# Patient Record
Sex: MALE | Race: WHITE | NOT HISPANIC OR LATINO | Employment: FULL TIME | URBAN - METROPOLITAN AREA
[De-identification: names, ages, dates, MRNs, and addresses within clinical notes are randomized per-mention and may not be internally consistent; named-entity substitution may affect disease eponyms.]

---

## 2017-01-04 ENCOUNTER — HOSPITAL ENCOUNTER (OUTPATIENT)
Facility: MEDICAL CENTER | Age: 52
End: 2017-01-05
Attending: EMERGENCY MEDICINE | Admitting: HOSPITALIST
Payer: COMMERCIAL

## 2017-01-04 ENCOUNTER — RESOLUTE PROFESSIONAL BILLING HOSPITAL PROF FEE (OUTPATIENT)
Dept: HOSPITALIST | Facility: MEDICAL CENTER | Age: 52
End: 2017-01-04
Payer: COMMERCIAL

## 2017-01-04 DIAGNOSIS — M54.50 ACUTE MIDLINE LOW BACK PAIN WITHOUT SCIATICA: ICD-10-CM

## 2017-01-04 DIAGNOSIS — R73.9 HYPERGLYCEMIA: ICD-10-CM

## 2017-01-04 DIAGNOSIS — E10.10 DIABETIC KETOACIDOSIS WITHOUT COMA ASSOCIATED WITH TYPE 1 DIABETES MELLITUS (HCC): ICD-10-CM

## 2017-01-04 PROBLEM — E11.10 DKA (DIABETIC KETOACIDOSES): Status: ACTIVE | Noted: 2017-01-04

## 2017-01-04 LAB
ALBUMIN SERPL BCP-MCNC: 4.5 G/DL (ref 3.2–4.9)
ALBUMIN SERPL BCP-MCNC: 4.7 G/DL (ref 3.2–4.9)
ALBUMIN/GLOB SERPL: 1.6 G/DL
ALBUMIN/GLOB SERPL: 1.6 G/DL
ALP SERPL-CCNC: 68 U/L (ref 30–99)
ALP SERPL-CCNC: 84 U/L (ref 30–99)
ALT SERPL-CCNC: 23 U/L (ref 2–50)
ALT SERPL-CCNC: 25 U/L (ref 2–50)
ANION GAP SERPL CALC-SCNC: 11 MMOL/L (ref 0–11.9)
ANION GAP SERPL CALC-SCNC: 15 MMOL/L (ref 0–11.9)
AST SERPL-CCNC: 10 U/L (ref 12–45)
AST SERPL-CCNC: 12 U/L (ref 12–45)
BASOPHILS # BLD AUTO: 0.1 % (ref 0–1.8)
BASOPHILS # BLD: 0.01 K/UL (ref 0–0.12)
BILIRUB SERPL-MCNC: 0.3 MG/DL (ref 0.1–1.5)
BILIRUB SERPL-MCNC: 0.5 MG/DL (ref 0.1–1.5)
BLOOD CULTURE HOLD CXBCH: NORMAL
BUN SERPL-MCNC: 23 MG/DL (ref 8–22)
BUN SERPL-MCNC: 28 MG/DL (ref 8–22)
CALCIUM SERPL-MCNC: 10.4 MG/DL (ref 8.5–10.5)
CALCIUM SERPL-MCNC: 9.7 MG/DL (ref 8.5–10.5)
CHLORIDE SERPL-SCNC: 105 MMOL/L (ref 96–112)
CHLORIDE SERPL-SCNC: 96 MMOL/L (ref 96–112)
CO2 SERPL-SCNC: 18 MMOL/L (ref 20–33)
CO2 SERPL-SCNC: 20 MMOL/L (ref 20–33)
CREAT SERPL-MCNC: 0.83 MG/DL (ref 0.5–1.4)
CREAT SERPL-MCNC: 1.04 MG/DL (ref 0.5–1.4)
EOSINOPHIL # BLD AUTO: 0 K/UL (ref 0–0.51)
EOSINOPHIL NFR BLD: 0 % (ref 0–6.9)
ERYTHROCYTE [DISTWIDTH] IN BLOOD BY AUTOMATED COUNT: 41.3 FL (ref 35.9–50)
EST. AVERAGE GLUCOSE BLD GHB EST-MCNC: 269 MG/DL
GFR SERPL CREATININE-BSD FRML MDRD: >60 ML/MIN/1.73 M 2
GFR SERPL CREATININE-BSD FRML MDRD: >60 ML/MIN/1.73 M 2
GLOBULIN SER CALC-MCNC: 2.8 G/DL (ref 1.9–3.5)
GLOBULIN SER CALC-MCNC: 2.9 G/DL (ref 1.9–3.5)
GLUCOSE BLD-MCNC: 298 MG/DL (ref 65–99)
GLUCOSE BLD-MCNC: 344 MG/DL (ref 65–99)
GLUCOSE BLD-MCNC: 384 MG/DL (ref 65–99)
GLUCOSE BLD-MCNC: 532 MG/DL (ref 65–99)
GLUCOSE SERPL-MCNC: 417 MG/DL (ref 65–99)
GLUCOSE SERPL-MCNC: 728 MG/DL (ref 65–99)
HBA1C MFR BLD: 11 % (ref 0–5.6)
HCT VFR BLD AUTO: 39.2 % (ref 42–52)
HGB BLD-MCNC: 14 G/DL (ref 14–18)
IMM GRANULOCYTES # BLD AUTO: 0.06 K/UL (ref 0–0.11)
IMM GRANULOCYTES NFR BLD AUTO: 0.6 % (ref 0–0.9)
LACTATE BLD-SCNC: 1.6 MMOL/L (ref 0.5–2)
LACTATE BLD-SCNC: 3.4 MMOL/L (ref 0.5–2)
LIPASE SERPL-CCNC: 20 U/L (ref 11–82)
LYMPHOCYTES # BLD AUTO: 0.9 K/UL (ref 1–4.8)
LYMPHOCYTES NFR BLD: 9.5 % (ref 22–41)
MCH RBC QN AUTO: 31.1 PG (ref 27–33)
MCHC RBC AUTO-ENTMCNC: 35.7 G/DL (ref 33.7–35.3)
MCV RBC AUTO: 87.1 FL (ref 81.4–97.8)
MONOCYTES # BLD AUTO: 0.33 K/UL (ref 0–0.85)
MONOCYTES NFR BLD AUTO: 3.5 % (ref 0–13.4)
NEUTROPHILS # BLD AUTO: 8.21 K/UL (ref 1.82–7.42)
NEUTROPHILS NFR BLD: 86.3 % (ref 44–72)
NRBC # BLD AUTO: 0 K/UL
NRBC BLD AUTO-RTO: 0 /100 WBC
PLATELET # BLD AUTO: 316 K/UL (ref 164–446)
PMV BLD AUTO: 9.5 FL (ref 9–12.9)
POTASSIUM SERPL-SCNC: 3.7 MMOL/L (ref 3.6–5.5)
POTASSIUM SERPL-SCNC: 4.4 MMOL/L (ref 3.6–5.5)
PROT SERPL-MCNC: 7.3 G/DL (ref 6–8.2)
PROT SERPL-MCNC: 7.6 G/DL (ref 6–8.2)
RBC # BLD AUTO: 4.5 M/UL (ref 4.7–6.1)
SODIUM SERPL-SCNC: 129 MMOL/L (ref 135–145)
SODIUM SERPL-SCNC: 136 MMOL/L (ref 135–145)
WBC # BLD AUTO: 9.5 K/UL (ref 4.8–10.8)

## 2017-01-04 PROCEDURE — 700102 HCHG RX REV CODE 250 W/ 637 OVERRIDE(OP): Performed by: HOSPITALIST

## 2017-01-04 PROCEDURE — 83036 HEMOGLOBIN GLYCOSYLATED A1C: CPT

## 2017-01-04 PROCEDURE — 700111 HCHG RX REV CODE 636 W/ 250 OVERRIDE (IP): Performed by: EMERGENCY MEDICINE

## 2017-01-04 PROCEDURE — 96372 THER/PROPH/DIAG INJ SC/IM: CPT

## 2017-01-04 PROCEDURE — 83605 ASSAY OF LACTIC ACID: CPT

## 2017-01-04 PROCEDURE — 99285 EMERGENCY DEPT VISIT HI MDM: CPT

## 2017-01-04 PROCEDURE — 700105 HCHG RX REV CODE 258: Performed by: EMERGENCY MEDICINE

## 2017-01-04 PROCEDURE — 96376 TX/PRO/DX INJ SAME DRUG ADON: CPT

## 2017-01-04 PROCEDURE — G0378 HOSPITAL OBSERVATION PER HR: HCPCS

## 2017-01-04 PROCEDURE — 302128 INFUSION PUMP W/POLE: Performed by: EMERGENCY MEDICINE

## 2017-01-04 PROCEDURE — 83690 ASSAY OF LIPASE: CPT

## 2017-01-04 PROCEDURE — 700102 HCHG RX REV CODE 250 W/ 637 OVERRIDE(OP): Performed by: EMERGENCY MEDICINE

## 2017-01-04 PROCEDURE — 36415 COLL VENOUS BLD VENIPUNCTURE: CPT

## 2017-01-04 PROCEDURE — 700105 HCHG RX REV CODE 258: Performed by: HOSPITALIST

## 2017-01-04 PROCEDURE — 85025 COMPLETE CBC W/AUTO DIFF WBC: CPT

## 2017-01-04 PROCEDURE — 96365 THER/PROPH/DIAG IV INF INIT: CPT

## 2017-01-04 PROCEDURE — 96361 HYDRATE IV INFUSION ADD-ON: CPT

## 2017-01-04 PROCEDURE — 80053 COMPREHEN METABOLIC PANEL: CPT

## 2017-01-04 PROCEDURE — A9270 NON-COVERED ITEM OR SERVICE: HCPCS | Performed by: HOSPITALIST

## 2017-01-04 PROCEDURE — 99220 PR INITIAL OBSERVATION CARE,LEVL III: CPT | Performed by: HOSPITALIST

## 2017-01-04 PROCEDURE — 96375 TX/PRO/DX INJ NEW DRUG ADDON: CPT

## 2017-01-04 PROCEDURE — 700111 HCHG RX REV CODE 636 W/ 250 OVERRIDE (IP): Performed by: HOSPITALIST

## 2017-01-04 PROCEDURE — 82962 GLUCOSE BLOOD TEST: CPT

## 2017-01-04 RX ORDER — MORPHINE SULFATE 4 MG/ML
4 INJECTION, SOLUTION INTRAMUSCULAR; INTRAVENOUS ONCE
Status: COMPLETED | OUTPATIENT
Start: 2017-01-04 | End: 2017-01-04

## 2017-01-04 RX ORDER — RIFAMPIN 300 MG/1
300 CAPSULE ORAL 2 TIMES DAILY
Status: ON HOLD | COMMUNITY
End: 2017-01-05

## 2017-01-04 RX ORDER — MORPHINE SULFATE 4 MG/ML
2 INJECTION, SOLUTION INTRAMUSCULAR; INTRAVENOUS ONCE
Status: COMPLETED | OUTPATIENT
Start: 2017-01-04 | End: 2017-01-04

## 2017-01-04 RX ORDER — BUPROPION HYDROCHLORIDE 300 MG/1
300 TABLET ORAL EVERY MORNING
Status: DISCONTINUED | OUTPATIENT
Start: 2017-01-04 | End: 2017-01-04

## 2017-01-04 RX ORDER — HYDROMORPHONE HYDROCHLORIDE 2 MG/1
2 TABLET ORAL
Status: DISCONTINUED | OUTPATIENT
Start: 2017-01-04 | End: 2017-01-05 | Stop reason: HOSPADM

## 2017-01-04 RX ORDER — ATORVASTATIN CALCIUM 20 MG/1
20 TABLET, FILM COATED ORAL EVERY EVENING
Status: DISCONTINUED | OUTPATIENT
Start: 2017-01-04 | End: 2017-01-05 | Stop reason: HOSPADM

## 2017-01-04 RX ORDER — SODIUM CHLORIDE 9 MG/ML
INJECTION, SOLUTION INTRAVENOUS CONTINUOUS
Status: DISCONTINUED | OUTPATIENT
Start: 2017-01-04 | End: 2017-01-05 | Stop reason: HOSPADM

## 2017-01-04 RX ORDER — SODIUM CHLORIDE 9 MG/ML
INJECTION, SOLUTION INTRAVENOUS CONTINUOUS
Status: DISCONTINUED | OUTPATIENT
Start: 2017-01-04 | End: 2017-01-04

## 2017-01-04 RX ORDER — SODIUM CHLORIDE 9 MG/ML
1000 INJECTION, SOLUTION INTRAVENOUS CONTINUOUS
Status: ACTIVE | OUTPATIENT
Start: 2017-01-04 | End: 2017-01-04

## 2017-01-04 RX ORDER — ONDANSETRON 4 MG/1
4 TABLET, ORALLY DISINTEGRATING ORAL EVERY 4 HOURS PRN
Status: DISCONTINUED | OUTPATIENT
Start: 2017-01-04 | End: 2017-01-05 | Stop reason: HOSPADM

## 2017-01-04 RX ORDER — PROMETHAZINE HYDROCHLORIDE 25 MG/1
12.5-25 TABLET ORAL EVERY 4 HOURS PRN
Status: DISCONTINUED | OUTPATIENT
Start: 2017-01-04 | End: 2017-01-05 | Stop reason: HOSPADM

## 2017-01-04 RX ORDER — AMOXICILLIN AND CLAVULANATE POTASSIUM 875; 125 MG/1; MG/1
1 TABLET, FILM COATED ORAL 2 TIMES DAILY
Status: ON HOLD | COMMUNITY
End: 2017-01-05

## 2017-01-04 RX ORDER — ONDANSETRON 2 MG/ML
4 INJECTION INTRAMUSCULAR; INTRAVENOUS EVERY 4 HOURS PRN
Status: DISCONTINUED | OUTPATIENT
Start: 2017-01-04 | End: 2017-01-05 | Stop reason: HOSPADM

## 2017-01-04 RX ORDER — ONDANSETRON 2 MG/ML
4 INJECTION INTRAMUSCULAR; INTRAVENOUS ONCE
Status: COMPLETED | OUTPATIENT
Start: 2017-01-04 | End: 2017-01-04

## 2017-01-04 RX ORDER — LACTOBACILLUS RHAMNOSUS GG 10B CELL
1 CAPSULE ORAL DAILY
COMMUNITY
End: 2017-01-16

## 2017-01-04 RX ORDER — THYROID 30 MG/1
75 TABLET ORAL DAILY
Status: DISCONTINUED | OUTPATIENT
Start: 2017-01-05 | End: 2017-01-05 | Stop reason: HOSPADM

## 2017-01-04 RX ORDER — ACETAMINOPHEN 325 MG/1
650 TABLET ORAL EVERY 6 HOURS PRN
Status: DISCONTINUED | OUTPATIENT
Start: 2017-01-04 | End: 2017-01-05 | Stop reason: HOSPADM

## 2017-01-04 RX ORDER — PROMETHAZINE HYDROCHLORIDE 25 MG/1
12.5-25 SUPPOSITORY RECTAL EVERY 4 HOURS PRN
Status: DISCONTINUED | OUTPATIENT
Start: 2017-01-04 | End: 2017-01-05 | Stop reason: HOSPADM

## 2017-01-04 RX ORDER — INSULIN GLARGINE 100 [IU]/ML
22 INJECTION, SOLUTION SUBCUTANEOUS EVERY EVENING
Status: DISCONTINUED | OUTPATIENT
Start: 2017-01-04 | End: 2017-01-05 | Stop reason: HOSPADM

## 2017-01-04 RX ORDER — SODIUM CHLORIDE 9 MG/ML
1000 INJECTION, SOLUTION INTRAVENOUS CONTINUOUS
Status: DISCONTINUED | OUTPATIENT
Start: 2017-01-04 | End: 2017-01-04

## 2017-01-04 RX ORDER — BUPROPION HYDROCHLORIDE 150 MG/1
150 TABLET, EXTENDED RELEASE ORAL 2 TIMES DAILY
Status: DISCONTINUED | OUTPATIENT
Start: 2017-01-04 | End: 2017-01-05 | Stop reason: HOSPADM

## 2017-01-04 RX ORDER — LACTOBACILLUS RHAMNOSUS GG 10B CELL
1 CAPSULE ORAL DAILY
Status: DISCONTINUED | OUTPATIENT
Start: 2017-01-04 | End: 2017-01-04

## 2017-01-04 RX ORDER — MESALAMINE 1.2 G/1
2.4 TABLET, DELAYED RELEASE ORAL 2 TIMES DAILY
Status: DISCONTINUED | OUTPATIENT
Start: 2017-01-04 | End: 2017-01-05 | Stop reason: HOSPADM

## 2017-01-04 RX ORDER — DEXTROSE MONOHYDRATE 25 G/50ML
25 INJECTION, SOLUTION INTRAVENOUS
Status: DISCONTINUED | OUTPATIENT
Start: 2017-01-04 | End: 2017-01-05 | Stop reason: HOSPADM

## 2017-01-04 RX ADMIN — HYDROMORPHONE HYDROCHLORIDE 1 MG: 1 INJECTION, SOLUTION INTRAMUSCULAR; INTRAVENOUS; SUBCUTANEOUS at 12:24

## 2017-01-04 RX ADMIN — HYDROMORPHONE HYDROCHLORIDE 2 MG: 2 TABLET ORAL at 21:30

## 2017-01-04 RX ADMIN — INSULIN HUMAN 10 UNITS: 100 INJECTION, SOLUTION PARENTERAL at 13:29

## 2017-01-04 RX ADMIN — INSULIN GLARGINE 22 UNITS: 100 INJECTION, SOLUTION SUBCUTANEOUS at 21:31

## 2017-01-04 RX ADMIN — SODIUM CHLORIDE 1000 ML: 9 INJECTION, SOLUTION INTRAVENOUS at 16:12

## 2017-01-04 RX ADMIN — INSULIN LISPRO 10 UNITS: 100 INJECTION, SOLUTION INTRAVENOUS; SUBCUTANEOUS at 17:51

## 2017-01-04 RX ADMIN — SODIUM CHLORIDE: 9 INJECTION, SOLUTION INTRAVENOUS at 21:35

## 2017-01-04 RX ADMIN — BUPROPION HYDROCHLORIDE 150 MG: 150 TABLET, FILM COATED, EXTENDED RELEASE ORAL at 21:30

## 2017-01-04 RX ADMIN — ENOXAPARIN SODIUM 40 MG: 100 INJECTION SUBCUTANEOUS at 16:12

## 2017-01-04 RX ADMIN — SODIUM CHLORIDE: 9 INJECTION, SOLUTION INTRAVENOUS at 10:56

## 2017-01-04 RX ADMIN — ONDANSETRON 4 MG: 2 INJECTION, SOLUTION INTRAMUSCULAR; INTRAVENOUS at 10:56

## 2017-01-04 RX ADMIN — MORPHINE SULFATE 4 MG: 4 INJECTION INTRAVENOUS at 10:56

## 2017-01-04 RX ADMIN — ATORVASTATIN CALCIUM 20 MG: 20 TABLET, FILM COATED ORAL at 21:30

## 2017-01-04 RX ADMIN — SODIUM CHLORIDE: 9 INJECTION, SOLUTION INTRAVENOUS at 13:45

## 2017-01-04 RX ADMIN — SODIUM CHLORIDE 10 UNITS/HR: 9 INJECTION, SOLUTION INTRAVENOUS at 13:50

## 2017-01-04 RX ADMIN — INSULIN LISPRO 7 UNITS: 100 INJECTION, SOLUTION INTRAVENOUS; SUBCUTANEOUS at 21:32

## 2017-01-04 RX ADMIN — MORPHINE SULFATE 2 MG: 4 INJECTION INTRAVENOUS at 13:45

## 2017-01-04 ASSESSMENT — PAIN SCALES - GENERAL
PAINLEVEL_OUTOF10: 3
PAINLEVEL_OUTOF10: 5
PAINLEVEL_OUTOF10: ASSUMED PAIN PRESENT
PAINLEVEL_OUTOF10: 5

## 2017-01-04 ASSESSMENT — LIFESTYLE VARIABLES
ALCOHOL_USE: NO
EVER_SMOKED: NEVER

## 2017-01-04 NOTE — ED PROVIDER NOTES
ED Provider Note    CHIEF COMPLAINT  No chief complaint on file.      HPI  Mahesh Bradshaw is a 51 y.o. male who presents with history of hyperglycemia. Evidently he checked his glucose this morning, 6:30 AM, her glucose was 391. He checked his glucose again at 9:30 AM this morning and it read,high. History of insulin-dependent diabetes, last insulin 6:30 AM this morning. He did have an injection, epidural, 60 mg of Kenalog, epidural injection 6:30 PM last night. No fever no chills nausea and vomiting, 9 AM last vomiting here no diarrhea no chest pain no abdominal pain.      REVIEW OF SYSTEMS  See HPI for further details. History of chronic back pain, recent epidural injection, 40 mg Kenalog, 6:30 PM last night. History of asthma high-grade headaches depression diabetes kidney stones doubt sepsis. Recent umbilical hernia surgery. Was followed by a seroma, infection, his last Augmentin was last night.     All other systems are negative.     PAST MEDICAL HISTORY  Past Medical History   Diagnosis Date   • ASTHMA    • Migraine    • Hypothyroid    • Depression    • Depression 11/26/2014   • Kidney stones    • Gout    • Sepsis ([facility]) 1/21/2016   • Essential hypertension 1/22/2016   • Diabetes ([facility])    • ADRIANE (acute kidney injury) ([facility]) 2/24/2016       FAMILY HISTORY  No family history on file.    SOCIAL HISTORY  Social History     Social History   • Marital Status:      Spouse Name: N/A   • Number of Children: N/A   • Years of Education: N/A     Social History Main Topics   • Smoking status: Never Smoker    • Smokeless tobacco: Not on file   • Alcohol Use: Yes      Comment: occ   • Drug Use: No   • Sexual Activity: Not on file     Other Topics Concern   • Not on file     Social History Narrative    ** Merged History Encounter **         ** Merged History Encounter **            SURGICAL HISTORY  Past Surgical History   Procedure Laterality Date   • Other orthopedic surgery       Left Wrist ORIF   • Carmenza by  laparoscopy     • Colonoscopy with biopsy  11/26/2014     Performed by Solo Higginbotham M.D. at ENDOSCOPY [facility]   • Umbilical hernia repair N/A 11/6/2016     Procedure: UMBILICAL HERNIA REPAIR;  Surgeon: Esau Dooley M.D.;  Location: SURGERY [facility];  Service:        CURRENT MEDICATIONS  Home Medications     **Home medications have not yet been reviewed for this encounter**          ALLERGIES  Allergies   Allergen Reactions   • Peanut-Derived Anaphylaxis     Peanuts    • Hydrocodone-Acetaminophen Hives     Has tolerated morphine and oxycodone previously        PHYSICAL EXAM  VITAL SIGNS: There were no vitals taken for this visit.  Constitutional: Well developed, Well nourished, No acute distress, Non-toxic appearance.   HENT: Normocephalic, Atraumatic, Bilateral external ears normal, Oropharynx moist, No oral exudates, Nose normal.   Eyes: PERRL, EOMI, Conjunctiva normal, No discharge.   Neck: Normal range of motion, No tenderness, Supple, No stridor.   Lymphatic: No lymphadenopathy noted.   Cardiovascular: Normal heart rate, Normal rhythm, No murmurs, No rubs, No gallops.   Thorax & Lungs: Normal breath sounds, No respiratory distress, No wheezing, No chest tenderness.   Abdomen: Slightly tender umbilicus where he has had recent surgery, no guarding no rigidity and the abdomen is soft.  No masses, No pulsatile masses.  Skin: Warm, Dry, No erythema, No rash.   Back: Examination of the back reveals tender lumbar. Straight leg raise is positive bilaterally. Deep tendon reflexes equal bilaterally. Toe and heel flexion and extension are normal. Motor sensory exam of the lower legs is normal  Extremities: Intact distal pulses, No edema, No tenderness, No cyanosis, No clubbing.   Musculoskeletal: Good range of motion in all major joints. No tenderness to palpation or major deformities noted.   Neurologic: Alert & oriented x 3, Normal motor function, Normal sensory function, No focal deficits  noted.   Psychiatric: Affect normal, Judgment normal, Mood normal.       RADIOLOGY/PROCEDURES      COURSE & MEDICAL DECISION MAKING  Pertinent Labs & Imaging studies reviewed. (See chart for details) electrolytes, sodium low 129 bicarb of 18, glucose 728.  He is given insulin, regular insulin, 10 unit bolus, then a 0.1/kg per hour drip of regular insulin, IV normal saline, thousand cc an hour.. I will talk with the hospitalist about admission    FINAL IMPRESSION  1.   1. Hyperglycemia        2. Diabetic ketoacidosis     3. Low back pain     Disposition  I have talked with the hospitalist about admission. He had epidural injection yesterday, still having back pain, given narcotic analgesics for his back pain.  Electronically signed by: Brady Posadas, 1/4/2017 10:43 AM

## 2017-01-04 NOTE — ED NOTES
Chief Complaint   Patient presents with   • High Blood Sugar   • Abdominal Pain     1030-Patient states he had a steroid shot yesterday for chronic back pain, FSBS at home will not give reading just shows high. Normal insulin regimen not controlling BS. Patient c/o N/V. Also has abdominal pain, but states recent history of hernia surgery.

## 2017-01-04 NOTE — DISCHARGE PLANNING
Care Transition Team Assessment    Information Source  Orientation : Oriented x 4  Who is responsible for making decisions for patient? : Patient  Source of Information: Patient  Primary Caregiver for Others?: Minor child  Why do you believe you were admitted?: high blood sugar    Readmission Evaluation  Is this a readmission?: No    Elopement Risk  Legal Hold: No  Ambulatory or Self Mobile in Wheelchair: No-Not an Elopement Risk    Interdisciplinary Discharge Planning  Does Admitting Nurse Feel This Could be a Complex Discharge?: No  Primary Care Physician: Rodolfo Stein  Lives with - Patient's Self Care Capacity: Significant Other, Child Less than 18 Years of Age  Support Systems: Spouse / Significant Other, Friends / Neighbors  Housing / Facility: [address] [place] [address]  Do You Take your Prescribed Medications Regularly: Yes  Able to Return to Previous ADL's: Yes  Mobility Issues: No  Prior Services: None  Durable Medical Equipment: Not Applicable    Discharge Preparedness  [facility] resources needed?: None  Do you have concerns about your hospital stay or care after discharge?: No  Difficulity with ADLs: None  Difficulity with IADLs: None  Pharmacy: [facility] in [facility]  Prescription Coverage: Yes    Functional Assesment  Prior Functional Level: Independent with Activities of Daily Living    Finances  Source of Income: Employed  Medical Insurance Coverage: Private insurance              Advance Directive  Advance Directive?: Living Will  Document Location: Not available (place on chart w/in 36 hrs)              Discharge Risks or Barriers  Discharge risks or barriers?: No  Patient risk factors:  (multiple admissions 2016)    Anticipated Discharge Information  Anticipated discharge disposition: Home  Discharge Address: [address] [place]  Discharge Contact Phone Number: [phone]

## 2017-01-04 NOTE — ED NOTES
Med rec complete per pt  Allergies reviewed   Pt normally takes Arimidex and Depo-Testosterone on Tuesdays, but Pt was on vacation.  Allergies reviewed  Pt [facility]

## 2017-01-04 NOTE — IP AVS SNAPSHOT
" After Visit Summary                                                                                                                  Name:Mahesh Bradshaw  Medical Record Number:[id number]  CSN: [id number]    YOB: 1965   Age: 51 y.o.  Sex: male  HT:1.727 m (5' 8\") WT: 102.967 kg (227 lb)          Admit Date: 1/4/2017     Discharge Date:   Today's Date: 1/5/2017  Attending Doctor:  Carlos Kapoor M.D.                  Allergies:  Peanut-derived; Tradjenta; and Hydrocodone-acetaminophen            Discharge Instructions       Discharge Instructions    Discharged to home by car with relative. Discharged via wheelchair, hospital escort: Yes.  Special equipment needed: Not Applicable    Be sure to schedule a follow-up appointment with your primary care doctor or any specialists as instructed.     Discharge Plan:   Diet Plan: Discussed  Activity Level: Discussed  Confirmed Follow up Appointment: Patient to Call and Schedule Appointment  Confirmed Symptoms Management: Discussed  Medication Reconciliation Updated: Yes  Influenza Vaccine Indication: Not indicated: Previously immunized this influenza season and > 8 years of age    I understand that a diet low in cholesterol, fat, and sodium is recommended for good health. Unless I have been given specific instructions below for another diet, I accept this instruction as my diet prescription.   Other diet: DIABETIC    Special Instructions: None    · Is patient discharged on Warfarin / Coumadin?   No     · Is patient Post Blood Transfusion?  No    Depression / Suicide Risk    As you are discharged from this [facility], it is important to learn how to keep safe from harming yourself.    Recognize the warning signs:  · Abrupt changes in personality, positive or negative- including increase in energy   · Giving away possessions  · Change in eating patterns- significant weight changes-  positive or negative  · Change in sleeping patterns- unable to sleep " or sleeping all the time   · Unwillingness or inability to communicate  · Depression  · Unusual sadness, discouragement and loneliness  · Talk of wanting to die  · Neglect of personal appearance   · Rebelliousness- reckless behavior  · Withdrawal from people/activities they love  · Confusion- inability to concentrate     If you or a loved one observes any of these behaviors or has concerns about self-harm, here's what you can do:  · Talk about it- your feelings and reasons for harming yourself  · Remove any means that you might use to hurt yourself (examples: pills, rope, extension cords, firearm)  · Get professional help from the community (Mental Health, Substance Abuse, psychological counseling)  · Do not be alone:Call your Safe Contact- someone whom you trust who will be there for you.  · Call your local CRISIS HOTLINE 998-5025 or 741-488-3346  · Call your local Children's Mobile Crisis Response Team [place] (285) 795-9758 or www.[employer]  · Call the toll free National Suicide Prevention Hotlines   · National Suicide Prevention Lifeline 059-240-KWQE (9140)  · [employer] Hope Line Network 800-SUICIDE (868-6468)        Follow-up Information     1. Follow up with Rodolfo Stein M.D.. Schedule an appointment as soon as possible for a visit in 1 week.    Specialty:  Internal Medicine    Contact information    [address]  [address]  [facility] [address]  [phone]           Discharge Medication Instructions:    Below are the medications your physician expects you to take upon discharge:    Review all your home medications and newly ordered medications with your doctor and/or pharmacist. Follow medication instructions as directed by your doctor and/or pharmacist.    Please keep your medication list with you and share with your physician.               Medication List      START taking these medications        Instructions    Insulin Glargine 300 UNIT/ML Sopn   Commonly known as:  TOUJEO SOLOSTAR     Inject 26 Units as instructed every day. Usually takes between 1900 and 2000   Dose:  26 Units       insulin lispro (Human) 100 UNIT/ML Sopn injection   Last time this was given:  10 Units on 1/5/2017 12:11 PM   Commonly known as:  HUMALOG    Inject 2-12 Units as instructed 4 Times a Day,Before Meals and at Bedtime. 70- 150 =0 units 151 - 200=2 units 201 - 299=4 units 301 - 350=6 units  351 - 400=8 units 400- 500= 10 units   Dose:  2-12 Units         CONTINUE taking these medications        Instructions    anastrozole 1 MG Tabs   Commonly known as:  ARIMIDEX    Take 1 mg by mouth 2X A WEEK. Tues and Fri   Dose:  1 mg       ARMOUR THYROID PO   Last time this was given:  75 mg on 1/5/2017  7:49 AM    Take 75 mg by mouth every day.   Dose:  75 mg       aspirin 81 MG tablet    Take 81 mg by mouth every day.   Dose:  81 mg       atorvastatin 20 MG Tabs   Last time this was given:  20 mg on 1/4/2017  9:30 PM   Commonly known as:  LIPITOR    Take 20 mg by mouth every day.   Dose:  20 mg       buPROPion 300 MG XL tablet   Commonly known as:  WELLBUTRIN XL    Take 300 mg by mouth every morning.   Dose:  300 mg       CULTURELLE DIGESTIVE HEALTH Caps    Take 1 Cap by mouth every day.   Dose:  1 Cap       [facility] VIT D 5000 HIGH-POTENCY PO    Take 1 Tab by mouth every day.   Dose:  1 Tab       HYDROmorphone 2 MG Tabs   Last time this was given:  2 mg on 1/4/2017  9:30 PM   Commonly known as:  DILAUDID    Take 2 mg by mouth 1 time daily as needed for Severe Pain (back pain).   Dose:  2 mg       LIALDA 1.2 GM Tbec   Generic drug:  mesalamine    Take 2.4 g by mouth 2 Times a Day.   Dose:  2.4 g       ondansetron 4 MG Tbdp   Commonly known as:  ZOFRAN ODT    Take 1 Tab by mouth every four hours as needed.   Dose:  4 mg       testosterone cypionate 200 MG/ML Soln injection   Commonly known as:  DEPO-TESTOSTERONE    200 mg by Intramuscular route every Tuesday.   Dose:  200 mg         STOP taking these medications      amoxicillin-clavulanate 875-125 MG Tabs   Commonly known as:  AUGMENTIN       rifampin 300 MG Caps   Commonly known as:  RIFADINE               Instructions           Diet / Nutrition:    Follow any diet instructions given to you by your doctor or the dietician, including how much salt (sodium) you are allowed each day.    If you are overweight, talk to your doctor about a weight reduction plan.    Activity:    Remain physically active following your doctor's instructions about exercise and activity.    Rest often.     Any time you become even a little tired or short of breath, SIT DOWN and rest.    Worsening Symptoms:    Report any of the following signs and symptoms to the doctor's office immediately:    *Pain of jaw, arm, or neck  *Chest pain not relieved by medication                               *Dizziness or loss of consciousness  *Difficulty breathing even when at rest   *More tired than usual                                       *Bleeding drainage or swelling of surgical site  *Swelling of feet, ankles, legs or stomach                 *Fever (>100ºF)  *Pink or blood tinged sputum  *Weight gain (3lbs/day or 5lbs /week)           *Shock from internal defibrillator (if applicable)  *Palpitations or irregular heartbeats                *Cool and/or numb extremities    Stroke Awareness    Common Risk Factors for Stroke include:    Age  Atrial Fibrillation  Carotid Artery Stenosis  Diabetes Mellitus  Excessive alcohol consumption  High blood pressure  Overweight   Physical inactivity  Smoking    Warning signs and symptoms of a stroke include:    *Sudden numbness or weakness of the face, arm or leg (especially on one side of the body).  *Sudden confusion, trouble speaking or understanding.  *Sudden trouble seeing in one or both eyes.  *Sudden trouble walking, dizziness, loss of balance or coordination.Sudden severe headache with no known cause.    It is very important to get treatment quickly when a stroke occurs.  If you experience any of the above warning signs, call 911 immediately.                   Disclaimer         Quit Smoking / Tobacco Use:    I understand the use of any tobacco products increases my chance of suffering from future heart disease or stroke and could cause other illnesses which may shorten my life. Quitting the use of tobacco products is the single most important thing I can do to improve my health. For further information on smoking / tobacco cessation call a Toll Free Quit Line at 1-894.315.9704 (*National Cancer [place]) or 1-495.678.5742 (American Lung Association) or you can access the web based program at www.lungusa.org.    [place] Tobacco Users Help Line:  (587) 786-9249       Toll Free: 1-453.956.1337  Quit Tobacco Program [facility] (013)528-9320    Crisis Hotline:    [place] Crisis Hotline:  8-441-PCEGQGX or 1-983.300.8289    [place] Crisis Hotline:    1-515.941.3020 or 958-178-7018    Discharge Survey:   Thank you for choosing [facility]. We hope we did everything we could to make your hospital stay a pleasant one. You may be receiving a phone survey and we would appreciate your time and participation in answering the questions. Your input is very valuable to us in our efforts to improve our service to our patients and their families.        My signature on this form indicates that:    1. I have reviewed and understand the above information.  2. My questions regarding this information have been answered to my satisfaction.  3. I have formulated a plan with my discharge nurse to obtain my prescribed medications for home.                  Disclaimer         __________________________________                     __________       ________                       Patient Signature                                                 Date                    Time

## 2017-01-05 VITALS
BODY MASS INDEX: 34.4 KG/M2 | HEART RATE: 65 BPM | SYSTOLIC BLOOD PRESSURE: 104 MMHG | TEMPERATURE: 98.1 F | HEIGHT: 68 IN | WEIGHT: 227 LBS | DIASTOLIC BLOOD PRESSURE: 58 MMHG | RESPIRATION RATE: 16 BRPM | OXYGEN SATURATION: 95 %

## 2017-01-05 PROBLEM — E87.20 LACTIC ACIDOSIS: Status: RESOLVED | Noted: 2017-01-05 | Resolved: 2017-01-05

## 2017-01-05 PROBLEM — E87.1 HYPONATREMIA: Status: ACTIVE | Noted: 2017-01-05

## 2017-01-05 PROBLEM — E87.20 LACTIC ACIDOSIS: Status: ACTIVE | Noted: 2017-01-05

## 2017-01-05 PROBLEM — E87.1 HYPONATREMIA: Status: RESOLVED | Noted: 2017-01-05 | Resolved: 2017-01-05

## 2017-01-05 PROBLEM — E11.10 DKA (DIABETIC KETOACIDOSES): Status: RESOLVED | Noted: 2017-01-04 | Resolved: 2017-01-05

## 2017-01-05 LAB
ANION GAP SERPL CALC-SCNC: 12 MMOL/L (ref 0–11.9)
BUN SERPL-MCNC: 22 MG/DL (ref 8–22)
CALCIUM SERPL-MCNC: 9 MG/DL (ref 8.5–10.5)
CHLORIDE SERPL-SCNC: 104 MMOL/L (ref 96–112)
CO2 SERPL-SCNC: 20 MMOL/L (ref 20–33)
CREAT SERPL-MCNC: 0.66 MG/DL (ref 0.5–1.4)
GFR SERPL CREATININE-BSD FRML MDRD: >60 ML/MIN/1.73 M 2
GLUCOSE BLD-MCNC: 201 MG/DL (ref 65–99)
GLUCOSE BLD-MCNC: 249 MG/DL (ref 65–99)
GLUCOSE BLD-MCNC: 303 MG/DL (ref 65–99)
GLUCOSE BLD-MCNC: 338 MG/DL (ref 65–99)
GLUCOSE SERPL-MCNC: 251 MG/DL (ref 65–99)
LACTATE BLD-SCNC: 1.5 MMOL/L (ref 0.5–2)
LACTATE BLD-SCNC: 1.9 MMOL/L (ref 0.5–2)
POTASSIUM SERPL-SCNC: 4.7 MMOL/L (ref 3.6–5.5)
SODIUM SERPL-SCNC: 136 MMOL/L (ref 135–145)

## 2017-01-05 PROCEDURE — 700105 HCHG RX REV CODE 258: Performed by: HOSPITALIST

## 2017-01-05 PROCEDURE — G0378 HOSPITAL OBSERVATION PER HR: HCPCS

## 2017-01-05 PROCEDURE — 80048 BASIC METABOLIC PNL TOTAL CA: CPT

## 2017-01-05 PROCEDURE — 700102 HCHG RX REV CODE 250 W/ 637 OVERRIDE(OP): Performed by: NURSE PRACTITIONER

## 2017-01-05 PROCEDURE — 700102 HCHG RX REV CODE 250 W/ 637 OVERRIDE(OP): Performed by: HOSPITALIST

## 2017-01-05 PROCEDURE — 82962 GLUCOSE BLOOD TEST: CPT | Mod: 91

## 2017-01-05 PROCEDURE — 99217 PR OBSERVATION CARE DISCHARGE: CPT | Performed by: HOSPITALIST

## 2017-01-05 PROCEDURE — 36415 COLL VENOUS BLD VENIPUNCTURE: CPT

## 2017-01-05 PROCEDURE — 700111 HCHG RX REV CODE 636 W/ 250 OVERRIDE (IP): Performed by: HOSPITALIST

## 2017-01-05 PROCEDURE — 96361 HYDRATE IV INFUSION ADD-ON: CPT

## 2017-01-05 PROCEDURE — A9270 NON-COVERED ITEM OR SERVICE: HCPCS | Performed by: HOSPITALIST

## 2017-01-05 PROCEDURE — 96372 THER/PROPH/DIAG INJ SC/IM: CPT

## 2017-01-05 PROCEDURE — 83605 ASSAY OF LACTIC ACID: CPT

## 2017-01-05 RX ORDER — INSULIN GLARGINE 100 [IU]/ML
8 INJECTION, SOLUTION SUBCUTANEOUS ONCE
Status: COMPLETED | OUTPATIENT
Start: 2017-01-05 | End: 2017-01-05

## 2017-01-05 RX ADMIN — SODIUM CHLORIDE: 9 INJECTION, SOLUTION INTRAVENOUS at 05:41

## 2017-01-05 RX ADMIN — INSULIN LISPRO 4 UNITS: 100 INJECTION, SOLUTION INTRAVENOUS; SUBCUTANEOUS at 06:38

## 2017-01-05 RX ADMIN — ENOXAPARIN SODIUM 40 MG: 100 INJECTION SUBCUTANEOUS at 07:44

## 2017-01-05 RX ADMIN — ACETAMINOPHEN 650 MG: 325 TABLET, FILM COATED ORAL at 10:39

## 2017-01-05 RX ADMIN — INSULIN LISPRO 10 UNITS: 100 INJECTION, SOLUTION INTRAVENOUS; SUBCUTANEOUS at 12:11

## 2017-01-05 RX ADMIN — INSULIN GLARGINE 8 UNITS: 100 INJECTION, SOLUTION SUBCUTANEOUS at 10:44

## 2017-01-05 RX ADMIN — THYROID, PORCINE 75 MG: 30 TABLET ORAL at 07:49

## 2017-01-05 RX ADMIN — BUPROPION HYDROCHLORIDE 150 MG: 150 TABLET, FILM COATED, EXTENDED RELEASE ORAL at 07:48

## 2017-01-05 ASSESSMENT — PAIN SCALES - GENERAL
PAINLEVEL_OUTOF10: 5
PAINLEVEL_OUTOF10: 3

## 2017-01-05 NOTE — ED NOTES
Report from NOC shift.  Scheduled AM medications given per MAR.  Denies N/V.  3/10 chronic back pain reported, declines pain medication, warm blankets provided.  Call light within reach.  Maintenance IVF infusing.

## 2017-01-05 NOTE — DISCHARGE INSTRUCTIONS
Discharge Instructions    Discharged to home by car with relative. Discharged via wheelchair, hospital escort: Yes.  Special equipment needed: Not Applicable    Be sure to schedule a follow-up appointment with your primary care doctor or any specialists as instructed.     Discharge Plan:   Diet Plan: Discussed  Activity Level: Discussed  Confirmed Follow up Appointment: Patient to Call and Schedule Appointment  Confirmed Symptoms Management: Discussed  Medication Reconciliation Updated: Yes  Influenza Vaccine Indication: Not indicated: Previously immunized this influenza season and > 8 years of age    I understand that a diet low in cholesterol, fat, and sodium is recommended for good health. Unless I have been given specific instructions below for another diet, I accept this instruction as my diet prescription.   Other diet: DIABETIC    Special Instructions: None    · Is patient discharged on Warfarin / Coumadin?   No     · Is patient Post Blood Transfusion?  No    Depression / Suicide Risk    As you are discharged from this [facility] facility, it is important to learn how to keep safe from harming yourself.    Recognize the warning signs:  · Abrupt changes in personality, positive or negative- including increase in energy   · Giving away possessions  · Change in eating patterns- significant weight changes-  positive or negative  · Change in sleeping patterns- unable to sleep or sleeping all the time   · Unwillingness or inability to communicate  · Depression  · Unusual sadness, discouragement and loneliness  · Talk of wanting to die  · Neglect of personal appearance   · Rebelliousness- reckless behavior  · Withdrawal from people/activities they love  · Confusion- inability to concentrate     If you or a loved one observes any of these behaviors or has concerns about self-harm, here's what you can do:  · Talk about it- your feelings and reasons for harming yourself  · Remove any means that you might use to hurt  yourself (examples: pills, rope, extension cords, firearm)  · Get professional help from the community (Mental Health, Substance Abuse, psychological counseling)  · Do not be alone:Call your Safe Contact- someone whom you trust who will be there for you.  · Call your local CRISIS HOTLINE 539-5288 or 510-840-7257  · Call your local Children's Mobile Crisis Response Team [place] (274) 085-1784 or www.[employer]  · Call the toll free National Suicide Prevention Hotlines   · National Suicide Prevention Lifeline 959-672-EVUQ (0031)  · National Hope Line Network 800-SUICIDE (684-0325)

## 2017-01-05 NOTE — PROGRESS NOTES
Discuss to patient about Plan of care. Continue with IV fluids and monitor FSBS. Alert and oriented, pleasant. Chronic back pain not asking for pain medication.

## 2017-01-05 NOTE — PROGRESS NOTES
IV AND TELE DCED INSTRUCTIONS GIVEN WITH SCRIPTS ANSWERED ALL QUESTIONS  TO LOBBY VIA W/C CONDITION STABLE

## 2017-01-05 NOTE — PROGRESS NOTES
Report received.  Assumed Care.  Pt in bed. AOx4, responds appropriately.  Denies pain, SOB.  Plan of care discussed, pt verbalizes understanding.  Call light and belongings within reach, treaded slipper socks on,bed in lowest position.will monitor.

## 2017-01-05 NOTE — RESPIRATORY CARE
COPD EDUCATION by COPD CLINICAL EDUCATOR  1/5/2017 at 7:52 AM by Carleen Ohara     Patient reviewed by COPD education team. Patient does not qualify for COPD program.

## 2017-01-05 NOTE — H&P
PRIMARY CARE PHYSICIAN:  Rodolfo Stein MD.    CHIEF COMPLAINT:  Uncontrolled blood sugars with nausea, vomiting, and   abdominal cramps.    HISTORY OF PRESENT ILLNESS:  Patient is a 51-year-old male with history of   insulin-dependent diabetes, ulcerative colitis, hypothyroidism, chronic low   back pain, and post-lumbar epidural steroid injection yesterday, evaluated   here at [facility] with complaints of uncontrolled blood sugars following with   nausea, vomiting, abdominal cramps, and mainly fatigue.  The patient reports   that his normal blood sugars run in 160-180s range.  Following steroid   injection yesterday blood sugars noted to be in the 400s.  He routinely takes   long acting insulin 18-22 units with a sliding scale as needed, although   recently has not required sliding scale.  He has had nausea and vomiting   throughout the morning and while at work in his office with abdominal cramps   associated with his nausea and vomiting.  Patient had recent complication with   infected abdominal hernia site with seroma, completed Robaxin and Augmentin   yesterday with decrease in drainage, now minimal with no significant redness.    Had the umbilical wound area packed.  He has had increased urinary frequency,   of late.  No dysuria.  Weight stable of late.  Patient without chest pain or   shortness of breath.  No fevers, chills, or cough.  No melena or hematochezia.    REVIEW OF SYSTEMS:  As above, otherwise negative according to AMA and CMS   criteria.    PAST MEDICAL HISTORY:  Includes:  1.  Insulin-dependent diabetes, diagnosed March 2016.  2.  Chronic low back pain.  3.  Hypothyroidism.  4.  History of anxiety.  5.  Ulcerative colitis.  6.  Recent umbilical hernia repair 11/06/2016.  7.  Complications of infection with seroma having recently completed   antibiotics as described above.  8.  Acute respiratory failure with ventilator dependence, extubated   03/05/2016, with MRSA aspiration pneumonia.  9.  Acute  renal failure.  10.  Encephalopathy due to hypoglycemia.  11.  History of asthma and gout.  12.  Low testosterone.    PAST SURGICAL HISTORY:  1.  Incarcerated umbilical hernia repair with mesh, 11/06/2016.  2.  Cholecystectomy.  3.  Left wrist surgery.    MEDICATIONS:  Currently, on Arimidex 1 mg two times a week, [place] Thyroid 75   mg daily, aspirin 81 daily, Lipitor 20 daily, Wellbutrin- mg every   morning, vitamin D 5000 units daily, Dilaudid 2 mg once daily, as needed for   back pain, has not been on couple of weeks, insulin glargine 18-24 units daily   depending on blood sugar, probiotic daily, Mesalamine 2.4 g twice a day,   Zofran 4 mg q. 4 hours p.r.n., and testosterone 200 mg IM every Tuesday.    Augmentin and rifampin, recently completed a 10-day course.    ALLERGIES:  PEANUT DERIVATIVES, ALSO HYDROCODONE.    SOCIAL HISTORY:  No tobacco.  Occasional alcohol.  He is an RN at [employer],   recently engaged.    FAMILY HISTORY:  Grandfather had diabetes, mother had non-Hodgkin's lymphoma.    PHYSICAL EXAMINATION:  CURRENT VITAL SIGNS:  Revealed a temperature of 36.1, pulse 80s, respirations   16, blood pressure 119/ systolic, 91% on room air, and 102 kilograms.  GENERAL:  Patient was alert, appropriate, and oriented.  No apparent distress,   was mildly obese.  HEENT:  Anicteric.  Extraocular movements are intact.  Mucous membranes were   dry.  NECK:  No cervical or supraclavicular adenopathy.  Trachea midline.  CARDIOVASCULAR:  Regular rate and rhythm.  No murmurs.  LUNGS:  Clear to auscultation bilaterally.  Normal chest wall excursion effort   without chest wall tenderness.  ABDOMEN:  Mildly obese, soft, nontender, and nondistended.  His umbilicus with   overlying dressing and packed with gauze.  There is no surrounding oozing or   drainage.  No warmth.  There is very minimum erythema  BACK:  No CVA or paraspinal tenderness.  EXTREMITIES:  No lower extremity edema, negative Homans sign, symmetric,    generalized 5/5 strength.  NEUROLOGICAL EXAMINATION:  Cranial nerves grossly intact.  No focal extremity   weakness.  SKIN:  Warm and dry without pallor.  PSYCHIATRIC:  Calm and cooperative without depressed affect.    LABORATORY DATA:  Patient's labs revealed a white count 9.5, hemoglobin 14,   and platelet count 316,000.  A BUN and creatinine 23/0.8, blood sugar of 417.    Earlier at 10:45 labs revealed a sodium of 129, bicarbonate 18, anion gap 15,   blood sugar 728, BUN and creatinine 20/1.04, and his lactic acid is 3.4.    IMPRESSION AND PLAN:  1.  Post diabetic ketoacidosis.  The patient will be continued on intravenous   fluid resuscitation, transition off insulin drip, which was started in   emergency room.  He will need increased insulin coverage due to hyperglycemic   effect from recent steroid injection.  We will increase his long-acting   insulin glargine with close monitoring of blood sugar with serial Accu-Cheks,   provide insulin sliding scale coverage as needed.  Start ADA diet.  Continue   with intravenous fluids.  Follow repeat BMP in 12 hours to ensure it does not   revert back to diabetic ketoacidosis.  2.  Hyponatremia, hyperosmolar/pseudo hyponatremia, resolved.  We will   continue monitoring with intravenous normal saline and glycemic control.  3.  History of ulcerative colitis, stable symptoms.  Resume mesalamine.  4.  History of hypothyroidism, continued on [place] Thyroid.  5.  History of anxiety.  Resume outpatient Wellbutrin.  6.  Dyslipidemia, to be continued on home statin, Lipitor.  7.  Chronic low back pain, post-lumbar epidural steroid injection to follow up   with [facility].  8.  Lactic acidosis.  The patient is afebrile without acute symptoms of   infection.  Blood pressure is stable.  Provide fluids to facilitate tissue   perfusion.  Follow up lactic acid level.  9.  Recent umbilical hernia repair, complications of seroma, infection. Completed   antibiotics, no signs of active  infection to be continued with wound care.        ____________________________________     MD TUNDE ANTHONY / STORM    DD:  01/04/2017 18:07:00  DT:  01/04/2017 23:26:34    D#:  [id number]  Job#:  [id number]

## 2017-01-05 NOTE — DISCHARGE SUMMARY
Hospital Medicine Discharge Note     Patient ID:  Mahesh Bradshaw  [id number]  51 y.o.male  1965    Admit Date:  1/4/2017       Discharge Date:   1/5/2017    Primary Care Provider: Rodolfo Stein M.D.    Admitting Physician: Andrea Gomez M.D.  Discharging Physician: Carlos Kapoor MD    Chief Complaint: nausea, vomiting, abdominal pain    Discharge Diagnoses:     DKA (diabetic ketoacidoses) - resolved    Hyponatremia- pseudohyponatremia, resolved    Lactic acidosis- resolved    Acute dehydration    Chronic Medical Problems:  Past Medical History   Diagnosis Date   • ASTHMA    • Migraine    • Hypothyroid    • Depression    • Depression 11/26/2014   • Kidney stones    • Gout    • Sepsis ([facility]) 1/21/2016   • Essential hypertension 1/22/2016   • Diabetes ([facility])    • ADRIANE (acute kidney injury) ([facility]) 2/24/2016       Code Status: Full Code    Hospital Summary:       Please refer to H&P by Dr Gomez on 1/4/2017 for full details.      In brief, Mahesh Bradshaw is a 51 y.o. male who was admitted 1/4/2017 for uncontrolled blood sugars with nausea, vomiting, and abdominal cramping. He recently had a steroid injection for his back pain, and then developed blood sugars in the 400s, with N/V. He was found to be in DKA and placed under observation status.      The patient was placed on an insulin drip, given IV hydration, and monitored. His DKA improved and his gap has closed. His blood sugars have improved, and he has tolerated a diabetic diet without distress. He is feeling improved and ready for discharge home. His electrolytes were corrected. He has no further abdominal pain, and his abdominal examination is benign. It is likely that the steroid injection that he had for his back pain caused his blood sugars to become out of control.     Today, the patient is feeling improved. He feels ready for discharge home. He has a sick day plan established by his PCP, and does have regular insulin to use for sliding scale  coverage to improve his diabetic control. His long acting insulin has been increased. He has been instructed to follow up with his PCP.     Therefore, he is discharged in good and stable condition with close outpatient follow-up.    Consultants:      None    Studies:    Laboratory:   Recent Labs      01/04/17   1045   WBC  9.5   RBC  4.50*   HEMOGLOBIN  14.0   HEMATOCRIT  39.2*   MCV  87.1   MCH  31.1   MCHC  35.7*   RDW  41.3   PLATELETCT  316   MPV  9.5       Recent Labs      01/04/17   1045  01/04/17   1410  01/05/17   0041   SODIUM  129*  136  136   POTASSIUM  4.4  3.7  4.7   CHLORIDE  96  105  104   CO2  18*  20  20   GLUCOSE  728*  417*  251*   BUN  28*  23*  22   CREATININE  1.04  0.83  0.66   CALCIUM  10.4  9.7  9.0       Recent Labs      01/04/17   1045  01/04/17   1410  01/05/17   0041   ALTSGPT  25  23   --    ASTSGOT  12  10*   --    ALKPHOSPHAT  84  68   --    TBILIRUBIN  0.5  0.3   --    LIPASE  20   --    --    GLUCOSE  728*  417*  251*      Results     Procedure Component Value Units Date/Time    BLOOD CULTURE,HOLD [[id number]] Collected:  01/04/17 1045    Order Status:  Completed Updated:  01/04/17 1137     Blood Culture Hold Collected         Procedures/Surgeries:        None    Disposition:  Discharge home    Condition:  Stable    Instructions:   Activity: As tolerated.  Diet: consistent carbohydrate  Followup:   -PCP 1 week  Instructions:  -Take FBSB ACHS and record, use SS insulin for coverage  -Increase Glargine by 2 units daily until FSBS is less than 200  -Use sick day management strategy for diabetic management  -Given instructions to return to the ER if any new or worsening symptoms, worsening condition, or failure to improve  -Call PCP for followup  -No smoking, no alcohol, no caffeine  -Encourage risk factor reduction with tobacco and alcohol abstinence, diet changes, weight loss, and exercise.     Follow-Up  Rodolfo Stein M.D.  [address]  [address]  [facility]  [address]  [phone]    Schedule an appointment as soon as possible for a visit in 1 week      Discharge Medications:           Medication List      START taking these medications       Instructions    Insulin Glargine 300 UNIT/ML Sopn   Commonly known as:  TOUJEO SOLOSTAR    Inject 26 Units as instructed every day. Usually takes between 1900 and 2000   Dose:  26 Units       insulin lispro (Human) 100 UNIT/ML Sopn injection   Last time this was given:  10 Units on 1/5/2017 12:11 PM   Commonly known as:  HUMALOG    Inject 2-12 Units as instructed 4 Times a Day,Before Meals and at Bedtime. 70- 150 =0 units 151 - 200=2 units 201 - 299=4 units 301 - 350=6 units  351 - 400=8 units 400- 500= 10 units   Dose:  2-12 Units         CONTINUE taking these medications       Instructions    anastrozole 1 MG Tabs   Commonly known as:  ARIMIDEX    Take 1 mg by mouth 2X A WEEK. Tues and Fri   Dose:  1 mg       ARMOUR THYROID PO   Last time this was given:  75 mg on 1/5/2017  7:49 AM    Take 75 mg by mouth every day.   Dose:  75 mg       aspirin 81 MG tablet    Take 81 mg by mouth every day.   Dose:  81 mg       atorvastatin 20 MG Tabs   Last time this was given:  20 mg on 1/4/2017  9:30 PM   Commonly known as:  LIPITOR    Take 20 mg by mouth every day.   Dose:  20 mg       buPROPion 300 MG XL tablet   Commonly known as:  WELLBUTRIN XL    Take 300 mg by mouth every morning.   Dose:  300 mg       CULTURELLE DIGESTIVE HEALTH Caps    Take 1 Cap by mouth every day.   Dose:  1 Cap       [employer] VIT D 5000 HIGH-POTENCY PO    Take 1 Tab by mouth every day.   Dose:  1 Tab       HYDROmorphone 2 MG Tabs   Last time this was given:  2 mg on 1/4/2017  9:30 PM   Commonly known as:  DILAUDID    Take 2 mg by mouth 1 time daily as needed for Severe Pain (back pain).   Dose:  2 mg       LIALDA 1.2 GM Tbec   Generic drug:  mesalamine    Take 2.4 g by mouth 2 Times a Day.   Dose:  2.4 g       ondansetron 4 MG Tbdp   Commonly known as:  ZOFRAN ODT    Take 1  Tab by mouth every four hours as needed.   Dose:  4 mg       testosterone cypionate 200 MG/ML Soln injection   Commonly known as:  DEPO-TESTOSTERONE    200 mg by Intramuscular route every Tuesday.   Dose:  200 mg         STOP taking these medications          amoxicillin-clavulanate 875-125 MG Tabs   Commonly known as:  AUGMENTIN       rifampin 300 MG Caps   Commonly known as:  RIFADINE           Please CC the above physicians    JAS HayPMynorRMynorN.  1/5/2017  12:25 PM

## 2017-01-13 ENCOUNTER — HOSPITAL ENCOUNTER (OUTPATIENT)
Dept: LAB | Facility: MEDICAL CENTER | Age: 52
End: 2017-01-13
Attending: NURSE PRACTITIONER
Payer: COMMERCIAL

## 2017-01-13 LAB
ALBUMIN SERPL BCP-MCNC: 4.5 G/DL (ref 3.2–4.9)
ALBUMIN/GLOB SERPL: 1.5 G/DL
ALP SERPL-CCNC: 83 U/L (ref 30–99)
ALT SERPL-CCNC: 68 U/L (ref 2–50)
ANION GAP SERPL CALC-SCNC: 13 MMOL/L (ref 0–11.9)
APPEARANCE UR: CLEAR
AST SERPL-CCNC: 37 U/L (ref 12–45)
BASOPHILS # BLD AUTO: 0.3 % (ref 0–1.8)
BASOPHILS # BLD: 0.02 K/UL (ref 0–0.12)
BILIRUB SERPL-MCNC: 0.5 MG/DL (ref 0.1–1.5)
BILIRUB UR QL STRIP.AUTO: NEGATIVE
BUN SERPL-MCNC: 28 MG/DL (ref 8–22)
CALCIUM SERPL-MCNC: 9.6 MG/DL (ref 8.4–10.2)
CHLORIDE SERPL-SCNC: 93 MMOL/L (ref 96–112)
CO2 SERPL-SCNC: 23 MMOL/L (ref 20–33)
COLOR UR: ABNORMAL
CREAT SERPL-MCNC: 1.05 MG/DL (ref 0.5–1.4)
CULTURE IF INDICATED INDCX: NO UA CULTURE
EOSINOPHIL # BLD AUTO: 0.06 K/UL (ref 0–0.51)
EOSINOPHIL NFR BLD: 0.8 % (ref 0–6.9)
ERYTHROCYTE [DISTWIDTH] IN BLOOD BY AUTOMATED COUNT: 43.8 FL (ref 35.9–50)
GFR SERPL CREATININE-BSD FRML MDRD: >60 ML/MIN/1.73 M 2
GLOBULIN SER CALC-MCNC: 3 G/DL (ref 1.9–3.5)
GLUCOSE SERPL-MCNC: 628 MG/DL (ref 65–99)
GLUCOSE UR STRIP.AUTO-MCNC: >=1000 MG/DL
HCT VFR BLD AUTO: 40.2 % (ref 42–52)
HGB BLD-MCNC: 14.6 G/DL (ref 14–18)
IMM GRANULOCYTES # BLD AUTO: 0.05 K/UL (ref 0–0.11)
IMM GRANULOCYTES NFR BLD AUTO: 0.7 % (ref 0–0.9)
KETONES UR STRIP.AUTO-MCNC: ABNORMAL MG/DL
LEUKOCYTE ESTERASE UR QL STRIP.AUTO: NEGATIVE
LYMPHOCYTES # BLD AUTO: 1.1 K/UL (ref 1–4.8)
LYMPHOCYTES NFR BLD: 14.6 % (ref 22–41)
MCH RBC QN AUTO: 31.4 PG (ref 27–33)
MCHC RBC AUTO-ENTMCNC: 36.3 G/DL (ref 33.7–35.3)
MCV RBC AUTO: 86.5 FL (ref 81.4–97.8)
MICRO URNS: ABNORMAL
MONOCYTES # BLD AUTO: 0.42 K/UL (ref 0–0.85)
MONOCYTES NFR BLD AUTO: 5.6 % (ref 0–13.4)
NEUTROPHILS # BLD AUTO: 5.88 K/UL (ref 1.82–7.42)
NEUTROPHILS NFR BLD: 78 % (ref 44–72)
NITRITE UR QL STRIP.AUTO: NEGATIVE
NRBC # BLD AUTO: 0 K/UL
NRBC BLD AUTO-RTO: 0 /100 WBC
PH UR STRIP.AUTO: 5 [PH]
PLATELET # BLD AUTO: 227 K/UL (ref 164–446)
PMV BLD AUTO: 9.9 FL (ref 9–12.9)
POTASSIUM SERPL-SCNC: 4.3 MMOL/L (ref 3.6–5.5)
PROT SERPL-MCNC: 7.5 G/DL (ref 6–8.2)
PROT UR QL STRIP: NEGATIVE MG/DL
RBC # BLD AUTO: 4.65 M/UL (ref 4.7–6.1)
RBC UR QL AUTO: NEGATIVE
SODIUM SERPL-SCNC: 129 MMOL/L (ref 135–145)
SP GR UR STRIP.AUTO: 1.01
TSH SERPL DL<=0.005 MIU/L-ACNC: 3.32 UIU/ML (ref 0.35–5.5)
WBC # BLD AUTO: 7.5 K/UL (ref 4.8–10.8)

## 2017-01-13 PROCEDURE — 80053 COMPREHEN METABOLIC PANEL: CPT

## 2017-01-13 PROCEDURE — 85025 COMPLETE CBC W/AUTO DIFF WBC: CPT

## 2017-01-13 PROCEDURE — 84443 ASSAY THYROID STIM HORMONE: CPT

## 2017-01-13 PROCEDURE — 81003 URINALYSIS AUTO W/O SCOPE: CPT

## 2017-01-13 PROCEDURE — 36415 COLL VENOUS BLD VENIPUNCTURE: CPT

## 2017-01-16 ENCOUNTER — HOSPITAL ENCOUNTER (INPATIENT)
Facility: MEDICAL CENTER | Age: 52
LOS: 2 days | DRG: 638 | End: 2017-01-18
Attending: EMERGENCY MEDICINE | Admitting: INTERNAL MEDICINE
Payer: COMMERCIAL

## 2017-01-16 ENCOUNTER — RESOLUTE PROFESSIONAL BILLING HOSPITAL PROF FEE (OUTPATIENT)
Dept: HOSPITALIST | Facility: MEDICAL CENTER | Age: 52
End: 2017-01-16
Payer: COMMERCIAL

## 2017-01-16 DIAGNOSIS — R73.9 HYPERGLYCEMIA: ICD-10-CM

## 2017-01-16 DIAGNOSIS — E86.0 DEHYDRATION: ICD-10-CM

## 2017-01-16 PROBLEM — E11.65 HYPERGLYCEMIA DUE TO TYPE 2 DIABETES MELLITUS (HCC): Status: ACTIVE | Noted: 2017-01-16

## 2017-01-16 PROBLEM — M54.9 BACK PAIN: Status: ACTIVE | Noted: 2017-01-16

## 2017-01-16 LAB
ACETONE UR QL: ABNORMAL
ALBUMIN SERPL BCP-MCNC: 4.2 G/DL (ref 3.2–4.9)
ALBUMIN/GLOB SERPL: 1.4 G/DL
ALP SERPL-CCNC: 98 U/L (ref 30–99)
ALT SERPL-CCNC: 117 U/L (ref 2–50)
ANION GAP SERPL CALC-SCNC: 13 MMOL/L (ref 0–11.9)
APPEARANCE UR: CLEAR
AST SERPL-CCNC: 17 U/L (ref 12–45)
B-OH-BUTYR SERPL-MCNC: 0.36 MMOL/L (ref 0.02–0.27)
BASOPHILS # BLD AUTO: 0.1 % (ref 0–1.8)
BASOPHILS # BLD: 0.01 K/UL (ref 0–0.12)
BILIRUB SERPL-MCNC: 0.3 MG/DL (ref 0.1–1.5)
BILIRUB UR QL STRIP.AUTO: NEGATIVE
BUN SERPL-MCNC: 29 MG/DL (ref 8–22)
CALCIUM SERPL-MCNC: 9.7 MG/DL (ref 8.5–10.5)
CHLORIDE SERPL-SCNC: 96 MMOL/L (ref 96–112)
CO2 SERPL-SCNC: 20 MMOL/L (ref 20–33)
COLOR UR: COLORLESS
CREAT SERPL-MCNC: 0.89 MG/DL (ref 0.5–1.4)
CULTURE IF INDICATED INDCX: NO UA CULTURE
EOSINOPHIL # BLD AUTO: 0.03 K/UL (ref 0–0.51)
EOSINOPHIL NFR BLD: 0.4 % (ref 0–6.9)
ERYTHROCYTE [DISTWIDTH] IN BLOOD BY AUTOMATED COUNT: 44.5 FL (ref 35.9–50)
EST. AVERAGE GLUCOSE BLD GHB EST-MCNC: 301 MG/DL
GFR SERPL CREATININE-BSD FRML MDRD: >60 ML/MIN/1.73 M 2
GLOBULIN SER CALC-MCNC: 3 G/DL (ref 1.9–3.5)
GLUCOSE BLD-MCNC: 220 MG/DL (ref 65–99)
GLUCOSE BLD-MCNC: 279 MG/DL (ref 65–99)
GLUCOSE BLD-MCNC: 392 MG/DL (ref 65–99)
GLUCOSE SERPL-MCNC: 589 MG/DL (ref 65–99)
GLUCOSE UR STRIP.AUTO-MCNC: >1000 MG/DL
HBA1C MFR BLD: 12.1 % (ref 0–5.6)
HCT VFR BLD AUTO: 41.9 % (ref 42–52)
HGB BLD-MCNC: 14.8 G/DL (ref 14–18)
IMM GRANULOCYTES # BLD AUTO: 0.03 K/UL (ref 0–0.11)
IMM GRANULOCYTES NFR BLD AUTO: 0.4 % (ref 0–0.9)
KETONES UR STRIP.AUTO-MCNC: NEGATIVE MG/DL
LEUKOCYTE ESTERASE UR QL STRIP.AUTO: NEGATIVE
LIPASE SERPL-CCNC: 14 U/L (ref 11–82)
LYMPHOCYTES # BLD AUTO: 1.64 K/UL (ref 1–4.8)
LYMPHOCYTES NFR BLD: 23.8 % (ref 22–41)
MCH RBC QN AUTO: 31.5 PG (ref 27–33)
MCHC RBC AUTO-ENTMCNC: 35.3 G/DL (ref 33.7–35.3)
MCV RBC AUTO: 89.1 FL (ref 81.4–97.8)
MICRO URNS: ABNORMAL
MONOCYTES # BLD AUTO: 0.37 K/UL (ref 0–0.85)
MONOCYTES NFR BLD AUTO: 5.4 % (ref 0–13.4)
NEUTROPHILS # BLD AUTO: 4.81 K/UL (ref 1.82–7.42)
NEUTROPHILS NFR BLD: 69.9 % (ref 44–72)
NITRITE UR QL STRIP.AUTO: NEGATIVE
NRBC # BLD AUTO: 0 K/UL
NRBC BLD AUTO-RTO: 0 /100 WBC
PH UR STRIP.AUTO: 5 [PH]
PLATELET # BLD AUTO: 204 K/UL (ref 164–446)
PMV BLD AUTO: 9.8 FL (ref 9–12.9)
POTASSIUM SERPL-SCNC: 4.5 MMOL/L (ref 3.6–5.5)
PROT SERPL-MCNC: 7.2 G/DL (ref 6–8.2)
PROT UR QL STRIP: NEGATIVE MG/DL
RBC # BLD AUTO: 4.7 M/UL (ref 4.7–6.1)
RBC UR QL AUTO: NEGATIVE
SODIUM SERPL-SCNC: 129 MMOL/L (ref 135–145)
SP GR UR STRIP.AUTO: 1.02
WBC # BLD AUTO: 6.9 K/UL (ref 4.8–10.8)

## 2017-01-16 PROCEDURE — 770006 HCHG ROOM/CARE - MED/SURG/GYN SEMI*

## 2017-01-16 PROCEDURE — 83036 HEMOGLOBIN GLYCOSYLATED A1C: CPT

## 2017-01-16 PROCEDURE — 700102 HCHG RX REV CODE 250 W/ 637 OVERRIDE(OP): Performed by: INTERNAL MEDICINE

## 2017-01-16 PROCEDURE — 96361 HYDRATE IV INFUSION ADD-ON: CPT

## 2017-01-16 PROCEDURE — 700105 HCHG RX REV CODE 258: Performed by: EMERGENCY MEDICINE

## 2017-01-16 PROCEDURE — 96376 TX/PRO/DX INJ SAME DRUG ADON: CPT

## 2017-01-16 PROCEDURE — 82010 KETONE BODYS QUAN: CPT

## 2017-01-16 PROCEDURE — 36415 COLL VENOUS BLD VENIPUNCTURE: CPT

## 2017-01-16 PROCEDURE — 96372 THER/PROPH/DIAG INJ SC/IM: CPT

## 2017-01-16 PROCEDURE — 85025 COMPLETE CBC W/AUTO DIFF WBC: CPT

## 2017-01-16 PROCEDURE — A9270 NON-COVERED ITEM OR SERVICE: HCPCS | Performed by: INTERNAL MEDICINE

## 2017-01-16 PROCEDURE — 700105 HCHG RX REV CODE 258: Performed by: INTERNAL MEDICINE

## 2017-01-16 PROCEDURE — 83690 ASSAY OF LIPASE: CPT

## 2017-01-16 PROCEDURE — 96374 THER/PROPH/DIAG INJ IV PUSH: CPT

## 2017-01-16 PROCEDURE — 96375 TX/PRO/DX INJ NEW DRUG ADDON: CPT

## 2017-01-16 PROCEDURE — 99285 EMERGENCY DEPT VISIT HI MDM: CPT

## 2017-01-16 PROCEDURE — 82962 GLUCOSE BLOOD TEST: CPT | Mod: 91

## 2017-01-16 PROCEDURE — 700111 HCHG RX REV CODE 636 W/ 250 OVERRIDE (IP): Performed by: EMERGENCY MEDICINE

## 2017-01-16 PROCEDURE — 80053 COMPREHEN METABOLIC PANEL: CPT

## 2017-01-16 PROCEDURE — 81002 URINALYSIS NONAUTO W/O SCOPE: CPT

## 2017-01-16 PROCEDURE — 99223 1ST HOSP IP/OBS HIGH 75: CPT | Performed by: INTERNAL MEDICINE

## 2017-01-16 PROCEDURE — 81003 URINALYSIS AUTO W/O SCOPE: CPT

## 2017-01-16 PROCEDURE — 700111 HCHG RX REV CODE 636 W/ 250 OVERRIDE (IP): Performed by: INTERNAL MEDICINE

## 2017-01-16 RX ORDER — SODIUM CHLORIDE 9 MG/ML
1000 INJECTION, SOLUTION INTRAVENOUS ONCE
Status: COMPLETED | OUTPATIENT
Start: 2017-01-16 | End: 2017-01-16

## 2017-01-16 RX ORDER — THYROID 30 MG/1
75 TABLET ORAL EVERY EVENING
Status: DISCONTINUED | OUTPATIENT
Start: 2017-01-16 | End: 2017-01-18 | Stop reason: HOSPADM

## 2017-01-16 RX ORDER — DOCUSATE SODIUM 100 MG/1
100 CAPSULE, LIQUID FILLED ORAL EVERY MORNING
Status: DISCONTINUED | OUTPATIENT
Start: 2017-01-16 | End: 2017-01-18 | Stop reason: HOSPADM

## 2017-01-16 RX ORDER — MESALAMINE 1.2 G/1
2.4 TABLET, DELAYED RELEASE ORAL 2 TIMES DAILY
Status: DISCONTINUED | OUTPATIENT
Start: 2017-01-16 | End: 2017-01-18 | Stop reason: HOSPADM

## 2017-01-16 RX ORDER — ATORVASTATIN CALCIUM 20 MG/1
20 TABLET, FILM COATED ORAL
Status: DISCONTINUED | OUTPATIENT
Start: 2017-01-16 | End: 2017-01-17

## 2017-01-16 RX ORDER — BUPROPION HYDROCHLORIDE 300 MG/1
300 TABLET ORAL EVERY MORNING
Status: DISCONTINUED | OUTPATIENT
Start: 2017-01-16 | End: 2017-01-16

## 2017-01-16 RX ORDER — ONDANSETRON 4 MG/1
4 TABLET, ORALLY DISINTEGRATING ORAL EVERY 4 HOURS PRN
Status: DISCONTINUED | OUTPATIENT
Start: 2017-01-16 | End: 2017-01-18 | Stop reason: HOSPADM

## 2017-01-16 RX ORDER — LACTULOSE 20 G/30ML
30 SOLUTION ORAL
Status: DISCONTINUED | OUTPATIENT
Start: 2017-01-16 | End: 2017-01-18 | Stop reason: HOSPADM

## 2017-01-16 RX ORDER — PROMETHAZINE HYDROCHLORIDE 25 MG/1
12.5-25 TABLET ORAL EVERY 4 HOURS PRN
Status: DISCONTINUED | OUTPATIENT
Start: 2017-01-16 | End: 2017-01-18 | Stop reason: HOSPADM

## 2017-01-16 RX ORDER — PROMETHAZINE HYDROCHLORIDE 25 MG/1
12.5-25 SUPPOSITORY RECTAL EVERY 4 HOURS PRN
Status: DISCONTINUED | OUTPATIENT
Start: 2017-01-16 | End: 2017-01-18 | Stop reason: HOSPADM

## 2017-01-16 RX ORDER — ACETAMINOPHEN 325 MG/1
650 TABLET ORAL EVERY 4 HOURS PRN
Status: DISCONTINUED | OUTPATIENT
Start: 2017-01-16 | End: 2017-01-18 | Stop reason: HOSPADM

## 2017-01-16 RX ORDER — AMOXICILLIN 250 MG
1 CAPSULE ORAL NIGHTLY
Status: DISCONTINUED | OUTPATIENT
Start: 2017-01-16 | End: 2017-01-18 | Stop reason: HOSPADM

## 2017-01-16 RX ORDER — DIAZEPAM 2 MG/1
2 TABLET ORAL
Status: COMPLETED | OUTPATIENT
Start: 2017-01-16 | End: 2017-01-17

## 2017-01-16 RX ORDER — INSULIN GLARGINE 100 [IU]/ML
26 INJECTION, SOLUTION SUBCUTANEOUS EVERY EVENING
Status: DISCONTINUED | OUTPATIENT
Start: 2017-01-16 | End: 2017-01-16

## 2017-01-16 RX ORDER — BISACODYL 10 MG
10 SUPPOSITORY, RECTAL RECTAL
Status: DISCONTINUED | OUTPATIENT
Start: 2017-01-16 | End: 2017-01-18 | Stop reason: HOSPADM

## 2017-01-16 RX ORDER — HYDROMORPHONE HYDROCHLORIDE 2 MG/1
2 TABLET ORAL
Status: DISCONTINUED | OUTPATIENT
Start: 2017-01-16 | End: 2017-01-18 | Stop reason: HOSPADM

## 2017-01-16 RX ORDER — ONDANSETRON 2 MG/ML
4 INJECTION INTRAMUSCULAR; INTRAVENOUS ONCE
Status: COMPLETED | OUTPATIENT
Start: 2017-01-16 | End: 2017-01-16

## 2017-01-16 RX ORDER — ENEMA 19; 7 G/133ML; G/133ML
1 ENEMA RECTAL
Status: DISCONTINUED | OUTPATIENT
Start: 2017-01-16 | End: 2017-01-18 | Stop reason: HOSPADM

## 2017-01-16 RX ORDER — ONDANSETRON 4 MG/1
4 TABLET, ORALLY DISINTEGRATING ORAL EVERY 4 HOURS PRN
Status: DISCONTINUED | OUTPATIENT
Start: 2017-01-16 | End: 2017-01-16

## 2017-01-16 RX ORDER — ANASTROZOLE 1 MG/1
1 TABLET ORAL
Status: DISCONTINUED | OUTPATIENT
Start: 2017-01-16 | End: 2017-01-16

## 2017-01-16 RX ORDER — AMOXICILLIN AND CLAVULANATE POTASSIUM 875; 125 MG/1; MG/1
1 TABLET, FILM COATED ORAL 2 TIMES DAILY
Status: ON HOLD | COMMUNITY
Start: 2016-12-21 | End: 2017-01-18

## 2017-01-16 RX ORDER — THYROID 60 MG/1
60 TABLET ORAL DAILY
COMMUNITY
End: 2018-07-16

## 2017-01-16 RX ORDER — INSULIN GLARGINE 100 [IU]/ML
40 INJECTION, SOLUTION SUBCUTANEOUS EVERY EVENING
Status: DISCONTINUED | OUTPATIENT
Start: 2017-01-16 | End: 2017-01-18 | Stop reason: HOSPADM

## 2017-01-16 RX ORDER — AMOXICILLIN 250 MG
1 CAPSULE ORAL
Status: DISCONTINUED | OUTPATIENT
Start: 2017-01-16 | End: 2017-01-18 | Stop reason: HOSPADM

## 2017-01-16 RX ORDER — SODIUM CHLORIDE 9 MG/ML
INJECTION, SOLUTION INTRAVENOUS CONTINUOUS
Status: ACTIVE | OUTPATIENT
Start: 2017-01-16 | End: 2017-01-17

## 2017-01-16 RX ORDER — TESTOSTERONE CYPIONATE 200 MG/ML
200 INJECTION, SOLUTION INTRAMUSCULAR
Status: DISCONTINUED | OUTPATIENT
Start: 2017-01-17 | End: 2017-01-18 | Stop reason: HOSPADM

## 2017-01-16 RX ORDER — INSULIN GLARGINE 100 [IU]/ML
40 INJECTION, SOLUTION SUBCUTANEOUS
Status: DISCONTINUED | OUTPATIENT
Start: 2017-01-17 | End: 2017-01-18 | Stop reason: HOSPADM

## 2017-01-16 RX ORDER — ANASTROZOLE 1 MG/1
1 TABLET ORAL
Status: DISCONTINUED | OUTPATIENT
Start: 2017-01-20 | End: 2017-01-17

## 2017-01-16 RX ORDER — ASPIRIN 81 MG/1
81 TABLET, CHEWABLE ORAL DAILY
Status: DISCONTINUED | OUTPATIENT
Start: 2017-01-16 | End: 2017-01-18 | Stop reason: HOSPADM

## 2017-01-16 RX ORDER — POTASSIUM CHLORIDE 1.5 G/1.58G
20 POWDER, FOR SOLUTION ORAL
Status: ON HOLD | COMMUNITY
End: 2017-01-18

## 2017-01-16 RX ORDER — BUPROPION HYDROCHLORIDE 150 MG/1
150 TABLET, EXTENDED RELEASE ORAL 2 TIMES DAILY
Status: DISCONTINUED | OUTPATIENT
Start: 2017-01-16 | End: 2017-01-18 | Stop reason: HOSPADM

## 2017-01-16 RX ORDER — ANASTROZOLE 1 MG/1
1 TABLET ORAL
Status: DISCONTINUED | OUTPATIENT
Start: 2017-01-17 | End: 2017-01-17

## 2017-01-16 RX ORDER — ONDANSETRON 2 MG/ML
4 INJECTION INTRAMUSCULAR; INTRAVENOUS EVERY 4 HOURS PRN
Status: DISCONTINUED | OUTPATIENT
Start: 2017-01-16 | End: 2017-01-18 | Stop reason: HOSPADM

## 2017-01-16 RX ORDER — DEXTROSE MONOHYDRATE 25 G/50ML
25 INJECTION, SOLUTION INTRAVENOUS
Status: DISCONTINUED | OUTPATIENT
Start: 2017-01-16 | End: 2017-01-18 | Stop reason: HOSPADM

## 2017-01-16 RX ORDER — RIFAMPIN 300 MG/1
600 CAPSULE ORAL DAILY
Status: ON HOLD | COMMUNITY
Start: 2016-12-21 | End: 2017-01-18

## 2017-01-16 RX ADMIN — HYDROMORPHONE HYDROCHLORIDE 1 MG: 1 INJECTION, SOLUTION INTRAMUSCULAR; INTRAVENOUS; SUBCUTANEOUS at 11:17

## 2017-01-16 RX ADMIN — INSULIN GLARGINE 40 UNITS: 100 INJECTION, SOLUTION SUBCUTANEOUS at 21:29

## 2017-01-16 RX ADMIN — THYROID, PORCINE 75 MG: 30 TABLET ORAL at 21:36

## 2017-01-16 RX ADMIN — INSULIN HUMAN 15 UNITS: 100 INJECTION, SOLUTION PARENTERAL at 11:39

## 2017-01-16 RX ADMIN — ONDANSETRON 4 MG: 4 TABLET, ORALLY DISINTEGRATING ORAL at 15:46

## 2017-01-16 RX ADMIN — HYDROMORPHONE HYDROCHLORIDE 1 MG: 1 INJECTION, SOLUTION INTRAMUSCULAR; INTRAVENOUS; SUBCUTANEOUS at 09:28

## 2017-01-16 RX ADMIN — ATORVASTATIN CALCIUM 20 MG: 20 TABLET, FILM COATED ORAL at 21:37

## 2017-01-16 RX ADMIN — ONDANSETRON 4 MG: 2 INJECTION, SOLUTION INTRAMUSCULAR; INTRAVENOUS at 09:27

## 2017-01-16 RX ADMIN — SODIUM CHLORIDE 1000 ML: 9 INJECTION, SOLUTION INTRAVENOUS at 09:27

## 2017-01-16 RX ADMIN — ACETAMINOPHEN 650 MG: 325 TABLET, FILM COATED ORAL at 15:46

## 2017-01-16 RX ADMIN — BUPROPION HYDROCHLORIDE 150 MG: 150 TABLET, FILM COATED, EXTENDED RELEASE ORAL at 14:52

## 2017-01-16 RX ADMIN — SODIUM CHLORIDE: 9 INJECTION, SOLUTION INTRAVENOUS at 20:50

## 2017-01-16 RX ADMIN — BUPROPION HYDROCHLORIDE 150 MG: 150 TABLET, FILM COATED, EXTENDED RELEASE ORAL at 21:36

## 2017-01-16 RX ADMIN — INSULIN LISPRO 2 UNITS: 100 INJECTION, SOLUTION INTRAVENOUS; SUBCUTANEOUS at 21:31

## 2017-01-16 RX ADMIN — INSULIN LISPRO 3 UNITS: 100 INJECTION, SOLUTION INTRAVENOUS; SUBCUTANEOUS at 17:11

## 2017-01-16 RX ADMIN — ONDANSETRON 4 MG: 2 INJECTION, SOLUTION INTRAMUSCULAR; INTRAVENOUS at 11:17

## 2017-01-16 RX ADMIN — ASPIRIN 81 MG: 81 TABLET, CHEWABLE ORAL at 12:58

## 2017-01-16 RX ADMIN — SODIUM CHLORIDE: 9 INJECTION, SOLUTION INTRAVENOUS at 12:58

## 2017-01-16 RX ADMIN — SODIUM CHLORIDE 1000 ML: 9 INJECTION, SOLUTION INTRAVENOUS at 11:00

## 2017-01-16 RX ADMIN — HYDROMORPHONE HYDROCHLORIDE 2 MG: 2 TABLET ORAL at 21:39

## 2017-01-16 ASSESSMENT — LIFESTYLE VARIABLES
DO YOU DRINK ALCOHOL: NO
ALCOHOL_USE: NO
EVER_SMOKED: NEVER

## 2017-01-16 ASSESSMENT — PAIN SCALES - GENERAL
PAINLEVEL_OUTOF10: 0
PAINLEVEL_OUTOF10: 6
PAINLEVEL_OUTOF10: 5

## 2017-01-16 NOTE — PROGRESS NOTES
Diabetes education: Met with Mahesh regarding diabetes management. Please see consult note.  Plan: Pt has no diabetes education needs at this time. CDE will continue to follow. Please call [phone] if needs change.

## 2017-01-16 NOTE — ED NOTES
The Medication Reconciliation has been completed per patient  Allergies have been reviewed  Antibiotic use in 30 days - Yes - Augmentin and Rifampin    Pharmacy:  [facility] [place]

## 2017-01-16 NOTE — ED PROVIDER NOTES
"ED Provider Note    Scribed for Geronimo Bell M.D. by Wyatt Tomas. 1/16/2017  8:16 AM    Primary Care Provider: Rodolfo Stein M.D.  Means of arrival: Walk-in  History limited by: None    CHIEF COMPLAINT  Chief Complaint   Patient presents with   • Tired     Pt BIB self to [facility], pt reports unable to manage glucose, pt reports abnormal labs (Na). states feeling tired.     HPI  Mahesh Bradshaw is a 51 y.o. male with a history of diabetes who presents to the ED complaining of fatigue and difficulty managing blood sugars. The patient reports diet control until November 2016 when he began taking Insulin after having hernia surgery. He states he has been managing his sugars at around 190, but his sugars elevated to the 700 after receiving a steroid epidural by Dr. Cash at [facility] 2 weeks ago. He has been hospitalized previously and seen by his PCP for symptoms as well since receiving the injection. The patient reports of having labs performed on Friday 3 days ago and was advised to come to the ED this morning for abnormal sodium values of 117. He also notes of using 120 units of long acting insulin and 40 units of short acting insulin yesterday and his sugars afterwards were recorded to be 597. The patient has associated upper abdominal cramping, which he states he normally gets when his \"sugars are out of control\", fatigue, nausea, and headache and has been medicating with Zofran at home. He also notes of weight loss of 6 lbs in the last week. He denies any vomiting, acute blurry vision, sore throat, chest pain, shortness of breath, diarrhea, skin rashes, focal numbness or weakness, or depression.     REVIEW OF SYSTEMS    CONSTITUTIONAL: Fatigue, denies weakness.   EYES:  Denies acute blurry vision  ENT:  Denies sore throat  CARDIOVASCULAR:  Denies chest pain  RESPIRATORY:  Denies shortness of breath, difficulty breathing.  GI: Upper abdominal pain, nausea, Denies vomiting, or " diarrhea.  MUSCULOSKELETAL: Denies weakness  SKIN:  No rash.  NEUROLOGIC: Headache, denies focal numbness or weakness.   PSYCHIATRIC:  Denies depression.    PAST MEDICAL HISTORY  Past Medical History   Diagnosis Date   • ASTHMA    • Migraine    • Hypothyroid    • Depression    • Depression 11/26/2014   • Kidney stones    • Gout    • Sepsis (CMS-HCC) 1/21/2016   • Essential hypertension 1/22/2016   • Diabetes (CMS-HCC)    • ADRIANE (acute kidney injury) (CMS-HCC) 2/24/2016     FAMILY HISTORY  No family history noted    SOCIAL HISTORY   reports that he has never smoked. He reports that he drinks alcohol. He reports that he does not use illicit drugs.    SURGICAL HISTORY  Past Surgical History   Procedure Laterality Date   • Other orthopedic surgery       Left Wrist ORIF   • Carmenza by laparoscopy     • Colonoscopy with biopsy  11/26/2014     Performed by Solo Higginbotham M.D. at ENDOSCOPY [facility]   • Umbilical hernia repair N/A 11/6/2016     Procedure: UMBILICAL HERNIA REPAIR;  Surgeon: Esau Dooley M.D.;  Location: SURGERY [facility];  Service:        CURRENT MEDICATIONS  No current facility-administered medications for this encounter.    Current outpatient prescriptions:   •  insulin lispro, Human, (HUMALOG) 100 UNIT/ML Solution Pen-injector injection, Inject 2-12 Units as instructed 4 Times a Day,Before Meals and at Bedtime. 70- 150 =0 units 151 - 200=2 units 201 - 299=4 units 301 - 350=6 units  351 - 400=8 units 400- 500= 10 units, Disp: 1 PEN, Rfl: 6  •  Insulin Glargine (TOUJEO SOLOSTAR) 300 UNIT/ML Solution Pen-injector, Inject 26 Units as instructed every day. Usually takes between 1900 and 2000, Disp: 1 PEN, Rfl: 0  •  Lactobacillus-Inulin ([facility] DIG[facility]) Cap, Take 1 Cap by mouth every day., Disp: , Rfl:   •  ARMOUR THYROID PO, Take 75 mg by mouth every day., Disp: , Rfl:   •  anastrozole (ARIMIDEX) 1 MG Tab, Take 1 mg by mouth 2X A WEEK. Tues and Fri, Disp: , Rfl:   •  aspirin  81 MG tablet, Take 81 mg by mouth every day., Disp: , Rfl:   •  atorvastatin (LIPITOR) 20 MG Tab, Take 20 mg by mouth every day., Disp: , Rfl:   •  HYDROmorphone (DILAUDID) 2 MG Tab, Take 2 mg by mouth 1 time daily as needed for Severe Pain (back pain)., Disp: , Rfl:   •  Cholecalciferol ([employer] VIT D 5000 HIGH-POTENCY PO), Take 1 Tab by mouth every day., Disp: , Rfl:   •  ondansetron (ZOFRAN ODT) 4 MG TABLET DISPERSIBLE, Take 1 Tab by mouth every four hours as needed., Disp: 20 Tab, Rfl: 0  •  buPROPion (WELLBUTRIN XL) 300 MG XL tablet, Take 300 mg by mouth every morning., Disp: , Rfl:   •  mesalamine (LIALDA) 1.2 GM TBEC, Take 2.4 g by mouth 2 Times a Day., Disp: , Rfl:   •  testosterone cypionate (DEPO-TESTOSTERONE) 200 MG/ML SOLN injection, 200 mg by Intramuscular route every Tuesday., Disp: , Rfl:     ALLERGIES  Allergies   Allergen Reactions   • Peanut-Derived Anaphylaxis     Peanuts   RXN=age 36   • Tradjenta [Linagliptin] Unspecified     Pt developed pancreatitis   • Hydrocodone-Acetaminophen Hives     Has tolerated morphine and oxycodone previously   RXN=age 29       PHYSICAL EXAM  VITAL SIGNS: Temp(Src) 36.7 °C (98.1 °F)  Wt 102.967 kg (227 lb)     Constitutional: Patient is awake and alert. No acute respiratory distress. Well developed, Well nourished, Non-toxic appearance.  HENT: Normocephalic, Atraumatic, Bilateral external ears normal, Oropharynx pink dry with no exudates, Nose patent.  Eyes: PERRLA, EOMI, Sclera and conjunctiva clear, No discharge.   Neck:  Supple no nuchal rigidity, no thyromegaly or mass. Non-tender  Lymphatic: No supraclavicular lymph nodes.   Cardiovascular: Heart is regular rate and rhythm no murmur, rub or thrill.   Thorax & Lungs: Chest is symmetrical, with good breath sounds. No wheezing or crackles. No respiratory distress, No chest tenderness.   Abdomen: Soft, tender epigastric area, no hepatosplenomegaly there is no guarding or rebound, No masses, No pulsatile masses.  Skin:  Warm, Dry, no petechia, purpura, or rash.   Back: Non tender with palpation, No CVA tenderness.   Extremities: No edema. Non tender.   Musculoskeletal: Good range of motion to wrists, elbows, shoulders, hips, knees, and ankles. Pulses 2+ radially and femorally. No gross deformities noted.   Neurologic: Alert & oriented to person, time, and place. Normal gait. Strength is 5 over 5 and symmetric in bilateral upper and lower extremities.  Sensory is intact to light touch to face, arms, and legs.  DTRs are symmetrical in biceps brachioradialis, patella and Achilles.   Psychiatric: Normal affect    LABS  Results for orders placed or performed during the hospital encounter of 01/16/17   CBC WITH DIFFERENTIAL   Result Value Ref Range    WBC 6.9 4.8 - 10.8 K/uL    RBC 4.70 4.70 - 6.10 M/uL    Hemoglobin 14.8 14.0 - 18.0 g/dL    Hematocrit 41.9 (L) 42.0 - 52.0 %    MCV 89.1 81.4 - 97.8 fL    MCH 31.5 27.0 - 33.0 pg    MCHC 35.3 33.7 - 35.3 g/dL    RDW 44.5 35.9 - 50.0 fL    Platelet Count 204 164 - 446 K/uL    MPV 9.8 9.0 - 12.9 fL    Neutrophils-Polys 69.90 44.00 - 72.00 %    Lymphocytes 23.80 22.00 - 41.00 %    Monocytes 5.40 0.00 - 13.40 %    Eosinophils 0.40 0.00 - 6.90 %    Basophils 0.10 0.00 - 1.80 %    Immature Granulocytes 0.40 0.00 - 0.90 %    Nucleated RBC 0.00 /100 WBC    Neutrophils (Absolute) 4.81 1.82 - 7.42 K/uL    Lymphs (Absolute) 1.64 1.00 - 4.80 K/uL    Monos (Absolute) 0.37 0.00 - 0.85 K/uL    Eos (Absolute) 0.03 0.00 - 0.51 K/uL    Baso (Absolute) 0.01 0.00 - 0.12 K/uL    Immature Granulocytes (abs) 0.03 0.00 - 0.11 K/uL    NRBC (Absolute) 0.00 K/uL   COMP METABOLIC PANEL   Result Value Ref Range    Sodium 129 (L) 135 - 145 mmol/L    Potassium 4.5 3.6 - 5.5 mmol/L    Chloride 96 96 - 112 mmol/L    Co2 20 20 - 33 mmol/L    Anion Gap 13.0 (H) 0.0 - 11.9    Glucose 589 (HH) 65 - 99 mg/dL    Bun 29 (H) 8 - 22 mg/dL    Creatinine 0.89 0.50 - 1.40 mg/dL    Calcium 9.7 8.5 - 10.5 mg/dL    AST(SGOT) 17 12 - 45  U/L    ALT(SGPT) 117 (H) 2 - 50 U/L    Alkaline Phosphatase 98 30 - 99 U/L    Total Bilirubin 0.3 0.1 - 1.5 mg/dL    Albumin 4.2 3.2 - 4.9 g/dL    Total Protein 7.2 6.0 - 8.2 g/dL    Globulin 3.0 1.9 - 3.5 g/dL    A-G Ratio 1.4 g/dL   URINALYSIS,CULTURE IF INDICATED   Result Value Ref Range    Color Colorless     Character Clear     Specific Gravity 1.025 <1.035    Ph 5.0 5.0-8.0    Glucose >1000 (A) Negative mg/dL    Ketones Negative Negative mg/dL    Protein Negative Negative mg/dL    Bilirubin Negative Negative    Nitrite Negative Negative    Leukocyte Esterase Negative Negative    Occult Blood Negative Negative    Micro Urine Req see below     Culture Indicated No UA Culture   BETA-HYDROXYBUTYRIC ACID   Result Value Ref Range    beta-Hydroxybutyric Acid 0.36 (H) 0.02 - 0.27 mmol/L   KETONES-URINE QUAL(ACETONE URINE QUAL)   Result Value Ref Range    Ketones Small (A) Negative   LIPASE   Result Value Ref Range    Lipase 14 11 - 82 U/L   ESTIMATED GFR   Result Value Ref Range    GFR If African American >60 >60 mL/min/1.73 m 2    GFR If Non African American >60 >60 mL/min/1.73 m 2   All labs reviewed by me.    COURSE & MEDICAL DECISION MAKING  Pertinent Labs & Imaging studies reviewed. (See chart for details)    Differential diagnoses include but are not limited to pancreatitis, diabetes out-of-control secondary steroid injection, infection.     8:29 AM - Patient seen and examined at bedside. Ordered estimated GFR, CBC, CMP, urinalysis culture, Beta-hydroxybutyric acid, Ketones-urine qual, hemoglobin A1C, and lipase.  Patient will be medicated with normal saline 1L infusion for his symptoms.     9:26 AM The patient is complaining of pain and will be medicated with Dilaudid 1g IV and Zofran 4mg IV.     10:21 AM Reviewed the patient's lab results, which are noted above. I discussed the patient's case and the above findings with Dr. Olson (Hospitalist) who happened to be in the area, who agreed to consult on and  admit the patient. He requests to consult on the patient first prior to administering Insulin.     10:34 AM Patient was reevaluated at bedside. The patient is resting comfortably in bed, but continues to report of abdominal pain. Discussed lab results with the patient and informed them of the results which are noted above. Also discussed admission to the Hospitalist for further medical treatment and care, which he understands and agrees with. The patient will be given another bolus of fluid and treated with Dilaudid 1g IV and Zofran 4mg IV.     Decision Making  Patient is had uncontrolled hyperglycemia for 2 weeks. He had a epidural steroid injection and since then he's had uncontrolled diabetes. His sugars right after the injection was in the 700s. That's been hospitalized for that. Subsequently sugars are better controlled now become out of control again the last several days. His sugar this morning was over 500. He feels weak with epigastric abdominal pain nausea and just generalized weakness last 2 days again. He is quite hyperglycemic at this time  With a glucose of almost 590. He also has a BUN of 29 which is elevated and fictitious hyponatremia 129. Beta hydroxybutyric acid 0.36 which is elevated and ketones. Probably has some component of DKA. Although the CO2 was still 20. Been taking large doses of insulin at home included 120 units of long-acting insulin and and 4040 units of regular yesterday but continues to have marked hyperglycemia today. This time I feel that he needs admission to the hospital for further care and evaluation. Urine showed no signs of infection. I do not believe he has an infection in his back this area looks good. He will need hospitalization. We did give him IV fluids here and Dr. Woods will start his insulin regime to bring his sugars back down.      DISPOSITION:  Patient will be admitted to Dr. Olson in guarded condition.    FINAL IMPRESSION  1. Hyperglycemia    2.  Dehydration     3. Early DKA     PLAN  1. Admission [facility]  2. Continue management of his hyperglycemia      IWyatt (Gabrielleiblorenzo), am scribing for, and in the presence of, Geronimo Bell M.D..    Electronically signed by: Wyatt Tomas (Gareth), 1/16/2017    Geronimo EPSTEIN M.D. personally performed the services described in this documentation, as scribed by Wyatt Tomas in my presence, and it is both accurate and complete.    The note accurately reflects work and decisions made by me.  Geronimo Bell  1/16/2017  1:03 PM

## 2017-01-16 NOTE — IP AVS SNAPSHOT
After Visit Summary                                                                                                                  Name:Mahesh Bradshaw  Medical Record Number:[id number]  CSN: [id number]    YOB: 1965   Age: 51 y.o.  Sex: male  HT:  WT: 102.967 kg (227 lb)          Admit Date: 1/16/2017     Discharge Date:   Today's Date: 1/18/2017  Attending Doctor:  Morena Machado M.D.                  Allergies:  Peanut-derived; Tradjenta; and Hydrocodone            Discharge Instructions       Discharge Instructions    Discharged to home by car with relative. Discharged via wheelchair, hospital escort: Yes.  Special equipment needed: Not Applicable    Be sure to schedule a follow-up appointment with your primary care doctor or any specialists as instructed.     Discharge Plan:   Diet Plan: Discussed  Activity Level: Discussed  Confirmed Follow up Appointment: Appointment Scheduled  Confirmed Symptoms Management: Discussed  Medication Reconciliation Updated: Yes  Influenza Vaccine Indication: Not indicated: Previously immunized this influenza season and > 8 years of age    I understand that a diet low in cholesterol, fat, and sodium is recommended for good health. Unless I have been given specific instructions below for another diet, I accept this instruction as my diet prescription.   Other diet: diabetic diet      Special Instructions:    · Is patient discharged on Warfarin / Coumadin?   No     · Is patient Post Blood Transfusion?  No    Depression / Suicide Risk    As you are discharged from this [facility] facility, it is important to learn how to keep safe from harming yourself.    Recognize the warning signs:  · Abrupt changes in personality, positive or negative- including increase in energy   · Giving away possessions  · Change in eating patterns- significant weight changes-  positive or negative  · Change in sleeping patterns- unable to sleep or sleeping all the time   · Unwillingness or  inability to communicate  · Depression  · Unusual sadness, discouragement and loneliness  · Talk of wanting to die  · Neglect of personal appearance   · Rebelliousness- reckless behavior  · Withdrawal from people/activities they love  · Confusion- inability to concentrate     If you or a loved one observes any of these behaviors or has concerns about self-harm, here's what you can do:  · Talk about it- your feelings and reasons for harming yourself  · Remove any means that you might use to hurt yourself (examples: pills, rope, extension cords, firearm)  · Get professional help from the community (Mental Health, Substance Abuse, psychological counseling)  · Do not be alone:Call your Safe Contact- someone whom you trust who will be there for you.  · Call your local CRISIS HOTLINE 579-2757 or 721-667-0441  · Call your local Children's Mobile Crisis Response Team [place] (400) 263-7948 or www.[employer]  · Call the toll free National Suicide Prevention Hotlines   · National Suicide Prevention Lifeline 691-718-BVYB (2818)  · [employer] 800-SUICIDE (020-3586)        Follow-up Information     1. Follow up with Rodolfo Stein M.D.. Schedule an appointment as soon as possible for a visit in 2 weeks.    Specialty:  Internal Medicine    Why:  Hospital follow-up appointment with PCP    Contact information    [address]  [address]  [place] [address] [phone]          2. Follow up with Solo Higginbotham M.D. In 2 weeks.    Specialty:  Gastroenterology    Contact information    [address]  [place] [address] [phone]           Discharge Medication Instructions:    Below are the medications your physician expects you to take upon discharge:    Review all your home medications and newly ordered medications with your doctor and/or pharmacist. Follow medication instructions as directed by your doctor and/or pharmacist.    Please keep your medication list with you and share with  your physician.               Medication List      START taking these medications        Instructions    aspirin 81 MG Chew chewable tablet   Last time this was given:  81 mg on 1/18/2017  7:51 AM   Commonly known as:  ASA   Replaces:  aspirin 81 MG tablet    Take 1 Tab by mouth every day.   Dose:  81 mg         CHANGE how you take these medications        Instructions    Insulin Glargine 300 UNIT/ML Sopn   What changed:  how much to take   Commonly known as:  TOUJEO SOLOSTAR    Inject 40 Units as instructed 2 Times a Day. 62 units QAM 52 units QPM   Dose:  40 Units       insulin lispro 100 UNIT/ML Soln   What changed:  Another medication with the same name was removed. Continue taking this medication, and follow the directions you see here.   Last time this was given:  2 Units on 1/17/2017  8:41 PM   Commonly known as:  HUMALOG    Inject 2-20 Units as instructed 3 times a day before meals. Sliding Scale - 2 units every 50   Dose:  2-20 Units         CONTINUE taking these medications        Instructions    ARMOUR THYROID 60 MG Tabs   Last time this was given:  75 mg on 1/17/2017  8:34 PM   Generic drug:  thyroid    Take 75 mg by mouth every evening. Pt uses a 60 mg and 1/2 of a 30 mg   Dose:  75 mg       buPROPion 300 MG XL tablet   Commonly known as:  WELLBUTRIN XL    Take 300 mg by mouth every evening.   Dose:  300 mg       HYDROmorphone 2 MG Tabs   Last time this was given:  2 mg on 1/18/2017  2:55 AM   Commonly known as:  DILAUDID    Take 2 mg by mouth 1 time daily as needed for Severe Pain (back pain).   Dose:  2 mg       LIALDA 1.2 GM Tbec   Generic drug:  mesalamine    Take 2.4 g by mouth 2 Times a Day.   Dose:  2.4 g       ondansetron 4 MG Tbdp   Last time this was given:  4 mg on 1/16/2017  3:46 PM   Commonly known as:  ZOFRAN ODT    Take 1 Tab by mouth every four hours as needed.   Dose:  4 mg       testosterone cypionate 200 MG/ML Soln injection   Commonly known as:  DEPO-TESTOSTERONE    200 mg by  Intramuscular route every Tuesday.   Dose:  200 mg       Vitamin D3 5000 UNITS Tabs    Take 5,000 Units by mouth every evening.   Dose:  5000 Units         STOP taking these medications     amoxicillin-clavulanate 875-125 MG Tabs   Commonly known as:  AUGMENTIN       anastrozole 1 MG Tabs   Commonly known as:  ARIMIDEX       aspirin 81 MG tablet   Replaced by:  aspirin 81 MG Chew chewable tablet       atorvastatin 20 MG Tabs   Commonly known as:  LIPITOR       potassium chloride 20 MEQ Pack   Commonly known as:  KLOR-CON       rifampin 300 MG Caps   Commonly known as:  RIFADINE               Instructions           Diet / Nutrition:    Follow any diet instructions given to you by your doctor or the dietician, including how much salt (sodium) you are allowed each day.    If you are overweight, talk to your doctor about a weight reduction plan.    Activity:    Remain physically active following your doctor's instructions about exercise and activity.    Rest often.     Any time you become even a little tired or short of breath, SIT DOWN and rest.    Worsening Symptoms:    Report any of the following signs and symptoms to the doctor's office immediately:    *Pain of jaw, arm, or neck  *Chest pain not relieved by medication                               *Dizziness or loss of consciousness  *Difficulty breathing even when at rest   *More tired than usual                                       *Bleeding drainage or swelling of surgical site  *Swelling of feet, ankles, legs or stomach                 *Fever (>100ºF)  *Pink or blood tinged sputum  *Weight gain (3lbs/day or 5lbs /week)           *Shock from internal defibrillator (if applicable)  *Palpitations or irregular heartbeats                *Cool and/or numb extremities    Stroke Awareness    Common Risk Factors for Stroke include:    Age  Atrial Fibrillation  Carotid Artery Stenosis  Diabetes Mellitus  Excessive alcohol consumption  High blood pressure  Overweight      Physical inactivity  Smoking    Warning signs and symptoms of a stroke include:    *Sudden numbness or weakness of the face, arm or leg (especially on one side of the body).  *Sudden confusion, trouble speaking or understanding.  *Sudden trouble seeing in one or both eyes.  *Sudden trouble walking, dizziness, loss of balance or coordination.Sudden severe headache with no known cause.    It is very important to get treatment quickly when a stroke occurs. If you experience any of the above warning signs, call 911 immediately.                   Disclaimer         Quit Smoking / Tobacco Use:    I understand the use of any tobacco products increases my chance of suffering from future heart disease or stroke and could cause other illnesses which may shorten my life. Quitting the use of tobacco products is the single most important thing I can do to improve my health. For further information on smoking / tobacco cessation call a Toll Free Quit Line at 1-512.185.9966 (*National Cancer [place]) or 1-241.143.2008 (American Lung Association) or you can access the web based program at www.[employer].org.    [place] Tobacco Users Help Line:  (907) 806-1138       Toll Free: 1-708.311.7216  Quit Tobacco Program [facility] (559)769-3093    Crisis Hotline:    [place] Crisis Hotline:  3-670-MDAWQXK or 1-109.552.3626    [place] Crisis Hotline:    1-291.960.4467 or 503-320-2284    Discharge Survey:   Thank you for choosing [facility]. We hope we did everything we could to make your hospital stay a pleasant one. You may be receiving a phone survey and we would appreciate your time and participation in answering the questions. Your input is very valuable to us in our efforts to improve our service to our patients and their families.        My signature on this form indicates that:    1. I have reviewed and understand the above information.  2. My questions regarding this information have been answered to my  satisfaction.  3. I have formulated a plan with my discharge nurse to obtain my prescribed medications for home.                  Disclaimer         __________________________________                     __________       ________                       Patient Signature                                                 Date                    Time

## 2017-01-16 NOTE — CONSULTS
Diabetes education: Met with Mahesh who has an almost 1 year hx of diabetes. Pt was originally on Trajenta, but was allergic, was on insulin for short time, did well went off insulin. Most recently had hernia repair and then steroid injection to his back. He was placed on Tresiba, which worked well for the short term, but after steroid injection was admitted and discharged on Toujeo. He could not get blood sugars down with large doses of Toujeo (BID though Toujeo is once a day) and sliding scale ac and hs. Pt was admitted 1/4/17 (blood sugar 728), discharged 1/5/17 and readmitted today with blood sugar of 589 ( was 628 on 1/13/17).  Reviewed Toujeo and handout given.  Pt is currently on Lantus 40 units bid and Humalog sliding scale ac and hs. No current blood sugars available. Hg A1c was 11.0%( was 8.8% on 9/19/16).   Plan: Pt has no diabetes education needs at this time. CDE will continue to follow. Please call [phone] if needs change.

## 2017-01-16 NOTE — IP AVS SNAPSHOT
" <p align=\"LEFT\"><IMG SRC=\"//[email or web address]$/[email or web address]\" alt=\"Image\" WIDTH=\"50%\" HEIGHT=\"200\" BORDER=\"\"></p>                   Name:Mahesh Bradshaw  Medical Record Number:[id number]  CSN: [id number]    YOB: 1965   Age: 51 y.o.  Sex: male  HT:  WT: 102.967 kg (227 lb)          Admit Date: 1/16/2017     Discharge Date:   Today's Date: 1/18/2017  Attending Doctor:  Morena Machado M.D.                  Allergies:  Peanut-derived; Tradjenta; and Hydrocodone          Follow-up Information     1. Follow up with Rodolfo Stein M.D.. Schedule an appointment as soon as possible for a visit in 2 weeks.    Specialty:  Internal Medicine    Why:  Hospital follow-up appointment with PCP    Contact information    [address]  [address]  [place] [id number] [phone]          2. Follow up with Solo Higginbotham M.D. In 2 weeks.    Specialty:  Gastroenterology    Contact information    [id number]  [place] [id number] [phone]           Medication List      Take these Medications        Instructions    ARMOUR THYROID 60 MG Tabs   Generic drug:  thyroid    Take 75 mg by mouth every evening. Pt uses a 60 mg and 1/2 of a 30 mg   Dose:  75 mg       aspirin 81 MG Chew chewable tablet   Commonly known as:  ASA    Take 1 Tab by mouth every day.   Dose:  81 mg       buPROPion 300 MG XL tablet   Commonly known as:  WELLBUTRIN XL    Take 300 mg by mouth every evening.   Dose:  300 mg       HYDROmorphone 2 MG Tabs   Commonly known as:  DILAUDID    Take 2 mg by mouth 1 time daily as needed for Severe Pain (back pain).   Dose:  2 mg       Insulin Glargine 300 UNIT/ML Sopn   What changed:  how much to take   Commonly known as:  TOUJEO SOLOSTAR    Inject 40 Units as instructed 2 Times a Day. 62 units QAM 52 units QPM   Dose:  40 Units       insulin lispro 100 UNIT/ML Soln   What changed:  Another medication with the same name was removed. Continue taking this medication, and follow the directions you see here.  "   Commonly known as:  HUMALOG    Inject 2-20 Units as instructed 3 times a day before meals. Sliding Scale - 2 units every 50   Dose:  2-20 Units       LIALDA 1.2 GM Tbec   Generic drug:  mesalamine    Take 2.4 g by mouth 2 Times a Day.   Dose:  2.4 g       ondansetron 4 MG Tbdp   Commonly known as:  ZOFRAN ODT    Take 1 Tab by mouth every four hours as needed.   Dose:  4 mg       testosterone cypionate 200 MG/ML Soln injection   Commonly known as:  DEPO-TESTOSTERONE    200 mg by Intramuscular route every Tuesday.   Dose:  200 mg       Vitamin D3 5000 UNITS Tabs    Take 5,000 Units by mouth every evening.   Dose:  5000 Units

## 2017-01-16 NOTE — IP AVS SNAPSHOT
Equals6 Access Code: [email or web address]  Expires: 1/30/2017  8:19 AM    Equals6  A secure, online tool to manage your health information     [facility]’s Equals6® is a secure, online tool that connects you to your personalized health information from the privacy of your home -- day or night - making it very easy for you to manage your healthcare. Once the activation process is completed, you can even access your medical information using the Equals6 princess, which is available for free in the Apple Princess store or Google Play store.     Equals6 provides the following levels of access (as shown below):   My Chart Features   [facility] Primary Care Doctor [facility]  Specialists [facility]  Urgent  Care Non-[facility]  Primary Care  Doctor   Email your healthcare team securely and privately 24/7 X X X X   Manage appointments: schedule your next appointment; view details of past/upcoming appointments X      Request prescription refills. X      View recent personal medical records, including lab and immunizations X X X X   View health record, including health history, allergies, medications X X X X   Read reports about your outpatient visits, procedures, consult and ER notes X X X X   See your discharge summary, which is a recap of your hospital and/or ER visit that includes your diagnosis, lab results, and care plan. X X       How to register for Equals6:  1. Go to  [email or web address].  2. Click on the Sign Up Now box, which takes you to the New Member Sign Up page. You will need to provide the following information:  a. Enter your Equals6 Access Code exactly as it appears at the top of this page. (You will not need to use this code after you’ve completed the sign-up process. If you do not sign up before the expiration date, you must request a new code.)   b. Enter your date of birth.   c. Enter your home email address.   d. Click Submit, and follow the next screen’s instructions.  3. Create a Equals6 ID. This will be your Equals6  login ID and cannot be changed, so think of one that is secure and easy to remember.  4. Create a [facility] password. You can change your password at any time.  5. Enter your Password Reset Question and Answer. This can be used at a later time if you forget your password.   6. Enter your e-mail address. This allows you to receive e-mail notifications when new information is available in [facility].  7. Click Sign Up. You can now view your health information.    For assistance activating your [facility] account, call [phone]

## 2017-01-16 NOTE — ED NOTES
Chief Complaint   Patient presents with   • Tired     Pt BIB self to [facility], pt reports unable to manage glucose, pt reports abnormal labs (Na). states feeling tired.

## 2017-01-16 NOTE — H&P
CHIEF COMPLAINT:  Elevated blood sugars and back pain.    HISTORY OF PRESENT ILLNESS:  The patient is a 51-year-old male with history of   type 2 diabetes mellitus, recently on insulin as well as ulcerative colitis   and epidural steroid injection.  Patient underwent an uncomplicated lumbar   epidural steroid injection for chronic lower back pain on 01/04/2017 after   that he developed elevated blood sugars and mild DKA.  He was admitted for   this on 01/04/2017 through 01/05/2017.  His blood sugar is normalized and then   he went home, his notes over the last week, his blood sugars continue remain   elevated and he has had mild increase in back pain last week, primarily around   where they did the epidural steroid injection.  He denies any fecal or   urinary incontinence.  He does state he has been urinating more, which he   attributes to when his blood sugars get high.  He has had some mild abdominal   pain that is crampy in nature, primarily around the epigastric region when he   said it is exacerbated when his blood sugars go up.  Denies any diarrhea.    Denies any numbness or weakness in upper extremities or lower extremities.    Denies any skin rashes.  No obvious fevers or chills.  However, the last week,   he has had much decreased energy and overall just feels quite unwell.  He   says he denies any new medications.  He started over-the-counter or by mouth.    He says he has been compliant with his diet and has been working on his blood   sugars as best as he can.    REVIEW OF SYSTEMS:  All other systems are reviewed and are negative.    In the ED, he was given 2 liters of NS bolus and 15 units of Humulin R.    PAST MEDICAL HISTORY:  1.  Insulin-dependent diabetes diagnosed in March 2006  2.  Chronic low back pain status post recent lumbar epidural steroid injection   in 01/4/2017.  3.  Hypothyroidism.  4.  Anxiety.  5.  Ulcerative colitis.  6.  Recent umbilical hernia repair 11/06/2016.  7.  History of  asthma.  8.  History of gout.  9.  Low testosterone.    PAST SURGICAL HISTORY:  1.  Incarcerated umbilical hernia repair with mesh 11/06/2006.  2.  Cholecystectomy.  3.  Left wrist surgery.  4.  Lumbar epidural steroid injection 01/04/2017.    ALLERGIES:  1.  PEANUT DERIVATIVES.  2.  HYDROCODONE    SOCIAL HISTORY:  No alcohol, no tobacco, no drugs.  He is an RN at [employer].    FAMILY HISTORY:  Father with diabetes, mother with non-Hodgkin's Lymphoma.    MEDICATIONS PRIOR TO ADMISSION:  1.  Insulin Glargine 26 units everyday.  2.  Insulin sliding scale 2-12 units instructed 4 times a day before meals and   at bedtime.  3.  Anastrozole 1 mg tablet Tuesday and Friday.  4.  Beulah Thyroid 75 mg daily.  5.  Aspirin 81 mg daily.  6.  Atorvastatin 20 mg tablet daily.  7.  Bupropion  mg tablet daily.  8.  Culturelle Digestive capsule once daily.  9.  [facility] Vitamin D 5000 units daily.  10.  Hydromorphone 2 mg tablets everyday as needed for severe back pain.  11.  Lialda 1.2 g per ER 2.4 g b.i.d.  12.  Zofran 4 mg tablets every 4 hours as needed for nausea and vomiting.  13.  Testosterone 200 mg injection every Tuesday.    PHYSICAL EXAMINATION:  VITAL SIGNS:  Temperature is 36.7, heart rate 77, respiratory rate 16, oxygen   saturation 100% on room air, blood pressure 139/83.  GENERAL:  No acute distress, speaking in full sentences, A and O x4, pleasant   and cooperative.  HEENT:  Normocephalic and atraumatic.  Pupils are equal, round, and reactive   to light.  Extraocular muscles are intact.   NECK:  Flat JVD.  CARDIOVASCULAR:  Regular rate and rhythm.  No murmurs, rubs, or gallops.    Nondisplaced PMI.  LUNGS:  Clear to auscultation bilaterally.  No use of accessory muscles.  ABDOMEN:  Soft, nontender, and nondistended.  Normoactive bowel sounds.  No   hepatosplenomegaly.  Can appreciate a mesh in the umbilical region with 3-5 cm   horizontal abdominal incision just distal to the umbilicus that appears   clean, dry, and  intact well healed.  BACK:  He has moderate tenderness to palpation along the vertebral column in   L2-L4 region.  There is no fluctuance and no overlying erythema.  There is no   sacral edema.  Otherwise, the rest of the paravertebral and vertebral column   is nontender.  There is no step-offs appreciated.  EXTREMITIES:  No clubbing, cyanosis, or edema.  Warm peripherally, 2+ radial   pulses equal and symmetric.  NEUROLOGICAL:  Intact sensation to soft touch throughout and nonfocal.  MUSCULOSKELETAL:  Full range of motion in all extremities, 5/5 muscle strength   throughout.  SKIN:  No rashes, lesions, or excoriations.  PSYCHOLOGICAL:  Appropriate affect, A and O x4.    LABORATORY DATA:  CBC remarkable for white blood cell count 6.9, H and H 14.4   and 41.9, platelet count is 204.  CMP:  Sodium 129, potassium 4.5, chloride   96, bicarbonate 20, BUN and creatinine is 20 and 0.89, glucose is 589.  AST   and ALT 17 and 117.  Lipase 14.  Beta hydroxybutyrate 0.36.    UA, no evidence of infection, massive glucosuria.    IMAGING:  None.    ASSESSMENT:  1.  Hyperglycemia secondary type 2 diabetes mellitus and possibly infectious   etiology.  2.  Lower back pain, concerning for possible epidural infection.  3.  Isolated elevated ALT, unknown etiology.  4.  Type 2 diabetes mellitus, poorly controlled.  5.  History of ulcerative colitis.  6.  History of anxiety.  7.  History of gout.  8.  Pseudohyponatremia.    PLAN:  1.  Patient will be admitted to the neurological unit.  At this time, I do not   have a clear indication or answer to the patient's persistent hyperglycemia.    Epidural steroid injections with corticosteroids are known to cause   hyperglycemia of underline diabetes, but effect should be transient to wear   off within 2-5 days of injection.  He is 2 weeks out now, therefore I am   concerned he might have an underlying infection given his point tenderness,   overall lethargy and persistent hyperglycemia, I am  going to do a MRI of the   lumbar spine with and without contrast to rule out any epidural or vertebral   column infection.  Given his vital signs are stable and all other labs are   within normal limits, I am not going to initiate antibiotics at this time.  I   am going to give Humulin R 15 units x1 now and then I will continue his   outpatient Toujeo, doing insulin sliding scale, diabetic diet as well as   diabetes education as I am concerned that maybe the patient is not been   totally compliant with his diet either.  He does have an elevated ALT, which   is more specific for liver than AST is.  I am going to get a GGT.  I am also   going to do an abdominal ultrasound.  It could be fatty liver, though I do not   know at this time.    CODE STATUS:  Full code.    DIET:  Diabetic.    Deep venous thrombosis prophylaxis, none.  We will do bilateral SCDs in place.       ____________________________________     MD NELSON JACOBO / STORM    DD:  01/16/2017 11:44:28  DT:  01/16/2017 15:44:43    D#:  [id number]  Job#:  [id number]

## 2017-01-16 NOTE — IP AVS SNAPSHOT
1/18/2017          Mahesh Bradshaw  [address]  [place] [address]    Dear Mahesh:    [facility] wants to ensure your discharge home is safe and you or your loved ones have had all your questions answered regarding your care after you leave the hospital.    You may receive a telephone call within two days of your discharge.  This call is to make certain you understand your discharge instructions as well as ensure we provided you with the best care possible during your stay with us.     The call will only last approximately 3-5 minutes and will be done by a nurse.    Once again, we want to ensure your discharge home is safe and that you have a clear understanding of any next steps in your care.  If you have any questions or concerns, please do not hesitate to contact us, we are here for you.  Thank you for choosing [facility] for your healthcare needs.    Sincerely,    Kuldip Dias    [facility]

## 2017-01-17 ENCOUNTER — APPOINTMENT (OUTPATIENT)
Dept: RADIOLOGY | Facility: MEDICAL CENTER | Age: 52
DRG: 638 | End: 2017-01-17
Attending: INTERNAL MEDICINE
Payer: COMMERCIAL

## 2017-01-17 LAB
ALBUMIN SERPL BCP-MCNC: 3.5 G/DL (ref 3.2–4.9)
ALP SERPL-CCNC: 80 U/L (ref 30–99)
ALT SERPL-CCNC: 152 U/L (ref 2–50)
ANION GAP SERPL CALC-SCNC: 7 MMOL/L (ref 0–11.9)
AST SERPL-CCNC: 49 U/L (ref 12–45)
BILIRUB CONJ SERPL-MCNC: <0.1 MG/DL (ref 0.1–0.5)
BILIRUB INDIRECT SERPL-MCNC: ABNORMAL MG/DL (ref 0–1)
BILIRUB SERPL-MCNC: 0.3 MG/DL (ref 0.1–1.5)
BUN SERPL-MCNC: 22 MG/DL (ref 8–22)
CALCIUM SERPL-MCNC: 8.3 MG/DL (ref 8.5–10.5)
CHLORIDE SERPL-SCNC: 106 MMOL/L (ref 96–112)
CO2 SERPL-SCNC: 22 MMOL/L (ref 20–33)
CREAT SERPL-MCNC: 0.74 MG/DL (ref 0.5–1.4)
ERYTHROCYTE [DISTWIDTH] IN BLOOD BY AUTOMATED COUNT: 45.9 FL (ref 35.9–50)
GFR SERPL CREATININE-BSD FRML MDRD: >60 ML/MIN/1.73 M 2
GLUCOSE BLD-MCNC: 194 MG/DL (ref 65–99)
GLUCOSE BLD-MCNC: 204 MG/DL (ref 65–99)
GLUCOSE BLD-MCNC: 234 MG/DL (ref 65–99)
GLUCOSE BLD-MCNC: 260 MG/DL (ref 65–99)
GLUCOSE SERPL-MCNC: 240 MG/DL (ref 65–99)
HCT VFR BLD AUTO: 38.3 % (ref 42–52)
HGB BLD-MCNC: 12.9 G/DL (ref 14–18)
MAGNESIUM SERPL-MCNC: 1.9 MG/DL (ref 1.5–2.5)
MCH RBC QN AUTO: 30.5 PG (ref 27–33)
MCHC RBC AUTO-ENTMCNC: 33.7 G/DL (ref 33.7–35.3)
MCV RBC AUTO: 90.5 FL (ref 81.4–97.8)
PLATELET # BLD AUTO: 176 K/UL (ref 164–446)
PMV BLD AUTO: 9.6 FL (ref 9–12.9)
POTASSIUM SERPL-SCNC: 3.8 MMOL/L (ref 3.6–5.5)
PROT SERPL-MCNC: 6.2 G/DL (ref 6–8.2)
RBC # BLD AUTO: 4.23 M/UL (ref 4.7–6.1)
SODIUM SERPL-SCNC: 135 MMOL/L (ref 135–145)
WBC # BLD AUTO: 6.3 K/UL (ref 4.8–10.8)

## 2017-01-17 PROCEDURE — 97161 PT EVAL LOW COMPLEX 20 MIN: CPT

## 2017-01-17 PROCEDURE — G8978 MOBILITY CURRENT STATUS: HCPCS | Mod: CI

## 2017-01-17 PROCEDURE — 700102 HCHG RX REV CODE 250 W/ 637 OVERRIDE(OP): Performed by: INTERNAL MEDICINE

## 2017-01-17 PROCEDURE — 83735 ASSAY OF MAGNESIUM: CPT

## 2017-01-17 PROCEDURE — 99233 SBSQ HOSP IP/OBS HIGH 50: CPT | Performed by: INTERNAL MEDICINE

## 2017-01-17 PROCEDURE — 80076 HEPATIC FUNCTION PANEL: CPT

## 2017-01-17 PROCEDURE — G8979 MOBILITY GOAL STATUS: HCPCS | Mod: CI

## 2017-01-17 PROCEDURE — A9270 NON-COVERED ITEM OR SERVICE: HCPCS | Performed by: NURSE PRACTITIONER

## 2017-01-17 PROCEDURE — A9579 GAD-BASE MR CONTRAST NOS,1ML: HCPCS | Performed by: INTERNAL MEDICINE

## 2017-01-17 PROCEDURE — G8980 MOBILITY D/C STATUS: HCPCS | Mod: CI

## 2017-01-17 PROCEDURE — 82962 GLUCOSE BLOOD TEST: CPT

## 2017-01-17 PROCEDURE — A9270 NON-COVERED ITEM OR SERVICE: HCPCS | Performed by: INTERNAL MEDICINE

## 2017-01-17 PROCEDURE — 700102 HCHG RX REV CODE 250 W/ 637 OVERRIDE(OP): Performed by: NURSE PRACTITIONER

## 2017-01-17 PROCEDURE — 36415 COLL VENOUS BLD VENIPUNCTURE: CPT

## 2017-01-17 PROCEDURE — 770006 HCHG ROOM/CARE - MED/SURG/GYN SEMI*

## 2017-01-17 PROCEDURE — 700105 HCHG RX REV CODE 258: Performed by: INTERNAL MEDICINE

## 2017-01-17 PROCEDURE — 72158 MRI LUMBAR SPINE W/O & W/DYE: CPT

## 2017-01-17 PROCEDURE — 700117 HCHG RX CONTRAST REV CODE 255: Performed by: INTERNAL MEDICINE

## 2017-01-17 PROCEDURE — 80048 BASIC METABOLIC PNL TOTAL CA: CPT

## 2017-01-17 PROCEDURE — 85027 COMPLETE CBC AUTOMATED: CPT

## 2017-01-17 RX ADMIN — ACETAMINOPHEN, ASPIRIN AND CAFFEINE 2 TABLET: 250; 250; 65 TABLET, FILM COATED ORAL at 06:04

## 2017-01-17 RX ADMIN — GADODIAMIDE 20 ML: 287 INJECTION INTRAVENOUS at 11:03

## 2017-01-17 RX ADMIN — BUPROPION HYDROCHLORIDE 150 MG: 150 TABLET, FILM COATED, EXTENDED RELEASE ORAL at 20:35

## 2017-01-17 RX ADMIN — SODIUM CHLORIDE: 9 INJECTION, SOLUTION INTRAVENOUS at 05:05

## 2017-01-17 RX ADMIN — HYDROMORPHONE HYDROCHLORIDE 2 MG: 2 TABLET ORAL at 11:28

## 2017-01-17 RX ADMIN — INSULIN LISPRO 3 UNITS: 100 INJECTION, SOLUTION INTRAVENOUS; SUBCUTANEOUS at 17:32

## 2017-01-17 RX ADMIN — INSULIN LISPRO 1 UNITS: 100 INJECTION, SOLUTION INTRAVENOUS; SUBCUTANEOUS at 11:29

## 2017-01-17 RX ADMIN — INSULIN GLARGINE 40 UNITS: 100 INJECTION, SOLUTION SUBCUTANEOUS at 20:40

## 2017-01-17 RX ADMIN — DIAZEPAM 2 MG: 2 TABLET ORAL at 10:42

## 2017-01-17 RX ADMIN — INSULIN LISPRO 2 UNITS: 100 INJECTION, SOLUTION INTRAVENOUS; SUBCUTANEOUS at 06:06

## 2017-01-17 RX ADMIN — INSULIN GLARGINE 40 UNITS: 100 INJECTION, SOLUTION SUBCUTANEOUS at 06:05

## 2017-01-17 RX ADMIN — ANASTROZOLE 1 MG: 1 TABLET ORAL at 08:39

## 2017-01-17 RX ADMIN — BUPROPION HYDROCHLORIDE 150 MG: 150 TABLET, FILM COATED, EXTENDED RELEASE ORAL at 08:39

## 2017-01-17 RX ADMIN — INSULIN LISPRO 2 UNITS: 100 INJECTION, SOLUTION INTRAVENOUS; SUBCUTANEOUS at 20:41

## 2017-01-17 RX ADMIN — THYROID, PORCINE 75 MG: 30 TABLET ORAL at 20:34

## 2017-01-17 RX ADMIN — ACETAMINOPHEN 650 MG: 325 TABLET, FILM COATED ORAL at 04:34

## 2017-01-17 RX ADMIN — ASPIRIN 81 MG: 81 TABLET, CHEWABLE ORAL at 08:39

## 2017-01-17 ASSESSMENT — GAIT ASSESSMENTS
DISTANCE (FEET): 400
GAIT LEVEL OF ASSIST: SUPERVISED

## 2017-01-17 ASSESSMENT — ENCOUNTER SYMPTOMS
WEAKNESS: 0
COUGH: 0
MYALGIAS: 0
NAUSEA: 0
HEADACHES: 0
VOMITING: 0
FEVER: 0
CHILLS: 0
PALPITATIONS: 0
CONSTIPATION: 0

## 2017-01-17 ASSESSMENT — PAIN SCALES - GENERAL
PAINLEVEL_OUTOF10: 4
PAINLEVEL_OUTOF10: 5
PAINLEVEL_OUTOF10: 2
PAINLEVEL_OUTOF10: 5
PAINLEVEL_OUTOF10: 3

## 2017-01-17 NOTE — PROGRESS NOTES
Pt refuses bed alarm; pt independent and ambulatory, steady on feet. Instructed to call for any assistance.

## 2017-01-17 NOTE — PROGRESS NOTES
Patient received, report taken at bedside, alert and oriented x4 and able to make needs known, denies discomfort at this time, no s/s of distress, respirations even and unlabored, call light within reach

## 2017-01-17 NOTE — RESPIRATORY CARE
COPD EDUCATION by COPD CLINICAL EDUCATOR  1/17/2017 at 8:07 AM by Jacey Blackwell     Patient reviewed by COPD education team. Patient does not qualify for COPD program.

## 2017-01-17 NOTE — PROGRESS NOTES
Received bedside report from day-shift RN and assumed care of patient. Labs and orders noted. Assessment performed. Safety precautions in place including patient call light within reach, personal possessions nearby, bed in low position and locked, patient rounding in practice, and non-skid socks in place.

## 2017-01-17 NOTE — PROGRESS NOTES
Nursing staff indicates patient has not been NPO, will keep NPO after midnight for RUQ USD in a.m.

## 2017-01-17 NOTE — PROGRESS NOTES
Assumed care of pt. Received report from ER RN. KEN/BERT4. Ambulatory, steady on feet. PIV in left AC 20g, with NS at 125cc/hr. Diabetic diet. Accu checks AC and HS. PT ordered. APAP and zofran given per pt request. MRI pending, 1x dose of valium prior to. Call light in reach, bed in low position, will continue hourly rounding.

## 2017-01-17 NOTE — THERAPY
"Physical Therapy Evaluation completed.   Bed Mobility:  Supine to Sit: Supervised  Transfers: Sit to Stand: Supervised  Gait: Level Of Assist: Supervised with No Equipment Needed       Plan of Care: Patient with no further skilled PT needs in the acute care setting at this time  Discharge Recommendations: Equipment: No Equipment Needed. Post-acute therapy recommended after discharged home.    See \"Rehab Therapy-Acute\" Patient Summary Report for complete documentation.     "

## 2017-01-18 ENCOUNTER — APPOINTMENT (OUTPATIENT)
Dept: RADIOLOGY | Facility: MEDICAL CENTER | Age: 52
DRG: 638 | End: 2017-01-18
Attending: INTERNAL MEDICINE
Payer: COMMERCIAL

## 2017-01-18 VITALS
BODY MASS INDEX: 34.52 KG/M2 | WEIGHT: 227 LBS | RESPIRATION RATE: 18 BRPM | OXYGEN SATURATION: 96 % | HEART RATE: 74 BPM | TEMPERATURE: 97.4 F | SYSTOLIC BLOOD PRESSURE: 122 MMHG | DIASTOLIC BLOOD PRESSURE: 81 MMHG

## 2017-01-18 LAB
ALBUMIN SERPL BCP-MCNC: 3.8 G/DL (ref 3.2–4.9)
ALBUMIN/GLOB SERPL: 1.3 G/DL
ALP SERPL-CCNC: 80 U/L (ref 30–99)
ALT SERPL-CCNC: 113 U/L (ref 2–50)
ANION GAP SERPL CALC-SCNC: 9 MMOL/L (ref 0–11.9)
APAP SERPL-MCNC: <10 UG/ML (ref 10–30)
AST SERPL-CCNC: 24 U/L (ref 12–45)
BILIRUB SERPL-MCNC: 0.4 MG/DL (ref 0.1–1.5)
BUN SERPL-MCNC: 23 MG/DL (ref 8–22)
CALCIUM SERPL-MCNC: 9.1 MG/DL (ref 8.5–10.5)
CHLORIDE SERPL-SCNC: 106 MMOL/L (ref 96–112)
CO2 SERPL-SCNC: 21 MMOL/L (ref 20–33)
CREAT SERPL-MCNC: 0.75 MG/DL (ref 0.5–1.4)
GFR SERPL CREATININE-BSD FRML MDRD: >60 ML/MIN/1.73 M 2
GGT SERPL-CCNC: 361 U/L (ref 7–51)
GLOBULIN SER CALC-MCNC: 3 G/DL (ref 1.9–3.5)
GLUCOSE BLD-MCNC: 123 MG/DL (ref 65–99)
GLUCOSE BLD-MCNC: 137 MG/DL (ref 65–99)
GLUCOSE SERPL-MCNC: 154 MG/DL (ref 65–99)
POTASSIUM SERPL-SCNC: 3.8 MMOL/L (ref 3.6–5.5)
PROT SERPL-MCNC: 6.8 G/DL (ref 6–8.2)
SODIUM SERPL-SCNC: 136 MMOL/L (ref 135–145)

## 2017-01-18 PROCEDURE — 82977 ASSAY OF GGT: CPT

## 2017-01-18 PROCEDURE — A9270 NON-COVERED ITEM OR SERVICE: HCPCS | Performed by: INTERNAL MEDICINE

## 2017-01-18 PROCEDURE — 80307 DRUG TEST PRSMV CHEM ANLYZR: CPT

## 2017-01-18 PROCEDURE — 700102 HCHG RX REV CODE 250 W/ 637 OVERRIDE(OP): Performed by: INTERNAL MEDICINE

## 2017-01-18 PROCEDURE — 99239 HOSP IP/OBS DSCHRG MGMT >30: CPT | Performed by: INTERNAL MEDICINE

## 2017-01-18 PROCEDURE — 36415 COLL VENOUS BLD VENIPUNCTURE: CPT

## 2017-01-18 PROCEDURE — 82962 GLUCOSE BLOOD TEST: CPT

## 2017-01-18 PROCEDURE — 80053 COMPREHEN METABOLIC PANEL: CPT

## 2017-01-18 PROCEDURE — 76705 ECHO EXAM OF ABDOMEN: CPT

## 2017-01-18 RX ORDER — ASPIRIN 81 MG/1
81 TABLET, CHEWABLE ORAL DAILY
Qty: 100 TAB | Status: SHIPPED | DISCHARGE
Start: 2017-01-18 | End: 2017-04-06

## 2017-01-18 RX ADMIN — ASPIRIN 81 MG: 81 TABLET, CHEWABLE ORAL at 07:51

## 2017-01-18 RX ADMIN — BUPROPION HYDROCHLORIDE 150 MG: 150 TABLET, FILM COATED, EXTENDED RELEASE ORAL at 07:51

## 2017-01-18 RX ADMIN — HYDROMORPHONE HYDROCHLORIDE 2 MG: 2 TABLET ORAL at 02:55

## 2017-01-18 RX ADMIN — INSULIN GLARGINE 40 UNITS: 100 INJECTION, SOLUTION SUBCUTANEOUS at 06:08

## 2017-01-18 ASSESSMENT — PAIN SCALES - GENERAL
PAINLEVEL_OUTOF10: 2
PAINLEVEL_OUTOF10: 6
PAINLEVEL_OUTOF10: 2

## 2017-01-18 NOTE — PROGRESS NOTES
Patient discharged, all personal belongings sent with patient, up and ambulatory and refused escort downstairs

## 2017-01-18 NOTE — DISCHARGE INSTRUCTIONS
Discharge Instructions    Discharged to home by car with relative. Discharged via wheelchair, hospital escort: Yes.  Special equipment needed: Not Applicable    Be sure to schedule a follow-up appointment with your primary care doctor or any specialists as instructed.     Discharge Plan:   Diet Plan: Discussed  Activity Level: Discussed  Confirmed Follow up Appointment: Appointment Scheduled  Confirmed Symptoms Management: Discussed  Medication Reconciliation Updated: Yes  Influenza Vaccine Indication: Not indicated: Previously immunized this influenza season and > 8 years of age    I understand that a diet low in cholesterol, fat, and sodium is recommended for good health. Unless I have been given specific instructions below for another diet, I accept this instruction as my diet prescription.   Other diet: diabetic diet      Special Instructions:    · Is patient discharged on Warfarin / Coumadin?   No     · Is patient Post Blood Transfusion?  No    Depression / Suicide Risk    As you are discharged from this [facility] facility, it is important to learn how to keep safe from harming yourself.    Recognize the warning signs:  · Abrupt changes in personality, positive or negative- including increase in energy   · Giving away possessions  · Change in eating patterns- significant weight changes-  positive or negative  · Change in sleeping patterns- unable to sleep or sleeping all the time   · Unwillingness or inability to communicate  · Depression  · Unusual sadness, discouragement and loneliness  · Talk of wanting to die  · Neglect of personal appearance   · Rebelliousness- reckless behavior  · Withdrawal from people/activities they love  · Confusion- inability to concentrate     If you or a loved one observes any of these behaviors or has concerns about self-harm, here's what you can do:  · Talk about it- your feelings and reasons for harming yourself  · Remove any means that you might use to hurt yourself  (examples: pills, rope, extension cords, firearm)  · Get professional help from the community (Mental Health, Substance Abuse, psychological counseling)  · Do not be alone:Call your Safe Contact- someone whom you trust who will be there for you.  · Call your local CRISIS HOTLINE 765-0311 or 622-752-5918  · Call your local Children's Mobile Crisis Response Team [place] (542) 887-8153 or www.[employer]  · Call the toll free National Suicide Prevention Hotlines   · National Suicide Prevention Lifeline 996-300-VBDF (7523)  · National Hope Line Network 800-SUICIDE (325-3549)

## 2017-01-18 NOTE — CARE PLAN
Problem: Nutritional:  Goal: Patient to verbalize or demonstrate understanding of diet  Outcome: MET Date Met:  01/17/17

## 2017-01-18 NOTE — PROGRESS NOTES
Received report from day shift RN. Assumed care of patient. Pt shows no s/sx of distress. Discussed plan of care for day with patient and received verbal understanding. Call light within reach, bed in low position.

## 2017-01-18 NOTE — DIETARY
Nutrition Services: Consult received for Diabetic Diet education. FSBS: [phone] (1/16-1/17), on Lantus and SSI. HbA1c= 12.1 on 1/16. Diet= Diabetic. Pt with known Type II DM and reports he tries to follow diabetic diet at home. Pt states he was doing well until he had medical issues starting in November and receptive to receive education. Provided written and verbal information on DM diet with resources for follow up. Expect good compliance. RD available PRN.

## 2017-01-18 NOTE — PROGRESS NOTES
Hospital Medicine Progress Note, Adult, Complex               Author: Morena Machado Date & Time created: 1/17/2017  4:41 PM     Interval History:  The patient was admitted with back pain and elevated blood sugars     Today blood sugar is better on insulin  His back pain is at its baseline, he denies focal weakness or numbness  He has been taking tylenol regularly at home for pain and is on arimidex for low testosterone treatment which he has been on for at least 5 years  MRI of the spine shows no acute pathology    Review of Systems:  Review of Systems   Constitutional: Negative for fever and chills.   Respiratory: Negative for cough.    Cardiovascular: Negative for chest pain, palpitations and leg swelling.   Gastrointestinal: Negative for nausea, vomiting and constipation.        Epigastric discomfort when blood sugar is elevated   Genitourinary: Negative for dysuria.   Musculoskeletal: Negative for myalgias.   Skin: Negative for itching and rash.   Neurological: Negative for weakness and headaches.       Physical Exam:  Physical Exam   Constitutional: No distress.   Eyes: Conjunctivae are normal.   Neck: Neck supple.   Cardiovascular: Normal rate.    Pulmonary/Chest: No respiratory distress. He has no wheezes. He has no rales.   Abdominal: Soft. Bowel sounds are normal. He exhibits no distension. There is no tenderness.   Musculoskeletal: He exhibits no edema.   Skin: Skin is warm and dry. He is not diaphoretic.   Vitals reviewed.      Labs:        Invalid input(s): [id number]      Recent Labs      01/16/17   0920  01/17/17   0338   SODIUM  129*  135   POTASSIUM  4.5  3.8   CHLORIDE  96  106   CO2  20  22   BUN  29*  22   CREATININE  0.89  0.74   MAGNESIUM   --   1.9   CALCIUM  9.7  8.3*     Recent Labs      01/16/17   0920  01/17/17   0338   ALTSGPT  117*  152*   ASTSGOT  17  49*   ALKPHOSPHAT  98  80   TBILIRUBIN  0.3  0.3   DBILIRUBIN   --   <0.1   LIPASE  14   --    GLUCOSE  589*  240*     Recent Labs       17   0920  17   0338   RBC  4.70  4.23*   HEMOGLOBIN  14.8  12.9*   HEMATOCRIT  41.9*  38.3*   PLATELETCT  204  176     Recent Labs      17   0920  17   0338   WBC  6.9  6.3   NEUTSPOLYS  69.90   --    LYMPHOCYTES  23.80   --    MONOCYTES  5.40   --    EOSINOPHILS  0.40   --    BASOPHILS  0.10   --    ASTSGOT  17  49*   ALTSGPT  117*  152*   ALKPHOSPHAT  98  80   TBILIRUBIN  0.3  0.3           Hemodynamics:  Temp (24hrs), Av.3 °C (97.3 °F), Min:36.1 °C (96.9 °F), Max:36.6 °C (97.8 °F)  Temperature: 36.6 °C (97.8 °F)  Pulse  Av.3  Min: 60  Max: 80   Blood Pressure: 125/80 mmHg     Respiratory:    Respiration: 17, Pulse Oximetry: 94 %           Fluids:    Intake/Output Summary (Last 24 hours) at 17 1641  Last data filed at 17 0800   Gross per 24 hour   Intake    360 ml   Output      0 ml   Net    360 ml        GI/Nutrition:  Orders Placed This Encounter   Procedures   • Diet Order     Standing Status: Standing      Number of Occurrences: 1      Standing Expiration Date:      Order Specific Question:  Diet:     Answer:  Diabetic [3]     Medical Decision Making, by Problem:  Active Hospital Problems    Diagnosis   • Back pain [M54.9] at his baseline,  Dilaudid as needed  Elevated ALT continuing to rise  I called Dr. Lopez who will see the patient, he sees Dr. Higginbotham outpatient  Stop statin, stop arimidex and check tylenol level   • Hyperglycemia due to type 2 diabetes mellitus (CMS-HCC) [E11.65] continue insulin       Labs reviewed, Radiology images reviewed and Medications reviewed  Sousa catheter: No Sousa      DVT Prophylaxis: Enoxaparin (Lovenox)    Ulcer prophylaxis: Not indicated    Assessed for rehab: Patient returned to prior level of function, rehabilitation not indicated at this time

## 2017-01-18 NOTE — PROGRESS NOTES
Patient received, report taken at bedside, alert and oriented x4 and able to make needs known, chronic low back pain and pain management effective, saline lock, up and ambulatory independently to the bathroom, no s/s of distress, call light within reach

## 2017-01-19 ENCOUNTER — PATIENT OUTREACH (OUTPATIENT)
Dept: HEALTH INFORMATION MANAGEMENT | Facility: OTHER | Age: 52
End: 2017-01-19

## 2017-01-19 NOTE — CONSULTS
DATE OF SERVICE:  01/18/2017    REFERRING PHYSICIAN:  Dr. Morena Machado.    REASON FOR THE CONSULT:  Abnormal liver tests.    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  He was to follow up, but insurance   kept him from following up for a colitis that was possibly felt to be   ulcerative colitis.  He was to have a full IBD lab panel and flexible   sigmoidoscopy with multiple biopsies.  Again, insurance would not cover the   procedure and laboratory evaluation and so he plans to follow up now that is a   new year.  He has continued on Lialda.    Several days ago, he was feeling unwell and his blood sugar was found to be in   the 600 range.  He had a false hyponatremia related to the hyperglycemia and   was advised to come to the hospital.  His liver tests have been normal until   January 13th when he was noted to have AST 37, ALT 68, alkaline phosphatase   83, total bilirubin 0.5.  On January 16th, his labs were repeated, AST 17, ALT   117.  Yesterday, his AST was up to 49, .  Today, his AST is 24 and his   ALT is down to 113.  His alkaline phosphatase and total bilirubin have   remained normal.  However, his GGT was 361 today.  He does not drink alcohol   on a regular basis.    He was taking rifampin and Augmentin over the holidays for a seroma, but   developed post-repair of incarcerated umbilical hernia.  During that time, his   liver tests were normal.  He has been on anastrozole for 5 years.  He has   been on a statin medication for months and his liver tests have remained   normal.  He does not recall any viral illness in the last week or so.  He has   had a previous cholecystectomy for gallstones, but on imaging today, his   common bile duct is 0.48 cm.  His portal vein is patent.  He does not have   ascites.  His liver is described to be heterogeneous with increased   echogenicity and typical for hepatic  steatosis.    ALLERGIES:  PEANUTS, TRADJENTA (DEVELOPED PANCREATITIS), HYDROCODONE.    MEDICATIONS:  Prior to admission, insulin glargine 26 units daily, insulin   sliding scale, anastrozole 1 mg Tuesdays and Fridays, [place] Thyroid 75 mg   daily, aspirin 81 mg daily, atorvastatin 20 mg daily, bupropion  mg   daily, [place] Digestive once daily, [facility] vitamin D 5000 units daily,   hydromorphone 2 mg daily p.r.n., Lialda 1.2 grams 2 tablets daily, Zofran 4 mg   p.r.n., testosterone 200 mg injection every Tuesday.    SURGERIES:  Incarcerated umbilical hernia repair with mesh November 2016,   cholecystectomy, left wrist surgery, lumbar epidural steroid injection January 2017.    MEDICAL ILLNESSES:  Type 2 diabetes diagnosed March 2006, chronic low back   pain, hypothyroidism, anxiety, query ulcerative colitis, history of asthma,   history of gout, low testosterone.    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  He drinks alcohol on occasion.  No   drugs.  No tobacco.    FAMILY HISTORY:  Father had diabetes.  Mother had non-Hodgkin's lymphoma.    REVIEW OF SYSTEMS:  No fevers, chills, or night sweats.  No severe headaches.    No shortness of breath or chest pain.  Has had some epigastric pain.  No   diarrhea.  No jaundice.  No syncope, seizure, or stroke.    PHYSICAL EXAMINATION:  VITAL SIGNS:  Temperature 36.3, pulse 74, respirations 18, blood pressure   122/81.  GENERAL:  The patient is a healthy-appearing 51-year-old male, sitting up in   the chair.  HEENT:  Pupils are round.  Sclerae are anicteric.  Conjunctivae pink.  NECK:  Soft.  No adenopathy.  LUNGS:  Clear.  CARDIOVASCULAR:  Regular rate and rhythm.  ABDOMEN:  Bowel sounds present, soft, nontender, no palpable masses.  EXTREMITIES:  No clubbing, cyanosis, peripheral edema.  SKIN:  Smooth, moist, and warm.  NEUROLOGIC:  He is awake, alert, oriented.    LABORATORY DATA:  Given above.    IMPRESSION:  1.  Mildly abnormal transaminases.  This may be related  to his high blood   sugars about the time of admission.  2.  Epigastric pain, resolved.  3.  Hyperglycemia, resolved.  4.  Type 2 diabetes mellitus, poorly controlled.  5.  Query ulcerative colitis.  6.  Pseudohyponatremia.  7.  Low back pain.    RECOMMENDATIONS:  I feel it is fine for the patient to go home.  I do not feel   his statin medication or anastrozole has to be discontinued.  I do not feel   this is related to Lialda as he has been on for a year.  I think this was an   acute event and fortunately he is already showing improvement.  I have   encouraged him to follow up with Dr. Higginbotham and he will follow up with his   primary care for repeat liver tests.       ____________________________________     MD NICHO HOYOS / NTS    DD:  01/18/2017 13:14:27  DT:  01/18/2017 16:27:05    D#:  [id number]  Job#:  [id number]

## 2017-01-19 NOTE — DISCHARGE SUMMARY
DISCHARGE DIAGNOSES:  1.  Elevated liver transaminases.  2.  Medication-induced hepatitis.  3.  Hyperglycemia.  4.  Type 2 diabetes mellitus.  5.  Chronic back pain.  6.  Ulcerative colitis.    HOSPITAL COURSE:  The patient is a 51-year-old male who presented to the   hospital complaining of worsening back pain and elevated blood sugars.    Patient had undergone an epidural steroid injection for his back pain on   01/04/2017 and states after this injection it seemed that his blood sugars had   been more elevated and he was voiding more frequently, so he came to the   hospital to be evaluated because he could not his blood sugar under control at   home with his insulin and he was noted to have a glucose of 589 with a   bicarbonate level of 20.  Due to his continued back pain there was concern   that possibly he had an infection causing his hyperglycemia as well and as he   had a recent epidural steroid injection.  MRI of the lumbar spine was   performed; however, this showed no evidence of infection and did show some   chronic degenerative disk disease, but no evidence of diskitis or   osteomyelitis or abscess formation.  The patient's blood sugars were brought   under control with his insulin.  He was also given aggressive IV fluid   hydration; however, he was noted to have liver transaminase elevation with ALT   claiming as high as 152.  Because of this, his statin and Arimidex were   discontinued, a gamma GT was checked and found to be elevated at 361 and   patient is followed by Dr. Higginbotham from gastroenterology for his ulcerative   colitis.  Therefore, Dr. Lopez who is on-call was called and notified of   laboratory abnormalities.  He did have ultrasound of the liver which showed   some fatty infiltration of the liver, but no other abnormalities and his ALT   did come down to 113, after discontinuation of his Arimidex and statin.    Bilirubin was normal at 0.4, alkaline phosphatase of 80 and AST of 24.     Patient's Tylenol level was less than 10 as well.  Patient was notified of   these results as with Dr. Lopez.  After discussion with gastroenterology   and the patient was agreed.    Patient will follow up with Dr. Higginbotham after discharge from the hospital   and he would resume Arimidex and statin therapy only after follow up with   gastroenterology depending on his laboratory studies and other evidence upon   followup.    TIME OF DISCHARGE:  38 minutes.       ____________________________________     SHANTEL KIRKPATRICK MD    OPB / NTS    DD:  01/18/2017 13:36:39  DT:  01/18/2017 21:34:42    D#:  [id number]  Job#:  [id number]    cc: MAYNOR LOPEZ MD, CATHERINE PADILLA MD

## 2017-01-19 NOTE — PROGRESS NOTES
01/19/2017 1550 - Discharge Outreach attempt -   01/21/2017 1642 - Discharge Outreach second attempt -   01/24/2017 0916 - Discharge Outreach third attempt -

## 2017-02-18 ENCOUNTER — APPOINTMENT (OUTPATIENT)
Dept: RADIOLOGY | Facility: MEDICAL CENTER | Age: 52
End: 2017-02-18
Attending: EMERGENCY MEDICINE
Payer: COMMERCIAL

## 2017-02-18 ENCOUNTER — HOSPITAL ENCOUNTER (EMERGENCY)
Facility: MEDICAL CENTER | Age: 52
End: 2017-02-19
Attending: EMERGENCY MEDICINE
Payer: COMMERCIAL

## 2017-02-18 DIAGNOSIS — R10.9 ABDOMINAL PAIN, UNSPECIFIED LOCATION: ICD-10-CM

## 2017-02-18 PROCEDURE — 96374 THER/PROPH/DIAG INJ IV PUSH: CPT

## 2017-02-18 PROCEDURE — 700111 HCHG RX REV CODE 636 W/ 250 OVERRIDE (IP): Performed by: EMERGENCY MEDICINE

## 2017-02-18 PROCEDURE — 96361 HYDRATE IV INFUSION ADD-ON: CPT

## 2017-02-18 PROCEDURE — 99284 EMERGENCY DEPT VISIT MOD MDM: CPT

## 2017-02-18 PROCEDURE — 96375 TX/PRO/DX INJ NEW DRUG ADDON: CPT

## 2017-02-18 PROCEDURE — 700105 HCHG RX REV CODE 258: Performed by: EMERGENCY MEDICINE

## 2017-02-18 RX ORDER — ONDANSETRON 2 MG/ML
4 INJECTION INTRAMUSCULAR; INTRAVENOUS ONCE
Status: COMPLETED | OUTPATIENT
Start: 2017-02-18 | End: 2017-02-18

## 2017-02-18 RX ORDER — SODIUM CHLORIDE 9 MG/ML
1000 INJECTION, SOLUTION INTRAVENOUS ONCE
Status: COMPLETED | OUTPATIENT
Start: 2017-02-18 | End: 2017-02-19

## 2017-02-18 RX ADMIN — SODIUM CHLORIDE 1000 ML: 9 INJECTION, SOLUTION INTRAVENOUS at 23:59

## 2017-02-18 RX ADMIN — FENTANYL CITRATE 100 MCG: 50 INJECTION, SOLUTION INTRAMUSCULAR; INTRAVENOUS at 23:58

## 2017-02-18 RX ADMIN — ONDANSETRON 4 MG: 2 INJECTION, SOLUTION INTRAMUSCULAR; INTRAVENOUS at 23:58

## 2017-02-18 ASSESSMENT — ENCOUNTER SYMPTOMS
NAUSEA: 0
FEVER: 1
CHILLS: 0
DIARRHEA: 0
SHORTNESS OF BREATH: 0
VOMITING: 0

## 2017-02-18 NOTE — ED AVS SNAPSHOT
[employer] Access Code: [email or web address]  Expires: 2/28/2017 10:27 AM    [employer]  A secure, online tool to manage your health information     [employer]’s [employer]® is a secure, online tool that connects you to your personalized health information from the privacy of your home -- day or night - making it very easy for you to manage your healthcare. Once the activation process is completed, you can even access your medical information using the [employer] princess, which is available for free in the Apple Princess store or Google Play store.     [employer] provides the following levels of access (as shown below):   My Chart Features   [facility] Primary Care Doctor [facility]  Specialists [facility]  Urgent  Care Non-[facility]  Primary Care  Doctor   Email your healthcare team securely and privately 24/7 X X X X   Manage appointments: schedule your next appointment; view details of past/upcoming appointments X      Request prescription refills. X      View recent personal medical records, including lab and immunizations X X X X   View health record, including health history, allergies, medications X X X X   Read reports about your outpatient visits, procedures, consult and ER notes X X X X   See your discharge summary, which is a recap of your hospital and/or ER visit that includes your diagnosis, lab results, and care plan. X X       How to register for [employer]:  1. Go to  [email or web address].  2. Click on the Sign Up Now box, which takes you to the New Member Sign Up page. You will need to provide the following information:  a. Enter your [employer] Access Code exactly as it appears at the top of this page. (You will not need to use this code after you’ve completed the sign-up process. If you do not sign up before the expiration date, you must request a new code.)   b. Enter your date of birth.   c. Enter your home email address.   d. Click Submit, and follow the next screen’s instructions.  3. Create a [employer] ID. This will be your [employer]  login ID and cannot be changed, so think of one that is secure and easy to remember.  4. Create a [employer] password. You can change your password at any time.  5. Enter your Password Reset Question and Answer. This can be used at a later time if you forget your password.   6. Enter your e-mail address. This allows you to receive e-mail notifications when new information is available in [employer].  7. Click Sign Up. You can now view your health information.    For assistance activating your [employer] account, call [phone]

## 2017-02-18 NOTE — ED AVS SNAPSHOT
Home Care Instructions                                                                                                                Mahesh Bradshaw   MRN: [id number]    Department:  [facility], Emergency Dept   Date of Visit:  2/18/2017            [facility], Emergency Dept    [address] [address]    Phone:  [phone]      You were seen by     Bebo Pardo D.O.      Your Diagnosis Was     Abdominal pain, unspecified location     R10.9       These are the medications you received during your hospitalization from 02/18/2017 2309 to 02/19/2017 0415     Date/Time Order Dose Route Action    02/18/2017 2359 NS infusion 1,000 mL 1,000 mL Intravenous New Bag    02/18/2017 2358 fentaNYL (SUBLIMAZE) injection 100 mcg 100 mcg Intravenous Given    02/18/2017 2358 ondansetron (ZOFRAN) syringe/vial injection 4 mg 4 mg Intravenous Given    02/19/2017 0032 HYDROmorphone (DILAUDID) injection 1 mg 1 mg Intravenous Given    02/19/2017 0031 ondansetron (ZOFRAN) syringe/vial injection 4 mg 4 mg Intravenous Given    02/19/2017 0211 iohexol (OMNIPAQUE) 350 mg/mL 100 mL Intravenous Given    02/19/2017 0212 iohexol (OMNIPAQUE) 240 mg/mL 50 mL Intravenous Given    02/19/2017 0300 HYDROmorphone (DILAUDID) injection 0.5 mg 0.5 mg Intravenous Given      Follow-up Information     1. Follow up with Rodolfo Stein M.D..    Specialty:  Internal Medicine    Contact information    [address]  [address]  [place] [address]  [phone]          2. Follow up with Esau Dooley M.D..    Specialty:  Surgery    Contact information    [address]  [address]  [place] [id number] [phone]        Medication Information     Review all of your home medications and newly ordered medications with your primary doctor and/or pharmacist as soon as possible. Follow medication instructions as directed by your doctor and/or pharmacist.     Please keep your complete medication list  with you and share with your physician. Update the information when medications are discontinued, doses are changed, or new medications (including over-the-counter products) are added; and carry medication information at all times in the event of emergency situations.               Medication List      ASK your doctor about these medications        Instructions    ARMOUR THYROID 60 MG Tabs   Generic drug:  thyroid    Take 75 mg by mouth every evening. Pt uses a 60 mg and 1/2 of a 30 mg   Dose:  75 mg       aspirin 81 MG Chew chewable tablet   Commonly known as:  ASA    Take 1 Tab by mouth every day.   Dose:  81 mg       buPROPion 300 MG XL tablet   Commonly known as:  WELLBUTRIN XL    Take 300 mg by mouth every evening.   Dose:  300 mg       HYDROmorphone 2 MG Tabs   Commonly known as:  DILAUDID    Take 2 mg by mouth 1 time daily as needed for Severe Pain (back pain).   Dose:  2 mg       Insulin Glargine 300 UNIT/ML Sopn   Commonly known as:  TOUJEO SOLOSTAR    Inject 40 Units as instructed 2 Times a Day. 62 units QAM 52 units QPM   Dose:  40 Units       insulin lispro 100 UNIT/ML Soln   Commonly known as:  HUMALOG    Inject 2-20 Units as instructed 3 times a day before meals. Sliding Scale - 2 units every 50   Dose:  2-20 Units       LIALDA 1.2 GM Tbec   Generic drug:  mesalamine    Take 2.4 g by mouth 2 Times a Day.   Dose:  2.4 g       ondansetron 4 MG Tbdp   Commonly known as:  ZOFRAN ODT    Take 1 Tab by mouth every four hours as needed.   Dose:  4 mg       testosterone cypionate 200 MG/ML Soln injection   Commonly known as:  DEPO-TESTOSTERONE    200 mg by Intramuscular route every Tuesday.   Dose:  200 mg       Vitamin D3 5000 UNITS Tabs    Take 5,000 Units by mouth every evening.   Dose:  5000 Units               Procedures and tests performed during your visit     Procedure/Test Number of Times Performed    BLOOD CULTURE 2    CBC WITH DIFFERENTIAL 1    COMP METABOLIC PANEL 1    CONSENT FOR CONTRAST INJECTION  1    CT-ABDOMEN-PELVIS WITH 1    CULTURE WOUND W/ GRAM STAIN 1    ESTIMATED GFR 1        Discharge Instructions       Abdominal Pain (Nonspecific)  Your exam might not show the exact reason you have abdominal pain. Since there are many different causes of abdominal pain, another checkup and more tests may be needed. It is very important to follow up for lasting (persistent) or worsening symptoms. A possible cause of abdominal pain in any person who still has his or her appendix is acute appendicitis. Appendicitis is often hard to diagnose. Normal blood tests, urine tests, ultrasound, and CT scans do not completely rule out early appendicitis or other causes of abdominal pain. Sometimes, only the changes that happen over time will allow appendicitis and other causes of abdominal pain to be determined. Other potential problems that may require surgery may also take time to become more apparent. Because of this, it is important that you follow all of the instructions below.  HOME CARE INSTRUCTIONS   · Rest as much as possible.   · Do not eat solid food until your pain is gone.   · While adults or children have pain: A diet of water, weak decaffeinated tea, broth or bouillon, gelatin, oral rehydration solutions (ORS), frozen ice pops, or ice chips may be helpful.   · When pain is gone in adults or children: Start a light diet (dry toast, crackers, applesauce, or white rice). Increase the diet slowly as long as it does not bother you. Eat no dairy products (including cheese and eggs) and no spicy, fatty, fried, or high-fiber foods.   · Use no alcohol, caffeine, or cigarettes.   · Take your regular medicines unless your caregiver told you not to.   · Take any prescribed medicine as directed.   · Only take over-the-counter or prescription medicines for pain, discomfort, or fever as directed by your caregiver. Do not give aspirin to children.   If your caregiver has given you a follow-up appointment, it is very important to  keep that appointment. Not keeping the appointment could result in a permanent injury and/or lasting (chronic) pain and/or disability. If there is any problem keeping the appointment, you must call to reschedule.   SEEK IMMEDIATE MEDICAL CARE IF:   · Your pain is not gone in 24 hours.   · Your pain becomes worse, changes location, or feels different.   · You or your child has an oral temperature above 102° F (38.9° C), not controlled by medicine.   · Your baby is older than 3 months with a rectal temperature of 102° F (38.9° C) or higher.   · Your baby is 3 months old or younger with a rectal temperature of 100.4° F (38° C) or higher.   · You have shaking chills.   · You keep throwing up (vomiting) or cannot drink liquids.   · There is blood in your vomit or you see blood in your bowel movements.   · Your bowel movements become dark or black.   · You have frequent bowel movements.   · Your bowel movements stop (become blocked) or you cannot pass gas.   · You have bloody, frequent, or painful urination.   · You have yellow discoloration in the skin or whites of the eyes.   · Your stomach becomes bloated or bigger.   · You have dizziness or fainting.   · You have chest or back pain.   MAKE SURE YOU:   · Understand these instructions.   · Will watch your condition.   · Will get help right away if you are not doing well or get worse.   Document Released: 12/18/2006 Document Revised: 03/11/2013 Document Reviewed: 11/15/2010  ExitCare® Patient Information ©2013 [employer].Cellulitis  Cellulitis is an infection of the skin and the tissue beneath it. The infected area is usually red and tender. Cellulitis occurs most often in the arms and lower legs.   CAUSES   Cellulitis is caused by bacteria that enter the skin through cracks or cuts in the skin. The most common types of bacteria that cause cellulitis are staphylococci and streptococci.  SIGNS AND SYMPTOMS   · Redness and warmth.  · Swelling.  · Tenderness or  pain.  · Fever.  DIAGNOSIS   Your health care provider can usually determine what is wrong based on a physical exam. Blood tests may also be done.  TREATMENT   Treatment usually involves taking an antibiotic medicine.  HOME CARE INSTRUCTIONS   · Take your antibiotic medicine as directed by your health care provider. Finish the antibiotic even if you start to feel better.  · Keep the infected arm or leg elevated to reduce swelling.  · Apply a warm cloth to the affected area up to 4 times per day to relieve pain.  · Take medicines only as directed by your health care provider.  · Keep all follow-up visits as directed by your health care provider.  SEEK MEDICAL CARE IF:   · You notice red streaks coming from the infected area.  · Your red area gets larger or turns dark in color.  · Your bone or joint underneath the infected area becomes painful after the skin has healed.  · Your infection returns in the same area or another area.  · You notice a swollen bump in the infected area.  · You develop new symptoms.  · You have a fever.  SEEK IMMEDIATE MEDICAL CARE IF:   · You feel very sleepy.  · You develop vomiting or diarrhea.  · You have a general ill feeling (malaise) with muscle aches and pains.  MAKE SURE YOU:   · Understand these instructions.  · Will watch your condition.  · Will get help right away if you are not doing well or get worse.     This information is not intended to replace advice given to you by your health care provider. Make sure you discuss any questions you have with your health care provider.     Document Released: 09/27/2006 Document Revised: 01/08/2016 Document Reviewed: 03/04/2013  Elsevier Interactive Patient Education ©2016 Elsevier Inc.            Patient Information     Patient Information    Following emergency treatment: all patient requiring follow-up care must return either to a private physician or a clinic if your condition worsens before you are able to obtain further medical  attention, please return to the emergency room.     Billing Information    At [facility], we work to make the billing process streamlined for our patients.  Our Representatives are here to answer any questions you may have regarding your hospital bill.  If you have insurance coverage and have supplied your insurance information to us, we will submit a claim to your insurer on your behalf.  Should you have any questions regarding your bill, we can be reached online or by phone as follows:  Online: You are able pay your bills online or live chat with our representatives about any billing questions you may have. We are here to help Monday - Friday from 8:00am to 7:30pm and 9:00am - 12:00pm on Saturdays.  Please visit [email or web address]  for more information.   Phone:  [phone] or [phone]    Please note that your emergency physician, surgeon, pathologist, radiologist, anesthesiologist, and other specialists are not employed by [facility] and will therefore bill separately for their services.  Please contact them directly for any questions concerning their bills at the numbers below:     Emergency Physician Services:  [phone]  [facility]:  [phone]  [facility]:  [phone]  [facility]:  [phone]    1. Your final bill may vary from the amount quoted upon discharge if all procedures are not complete at that time, or if your doctor has additional procedures of which we are not aware. You will receive an additional bill if you return to the Emergency Department at [facility] for suture removal regardless of the facility of which the sutures were placed.     2. Please arrange for settlement of this account at the emergency registration.    3. All self-pay accounts are due in full at the time of treatment.  If you are unable to meet this obligation then payment is expected within 4-5 days.     4. If you have had  radiology studies (CT, X-ray, Ultrasound, MRI), you have received a preliminary result during your emergency department visit. Please contact the radiology department [phone] to receive a copy of your final result. Please discuss the Final result with your primary physician or with the follow up physician provided.     Crisis Hotline:  [place] Crisis Hotline:  0-124-PEJCSUV or 1-299.684.3155  [place] Crisis Hotline:    1-994.926.2980 or 315-501-2135         ED Discharge Follow Up Questions    1. In order to provide you with very good care, we would like to follow up with a phone call in the next few days.  May we have your permission to contact you?     YES /  NO    2. What is the best phone number to call you? (       )_____-__________    3. What is the best time to call you?      Morning  /  Afternoon  /  Evening                   Patient Signature:  ____________________________________________________________    Date:  ____________________________________________________________

## 2017-02-18 NOTE — ED AVS SNAPSHOT
2/19/2017          Mahesh Bradshaw  [address]  [place] [address]    Dear Mahesh:    [facility] wants to ensure your discharge home is safe and you or your loved ones have had all your questions answered regarding your care after you leave the hospital.    You may receive a telephone call within two days of your discharge.  This call is to make certain you understand your discharge instructions as well as ensure we provided you with the best care possible during your stay with us.     The call will only last approximately 3-5 minutes and will be done by a nurse.    Once again, we want to ensure your discharge home is safe and that you have a clear understanding of any next steps in your care.  If you have any questions or concerns, please do not hesitate to contact us, we are here for you.  Thank you for choosing [facility] for your healthcare needs.    Sincerely,    Kuldip Dias    [facility]

## 2017-02-19 VITALS
HEIGHT: 69 IN | SYSTOLIC BLOOD PRESSURE: 156 MMHG | RESPIRATION RATE: 14 BRPM | OXYGEN SATURATION: 91 % | HEART RATE: 97 BPM | WEIGHT: 224 LBS | TEMPERATURE: 98.1 F | DIASTOLIC BLOOD PRESSURE: 95 MMHG | BODY MASS INDEX: 33.18 KG/M2

## 2017-02-19 LAB
ALBUMIN SERPL BCP-MCNC: 4.1 G/DL (ref 3.2–4.9)
ALBUMIN/GLOB SERPL: 1.8 G/DL
ALP SERPL-CCNC: 66 U/L (ref 30–99)
ALT SERPL-CCNC: 21 U/L (ref 2–50)
ANION GAP SERPL CALC-SCNC: 12 MMOL/L (ref 0–11.9)
AST SERPL-CCNC: 11 U/L (ref 12–45)
BASOPHILS # BLD AUTO: 0.3 % (ref 0–1.8)
BASOPHILS # BLD: 0.02 K/UL (ref 0–0.12)
BILIRUB SERPL-MCNC: 0.4 MG/DL (ref 0.1–1.5)
BUN SERPL-MCNC: 20 MG/DL (ref 8–22)
CALCIUM SERPL-MCNC: 8.6 MG/DL (ref 8.5–10.5)
CHLORIDE SERPL-SCNC: 102 MMOL/L (ref 96–112)
CO2 SERPL-SCNC: 21 MMOL/L (ref 20–33)
CREAT SERPL-MCNC: 0.88 MG/DL (ref 0.5–1.4)
EOSINOPHIL # BLD AUTO: 0.07 K/UL (ref 0–0.51)
EOSINOPHIL NFR BLD: 0.9 % (ref 0–6.9)
ERYTHROCYTE [DISTWIDTH] IN BLOOD BY AUTOMATED COUNT: 42.9 FL (ref 35.9–50)
GFR SERPL CREATININE-BSD FRML MDRD: >60 ML/MIN/1.73 M 2
GLOBULIN SER CALC-MCNC: 2.3 G/DL (ref 1.9–3.5)
GLUCOSE SERPL-MCNC: 239 MG/DL (ref 65–99)
GRAM STN SPEC: NORMAL
GRAM STN SPEC: NORMAL
HCT VFR BLD AUTO: 37.8 % (ref 42–52)
HGB BLD-MCNC: 13.5 G/DL (ref 14–18)
IMM GRANULOCYTES # BLD AUTO: 0.03 K/UL (ref 0–0.11)
IMM GRANULOCYTES NFR BLD AUTO: 0.4 % (ref 0–0.9)
LYMPHOCYTES # BLD AUTO: 2.14 K/UL (ref 1–4.8)
LYMPHOCYTES NFR BLD: 27.4 % (ref 22–41)
MCH RBC QN AUTO: 31.4 PG (ref 27–33)
MCHC RBC AUTO-ENTMCNC: 35.7 G/DL (ref 33.7–35.3)
MCV RBC AUTO: 87.9 FL (ref 81.4–97.8)
MONOCYTES # BLD AUTO: 0.42 K/UL (ref 0–0.85)
MONOCYTES NFR BLD AUTO: 5.4 % (ref 0–13.4)
NEUTROPHILS # BLD AUTO: 5.14 K/UL (ref 1.82–7.42)
NEUTROPHILS NFR BLD: 65.6 % (ref 44–72)
NRBC # BLD AUTO: 0 K/UL
NRBC BLD AUTO-RTO: 0 /100 WBC
PLATELET # BLD AUTO: 201 K/UL (ref 164–446)
PMV BLD AUTO: 9.1 FL (ref 9–12.9)
POTASSIUM SERPL-SCNC: 3.4 MMOL/L (ref 3.6–5.5)
PROT SERPL-MCNC: 6.4 G/DL (ref 6–8.2)
RBC # BLD AUTO: 4.3 M/UL (ref 4.7–6.1)
SIGNIFICANT IND 70042: NORMAL
SITE SITE: NORMAL
SODIUM SERPL-SCNC: 135 MMOL/L (ref 135–145)
SOURCE SOURCE: NORMAL
WBC # BLD AUTO: 7.8 K/UL (ref 4.8–10.8)

## 2017-02-19 PROCEDURE — 74177 CT ABD & PELVIS W/CONTRAST: CPT

## 2017-02-19 PROCEDURE — 80053 COMPREHEN METABOLIC PANEL: CPT

## 2017-02-19 PROCEDURE — 700117 HCHG RX CONTRAST REV CODE 255: Performed by: EMERGENCY MEDICINE

## 2017-02-19 PROCEDURE — 96376 TX/PRO/DX INJ SAME DRUG ADON: CPT

## 2017-02-19 PROCEDURE — 87070 CULTURE OTHR SPECIMN AEROBIC: CPT

## 2017-02-19 PROCEDURE — 87077 CULTURE AEROBIC IDENTIFY: CPT

## 2017-02-19 PROCEDURE — 87186 SC STD MICRODIL/AGAR DIL: CPT

## 2017-02-19 PROCEDURE — 700111 HCHG RX REV CODE 636 W/ 250 OVERRIDE (IP): Performed by: EMERGENCY MEDICINE

## 2017-02-19 PROCEDURE — 87040 BLOOD CULTURE FOR BACTERIA: CPT

## 2017-02-19 PROCEDURE — 87205 SMEAR GRAM STAIN: CPT

## 2017-02-19 PROCEDURE — 85025 COMPLETE CBC W/AUTO DIFF WBC: CPT

## 2017-02-19 PROCEDURE — 96375 TX/PRO/DX INJ NEW DRUG ADDON: CPT

## 2017-02-19 RX ORDER — ONDANSETRON 2 MG/ML
4 INJECTION INTRAMUSCULAR; INTRAVENOUS ONCE
Status: COMPLETED | OUTPATIENT
Start: 2017-02-19 | End: 2017-02-19

## 2017-02-19 RX ADMIN — ONDANSETRON 4 MG: 2 INJECTION, SOLUTION INTRAMUSCULAR; INTRAVENOUS at 00:31

## 2017-02-19 RX ADMIN — HYDROMORPHONE HYDROCHLORIDE 1 MG: 1 INJECTION, SOLUTION INTRAMUSCULAR; INTRAVENOUS; SUBCUTANEOUS at 00:32

## 2017-02-19 RX ADMIN — HYDROMORPHONE HYDROCHLORIDE 0.5 MG: 1 INJECTION, SOLUTION INTRAMUSCULAR; INTRAVENOUS; SUBCUTANEOUS at 03:00

## 2017-02-19 RX ADMIN — IOHEXOL 100 ML: 350 INJECTION, SOLUTION INTRAVENOUS at 02:11

## 2017-02-19 RX ADMIN — IOHEXOL 50 ML: 240 INJECTION, SOLUTION INTRATHECAL; INTRAVASCULAR; INTRAVENOUS; ORAL at 02:12

## 2017-02-19 NOTE — ED NOTES
Pt arrived pov with c/o abd pain. Per pt, he has hernia which has been draining purulent drainage. Pt also with low grade fever. On abx but has only taken 2 doses. Feels lousy. md at bs.

## 2017-02-19 NOTE — ED PROVIDER NOTES
"ED Provider Note    Scribed for Bebo Pardo D.O. by Abbe Pritchett. 2/18/2017  11:16 PM    Primary care provider: Rodolfo Stein M.D.  Means of arrival: Walk-in  History obtained from: Patient  History limited by: None    CHIEF COMPLAINT  Chief Complaint   Patient presents with   • Abdominal Pain       HPI  Mahesh Bradshaw is a 51 y.o. male who presents to the Emergency Department with pain at his umbilical hernia repair site.  In the recent past he had complications from infection of the area noting that there has been drainage from the site over the last week.  Prior to this evening the repair \"ruptured\" twice.  He is currently taking rifampin at home. The discomfort is localized to the umbilical site.  He has had low grade fevers at home which he treated with Tylenol prior to arrival.  His surgery was performed by Dr. Dooley, General Surgery.   Negative nausea, vomiting, diarrhea, chills, chest pain, or shortness of breath.      REVIEW OF SYSTEMS  Review of Systems   Constitutional: Positive for fever. Negative for chills.   Respiratory: Negative for shortness of breath.    Cardiovascular: Negative for chest pain.   Gastrointestinal: Negative for nausea, vomiting and diarrhea.        + Umbilical Hernia post-op leakage   All other systems reviewed and are negative.      PAST MEDICAL HISTORY   has a past medical history of ASTHMA; Migraine; Hypothyroid; Depression; Depression (11/26/2014); Kidney stones; Gout; Sepsis (CMS-HCC) (1/21/2016); Essential hypertension (1/22/2016); Diabetes (CMS-HCC); and ADRIANE (acute kidney injury) (CMS-HCC) (2/24/2016).    SURGICAL HISTORY   has past surgical history that includes other orthopedic surgery; vaishnavi by laparoscopy; colonoscopy with biopsy (11/26/2014); and umbilical hernia repair (N/A, 11/6/2016).    SOCIAL HISTORY  Social History   Substance Use Topics   • Smoking status: Never Smoker    • Alcohol Use: Yes      Comment: occ      History   Drug Use No " "      FAMILY HISTORY  No pertinent family history     CURRENT MEDICATIONS  No current facility-administered medications on file prior to encounter.     Current Outpatient Prescriptions on File Prior to Encounter   Medication Sig Dispense Refill   • aspirin (ASA) 81 MG Chew Tab chewable tablet Take 1 Tab by mouth every day. 100 Tab    • Insulin Glargine (TOUJEO SOLOSTAR) 300 UNIT/ML Solution Pen-injector Inject 40 Units as instructed 2 Times a Day. 62 units QAM  52 units QPM 60 PEN 3   • thyroid (ARMOUR THYROID) 60 MG Tab Take 75 mg by mouth every evening. Pt uses a 60 mg and 1/2 of a 30 mg     • Cholecalciferol (VITAMIN D3) 5000 UNITS Tab Take 5,000 Units by mouth every evening.     • insulin lispro (HUMALOG) 100 UNIT/ML Solution Inject 2-20 Units as instructed 3 times a day before meals. Sliding Scale - 2 units every 50     • HYDROmorphone (DILAUDID) 2 MG Tab Take 2 mg by mouth 1 time daily as needed for Severe Pain (back pain).     • ondansetron (ZOFRAN ODT) 4 MG TABLET DISPERSIBLE Take 1 Tab by mouth every four hours as needed. 20 Tab 0   • buPROPion (WELLBUTRIN XL) 300 MG XL tablet Take 300 mg by mouth every evening.     • mesalamine (LIALDA) 1.2 GM TBEC Take 2.4 g by mouth 2 Times a Day.     • testosterone cypionate (DEPO-TESTOSTERONE) 200 MG/ML SOLN injection 200 mg by Intramuscular route every Tuesday.         ALLERGIES  Allergies   Allergen Reactions   • Peanut-Derived Anaphylaxis     Peanuts   RXN=age 36   • Tradjenta [Linagliptin] Unspecified     Pt developed pancreatitis   • Hydrocodone Hives     Tolerates Morphine and oxycodone  Rxn Age - 29       PHYSICAL EXAM  VITAL SIGNS: /95 mmHg  Pulse 101  Temp(Src) 36.7 °C (98.1 °F)  Resp 16  Ht 1.753 m (5' 9\")  Wt 101.606 kg (224 lb)  BMI 33.06 kg/m2    Constitutional: Well developed, well nourished. No acute distress.  HEENT: Normocephalic, atraumatic. Posterior pharynx clear and moist.  Eyes:  EOMI. Normal sclera.  Neck: Supple, Full range of " motion, nontender.  Chest/Pulmonary: clear to ausculation. Symmetrical expansion.   Cardio: Regular rate and rhythm with no murmur.   Abdomen: Soft, mild drainage at the periumbilical site, no active bleeding, surrounding mild erythema of approximately 2 cm. No induration appreciated  Back: No CVA tenderness, nontender midline, no step offs.  Musculoskeletal: No deformity, no edema, neurovascular intact.   Neuro: Clear speech, appropriate, cooperative, cranial nerves II-XII grossly intact.  Psych: Normal mood and affect    DIAGNOSTIC STUDIES / PROCEDURES    LABS  Results for orders placed or performed during the hospital encounter of 02/18/17   CBC WITH DIFFERENTIAL   Result Value Ref Range    WBC 7.8 4.8 - 10.8 K/uL    RBC 4.30 (L) 4.70 - 6.10 M/uL    Hemoglobin 13.5 (L) 14.0 - 18.0 g/dL    Hematocrit 37.8 (L) 42.0 - 52.0 %    MCV 87.9 81.4 - 97.8 fL    MCH 31.4 27.0 - 33.0 pg    MCHC 35.7 (H) 33.7 - 35.3 g/dL    RDW 42.9 35.9 - 50.0 fL    Platelet Count 201 164 - 446 K/uL    MPV 9.1 9.0 - 12.9 fL    Neutrophils-Polys 65.60 44.00 - 72.00 %    Lymphocytes 27.40 22.00 - 41.00 %    Monocytes 5.40 0.00 - 13.40 %    Eosinophils 0.90 0.00 - 6.90 %    Basophils 0.30 0.00 - 1.80 %    Immature Granulocytes 0.40 0.00 - 0.90 %    Nucleated RBC 0.00 /100 WBC    Neutrophils (Absolute) 5.14 1.82 - 7.42 K/uL    Lymphs (Absolute) 2.14 1.00 - 4.80 K/uL    Monos (Absolute) 0.42 0.00 - 0.85 K/uL    Eos (Absolute) 0.07 0.00 - 0.51 K/uL    Baso (Absolute) 0.02 0.00 - 0.12 K/uL    Immature Granulocytes (abs) 0.03 0.00 - 0.11 K/uL    NRBC (Absolute) 0.00 K/uL   COMP METABOLIC PANEL   Result Value Ref Range    Sodium 135 135 - 145 mmol/L    Potassium 3.4 (L) 3.6 - 5.5 mmol/L    Chloride 102 96 - 112 mmol/L    Co2 21 20 - 33 mmol/L    Anion Gap 12.0 (H) 0.0 - 11.9    Glucose 239 (H) 65 - 99 mg/dL    Bun 20 8 - 22 mg/dL    Creatinine 0.88 0.50 - 1.40 mg/dL    Calcium 8.6 8.5 - 10.5 mg/dL    AST(SGOT) 11 (L) 12 - 45 U/L    ALT(SGPT) 21 2 -  50 U/L    Alkaline Phosphatase 66 30 - 99 U/L    Total Bilirubin 0.4 0.1 - 1.5 mg/dL    Albumin 4.1 3.2 - 4.9 g/dL    Total Protein 6.4 6.0 - 8.2 g/dL    Globulin 2.3 1.9 - 3.5 g/dL    A-G Ratio 1.8 g/dL   ESTIMATED GFR   Result Value Ref Range    GFR If African American >60 >60 mL/min/1.73 m 2    GFR If Non African American >60 >60 mL/min/1.73 m 2       All labs reviewed by me.      RADIOLOGY  CT-ABDOMEN-PELVIS WITH   Final Result      Anterior abdominal wall phlegmon and adjacent cellulitis without visualized discrete fluid collection                 The radiologist's interpretation of all radiological studies have been reviewed by me.    COURSE & MEDICAL DECISION MAKING  Pertinent Labs & Imaging studies reviewed. (See chart for details)    11:16 PM - Patient seen and examined at bedside. Patient will be treated with NS 1000 mL, Sublimaze 100 mcg, Zofran 4 mg. Ordered CT Abdomen Pelvis with, CBC with differential, CMP, Blood Cultures to evaluate his symptoms. The differential diagnoses include but are not limited to: abscess vs cellulitis    12:27 AM - Ordered Dilaudid 1 mg, Zofran 4 mg     2:53 AM  At this time, the patient is afebrile, nontoxic appearing, and currently on rifampin. He has an appointment with his general surgeon on Tuesday, and states he feels comfortable enough to go home. The CT was negative for any abscess, and he will call his surgeon in the morning to touch base again.  He is currently on rifampin, which covers staph and strep. Wound culture was ordered and will be sent.  Pt will have family come pick him up secondary to being given some additional pain medications.     FINAL IMPRESSION  1. Abdominal pain, unspecified location          Abbe EPSTEIN (Gareth), am scribing for, and in the presence of, Bebo Pardo D.O..    Electronically signed by: Abbe Pritchett (Gareth), 2/18/2017    Bebo EPSTEIN D.O. personally performed the services described in this documentation,  as scribed by Abbe Pritchett in my presence, and it is both accurate and complete.    The note accurately reflects work and decisions made by me.  Bebo Pardo  2/19/2017  2:56 AM

## 2017-02-19 NOTE — DISCHARGE INSTRUCTIONS
Abdominal Pain (Nonspecific)  Your exam might not show the exact reason you have abdominal pain. Since there are many different causes of abdominal pain, another checkup and more tests may be needed. It is very important to follow up for lasting (persistent) or worsening symptoms. A possible cause of abdominal pain in any person who still has his or her appendix is acute appendicitis. Appendicitis is often hard to diagnose. Normal blood tests, urine tests, ultrasound, and CT scans do not completely rule out early appendicitis or other causes of abdominal pain. Sometimes, only the changes that happen over time will allow appendicitis and other causes of abdominal pain to be determined. Other potential problems that may require surgery may also take time to become more apparent. Because of this, it is important that you follow all of the instructions below.  HOME CARE INSTRUCTIONS   · Rest as much as possible.   · Do not eat solid food until your pain is gone.   · While adults or children have pain: A diet of water, weak decaffeinated tea, broth or bouillon, gelatin, oral rehydration solutions (ORS), frozen ice pops, or ice chips may be helpful.   · When pain is gone in adults or children: Start a light diet (dry toast, crackers, applesauce, or white rice). Increase the diet slowly as long as it does not bother you. Eat no dairy products (including cheese and eggs) and no spicy, fatty, fried, or high-fiber foods.   · Use no alcohol, caffeine, or cigarettes.   · Take your regular medicines unless your caregiver told you not to.   · Take any prescribed medicine as directed.   · Only take over-the-counter or prescription medicines for pain, discomfort, or fever as directed by your caregiver. Do not give aspirin to children.   If your caregiver has given you a follow-up appointment, it is very important to keep that appointment. Not keeping the appointment could result in a permanent injury and/or lasting (chronic) pain  and/or disability. If there is any problem keeping the appointment, you must call to reschedule.   SEEK IMMEDIATE MEDICAL CARE IF:   · Your pain is not gone in 24 hours.   · Your pain becomes worse, changes location, or feels different.   · You or your child has an oral temperature above 102° F (38.9° C), not controlled by medicine.   · Your baby is older than 3 months with a rectal temperature of 102° F (38.9° C) or higher.   · Your baby is 3 months old or younger with a rectal temperature of 100.4° F (38° C) or higher.   · You have shaking chills.   · You keep throwing up (vomiting) or cannot drink liquids.   · There is blood in your vomit or you see blood in your bowel movements.   · Your bowel movements become dark or black.   · You have frequent bowel movements.   · Your bowel movements stop (become blocked) or you cannot pass gas.   · You have bloody, frequent, or painful urination.   · You have yellow discoloration in the skin or whites of the eyes.   · Your stomach becomes bloated or bigger.   · You have dizziness or fainting.   · You have chest or back pain.   MAKE SURE YOU:   · Understand these instructions.   · Will watch your condition.   · Will get help right away if you are not doing well or get worse.   Document Released: 12/18/2006 Document Revised: 03/11/2013 Document Reviewed: 11/15/2010  [employer]® Patient Information ©2013 [employer].Cellulitis  Cellulitis is an infection of the skin and the tissue beneath it. The infected area is usually red and tender. Cellulitis occurs most often in the arms and lower legs.   CAUSES   Cellulitis is caused by bacteria that enter the skin through cracks or cuts in the skin. The most common types of bacteria that cause cellulitis are staphylococci and streptococci.  SIGNS AND SYMPTOMS   · Redness and warmth.  · Swelling.  · Tenderness or pain.  · Fever.  DIAGNOSIS   Your health care provider can usually determine what is wrong based on a physical exam. Blood  tests may also be done.  TREATMENT   Treatment usually involves taking an antibiotic medicine.  HOME CARE INSTRUCTIONS   · Take your antibiotic medicine as directed by your health care provider. Finish the antibiotic even if you start to feel better.  · Keep the infected arm or leg elevated to reduce swelling.  · Apply a warm cloth to the affected area up to 4 times per day to relieve pain.  · Take medicines only as directed by your health care provider.  · Keep all follow-up visits as directed by your health care provider.  SEEK MEDICAL CARE IF:   · You notice red streaks coming from the infected area.  · Your red area gets larger or turns dark in color.  · Your bone or joint underneath the infected area becomes painful after the skin has healed.  · Your infection returns in the same area or another area.  · You notice a swollen bump in the infected area.  · You develop new symptoms.  · You have a fever.  SEEK IMMEDIATE MEDICAL CARE IF:   · You feel very sleepy.  · You develop vomiting or diarrhea.  · You have a general ill feeling (malaise) with muscle aches and pains.  MAKE SURE YOU:   · Understand these instructions.  · Will watch your condition.  · Will get help right away if you are not doing well or get worse.     This information is not intended to replace advice given to you by your health care provider. Make sure you discuss any questions you have with your health care provider.     Document Released: 09/27/2006 Document Revised: 01/08/2016 Document Reviewed: 03/04/2013  [employer] Interactive Patient Education ©2016 [employer]

## 2017-02-19 NOTE — ED NOTES
Pt given verbal and written discharge information. Pt verbalized understanding. Pt ambulating with a steady gait. Driven home by family.

## 2017-02-21 ENCOUNTER — PATIENT OUTREACH (OUTPATIENT)
Dept: HEALTH INFORMATION MANAGEMENT | Facility: OTHER | Age: 52
End: 2017-02-21

## 2017-02-21 LAB
BACTERIA WND AEROBE CULT: ABNORMAL
BACTERIA WND AEROBE CULT: ABNORMAL
GRAM STN SPEC: ABNORMAL
SIGNIFICANT IND 70042: ABNORMAL
SITE SITE: ABNORMAL
SOURCE SOURCE: ABNORMAL

## 2017-02-22 NOTE — ED NOTES
"ED Positive Culture Follow-up/Notification Note:    Date: 2/22/17     Patient seen in the ED on 2/18/2017 for pain at umbilical hernia repair site. Patient reports drainage for past week; patient has been taking rifampin.  CT abdomen noted \"Anterior abdominal wall phlegmon and adjacent cellulitis without visualized discrete fluid collection.\"    1. Abdominal pain, unspecified location       Discharge Medication List as of 2/19/2017  4:15 AM          Allergies: Peanut-derived; Tradjenta; and Hydrocodone     Final cultures:   Results     Procedure Component Value Units Date/Time    CULTURE WOUND W/ GRAM STAIN [[id number]]  (Abnormal)  (Susceptibility) Collected:  02/19/17 0308    Order Status:  Completed Specimen Information:  Wound from Abdominal Updated:  02/21/17 1016     Gram Stain Result --      Result:        Moderate WBCs.  Few Gram positive cocci.       Significant Indicator POS (POS)      Source WND      Site ABDOMINAL      Culture Result Wound -- (A)      Culture Result Wound -- (A)      Result:        Staphylococcus aureus  Heavy growth      Culture & Susceptibility     STAPHYLOCOCCUS AUREUS     Antibiotic Sensitivity Microscan Unit Status    Ampicillin/sulbactam Sensitive <=8/4 mcg/mL Final    Clindamycin Sensitive <=0.5 mcg/mL Final    Daptomycin Sensitive <=0.5 mcg/mL Final    Erythromycin Sensitive <=0.5 mcg/mL Final    Moxifloxacin Sensitive <=0.5 mcg/mL Final    Oxacillin Sensitive 0.5 mcg/mL Final    Penicillin Resistant >8 mcg/mL Final    Tetracycline Sensitive <=4 mcg/mL Final    Trimeth/Sulfa Sensitive <=0.5/9.5 mcg/mL Final    Vancomycin Sensitive 2 mcg/mL Final                       BLOOD CULTURE [[id number]] Collected:  02/19/17 0039    Order Status:  Completed Specimen Information:  Blood from Peripheral Updated:  02/20/17 0909     Significant Indicator NEG      Source BLD      Site PERIPHERAL      Blood Culture --      Result:        No Growth    Note: Blood cultures are incubated for 5 days " "and  are monitored continuously.Positive blood cultures  are called to the RN and reported as soon as  they are identified.      Narrative:      Per Hospital Policy: Only change Specimen Src: to \"Line\" if  specified by physician order.    BLOOD CULTURE [[id number]] Collected:  02/19/17 0001    Order Status:  Completed Specimen Information:  Blood from Peripheral Updated:  02/20/17 0909     Significant Indicator NEG      Source BLD      Site PERIPHERAL      Blood Culture --      Result:        No Growth    Note: Blood cultures are incubated for 5 days and  are monitored continuously.Positive blood cultures  are called to the RN and reported as soon as  they are identified.      Narrative:      Per Hospital Policy: Only change Specimen Src: to \"Line\" if  specified by physician order.    GRAM STAIN [[id number]] Collected:  02/19/17 0308    Order Status:  Completed Specimen Information:  Wound Updated:  02/19/17 2051     Significant Indicator .      Source WND      Site ABDOMINAL      Gram Stain Result --      Result:        Moderate WBCs.  Few Gram positive cocci.            Plan:   Discussed results with patient. He reports drainage has significantly decreased and area of erythema is reduced. He has been taking rifampin per his surgeon. He reports that he has had similar issues in the past and rifampin is only antibiotic that works.  He reports that he saw his surgeon yesterday and surgeon concerned that problem is due to mesh, so scheduled for removal on 3/10. Also saw PCP this morning and received another prescription for rifampin. Also will take doxycycline for a few days prior to surgery.  Prescribed antibiotics will cover cultured organism and patient is improving. Patient has close follow up with surgeon and PCP. Return precautions provided.    Dusty Luna, PharmD      "

## 2017-02-24 LAB
BACTERIA BLD CULT: NORMAL
SIGNIFICANT IND 70042: NORMAL
SIGNIFICANT IND 70042: NORMAL
SITE SITE: NORMAL
SITE SITE: NORMAL
SOURCE SOURCE: NORMAL
SOURCE SOURCE: NORMAL

## 2017-03-03 DIAGNOSIS — Z01.810 PRE-OPERATIVE CARDIOVASCULAR EXAMINATION: ICD-10-CM

## 2017-03-03 LAB — EKG IMPRESSION: NORMAL

## 2017-03-10 ENCOUNTER — APPOINTMENT (OUTPATIENT)
Dept: RADIOLOGY | Facility: MEDICAL CENTER | Age: 52
End: 2017-03-10
Attending: SURGERY
Payer: COMMERCIAL

## 2017-03-10 ENCOUNTER — HOSPITAL ENCOUNTER (OUTPATIENT)
Facility: MEDICAL CENTER | Age: 52
End: 2017-03-10
Attending: SURGERY | Admitting: SURGERY
Payer: COMMERCIAL

## 2017-03-10 VITALS
WEIGHT: 230.6 LBS | TEMPERATURE: 98 F | RESPIRATION RATE: 16 BRPM | HEART RATE: 101 BPM | OXYGEN SATURATION: 94 % | BODY MASS INDEX: 34.95 KG/M2 | HEIGHT: 68 IN

## 2017-03-10 PROBLEM — L02.91 SUBCUTANEOUS ABSCESS: Status: ACTIVE | Noted: 2017-03-10

## 2017-03-10 LAB
ANION GAP SERPL CALC-SCNC: 11 MMOL/L (ref 0–11.9)
BUN SERPL-MCNC: 21 MG/DL (ref 8–22)
CALCIUM SERPL-MCNC: 8.9 MG/DL (ref 8.5–10.5)
CHLORIDE SERPL-SCNC: 101 MMOL/L (ref 96–112)
CO2 SERPL-SCNC: 21 MMOL/L (ref 20–33)
CREAT SERPL-MCNC: 0.82 MG/DL (ref 0.5–1.4)
GFR SERPL CREATININE-BSD FRML MDRD: >60 ML/MIN/1.73 M 2
GLUCOSE BLD-MCNC: 218 MG/DL (ref 65–99)
GLUCOSE SERPL-MCNC: 209 MG/DL (ref 65–99)
GRAM STN SPEC: NORMAL
GRAM STN SPEC: NORMAL
POTASSIUM SERPL-SCNC: 3.7 MMOL/L (ref 3.6–5.5)
SIGNIFICANT IND 70042: NORMAL
SITE SITE: NORMAL
SODIUM SERPL-SCNC: 133 MMOL/L (ref 135–145)
SOURCE SOURCE: NORMAL

## 2017-03-10 PROCEDURE — 87075 CULTR BACTERIA EXCEPT BLOOD: CPT

## 2017-03-10 PROCEDURE — 501838 HCHG SUTURE GENERAL: Performed by: SURGERY

## 2017-03-10 PROCEDURE — 700111 HCHG RX REV CODE 636 W/ 250 OVERRIDE (IP)

## 2017-03-10 PROCEDURE — 82962 GLUCOSE BLOOD TEST: CPT

## 2017-03-10 PROCEDURE — 87070 CULTURE OTHR SPECIMN AEROBIC: CPT

## 2017-03-10 PROCEDURE — 88300 SURGICAL PATH GROSS: CPT

## 2017-03-10 PROCEDURE — C1713 ANCHOR/SCREW BN/BN,TIS/BN: HCPCS | Performed by: SURGERY

## 2017-03-10 PROCEDURE — 700102 HCHG RX REV CODE 250 W/ 637 OVERRIDE(OP)

## 2017-03-10 PROCEDURE — 87205 SMEAR GRAM STAIN: CPT

## 2017-03-10 PROCEDURE — 76937 US GUIDE VASCULAR ACCESS: CPT

## 2017-03-10 PROCEDURE — 502240 HCHG MISC OR SUPPLY RC 0272: Performed by: SURGERY

## 2017-03-10 PROCEDURE — 80048 BASIC METABOLIC PNL TOTAL CA: CPT

## 2017-03-10 PROCEDURE — 87015 SPECIMEN INFECT AGNT CONCNTJ: CPT

## 2017-03-10 PROCEDURE — 160002 HCHG RECOVERY MINUTES (STAT): Performed by: SURGERY

## 2017-03-10 PROCEDURE — 160038 HCHG SURGERY MINUTES - EA ADDL 1 MIN LEVEL 2: Performed by: SURGERY

## 2017-03-10 PROCEDURE — 160035 HCHG PACU - 1ST 60 MINS PHASE I: Performed by: SURGERY

## 2017-03-10 PROCEDURE — A6402 STERILE GAUZE <= 16 SQ IN: HCPCS | Performed by: SURGERY

## 2017-03-10 PROCEDURE — 700101 HCHG RX REV CODE 250

## 2017-03-10 PROCEDURE — 110371 HCHG SHELL REV 272: Performed by: SURGERY

## 2017-03-10 PROCEDURE — A9270 NON-COVERED ITEM OR SERVICE: HCPCS

## 2017-03-10 PROCEDURE — A4606 OXYGEN PROBE USED W OXIMETER: HCPCS | Performed by: SURGERY

## 2017-03-10 PROCEDURE — 160048 HCHG OR STATISTICAL LEVEL 1-5: Performed by: SURGERY

## 2017-03-10 PROCEDURE — 160036 HCHG PACU - EA ADDL 30 MINS PHASE I: Performed by: SURGERY

## 2017-03-10 PROCEDURE — 500888 HCHG PACK, MINOR BASIN: Performed by: SURGERY

## 2017-03-10 PROCEDURE — 160027 HCHG SURGERY MINUTES - 1ST 30 MINS LEVEL 2: Performed by: SURGERY

## 2017-03-10 PROCEDURE — 110382 HCHG SHELL REV 271: Performed by: SURGERY

## 2017-03-10 PROCEDURE — 160009 HCHG ANES TIME/MIN: Performed by: SURGERY

## 2017-03-10 DEVICE — MESH ALLOMAX 5CM X 8CM - RECTANGLE (1EA/CA): Type: IMPLANTABLE DEVICE | Status: FUNCTIONAL

## 2017-03-10 RX ORDER — CELECOXIB 200 MG/1
200 CAPSULE ORAL 2 TIMES DAILY PRN
Qty: 28 CAP | Refills: 0 | Status: SHIPPED | OUTPATIENT
Start: 2017-03-10 | End: 2017-04-06

## 2017-03-10 RX ORDER — BUPIVACAINE HYDROCHLORIDE AND EPINEPHRINE 5; 5 MG/ML; UG/ML
INJECTION, SOLUTION PERINEURAL
Status: DISCONTINUED | OUTPATIENT
Start: 2017-03-10 | End: 2017-03-10 | Stop reason: HOSPADM

## 2017-03-10 RX ORDER — PROMETHAZINE HYDROCHLORIDE 25 MG/ML
INJECTION, SOLUTION INTRAMUSCULAR; INTRAVENOUS
Status: COMPLETED
Start: 2017-03-10 | End: 2017-03-10

## 2017-03-10 RX ORDER — RIFAMPIN 300 MG/1
300 CAPSULE ORAL 2 TIMES DAILY
Status: ON HOLD | COMMUNITY
End: 2017-04-10

## 2017-03-10 RX ORDER — ONDANSETRON 2 MG/ML
INJECTION INTRAMUSCULAR; INTRAVENOUS
Status: COMPLETED
Start: 2017-03-10 | End: 2017-03-10

## 2017-03-10 RX ORDER — OXYCODONE HYDROCHLORIDE AND ACETAMINOPHEN 5; 325 MG/1; MG/1
1-2 TABLET ORAL EVERY 6 HOURS PRN
Qty: 20 TAB | Refills: 0 | Status: SHIPPED | OUTPATIENT
Start: 2017-03-10 | End: 2017-04-06

## 2017-03-10 RX ORDER — SODIUM CHLORIDE 9 MG/ML
1000 INJECTION, SOLUTION INTRAVENOUS
Status: COMPLETED | OUTPATIENT
Start: 2017-03-10 | End: 2017-03-10

## 2017-03-10 RX ORDER — OXYCODONE HCL 5 MG/5 ML
SOLUTION, ORAL ORAL
Status: COMPLETED
Start: 2017-03-10 | End: 2017-03-10

## 2017-03-10 RX ORDER — SULFAMETHOXAZOLE AND TRIMETHOPRIM 400; 80 MG/1; MG/1
1 TABLET ORAL 2 TIMES DAILY
Status: ON HOLD | COMMUNITY
End: 2017-04-10

## 2017-03-10 RX ADMIN — FENTANYL CITRATE 50 MCG: 50 INJECTION, SOLUTION INTRAMUSCULAR; INTRAVENOUS at 12:24

## 2017-03-10 RX ADMIN — OXYCODONE HYDROCHLORIDE 10 MG: 5 SOLUTION ORAL at 13:00

## 2017-03-10 RX ADMIN — SODIUM CHLORIDE 1000 ML: 9 INJECTION, SOLUTION INTRAVENOUS at 10:02

## 2017-03-10 RX ADMIN — FENTANYL CITRATE 50 MCG: 50 INJECTION, SOLUTION INTRAMUSCULAR; INTRAVENOUS at 12:08

## 2017-03-10 RX ADMIN — PROMETHAZINE HYDROCHLORIDE 6.25 MG: 25 INJECTION INTRAMUSCULAR; INTRAVENOUS at 13:15

## 2017-03-10 RX ADMIN — ONDANSETRON 4 MG: 2 INJECTION, SOLUTION INTRAMUSCULAR; INTRAVENOUS at 12:22

## 2017-03-10 RX ADMIN — HYDROMORPHONE HYDROCHLORIDE 0.5 MG: 1 INJECTION, SOLUTION INTRAMUSCULAR; INTRAVENOUS; SUBCUTANEOUS at 13:45

## 2017-03-10 RX ADMIN — HYDROMORPHONE HYDROCHLORIDE 0.2 MG: 1 INJECTION, SOLUTION INTRAMUSCULAR; INTRAVENOUS; SUBCUTANEOUS at 12:45

## 2017-03-10 RX ADMIN — HYDROMORPHONE HYDROCHLORIDE 0.4 MG: 1 INJECTION, SOLUTION INTRAMUSCULAR; INTRAVENOUS; SUBCUTANEOUS at 13:00

## 2017-03-10 RX ADMIN — HYDROMORPHONE HYDROCHLORIDE 0.2 MG: 1 INJECTION, SOLUTION INTRAMUSCULAR; INTRAVENOUS; SUBCUTANEOUS at 12:50

## 2017-03-10 RX ADMIN — HYDROMORPHONE HYDROCHLORIDE 0.2 MG: 1 INJECTION, SOLUTION INTRAMUSCULAR; INTRAVENOUS; SUBCUTANEOUS at 12:36

## 2017-03-10 RX ADMIN — HYDROMORPHONE HYDROCHLORIDE 0.5 MG: 1 INJECTION, SOLUTION INTRAMUSCULAR; INTRAVENOUS; SUBCUTANEOUS at 13:15

## 2017-03-10 ASSESSMENT — PAIN SCALES - GENERAL
PAINLEVEL_OUTOF10: 9
PAINLEVEL_OUTOF10: 9
PAINLEVEL_OUTOF10: 5
PAINLEVEL_OUTOF10: 3
PAINLEVEL_OUTOF10: 5
PAINLEVEL_OUTOF10: 9
PAINLEVEL_OUTOF10: 3
PAINLEVEL_OUTOF10: 3

## 2017-03-10 NOTE — IP AVS SNAPSHOT
" Home Care Instructions                                                                                                                Name:Mahesh Bradshaw  Medical Record Number:[id number]  CSN: [id number]    YOB: 1965   Age: 51 y.o.  Sex: male  HT:1.727 m (5' 7.99\") WT: 104.6 kg (230 lb 9.6 oz)          Admit Date: 3/10/2017     Discharge Date:   Today's Date: 3/10/2017  Attending Doctor:  Esau Dooley M.D.                  Allergies:  Peanut-derived; Tradjenta; and Hydrocodone                Discharge Instructions         ACTIVITY: Rest and take it easy for the first 24 hours.  A responsible adult is recommended to remain with you during that time.  It is normal to feel sleepy.  We encourage you to not do anything that requires balance, judgment or coordination.    MILD FLU-LIKE SYMPTOMS ARE NORMAL. YOU MAY EXPERIENCE GENERALIZED MUSCLE ACHES, THROAT IRRITATION, HEADACHE AND/OR SOME NAUSEA.    FOR 24 HOURS DO NOT:  Drive, operate machinery or run household appliances.  Drink beer or alcoholic beverages.   Make important decisions or sign legal documents.    SPECIAL INSTRUCTIONS: Hernia Repair Discharge Instructions:    1. ACTIVITIES: Upon discharge from the hospital, the day of surgery it is requested that you do no significant physical activity and limit mental activities, as you have had sedation. The day after surgery, you may resume activities of daily living, but for four weeks, it is recommended that you do no strenuous activities or heavy lifting (greater than 15 pounds).     2. DRIVING: You may drive whenever you are off pain medications and are able to perform the activities needed to drive, i.e. turning, bending, twisting, etc.     3. WOUND: It is not unusual for patients to experience swelling and even bruising at the hernia repair site.  This will resolve over the next few days.     4. ICE: please use ice on the wound to decrease the swelling for the first 24 hours and then " discontinue.     5. BATHING: The dressing can be removed two days after surgery and the wound can then be wetted in a shower as normal, but avoid submersion in water (tub bath) for at least a week.    6. PAIN MEDICATION: You will be given a prescription for pain medication at discharge. Please take these as directed. It is important to remember not to take medications on an empty stomach as this may cause nausea.     7. BOWEL FUNCTION: After hernia repair, it is not uncommon for patients to experience constipation. This is due to decreasing activity levels as well as pain medications. You may wish to use a stool softener beginning immediately after surgery, and you may or may not need to use a laxative (Milk of Magnesia, Ex-lax; Senokot, etc.) as well.            8 .CALL IF YOU HAVE: (1) Fevers to more than 1010 F, (2) Unusual chest or leg pain, (3) Drainage or fluid from incision that may be foul smelling, increased tenderness or soreness at the wound or the wound edges are no longer together,redness or swelling at the incision site. Please do not hesitate to call with any other questions.     9. APPOINTMENT: Contact our office at [phone] for a follow-up appointment in 1 to 2 weeks following your procedure.     If you have any additional questions, please do not hesitate to call the office and speak to either myself or the physician on call.     Office address:  [address] [place] [address]      Esau Dooley M.D.  [facility]  [phone]    DIET: To avoid nausea, slowly advance diet as tolerated, avoiding spicy or greasy foods for the first day.  Add more substantial food to your diet according to your physician's instructions.  Babies can be fed formula or breast milk as soon as they are hungry.  INCREASE FLUIDS AND FIBER TO AVOID CONSTIPATION.      FOLLOW-UP APPOINTMENT:  A follow-up appointment should be arranged with your doctor in 1-2 weeks; call to schedule.    You should CALL  YOUR PHYSICIAN if you develop:  Fever greater than 101 degrees F.  Pain not relieved by medication, or persistent nausea or vomiting.  Excessive bleeding (blood soaking through dressing) or unexpected drainage from the wound.  Extreme redness or swelling around the incision site, drainage of pus or foul smelling drainage.  Inability to urinate or empty your bladder within 8 hours.  Problems with breathing or chest pain.    You should call 911 if you develop problems with breathing or chest pain.  If you are unable to contact your doctor or surgical center, you should go to the nearest emergency room or urgent care center.    Physician's telephone #: DAWIT [phone]    If any questions arise, call your doctor.  If your doctor is not available, please feel free to call the Surgical Center at [phone].  The Center is open Monday through Friday from 7AM to 7PM.  You can also call the [employer] HOTLINE open 24 hours/day, 7 days/week and speak to a nurse at [phone], or toll free at [phone].    A registered nurse may call you a few days after your surgery to see how you are doing after your procedure.    MEDICATIONS: Resume taking daily medication.  Take prescribed pain medication with food.  If no medication is prescribed, you may take non-aspirin pain medication if needed.  PAIN MEDICATION CAN BE VERY CONSTIPATING.  Take a stool softener or laxative such as senokot, pericolace, or milk of magnesia if needed.    Prescription given for PERCOCET AND CELEBREX .  Last pain medication given at 1PM, MAY TAKE NEXT DOSE AT 7PM.    Your physician has prescribed pain medication that includes Acetaminophen (Tylenol), do not take additional Acetaminophen (Tylenol) while taking the prescribed medication.    Depression / Suicide Risk    As you are discharged from this [facility] facility, it is important to learn how to keep safe from harming yourself.    Recognize the warning signs:  · Abrupt changes in  personality, positive or negative- including increase in energy   · Giving away possessions  · Change in eating patterns- significant weight changes-  positive or negative  · Change in sleeping patterns- unable to sleep or sleeping all the time   · Unwillingness or inability to communicate  · Depression  · Unusual sadness, discouragement and loneliness  · Talk of wanting to die  · Neglect of personal appearance   · Rebelliousness- reckless behavior  · Withdrawal from people/activities they love  · Confusion- inability to concentrate     If you or a loved one observes any of these behaviors or has concerns about self-harm, here's what you can do:  · Talk about it- your feelings and reasons for harming yourself  · Remove any means that you might use to hurt yourself (examples: pills, rope, extension cords, firearm)  · Get professional help from the community (Mental Health, Substance Abuse, psychological counseling)  · Do not be alone:Call your Safe Contact- someone whom you trust who will be there for you.  · Call your local CRISIS HOTLINE 452-2906 or 787-110-1658  · Call your local Children's Mobile Crisis Response Team [place] (703) 226-0447 or www[place] PERL  · Call the toll free National Suicide Prevention Hotlines   · National Suicide Prevention Lifeline 250-856-DFNH (2420)  · National Hope Line Network 800-SUICIDE (440-9643)       Medication List      START taking these medications        Instructions    celecoxib 200 MG Caps   Commonly known as:  CELEBREX    Take 1 Cap by mouth 2 times a day as needed for Mild Pain or Moderate Pain.   Dose:  200 mg       oxycodone-acetaminophen 5-325 MG Tabs   Commonly known as:  PERCOCET    Take 1-2 Tabs by mouth every 6 hours as needed for Moderate Pain.   Dose:  1-2 Tab         CONTINUE taking these medications        Instructions    ARMOUR THYROID 60 MG Tabs   Generic drug:  thyroid    Take 75 mg by mouth every evening. Pt uses a 60 mg and 1/2 of a 30 mg      Dose:  75 mg       aspirin 81 MG Chew chewable tablet   Commonly known as:  ASA    Take 1 Tab by mouth every day.   Dose:  81 mg       buPROPion 300 MG XL tablet   Commonly known as:  WELLBUTRIN XL    Take 300 mg by mouth every evening.   Dose:  300 mg       HYDROmorphone 2 MG Tabs   Commonly known as:  DILAUDID    Take 2 mg by mouth 1 time daily as needed for Severe Pain (back pain).   Dose:  2 mg       Insulin Glargine 300 UNIT/ML Sopn    Inject 46 Units as instructed 2 Times a Day.   Dose:  46 Units       insulin lispro 100 UNIT/ML Soln   Commonly known as:  HUMALOG    Inject 2-20 Units as instructed 3 times a day before meals. Sliding Scale - 2 units every 50   Dose:  2-20 Units       LIALDA 1.2 GM Tbec   Generic drug:  mesalamine    Take 2.4 g by mouth every evening.   Dose:  2.4 g       ondansetron 4 MG Tbdp   Commonly known as:  ZOFRAN ODT    Take 1 Tab by mouth every four hours as needed.   Dose:  4 mg       rifampin 300 MG Caps   Commonly known as:  RIFADINE    Take 300 mg by mouth 2 times a day.   Dose:  300 mg       sulfamethoxazole-trimethoprim 400-80 MG Tabs   Commonly known as:  BACTRIM    Take 1 Tab by mouth 2 times a day.   Dose:  1 Tab       testosterone cypionate 200 MG/ML Soln injection   Commonly known as:  DEPO-TESTOSTERONE    200 mg by Intramuscular route every Tuesday.   Dose:  200 mg       Vitamin D3 5000 UNITS Tabs    Take 5,000 Units by mouth every evening.   Dose:  5000 Units               Medication Information     Above and/or attached are the medications your physician expects you to take upon discharge. Review all of your home medications and newly ordered medications with your doctor and/or pharmacist. Follow medication instructions as directed by your doctor and/or pharmacist. Please keep your medication list with you and share with your physician. Update the information when medications are discontinued, doses are changed, or new medications (including over-the-counter products)  are added; and carry medication information at all times in the event of emergency situations.        Resources     Quit Smoking / Tobacco Use:    I understand the use of any tobacco products increases my chance of suffering from future heart disease or stroke and could cause other illnesses which may shorten my life. Quitting the use of tobacco products is the single most important thing I can do to improve my health. For further information on smoking / tobacco cessation call a Toll Free Quit Line at 1-769.499.5743 (*National [employer] [place]) or 1-715.127.4012 (American Lung Association) or you can access the web based program at www.lungusa.org.    [place] Tobacco Users Help Line:  (978) 104-8490       Toll Free: 1-316.419.1394  Quit Tobacco Program [facility] [phone])757-2661    Crisis Hotline:    [place] Crisis Hotline:  8-834-VQRYHIP or 1-278.750.6158    [place] Crisis Hotline:    1-592.405.4481 or 988-294-9747    Discharge Survey:   Thank you for choosing [facility]. We hope we did everything we could to make your hospital stay a pleasant one. You may be receiving a survey and we would appreciate your time and participation in answering the questions. Your input is very valuable to us in our efforts to improve our service to our patients and their families.            Signatures     My signature on this form indicates that:    1. I acknowledge receipt and understanding of these Home Care Instruction.  2. My questions regarding this information have been answered to my satisfaction.  3. I have formulated a plan with my discharge nurse to obtain my prescribed medications for home.    __________________________________      __________________________________                   Patient Signature                                 Guardian/Responsible Adult Signature      __________________________________                 __________       ________                       Nurse Signature                                                Date                 Time

## 2017-03-10 NOTE — PROGRESS NOTES
1154-received pt from OR with report from Dr Earl. VSS.  Steri strips, gauze, tegaderm dressing noted to umbilicus, small amt of bloody drainage present. Abdomen rounded, soft.  Pt c/o pain 9/10, medicated with fentanyl IV  1217-handoff report to Karen LUCERO

## 2017-03-10 NOTE — DISCHARGE INSTRUCTIONS
ACTIVITY: Rest and take it easy for the first 24 hours.  A responsible adult is recommended to remain with you during that time.  It is normal to feel sleepy.  We encourage you to not do anything that requires balance, judgment or coordination.    MILD FLU-LIKE SYMPTOMS ARE NORMAL. YOU MAY EXPERIENCE GENERALIZED MUSCLE ACHES, THROAT IRRITATION, HEADACHE AND/OR SOME NAUSEA.    FOR 24 HOURS DO NOT:  Drive, operate machinery or run household appliances.  Drink beer or alcoholic beverages.   Make important decisions or sign legal documents.    SPECIAL INSTRUCTIONS: Hernia Repair Discharge Instructions:    1. ACTIVITIES: Upon discharge from the hospital, the day of surgery it is requested that you do no significant physical activity and limit mental activities, as you have had sedation. The day after surgery, you may resume activities of daily living, but for four weeks, it is recommended that you do no strenuous activities or heavy lifting (greater than 15 pounds).     2. DRIVING: You may drive whenever you are off pain medications and are able to perform the activities needed to drive, i.e. turning, bending, twisting, etc.     3. WOUND: It is not unusual for patients to experience swelling and even bruising at the hernia repair site.  This will resolve over the next few days.     4. ICE: please use ice on the wound to decrease the swelling for the first 24 hours and then discontinue.     5. BATHING: The dressing can be removed two days after surgery and the wound can then be wetted in a shower as normal, but avoid submersion in water (tub bath) for at least a week.    6. PAIN MEDICATION: You will be given a prescription for pain medication at discharge. Please take these as directed. It is important to remember not to take medications on an empty stomach as this may cause nausea.     7. BOWEL FUNCTION: After hernia repair, it is not uncommon for patients to experience constipation. This is due to decreasing activity  levels as well as pain medications. You may wish to use a stool softener beginning immediately after surgery, and you may or may not need to use a laxative (Milk of Magnesia, Ex-lax; Senokot, etc.) as well.            8 .CALL IF YOU HAVE: (1) Fevers to more than 1010 F, (2) Unusual chest or leg pain, (3) Drainage or fluid from incision that may be foul smelling, increased tenderness or soreness at the wound or the wound edges are no longer together,redness or swelling at the incision site. Please do not hesitate to call with any other questions.     9. APPOINTMENT: Contact our office at [phone] for a follow-up appointment in 1 to 2 weeks following your procedure.     If you have any additional questions, please do not hesitate to call the office and speak to either myself or the physician on call.     Office address:  [address] [place] [address], [place] [address]      Esau Dooley M.D.  [facility]  [phone]    DIET: To avoid nausea, slowly advance diet as tolerated, avoiding spicy or greasy foods for the first day.  Add more substantial food to your diet according to your physician's instructions.  Babies can be fed formula or breast milk as soon as they are hungry.  INCREASE FLUIDS AND FIBER TO AVOID CONSTIPATION.      FOLLOW-UP APPOINTMENT:  A follow-up appointment should be arranged with your doctor in 1-2 weeks; call to schedule.    You should CALL YOUR PHYSICIAN if you develop:  Fever greater than 101 degrees F.  Pain not relieved by medication, or persistent nausea or vomiting.  Excessive bleeding (blood soaking through dressing) or unexpected drainage from the wound.  Extreme redness or swelling around the incision site, drainage of pus or foul smelling drainage.  Inability to urinate or empty your bladder within 8 hours.  Problems with breathing or chest pain.    You should call 911 if you develop problems with breathing or chest pain.  If you are unable to contact your doctor or surgical  center, you should go to the nearest emergency room or urgent care center.    Physician's telephone #: DAWIT [phone]    If any questions arise, call your doctor.  If your doctor is not available, please feel free to call the Surgical Center at [phone].  The Center is open Monday through Friday from 7AM to 7PM.  You can also call the HEALTH HOTLINE open 24 hours/day, 7 days/week and speak to a nurse at [phone], or toll free at [phone].    A registered nurse may call you a few days after your surgery to see how you are doing after your procedure.    MEDICATIONS: Resume taking daily medication.  Take prescribed pain medication with food.  If no medication is prescribed, you may take non-aspirin pain medication if needed.  PAIN MEDICATION CAN BE VERY CONSTIPATING.  Take a stool softener or laxative such as senokot, pericolace, or milk of magnesia if needed.    Prescription given for PERCOCET AND CELEBREX .  Last pain medication given at 1PM, MAY TAKE NEXT DOSE AT 7PM.    Your physician has prescribed pain medication that includes Acetaminophen (Tylenol), do not take additional Acetaminophen (Tylenol) while taking the prescribed medication.    Depression / Suicide Risk    As [facility] are [facility], it is important to learn how to keep safe from harming yourself.    Recognize the warning signs:  · Abrupt changes in personality, positive or negative- including increase in energy   · Giving away possessions  · Change in eating patterns- significant weight changes-  positive or negative  · Change in sleeping patterns- unable to sleep or sleeping all the time   · Unwillingness or inability to communicate  · Depression  · Unusual sadness, discouragement and loneliness  · Talk of wanting to die  · Neglect of personal appearance   · Rebelliousness- reckless behavior  · Withdrawal from people/activities they love  · Confusion- inability to concentrate     If you or a loved one  observes any of these behaviors or has concerns about self-harm, here's what you can do:  · Talk about it- your feelings and reasons for harming yourself  · Remove any means that you might use to hurt yourself (examples: pills, rope, extension cords, firearm)  · Get professional help from the community (Mental Health, Substance Abuse, psychological counseling)  · Do not be alone:Call your Safe Contact- someone whom you trust who will be there for you.  · Call your local CRISIS HOTLINE 266-2881 or 012-365-7299  · Call your local Children's Mobile Crisis Response Team [place] (582) 373-7591 or www.[employer]  · Call the toll free National Suicide Prevention Hotlines   · National Suicide Prevention Lifeline 430-168-OZUT (5039)  · National Hope Line Network 800-SUICIDE (648-7630)

## 2017-03-10 NOTE — OR NURSING
1217 received report from Dwayne LUCERO, plan of care discussed. BSFS 218, no orders to treat at this time.      1224 pt medicated for pain and nausea- see MAR    1315 TO from Dr. Earl to give Phenergan 6.25 mg IV for uncontrolled pain and nausea. May increase IV dilaudid dose to 0.5mg up to 4 mg max dose    1318 GF at bedside.    1345 pt able to void without difficulty    1410 discharge instructions given- all questions and concerns addressed.     1453 pt states he feels ready to go home. IV removed. Pt discharged out to car in WC, GF at side.

## 2017-03-10 NOTE — OP REPORT
DATE OF SERVICE:  03/10/2017    PREOPERATIVE DIAGNOSIS:  Infected umbilical hernia mesh.    POSTOPERATIVE DIAGNOSIS:  Infected umbilical hernia mesh.    PROCEDURES PERFORMED:  1.  Explantation of previous umbilical hernia mesh.  2.  Umbilical hernia repair with biologic mesh.    SURGEON:  Esau Dooley MD    ASSISTANT:  Irene Carson PA-C    ANESTHESIOLOGIST:  Ruiz Earl MD    ESTIMATED BLOOD LOSS:  50 mL.    COMPLICATIONS:  None.    SPECIMENS:  Umbilical hernia mesh for gross pathology.    INDICATIONS:  The patient is a 51-year-old male who underwent an emergent   umbilical hernia repair for incarcerated umbilical hernia.  At that time, I   used a synthetic mesh.  He subsequently developed chronic wound infection at   the site.  This was concerning for mesh infection.  Despite incision and   drainage, the area continued to drain indicating that the mesh was likely   infected.  I therefore consented for explantation of the mesh with   reconstruction of the hernia repair with biologic mesh.  I discussed the risks   and benefits of the procedure.  Risks include bleeding, infection and   recurrence.    PROCEDURE IN DETAIL:  Informed consent was obtained.  The patient was brought   to the operating room and placed in supine position on the operating table.    General anesthesia was induced.  The patient's abdomen was prepped and draped   in the usual sterile fashion.  Utilizing the previous skin incision, I   dissected down through the subcutaneous tissues inferior to the umbilicus   towards the fascia.  The umbilical stalk was elevated off the repair.  It was   densely adherent to the fascia and mesh underneath except for 1 portion where   there was a clear draining sinus coming from the mesh through to the   umbilicus.  Once the umbilical stalk was elevated off of the fascia and mesh,   I began to dissect the mesh carefully away from the fascia.  Sutures were   removed.  The mesh was adherent to the omentum  underneath and this was also   taken down.  The mesh was passed off as a specimen.  Subsequently, we turned   our attention to reconstruction.  The hernia defect measured approximately 2x3   cm.  An AlloMax mesh was chosen to affect the repair.  This was placed in an   underlay fashion using interrupted 0 Ethibond sutures.  Once this was sutured   in place, the fascia was closed over the top of it using additional 0 Ethibond   sutures.  The umbilical skin containing the draining sinus was sharply   debrided.  We then recreated the umbilicus tacking down the skin to the fascia   using 3-0 Vicryl sutures.  Deep dermal 3-0 Vicryl sutures were placed and the   skin was closed with a running 4-0 Vicryl in a subcuticular fashion.  The   wound was dressed with Steri-Strips, cotton balls and Tegaderm.  The patient   tolerated the procedure well, was extubated and taken to recovery in stable   condition.  All sponge and instrument counts were correct.       ____________________________________     MD BEE Wyman / STORM    DD:  03/10/2017 12:02:26  DT:  03/10/2017 14:50:23    D#:  [id number]  Job#:  [id number]

## 2017-03-10 NOTE — DISCHARGE SUMMARY
Hernia Repair Discharge Instructions:    1. ACTIVITIES: Upon discharge from the hospital, the day of surgery it is requested that you do no significant physical activity and limit mental activities, as you have had sedation. The day after surgery, you may resume activities of daily living, but for four weeks, it is recommended that you do no strenuous activities or heavy lifting (greater than 15 pounds).     2. DRIVING: You may drive whenever you are off pain medications and are able to perform the activities needed to drive, i.e. turning, bending, twisting, etc.     3. WOUND: It is not unusual for patients to experience swelling and even bruising at the hernia repair site.  This will resolve over the next few days.     4. ICE: please use ice on the wound to decrease the swelling for the first 24 hours and then discontinue.     5. BATHING: The dressing can be removed two days after surgery and the wound can then be wetted in a shower as normal, but avoid submersion in water (tub bath) for at least a week.    6. PAIN MEDICATION: You will be given a prescription for pain medication at discharge. Please take these as directed. It is important to remember not to take medications on an empty stomach as this may cause nausea.     7. BOWEL FUNCTION: After hernia repair, it is not uncommon for patients to experience constipation. This is due to decreasing activity levels as well as pain medications. You may wish to use a stool softener beginning immediately after surgery, and you may or may not need to use a laxative (Milk of Magnesia, Ex-lax; Senokot, etc.) as well.            8 .CALL IF YOU HAVE: (1) Fevers to more than 1010 F, (2) Unusual chest or leg pain, (3) Drainage or fluid from incision that may be foul smelling, increased tenderness or soreness at the wound or the wound edges are no longer together,redness or swelling at the incision site. Please do not hesitate to call with any other questions.     9.  APPOINTMENT: Contact our office at [phone] for a follow-up appointment in 1 to 2 weeks following your procedure.     If you have any additional questions, please do not hesitate to call the office and speak to either myself or the physician on call.     Office address:   [address], [place] [address]      Esau Dooley M.D.  [facility]  [phone]

## 2017-03-10 NOTE — IP AVS SNAPSHOT
3/10/2017          Mahesh Bradshaw  [address]  [place] [address]    Dear Mahesh:    [facility] wants to ensure your discharge home is safe and you or your loved ones have had all your questions answered regarding your care after you leave the hospital.    You may receive a telephone call within two days of your discharge.  This call is to make certain you understand your discharge instructions as well as ensure we provided you with the best care possible during your stay with us.     The call will only last approximately 3-5 minutes and will be done by a nurse.    Once again, we want to ensure your discharge home is safe and that you have a clear understanding of any next steps in your care.  If you have any questions or concerns, please do not hesitate to contact us, we are here for you.  Thank you for choosing [facility] for your healthcare needs.    Sincerely,    Kuldip Dias    [facility]

## 2017-03-10 NOTE — OR SURGEON
Immediate Post-Operative Note      PreOp Diagnosis: infected umbilical hernia mesh    PostOp Diagnosis: same    Procedure(s):  UMBILICAL HERNIA REPAIR- INCISION AND DRAINAGE OF UMBILICAL WOUND AND MESH REMOVAL - Wound Class: Dirty or Infected    Surgeon(s):  Esau Dooley M.D.    Anesthesiologist/Type of Anesthesia:  Anesthesiologist: Ruiz Earl M.D./General    Surgical Staff:  Circulator: Freya Rocha R.N.  Relief Scrub: Shelley Washington  Scrub Person: Zoila Andrews; Moira Portillo R.N.  First Assist: Irene Carson PA-C    Specimen: umbilical hernia mesh    Estimated Blood Loss: 50 cc    Findings: sinus from umbilicus to hernia mesh    Complications: none        3/10/2017 11:56 AM Esau Dooley

## 2017-03-13 LAB
BACTERIA TISS AEROBE CULT: NORMAL
BACTERIA TISS AEROBE CULT: NORMAL
GRAM STN SPEC: NORMAL
GRAM STN SPEC: NORMAL
SIGNIFICANT IND 70042: NORMAL
SITE SITE: NORMAL
SOURCE SOURCE: NORMAL

## 2017-03-15 LAB
BACTERIA SPEC ANAEROBE CULT: NORMAL
BACTERIA SPEC ANAEROBE CULT: NORMAL
SIGNIFICANT IND 70042: NORMAL
SITE SITE: NORMAL
SOURCE SOURCE: NORMAL

## 2017-03-21 ENCOUNTER — APPOINTMENT (OUTPATIENT)
Dept: RADIOLOGY | Facility: MEDICAL CENTER | Age: 52
End: 2017-03-21
Attending: EMERGENCY MEDICINE
Payer: COMMERCIAL

## 2017-03-21 ENCOUNTER — HOSPITAL ENCOUNTER (EMERGENCY)
Facility: MEDICAL CENTER | Age: 52
End: 2017-03-21
Attending: EMERGENCY MEDICINE
Payer: COMMERCIAL

## 2017-03-21 VITALS
SYSTOLIC BLOOD PRESSURE: 104 MMHG | BODY MASS INDEX: 33.77 KG/M2 | OXYGEN SATURATION: 98 % | HEIGHT: 69 IN | RESPIRATION RATE: 14 BRPM | HEART RATE: 86 BPM | TEMPERATURE: 98.1 F | WEIGHT: 228 LBS | DIASTOLIC BLOOD PRESSURE: 86 MMHG

## 2017-03-21 DIAGNOSIS — R10.9 POSTOPERATIVE ABDOMINAL PAIN: ICD-10-CM

## 2017-03-21 DIAGNOSIS — G89.18 POSTOPERATIVE ABDOMINAL PAIN: ICD-10-CM

## 2017-03-21 LAB
ALBUMIN SERPL BCP-MCNC: 4 G/DL (ref 3.2–4.9)
ALBUMIN/GLOB SERPL: 1.3 G/DL
ALP SERPL-CCNC: 74 U/L (ref 30–99)
ALT SERPL-CCNC: 14 U/L (ref 2–50)
ANION GAP SERPL CALC-SCNC: 10 MMOL/L (ref 0–11.9)
AST SERPL-CCNC: 8 U/L (ref 12–45)
BASOPHILS # BLD AUTO: 0.3 % (ref 0–1.8)
BASOPHILS # BLD: 0.02 K/UL (ref 0–0.12)
BILIRUB SERPL-MCNC: 0.3 MG/DL (ref 0.1–1.5)
BUN SERPL-MCNC: 13 MG/DL (ref 8–22)
CALCIUM SERPL-MCNC: 9.3 MG/DL (ref 8.5–10.5)
CHLORIDE SERPL-SCNC: 101 MMOL/L (ref 96–112)
CO2 SERPL-SCNC: 23 MMOL/L (ref 20–33)
CREAT SERPL-MCNC: 0.72 MG/DL (ref 0.5–1.4)
EOSINOPHIL # BLD AUTO: 0.05 K/UL (ref 0–0.51)
EOSINOPHIL NFR BLD: 0.7 % (ref 0–6.9)
ERYTHROCYTE [DISTWIDTH] IN BLOOD BY AUTOMATED COUNT: 39.8 FL (ref 35.9–50)
GFR SERPL CREATININE-BSD FRML MDRD: >60 ML/MIN/1.73 M 2
GLOBULIN SER CALC-MCNC: 3.2 G/DL (ref 1.9–3.5)
GLUCOSE SERPL-MCNC: 372 MG/DL (ref 65–99)
GRAM STN SPEC: NORMAL
GRAM STN SPEC: NORMAL
HCT VFR BLD AUTO: 36.5 % (ref 42–52)
HGB BLD-MCNC: 12.7 G/DL (ref 14–18)
IMM GRANULOCYTES # BLD AUTO: 0.03 K/UL (ref 0–0.11)
IMM GRANULOCYTES NFR BLD AUTO: 0.4 % (ref 0–0.9)
LIPASE SERPL-CCNC: 9 U/L (ref 11–82)
LYMPHOCYTES # BLD AUTO: 1.4 K/UL (ref 1–4.8)
LYMPHOCYTES NFR BLD: 19 % (ref 22–41)
MCH RBC QN AUTO: 30.5 PG (ref 27–33)
MCHC RBC AUTO-ENTMCNC: 34.8 G/DL (ref 33.7–35.3)
MCV RBC AUTO: 87.5 FL (ref 81.4–97.8)
MONOCYTES # BLD AUTO: 0.41 K/UL (ref 0–0.85)
MONOCYTES NFR BLD AUTO: 5.6 % (ref 0–13.4)
NEUTROPHILS # BLD AUTO: 5.47 K/UL (ref 1.82–7.42)
NEUTROPHILS NFR BLD: 74 % (ref 44–72)
NRBC # BLD AUTO: 0 K/UL
NRBC BLD AUTO-RTO: 0 /100 WBC
PLATELET # BLD AUTO: 234 K/UL (ref 164–446)
PMV BLD AUTO: 10.1 FL (ref 9–12.9)
POTASSIUM SERPL-SCNC: 4 MMOL/L (ref 3.6–5.5)
PROT SERPL-MCNC: 7.2 G/DL (ref 6–8.2)
RBC # BLD AUTO: 4.17 M/UL (ref 4.7–6.1)
SIGNIFICANT IND 70042: NORMAL
SITE SITE: NORMAL
SODIUM SERPL-SCNC: 134 MMOL/L (ref 135–145)
SOURCE SOURCE: NORMAL
WBC # BLD AUTO: 7.4 K/UL (ref 4.8–10.8)

## 2017-03-21 PROCEDURE — 96376 TX/PRO/DX INJ SAME DRUG ADON: CPT

## 2017-03-21 PROCEDURE — 99284 EMERGENCY DEPT VISIT MOD MDM: CPT

## 2017-03-21 PROCEDURE — 74177 CT ABD & PELVIS W/CONTRAST: CPT

## 2017-03-21 PROCEDURE — 83690 ASSAY OF LIPASE: CPT

## 2017-03-21 PROCEDURE — 87070 CULTURE OTHR SPECIMN AEROBIC: CPT

## 2017-03-21 PROCEDURE — 96375 TX/PRO/DX INJ NEW DRUG ADDON: CPT

## 2017-03-21 PROCEDURE — 700111 HCHG RX REV CODE 636 W/ 250 OVERRIDE (IP): Performed by: EMERGENCY MEDICINE

## 2017-03-21 PROCEDURE — 700117 HCHG RX CONTRAST REV CODE 255: Performed by: EMERGENCY MEDICINE

## 2017-03-21 PROCEDURE — 85025 COMPLETE CBC W/AUTO DIFF WBC: CPT

## 2017-03-21 PROCEDURE — 96374 THER/PROPH/DIAG INJ IV PUSH: CPT

## 2017-03-21 PROCEDURE — 87077 CULTURE AEROBIC IDENTIFY: CPT

## 2017-03-21 PROCEDURE — 87205 SMEAR GRAM STAIN: CPT

## 2017-03-21 PROCEDURE — 87186 SC STD MICRODIL/AGAR DIL: CPT | Mod: 91

## 2017-03-21 PROCEDURE — 80053 COMPREHEN METABOLIC PANEL: CPT

## 2017-03-21 RX ORDER — ONDANSETRON 2 MG/ML
4 INJECTION INTRAMUSCULAR; INTRAVENOUS ONCE
Status: COMPLETED | OUTPATIENT
Start: 2017-03-21 | End: 2017-03-21

## 2017-03-21 RX ADMIN — HYDROMORPHONE HYDROCHLORIDE 1 MG: 1 INJECTION, SOLUTION INTRAMUSCULAR; INTRAVENOUS; SUBCUTANEOUS at 12:40

## 2017-03-21 RX ADMIN — IOHEXOL 100 ML: 350 INJECTION, SOLUTION INTRAVENOUS at 13:19

## 2017-03-21 RX ADMIN — ONDANSETRON 4 MG: 2 INJECTION, SOLUTION INTRAMUSCULAR; INTRAVENOUS at 12:40

## 2017-03-21 RX ADMIN — HYDROMORPHONE HYDROCHLORIDE 1 MG: 1 INJECTION, SOLUTION INTRAMUSCULAR; INTRAVENOUS; SUBCUTANEOUS at 14:15

## 2017-03-21 ASSESSMENT — PAIN SCALES - GENERAL
PAINLEVEL_OUTOF10: 3
PAINLEVEL_OUTOF10: 5
PAINLEVEL_OUTOF10: 5

## 2017-03-21 NOTE — ED AVS SNAPSHOT
[employer] Access Code: [email or web address]  Expires: 3/29/2017 12:30 PM    [employer]  A secure, online tool to manage your health information     [employer]’s [employer]® is a secure, online tool that connects you to your personalized health information from the privacy of your home -- day or night - making it very easy for you to manage your healthcare. Once the activation process is completed, you can even access your medical information using the [employer] princess, which is available for free in the Apple Princess store or Google Play store.     [employer] provides the following levels of access (as shown below):   My Chart Features   [facility] Primary Care Doctor [facility]  Specialists [facility]  Urgent  Care Non-[facility]  Primary Care  Doctor   Email your healthcare team securely and privately 24/7 X X X X   Manage appointments: schedule your next appointment; view details of past/upcoming appointments X      Request prescription refills. X      View recent personal medical records, including lab and immunizations X X X X   View health record, including health history, allergies, medications X X X X   Read reports about your outpatient visits, procedures, consult and ER notes X X X X   See your discharge summary, which is a recap of your hospital and/or ER visit that includes your diagnosis, lab results, and care plan. X X       How to register for [employer]:  1. Go to  [email or web address].  2. Click on the Sign Up Now box, which takes you to the New Member Sign Up page. You will need to provide the following information:  a. Enter your [employer] Access Code exactly as it appears at the top of this page. (You will not need to use this code after you’ve completed the sign-up process. If you do not sign up before the expiration date, you must request a new code.)   b. Enter your date of birth.   c. Enter your home email address.   d. Click Submit, and follow the next screen’s instructions.  3. Create a [employer] ID. This will be your [employer]  login ID and cannot be changed, so think of one that is secure and easy to remember.  4. Create a [employer] password. You can change your password at any time.  5. Enter your Password Reset Question and Answer. This can be used at a later time if you forget your password.   6. Enter your e-mail address. This allows you to receive e-mail notifications when new information is available in [employer].  7. Click Sign Up. You can now view your health information.    For assistance activating your [employer] account, call [phone]

## 2017-03-21 NOTE — ED NOTES
PIV removed, catheter intact, dressing applied, and bleeding controlled. Pt up to get dressed for discharge.  Putting on abdominal binder.

## 2017-03-21 NOTE — ED NOTES
In to dc pt, info/instructions given and questions answered. Pt ambulated out with a steady gait without difficulty.

## 2017-03-21 NOTE — ED NOTES
Patient requesting pain medication, ERP called and notified, awaiting orders.  Report to JAZMINE Corbin.  Patient updated on POC.

## 2017-03-21 NOTE — ED PROVIDER NOTES
"ED Provider Note    CHIEF COMPLAINT  Chief Complaint   Patient presents with   • Sent by MD     s/p hernia repair 10 days ago, drainage and 6/10 abdominal pain to site       HPI  Mahesh Bradshaw is a 51 y.o. male who presents for evaluation of abdominal pain and slight redness around the surgical incision small amount of drainage of serosanguineous liquid. The patient underwent an abdominal hernia revision about 10 days ago with Dr. Dooley. He apparently had a bad reaction to the mesh initially and this was a revision. The patient is well-known to me. He has next and if history as listed below including diabetes. He has not had any fevers or chills but reports 5 out of 10 intermittent pain worse with sitting up and moving. He has not had any vomiting blood and black or bloody stools. No high fevers chills or chest pain    REVIEW OF SYSTEMS  See HPI for further details. No numbness tingling weakness fevers or chills All other systems are negative.     PAST MEDICAL HISTORY  Past Medical History   Diagnosis Date   • Migraine    • Gout    • Indigestion    • UC (ulcerative colitis) (CMS-HCC) 3-3-17     \"Five BM's per day, takes Lialda\"   • Difficult intubation 2-2016   • Arthritis 3-3-17     \"Spine\"   • Sepsis (CMS-HCC) 1/21/2016   • Essential hypertension 1/22/2016   • Snoring 3-3-17     Has not had a sleep study   • High cholesterol 3-3-17     Does not currently take medication for   • Congestive heart failure (CMS-HCC) 2-2016      3-3-17 \"Not a current issue\"   • ASTHMA 3-3-17     \"Hasn't had to use inhaler in 2 years\"   • Aspiration pneumonia (CMS-HCC) 3-2016   • Breath shortness 3-3-17     \"With Exercise\"   • Hypothyroid 3-3-17     Takes [place] Thyroid   • Pain 3-3-17     \"Left Flank\"   • Heart burn    • Depression 11/26/2014   • Anesthesia      \"Was difficult to intubate 2-2016\"   • Pancreatitis 2-2016     \"D/T Tradjenta was hospitialized for 15 days\"   • Elevated liver enzymes 2-2016   • Electrolyte " "imbalance 2-2016   • Kidney stones    • ADRIANE (acute kidney injury) (CMS-HCC) 2/24/2016   • RF (renal failure) 2-2016   • Diabetes (CMS-HCC) 3-3-17     Takes Insulin   • DKA (diabetic ketoacidoses) (CMS-HCC) 2-2016     \"10 days on vent with DKA, Renal failure, CHF, elevated liver enzymes and electrolyte imbalance\"   • On mechanically assisted ventilation (CMS-HCC) 2-2016     HX \"On Vent for 10 days at [facility]\".  \"DX Pancreatitis, DKA, CHF, Elevated Liver Enzymes & Electrolyte Imbalance\".       FAMILY HISTORY  No history of bleeding disorder    SOCIAL HISTORY  Social History     Social History   • Marital Status: Single     Spouse Name: N/A   • Number of Children: N/A   • Years of Education: N/A     Social History Main Topics   • Smoking status: Never Smoker    • Smokeless tobacco: None   • Alcohol Use: No      Comment: occ   • Drug Use: No   • Sexual Activity: Not Asked     Other Topics Concern   • None     Social History Narrative    ** Merged History Encounter **         ** Merged History Encounter **         nonsmoker no IV drugs    SURGICAL HISTORY  Past Surgical History   Procedure Laterality Date   • Carmenza by laparoscopy  2005   • Colonoscopy with biopsy  11/26/2014     Performed by Solo Higginbotham M.D. at ENDOSCOPY [facility]   • Umbilical hernia repair N/A 11/6/2016     Procedure: UMBILICAL HERNIA REPAIR;  Surgeon: Esau Dooley M.D.;  Location: SURGERY [facility];  Service:    • Other orthopedic surgery  1997 or 1998     Left Wrist ORIF   • Umbilical hernia repair  3/10/2017     Procedure: UMBILICAL HERNIA REPAIR- INCISION AND DRAINAGE OF UMBILICAL WOUND AND MESH REMOVAL;  Surgeon: Esau Dooley M.D.;  Location: SURGERY SAME DAY [facility];  Service:        CURRENT MEDICATIONS  Home Medications     Reviewed by Skylar Barnhart R.N. (Registered Nurse) on 03/21/17 at 1119  Med List Status: Complete    Medication Last Dose Status    aspirin (ASA) 81 MG Chew Tab chewable tablet 2/23/2017 " "Active    buPROPion (WELLBUTRIN XL) 300 MG XL tablet 3/20/2017 Active    celecoxib (CELEBREX) 200 MG Cap 3/21/2017 Active    Cholecalciferol (VITAMIN D3) 5000 UNITS Tab 3/20/2017 Active    HYDROmorphone (DILAUDID) 2 MG Tab PRN Active    Insulin Glargine 300 UNIT/ML Solution Pen-injector 3/9/2017 Active    insulin lispro (HUMALOG) 100 UNIT/ML Solution 3/21/2017 Active    mesalamine (LIALDA) 1.2 GM TBEC 3/20/2017 Active    ondansetron (ZOFRAN ODT) 4 MG TABLET DISPERSIBLE  Active    oxycodone-acetaminophen (PERCOCET) 5-325 MG Tab  Active    rifampin (RIFADINE) 300 MG Cap 3/20/2017 Active    sulfamethoxazole-trimethoprim (BACTRIM) 400-80 MG Tab 3/20/2017 Active    testosterone cypionate (DEPO-TESTOSTERONE) 200 MG/ML SOLN injection 3/20/2017 Active    thyroid (ARMOUR THYROID) 60 MG Tab 3/20/2017 Active                ALLERGIES  Allergies   Allergen Reactions   • Peanut-Derived Anaphylaxis     Peanuts   RXN=age 36   • Tradjenta [Linagliptin] Unspecified     Pt developed pancreatitis   • Hydrocodone Hives     Tolerates Morphine and oxycodone  Rxn Age - 29       PHYSICAL EXAM  VITAL SIGNS: /85 mmHg  Pulse 102  Temp(Src) 36.7 °C (98.1 °F)  Resp 16  Ht 1.74 m (5' 8.5\")  Wt 103.42 kg (228 lb)  BMI 34.16 kg/m2  SpO2 97%      Constitutional: Well developed, Well nourished, No acute distress, Non-toxic appearance.   HENT: Normocephalic, Atraumatic, Bilateral external ears normal, Oropharynx moist, No oral exudates, Nose normal.   Eyes: PERRLA, EOMI, Conjunctiva normal, No discharge.   Neck: Normal range of motion, No tenderness, Supple, No stridor.   Cardiovascular: Mild tachycardia Normal rhythm, No murmurs, No rubs, No gallops.   Thorax & Lungs: Normal breath sounds, No respiratory distress, No wheezing, No chest tenderness.   Abdomen: Bowel sounds normal, I'll diffuse tenderness, surgical incision is clean and dry there is minimal erythema and a small amount of exudate draining from the wound and no evidence of " dehiscence  Skin: Warm, Dry, No erythema, No rash.   Back: No tenderness, No CVA tenderness.   Extremities: Intact distal pulses, No edema, No tenderness, No cyanosis, No clubbing.   Neurologic: Alert & oriented x 3, Normal motor function, Normal sensory function, No focal deficits noted.   Psychiatric: Affect normal, Judgment normal, Mood normal.       RADIOLOGY/PROCEDURES  Results for orders placed or performed during the hospital encounter of 03/21/17   CBC WITH DIFFERENTIAL   Result Value Ref Range    WBC 7.4 4.8 - 10.8 K/uL    RBC 4.17 (L) 4.70 - 6.10 M/uL    Hemoglobin 12.7 (L) 14.0 - 18.0 g/dL    Hematocrit 36.5 (L) 42.0 - 52.0 %    MCV 87.5 81.4 - 97.8 fL    MCH 30.5 27.0 - 33.0 pg    MCHC 34.8 33.7 - 35.3 g/dL    RDW 39.8 35.9 - 50.0 fL    Platelet Count 234 164 - 446 K/uL    MPV 10.1 9.0 - 12.9 fL    Neutrophils-Polys 74.00 (H) 44.00 - 72.00 %    Lymphocytes 19.00 (L) 22.00 - 41.00 %    Monocytes 5.60 0.00 - 13.40 %    Eosinophils 0.70 0.00 - 6.90 %    Basophils 0.30 0.00 - 1.80 %    Immature Granulocytes 0.40 0.00 - 0.90 %    Nucleated RBC 0.00 /100 WBC    Neutrophils (Absolute) 5.47 1.82 - 7.42 K/uL    Lymphs (Absolute) 1.40 1.00 - 4.80 K/uL    Monos (Absolute) 0.41 0.00 - 0.85 K/uL    Eos (Absolute) 0.05 0.00 - 0.51 K/uL    Baso (Absolute) 0.02 0.00 - 0.12 K/uL    Immature Granulocytes (abs) 0.03 0.00 - 0.11 K/uL    NRBC (Absolute) 0.00 K/uL   COMP METABOLIC PANEL   Result Value Ref Range    Sodium 134 (L) 135 - 145 mmol/L    Potassium 4.0 3.6 - 5.5 mmol/L    Chloride 101 96 - 112 mmol/L    Co2 23 20 - 33 mmol/L    Anion Gap 10.0 0.0 - 11.9    Glucose 372 (H) 65 - 99 mg/dL    Bun 13 8 - 22 mg/dL    Creatinine 0.72 0.50 - 1.40 mg/dL    Calcium 9.3 8.5 - 10.5 mg/dL    AST(SGOT) 8 (L) 12 - 45 U/L    ALT(SGPT) 14 2 - 50 U/L    Alkaline Phosphatase 74 30 - 99 U/L    Total Bilirubin 0.3 0.1 - 1.5 mg/dL    Albumin 4.0 3.2 - 4.9 g/dL    Total Protein 7.2 6.0 - 8.2 g/dL    Globulin 3.2 1.9 - 3.5 g/dL    A-G  Ratio 1.3 g/dL   LIPASE   Result Value Ref Range    Lipase 9 (L) 11 - 82 U/L   ESTIMATED GFR   Result Value Ref Range    GFR If African American >60 >60 mL/min/1.73 m 2    GFR If Non African American >60 >60 mL/min/1.73 m 2      CT-ABDOMEN-PELVIS WITH   Final Result         1. Soft tissue stranding with several foci within the subcutaneous tissue as well as peritoneum (likely deep to the mesh), postsurgical change versus superimposed infection.          COURSE & MEDICAL DECISION MAKING  Per surgery's request CT scan with IV and oral contrast as well as laboratory studies were performed. There is no leukocytosis. He has mild hyperglycemia but no evidence of acidosis. Dr. Dooley reviewed the CT scan he thinks it the findings are most consistent with postoperative changes and there is no obvious abscess. He liked expectantly manage this. He did ask that I perform a wound culture. He did specifically recommend no antibiotics or admission at this time he will follow the patient closely as an outpatient. We will order the patient an abdominal binder. Here he has pain medicine at home    FINAL IMPRESSION  1.   1. Postoperative abdominal pain      2. Hyperglycemia without acidosis       Electronically signed by: Orlando Harper, 3/21/2017 11:44 AM

## 2017-03-21 NOTE — ED AVS SNAPSHOT
3/21/2017          Mahesh Bradshaw  [address]  [place] [address]    Dear Mahesh:    [facility] wants to ensure your discharge home is safe and you or your loved ones have had all your questions answered regarding your care after you leave the hospital.    You may receive a telephone call within two days of your discharge.  This call is to make certain you understand your discharge instructions as well as ensure we provided you with the best care possible during your stay with us.     The call will only last approximately 3-5 minutes and will be done by a nurse.    Once again, we want to ensure your discharge home is safe and that you have a clear understanding of any next steps in your care.  If you have any questions or concerns, please do not hesitate to contact us, we are here for you.  Thank you for choosing [facility] for your healthcare needs.    Sincerely,    Kuldip Dias    [facility]

## 2017-03-21 NOTE — DISCHARGE INSTRUCTIONS
Abdominal Pain (Nonspecific)  Your exam might not show the exact reason you have abdominal pain. Since there are many different causes of abdominal pain, another checkup and more tests may be needed. It is very important to follow up for lasting (persistent) or worsening symptoms. A possible cause of abdominal pain in any person who still has his or her appendix is acute appendicitis. Appendicitis is often hard to diagnose. Normal blood tests, urine tests, ultrasound, and CT scans do not completely rule out early appendicitis or other causes of abdominal pain. Sometimes, only the changes that happen over time will allow appendicitis and other causes of abdominal pain to be determined. Other potential problems that may require surgery may also take time to become more apparent. Because of this, it is important that you follow all of the instructions below.  HOME CARE INSTRUCTIONS   · Rest as much as possible.   · Do not eat solid food until your pain is gone.   · While adults or children have pain: A diet of water, weak decaffeinated tea, broth or bouillon, gelatin, oral rehydration solutions (ORS), frozen ice pops, or ice chips may be helpful.   · When pain is gone in adults or children: Start a light diet (dry toast, crackers, applesauce, or white rice). Increase the diet slowly as long as it does not bother you. Eat no dairy products (including cheese and eggs) and no spicy, fatty, fried, or high-fiber foods.   · Use no alcohol, caffeine, or cigarettes.   · Take your regular medicines unless your caregiver told you not to.   · Take any prescribed medicine as directed.   · Only take over-the-counter or prescription medicines for pain, discomfort, or fever as directed by your caregiver. Do not give aspirin to children.   If your caregiver has given you a follow-up appointment, it is very important to keep that appointment. Not keeping the appointment could result in a permanent injury and/or lasting (chronic) pain  and/or disability. If there is any problem keeping the appointment, you must call to reschedule.   SEEK IMMEDIATE MEDICAL CARE IF:   · Your pain is not gone in 24 hours.   · Your pain becomes worse, changes location, or feels different.   · You or your child has an oral temperature above 102° F (38.9° C), not controlled by medicine.   · Your baby is older than 3 months with a rectal temperature of 102° F (38.9° C) or higher.   · Your baby is 3 months old or younger with a rectal temperature of 100.4° F (38° C) or higher.   · You have shaking chills.   · You keep throwing up (vomiting) or cannot drink liquids.   · There is blood in your vomit or you see blood in your bowel movements.   · Your bowel movements become dark or black.   · You have frequent bowel movements.   · Your bowel movements stop (become blocked) or you cannot pass gas.   · You have bloody, frequent, or painful urination.   · You have yellow discoloration in the skin or whites of the eyes.   · Your stomach becomes bloated or bigger.   · You have dizziness or fainting.   · You have chest or back pain.   MAKE SURE YOU:   · Understand these instructions.   · Will watch your condition.   · Will get help right away if you are not doing well or get worse.   Document Released: 12/18/2006 Document Revised: 03/11/2013 Document Reviewed: 11/15/2010  [employer]® Patient Information ©2013 [employer].

## 2017-03-21 NOTE — ED NOTES
".  Chief Complaint   Patient presents with   • Sent by MD     s/p hernia repair 10 days ago, drainage and 6/10 abdominal pain to site     ./85 mmHg  Pulse 102  Temp(Src) 36.7 °C (98.1 °F)  Resp 16  Ht 1.74 m (5' 8.5\")  Wt 103.42 kg (228 lb)  BMI 34.16 kg/m2  SpO2 97%    Ambulatory to triage with above complaint  "

## 2017-03-21 NOTE — ED AVS SNAPSHOT
Home Care Instructions                                                                                                                Mahesh Bradshaw   MRN: [id number]    Department:  [facility], Emergency Dept   Date of Visit:  3/21/2017            [facility], Emergency Dept    [address] [address]    Phone:  [phone]      You were seen by     Orlando Harper M.D.      Your Diagnosis Was     Postoperative abdominal pain     R10.9, G89.18       These are the medications you received during your hospitalization from 03/21/2017 1109 to 03/21/2017 1440     Date/Time Order Dose Route Action    03/21/2017 1240 ondansetron (ZOFRAN) syringe/vial injection 4 mg 4 mg Intravenous Given    03/21/2017 1240 HYDROmorphone (DILAUDID) injection 1 mg 1 mg Intravenous Given    03/21/2017 1319 iohexol (OMNIPAQUE) 350 mg/mL 100 mL Intravenous Given    03/21/2017 1415 HYDROmorphone (DILAUDID) injection 1 mg 1 mg Intravenous Given      Follow-up Information     1. Follow up with Esau Dooley M.D. In 2 days.    Specialty:  Surgery    Why:  For wound re-check    Contact information    [address]  [address]  [place] [place] [address] [phone]        Medication Information     Review all of your home medications and newly ordered medications with your primary doctor and/or pharmacist as soon as possible. Follow medication instructions as directed by your doctor and/or pharmacist.     Please keep your complete medication list with you and share with your physician. Update the information when medications are discontinued, doses are changed, or new medications (including over-the-counter products) are added; and carry medication information at all times in the event of emergency situations.               Medication List      ASK your doctor about these medications        Instructions    Morning Afternoon Evening Bedtime    ARMOUR THYROID 60 MG Tabs   Generic drug:  thyroid         Take 75 mg by mouth every evening. Pt uses a 60 mg and 1/2 of a 30 mg   Dose:  75 mg                        aspirin 81 MG Chew chewable tablet   Commonly known as:  ASA        Take 1 Tab by mouth every day.   Dose:  81 mg                        buPROPion 300 MG XL tablet   Commonly known as:  WELLBUTRIN XL        Take 300 mg by mouth every evening.   Dose:  300 mg                        celecoxib 200 MG Caps   Commonly known as:  CELEBREX        Take 1 Cap by mouth 2 times a day as needed for Mild Pain or Moderate Pain.   Dose:  200 mg                        HYDROmorphone 2 MG Tabs   Commonly known as:  DILAUDID        Take 2 mg by mouth 1 time daily as needed for Severe Pain (back pain).   Dose:  2 mg                        Insulin Glargine 300 UNIT/ML Sopn        Inject 46 Units as instructed 2 Times a Day.   Dose:  46 Units                        insulin lispro 100 UNIT/ML Soln   Commonly known as:  HUMALOG        Inject 2-20 Units as instructed 3 times a day before meals. Sliding Scale - 2 units every 50   Dose:  2-20 Units                        LIALDA 1.2 GM Tbec   Generic drug:  mesalamine        Take 2.4 g by mouth every evening.   Dose:  2.4 g                        ondansetron 4 MG Tbdp   Commonly known as:  ZOFRAN ODT        Take 1 Tab by mouth every four hours as needed.   Dose:  4 mg                        oxycodone-acetaminophen 5-325 MG Tabs   Commonly known as:  PERCOCET        Take 1-2 Tabs by mouth every 6 hours as needed for Moderate Pain.   Dose:  1-2 Tab                        rifampin 300 MG Caps   Commonly known as:  RIFADINE        Take 300 mg by mouth 2 times a day.   Dose:  300 mg                        sulfamethoxazole-trimethoprim 400-80 MG Tabs   Commonly known as:  BACTRIM        Take 1 Tab by mouth 2 times a day.   Dose:  1 Tab                        testosterone cypionate 200 MG/ML Soln injection   Commonly known as:  DEPO-TESTOSTERONE        200 mg by Intramuscular route every  Tuesday.   Dose:  200 mg                        Vitamin D3 5000 UNITS Tabs        Take 5,000 Units by mouth every evening.   Dose:  5000 Units                                Procedures and tests performed during your visit     CBC WITH DIFFERENTIAL    COMP METABOLIC PANEL    CONSENT FOR CONTRAST INJECTION    CT-ABDOMEN-PELVIS WITH    CULTURE WOUND W/ GRAM STAIN    ESTIMATED GFR    LIPASE    SALINE LOCK        Discharge Instructions       Abdominal Pain (Nonspecific)  Your exam might not show the exact reason you have abdominal pain. Since there are many different causes of abdominal pain, another checkup and more tests may be needed. It is very important to follow up for lasting (persistent) or worsening symptoms. A possible cause of abdominal pain in any person who still has his or her appendix is acute appendicitis. Appendicitis is often hard to diagnose. Normal blood tests, urine tests, ultrasound, and CT scans do not completely rule out early appendicitis or other causes of abdominal pain. Sometimes, only the changes that happen over time will allow appendicitis and other causes of abdominal pain to be determined. Other potential problems that may require surgery may also take time to become more apparent. Because of this, it is important that you follow all of the instructions below.  HOME CARE INSTRUCTIONS   · Rest as much as possible.   · Do not eat solid food until your pain is gone.   · While adults or children have pain: A diet of water, weak decaffeinated tea, broth or bouillon, gelatin, oral rehydration solutions (ORS), frozen ice pops, or ice chips may be helpful.   · When pain is gone in adults or children: Start a light diet (dry toast, crackers, applesauce, or white rice). Increase the diet slowly as long as it does not bother you. Eat no dairy products (including cheese and eggs) and no spicy, fatty, fried, or high-fiber foods.   · Use no alcohol, caffeine, or cigarettes.   · Take your regular  medicines unless your caregiver told you not to.   · Take any prescribed medicine as directed.   · Only take over-the-counter or prescription medicines for pain, discomfort, or fever as directed by your caregiver. Do not give aspirin to children.   If your caregiver has given you a follow-up appointment, it is very important to keep that appointment. Not keeping the appointment could result in a permanent injury and/or lasting (chronic) pain and/or disability. If there is any problem keeping the appointment, you must call to reschedule.   SEEK IMMEDIATE MEDICAL CARE IF:   · Your pain is not gone in 24 hours.   · Your pain becomes worse, changes location, or feels different.   · You or your child has an oral temperature above 102° F (38.9° C), not controlled by medicine.   · Your baby is older than 3 months with a rectal temperature of 102° F (38.9° C) or higher.   · Your baby is 3 months old or younger with a rectal temperature of 100.4° F (38° C) or higher.   · You have shaking chills.   · You keep throwing up (vomiting) or cannot drink liquids.   · There is blood in your vomit or you see blood in your bowel movements.   · Your bowel movements become dark or black.   · You have frequent bowel movements.   · Your bowel movements stop (become blocked) or you cannot pass gas.   · You have bloody, frequent, or painful urination.   · You have yellow discoloration in the skin or whites of the eyes.   · Your stomach becomes bloated or bigger.   · You have dizziness or fainting.   · You have chest or back pain.   MAKE SURE YOU:   · Understand these instructions.   · Will watch your condition.   · Will get help right away if you are not doing well or get worse.   Document Released: 12/18/2006 Document Revised: 03/11/2013 Document Reviewed: 11/15/2010  [employer]® Patient Information ©2013 [employer]          Patient Information     Patient Information    Following emergency treatment: all patient requiring follow-up care  must return either to a private physician or a clinic if your condition worsens before you are able to obtain further medical attention, please return to the emergency room.     Billing Information    At [facility], we work to make the billing process streamlined for our patients.  Our Representatives are here to answer any questions you may have regarding your hospital bill.  If you have insurance coverage and have supplied your insurance information to us, we will submit a claim to your insurer on your behalf.  Should you have any questions regarding your bill, we can be reached online or by phone as follows:  Online: You are able pay your bills online or live chat with our representatives about any billing questions you may have. We are here to help Monday - Friday from 8:00am to 7:30pm and 9:00am - 12:00pm on Saturdays.  Please visit [email or web address]  for more information.   Phone:  [phone] or [phone]    Please note that your emergency physician, surgeon, pathologist, radiologist, anesthesiologist, and other specialists are not employed by [facility] and will therefore bill separately for their services.  Please contact them directly for any questions concerning their bills at the numbers below:     Emergency Physician Services:  [phone]  [facility]:  [phone]  [facility]:  [phone]  [facility]:  [phone]    1. Your final bill may vary from the amount quoted upon discharge if all procedures are not complete at that time, or if your doctor has additional procedures of which we are not aware. You will receive an additional bill if you return to the Emergency Department at [facility] for suture removal regardless of the facility of which the sutures were placed.     2. Please arrange for settlement of this account at the emergency registration.    3. All self-pay accounts are due in full at the  time of treatment.  If you are unable to meet this obligation then payment is expected within 4-5 days.     4. If you have had radiology studies (CT, X-ray, Ultrasound, MRI), you have received a preliminary result during your emergency department visit. Please contact the radiology department [phone] to receive a copy of your final result. Please discuss the Final result with your primary physician or with the follow up physician provided.     Crisis Hotline:  [place] Crisis Hotline:  7-427-ERIIYPR or 1-266.952.8806  [place] Crisis Hotline:    1-588.333.4359 or 330-672-9066         ED Discharge Follow Up Questions    1. In order to provide you with very good care, we would like to follow up with a phone call in the next few days.  May we have your permission to contact you?     YES /  NO    2. What is the best phone number to call you? (       )_____-__________    3. What is the best time to call you?      Morning  /  Afternoon  /  Evening                   Patient Signature:  ____________________________________________________________    Date:  ____________________________________________________________

## 2017-03-22 ENCOUNTER — PATIENT OUTREACH (OUTPATIENT)
Dept: HEALTH INFORMATION MANAGEMENT | Facility: OTHER | Age: 52
End: 2017-03-22

## 2017-03-22 NOTE — PROGRESS NOTES
03/22/2017 1010 - Discharge Outreach attempt -   03/22/2017 1050 - Patient returned call and call completed.

## 2017-03-23 LAB
BACTERIA WND AEROBE CULT: ABNORMAL
GRAM STN SPEC: ABNORMAL
SIGNIFICANT IND 70042: ABNORMAL
SITE SITE: ABNORMAL
SOURCE SOURCE: ABNORMAL

## 2017-03-24 ENCOUNTER — HOSPITAL ENCOUNTER (EMERGENCY)
Facility: MEDICAL CENTER | Age: 52
End: 2017-03-24
Attending: EMERGENCY MEDICINE
Payer: COMMERCIAL

## 2017-03-24 VITALS
DIASTOLIC BLOOD PRESSURE: 81 MMHG | HEART RATE: 80 BPM | BODY MASS INDEX: 34.56 KG/M2 | SYSTOLIC BLOOD PRESSURE: 133 MMHG | WEIGHT: 228 LBS | TEMPERATURE: 98 F | HEIGHT: 68 IN | OXYGEN SATURATION: 98 % | RESPIRATION RATE: 16 BRPM

## 2017-03-24 DIAGNOSIS — R73.9 HYPERGLYCEMIA: ICD-10-CM

## 2017-03-24 DIAGNOSIS — S31.109D OPEN ABDOMINAL WALL WOUND, SUBSEQUENT ENCOUNTER: ICD-10-CM

## 2017-03-24 LAB
ANION GAP SERPL CALC-SCNC: 11 MMOL/L (ref 0–11.9)
APPEARANCE UR: ABNORMAL
B-OH-BUTYR SERPL-MCNC: 0.11 MMOL/L (ref 0.02–0.27)
BASOPHILS # BLD AUTO: 0.1 % (ref 0–1.8)
BASOPHILS # BLD: 0.01 K/UL (ref 0–0.12)
BILIRUB UR QL STRIP.AUTO: NEGATIVE
BUN SERPL-MCNC: 18 MG/DL (ref 8–22)
CALCIUM SERPL-MCNC: 9.3 MG/DL (ref 8.5–10.5)
CHLORIDE SERPL-SCNC: 99 MMOL/L (ref 96–112)
CO2 SERPL-SCNC: 23 MMOL/L (ref 20–33)
COLOR UR: COLORLESS
CREAT SERPL-MCNC: 0.95 MG/DL (ref 0.5–1.4)
EOSINOPHIL # BLD AUTO: 0.08 K/UL (ref 0–0.51)
EOSINOPHIL NFR BLD: 1.1 % (ref 0–6.9)
ERYTHROCYTE [DISTWIDTH] IN BLOOD BY AUTOMATED COUNT: 39.9 FL (ref 35.9–50)
GFR SERPL CREATININE-BSD FRML MDRD: >60 ML/MIN/1.73 M 2
GLUCOSE SERPL-MCNC: 535 MG/DL (ref 65–99)
GLUCOSE UR STRIP.AUTO-MCNC: >1000 MG/DL
HCT VFR BLD AUTO: 36.3 % (ref 42–52)
HGB BLD-MCNC: 12.8 G/DL (ref 14–18)
IMM GRANULOCYTES # BLD AUTO: 0.04 K/UL (ref 0–0.11)
IMM GRANULOCYTES NFR BLD AUTO: 0.5 % (ref 0–0.9)
KETONES UR STRIP.AUTO-MCNC: NEGATIVE MG/DL
LEUKOCYTE ESTERASE UR QL STRIP.AUTO: NEGATIVE
LYMPHOCYTES # BLD AUTO: 1.67 K/UL (ref 1–4.8)
LYMPHOCYTES NFR BLD: 22 % (ref 22–41)
MCH RBC QN AUTO: 30.8 PG (ref 27–33)
MCHC RBC AUTO-ENTMCNC: 35.3 G/DL (ref 33.7–35.3)
MCV RBC AUTO: 87.3 FL (ref 81.4–97.8)
MICRO URNS: ABNORMAL
MONOCYTES # BLD AUTO: 0.44 K/UL (ref 0–0.85)
MONOCYTES NFR BLD AUTO: 5.8 % (ref 0–13.4)
MUCOUS THREADS #/AREA URNS HPF: ABNORMAL /HPF
NEUTROPHILS # BLD AUTO: 5.36 K/UL (ref 1.82–7.42)
NEUTROPHILS NFR BLD: 70.5 % (ref 44–72)
NITRITE UR QL STRIP.AUTO: NEGATIVE
NRBC # BLD AUTO: 0 K/UL
NRBC BLD AUTO-RTO: 0 /100 WBC
PH UR STRIP.AUTO: 5 [PH]
PLATELET # BLD AUTO: 321 K/UL (ref 164–446)
PMV BLD AUTO: 9.2 FL (ref 9–12.9)
POTASSIUM SERPL-SCNC: 4.2 MMOL/L (ref 3.6–5.5)
PROT UR QL STRIP: NEGATIVE MG/DL
RBC # BLD AUTO: 4.16 M/UL (ref 4.7–6.1)
RBC # URNS HPF: ABNORMAL /HPF
RBC UR QL AUTO: NEGATIVE
SODIUM SERPL-SCNC: 133 MMOL/L (ref 135–145)
SP GR UR STRIP.AUTO: 1.03
WBC # BLD AUTO: 7.6 K/UL (ref 4.8–10.8)
WBC #/AREA URNS HPF: ABNORMAL /HPF

## 2017-03-24 PROCEDURE — 82010 KETONE BODYS QUAN: CPT

## 2017-03-24 PROCEDURE — 81001 URINALYSIS AUTO W/SCOPE: CPT

## 2017-03-24 PROCEDURE — 700102 HCHG RX REV CODE 250 W/ 637 OVERRIDE(OP): Performed by: EMERGENCY MEDICINE

## 2017-03-24 PROCEDURE — 80048 BASIC METABOLIC PNL TOTAL CA: CPT

## 2017-03-24 PROCEDURE — 96374 THER/PROPH/DIAG INJ IV PUSH: CPT

## 2017-03-24 PROCEDURE — 700111 HCHG RX REV CODE 636 W/ 250 OVERRIDE (IP): Performed by: EMERGENCY MEDICINE

## 2017-03-24 PROCEDURE — 82962 GLUCOSE BLOOD TEST: CPT

## 2017-03-24 PROCEDURE — A9270 NON-COVERED ITEM OR SERVICE: HCPCS | Performed by: EMERGENCY MEDICINE

## 2017-03-24 PROCEDURE — 96375 TX/PRO/DX INJ NEW DRUG ADDON: CPT

## 2017-03-24 PROCEDURE — 85025 COMPLETE CBC W/AUTO DIFF WBC: CPT

## 2017-03-24 PROCEDURE — 99284 EMERGENCY DEPT VISIT MOD MDM: CPT

## 2017-03-24 PROCEDURE — 96372 THER/PROPH/DIAG INJ SC/IM: CPT

## 2017-03-24 PROCEDURE — 87040 BLOOD CULTURE FOR BACTERIA: CPT

## 2017-03-24 PROCEDURE — 700105 HCHG RX REV CODE 258: Performed by: EMERGENCY MEDICINE

## 2017-03-24 RX ORDER — ONDANSETRON 2 MG/ML
4 INJECTION INTRAMUSCULAR; INTRAVENOUS ONCE
Status: COMPLETED | OUTPATIENT
Start: 2017-03-24 | End: 2017-03-24

## 2017-03-24 RX ORDER — SODIUM CHLORIDE 9 MG/ML
2000 INJECTION, SOLUTION INTRAVENOUS ONCE
Status: COMPLETED | OUTPATIENT
Start: 2017-03-24 | End: 2017-03-24

## 2017-03-24 RX ORDER — KETOROLAC TROMETHAMINE 30 MG/ML
30 INJECTION, SOLUTION INTRAMUSCULAR; INTRAVENOUS ONCE
Status: COMPLETED | OUTPATIENT
Start: 2017-03-24 | End: 2017-03-24

## 2017-03-24 RX ORDER — SULFAMETHOXAZOLE AND TRIMETHOPRIM 800; 160 MG/1; MG/1
1 TABLET ORAL 2 TIMES DAILY
Qty: 20 TAB | Refills: 0 | Status: SHIPPED | OUTPATIENT
Start: 2017-03-24 | End: 2017-04-03

## 2017-03-24 RX ORDER — SULFAMETHOXAZOLE AND TRIMETHOPRIM 800; 160 MG/1; MG/1
1 TABLET ORAL ONCE
Status: COMPLETED | OUTPATIENT
Start: 2017-03-24 | End: 2017-03-24

## 2017-03-24 RX ADMIN — HYDROMORPHONE HYDROCHLORIDE 1 MG: 1 INJECTION, SOLUTION INTRAMUSCULAR; INTRAVENOUS; SUBCUTANEOUS at 18:13

## 2017-03-24 RX ADMIN — INSULIN HUMAN 8 UNITS: 100 INJECTION, SOLUTION PARENTERAL at 20:29

## 2017-03-24 RX ADMIN — SULFAMETHOXAZOLE AND TRIMETHOPRIM 1 TABLET: 800; 160 TABLET ORAL at 20:10

## 2017-03-24 RX ADMIN — KETOROLAC TROMETHAMINE 30 MG: 30 INJECTION, SOLUTION INTRAMUSCULAR; INTRAVENOUS at 20:30

## 2017-03-24 RX ADMIN — ONDANSETRON 4 MG: 2 INJECTION, SOLUTION INTRAMUSCULAR; INTRAVENOUS at 17:59

## 2017-03-24 RX ADMIN — SODIUM CHLORIDE 2000 ML: 9 INJECTION, SOLUTION INTRAVENOUS at 17:59

## 2017-03-24 ASSESSMENT — PAIN SCALES - GENERAL
PAINLEVEL_OUTOF10: 6
PAINLEVEL_OUTOF10: 6

## 2017-03-24 NOTE — ED AVS SNAPSHOT
Home Care Instructions                                                                                                                Mahesh Bradshaw   MRN: [id number]    Department:  [facility], Emergency Dept   Date of Visit:  3/24/2017            [facility], Emergency Dept    [address] [facility] [address]    Phone:  [phone]      You were seen by     Jose Martin Quiñones M.D.      Your Diagnosis Was     Open abdominal wall wound, subsequent encounter     S31.109D       These are the medications you received during your hospitalization from 03/24/2017 1703 to 03/24/2017 2038     Date/Time Order Dose Route Action    03/24/2017 1759 NS infusion 2,000 mL 2,000 mL Intravenous New Bag    03/24/2017 1759 ondansetron (ZOFRAN) syringe/vial injection 4 mg 4 mg Intravenous Given    03/24/2017 1813 HYDROmorphone (DILAUDID) injection 1 mg 1 mg Intravenous Given    03/24/2017 2010 sulfamethoxazole-trimethoprim (BACTRIM DS) 800-160 MG tablet 1 Tab 1 Tab Oral Given    03/24/2017 2029 insulin regular (HUMULIN R) injection 8 Units 8 Units Subcutaneous Given    03/24/2017 2030 ketorolac (TORADOL) injection 30 mg 30 mg Intravenous Given      Follow-up Information     1. Schedule an appointment as soon as possible for a visit with Esau Dooley M.D..    Specialty:  Surgery    Contact information    [address] [address] [phone]        Medication Information     Review all of your home medications and newly ordered medications with your primary doctor and/or pharmacist as soon as possible. Follow medication instructions as directed by your doctor and/or pharmacist.     Please keep your complete medication list with you and share with your physician. Update the information when medications are discontinued, doses are changed, or new medications (including over-the-counter products) are added; and carry medication information at all times in the event of  emergency situations.               Medication List      ASK your doctor about these medications        Instructions    Morning Afternoon Evening Bedtime    ARMOUR THYROID 60 MG Tabs   Generic drug:  thyroid        Take 75 mg by mouth every evening. Pt uses a 60 mg and 1/2 of a 30 mg   Dose:  75 mg                        aspirin 81 MG Chew chewable tablet   Commonly known as:  ASA        Take 1 Tab by mouth every day.   Dose:  81 mg                        buPROPion 300 MG XL tablet   Commonly known as:  WELLBUTRIN XL        Take 300 mg by mouth every evening.   Dose:  300 mg                        celecoxib 200 MG Caps   Commonly known as:  CELEBREX        Take 1 Cap by mouth 2 times a day as needed for Mild Pain or Moderate Pain.   Dose:  200 mg                        HYDROmorphone 2 MG Tabs   Commonly known as:  DILAUDID        Take 2 mg by mouth 1 time daily as needed for Severe Pain (back pain).   Dose:  2 mg                        Insulin Glargine 300 UNIT/ML Sopn        Inject 46 Units as instructed 2 Times a Day.   Dose:  46 Units                        insulin lispro 100 UNIT/ML Soln   Commonly known as:  HUMALOG        Inject 2-20 Units as instructed 3 times a day before meals. Sliding Scale - 2 units every 50   Dose:  2-20 Units                        LIALDA 1.2 GM Tbec   Generic drug:  mesalamine        Take 2.4 g by mouth every evening.   Dose:  2.4 g                        ondansetron 4 MG Tbdp   Commonly known as:  ZOFRAN ODT        Take 1 Tab by mouth every four hours as needed.   Dose:  4 mg                        oxycodone-acetaminophen 5-325 MG Tabs   Commonly known as:  PERCOCET        Take 1-2 Tabs by mouth every 6 hours as needed for Moderate Pain.   Dose:  1-2 Tab                        rifampin 300 MG Caps   Commonly known as:  RIFADINE        Take 300 mg by mouth 2 times a day.   Dose:  300 mg                        * sulfamethoxazole-trimethoprim 400-80 MG Tabs   What changed:  Another  medication with the same name was added. Make sure you understand how and when to take each.   Commonly known as:  BACTRIM   Ask about: Which instructions should I use?        Take 1 Tab by mouth 2 times a day.   Dose:  1 Tab                        * sulfamethoxazole-trimethoprim 800-160 MG tablet   What changed:  You were already taking a medication with the same name, and this prescription was added. Make sure you understand how and when to take each.   Last time this was given:  1 Tab on 3/24/2017  8:10 PM   Commonly known as:  BACTRIM DS   Ask about: Which instructions should I use?        Take 1 Tab by mouth 2 times a day for 10 days.   Dose:  1 Tab                        testosterone cypionate 200 MG/ML Soln injection   Commonly known as:  DEPO-TESTOSTERONE        200 mg by Intramuscular route every Tuesday.   Dose:  200 mg                        Vitamin D3 5000 UNITS Tabs        Take 5,000 Units by mouth every evening.   Dose:  5000 Units                        * Notice:  This list has 2 medication(s) that are the same as other medications prescribed for you. Read the directions carefully, and ask your doctor or other care provider to review them with you.         Where to Get Your Medications      You can get these medications from any pharmacy     Bring a paper prescription for each of these medications    - sulfamethoxazole-trimethoprim 800-160 MG tablet            Procedures and tests performed during your visit     Procedure/Test Number of Times Performed    BASIC METABOLIC PANEL 1    BETA-HYDROXYBUTYRIC ACID 1    BLOOD CULTURE x2 2    CBC WITH DIFFERENTIAL 1    ESTIMATED GFR 1    If insulin, ordered initiate hypoglycemia protocol, 1    NURSING COMMUNICATION 1    URINALYSIS 1    URINE MICROSCOPIC (W/UA) 1        Discharge Instructions       Hyperglycemia  Hyperglycemia occurs when the glucose (sugar) in your blood is too high. Hyperglycemia can happen for many reasons, but it most often happens to people  "who do not know they have diabetes or are not managing their diabetes properly.   CAUSES   Whether you have diabetes or not, there are other causes of hyperglycemia. Hyperglycemia can occur when you have diabetes, but it can also occur in other situations that you might not be as aware of, such as:  Diabetes  · If you have diabetes and are having problems controlling your blood glucose, hyperglycemia could occur because of some of the following reasons:  · Not following your meal plan.  · Not taking your diabetes medications or not taking it properly.  · Exercising less or doing less activity than you normally do.  · Being sick.  Pre-diabetes  · This cannot be ignored. Before people develop Type 2 diabetes, they almost always have \"pre-diabetes.\" This is when your blood glucose levels are higher than normal, but not yet high enough to be diagnosed as diabetes. Research has shown that some long-term damage to the body, especially the heart and circulatory system, may already be occurring during pre-diabetes. If you take action to manage your blood glucose when you have pre-diabetes, you may delay or prevent Type 2 diabetes from developing.  Stress  · If you have diabetes, you may be \"diet\" controlled or on oral medications or insulin to control your diabetes. However, you may find that your blood glucose is higher than usual in the hospital whether you have diabetes or not. This is often referred to as \"stress hyperglycemia.\" Stress can elevate your blood glucose. This happens because of hormones put out by the body during times of stress. If stress has been the cause of your high blood glucose, it can be followed regularly by your caregiver. That way he/she can make sure your hyperglycemia does not continue to get worse or progress to diabetes.  Steroids  · Steroids are medications that act on the infection fighting system (immune system) to block inflammation or infection. One side effect can be a rise in blood " "glucose. Most people can produce enough extra insulin to allow for this rise, but for those who cannot, steroids make blood glucose levels go even higher. It is not unusual for steroid treatments to \"uncover\" diabetes that is developing. It is not always possible to determine if the hyperglycemia will go away after the steroids are stopped. A special blood test called an A1c is sometimes done to determine if your blood glucose was elevated before the steroids were started.  SYMPTOMS  · Thirsty.  · Frequent urination.  · Dry mouth.  · Blurred vision.  · Tired or fatigue.  · Weakness.  · Sleepy.  · Tingling in feet or leg.  DIAGNOSIS   Diagnosis is made by monitoring blood glucose in one or all of the following ways:  · A1c test. This is a chemical found in your blood.  · Fingerstick blood glucose monitoring.  · Laboratory results.  TREATMENT   First, knowing the cause of the hyperglycemia is important before the hyperglycemia can be treated. Treatment may include, but is not be limited to:  · Education.  · Change or adjustment in medications.  · Change or adjustment in meal plan.  · Treatment for an illness, infection, etc.  · More frequent blood glucose monitoring.  · Change in exercise plan.  · Decreasing or stopping steroids.  · Lifestyle changes.  HOME CARE INSTRUCTIONS   · Test your blood glucose as directed.  · Exercise regularly. Your caregiver will give you instructions about exercise. Pre-diabetes or diabetes which comes on with stress is helped by exercising.  · Eat wholesome, balanced meals. Eat often and at regular, fixed times. Your caregiver or nutritionist will give you a meal plan to guide your sugar intake.  · Being at an ideal weight is important. If needed, losing as little as 10 to 15 pounds may help improve blood glucose levels.  SEEK MEDICAL CARE IF:   · You have questions about medicine, activity, or diet.  · You continue to have symptoms (problems such as increased thirst, urination, or weight " gain).  SEEK IMMEDIATE MEDICAL CARE IF:   · You are vomiting or have diarrhea.  · Your breath smells fruity.  · You are breathing faster or slower.  · You are very sleepy or incoherent.  · You have numbness, tingling, or pain in your feet or hands.  · You have chest pain.  · Your symptoms get worse even though you have been following your caregiver's orders.  · If you have any other questions or concerns.     This information is not intended to replace advice given to you by your health care provider. Make sure you discuss any questions you have with your health care provider.     Document Released: 06/13/2002 Document Revised: 03/11/2013 Document Reviewed: 04/15/2013  [employer] Interactive Patient Education ©2016 [employer]  Wound Infection  A wound infection happens when a type of germ (bacteria) starts growing in the wound. In some cases, this can cause the wound to break open. If cared for properly, the infected wound will heal from the inside to the outside. Wound infections need treatment.  CAUSES  An infection is caused by bacteria growing in the wound.   SYMPTOMS   · Increase in redness, swelling, or pain at the wound site.  · Increase in drainage at the wound site.  · Wound or bandage (dressing) starts to smell bad.  · Fever.  · Feeling tired or fatigued.  · Pus draining from the wound.  TREATMENT   Your health care provider will prescribe antibiotic medicine. The wound infection should improve within 24 to 48 hours. Any redness around the wound should stop spreading and the wound should be less painful.   HOME CARE INSTRUCTIONS   · Only take over-the-counter or prescription medicines for pain, discomfort, or fever as directed by your health care provider.  · Take your antibiotics as directed. Finish them even if you start to feel better.  · Gently wash the area with mild soap and water 2 times a day, or as directed. Rinse off the soap. Pat the area dry with a clean towel. Do not rub the wound. This may  cause bleeding.  · Follow your health care provider's instructions for how often you need to change the dressing.  · Apply ointment and a dressing to the wound as directed.  · If the dressing sticks, moisten it with soapy water and gently remove it.  · Change the bandage right away if it becomes wet, dirty, or develops a bad smell.  · Take showers. Do not take tub baths, swim, or do anything that may soak the wound until it is healed.  · Avoid exercises that make you sweat heavily.  · Use anti-itch medicine as directed by your health care provider. The wound may itch when it is healing. Do not pick or scratch at the wound.  · Follow up with your health care provider to get your wound rechecked as directed.  SEEK MEDICAL CARE IF:  · You have an increase in swelling, pain, or redness around the wound.  · You have an increase in the amount of pus coming from the wound.  · There is a bad smell coming from the wound.  · More of the wound breaks open.  · You have a fever.  MAKE SURE YOU:   · Understand these instructions.  · Will watch your condition.  · Will get help right away if you are not doing well or get worse.     This information is not intended to replace advice given to you by your health care provider. Make sure you discuss any questions you have with your health care provider.     Document Released: 09/15/2004 Document Revised: 12/23/2014 Document Reviewed: 04/22/2012  [employer] Interactive Patient Education ©2016 [employer]            Patient Information     Patient Information    Following emergency treatment: all patient requiring follow-up care must return either to a private physician or a clinic if your condition worsens before you are able to obtain further medical attention, please return to the emergency room.     Billing Information    At [facility], we work to make the billing process streamlined for our patients.  Our Representatives are here to answer any questions you may have regarding  your hospital bill.  If you have insurance coverage and have supplied your insurance information to us, we will submit a claim to your insurer on your behalf.  Should you have any questions regarding your bill, we can be reached online or by phone as follows:  Online: You are able pay your bills online or live chat with our representatives about any billing questions you may have. We are here to help Monday - Friday from 8:00am to 7:30pm and 9:00am - 12:00pm on Saturdays.  Please visit [email or web address]  for more information.   Phone:  [phone] or [phone]    Please note that your emergency physician, surgeon, pathologist, radiologist, anesthesiologist, and other specialists are not employed by [facility] and will therefore bill separately for their services.  Please contact them directly for any questions concerning their bills at the numbers below:     Emergency Physician Services:  [phone]  [facility]:  [phone]  [facility]:  [phone]  [facility]:  [phone]    1. Your final bill may vary from the amount quoted upon discharge if all procedures are not complete at that time, or if your doctor has additional procedures of which we are not aware. You will receive an additional bill if you return to the Emergency Department at [facility] for suture removal regardless of the facility of which the sutures were placed.     2. Please arrange for settlement of this account at the emergency registration.    3. All self-pay accounts are due in full at the time of treatment.  If you are unable to meet this obligation then payment is expected within 4-5 days.     4. If you have had radiology studies (CT, X-ray, Ultrasound, MRI), you have received a preliminary result during your emergency department visit. Please contact the radiology department [phone] to receive a copy of your final result. Please discuss  the Final result with your primary physician or with the follow up physician provided.     Crisis Hotline:  [place] Crisis Hotline:  5-880-LXPUVVM or 1-293.435.3461  [place] Crisis Hotline:    1-933.463.2921 or 560-660-2127         ED Discharge Follow Up Questions    1. In order to provide you with very good care, we would like to follow up with a phone call in the next few days.  May we have your permission to contact you?     YES /  NO    2. What is the best phone number to call you? (       )_____-__________    3. What is the best time to call you?      Morning  /  Afternoon  /  Evening                   Patient Signature:  ____________________________________________________________    Date:  ____________________________________________________________

## 2017-03-24 NOTE — ED AVS SNAPSHOT
3/24/2017          Mahesh Bradshaw  [address]  [place] [address]    Dear Mahesh:    [facility] wants to ensure your discharge home is safe and you or your loved ones have had all your questions answered regarding your care after you leave the hospital.    You may receive a telephone call within two days of your discharge.  This call is to make certain you understand your discharge instructions as well as ensure we provided you with the best care possible during your stay with us.     The call will only last approximately 3-5 minutes and will be done by a nurse.    Once again, we want to ensure your discharge home is safe and that you have a clear understanding of any next steps in your care.  If you have any questions or concerns, please do not hesitate to contact us, we are here for you.  Thank you for choosing [facility] for your healthcare needs.    Sincerely,    Kuldip Dias    [facility]

## 2017-03-24 NOTE — ED AVS SNAPSHOT
[employer] Access Code: [email or web address]  Expires: 3/29/2017 12:30 PM    [employer]  A secure, online tool to manage your health information     [employer]’s [employer]® is a secure, online tool that connects you to your personalized health information from the privacy of your home -- day or night - making it very easy for you to manage your healthcare. Once the activation process is completed, you can even access your medical information using the [employer] princess, which is available for free in the Apple Princess store or Google Play store.     [employer] provides the following levels of access (as shown below):   My Chart Features   [facility] Primary Care Doctor [facility]  Specialists [facility]  Urgent  Care Non-[facility]  Primary Care  Doctor   Email your healthcare team securely and privately 24/7 X X X X   Manage appointments: schedule your next appointment; view details of past/upcoming appointments X      Request prescription refills. X      View recent personal medical records, including lab and immunizations X X X X   View health record, including health history, allergies, medications X X X X   Read reports about your outpatient visits, procedures, consult and ER notes X X X X   See your discharge summary, which is a recap of your hospital and/or ER visit that includes your diagnosis, lab results, and care plan. X X       How to register for [employer]:  1. Go to  [email or web address].  2. Click on the Sign Up Now box, which takes you to the New Member Sign Up page. You will need to provide the following information:  a. Enter your [employer] Access Code exactly as it appears at the top of this page. (You will not need to use this code after you’ve completed the sign-up process. If you do not sign up before the expiration date, you must request a new code.)   b. Enter your date of birth.   c. Enter your home email address.   d. Click Submit, and follow the next screen’s instructions.  3. Create a [employer] ID. This will be your [employer]  login ID and cannot be changed, so think of one that is secure and easy to remember.  4. Create a [employer] password. You can change your password at any time.  5. Enter your Password Reset Question and Answer. This can be used at a later time if you forget your password.   6. Enter your e-mail address. This allows you to receive e-mail notifications when new information is available in [employer].  7. Click Sign Up. You can now view your health information.    For assistance activating your [employer] account, call [phone]

## 2017-03-24 NOTE — ED NOTES
ED Positive Culture Follow-up/Notification Note:    Date: 3/24/17     Patient seen in the ED on 3/21/2017 for abdominal pain and redness around surgical site with small amount of serosanguineous liquid. S/p hernia revision 10 days prior with Dr. Dooley after having a bad reaction to the mesh.   1. Postoperative abdominal pain       Discharge Medication List as of 3/21/2017  2:40 PM          Allergies: Peanut-derived; Tradjenta; and Hydrocodone     Final cultures:   Results     Procedure Component Value Units Date/Time    CULTURE WOUND W/ GRAM STAIN [[id number]]  (Abnormal)  (Susceptibility) Collected:  03/21/17 1410    Order Status:  Completed Specimen Information:  Wound from Abdominal Updated:  03/23/17 0809     Gram Stain Result --      Result:        Moderate WBCs.  Many Gram positive cocci.       Significant Indicator POS (POS)      Source WND      Site ABDOMINAL      Culture Result Wound -- (A)      Culture Result Wound -- (A)      Result:        Staphylococcus aureus  Heavy growth       Culture Result Wound -- (A)      Result:        Klebsiella pneumoniae  Light growth       Culture Result Wound -- (A)      Result:        Viridans Streptococcus  Light growth      Culture & Susceptibility     KLEBSIELLA PNEUMONIAE     Antibiotic Sensitivity Microscan Unit Status    Cefepime Sensitive <=8 mcg/mL Final    Cefotaxime Sensitive <=2 mcg/mL Final    Cefotetan Sensitive <=16 mcg/mL Final    Ceftazidime Sensitive <=1 mcg/mL Final    Ceftriaxone Sensitive <=8 mcg/mL Final    Cefuroxime Sensitive <=4 mcg/mL Final    Ciprofloxacin Sensitive <=1 mcg/mL Final    Ertapenem Sensitive <=1 mcg/mL Final    Gentamicin Sensitive <=4 mcg/mL Final    Pip/Tazobactam Sensitive <=16 mcg/mL Final    Tigecycline Sensitive <=2 mcg/mL Final    Tobramycin Sensitive <=4 mcg/mL Final    Trimeth/Sulfa Sensitive <=2/38 mcg/mL Final              STAPHYLOCOCCUS AUREUS     Antibiotic Sensitivity Microscan Unit Status    Ampicillin/sulbactam  Sensitive <=8/4 mcg/mL Final    Clindamycin Sensitive <=0.5 mcg/mL Final    Daptomycin Sensitive <=0.5 mcg/mL Final    Erythromycin Sensitive <=0.5 mcg/mL Final    Moxifloxacin Sensitive <=0.5 mcg/mL Final    Oxacillin Sensitive <=0.25 mcg/mL Final    Penicillin Resistant >8 mcg/mL Final    Tetracycline Sensitive <=4 mcg/mL Final    Trimeth/Sulfa Sensitive <=0.5/9.5 mcg/mL Final    Vancomycin Sensitive 1 mcg/mL Final                       GRAM STAIN [[id number]] Collected:  03/21/17 1410    Order Status:  Completed Specimen Information:  Wound Updated:  03/21/17 2135     Significant Indicator .      Source WND      Site ABDOMINAL      Gram Stain Result --      Result:        Moderate WBCs.  Many Gram positive cocci.            Plan:   Discussed culture results with Dr. Harper and the patient's wife. The patient was seen by Dr. Dooley 3/23/17.  Cultures were not discussed and patient is not currently taking antibiotics. He is still not feeling well, having aches and pains and ongoing abdominal pain.  His blood glucose has also been elevated and is currently ~400.   Dr. Harper recommended patient return to the ER for further evaluation/treatment. Patient's wife contacted Mahesh and he will be returning to the ER.      Oliva Portillo

## 2017-03-25 ENCOUNTER — PATIENT OUTREACH (OUTPATIENT)
Dept: HEALTH INFORMATION MANAGEMENT | Facility: OTHER | Age: 52
End: 2017-03-25

## 2017-03-25 LAB — GLUCOSE BLD-MCNC: 325 MG/DL (ref 65–99)

## 2017-03-25 NOTE — DISCHARGE INSTRUCTIONS
"Hyperglycemia  Hyperglycemia occurs when the glucose (sugar) in your blood is too high. Hyperglycemia can happen for many reasons, but it most often happens to people who do not know they have diabetes or are not managing their diabetes properly.   CAUSES   Whether you have diabetes or not, there are other causes of hyperglycemia. Hyperglycemia can occur when you have diabetes, but it can also occur in other situations that you might not be as aware of, such as:  Diabetes  · If you have diabetes and are having problems controlling your blood glucose, hyperglycemia could occur because of some of the following reasons:  · Not following your meal plan.  · Not taking your diabetes medications or not taking it properly.  · Exercising less or doing less activity than you normally do.  · Being sick.  Pre-diabetes  · This cannot be ignored. Before people develop Type 2 diabetes, they almost always have \"pre-diabetes.\" This is when your blood glucose levels are higher than normal, but not yet high enough to be diagnosed as diabetes. Research has shown that some long-term damage to the body, especially the heart and circulatory system, may already be occurring during pre-diabetes. If you take action to manage your blood glucose when you have pre-diabetes, you may delay or prevent Type 2 diabetes from developing.  Stress  · If you have diabetes, you may be \"diet\" controlled or on oral medications or insulin to control your diabetes. However, you may find that your blood glucose is higher than usual in the hospital whether you have diabetes or not. This is often referred to as \"stress hyperglycemia.\" Stress can elevate your blood glucose. This happens because of hormones put out by the body during times of stress. If stress has been the cause of your high blood glucose, it can be followed regularly by your caregiver. That way he/she can make sure your hyperglycemia does not continue to get worse or progress to " "diabetes.  Steroids  · Steroids are medications that act on the infection fighting system (immune system) to block inflammation or infection. One side effect can be a rise in blood glucose. Most people can produce enough extra insulin to allow for this rise, but for those who cannot, steroids make blood glucose levels go even higher. It is not unusual for steroid treatments to \"uncover\" diabetes that is developing. It is not always possible to determine if the hyperglycemia will go away after the steroids are stopped. A special blood test called an A1c is sometimes done to determine if your blood glucose was elevated before the steroids were started.  SYMPTOMS  · Thirsty.  · Frequent urination.  · Dry mouth.  · Blurred vision.  · Tired or fatigue.  · Weakness.  · Sleepy.  · Tingling in feet or leg.  DIAGNOSIS   Diagnosis is made by monitoring blood glucose in one or all of the following ways:  · A1c test. This is a chemical found in your blood.  · Fingerstick blood glucose monitoring.  · Laboratory results.  TREATMENT   First, knowing the cause of the hyperglycemia is important before the hyperglycemia can be treated. Treatment may include, but is not be limited to:  · Education.  · Change or adjustment in medications.  · Change or adjustment in meal plan.  · Treatment for an illness, infection, etc.  · More frequent blood glucose monitoring.  · Change in exercise plan.  · Decreasing or stopping steroids.  · Lifestyle changes.  HOME CARE INSTRUCTIONS   · Test your blood glucose as directed.  · Exercise regularly. Your caregiver will give you instructions about exercise. Pre-diabetes or diabetes which comes on with stress is helped by exercising.  · Eat wholesome, balanced meals. Eat often and at regular, fixed times. Your caregiver or nutritionist will give you a meal plan to guide your sugar intake.  · Being at an ideal weight is important. If needed, losing as little as 10 to 15 pounds may help improve blood " glucose levels.  SEEK MEDICAL CARE IF:   · You have questions about medicine, activity, or diet.  · You continue to have symptoms (problems such as increased thirst, urination, or weight gain).  SEEK IMMEDIATE MEDICAL CARE IF:   · You are vomiting or have diarrhea.  · Your breath smells fruity.  · You are breathing faster or slower.  · You are very sleepy or incoherent.  · You have numbness, tingling, or pain in your feet or hands.  · You have chest pain.  · Your symptoms get worse even though you have been following your caregiver's orders.  · If you have any other questions or concerns.     This information is not intended to replace advice given to you by your health care provider. Make sure you discuss any questions you have with your health care provider.     Document Released: 06/13/2002 Document Revised: 03/11/2013 Document Reviewed: 04/15/2013  [employer] Patient Education ©2016 [employer]  Wound Infection  A wound infection happens when a type of germ (bacteria) starts growing in the wound. In some cases, this can cause the wound to break open. If cared for properly, the infected wound will heal from the inside to the outside. Wound infections need treatment.  CAUSES  An infection is caused by bacteria growing in the wound.   SYMPTOMS   · Increase in redness, swelling, or pain at the wound site.  · Increase in drainage at the wound site.  · Wound or bandage (dressing) starts to smell bad.  · Fever.  · Feeling tired or fatigued.  · Pus draining from the wound.  TREATMENT   Your health care provider will prescribe antibiotic medicine. The wound infection should improve within 24 to 48 hours. Any redness around the wound should stop spreading and the wound should be less painful.   HOME CARE INSTRUCTIONS   · Only take over-the-counter or prescription medicines for pain, discomfort, or fever as directed by your health care provider.  · Take your antibiotics as directed. Finish them even if you  start to feel better.  · Gently wash the area with mild soap and water 2 times a day, or as directed. Rinse off the soap. Pat the area dry with a clean towel. Do not rub the wound. This may cause bleeding.  · Follow your health care provider's instructions for how often you need to change the dressing.  · Apply ointment and a dressing to the wound as directed.  · If the dressing sticks, moisten it with soapy water and gently remove it.  · Change the bandage right away if it becomes wet, dirty, or develops a bad smell.  · Take showers. Do not take tub baths, swim, or do anything that may soak the wound until it is healed.  · Avoid exercises that make you sweat heavily.  · Use anti-itch medicine as directed by your health care provider. The wound may itch when it is healing. Do not pick or scratch at the wound.  · Follow up with your health care provider to get your wound rechecked as directed.  SEEK MEDICAL CARE IF:  · You have an increase in swelling, pain, or redness around the wound.  · You have an increase in the amount of pus coming from the wound.  · There is a bad smell coming from the wound.  · More of the wound breaks open.  · You have a fever.  MAKE SURE YOU:   · Understand these instructions.  · Will watch your condition.  · Will get help right away if you are not doing well or get worse.     This information is not intended to replace advice given to you by your health care provider. Make sure you discuss any questions you have with your health care provider.     Document Released: 09/15/2004 Document Revised: 12/23/2014 Document Reviewed: 04/22/2012  [employer] Patient Education ©2016 [employer]

## 2017-03-25 NOTE — ED NOTES
Pt given discharge and follow up instructions and prescriptions, all questions answered, instructed on proper antibiotic use, pt verbalized understanding. Pt discharged with self. Copy of discharge provided to pt. Signed copy in chart.  Pt states that all personal belongings are in possession. Pt ambulatory off unit

## 2017-03-25 NOTE — ED NOTES
Pt ambulates back to room  Chief Complaint   Patient presents with   • Positive Culture   pt states + wound culture  Recent hernia repair  Pt seen here earlier this week

## 2017-03-25 NOTE — ED NOTES
Gil from Lab called with critical result of glucose at 1828. Critical lab result read back to Gil.   Dr. Quiñones notified of critical lab result at 1832.  Critical lab result read back by Dr. Quiñones.

## 2017-03-25 NOTE — ED PROVIDER NOTES
"ED Provider Note    CHIEF COMPLAINT  Chief Complaint   Patient presents with   • Positive Culture       HPI  Mahesh Bradshaw is a 51 y.o. male who presents to ED per recommendation of Dr. Plummer for positive wound culture. Prior mesh placement for hernia complicated by infection and subsequently removed ~2 weeks ago per Dr. Dooley. Initially on Bactrim/Rifampin, however, stopped. Recent wound culture obtained and +Klebsiella. No fevers/chills. No nausea/vomiting. Continued drainage from umbilical wound. Reports feeling like \"I have the flu\" with generalized body aches. No cp/sob. Also reports trouble controlling blood sugars despite insulin use at home - blood sugars >400.     REVIEW OF SYSTEMS  See HPI for further details. All other systems are negative.     PAST MEDICAL HISTORY   has a past medical history of Migraine; Gout; Indigestion; UC (ulcerative colitis) (CMS-HCC) (3-3-17); Difficult intubation (2-2016); Arthritis (3-3-17); Sepsis (CMS-HCC) (1/21/2016); Essential hypertension (1/22/2016); Snoring (3-3-17); High cholesterol (3-3-17); Congestive heart failure (CMS-HCC) (2-2016 ); ASTHMA (3-3-17); Aspiration pneumonia (CMS-HCC) (3-2016); Breath shortness (3-3-17); Hypothyroid (3-3-17); Pain (3-3-17); Heart burn; Depression (11/26/2014); Anesthesia; Pancreatitis (2-2016); Elevated liver enzymes (2-2016); Electrolyte imbalance (2-2016); Kidney stones; ADRIANE (acute kidney injury) (CMS-HCC) (2/24/2016); RF (renal failure) (2-2016); Diabetes (CMS-HCC) (3-3-17); DKA (diabetic ketoacidoses) (CMS-HCC) (2-2016); and On mechanically assisted ventilation (CMS-HCC) (2-2016).    SOCIAL HISTORY  Social History     Social History Main Topics   • Smoking status: Never Smoker    • Smokeless tobacco: Not on file   • Alcohol Use: No      Comment: occ   • Drug Use: No   • Sexual Activity: Not on file       SURGICAL HISTORY   has past surgical history that includes vaishnavi by laparoscopy (2005); colonoscopy with biopsy " "(11/26/2014); umbilical hernia repair (N/A, 11/6/2016); other orthopedic surgery (1997 or 1998); and umbilical hernia repair (3/10/2017).    CURRENT MEDICATIONS  Home Medications     **Home medications have not yet been reviewed for this encounter**          ALLERGIES  Allergies   Allergen Reactions   • Peanut-Derived Anaphylaxis     Peanuts   RXN=age 36   • Tradjenta [Linagliptin] Unspecified     Pt developed pancreatitis   • Hydrocodone Hives     Tolerates Morphine and oxycodone  Rxn Age - 29       PHYSICAL EXAM  VITAL SIGNS: /83 mmHg  Pulse 95  Temp(Src) 36.9 °C (98.4 °F)  Resp 18  Ht 1.727 m (5' 8\")  Wt 103.42 kg (228 lb)  BMI 34.68 kg/m2  SpO2 97% @CHEMA[952912::@   Pulse ox interpretation: I interpret this pulse ox as normal.  Constitutional: Alert in no apparent distress.  HENT: No signs of trauma, Bilateral external ears normal, Nose normal.   Eyes: Pupils are equal and reactive, Conjunctiva normal, Non-icteric.   Neck: Normal range of motion, No tenderness, Supple, No stridor. No JVD.  Lymphatic: No lymphadenopathy noted.   Cardiovascular: Regular rate and rhythm, no murmurs.   Thorax & Lungs: Normal breath sounds, No respiratory distress, No wheezing, No chest tenderness.   Abdomen: Bowel sounds normal, Soft, Mild tenderness around umbilicus, No masses, No pulsatile masses. No peritoneal signs. Open umbilical wound with purulent drainage. No surrounding erythema.   Skin: Warm, Dry, No erythema, No rash.   Back: No bony tenderness, No CVA tenderness.   Extremities: Intact distal pulses, No edema, No tenderness,   Musculoskeletal: Good range of motion in all major joints. No tenderness to palpation or major deformities noted.   Neurologic: Alert , Normal motor function, Normal sensory function, No focal deficits noted.   Psychiatric: Affect normal, Judgment normal, Mood normal.       DIAGNOSTIC STUDIES / PROCEDURES    LABS  Results for orders placed or performed during the hospital encounter of " 03/24/17   CBC WITH DIFFERENTIAL   Result Value Ref Range    WBC 7.6 4.8 - 10.8 K/uL    RBC 4.16 (L) 4.70 - 6.10 M/uL    Hemoglobin 12.8 (L) 14.0 - 18.0 g/dL    Hematocrit 36.3 (L) 42.0 - 52.0 %    MCV 87.3 81.4 - 97.8 fL    MCH 30.8 27.0 - 33.0 pg    MCHC 35.3 33.7 - 35.3 g/dL    RDW 39.9 35.9 - 50.0 fL    Platelet Count 321 164 - 446 K/uL    MPV 9.2 9.0 - 12.9 fL    Neutrophils-Polys 70.50 44.00 - 72.00 %    Lymphocytes 22.00 22.00 - 41.00 %    Monocytes 5.80 0.00 - 13.40 %    Eosinophils 1.10 0.00 - 6.90 %    Basophils 0.10 0.00 - 1.80 %    Immature Granulocytes 0.50 0.00 - 0.90 %    Nucleated RBC 0.00 /100 WBC    Neutrophils (Absolute) 5.36 1.82 - 7.42 K/uL    Lymphs (Absolute) 1.67 1.00 - 4.80 K/uL    Monos (Absolute) 0.44 0.00 - 0.85 K/uL    Eos (Absolute) 0.08 0.00 - 0.51 K/uL    Baso (Absolute) 0.01 0.00 - 0.12 K/uL    Immature Granulocytes (abs) 0.04 0.00 - 0.11 K/uL    NRBC (Absolute) 0.00 K/uL   BASIC METABOLIC PANEL   Result Value Ref Range    Sodium 133 (L) 135 - 145 mmol/L    Potassium 4.2 3.6 - 5.5 mmol/L    Chloride 99 96 - 112 mmol/L    Co2 23 20 - 33 mmol/L    Glucose 535 (HH) 65 - 99 mg/dL    Bun 18 8 - 22 mg/dL    Creatinine 0.95 0.50 - 1.40 mg/dL    Calcium 9.3 8.5 - 10.5 mg/dL    Anion Gap 11.0 0.0 - 11.9   URINALYSIS   Result Value Ref Range    Micro Urine Req Microscopic     Color Colorless     Character Sl Cloudy (A)     Specific Gravity 1.027 <1.035    Ph 5.0 5.0-8.0    Glucose >1000 (A) Negative mg/dL    Ketones Negative Negative mg/dL    Protein Negative Negative mg/dL    Bilirubin Negative Negative    Nitrite Negative Negative    Leukocyte Esterase Negative Negative    Occult Blood Negative Negative   BETA-HYDROXYBUTYRIC ACID   Result Value Ref Range    beta-Hydroxybutyric Acid 0.11 0.02 - 0.27 mmol/L   ESTIMATED GFR   Result Value Ref Range    GFR If African American >60 >60 mL/min/1.73 m 2    GFR If Non African American >60 >60 mL/min/1.73 m 2   URINE MICROSCOPIC (W/UA)   Result Value  Ref Range    WBC 0-2 (A) /hpf    RBC 0-2 (A) /hpf    Mucous Threads Few /hpf     COURSE & MEDICAL DECISION MAKING  Pertinent Labs & Imaging studies reviewed. (See chart for details)  Afebrile. Non toxic in appearance. Non surgical abdomen. Chronic wound. Culture reviewed and sensitive to Bactrim. No leukocytosis. Discussed results/plan with Dr. Dooley and agrees OK with outpatient follow up with Bactrim. Hyperglycemia w/o DKA. Improved after NS and subQ dose provided. Stress need for very close monitoring given active infection. Patient/wife well aware. Left ED well appearing.     7:12 PM  Discussed with Dr. Dooley - agrees with PO Bactrim, close blood sugar control, and close outpatient follow up.     The patient will not drink alcohol nor drive with prescribed medications. The patient will return for worsening symptoms and is stable at the time of discharge. The patient verbalizes understanding and will comply.    FINAL IMPRESSION  1. Open abdominal wall wound, subsequent encounter      2. Hyperglycemia           Electronically signed by: Jose Martin Quiñones, 3/24/2017 5:21 PM

## 2017-04-06 ENCOUNTER — APPOINTMENT (OUTPATIENT)
Dept: RADIOLOGY | Facility: MEDICAL CENTER | Age: 52
DRG: 856 | End: 2017-04-06
Attending: SURGERY
Payer: COMMERCIAL

## 2017-04-06 ENCOUNTER — HOSPITAL ENCOUNTER (INPATIENT)
Facility: MEDICAL CENTER | Age: 52
LOS: 4 days | DRG: 856 | End: 2017-04-10
Attending: EMERGENCY MEDICINE | Admitting: HOSPITALIST
Payer: COMMERCIAL

## 2017-04-06 ENCOUNTER — APPOINTMENT (OUTPATIENT)
Dept: RADIOLOGY | Facility: MEDICAL CENTER | Age: 52
DRG: 856 | End: 2017-04-06
Attending: EMERGENCY MEDICINE
Payer: COMMERCIAL

## 2017-04-06 ENCOUNTER — RESOLUTE PROFESSIONAL BILLING HOSPITAL PROF FEE (OUTPATIENT)
Dept: HOSPITALIST | Facility: MEDICAL CENTER | Age: 52
End: 2017-04-06
Payer: COMMERCIAL

## 2017-04-06 DIAGNOSIS — E87.20 LACTIC ACID ACIDOSIS: ICD-10-CM

## 2017-04-06 DIAGNOSIS — E86.0 DEHYDRATION: ICD-10-CM

## 2017-04-06 DIAGNOSIS — R73.9 HYPERGLYCEMIA: ICD-10-CM

## 2017-04-06 DIAGNOSIS — K51.919 ULCERATIVE COLITIS WITH COMPLICATION, UNSPECIFIED LOCATION (HCC): ICD-10-CM

## 2017-04-06 DIAGNOSIS — K42.0 INCARCERATED UMBILICAL HERNIA: ICD-10-CM

## 2017-04-06 PROBLEM — E11.10 DKA (DIABETIC KETOACIDOSES): Status: ACTIVE | Noted: 2017-04-06

## 2017-04-06 LAB
ALBUMIN SERPL BCP-MCNC: 4.1 G/DL (ref 3.2–4.9)
ALBUMIN/GLOB SERPL: 1.4 G/DL
ALP SERPL-CCNC: 81 U/L (ref 30–99)
ALT SERPL-CCNC: 19 U/L (ref 2–50)
ANION GAP SERPL CALC-SCNC: 14 MMOL/L (ref 0–11.9)
ANION GAP SERPL CALC-SCNC: 8 MMOL/L (ref 0–11.9)
APPEARANCE UR: CLEAR
AST SERPL-CCNC: 15 U/L (ref 12–45)
BASOPHILS # BLD AUTO: 0.5 % (ref 0–1.8)
BASOPHILS # BLD: 0.03 K/UL (ref 0–0.12)
BILIRUB SERPL-MCNC: 0.4 MG/DL (ref 0.1–1.5)
BILIRUB UR QL STRIP.AUTO: NEGATIVE
BUN SERPL-MCNC: 10 MG/DL (ref 8–22)
BUN SERPL-MCNC: 13 MG/DL (ref 8–22)
CALCIUM SERPL-MCNC: 7.4 MG/DL (ref 8.5–10.5)
CALCIUM SERPL-MCNC: 9.3 MG/DL (ref 8.5–10.5)
CHLORIDE SERPL-SCNC: 110 MMOL/L (ref 96–112)
CHLORIDE SERPL-SCNC: 98 MMOL/L (ref 96–112)
CO2 SERPL-SCNC: 19 MMOL/L (ref 20–33)
CO2 SERPL-SCNC: 19 MMOL/L (ref 20–33)
COLOR UR: COLORLESS
CREAT SERPL-MCNC: 0.61 MG/DL (ref 0.5–1.4)
CREAT SERPL-MCNC: 1.04 MG/DL (ref 0.5–1.4)
CULTURE IF INDICATED INDCX: NO UA CULTURE
EOSINOPHIL # BLD AUTO: 0.07 K/UL (ref 0–0.51)
EOSINOPHIL NFR BLD: 1.2 % (ref 0–6.9)
ERYTHROCYTE [DISTWIDTH] IN BLOOD BY AUTOMATED COUNT: 40.5 FL (ref 35.9–50)
GFR SERPL CREATININE-BSD FRML MDRD: >60 ML/MIN/1.73 M 2
GFR SERPL CREATININE-BSD FRML MDRD: >60 ML/MIN/1.73 M 2
GLOBULIN SER CALC-MCNC: 2.9 G/DL (ref 1.9–3.5)
GLUCOSE BLD-MCNC: 159 MG/DL (ref 65–99)
GLUCOSE BLD-MCNC: 228 MG/DL (ref 65–99)
GLUCOSE SERPL-MCNC: 275 MG/DL (ref 65–99)
GLUCOSE SERPL-MCNC: 576 MG/DL (ref 65–99)
GLUCOSE UR STRIP.AUTO-MCNC: >1000 MG/DL
HCT VFR BLD AUTO: 38.2 % (ref 42–52)
HGB BLD-MCNC: 13.2 G/DL (ref 14–18)
IMM GRANULOCYTES # BLD AUTO: 0.03 K/UL (ref 0–0.11)
IMM GRANULOCYTES NFR BLD AUTO: 0.5 % (ref 0–0.9)
KETONES UR STRIP.AUTO-MCNC: NEGATIVE MG/DL
LACTATE BLD-SCNC: 1.7 MMOL/L (ref 0.5–2)
LACTATE BLD-SCNC: 6 MMOL/L (ref 0.5–2)
LEUKOCYTE ESTERASE UR QL STRIP.AUTO: NEGATIVE
LIPASE SERPL-CCNC: 9 U/L (ref 11–82)
LYMPHOCYTES # BLD AUTO: 1.24 K/UL (ref 1–4.8)
LYMPHOCYTES NFR BLD: 21.6 % (ref 22–41)
MCH RBC QN AUTO: 30.5 PG (ref 27–33)
MCHC RBC AUTO-ENTMCNC: 34.6 G/DL (ref 33.7–35.3)
MCV RBC AUTO: 88.2 FL (ref 81.4–97.8)
MICRO URNS: ABNORMAL
MONOCYTES # BLD AUTO: 0.3 K/UL (ref 0–0.85)
MONOCYTES NFR BLD AUTO: 5.2 % (ref 0–13.4)
NEUTROPHILS # BLD AUTO: 4.06 K/UL (ref 1.82–7.42)
NEUTROPHILS NFR BLD: 71 % (ref 44–72)
NITRITE UR QL STRIP.AUTO: NEGATIVE
NRBC # BLD AUTO: 0 K/UL
NRBC BLD AUTO-RTO: 0 /100 WBC
PH UR STRIP.AUTO: 5 [PH]
PLATELET # BLD AUTO: 252 K/UL (ref 164–446)
PMV BLD AUTO: 9.5 FL (ref 9–12.9)
POTASSIUM SERPL-SCNC: 3.4 MMOL/L (ref 3.6–5.5)
POTASSIUM SERPL-SCNC: 4.8 MMOL/L (ref 3.6–5.5)
PROT SERPL-MCNC: 7 G/DL (ref 6–8.2)
PROT UR QL STRIP: NEGATIVE MG/DL
RBC # BLD AUTO: 4.33 M/UL (ref 4.7–6.1)
RBC UR QL AUTO: NEGATIVE
SODIUM SERPL-SCNC: 131 MMOL/L (ref 135–145)
SODIUM SERPL-SCNC: 137 MMOL/L (ref 135–145)
SP GR UR STRIP.AUTO: 1.02
TSH SERPL DL<=0.005 MIU/L-ACNC: 1.79 UIU/ML (ref 0.3–3.7)
WBC # BLD AUTO: 5.7 K/UL (ref 4.8–10.8)

## 2017-04-06 PROCEDURE — 74177 CT ABD & PELVIS W/CONTRAST: CPT

## 2017-04-06 PROCEDURE — 81003 URINALYSIS AUTO W/O SCOPE: CPT

## 2017-04-06 PROCEDURE — 96376 TX/PRO/DX INJ SAME DRUG ADON: CPT

## 2017-04-06 PROCEDURE — 80048 BASIC METABOLIC PNL TOTAL CA: CPT

## 2017-04-06 PROCEDURE — 87040 BLOOD CULTURE FOR BACTERIA: CPT

## 2017-04-06 PROCEDURE — 74250 X-RAY XM SM INT 1CNTRST STD: CPT

## 2017-04-06 PROCEDURE — 700117 HCHG RX CONTRAST REV CODE 255: Performed by: EMERGENCY MEDICINE

## 2017-04-06 PROCEDURE — 700102 HCHG RX REV CODE 250 W/ 637 OVERRIDE(OP): Performed by: HOSPITALIST

## 2017-04-06 PROCEDURE — 83605 ASSAY OF LACTIC ACID: CPT

## 2017-04-06 PROCEDURE — 85025 COMPLETE CBC W/AUTO DIFF WBC: CPT

## 2017-04-06 PROCEDURE — 96375 TX/PRO/DX INJ NEW DRUG ADDON: CPT

## 2017-04-06 PROCEDURE — 700105 HCHG RX REV CODE 258: Performed by: EMERGENCY MEDICINE

## 2017-04-06 PROCEDURE — 96365 THER/PROPH/DIAG IV INF INIT: CPT

## 2017-04-06 PROCEDURE — 71010 DX-CHEST-PORTABLE (1 VIEW): CPT

## 2017-04-06 PROCEDURE — 770022 HCHG ROOM/CARE - ICU (200)

## 2017-04-06 PROCEDURE — 96361 HYDRATE IV INFUSION ADD-ON: CPT

## 2017-04-06 PROCEDURE — 83690 ASSAY OF LIPASE: CPT

## 2017-04-06 PROCEDURE — 84443 ASSAY THYROID STIM HORMONE: CPT

## 2017-04-06 PROCEDURE — 36415 COLL VENOUS BLD VENIPUNCTURE: CPT

## 2017-04-06 PROCEDURE — 80053 COMPREHEN METABOLIC PANEL: CPT

## 2017-04-06 PROCEDURE — 82962 GLUCOSE BLOOD TEST: CPT

## 2017-04-06 PROCEDURE — 700111 HCHG RX REV CODE 636 W/ 250 OVERRIDE (IP): Performed by: EMERGENCY MEDICINE

## 2017-04-06 PROCEDURE — 700111 HCHG RX REV CODE 636 W/ 250 OVERRIDE (IP): Performed by: HOSPITALIST

## 2017-04-06 PROCEDURE — 99291 CRITICAL CARE FIRST HOUR: CPT

## 2017-04-06 PROCEDURE — A9270 NON-COVERED ITEM OR SERVICE: HCPCS | Performed by: HOSPITALIST

## 2017-04-06 PROCEDURE — 99223 1ST HOSP IP/OBS HIGH 75: CPT | Performed by: HOSPITALIST

## 2017-04-06 PROCEDURE — 700105 HCHG RX REV CODE 258: Performed by: HOSPITALIST

## 2017-04-06 RX ORDER — LORAZEPAM 2 MG/ML
0.5 INJECTION INTRAMUSCULAR EVERY 6 HOURS PRN
Status: DISCONTINUED | OUTPATIENT
Start: 2017-04-06 | End: 2017-04-10 | Stop reason: HOSPADM

## 2017-04-06 RX ORDER — MORPHINE SULFATE 4 MG/ML
2-4 INJECTION, SOLUTION INTRAMUSCULAR; INTRAVENOUS
Status: DISCONTINUED | OUTPATIENT
Start: 2017-04-06 | End: 2017-04-10 | Stop reason: HOSPADM

## 2017-04-06 RX ORDER — RIFAMPIN 300 MG/1
300 CAPSULE ORAL 2 TIMES DAILY
Status: DISCONTINUED | OUTPATIENT
Start: 2017-04-06 | End: 2017-04-06

## 2017-04-06 RX ORDER — SODIUM CHLORIDE 9 MG/ML
1000 INJECTION, SOLUTION INTRAVENOUS ONCE
Status: ACTIVE | OUTPATIENT
Start: 2017-04-06 | End: 2017-04-07

## 2017-04-06 RX ORDER — ONDANSETRON 2 MG/ML
4 INJECTION INTRAMUSCULAR; INTRAVENOUS EVERY 4 HOURS PRN
Status: DISCONTINUED | OUTPATIENT
Start: 2017-04-06 | End: 2017-04-10 | Stop reason: HOSPADM

## 2017-04-06 RX ORDER — MESALAMINE 400 MG/1
400 CAPSULE, DELAYED RELEASE ORAL 3 TIMES DAILY
Status: DISCONTINUED | OUTPATIENT
Start: 2017-04-06 | End: 2017-04-10 | Stop reason: HOSPADM

## 2017-04-06 RX ORDER — SODIUM CHLORIDE 9 MG/ML
INJECTION, SOLUTION INTRAVENOUS CONTINUOUS
Status: DISCONTINUED | OUTPATIENT
Start: 2017-04-06 | End: 2017-04-09

## 2017-04-06 RX ORDER — ONDANSETRON 4 MG/1
4 TABLET, ORALLY DISINTEGRATING ORAL EVERY 4 HOURS PRN
Status: DISCONTINUED | OUTPATIENT
Start: 2017-04-06 | End: 2017-04-10 | Stop reason: HOSPADM

## 2017-04-06 RX ORDER — SODIUM CHLORIDE 9 MG/ML
30 INJECTION, SOLUTION INTRAVENOUS
Status: DISCONTINUED | OUTPATIENT
Start: 2017-04-06 | End: 2017-04-07

## 2017-04-06 RX ORDER — THYROID 30 MG/1
75 TABLET ORAL EVERY EVENING
COMMUNITY
End: 2017-05-09 | Stop reason: DRUGHIGH

## 2017-04-06 RX ORDER — PROMETHAZINE HYDROCHLORIDE 25 MG/1
12.5-25 TABLET ORAL EVERY 4 HOURS PRN
Status: DISCONTINUED | OUTPATIENT
Start: 2017-04-06 | End: 2017-04-10 | Stop reason: HOSPADM

## 2017-04-06 RX ORDER — BUPROPION HYDROCHLORIDE 150 MG/1
300 TABLET, EXTENDED RELEASE ORAL EVERY EVENING
Status: DISCONTINUED | OUTPATIENT
Start: 2017-04-06 | End: 2017-04-10 | Stop reason: HOSPADM

## 2017-04-06 RX ORDER — PROMETHAZINE HYDROCHLORIDE 25 MG/1
12.5-25 SUPPOSITORY RECTAL EVERY 4 HOURS PRN
Status: DISCONTINUED | OUTPATIENT
Start: 2017-04-06 | End: 2017-04-10 | Stop reason: HOSPADM

## 2017-04-06 RX ORDER — INSULIN GLARGINE 100 [IU]/ML
50 INJECTION, SOLUTION SUBCUTANEOUS 2 TIMES DAILY
Status: DISCONTINUED | OUTPATIENT
Start: 2017-04-06 | End: 2017-04-10

## 2017-04-06 RX ORDER — SODIUM CHLORIDE 9 MG/ML
30 INJECTION, SOLUTION INTRAVENOUS ONCE
Status: COMPLETED | OUTPATIENT
Start: 2017-04-06 | End: 2017-04-06

## 2017-04-06 RX ORDER — HEPARIN SODIUM 5000 [USP'U]/ML
5000 INJECTION, SOLUTION INTRAVENOUS; SUBCUTANEOUS EVERY 8 HOURS
Status: DISCONTINUED | OUTPATIENT
Start: 2017-04-06 | End: 2017-04-10 | Stop reason: HOSPADM

## 2017-04-06 RX ORDER — ONDANSETRON 2 MG/ML
4 INJECTION INTRAMUSCULAR; INTRAVENOUS EVERY 6 HOURS PRN
Status: DISCONTINUED | OUTPATIENT
Start: 2017-04-06 | End: 2017-04-06

## 2017-04-06 RX ORDER — ONDANSETRON 2 MG/ML
4 INJECTION INTRAMUSCULAR; INTRAVENOUS ONCE
Status: COMPLETED | OUTPATIENT
Start: 2017-04-06 | End: 2017-04-06

## 2017-04-06 RX ORDER — THYROID 30 MG/1
75 TABLET ORAL EVERY EVENING
Status: DISCONTINUED | OUTPATIENT
Start: 2017-04-06 | End: 2017-04-10 | Stop reason: HOSPADM

## 2017-04-06 RX ORDER — DEXTROSE MONOHYDRATE 25 G/50ML
25 INJECTION, SOLUTION INTRAVENOUS
Status: DISCONTINUED | OUTPATIENT
Start: 2017-04-06 | End: 2017-04-10 | Stop reason: HOSPADM

## 2017-04-06 RX ORDER — SODIUM CHLORIDE 9 MG/ML
500 INJECTION, SOLUTION INTRAVENOUS
Status: DISCONTINUED | OUTPATIENT
Start: 2017-04-06 | End: 2017-04-10 | Stop reason: HOSPADM

## 2017-04-06 RX ORDER — ACETAMINOPHEN 325 MG/1
650 TABLET ORAL EVERY 6 HOURS PRN
Status: DISCONTINUED | OUTPATIENT
Start: 2017-04-06 | End: 2017-04-10 | Stop reason: HOSPADM

## 2017-04-06 RX ORDER — LORAZEPAM 1 MG/1
0.5 TABLET ORAL EVERY 6 HOURS PRN
Status: DISCONTINUED | OUTPATIENT
Start: 2017-04-06 | End: 2017-04-10 | Stop reason: HOSPADM

## 2017-04-06 RX ORDER — THYROID 30 MG/1
75 TABLET ORAL EVERY EVENING
Status: DISCONTINUED | OUTPATIENT
Start: 2017-04-06 | End: 2017-04-06

## 2017-04-06 RX ADMIN — SODIUM CHLORIDE: 9 INJECTION, SOLUTION INTRAVENOUS at 17:22

## 2017-04-06 RX ADMIN — INSULIN GLARGINE 20 UNITS: 100 INJECTION, SOLUTION SUBCUTANEOUS at 21:00

## 2017-04-06 RX ADMIN — MORPHINE SULFATE 4 MG: 4 INJECTION INTRAVENOUS at 20:32

## 2017-04-06 RX ADMIN — MORPHINE SULFATE 4 MG: 4 INJECTION INTRAVENOUS at 17:18

## 2017-04-06 RX ADMIN — MESALAMINE 400 MG: 400 CAPSULE, DELAYED RELEASE ORAL at 20:33

## 2017-04-06 RX ADMIN — PIPERACILLIN AND TAZOBACTAM 3.38 G: 3; .375 INJECTION, POWDER, LYOPHILIZED, FOR SOLUTION INTRAVENOUS; PARENTERAL at 18:18

## 2017-04-06 RX ADMIN — INSULIN LISPRO 4 UNITS: 100 INJECTION, SOLUTION INTRAVENOUS; SUBCUTANEOUS at 18:22

## 2017-04-06 RX ADMIN — SODIUM CHLORIDE 3102 ML: 9 INJECTION, SOLUTION INTRAVENOUS at 11:25

## 2017-04-06 RX ADMIN — ONDANSETRON 4 MG: 2 INJECTION, SOLUTION INTRAMUSCULAR; INTRAVENOUS at 10:32

## 2017-04-06 RX ADMIN — LEVOTHYROXINE, LIOTHYRONINE 75 MG: 19; 4.5 TABLET ORAL at 20:33

## 2017-04-06 RX ADMIN — IOHEXOL 100 ML: 350 INJECTION, SOLUTION INTRAVENOUS at 12:27

## 2017-04-06 RX ADMIN — BUPROPION HYDROCHLORIDE 300 MG: 150 TABLET, FILM COATED, EXTENDED RELEASE ORAL at 20:33

## 2017-04-06 RX ADMIN — HYDROMORPHONE HYDROCHLORIDE 1 MG: 1 INJECTION, SOLUTION INTRAMUSCULAR; INTRAVENOUS; SUBCUTANEOUS at 10:32

## 2017-04-06 RX ADMIN — ONDANSETRON 4 MG: 2 INJECTION, SOLUTION INTRAMUSCULAR; INTRAVENOUS at 13:52

## 2017-04-06 RX ADMIN — PIPERACILLIN AND TAZOBACTAM 3.38 G: 3; .375 INJECTION, POWDER, LYOPHILIZED, FOR SOLUTION INTRAVENOUS; PARENTERAL at 11:14

## 2017-04-06 ASSESSMENT — PAIN SCALES - GENERAL
PAINLEVEL_OUTOF10: 1
PAINLEVEL_OUTOF10: 7
PAINLEVEL_OUTOF10: 3
PAINLEVEL_OUTOF10: 6
PAINLEVEL_OUTOF10: 2

## 2017-04-06 NOTE — IP AVS SNAPSHOT
" Home Care Instructions                                                                                                                  Name:Mahesh Bradshaw  Medical Record Number:[id number]  CSN: [id number]    YOB: 1965   Age: 51 y.o.  Sex: male  HT:1.727 m (5' 8\") WT: 104.2 kg (229 lb 11.5 oz)          Admit Date: 4/6/2017     Discharge Date:   Today's Date: 4/10/2017  Attending Doctor:  Adrián Santos M.D.                  Allergies:  Peanut-derived; Tradjenta; and Hydrocodone            Discharge Instructions       Pack wound with iodoform gauze, dress with dry gauze and tape     If you are being discharged on Antibiotics    Take the antibiotic as directed and complete the entire course unless directed otherwise by your doctor.    Some people have loose stools during or shortly after antibiotic therapy. This may be due to C. difficile infection. See your doctor if you have three or more watery stools a day and symptoms lasting more than two days, or if you have a new fever, severe abdominal pain, cramping, or blood in your stool.       Wound Care  Taking care of your wound properly can help to prevent pain and infection. It can also help your wound to heal more quickly.   HOW TO CARE FOR YOUR WOUND   · Take or apply over-the-counter and prescription medicines only as told by your health care provider.  · If you were prescribed antibiotic medicine, take or apply it as told by your health care provider. Do not stop using the antibiotic even if your condition improves.  · Clean the wound each day or as told by your health care provider.  · Wash the wound with mild soap and water.  · Rinse the wound with water to remove all soap.  · Pat the wound dry with a clean towel. Do not rub it.  · There are many different ways to close and cover a wound. For example, a wound can be covered with stitches (sutures), skin glue, or adhesive strips. Follow instructions from your health care provider about:  · How to " take care of your wound.  · When and how you should change your bandage (dressing).  · When you should remove your dressing.  · Removing whatever was used to close your wound.  · Check your wound every day for signs of infection. Watch for:  · Redness, swelling, or pain.  · Fluid, blood, or pus.  · Keep the dressing dry until your health care provider says it can be removed. Do not take baths, swim, use a hot tub, or do anything that would put your wound underwater until your health care provider approves.  · Raise (elevate) the injured area above the level of your heart while you are sitting or lying down.  · Do not scratch or pick at the wound.  · Keep all follow-up visits as told by your health care provider. This is important.  SEEK MEDICAL CARE IF:  · You received a tetanus shot and you have swelling, severe pain, redness, or bleeding at the injection site.  · You have a fever.  · Your pain is not controlled with medicine.  · You have increased redness, swelling, or pain at the site of your wound.  · You have fluid, blood, or pus coming from your wound.  · You notice a bad smell coming from your wound or your dressing.  SEEK IMMEDIATE MEDICAL CARE IF:  · You have a red streak going away from your wound.     This information is not intended to replace advice given to you by your health care provider. Make sure you discuss any questions you have with your health care provider.     Document Released: 09/26/2009 Document Revised: 05/03/2016 Document Reviewed: 12/14/2015  [employer] Interactive Patient Education ©2016 [employer]    Diabetic Ketoacidosis  Diabetic ketoacidosis is a life-threatening complication of diabetes. If it is not treated, it can cause severe dehydration and organ damage and can lead to a coma or death.  CAUSES  This condition develops when there is not enough of the hormone insulin in the body. Insulin helps the body to break down sugar for energy. Without insulin, the body cannot break down  sugar, so it breaks down fats instead. This leads to the production of acids that are called ketones. Ketones are poisonous at high levels.  This condition can be triggered by:  · Stress on the body that is brought on by an illness.  · Medicines that raise blood glucose levels.  · Not taking diabetes medicine.  SYMPTOMS  Symptoms of this condition include:  · Fatigue.  · Weight loss.  · Excessive thirst.  · Light-headedness.  · Fruity or sweet-smelling breath.  · Excessive urination.  · Vision changes.  · Confusion or irritability.  · Nausea.  · Vomiting.  · Rapid breathing.  · Abdominal pain.  · Feeling flushed.  DIAGNOSIS  This condition is diagnosed based on a medical history, a physical exam, and blood tests. You may also have a urine test that checks for ketones.  TREATMENT  This condition may be treated with:  · Fluid replacement. This may be done to correct dehydration.  · Insulin injections. These may be given through the skin or through an IV tube.  · Electrolyte replacement. Electrolytes, such as potassium and sodium, may be given in pill form or through an IV tube.  · Antibiotic medicines. These may be prescribed if your condition was caused by an infection.  HOME CARE INSTRUCTIONS  Eating and Drinking  · Drink enough fluids to keep your urine clear or pale yellow.  · If you cannot eat, alternate between drinking fluids with sugar (such as juice) and salty fluids (such as broth or bouillon).  · If you can eat, follow your usual diet and drink sugar-free liquids, such as water.  Other Instructions  · Take insulin as directed by your health care provider. Do not skip insulin injections. Do not use  insulin.  · If your blood sugar is over 240 mg/dL, monitor your urine ketones every 4-6 hours.  · If you were prescribed an antibiotic medicine, finish all of it even if you start to feel better.  · Rest and exercise only as directed by your health care provider.  · If you get sick, call your health care  "provider and begin treatment quickly. Your body often needs extra insulin to fight an illness.  · Check your blood glucose levels regularly. If your blood glucose is high, drink plenty of fluids. This helps to flush out ketones.  SEEK MEDICAL CARE IF:  · Your blood glucose level is too high or too low.  · You have ketones in your urine.  · You have a fever.  · You cannot eat.  · You cannot tolerate fluids.  · You have been vomiting for more than 2 hours.  · You continue to have symptoms of this condition.  · You develop new symptoms.  SEEK IMMEDIATE MEDICAL CARE IF:  · Your blood glucose levels continue to be high (elevated).  · Your monitor reads \"high\" even when you are taking insulin.  · You faint.  · You have chest pain.  · You have trouble breathing.  · You have a sudden, severe headache.  · You have sudden weakness in one arm or one leg.  · You have sudden trouble speaking or swallowing.  · You have vomiting or diarrhea that gets worse after 3 hours.  · You feel severely fatigued.  · You have trouble thinking.  · You have abdominal pain.  · You are severely dehydrated. Symptoms of severe dehydration include:  ¨ Extreme thirst.  ¨ Dry mouth.  ¨ Blue lips.  ¨ Cold hands and feet.  ¨ Rapid breathing.     This information is not intended to replace advice given to you by your health care provider. Make sure you discuss any questions you have with your health care provider.     Document Released: 12/15/2001 Document Revised: 05/03/2016 Document Reviewed: 11/25/2015  [employer] Interactive Patient Education ©2016 [employer]        Discharge Instructions    Discharged to home by car with relative. Discharged via wheelchair, hospital escort: Yes.  Special equipment needed: Not Applicable    Be sure to schedule a follow-up appointment with your primary care doctor or any specialists as instructed.     Discharge Plan:   Diet Plan: Discussed  Activity Level: Discussed  Confirmed Follow up Appointment: Patient to Call " and Schedule Appointment  Confirmed Symptoms Management: Discussed  Medication Reconciliation Updated: Yes  Influenza Vaccine Indication: Not indicated: Previously immunized this influenza season and > 8 years of age    I understand that a diet low in cholesterol, fat, and sodium is recommended for good health. Unless I have been given specific instructions below for another diet, I accept this instruction as my diet prescription.   Other diet: diabetic diet as tolerated     Special Instructions: None    · Is patient discharged on Warfarin / Coumadin?   No     · Is patient Post Blood Transfusion?  No    Depression / Suicide Risk    As you are discharged from this [facility] facility, it is important to learn how to keep safe from harming yourself.    Recognize the warning signs:  · Abrupt changes in personality, positive or negative- including increase in energy   · Giving away possessions  · Change in eating patterns- significant weight changes-  positive or negative  · Change in sleeping patterns- unable to sleep or sleeping all the time   · Unwillingness or inability to communicate  · Depression  · Unusual sadness, discouragement and loneliness  · Talk of wanting to die  · Neglect of personal appearance   · Rebelliousness- reckless behavior  · Withdrawal from people/activities they love  · Confusion- inability to concentrate     If you or a loved one observes any of these behaviors or has concerns about self-harm, here's what you can do:  · Talk about it- your feelings and reasons for harming yourself  · Remove any means that you might use to hurt yourself (examples: pills, rope, extension cords, firearm)  · Get professional help from the community (Mental Health, Substance Abuse, psychological counseling)  · Do not be alone:Call your Safe Contact- someone whom you trust who will be there for you.  · Call your local CRISIS HOTLINE 625-1636 or 961-791-6366  · Call your local Children's Mobile Crisis Response  Team [facility] (952) 187-3697 or [email or web address]  · Call the toll free National Suicide Prevention Hotlines   · National Suicide Prevention Lifeline 024-580-IDWA (8809)  · National Hope Line Network 800-SUICIDE (177-8194)            Your appointments     Apr 11, 2017 10:00 AM   SMALL BOWEL SERIES with [address], RADIOLOGIST, NNR   [facility] - [address]    [address]  [place] [id number] [phone]           For Peds: under age 2 - skip one feeding.  Age 2 through 17 - NPO 4 hours.  [address] [place] can do peds.  Age 18 and older:  NPO after midnight.            Apr 11, 2017  5:30 PM   New Antibiotics with INFUSION QUICK INJECT   Infusion Services ([facility])    [id number]  [place] [address]   [phone]            Apr 12, 2017  5:30 PM   Est Antibiotics with INFUSION QUICK INJECT   Infusion Services ([facility])    [address] [address]  [place] [address]   [phone]            Apr 13, 2017  4:30 PM   Est Antibiotics with RN 2   Infusion Services ([facility])    [address] [address]  [place] [address] [phone]            Apr 14, 2017  6:00 PM   Est Antibiotics with RN 1   Infusion Services ([facility])    [address]  [place] [address]   [phone]            Apr 15, 2017  4:00 PM   Est Antibiotics with RN 1   Infusion Services ([facility])    [address] [address]  [place] [address] [phone]            Apr 16, 2017  5:00 PM   Est Antibiotics with RN 1   Infusion Services ([facility])    [address] [address]  [place] [address] [phone]            Apr 17, 2017  5:00 PM   Est Antibiotics with RN 4   Infusion Services ([facility])    [address] [address]  [place] [address] [phone]            Apr 18, 2017  4:00 PM   Est Antibiotics with RN 6   Infusion Services ([facility])    [address] [address]  [place] [address]   [phone]            Apr 19, 2017  5:00 PM   Est Antibiotics with RN 1   Infusion  Services ([facility])    [address] [facility] [address]  [facility] [address]   [phone]            Apr 20, 2017  6:00 PM   Est Antibiotics with RN 1   Infusion Services ([facility])    [address] [facility]  [place] [facility] [address]   [phone]              Follow-up Information     1. Follow up with Rodolfo Stein M.D..    Specialty:  Internal Medicine    Contact information    [address]  [address]  [place] [facility] [address]  [phone]          2. Follow up with Esau Dooley M.D. In 1 week.    Specialty:  Surgery    Why:  follow up in 7-10 days     Contact information    [address] [facility]  [address]  [facility] [id number] [phone]           Discharge Medication Instructions:    Below are the medications your physician expects you to take upon discharge:    Review all your home medications and newly ordered medications with your doctor and/or pharmacist. Follow medication instructions as directed by your doctor and/or pharmacist.    Please keep your medication list with you and share with your physician.               Medication List      START taking these medications        Instructions    fluconazole 200 MG Tabs   Last time this was given:  200 mg on 4/10/2017  8:47 AM   Commonly known as:  DIFLUCAN   Next Dose Due:  4/11/17 9:00am     Take 1 Tab by mouth every day for 10 days.   Dose:  200 mg       HYDROmorphone 2 MG Tabs   Last time this was given:  2 mg on 4/10/2017  3:03 PM   Commonly known as:  DILAUDID   Next Dose Due:  As needed for pain     Take 1 Tab by mouth every 8 hours as needed for Severe Pain.   Dose:  2 mg         CONTINUE taking these medications        Instructions    * HUYEN THYROID 60 MG Tabs   Last time this was given:  75 mg on 4/9/2017  9:01 PM   Generic drug:  thyroid   Next Dose Due:  4/10/17 9:00pm     Take 75 mg by mouth every evening. Pt uses a 60 mg and 1/2 of a 30 mg, total dose = 75mg QPM   Dose:  75 mg       * thyroid 30 MG Tabs   Last time this was given:  75 mg on 4/9/2017  9:01 PM     Commonly known as:  ARMOUR THYROID   Next Dose Due:  4/11/17 6:00am on empty stomach     Take 75 mg by mouth every evening. Pt uses a 60 mg and 1/2 of a 30 mg, total dose = 75mg QPM   Dose:  75 mg       buPROPion 300 MG XL tablet   Commonly known as:  WELLBUTRIN XL    Take 300 mg by mouth every evening.   Dose:  300 mg       insulin lispro 100 UNIT/ML Soln   Last time this was given:  3 Units on 4/10/2017  5:32 AM   Commonly known as:  HUMALOG   Next Dose Due:  4/10/17 5:30pm     Inject 2-20 Units as instructed 3 times a day before meals. Sliding Scale - 2 units every 50 > 150   Dose:  2-20 Units       LIALDA 1.2 GM Tbec   Generic drug:  mesalamine    Take 2.4 g by mouth every evening.   Dose:  2.4 g       testosterone cypionate 200 MG/ML Soln injection   Commonly known as:  DEPO-TESTOSTERONE    200 mg by Intramuscular route every Tuesday.   Dose:  200 mg       TOUJEO SOLOSTAR 300 UNIT/ML Sopn   Generic drug:  Insulin Glargine    Inject 62 Units as instructed 2 Times a Day. Adjusts based on blood sugar.   Dose:  62 Units       Vitamin D3 5000 UNITS Tabs    Take 5,000 Units by mouth every evening.   Dose:  5000 Units       * Notice:  This list has 2 medication(s) that are the same as other medications prescribed for you. Read the directions carefully, and ask your doctor or other care provider to review them with you.      STOP taking these medications     rifampin 300 MG Caps   Commonly known as:  RIFADINE       sulfamethoxazole-trimethoprim 400-80 MG Tabs   Commonly known as:  BACTRIM               Instructions           Diet / Nutrition:    Follow any diet instructions given to you by your doctor or the dietician, including how much salt (sodium) you are allowed each day.    If you are overweight, talk to your doctor about a weight reduction plan.    Activity:    Remain physically active following your doctor's instructions about exercise and activity.    Rest often.     Any time you become even a little tired  or short of breath, SIT DOWN and rest.    Worsening Symptoms:    Report any of the following signs and symptoms to the doctor's office immediately:    *Pain of jaw, arm, or neck  *Chest pain not relieved by medication                               *Dizziness or loss of consciousness  *Difficulty breathing even when at rest   *More tired than usual                                       *Bleeding drainage or swelling of surgical site  *Swelling of feet, ankles, legs or stomach                 *Fever (>100ºF)  *Pink or blood tinged sputum  *Weight gain (3lbs/day or 5lbs /week)           *Shock from internal defibrillator (if applicable)  *Palpitations or irregular heartbeats                *Cool and/or numb extremities    Stroke Awareness    Common Risk Factors for Stroke include:    Age  Atrial Fibrillation  Carotid Artery Stenosis  Diabetes Mellitus  Excessive alcohol consumption  High blood pressure  Overweight   Physical inactivity  Smoking    Warning signs and symptoms of a stroke include:    *Sudden numbness or weakness of the face, arm or leg (especially on one side of the body).  *Sudden confusion, trouble speaking or understanding.  *Sudden trouble seeing in one or both eyes.  *Sudden trouble walking, dizziness, loss of balance or coordination.Sudden severe headache with no known cause.    It is very important to get treatment quickly when a stroke occurs. If you experience any of the above warning signs, call 911 immediately.                   Disclaimer         Quit Smoking / Tobacco Use:    I understand the use of any tobacco products increases my chance of suffering from future heart disease or stroke and could cause other illnesses which may shorten my life. Quitting the use of tobacco products is the single most important thing I can do to improve my health. For further information on smoking / tobacco cessation call a Toll Free Quit Line at 1-175.566.6464 (*National Cancer [place]) or 1-262.891.6433  (American Lung Association) or you can access the web based program at www.lungusa.org.    [place] Tobacco Users Help Line:  (516) 935-9649       Toll Free: 1-308.258.2607  Quit Tobacco Program [facility] (801)205-2330    Crisis Hotline:    [place] Crisis Hotline:  2-255-MYCOUHE or 1-417.330.8957    [place] Crisis Hotline:    1-298.708.8243 or 177-667-6522    Discharge Survey:   Thank you for choosing [facility]. We hope we did everything we could to make your hospital stay a pleasant one. You may be receiving a phone survey and we would appreciate your time and participation in answering the questions. Your input is very valuable to us in our efforts to improve our service to our patients and their families.        My signature on this form indicates that:    1. I have reviewed and understand the above information.  2. My questions regarding this information have been answered to my satisfaction.  3. I have formulated a plan with my discharge nurse to obtain my prescribed medications for home.                  Disclaimer         __________________________________                     __________       ________                       Patient Signature                                                 Date                    Time

## 2017-04-06 NOTE — ED NOTES
"Med rec completed per pt at bedside  Allergies reviewed  Pt states that he had x3 10 day courses of Rifampin that  He finished around 3/31/17  Pt is also currently on his third 10 day course of Bactrim, started  4/4/17. States this is for a \"possible abdominal abscess\"      "

## 2017-04-06 NOTE — IP AVS SNAPSHOT
[employer] Access Code: [email or web address]  Expires: 4/27/2017 12:11 PM    [employer]  A secure, online tool to manage your health information     [employer]® is a secure, online tool that connects you to your personalized health information from the privacy of your home -- day or night - making it very easy for you to manage your healthcare. Once the activation process is completed, you can even access your medical information using the [employer] princess, which is available for free in the [employer] Princess store or Google Play store.     [employer] provides the following levels of access (as shown below):   My Chart Features   [facility] Primary Care Doctor [facility]  Specialists [facility]  Urgent  Care Non-[facility]  Primary Care  Doctor   Email your healthcare team securely and privately 24/7 X X X X   Manage appointments: schedule your next appointment; view details of past/upcoming appointments X      Request prescription refills. X      View recent personal medical records, including lab and immunizations X X X X   View health record, including health history, allergies, medications X X X X   Read reports about your outpatient visits, procedures, consult and ER notes X X X X   See your discharge summary, which is a recap of your hospital and/or ER visit that includes your diagnosis, lab results, and care plan. X X       How to register for [employer]:  1. Go to  [email or web address].  2. Click on the Sign Up Now box, which takes you to the New Member Sign Up page. You will need to provide the following information:  a. Enter your [employer] Access Code exactly as it appears at the top of this page. (You will not need to use this code after you’ve completed the sign-up process. If you do not sign up before the expiration date, you must request a new code.)   b. Enter your date of birth.   c. Enter your home email address.   d. Click Submit, and follow the next screen’s instructions.  3. Create a [employer] ID. This will be your [employer]  login ID and cannot be changed, so think of one that is secure and easy to remember.  4. Create a [employer] password. You can change your password at any time.  5. Enter your Password Reset Question and Answer. This can be used at a later time if you forget your password.   6. Enter your e-mail address. This allows you to receive e-mail notifications when new information is available in [email or web address].  7. Click Sign Up. You can now view your health information.    For assistance activating your [employer] account, call [phone]

## 2017-04-06 NOTE — ED NOTES
Chief Complaint   Patient presents with   • High Blood Sugar     Pt reports blood sugars have been running in the 200's-400's for the past few weeks.  FSBS 486 at home per pt.    • N/V   • Fatigue   Pt reports he took 20 units of Humalog this morning and blood sugars are not coming down.

## 2017-04-06 NOTE — ED NOTES
Lab called with critical result of lactic 6.0.  Dr. Vázquez notified of critical lab result.  Critical lab result read back by Dr. Vázquez.

## 2017-04-06 NOTE — PROGRESS NOTES
Bedside report to ICU RN. Pt transported to R 104 with 2 RN on monitor. All personal belongings with pt when leaving unit.

## 2017-04-06 NOTE — IP AVS SNAPSHOT
" <p align=\"LEFT\"><IMG SRC=\"//[email or web address]$/[email or web address]\" alt=\"Image\" WIDTH=\"50%\" HEIGHT=\"200\" BORDER=\"\"></p>                   Name:Mahesh Bradshaw  Medical Record Number:[id number]  CSN: [id number]    YOB: 1965   Age: 51 y.o.  Sex: male  HT:1.727 m (5' 8\") WT: 104.2 kg (229 lb 11.5 oz)          Admit Date: 4/6/2017     Discharge Date:   Today's Date: 4/10/2017  Attending Doctor:  Adrián Santos M.D.                  Allergies:  Peanut-derived; Tradjenta; and Hydrocodone          Your appointments     Apr 11, 2017 10:00 AM   SMALL BOWEL SERIES with [address] DX 2, RADIOLOGIST, NNR   [facility] - [address])    [address]  [facility] [address]   [phone]           For Peds: under age 2 - skip one feeding.  Age 2 through 17 - NPO 4 hours.  [address] can do peds.  Age 18 and older:  NPO after midnight.            Apr 11, 2017  5:30 PM   New Antibiotics with INFUSION QUICK INJECT   [facility] ([facility])    [address] [place] [address]   [phone]            Apr 12, 2017  5:30 PM   Est Antibiotics with INFUSION QUICK INJECT   [facility] ([facility])    [address] [place] [address]   [phone]            Apr 13, 2017  4:30 PM   Est Antibiotics with RN 2   [facility] ([facility])    [address] [address]  [place] [address]   [phone]            Apr 14, 2017  6:00 PM   Est Antibiotics with RN 1   [facility] ([facility])    [address] [address]  [place] [address]   [phone]            Apr 15, 2017  4:00 PM   Est Antibiotics with RN 1   [facility] ([facility])    [address] [address]  [place] [address]   [phone]            Apr 16, 2017  5:00 PM   Est Antibiotics with RN 1   [facility] ([facility])    [address] [address]  [place] [address]   [phone]            Apr 17, 2017  5:00 PM   Est Antibiotics with RN 4   [facility] ([facility])    [address] [place] [address]  [place] " [address]   [phone]            Apr 18, 2017  4:00 PM   Est Antibiotics with RN 6   [facility] ([facility])    [address] [address]  [facility] [address]   [phone]            Apr 19, 2017  5:00 PM   Est Antibiotics with RN 1   [facility] ([facility])    [address]  [facility] [address]   [phone]            Apr 20, 2017  6:00 PM   Est Antibiotics with RN 1   [facility] ([facility])    [address]  [facility] [address]   [phone]              Follow-up Information     1. Follow up with Rodolfo Stein M.D..    Specialty:  Internal Medicine    Contact information    [address] [facility]  [address]  [facility] [address]  [phone]          2. Follow up with sEau Dooley M.D. In 1 week.    Specialty:  Surgery    Why:  follow up in 7-10 days     Contact information    [address] [facility]  [address]  [facility] [id number] [phone]           Medication List      Take these Medications        Instructions    * ARMOUR THYROID 60 MG Tabs   Generic drug:  thyroid    Take 75 mg by mouth every evening. Pt uses a 60 mg and 1/2 of a 30 mg, total dose = 75mg QPM   Dose:  75 mg       * thyroid 30 MG Tabs   Commonly known as:  ARMOUR THYROID    Take 75 mg by mouth every evening. Pt uses a 60 mg and 1/2 of a 30 mg, total dose = 75mg QPM   Dose:  75 mg       buPROPion 300 MG XL tablet   Commonly known as:  WELLBUTRIN XL    Take 300 mg by mouth every evening.   Dose:  300 mg       fluconazole 200 MG Tabs   Commonly known as:  DIFLUCAN    Take 1 Tab by mouth every day for 10 days.   Dose:  200 mg       HYDROmorphone 2 MG Tabs   Commonly known as:  DILAUDID    Take 1 Tab by mouth every 8 hours as needed for Severe Pain.   Dose:  2 mg       insulin lispro 100 UNIT/ML Soln   Commonly known as:  HUMALOG    Inject 2-20 Units as instructed 3 times a day before meals. Sliding Scale - 2 units every 50 > 150   Dose:  2-20 Units       LIALDA 1.2 GM Tbec   Generic drug:  mesalamine    Take 2.4 g by  mouth every evening.   Dose:  2.4 g       testosterone cypionate 200 MG/ML Soln injection   Commonly known as:  DEPO-TESTOSTERONE    200 mg by Intramuscular route every Tuesday.   Dose:  200 mg       TOUJEO SOLOSTAR 300 UNIT/ML Sopn   Generic drug:  Insulin Glargine    Inject 62 Units as instructed 2 Times a Day. Adjusts based on blood sugar.   Dose:  62 Units       Vitamin D3 5000 UNITS Tabs    Take 5,000 Units by mouth every evening.   Dose:  5000 Units       * Notice:  This list has 2 medication(s) that are the same as other medications prescribed for you. Read the directions carefully, and ask your doctor or other care provider to review them with you.

## 2017-04-06 NOTE — ED PROVIDER NOTES
ED Provider Note    Scribed for Orlando Vázquez M.D. by Azra Osorio. 4/6/2017, 10:07 AM.    Primary care provider: Rodolfo Stein M.D.  Means of arrival: Walk-in  History obtained from: Patient   History limited by: None    CHIEF COMPLAINT  Chief Complaint   Patient presents with   • High Blood Sugar     Pt reports blood sugars have been running in the 200's-400's for the past few weeks.  FSBS 486 at home per pt.    • N/V   • Fatigue       HPI  Mahesh Bradshaw is a 51 y.o. Male with a history of type II diabetes who presents to the Emergency Department for elevated blood sugar, onset last night.  He noticed his blood sugar has been high approximately at 400 since last night. He took 62 units of Insulin with no relief. This morning his blood sugar was 486 which prompted him to Call his PCP who is not in the office, he was referred to come to the ED for possible DKA by the nurse practitioner. The patient  had surgery 3 weeks ago with hernia repair complicated by mesh infection, followed by fistula with Dr. Dooley in [facility].The patient has been on three rounds of antibiotics since surgery and stopped them 3 days ago. He has been vomiting, has a fever, feeling ache, and nauseous. He has a follow-up appointment with Dr. Dooley on Tuesday. He denies any worsening soreness of his surgical site. The pain et also denies sore throat, diarrhea, runny nose.He has history of Alterative colitis, type II diabetes, and pancreatitis.         REVIEW OF SYSTEMS  Review of Systems   Constitutional: Positive for fever and malaise/fatigue.   HENT: Negative for sore throat.         Runny nose   Gastrointestinal: Positive for nausea and vomiting. Negative for diarrhea.   Neurological: Positive for weakness.   All other systems reviewed and are negative.      PAST MEDICAL HISTORY   has a past medical history of Migraine; Gout; Indigestion; UC (ulcerative colitis) (CMS-HCC) (3-3-17); Difficult intubation  (2-2016); Arthritis (3-3-17); Sepsis (CMS-HCC) (1/21/2016); Essential hypertension (1/22/2016); Snoring (3-3-17); High cholesterol (3-3-17); Congestive heart failure (CMS-HCC) (2-2016 ); ASTHMA (3-3-17); Aspiration pneumonia (CMS-HCC) (3-2016); Breath shortness (3-3-17); Hypothyroid (3-3-17); Pain (3-3-17); Heart burn; Depression (11/26/2014); Anesthesia; Pancreatitis (2-2016); Elevated liver enzymes (2-2016); Electrolyte imbalance (2-2016); Kidney stones; ADRIANE (acute kidney injury) (CMS-HCC) (2/24/2016); RF (renal failure) (2-2016); Diabetes (CMS-HCC) (3-3-17); DKA (diabetic ketoacidoses) (CMS-HCC) (2-2016); and On mechanically assisted ventilation (CMS-HCC) (2-2016).    SURGICAL HISTORY   has past surgical history that includes vaishnavi by laparoscopy (2005); colonoscopy with biopsy (11/26/2014); umbilical hernia repair (N/A, 11/6/2016); other orthopedic surgery (1997 or 1998); and umbilical hernia repair (3/10/2017).    SOCIAL HISTORY  Social History   Substance Use Topics   • Smoking status: Never Smoker    • Smokeless tobacco: None   • Alcohol Use: No      Comment: occ      History   Drug Use No       FAMILY HISTORY  No family history on file.    CURRENT MEDICATIONS  Home Medications     Reviewed by Darleen Roper (Pharmacy Tech) on 04/06/17 at 1157  Med List Status: Complete    Medication Last Dose Status    buPROPion (WELLBUTRIN XL) 300 MG XL tablet 4/5/2017 Active    Cholecalciferol (VITAMIN D3) 5000 UNITS Tab 4/5/2017 Active    Insulin Glargine (TOUJEO SOLOSTAR) 300 UNIT/ML Solution Pen-injector 4/6/2017 Active    insulin lispro (HUMALOG) 100 UNIT/ML Solution 4/6/2017 Active    mesalamine (LIALDA) 1.2 GM TBEC 4/5/2017 Active    rifampin (RIFADINE) 300 MG Cap 3/31/2017 Active    sulfamethoxazole-trimethoprim (BACTRIM) 400-80 MG Tab 4/5/2017 Active    testosterone cypionate (DEPO-TESTOSTERONE) 200 MG/ML SOLN injection 4/4/2017 Active    thyroid (ARMOUR THYROID) 30 MG Tab 4/5/2017 Active    thyroid (ARMOUR  "THYROID) 60 MG Tab 4/5/2017 Active                ALLERGIES  Allergies   Allergen Reactions   • Peanut-Derived Anaphylaxis     Peanuts   RXN=age 36   • Tradjenta [Linagliptin] Unspecified     Pt developed pancreatitis   • Hydrocodone Hives     Tolerates Morphine and oxycodone  Rxn Age - 29       PHYSICAL EXAM  VITAL SIGNS: /90 mmHg  Pulse 94  Temp(Src) 36.9 °C (98.4 °F)  Resp 16  Ht 1.727 m (5' 8\")  Wt 103.42 kg (228 lb)  BMI 34.68 kg/m2  SpO2 97%  Vitals reviewed.  Constitutional: Awake, alert, ill-appearing.  HENT: Normocephalic, Atraumatic, Bilateral external ears normal, Oropharynx moist, No oral exudates, Nose normal.   Eyes: PERRL, EOMI, Conjunctiva normal, No discharge.   Neck: Normal range of motion, No tenderness, Supple, No stridor.   Cardiovascular:   Tachycardic Normal rhythm, No murmurs, No rubs, No gallops.   Thorax & Lungs: Normal breath sounds, No respiratory distress, No wheezing,  Abdomen: Bowel sounds normal, Soft, diffusely tender without peritonitis.  Fistula to the left of the umbilicus with drainage  Skin: Warm, Dry, No erythema, No rash.   Back: No tenderness, No CVA tenderness.   Musculoskeletal: Good range of motion in all major joints.   Neurologic: Alert, No focal deficits noted.   Psychiatric: Affect normal    LABS  Results for orders placed or performed during the hospital encounter of 04/06/17   CBC WITH DIFFERENTIAL   Result Value Ref Range    WBC 5.7 4.8 - 10.8 K/uL    RBC 4.33 (L) 4.70 - 6.10 M/uL    Hemoglobin 13.2 (L) 14.0 - 18.0 g/dL    Hematocrit 38.2 (L) 42.0 - 52.0 %    MCV 88.2 81.4 - 97.8 fL    MCH 30.5 27.0 - 33.0 pg    MCHC 34.6 33.7 - 35.3 g/dL    RDW 40.5 35.9 - 50.0 fL    Platelet Count 252 164 - 446 K/uL    MPV 9.5 9.0 - 12.9 fL    Neutrophils-Polys 71.00 44.00 - 72.00 %    Lymphocytes 21.60 (L) 22.00 - 41.00 %    Monocytes 5.20 0.00 - 13.40 %    Eosinophils 1.20 0.00 - 6.90 %    Basophils 0.50 0.00 - 1.80 %    Immature Granulocytes 0.50 0.00 - 0.90 %    " Nucleated RBC 0.00 /100 WBC    Neutrophils (Absolute) 4.06 1.82 - 7.42 K/uL    Lymphs (Absolute) 1.24 1.00 - 4.80 K/uL    Monos (Absolute) 0.30 0.00 - 0.85 K/uL    Eos (Absolute) 0.07 0.00 - 0.51 K/uL    Baso (Absolute) 0.03 0.00 - 0.12 K/uL    Immature Granulocytes (abs) 0.03 0.00 - 0.11 K/uL    NRBC (Absolute) 0.00 K/uL   COMP METABOLIC PANEL   Result Value Ref Range    Sodium 131 (L) 135 - 145 mmol/L    Potassium 4.8 3.6 - 5.5 mmol/L    Chloride 98 96 - 112 mmol/L    Co2 19 (L) 20 - 33 mmol/L    Anion Gap 14.0 (H) 0.0 - 11.9    Glucose 576 (HH) 65 - 99 mg/dL    Bun 13 8 - 22 mg/dL    Creatinine 1.04 0.50 - 1.40 mg/dL    Calcium 9.3 8.5 - 10.5 mg/dL    AST(SGOT) 15 12 - 45 U/L    ALT(SGPT) 19 2 - 50 U/L    Alkaline Phosphatase 81 30 - 99 U/L    Total Bilirubin 0.4 0.1 - 1.5 mg/dL    Albumin 4.1 3.2 - 4.9 g/dL    Total Protein 7.0 6.0 - 8.2 g/dL    Globulin 2.9 1.9 - 3.5 g/dL    A-G Ratio 1.4 g/dL   LIPASE   Result Value Ref Range    Lipase 9 (L) 11 - 82 U/L   URINALYSIS,CULTURE IF INDICATED   Result Value Ref Range    Color Colorless     Character Clear     Specific Gravity 1.025 <1.035    Ph 5.0 5.0-8.0    Glucose >1000 (A) Negative mg/dL    Ketones Negative Negative mg/dL    Protein Negative Negative mg/dL    Bilirubin Negative Negative    Nitrite Negative Negative    Leukocyte Esterase Negative Negative    Occult Blood Negative Negative    Micro Urine Req see below     Culture Indicated No UA Culture   LACTIC ACID   Result Value Ref Range    Lactic Acid 6.0 (HH) 0.5 - 2.0 mmol/L   ESTIMATED GFR   Result Value Ref Range    GFR If African American >60 >60 mL/min/1.73 m 2    GFR If Non African American >60 >60 mL/min/1.73 m 2       All labs reviewed by me.      RADIOLOGY  CT-ABDOMEN-PELVIS WITH   Final Result      Periumbilical wound with induration and mild inflammatory stranding.      Hepatic steatosis.      Status post cholecystectomy.      Sequelae of prior granulomatous exposure.         DX-CHEST-PORTABLE (1  VIEW)   Final Result      No acute cardiopulmonary process is seen.        The radiologist's interpretation of all radiological studies have been reviewed by me.    COURSE & MEDICAL DECISION MAKING  Pertinent Labs & Imaging studies reviewed. (See chart for details)    Obtained and reviewed past medical records from previous visits including baseline labs.  Previous workup.  All CTs and physician notes.    10:07 AM Patient seen and examined at bedside. The patient presents with abdominal pain, fatigue, weakness t, and the differential diagnosis includes but is not limited to , hyperglycemia, DKA, sepsis, intra-abdominal infection.. Ordered for CT-abdomen, DX-chest, Blood culture, urinalysis, CBC with differential, CMP, Lipase, Lactic acid, and Estimated GFR to evaluate. Patient will be treated with 1mg dilaudid, 4mg zofran, NS Bolus infusion 3,102ml and IV fluids for his symptoms.        10:48 AM Consulted with pharmacy on which antibiotics to treat the patient with.     10:58 AM Patient treated with 3.375g of Zosyn for his symptoms.  Treated for sepsis with antibiotics and fluids per protocol.  Will be admitted.    12:27 PM Patient treated with 350mg/ml Omnipaque.      12:02 PM - I discussed the patient's case and the above findings with Dr. Vallejo (Hospitalist) who agrees to see the patient.     12:42 PM Paged Dr Dooley    12:53 PM - I discussed the patient's case and the above findings with Dr. Dooley, who will see the patient consultation.  Dr. Mcgarry is updated.    FINAL IMPRESSION  1. Hyperglycemia    2. Dehydration    3. Lactic acid acidosis    4. Ulcerative colitis with complication, unspecified location (CMS-HCC)          Azra EPSTEIN (Gareth), am scribing for, and in the presence of, [place] Vázquez M.D..    Electronically signed by: Azra Osorio (Gareth), 4/6/2017    Orlando EPSTEIN M.D. personally performed the services described in this documentation, as scribed by  Azra Osorio in my presence, and it is both accurate and complete.    The note accurately reflects work and decisions made by me.  Orlando Vázquez  4/6/2017  2:06 PM

## 2017-04-06 NOTE — ED NOTES
Christopher from Lab called with critical result of glucose 576 at 1124. Critical lab result read back to Christopher.   Dr. Vázquez notified of critical lab result at 1130.  Critical lab result read back by Dr. Vázquez.

## 2017-04-06 NOTE — IP AVS SNAPSHOT
4/10/2017          Mahesh Bradshaw  [address]  [place] [address]    Dear Mahesh:    [facility] wants to ensure your discharge home is safe and you or your loved ones have had all your questions answered regarding your care after you leave the hospital.    You may receive a telephone call within two days of your discharge.  This call is to make certain you understand your discharge instructions as well as ensure we provided you with the best care possible during your stay with us.     The call will only last approximately 3-5 minutes and will be done by a nurse.    Once again, we want to ensure your discharge home is safe and that you have a clear understanding of any next steps in your care.  If you have any questions or concerns, please do not hesitate to contact us, we are here for you.  Thank you for choosing [facility] for your healthcare needs.    Sincerely,    Kuldip Dias    [facility]

## 2017-04-07 PROBLEM — D64.9 NORMOCYTIC ANEMIA: Status: ACTIVE | Noted: 2017-04-07

## 2017-04-07 PROBLEM — R74.01 TRANSAMINITIS: Status: ACTIVE | Noted: 2017-04-07

## 2017-04-07 LAB
ALBUMIN SERPL BCP-MCNC: 3.3 G/DL (ref 3.2–4.9)
ALBUMIN/GLOB SERPL: 1.3 G/DL
ALP SERPL-CCNC: 78 U/L (ref 30–99)
ALT SERPL-CCNC: 107 U/L (ref 2–50)
ANION GAP SERPL CALC-SCNC: 9 MMOL/L (ref 0–11.9)
AST SERPL-CCNC: 163 U/L (ref 12–45)
BASOPHILS # BLD AUTO: 0.3 % (ref 0–1.8)
BASOPHILS # BLD: 0.01 K/UL (ref 0–0.12)
BILIRUB SERPL-MCNC: 0.6 MG/DL (ref 0.1–1.5)
BUN SERPL-MCNC: 11 MG/DL (ref 8–22)
CALCIUM SERPL-MCNC: 8 MG/DL (ref 8.5–10.5)
CHLORIDE SERPL-SCNC: 107 MMOL/L (ref 96–112)
CHOLEST SERPL-MCNC: 187 MG/DL (ref 100–199)
CO2 SERPL-SCNC: 24 MMOL/L (ref 20–33)
CREAT SERPL-MCNC: 0.86 MG/DL (ref 0.5–1.4)
EOSINOPHIL # BLD AUTO: 0.06 K/UL (ref 0–0.51)
EOSINOPHIL NFR BLD: 1.6 % (ref 0–6.9)
ERYTHROCYTE [DISTWIDTH] IN BLOOD BY AUTOMATED COUNT: 41.2 FL (ref 35.9–50)
GFR SERPL CREATININE-BSD FRML MDRD: >60 ML/MIN/1.73 M 2
GLOBULIN SER CALC-MCNC: 2.5 G/DL (ref 1.9–3.5)
GLUCOSE BLD-MCNC: 128 MG/DL (ref 65–99)
GLUCOSE BLD-MCNC: 148 MG/DL (ref 65–99)
GLUCOSE BLD-MCNC: 148 MG/DL (ref 65–99)
GLUCOSE BLD-MCNC: 179 MG/DL (ref 65–99)
GLUCOSE BLD-MCNC: 200 MG/DL (ref 65–99)
GLUCOSE SERPL-MCNC: 179 MG/DL (ref 65–99)
GRAM STN SPEC: ABNORMAL
GRAM STN SPEC: ABNORMAL
HCT VFR BLD AUTO: 34.3 % (ref 42–52)
HDLC SERPL-MCNC: 28 MG/DL
HGB BLD-MCNC: 11.9 G/DL (ref 14–18)
IMM GRANULOCYTES # BLD AUTO: 0.03 K/UL (ref 0–0.11)
IMM GRANULOCYTES NFR BLD AUTO: 0.8 % (ref 0–0.9)
LDLC SERPL CALC-MCNC: ABNORMAL MG/DL
LYMPHOCYTES # BLD AUTO: 1.45 K/UL (ref 1–4.8)
LYMPHOCYTES NFR BLD: 37.9 % (ref 22–41)
MCH RBC QN AUTO: 30.7 PG (ref 27–33)
MCHC RBC AUTO-ENTMCNC: 34.7 G/DL (ref 33.7–35.3)
MCV RBC AUTO: 88.4 FL (ref 81.4–97.8)
MONOCYTES # BLD AUTO: 0.25 K/UL (ref 0–0.85)
MONOCYTES NFR BLD AUTO: 6.5 % (ref 0–13.4)
NEUTROPHILS # BLD AUTO: 2.03 K/UL (ref 1.82–7.42)
NEUTROPHILS NFR BLD: 52.9 % (ref 44–72)
NRBC # BLD AUTO: 0 K/UL
NRBC BLD AUTO-RTO: 0 /100 WBC
PLATELET # BLD AUTO: 214 K/UL (ref 164–446)
PMV BLD AUTO: 9.4 FL (ref 9–12.9)
POTASSIUM SERPL-SCNC: 3.7 MMOL/L (ref 3.6–5.5)
PROT SERPL-MCNC: 5.8 G/DL (ref 6–8.2)
RBC # BLD AUTO: 3.88 M/UL (ref 4.7–6.1)
SIGNIFICANT IND 70042: ABNORMAL
SITE SITE: ABNORMAL
SODIUM SERPL-SCNC: 140 MMOL/L (ref 135–145)
SOURCE SOURCE: ABNORMAL
TRIGL SERPL-MCNC: 407 MG/DL (ref 0–149)
WBC # BLD AUTO: 3.8 K/UL (ref 4.8–10.8)

## 2017-04-07 PROCEDURE — 87102 FUNGUS ISOLATION CULTURE: CPT

## 2017-04-07 PROCEDURE — 87186 SC STD MICRODIL/AGAR DIL: CPT | Mod: 91

## 2017-04-07 PROCEDURE — 160036 HCHG PACU - EA ADDL 30 MINS PHASE I: Performed by: SURGERY

## 2017-04-07 PROCEDURE — 160039 HCHG SURGERY MINUTES - EA ADDL 1 MIN LEVEL 3: Performed by: SURGERY

## 2017-04-07 PROCEDURE — 160035 HCHG PACU - 1ST 60 MINS PHASE I: Performed by: SURGERY

## 2017-04-07 PROCEDURE — 700111 HCHG RX REV CODE 636 W/ 250 OVERRIDE (IP)

## 2017-04-07 PROCEDURE — 110382 HCHG SHELL REV 271: Performed by: SURGERY

## 2017-04-07 PROCEDURE — 700102 HCHG RX REV CODE 250 W/ 637 OVERRIDE(OP): Performed by: HOSPITALIST

## 2017-04-07 PROCEDURE — A9270 NON-COVERED ITEM OR SERVICE: HCPCS | Performed by: INTERNAL MEDICINE

## 2017-04-07 PROCEDURE — 82962 GLUCOSE BLOOD TEST: CPT

## 2017-04-07 PROCEDURE — 110371 HCHG SHELL REV 272: Performed by: SURGERY

## 2017-04-07 PROCEDURE — 87205 SMEAR GRAM STAIN: CPT

## 2017-04-07 PROCEDURE — 87075 CULTR BACTERIA EXCEPT BLOOD: CPT

## 2017-04-07 PROCEDURE — 80053 COMPREHEN METABOLIC PANEL: CPT

## 2017-04-07 PROCEDURE — 700105 HCHG RX REV CODE 258: Performed by: HOSPITALIST

## 2017-04-07 PROCEDURE — A9270 NON-COVERED ITEM OR SERVICE: HCPCS | Performed by: HOSPITALIST

## 2017-04-07 PROCEDURE — 160009 HCHG ANES TIME/MIN: Performed by: SURGERY

## 2017-04-07 PROCEDURE — 501487 HCHG STRYKER TIP: Performed by: SURGERY

## 2017-04-07 PROCEDURE — 500423 HCHG DRESSING, ABD COMBINE: Performed by: SURGERY

## 2017-04-07 PROCEDURE — 770001 HCHG ROOM/CARE - MED/SURG/GYN PRIV*

## 2017-04-07 PROCEDURE — 500888 HCHG PACK, MINOR BASIN: Performed by: SURGERY

## 2017-04-07 PROCEDURE — 87070 CULTURE OTHR SPECIMN AEROBIC: CPT

## 2017-04-07 PROCEDURE — 160048 HCHG OR STATISTICAL LEVEL 1-5: Performed by: SURGERY

## 2017-04-07 PROCEDURE — 700102 HCHG RX REV CODE 250 W/ 637 OVERRIDE(OP): Performed by: INTERNAL MEDICINE

## 2017-04-07 PROCEDURE — 501486 HCHG STRYKER IRRIG SET HC W/TUBING: Performed by: SURGERY

## 2017-04-07 PROCEDURE — 87077 CULTURE AEROBIC IDENTIFY: CPT

## 2017-04-07 PROCEDURE — 80061 LIPID PANEL: CPT

## 2017-04-07 PROCEDURE — 99233 SBSQ HOSP IP/OBS HIGH 50: CPT | Performed by: INTERNAL MEDICINE

## 2017-04-07 PROCEDURE — 85025 COMPLETE CBC W/AUTO DIFF WBC: CPT

## 2017-04-07 PROCEDURE — 700111 HCHG RX REV CODE 636 W/ 250 OVERRIDE (IP): Performed by: HOSPITALIST

## 2017-04-07 PROCEDURE — 160028 HCHG SURGERY MINUTES - 1ST 30 MINS LEVEL 3: Performed by: SURGERY

## 2017-04-07 PROCEDURE — 160002 HCHG RECOVERY MINUTES (STAT): Performed by: SURGERY

## 2017-04-07 PROCEDURE — A4606 OXYGEN PROBE USED W OXIMETER: HCPCS | Performed by: SURGERY

## 2017-04-07 PROCEDURE — 502240 HCHG MISC OR SUPPLY RC 0272: Performed by: SURGERY

## 2017-04-07 PROCEDURE — 700102 HCHG RX REV CODE 250 W/ 637 OVERRIDE(OP)

## 2017-04-07 PROCEDURE — A9270 NON-COVERED ITEM OR SERVICE: HCPCS

## 2017-04-07 PROCEDURE — A6266 IMPREG GAUZE NO H20/SAL/YARD: HCPCS | Performed by: SURGERY

## 2017-04-07 PROCEDURE — 0J980ZZ DRAINAGE OF ABDOMEN SUBCUTANEOUS TISSUE AND FASCIA, OPEN APPROACH: ICD-10-PCS | Performed by: SURGERY

## 2017-04-07 RX ORDER — FLUCONAZOLE 200 MG/1
200 TABLET ORAL DAILY
Status: DISCONTINUED | OUTPATIENT
Start: 2017-04-07 | End: 2017-04-10 | Stop reason: HOSPADM

## 2017-04-07 RX ORDER — OXYCODONE HCL 5 MG/5 ML
SOLUTION, ORAL ORAL
Status: COMPLETED
Start: 2017-04-07 | End: 2017-04-07

## 2017-04-07 RX ADMIN — PIPERACILLIN AND TAZOBACTAM 3.38 G: 3; .375 INJECTION, POWDER, LYOPHILIZED, FOR SOLUTION INTRAVENOUS; PARENTERAL at 17:52

## 2017-04-07 RX ADMIN — HEPARIN SODIUM 5000 UNITS: 5000 INJECTION, SOLUTION INTRAVENOUS; SUBCUTANEOUS at 15:58

## 2017-04-07 RX ADMIN — FLUCONAZOLE 200 MG: 200 TABLET ORAL at 17:52

## 2017-04-07 RX ADMIN — SODIUM CHLORIDE: 9 INJECTION, SOLUTION INTRAVENOUS at 00:08

## 2017-04-07 RX ADMIN — BUPROPION HYDROCHLORIDE 300 MG: 150 TABLET, FILM COATED, EXTENDED RELEASE ORAL at 20:16

## 2017-04-07 RX ADMIN — MESALAMINE 400 MG: 400 CAPSULE, DELAYED RELEASE ORAL at 16:08

## 2017-04-07 RX ADMIN — MORPHINE SULFATE 4 MG: 4 INJECTION INTRAVENOUS at 08:47

## 2017-04-07 RX ADMIN — LEVOTHYROXINE, LIOTHYRONINE 75 MG: 19; 4.5 TABLET ORAL at 20:16

## 2017-04-07 RX ADMIN — MORPHINE SULFATE 4 MG: 4 INJECTION INTRAVENOUS at 02:21

## 2017-04-07 RX ADMIN — PIPERACILLIN AND TAZOBACTAM 3.38 G: 3; .375 INJECTION, POWDER, LYOPHILIZED, FOR SOLUTION INTRAVENOUS; PARENTERAL at 06:12

## 2017-04-07 RX ADMIN — MORPHINE SULFATE 2 MG: 4 INJECTION INTRAVENOUS at 13:00

## 2017-04-07 RX ADMIN — MORPHINE SULFATE 4 MG: 4 INJECTION INTRAVENOUS at 17:58

## 2017-04-07 RX ADMIN — SODIUM CHLORIDE: 9 INJECTION, SOLUTION INTRAVENOUS at 20:16

## 2017-04-07 RX ADMIN — INSULIN LISPRO 3 UNITS: 100 INJECTION, SOLUTION INTRAVENOUS; SUBCUTANEOUS at 06:14

## 2017-04-07 RX ADMIN — HEPARIN SODIUM 5000 UNITS: 5000 INJECTION, SOLUTION INTRAVENOUS; SUBCUTANEOUS at 22:16

## 2017-04-07 RX ADMIN — MORPHINE SULFATE 4 MG: 4 INJECTION INTRAVENOUS at 22:12

## 2017-04-07 RX ADMIN — OXYCODONE HYDROCHLORIDE 10 MG: 5 SOLUTION ORAL at 11:10

## 2017-04-07 RX ADMIN — INSULIN GLARGINE 50 UNITS: 100 INJECTION, SOLUTION SUBCUTANEOUS at 20:22

## 2017-04-07 RX ADMIN — SODIUM CHLORIDE: 9 INJECTION, SOLUTION INTRAVENOUS at 08:32

## 2017-04-07 RX ADMIN — ONDANSETRON 4 MG: 4 TABLET, ORALLY DISINTEGRATING ORAL at 18:08

## 2017-04-07 RX ADMIN — FENTANYL CITRATE 25 MCG: 50 INJECTION, SOLUTION INTRAMUSCULAR; INTRAVENOUS at 11:10

## 2017-04-07 RX ADMIN — PIPERACILLIN AND TAZOBACTAM 3.38 G: 3; .375 INJECTION, POWDER, LYOPHILIZED, FOR SOLUTION INTRAVENOUS; PARENTERAL at 00:02

## 2017-04-07 RX ADMIN — MESALAMINE 400 MG: 400 CAPSULE, DELAYED RELEASE ORAL at 20:16

## 2017-04-07 RX ADMIN — PIPERACILLIN AND TAZOBACTAM 3.38 G: 3; .375 INJECTION, POWDER, LYOPHILIZED, FOR SOLUTION INTRAVENOUS; PARENTERAL at 12:43

## 2017-04-07 ASSESSMENT — ENCOUNTER SYMPTOMS
PALPITATIONS: 0
SHORTNESS OF BREATH: 0
DIZZINESS: 0
HEADACHES: 0
NAUSEA: 0
LOSS OF CONSCIOUSNESS: 0
WEAKNESS: 0
FEVER: 0
SPUTUM PRODUCTION: 0
CHILLS: 0
VOMITING: 0
MYALGIAS: 0
ABDOMINAL PAIN: 1
FALLS: 0
CONSTIPATION: 0
DEPRESSION: 0
DIARRHEA: 0
TINGLING: 0
COUGH: 0
STRIDOR: 0

## 2017-04-07 ASSESSMENT — PAIN SCALES - GENERAL
PAINLEVEL_OUTOF10: 4
PAINLEVEL_OUTOF10: 6
PAINLEVEL_OUTOF10: 3
PAINLEVEL_OUTOF10: 5
PAINLEVEL_OUTOF10: 6
PAINLEVEL_OUTOF10: 8
PAINLEVEL_OUTOF10: 6
PAINLEVEL_OUTOF10: 4
PAINLEVEL_OUTOF10: 4
PAINLEVEL_OUTOF10: 0
PAINLEVEL_OUTOF10: 2
PAINLEVEL_OUTOF10: 4
PAINLEVEL_OUTOF10: 2
PAINLEVEL_OUTOF10: 2
PAINLEVEL_OUTOF10: 0
PAINLEVEL_OUTOF10: 2
PAINLEVEL_OUTOF10: 5
PAINLEVEL_OUTOF10: 8
PAINLEVEL_OUTOF10: 0

## 2017-04-07 ASSESSMENT — LIFESTYLE VARIABLES
EVER FELT BAD OR GUILTY ABOUT YOUR DRINKING: NO
EVER_SMOKED: NEVER
ALCOHOL_USE: YES
TOTAL SCORE: 0
ON A TYPICAL DAY WHEN YOU DRINK ALCOHOL HOW MANY DRINKS DO YOU HAVE: 2
EVER HAD A DRINK FIRST THING IN THE MORNING TO STEADY YOUR NERVES TO GET RID OF A HANGOVER: NO
HAVE YOU EVER FELT YOU SHOULD CUT DOWN ON YOUR DRINKING: NO
TOTAL SCORE: 0
HOW MANY TIMES IN THE PAST YEAR HAVE YOU HAD 5 OR MORE DRINKS IN A DAY: 0
TOTAL SCORE: 0
CONSUMPTION TOTAL: NEGATIVE
HAVE PEOPLE ANNOYED YOU BY CRITICIZING YOUR DRINKING: NO
AVERAGE NUMBER OF DAYS PER WEEK YOU HAVE A DRINK CONTAINING ALCOHOL: 0

## 2017-04-07 NOTE — PROGRESS NOTES
Hospital Medicine Interval Note  Date of Service:  4/7/2017    Chief Complaint  51 y.o.-year-old male admitted 4/6/2017 with abdominal pain.    Interval Problem Update  Pt has had a complicated umbilical hernia repair in past, now back for complication.  Went for surgery today.  Pt had high blood sugars upon arrival, now improved.  Pt seen in ICU, ICU care given.  Discussed patient condition and plan with RN, RT and charge nurse / hot rounds.      Consultants/Specialty  Surgery - Dr Dooley    Disposition  Improved, ok to surgical floor       Review of Systems   Constitutional: Negative for fever, chills and malaise/fatigue.   HENT: Negative for congestion.    Respiratory: Negative for cough, sputum production, shortness of breath and stridor.    Cardiovascular: Negative for chest pain, palpitations and leg swelling.   Gastrointestinal: Positive for abdominal pain. Negative for nausea, vomiting, diarrhea and constipation.   Genitourinary: Negative for dysuria and urgency.   Musculoskeletal: Negative for myalgias and falls.   Neurological: Negative for dizziness, tingling, loss of consciousness, weakness and headaches.   Psychiatric/Behavioral: Negative for depression and suicidal ideas.      Physical Exam Laboratory/Imaging   Filed Vitals:    04/07/17 1430 04/07/17 1445 04/07/17 1500 04/07/17 1600   BP:       Pulse: 61 61 57 75   Temp:       Resp: 12 13 10 39   Height:       Weight:       SpO2: 100% 100% 100% 97%     Physical Exam   Constitutional: He is oriented to person, place, and time. He appears well-developed.  Non-toxic appearance. No distress.   HENT:   Head: Normocephalic and atraumatic.   Mouth/Throat: Oropharynx is clear and moist. No oropharyngeal exudate.   Eyes: Right eye exhibits no discharge. Left eye exhibits no discharge.   Neck: Neck supple. No tracheal deviation, no edema and no erythema present.   Cardiovascular: Normal rate and regular rhythm.  Exam reveals no gallop and no friction rub.     No murmur heard.  Pulmonary/Chest: Effort normal and breath sounds normal. No stridor. No respiratory distress. He has no wheezes. He has no rales. He exhibits no tenderness.   Abdominal: Soft. He exhibits no distension. There is tenderness.   Decreased bowel sounds  Clean and intact dressing over abdomen   Musculoskeletal: Normal range of motion. He exhibits no edema or tenderness.   Lymphadenopathy:     He has no cervical adenopathy.   Neurological: He is alert and oriented to person, place, and time. No cranial nerve deficit.   Skin: Skin is warm and dry. No rash noted. He is not diaphoretic. No erythema.   Psychiatric: He has a normal mood and affect. His behavior is normal. Judgment and thought content normal.   Nursing note and vitals reviewed.   Lab Results   Component Value Date/Time    WBC 3.8* 04/07/2017 02:19 AM    HEMOGLOBIN 11.9* 04/07/2017 02:19 AM    HEMATOCRIT 34.3* 04/07/2017 02:19 AM    PLATELET COUNT 214 04/07/2017 02:19 AM     Lab Results   Component Value Date/Time    SODIUM 140 04/07/2017 02:19 AM    POTASSIUM 3.7 04/07/2017 02:19 AM    CHLORIDE 107 04/07/2017 02:19 AM    CO2 24 04/07/2017 02:19 AM    GLUCOSE 179* 04/07/2017 02:19 AM    BUN 11 04/07/2017 02:19 AM    CREATININE 0.86 04/07/2017 02:19 AM      Assessment/Plan    Sepsis (CMS-HCC)  Assessment & Plan  - d/t infection involving previous umbilical hernia repair  - now s/p surgery  - had I&D of periumbilical abscess  - continue zosyn and diflucan  - await culture results    Hypothyroid  Assessment & Plan  - continue amour thyroid     DKA (diabetic ketoacidoses) (CMS-HCC)  Assessment & Plan  - resolved     Normocytic anemia  Assessment & Plan  - no sign of gross bleeding  - repeat cbc in am    Transaminitis  Assessment & Plan  - mild, repeat cmp in am       Labs reviewed, Medications reviewed and Radiology images reviewed        DVT Prophylaxis: Heparin      Antibiotics: Treating active infection/contamination beyond 24 hours  perioperative coverage

## 2017-04-07 NOTE — PROGRESS NOTES
Spoke with Dr. Dooley at 0745 and verified patient has been NPO. Just spoke with same day surgery and gave update on patient condition. She stated that he is on the schedule tentatively, pending a call back from Dr. Brian.

## 2017-04-07 NOTE — PROGRESS NOTES
Pt arrived back to R-[facility] s/p I&D periumbilical abscess with Dr. Dooley. Incision is now open with packing and abd pads and tape with minimal shadowing noted. Will monitor. Pt is alert and oriented. C/o abd pain 8/10. VSS on 2L NC. Will review orders. Call light within reach.

## 2017-04-07 NOTE — OR NURSING
1101: Rec'd pt from OR with Dr. Pitts, no airway present, vss, no distress noted, breathing is even and unlabored, umbilicus incision area is covered with abd's and cdi, wound under is open and packed,   1107: FSBS-148,   1110: pt c/o discomfort, given sips of water, tolerating well, medicated with iv fent and oral pain meds,hand-off to Shayy LUCERO.

## 2017-04-07 NOTE — CARE PLAN
Problem: Communication  Goal: The ability to communicate needs accurately and effectively will improve  Outcome: PROGRESSING AS EXPECTED  Pt able to communicate without difficulty.     Problem: Safety  Goal: Will remain free from injury  Outcome: PROGRESSING AS EXPECTED  Pt able to verbalize understanding of fall precautions and is following.

## 2017-04-07 NOTE — CARE PLAN
Problem: Venous Thromboembolism (VTW)/Deep Vein Thrombosis (DVT) Prevention:  Goal: Patient will participate in Venous Thrombosis (VTE)/Deep Vein Thrombosis (DVT)Prevention Measures  Intervention: Ensure patient wears graduated elastic stockings (CUONG hose) and/or SCDs, if ordered, when in bed or chair (Remove at least once per shift for skin check)  SCD's in place.  Patient refused 2200 and 0600 heparin to prepare for possible surgery in the morning.      Problem: Pain Management  Goal: Pain level will decrease to patient’s comfort goal  Intervention: Follow pain managment plan developed in collaboration with patient and Interdisciplinary Team  Pain medications per MAR PRN pain

## 2017-04-07 NOTE — PROGRESS NOTES
Patients 2030 FSBG 159. He is scheduled to received 50 units of lantus. Patient will be NPO at midnight, but does not want the medication held, he would just like a smaller dose at this time. Paged Dr. Mcgarry to discuss patients dose. Orders received to give 20 units at this time. Patient satisfied with plan.

## 2017-04-07 NOTE — OR NURSING
Arrived via [place] ;with monitor and O2  Report from Kaitlin LUCERO awake alert, IV intact left 4arm.  Dressing dry intact over umbilicus. Cedric Gurrola and Miah in to talk with pt  Consents signed  To OR

## 2017-04-07 NOTE — PROGRESS NOTES
Received call from Dr. Dooley. MD wants patient to have clear liquids for the night and be NPO at midnight. MD to come see patient in the morning and discuss possible surgery. Updated patient, no further questions at this time.

## 2017-04-07 NOTE — CONSULTS
"Surgical Consult    Date: 4/7/2017    Requesting Physician: Dr. Vázquez    Consulting Physician: Esau Dooley M.D. [facility]    Reason for consultation: drainage from abdominal wound    CC: hyperglycemia    HPI: This is a 51 y.o. male who is well known to me. He is originally post incarcerated umbilical hernia repair. This became infected, and he eventually underwent mesh excision and replacement with biologic mesh. He has since developed drainage from the wound, concerning for abscess or enterocutaneous fistula. He was scheduled for small bowel follow through and abscess drainage. However, he presented with severe hyperglycemia to emergency room in DKA. He had sweats and chills as well. He currently feels well, with only some moderate pain at the site.     Past Medical History   Diagnosis Date   • Migraine    • Gout    • Indigestion    • UC (ulcerative colitis) (CMS-HCC) 3-3-17     \"Five BM's per day, takes Lialda\"   • Difficult intubation 2-2016   • Arthritis 3-3-17     \"Spine\"   • Sepsis (CMS-HCC) 1/21/2016   • Essential hypertension 1/22/2016   • Snoring 3-3-17     Has not had a sleep study   • High cholesterol 3-3-17     Does not currently take medication for   • Congestive heart failure (CMS-HCC) 2-2016      3-3-17 \"Not a current issue\"   • ASTHMA 3-3-17     \"Hasn't had to use inhaler in 2 years\"   • Aspiration pneumonia (CMS-HCC) 3-2016   • Breath shortness 3-3-17     \"With Exercise\"   • Hypothyroid 3-3-17     Takes [place] Thyroid   • Pain 3-3-17     \"Left Flank\"   • Heart burn    • Depression 11/26/2014   • Anesthesia      \"Was difficult to intubate 2-2016\"   • Pancreatitis 2-2016     \"D/T Tradjenta was hospitialized for 15 days\"   • Elevated liver enzymes 2-2016   • Electrolyte imbalance 2-2016   • Kidney stones    • ADRIANE (acute kidney injury) (CMS-HCC) 2/24/2016   • RF (renal failure) 2-2016   • Diabetes (CMS-HCC) 3-3-17     Takes Insulin   • DKA (diabetic ketoacidoses) (CMS-HCC) 2-2016     " "\"10 days on vent with DKA, Renal failure, CHF, elevated liver enzymes and electrolyte imbalance\"   • On mechanically assisted ventilation (CMS-HCC) 2-2016     HX \"On Vent for 10 days at [facility]\".  \"DX Pancreatitis, DKA, CHF, Elevated Liver Enzymes & Electrolyte Imbalance\".       Past Surgical History   Procedure Laterality Date   • Carmenza by laparoscopy  2005   • Colonoscopy with biopsy  11/26/2014     Performed by Solo Higginbotham M.D. at ENDOSCOPY [facility]   • Umbilical hernia repair N/A 11/6/2016     Procedure: UMBILICAL HERNIA REPAIR;  Surgeon: Esau Dooley M.D.;  Location: SURGERY [facility];  Service:    • Other orthopedic surgery  1997 or 1998     Left Wrist ORIF   • Umbilical hernia repair  3/10/2017     Procedure: UMBILICAL HERNIA REPAIR- INCISION AND DRAINAGE OF UMBILICAL WOUND AND MESH REMOVAL;  Surgeon: Esau Dooley M.D.;  Location: SURGERY SAME DAY [facility];  Service:        Current Facility-Administered Medications   Medication Dose Route Frequency Provider Last Rate Last Dose   • NS infusion 1,000 mL  1,000 mL Intravenous Once Orlando Vázquez M.D.   Stopped at 04/06/17 1100   • buPROPion SR (WELLBUTRIN-SR) tablet 300 mg  300 mg Oral Q EVENING Carrillo Mcgarry M.D.   300 mg at 04/06/17 2033   • insulin glargine (LANTUS) injection 50 Units  50 Units Subcutaneous BID Carrillo Mcgarry M.D.   20 Units at 04/06/17 2100   • mesalamine delayed-release (DELZICOL) capsule 400 mg  400 mg Oral TID Carrillo Mcgarry M.D.   400 mg at 04/06/17 2033   • thyroid (ARMOUR THYROID) tablet 75 mg  75 mg Oral Q EVENING Carrillo Mcgarry M.D.   75 mg at 04/06/17 2033   • NS infusion   Intravenous Continuous Carrillo Mcgarry M.D. 150 mL/hr at 04/07/17 0008     • NS (BOLUS) infusion 500 mL  500 mL Intravenous Once PRN Carrillo Mcgarry M.D.       • heparin injection 5,000 Units  5,000 Units Subcutaneous Q8HRS Carrillo Mcgarry M.D.   Stopped at 04/06/17 1400   • acetaminophen " (TYLENOL) tablet 650 mg  650 mg Oral Q6HRS PRN Carrillo Mcgarry M.D.       • glucose 4 g chewable tablet 16 g  16 g Oral Q15 MIN PRN Carrillo Mcgarry M.D.        And   • dextrose 50% (D50W) injection 25 mL  25 mL Intravenous Q15 MIN PRN Carrillo Mcgarry M.D.       • ondansetron (ZOFRAN) syringe/vial injection 4 mg  4 mg Intravenous Q4HRS PRN Carrillo Mcgarry M.D.       • ondansetron (ZOFRAN ODT) dispertab 4 mg  4 mg Oral Q4HRS PRN Carrillo Mcgarry M.D.       • promethazine (PHENERGAN) tablet 12.5-25 mg  12.5-25 mg Oral Q4HRS PRJT Mcgarry M.D.       • promethazine (PHENERGAN) suppository 12.5-25 mg  12.5-25 mg Rectal Q4HRS PRJT Mcgarry M.D.       • prochlorperazine (COMPAZINE) injection 5-10 mg  5-10 mg Intravenous Q4HRS PRN Carrillo Mcgarry M.D.       • lorazepam (ATIVAN) tablet 0.5 mg  0.5 mg Oral Q6HRS PRJT Mcgarry M.D.        Or   • lorazepam (ATIVAN) injection 0.5 mg  0.5 mg Intravenous Q6HRS PRN Carrillo Mcgarry M.D.       • insulin NPH (HUMULIN,NOVOLIN) injection 10 Units  10 Units Subcutaneous Once Carrillo Mcgarry M.D.   Stopped at 04/06/17 1400   • morphine (pf) 4 mg/ml injection 2-4 mg  2-4 mg Intravenous Q3HRS PRN Carrillo Mcgarry M.D.   4 mg at 04/07/17 0221   • piperacillin-tazobactam (ZOSYN) 3.375 g in  mL IVPB  3.375 g Intravenous Q6HRS Carrillo Mcgarry M.D.   Stopped at 04/07/17 0642   • insulin lispro (HUMALOG) injection 3-14 Units  3-14 Units Subcutaneous Q6HRS Emerson Summers D.O.   3 Units at 04/07/17 0614       Social History     Social History   • Marital Status: Single     Spouse Name: N/A   • Number of Children: N/A   • Years of Education: N/A     Occupational History   • Not on file.     Social History Main Topics   • Smoking status: Never Smoker    • Smokeless tobacco: Not on file   • Alcohol Use: No      Comment: occ   • Drug Use: No   • Sexual Activity: Not on file     Other Topics Concern   • Not on file     Social  "History Narrative    ** Merged History Encounter **         ** Merged History Encounter **            No family history on file.    Allergies:  Peanut-derived; Tradjenta; and Hydrocodone    Review of Systems:  Constitutional: as above  HENT: Negative for hearing loss, ear pain, nosebleeds, congestion, sore throat, neck pain, tinnitus and ear discharge.    Eyes: Negative for blurred vision, double vision, photophobia, pain, discharge and redness.   Respiratory: Negative for cough, hemoptysis, sputum production, shortness of breath, wheezing and stridor.    Cardiovascular: Negative for chest pain, palpitations, orthopnea, claudication, leg swelling and PND.   Gastrointestinal: as above  Genitourinary: Negative for dysuria, urgency, frequency, hematuria and flank pain.   Musculoskeletal: Negative for myalgias, back pain, joint pain and falls.   Skin: Negative for itching and rash.  Neurological: Negative for dizziness, tingling, tremors, sensory change, speech change, focal weakness, seizures, loss of consciousness, weakness and headaches.   Endo/Heme/Allergies: Negative for environmental allergies and polydipsia. Does not bruise/bleed easily.   Psychiatric/Behavioral: Negative for depression, suicidal ideas, hallucinations, memory loss and substance abuse. The patient is not nervous/anxious and does not have insomnia.    Physical Exam:  Blood pressure 144/90, pulse 87, temperature 36.2 °C (97.2 °F), resp. rate 20, height 1.727 m (5' 8\"), weight 104.2 kg (229 lb 11.5 oz), SpO2 97 %.    Constitutional: he is oriented to person, place, and time.  he appears well-developed and well-nourished. No distress.   Head: Normocephalic and atraumatic.   Neck: Normal range of motion. Neck supple. No JVD present. No tracheal deviation present. No thyromegaly present.   Cardiovascular: Normal rate, regular rhythm, normal heart sounds and intact distal pulses.  Exam reveals no gallop and no friction rub.  No murmur " heard.  Pulmonary/Chest: Effort normal and breath sounds normal. No stridor. No respiratory distress. he has no wheezes. She has no rales.   Abdominal: Soft. Bowel sounds are normal. he exhibits no distension and no mass. There is  Tenderness around the umbilicus. Purulent drainage from wound. There is no rebound and no guarding.   Musculoskeletal: Normal range of motion. he exhibits no edema and no tenderness.   Neurological: he is alert and oriented to person, place, and time. he has normal reflexes. No cranial nerve deficit. Coordination normal.   Skin: Skin is warm and dry. No rash noted. he is not diaphoretic. No erythema. No pallor.   Psychiatric: he has a normal mood and affect.  Behavior is normal.       Labs:  Recent Labs      04/06/17   1005  04/07/17   0219   WBC  5.7  3.8*   RBC  4.33*  3.88*   HEMOGLOBIN  13.2*  11.9*   HEMATOCRIT  38.2*  34.3*   MCV  88.2  88.4   MCH  30.5  30.7   MCHC  34.6  34.7   RDW  40.5  41.2   PLATELETCT  252  214   MPV  9.5  9.4     Recent Labs      04/06/17   1005  04/06/17   1422  04/07/17   0219   SODIUM  131*  137  140   POTASSIUM  4.8  3.4*  3.7   CHLORIDE  98  110  107   CO2  19*  19*  24   GLUCOSE  576*  275*  179*   BUN  13  10  11   CREATININE  1.04  0.61  0.86   CALCIUM  9.3  7.4*  8.0*         Recent Labs      04/06/17   1005  04/07/17   0219   ASTSGOT  15  163*   ALTSGPT  19  107*   TBILIRUBIN  0.4  0.6   ALKPHOSPHAT  81  78   GLOBULIN  2.9  2.5       Radiology:  Small bowel series shows no obstruction and no evidence of enterocutaneous fistula    Assessment: This is a 51 y.o. Who was admitted with hyperglycemia who has infected umbilical hernia repair site. No evidence of fistula on SBFT, although the possibility of very small fistula not entirely ruled out. In any case, he will need incision and drainage today    Plan: to OR for I and D. NPO in meantime.     Thank you very much for this consultation.    Esau Dooley M.D.  [facility]  [phone]

## 2017-04-07 NOTE — PROGRESS NOTES
1657 arrived from ER after radiology with RN and transport, c/o some abd discomfort, IV saline lock in place, abd drsg changed in radiology, A,A&O, ROJAS = and upon request, connected to monitor, IV started, assessment started

## 2017-04-07 NOTE — OP REPORT
Operative Report    Date: 4/7/2017    Surgeon: Esau Dooley M.D.    Assistant: none    Anesthesiologist: Dr. Pitts    Pre-operative Diagnosis: skylar-umbilical abscess    Post-operative Diagnosis: same     Procedure: I and D skylar-umbilical abscess    Indications: 51 year old male with previous mesh infection after umbilical hernia repair. He had continued drainage after replacement of mesh with biologic. He presented in DKA, with evidence of continued infection in the area.      The risks of bleeding, infection, damage to surrounding structures, need for reoperation were discussed with the patient. The patient was given a chance to ask questions, and all his questions were answered. The patient demonstrates adequate understanding, seems pleased with the plan, and wishes to proceed.    Findings: small amount of purulent drainage in wound. Repair intact.    Procedure in detail: The patient was identified in the pre-operative holding area and brought to the operating room. Correct side and site were identified. Pre-operative antibiotics of Ancef were administered prior to the procedure. GETA was smoothly induced. The patient was prepped and draped in the usual sterile fashion.    The previous incision was opened. A pocket of purulent material was encountered and cultured. The wound was thoroughly irrigated using the pulsevac. The wound was packed with iodoform gauze, dressed with ABD and tape.     The patient was awakened from general anesthetic, and was taken to the recovery room in stable condition.    Sponge and needle counts were correct at the end of the case.     Specimen: wound culture     EBL: minimal      Dispo: to PACU    Esau Dooley M.D.  [facility]  [phone]

## 2017-04-07 NOTE — DISCHARGE PLANNING
S: Chart notes indicate the pt is a 50 yo male that [place] [facility] [place] 4[place]6/2017 for a blood sugar level of over 400. His admit dx was DKA.     B: Chart notes indicate the pt has a past medical hx of; Insulin-dependent diabetes with high insulin demands, recent complicated umbilical hernia repair with enterocutaneous fistula    and mesh rejection.  The patient has continued purulence and drainage followed by Dr. Dooley, history of ulcerative colitis, lower back pain, hypothyroidism, anxiety, asthma, and gout. Pt has a past surgical hx of; umbilical hernia repair and revisions, cholecystectomy, left wrist surgery, and lumbar surgery. The pt is a full code. The pt list his SO as his emergency contact. There is no AD or NOK listed. The pt lives locally.     A: Pt was in surgery for a Katheryn-Umbilical Abscess during AM Rounds so the pt was not discussed.     R: The pt's dc needs are unknown at this time.

## 2017-04-07 NOTE — H&P
IDENTIFICATION:  A 51-year-old male.    PRIMARY CARE PHYSICIAN:  Rodolfo Stein MD and GI Solo Higginbotham MD   and surgery is Esau Dooley MD.    CHIEF COMPLAINT:  Uncontrolled blood sugars, abdominal pain and increased   tenderness around the surgical umbilical hernia area.    HISTORY OF PRESENT ILLNESS:  A 51-year-old male who had complicated umbilical   hernia repair with mesh rejection and replacement with a Biomesh, the patient   developed an enterocutaneous fistula.  He has been seen by Dr. Dooley every   week.  The patient presents with uncontrolled blood sugars into the 500s and   since surgery today worsens, increasing abdominal wall tenderness around the   umbilical area.  The patient was seen in the emergency room, was found he had   a significant elevation of his blood sugar to 576.  He has early DKA with a   lactic acid of 6.  The patient was referred to Dr. Dooley for further   evaluation and treatment as well.  The patient has been on and off   antibiotics, recently on a course of Bactrim.  The patient denies any fevers   or chills, although he had some low-grade temperatures at home, some nausea,   vomiting and he has kind of chronic diarrhea from his ulcerative colitis   history.    ALLERGIES:  PEANUTS, TRADJENTA, HYDROCODONE.    MEDICATION:  Medication regimen on admission as follows:  1.  Wellbutrin  mg daily.  2.  Vitamin D3 5000 units daily.  3.  Insulin glargine Toujeo SoloStar 62 units subQ b.i.d.  4.  Humalog per sliding scale.  5.  Lialda 1.2 g 2 tablets daily.  6.  Rifampin 300 mg b.i.d.  7.  Bactrim 400/80 one tablet p.o. b.i.d.  8.  Testosterone every Tuesday.  9.  [place] Thyroid, a total of 75 mg q.p.m.    PAST MEDICAL HISTORY:  1.  Insulin-dependent diabetes with high insulin demands.  2.  Recent complicated umbilical hernia repair with enterocutaneous fistula   and mesh rejection.  The patient has continued purulence and drainage followed   by Dr. Dooley.  3.  History of ulcerative colitis.  4.  History of low back pain.  5.  Hypothyroidism.  6.  Anxiety.  7.  History of asthma.  8. History of gout.    PAST SURGICAL HISTORY:  1.  History of umbilical hernia repair and revisions per Dr. Dooley.  2.  Cholecystectomy.  3.  History of left wrist surgery.  4.  History of lumbar surgery.    SOCIAL HISTORY:  The patient is a nonsmoker, nondrinker.  He used to be a RN   at [employer].    FAMILY HISTORY:  Positive for a non-Hodgkin's lymphoma in his mother and   father without condition.    REVIEW OF SYSTEMS:  Negative for AMA and CMS criteria as outlined in HPI.    PHYSICAL EXAMINATION:  VITAL SIGNS:  The patient is found with the following vital signs:    Temperature 36.9, pulse 98, respiration 15, blood pressure 144/90.  The   patient is saturating well on room air.  GENERAL:  This is a pleasant  male in no acute distress, no acute   respiratory distress.  Well developed, well nourished.  HEENT:  Normocephalic, atraumatic.  Mucous membranes moist.  Nasopharynx   otherwise clear.  NECK:  Stiff range of motion.  No lymphadenopathy or thyromegaly.  CHEST:  Normal bony structures.  LUNGS:  Clear to auscultation anteriorly.  HEART:  Regular rate.  S1 and S2 are normal.  No S3, S4.  ABDOMEN:  Protuberant.  There is umbilical hernia repair and a dressing   currently, surrounding erythema and tenderness, positive bowel sounds.  GENITOURINARY:  Normal external male genitalia.  RECTAL:  Exam is deferred.  MUSCULOSKELETAL:  No clubbing, cyanosis, or edema.  NEUROLOGIC:  The patient is alert and oriented x4.  Cranial nerves II-XII   grossly intact.  No sensory loss is elicited.    LABORATORY DATA AND IMAGING:  Show a white cell count of 5.7, hemoglobin 13.2,   hematocrit 38.2, platelet count of 252.  Sodium is 131, potassium 4.8,   chloride is 98, bicarbonate 19.  Glucose is 576, lipase is 9, lactic acid is   6.  Urinalysis here is negative.  CT abdomen and pelvis shows a  periumbilical   wound with induration, mild inflammatory stranding, hepatic steatosis.  There   is status post cholecystectomy sequelae of prior granulomatous exposure.    Chest x-ray is negative.    ASSESSMENT AND PLAN:  1.  Early diabetic ketoacidosis.  The patient will be vigorously hydrated.  We   will give some additional long-acting insulin and continue with aggressive   sliding scale.  The patient might require some 150 protocol if he does not   correct, we will follow closely his BMPs and try to correct his electrolytes   as well.  2.  Umbilical hernia repair complicated with rejection and difficulties.  The   patient will be on empiric antibiotics and followed by Dr. Dooley.  3.  Uncontrolled diabetes since the surgery and infection.  4.  History of ulcerative colitis.  5.  History of hypothyroidism.  6.  Hyponatremia secondary to uncontrolled blood sugar.  7.  Mild anemia.    OVERALL PLAN:  The patient at this time will be admitted to ICU until his   blood sugars are controlled and his infectious status is improved and lactic   acid is normalized.  The patient will be seen by Dr. Dooley and the patient   will be aggressively fluid resuscitated, hydrated, sepsis protocol.  The   patient at this time will not be on a diabetic ketoacidosis protocol and   certainly if he does not correct with fluids and insulin correction, he might   require more aggressive treatment.  Patient understands and will be admitted   in a stabilized, but overall very ill condition likely requiring 2+   overnights.  The patient at this time is n.p.o. for possibility of surgical   intervention.  For full details, please refer to the computer system and paper   chart.    Time spent on admission 45 minutes.       ____________________________________     MD JEANINE MORE / STORM    DD:  04/06/2017 13:43:57  DT:  04/06/2017 17:55:20    D#:  [id number]  Job#:  [id number]

## 2017-04-07 NOTE — OR NURSING
1110 Report given from Rena Hernandez.  Abdomen dressing has scant amount of bloody drainage noted.      1210 Report called to receiving JAZMINE Madrigal.      1230 Transported to PT room via [facility].  Pt has all belongings.  Minimal drainage noted on abdomen dressing.

## 2017-04-08 LAB
ALBUMIN SERPL BCP-MCNC: 3.3 G/DL (ref 3.2–4.9)
ALBUMIN/GLOB SERPL: 1.4 G/DL
ALP SERPL-CCNC: 112 U/L (ref 30–99)
ALT SERPL-CCNC: 187 U/L (ref 2–50)
ANION GAP SERPL CALC-SCNC: 11 MMOL/L (ref 0–11.9)
AST SERPL-CCNC: 186 U/L (ref 12–45)
BILIRUB SERPL-MCNC: 0.6 MG/DL (ref 0.1–1.5)
BUN SERPL-MCNC: 8 MG/DL (ref 8–22)
CALCIUM SERPL-MCNC: 7.8 MG/DL (ref 8.5–10.5)
CHLORIDE SERPL-SCNC: 107 MMOL/L (ref 96–112)
CO2 SERPL-SCNC: 24 MMOL/L (ref 20–33)
CREAT SERPL-MCNC: 0.76 MG/DL (ref 0.5–1.4)
ERYTHROCYTE [DISTWIDTH] IN BLOOD BY AUTOMATED COUNT: 42.2 FL (ref 35.9–50)
GFR SERPL CREATININE-BSD FRML MDRD: >60 ML/MIN/1.73 M 2
GLOBULIN SER CALC-MCNC: 2.3 G/DL (ref 1.9–3.5)
GLUCOSE BLD-MCNC: 110 MG/DL (ref 65–99)
GLUCOSE BLD-MCNC: 114 MG/DL (ref 65–99)
GLUCOSE BLD-MCNC: 116 MG/DL (ref 65–99)
GLUCOSE BLD-MCNC: 136 MG/DL (ref 65–99)
GLUCOSE BLD-MCNC: 146 MG/DL (ref 65–99)
GLUCOSE BLD-MCNC: 167 MG/DL (ref 65–99)
GLUCOSE SERPL-MCNC: 139 MG/DL (ref 65–99)
HCT VFR BLD AUTO: 33.8 % (ref 42–52)
HGB BLD-MCNC: 11.5 G/DL (ref 14–18)
MCH RBC QN AUTO: 30.3 PG (ref 27–33)
MCHC RBC AUTO-ENTMCNC: 34 G/DL (ref 33.7–35.3)
MCV RBC AUTO: 88.9 FL (ref 81.4–97.8)
PLATELET # BLD AUTO: 192 K/UL (ref 164–446)
PMV BLD AUTO: 9.2 FL (ref 9–12.9)
POTASSIUM SERPL-SCNC: 3.5 MMOL/L (ref 3.6–5.5)
PROT SERPL-MCNC: 5.6 G/DL (ref 6–8.2)
RBC # BLD AUTO: 3.8 M/UL (ref 4.7–6.1)
SODIUM SERPL-SCNC: 142 MMOL/L (ref 135–145)
WBC # BLD AUTO: 3.9 K/UL (ref 4.8–10.8)

## 2017-04-08 PROCEDURE — A9270 NON-COVERED ITEM OR SERVICE: HCPCS | Performed by: HOSPITALIST

## 2017-04-08 PROCEDURE — 770001 HCHG ROOM/CARE - MED/SURG/GYN PRIV*

## 2017-04-08 PROCEDURE — 36415 COLL VENOUS BLD VENIPUNCTURE: CPT

## 2017-04-08 PROCEDURE — 80053 COMPREHEN METABOLIC PANEL: CPT

## 2017-04-08 PROCEDURE — 700111 HCHG RX REV CODE 636 W/ 250 OVERRIDE (IP): Performed by: HOSPITALIST

## 2017-04-08 PROCEDURE — 700102 HCHG RX REV CODE 250 W/ 637 OVERRIDE(OP): Performed by: HOSPITALIST

## 2017-04-08 PROCEDURE — 82962 GLUCOSE BLOOD TEST: CPT | Mod: 91

## 2017-04-08 PROCEDURE — 700105 HCHG RX REV CODE 258: Performed by: HOSPITALIST

## 2017-04-08 PROCEDURE — 700102 HCHG RX REV CODE 250 W/ 637 OVERRIDE(OP): Performed by: INTERNAL MEDICINE

## 2017-04-08 PROCEDURE — A9270 NON-COVERED ITEM OR SERVICE: HCPCS | Performed by: INTERNAL MEDICINE

## 2017-04-08 PROCEDURE — 99232 SBSQ HOSP IP/OBS MODERATE 35: CPT | Performed by: HOSPITALIST

## 2017-04-08 PROCEDURE — 85027 COMPLETE CBC AUTOMATED: CPT

## 2017-04-08 RX ORDER — HYDROMORPHONE HYDROCHLORIDE 2 MG/1
2 TABLET ORAL EVERY 4 HOURS PRN
Status: DISCONTINUED | OUTPATIENT
Start: 2017-04-08 | End: 2017-04-10 | Stop reason: HOSPADM

## 2017-04-08 RX ORDER — HYDROMORPHONE HYDROCHLORIDE 2 MG/1
2 TABLET ORAL EVERY 6 HOURS PRN
Status: DISCONTINUED | OUTPATIENT
Start: 2017-04-08 | End: 2017-04-08

## 2017-04-08 RX ORDER — POTASSIUM CHLORIDE 750 MG/1
40 TABLET, FILM COATED, EXTENDED RELEASE ORAL ONCE
Status: COMPLETED | OUTPATIENT
Start: 2017-04-08 | End: 2017-04-08

## 2017-04-08 RX ORDER — HYDROMORPHONE HYDROCHLORIDE 2 MG/1
2 TABLET ORAL EVERY 8 HOURS PRN
Status: ON HOLD | COMMUNITY
End: 2017-04-10

## 2017-04-08 RX ADMIN — ACETAMINOPHEN 650 MG: 325 TABLET, FILM COATED ORAL at 13:50

## 2017-04-08 RX ADMIN — PIPERACILLIN AND TAZOBACTAM 3.38 G: 3; .375 INJECTION, POWDER, LYOPHILIZED, FOR SOLUTION INTRAVENOUS; PARENTERAL at 06:24

## 2017-04-08 RX ADMIN — INSULIN GLARGINE 50 UNITS: 100 INJECTION, SOLUTION SUBCUTANEOUS at 22:26

## 2017-04-08 RX ADMIN — PIPERACILLIN AND TAZOBACTAM 3.38 G: 3; .375 INJECTION, POWDER, LYOPHILIZED, FOR SOLUTION INTRAVENOUS; PARENTERAL at 23:18

## 2017-04-08 RX ADMIN — FLUCONAZOLE 200 MG: 200 TABLET ORAL at 09:00

## 2017-04-08 RX ADMIN — SODIUM CHLORIDE: 9 INJECTION, SOLUTION INTRAVENOUS at 02:51

## 2017-04-08 RX ADMIN — SODIUM CHLORIDE: 9 INJECTION, SOLUTION INTRAVENOUS at 15:51

## 2017-04-08 RX ADMIN — BUPROPION HYDROCHLORIDE 300 MG: 150 TABLET, FILM COATED, EXTENDED RELEASE ORAL at 21:20

## 2017-04-08 RX ADMIN — PIPERACILLIN AND TAZOBACTAM 3.38 G: 3; .375 INJECTION, POWDER, LYOPHILIZED, FOR SOLUTION INTRAVENOUS; PARENTERAL at 17:46

## 2017-04-08 RX ADMIN — HYDROMORPHONE HYDROCHLORIDE 2 MG: 2 TABLET ORAL at 21:13

## 2017-04-08 RX ADMIN — ONDANSETRON 4 MG: 2 INJECTION INTRAMUSCULAR; INTRAVENOUS at 08:58

## 2017-04-08 RX ADMIN — HEPARIN SODIUM 5000 UNITS: 5000 INJECTION, SOLUTION INTRAVENOUS; SUBCUTANEOUS at 21:20

## 2017-04-08 RX ADMIN — SODIUM CHLORIDE: 9 INJECTION, SOLUTION INTRAVENOUS at 23:18

## 2017-04-08 RX ADMIN — POTASSIUM CHLORIDE 40 MEQ: 750 TABLET, FILM COATED, EXTENDED RELEASE ORAL at 13:48

## 2017-04-08 RX ADMIN — HYDROMORPHONE HYDROCHLORIDE 2 MG: 2 TABLET ORAL at 13:48

## 2017-04-08 RX ADMIN — MORPHINE SULFATE 4 MG: 4 INJECTION INTRAVENOUS at 22:34

## 2017-04-08 RX ADMIN — HEPARIN SODIUM 5000 UNITS: 5000 INJECTION, SOLUTION INTRAVENOUS; SUBCUTANEOUS at 13:50

## 2017-04-08 RX ADMIN — INSULIN GLARGINE 50 UNITS: 100 INJECTION, SOLUTION SUBCUTANEOUS at 08:56

## 2017-04-08 RX ADMIN — PIPERACILLIN AND TAZOBACTAM 3.38 G: 3; .375 INJECTION, POWDER, LYOPHILIZED, FOR SOLUTION INTRAVENOUS; PARENTERAL at 00:03

## 2017-04-08 RX ADMIN — MORPHINE SULFATE 2 MG: 4 INJECTION INTRAVENOUS at 02:54

## 2017-04-08 RX ADMIN — MORPHINE SULFATE 2 MG: 4 INJECTION INTRAVENOUS at 15:43

## 2017-04-08 RX ADMIN — PIPERACILLIN AND TAZOBACTAM 3.38 G: 3; .375 INJECTION, POWDER, LYOPHILIZED, FOR SOLUTION INTRAVENOUS; PARENTERAL at 11:41

## 2017-04-08 RX ADMIN — ACETAMINOPHEN 650 MG: 325 TABLET, FILM COATED ORAL at 06:34

## 2017-04-08 RX ADMIN — MESALAMINE 400 MG: 400 CAPSULE, DELAYED RELEASE ORAL at 16:37

## 2017-04-08 RX ADMIN — HEPARIN SODIUM 5000 UNITS: 5000 INJECTION, SOLUTION INTRAVENOUS; SUBCUTANEOUS at 06:24

## 2017-04-08 RX ADMIN — HYDROMORPHONE HYDROCHLORIDE 2 MG: 2 TABLET ORAL at 09:02

## 2017-04-08 RX ADMIN — MESALAMINE 400 MG: 400 CAPSULE, DELAYED RELEASE ORAL at 22:26

## 2017-04-08 RX ADMIN — LEVOTHYROXINE, LIOTHYRONINE 75 MG: 19; 4.5 TABLET ORAL at 22:26

## 2017-04-08 RX ADMIN — MESALAMINE 400 MG: 400 CAPSULE, DELAYED RELEASE ORAL at 09:00

## 2017-04-08 RX ADMIN — SODIUM CHLORIDE: 9 INJECTION, SOLUTION INTRAVENOUS at 10:00

## 2017-04-08 ASSESSMENT — ENCOUNTER SYMPTOMS
SPEECH CHANGE: 0
SHORTNESS OF BREATH: 0
CHILLS: 0
NAUSEA: 0
TINGLING: 0
LOSS OF CONSCIOUSNESS: 0
DIZZINESS: 0
MYALGIAS: 0
VOMITING: 0
ABDOMINAL PAIN: 1
CONSTIPATION: 0
NAUSEA: 1
HEADACHES: 0
FEVER: 0
FALLS: 0
DIARRHEA: 1
COUGH: 0
DEPRESSION: 0
SPUTUM PRODUCTION: 0

## 2017-04-08 ASSESSMENT — PAIN SCALES - GENERAL
PAINLEVEL_OUTOF10: 7

## 2017-04-08 NOTE — PROGRESS NOTES
Infectious Disease Progress Note    Author: Anai Santoro M.D. Date of service & Time created: 2017  4:17 PM    Chief Complaint:  Chief Complaint   Patient presents with   • High Blood Sugar     Pt reports blood sugars have been running in the 200's-400's for the past few weeks.  FSBS 486 at home per pt.    • N/V   • Fatigue       Interval History:  51 year old male seen for infected umblical hernia repair site  - no fevers. Some abdominal pain  Labs Reviewed, Medications Reviewed, Radiology Reviewed and Wound Reviewed.    Review of Systems:  Review of Systems   Constitutional: Positive for malaise/fatigue. Negative for fever.   Respiratory: Negative for cough and shortness of breath.    Gastrointestinal: Positive for nausea and abdominal pain.   Genitourinary: Negative for dysuria.   Musculoskeletal: Negative for myalgias.   Neurological: Negative for speech change and headaches.       Hemodynamics:  Temp (24hrs), Av.3 °C (97.4 °F), Min:36.2 °C (97.1 °F), Max:36.6 °C (97.8 °F)  Temperature: 36.6 °C (97.8 °F)  Pulse  Av.8  Min: 57  Max: 102Heart Rate (Monitored): 64  Blood Pressure: 110/62 mmHg       Physical Exam:  Physical Exam   Constitutional: He is oriented to person, place, and time. No distress.   HENT:   Mouth/Throat: No oropharyngeal exudate.   Eyes: No scleral icterus.   Neck: Neck supple.   Cardiovascular: Regular rhythm.    No murmur heard.  Pulmonary/Chest: He has no wheezes. He has no rales.   Abdominal: Soft. There is tenderness.   umblical wound packed   Musculoskeletal: He exhibits no edema.   Neurological: He is alert and oriented to person, place, and time.   Vitals reviewed.      Meds:    Current facility-administered medications:   •  HYDROmorphone (DILAUDID) tablet 2 mg, 2 mg, Oral, Q4HRS PRN, Adrián Santos M.D., 2 mg at 17 1348  •  fluconazole (DIFLUCAN) tablet 200 mg, 200 mg, Oral, DAILY, Anai Santoro M.D., 200 mg at 17 0900  •  buPROPion SR (WELLBUTRIN-SR)  tablet 300 mg, 300 mg, Oral, Q EVENING, Carrillo Mcgarry M.D., 300 mg at 04/07/17 2016  •  insulin glargine (LANTUS) injection 50 Units, 50 Units, Subcutaneous, BID, Carrillo Mcgarry M.D., 50 Units at 04/08/17 0856  •  mesalamine delayed-release (DELZICOL) capsule 400 mg, 400 mg, Oral, TID, Carrillo Mcgarry M.D., 400 mg at 04/08/17 0900  •  thyroid (ARMOUR THYROID) tablet 75 mg, 75 mg, Oral, Q EVENING, Carrillo Mcgarry M.D., 75 mg at 04/07/17 2016  •  NS infusion, , Intravenous, Continuous, Carrillo Mcgarry M.D., Last Rate: 150 mL/hr at 04/08/17 1551  •  NS (BOLUS) infusion 500 mL, 500 mL, Intravenous, Once PRN, Carrillo Mcgarry M.D.  •  heparin injection 5,000 Units, 5,000 Units, Subcutaneous, Q8HRS, Carrillo Mcgarry M.D., 5,000 Units at 04/08/17 1350  •  acetaminophen (TYLENOL) tablet 650 mg, 650 mg, Oral, Q6HRS PRN, Carrillo Mcgarry M.D., 650 mg at 04/08/17 1350  •  Action is required: Protocol 1073 Hypoglycemia has been implemented, , , Once **AND** Protocol 1073 Inclusion Criteria, , , CONTINUOUS **AND** Protocol 1073 NOTIFY, , , Once **AND** Protocol 1073 Initiate protocol immediately if FSBG is less than or equal to 70 mg/dL, , , CONTINUOUS **AND** glucose 4 g chewable tablet 16 g, 16 g, Oral, Q15 MIN PRN **AND** dextrose 50% (D50W) injection 25 mL, 25 mL, Intravenous, Q15 MIN PRN, Carrillo Mcgarry M.D.  •  ondansetron (ZOFRAN) syringe/vial injection 4 mg, 4 mg, Intravenous, Q4HRS PRN, Carrillo Mcgarry M.D., 4 mg at 04/08/17 0858  •  ondansetron (ZOFRAN ODT) dispertab 4 mg, 4 mg, Oral, Q4HRS PRN, Carrillo Mcgarry M.D., 4 mg at 04/07/17 1808  •  promethazine (PHENERGAN) tablet 12.5-25 mg, 12.5-25 mg, Oral, Q4HRS PRN, Carrillo Mcgarry M.D.  •  promethazine (PHENERGAN) suppository 12.5-25 mg, 12.5-25 mg, Rectal, Q4HRS PRN, Carrillo Mcgarry M.D.  •  prochlorperazine (COMPAZINE) injection 5-10 mg, 5-10 mg, Intravenous, Q4HRS PRN, Carrillo Mcgarry M.D.  •  lorazepam  (ATIVAN) tablet 0.5 mg, 0.5 mg, Oral, Q6HRS PRN **OR** lorazepam (ATIVAN) injection 0.5 mg, 0.5 mg, Intravenous, Q6HRS PRN, Carrillo Mcgarry M.D.  •  morphine (pf) 4 mg/ml injection 2-4 mg, 2-4 mg, Intravenous, Q3HRS PRN, Carrillo Mcgarry M.D., 2 mg at 04/08/17 1543  •  piperacillin-tazobactam (ZOSYN) 3.375 g in  mL IVPB, 3.375 g, Intravenous, Q6HRS, Carrillo Mcgarry M.D., Stopped at 04/08/17 1211  •  insulin lispro (HUMALOG) injection 3-14 Units, 3-14 Units, Subcutaneous, Q6HRS, Emerson Summers D.O., Stopped at 04/07/17 1200    Labs:  Recent Labs      04/06/17   1005  04/07/17   0219 04/08/17 0417   WBC  5.7  3.8*  3.9*   RBC  4.33*  3.88*  3.80*   HEMOGLOBIN  13.2*  11.9*  11.5*   HEMATOCRIT  38.2*  34.3*  33.8*   MCV  88.2  88.4  88.9   MCH  30.5  30.7  30.3   RDW  40.5  41.2  42.2   PLATELETCT  252  214  192   MPV  9.5  9.4  9.2   NEUTSPOLYS  71.00  52.90   --    LYMPHOCYTES  21.60*  37.90   --    MONOCYTES  5.20  6.50   --    EOSINOPHILS  1.20  1.60   --    BASOPHILS  0.50  0.30   --      Recent Labs      04/06/17   1422  04/07/17   0219  04/08/17 0417   SODIUM  137  140  142   POTASSIUM  3.4*  3.7  3.5*   CHLORIDE  110  107  107   CO2  19*  24  24   GLUCOSE  275*  179*  139*   BUN  10  11  8     Recent Labs      04/06/17   1005  04/06/17   1422  04/07/17   0219  04/08/17 0417   ALBUMIN  4.1   --   3.3  3.3   TBILIRUBIN  0.4   --   0.6  0.6   ALKPHOSPHAT  81   --   78  112*   TOTPROTEIN  7.0   --   5.8*  5.6*   ALTSGPT  19   --   107*  187*   ASTSGOT  15   --   163*  186*   CREATININE  1.04  0.61  0.86  0.76       Imaging:  Ct-abdomen-pelvis With    4/6/2017 4/6/2017 12:04 PM HISTORY/REASON FOR EXAM:  Pain. Nausea and vomiting. TECHNIQUE/EXAM DESCRIPTION:  CT scan of the abdomen and pelvis with contrast. Contrast-enhanced helical scanning was obtained from the diaphragmatic domes through the pubic symphysis following the bolus administration of nonionic contrast without complication. 100  mL of Omnipaque 350 nonionic contrast was administered without complication. Low dose optimization technique was utilized for this CT exam including automated exposure control and adjustment of the mA and/or kV according to patient size. COMPARISON: 3/21/2017 FINDINGS: Lung bases: There is mild bibasilar atelectasis. There is a calcified subcarinal lymph node. Abdomen: No free air seen in the abdomen or pelvis. The liver is hypodense compatible with hepatic steatosis. There is a tiny calcified granuloma within the liver. No adrenal mass is identified. There is no evidence of hydronephrosis. Pancreas is unremarkable. Aorta is not aneurysmal. There is minimal atherosclerotic plaque. There are small inguinal, retroperitoneal and mesenteric lymph nodes. There is no evidence of bowel obstruction. Terminal ileum and visualized appendix are unremarkable. Patient is status post cholecystectomy. There is no biliary ductal dilatation. Portal vein is patent. Bladder is mildly distended. Calcific densities in the pelvis likely represent phleboliths. No free fluid is seen in the abdomen or pelvis. There is a periumbilical wound with surrounding infiltration and mild stranding. Degenerative changes are seen in the spine.     4/6/2017  Periumbilical wound with induration and mild inflammatory stranding. Hepatic steatosis. Status post cholecystectomy. Sequelae of prior granulomatous exposure.     Ct-abdomen-pelvis With    3/21/2017  3/21/2017 1:01 PM HISTORY/REASON FOR EXAM:  Pain s/p hernia repair 10 days ago, drainage and 6/10 abdominal pain to site TECHNIQUE/EXAM DESCRIPTION:   CT scan of the abdomen and pelvis with contrast. Contrast-enhanced helical scanning was obtained from the diaphragmatic domes through the pubic symphysis following the bolus administration of nonionic contrast without complication. 100 mL of Omnipaque 350 nonionic contrast was administered without complication. Low dose optimization technique was utilized  for this CT exam including automated exposure control and adjustment of the mA and/or kV according to patient size. COMPARISON: 2/19/2017. FINDINGS: Lung bases: No pulmonary nodules at the lung bases. No pleural or pericardial fluid. Abdomen: The liver is unremarkable. The spleen is unremarkable. The pancreas is unremarkable. The gallbladder is surgically absent. The adrenal glands are normal in size. The kidneys enhance symmetrically. The abdominal aorta is normal in caliber. There is no lymphadenopathy. No bowel wall thickening or bowel dilatation. Normal appendix. Postsurgical change in the anterior abdominal wall at the level of the umbilicus with some fluid and gas within the subcutaneous tissue. There appears to be gas in the peritoneum underneath the mesh. Pelvis: Unremarkable urinary bladder and prostate. No lymph node enlargement, free fluid, or free air in the abdomen or pelvis. No aggressive bone lesions are seen. ___________________________________     3/21/2017  1. Soft tissue stranding with several foci within the subcutaneous tissue as well as peritoneum (likely deep to the mesh), postsurgical change versus superimposed infection.    Dx-chest-portable (1 View)    4/6/2017 4/6/2017 10:57 AM HISTORY/REASON FOR EXAM:  Weakness. TECHNIQUE/EXAM DESCRIPTION AND NUMBER OF VIEWS: Single portable view of the chest. COMPARISON: 10/25/2016 FINDINGS: The heart is not enlarged. No focal consolidation, pleural effusion or pneumothorax is identified.  Costophrenic angles are sharp.     4/6/2017  No acute cardiopulmonary process is seen.    Dx-small Bowel Series    4/6/2017 4/6/2017 7:57 PM HISTORY/REASON FOR EXAM:  Nausea and vomiting. Drainage post hernia repair. TECHNIQUE/EXAM DESCRIPTION AND NUMBER OF VIEWS: Air contrast small bowel follow-through performed. Fluoroscopic images were obtained. 0 fluoroscopic images obtained. Total fluoroscopy time: 0 minutes COMPARISON: CT from the same day FINDINGS:  image  demonstrates contrast within the renal collecting systems and bladder. There is a nonspecific bowel gas pattern. Surgical clips are seen in the right upper quadrant. The patient swallowed contrast without difficulty. There was no evidence of small bowel obstruction, abnormal wall thickening or stricture. The duodenojejunal junction was in a normal location. Contrast was seen within the colon by 3 hours and 20 minutes. A lateral view was performed and no enterocutaneous fistula was identified     4/6/2017  No enterocutaneous fistula identified. No evidence of small bowel obstruction.    Ir-us Guided Piv    3/10/2017  EXAMINATION:                                                                    HISTORY/REASON FOR EXAM:  Ultrasound Guided PIV.  TECHNIQUE/EXAM DESCRIPTION AND NUMBER OF VIEWS:  Peripheral IV insertion with ultrasound guidance.  The procedure was prepared using maximal sterile barrier technique including sterile gown, mask, cap, and donning of sterile gloves following appropriate hand hygiene and/or sterile scrub. Patient skin site was prepped with 2% Chlorhexidine solution.   FINDINGS: Peripheral IV insertion with Ultrasound Guidance was performed by qualified imaging nursing staff without the assistance of a Radiologist.     3/10/2017   Ultrasound-guided PERIPHERAL IV INSERTION performed by qualified nursing staff as above.       Micro:  Results     Procedure Component Value Units Date/Time    CULTURE WOUND W/ GRAM STAIN [[id number]]  (Abnormal) Collected:  04/07/17 1042    Order Status:  Completed Specimen Information:  Wound Updated:  04/08/17 1305     Significant Indicator POS (POS)      Source WND      Site Umbilical Abscess      Culture Result Wound -- (A)      Gram Stain Result --      Result:        Many WBCs.  Many Gram positive cocci.  Moderate Gram negative rods.       Culture Result Wound -- (A)      Result:        Group D Enterococcus species  Heavy growth  Combination therapy with  "ampicillin, penicillin, or  vancomycin (for susceptible strains) plus an aminoglycoside  is usually indicated for serious enterococcal infections,  such as endocarditis unless high-level resistance to both  gentamicin and streptomycin is documented; such combinations  are predicted to result in synergistic killing of the  Enterococcus.       Culture Result Wound -- (A)      Result:        Lactose fermenting Gram negative dung  Light growth      GRAM STAIN [[id number]] Collected:  04/07/17 1042    Order Status:  Completed Specimen Information:  Wound Updated:  04/07/17 1748     Significant Indicator .      Source WND      Site Umbilical Abscess      Gram Stain Result --      Result:        Many WBCs.  Many Gram positive cocci.  Moderate Gram negative rods.      ANAEROBIC CULTURE [[id number]] Collected:  04/07/17 1042    Order Status:  Completed Specimen Information:  Other Updated:  04/07/17 1324    FUNGAL CULTURE [[id number]] Collected:  04/07/17 1042    Order Status:  Completed Specimen Information:  Other Updated:  04/07/17 1324    BLOOD CULTURE [[id number]] Collected:  04/06/17 1003    Order Status:  Completed Specimen Information:  Blood from Peripheral Updated:  04/07/17 0720     Significant Indicator NEG      Source BLD      Site PERIPHERAL      Blood Culture --      Result:        No Growth    Note: Blood cultures are incubated for 5 days and  are monitored continuously.Positive blood cultures  are called to the RN and reported as soon as  they are identified.      Narrative:      Per Hospital Policy: Only change Specimen Src: to \"Line\" if  specified by physician order.    BLOOD CULTURE [[id number]] Collected:  04/06/17 1047    Order Status:  Completed Specimen Information:  Blood from Peripheral Updated:  04/07/17 0720     Significant Indicator NEG      Source BLD      Site PERIPHERAL      Blood Culture --      Result:        No Growth    Note: Blood cultures are incubated for 5 days and  are monitored " "continuously.Positive blood cultures  are called to the RN and reported as soon as  they are identified.      Narrative:      Per Hospital Policy: Only change Specimen Src: to \"Line\" if  specified by physician order.    Blood Culture [[id number]]     Order Status:  Sent Specimen Information:  Blood from Peripheral     Blood Culture [[id number]]     Order Status:  Sent Specimen Information:  Blood from Peripheral     URINALYSIS,CULTURE IF INDICATED [[id number]]  (Abnormal) Collected:  04/06/17 1003    Order Status:  Completed Specimen Information:  Urine Updated:  04/06/17 1054     Color Colorless      Character Clear      Specific Gravity 1.025      Ph 5.0      Glucose >1000 (A) mg/dL      Ketones Negative mg/dL      Protein Negative mg/dL      Bilirubin Negative      Nitrite Negative      Leukocyte Esterase Negative      Occult Blood Negative      Micro Urine Req see below      Comment: Microscopic examination not performed when specimen is clear  and chemically negative for protein, blood, leukocyte esterase  and nitrite.          Culture Indicated No UA Culture            Assessment:  Active Hospital Problems    Diagnosis   • Sepsis (CMS-HCC) [A41.9]   • Normocytic anemia [D64.9]   • Transaminitis [R74.0]   • DKA (diabetic ketoacidoses) (CMS-HCC) [E13.10]   • Hypothyroid [E03.9]       Plan:  Surgical site infection  S/p umblical hernia repair 11/6  Removal of mesh 3/10  I&D 4/6  C/s are grp d enterococcus and gnr  Follow the ID  Continue zosyn and diflucan  picc line    DM  Keep blood sugars under control for wound healing    Ulcerative colitis  In remission currently    Obesity       Discussed with internal Medicine  "

## 2017-04-08 NOTE — PROGRESS NOTES
Called report to JAZMINE Dugan for T429. Questions answered. Pt updated on transfer. Transport called and will come with herb for transport.

## 2017-04-08 NOTE — PROGRESS NOTES
Pt admitted to room T429 via transport in [facility] from PACU at 1900. Pain reported at 4 on a scale of 0-10. Oriented to room call light and smoking policy.  Reviewed plan of care (equipment, incentive spirometer, sequential compression devices, medications, activity, diet, fall precautions, skin care, and pain) with patient.

## 2017-04-08 NOTE — PROGRESS NOTES
Received report from day shift RNKailee. Pt A&O x 4. C/o pain at surgical site. Pain medication is not available at this time. Ice pack is given per pt's request. Pt denies nausea, vomiting, chest pain, shortness of breath at this time. Surgical wound at mid abd covered with dressing, old drainage noted, dry and intact. Abd distended, BS x 4 normoactive. +void. Pt ambulates with standby assist, steady gait noted. Plan of care discussed with pt. All needs are met at this time. Call light within reach. Bed locked and in lowest position.

## 2017-04-08 NOTE — ASSESSMENT & PLAN NOTE
- d/t infection involving previous umbilical hernia repair  - now s/p surgery  - had I&D of periumbilical abscess  - continue zosyn and diflucan  - await culture results

## 2017-04-08 NOTE — CARE PLAN
Problem: Safety  Goal: Will remain free from falls  Outcome: PROGRESSING AS EXPECTED  Pt ambulates self, steady gait noted. Pt calls for assistance appropriately.     Problem: Venous Thromboembolism (VTW)/Deep Vein Thrombosis (DVT) Prevention:  Goal: Patient will participate in Venous Thrombosis (VTE)/Deep Vein Thrombosis (DVT)Prevention Measures  Outcome: PROGRESSING AS EXPECTED  SCDs in place. Unfractionated heparin given per MAR.     Problem: Bowel/Gastric:  Goal: Normal bowel function is maintained or improved  Outcome: PROGRESSING AS EXPECTED  +BM, BS x 4 normoactive. Pt tolerating clear liquids.     Problem: Pain Management  Goal: Pain level will decrease to patient’s comfort goal  Outcome: PROGRESSING AS EXPECTED  Pain is controlled with morphine PRN.    Problem: Respiratory:  Goal: Respiratory status will improve  Outcome: PROGRESSING AS EXPECTED  Educated and encouraged to use incentive spirometry.

## 2017-04-08 NOTE — PROGRESS NOTES
"Surgical Progress Note:      No acute events  some nausea    PE:  /62 mmHg  Pulse 77  Temp(Src) 36.6 °C (97.8 °F)  Resp 16  Ht 1.727 m (5' 8\")  Wt 104.2 kg (229 lb 11.5 oz)  BMI 34.94 kg/m2  SpO2 93%    I/O:   Intake/Output Summary (Last 24 hours) at 04/08/17 1534  Last data filed at 04/08/17 1200   Gross per 24 hour   Intake   3270 ml   Output   1900 ml   Net   1370 ml     UOP:  PO:  IV:    GEN: NAD  COR: RRR  PULM: CTA  ABD: soft, ND. TTP near wound, wound packed with sanguinous and mild purulent drainage    Labs:  Recent Labs      04/06/17   1005  04/07/17   0219  04/08/17   0417   WBC  5.7  3.8*  3.9*   RBC  4.33*  3.88*  3.80*   HEMOGLOBIN  13.2*  11.9*  11.5*   HEMATOCRIT  38.2*  34.3*  33.8*   MCV  88.2  88.4  88.9   MCH  30.5  30.7  30.3   RDW  40.5  41.2  42.2   PLATELETCT  252  214  192   MPV  9.5  9.4  9.2   NEUTSPOLYS  71.00  52.90   --    LYMPHOCYTES  21.60*  37.90   --    MONOCYTES  5.20  6.50   --    EOSINOPHILS  1.20  1.60   --    BASOPHILS  0.50  0.30   --      Recent Labs      04/06/17   1422  04/07/17   0219  04/08/17 0417   SODIUM  137  140  142   POTASSIUM  3.4*  3.7  3.5*   CHLORIDE  110  107  107   CO2  19*  24  24   GLUCOSE  275*  179*  139*   BUN  10  11  8         A/P: POD# 1  S/P I and D periumbilical abscess  Dressing changed  Ok for nursing to do dressing changes/pack wound  Continue antibiotics per ID reccs     Esau Dooley M.D.  [facility]  [phone]      "

## 2017-04-08 NOTE — CONSULTS
DATE OF SERVICE:  04/07/2017    REFERRING PHYSICIAN:  Emerson Summers DO.    REASON FOR CONSULTATION:  Intra-abdominal infection.    HISTORY OF PRESENT ILLNESS:  Patient is a 51-year-old white male who has   history of diabetes mellitus, ulcerative colitis, hypertension, obesity.  He   presented to the emergency room back in January of 2016 with nausea, vomiting   and left quadrant pain.  He was diagnosed with ulcerative colitis   exacerbation.  He was again admitted in June of 2016 with altered mental   status and in hyperosmolar hyperglycemic state.  He apparently also had   aspiration pneumonia.  He had a prolonged stay and was discharged on   3/10/2016.  He developed umbilical hernia after that which was incarcerated.    On 11/06/2016, he underwent repair of the incarcerated umbilical hernia with   mesh.  After that, he developed a seroma.  On 11/13, he underwent aspiration   of the umbilical hernia repair site seroma.  He was readmitted in January of 2017 with uncontrolled sugars, nausea, vomiting and abdominal cramps.  He was   diagnosed with medication-induced hepatitis.  He developed a chronic wound at   the site of his umbilical hernia repair and on 03/10/2017, he underwent   explantation of previous umbilical hernia mesh and umbilical hernia repair   with biologic mesh.  He has been having off and on drainage from the area.  He   started having fevers off and on and some night sweats.  He was given Bactrim   and rifampin.  His cultures at that time had grown MSSA, Klebsiella   pneumoniae and viridans strep.  He again came back into the emergency room   because his blood sugars have been out of control and he has been very   fatigued.  Since he has been here, his sugar was 576 and his LFTs were   elevated.  He had a CT scan of his abdomen and pelvis on 04/06 which showed   periumbilical wound with induration and mild inflammatory stranding.  He was   taken to the OR by Dr. Dooley and he underwent I and D  of the periumbilical   abscess.  There was small amount of purulent drainage and in view of all these   issues, infectious disease consult has been called.    REVIEW OF SYSTEMS:  Complains of night sweats, complains of abdominal pain,   complains of the blood sugars not been controlled.  No cough, no shortness of   breath, no chest pain, no rash.  Other review of system is negative per   AMA/CMS criteria.    ALLERGIES:  TRADJENTA, HYDROCODONE, PEANUTS.    PAST MEDICAL HISTORY:  History of diabetes mellitus, obesity, ulcerative   colitis, hypothyroidism, anxiety, asthma, gout.    PAST SURGICAL HISTORY:  As above, cholecystectomy, left wrist surgery, lumbar   surgery.    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    FAMILY HISTORY:  Positive for non-Hodgkin's lymphoma in the mother.    MEDICATIONS:  Currently, he is on Wellbutrin, Lantus, Humalog, Delzicol,   Zosyn, [place] Thyroid.    PHYSICAL EXAMINATION:  GENERAL:  Patient looks tired, but nontoxic.  VITAL SIGNS:  T-max is 97.6, blood pressure is 128/88, pulse is 75.  HEAD AND ENT:  Mucosa is moist.  No oral thrush.  NECK:  Supple.  No lymphadenopathy.  PULMONARY:  Bilateral air entry, clear to auscultation.  CARDIOVASCULAR:  Regular.  No murmurs, no gallops heard.  ABDOMEN:  Significant tenderness, surgical bandage.  EXTREMITIES:  No edema.  CENTRAL NERVOUS SYSTEM:  Alert and oriented x3.  No focal neurological   deficit.    ASSESSMENT:  1.  Infected umbilical hernia site.  2.  Uncontrolled sugars.  3.  Obesity.  4.  Medical problems as above.    RECOMMENDATIONS:  At this time, I would recommend to continue with the Zosyn,   I would recommend at least 2 weeks of intravenous antibiotics.  We will   continue with the wound care, also add Diflucan.  Continue with the supportive   measures.  We will follow the OR cultures.  I have reviewed all the records.    Thank you for the consultation.       ____________________________________     ARYAN ROBLES  MD MARTINEZ / STORM    DD:  04/07/2017 16:26:03  DT:  04/07/2017 20:57:45    D#:  [id number]  Job#:  [id number]

## 2017-04-08 NOTE — PROGRESS NOTES
Surgeon took down abdominal dressing and packed umbilicus wound. Daily dressing change order in computer.

## 2017-04-08 NOTE — RESPIRATORY CARE
COPD EDUCATION by COPD CLINICAL EDUCATOR  4/7/2017 at 5:48 PM by Carleen Ohara     Patient reviewed by COPD education team. Patient does not qualify for COPD program.

## 2017-04-09 ENCOUNTER — APPOINTMENT (OUTPATIENT)
Dept: RADIOLOGY | Facility: MEDICAL CENTER | Age: 52
DRG: 856 | End: 2017-04-09
Attending: INTERNAL MEDICINE
Payer: COMMERCIAL

## 2017-04-09 LAB
ALBUMIN SERPL BCP-MCNC: 3.6 G/DL (ref 3.2–4.9)
ALBUMIN/GLOB SERPL: 1.5 G/DL
ALP SERPL-CCNC: 112 U/L (ref 30–99)
ALT SERPL-CCNC: 124 U/L (ref 2–50)
ANION GAP SERPL CALC-SCNC: 8 MMOL/L (ref 0–11.9)
AST SERPL-CCNC: 69 U/L (ref 12–45)
BACTERIA WND AEROBE CULT: ABNORMAL
BILIRUB SERPL-MCNC: 0.3 MG/DL (ref 0.1–1.5)
BUN SERPL-MCNC: 7 MG/DL (ref 8–22)
CALCIUM SERPL-MCNC: 8.2 MG/DL (ref 8.5–10.5)
CHLORIDE SERPL-SCNC: 111 MMOL/L (ref 96–112)
CO2 SERPL-SCNC: 21 MMOL/L (ref 20–33)
CREAT SERPL-MCNC: 0.77 MG/DL (ref 0.5–1.4)
ERYTHROCYTE [DISTWIDTH] IN BLOOD BY AUTOMATED COUNT: 43.7 FL (ref 35.9–50)
GFR SERPL CREATININE-BSD FRML MDRD: >60 ML/MIN/1.73 M 2
GLOBULIN SER CALC-MCNC: 2.4 G/DL (ref 1.9–3.5)
GLUCOSE BLD-MCNC: 145 MG/DL (ref 65–99)
GLUCOSE BLD-MCNC: 155 MG/DL (ref 65–99)
GLUCOSE BLD-MCNC: 85 MG/DL (ref 65–99)
GLUCOSE BLD-MCNC: 88 MG/DL (ref 65–99)
GLUCOSE SERPL-MCNC: 86 MG/DL (ref 65–99)
GRAM STN SPEC: ABNORMAL
HCT VFR BLD AUTO: 35.3 % (ref 42–52)
HGB BLD-MCNC: 11.8 G/DL (ref 14–18)
MCH RBC QN AUTO: 30 PG (ref 27–33)
MCHC RBC AUTO-ENTMCNC: 33.4 G/DL (ref 33.7–35.3)
MCV RBC AUTO: 89.8 FL (ref 81.4–97.8)
PLATELET # BLD AUTO: 173 K/UL (ref 164–446)
PMV BLD AUTO: 8.5 FL (ref 9–12.9)
POTASSIUM SERPL-SCNC: 3.5 MMOL/L (ref 3.6–5.5)
PROT SERPL-MCNC: 6 G/DL (ref 6–8.2)
RBC # BLD AUTO: 3.93 M/UL (ref 4.7–6.1)
SIGNIFICANT IND 70042: ABNORMAL
SITE SITE: ABNORMAL
SODIUM SERPL-SCNC: 140 MMOL/L (ref 135–145)
SOURCE SOURCE: ABNORMAL
WBC # BLD AUTO: 3.9 K/UL (ref 4.8–10.8)

## 2017-04-09 PROCEDURE — 85027 COMPLETE CBC AUTOMATED: CPT

## 2017-04-09 PROCEDURE — 700102 HCHG RX REV CODE 250 W/ 637 OVERRIDE(OP): Performed by: HOSPITALIST

## 2017-04-09 PROCEDURE — 770001 HCHG ROOM/CARE - MED/SURG/GYN PRIV*

## 2017-04-09 PROCEDURE — 700102 HCHG RX REV CODE 250 W/ 637 OVERRIDE(OP): Performed by: INTERNAL MEDICINE

## 2017-04-09 PROCEDURE — A9270 NON-COVERED ITEM OR SERVICE: HCPCS | Performed by: HOSPITALIST

## 2017-04-09 PROCEDURE — A9270 NON-COVERED ITEM OR SERVICE: HCPCS | Performed by: INTERNAL MEDICINE

## 2017-04-09 PROCEDURE — 700111 HCHG RX REV CODE 636 W/ 250 OVERRIDE (IP): Performed by: HOSPITALIST

## 2017-04-09 PROCEDURE — 700105 HCHG RX REV CODE 258: Performed by: HOSPITALIST

## 2017-04-09 PROCEDURE — 99233 SBSQ HOSP IP/OBS HIGH 50: CPT | Performed by: HOSPITALIST

## 2017-04-09 PROCEDURE — C1751 CATH, INF, PER/CENT/MIDLINE: HCPCS

## 2017-04-09 PROCEDURE — 36415 COLL VENOUS BLD VENIPUNCTURE: CPT

## 2017-04-09 PROCEDURE — 82962 GLUCOSE BLOOD TEST: CPT | Mod: 91

## 2017-04-09 PROCEDURE — 36569 INSJ PICC 5 YR+ W/O IMAGING: CPT

## 2017-04-09 PROCEDURE — 80053 COMPREHEN METABOLIC PANEL: CPT

## 2017-04-09 RX ORDER — POTASSIUM CHLORIDE 750 MG/1
40 TABLET, FILM COATED, EXTENDED RELEASE ORAL ONCE
Status: COMPLETED | OUTPATIENT
Start: 2017-04-09 | End: 2017-04-09

## 2017-04-09 RX ADMIN — PIPERACILLIN AND TAZOBACTAM 3.38 G: 3; .375 INJECTION, POWDER, LYOPHILIZED, FOR SOLUTION INTRAVENOUS; PARENTERAL at 11:38

## 2017-04-09 RX ADMIN — POTASSIUM CHLORIDE 40 MEQ: 750 TABLET, FILM COATED, EXTENDED RELEASE ORAL at 11:42

## 2017-04-09 RX ADMIN — LEVOTHYROXINE, LIOTHYRONINE 75 MG: 19; 4.5 TABLET ORAL at 21:01

## 2017-04-09 RX ADMIN — MESALAMINE 400 MG: 400 CAPSULE, DELAYED RELEASE ORAL at 21:02

## 2017-04-09 RX ADMIN — MESALAMINE 400 MG: 400 CAPSULE, DELAYED RELEASE ORAL at 15:15

## 2017-04-09 RX ADMIN — INSULIN GLARGINE 50 UNITS: 100 INJECTION, SOLUTION SUBCUTANEOUS at 21:04

## 2017-04-09 RX ADMIN — BUPROPION HYDROCHLORIDE 300 MG: 150 TABLET, FILM COATED, EXTENDED RELEASE ORAL at 21:00

## 2017-04-09 RX ADMIN — HEPARIN SODIUM 5000 UNITS: 5000 INJECTION, SOLUTION INTRAVENOUS; SUBCUTANEOUS at 20:58

## 2017-04-09 RX ADMIN — MORPHINE SULFATE 2 MG: 4 INJECTION INTRAVENOUS at 20:59

## 2017-04-09 RX ADMIN — HEPARIN SODIUM 5000 UNITS: 5000 INJECTION, SOLUTION INTRAVENOUS; SUBCUTANEOUS at 05:06

## 2017-04-09 RX ADMIN — SODIUM CHLORIDE: 9 INJECTION, SOLUTION INTRAVENOUS at 05:10

## 2017-04-09 RX ADMIN — MESALAMINE 400 MG: 400 CAPSULE, DELAYED RELEASE ORAL at 09:18

## 2017-04-09 RX ADMIN — FLUCONAZOLE 200 MG: 200 TABLET ORAL at 09:18

## 2017-04-09 RX ADMIN — INSULIN GLARGINE 50 UNITS: 100 INJECTION, SOLUTION SUBCUTANEOUS at 09:19

## 2017-04-09 RX ADMIN — HYDROMORPHONE HYDROCHLORIDE 2 MG: 2 TABLET ORAL at 09:18

## 2017-04-09 RX ADMIN — MORPHINE SULFATE 4 MG: 4 INJECTION INTRAVENOUS at 11:46

## 2017-04-09 RX ADMIN — HEPARIN SODIUM 5000 UNITS: 5000 INJECTION, SOLUTION INTRAVENOUS; SUBCUTANEOUS at 15:15

## 2017-04-09 RX ADMIN — HYDROMORPHONE HYDROCHLORIDE 2 MG: 2 TABLET ORAL at 17:34

## 2017-04-09 RX ADMIN — PIPERACILLIN AND TAZOBACTAM 3.38 G: 3; .375 INJECTION, POWDER, LYOPHILIZED, FOR SOLUTION INTRAVENOUS; PARENTERAL at 05:03

## 2017-04-09 RX ADMIN — PIPERACILLIN AND TAZOBACTAM 3.38 G: 3; .375 INJECTION, POWDER, LYOPHILIZED, FOR SOLUTION INTRAVENOUS; PARENTERAL at 17:35

## 2017-04-09 ASSESSMENT — ENCOUNTER SYMPTOMS
CONSTIPATION: 0
NERVOUS/ANXIOUS: 0
SHORTNESS OF BREATH: 0
MYALGIAS: 0
VOMITING: 0
FEVER: 0
SPUTUM PRODUCTION: 0
HEADACHES: 0
SPEECH CHANGE: 0
NAUSEA: 1
COUGH: 0
DEPRESSION: 0
ABDOMINAL PAIN: 1
NAUSEA: 0

## 2017-04-09 ASSESSMENT — PAIN SCALES - GENERAL
PAINLEVEL_OUTOF10: 4
PAINLEVEL_OUTOF10: 5
PAINLEVEL_OUTOF10: 4
PAINLEVEL_OUTOF10: 7
PAINLEVEL_OUTOF10: 5
PAINLEVEL_OUTOF10: 4
PAINLEVEL_OUTOF10: 4
PAINLEVEL_OUTOF10: 5

## 2017-04-09 NOTE — PROGRESS NOTES
Received report from day shift RN. Pt A&O x 4. Pain reported at 7/10. Medicated per MAR. Pt denies nausea, vomiting, chest pain, shortness of breath at this time. Pt tolerating diet well. +BM, +void. Surgical wound at mid abd covered with dressing, CDI. Pt ambulates self with steady gait. Plan of care discussed with pt. All needs are met at this time. Call light within reach. Bed locked and in lowest position.

## 2017-04-09 NOTE — PROGRESS NOTES
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    Interval Problem Update  4/9: PICC pending. DC IVF. HypoK: repleting.  Abd pain w/ Packing change  4/8: WC Group D Enterococcus. HypoK: repleting.    4/7: Went for surgery today.    Disposition  Likely dc home tomm after PICC, will need outpatient infusion     Review of Systems   Constitutional: Negative for fever.   Respiratory: Negative for cough, sputum production and shortness of breath.    Cardiovascular: Negative for chest pain.   Gastrointestinal: Positive for abdominal pain (w. movement). Negative for nausea, vomiting and constipation.   Genitourinary: Negative for dysuria.   Musculoskeletal: Negative for myalgias.   Neurological: Negative for headaches.   Psychiatric/Behavioral: Negative for depression. The patient is not nervous/anxious.       Physical Exam Laboratory/Imaging   Filed Vitals:    04/08/17 1840 04/08/17 2100 04/09/17 0300 04/09/17 0800   BP: 100/56 105/77 112/68 120/74   Pulse: 84  62 74   Temp: 37.1 °C (98.8 °F)  36.3 °C (97.4 °F) 36.2 °C (97.2 °F)   Resp: 17  16 16   Height:       Weight:       SpO2: 90%  95% 98%     Physical Exam   Constitutional: He is oriented to person, place, and time. He appears well-developed.  Non-toxic appearance. No distress.   obese   HENT:   Head: Normocephalic and atraumatic.   Eyes: Conjunctivae are normal.   Neck: No edema and no erythema present.   Cardiovascular: Normal rate and regular rhythm.    No murmur heard.  Pulmonary/Chest: Effort normal. No respiratory distress. He has no wheezes. He has no rales.   Abdominal: Soft. He exhibits no distension. There is tenderness. There is no rebound and no guarding.   Decreased bowel sounds  Clean and intact dressing over abdomen   Musculoskeletal: Normal range of motion. He exhibits no edema or tenderness.   Neurological: He is  alert and oriented to person, place, and time. No cranial nerve deficit.   Skin: Skin is warm and dry. No rash noted. He is not diaphoretic. No erythema.   Nursing note and vitals reviewed.   Lab Results   Component Value Date/Time    WBC 3.9* 04/09/2017 06:25 AM    HEMOGLOBIN 11.8* 04/09/2017 06:25 AM    HEMATOCRIT 35.3* 04/09/2017 06:25 AM    PLATELET COUNT 173 04/09/2017 06:25 AM     Lab Results   Component Value Date/Time    SODIUM 140 04/09/2017 04:05 AM    POTASSIUM 3.5* 04/09/2017 04:05 AM    CHLORIDE 111 04/09/2017 04:05 AM    CO2 21 04/09/2017 04:05 AM    GLUCOSE 86 04/09/2017 04:05 AM    BUN 7* 04/09/2017 04:05 AM    CREATININE 0.77 04/09/2017 04:05 AM      Assessment/Plan    Sepsis (CMS-HCC)  Assessment & Plan  - d/t infection involving previous umbilical hernia repair  - now s/p surgery  - had I&D of periumbilical abscess  - continue zosyn and diflucan  - WC growing group D enterococcus   - PICC PENDING  - will need outpatient infusion    Hypothyroid  Assessment & Plan  - continue amour thyroid     DKA (diabetic ketoacidoses) (CMS-HCC)  Assessment & Plan  - resolved     Normocytic anemia  Assessment & Plan  - no sign of gross bleeding    Transaminitis  Assessment & Plan  - mild    Hypokalemia  - repleting orally    Labs reviewed and Medications reviewed  Sousa catheter: No Sousa      DVT Prophylaxis: Heparin      Antibiotics: Treating active infection/contamination beyond 24 hours perioperative coverage

## 2017-04-09 NOTE — CARE PLAN
Problem: Venous Thromboembolism (VTW)/Deep Vein Thrombosis (DVT) Prevention:  Goal: Patient will participate in Venous Thrombosis (VTE)/Deep Vein Thrombosis (DVT)Prevention Measures  Outcome: PROGRESSING AS EXPECTED  Pt is ambulatory. Unfractionated heparin is given per MAR.     Problem: Bowel/Gastric:  Goal: Normal bowel function is maintained or improved  Outcome: PROGRESSING AS EXPECTED  Pt tolerating diet without having nausea and vomiting. +BM. +BS x 4 normoactive.     Problem: Pain Management  Goal: Pain level will decrease to patient’s comfort goal  Outcome: PROGRESSING AS EXPECTED  Pain is controlled with PRN medications.

## 2017-04-09 NOTE — PROGRESS NOTES
Hospital Medicine Interval Note  Date of Service:  4/8/2017    Chief Complaint  51 y.o.-year-old male admitted 4/6/2017 with abdominal pain.    Interval Problem Update  4/8: WC Group D Enterococcus. HypoK: repleting.    4/7: Went for surgery today.    Disposition  Improved, ok to surgical floor       Review of Systems   Constitutional: Negative for fever and chills.   Respiratory: Negative for cough, sputum production and shortness of breath.    Cardiovascular: Negative for chest pain.   Gastrointestinal: Positive for abdominal pain and diarrhea. Negative for nausea, vomiting and constipation.   Genitourinary: Negative for dysuria.   Musculoskeletal: Negative for myalgias and falls.   Neurological: Negative for dizziness, tingling, loss of consciousness and headaches.   Psychiatric/Behavioral: Negative for depression.      Physical Exam Laboratory/Imaging   Filed Vitals:    04/08/17 0300 04/08/17 0704 04/08/17 1230 04/08/17 1515   BP: 102/60 110/62 118/67 114/67   Pulse: 88 77 80 76   Temp: 36.3 °C (97.3 °F) 36.6 °C (97.8 °F) 36.5 °C (97.7 °F) 36.1 °C (96.9 °F)   Resp: 17 16 16 18   Height:       Weight:       SpO2: 95% 93% 98% 95%     Physical Exam   Constitutional: He is oriented to person, place, and time. He appears well-developed.  Non-toxic appearance. No distress.   obese   HENT:   Head: Normocephalic and atraumatic.   Mouth/Throat: Oropharynx is clear and moist. No oropharyngeal exudate.   Eyes: Conjunctivae are normal.   Neck: No edema and no erythema present.   Cardiovascular: Normal rate and regular rhythm.    No murmur heard.  Pulmonary/Chest: Effort normal and breath sounds normal. No respiratory distress. He has no wheezes. He has no rales. He exhibits no tenderness.   Abdominal: Soft. He exhibits no distension. There is tenderness.   Decreased bowel sounds  Clean and intact dressing over abdomen   Musculoskeletal: Normal range of motion. He exhibits no edema or tenderness.   Neurological: He is alert  and oriented to person, place, and time. No cranial nerve deficit.   Skin: Skin is warm and dry. No rash noted. He is not diaphoretic. No erythema.   Nursing note and vitals reviewed.   Lab Results   Component Value Date/Time    WBC 3.9* 04/08/2017 04:17 AM    HEMOGLOBIN 11.5* 04/08/2017 04:17 AM    HEMATOCRIT 33.8* 04/08/2017 04:17 AM    PLATELET COUNT 192 04/08/2017 04:17 AM     Lab Results   Component Value Date/Time    SODIUM 142 04/08/2017 04:17 AM    POTASSIUM 3.5* 04/08/2017 04:17 AM    CHLORIDE 107 04/08/2017 04:17 AM    CO2 24 04/08/2017 04:17 AM    GLUCOSE 139* 04/08/2017 04:17 AM    BUN 8 04/08/2017 04:17 AM    CREATININE 0.76 04/08/2017 04:17 AM      Assessment/Plan    Sepsis (CMS-HCC)  Assessment & Plan  - d/t infection involving previous umbilical hernia repair  - now s/p surgery  - had I&D of periumbilical abscess  - continue zosyn and diflucan  - WC growing group D enterococcus   - PICC PENDING    Hypothyroid  Assessment & Plan  - continue amour thyroid     DKA (diabetic ketoacidoses) (CMS-HCC)  Assessment & Plan  - resolved     Normocytic anemia  Assessment & Plan  - no sign of gross bleeding    Transaminitis  Assessment & Plan  - mild       Labs reviewed and Medications reviewed        DVT Prophylaxis: Heparin      Antibiotics: Treating active infection/contamination beyond 24 hours perioperative coverage

## 2017-04-09 NOTE — PROGRESS NOTES
Infectious Disease Progress Note    Author: Anai Santoro M.D. Date of service & Time created: 2017  2:49 PM    Chief Complaint:  Chief Complaint   Patient presents with   • High Blood Sugar     Pt reports blood sugars have been running in the 200's-400's for the past few weeks.  FSBS 486 at home per pt.    • N/V   • Fatigue       Interval History:  51 year old male seen for infected umblical hernia repair site  - no fevers. Some abdominal pain  - has some pain. No fevers.  Labs Reviewed, Medications Reviewed, Radiology Reviewed and Wound Reviewed.    Review of Systems:  Review of Systems   Constitutional: Positive for malaise/fatigue. Negative for fever.   Respiratory: Negative for cough and shortness of breath.    Gastrointestinal: Positive for nausea and abdominal pain.   Genitourinary: Negative for dysuria.   Musculoskeletal: Negative for myalgias.   Neurological: Negative for speech change and headaches.       Hemodynamics:  Temp (24hrs), Av.4 °C (97.6 °F), Min:36.1 °C (96.9 °F), Max:37.1 °C (98.8 °F)  Temperature: 36.2 °C (97.2 °F)  Pulse  Av.8  Min: 57  Max: 102   Blood Pressure: 120/74 mmHg       Physical Exam:  Physical Exam   Constitutional: He is oriented to person, place, and time. No distress.   HENT:   Mouth/Throat: No oropharyngeal exudate.   Eyes: No scleral icterus.   Neck: Neck supple.   Cardiovascular: Regular rhythm.    No murmur heard.  Pulmonary/Chest: He has no wheezes. He has no rales.   Abdominal: Soft. There is tenderness.   umblical wound packed   Musculoskeletal: He exhibits no edema.   Neurological: He is alert and oriented to person, place, and time.   Vitals reviewed.      Meds:    Current facility-administered medications:   •  HYDROmorphone (DILAUDID) tablet 2 mg, 2 mg, Oral, Q4HRS PRN, Adrián Santos M.D., 2 mg at 17  •  fluconazole (DIFLUCAN) tablet 200 mg, 200 mg, Oral, DAILY, Anai Santoro M.D., 200 mg at 17  •  buPROPion SR  (WELLBUTRIN-SR) tablet 300 mg, 300 mg, Oral, Q EVENING, Carrillo Mcgarry M.D., 300 mg at 04/08/17 2120  •  insulin glargine (LANTUS) injection 50 Units, 50 Units, Subcutaneous, BID, Carrillo Mcgarry M.D., 50 Units at 04/09/17 0919  •  mesalamine delayed-release (DELZICOL) capsule 400 mg, 400 mg, Oral, TID, Carrillo Mcgarry M.D., 400 mg at 04/09/17 0918  •  thyroid (ARMOUR THYROID) tablet 75 mg, 75 mg, Oral, Q EVENING, Carrillo Mcgarry M.D., 75 mg at 04/08/17 2226  •  NS (BOLUS) infusion 500 mL, 500 mL, Intravenous, Once PRN, Carrilol Mcgarry M.D.  •  heparin injection 5,000 Units, 5,000 Units, Subcutaneous, Q8HRS, Carrillo Mcgarry M.D., 5,000 Units at 04/09/17 0506  •  acetaminophen (TYLENOL) tablet 650 mg, 650 mg, Oral, Q6HRS PRN, Carrillo Mcgarry M.D., 650 mg at 04/08/17 1350  •  Action is required: Protocol 1073 Hypoglycemia has been implemented, , , Once **AND** Protocol 1073 Inclusion Criteria, , , CONTINUOUS **AND** Protocol 1073 NOTIFY, , , Once **AND** Protocol 1073 Initiate protocol immediately if FSBG is less than or equal to 70 mg/dL, , , CONTINUOUS **AND** glucose 4 g chewable tablet 16 g, 16 g, Oral, Q15 MIN PRN **AND** dextrose 50% (D50W) injection 25 mL, 25 mL, Intravenous, Q15 MIN PRN, Carrillo Mcgarry M.D.  •  ondansetron (ZOFRAN) syringe/vial injection 4 mg, 4 mg, Intravenous, Q4HRS PRN, Carrillo Mcgarry M.D., 4 mg at 04/08/17 0858  •  ondansetron (ZOFRAN ODT) dispertab 4 mg, 4 mg, Oral, Q4HRS PRN, Carrillo Mcgarry M.D., 4 mg at 04/07/17 1808  •  promethazine (PHENERGAN) tablet 12.5-25 mg, 12.5-25 mg, Oral, Q4HRS PRN, Carrillo Mcgarry M.D.  •  promethazine (PHENERGAN) suppository 12.5-25 mg, 12.5-25 mg, Rectal, Q4HRS PRN, Carrillo Mcgarry M.D.  •  prochlorperazine (COMPAZINE) injection 5-10 mg, 5-10 mg, Intravenous, Q4HRS PRN, Carrillo Mcgarry M.D.  •  lorazepam (ATIVAN) tablet 0.5 mg, 0.5 mg, Oral, Q6HRS PRN **OR** lorazepam (ATIVAN) injection 0.5 mg, 0.5 mg,  Intravenous, Q6HRS PRN, Carrillo Mcgarry M.D.  •  morphine (pf) 4 mg/ml injection 2-4 mg, 2-4 mg, Intravenous, Q3HRS PRN, Carrillo Mcgarry M.D., 4 mg at 04/09/17 1146  •  piperacillin-tazobactam (ZOSYN) 3.375 g in  mL IVPB, 3.375 g, Intravenous, Q6HRS, Carrillo Mcgarry M.D., Stopped at 04/09/17 1208  •  insulin lispro (HUMALOG) injection 3-14 Units, 3-14 Units, Subcutaneous, Q6HRS, Emerson Summers D.O., Stopped at 04/07/17 1200    Labs:  Recent Labs      04/07/17 0219 04/08/17 0417 04/09/17   0625   WBC  3.8*  3.9*  3.9*   RBC  3.88*  3.80*  3.93*   HEMOGLOBIN  11.9*  11.5*  11.8*   HEMATOCRIT  34.3*  33.8*  35.3*   MCV  88.4  88.9  89.8   MCH  30.7  30.3  30.0   RDW  41.2  42.2  43.7   PLATELETCT  214  192  173   MPV  9.4  9.2  8.5*   NEUTSPOLYS  52.90   --    --    LYMPHOCYTES  37.90   --    --    MONOCYTES  6.50   --    --    EOSINOPHILS  1.60   --    --    BASOPHILS  0.30   --    --      Recent Labs      04/07/17 0219 04/08/17 0417  04/09/17   0405   SODIUM  140  142  140   POTASSIUM  3.7  3.5*  3.5*   CHLORIDE  107  107  111   CO2  24  24  21   GLUCOSE  179*  139*  86   BUN  11  8  7*     Recent Labs      04/07/17 0219 04/08/17   0417  04/09/17   0405   ALBUMIN  3.3  3.3  3.6   TBILIRUBIN  0.6  0.6  0.3   ALKPHOSPHAT  78  112*  112*   TOTPROTEIN  5.8*  5.6*  6.0   ALTSGPT  107*  187*  124*   ASTSGOT  163*  186*  69*   CREATININE  0.86  0.76  0.77       Imaging:  Ct-abdomen-pelvis With    4/6/2017 4/6/2017 12:04 PM HISTORY/REASON FOR EXAM:  Pain. Nausea and vomiting. TECHNIQUE/EXAM DESCRIPTION:  CT scan of the abdomen and pelvis with contrast. Contrast-enhanced helical scanning was obtained from the diaphragmatic domes through the pubic symphysis following the bolus administration of nonionic contrast without complication. 100 mL of Omnipaque 350 nonionic contrast was administered without complication. Low dose optimization technique was utilized for this CT exam including  automated exposure control and adjustment of the mA and/or kV according to patient size. COMPARISON: 3/21/2017 FINDINGS: Lung bases: There is mild bibasilar atelectasis. There is a calcified subcarinal lymph node. Abdomen: No free air seen in the abdomen or pelvis. The liver is hypodense compatible with hepatic steatosis. There is a tiny calcified granuloma within the liver. No adrenal mass is identified. There is no evidence of hydronephrosis. Pancreas is unremarkable. Aorta is not aneurysmal. There is minimal atherosclerotic plaque. There are small inguinal, retroperitoneal and mesenteric lymph nodes. There is no evidence of bowel obstruction. Terminal ileum and visualized appendix are unremarkable. Patient is status post cholecystectomy. There is no biliary ductal dilatation. Portal vein is patent. Bladder is mildly distended. Calcific densities in the pelvis likely represent phleboliths. No free fluid is seen in the abdomen or pelvis. There is a periumbilical wound with surrounding infiltration and mild stranding. Degenerative changes are seen in the spine.     4/6/2017  Periumbilical wound with induration and mild inflammatory stranding. Hepatic steatosis. Status post cholecystectomy. Sequelae of prior granulomatous exposure.     Ct-abdomen-pelvis With    3/21/2017  3/21/2017 1:01 PM HISTORY/REASON FOR EXAM:  Pain s/p hernia repair 10 days ago, drainage and 6/10 abdominal pain to site TECHNIQUE/EXAM DESCRIPTION:   CT scan of the abdomen and pelvis with contrast. Contrast-enhanced helical scanning was obtained from the diaphragmatic domes through the pubic symphysis following the bolus administration of nonionic contrast without complication. 100 mL of Omnipaque 350 nonionic contrast was administered without complication. Low dose optimization technique was utilized for this CT exam including automated exposure control and adjustment of the mA and/or kV according to patient size. COMPARISON: 2/19/2017. FINDINGS:  Lung bases: No pulmonary nodules at the lung bases. No pleural or pericardial fluid. Abdomen: The liver is unremarkable. The spleen is unremarkable. The pancreas is unremarkable. The gallbladder is surgically absent. The adrenal glands are normal in size. The kidneys enhance symmetrically. The abdominal aorta is normal in caliber. There is no lymphadenopathy. No bowel wall thickening or bowel dilatation. Normal appendix. Postsurgical change in the anterior abdominal wall at the level of the umbilicus with some fluid and gas within the subcutaneous tissue. There appears to be gas in the peritoneum underneath the mesh. Pelvis: Unremarkable urinary bladder and prostate. No lymph node enlargement, free fluid, or free air in the abdomen or pelvis. No aggressive bone lesions are seen. ___________________________________     3/21/2017  1. Soft tissue stranding with several foci within the subcutaneous tissue as well as peritoneum (likely deep to the mesh), postsurgical change versus superimposed infection.    Dx-chest-portable (1 View)    4/6/2017 4/6/2017 10:57 AM HISTORY/REASON FOR EXAM:  Weakness. TECHNIQUE/EXAM DESCRIPTION AND NUMBER OF VIEWS: Single portable view of the chest. COMPARISON: 10/25/2016 FINDINGS: The heart is not enlarged. No focal consolidation, pleural effusion or pneumothorax is identified.  Costophrenic angles are sharp.     4/6/2017  No acute cardiopulmonary process is seen.    Dx-small Bowel Series    4/6/2017 4/6/2017 7:57 PM HISTORY/REASON FOR EXAM:  Nausea and vomiting. Drainage post hernia repair. TECHNIQUE/EXAM DESCRIPTION AND NUMBER OF VIEWS: Air contrast small bowel follow-through performed. Fluoroscopic images were obtained. 0 fluoroscopic images obtained. Total fluoroscopy time: 0 minutes COMPARISON: CT from the same day FINDINGS:  image demonstrates contrast within the renal collecting systems and bladder. There is a nonspecific bowel gas pattern. Surgical clips are seen in the  right upper quadrant. The patient swallowed contrast without difficulty. There was no evidence of small bowel obstruction, abnormal wall thickening or stricture. The duodenojejunal junction was in a normal location. Contrast was seen within the colon by 3 hours and 20 minutes. A lateral view was performed and no enterocutaneous fistula was identified     4/6/2017  No enterocutaneous fistula identified. No evidence of small bowel obstruction.    Ir-us Guided Piv    3/10/2017  EXAMINATION:                                                                    HISTORY/REASON FOR EXAM:  Ultrasound Guided PIV.  TECHNIQUE/EXAM DESCRIPTION AND NUMBER OF VIEWS:  Peripheral IV insertion with ultrasound guidance.  The procedure was prepared using maximal sterile barrier technique including sterile gown, mask, cap, and donning of sterile gloves following appropriate hand hygiene and/or sterile scrub. Patient skin site was prepped with 2% Chlorhexidine solution.   FINDINGS: Peripheral IV insertion with Ultrasound Guidance was performed by qualified imaging nursing staff without the assistance of a Radiologist.     3/10/2017   Ultrasound-guided PERIPHERAL IV INSERTION performed by qualified nursing staff as above.       Micro:  Results     Procedure Component Value Units Date/Time    FUNGAL CULTURE [[id number]] Collected:  04/07/17 1042    Order Status:  Completed Specimen Information:  Wound Updated:  04/09/17 1141     Significant Indicator NEG      Source WND      Site Umbilical Abscess      Fungal Culture Culture in progress.     CULTURE WOUND W/ GRAM STAIN [[id number]]  (Abnormal) Collected:  04/07/17 1042    Order Status:  Completed Specimen Information:  Wound Updated:  04/09/17 1141     Significant Indicator POS (POS)      Source WND      Site Umbilical Abscess      Culture Result Wound -- (A)      Gram Stain Result --      Result:        Many WBCs.  Many Gram positive cocci.  Moderate Gram negative rods.       Culture Result  "Wound -- (A)      Result:        Group D Enterococcus species  Heavy growth  Combination therapy with ampicillin, penicillin, or  vancomycin (for susceptible strains) plus an aminoglycoside  is usually indicated for serious enterococcal infections,  such as endocarditis unless high-level resistance to both  gentamicin and streptomycin is documented; such combinations  are predicted to result in synergistic killing of the  Enterococcus.       Culture Result Wound -- (A)      Result:        Lactose fermenting Gram negative dung  Light growth      ANAEROBIC CULTURE [[id number]] Collected:  04/07/17 1042    Order Status:  Completed Specimen Information:  Wound Updated:  04/09/17 1141     Significant Indicator NEG      Source WND      Site Umbilical Abscess      Anaerobic Culture, Culture Res        Result:        Moderate growth anaerobes; further incubation required.    GRAM STAIN [[id number]] Collected:  04/07/17 1042    Order Status:  Completed Specimen Information:  Wound Updated:  04/07/17 1748     Significant Indicator .      Source WND      Site Umbilical Abscess      Gram Stain Result --      Result:        Many WBCs.  Many Gram positive cocci.  Moderate Gram negative rods.      BLOOD CULTURE [[id number]] Collected:  04/06/17 1003    Order Status:  Completed Specimen Information:  Blood from Peripheral Updated:  04/07/17 0720     Significant Indicator NEG      Source BLD      Site PERIPHERAL      Blood Culture --      Result:        No Growth    Note: Blood cultures are incubated for 5 days and  are monitored continuously.Positive blood cultures  are called to the RN and reported as soon as  they are identified.      Narrative:      Per Hospital Policy: Only change Specimen Src: to \"Line\" if  specified by physician order.    BLOOD CULTURE [[id number]] Collected:  04/06/17 1047    Order Status:  Completed Specimen Information:  Blood from Peripheral Updated:  04/07/17 0720     Significant Indicator NEG      Source BLD " "     Site PERIPHERAL      Blood Culture --      Result:        No Growth    Note: Blood cultures are incubated for 5 days and  are monitored continuously.Positive blood cultures  are called to the RN and reported as soon as  they are identified.      Narrative:      Per Hospital Policy: Only change Specimen Src: to \"Line\" if  specified by physician order.    Blood Culture [[id number]]     Order Status:  Sent Specimen Information:  Blood from Peripheral     Blood Culture [[id number]]     Order Status:  Sent Specimen Information:  Blood from Peripheral     URINALYSIS,CULTURE IF INDICATED [[id number]]  (Abnormal) Collected:  04/06/17 1003    Order Status:  Completed Specimen Information:  Urine Updated:  04/06/17 1054     Color Colorless      Character Clear      Specific Gravity 1.025      Ph 5.0      Glucose >1000 (A) mg/dL      Ketones Negative mg/dL      Protein Negative mg/dL      Bilirubin Negative      Nitrite Negative      Leukocyte Esterase Negative      Occult Blood Negative      Micro Urine Req see below      Comment: Microscopic examination not performed when specimen is clear  and chemically negative for protein, blood, leukocyte esterase  and nitrite.          Culture Indicated No UA Culture            Assessment:  Active Hospital Problems    Diagnosis   • Sepsis (CMS-[facility]) [A41.9]   • Normocytic anemia [D64.9]   • Transaminitis [R74.0]   • DKA (diabetic ketoacidoses) (CMS-[facility]) [E13.10]   • Hypothyroid [E03.9]       Plan:  Surgical site infection  S/p umblical hernia repair 11/6  Removal of mesh 3/10  I&D 4/6  C/s are grp d enterococcus and gnr  Follow the ID  Continue zosyn and diflucan. Aim for at least 2 weeks of iv.  picc line    Increased lfts  Decreased today  Monitor  Leucopenia  Wbc 3.4  monitor  DM  Keep blood sugars under control for wound healing    Ulcerative colitis  In remission currently    Obesity       Discussed with internal Medicine  "

## 2017-04-09 NOTE — PROGRESS NOTES
Assumed care of pt.   AAOx 4.   Room air, sating 96%.  ROJAS, No Weakness.    Up independently.  Abdominal incision, gauze dressing with scant, old, drainage.  Consistent carbohydrate diet, tolerating well  Voiding, BS, flatus, LBM 4/9.    Pain: pt reports pain of 5 out of 10 medicated per MAR.

## 2017-04-10 VITALS
HEIGHT: 68 IN | SYSTOLIC BLOOD PRESSURE: 120 MMHG | BODY MASS INDEX: 34.82 KG/M2 | WEIGHT: 229.72 LBS | DIASTOLIC BLOOD PRESSURE: 81 MMHG | OXYGEN SATURATION: 94 % | RESPIRATION RATE: 16 BRPM | TEMPERATURE: 96.8 F | HEART RATE: 66 BPM

## 2017-04-10 LAB
ALBUMIN SERPL BCP-MCNC: 3.6 G/DL (ref 3.2–4.9)
ALBUMIN/GLOB SERPL: 1.3 G/DL
ALP SERPL-CCNC: 99 U/L (ref 30–99)
ALT SERPL-CCNC: 93 U/L (ref 2–50)
ANION GAP SERPL CALC-SCNC: 5 MMOL/L (ref 0–11.9)
AST SERPL-CCNC: 29 U/L (ref 12–45)
BACTERIA SPEC ANAEROBE CULT: ABNORMAL
BACTERIA SPEC ANAEROBE CULT: ABNORMAL
BILIRUB SERPL-MCNC: 0.3 MG/DL (ref 0.1–1.5)
BUN SERPL-MCNC: 7 MG/DL (ref 8–22)
CALCIUM SERPL-MCNC: 9.1 MG/DL (ref 8.5–10.5)
CHLORIDE SERPL-SCNC: 106 MMOL/L (ref 96–112)
CO2 SERPL-SCNC: 27 MMOL/L (ref 20–33)
CREAT SERPL-MCNC: 0.73 MG/DL (ref 0.5–1.4)
ERYTHROCYTE [DISTWIDTH] IN BLOOD BY AUTOMATED COUNT: 42.7 FL (ref 35.9–50)
GFR SERPL CREATININE-BSD FRML MDRD: >60 ML/MIN/1.73 M 2
GLOBULIN SER CALC-MCNC: 2.7 G/DL (ref 1.9–3.5)
GLUCOSE BLD-MCNC: 121 MG/DL (ref 65–99)
GLUCOSE BLD-MCNC: 132 MG/DL (ref 65–99)
GLUCOSE BLD-MCNC: 177 MG/DL (ref 65–99)
GLUCOSE BLD-MCNC: 96 MG/DL (ref 65–99)
GLUCOSE SERPL-MCNC: 84 MG/DL (ref 65–99)
HCT VFR BLD AUTO: 34.2 % (ref 42–52)
HGB BLD-MCNC: 11.8 G/DL (ref 14–18)
MCH RBC QN AUTO: 30.6 PG (ref 27–33)
MCHC RBC AUTO-ENTMCNC: 34.5 G/DL (ref 33.7–35.3)
MCV RBC AUTO: 88.6 FL (ref 81.4–97.8)
PLATELET # BLD AUTO: 190 K/UL (ref 164–446)
PMV BLD AUTO: 9.3 FL (ref 9–12.9)
POTASSIUM SERPL-SCNC: 3.3 MMOL/L (ref 3.6–5.5)
PROT SERPL-MCNC: 6.3 G/DL (ref 6–8.2)
RBC # BLD AUTO: 3.86 M/UL (ref 4.7–6.1)
SIGNIFICANT IND 70042: ABNORMAL
SITE SITE: ABNORMAL
SODIUM SERPL-SCNC: 138 MMOL/L (ref 135–145)
SOURCE SOURCE: ABNORMAL
WBC # BLD AUTO: 3.8 K/UL (ref 4.8–10.8)

## 2017-04-10 PROCEDURE — 85027 COMPLETE CBC AUTOMATED: CPT

## 2017-04-10 PROCEDURE — 99239 HOSP IP/OBS DSCHRG MGMT >30: CPT | Performed by: HOSPITALIST

## 2017-04-10 PROCEDURE — 700111 HCHG RX REV CODE 636 W/ 250 OVERRIDE (IP): Performed by: HOSPITALIST

## 2017-04-10 PROCEDURE — 700102 HCHG RX REV CODE 250 W/ 637 OVERRIDE(OP): Performed by: HOSPITALIST

## 2017-04-10 PROCEDURE — A9270 NON-COVERED ITEM OR SERVICE: HCPCS | Performed by: HOSPITALIST

## 2017-04-10 PROCEDURE — 700105 HCHG RX REV CODE 258: Performed by: INTERNAL MEDICINE

## 2017-04-10 PROCEDURE — 82550 ASSAY OF CK (CPK): CPT

## 2017-04-10 PROCEDURE — 82962 GLUCOSE BLOOD TEST: CPT

## 2017-04-10 PROCEDURE — 700102 HCHG RX REV CODE 250 W/ 637 OVERRIDE(OP): Performed by: INTERNAL MEDICINE

## 2017-04-10 PROCEDURE — A9270 NON-COVERED ITEM OR SERVICE: HCPCS | Performed by: INTERNAL MEDICINE

## 2017-04-10 PROCEDURE — 700111 HCHG RX REV CODE 636 W/ 250 OVERRIDE (IP): Performed by: INTERNAL MEDICINE

## 2017-04-10 PROCEDURE — 80053 COMPREHEN METABOLIC PANEL: CPT

## 2017-04-10 PROCEDURE — 700105 HCHG RX REV CODE 258: Performed by: HOSPITALIST

## 2017-04-10 RX ORDER — INSULIN GLARGINE 100 [IU]/ML
36 INJECTION, SOLUTION SUBCUTANEOUS 2 TIMES DAILY
Status: DISCONTINUED | OUTPATIENT
Start: 2017-04-10 | End: 2017-04-10 | Stop reason: HOSPADM

## 2017-04-10 RX ORDER — POTASSIUM CHLORIDE 750 MG/1
40 TABLET, FILM COATED, EXTENDED RELEASE ORAL ONCE
Status: COMPLETED | OUTPATIENT
Start: 2017-04-10 | End: 2017-04-10

## 2017-04-10 RX ORDER — HYDROMORPHONE HYDROCHLORIDE 2 MG/1
2 TABLET ORAL EVERY 8 HOURS PRN
Qty: 10 TAB | Refills: 0 | Status: SHIPPED | OUTPATIENT
Start: 2017-04-10 | End: 2017-04-19

## 2017-04-10 RX ORDER — FLUCONAZOLE 200 MG/1
200 TABLET ORAL DAILY
Qty: 10 TAB | Refills: 0 | Status: ON HOLD | OUTPATIENT
Start: 2017-04-10 | End: 2017-04-21

## 2017-04-10 RX ORDER — HYDROMORPHONE HYDROCHLORIDE 2 MG/1
2 TABLET ORAL EVERY 8 HOURS PRN
Qty: 10 TAB | Refills: 0 | Status: SHIPPED | OUTPATIENT
Start: 2017-04-10 | End: 2017-04-10

## 2017-04-10 RX ORDER — IBUPROFEN 800 MG/1
400 TABLET ORAL EVERY 6 HOURS PRN
Status: DISCONTINUED | OUTPATIENT
Start: 2017-04-10 | End: 2017-04-10 | Stop reason: HOSPADM

## 2017-04-10 RX ADMIN — MORPHINE SULFATE 4 MG: 4 INJECTION INTRAVENOUS at 09:57

## 2017-04-10 RX ADMIN — INSULIN GLARGINE 40 UNITS: 100 INJECTION, SOLUTION SUBCUTANEOUS at 08:48

## 2017-04-10 RX ADMIN — MORPHINE SULFATE 4 MG: 4 INJECTION INTRAVENOUS at 09:58

## 2017-04-10 RX ADMIN — CEFTRIAXONE 2 G: 2 INJECTION, POWDER, FOR SOLUTION INTRAMUSCULAR; INTRAVENOUS at 13:36

## 2017-04-10 RX ADMIN — HYDROMORPHONE HYDROCHLORIDE 2 MG: 2 TABLET ORAL at 15:03

## 2017-04-10 RX ADMIN — HYDROMORPHONE HYDROCHLORIDE 2 MG: 2 TABLET ORAL at 05:46

## 2017-04-10 RX ADMIN — PIPERACILLIN AND TAZOBACTAM 3.38 G: 3; .375 INJECTION, POWDER, LYOPHILIZED, FOR SOLUTION INTRAVENOUS; PARENTERAL at 05:28

## 2017-04-10 RX ADMIN — MESALAMINE 400 MG: 400 CAPSULE, DELAYED RELEASE ORAL at 15:04

## 2017-04-10 RX ADMIN — HEPARIN SODIUM 5000 UNITS: 5000 INJECTION, SOLUTION INTRAVENOUS; SUBCUTANEOUS at 05:29

## 2017-04-10 RX ADMIN — DAPTOMYCIN 420 MG: 500 INJECTION, POWDER, LYOPHILIZED, FOR SOLUTION INTRAVENOUS at 14:21

## 2017-04-10 RX ADMIN — FLUCONAZOLE 200 MG: 200 TABLET ORAL at 08:47

## 2017-04-10 RX ADMIN — PIPERACILLIN AND TAZOBACTAM 3.38 G: 3; .375 INJECTION, POWDER, LYOPHILIZED, FOR SOLUTION INTRAVENOUS; PARENTERAL at 11:04

## 2017-04-10 RX ADMIN — PIPERACILLIN AND TAZOBACTAM 3.38 G: 3; .375 INJECTION, POWDER, LYOPHILIZED, FOR SOLUTION INTRAVENOUS; PARENTERAL at 01:20

## 2017-04-10 RX ADMIN — MESALAMINE 400 MG: 400 CAPSULE, DELAYED RELEASE ORAL at 08:47

## 2017-04-10 RX ADMIN — POTASSIUM CHLORIDE 40 MEQ: 750 TABLET, FILM COATED, EXTENDED RELEASE ORAL at 09:57

## 2017-04-10 RX ADMIN — IBUPROFEN 400 MG: 800 TABLET, FILM COATED ORAL at 14:22

## 2017-04-10 ASSESSMENT — PAIN SCALES - GENERAL
PAINLEVEL_OUTOF10: 2
PAINLEVEL_OUTOF10: 3
PAINLEVEL_OUTOF10: 2
PAINLEVEL_OUTOF10: 7
PAINLEVEL_OUTOF10: 6
PAINLEVEL_OUTOF10: 1

## 2017-04-10 ASSESSMENT — ENCOUNTER SYMPTOMS
VOMITING: 0
HEADACHES: 0
FEVER: 0
COUGH: 0
ABDOMINAL PAIN: 1
NAUSEA: 0
SPEECH CHANGE: 0
SHORTNESS OF BREATH: 0
MYALGIAS: 0

## 2017-04-10 NOTE — CARE PLAN
Problem: Venous Thromboembolism (VTW)/Deep Vein Thrombosis (DVT) Prevention:  Goal: Patient will participate in Venous Thrombosis (VTE)/Deep Vein Thrombosis (DVT)Prevention Measures  Outcome: PROGRESSING AS EXPECTED  Heparin administered per MAR. Pt has been encouraged to ambulate. Education has been provided.     Problem: Pain Management  Goal: Pain level will decrease to patient’s comfort goal  Outcome: PROGRESSING AS EXPECTED  Consistent pain scale used. Pt medicated for pain per MAR.

## 2017-04-10 NOTE — DISCHARGE INSTRUCTIONS
Pack wound with iodoform gauze, dress with dry gauze and tape     If you are being discharged on Antibiotics    Take the antibiotic as directed and complete the entire course unless directed otherwise by your doctor.    Some people have loose stools during or shortly after antibiotic therapy. This may be due to C. difficile infection. See your doctor if you have three or more watery stools a day and symptoms lasting more than two days, or if you have a new fever, severe abdominal pain, cramping, or blood in your stool.       Wound Care  Taking care of your wound properly can help to prevent pain and infection. It can also help your wound to heal more quickly.   HOW TO CARE FOR YOUR WOUND   · Take or apply over-the-counter and prescription medicines only as told by your health care provider.  · If you were prescribed antibiotic medicine, take or apply it as told by your health care provider. Do not stop using the antibiotic even if your condition improves.  · Clean the wound each day or as told by your health care provider.  · Wash the wound with mild soap and water.  · Rinse the wound with water to remove all soap.  · Pat the wound dry with a clean towel. Do not rub it.  · There are many different ways to close and cover a wound. For example, a wound can be covered with stitches (sutures), skin glue, or adhesive strips. Follow instructions from your health care provider about:  · How to take care of your wound.  · When and how you should change your bandage (dressing).  · When you should remove your dressing.  · Removing whatever was used to close your wound.  · Check your wound every day for signs of infection. Watch for:  · Redness, swelling, or pain.  · Fluid, blood, or pus.  · Keep the dressing dry until your health care provider says it can be removed. Do not take baths, swim, use a hot tub, or do anything that would put your wound underwater until your health care provider approves.  · Raise (elevate) the  injured area above the level of your heart while you are sitting or lying down.  · Do not scratch or pick at the wound.  · Keep all follow-up visits as told by your health care provider. This is important.  SEEK MEDICAL CARE IF:  · You received a tetanus shot and you have swelling, severe pain, redness, or bleeding at the injection site.  · You have a fever.  · Your pain is not controlled with medicine.  · You have increased redness, swelling, or pain at the site of your wound.  · You have fluid, blood, or pus coming from your wound.  · You notice a bad smell coming from your wound or your dressing.  SEEK IMMEDIATE MEDICAL CARE IF:  · You have a red streak going away from your wound.     This information is not intended to replace advice given to you by your health care provider. Make sure you discuss any questions you have with your health care provider.     Document Released: 09/26/2009 Document Revised: 05/03/2016 Document Reviewed: 12/14/2015  [employer] Interactive Patient Education ©2016 [employer]    Diabetic Ketoacidosis  Diabetic ketoacidosis is a life-threatening complication of diabetes. If it is not treated, it can cause severe dehydration and organ damage and can lead to a coma or death.  CAUSES  This condition develops when there is not enough of the hormone insulin in the body. Insulin helps the body to break down sugar for energy. Without insulin, the body cannot break down sugar, so it breaks down fats instead. This leads to the production of acids that are called ketones. Ketones are poisonous at high levels.  This condition can be triggered by:  · Stress on the body that is brought on by an illness.  · Medicines that raise blood glucose levels.  · Not taking diabetes medicine.  SYMPTOMS  Symptoms of this condition include:  · Fatigue.  · Weight loss.  · Excessive thirst.  · Light-headedness.  · Fruity or sweet-smelling breath.  · Excessive urination.  · Vision changes.  · Confusion or  irritability.  · Nausea.  · Vomiting.  · Rapid breathing.  · Abdominal pain.  · Feeling flushed.  DIAGNOSIS  This condition is diagnosed based on a medical history, a physical exam, and blood tests. You may also have a urine test that checks for ketones.  TREATMENT  This condition may be treated with:  · Fluid replacement. This may be done to correct dehydration.  · Insulin injections. These may be given through the skin or through an IV tube.  · Electrolyte replacement. Electrolytes, such as potassium and sodium, may be given in pill form or through an IV tube.  · Antibiotic medicines. These may be prescribed if your condition was caused by an infection.  HOME CARE INSTRUCTIONS  Eating and Drinking  · Drink enough fluids to keep your urine clear or pale yellow.  · If you cannot eat, alternate between drinking fluids with sugar (such as juice) and salty fluids (such as broth or bouillon).  · If you can eat, follow your usual diet and drink sugar-free liquids, such as water.  Other Instructions  · Take insulin as directed by your health care provider. Do not skip insulin injections. Do not use  insulin.  · If your blood sugar is over 240 mg/dL, monitor your urine ketones every 4-6 hours.  · If you were prescribed an antibiotic medicine, finish all of it even if you start to feel better.  · Rest and exercise only as directed by your health care provider.  · If you get sick, call your health care provider and begin treatment quickly. Your body often needs extra insulin to fight an illness.  · Check your blood glucose levels regularly. If your blood glucose is high, drink plenty of fluids. This helps to flush out ketones.  SEEK MEDICAL CARE IF:  · Your blood glucose level is too high or too low.  · You have ketones in your urine.  · You have a fever.  · You cannot eat.  · You cannot tolerate fluids.  · You have been vomiting for more than 2 hours.  · You continue to have symptoms of this condition.  · You develop  "new symptoms.  SEEK IMMEDIATE MEDICAL CARE IF:  · Your blood glucose levels continue to be high (elevated).  · Your monitor reads \"high\" even when you are taking insulin.  · You faint.  · You have chest pain.  · You have trouble breathing.  · You have a sudden, severe headache.  · You have sudden weakness in one arm or one leg.  · You have sudden trouble speaking or swallowing.  · You have vomiting or diarrhea that gets worse after 3 hours.  · You feel severely fatigued.  · You have trouble thinking.  · You have abdominal pain.  · You are severely dehydrated. Symptoms of severe dehydration include:  ¨ Extreme thirst.  ¨ Dry mouth.  ¨ Blue lips.  ¨ Cold hands and feet.  ¨ Rapid breathing.     This information is not intended to replace advice given to you by your health care provider. Make sure you discuss any questions you have with your health care provider.     Document Released: 12/15/2001 Document Revised: 05/03/2016 Document Reviewed: 11/25/2015  [employer] Interactive Patient Education ©2016 [employer]        Discharge Instructions    Discharged to home by car with relative. Discharged via wheelchair, hospital escort: Yes.  Special equipment needed: Not Applicable    Be sure to schedule a follow-up appointment with your primary care doctor or any specialists as instructed.     Discharge Plan:   Diet Plan: Discussed  Activity Level: Discussed  Confirmed Follow up Appointment: Patient to Call and Schedule Appointment  Confirmed Symptoms Management: Discussed  Medication Reconciliation Updated: Yes  Influenza Vaccine Indication: Not indicated: Previously immunized this influenza season and > 8 years of age    I understand that a diet low in cholesterol, fat, and sodium is recommended for good health. Unless I have been given specific instructions below for another diet, I accept this instruction as my diet prescription.   Other diet: diabetic diet as tolerated     Special Instructions: None    · Is patient " discharged on Warfarin / Coumadin?   No     · Is patient Post Blood Transfusion?  No    Depression / Suicide Risk    As you are discharged from this [facility] facility, it is important to learn how to keep safe from harming yourself.    Recognize the warning signs:  · Abrupt changes in personality, positive or negative- including increase in energy   · Giving away possessions  · Change in eating patterns- significant weight changes-  positive or negative  · Change in sleeping patterns- unable to sleep or sleeping all the time   · Unwillingness or inability to communicate  · Depression  · Unusual sadness, discouragement and loneliness  · Talk of wanting to die  · Neglect of personal appearance   · Rebelliousness- reckless behavior  · Withdrawal from people/activities they love  · Confusion- inability to concentrate     If you or a loved one observes any of these behaviors or has concerns about self-harm, here's what you can do:  · Talk about it- your feelings and reasons for harming yourself  · Remove any means that you might use to hurt yourself (examples: pills, rope, extension cords, firearm)  · Get professional help from the community (Mental Health, Substance Abuse, psychological counseling)  · Do not be alone:Call your Safe Contact- someone whom you trust who will be there for you.  · Call your local CRISIS HOTLINE 141-1209 or 356-384-2393  · Call your local Children's Mobile Crisis Response Team [place] (415) 080-7777 or www.[employer]  · Call the toll free National Suicide Prevention Hotlines   · National Suicide Prevention Lifeline 376-462-XSZK (9047)  · National Hope Line Network 800-SUICIDE (716-6585)

## 2017-04-10 NOTE — DISCHARGE PLANNING
Medical Social Work    Referral: Discharge planning     Intervention: Pt discussed in IDT AM rounds. Pt will need daily IV abx therapy and daily dressing changes. Met w/ pt at bedside and he is agreeable to outpatient infusion daily and a follow-up appointment at the outpatient wound clinic. MD order for wound clinic has been placed. SW awaiting ID orders. Paged ID MD.     Left VM to outpatient wound clinic requesting TC back with first appointment for 04/11/17. If SW unable to schedule appointment, pt's family member is an RN and is comfortable changing dressing daily.     Plan: SW to fax ID infusion orders to outpatient infusion center, once MD orders are received. Awaiting scheduled outpatient wound clinic appointment for tomorrow 04/11/17

## 2017-04-10 NOTE — PROGRESS NOTES
Assumed care of pt at 0700. Pt sitting in bed watching TV, oriented x4, pt c/o pain 1 out of 10 in abdomen, rest encouraged. Pt - N/V. + BS, + flatus, + BM. Abdomen with midline dressing, changed by MD this am, CDI. Pt up self, steady. Labs noted, assessment complete. Pt updated on POC. Pt educated to use call light when in need of assisstance. Bed locked and in lowest position. All needs met at this time, call light within reach, will continue to monitor.

## 2017-04-10 NOTE — PROGRESS NOTES
Bedside report received.  Assessment complete.  A&O x 4. Patient calls appropriately.  Denies numbness and tingling. Moves extremities.   Patient up self.    Patient has 5/10 pain. MAR medications given  Denies N&V. Tolerating diet.  Abd incision, dressing in place. Clean, dry and intact.  + void, + flatus  Patient denies SOB.  Review plan with of care with patient. Call light and personal belongings with in reach. Hourly rounding in place. All needs met at this time.

## 2017-04-10 NOTE — PROGRESS NOTES
PICC Insertion Final 3CG    Consents confirmed, vessel patency confirmed with ultrasound. Risks and benefits of procedure explained to patient/family and education regarding central line associated bloodstream infections provided. Questions answered.     PICC placed in RUE per MD order with ultrasound guidance. 4 Fr, single lumen PICC placed in basilic vein after one attempt(s). 4 cc's of 1% lidocaine injected intradermally, 21 gauge microintroducer needle and modified Seldinger technique used. 43 cm catheter inserted with good blood return. Secured at 1cm marker. Each lumen flushed without resistance with 10 mL 0.9% normal saline. PICC line secured with Biopatch and Tegaderm.    PICC placement is confirmed by nurse using 3CG technology. PICC line is appropriate for use at this time. Pt tolerated procedure well.  Patient condition relayed to unit RN or ordering physician via this post procedure note in the EMR.     W[employer] Power PICC ref # [id number], Lot # [id number]

## 2017-04-10 NOTE — DISCHARGE PLANNING
Medical Social Work     Received abx order from [facility]. Faxed to . Received fax confirmation. Called outpatient infusion center  and arranged pt's first abx therapy appointment for 04/11/17 @ 17:30 at the outpatient infusion center. Provided pt with appointment time and directions to infusion center. Pt will need to receive first dose of IV abx before discharging home today. Bedside RN aware of above information.     Awaiting arranged appointment at the outpatient wound clinic.

## 2017-04-10 NOTE — DISCHARGE PLANNING
Medical Social Work     Appointment arranged for the outpatient wound clinic tomorrow 04/11/17 at 13:30 with a check-in time for 13:15. Provided pt with appointment time and directions to clinic. Pt agreeable to this plan. Nothing further.

## 2017-04-10 NOTE — PROGRESS NOTES
"Surgical Progress Note:       No acute events  Pain improved    PE:  /79 mmHg  Pulse 78  Temp(Src) 36.9 °C (98.4 °F)  Resp 16  Ht 1.727 m (5' 8\")  Wt 104.2 kg (229 lb 11.5 oz)  BMI 34.94 kg/m2  SpO2 94%    I/O:   Intake/Output Summary (Last 24 hours) at 04/10/17 0913  Last data filed at 04/10/17 0800   Gross per 24 hour   Intake   2820 ml   Output    750 ml   Net   2070 ml     UOP:  PO:  IV:    GEN: NAD  COR: RRR  PULM: CTA  ABD: soft, min TTP around wound, wound open with minimal purulent drainage on packing      Labs:  Recent Labs      04/08/17   0417  04/09/17   0625  04/10/17   0130   WBC  3.9*  3.9*  3.8*   RBC  3.80*  3.93*  3.86*   HEMOGLOBIN  11.5*  11.8*  11.8*   HEMATOCRIT  33.8*  35.3*  34.2*   MCV  88.9  89.8  88.6   MCH  30.3  30.0  30.6   RDW  42.2  43.7  42.7   PLATELETCT  192  173  190   MPV  9.2  8.5*  9.3     Recent Labs      04/08/17   0417  04/09/17   0405  04/10/17   0130   SODIUM  142  140  138   POTASSIUM  3.5*  3.5*  3.3*   CHLORIDE  107  111  106   CO2  24  21  27   GLUCOSE  139*  86  84   BUN  8  7*  7*         A/P:   Decreasing drainage  Continue packing  Antibiotics/dispo per primary team and ID     Esau Dooley M.D.  [facility]  [phone]      "

## 2017-04-10 NOTE — PROGRESS NOTES
Pt discharged home. Left T429 via wheelchair with this RN. Discharge instructions and prescriptions provided prior to departure.

## 2017-04-10 NOTE — DISCHARGE SUMMARY
Hospital Medicine Discharge Note     Admit Date:  4/6/2017       Discharge Date:   4/10/2017    Attending Physician:  Adrián Santos     Diagnoses (includes active and resolved):   Active Problems:    Sepsis (CMS-Formerly [facility]) POA: Unknown    Hypothyroid POA: Yes    DKA (diabetic ketoacidoses) (CMS-Formerly [facility]) POA: Unknown    Normocytic anemia POA: Unknown    Transaminitis POA: Unknown  Resolved Problems:    * No resolved hospital problems. *   Hypokalemia     Hospital Summary (Brief Narrative):       51-year-old male w/h/o complicated umbilical hernia repair with mesh rejection and replacement with a Biomesh p/w enterocutaneous fistula.  He was seen by Dr. Dooley every week previously.    He was admitted for management. He was found initially to have uncontrolled sugars in the 500s.   He says that he has been adjusting his insulin with his doctor but it has been difficult. He was taken by surgery for I&D of this wound. His insulin was adjusted and his glucoses improved. With Lantus 50 twice a day, he started having low blood sugars. This is likely due to his wounds and infection improving. Thus his Lantus was decreased to 40. At home he will go back to his home dose of 36 twice a day but will measure his blood sugars carefully. If he does have hyperglycemia, he will contact his PCP and they will titrate some more.  He was initially placed on Zosyn and Diflucan. He was seen by ID and his wound cultures eventually grew enterococcus faecalis, klebsiella pneumonia, and Bacteroides fragilis. He was set up with outpatient infusion regimen of daily daptomycin and ceftriaxone. He is to also continue the fluconazole. The end date of all these medications is 4/20/2017.  Narcotic history was researched for this patient. The last fill was in 3/2017, but he has increased needs currently.      Consultants:      PURVI Santoro  Surg Miah    Procedures:        4/7/2017 I and D skylar-umbilical abscess    Discharge Medications:           Medication  List      START taking these medications       Instructions    fluconazole 200 MG Tabs   Last time this was given:  200 mg on 4/10/2017  8:47 AM   Commonly known as:  DIFLUCAN   Next Dose Due:  4/11/17 9:00am     Take 1 Tab by mouth every day for 10 days.   Dose:  200 mg       HYDROmorphone 2 MG Tabs   Last time this was given:  2 mg on 4/10/2017  3:03 PM   Commonly known as:  DILAUDID   Next Dose Due:  As needed for pain     Take 1 Tab by mouth every 8 hours as needed for Severe Pain.   Dose:  2 mg         CONTINUE taking these medications       Instructions    * HUYEN THYROID 60 MG Tabs   Last time this was given:  75 mg on 4/9/2017  9:01 PM   Generic drug:  thyroid   Next Dose Due:  4/10/17 9:00pm     Take 75 mg by mouth every evening. Pt uses a 60 mg and 1/2 of a 30 mg, total dose = 75mg QPM   Dose:  75 mg       * thyroid 30 MG Tabs   Last time this was given:  75 mg on 4/9/2017  9:01 PM   Commonly known as:  HUYEN THYROID   Next Dose Due:  4/11/17 6:00am on empty stomach     Take 75 mg by mouth every evening. Pt uses a 60 mg and 1/2 of a 30 mg, total dose = 75mg QPM   Dose:  75 mg       buPROPion 300 MG XL tablet   Commonly known as:  WELLBUTRIN XL    Take 300 mg by mouth every evening.   Dose:  300 mg       insulin lispro 100 UNIT/ML Soln   Last time this was given:  3 Units on 4/10/2017  5:32 AM   Commonly known as:  HUMALOG   Next Dose Due:  4/10/17 5:30pm     Inject 2-20 Units as instructed 3 times a day before meals. Sliding Scale - 2 units every 50 > 150   Dose:  2-20 Units       LIALDA 1.2 GM Tbec   Generic drug:  mesalamine    Take 2.4 g by mouth every evening.   Dose:  2.4 g       testosterone cypionate 200 MG/ML Soln injection   Commonly known as:  DEPO-TESTOSTERONE    200 mg by Intramuscular route every Tuesday.   Dose:  200 mg       TOUJEO SOLOSTAR 300 UNIT/ML Sopn   Generic drug:  Insulin Glargine    Inject 62 Units as instructed 2 Times a Day. Adjusts based on blood sugar.   Dose:  62 Units        Vitamin D3 5000 UNITS Tabs    Take 5,000 Units by mouth every evening.   Dose:  5000 Units       * Notice:  This list has 2 medication(s) that are the same as other medications prescribed for you. Read the directions carefully, and ask your doctor or other care provider to review them with you.      STOP taking these medications          rifampin 300 MG Caps   Commonly known as:  RIFADINE       sulfamethoxazole-trimethoprim 400-80 MG Tabs   Commonly known as:  BACTRIM           Disposition:   Discharge home    Diet:   Diabetic    Activity:   As tolerated    Code status:   Full code    Primary Care Provider:    Rodolfo Stein M.D.    Follow up appointment details :      PCP in 2 weeks  No follow-up provider specified.  Future Appointments  Date Time Provider Department Center   4/11/2017 10:00 AM [address]       Pending Studies:        None    Time spent on discharge day patient visit: 41 minutes    #################################################    Interval history/exam for day of discharge:    Filed Vitals:    04/09/17 0800 04/09/17 2000 04/10/17 0400 04/10/17 0800   BP: 120/74 117/74 118/79 120/81   Pulse: 74 73 78 66   Temp: 36.2 °C (97.2 °F) 36.7 °C (98.1 °F) 36.9 °C (98.4 °F) 36 °C (96.8 °F)   Resp: 16 16 16 16   Height:       Weight:       SpO2: 98% 94% 94%      Weight/BMI: Body mass index is 34.94 kg/(m^2).  Pulse Oximetry: 94 %, O2 (LPM): 97, O2 Delivery: None (Room Air)    General: obese, NAD  CVS:  RRR  PULM:  CTAB, no respiratory distress  ABd: Tender to palpation over incision site    Most Recent Labs:      Procedure Component Value Units Date/Time     CULTURE WOUND W/ GRAM STAIN [[id number]] (Abnormal)  Collected: 04/07/17 1042     Order Status: Completed Specimen Information: Wound Updated: 04/10/17 1442      Significant Indicator POS (POS)       Source WND       Site Umbilical Abscess       Culture Result Wound -- (A)       Gram Stain Result --       Result:        Many  WBCs.   Many Gram positive cocci.   Moderate Gram negative rods.         Culture Result Wound -- (A)       Result:        Enterococcus faecalis   Heavy growth   Combination therapy with ampicillin, penicillin, or   vancomycin (for susceptible strains) plus an aminoglycoside   is usually indicated for serious enterococcal infections,   such as endocarditis unless high-level resistance to both   gentamicin and streptomycin is documented; such combinations   are predicted to result in synergistic killing of the   Enterococcus.   The susceptibility profile for this organism indicates that   Streptomycin would not be an effective component of   combination therapy.         Culture Result Wound -- (A)       Result:        Klebsiella pneumoniae   Light growth        Culture & Susceptibility      ENTEROCOCCUS FAECALIS      Antibiotic Sensitivity Microscan Unit Status     Ampicillin Sensitive <=2 mcg/mL Final     Daptomycin Sensitive 1 mcg/mL Final     Gent Synergy Sensitive <=500 mcg/mL Final     Penicillin Sensitive 2 mcg/mL Final     Vancomycin Sensitive 2 mcg/mL Final                 KLEBSIELLA PNEUMONIAE      Antibiotic Sensitivity Microscan Unit Status     Ampicillin Intermediate 16 mcg/mL Final     Cefepime Sensitive <=8 mcg/mL Final     Cefotaxime Sensitive <=2 mcg/mL Final     Cefotetan Sensitive <=16 mcg/mL Final     Ceftazidime Sensitive <=1 mcg/mL Final     Ceftriaxone Sensitive <=8 mcg/mL Final     Cefuroxime Sensitive <=4 mcg/mL Final     Ciprofloxacin Sensitive <=1 mcg/mL Final     Ertapenem Sensitive <=1 mcg/mL Final     Gentamicin Sensitive <=4 mcg/mL Final     Pip/Tazobactam Sensitive <=16 mcg/mL Final     Tigecycline Sensitive <=2 mcg/mL Final     Tobramycin Sensitive <=4 mcg/mL Final     Trimeth/Sulfa Sensitive <=2/38 mcg/mL Final                           ANAEROBIC CULTURE [[id number]] (Abnormal) Collected: 04/07/17 1042     Order Status: Completed Specimen Information: Wound Updated: 04/10/17 8114       Significant Indicator POS (POS)       Source WND       Site Umbilical Abscess       Anaerobic Culture, Culture Res         Result:        Moderate growth mixed anaerobes including (A)      Anaerobic Culture, Culture Res Bacteroides fragilis Group (A)        Lab Results   Component Value Date/Time    WBC 3.8* 04/10/2017 01:30 AM    RBC 3.86* 04/10/2017 01:30 AM    HEMOGLOBIN 11.8* 04/10/2017 01:30 AM    HEMATOCRIT 34.2* 04/10/2017 01:30 AM    MCV 88.6 04/10/2017 01:30 AM    MCH 30.6 04/10/2017 01:30 AM    MCHC 34.5 04/10/2017 01:30 AM    MPV 9.3 04/10/2017 01:30 AM    NEUTROPHILS-POLYS 52.90 04/07/2017 02:19 AM    LYMPHOCYTES 37.90 04/07/2017 02:19 AM    MONOCYTES 6.50 04/07/2017 02:19 AM    EOSINOPHILS 1.60 04/07/2017 02:19 AM    BASOPHILS 0.30 04/07/2017 02:19 AM    ANISOCYTOSIS 1+ 03/04/2016 12:30 AM      Lab Results   Component Value Date/Time    SODIUM 138 04/10/2017 01:30 AM    POTASSIUM 3.3* 04/10/2017 01:30 AM    CHLORIDE 106 04/10/2017 01:30 AM    CO2 27 04/10/2017 01:30 AM    GLUCOSE 84 04/10/2017 01:30 AM    BUN 7* 04/10/2017 01:30 AM    CREATININE 0.73 04/10/2017 01:30 AM      Lab Results   Component Value Date/Time    ALT(SGPT) 93* 04/10/2017 01:30 AM    AST(SGOT) 29 04/10/2017 01:30 AM    ALKALINE PHOSPHATASE 99 04/10/2017 01:30 AM    TOTAL BILIRUBIN 0.3 04/10/2017 01:30 AM    DIRECT BILIRUBIN <0.1 01/17/2017 03:38 AM    LIPASE 9* 04/06/2017 10:05 AM    ALBUMIN 3.6 04/10/2017 01:30 AM    GLOBULIN 2.7 04/10/2017 01:30 AM    PRE-ALBUMIN 16.0* 03/07/2016 01:07 AM    INR 1.27* 02/27/2016 09:33 PM    MACROCYTOSIS 1+ 03/01/2016 02:05 AM     Lab Results   Component Value Date/Time    PT 15.8* 02/27/2016 09:33 PM    INR 1.27* 02/27/2016 09:33 PM        Imaging/ Testing:      IR-PICC LINE PLACEMENT > AGE 5   Final Result                  Ultrasound-guided PICC placement performed by qualified nursing staff as    above.          DX-SMALL BOWEL SERIES   Final Result      No enterocutaneous fistula identified.       No evidence of small bowel obstruction.      CT-ABDOMEN-PELVIS WITH   Final Result      Periumbilical wound with induration and mild inflammatory stranding.      Hepatic steatosis.      Status post cholecystectomy.      Sequelae of prior granulomatous exposure.         DX-CHEST-PORTABLE (1 VIEW)   Final Result      No acute cardiopulmonary process is seen.          Instructions:      The patient was instructed to return to the ER in the event of worsening symptoms. I have counseled the patient on the importance of compliance and the patient has agreed to proceed with all medical recommendations and follow up plan indicated above.   The patient understands that all medications come with benefits and risks. Risks may include permanent injury or death and these risks can be minimized with close reassessment and monitoring.

## 2017-04-10 NOTE — CARE PLAN
Problem: Safety  Goal: Will remain free from injury  Patient uses call light when needing to get up.    Problem: Pain Management  Goal: Pain level will decrease to patient’s comfort goal  MAR medications given to control pain

## 2017-04-10 NOTE — PROGRESS NOTES
Infectious Disease Progress Note    Author: Luma Aguirre M.D. Date of service & Time created: 4/10/2017  12:15 PM    Chief Complaint:  Chief Complaint   Patient presents with   • High Blood Sugar     Pt reports blood sugars have been running in the 200's-400's for the past few weeks.  FSBS 486 at home per pt.    • N/V   • Fatigue       Interval History:  51 year old male seen for infected umblical hernia repair site  - no fevers. Some abdominal pain  - has some pain. No fevers.  4/10 AF, WBC 3.8 wound packed this [id number]  Labs Reviewed, Medications Reviewed, Radiology Reviewed and Wound Reviewed.    Review of Systems:  Review of Systems   Constitutional: Positive for malaise/fatigue. Negative for fever.   Respiratory: Negative for cough and shortness of breath.    Gastrointestinal: Positive for abdominal pain. Negative for nausea and vomiting.   Genitourinary: Negative for dysuria.   Musculoskeletal: Negative for myalgias.   Neurological: Negative for speech change and headaches.   All other systems reviewed and are negative.      Hemodynamics:  Temp (24hrs), Av.6 °C (97.8 °F), Min:36 °C (96.8 °F), Max:36.9 °C (98.4 °F)  Temperature: 36 °C (96.8 °F)  Pulse  Av.7  Min: 57  Max: 102   Blood Pressure: 120/81 mmHg       Physical Exam:  Physical Exam   Constitutional: He is oriented to person, place, and time. He appears well-developed and well-nourished. No distress.   HENT:   Head: Normocephalic and atraumatic.   Mouth/Throat: No oropharyngeal exudate.   Eyes: EOM are normal. Pupils are equal, round, and reactive to light. No scleral icterus.   Neck: Neck supple.   Cardiovascular: Normal rate.    No murmur heard.  Pulmonary/Chest: Effort normal. No respiratory distress.   Abdominal: Soft. He exhibits no distension. There is tenderness.   umblical wound packed-no induration, necrosis   Musculoskeletal: He exhibits no edema.   RUE PICC   Neurological: He is alert and oriented to person,  place, and time.   Nursing note and vitals reviewed.      Meds:    Current facility-administered medications:   •  insulin lispro (HUMALOG) injection 3-14 Units, 3-14 Units, Subcutaneous, 4X/DAY ACHS, Adrián Santos M.D., Stopped at 04/10/17 1107  •  insulin glargine (LANTUS) injection 36 Units, 36 Units, Subcutaneous, BID, Adrián Santos M.D.  •  HYDROmorphone (DILAUDID) tablet 2 mg, 2 mg, Oral, Q4HRS PRN, Adrián Santos M.D., 2 mg at 04/10/17 0546  •  fluconazole (DIFLUCAN) tablet 200 mg, 200 mg, Oral, DAILY, Anai Santoro M.D., 200 mg at 04/10/17 0847  •  buPROPion SR (WELLBUTRIN-SR) tablet 300 mg, 300 mg, Oral, Q EVENING, Carrillo Mcgarry M.D., 300 mg at 04/09/17 2100  •  mesalamine delayed-release (DELZICOL) capsule 400 mg, 400 mg, Oral, TID, Carrillo Mcgarry M.D., 400 mg at 04/10/17 0847  •  thyroid (ARMOUR THYROID) tablet 75 mg, 75 mg, Oral, Q EVENING, Carrillo Mcgarry M.D., 75 mg at 04/09/17 2101  •  NS (BOLUS) infusion 500 mL, 500 mL, Intravenous, Once PRN, Carrillo Mcgarry M.D.  •  heparin injection 5,000 Units, 5,000 Units, Subcutaneous, Q8HRS, Carrillo Mcgarry M.D., 5,000 Units at 04/10/17 0529  •  acetaminophen (TYLENOL) tablet 650 mg, 650 mg, Oral, Q6HRS PRN, Carrillo Mcgarry M.D., 650 mg at 04/08/17 1350  •  Action is required: Protocol 1073 Hypoglycemia has been implemented, , , Once **AND** Protocol 1073 Inclusion Criteria, , , CONTINUOUS **AND** Protocol 1073 NOTIFY, , , Once **AND** Protocol 1073 Initiate protocol immediately if FSBG is less than or equal to 70 mg/dL, , , CONTINUOUS **AND** glucose 4 g chewable tablet 16 g, 16 g, Oral, Q15 MIN PRN **AND** dextrose 50% (D50W) injection 25 mL, 25 mL, Intravenous, Q15 MIN PRN, Carrillo Mcgarry M.D.  •  ondansetron (ZOFRAN) syringe/vial injection 4 mg, 4 mg, Intravenous, Q4HRS PRN, Carrillo Mcgarry M.D., 4 mg at 04/08/17 0858  •  ondansetron (ZOFRAN ODT) dispertab 4 mg, 4 mg, Oral, Q4HRS PRN, Carrillo Mcgarry M.D., 4 mg at  04/07/17 1808  •  promethazine (PHENERGAN) tablet 12.5-25 mg, 12.5-25 mg, Oral, Q4HRS PRN, Carrillo Mcgarry M.D.  •  promethazine (PHENERGAN) suppository 12.5-25 mg, 12.5-25 mg, Rectal, Q4HRS PRN, Carrillo Mcgarry M.D.  •  prochlorperazine (COMPAZINE) injection 5-10 mg, 5-10 mg, Intravenous, Q4HRS PRN, Carrillo Mcgarry M.D.  •  lorazepam (ATIVAN) tablet 0.5 mg, 0.5 mg, Oral, Q6HRS PRN **OR** lorazepam (ATIVAN) injection 0.5 mg, 0.5 mg, Intravenous, Q6HRS PRN, Carrillo Mcgarry M.D.  •  morphine (pf) 4 mg/ml injection 2-4 mg, 2-4 mg, Intravenous, Q3HRS PRN, Carrillo Mcgarry M.D., 4 mg at 04/10/17 0958  •  piperacillin-tazobactam (ZOSYN) 3.375 g in  mL IVPB, 3.375 g, Intravenous, Q6HRS, Carrillo Mcgarry M.D., Last Rate: 200 mL/hr at 04/10/17 1104, 3.375 g at 04/10/17 1104    Labs:  Recent Labs      04/08/17 0417 04/09/17   0625  04/10/17   0130   WBC  3.9*  3.9*  3.8*   RBC  3.80*  3.93*  3.86*   HEMOGLOBIN  11.5*  11.8*  11.8*   HEMATOCRIT  33.8*  35.3*  34.2*   MCV  88.9  89.8  88.6   MCH  30.3  30.0  30.6   RDW  42.2  43.7  42.7   PLATELETCT  192  173  190   MPV  9.2  8.5*  9.3     Recent Labs      04/08/17   0417  04/09/17   0405  04/10/17   0130   SODIUM  142  140  138   POTASSIUM  3.5*  3.5*  3.3*   CHLORIDE  107  111  106   CO2  24  21  27   GLUCOSE  139*  86  84   BUN  8  7*  7*     Recent Labs      04/08/17   0417  04/09/17   0405  04/10/17   0130   ALBUMIN  3.3  3.6  3.6   TBILIRUBIN  0.6  0.3  0.3   ALKPHOSPHAT  112*  112*  99   TOTPROTEIN  5.6*  6.0  6.3   ALTSGPT  187*  124*  93*   ASTSGOT  186*  69*  29   CREATININE  0.76  0.77  0.73       Imaging:  Ct-abdomen-pelvis With  4/6/2017  Periumbilical wound with induration and mild inflammatory stranding. Hepatic steatosis. Status post cholecystectomy. Sequelae of prior granulomatous exposure.     Ct-abdomen-pelvis With  3/21/2017  1. Soft tissue stranding with several foci within the subcutaneous tissue as well as peritoneum (likely  deep to the mesh), postsurgical change versus superimposed infection.    Dx-chest-portable (1 View)  4/6/2017  No acute cardiopulmonary process is seen.    Dx-small Bowel Series  4/6/2017  No enterocutaneous fistula identified. No evidence of small bowel obstruction.        Micro:  Results     Procedure Component Value Units Date/Time    CULTURE WOUND W/ GRAM STAIN [[id number]]  (Abnormal)  (Susceptibility) Collected:  04/07/17 1042    Order Status:  Completed Specimen Information:  Wound Updated:  04/09/17 1618     Gram Stain Result --      Result:        Many WBCs.  Many Gram positive cocci.  Moderate Gram negative rods.       Significant Indicator POS (POS)      Source WND      Site Umbilical Abscess      Culture Result Wound -- (A)      Culture Result Wound -- (A)      Result:        Enterococcus faecalis  Heavy growth  Combination therapy with ampicillin, penicillin, or  vancomycin (for susceptible strains) plus an aminoglycoside  is usually indicated for serious enterococcal infections,  such as endocarditis unless high-level resistance to both  gentamicin and streptomycin is documented; such combinations  are predicted to result in synergistic killing of the  Enterococcus.  The susceptibility profile for this organism indicates that  Streptomycin would not be an effective component of  combination therapy.       Culture Result Wound -- (A)      Result:        Klebsiella pneumoniae  Light growth      Culture & Susceptibility     ENTEROCOCCUS FAECALIS     Antibiotic Sensitivity Microscan Unit Status    Ampicillin Sensitive <=2 mcg/mL Final    Daptomycin Sensitive 1 mcg/mL Final    Gent Synergy Sensitive <=500 mcg/mL Final    Penicillin Sensitive 2 mcg/mL Final    Vancomycin Sensitive 2 mcg/mL Final              KLEBSIELLA PNEUMONIAE     Antibiotic Sensitivity Microscan Unit Status    Ampicillin Intermediate 16 mcg/mL Final    Cefepime Sensitive <=8 mcg/mL Final    Cefotaxime Sensitive <=2 mcg/mL Final     "Cefotetan Sensitive <=16 mcg/mL Final    Ceftazidime Sensitive <=1 mcg/mL Final    Ceftriaxone Sensitive <=8 mcg/mL Final    Cefuroxime Sensitive <=4 mcg/mL Final    Ciprofloxacin Sensitive <=1 mcg/mL Final    Ertapenem Sensitive <=1 mcg/mL Final    Gentamicin Sensitive <=4 mcg/mL Final    Pip/Tazobactam Sensitive <=16 mcg/mL Final    Tigecycline Sensitive <=2 mcg/mL Final    Tobramycin Sensitive <=4 mcg/mL Final    Trimeth/Sulfa Sensitive <=2/38 mcg/mL Final                       ANAEROBIC CULTURE [[id number]] Collected:  04/07/17 1042    Order Status:  Completed Specimen Information:  Wound Updated:  04/09/17 1618     Significant Indicator NEG      Source WND      Site Umbilical Abscess      Anaerobic Culture, Culture Res        Result:        Moderate growth anaerobes; further incubation required.    FUNGAL CULTURE [[id number]] Collected:  04/07/17 1042    Order Status:  Completed Specimen Information:  Wound Updated:  04/09/17 1618     Significant Indicator NEG      Source WND      Site Umbilical Abscess      Fungal Culture Culture in progress.     GRAM STAIN [[id number]] Collected:  04/07/17 1042    Order Status:  Completed Specimen Information:  Wound Updated:  04/07/17 1748     Significant Indicator .      Source WND      Site Umbilical Abscess      Gram Stain Result --      Result:        Many WBCs.  Many Gram positive cocci.  Moderate Gram negative rods.      BLOOD CULTURE [[id number]] Collected:  04/06/17 1003    Order Status:  Completed Specimen Information:  Blood from Peripheral Updated:  04/07/17 0720     Significant Indicator NEG      Source BLD      Site PERIPHERAL      Blood Culture --      Result:        No Growth    Note: Blood cultures are incubated for 5 days and  are monitored continuously.Positive blood cultures  are called to the RN and reported as soon as  they are identified.      Narrative:      Per Hospital Policy: Only change Specimen Src: to \"Line\" if  specified by physician order.    BLOOD " "CULTURE [[id number]] Collected:  04/06/17 1047    Order Status:  Completed Specimen Information:  Blood from Peripheral Updated:  04/07/17 0720     Significant Indicator NEG      Source BLD      Site PERIPHERAL      Blood Culture --      Result:        No Growth    Note: Blood cultures are incubated for 5 days and  are monitored continuously.Positive blood cultures  are called to the RN and reported as soon as  they are identified.      Narrative:      Per Hospital Policy: Only change Specimen Src: to \"Line\" if  specified by physician order.    URINALYSIS,CULTURE IF INDICATED [[id number]]  (Abnormal) Collected:  04/06/17 1003    Order Status:  Completed Specimen Information:  Urine Updated:  04/06/17 1054     Color Colorless      Character Clear      Specific Gravity 1.025      Ph 5.0      Glucose >1000 (A) mg/dL      Ketones Negative mg/dL      Protein Negative mg/dL      Bilirubin Negative      Nitrite Negative      Leukocyte Esterase Negative      Occult Blood Negative      Micro Urine Req see below      Comment: Microscopic examination not performed when specimen is clear  and chemically negative for protein, blood, leukocyte esterase  and nitrite.          Culture Indicated No UA Culture     Blood Culture [[id number]] Collected:  04/06/17 0000    Order Status:  Canceled Specimen Information:  Other from Peripheral     Narrative:      TEST Blood Culture WAS CANCELLED, 04/10/17 01:05 Test autocancelled, not  collected or received  From different peripheral sites, if not done within the last  24 hours (Per Hospital Policy: Only change specimen source to  \"Line\" if specified by physician order)    Blood Culture [[id number]] Collected:  04/06/17 0000    Order Status:  Canceled Specimen Information:  Other from Peripheral     Narrative:      TEST Blood Culture WAS CANCELLED, 04/10/17 01:05 Test autocancelled, not  collected or received  From different peripheral sites, if not done within the last  24 hours (Per Hospital " "Policy: Only change specimen source to  \"Line\" if specified by physician order)           Assessment:  Active Hospital Problems    Diagnosis   • Sepsis (CMS-HCC) [A41.9]   • Normocytic anemia [D64.9]   • Transaminitis [R74.0]   • DKA (diabetic ketoacidoses) (CMS-HCC) [E13.10]   • Hypothyroid [E03.9]       Plan:  Surgical site infection, deep  S/p umblical hernia repair 11/6  Removal of mesh 3/10  I&D 4/6-imaging for fistula neg  C/s are Enterococcus faecalis and Klebsiella  Continue zosyn and diflucan while in hosp  Change to once daily daptomycin and ceftriaxone for outpatient infusion  Stop date IV abx 4/20/2017  Give dose prior to discharge    Mild transmainitis  Improved    Leukopenia, persistent  Wbc 3.8  Change abx as above and continue to monitor    DM  Keep blood sugars under control for wound healing    Ulcerative colitis  In remission currently    Discussed with internal Medicine-Dr Santos  "

## 2017-04-11 ENCOUNTER — OUTPATIENT INFUSION SERVICES (OUTPATIENT)
Dept: ONCOLOGY | Facility: MEDICAL CENTER | Age: 52
End: 2017-04-11
Attending: INTERNAL MEDICINE
Payer: COMMERCIAL

## 2017-04-11 ENCOUNTER — NON-PROVIDER VISIT (OUTPATIENT)
Dept: WOUND CARE | Facility: MEDICAL CENTER | Age: 52
End: 2017-04-11
Attending: HOSPITALIST
Payer: COMMERCIAL

## 2017-04-11 VITALS
TEMPERATURE: 97.5 F | SYSTOLIC BLOOD PRESSURE: 150 MMHG | BODY MASS INDEX: 34.38 KG/M2 | HEART RATE: 113 BPM | DIASTOLIC BLOOD PRESSURE: 90 MMHG | OXYGEN SATURATION: 96 % | HEIGHT: 68 IN | RESPIRATION RATE: 18 BRPM | WEIGHT: 226.85 LBS

## 2017-04-11 DIAGNOSIS — T81.40XA OTHER POSTOPERATIVE INFECTION: ICD-10-CM

## 2017-04-11 DIAGNOSIS — K42.0 INCARCERATED UMBILICAL HERNIA: ICD-10-CM

## 2017-04-11 LAB
BACTERIA BLD CULT: NORMAL
CK SERPL-CCNC: 47 U/L (ref 0–154)
SIGNIFICANT IND 70042: NORMAL
SIGNIFICANT IND 70042: NORMAL
SITE SITE: NORMAL
SITE SITE: NORMAL
SOURCE SOURCE: NORMAL
SOURCE SOURCE: NORMAL

## 2017-04-11 PROCEDURE — 700111 HCHG RX REV CODE 636 W/ 250 OVERRIDE (IP)

## 2017-04-11 PROCEDURE — A6402 STERILE GAUZE <= 16 SQ IN: HCPCS

## 2017-04-11 PROCEDURE — 700105 HCHG RX REV CODE 258: Performed by: INTERNAL MEDICINE

## 2017-04-11 PROCEDURE — 700111 HCHG RX REV CODE 636 W/ 250 OVERRIDE (IP): Mod: JW | Performed by: INTERNAL MEDICINE

## 2017-04-11 PROCEDURE — 700105 HCHG RX REV CODE 258

## 2017-04-11 PROCEDURE — 97602 WOUND(S) CARE NON-SELECTIVE: CPT

## 2017-04-11 PROCEDURE — A6212 FOAM DRG <=16 SQ IN W/BORDER: HCPCS

## 2017-04-11 PROCEDURE — 96365 THER/PROPH/DIAG IV INF INIT: CPT

## 2017-04-11 PROCEDURE — 303972 HCHG HYPAFIX RET DRST 18SQ PC"

## 2017-04-11 PROCEDURE — 302804 HCHG HYDROFIBER SILVER 6X6

## 2017-04-11 PROCEDURE — 96367 TX/PROPH/DG ADDL SEQ IV INF: CPT

## 2017-04-11 RX ORDER — ALLOPURINOL 300 MG/1
300 TABLET ORAL
Refills: 3 | COMMUNITY
Start: 2017-02-22 | End: 2017-04-19

## 2017-04-11 RX ADMIN — DAPTOMYCIN 420 MG: 500 INJECTION, POWDER, LYOPHILIZED, FOR SOLUTION INTRAVENOUS at 18:00

## 2017-04-11 RX ADMIN — CEFTRIAXONE SODIUM 2 G: 2 INJECTION, POWDER, FOR SOLUTION INTRAMUSCULAR; INTRAVENOUS at 17:43

## 2017-04-11 ASSESSMENT — PAIN SCALES - GENERAL: PAINLEVEL: 3=SLIGHT PAIN

## 2017-04-11 NOTE — CERTIFICATION
"Advanced Wound Care  [facility]  [address]  [address]  [place] [address]  [phone] Fax: [phone]      Initial Evaluation  For Certification Period: 4/11/17-5/11/17      Referring Physician: Adrián Santos MD  Primary Physician: Rodolfo Stein MD, YAIR Mohan       Consulting Physicians: YAIR Rubin    Wound(s):  Umbilical wound    Start of Care:    4/11/17  Subjective:        HPI: Pt presents to clinic with umbilical wound after discharge from [facility] inpatient yesterday.  He had his original hernia repair 11/6/2016.  He then had a mesh removal and I&D 3/10/17 with another I&D 4/7/17.  Pt states that he has IDDM and has had 5 episodes of DKA since January 2017.  He reports FBS today at 137.  He is on IV ABX (infusion center visits start today 4/11/17).   Wound was packed with iodoform gauze at Dr. Rutherford's (surgeon) office yesterday.  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           4/11/17 NPWT ordered.  Should arrive by next visit.    Pain: 3 out 10 pt states, \"sharp tingling with movement at incision site.\"           Past Medical History:   Past Medical History   Diagnosis Date   • Migraine    • Gout    • Indigestion    • UC (ulcerative colitis) (CMS-HCC) 3-3-17     \"Five BM's per day, takes Lialda\"   • Difficult intubation 2-2016   • Arthritis 3-3-17     \"Spine\"   • Sepsis (CMS-HCC) 1/21/2016   • Essential hypertension 1/22/2016   • Snoring 3-3-17     Has not had a sleep study   • High cholesterol 3-3-17     Does not currently take medication for   • Congestive heart failure (CMS-HCC) 2-2016      3-3-17 \"Not a current issue\"   • ASTHMA 3-3-17     \"Hasn't had to use inhaler in 2 years\"   • Aspiration pneumonia (CMS-HCC) 3-2016   • Breath shortness 3-3-17     \"With Exercise\"   • Hypothyroid 3-3-17     Takes Glasgow Thyroid   • Pain 3-3-17     \"Left Flank\"   • Heart burn    • Depression 11/26/2014   • Anesthesia      \"Was difficult to intubate 2-2016\"   • " "Pancreatitis 2-2016     \"D/T Tradjenta was hospitialized for 15 days\"   • Elevated liver enzymes 2-2016   • Electrolyte imbalance 2-2016   • Kidney stones    • ADRIANE (acute kidney injury) (CMS-HCC) 2/24/2016   • RF (renal failure) 2-2016   • Diabetes (CMS-HCC) 3-3-17     Takes Insulin   • DKA (diabetic ketoacidoses) (CMS-HCC) 2-2016     \"10 days on vent with DKA, Renal failure, CHF, elevated liver enzymes and electrolyte imbalance\"   • On mechanically assisted ventilation (CMS-HCC) 2-2016     HX \"On Vent for 10 days at [facility]\".  \"DX Pancreatitis, DKA, CHF, Elevated Liver Enzymes & Electrolyte Imbalance\".     Current Medications:   Current outpatient prescriptions:   •  fluconazole (DIFLUCAN) 200 MG Tab, Take 1 Tab by mouth every day for 10 days., Disp: 10 Tab, Rfl: 0  •  HYDROmorphone (DILAUDID) 2 MG Tab, Take 1 Tab by mouth every 8 hours as needed for Severe Pain., Disp: 10 Tab, Rfl: 0  •  Insulin Glargine (TOUJEO SOLOSTAR) 300 UNIT/ML Solution Pen-injector, Inject 62 Units as instructed 2 Times a Day. Adjusts based on blood sugar., Disp: , Rfl:   •  thyroid (ARMOUR THYROID) 30 MG Tab, Take 75 mg by mouth every evening. Pt uses a 60 mg and 1/2 of a 30 mg, total dose = 75mg QPM, Disp: , Rfl:   •  thyroid (ARMOUR THYROID) 60 MG Tab, Take 75 mg by mouth every evening. Pt uses a 60 mg and 1/2 of a 30 mg, total dose = 75mg QPM, Disp: , Rfl:   •  Cholecalciferol (VITAMIN D3) 5000 UNITS Tab, Take 5,000 Units by mouth every evening., Disp: , Rfl:   •  insulin lispro (HUMALOG) 100 UNIT/ML Solution, Inject 2-20 Units as instructed 3 times a day before meals. Sliding Scale - 2 units every 50 > 150, Disp: , Rfl:   •  buPROPion (WELLBUTRIN XL) 300 MG XL tablet, Take 300 mg by mouth every evening., Disp: , Rfl:   •  mesalamine (LIALDA) 1.2 GM TBEC, Take 2.4 g by mouth every evening., Disp: , Rfl:   •  testosterone cypionate (DEPO-TESTOSTERONE) 200 MG/ML SOLN injection, 200 mg by Intramuscular route every Tuesday., Disp: , Rfl:  "   Pt states that he is being followed by ID and is on IV ABX.  He states he's been on IV ABX for 4 days.  Allergies: Peanut-derived; Tradjenta; and Hydrocodone  Past Surgical History:   Past Surgical History   Procedure Laterality Date   • Carmenza by laparoscopy  2005   • Colonoscopy with biopsy  11/26/2014     Performed by Solo Higginbotham M.D. at ENDOSCOPY [facility]   • Umbilical hernia repair N/A 11/6/2016     Procedure: UMBILICAL HERNIA REPAIR;  Surgeon: Esau Dooley M.D.;  Location: SURGERY [facility];  Service:    • Other orthopedic surgery  1997 or 1998     Left Wrist ORIF   • Umbilical hernia repair  3/10/2017     Procedure: UMBILICAL HERNIA REPAIR- INCISION AND DRAINAGE OF UMBILICAL WOUND AND MESH REMOVAL;  Surgeon: Esau Dooley M.D.;  Location: SURGERY SAME DAY [facility];  Service:    • Incision and drainage general  4/7/2017     Procedure: INCISION AND DRAINAGE GENERAL;  Surgeon: Esau Dooley M.D.;  Location: SURGERY SAME DAY [facility];  Service:    Social History:    Social History     Social History   • Marital Status: Single     Spouse Name: N/A   • Number of Children: N/A   • Years of Education: N/A     Occupational History   • Not on file.     Social History Main Topics   • Smoking status: Never Smoker    • Smokeless tobacco: Not on file   • Alcohol Use: No      Comment: occ   • Drug Use: No   • Sexual Activity: Not on file     Other Topics Concern   • Not on file     Social History Narrative    ** Merged History Encounter **         ** Merged History Encounter **              Objective:    Tests and Measures: Cx positive from 4/7/17.  Pt is followed by ID and is on IV ABX    Orthotic, protective, supportive devices: none     Wound Characteristics                                                    Location: midline abdomen   Initial Evaluation  Date: 4/11/17   Tissue Type and %: 100% marbled pink viable, adherent yellow, and SQ   Periwound: intact   Drainage: Large  thin yellow   Exposed structures sutures   Wound Edges:   open   Odor: none   S&S of Infection:   drainage   Edema: local   Sensation: intact                     Measurements:  Midline abdomen Initial Evaluation  Date: 4/11/17   Length (cm) 1.6   Width (cm) 4.1   Depth (cm) 3   Area (cm2) 6.56   Tract/undermine 12 o'clock   0.6cm   3 o'clock    1cm   6 o'clock   0 cm   9 o'clock   0.5 cm        Procedures:     Debridement :  Non selective using NS and cotton tipped applicator   Cleansed with:   NS                                                                       Periwound protected with: skin prep   Primary dressing: AqAg (VAC ordered and should arrive by next visit)   Secondary Dressing: super absorbant secured with hypafix   Other:      Patient Education: Reviewed POC, nutrition for wound healing, s/s of complications/infection, when to notify MD/go to ER.  Pt verbalized understanding to all.    Professional Collaboration: joint visit with YAIR Rubin, note sent to YAIR via [employer].  Wound VAC ordered and should arrive by next clinic visit.        Assessment:      Wound etiology: surgical     Wound Progress:  TBD, initial    Rationale for Treatment: Aquacel Ag for it's antimicrobial properties, to absorb excess exudate, and to maintain moist wound environment.  (NPWT ordered)    Patient tolerance/compliance: pt motivated to heal wound and would like to have it healed by the fall when he goes on his honeymoon    Complicating factors: IDDM, infection (pt on IV ABX)    Need for ongoing Advanced Wound Care services: Patient requires skilled therapeutic wound care services for product selection, application of product, debridement, close monitoring with clinical assessment, compression for expedite of wound healing.        Plan:      Treatment Plan and Recommendations:  Diagnosis/ICD9: No diagnosis found.  Procedures/CPT:    Frequency: 2x'/week (change to 3x/week if VAC/NPWT ordered)      Treatment Goals: STG 2  Weeks  LTG 4 Weeks   Granulation Tissue: 75% 100%   Decrease Necrotic Tissue to: 50% 0%   Wound Phase:  inflammatory proliferative   Decrease Size by: 25% 50%   Periwound:  intact intact   Decrease tracts/undermining by: 50% 75%   Decrease Pain:  2 none                        At the time of each visit a thorough assessment of the patient is completed to assure the  appropriateness of our plan of care.  The dressings or modalities may need to be adapted   from the original plan to address any significant changes in the wound environment.          Clinician Signature:_______________________________Date__________________      Physician Signature:______________________________Date:__________________

## 2017-04-12 ENCOUNTER — TELEPHONE (OUTPATIENT)
Dept: INFECTIOUS DISEASES | Facility: MEDICAL CENTER | Age: 52
End: 2017-04-12

## 2017-04-12 ENCOUNTER — OUTPATIENT INFUSION SERVICES (OUTPATIENT)
Dept: ONCOLOGY | Facility: MEDICAL CENTER | Age: 52
End: 2017-04-12
Attending: INTERNAL MEDICINE
Payer: COMMERCIAL

## 2017-04-12 VITALS
SYSTOLIC BLOOD PRESSURE: 123 MMHG | BODY MASS INDEX: 34.99 KG/M2 | RESPIRATION RATE: 18 BRPM | DIASTOLIC BLOOD PRESSURE: 82 MMHG | HEART RATE: 87 BPM | TEMPERATURE: 98.4 F | OXYGEN SATURATION: 96 % | WEIGHT: 226.85 LBS

## 2017-04-12 DIAGNOSIS — E87.6 HYPOKALEMIA: ICD-10-CM

## 2017-04-12 PROCEDURE — 700111 HCHG RX REV CODE 636 W/ 250 OVERRIDE (IP): Performed by: PHARMACIST

## 2017-04-12 PROCEDURE — 96365 THER/PROPH/DIAG IV INF INIT: CPT

## 2017-04-12 PROCEDURE — 700111 HCHG RX REV CODE 636 W/ 250 OVERRIDE (IP)

## 2017-04-12 PROCEDURE — 96368 THER/DIAG CONCURRENT INF: CPT

## 2017-04-12 PROCEDURE — 700105 HCHG RX REV CODE 258: Performed by: PHARMACIST

## 2017-04-12 PROCEDURE — 700105 HCHG RX REV CODE 258

## 2017-04-12 RX ORDER — POTASSIUM CHLORIDE 20 MEQ/1
20 TABLET, EXTENDED RELEASE ORAL 2 TIMES DAILY
Qty: 6 TAB | Refills: 0 | Status: SHIPPED | OUTPATIENT
Start: 2017-04-12 | End: 2017-04-15

## 2017-04-12 RX ADMIN — DAPTOMYCIN 420 MG: 500 INJECTION, POWDER, LYOPHILIZED, FOR SOLUTION INTRAVENOUS at 17:30

## 2017-04-12 RX ADMIN — CEFTRIAXONE SODIUM 2 G: 2 INJECTION, POWDER, FOR SOLUTION INTRAMUSCULAR; INTRAVENOUS at 17:30

## 2017-04-12 ASSESSMENT — PAIN SCALES - GENERAL: PAINLEVEL: 4=SLIGHT-MODERATE PAIN

## 2017-04-12 NOTE — PROGRESS NOTES
LATE ENTRY:    Pt ambulated into department for his first infusion of Daptomycin and Rocephin after developing an infection from a Hernia Repair. RN educated Pt about side effects of antibiotics, highlighting: fatigue, GI distress and muscle aches. Medication handouts given to Pt. Pt stated that he is followed by wound care. PICC had + blood return prior, flushed briskly. Rocephin infused first, and Daptomycin 2nd. Pt tolerated well both infusions well, declined developing any new symptoms during or after. PICC had + blood return after, flushed per [facility] policy, swab cap applied, line secured to arm with 4x4's and tube gauze. Future appointments confirmed with Pt prior to leaving, left department by self appearing in good spirits and NAD.

## 2017-04-12 NOTE — TELEPHONE ENCOUNTER
Malgorzata at infusion called to let you know that the potassium levels on this patient came back a little low

## 2017-04-13 ENCOUNTER — NON-PROVIDER VISIT (OUTPATIENT)
Dept: WOUND CARE | Facility: MEDICAL CENTER | Age: 52
End: 2017-04-13
Attending: HOSPITALIST
Payer: COMMERCIAL

## 2017-04-13 ENCOUNTER — OUTPATIENT INFUSION SERVICES (OUTPATIENT)
Dept: ONCOLOGY | Facility: MEDICAL CENTER | Age: 52
End: 2017-04-13
Attending: INTERNAL MEDICINE
Payer: COMMERCIAL

## 2017-04-13 VITALS
HEART RATE: 96 BPM | DIASTOLIC BLOOD PRESSURE: 100 MMHG | TEMPERATURE: 98.3 F | BODY MASS INDEX: 35.61 KG/M2 | WEIGHT: 226.85 LBS | OXYGEN SATURATION: 98 % | RESPIRATION RATE: 18 BRPM | HEIGHT: 67 IN | SYSTOLIC BLOOD PRESSURE: 147 MMHG

## 2017-04-13 PROCEDURE — 97597 DBRDMT OPN WND 1ST 20 CM/<: CPT

## 2017-04-13 PROCEDURE — 700105 HCHG RX REV CODE 258

## 2017-04-13 PROCEDURE — A6402 STERILE GAUZE <= 16 SQ IN: HCPCS

## 2017-04-13 PROCEDURE — 700111 HCHG RX REV CODE 636 W/ 250 OVERRIDE (IP)

## 2017-04-13 PROCEDURE — 302804 HCHG HYDROFIBER SILVER 6X6

## 2017-04-13 PROCEDURE — 97605 NEG PRS WND THER DME<=50SQCM: CPT

## 2017-04-13 PROCEDURE — A6213 FOAM DRG >16<=48 SQ IN W/BDR: HCPCS

## 2017-04-13 PROCEDURE — 96368 THER/DIAG CONCURRENT INF: CPT

## 2017-04-13 PROCEDURE — 96365 THER/PROPH/DIAG IV INF INIT: CPT

## 2017-04-13 RX ADMIN — CEFTRIAXONE SODIUM 2 G: 2 INJECTION, POWDER, FOR SOLUTION INTRAMUSCULAR; INTRAVENOUS at 17:15

## 2017-04-13 RX ADMIN — DAPTOMYCIN 420 MG: 500 INJECTION, POWDER, LYOPHILIZED, FOR SOLUTION INTRAVENOUS at 17:15

## 2017-04-13 ASSESSMENT — PAIN SCALES - GENERAL: PAINLEVEL: 4=SLIGHT-MODERATE PAIN

## 2017-04-13 NOTE — PROGRESS NOTES
Spoke with JAZMINE Mcgarry at [facility] who relayed to the pt that he is to take PO KCL 20mEq BID x3 days.  He is to FU with his PCP and increase his intake of foods high in potassium.

## 2017-04-13 NOTE — PROGRESS NOTES
Leeann returns for IVABX. Daptomycin / Rocephin infused as ordered. Mahesh tolerated well and without incident. PICC line in good condition and has positive blood return. PICC line flushed with saline per protocol. Mahesh DC'd home in good condition. Returns daily. Patient to see ID physician on 4/19/2017 at 1400 ( message given ).

## 2017-04-13 NOTE — WOUND TEAM
"Advanced Wound Care  [facility]  [address]  [address]  [place] [address]  [phone] Fax: [phone]      Encounter Note  For Certification Period: 4/11/17-5/11/17      Referring Physician: Adrián Santos MD  Primary Physician: Rodolfo Stein MD, YAIR Mohan       Consulting Physicians: YAIR Rubin    Wound(s):  Umbilical wound    Start of Care:    4/11/17  Subjective:        HPI: Pt presents to clinic with umbilical wound after discharge from [facility] inpatient yesterday.  He had his original hernia repair 11/6/2016.  He then had a mesh removal and I&D 3/10/17 with another I&D 4/7/17.  Pt states that he has IDDM and has had 5 episodes of DKA since January 2017.  He reports FBS today at 137.  He is on IV ABX (infusion center visits start today 4/11/17).   Wound was packed with iodoform gauze at Dr. Rutherford's (surgeon) office yesterday.  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           4/11/17 NPWT ordered.  Should arrive by next visit.    Pain: 3 out 10 pt states, \"sharp tingling with movement at incision site.\"           Past Medical History: reviewed 4/13/17    Current Medications: daily antibx infusions. Pt states that he is being followed by ID and is on IV ABX.  He states he's been on IV ABX for 4 days.    Allergies: Peanut-derived; Tradjenta; and Hydrocodone    Social History:    [employer] RN      Objective:    Tests and Measures: Cx positive from 4/7/17.  Pt is followed by ID and is on IV ABX    Orthotic, protective, supportive devices: none     Wound Characteristics                                                    Location: midline abdomen   Initial Evaluation  Date: 4/11/17 4/13/17   Tissue Type and %: 100% marbled pink viable, adherent yellow, and % marbled pink viable, adherent yellow   Periwound: intact intact   Drainage: Large thin yellow    Exposed structures sutures sutures   Wound Edges:   open open   Odor: none none   S&S of Infection:   " drainage    Edema: local    Sensation: intact intact                     Measurements:  Midline abdomen Initial Evaluation  Date: 4/11/17   Length (cm) 1.6   Width (cm) 4.1   Depth (cm) 3   Area (cm2) 6.56   Tract/undermine 12 o'clock   0.6cm   3 o'clock    1cm   6 o'clock   0 cm   9 o'clock   0.5 cm        Procedures:     Debridement :  Selective using forceps, curette to remove 6.56cm2 slough, biofilm   Cleansed with:   NS                                                                       Periwound protected with: skin prep   Primary dressing: AqAg (VAC not yet delivered to [facility])   Secondary Dressing: super absorbant secured with hypafix   Other:      Patient Education: Reviewed dressing mainteneance, NPWT POC.  Pt verbalized understanding.    Professional Collaboration:  ROBBIE Herrera re NPWT delivery schedule      Assessment:      Wound etiology: surgical     Wound Progress:  Heavy yellow exudate    Rationale for Treatment: Aquacel Ag for it's antimicrobial properties, to absorb excess exudate, and to maintain moist wound environment.  (NPWT ordered)    Patient tolerance/compliance: pt motivated to heal wound and would like to have it healed by the fall when he goes on his honeymoon    Complicating factors: IDDM, infection (pt on IV ABX)    Need for ongoing Advanced Wound Care services: Patient requires skilled therapeutic wound care services for product selection, application of product, debridement, close monitoring with clinical assessment, compression for expedite of wound healing.        Plan:      Treatment Plan and Recommendations:  Diagnosis/ICD9: open surgical abdominal wound    Procedures/CPT:NPWT, selective debridement    Frequency:3x/week VAC/NPWT       Treatment Goals: STG 2 Weeks  LTG 4 Weeks   Granulation Tissue: 75% 100%   Decrease Necrotic Tissue to: 50% 0%   Wound Phase:  inflammatory proliferative   Decrease Size by: 25% 50%   Periwound:  intact intact   Decrease tracts/undermining by: 50%  75%   Decrease Pain:  2 none                        At the time of each visit a thorough assessment of the patient is completed to assure the  appropriateness of our plan of care.  The dressings or modalities may need to be adapted   from the original plan to address any significant changes in the wound environment.

## 2017-04-14 ENCOUNTER — OUTPATIENT INFUSION SERVICES (OUTPATIENT)
Dept: ONCOLOGY | Facility: MEDICAL CENTER | Age: 52
End: 2017-04-14
Attending: INTERNAL MEDICINE
Payer: COMMERCIAL

## 2017-04-14 VITALS
HEART RATE: 95 BPM | HEIGHT: 67 IN | TEMPERATURE: 97 F | WEIGHT: 226.85 LBS | BODY MASS INDEX: 35.61 KG/M2 | OXYGEN SATURATION: 95 % | SYSTOLIC BLOOD PRESSURE: 116 MMHG | RESPIRATION RATE: 18 BRPM | DIASTOLIC BLOOD PRESSURE: 72 MMHG

## 2017-04-14 PROCEDURE — 700111 HCHG RX REV CODE 636 W/ 250 OVERRIDE (IP)

## 2017-04-14 PROCEDURE — 96365 THER/PROPH/DIAG IV INF INIT: CPT

## 2017-04-14 PROCEDURE — 700105 HCHG RX REV CODE 258

## 2017-04-14 PROCEDURE — 96368 THER/DIAG CONCURRENT INF: CPT

## 2017-04-14 RX ORDER — OXYCODONE AND ACETAMINOPHEN 10; 325 MG/1; MG/1
1 TABLET ORAL EVERY 8 HOURS PRN
Status: ON HOLD | COMMUNITY
End: 2017-05-08

## 2017-04-14 RX ADMIN — CEFTRIAXONE SODIUM 2 G: 2 INJECTION, POWDER, FOR SOLUTION INTRAMUSCULAR; INTRAVENOUS at 16:46

## 2017-04-14 RX ADMIN — DAPTOMYCIN 420 MG: 500 INJECTION, POWDER, LYOPHILIZED, FOR SOLUTION INTRAVENOUS at 16:46

## 2017-04-14 ASSESSMENT — PAIN SCALES - GENERAL: PAINLEVEL: 4=SLIGHT-MODERATE PAIN

## 2017-04-14 NOTE — PROGRESS NOTES
Pt presented to infusion center for daily daptomycin and rocephin. Pt reports he had wound vac placed to abdomen today. Right arm PICC line in place, brisk blood return observed. Daptomycin and Rocephin infused with no s/s of adverse reaction. PICC line flushed, swab cap placed, wrapped in gauze and protective sleeve. Pt has remaining appts, left on foot in good spirits.

## 2017-04-14 NOTE — WOUND TEAM
"Advanced Wound Care  [facility]  [address]  [address]  [place] [address]  [phone] Fax: [phone]      Encounter Note  For Certification Period: 4/11/17-5/11/17      Referring Physician: Adrián Santos MD  Primary Physician: Rodolfo Stein MD, YAIR Mohan       Consulting Physicians: YAIR Rubin    Wound(s):  Umbilical wound    Start of Care:    4/11/17  Subjective:        HPI: Pt presents to clinic with umbilical wound after discharge from [facility] inpatient yesterday.  He had his original hernia repair 11/6/2016.  He then had a mesh removal and I&D 3/10/17 with another I&D 4/7/17.  Pt states that he has IDDM and has had 5 episodes of DKA since January 2017.  He reports FBS today at 137.  He is on IV ABX (infusion center visits start today 4/11/17).   Wound was packed with iodoform gauze at Dr. Rutherford's (surgeon) office yesterday.  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           4/11/17 NPWT ordered.  Should arrive by next visit.    Pain: 3 out 10 pt states, \"sharp tingling with movement at incision site.\"           Past Medical History: reviewed 4/13/17    Current Medications: daily antibx infusions. Pt states that he is being followed by ID and is on IV ABX.  He states he's been on IV ABX for 4 days.    Allergies: Peanut-derived; Tradjenta; and Hydrocodone    Social History:    [employer] RN      Objective:    Tests and Measures: Cx positive from 4/7/17.  Pt is followed by ID and is on IV ABX    Orthotic, protective, supportive devices: none     Wound Characteristics                                                    Location: midline abdomen   Initial Evaluation  Date: 4/11/17 4/13/17   Tissue Type and %: 100% marbled pink viable, adherent yellow, and % marbled pink viable, adherent yellow   Periwound: intact intact   Drainage: Large thin yellow    Exposed structures sutures sutures   Wound Edges:   open open   Odor: none none   S&S of Infection:   " drainage    Edema: local    Sensation: intact intact                     Measurements:  Midline abdomen Initial Evaluation  Date: 17   Length (cm) 1.6   Width (cm) 4.1   Depth (cm) 3   Area (cm2) 6.56   Tract/undermine 12 o'clock   0.6cm   3 o'clock    1cm   6 o'clock   0 cm   9 o'clock   0.5 cm        Procedures:     Debridement :  Non-selective    Cleansed with:   NS                                                                       Periwound protected with: skin prep, drape tape   Primary dressin piece white foam to cover suture, 2 pieces black foam              Secondary Dressing: Drape tape   Other: Initiated NPWT @ 125mmHg, continuous     Patient Education: [employer] wound VAC: function, purpose, rationale.  Instructed in leak troubleshooting, canister changing, blockage alarm, 24-hr [employer] contact number.  Instructed to remove black sponge and replace w/wet-dry if machine not functioning x >2 hr     Professional Collaboration: none      Assessment:      Wound etiology: surgical     Wound Progress:  TBD    Rationale for Treatment: NPWT to facilitate tissue granulation and wound healing, manage exudate, minimize risk for infection, expedite wound closure    Patient tolerance/compliance: pt motivated to heal wound and would like to have it healed by the fall when he goes on his honeymoon    Complicating factors: IDDM, infection (pt on IV ABX)    Need for ongoing Advanced Wound Care services: Patient requires skilled therapeutic wound care services for product selection, application of product, debridement, close monitoring with clinical assessment, compression for expedite of wound healing.        Plan:      Treatment Plan and Recommendations:  Diagnosis/ICD9: open surgical abdominal wound    Procedures/CPT:NPWT, selective debridement    Frequency:3x/week VAC/NPWT       Treatment Goals: STG 2 Weeks  LTG 4 Weeks   Granulation Tissue: 75% 100%   Decrease Necrotic Tissue to: 50% 0%   Wound Phase:  inflammatory  proliferative   Decrease Size by: 25% 50%   Periwound:  intact intact   Decrease tracts/undermining by: 50% 75%   Decrease Pain:  2 none                        At the time of each visit a thorough assessment of the patient is completed to assure the  appropriateness of our plan of care.  The dressings or modalities may need to be adapted   from the original plan to address any significant changes in the wound environment.

## 2017-04-15 ENCOUNTER — NON-PROVIDER VISIT (OUTPATIENT)
Dept: WOUND CARE | Facility: MEDICAL CENTER | Age: 52
End: 2017-04-15
Attending: HOSPITALIST
Payer: COMMERCIAL

## 2017-04-15 ENCOUNTER — OUTPATIENT INFUSION SERVICES (OUTPATIENT)
Dept: ONCOLOGY | Facility: MEDICAL CENTER | Age: 52
End: 2017-04-15
Attending: INTERNAL MEDICINE
Payer: COMMERCIAL

## 2017-04-15 VITALS
SYSTOLIC BLOOD PRESSURE: 143 MMHG | HEIGHT: 67 IN | BODY MASS INDEX: 35.61 KG/M2 | RESPIRATION RATE: 18 BRPM | DIASTOLIC BLOOD PRESSURE: 80 MMHG | TEMPERATURE: 98.3 F | HEART RATE: 98 BPM | OXYGEN SATURATION: 92 % | WEIGHT: 226.85 LBS

## 2017-04-15 PROCEDURE — 96368 THER/DIAG CONCURRENT INF: CPT

## 2017-04-15 PROCEDURE — 97605 NEG PRS WND THER DME<=50SQCM: CPT

## 2017-04-15 PROCEDURE — 700111 HCHG RX REV CODE 636 W/ 250 OVERRIDE (IP): Performed by: INTERNAL MEDICINE

## 2017-04-15 PROCEDURE — 700105 HCHG RX REV CODE 258: Performed by: INTERNAL MEDICINE

## 2017-04-15 PROCEDURE — A6402 STERILE GAUZE <= 16 SQ IN: HCPCS

## 2017-04-15 PROCEDURE — 96365 THER/PROPH/DIAG IV INF INIT: CPT

## 2017-04-15 RX ADMIN — CEFTRIAXONE SODIUM 2 G: 2 INJECTION, POWDER, FOR SOLUTION INTRAMUSCULAR; INTRAVENOUS at 16:03

## 2017-04-15 RX ADMIN — DAPTOMYCIN 420 MG: 500 INJECTION, POWDER, LYOPHILIZED, FOR SOLUTION INTRAVENOUS at 16:04

## 2017-04-15 ASSESSMENT — PAIN SCALES - GENERAL: PAINLEVEL: 8=MODERATE-SEVERE PAIN

## 2017-04-15 NOTE — PROGRESS NOTES
Pt presented to infusion center for daily Rocephin and Daptomycin. Right arm PICC line in place, brisk blood return observed. Clave changed. Antibiotics infused with no s/s of adverse reaction. PICC line flushed, swab cap placed, wrapped in gauze and protective sleeve. Has all remaining appts, left on foot in great spirits.

## 2017-04-15 NOTE — WOUND TEAM
"Advanced Wound Care  [facility]  [address]  [address]  [place] [address]  [phone] Fax: [phone]      Encounter Note  For Certification Period: 4/11/17-5/11/17      Referring Physician: Adrián Santos MD  Primary Physician: Rodolfo Stein MD, YAIR Mohan       Consulting Physicians: YAIR Rubin    Wound(s):  Umbilical wound    Start of Care:    4/11/17  Subjective:        HPI: Pt presents to clinic with umbilical wound after discharge from [facility] inpatient yesterday.  He had his original hernia repair 11/6/2016.  He then had a mesh removal and I&D 3/10/17 with another I&D 4/7/17.  Pt states that he has IDDM and has had 5 episodes of DKA since January 2017.  He reports FBS today at 137.  He is on IV ABX (infusion center visits start today 4/11/17).   Wound was packed with iodoform gauze at Dr. Rutherford's (surgeon) office yesterday.  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           4/11/17 NPWT ordered.  Should arrive by next visit.    Pain: 3 out 10 pt states, \"sharp tingling with movement at incision site.\"           Past Medical History: reviewed 4/13/17    Current Medications: daily antibx infusions. Pt states that he is being followed by ID and is on IV ABX.  He states he's been on IV ABX for 4 days.    Allergies: Peanut-derived; Tradjenta; and Hydrocodone    Social History:    [employer] RN      Objective:    Tests and Measures: Cx positive from 4/7/17.  Pt is followed by ID and is on IV ABX    Orthotic, protective, supportive devices: none     Wound Characteristics                                                    Location: midline abdomen   Initial Evaluation  Date: 4/11/17 4/13/17 Encounter  4/15/17   Tissue Type and %: 100% marbled pink viable, adherent yellow, and % marbled pink viable, adherent yellow 100% pink, viable.   Periwound: intact intact intact   Drainage: Large thin yellow  Scant ss   Exposed structures sutures sutures suture   Wound " Edges:   open open open   Odor: none none none   S&S of Infection:   drainage  none   Edema: local  none   Sensation: intact intact intact                     Measurements:  Midline abdomen Initial Evaluation  Date: 17   Length (cm) 1.6   Width (cm) 4.1   Depth (cm) 3   Area (cm2) 6.56   Tract/undermine 12 o'clock   0.6cm   3 o'clock    1cm   6 o'clock   0 cm   9 o'clock   0.5 cm        Procedures:  1 piece white and 2 pieces black foam removed by clinician.    Debridement :  Non-selective    Cleansed with:   NS                                                                       Periwound protected with: skin prep, drape tape   Primary dressin piece white foam to cover suture, 2 pieces black foam              Secondary Dressing: Drape tape   Other: Initiated NPWT @ 125mmHg, continuous     Patient Education: [employer] wound VAC: function, purpose, rationale.  Instructed in leak troubleshooting, canister changing, blockage alarm, 24-hr [employer] contact number.  Instructed to remove black sponge and replace w/wet-dry if machine not functioning x >2 hr     Professional Collaboration: none      Assessment:      Wound etiology: surgical     Wound Progress: Granulating.  Rationale for Treatment: NPWT to facilitate tissue granulation and wound healing, manage exudate, minimize risk for infection, expedite wound closure    Patient tolerance/compliance: pt motivated to heal wound and would like to have it healed by the fall when he goes on his honeymoon    Complicating factors: IDDM, infection (pt on IV ABX)    Need for ongoing Advanced Wound Care services: Patient requires skilled therapeutic wound care services for product selection, application of product, debridement, close monitoring with clinical assessment, compression for expedite of wound healing.        Plan:      Treatment Plan and Recommendations:  Diagnosis/ICD9: open surgical abdominal wound    Procedures/CPT:NPWT, selective debridement    Frequency:3x/week  VAC/NPWT       Treatment Goals: STG 2 Weeks  LTG 4 Weeks   Granulation Tissue: 75% 100%   Decrease Necrotic Tissue to: 50% 0%   Wound Phase:  inflammatory proliferative   Decrease Size by: 25% 50%   Periwound:  intact intact   Decrease tracts/undermining by: 50% 75%   Decrease Pain:  2 none                        At the time of each visit a thorough assessment of the patient is completed to assure the  appropriateness of our plan of care.  The dressings or modalities may need to be adapted   from the original plan to address any significant changes in the wound environment.

## 2017-04-15 NOTE — PROGRESS NOTES
Pt arrived to [facility] ambulatory, here for daily IV abx for abd wound. Wound vac in place and pt reports he just had the dressing changed in wound care today so pain is slightly elevated. Ice pack provided for pt comfort to abd. R PICC in place, line flushed and blood return observed. Both abx infused as ordered. Pt latrice well. Lines flushed clear. PICC flushed. New alcohol cap placed and line re-wrapped. Pt knows when to return. Discharged home under self care in no apparent distress.

## 2017-04-16 ENCOUNTER — OUTPATIENT INFUSION SERVICES (OUTPATIENT)
Dept: ONCOLOGY | Facility: MEDICAL CENTER | Age: 52
End: 2017-04-16
Attending: INTERNAL MEDICINE
Payer: COMMERCIAL

## 2017-04-16 VITALS
RESPIRATION RATE: 18 BRPM | WEIGHT: 226.85 LBS | BODY MASS INDEX: 35.61 KG/M2 | SYSTOLIC BLOOD PRESSURE: 131 MMHG | TEMPERATURE: 97.5 F | HEART RATE: 98 BPM | HEIGHT: 67 IN | DIASTOLIC BLOOD PRESSURE: 65 MMHG | OXYGEN SATURATION: 95 %

## 2017-04-16 PROCEDURE — 96365 THER/PROPH/DIAG IV INF INIT: CPT

## 2017-04-16 PROCEDURE — 700111 HCHG RX REV CODE 636 W/ 250 OVERRIDE (IP): Performed by: INTERNAL MEDICINE

## 2017-04-16 PROCEDURE — 96368 THER/DIAG CONCURRENT INF: CPT

## 2017-04-16 PROCEDURE — 700105 HCHG RX REV CODE 258: Performed by: INTERNAL MEDICINE

## 2017-04-16 RX ADMIN — CEFTRIAXONE SODIUM 2 G: 2 INJECTION, POWDER, FOR SOLUTION INTRAMUSCULAR; INTRAVENOUS at 16:36

## 2017-04-16 RX ADMIN — DAPTOMYCIN 420 MG: 500 INJECTION, POWDER, LYOPHILIZED, FOR SOLUTION INTRAVENOUS at 16:36

## 2017-04-16 ASSESSMENT — PAIN SCALES - GENERAL: PAINLEVEL: 7=MODERATE-SEVERE PAIN

## 2017-04-17 ENCOUNTER — OUTPATIENT INFUSION SERVICES (OUTPATIENT)
Dept: ONCOLOGY | Facility: MEDICAL CENTER | Age: 52
End: 2017-04-17
Attending: INTERNAL MEDICINE
Payer: COMMERCIAL

## 2017-04-17 VITALS
SYSTOLIC BLOOD PRESSURE: 131 MMHG | TEMPERATURE: 99 F | OXYGEN SATURATION: 95 % | WEIGHT: 236.77 LBS | HEART RATE: 79 BPM | HEIGHT: 67 IN | BODY MASS INDEX: 37.16 KG/M2 | RESPIRATION RATE: 18 BRPM | DIASTOLIC BLOOD PRESSURE: 76 MMHG

## 2017-04-17 DIAGNOSIS — L02.91 CUTANEOUS ABSCESS, UNSPECIFIED SITE: ICD-10-CM

## 2017-04-17 LAB
ALBUMIN SERPL BCP-MCNC: 4.2 G/DL (ref 3.2–4.9)
ALP SERPL-CCNC: 78 U/L (ref 30–99)
ALT SERPL-CCNC: 31 U/L (ref 2–50)
AST SERPL-CCNC: 20 U/L (ref 12–45)
BASOPHILS # BLD AUTO: 0.8 % (ref 0–1.8)
BASOPHILS # BLD: 0.04 K/UL (ref 0–0.12)
BILIRUB CONJ SERPL-MCNC: <0.1 MG/DL (ref 0.1–0.5)
BILIRUB INDIRECT SERPL-MCNC: NORMAL MG/DL (ref 0–1)
BILIRUB SERPL-MCNC: 0.2 MG/DL (ref 0.1–1.5)
BUN SERPL-MCNC: 17 MG/DL (ref 8–22)
CALCIUM SERPL-MCNC: 9.3 MG/DL (ref 8.5–10.5)
CHLORIDE SERPL-SCNC: 103 MMOL/L (ref 96–112)
CK SERPL-CCNC: 56 U/L (ref 0–154)
CO2 SERPL-SCNC: 22 MMOL/L (ref 20–33)
CREAT SERPL-MCNC: 0.79 MG/DL (ref 0.5–1.4)
EOSINOPHIL # BLD AUTO: 0.09 K/UL (ref 0–0.51)
EOSINOPHIL NFR BLD: 1.7 % (ref 0–6.9)
ERYTHROCYTE [DISTWIDTH] IN BLOOD BY AUTOMATED COUNT: 42 FL (ref 35.9–50)
GFR SERPL CREATININE-BSD FRML MDRD: >60 ML/MIN/1.73 M 2
GLUCOSE SERPL-MCNC: 253 MG/DL (ref 65–99)
HCT VFR BLD AUTO: 36.7 % (ref 42–52)
HGB BLD-MCNC: 13.2 G/DL (ref 14–18)
IMM GRANULOCYTES # BLD AUTO: 0.04 K/UL (ref 0–0.11)
IMM GRANULOCYTES NFR BLD AUTO: 0.8 % (ref 0–0.9)
LYMPHOCYTES # BLD AUTO: 1.8 K/UL (ref 1–4.8)
LYMPHOCYTES NFR BLD: 34.7 % (ref 22–41)
MCH RBC QN AUTO: 31.8 PG (ref 27–33)
MCHC RBC AUTO-ENTMCNC: 36 G/DL (ref 33.7–35.3)
MCV RBC AUTO: 88.4 FL (ref 81.4–97.8)
MONOCYTES # BLD AUTO: 0.28 K/UL (ref 0–0.85)
MONOCYTES NFR BLD AUTO: 5.4 % (ref 0–13.4)
NEUTROPHILS # BLD AUTO: 2.94 K/UL (ref 1.82–7.42)
NEUTROPHILS NFR BLD: 56.6 % (ref 44–72)
NRBC # BLD AUTO: 0 K/UL
NRBC BLD AUTO-RTO: 0 /100 WBC
PHOSPHATE SERPL-MCNC: 2.6 MG/DL (ref 2.5–4.5)
PLATELET # BLD AUTO: 248 K/UL (ref 164–446)
PMV BLD AUTO: 9.5 FL (ref 9–12.9)
POTASSIUM SERPL-SCNC: 4 MMOL/L (ref 3.6–5.5)
PROT SERPL-MCNC: 6.9 G/DL (ref 6–8.2)
RBC # BLD AUTO: 4.15 M/UL (ref 4.7–6.1)
SODIUM SERPL-SCNC: 137 MMOL/L (ref 135–145)
WBC # BLD AUTO: 5.2 K/UL (ref 4.8–10.8)

## 2017-04-17 PROCEDURE — 700105 HCHG RX REV CODE 258

## 2017-04-17 PROCEDURE — 700105 HCHG RX REV CODE 258: Performed by: INTERNAL MEDICINE

## 2017-04-17 PROCEDURE — 80069 RENAL FUNCTION PANEL: CPT

## 2017-04-17 PROCEDURE — 96365 THER/PROPH/DIAG IV INF INIT: CPT

## 2017-04-17 PROCEDURE — 700111 HCHG RX REV CODE 636 W/ 250 OVERRIDE (IP)

## 2017-04-17 PROCEDURE — 85025 COMPLETE CBC W/AUTO DIFF WBC: CPT

## 2017-04-17 PROCEDURE — 96368 THER/DIAG CONCURRENT INF: CPT

## 2017-04-17 PROCEDURE — 700111 HCHG RX REV CODE 636 W/ 250 OVERRIDE (IP): Performed by: INTERNAL MEDICINE

## 2017-04-17 PROCEDURE — 82550 ASSAY OF CK (CPK): CPT

## 2017-04-17 PROCEDURE — 80076 HEPATIC FUNCTION PANEL: CPT

## 2017-04-17 RX ADMIN — DAPTOMYCIN 420 MG: 500 INJECTION, POWDER, LYOPHILIZED, FOR SOLUTION INTRAVENOUS at 17:18

## 2017-04-17 RX ADMIN — CEFTRIAXONE SODIUM 2 G: 2 INJECTION, POWDER, FOR SOLUTION INTRAMUSCULAR; INTRAVENOUS at 17:19

## 2017-04-17 ASSESSMENT — PAIN SCALES - GENERAL: PAINLEVEL: 5=MODERATE PAIN

## 2017-04-17 NOTE — PROGRESS NOTES
Pt returns to infusion center for IV antibiotics.  Wound vac in place to abd; sees wound care. PICC line in place, brisk blood return noted.  Pt tolerated infusion without incident.  PICC line dressing changed.  PICC line flushed with saline per policy, orange cap and gauze/net placed.  Pt left infusion center ambulatory and in good condition, returns daily.

## 2017-04-18 ENCOUNTER — HOSPITAL ENCOUNTER (INPATIENT)
Facility: MEDICAL CENTER | Age: 52
LOS: 2 days | DRG: 641 | End: 2017-04-21
Attending: EMERGENCY MEDICINE | Admitting: HOSPITALIST
Payer: COMMERCIAL

## 2017-04-18 ENCOUNTER — NON-PROVIDER VISIT (OUTPATIENT)
Dept: WOUND CARE | Facility: MEDICAL CENTER | Age: 52
End: 2017-04-18
Attending: HOSPITALIST
Payer: COMMERCIAL

## 2017-04-18 ENCOUNTER — RESOLUTE PROFESSIONAL BILLING HOSPITAL PROF FEE (OUTPATIENT)
Dept: HOSPITALIST | Facility: MEDICAL CENTER | Age: 52
End: 2017-04-18
Payer: COMMERCIAL

## 2017-04-18 ENCOUNTER — OUTPATIENT INFUSION SERVICES (OUTPATIENT)
Dept: ONCOLOGY | Facility: MEDICAL CENTER | Age: 52
End: 2017-04-18
Attending: INTERNAL MEDICINE
Payer: COMMERCIAL

## 2017-04-18 VITALS
DIASTOLIC BLOOD PRESSURE: 76 MMHG | TEMPERATURE: 97.2 F | RESPIRATION RATE: 18 BRPM | OXYGEN SATURATION: 93 % | SYSTOLIC BLOOD PRESSURE: 141 MMHG | HEART RATE: 96 BPM

## 2017-04-18 DIAGNOSIS — E87.20 LACTIC ACIDOSIS: ICD-10-CM

## 2017-04-18 DIAGNOSIS — R65.10 SIRS (SYSTEMIC INFLAMMATORY RESPONSE SYNDROME) (HCC): ICD-10-CM

## 2017-04-18 LAB
ALBUMIN SERPL BCP-MCNC: 3.9 G/DL (ref 3.2–4.9)
ALBUMIN/GLOB SERPL: 1.6 G/DL
ALP SERPL-CCNC: 82 U/L (ref 30–99)
ALT SERPL-CCNC: 35 U/L (ref 2–50)
ANION GAP SERPL CALC-SCNC: 15 MMOL/L (ref 0–11.9)
AST SERPL-CCNC: 33 U/L (ref 12–45)
BASOPHILS # BLD AUTO: 0.4 % (ref 0–1.8)
BASOPHILS # BLD: 0.02 K/UL (ref 0–0.12)
BILIRUB SERPL-MCNC: 0.3 MG/DL (ref 0.1–1.5)
BUN SERPL-MCNC: 22 MG/DL (ref 8–22)
CALCIUM SERPL-MCNC: 9.5 MG/DL (ref 8.5–10.5)
CHLORIDE SERPL-SCNC: 102 MMOL/L (ref 96–112)
CO2 SERPL-SCNC: 20 MMOL/L (ref 20–33)
CREAT SERPL-MCNC: 1 MG/DL (ref 0.5–1.4)
EOSINOPHIL # BLD AUTO: 0.01 K/UL (ref 0–0.51)
EOSINOPHIL NFR BLD: 0.2 % (ref 0–6.9)
ERYTHROCYTE [DISTWIDTH] IN BLOOD BY AUTOMATED COUNT: 41.8 FL (ref 35.9–50)
GFR SERPL CREATININE-BSD FRML MDRD: >60 ML/MIN/1.73 M 2
GLOBULIN SER CALC-MCNC: 2.5 G/DL (ref 1.9–3.5)
GLUCOSE BLD-MCNC: 245 MG/DL (ref 65–99)
GLUCOSE SERPL-MCNC: 268 MG/DL (ref 65–99)
HCT VFR BLD AUTO: 36.8 % (ref 42–52)
HGB BLD-MCNC: 12.8 G/DL (ref 14–18)
IMM GRANULOCYTES # BLD AUTO: 0.05 K/UL (ref 0–0.11)
IMM GRANULOCYTES NFR BLD AUTO: 1 % (ref 0–0.9)
LYMPHOCYTES # BLD AUTO: 0.29 K/UL (ref 1–4.8)
LYMPHOCYTES NFR BLD: 5.9 % (ref 22–41)
MAGNESIUM SERPL-MCNC: 1.3 MG/DL (ref 1.5–2.5)
MCH RBC QN AUTO: 30.5 PG (ref 27–33)
MCHC RBC AUTO-ENTMCNC: 34.8 G/DL (ref 33.7–35.3)
MCV RBC AUTO: 87.6 FL (ref 81.4–97.8)
MONOCYTES # BLD AUTO: 0.02 K/UL (ref 0–0.85)
MONOCYTES NFR BLD AUTO: 0.4 % (ref 0–13.4)
NEUTROPHILS # BLD AUTO: 4.52 K/UL (ref 1.82–7.42)
NEUTROPHILS NFR BLD: 92.1 % (ref 44–72)
NRBC # BLD AUTO: 0 K/UL
NRBC BLD AUTO-RTO: 0 /100 WBC
PLATELET # BLD AUTO: 227 K/UL (ref 164–446)
PMV BLD AUTO: 9.2 FL (ref 9–12.9)
POTASSIUM SERPL-SCNC: 3.5 MMOL/L (ref 3.6–5.5)
PROT SERPL-MCNC: 6.4 G/DL (ref 6–8.2)
RBC # BLD AUTO: 4.2 M/UL (ref 4.7–6.1)
SODIUM SERPL-SCNC: 137 MMOL/L (ref 135–145)
WBC # BLD AUTO: 4.9 K/UL (ref 4.8–10.8)

## 2017-04-18 PROCEDURE — 700105 HCHG RX REV CODE 258: Performed by: INTERNAL MEDICINE

## 2017-04-18 PROCEDURE — 81003 URINALYSIS AUTO W/O SCOPE: CPT

## 2017-04-18 PROCEDURE — 84443 ASSAY THYROID STIM HORMONE: CPT

## 2017-04-18 PROCEDURE — 700105 HCHG RX REV CODE 258

## 2017-04-18 PROCEDURE — 96375 TX/PRO/DX INJ NEW DRUG ADDON: CPT

## 2017-04-18 PROCEDURE — 700111 HCHG RX REV CODE 636 W/ 250 OVERRIDE (IP)

## 2017-04-18 PROCEDURE — G0480 DRUG TEST DEF 1-7 CLASSES: HCPCS

## 2017-04-18 PROCEDURE — 36415 COLL VENOUS BLD VENIPUNCTURE: CPT

## 2017-04-18 PROCEDURE — 80053 COMPREHEN METABOLIC PANEL: CPT

## 2017-04-18 PROCEDURE — 80307 DRUG TEST PRSMV CHEM ANLYZR: CPT

## 2017-04-18 PROCEDURE — 83735 ASSAY OF MAGNESIUM: CPT

## 2017-04-18 PROCEDURE — 85025 COMPLETE CBC W/AUTO DIFF WBC: CPT

## 2017-04-18 PROCEDURE — 97597 DBRDMT OPN WND 1ST 20 CM/<: CPT

## 2017-04-18 PROCEDURE — 96368 THER/DIAG CONCURRENT INF: CPT

## 2017-04-18 PROCEDURE — 700111 HCHG RX REV CODE 636 W/ 250 OVERRIDE (IP): Performed by: INTERNAL MEDICINE

## 2017-04-18 PROCEDURE — 99291 CRITICAL CARE FIRST HOUR: CPT

## 2017-04-18 PROCEDURE — 82962 GLUCOSE BLOOD TEST: CPT

## 2017-04-18 PROCEDURE — 84439 ASSAY OF FREE THYROXINE: CPT

## 2017-04-18 PROCEDURE — 84484 ASSAY OF TROPONIN QUANT: CPT

## 2017-04-18 PROCEDURE — 99223 1ST HOSP IP/OBS HIGH 75: CPT | Performed by: HOSPITALIST

## 2017-04-18 PROCEDURE — A6402 STERILE GAUZE <= 16 SQ IN: HCPCS

## 2017-04-18 PROCEDURE — 96361 HYDRATE IV INFUSION ADD-ON: CPT

## 2017-04-18 PROCEDURE — 700111 HCHG RX REV CODE 636 W/ 250 OVERRIDE (IP): Performed by: EMERGENCY MEDICINE

## 2017-04-18 PROCEDURE — 96365 THER/PROPH/DIAG IV INF INIT: CPT

## 2017-04-18 PROCEDURE — 93005 ELECTROCARDIOGRAM TRACING: CPT | Performed by: EMERGENCY MEDICINE

## 2017-04-18 RX ORDER — MORPHINE SULFATE 4 MG/ML
4 INJECTION, SOLUTION INTRAMUSCULAR; INTRAVENOUS ONCE
Status: COMPLETED | OUTPATIENT
Start: 2017-04-18 | End: 2017-04-18

## 2017-04-18 RX ORDER — SODIUM CHLORIDE, SODIUM LACTATE, POTASSIUM CHLORIDE, CALCIUM CHLORIDE 600; 310; 30; 20 MG/100ML; MG/100ML; MG/100ML; MG/100ML
1000 INJECTION, SOLUTION INTRAVENOUS ONCE
Status: COMPLETED | OUTPATIENT
Start: 2017-04-18 | End: 2017-04-19

## 2017-04-18 RX ORDER — ONDANSETRON 2 MG/ML
4 INJECTION INTRAMUSCULAR; INTRAVENOUS ONCE
Status: COMPLETED | OUTPATIENT
Start: 2017-04-18 | End: 2017-04-18

## 2017-04-18 RX ADMIN — CEFTRIAXONE SODIUM 2 G: 2 INJECTION, POWDER, FOR SOLUTION INTRAMUSCULAR; INTRAVENOUS at 16:06

## 2017-04-18 RX ADMIN — ONDANSETRON 4 MG: 2 INJECTION INTRAMUSCULAR; INTRAVENOUS at 23:16

## 2017-04-18 RX ADMIN — MORPHINE SULFATE 4 MG: 4 INJECTION INTRAVENOUS at 23:16

## 2017-04-18 RX ADMIN — SODIUM CHLORIDE, POTASSIUM CHLORIDE, SODIUM LACTATE AND CALCIUM CHLORIDE 1000 ML: 600; 310; 30; 20 INJECTION, SOLUTION INTRAVENOUS at 23:19

## 2017-04-18 RX ADMIN — DAPTOMYCIN 420 MG: 500 INJECTION, POWDER, LYOPHILIZED, FOR SOLUTION INTRAVENOUS at 16:06

## 2017-04-18 ASSESSMENT — PAIN SCALES - GENERAL: PAINLEVEL: 8=MODERATE-SEVERE PAIN

## 2017-04-18 ASSESSMENT — LIFESTYLE VARIABLES: DO YOU DRINK ALCOHOL: NO

## 2017-04-18 NOTE — IP AVS SNAPSHOT
" Home Care Instructions                                                                                                                  Name:Mahesh Bradshaw  Medical Record Number:[id number]  CSN: [id number]    YOB: 1965   Age: 51 y.o.  Sex: male  HT:1.702 m (5' 7.01\") WT: 106.3 kg (234 lb 5.6 oz)          Admit Date: 4/18/2017     Discharge Date:   Today's Date: 4/21/2017  Attending Doctor:  Carrillo Mcgarry M.D.                  Allergies:  Peanut-derived; Tradjenta; and Hydrocodone            Discharge Instructions       Discharge Instructions    Discharged to home by car with relative. Discharged via wheelchair, hospital escort: Yes.  Special equipment needed: Not Applicable    Be sure to schedule a follow-up appointment with your primary care doctor or any specialists as instructed.     Discharge Plan:   Influenza Vaccine Indication: Not indicated: Previously immunized this influenza season and > 8 years of age    I understand that a diet low in cholesterol, fat, and sodium is recommended for good health. Unless I have been given specific instructions below for another diet, I accept this instruction as my diet prescription.   Other diet: Diabetic      Sepsis, Adult  Sepsis is a serious infection of your blood or tissues that affects your whole body. The infection that causes sepsis may be bacterial, viral, fungal, or parasitic. Sepsis may be life threatening. Sepsis can cause your blood pressure to drop. This may result in shock. Shock causes your central nervous system and your organs to stop working correctly.   RISK FACTORS  Sepsis can happen in anyone, but it is more likely to happen in people who have weakened immune systems.  SIGNS AND SYMPTOMS   Symptoms of sepsis can include:  · Fever or low body temperature (hypothermia).  · Rapid breathing (hyperventilation).  · Chills.  · Rapid heartbeat (tachycardia).  · Confusion or light-headedness.  · Trouble breathing.  · Urinating much less " than usual.  · Cool, clammy skin or red, flushed skin.  · Other problems with the heart, kidneys, or brain.  DIAGNOSIS   Your health care provider will likely do tests to look for an infection, to see if the infection has spread to your blood, and to see how serious your condition is. Tests can include:  · Blood tests, including cultures of your blood.  · Cultures of other fluids from your body, such as:  ¨ Urine.  ¨ Pus from wounds.  ¨ Mucus coughed up from your lungs.  · Urine tests other than cultures.  · X-ray exams or other imaging tests.  TREATMENT   Treatment will begin with elimination of the source of infection. If your sepsis is likely caused by a bacterial or fungal infection, you will be given antibiotic or antifungal medicines.  You may also receive:  · Oxygen.  · Fluids through an IV tube.  · Medicines to increase your blood pressure.  · A machine to clean your blood (dialysis) if your kidneys fail.  · A machine to help you breathe if your lungs fail.  SEEK IMMEDIATE MEDICAL CARE IF:  You get an infection or develop any of the signs and symptoms of sepsis after surgery or a hospitalization.     This information is not intended to replace advice given to you by your health care provider. Make sure you discuss any questions you have with your health care provider.     Document Released: 09/15/2004 Document Revised: 05/03/2016 Document Reviewed: 08/25/2014  [employer] Interactive Patient Education ©2016 [employer]      · Is patient discharged on Warfarin / Coumadin?   No     · Is patient Post Blood Transfusion?  No    Depression / Suicide Risk    As you are discharged from this [facility] facility, it is important to learn how to keep safe from harming yourself.    Recognize the warning signs:  · Abrupt changes in personality, positive or negative- including increase in energy   · Giving away possessions  · Change in eating patterns- significant weight changes-  positive or negative  · Change in  sleeping patterns- unable to sleep or sleeping all the time   · Unwillingness or inability to communicate  · Depression  · Unusual sadness, discouragement and loneliness  · Talk of wanting to die  · Neglect of personal appearance   · Rebelliousness- reckless behavior  · Withdrawal from people/activities they love  · Confusion- inability to concentrate     If you or a loved one observes any of these behaviors or has concerns about self-harm, here's what you can do:  · Talk about it- your feelings and reasons for harming yourself  · Remove any means that you might use to hurt yourself (examples: pills, rope, extension cords, firearm)  · Get professional help from the community (Mental Health, Substance Abuse, psychological counseling)  · Do not be alone:Call your Safe Contact- someone whom you trust who will be there for you.  · Call your local CRISIS HOTLINE 379-9782 or 858-057-7864  · Call your local Children's Mobile Crisis Response Team [place] (744) 903-9651 or www.Mister Bell  · Call the toll free National Suicide Prevention Hotlines   · National Suicide Prevention Lifeline 250-084-DEUR (8035)  · National Hope Line Network 800-SUICIDE (549-4447)        Your appointments     Apr 22, 2017  3:30 PM   Wound 30 Minute Procedure with Rachele Dodson R.N.   [facility] Center ([address] [place] [address])    [address]  [place] [address]   [phone]            Apr 27, 2017  8:30 AM   Wound 30 Minute Procedure with Za Clemente P.T.   [facility] ([address] [place] [address])    [address]  [place] [address]   [phone]            Apr 29, 2017  8:30 AM   Wound 30 Minute Procedure with Za Clemente P.T.   [facility] ([address] [place] [address])    [address]  [place] [address]   [phone]            May 02, 2017  8:30 AM   Wound 30 Minute Procedure with Shira Santos R.N.   [facility] ([address] [place] [address])    [address]  [place] [address]   [phone]            May 04, 2017  8:30  AM   Wound 30 Minute Procedure with Za Clemente P.T.   [facility] ([address])    [address]  [place] [address] [phone]            May 06, 2017  8:30 AM   Wound 30 Minute Procedure with Za Clemente P.T.   [facility] ([address])    [address]  [place] [address]   [phone]              Follow-up Information     1. Follow up with Rodolfo Stein M.D. In 2 weeks.    Specialty:  Internal Medicine    Contact information    [address] [facility]  [address]  [place] [address]  [phone]           Discharge Medication Instructions:    Below are the medications your physician expects you to take upon discharge:    Review all your home medications and newly ordered medications with your doctor and/or pharmacist. Follow medication instructions as directed by your doctor and/or pharmacist.    Please keep your medication list with you and share with your physician.               Medication List      START taking these medications        Instructions    Morning Afternoon Evening Bedtime    magnesium oxide 400 (241.3 MG) MG Tabs tablet   Last time this was given:  400 mg on 4/21/2017  8:38 AM   Commonly known as:  MAG-OX        Take 1 Tab by mouth every day.   Dose:  400 mg                          CONTINUE taking these medications        Instructions    Morning Afternoon Evening Bedtime    * ARMOUR THYROID 60 MG Tabs   Last time this was given:  75 mg on 4/20/2017  8:42 PM   Generic drug:  thyroid        Take 75 mg by mouth every evening. Pt uses a 60 mg and 1/2 of a 30 mg, total dose = 75mg QPM   Dose:  75 mg                        * thyroid 30 MG Tabs   Last time this was given:  75 mg on 4/20/2017  8:42 PM   Commonly known as:  ARMOUR THYROID        Take 75 mg by mouth every evening. Pt uses a 60 mg and 1/2 of a 30 mg, total dose = 75mg QPM   Dose:  75 mg                        buPROPion 300 MG XL tablet   Commonly known as:  WELLBUTRIN XL        Take 300 mg by mouth every  evening.   Dose:  300 mg                        insulin lispro 100 UNIT/ML Soln   Last time this was given:  3 Units on 4/20/2017  7:42 AM   Commonly known as:  HUMALOG        Inject 2-20 Units as instructed 3 times a day before meals. Sliding Scale - 2 units every 50 > 150   Dose:  2-20 Units                        LIALDA 1.2 GM Tbec   Generic drug:  mesalamine        Take 2.4 g by mouth every evening.   Dose:  2.4 g                        oxycodone-acetaminophen  MG Tabs   Last time this was given:  1 Tab on 4/21/2017  5:34 PM   Commonly known as:  PERCOCET-10        Take 1 Tab by mouth every 8 hours as needed for Severe Pain.   Dose:  1 Tab                        testosterone cypionate 200 MG/ML Soln injection   Commonly known as:  DEPO-TESTOSTERONE        200 mg by Intramuscular route every Tuesday.   Dose:  200 mg                        TOUJEO SOLOSTAR 300 UNIT/ML Sopn   Generic drug:  Insulin Glargine        Inject 62 Units as instructed every day. Adjusts based on blood sugar.   Dose:  62 Units                        Vitamin D3 5000 UNITS Tabs        Take 5,000 Units by mouth every evening.   Dose:  5000 Units                        * Notice:  This list has 2 medication(s) that are the same as other medications prescribed for you. Read the directions carefully, and ask your doctor or other care provider to review them with you.      STOP taking these medications     fluconazole 200 MG Tabs   Commonly known as:  DIFLUCAN                    Where to Get Your Medications      Information about where to get these medications is not yet available     ! Ask your nurse or doctor about these medications    - magnesium oxide 400 (241.3 MG) MG Tabs tablet            Instructions           Diet / Nutrition:    Follow any diet instructions given to you by your doctor or the dietician, including how much salt (sodium) you are allowed each day.    If you are overweight, talk to your doctor about a weight reduction  plan.    Activity:    Remain physically active following your doctor's instructions about exercise and activity.    Rest often.     Any time you become even a little tired or short of breath, SIT DOWN and rest.    Worsening Symptoms:    Report any of the following signs and symptoms to the doctor's office immediately:    *Pain of jaw, arm, or neck  *Chest pain not relieved by medication                               *Dizziness or loss of consciousness  *Difficulty breathing even when at rest   *More tired than usual                                       *Bleeding drainage or swelling of surgical site  *Swelling of feet, ankles, legs or stomach                 *Fever (>100ºF)  *Pink or blood tinged sputum  *Weight gain (3lbs/day or 5lbs /week)           *Shock from internal defibrillator (if applicable)  *Palpitations or irregular heartbeats                *Cool and/or numb extremities    Stroke Awareness    Common Risk Factors for Stroke include:    Age  Atrial Fibrillation  Carotid Artery Stenosis  Diabetes Mellitus  Excessive alcohol consumption  High blood pressure  Overweight   Physical inactivity  Smoking    Warning signs and symptoms of a stroke include:    *Sudden numbness or weakness of the face, arm or leg (especially on one side of the body).  *Sudden confusion, trouble speaking or understanding.  *Sudden trouble seeing in one or both eyes.  *Sudden trouble walking, dizziness, loss of balance or coordination.Sudden severe headache with no known cause.    It is very important to get treatment quickly when a stroke occurs. If you experience any of the above warning signs, call 911 immediately.                   Disclaimer         Quit Smoking / Tobacco Use:    I understand the use of any tobacco products increases my chance of suffering from future heart disease or stroke and could cause other illnesses which may shorten my life. Quitting the use of tobacco products is the single most important thing I can  do to improve my health. For further information on smoking / tobacco cessation call a Toll Free Quit Line at 1-853.982.6350 (*National Cancer [place]) or 1-766.888.9842 (American Lung Association) or you can access the web based program at www.lungusa.org.    [place] Tobacco Users Help Line:  (201) 947-6201       Toll Free: 1-205.634.5698  Quit Tobacco Program [facility] (702)945-6616    Crisis Hotline:    [place] Crisis Hotline:  7-619-XPOOFKY or 1-483.339.4768    [place] Crisis Hotline:    1-507.905.2361 or 492-607-6641    Discharge Survey:   Thank you for choosing [facility]. We hope we did everything we could to make your hospital stay a pleasant one. You may be receiving a phone survey and we would appreciate your time and participation in answering the questions. Your input is very valuable to us in our efforts to improve our service to our patients and their families.        My signature on this form indicates that:    1. I have reviewed and understand the above information.  2. My questions regarding this information have been answered to my satisfaction.  3. I have formulated a plan with my discharge nurse to obtain my prescribed medications for home.                  Disclaimer         __________________________________                     __________       ________                       Patient Signature                                                 Date                    Time

## 2017-04-18 NOTE — WOUND TEAM
Advanced Wound Care  [facility]  [address]  [address]  [place] [address]  [phone] Fax: [phone]      Encounter Note  For Certification Period: 4/11/17-5/11/17      Referring Physician: Adrián Santos MD  Primary Physician: Rodolfo Stein MD, YAIR Mohan       Consulting Physicians: YAIR Rubin    Wound(s):  Umbilical wound    Start of Care:    4/11/17  Subjective:        HPI: Pt presents to clinic with umbilical wound after discharge from [facility] yesterday.  He had his original hernia repair 11/6/2016.  He then had a mesh removal and I&D 3/10/17 with another I&D 4/7/17.  Pt states that he has IDDM and has had 5 episodes of DKA since January 2017.  He reports FBS today at 137.  He is on IV ABX (infusion center visits start today 4/11/17).   Wound was packed with iodoform gauze at Dr. Rutherford's (surgeon) office yesterday.  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           4/11/17 NPWT ordered.  Should arrive by next visit.    Pain: 3 out 10           Past Medical History: reviewed 4/13/17    Current Medications: daily antibx infusions. Pt states that he is being followed by ID and is on IV ABX.  He states he's been on IV ABX for 4 days.    Allergies: Peanut-derived; Tradjenta; and Hydrocodone    Social History:    [employer] RN      Objective:    Tests and Measures: Cx positive from 4/7/17.  Pt is followed by ID and is on IV ABX    Orthotic, protective, supportive devices: none     Wound Characteristics                                                    Location: midline abdomen   Initial Evaluation  Date: 4/11/17 Encounter  4/15/17 Encounter  4/18/17   Tissue Type and %: 100% marbled pink viable, adherent yellow, and % pink, viable. 60% pink viable, 20% white necrotic, 20% red granulation   Periwound: intact intact intact   Drainage: Large thin yellow Scant ss Min ss   Exposed structures Sutures suture Blue suture   Wound Edges:   open open open    Odor: None None none   S&S of Infection:   drainage None none   Edema: Local None none   Sensation: intact intact intact                     Measurements:  Midline abdomen Initial Evaluation  Date: 17 Encounter  17   Length (cm) 1.6 0.6   Width (cm) 4.1 3.1   Depth (cm) 3 2.2   Area (cm2) 6.56 1.86   Tract/undermine 12 o'clock   0.6cm   3 o'clock    1cm   6 o'clock   0 cm   9 o'clock   0.5 cm none        Procedures:  1 piece white and 2 pieces black foam removed by clinician. Wound bed inspected with flashlight and cotton tipped applicator, no foam retained   Debridement :  CSWD with curette (avoiding suture) to remove loose white necrotic tissue at base of wound bed, biofilm from sides, approx 2 cm2   Cleansed with:   NS, gauze, cotton tipped applicator                                                                       Periwound protected with: skin prep, drape tape   Primary dressin piece black foam to wound bed, one button for trac pad (2 pieces)               Secondary Dressing: Drape tape   Other: Initiated NPWT @ 125mmHg, continuous     Patient Education: Educated pt on wound progress, on rationale for CSWD avoiding suture. Pt verbalized understanding. Pt knowledgeable on trouble shooting vac, wound treatment, s/s infection, when to see medical care.     Professional Collaboration: none today      Assessment:      Wound etiology: surgical     Wound Progress: Granulating.  Rationale for Treatment: NPWT to facilitate tissue granulation and wound healing, manage exudate, minimize risk for infection, expedite wound closure    Patient tolerance/compliance: pt motivated to heal wound and would like to have it healed by the fall when he goes on his honeymoon    Complicating factors: IDDM, infection (pt on IV ABX)    Need for ongoing Advanced Wound Care services: Patient requires skilled therapeutic wound care services for product selection, application of product, debridement, close monitoring with  clinical assessment, compression for expedite of wound healing.        Plan:      Treatment Plan and Recommendations:  Diagnosis/ICD9: open surgical abdominal wound    Procedures/CPT:NPWT, selective debridement    Frequency:3x/week VAC/NPWT       Treatment Goals: STG 2 Weeks  LTG 4 Weeks   Granulation Tissue: 75% 100%   Decrease Necrotic Tissue to: 50% 0%   Wound Phase:  inflammatory proliferative   Decrease Size by: 25% 50%   Periwound:  intact intact   Decrease tracts/undermining by: 50% 75%   Decrease Pain:  2 none                        At the time of each visit a thorough assessment of the patient is completed to assure the  appropriateness of our plan of care.  The dressings or modalities may need to be adapted   from the original plan to address any significant changes in the wound environment.

## 2017-04-18 NOTE — IP AVS SNAPSHOT
[employer] Access Code: [email or web address]  Expires: 4/27/2017 12:11 PM    [employer]  A secure, online tool to manage your health information     [employer]’s [employer]® is a secure, online tool that connects you to your personalized health information from the privacy of your home -- day or night - making it very easy for you to manage your healthcare. Once the activation process is completed, you can even access your medical information using the [employer] princess, which is available for free in the Apple Princess store or Google Play store.     [employer] provides the following levels of access (as shown below):   My Chart Features   [facility] Primary Care Doctor [facility]  Specialists [facility]  Urgent  Care Non-[facility]  Primary Care  Doctor   Email your healthcare team securely and privately 24/7 X X X X   Manage appointments: schedule your next appointment; view details of past/upcoming appointments X      Request prescription refills. X      View recent personal medical records, including lab and immunizations X X X X   View health record, including health history, allergies, medications X X X X   Read reports about your outpatient visits, procedures, consult and ER notes X X X X   See your discharge summary, which is a recap of your hospital and/or ER visit that includes your diagnosis, lab results, and care plan. X X       How to register for [employer]:  1. Go to  [email or web address].  2. Click on the Sign Up Now box, which takes you to the New Member Sign Up page. You will need to provide the following information:  a. Enter your [employer] Access Code exactly as it appears at the top of this page. (You will not need to use this code after you’ve completed the sign-up process. If you do not sign up before the expiration date, you must request a new code.)   b. Enter your date of birth.   c. Enter your home email address.   d. Click Submit, and follow the next screen’s instructions.  3. Create a [employer] ID. This will be your [employer]  login ID and cannot be changed, so think of one that is secure and easy to remember.  4. Create a [employer] password. You can change your password at any time.  5. Enter your Password Reset Question and Answer. This can be used at a later time if you forget your password.   6. Enter your e-mail address. This allows you to receive e-mail notifications when new information is available in [employer].  7. Click Sign Up. You can now view your health information.    For assistance activating your [employer] account, call [phone]

## 2017-04-18 NOTE — IP AVS SNAPSHOT
" <p align=\"LEFT\"><IMG SRC=\"//[email or web address]\" alt=\"Image\" WIDTH=\"50%\" HEIGHT=\"200\" BORDER=\"\"></p>                   Name:Mahesh Bradshaw  Medical Record Number:[id number]  CSN: [id number]    YOB: 1965   Age: 51 y.o.  Sex: male  HT:1.702 m (5' 7.01\") WT: 106.3 kg (234 lb 5.6 oz)          Admit Date: 4/18/2017     Discharge Date:   Today's Date: 4/21/2017  Attending Doctor:  Carrillo Mcgarry M.D.                  Allergies:  Peanut-derived; Tradjenta; and Hydrocodone          Your appointments     Apr 22, 2017  3:30 PM   Wound 30 Minute Procedure with Rachele Dodson R.N.   [facility] ([address] [place] [address])    [address]  [place] [address]   [phone]            Apr 27, 2017  8:30 AM   Wound 30 Minute Procedure with Za Clemente P.T.   [facility] ([address] [place] [address])    [address]  [place] [address]   [phone]            Apr 29, 2017  8:30 AM   Wound 30 Minute Procedure with Za Clemente P.T.   [facility] ([address] [place] [address])    [address]  [place] [address]   [phone]            May 02, 2017  8:30 AM   Wound 30 Minute Procedure with Shira Santos R.N.   [facility] ([address] [place] [address])    [address]  [place] [address]   [phone]            May 04, 2017  8:30 AM   Wound 30 Minute Procedure with Za Clemente P.T.   [facility] ([address] [place] [address])    [address]  [place] [address]   [phone]            May 06, 2017  8:30 AM   Wound 30 Minute Procedure with GILA VaughnTMynor   [facility] ([address] [place] [address])    [address]  [place] [address]   [phone]              Follow-up Information     1. Follow up with Rodolfo Stein M.D. In 2 weeks.    Specialty:  Internal Medicine    Contact information    [address]  [address]  [facility] [address]  [phone]           Medication List      Take these Medications        Instructions    * ARMOUR THYROID 60 MG Tabs   Generic drug:  thyroid    Take 75 mg " by mouth every evening. Pt uses a 60 mg and 1/2 of a 30 mg, total dose = 75mg QPM   Dose:  75 mg       * thyroid 30 MG Tabs   Commonly known as:  ARMOUR THYROID    Take 75 mg by mouth every evening. Pt uses a 60 mg and 1/2 of a 30 mg, total dose = 75mg QPM   Dose:  75 mg       buPROPion 300 MG XL tablet   Commonly known as:  WELLBUTRIN XL    Take 300 mg by mouth every evening.   Dose:  300 mg       insulin lispro 100 UNIT/ML Soln   Commonly known as:  HUMALOG    Inject 2-20 Units as instructed 3 times a day before meals. Sliding Scale - 2 units every 50 > 150   Dose:  2-20 Units       LIALDA 1.2 GM Tbec   Generic drug:  mesalamine    Take 2.4 g by mouth every evening.   Dose:  2.4 g       magnesium oxide 400 (241.3 MG) MG Tabs tablet   Commonly known as:  MAG-OX    Take 1 Tab by mouth every day.   Dose:  400 mg       oxycodone-acetaminophen  MG Tabs   Commonly known as:  PERCOCET-10    Take 1 Tab by mouth every 8 hours as needed for Severe Pain.   Dose:  1 Tab       testosterone cypionate 200 MG/ML Soln injection   Commonly known as:  DEPO-TESTOSTERONE    200 mg by Intramuscular route every Tuesday.   Dose:  200 mg       TOUJEO SOLOSTAR 300 UNIT/ML Sopn   Generic drug:  Insulin Glargine    Inject 62 Units as instructed every day. Adjusts based on blood sugar.   Dose:  62 Units       Vitamin D3 5000 UNITS Tabs    Take 5,000 Units by mouth every evening.   Dose:  5000 Units       * Notice:  This list has 2 medication(s) that are the same as other medications prescribed for you. Read the directions carefully, and ask your doctor or other care provider to review them with you.

## 2017-04-18 NOTE — PROGRESS NOTES
Pt arrives ambulatory for antibiotic infusion.  PICC line flushed with saline and blood return noted.  Medication infused per orders with no complications or adverse reactions.  Pt to return tomorrow for next appt, confirmed with patient.  PICC line flushed per protocol, blood return noted, gauze and arm wrap applied to site.  Patient left ambulatory in no distress.

## 2017-04-18 NOTE — IP AVS SNAPSHOT
4/21/2017    Mahesh Bradshaw  [address]  [place] [address]    Dear Mahesh:    [facility] wants to ensure your discharge home is safe and you or your loved ones have had all of your questions answered regarding your care after you leave the hospital.    Below is a list of resources and contact information should you have any questions regarding your hospital stay, follow-up instructions, or active medical symptoms.    Questions or Concerns Regarding… Contact   Medical Questions Related to Your Discharge  (7 days a week, 8am-5pm) Contact a Nurse Care Coordinator   [phone]   Medical Questions Not Related to Your Discharge  (24 hours a day / 7 days a week)  Contact the Nurse Health Line   [phone]    Medications or Discharge Instructions Refer to your discharge packet   or contact your [facility] Primary Care Provider   [phone]   Follow-up Appointment(s) Schedule your appointment via [employer]   or contact Scheduling [phone]   Billing Review your statement via [employer]  or contact Billing [phone]   Medical Records Review your records via [employer]   or contact Medical Records [phone]     You may receive a telephone call within two days of discharge. This call is to make certain you understand your discharge instructions and have the opportunity to have any questions answered. You can also easily access your medical information, test results and upcoming appointments via the [employer] free online health management tool. You can learn more and sign up at [employer]. For assistance setting up your [employer] account, please call [phone].    Once again, we want to ensure your discharge home is safe and that you have a clear understanding of any next steps in your care. If you have any questions or concerns, please do not hesitate to contact us, we are here for you. Thank you for choosing [facility] for your healthcare needs.    Sincerely,    Your [facility] Healthcare Team

## 2017-04-18 NOTE — PROGRESS NOTES
Pt arrived to [facility], ambulatory, for dapto/rocephin infusions. Pt voices no new complaints. PICC line flushed per policy, positive blood return noted. Labs drawn per active order. Dapto and rocephin infusions completed with no complications. PICC line flushed per policy. Pt left IS with no s/sx of distress. Follow up appointment confirmed.

## 2017-04-19 ENCOUNTER — APPOINTMENT (OUTPATIENT)
Dept: ONCOLOGY | Facility: MEDICAL CENTER | Age: 52
End: 2017-04-19
Attending: INTERNAL MEDICINE
Payer: COMMERCIAL

## 2017-04-19 PROBLEM — L08.9 COMPLICATED WOUND INFECTION: Status: ACTIVE | Noted: 2017-04-19

## 2017-04-19 PROBLEM — T14.8XXA COMPLICATED WOUND INFECTION: Status: ACTIVE | Noted: 2017-04-19

## 2017-04-19 LAB
AMPHET UR QL SCN: NEGATIVE
APPEARANCE UR: CLEAR
BARBITURATES UR QL SCN: NEGATIVE
BENZODIAZ UR QL SCN: NEGATIVE
BILIRUB UR QL STRIP.AUTO: NEGATIVE
BZE UR QL SCN: NEGATIVE
CANNABINOIDS UR QL SCN: NEGATIVE
COLOR UR: YELLOW
CULTURE IF INDICATED INDCX: NO UA CULTURE
EKG IMPRESSION: NORMAL
GLUCOSE BLD-MCNC: 163 MG/DL (ref 65–99)
GLUCOSE BLD-MCNC: 182 MG/DL (ref 65–99)
GLUCOSE BLD-MCNC: 256 MG/DL (ref 65–99)
GLUCOSE UR STRIP.AUTO-MCNC: 500 MG/DL
KETONES UR STRIP.AUTO-MCNC: 20 MG/DL
LACTATE BLD-SCNC: 1.5 MMOL/L (ref 0.5–2)
LACTATE BLD-SCNC: 3.1 MMOL/L (ref 0.5–2)
LACTATE BLD-SCNC: 4.9 MMOL/L (ref 0.5–2)
LEUKOCYTE ESTERASE UR QL STRIP.AUTO: NEGATIVE
MDMA UR QL SCN: ABNORMAL
METHADONE UR QL SCN: NEGATIVE
MICRO URNS: ABNORMAL
NITRITE UR QL STRIP.AUTO: NEGATIVE
OPIATES UR QL SCN: POSITIVE
OXYCODONE UR QL SCN: POSITIVE
PCP UR QL SCN: NEGATIVE
PH UR STRIP.AUTO: 5 [PH]
PROPOXYPH UR QL SCN: NEGATIVE
PROT UR QL STRIP: NEGATIVE MG/DL
RBC UR QL AUTO: NEGATIVE
SP GR UR STRIP.AUTO: 1.01
T4 FREE SERPL-MCNC: 0.88 NG/DL (ref 0.53–1.43)
TROPONIN I SERPL-MCNC: <0.01 NG/ML (ref 0–0.04)
TSH SERPL DL<=0.005 MIU/L-ACNC: 2.18 UIU/ML (ref 0.3–3.7)

## 2017-04-19 PROCEDURE — 36415 COLL VENOUS BLD VENIPUNCTURE: CPT

## 2017-04-19 PROCEDURE — A9270 NON-COVERED ITEM OR SERVICE: HCPCS | Performed by: HOSPITALIST

## 2017-04-19 PROCEDURE — 96365 THER/PROPH/DIAG IV INF INIT: CPT

## 2017-04-19 PROCEDURE — 770022 HCHG ROOM/CARE - ICU (200)

## 2017-04-19 PROCEDURE — 700105 HCHG RX REV CODE 258: Performed by: HOSPITALIST

## 2017-04-19 PROCEDURE — 304562 HCHG STAT O2 MASK/CANNULA

## 2017-04-19 PROCEDURE — 700102 HCHG RX REV CODE 250 W/ 637 OVERRIDE(OP): Performed by: HOSPITALIST

## 2017-04-19 PROCEDURE — 96372 THER/PROPH/DIAG INJ SC/IM: CPT

## 2017-04-19 PROCEDURE — 87040 BLOOD CULTURE FOR BACTERIA: CPT | Mod: 91

## 2017-04-19 PROCEDURE — 83605 ASSAY OF LACTIC ACID: CPT

## 2017-04-19 PROCEDURE — 700105 HCHG RX REV CODE 258: Performed by: INTERNAL MEDICINE

## 2017-04-19 PROCEDURE — 700102 HCHG RX REV CODE 250 W/ 637 OVERRIDE(OP): Performed by: EMERGENCY MEDICINE

## 2017-04-19 PROCEDURE — 96361 HYDRATE IV INFUSION ADD-ON: CPT

## 2017-04-19 PROCEDURE — 96376 TX/PRO/DX INJ SAME DRUG ADON: CPT

## 2017-04-19 PROCEDURE — 700111 HCHG RX REV CODE 636 W/ 250 OVERRIDE (IP): Performed by: INTERNAL MEDICINE

## 2017-04-19 PROCEDURE — A9270 NON-COVERED ITEM OR SERVICE: HCPCS | Performed by: EMERGENCY MEDICINE

## 2017-04-19 PROCEDURE — 700111 HCHG RX REV CODE 636 W/ 250 OVERRIDE (IP): Performed by: HOSPITALIST

## 2017-04-19 PROCEDURE — 700111 HCHG RX REV CODE 636 W/ 250 OVERRIDE (IP): Performed by: EMERGENCY MEDICINE

## 2017-04-19 PROCEDURE — 82962 GLUCOSE BLOOD TEST: CPT | Mod: 91

## 2017-04-19 RX ORDER — SODIUM CHLORIDE 9 MG/ML
INJECTION, SOLUTION INTRAVENOUS
Status: COMPLETED
Start: 2017-04-19 | End: 2017-04-20

## 2017-04-19 RX ORDER — ONDANSETRON 2 MG/ML
4 INJECTION INTRAMUSCULAR; INTRAVENOUS EVERY 4 HOURS PRN
Status: DISCONTINUED | OUTPATIENT
Start: 2017-04-19 | End: 2017-04-21 | Stop reason: HOSPADM

## 2017-04-19 RX ORDER — BISACODYL 10 MG
10 SUPPOSITORY, RECTAL RECTAL
Status: DISCONTINUED | OUTPATIENT
Start: 2017-04-19 | End: 2017-04-21 | Stop reason: HOSPADM

## 2017-04-19 RX ORDER — PROMETHAZINE HYDROCHLORIDE 25 MG/1
12.5-25 TABLET ORAL EVERY 4 HOURS PRN
Status: DISCONTINUED | OUTPATIENT
Start: 2017-04-19 | End: 2017-04-21 | Stop reason: HOSPADM

## 2017-04-19 RX ORDER — THYROID 30 MG/1
75 TABLET ORAL EVERY EVENING
Status: DISCONTINUED | OUTPATIENT
Start: 2017-04-19 | End: 2017-04-19

## 2017-04-19 RX ORDER — OXYCODONE AND ACETAMINOPHEN 10; 325 MG/1; MG/1
1 TABLET ORAL EVERY 8 HOURS PRN
Status: DISCONTINUED | OUTPATIENT
Start: 2017-04-19 | End: 2017-04-21 | Stop reason: HOSPADM

## 2017-04-19 RX ORDER — ALLOPURINOL 100 MG/1
300 TABLET ORAL
Status: DISCONTINUED | OUTPATIENT
Start: 2017-04-19 | End: 2017-04-21 | Stop reason: HOSPADM

## 2017-04-19 RX ORDER — POTASSIUM CHLORIDE 750 MG/1
40 TABLET, FILM COATED, EXTENDED RELEASE ORAL ONCE
Status: COMPLETED | OUTPATIENT
Start: 2017-04-19 | End: 2017-04-19

## 2017-04-19 RX ORDER — MORPHINE SULFATE 4 MG/ML
2 INJECTION, SOLUTION INTRAMUSCULAR; INTRAVENOUS ONCE
Status: DISCONTINUED | OUTPATIENT
Start: 2017-04-19 | End: 2017-04-19

## 2017-04-19 RX ORDER — PROMETHAZINE HYDROCHLORIDE 25 MG/1
12.5-25 SUPPOSITORY RECTAL EVERY 4 HOURS PRN
Status: DISCONTINUED | OUTPATIENT
Start: 2017-04-19 | End: 2017-04-21 | Stop reason: HOSPADM

## 2017-04-19 RX ORDER — INSULIN GLARGINE 100 [IU]/ML
50 INJECTION, SOLUTION SUBCUTANEOUS 2 TIMES DAILY
Status: DISCONTINUED | OUTPATIENT
Start: 2017-04-19 | End: 2017-04-21 | Stop reason: HOSPADM

## 2017-04-19 RX ORDER — ACETAMINOPHEN 325 MG/1
650 TABLET ORAL EVERY 6 HOURS PRN
Status: DISCONTINUED | OUTPATIENT
Start: 2017-04-19 | End: 2017-04-21 | Stop reason: HOSPADM

## 2017-04-19 RX ORDER — SODIUM CHLORIDE 9 MG/ML
500 INJECTION, SOLUTION INTRAVENOUS
Status: DISCONTINUED | OUTPATIENT
Start: 2017-04-19 | End: 2017-04-21 | Stop reason: HOSPADM

## 2017-04-19 RX ORDER — FAMOTIDINE 20 MG/1
20 TABLET, FILM COATED ORAL 2 TIMES DAILY
Status: DISCONTINUED | OUTPATIENT
Start: 2017-04-19 | End: 2017-04-21 | Stop reason: HOSPADM

## 2017-04-19 RX ORDER — ONDANSETRON 4 MG/1
4 TABLET, ORALLY DISINTEGRATING ORAL EVERY 4 HOURS PRN
Status: DISCONTINUED | OUTPATIENT
Start: 2017-04-19 | End: 2017-04-21 | Stop reason: HOSPADM

## 2017-04-19 RX ORDER — SODIUM CHLORIDE 9 MG/ML
30 INJECTION, SOLUTION INTRAVENOUS
Status: COMPLETED | OUTPATIENT
Start: 2017-04-19 | End: 2017-04-19

## 2017-04-19 RX ORDER — MESALAMINE 1.2 G/1
2.4 TABLET, DELAYED RELEASE ORAL EVERY EVENING
Status: DISCONTINUED | OUTPATIENT
Start: 2017-04-19 | End: 2017-04-19

## 2017-04-19 RX ORDER — MESALAMINE 400 MG/1
800 CAPSULE, DELAYED RELEASE ORAL 3 TIMES DAILY
Status: DISCONTINUED | OUTPATIENT
Start: 2017-04-19 | End: 2017-04-21 | Stop reason: HOSPADM

## 2017-04-19 RX ORDER — AMOXICILLIN 250 MG
2 CAPSULE ORAL 2 TIMES DAILY
Status: DISCONTINUED | OUTPATIENT
Start: 2017-04-19 | End: 2017-04-21 | Stop reason: HOSPADM

## 2017-04-19 RX ORDER — ACETAMINOPHEN 325 MG/1
650 TABLET ORAL ONCE
Status: COMPLETED | OUTPATIENT
Start: 2017-04-19 | End: 2017-04-19

## 2017-04-19 RX ORDER — FLUCONAZOLE 200 MG/1
200 TABLET ORAL DAILY
Status: DISCONTINUED | OUTPATIENT
Start: 2017-04-19 | End: 2017-04-21 | Stop reason: HOSPADM

## 2017-04-19 RX ORDER — HYDROMORPHONE HYDROCHLORIDE 2 MG/1
2 TABLET ORAL EVERY 8 HOURS PRN
Status: DISCONTINUED | OUTPATIENT
Start: 2017-04-19 | End: 2017-04-21 | Stop reason: HOSPADM

## 2017-04-19 RX ORDER — BUPROPION HYDROCHLORIDE 150 MG/1
300 TABLET, EXTENDED RELEASE ORAL EVERY EVENING
Status: DISCONTINUED | OUTPATIENT
Start: 2017-04-19 | End: 2017-04-21 | Stop reason: HOSPADM

## 2017-04-19 RX ORDER — DEXTROSE MONOHYDRATE 25 G/50ML
25 INJECTION, SOLUTION INTRAVENOUS
Status: DISCONTINUED | OUTPATIENT
Start: 2017-04-19 | End: 2017-04-21 | Stop reason: HOSPADM

## 2017-04-19 RX ORDER — SODIUM CHLORIDE, SODIUM LACTATE, POTASSIUM CHLORIDE, CALCIUM CHLORIDE 600; 310; 30; 20 MG/100ML; MG/100ML; MG/100ML; MG/100ML
1000 INJECTION, SOLUTION INTRAVENOUS ONCE
Status: COMPLETED | OUTPATIENT
Start: 2017-04-19 | End: 2017-04-19

## 2017-04-19 RX ORDER — MORPHINE SULFATE 4 MG/ML
4 INJECTION, SOLUTION INTRAMUSCULAR; INTRAVENOUS ONCE
Status: COMPLETED | OUTPATIENT
Start: 2017-04-19 | End: 2017-04-19

## 2017-04-19 RX ORDER — ONDANSETRON 2 MG/ML
4 INJECTION INTRAMUSCULAR; INTRAVENOUS ONCE
Status: COMPLETED | OUTPATIENT
Start: 2017-04-19 | End: 2017-04-19

## 2017-04-19 RX ORDER — POLYETHYLENE GLYCOL 3350 17 G/17G
1 POWDER, FOR SOLUTION ORAL
Status: DISCONTINUED | OUTPATIENT
Start: 2017-04-19 | End: 2017-04-21 | Stop reason: HOSPADM

## 2017-04-19 RX ORDER — THYROID 30 MG/1
75 TABLET ORAL EVERY EVENING
Status: DISCONTINUED | OUTPATIENT
Start: 2017-04-19 | End: 2017-04-21 | Stop reason: HOSPADM

## 2017-04-19 RX ADMIN — MESALAMINE 800 MG: 400 CAPSULE, DELAYED RELEASE ORAL at 23:38

## 2017-04-19 RX ADMIN — OXYCODONE HYDROCHLORIDE AND ACETAMINOPHEN 1 TABLET: 10; 325 TABLET ORAL at 15:10

## 2017-04-19 RX ADMIN — POTASSIUM CHLORIDE 40 MEQ: 750 TABLET, FILM COATED, EXTENDED RELEASE ORAL at 11:19

## 2017-04-19 RX ADMIN — INSULIN GLARGINE 50 UNITS: 100 INJECTION, SOLUTION SUBCUTANEOUS at 23:47

## 2017-04-19 RX ADMIN — LEVOTHYROXINE, LIOTHYRONINE 75 MG: 19; 4.5 TABLET ORAL at 23:36

## 2017-04-19 RX ADMIN — FAMOTIDINE 20 MG: 20 TABLET, FILM COATED ORAL at 23:35

## 2017-04-19 RX ADMIN — ONDANSETRON 4 MG: 4 TABLET, ORALLY DISINTEGRATING ORAL at 15:11

## 2017-04-19 RX ADMIN — SODIUM CHLORIDE 3210 ML: 9 INJECTION, SOLUTION INTRAVENOUS at 07:38

## 2017-04-19 RX ADMIN — OXYCODONE HYDROCHLORIDE AND ACETAMINOPHEN 1 TABLET: 10; 325 TABLET ORAL at 23:36

## 2017-04-19 RX ADMIN — BUPROPION HYDROCHLORIDE 300 MG: 150 TABLET, FILM COATED, EXTENDED RELEASE ORAL at 23:37

## 2017-04-19 RX ADMIN — ONDANSETRON 4 MG: 2 INJECTION INTRAMUSCULAR; INTRAVENOUS at 00:42

## 2017-04-19 RX ADMIN — ENOXAPARIN SODIUM 40 MG: 100 INJECTION SUBCUTANEOUS at 11:14

## 2017-04-19 RX ADMIN — FLUCONAZOLE 200 MG: 200 TABLET ORAL at 07:06

## 2017-04-19 RX ADMIN — INSULIN LISPRO 7 UNITS: 100 INJECTION, SOLUTION INTRAVENOUS; SUBCUTANEOUS at 07:41

## 2017-04-19 RX ADMIN — INSULIN LISPRO 3 UNITS: 100 INJECTION, SOLUTION INTRAVENOUS; SUBCUTANEOUS at 11:19

## 2017-04-19 RX ADMIN — SODIUM CHLORIDE, POTASSIUM CHLORIDE, SODIUM LACTATE AND CALCIUM CHLORIDE 1000 ML: 600; 310; 30; 20 INJECTION, SOLUTION INTRAVENOUS at 03:18

## 2017-04-19 RX ADMIN — INSULIN GLARGINE 50 UNITS: 100 INJECTION, SOLUTION SUBCUTANEOUS at 09:10

## 2017-04-19 RX ADMIN — CEFTRIAXONE SODIUM 2 G: 2 INJECTION, POWDER, FOR SOLUTION INTRAMUSCULAR; INTRAVENOUS at 23:35

## 2017-04-19 RX ADMIN — INSULIN LISPRO 3 UNITS: 100 INJECTION, SOLUTION INTRAVENOUS; SUBCUTANEOUS at 17:44

## 2017-04-19 RX ADMIN — MAGNESIUM SULFATE IN DEXTROSE 1 G: 10 INJECTION, SOLUTION INTRAVENOUS at 00:49

## 2017-04-19 RX ADMIN — ACETAMINOPHEN 650 MG: 325 TABLET, FILM COATED ORAL at 03:17

## 2017-04-19 RX ADMIN — FAMOTIDINE 20 MG: 20 TABLET, FILM COATED ORAL at 11:12

## 2017-04-19 RX ADMIN — MORPHINE SULFATE 4 MG: 4 INJECTION INTRAVENOUS at 00:43

## 2017-04-19 ASSESSMENT — LIFESTYLE VARIABLES
EVER_SMOKED: NEVER
ALCOHOL_USE: NO

## 2017-04-19 ASSESSMENT — PAIN SCALES - GENERAL
PAINLEVEL_OUTOF10: 6
PAINLEVEL_OUTOF10: 6
PAINLEVEL_OUTOF10: 5

## 2017-04-19 NOTE — ED NOTES
Patient medicated at this time.  Hospitalist consulted about low potassium and leg cramps, meds ordered and given.

## 2017-04-19 NOTE — ED PROVIDER NOTES
"ED Provider Note    Scribed for Carlos Ambriz M.D. by Azra Osorio. 4/18/2017, 10:49 PM.    Primary care provider: Rodolfo Stein M.D.  Means of arrival: Walk-in  History obtained from: Patient   History limited by: None    CHIEF COMPLAINT  Chief Complaint   Patient presents with   • Medication Reaction     pt had IV antibiotics today, has not been feeling well, hurting all over his body. reports spasming all over.        HPI  Mahesh Bradshaw is a 51 y.o. Male with history of type II diabetes who presents to the Emergency Department  for a medication reaction to Daptomycin this morning. The patient is on day 7 of his medication. He has been feeling very drowsy today and complains of leg cramps and arm cramps that he describes as \"feels like they are going to snap\"  as well as chest wall pain. The patient has a loss of appetite. He also had a 101 °F fever this morning which has since relieved to 98.7 °F. The patient has normal bowel movements and no dysuria or urinary incontinence.  He took a 5mg percocet after he was scraped at the wound care today. The patient also took Wellbutrin, potassium and his thyroid medication today. He sees Dr. Dooley for surgery and also sees Dr. Aguirre at the wound care center.    REVIEW OF SYSTEMS  Pertinent positives include arm and leg cramping, fever, loss of appetite, drowsiness. Pertinent negatives include no dysuria, urinary incontinence. Otherwise ten systems reviewed and otherwise negative.     PAST MEDICAL HISTORY   has a past medical history of Migraine; Gout; Indigestion; UC (ulcerative colitis) (CMS-HCC) (3-3-17); Difficult intubation (2-2016); Arthritis (3-3-17); Sepsis (CMS-HCC) (1/21/2016); Essential hypertension (1/22/2016); Snoring (3-3-17); High cholesterol (3-3-17); Congestive heart failure (CMS-HCC) (2-2016 ); ASTHMA (3-3-17); Aspiration pneumonia (CMS-HCC) (3-2016); Breath shortness (3-3-17); Hypothyroid (3-3-17); Pain (3-3-17); Heart burn; " Depression (11/26/2014); Anesthesia; Pancreatitis (2-2016); Elevated liver enzymes (2-2016); Electrolyte imbalance (2-2016); Kidney stones; ADRIANE (acute kidney injury) (CMS-HCC) (2/24/2016); RF (renal failure) (2-2016); Diabetes (CMS-HCC) (3-3-17); DKA (diabetic ketoacidoses) (CMS-HCC) (2-2016); and On mechanically assisted ventilation (CMS-HCC) (2-2016).    SURGICAL HISTORY   has past surgical history that includes vaishnavi by laparoscopy (2005); colonoscopy with biopsy (11/26/2014); umbilical hernia repair (N/A, 11/6/2016); other orthopedic surgery (1997 or 1998); umbilical hernia repair (3/10/2017); and incision and drainage general (4/7/2017).    SOCIAL HISTORY  Social History   Substance Use Topics   • Smoking status: Never Smoker    • Smokeless tobacco: None   • Alcohol Use: No      Comment: occ      History   Drug Use No       FAMILY HISTORY  Non-Contributory    CURRENT MEDICATIONS  Home Medications     Reviewed by Rm Hayes R.N. (Registered Nurse) on 04/18/17 at 2248  Med List Status: Not Addressed    Medication Last Dose Status    allopurinol (ZYLOPRIM) 300 MG Tab PRN Active    buPROPion (WELLBUTRIN XL) 300 MG XL tablet taking Active    Cholecalciferol (VITAMIN D3) 5000 UNITS Tab taking Active    fluconazole (DIFLUCAN) 200 MG Tab taking Active    HYDROmorphone (DILAUDID) 2 MG Tab PRN Active    Insulin Glargine (TOUJEO SOLOSTAR) 300 UNIT/ML Solution Pen-injector taking Active    insulin lispro (HUMALOG) 100 UNIT/ML Solution taking Active    mesalamine (LIALDA) 1.2 GM TBEC taking Active    oxycodone-acetaminophen (PERCOCET-10)  MG Tab prn Active    testosterone cypionate (DEPO-TESTOSTERONE) 200 MG/ML SOLN injection Tuesdays Active    thyroid (ARMOUR THYROID) 30 MG Tab taking Active    thyroid (ARMOUR THYROID) 60 MG Tab taking Active                ALLERGIES  Allergies   Allergen Reactions   • Peanut-Derived Anaphylaxis     Peanuts   RXN=age 36   • Tradjenta [Linagliptin] Unspecified     Pt developed  pancreatitis   • Hydrocodone Hives     Tolerates Morphine and oxycodone  Rxn Age - 29       PHYSICAL EXAM  VITAL SIGNS: BP 92/43 mmHg  Pulse 135  Temp(Src) 37.2 °C (98.9 °F)  Resp 16  SpO2 93%  Pulse ox interpretation: I interpret this pulse ox as normal.  Constitutional: Alert and oriented x 3, Minimal Distress  HEENT: Atraumatic normocephalic, pupils are equal round reactive to light extraocular movements are intact. The nares is clear, external ears are normal, mouth shows moist mucous membranes  Neck: Supple, no JVD no tracheal deviation  Cardiovascular: Tachycardic rate and regular rhythm no murmur rub or gallop 2+ pulses peripherally x4  Thorax & Lungs: No respiratory distress, no wheezes rales or rhonchi, No chest tenderness.   GI: Soft nontender nondistended positive bowel sounds, no peritoneal signs, wound VAC in place clean dry and intact with no surrounding erythema or warmth  Skin: Warm dry no acute rash or lesion  Musculoskeletal: Moving all extremities with full range and 5 of 5 strength, no acute  deformity  Neurologic: Cranial nerves III through XII are grossly intact, no sensory deficit, no cerebellar dysfunction   Psychiatric: Appropriate affect for situation at this time    DIAGNOSTIC STUDIES / PROCEDURES  LABS  Results for orders placed or performed during the hospital encounter of 04/18/17   CBC WITH DIFFERENTIAL   Result Value Ref Range    WBC 4.9 4.8 - 10.8 K/uL    RBC 4.20 (L) 4.70 - 6.10 M/uL    Hemoglobin 12.8 (L) 14.0 - 18.0 g/dL    Hematocrit 36.8 (L) 42.0 - 52.0 %    MCV 87.6 81.4 - 97.8 fL    MCH 30.5 27.0 - 33.0 pg    MCHC 34.8 33.7 - 35.3 g/dL    RDW 41.8 35.9 - 50.0 fL    Platelet Count 227 164 - 446 K/uL    MPV 9.2 9.0 - 12.9 fL    Neutrophils-Polys 92.10 (H) 44.00 - 72.00 %    Lymphocytes 5.90 (L) 22.00 - 41.00 %    Monocytes 0.40 0.00 - 13.40 %    Eosinophils 0.20 0.00 - 6.90 %    Basophils 0.40 0.00 - 1.80 %    Immature Granulocytes 1.00 (H) 0.00 - 0.90 %    Nucleated RBC  0.00 /100 WBC    Neutrophils (Absolute) 4.52 1.82 - 7.42 K/uL    Lymphs (Absolute) 0.29 (L) 1.00 - 4.80 K/uL    Monos (Absolute) 0.02 0.00 - 0.85 K/uL    Eos (Absolute) 0.01 0.00 - 0.51 K/uL    Baso (Absolute) 0.02 0.00 - 0.12 K/uL    Immature Granulocytes (abs) 0.05 0.00 - 0.11 K/uL    NRBC (Absolute) 0.00 K/uL   COMP METABOLIC PANEL   Result Value Ref Range    Sodium 137 135 - 145 mmol/L    Potassium 3.5 (L) 3.6 - 5.5 mmol/L    Chloride 102 96 - 112 mmol/L    Co2 20 20 - 33 mmol/L    Anion Gap 15.0 (H) 0.0 - 11.9    Glucose 268 (H) 65 - 99 mg/dL    Bun 22 8 - 22 mg/dL    Creatinine 1.00 0.50 - 1.40 mg/dL    Calcium 9.5 8.5 - 10.5 mg/dL    AST(SGOT) 33 12 - 45 U/L    ALT(SGPT) 35 2 - 50 U/L    Alkaline Phosphatase 82 30 - 99 U/L    Total Bilirubin 0.3 0.1 - 1.5 mg/dL    Albumin 3.9 3.2 - 4.9 g/dL    Total Protein 6.4 6.0 - 8.2 g/dL    Globulin 2.5 1.9 - 3.5 g/dL    A-G Ratio 1.6 g/dL   URINALYSIS CULTURE, IF INDICATED   Result Value Ref Range    Color Yellow     Character Clear     Specific Gravity 1.015 <1.035    Ph 5.0 5.0-8.0    Glucose 500 (A) Negative mg/dL    Ketones 20 (A) Negative mg/dL    Protein Negative Negative mg/dL    Bilirubin Negative Negative    Nitrite Negative Negative    Leukocyte Esterase Negative Negative    Occult Blood Negative Negative    Micro Urine Req see below     Culture Indicated No UA Culture   MAGNESIUM   Result Value Ref Range    Magnesium 1.3 (L) 1.5 - 2.5 mg/dL   ESTIMATED GFR   Result Value Ref Range    GFR If African American >60 >60 mL/min/1.73 m 2    GFR If Non African American >60 >60 mL/min/1.73 m 2   TSH   Result Value Ref Range    TSH 2.180 0.300 - 3.700 uIU/mL   URINE DRUG SCREEN   Result Value Ref Range    Amphetamines Urine Negative Negative    Barbiturates Negative Negative    Benzodiazepines Negative Negative    Cocaine Metabolite Negative Negative    Methadone Negative Negative    Ecstasy see below Negative    Opiates Positive (A) Negative    Oxycodone Positive (A)  Negative    Phencyclidine -Pcp Negative Negative    Propoxyphene Negative Negative    Cannabinoid Metab Negative Negative   LACTIC ACID   Result Value Ref Range    Lactic Acid 4.9 (HH) 0.5 - 2.0 mmol/L   TROPONIN   Result Value Ref Range    Troponin I <0.01 0.00 - 0.04 ng/mL   FREE THYROXINE   Result Value Ref Range    Free T-4 0.88 0.53 - 1.43 ng/dL   Lactic Acid Every four hours after STAT order   Result Value Ref Range    Lactic Acid 3.1 (H) 0.5 - 2.0 mmol/L   ACCU-CHEK GLUCOSE   Result Value Ref Range    Glucose - Accu-Ck 245 (H) 65 - 99 mg/dL   ACCU-CHEK GLUCOSE   Result Value Ref Range    Glucose - Accu-Ck 256 (H) 65 - 99 mg/dL   EKG (NOW)   Result Value Ref Range    Report       [facility] Emergency Dept.    Test Date:  2017  Pt Name:    DEVIN MO              Department: ER  MRN:        [id number]                      Room:       [facility]  Gender:     M                            Technician: [id number]  :        1965                   Requested By:JOHN CEDENO  Order #:    [id number]                    Reading MD:    Measurements  Intervals                                Axis  Rate:       120                          P:          49  NY:         136                          QRS:        2  QRSD:       90                           T:          12  QT:         328  QTc:        464    Interpretive Statements  SINUS TACHYCARDIA  RSR' IN V1 OR V2, RIGHT VCD OR RVH  Compared to ECG 2017 08:22:59  Sinus rhythm no longer present           All labs reviewed by me.    EKG Interpretation  Interpreted by me    Rhythm: Sinus Tachycardia  Rate: 104  Axis: normal  Ectopy: none  Conduction: normal  ST Segments: no acute change  T Waves: no acute change  Q Waves: none  Clinical Impression: Sinus tachycardia      COURSE & MEDICAL DECISION MAKING  Pertinent Labs & Imaging studies reviewed. (See chart for details)    10:49 PM - Patient seen and examined at bedside. Patient will be treated  with LR infusion bolus for tachycardia, 4mg/ml morphine, 4mg Zofran. Ordered Magnesium, CBC, CMP, urinalysis, EKG to evaluate his symptoms.     12:18 AM Recheck: Patient re-evaluated at beside. Patient reports feeling improved but is still tachycardic. Discussed patient's condition and treatment plan. Patient's lab and radiology results discussed. The patient understood and is in agreement.     BP 92/43 mmHg  Pulse 110  Temp(Src) 37.7 °C (99.9 °F)  Resp 16  SpO2 95%      Medical Decision Making: Patient presents with muscle cramping and overall feeling of malaise. During time in the ER he did have acute diaphoresis. Laboratory results showed an elevated lactic acid no white count but does have a left shift. Patient is status post daptomycin earlier in the day. Blood cultures were obtained was given additional fluid bolus. Additional laboratory work ordered as above and will be admitted to the hospital in guarded condition. Patient was not given full 30 mL/kg bolus in the ER as he appears to be volume appropriate. Also was not given additional broad-spectrum antibiotics in the ER as he had received dose of Cubicin prior to coming to the ER.    Discussed with hospitalist admitted in guarded condition.    FINAL IMPRESSION  1. Lactic acidosis    2. SIRS (systemic inflammatory response syndrome) (CMS-HCC)     3. Persistent tachycardia      This dictation has been created using voice recognition software and/or scribes. The accuracy of the dictation is limited by the abilities of the software and the expertise of the scribes. I expect there may be some errors of grammar and possibly content. I made every attempt to manually correct the errors within my dictation. However, errors related to voice recognition software and/or scribes may still exist and should be interpreted within the appropriate context.     I, Azra Osorio (Scribe), am scribing for, and in the presence of, Carlos Ambriz,  M.D..    Electronically signed by: Azra Osorio (Scribe), 4/18/2017    I, Carlos Ambriz M.D. personally performed the services described in this documentation, as scribed by Azra Osorio in my presence, and it is both accurate and complete.    The note accurately reflects work and decisions made by me.  Carlos Ambriz  4/19/2017  7:46 AM

## 2017-04-19 NOTE — ED NOTES
Agnieszka from Lab called with critical result of 4.9 lactic acid at 0305. Critical lab result read back to [facility].   Dr. Ambriz notified of critical lab result at 0306.  Critical lab result read back by Dr. Ambriz.

## 2017-04-19 NOTE — ED NOTES
Med Rec completed per patient  Allergies reviewed    Patient on a 10 day course of Fluconazole and he is on day 6 he thinks for a wound infection that is not healing  Patient is also goes to wound clinic daily for Rocephin and Daptomycin, he's on day 7 (today should be day 8)    For [place] Thyroid he takes a total of 75mg he takes a 60 mg tablet and 1/2 of a 30mg tablet

## 2017-04-19 NOTE — ED NOTES
Up to office down the [place] to get his .  Back to bed at this time.  Ambulatory on own.  Medicated at this time.  Denies needs

## 2017-04-19 NOTE — H&P
CHIEF COMPLAINT:  He feels unwell all over.    PRIMARY CARE PROVIDER:  Rodolfo Stein MD    HISTORY OF PRESENT ILLNESS:  This is a 51-year-old man with a recent   complicated medical history where he had aspiration pneumonia.  He was on the   ventilator for some time and during his care, he developed an umbilical   hernia.  This was repaired in November 2016.  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  His most recent I and D was on the 7th of April.  He has a wound VAC   in place.  He has been seeing Dr. Dooley for surgery and Dr. Aguirre for   infectious disease followup.  He is on daptomycin and ceftriaxone.  He has   been getting these infusions at outpatient infusion center.  Today, he had   debridement of the wound and replacement of a wound VAC.  He comes in today   complaining of feeling ill all over and he is having severe cramps of the   extremities.  He also had a 101 degree fever.  He has not had any dizziness,   syncope or near syncope.  He has not had any discharge from the wound.  He has   had some erythema surrounding and pain.    REVIEW OF SYSTEMS:  A complete review of systems was performed.  Other than   what is stated in history present illness is otherwise negative.    PAST MEDICAL HISTORY:  Gout, ulcerative colitis, hyperlipidemia, snoring,   history of congestive heart failure, history of asthma, hypothyroidism, GERD,   pancreatitis, diabetic ketoacidosis.    PAST SURGICAL HISTORY:  Cholecystectomy, colonoscopy, umbilical hernia repair,   incision and drainage of an umbilical hernia abscess.    FAMILY HISTORY:  Positive for diabetes.    SOCIAL HISTORY:  No tobacco, alcohol or drug use.  He lives in the community.    ALLERGIES:  TO PEANUTS, TRADJENTA CAUSES PANCREATITIS, AND HYDROCODONE CAUSES   NAUSEA AND VOMITING.    CURRENT MEDICATIONS:  Allopurinol 300 mg daily, bupropion  mg daily,   cholecalciferol 5000 units  every evening, fluconazole 200 mg orally for 10   days, hydromorphone 2 mg every 8 hours as needed for pain, insulin glargine 62   mg twice daily, insulin Lispro 2-20 units on sliding scale, mesalamine 2.4   _____ every evening, Percocet 10/325 one tablet every 8 hours as needed for   pain, testosterone 200 mg IM every Tuesday, [place] Thyroid 75 mg every   evening.    PHYSICAL EXAMINATION:  VITAL SIGNS:  Temperature is 98.9 degrees, heart rate 118, respirations 16,   pulse oximetry 95% on room air, blood pressure is 92/43.  GENERAL:  This is a moderately obese, but otherwise well-developed man.  He is   awake, alert, oriented x3, pleasant and cooperative with the examination.  HEENT:  Normocephalic, atraumatic.  Pupils are equal and reactive.  Sclerae   are nonicteric.  Oropharynx clear.  Mucous membranes are moist.  NECK:  Supple, without lymphadenopathy.  No jugular venous distension is   present.  Trachea is midline without stridor.  CHEST:  Clear to auscultation bilaterally without rales, rhonchi or wheezes.    No tachypnea or accessory muscle use is noted.  CARDIOVASCULAR:  Regular rate.  I do not detect a murmur, rub or gallop.    Radial pulses normal and symmetric.  Normal capillary refill is present.  ABDOMEN:  Obese, somewhat firm.  EXTREMITIES:  He has a wound VAC in the center of the abdomen and umbilicus.    There is some erythema surrounding the wound VAC and definite tenderness.    This is encompassing most of the anterior abdomen.  I do not see any   fluctuance or discharge at this time, fluctuance or discharge.  MUSCULOSKELETAL:  No cyanosis, clubbing or edema of the extremities is   present.  No bony or musculoskeletal abnormalities are seen.  SKIN:  Warm and dry, normal color and temperature.  No rashes, ecchymoses or   petechiae.  NEUROLOGIC:  He is awake, alert, oriented x3, moving all extremities, and his   cranial nerves are intact.    LABORATORY DATA:  White blood cell count is 4.9, hemoglobin  is 12.8,   hematocrit is 36.8, and platelets are 227.  Sodium is 137, potassium 3.5,   chloride 102, bicarbonate is 20, glucose is 268, BUN 22, creatinine is 1.0,   calcium is 9.5, AST is 33, ALT is 35, alkaline phosphatase 82, total bilirubin   0.3, albumin is 3.9, lactic acid is 4.9.    IMPRESSION:  1.  Sepsis secondary to recurrent wound infection of the umbilical hernia.    The umbilical hernia and sepsis protocol has been ordered.  We will not order   any antibiotics at this time as he comes in on daptomycin and ceftriaxone.  We   will call infectious disease in the next few hours to decide on further   antibiotic care.  I have ordered a sepsis protocol.  Blood cultures have been   ordered, fluid boluses and to track his lactic acid levels.  Dr. Dooley will   be called and alerted.  The patient is back in the hospital.  2.  Chronic wound infection:  This is a wound VAC and antibiotics as   described.  He has a PICC line present and we will call infectious disease for   further management.  3.  Diabetes.  Continue insulin glargine and regular insulin sliding scale,   diabetic diet.    This is a critically ill patient with sepsis and total critical care time   managing sepsis and sepsis protocol is 32 minutes.       ____________________________________     MD JASMINE ANTONIO / STORM    DD:  04/19/2017 05:06:17  DT:  04/19/2017 05:54:18    D#:  [id number]  Job#:  [id number]

## 2017-04-19 NOTE — ED NOTES
BIB Self     Chief Complaint   Patient presents with   • Medication Reaction     pt had IV antibiotics today, has not been feeling well, hurting all over his body. reports spasming all over.        Pt FSBS at 1900 was 251, pt took 10 units of regular insulin. Has not taking nightly insulin.     Pt reports feeling tired.     Pt in gown, on monitor, chart up for ERP

## 2017-04-20 ENCOUNTER — NON-PROVIDER VISIT (OUTPATIENT)
Dept: WOUND CARE | Facility: MEDICAL CENTER | Age: 52
End: 2017-04-20

## 2017-04-20 ENCOUNTER — OUTPATIENT INFUSION SERVICES (OUTPATIENT)
Dept: ONCOLOGY | Facility: MEDICAL CENTER | Age: 52
End: 2017-04-20
Attending: INTERNAL MEDICINE
Payer: COMMERCIAL

## 2017-04-20 LAB
ANION GAP SERPL CALC-SCNC: 7 MMOL/L (ref 0–11.9)
BASOPHILS # BLD AUTO: 0.6 % (ref 0–1.8)
BASOPHILS # BLD: 0.03 K/UL (ref 0–0.12)
BUN SERPL-MCNC: 18 MG/DL (ref 8–22)
CALCIUM SERPL-MCNC: 9.1 MG/DL (ref 8.5–10.5)
CHLORIDE SERPL-SCNC: 104 MMOL/L (ref 96–112)
CK SERPL-CCNC: 33 U/L (ref 0–154)
CO2 SERPL-SCNC: 26 MMOL/L (ref 20–33)
CREAT SERPL-MCNC: 0.87 MG/DL (ref 0.5–1.4)
EOSINOPHIL # BLD AUTO: 0.14 K/UL (ref 0–0.51)
EOSINOPHIL NFR BLD: 3 % (ref 0–6.9)
ERYTHROCYTE [DISTWIDTH] IN BLOOD BY AUTOMATED COUNT: 43.1 FL (ref 35.9–50)
GFR SERPL CREATININE-BSD FRML MDRD: >60 ML/MIN/1.73 M 2
GLUCOSE BLD-MCNC: 121 MG/DL (ref 65–99)
GLUCOSE BLD-MCNC: 134 MG/DL (ref 65–99)
GLUCOSE BLD-MCNC: 136 MG/DL (ref 65–99)
GLUCOSE BLD-MCNC: 155 MG/DL (ref 65–99)
GLUCOSE BLD-MCNC: 93 MG/DL (ref 65–99)
GLUCOSE SERPL-MCNC: 204 MG/DL (ref 65–99)
HCT VFR BLD AUTO: 33.9 % (ref 42–52)
HGB BLD-MCNC: 11.6 G/DL (ref 14–18)
IMM GRANULOCYTES # BLD AUTO: 0.01 K/UL (ref 0–0.11)
IMM GRANULOCYTES NFR BLD AUTO: 0.2 % (ref 0–0.9)
LYMPHOCYTES # BLD AUTO: 1.12 K/UL (ref 1–4.8)
LYMPHOCYTES NFR BLD: 24.1 % (ref 22–41)
MCH RBC QN AUTO: 30.3 PG (ref 27–33)
MCHC RBC AUTO-ENTMCNC: 34.2 G/DL (ref 33.7–35.3)
MCV RBC AUTO: 88.5 FL (ref 81.4–97.8)
MONOCYTES # BLD AUTO: 0.36 K/UL (ref 0–0.85)
MONOCYTES NFR BLD AUTO: 7.7 % (ref 0–13.4)
NEUTROPHILS # BLD AUTO: 2.99 K/UL (ref 1.82–7.42)
NEUTROPHILS NFR BLD: 64.4 % (ref 44–72)
NRBC # BLD AUTO: 0 K/UL
NRBC BLD AUTO-RTO: 0 /100 WBC
PLATELET # BLD AUTO: 182 K/UL (ref 164–446)
PMV BLD AUTO: 9.2 FL (ref 9–12.9)
POTASSIUM SERPL-SCNC: 3.6 MMOL/L (ref 3.6–5.5)
RBC # BLD AUTO: 3.83 M/UL (ref 4.7–6.1)
SODIUM SERPL-SCNC: 137 MMOL/L (ref 135–145)
WBC # BLD AUTO: 4.7 K/UL (ref 4.8–10.8)

## 2017-04-20 PROCEDURE — 85025 COMPLETE CBC W/AUTO DIFF WBC: CPT

## 2017-04-20 PROCEDURE — 770022 HCHG ROOM/CARE - ICU (200)

## 2017-04-20 PROCEDURE — 700105 HCHG RX REV CODE 258: Performed by: INTERNAL MEDICINE

## 2017-04-20 PROCEDURE — 306263 VAC CANNISTER W/GEL 500ML: Performed by: HOSPITALIST

## 2017-04-20 PROCEDURE — 80048 BASIC METABOLIC PNL TOTAL CA: CPT

## 2017-04-20 PROCEDURE — 700111 HCHG RX REV CODE 636 W/ 250 OVERRIDE (IP): Performed by: INTERNAL MEDICINE

## 2017-04-20 PROCEDURE — A9270 NON-COVERED ITEM OR SERVICE: HCPCS | Performed by: HOSPITALIST

## 2017-04-20 PROCEDURE — 700111 HCHG RX REV CODE 636 W/ 250 OVERRIDE (IP): Performed by: HOSPITALIST

## 2017-04-20 PROCEDURE — 99233 SBSQ HOSP IP/OBS HIGH 50: CPT | Performed by: HOSPITALIST

## 2017-04-20 PROCEDURE — 700102 HCHG RX REV CODE 250 W/ 637 OVERRIDE(OP): Performed by: HOSPITALIST

## 2017-04-20 PROCEDURE — 700105 HCHG RX REV CODE 258

## 2017-04-20 PROCEDURE — 82550 ASSAY OF CK (CPK): CPT

## 2017-04-20 PROCEDURE — 82962 GLUCOSE BLOOD TEST: CPT

## 2017-04-20 RX ORDER — ZINC SULFATE 50(220)MG
220 CAPSULE ORAL DAILY
Status: DISCONTINUED | OUTPATIENT
Start: 2017-04-20 | End: 2017-04-21 | Stop reason: HOSPADM

## 2017-04-20 RX ADMIN — Medication 220 MG: at 17:13

## 2017-04-20 RX ADMIN — FLUCONAZOLE 200 MG: 200 TABLET ORAL at 07:39

## 2017-04-20 RX ADMIN — DAPTOMYCIN 420 MG: 500 INJECTION, POWDER, LYOPHILIZED, FOR SOLUTION INTRAVENOUS at 00:52

## 2017-04-20 RX ADMIN — FAMOTIDINE 20 MG: 20 TABLET, FILM COATED ORAL at 07:39

## 2017-04-20 RX ADMIN — MESALAMINE 800 MG: 400 CAPSULE, DELAYED RELEASE ORAL at 21:00

## 2017-04-20 RX ADMIN — SODIUM CHLORIDE 500 ML: 9 INJECTION, SOLUTION INTRAVENOUS at 00:53

## 2017-04-20 RX ADMIN — MESALAMINE 800 MG: 400 CAPSULE, DELAYED RELEASE ORAL at 07:38

## 2017-04-20 RX ADMIN — INSULIN GLARGINE 50 UNITS: 100 INJECTION, SOLUTION SUBCUTANEOUS at 20:48

## 2017-04-20 RX ADMIN — MESALAMINE 800 MG: 400 CAPSULE, DELAYED RELEASE ORAL at 14:32

## 2017-04-20 RX ADMIN — PIPERACILLIN SODIUM AND TAZOBACTAM SODIUM 3.38 G: 3; .375 INJECTION, POWDER, FOR SOLUTION INTRAVENOUS at 11:47

## 2017-04-20 RX ADMIN — BUPROPION HYDROCHLORIDE 300 MG: 150 TABLET, FILM COATED, EXTENDED RELEASE ORAL at 20:44

## 2017-04-20 RX ADMIN — THERA TABS 1 TABLET: TAB at 17:13

## 2017-04-20 RX ADMIN — OXYCODONE HYDROCHLORIDE AND ACETAMINOPHEN 1 TABLET: 10; 325 TABLET ORAL at 09:03

## 2017-04-20 RX ADMIN — FAMOTIDINE 20 MG: 20 TABLET, FILM COATED ORAL at 20:44

## 2017-04-20 RX ADMIN — OXYCODONE HYDROCHLORIDE AND ACETAMINOPHEN 1 TABLET: 10; 325 TABLET ORAL at 13:28

## 2017-04-20 RX ADMIN — HYDROMORPHONE HYDROCHLORIDE 2 MG: 2 TABLET ORAL at 11:47

## 2017-04-20 RX ADMIN — INSULIN GLARGINE 50 UNITS: 100 INJECTION, SOLUTION SUBCUTANEOUS at 07:43

## 2017-04-20 RX ADMIN — MAGNESIUM GLUCONATE 500 MG ORAL TABLET 400 MG: 500 TABLET ORAL at 20:44

## 2017-04-20 RX ADMIN — PIPERACILLIN SODIUM AND TAZOBACTAM SODIUM 3.38 G: 3; .375 INJECTION, POWDER, FOR SOLUTION INTRAVENOUS at 17:16

## 2017-04-20 RX ADMIN — ENOXAPARIN SODIUM 40 MG: 100 INJECTION SUBCUTANEOUS at 07:39

## 2017-04-20 RX ADMIN — INSULIN LISPRO 3 UNITS: 100 INJECTION, SOLUTION INTRAVENOUS; SUBCUTANEOUS at 07:42

## 2017-04-20 RX ADMIN — OXYCODONE HYDROCHLORIDE AND ACETAMINOPHEN 1 TABLET: 10; 325 TABLET ORAL at 21:53

## 2017-04-20 RX ADMIN — LEVOTHYROXINE, LIOTHYRONINE 75 MG: 19; 4.5 TABLET ORAL at 20:42

## 2017-04-20 ASSESSMENT — PAIN SCALES - GENERAL
PAINLEVEL_OUTOF10: 5
PAINLEVEL_OUTOF10: 2
PAINLEVEL_OUTOF10: 6
PAINLEVEL_OUTOF10: 2
PAINLEVEL_OUTOF10: 3
PAINLEVEL_OUTOF10: 3
PAINLEVEL_OUTOF10: 2
PAINLEVEL_OUTOF10: 3
PAINLEVEL_OUTOF10: 8
PAINLEVEL_OUTOF10: 2
PAINLEVEL_OUTOF10: 8
PAINLEVEL_OUTOF10: 6

## 2017-04-20 ASSESSMENT — ENCOUNTER SYMPTOMS
CHILLS: 0
WEAKNESS: 1
RESPIRATORY NEGATIVE: 1
PSYCHIATRIC NEGATIVE: 1
ABDOMINAL PAIN: 1
FEVER: 1
MYALGIAS: 1
EYES NEGATIVE: 1
BACK PAIN: 1
CARDIOVASCULAR NEGATIVE: 1

## 2017-04-20 ASSESSMENT — LIFESTYLE VARIABLES: DO YOU DRINK ALCOHOL: NO

## 2017-04-20 NOTE — CARE PLAN
Problem: Infection  Goal: Will remain free from infection  Intervention: Assess signs and symptoms of infection  Continuous monitoring for the wound      Problem: Pain Management  Goal: Pain level will decrease to patient’s comfort goal  Intervention: Follow pain managment plan developed in collaboration with patient and Interdisciplinary Team  Pain management per MAR

## 2017-04-20 NOTE — PROGRESS NOTES
Hospital Medicine Progress Note, Adult, Complex               Author: Carrillolee Mcgarry Date & Time created: 4/20/2017  3:35 PM     Interval History:  Patient seen and examined today. ICU Care  Care and plan discussed in IDT/Hot rounds.  Lines and assistive devices reviewed.    Patient tolerating treatment and therapies.  All Data, Medication data reviewed.  Case discussed with nursing as available.  Plan of Care reviewed with patient and notified of changes.  52 y/o pleasant male , know to us well, h/o complicated umbilical hernia repair, admitted with muscular pain and concern for sepsis and worsening infection, admitted to ICU and seen by ID today  4/20 Pt felt better after magnesium, still some LE pain, feels much better, CPK was Nl    Review of Systems:  Review of Systems   Constitutional: Positive for fever and malaise/fatigue. Negative for chills.   HENT: Negative.    Eyes: Negative.    Respiratory: Negative.    Cardiovascular: Negative.    Gastrointestinal: Positive for abdominal pain.   Genitourinary: Negative.    Musculoskeletal: Positive for myalgias and back pain. Negative for joint pain.   Skin: Negative.    Neurological: Positive for weakness.   Endo/Heme/Allergies: Negative.    Psychiatric/Behavioral: Negative.    All other systems reviewed and are negative.      Physical Exam:  Physical Exam   Constitutional: He is oriented to person, place, and time. He appears well-developed and well-nourished.   HENT:   Head: Normocephalic.   Eyes: Pupils are equal, round, and reactive to light.   Neck: Normal range of motion.   Cardiovascular: Normal rate, regular rhythm and normal heart sounds.    Pulmonary/Chest: Effort normal.   Abdominal: Soft. Bowel sounds are normal.   umbilical wound with Vacc, little erythema, no pain   Genitourinary: Rectum normal and penis normal.   Musculoskeletal: Normal range of motion. He exhibits no edema or tenderness.   Neurological: He is alert and oriented to person, place,  and time.   Skin: Skin is warm.   Psychiatric: He has a normal mood and affect.   Nursing note and vitals reviewed.      Labs:        Invalid input(s): [id number]  Recent Labs      17   0450   CPKTOTAL  56   --   33   TROPONINI   --   <0.01   --      Recent Labs      17   0450   SODIUM  137  137  137   POTASSIUM  4.0  3.5*  3.6   CHLORIDE  103  102  104   CO2  22  20  26   BUN    18   CREATININE  0.79  1.00  0.87   MAGNESIUM   --   1.3*   --    PHOSPHORUS  2.6   --    --    CALCIUM  9.3  9.5  9.1     Recent Labs      17   0450   ALTSGPT  31  35   --    ASTSGOT  20  33   --    ALKPHOSPHAT  78  82   --    TBILIRUBIN  0.2  0.3   --    DBILIRUBIN  <0.1   --    --    GLUCOSE  253*  268*  204*     Recent Labs      17   0450   RBC  4.15*  4.20*  3.83*   HEMOGLOBIN  13.2*  12.8*  11.6*   HEMATOCRIT  36.7*  36.8*  33.9*   PLATELETCT  248  227  182     Recent Labs      17   0450   WBC  5.2  4.9  4.7*   NEUTSPOLYS  56.60  92.10*  64.40   LYMPHOCYTES  34.70  5.90*  24.10   MONOCYTES  5.40  0.40  7.70   EOSINOPHILS  1.70  0.20  3.00   BASOPHILS  0.80  0.40  0.60   ASTSGOT  20  33   --    ALTSGPT  31  35   --    ALKPHOSPHAT  78  82   --    TBILIRUBIN  0.2  0.3   --            Hemodynamics:  Temp (24hrs), Av.9 °C (96.7 °F), Min:35.8 °C (96.4 °F), Max:36.1 °C (97 °F)  Temperature: 36.1 °C (97 °F)  Pulse  Av.8  Min: 67  Max: 135Heart Rate (Monitored): 73  NIBP: 134/76 mmHg     Respiratory:    Respiration: (!) 8, Pulse Oximetry: 93 %           Fluids:    Intake/Output Summary (Last 24 hours) at 17 1535  Last data filed at 17 1400   Gross per 24 hour   Intake    900 ml   Output    750 ml   Net    150 ml     Weight: 106.3 kg (234 lb 5.6 oz)  GI/Nutrition:  Orders Placed This Encounter   Procedures   • Diet  Order     Standing Status: Standing      Number of Occurrences: 1      Standing Expiration Date:      Order Specific Question:  Diet:     Answer:  Diabetic [3]     Medical Decision Making, by Problem:  Active Hospital Problems    Diagnosis   • Sepsis (CMS-HCC) [A41.9], doubt, recovered fast with fluids   • Type 2 diabetes mellitus (CMS-HCC) [E11.9] h/o , better control   • Complicated wound infection (CMS-HCC) [T79.8XXA] with ongoing Abx and wound care   • Ulcerative colitis (CMS-HCC) [K51.90] on immunosuppression   • Hypothyroid [E03.9] h/o   • Depression [F32.9] h/o   - Myalgia's  Plan  Ok for transfer  Id eval appreciated  Am labs, incl Aldolase, CRP  Dm management  IvAbx as per Id  DVT eval  See orders  Pt is critically ill in ICU  Time spent 35 min, no overlap    Labs reviewed, Medications reviewed and Radiology images reviewed  Sousa catheter: No Sousa  Central line in place: Chemotherapy    DVT Prophylaxis: Heparin      Antibiotics: Treating active infection/contamination beyond 24 hours perioperative coverage  Assessed for rehab: Patient unable to tolerate rehabilitation therapeutic regimen

## 2017-04-20 NOTE — DIETARY
Nutrition Services - Poor PO on admit screen    Pt is a 51 yr old male admitted with Complicated wound infection, Sepsis    Spoke with pt at bedside.  Pt reports low appetite due to pain/discomfort from umbilical hernia repair abscess.  Pt denies any wt loss.  Recommended BOOST Glucose Control supplements to be added as snacks.  Pt agreed to try them.  Encouraged pt to speak to Nutrition Representative regarding meal preferences.  Pt verbalized understanding.      Current diet:  Diabetic  Weight:  106.3 kg (234 lbs) - bed scale wt 4/20  BMI:  36.7  Pertinent labs:  Glucose 204, Glucose -256  Pertinent medications:  Lantus, SSI, other meds reviewed  Skin:  Open Surgical (complicated) abdomen midline - POA; currently being followed by the Wound Team  GI:  Last BM 4/20    Recommendation:    · Nutrition Representative to see pt for meals daily  · Record % of meals consumed in ADLs to help monitor PO adequacy  · RD to monitor wt and lab trends  · Obtain supplement order and add daily supplements    RD will continue to follow per department policy.

## 2017-04-20 NOTE — CONSULTS
"ADULT  INFECTIOUS DISEASES INPATIENT CONSULT NOTE     Date of Service: 04/20/17    Consult Requested By: Carrillo Mcgarry M.D.    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 His course was complicated with infections and ultimately he underwent mesh removal on 03/10/17 with debridement on 04/06/17. Prior cultures grew enterococcus faecalis and klebsiella and he was ultimately discharged to complete daptomycin and ceftriaxone through [facility] with a stop date of 04/20/17. He was doing well until Tuesday prior to admission. That day he went to the wound clinic and underwent some debridement with a wound vac change followed by abx infusions. Approximately two hours after he returned home, he noted diffuse cramping in his legs and arms which he had not experience before. He has associated low grade temps and worsening abdominal pain that he attributes to the debridement. Denies any changes in his bowel movements. On presentation, he was afebrile without leukocytosis. Magnesium level was low at 1.3. Lactic acid was elevated at 4.9 and due to concerns for possible sepsis he was admitted to the ICU. He was resumed on daptomycin and ceftriaxone. Recent CPK on 4/17/17 was normal at 56. He continues to have some extremity cramping. Wound vac changed planned for today. ID service was consulted for further recommendations.      Review Of Systems:  ROS are negative except as noted above in the HPI.    PMH:   Past Medical History   Diagnosis Date   • Migraine    • Gout    • Indigestion    • UC (ulcerative colitis) (CMS-HCC) 3-3-17     \"Five BM's per day, takes Lialda\"   • Difficult intubation 2-2016   • Arthritis 3-3-17     \"Spine\"   • Sepsis (CMS-HCC) 1/21/2016 " "  • Essential hypertension 1/22/2016   • Snoring 3-3-17     Has not had a sleep study   • High cholesterol 3-3-17     Does not currently take medication for   • Congestive heart failure (CMS-HCC) 2-2016      3-3-17 \"Not a current issue\"   • ASTHMA 3-3-17     \"Hasn't had to use inhaler in 2 years\"   • Aspiration pneumonia (CMS-HCC) 3-2016   • Breath shortness 3-3-17     \"With Exercise\"   • Hypothyroid 3-3-17     Takes [place] Thyroid   • Pain 3-3-17     \"Left Flank\"   • Heart burn    • Depression 11/26/2014   • Anesthesia      \"Was difficult to intubate 2-2016\"   • Pancreatitis 2-2016     \"D/T Tradjenta was hospitialized for 15 days\"   • Elevated liver enzymes 2-2016   • Electrolyte imbalance 2-2016   • Kidney stones    • ADRIANE (acute kidney injury) (CMS-HCC) 2/24/2016   • RF (renal failure) 2-2016   • Diabetes (CMS-HCC) 3-3-17     Takes Insulin   • DKA (diabetic ketoacidoses) (CMS-HCC) 2-2016     \"10 days on vent with DKA, Renal failure, CHF, elevated liver enzymes and electrolyte imbalance\"   • On mechanically assisted ventilation (CMS-HCC) 2-2016     HX \"On Vent for 10 days at [facility]\".  \"DX Pancreatitis, DKA, CHF, Elevated Liver Enzymes & Electrolyte Imbalance\".         PSH:  Past Surgical History   Procedure Laterality Date   • Carmenza by laparoscopy  2005   • Colonoscopy with biopsy  11/26/2014     Performed by Solo Higginbotham M.D. at ENDOSCOPY [facility]   • Umbilical hernia repair N/A 11/6/2016     Procedure: UMBILICAL HERNIA REPAIR;  Surgeon: Esau Dooley M.D.;  Location: SURGERY [facility];  Service:    • Other orthopedic surgery  1997 or 1998     Left Wrist ORIF   • Umbilical hernia repair  3/10/2017     Procedure: UMBILICAL HERNIA REPAIR- INCISION AND DRAINAGE OF UMBILICAL WOUND AND MESH REMOVAL;  Surgeon: Esau Dooley M.D.;  Location: SURGERY SAME DAY [facility];  Service:    • Incision and drainage general  4/7/2017     Procedure: INCISION AND DRAINAGE GENERAL;  Surgeon: Esau WHITNEY" CLINT Dooley;  Location: SURGERY SAME DAY [facility];  Service:        FAMILY HX:  Positive for DM2  Negative for heart disease  Mother  at age 35 from lymphoma    SOCIAL HX:  Social History     Social History   • Marital Status: Single     Spouse Name: N/A   • Number of Children: N/A   • Years of Education: N/A     Occupational History   • Not on file.     Social History Main Topics   • Smoking status: Never Smoker    • Smokeless tobacco: Not on file   • Alcohol Use: No      Comment: occ   • Drug Use: No   • Sexual Activity: Not on file     Other Topics Concern   • Not on file     Social History Narrative    ** Merged History Encounter **         ** Merged History Encounter **          History   Smoking status   • Never Smoker    Smokeless tobacco   • Not on file     History   Alcohol Use No     Comment: occ       Allergies/Intolerances:  Allergies   Allergen Reactions   • Peanut-Derived Anaphylaxis     Peanuts   RXN=age 36   • Tradjenta [Linagliptin] Unspecified     Pt developed pancreatitis   • Hydrocodone Hives     Tolerates Morphine and oxycodone  Rxn Age - 29       History reviewed with the patient    Other Current Medications:    Current facility-administered medications:   •  piperacillin-tazobactam (ZOSYN) 3.375 g in  mL IVPB, 3.375 g, Intravenous, Q6HRS, Kandy Foreman M.D.  •  allopurinol (ZYLOPRIM) tablet 300 mg, 300 mg, Oral, QDAY PRN, Lala Rendon M.D.  •  buPROPion SR (WELLBUTRIN-SR) tablet 300 mg, 300 mg, Oral, Q EVENING, Lala Rendon M.D., 300 mg at 17 2337  •  fluconazole (DIFLUCAN) tablet 200 mg, 200 mg, Oral, DAILY, Havelisa Rendon M.D., 200 mg at 17 0739  •  HYDROmorphone (DILAUDID) tablet 2 mg, 2 mg, Oral, Q8HRS PRN, Lala Rendon M.D.  •  oxycodone-acetaminophen (PERCOCET-10)  MG per tablet 1 Tab, 1 Tab, Oral, Q8HRS PRN, Lala Rendon M.D., 1 Tab at 17 0903  •  thyroid (ARMOUR THYROID) tablet 75 mg, 75 mg, Oral, Q EVENING, Haven M  CLINT Rendon, 75 mg at 04/19/17 2336  •  senna-docusate (PERICOLACE or SENOKOT S) 8.6-50 MG per tablet 2 Tab, 2 Tab, Oral, BID, Stopped at 04/19/17 2100 **AND** polyethylene glycol/lytes (MIRALAX) PACKET 1 Packet, 1 Packet, Oral, QDAY PRN **AND** magnesium hydroxide (MILK OF MAGNESIA) suspension 30 mL, 30 mL, Oral, QDAY PRN **AND** bisacodyl (DULCOLAX) suppository 10 mg, 10 mg, Rectal, QDAY PRN, Lala Rendon M.D.  •  NS (BOLUS) infusion 500 mL, 500 mL, Intravenous, Once PRN, Lala Rendon M.D.  •  enoxaparin (LOVENOX) inj 40 mg, 40 mg, Subcutaneous, DAILY, Lala Rendon M.D., 40 mg at 04/20/17 0739  •  acetaminophen (TYLENOL) tablet 650 mg, 650 mg, Oral, Q6HRS PRN, Lala Rendon M.D.  •  insulin lispro (HUMALOG) injection 3-14 Units, 3-14 Units, Subcutaneous, 4X/DAY ACHS, Lala Rendon M.D., 3 Units at 04/20/17 0742  •  Action is required: Protocol 1073 Hypoglycemia has been implemented, , , Once **AND** Protocol 1073 Inclusion Criteria, , , CONTINUOUS **AND** Protocol 1073 NOTIFY, , , Once **AND** Protocol 1073 Initiate protocol immediately if FSBG is less than or equal to 70 mg/dL, , , CONTINUOUS **AND** glucose 4 g chewable tablet 16 g, 16 g, Oral, Q15 MIN PRN **AND** dextrose 50% (D50W) injection 25 mL, 25 mL, Intravenous, Q15 MIN PRN, Lala Rendon M.D.  •  ondansetron (ZOFRAN) syringe/vial injection 4 mg, 4 mg, Intravenous, Q4HRS PRN, Lala Rendon M.D.  •  ondansetron (ZOFRAN ODT) dispertab 4 mg, 4 mg, Oral, Q4HRS PRN, Lala Rendon M.D., 4 mg at 04/19/17 1511  •  promethazine (PHENERGAN) tablet 12.5-25 mg, 12.5-25 mg, Oral, Q4HRS PRN, Lala Rendon M.D.  •  promethazine (PHENERGAN) suppository 12.5-25 mg, 12.5-25 mg, Rectal, Q4HRS PRN, Lala Rendon M.D.  •  prochlorperazine (COMPAZINE) injection 5-10 mg, 5-10 mg, Intravenous, Q4HRS PRN, Lala Rendon M.D.  •  famotidine (PEPCID) tablet 20 mg, 20 mg, Oral, BID, 20 mg at 04/20/17 0739 **OR** famotidine (PEPCID) injection 20  "mg, 20 mg, Intravenous, BID, Lala Rendon M.D.  •  mesalamine delayed-release (DELZICOL) capsule 800 mg, 800 mg, Oral, TID, Lala Rendon M.D., 800 mg at 17 0738  •  insulin glargine (LANTUS) injection 50 Units, 50 Units, Subcutaneous, BID, Lala Rendon M.D., 50 Units at 17 0743  [unfilled]    Most Recent Vital Signs:  BP 92/43 mmHg  Pulse 74  Temp(Src) 36.1 °C (97 °F)  Resp 13  Ht 1.702 m (5' 7\")  Wt 106.3 kg (234 lb 5.6 oz)  BMI 36.70 kg/m2  SpO2 93%  Temp  Av.6 °C (97.8 °F)  Min: 35.8 °C (96.4 °F)  Max: 37.7 °C (99.9 °F)    Physical Exam:  General: well-appearing, no acute distress  HEENT: sclera anicteric, PERRL, EOMI, MMM, no oral lesions  Neck: supple, no lymphadenopathy  Chest: CTAB, no r/r/w, normal work of breathing.  Cardiac: RRR, normal S1 S2, no m/r/g   Abdomen: + bowel sounds, soft, diffuse tender to palpation. Non-distended, no HSM. Wound vac over umbilicus. Wound examined with wound RN. Wound clean without discharge and measuring 1.5 x 3.5 cm. No surrounding erythema  Extremities: WWP, no edema, 2+ pulses, RUE PICC nontender  Skin: no rashes or worrisome lesions  Neuro: Alert and oriented times 3, non-focal exam    Pertinent Lab Results:  Recent Labs      17   1720  17   2300  17   0450   WBC  5.2  4.9  4.7*      Recent Labs      17   1720  17   2300  17   0450   HEMOGLOBIN  13.2*  12.8*  11.6*   HEMATOCRIT  36.7*  36.8*  33.9*   MCV  88.4  87.6  88.5   MCH  31.8  30.5  30.3   PLATELETCT  248  227  182         Recent Labs      17   1720  17   2300  17   0450   SODIUM  137  137  137   POTASSIUM  4.0  3.5*  3.6   CHLORIDE  103  102  104   CO2  22  20  26   CREATININE  0.79  1.00  0.87        Recent Labs      17   1720  17   2300   ALBUMIN  4.2  3.9        Pertinent Micro:  Results     Procedure Component Value Units Date/Time    BLOOD CULTURE x2 [[id number]] Collected:  17 0246    Order " "Status:  Completed Specimen Information:  Blood from Peripheral Updated:  04/20/17 0859     Significant Indicator NEG      Source BLD      Site PERIPHERAL      Blood Culture --      Result:        No Growth    Note: Blood cultures are incubated for 5 days and  are monitored continuously.Positive blood cultures  are called to the RN and reported as soon as  they are identified.      Narrative:      Per Hospital Policy: Only change Specimen Src: to \"Line\" if  specified by physician order.    BLOOD CULTURE x2 [[id number]] Collected:  04/19/17 0313    Order Status:  Completed Specimen Information:  Blood from Peripheral Updated:  04/20/17 0859     Significant Indicator NEG      Source BLD      Site PERIPHERAL      Blood Culture --      Result:        No Growth    Note: Blood cultures are incubated for 5 days and  are monitored continuously.Positive blood cultures  are called to the RN and reported as soon as  they are identified.      Narrative:      Per Hospital Policy: Only change Specimen Src: to \"Line\" if  specified by physician order.    URINALYSIS CULTURE, IF INDICATED [[id number]]  (Abnormal) Collected:  04/18/17 2330    Order Status:  Completed Specimen Information:  Urine Updated:  04/19/17 0000     Color Yellow      Character Clear      Specific Gravity 1.015      Ph 5.0      Glucose 500 (A) mg/dL      Ketones 20 (A) mg/dL      Protein Negative mg/dL      Bilirubin Negative      Nitrite Negative      Leukocyte Esterase Negative      Occult Blood Negative      Micro Urine Req see below      Comment: Microscopic examination not performed when specimen is clear  and chemically negative for protein, blood, leukocyte esterase  and nitrite.          Culture Indicated No UA Culture     Blood Culture [[id number]] Collected:  04/19/17 0000    Order Status:  Canceled Specimen Information:  Other from Peripheral     Narrative:      ORDER WAS CANCELLED 04/19/2017 04:59, Duplicate ..  From different peripheral " "sites, if not done within the last  24 hours (Per Hospital Policy: Only change specimen source to  \"Line\" if specified by physician order)    Blood Culture [[id number]] Collected:  04/19/17 0000    Order Status:  Canceled Specimen Information:  Other from Peripheral     Narrative:      ORDER WAS CANCELLED 04/19/2017 05:00, Duplicate ..  From different peripheral sites, if not done within the last  24 hours (Per Hospital Policy: Only change specimen source to  \"Line\" if specified by physician order)    Urinalysis [[id number]] Collected:  04/19/17 0000    Order Status:  Canceled Specimen Information:  Urine     Narrative:      ORDER WAS CANCELLED 04/19/2017 04:59, Duplicate ..  If not done within the last 24 hours        BLOOD CULTURE   Date Value Ref Range Status   04/19/2017   Preliminary    No Growth    Note: Blood cultures are incubated for 5 days and  are monitored continuously.Positive blood cultures  are called to the RN and reported as soon as  they are identified.       BLOOD CULTURE HOLD   Date Value Ref Range Status   01/04/2017 Collected  Final        Studies: none new to review    IMPRESSION:   1. Recent umbilical hernia repair abscess  2. Extremity cramping ? Due to daptomycin  3. Recent enterococcus faecalis infection  4. Recent klebsiella pneumoniae infection      PLAN:   Mahesh Bradshaw is a 51 y.o. Man with a history of ulcerative colitis and umbilical hernia repair in November 2016 whose course has been complicated by infection requiring mesh removal on 03/10/17 and debridement on 04/06/17. Recent cultures grew efaecalis and klebsiella pneumoniae for which he was on daptomycin and ceftriaxone via [facility] with original stop date of 04/20/17. Pt now admitted for extremity cramping and lactic acidosis but he does not appear to be septic. His upper and lower extremity cramping is concerning for possible early rhabdomyolysis as he has been on daptomycin. Will check CPK today. Will " discontinue daptomycin and ceftriaxone and transition to zosyn. Wound was examined with the wound care nurse and wound does not appear infected. Continue abx for now.     Discussed with Dr. Mcgarry. Will continue to follow    Kandy Foreman M.D.

## 2017-04-20 NOTE — CARE PLAN
Problem: Pain Management  Goal: Pain level will decrease to patient’s comfort goal  Outcome: PROGRESSING AS EXPECTED    04/19/17 8996   OTHER   Nurse Pain Scale 0 - 10  6   Comfort Goal 2       Intervention: Follow pain managment plan developed in collaboration with patient and Interdisciplinary Team  Current pain management plan with the patient includes the use of dilaudid in the event that he suffers an attack of back pain and Percocet for abdominal pain

## 2017-04-20 NOTE — PROGRESS NOTES
Cardiac Monitoring Summary    Heart Rate: 70-85  Systolic Blood Pressure: 100-125  Heart Rhythm: NSR  MA Interval: 0.18  QRS Length: 0.08  QT Length: 0.38

## 2017-04-20 NOTE — PROGRESS NOTES
"NURSING REPORT SHEET    Patient Name: Mahesh Bradshaw  Sex: Male  Age: 51 y.o.  Admitted: 4/18/2017  Room Number: T616/00  Code Status: Full Code    Providers: Hospitalist    Diagnosis  Complicated wound infection (CMS-HCC)  Sepsis (CMS-HCC)  Complicated wound infection (CMS-HCC)  Sepsis (CMS-HCC)    Allergies   Allergen Reactions   • Peanut-Derived Anaphylaxis     Peanuts   RXN=age 36   • Tradjenta [Linagliptin] Unspecified     Pt developed pancreatitis   • Hydrocodone Hives     Tolerates Morphine and oxycodone  Rxn Age - 29     Past Medical History   Diagnosis Date   • Migraine    • Gout    • Indigestion    • UC (ulcerative colitis) (CMS-HCC) 3-3-17     \"Five BM's per day, takes Lialda\"   • Difficult intubation 2-2016   • Arthritis 3-3-17     \"Spine\"   • Sepsis (CMS-HCC) 1/21/2016   • Essential hypertension 1/22/2016   • Snoring 3-3-17     Has not had a sleep study   • High cholesterol 3-3-17     Does not currently take medication for   • Congestive heart failure (CMS-HCC) 2-2016      3-3-17 \"Not a current issue\"   • ASTHMA 3-3-17     \"Hasn't had to use inhaler in 2 years\"   • Aspiration pneumonia (CMS-HCC) 3-2016   • Breath shortness 3-3-17     \"With Exercise\"   • Hypothyroid 3-3-17     Takes [place] Thyroid   • Pain 3-3-17     \"Left Flank\"   • Heart burn    • Depression 11/26/2014   • Anesthesia      \"Was difficult to intubate 2-2016\"   • Pancreatitis 2-2016     \"D/T Tradjenta was hospitialized for 15 days\"   • Elevated liver enzymes 2-2016   • Electrolyte imbalance 2-2016   • Kidney stones    • ADRIANE (acute kidney injury) (CMS-HCC) 2/24/2016   • RF (renal failure) 2-2016   • Diabetes (CMS-HCC) 3-3-17     Takes Insulin   • DKA (diabetic ketoacidoses) (CMS-HCC) 2-2016     \"10 days on vent with DKA, Renal failure, CHF, elevated liver enzymes and electrolyte imbalance\"   • On mechanically assisted ventilation (CMS-HCC) 2-2016     HX \"On Vent for 10 days at [facility]\".  \"DX Pancreatitis, DKA, CHF, Elevated Liver " "Enzymes & Electrolyte Imbalance\".       Vital Signs    Heart Rate (Monitored): 72  NIBP: 118/68 mmHg  Respiration: (!) 25  Pulse Oximetry: 96 %    Neurological     The patient is Alert and Oriented x 4, CAM-ICU is Negative currently PERRLA, cranial reflexes are intact, and strength is equal bilaterally.     Hemodynamics    Heart Rate: 70-90 Rhythm: NSR CVP: NA  Pulses: radial R:1/L:1, dorsalis pedis R:1/L:1, posterior tibial R:1/L:1  Edema: Negative    Respiratory    Normal respiratory effort. Chest is clear    GI    Current Diet: Diet Order  Last BM: 4/19  normal bowel sounds        No intake or output data in the 24 hours ending 04/20/17 0640  Patient Lines/Drains/Airways Status    Active Catheter     **None**                ID    TMax: Afebrile  Current ABX: Rocephin, and Daptomycin     Lines    RUE PICC    Drips    None    Skin    Wound Vac on Abdomen    Labs    Lab Results   Component Value Date/Time    WBC 4.7* 04/20/2017 04:50 AM    RBC 3.83* 04/20/2017 04:50 AM    HEMOGLOBIN 11.6* 04/20/2017 04:50 AM    HEMATOCRIT 33.9* 04/20/2017 04:50 AM    MCV 88.5 04/20/2017 04:50 AM    MCH 30.3 04/20/2017 04:50 AM    MCHC 34.2 04/20/2017 04:50 AM    MPV 9.2 04/20/2017 04:50 AM      Lab Results   Component Value Date/Time    SODIUM 137 04/20/2017 04:50 AM    POTASSIUM 3.6 04/20/2017 04:50 AM    CHLORIDE 104 04/20/2017 04:50 AM    CO2 26 04/20/2017 04:50 AM    GLUCOSE 204* 04/20/2017 04:50 AM    BUN 18 04/20/2017 04:50 AM    CREATININE 0.87 04/20/2017 04:50 AM     BLOOD CULTURE   Date Value Ref Range Status   04/06/2017 No growth after 5 days of incubation.  Final     BLOOD CULTURE HOLD   Date Value Ref Range Status   01/04/2017 Collected  Final        To Be Addressed    · Infectious Process  · Wound Consult placed for VAC care  · Called and received orders to continue outpatient ABX  · Acute, and Chronic Pain  · Percocet administered for pain r/t abdominal   · Possible Transfer or Discharge Today  · Patient has " remained HD stable all night  · Lactic Acids have seemed to resolve  · Patient has received ICU level care for 24 hours post first lactic acid reading above 4.

## 2017-04-20 NOTE — PROGRESS NOTES
[facility] Hospitalist [facility], due to the patient requesting that we continue his Outpatient IV ABX regimen, and BRANDI Summers DO returned the page promptly. Described the patient's current condition including that the patient required the medication at 1800. Requested 420 mg IV Daptomycin, 2 g Cetriaxone IV Daily; BRANDI Summers DO placed orders for the same. All orders read back, and entered in EPIC.

## 2017-04-20 NOTE — WOUND TEAM
"[facility] Wound & Ostomy [facility]  Inpatient Services  Initial Wound and Skin Care Evaluation    Admission Date:  04/18/17     HPI, PMH, SH: Reviewed  Unit where seen by Wound Team:  [facility]     WOUND CONSULT RELATED TO:  Wound VAC dressing change.        SUBJECTIVE:  \"I was due for a change today.\"      Self Report / Pain Level:  Pre-medicated      OBJECTIVE:  In bed, previous dressing intact.     WOUND TYPE, LOCATION, CHARACTERISTICS (Pressure ulcers: location, stage, POA or date identified)    Location and type of wound:  Mid ABD umbilicus, open complicated surgical           Periwound:  Intact        Drainage:   Scant, SS    Tissue Type and %:  100% pale pink    Wound Edges:  Open    Odor:    None    Exposed structure(s): appears to be one blue stitch   S&S of Infection:  None       Measurements:  (Taken 04/20/17)   Length:   1.5 cm    Width:    3.5 cm   Depth:   2 cm      INTERVENTIONS BY WOUND TEAM:  Previous dressing removed, 2 pieces total.  Cleansed with NS and gauze.  Photograph and measurements taken.  Filled wound with 1 piece of black foam with 1 piece of black foam for button.  Sealed with track pad.         Interdisciplinary consultation:  Dr. Foreman, RN, patient      EVALUATION:  Patient with umbilical wound.  He had his original hernia repair 11/6/2016.  He then had a mesh removal and I&D 3/10/17 with another I&D 4/7/17.  He is on IV ABX (infusion center visits start today 4/11/17) and followed by ID.       Factors affecting wound healing:  Sepsis, DM, infection     Goals:  Decrease in wound size by 1% each week.     NURSING PLAN OF CARE ORDERS (X):    Dressing changes: See Dressing Maintenance orders: X  Skin care: See Skin Care orders:   Rectal tube care: See Rectal Tube Care orders:   Other orders:    RSKIN: CURRENT (X) ORDERED (O)  Q shift Garrett:  X  Q shift pressure point assessments:  X  Pressure redistribution mattress        BRANDON    X  Bariatric BRANDON      Bariatric foam        Heel float boots     "   Heels floated on pillows      Barrier wipes      Barrier Cream      Barrier paste      Sacral silicone dressing      Silicone O2 tubing      Anchorfast      Trach with Optifoam split foam       Waffle cushion      Rectal tube or BMS      Antifungal tx    Turn q 2 hours     Up to chair     Ambulate   PT/OT     Dietician      PO  X TF   TPN     PVN    NPO   # days   Other       WOUND TEAM PLAN OF CARE (X):   NPWT change 3 x week:      X  Dressing changes by wound team:       Follow up as needed:       Other (explain):    Anticipated discharge plans (X):  SNF:           Home Care:           Outpatient Wound Center:        X    Self Care:            Other:

## 2017-04-20 NOTE — PROGRESS NOTES
Upon review of MD progress note, pt is currently hospitalized. Pharmacy, Charge JAZMINE Benavides, and PAR informed.

## 2017-04-20 NOTE — PROGRESS NOTES
Patient states that he left his watch on the bedside table in [facility]. Called [facility], and was told they do not have it.

## 2017-04-21 VITALS
HEART RATE: 72 BPM | DIASTOLIC BLOOD PRESSURE: 43 MMHG | SYSTOLIC BLOOD PRESSURE: 92 MMHG | OXYGEN SATURATION: 95 % | HEIGHT: 67 IN | WEIGHT: 234.35 LBS | TEMPERATURE: 97.7 F | BODY MASS INDEX: 36.78 KG/M2 | RESPIRATION RATE: 18 BRPM

## 2017-04-21 LAB
ALBUMIN SERPL BCP-MCNC: 3.7 G/DL (ref 3.2–4.9)
ALBUMIN/GLOB SERPL: 1.3 G/DL
ALP SERPL-CCNC: 60 U/L (ref 30–99)
ALT SERPL-CCNC: 39 U/L (ref 2–50)
ANION GAP SERPL CALC-SCNC: 9 MMOL/L (ref 0–11.9)
AST SERPL-CCNC: 23 U/L (ref 12–45)
BILIRUB SERPL-MCNC: 0.4 MG/DL (ref 0.1–1.5)
BUN SERPL-MCNC: 16 MG/DL (ref 8–22)
CALCIUM SERPL-MCNC: 9.4 MG/DL (ref 8.5–10.5)
CHLORIDE SERPL-SCNC: 102 MMOL/L (ref 96–112)
CO2 SERPL-SCNC: 27 MMOL/L (ref 20–33)
CREAT SERPL-MCNC: 0.83 MG/DL (ref 0.5–1.4)
CRP SERPL HS-MCNC: 3.38 MG/DL (ref 0–0.75)
ERYTHROCYTE [DISTWIDTH] IN BLOOD BY AUTOMATED COUNT: 41.9 FL (ref 35.9–50)
GFR SERPL CREATININE-BSD FRML MDRD: >60 ML/MIN/1.73 M 2
GLOBULIN SER CALC-MCNC: 2.8 G/DL (ref 1.9–3.5)
GLUCOSE BLD-MCNC: 105 MG/DL (ref 65–99)
GLUCOSE BLD-MCNC: 123 MG/DL (ref 65–99)
GLUCOSE BLD-MCNC: 131 MG/DL (ref 65–99)
GLUCOSE SERPL-MCNC: 127 MG/DL (ref 65–99)
HCT VFR BLD AUTO: 36.3 % (ref 42–52)
HGB BLD-MCNC: 12.7 G/DL (ref 14–18)
MAGNESIUM SERPL-MCNC: 1.8 MG/DL (ref 1.5–2.5)
MCH RBC QN AUTO: 30.7 PG (ref 27–33)
MCHC RBC AUTO-ENTMCNC: 35 G/DL (ref 33.7–35.3)
MCV RBC AUTO: 87.7 FL (ref 81.4–97.8)
PHOSPHATE SERPL-MCNC: 5.8 MG/DL (ref 2.5–4.5)
PLATELET # BLD AUTO: 206 K/UL (ref 164–446)
PMV BLD AUTO: 9.2 FL (ref 9–12.9)
POTASSIUM SERPL-SCNC: 3.6 MMOL/L (ref 3.6–5.5)
PROT SERPL-MCNC: 6.5 G/DL (ref 6–8.2)
RBC # BLD AUTO: 4.14 M/UL (ref 4.7–6.1)
SODIUM SERPL-SCNC: 138 MMOL/L (ref 135–145)
WBC # BLD AUTO: 4.3 K/UL (ref 4.8–10.8)

## 2017-04-21 PROCEDURE — 99233 SBSQ HOSP IP/OBS HIGH 50: CPT | Performed by: HOSPITALIST

## 2017-04-21 PROCEDURE — 700111 HCHG RX REV CODE 636 W/ 250 OVERRIDE (IP): Performed by: INTERNAL MEDICINE

## 2017-04-21 PROCEDURE — 93970 EXTREMITY STUDY: CPT | Mod: 26 | Performed by: SURGERY

## 2017-04-21 PROCEDURE — 85027 COMPLETE CBC AUTOMATED: CPT

## 2017-04-21 PROCEDURE — 700105 HCHG RX REV CODE 258: Performed by: INTERNAL MEDICINE

## 2017-04-21 PROCEDURE — 93970 EXTREMITY STUDY: CPT

## 2017-04-21 PROCEDURE — 83735 ASSAY OF MAGNESIUM: CPT

## 2017-04-21 PROCEDURE — A9270 NON-COVERED ITEM OR SERVICE: HCPCS | Performed by: HOSPITALIST

## 2017-04-21 PROCEDURE — 700102 HCHG RX REV CODE 250 W/ 637 OVERRIDE(OP): Performed by: HOSPITALIST

## 2017-04-21 PROCEDURE — 84100 ASSAY OF PHOSPHORUS: CPT

## 2017-04-21 PROCEDURE — 86140 C-REACTIVE PROTEIN: CPT

## 2017-04-21 PROCEDURE — 80053 COMPREHEN METABOLIC PANEL: CPT

## 2017-04-21 PROCEDURE — 700111 HCHG RX REV CODE 636 W/ 250 OVERRIDE (IP): Performed by: HOSPITALIST

## 2017-04-21 PROCEDURE — 82962 GLUCOSE BLOOD TEST: CPT

## 2017-04-21 RX ADMIN — PIPERACILLIN SODIUM AND TAZOBACTAM SODIUM 3.38 G: 3; .375 INJECTION, POWDER, FOR SOLUTION INTRAVENOUS at 05:44

## 2017-04-21 RX ADMIN — FAMOTIDINE 20 MG: 20 TABLET, FILM COATED ORAL at 08:38

## 2017-04-21 RX ADMIN — INSULIN GLARGINE 50 UNITS: 100 INJECTION, SOLUTION SUBCUTANEOUS at 08:46

## 2017-04-21 RX ADMIN — MESALAMINE 800 MG: 400 CAPSULE, DELAYED RELEASE ORAL at 16:12

## 2017-04-21 RX ADMIN — FLUCONAZOLE 200 MG: 200 TABLET ORAL at 05:44

## 2017-04-21 RX ADMIN — THERA TABS 1 TABLET: TAB at 08:38

## 2017-04-21 RX ADMIN — MESALAMINE 800 MG: 400 CAPSULE, DELAYED RELEASE ORAL at 08:38

## 2017-04-21 RX ADMIN — PIPERACILLIN SODIUM AND TAZOBACTAM SODIUM 3.38 G: 3; .375 INJECTION, POWDER, FOR SOLUTION INTRAVENOUS at 01:33

## 2017-04-21 RX ADMIN — Medication 220 MG: at 08:38

## 2017-04-21 RX ADMIN — OXYCODONE HYDROCHLORIDE AND ACETAMINOPHEN 1 TABLET: 10; 325 TABLET ORAL at 17:34

## 2017-04-21 RX ADMIN — MAGNESIUM GLUCONATE 500 MG ORAL TABLET 400 MG: 500 TABLET ORAL at 08:38

## 2017-04-21 RX ADMIN — ENOXAPARIN SODIUM 40 MG: 100 INJECTION SUBCUTANEOUS at 08:37

## 2017-04-21 ASSESSMENT — ENCOUNTER SYMPTOMS
EYES NEGATIVE: 1
CHILLS: 0
MYALGIAS: 1
NAUSEA: 0
ABDOMINAL PAIN: 0
DIARRHEA: 1
PSYCHIATRIC NEGATIVE: 1
BACK PAIN: 1
SHORTNESS OF BREATH: 0
COUGH: 0
RESPIRATORY NEGATIVE: 1
HEADACHES: 0
VOMITING: 0
FEVER: 0
CARDIOVASCULAR NEGATIVE: 1
ROS GI COMMENTS: CHRONIC

## 2017-04-21 ASSESSMENT — PAIN SCALES - GENERAL
PAINLEVEL_OUTOF10: 0
PAINLEVEL_OUTOF10: 0
PAINLEVEL_OUTOF10: 3

## 2017-04-21 ASSESSMENT — LIFESTYLE VARIABLES: DO YOU DRINK ALCOHOL: NO

## 2017-04-21 NOTE — PROGRESS NOTES
Infectious Disease Progress Note    Author: Kandy Foreman M.D. DOS & Time created: 2017  11:31 AM    Chief Complaint:  Chief Complaint   Patient presents with   • Medication Reaction     pt had IV antibiotics today, has not been feeling well, hurting all over his body. reports spasming all over.    FU for umbilical hernia repair abscess    Interval History:   AF, WBC 4.3, feels great, cramping improving, repeat CPK normal, anxious to go home  Labs Reviewed, Medications Reviewed, Radiology Reviewed and Wound Reviewed.    Review of Systems:  Review of Systems   Constitutional: Negative for fever and chills.   Respiratory: Negative for cough and shortness of breath.    Cardiovascular: Negative for chest pain.   Gastrointestinal: Positive for diarrhea. Negative for nausea, vomiting and abdominal pain.        Chronic   Neurological: Negative for headaches.   All other systems reviewed and are negative.      Hemodynamics:  Temp (24hrs), Av.7 °C (98.1 °F), Min:36.7 °C (98.1 °F), Max:36.7 °C (98.1 °F)  Temperature: 36.7 °C (98.1 °F)  Pulse  Av.8  Min: 67  Max: 135Heart Rate (Monitored): 72  NIBP: 124/76 mmHg       Physical Exam:  Physical Exam   Constitutional: He is oriented to person, place, and time. He appears well-developed and well-nourished.   HENT:   Head: Normocephalic and atraumatic.   Eyes: EOM are normal. Pupils are equal, round, and reactive to light.   Neck: Neck supple.   Cardiovascular: Normal rate, regular rhythm and normal heart sounds.    Pulmonary/Chest: Effort normal and breath sounds normal.   Abdominal: Soft.   Umbilical wound vac  Wound measures 1.5 x 3.5 - clean without any drainage. No surrounding erythema   Musculoskeletal: Normal range of motion. He exhibits no edema.   RUE PICC nontender   Neurological: He is alert and oriented to person, place, and time.   Skin: No rash noted.       Meds:    Current facility-administered medications:   •  piperacillin-tazobactam (ZOSYN)  3.375 g in  mL IVPB, 3.375 g, Intravenous, Q6HRS, Kandy Foreman M.D., Stopped at 04/21/17 0614  •  magnesium oxide (MAG-OX) tablet 400 mg, 400 mg, Oral, BID, Carrillo Mcgarry M.D., 400 mg at 04/21/17 0838  •  multivitamin (THERAGRAN) tablet 1 Tab, 1 Tab, Oral, DAILY, Carrillo Mcgarry M.D., 1 Tab at 04/21/17 0838  •  zinc sulfate (ZINCATE) capsule 220 mg, 220 mg, Oral, DAILY, Carrillo Mcgarry M.D., 220 mg at 04/21/17 0838  •  allopurinol (ZYLOPRIM) tablet 300 mg, 300 mg, Oral, QDAY PRN, Lala Rendon M.D.  •  buPROPion SR (WELLBUTRIN-SR) tablet 300 mg, 300 mg, Oral, Q EVENING, Lala Rendon M.D., 300 mg at 04/20/17 2044  •  fluconazole (DIFLUCAN) tablet 200 mg, 200 mg, Oral, DAILY, Lala Rendon M.D., 200 mg at 04/21/17 0544  •  HYDROmorphone (DILAUDID) tablet 2 mg, 2 mg, Oral, Q8HRS PRN, Lala Rendon M.D., 2 mg at 04/20/17 1147  •  oxycodone-acetaminophen (PERCOCET-10)  MG per tablet 1 Tab, 1 Tab, Oral, Q8HRS PRN, Lala Rendon M.D., 1 Tab at 04/20/17 2153  •  thyroid (ARMOUR THYROID) tablet 75 mg, 75 mg, Oral, Q EVENING, Lala Rendon M.D., 75 mg at 04/20/17 2042  •  senna-docusate (PERICOLACE or SENOKOT S) 8.6-50 MG per tablet 2 Tab, 2 Tab, Oral, BID, Stopped at 04/19/17 2100 **AND** polyethylene glycol/lytes (MIRALAX) PACKET 1 Packet, 1 Packet, Oral, QDAY PRN **AND** magnesium hydroxide (MILK OF MAGNESIA) suspension 30 mL, 30 mL, Oral, QDAY PRN **AND** bisacodyl (DULCOLAX) suppository 10 mg, 10 mg, Rectal, QDAY PRN, Lala Rendon M.D.  •  NS (BOLUS) infusion 500 mL, 500 mL, Intravenous, Once PRN, Lala Rendon M.D.  •  enoxaparin (LOVENOX) inj 40 mg, 40 mg, Subcutaneous, DAILY, Lala Rendon M.D., 40 mg at 04/21/17 0837  •  acetaminophen (TYLENOL) tablet 650 mg, 650 mg, Oral, Q6HRS PRN, Lala Rendon M.D.  •  insulin lispro (HUMALOG) injection 3-14 Units, 3-14 Units, Subcutaneous, 4X/DAY ACHS, Lala Rendon M.D., Stopped at 04/20/17 1100  •  Action is  required: Protocol 1073 Hypoglycemia has been implemented, , , Once **AND** Protocol 1073 Inclusion Criteria, , , CONTINUOUS **AND** Protocol 1073 NOTIFY, , , Once **AND** Protocol 1073 Initiate protocol immediately if FSBG is less than or equal to 70 mg/dL, , , CONTINUOUS **AND** glucose 4 g chewable tablet 16 g, 16 g, Oral, Q15 MIN PRN **AND** dextrose 50% (D50W) injection 25 mL, 25 mL, Intravenous, Q15 MIN PRN, Lala Rendon M.D.  •  ondansetron (ZOFRAN) syringe/vial injection 4 mg, 4 mg, Intravenous, Q4HRS PRN, Lala Rendon M.D.  •  ondansetron (ZOFRAN ODT) dispertab 4 mg, 4 mg, Oral, Q4HRS PRN, Lala Rendon M.D., 4 mg at 04/19/17 1511  •  promethazine (PHENERGAN) tablet 12.5-25 mg, 12.5-25 mg, Oral, Q4HRS PRN, Lala Rendon M.D.  •  promethazine (PHENERGAN) suppository 12.5-25 mg, 12.5-25 mg, Rectal, Q4HRS PRN, Lala Rendon M.D.  •  prochlorperazine (COMPAZINE) injection 5-10 mg, 5-10 mg, Intravenous, Q4HRS PRN, Lala Rendon M.D.  •  famotidine (PEPCID) tablet 20 mg, 20 mg, Oral, BID, 20 mg at 04/21/17 0838 **OR** famotidine (PEPCID) injection 20 mg, 20 mg, Intravenous, BID, Lala Rendon M.D.  •  mesalamine delayed-release (DELZICOL) capsule 800 mg, 800 mg, Oral, TID, Lala Rendon M.D., 800 mg at 04/21/17 0838  •  insulin glargine (LANTUS) injection 50 Units, 50 Units, Subcutaneous, BID, Lala Rendon M.D., 50 Units at 04/21/17 0846    Labs:  Recent Labs      04/18/17 2300 04/20/17   0450  04/21/17   0550   WBC  4.9  4.7*  4.3*   RBC  4.20*  3.83*  4.14*   HEMOGLOBIN  12.8*  11.6*  12.7*   HEMATOCRIT  36.8*  33.9*  36.3*   MCV  87.6  88.5  87.7   MCH  30.5  30.3  30.7   RDW  41.8  43.1  41.9   PLATELETCT  227  182  206   MPV  9.2  9.2  9.2   NEUTSPOLYS  92.10*  64.40   --    LYMPHOCYTES  5.90*  24.10   --    MONOCYTES  0.40  7.70   --    EOSINOPHILS  0.20  3.00   --    BASOPHILS  0.40  0.60   --      Recent Labs      04/18/17 2300 04/20/17   0450  04/21/17   0550   SODIUM   137  137  138   POTASSIUM  3.5*  3.6  3.6   CHLORIDE  102  104  102   CO2  20  26  27   GLUCOSE  268*  204*  127*   BUN  22  18  16   CPKTOTAL   --   33   --      Recent Labs      04/18/17   2300  04/20/17   0450  04/21/17   0550   ALBUMIN  3.9   --   3.7   TBILIRUBIN  0.3   --   0.4   ALKPHOSPHAT  82   --   60   TOTPROTEIN  6.4   --   6.5   ALTSGPT  35   --   39   ASTSGOT  33   --   23   CREATININE  1.00  0.87  0.83       Imaging:  Ct-abdomen-pelvis With    4/6/2017 4/6/2017 12:04 PM HISTORY/REASON FOR EXAM:  Pain. Nausea and vomiting. TECHNIQUE/EXAM DESCRIPTION:  CT scan of the abdomen and pelvis with contrast. Contrast-enhanced helical scanning was obtained from the diaphragmatic domes through the pubic symphysis following the bolus administration of nonionic contrast without complication. 100 mL of Omnipaque 350 nonionic contrast was administered without complication. Low dose optimization technique was utilized for this CT exam including automated exposure control and adjustment of the mA and/or kV according to patient size. COMPARISON: 3/21/2017 FINDINGS: Lung bases: There is mild bibasilar atelectasis. There is a calcified subcarinal lymph node. Abdomen: No free air seen in the abdomen or pelvis. The liver is hypodense compatible with hepatic steatosis. There is a tiny calcified granuloma within the liver. No adrenal mass is identified. There is no evidence of hydronephrosis. Pancreas is unremarkable. Aorta is not aneurysmal. There is minimal atherosclerotic plaque. There are small inguinal, retroperitoneal and mesenteric lymph nodes. There is no evidence of bowel obstruction. Terminal ileum and visualized appendix are unremarkable. Patient is status post cholecystectomy. There is no biliary ductal dilatation. Portal vein is patent. Bladder is mildly distended. Calcific densities in the pelvis likely represent phleboliths. No free fluid is seen in the abdomen or pelvis. There is a periumbilical wound with  surrounding infiltration and mild stranding. Degenerative changes are seen in the spine.     4/6/2017  Periumbilical wound with induration and mild inflammatory stranding. Hepatic steatosis. Status post cholecystectomy. Sequelae of prior granulomatous exposure.     Dx-chest-portable (1 View)    4/6/2017 4/6/2017 10:57 AM HISTORY/REASON FOR EXAM:  Weakness. TECHNIQUE/EXAM DESCRIPTION AND NUMBER OF VIEWS: Single portable view of the chest. COMPARISON: 10/25/2016 FINDINGS: The heart is not enlarged. No focal consolidation, pleural effusion or pneumothorax is identified.  Costophrenic angles are sharp.     4/6/2017  No acute cardiopulmonary process is seen.    Dx-small Bowel Series    4/6/2017 4/6/2017 7:57 PM HISTORY/REASON FOR EXAM:  Nausea and vomiting. Drainage post hernia repair. TECHNIQUE/EXAM DESCRIPTION AND NUMBER OF VIEWS: Air contrast small bowel follow-through performed. Fluoroscopic images were obtained. 0 fluoroscopic images obtained. Total fluoroscopy time: 0 minutes COMPARISON: CT from the same day FINDINGS:  image demonstrates contrast within the renal collecting systems and bladder. There is a nonspecific bowel gas pattern. Surgical clips are seen in the right upper quadrant. The patient swallowed contrast without difficulty. There was no evidence of small bowel obstruction, abnormal wall thickening or stricture. The duodenojejunal junction was in a normal location. Contrast was seen within the colon by 3 hours and 20 minutes. A lateral view was performed and no enterocutaneous fistula was identified     4/6/2017  No enterocutaneous fistula identified. No evidence of small bowel obstruction.    Ir-picc Line Placement > Age 5    4/9/2017  HISTORY/REASON FOR EXAM:   PICC placement. TECHNIQUE/EXAM DESCRIPTION AND NUMBER OF VIEWS:   PICC line insertion with ultrasound guidance.  The procedure was performed using maximal sterile barrier technique including sterile gown, mask, cap, and donning of  sterile gloves following appropriate hand hygiene and/or sterile scrub. Patient skin site was prepped with 2% Chlorhexidine solution. FINDINGS:  PICC line insertion with Ultrasound Guidance was performed by qualified nursing staff without the assistance of a Radiologist.  A follow up chest x-ray will be performed to determine appropriateness of PICC positioning.     4/9/2017           Ultrasound-guided PICC placement performed by qualified nursing staff as above.       Micro:  BLOOD CULTURE   Date Value Ref Range Status   04/19/2017   Preliminary    No Growth    Note: Blood cultures are incubated for 5 days and  are monitored continuously.Positive blood cultures  are called to the RN and reported as soon as  they are identified.       BLOOD CULTURE HOLD   Date Value Ref Range Status   01/04/2017 Collected  Final        Assessment:  Active Hospital Problems    Diagnosis   • Sepsis (CMS-[facility]) [A41.9]   • Type 2 diabetes mellitus (CMS-[facility]) [E11.9]   • Complicated wound infection (CMS-[facility]) [T79.8XXA]   • Ulcerative colitis (CMS-[facility]) [K51.90]   • Hypothyroid [E03.9]   • Depression [F32.9]       Plan:  Recent umbilical hernia repair abscess  Mesh removed 3/10/17  I&D 4/6/17  Recent cx - Efaecalis and klebsiella  D/c zosyn  No evidence of infection  Completed course of abx  Repeat CPK normal  D/C PICC line prior to discharge  Continue wound care    OK to d/c home from ID perspective    Discussed with internal Medicine - Dr. Mcgarry.

## 2017-04-21 NOTE — PROGRESS NOTES
Late entry  Bedside report received.  Assessment complete.  A&O x 4. Patient calls appropriately.  Denies numbness and tingling. Moves all extremities.   Patient up with 0 assist. Bed alarm in place.   Patient has 0/10 pain.   Denies N&V. Tolerating diabetic diet.  Patient denies SOB.  Review plan with of care with patient. Call light and personal belongings with in reach. Hourly rounding in place. All needs met at this time.

## 2017-04-21 NOTE — PROGRESS NOTES
Assumed care of patient from day shift RN.  All questions answered at this time.  Wound vac dress c/d/i at 125mmhg.  Pt. States pain is a 2/10 at this time, declining pain medication.

## 2017-04-21 NOTE — CARE PLAN
Problem: Safety  Goal: Will remain free from falls  Outcome: PROGRESSING AS EXPECTED  Assess bed is in lowest position, brakes are set. Ensure personal belongs and call light is within reach. Hourly rounding in place.     Problem: Pain Management  Goal: Pain level will decrease to patient’s comfort goal  Outcome: PROGRESSING AS EXPECTED  Assess pt pain Q 4 hours or PRN. Offer non pharmaceutical pain management options.

## 2017-04-21 NOTE — PROGRESS NOTES
Hospital Medicine Progress Note, Adult, Complex               Author: Carrillo Zeferino Date & Time created: 4/21/2017  1:20 PM     Interval History:  Patient seen and examined today. ICU Care  Care and plan discussed in IDT/Hot rounds.  Lines and assistive devices reviewed.    Patient tolerating treatment and therapies.  All Data, Medication data reviewed.  Case discussed with nursing as available.  Plan of Care reviewed with patient and notified of changes.  50 y/o pleasant male , know to us well, h/o complicated umbilical hernia repair, admitted with muscular pain and concern for sepsis and worsening infection, admitted to ICU and seen by ID today  4/20 Pt felt better after magnesium, still some LE pain, feels much better, CPK was Nl  4/21 Pt fees better, d/w ID, no further Abx needed, still some calf pain, cramping better , DVT study pending    Review of Systems:  Review of Systems   Constitutional: Positive for malaise/fatigue. Negative for chills.   HENT: Negative.    Eyes: Negative.    Respiratory: Negative.    Cardiovascular: Negative.    Genitourinary: Negative.    Musculoskeletal: Positive for myalgias and back pain. Negative for joint pain.   Skin: Negative.    Endo/Heme/Allergies: Negative.    Psychiatric/Behavioral: Negative.    All other systems reviewed and are negative.      Physical Exam:  Physical Exam   Constitutional: He is oriented to person, place, and time. He appears well-developed and well-nourished.   HENT:   Head: Normocephalic.   Eyes: Pupils are equal, round, and reactive to light.   Neck: Normal range of motion.   Cardiovascular: Normal rate, regular rhythm and normal heart sounds.    Pulmonary/Chest: Effort normal.   Abdominal: Soft. Bowel sounds are normal.   umbilical wound with Vacc, little erythema, no pain   Genitourinary: Rectum normal and penis normal.   Musculoskeletal: Normal range of motion. He exhibits no edema or tenderness.   Neurological: He is alert and oriented to  person, place, and time.   Skin: Skin is warm.   Psychiatric: He has a normal mood and affect.   Nursing note and vitals reviewed.      Labs:        Invalid input(s): [id number]  Recent Labs      17   0450   CPKTOTAL   --   33   TROPONINI  <0.01   --      Recent Labs      17   0550   SODIUM  137  137  138   POTASSIUM  3.5*  3.6  3.6   CHLORIDE  102  104  102   CO2  20  26  27   BUN  22  18  16   CREATININE  1.00  0.87  0.83   MAGNESIUM  1.3*   --   1.8   PHOSPHORUS   --    --   5.8*   CALCIUM  9.5  9.1  9.4     Recent Labs      17   0550   ALTSGPT  35   --   39   ASTSGOT  33   --   23   ALKPHOSPHAT  82   --   60   TBILIRUBIN  0.3   --   0.4   GLUCOSE  268*  204*  127*     Recent Labs      17   0550   RBC  4.20*  3.83*  4.14*   HEMOGLOBIN  12.8*  11.6*  12.7*   HEMATOCRIT  36.8*  33.9*  36.3*   PLATELETCT  227  182  206     Recent Labs      17   0550   WBC  4.9  4.7*  4.3*   NEUTSPOLYS  92.10*  64.40   --    LYMPHOCYTES  5.90*  24.10   --    MONOCYTES  0.40  7.70   --    EOSINOPHILS  0.20  3.00   --    BASOPHILS  0.40  0.60   --    ASTSGOT  33   --   23   ALTSGPT  35   --   39   ALKPHOSPHAT  82   --   60   TBILIRUBIN  0.3   --   0.4           Hemodynamics:  Temp (24hrs), Av.6 °C (97.9 °F), Min:36.5 °C (97.7 °F), Max:36.7 °C (98.1 °F)  Temperature: 36.5 °C (97.7 °F)  Pulse  Av.5  Min: 67  Max: 135Heart Rate (Monitored): 72  NIBP: 119/75 mmHg     Respiratory:    Respiration: 18, Pulse Oximetry: 95 %           Fluids:    Intake/Output Summary (Last 24 hours) at 17 1320  Last data filed at 17 0800   Gross per 24 hour   Intake   1060 ml   Output   1050 ml   Net     10 ml        GI/Nutrition:  Orders Placed This Encounter   Procedures   • Diet Order     Standing Status: Standing      Number of Occurrences: 1       Standing Expiration Date:      Order Specific Question:  Diet:     Answer:  Diabetic [3]     Medical Decision Making, by Problem:  Active Hospital Problems    Diagnosis   • Sepsis (CMS-[facility]) [A41.9], doubt, recovered fast with fluids   • Type 2 diabetes mellitus (CMS-[facility]) [E11.9] h/o , better control   • Complicated wound infection (CMS-[facility]) [T79.8XXA] with ongoing Abx and wound care   • Ulcerative colitis (CMS-[facility]) [K51.90] on immunosuppression   • Hypothyroid [E03.9] h/o   • Depression [F32.9] h/o   - Myalgia's  Plan  Ok for transfer  Id eval appreciated and off abx now, wound looks good  Dm management  DVT eval LE still Pending  If no DVT ok for d/c and remove Picc line  Pt informed of process    Labs reviewed, Medications reviewed and Radiology images reviewed  Sousa catheter: No Sousa  Central line in place: Chemotherapy    DVT Prophylaxis: Heparin      Antibiotics: Treating active infection/contamination beyond 24 hours perioperative coverage  Assessed for rehab: Patient unable to tolerate rehabilitation therapeutic regimen

## 2017-04-21 NOTE — DISCHARGE INSTRUCTIONS
Discharge Instructions    Discharged to home by car with relative. Discharged via wheelchair, hospital escort: Yes.  Special equipment needed: Not Applicable    Be sure to schedule a follow-up appointment with your primary care doctor or any specialists as instructed.     Discharge Plan:   Influenza Vaccine Indication: Not indicated: Previously immunized this influenza season and > 8 years of age    I understand that a diet low in cholesterol, fat, and sodium is recommended for good health. Unless I have been given specific instructions below for another diet, I accept this instruction as my diet prescription.   Other diet: Diabetic      Sepsis, Adult  Sepsis is a serious infection of your blood or tissues that affects your whole body. The infection that causes sepsis may be bacterial, viral, fungal, or parasitic. Sepsis may be life threatening. Sepsis can cause your blood pressure to drop. This may result in shock. Shock causes your central nervous system and your organs to stop working correctly.   RISK FACTORS  Sepsis can happen in anyone, but it is more likely to happen in people who have weakened immune systems.  SIGNS AND SYMPTOMS   Symptoms of sepsis can include:  · Fever or low body temperature (hypothermia).  · Rapid breathing (hyperventilation).  · Chills.  · Rapid heartbeat (tachycardia).  · Confusion or light-headedness.  · Trouble breathing.  · Urinating much less than usual.  · Cool, clammy skin or red, flushed skin.  · Other problems with the heart, kidneys, or brain.  DIAGNOSIS   Your health care provider will likely do tests to look for an infection, to see if the infection has spread to your blood, and to see how serious your condition is. Tests can include:  · Blood tests, including cultures of your blood.  · Cultures of other fluids from your body, such as:  ¨ Urine.  ¨ Pus from wounds.  ¨ Mucus coughed up from your lungs.  · Urine tests other than cultures.  · X-ray exams or other imaging  tests.  TREATMENT   Treatment will begin with elimination of the source of infection. If your sepsis is likely caused by a bacterial or fungal infection, you will be given antibiotic or antifungal medicines.  You may also receive:  · Oxygen.  · Fluids through an IV tube.  · Medicines to increase your blood pressure.  · A machine to clean your blood (dialysis) if your kidneys fail.  · A machine to help you breathe if your lungs fail.  SEEK IMMEDIATE MEDICAL CARE IF:  You get an infection or develop any of the signs and symptoms of sepsis after surgery or a hospitalization.     This information is not intended to replace advice given to you by your health care provider. Make sure you discuss any questions you have with your health care provider.     Document Released: 09/15/2004 Document Revised: 05/03/2016 Document Reviewed: 08/25/2014  [employer] Interactive Patient Education ©2016 [employer]      · Is patient discharged on Warfarin / Coumadin?   No     · Is patient Post Blood Transfusion?  No    Depression / Suicide Risk    As you are discharged from this [facility] facility, it is important to learn how to keep safe from harming yourself.    Recognize the warning signs:  · Abrupt changes in personality, positive or negative- including increase in energy   · Giving away possessions  · Change in eating patterns- significant weight changes-  positive or negative  · Change in sleeping patterns- unable to sleep or sleeping all the time   · Unwillingness or inability to communicate  · Depression  · Unusual sadness, discouragement and loneliness  · Talk of wanting to die  · Neglect of personal appearance   · Rebelliousness- reckless behavior  · Withdrawal from people/activities they love  · Confusion- inability to concentrate     If you or a loved one observes any of these behaviors or has concerns about self-harm, here's what you can do:  · Talk about it- your feelings and reasons for harming yourself  · Remove any  means that you might use to hurt yourself (examples: pills, rope, extension cords, firearm)  · Get professional help from the community (Mental Health, Substance Abuse, psychological counseling)  · Do not be alone:Call your Safe Contact- someone whom you trust who will be there for you.  · Call your local CRISIS HOTLINE 589-5464 or 151-280-9770  · Call your local Children's Mobile Crisis Response Team [place] (885) 385-0646 or www.[employer]  · Call the toll free National Suicide Prevention Hotlines   · National Suicide Prevention Lifeline 259-265-EION (5110)  · National Hope Line Network 800-SUICIDE (573-0599)

## 2017-04-21 NOTE — CARE PLAN
Problem: Safety  Goal: Will remain free from injury  Intervention: Provide assistance with mobility  Bed in low and locked position. Call light within reach.  Fall precautions in place.  Walkways free of obstacles.       Problem: Knowledge Deficit  Goal: Knowledge of disease process/condition, treatment plan, diagnostic tests, and medications will improve  Intervention: Assess knowledge level of disease process/condition, treatment plan, diagnostic tests, and medications  Pt. Verbalizes understanding of POC for tonight.       Problem: Pain Management  Goal: Pain level will decrease to patient’s comfort goal  Intervention: Follow pain managment plan developed in collaboration with patient and Interdisciplinary Team  Will continue to monitor pain throughout this shift.

## 2017-04-22 ENCOUNTER — NON-PROVIDER VISIT (OUTPATIENT)
Dept: WOUND CARE | Facility: MEDICAL CENTER | Age: 52
End: 2017-04-22
Attending: HOSPITALIST
Payer: COMMERCIAL

## 2017-04-22 LAB
AMPHET UR CFM-MCNC: <200 NG/ML
MDA UR CFM-MCNC: <200 NG/ML
MDEA UR CFM-MCNC: <200 NG/ML
MDMA UR CFM-MCNC: <200 NG/ML
METHAMPHET UR CFM-MCNC: <200 NG/ML

## 2017-04-22 PROCEDURE — 97605 NEG PRS WND THER DME<=50SQCM: CPT

## 2017-04-22 PROCEDURE — A6402 STERILE GAUZE <= 16 SQ IN: HCPCS

## 2017-04-22 NOTE — PROGRESS NOTES
Pt discharged in stable condition. PT provided with discharge instructions regarding follow up, prescriptions, activity, S/Sx to monitor for. Pt verbalizes understanding. PICC removed. All personal possessions with pt. Pt agreeable to discharge, all needs met.

## 2017-04-22 NOTE — WOUND TEAM
Advanced Wound Care  [facility]  [address]  [address]  [place] [address]  [phone] Fax: [phone]    Encounter Note  For Certification Period: 4/11/17-5/11/17      Referring Physician: Adrián Santos MD  Primary Physician: Rodolfo Stein MD, YAIR Mohan       Consulting Physicians: YAIR Rubin    Wound(s): Umbilical wound    Start of Care:    4/11/17  Subjective:        HPI: Pt presents to clinic with umbilical wound after discharge from [facility] yesterday.  He had his original hernia repair 11/6/2016.  He then had a mesh removal and I&D 3/10/17 with another I&D 4/7/17.  Pt states that he has IDDM and has had 5 episodes of DKA since January 2017.  He reports FBS today at 137.  He is on IV ABX (infusion center visits start today 4/11/17).   Wound was packed with iodoform gauze at Dr. Rutherford's (surgeon) office yesterday.  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           4/11/17 NPWT ordered.  Should arrive by next visit.    Pain: Tenderness with removing vac and cleansing wound bed.        Past Medical History: Reviewed 4/13/17    Current Medications: daily antibx infusions. Pt states that he is being followed by ID and is on IV ABX.  He states he's been on IV ABX for 4 days.    Allergies: Peanut-derived; Tradjenta; and Hydrocodone    Social History:    [employer] RN      Objective:    Tests and Measures: Cx positive from 4/7/17.  Pt is followed by ID and is on IV ABX    Orthotic, protective, supportive devices: none     Wound Characteristics                                                    Location: midline abdomen   Initial Evaluation  Date: 4/11/17 Encounter  4/15/17 Encounter Date:  4/22/17   Tissue Type and %: 100% marbled pink viable, adherent yellow, and % pink, viable. 50% pink viable, 25% white necrotic, 25% red granulation   Periwound: intact intact Intact   Drainage: Large thin yellow Scant ss Min ss   Exposed structures Sutures suture  Blue suture   Wound Edges:   Open Open Open   Odor: None None None   S&S of Infection:   drainage None None   Edema: Local None None   Sensation: intact intact intact              Measurements:  Midline abdomen Initial Evaluation  Date: 17 Encounter Date:  17   Length (cm) 1.6 0.6   Width (cm) 4.1 3.1   Depth (cm) 3 2.2   Area (cm2) 6.56 1.86   Tract/undermine 12 o'clock   0.6cm   3 o'clock    1cm   6 o'clock   0 cm   9 o'clock   0.5 cm none        Procedures: 1 black piece removed from wound bed and one button. Wound bed inspected with flashlight and cotton tipped applicator, no foam retained   Debridement: Non selective with NS, gauze and cotton tipped applicator to remove biofilm.   Cleansed with:   NS, gauze, cotton tipped applicator                                                                       Periwound protected with: Benzoin, drape tape   Primary dressin piece black foam to wound bed, one button for trac pad (2 pieces)               Secondary Dressing: Drape tape   Other: Initiated NPWT @ 125mmHg, continuous, no leaks noted     Patient Education: Educated pt on wound progress, on rationale for CSWD avoiding suture. Pt verbalized understanding. Pt knowledgeable on trouble shooting vac, wound treatment, s/s infection, when to see medical care.     Professional Collaboration: None today      Assessment:      Wound etiology: surgical     Wound Progress: Granulating    Rationale for Treatment: NPWT to facilitate tissue granulation and wound healing, manage exudate, minimize risk for infection, expedite wound closure    Patient tolerance/compliance: pt motivated to heal wound and would like to have it healed by the fall when he goes on his honeymoon    Complicating factors: IDDM, infection (pt on IV ABX)    Need for ongoing Advanced Wound Care services: Patient requires skilled therapeutic wound care services for product selection, application of product, debridement, close monitoring with  clinical assessment, compression for expedite of wound healing.        Plan:      Treatment Plan and Recommendations:  Diagnosis/ICD9: open surgical abdominal wound    Procedures/CPT:NPWT, selective debridement    Frequency:3x/week VAC/NPWT       Treatment Goals: STG 2 Weeks  LTG 4 Weeks   Granulation Tissue: 75% 100%   Decrease Necrotic Tissue to: 50% 0%   Wound Phase:  inflammatory proliferative   Decrease Size by: 25% 50%   Periwound:  intact intact   Decrease tracts/undermining by: 50% 75%   Decrease Pain:  2 none                        At the time of each visit a thorough assessment of the patient is completed to assure the  appropriateness of our plan of care.  The dressings or modalities may need to be adapted   from the original plan to address any significant changes in the wound environment.

## 2017-04-22 NOTE — DISCHARGE SUMMARY
DATE OF ADMISSION:  04/18/2017    DATE OF DISCHARGE:  04/21/2017    DISCHARGE DISPOSITION:  To home.    DISCHARGE FOLLOWUP:  Closely with his primary care physician, Dr. Rodolfo Stein   as well as surgery with Dr. Dooley.    DISCHARGE CONDITION:  Medically stable and improved.    DISCHARGE DIAGNOSES:  1.  Status post evaluation for presumed infection with patient presented with   significant muscular aches and pains and a low-grade fever.  2.  Status post muscular cramping, possibly related to profound   hypomagnesemia.  3.  No evidence of additional infection.  At this time, the patient completed   IV antibiotics under the direction of Dr. Foreman and Dr. Aguirre.  The patient   at this time was discontinued on this antibiotic regimen.  The PICC line has   been removed.  4.  Body aches, muscular pain.  His CPK was low.  The patient did receive   daptomycin as an outpatient, this is currently discontinued.  The patient at   this time had also negative lower extremity ultrasound findings and at this   time will be discharged with magnesium supplementation to hopefully prevent   further cramping.  5.  History of complicated umbilical hernia repair with mesh rejection and   revision with current wound VAC in place.  6.  History of gout.  7.  History of ulcerative colitis.  8.  Dyslipidemia.  9.  History of asthma.  10.  History of hypothyroidism.  11.  Gastroesophageal reflux disease.  12.  History of diabetic ketoacidosis event.  13.  History of cholecystectomy.  14.  History of colonoscopy.    DISCHARGE MEDICATIONS:  1.  Allopurinol 300 mg p.o. daily.  2.  Wellbutrin  mg daily.  3.  Vitamin D3 5000 units daily.  4.  Diflucan is discontinued.  5.  Dilaudid 2 mg p.r.n. severe pain.  6.  Insulin glargine 62 units daily.  7.  Lispro 2-20 units as instructed 3 times daily according to his sugars.  8.  Magnesium oxide 400 mg daily.  9.  Lialda 2.4 g q.p.m.  10.  Oxycodone/Percocet 10/325 one tab q. 8 p.r.n. pain.  11.   Depo-Testosterone 200 mg injection every Tuesday.  12.  [place] thyroid 30 mg tablet, the patient takes 75 mg daily.    For presenting symptoms, HPI and physical findings, please refer to the   dictated H and P.    CONSULTATIONS OBTAINED:  Dr. Kandy Foreman from infectious disease.    HOSPITAL COURSE:  The patient was admitted with body aches and low-grade fever   at home, the patient had muscular aches and pain.  He was referred to   infectious disease.  The patient had completed his IV antibiotics.  The   patient had his mesh removed on 03/10/2017.  The patient does not appear to   have an infection per infectious disease.  The patient had a repeat normal   CPK.  Patient at this time is released to home with close outpatient followup.    PICC line will be removed prior to discharge.  The patient's muscular aches   had improved and resolved except for his lower calf.  This was then evaluated   by ultrasound, does not show active clots.  The patient at this time is stable   for discharge with ongoing medical therapy and close outpatient followup.    Patient understands instructions and will be discharged with his wife.    LABORATORY DATA AND IMAGING:  White cell count is 4.3, hemoglobin 12.7,   hematocrit 36.3 with platelet count 206.  Chemistry is essentially normal.    Phosphorus was slightly elevated at 5.8, repeat magnesium today was 1.8.    Glucose levels were in the 120-130 range.  For full further details, please   refer to the computer system and paper chart.    DISCHARGE CONDITION:  Medically stable.    DISCHARGE DIET:  As tolerated.    Discharge ongoing wound care as arranged.    Time spent on discharge is 45 minutes.       ____________________________________     MD JEANINE MORE / STORM    DD:  04/21/2017 18:18:37  DT:  04/21/2017 22:54:19    D#:  [id number]  Job#:  [id number]    cc: Esau Dooley MD, MARIANA PIMENTEL MD, CATHERINE PADILLA MD

## 2017-04-25 ENCOUNTER — HOSPITAL ENCOUNTER (OUTPATIENT)
Dept: LAB | Facility: MEDICAL CENTER | Age: 52
End: 2017-04-25
Attending: NURSE PRACTITIONER
Payer: COMMERCIAL

## 2017-04-25 ENCOUNTER — NON-PROVIDER VISIT (OUTPATIENT)
Dept: WOUND CARE | Facility: MEDICAL CENTER | Age: 52
End: 2017-04-25
Attending: HOSPITALIST
Payer: COMMERCIAL

## 2017-04-25 LAB
ALBUMIN SERPL BCP-MCNC: 4.8 G/DL (ref 3.2–4.9)
ALBUMIN/GLOB SERPL: 1.5 G/DL
ALP SERPL-CCNC: 69 U/L (ref 30–99)
ALT SERPL-CCNC: 30 U/L (ref 2–50)
ANION GAP SERPL CALC-SCNC: 13 MMOL/L (ref 0–11.9)
APPEARANCE UR: CLEAR
AST SERPL-CCNC: 15 U/L (ref 12–45)
BASOPHILS # BLD AUTO: 0.7 % (ref 0–1.8)
BASOPHILS # BLD: 0.05 K/UL (ref 0–0.12)
BILIRUB SERPL-MCNC: 0.4 MG/DL (ref 0.1–1.5)
BILIRUB UR QL STRIP.AUTO: NEGATIVE
BUN SERPL-MCNC: 21 MG/DL (ref 8–22)
CALCIUM SERPL-MCNC: 9.5 MG/DL (ref 8.5–10.5)
CHLORIDE SERPL-SCNC: 106 MMOL/L (ref 96–112)
CO2 SERPL-SCNC: 17 MMOL/L (ref 20–33)
COLOR UR: YELLOW
CREAT SERPL-MCNC: 0.71 MG/DL (ref 0.5–1.4)
CULTURE IF INDICATED INDCX: NO UA CULTURE
EOSINOPHIL # BLD AUTO: 0.11 K/UL (ref 0–0.51)
EOSINOPHIL NFR BLD: 1.6 % (ref 0–6.9)
ERYTHROCYTE [DISTWIDTH] IN BLOOD BY AUTOMATED COUNT: 41.1 FL (ref 35.9–50)
GFR SERPL CREATININE-BSD FRML MDRD: >60 ML/MIN/1.73 M 2
GLOBULIN SER CALC-MCNC: 3.1 G/DL (ref 1.9–3.5)
GLUCOSE SERPL-MCNC: 173 MG/DL (ref 65–99)
GLUCOSE UR STRIP.AUTO-MCNC: 300 MG/DL
HCT VFR BLD AUTO: 42.3 % (ref 42–52)
HGB BLD-MCNC: 14.8 G/DL (ref 14–18)
IMM GRANULOCYTES # BLD AUTO: 0.14 K/UL (ref 0–0.11)
IMM GRANULOCYTES NFR BLD AUTO: 2 % (ref 0–0.9)
KETONES UR STRIP.AUTO-MCNC: NEGATIVE MG/DL
LEUKOCYTE ESTERASE UR QL STRIP.AUTO: NEGATIVE
LYMPHOCYTES # BLD AUTO: 2.29 K/UL (ref 1–4.8)
LYMPHOCYTES NFR BLD: 32.4 % (ref 22–41)
MAGNESIUM SERPL-MCNC: 1.8 MG/DL (ref 1.5–2.5)
MCH RBC QN AUTO: 30.5 PG (ref 27–33)
MCHC RBC AUTO-ENTMCNC: 35 G/DL (ref 33.7–35.3)
MCV RBC AUTO: 87 FL (ref 81.4–97.8)
MICRO URNS: ABNORMAL
MONOCYTES # BLD AUTO: 0.31 K/UL (ref 0–0.85)
MONOCYTES NFR BLD AUTO: 4.4 % (ref 0–13.4)
NEUTROPHILS # BLD AUTO: 4.17 K/UL (ref 1.82–7.42)
NEUTROPHILS NFR BLD: 58.9 % (ref 44–72)
NITRITE UR QL STRIP.AUTO: NEGATIVE
NRBC # BLD AUTO: 0 K/UL
NRBC BLD AUTO-RTO: 0 /100 WBC
PH UR STRIP.AUTO: 5.5 [PH]
PHOSPHATE SERPL-MCNC: 2.9 MG/DL (ref 2.5–4.5)
PLATELET # BLD AUTO: 282 K/UL (ref 164–446)
PMV BLD AUTO: 9.6 FL (ref 9–12.9)
POTASSIUM SERPL-SCNC: 3.9 MMOL/L (ref 3.6–5.5)
PROT SERPL-MCNC: 7.9 G/DL (ref 6–8.2)
PROT UR QL STRIP: NEGATIVE MG/DL
PTH-INTACT SERPL-MCNC: 36.4 PG/ML (ref 14–72)
RBC # BLD AUTO: 4.86 M/UL (ref 4.7–6.1)
RBC UR QL AUTO: NEGATIVE
SODIUM SERPL-SCNC: 136 MMOL/L (ref 135–145)
SP GR UR STRIP.AUTO: 1.02
WBC # BLD AUTO: 7.1 K/UL (ref 4.8–10.8)

## 2017-04-25 PROCEDURE — 81003 URINALYSIS AUTO W/O SCOPE: CPT

## 2017-04-25 PROCEDURE — 87040 BLOOD CULTURE FOR BACTERIA: CPT

## 2017-04-25 PROCEDURE — 88185 FLOWCYTOMETRY/TC ADD-ON: CPT | Mod: 91

## 2017-04-25 PROCEDURE — 83735 ASSAY OF MAGNESIUM: CPT

## 2017-04-25 PROCEDURE — 88184 FLOWCYTOMETRY/ TC 1 MARKER: CPT

## 2017-04-25 PROCEDURE — A6402 STERILE GAUZE <= 16 SQ IN: HCPCS

## 2017-04-25 PROCEDURE — 84100 ASSAY OF PHOSPHORUS: CPT

## 2017-04-25 PROCEDURE — 97602 WOUND(S) CARE NON-SELECTIVE: CPT

## 2017-04-25 PROCEDURE — 97605 NEG PRS WND THER DME<=50SQCM: CPT

## 2017-04-25 PROCEDURE — 36415 COLL VENOUS BLD VENIPUNCTURE: CPT

## 2017-04-25 PROCEDURE — 80053 COMPREHEN METABOLIC PANEL: CPT

## 2017-04-25 PROCEDURE — 83970 ASSAY OF PARATHORMONE: CPT

## 2017-04-25 PROCEDURE — 85025 COMPLETE CBC W/AUTO DIFF WBC: CPT

## 2017-04-25 NOTE — WOUND TEAM
Advanced Wound Care  [facility]  [address]  [address]  [place] [address]  [phone] Fax: [phone]    Encounter Note  For Certification Period: 4/11/17-5/11/17      Referring Physician: Adrián Santos MD  Primary Physician: Rodolfo Stein MD, YAIR Mohan       Consulting Physicians: YAIR Rubin    Wound(s): Umbilical wound    Start of Care:    4/11/17  Subjective:        HPI: Pt presents to clinic with umbilical wound after discharge from [facility].  He had his original hernia repair 11/6/2016.  He then had a mesh removal and I&D 3/10/17 with another I&D 4/7/17.  Pt states that he has IDDM and has had 5 episodes of DKA since January 2017.  He reports FBS today at 137.  He is on IV ABX (infusion center visits start today 4/11/17).              Pain: Tenderness with removing vac and cleansing wound bed.        Past Medical History: Reviewed 4/13/17    Current Medications: daily antibx infusions. Pt states that he is being followed by ID and is on IV ABX.    Allergies: Peanut-derived; Tradjenta; and Hydrocodone    Social History:    [employer] RN      Objective:    Tests and Measures: Cx positive from 4/7/17.  Pt is followed by ID and is on IV ABX    Orthotic, protective, supportive devices: none     Wound Characteristics                                                    Location: midline abdomen   Initial Evaluation  Date: 4/11/17 Encounter  4/15/17 Encounter Date:  4/22/17 4/25/2017   Tissue Type and %: 100% marbled pink viable, adherent yellow, and % pink, viable. 50% pink viable, 25% white necrotic, 25% red granulation 95% pink, 5% yellow marbeling,    Periwound: intact intact Intact intact   Drainage: Large thin yellow Scant ss Min ss Min ss   Exposed structures Sutures suture Blue suture Blue suture   Wound Edges:   Open Open Open intact   Odor: None None None none   S&S of Infection:   drainage None None none   Edema: Local None None none   Sensation: intact intact intact  intact              Measurements:  Midline abdomen Initial Evaluation  Date: 17 Encounter Date:  17   Length (cm) 1.6 1.0   Width (cm) 4.1 3.5   Depth (cm) 3 1.0   Area (cm2) 6.56 3.05cm2   Tract/undermine 12 o'clock   0.6cm   3 o'clock    1cm   6 o'clock   0 cm   9 o'clock   0.5 cm none        Procedures: 2 black piece removed from wound bed and one button. Wound bed inspected with flashlight and cotton tipped applicator, no foam retained   Debridement: Non selective with NS, gauze and cotton tipped applicator to remove biofilm.   Cleansed with:   NS, gauze, cotton tipped applicator                                                                       Periwound protected with: Benzoin, drape tape   Primary dressin piece black foam to wound bed, one button for trac pad (2 pieces)               Secondary Dressing: Drape tape   Other: Initiated NPWT @ 125mmHg, continuous, no leaks noted     Patient Education: Educated pt on wound progress, on rationale for CSWD avoiding suture. Pt verbalized understanding. Pt knowledgeable on trouble shooting vac, wound treatment, s/s infection, when to see medical care.     Professional Collaboration: None today      Assessment:      Wound etiology: surgical     Wound Progress: Granulating    Rationale for Treatment: NPWT to facilitate tissue granulation and wound healing, manage exudate, minimize risk for infection, expedite wound closure    Patient tolerance/compliance: pt motivated to heal wound and would like to have it healed by the fall when he goes on his honeymoon    Complicating factors: IDDM, infection (pt on IV ABX)    Need for ongoing Advanced Wound Care services: Patient requires skilled therapeutic wound care services for product selection, application of product, debridement, close monitoring with clinical assessment, compression for expedite of wound healing.        Plan:      Treatment Plan and Recommendations:  Diagnosis/ICD9: open surgical abdominal  wound    Procedures/CPT:NPWT, selective debridement    Frequency:3x/week VAC/NPWT       Treatment Goals: STG 2 Weeks  LTG 4 Weeks   Granulation Tissue: 75% 100%   Decrease Necrotic Tissue to: 50% 0%   Wound Phase:  inflammatory proliferative   Decrease Size by: 25% 50%   Periwound:  intact intact   Decrease tracts/undermining by: 50% 75%   Decrease Pain:  2 none                        At the time of each visit a thorough assessment of the patient is completed to assure the  appropriateness of our plan of care.  The dressings or modalities may need to be adapted   from the original plan to address any significant changes in the wound environment.

## 2017-04-26 LAB
ACUTE LEUKEMIA MARKERS SPEC-IMP: NORMAL
CD10 CELLS NFR SPEC: 1 %
CD11B CELLS NFR SPEC: 5 %
CD13 CELLS NFR SPEC: 4 %
CD16 CELLS NFR SPEC: 19 %
CD19 CELLS NFR SPEC: 15 %
CD2 CELLS NFR SPEC: 79 %
CD20 CELLS NFR SPEC: 15 %
CD3 CELLS NFR SPEC: 72 %
CD33 CELLS NFR SPEC: 5 %
CD34 CELLS NFR SPEC: <1 %
CD4 LEUKLYMP PHENO Q5783: 42 %
CD45 CELLS NFR SPEC: NORMAL %
CD5 CELLS NFR SPEC: 73 %
CD56 CELLS NFR SPEC: 19 %
CD64 CELLS NFR SPEC: 3 %
CD7 CELLS NFR SPEC: 71 %
CD8 LEUKLYMP PHENO Q5786: 24 %
EVENTS COUNTED SPEC: 19 MARKERS
LYMPHS KAPPA/LYMPH NFR SPEC: 10 %
LYMPHS LAMBDA/LYMPH NFR SPEC: 5 %

## 2017-04-27 ENCOUNTER — NON-PROVIDER VISIT (OUTPATIENT)
Dept: WOUND CARE | Facility: MEDICAL CENTER | Age: 52
End: 2017-04-27
Attending: HOSPITALIST
Payer: COMMERCIAL

## 2017-04-27 PROCEDURE — A6402 STERILE GAUZE <= 16 SQ IN: HCPCS

## 2017-04-27 PROCEDURE — 97605 NEG PRS WND THER DME<=50SQCM: CPT

## 2017-04-27 NOTE — WOUND TEAM
"Advanced Wound Care  [facility]  [address]  [address]  [place] [address]  [phone] Fax: [phone]    Encounter Note  For Certification Period: 4/11/17-5/11/17      Referring Physician: Adrián Santos MD  Primary Physician: Rodolfo Setin MD, YAIR Mohan       Consulting Physicians: YAIR Rubin    Wound(s): Umbilical wound    Start of Care:    4/11/17  Subjective: \"My blood sugars are out of control\"       HPI: Pt presents to clinic with umbilical wound after discharge from [facility].  He had his original hernia repair 11/6/2016.  He then had a mesh removal and I&D 3/10/17 with another I&D 4/7/17.  Pt states that he has IDDM and has had 5 episodes of DKA since January 2017.  He reports FBS today at 137.  He is on IV ABX (infusion center visits start today 4/11/17).              Pain: Tender to wound contact @9:00       Past Medical History: Reviewed 4/13/17    Current Medications: daily antibx infusions. Pt states that he is being followed by ID and is on IV ABX.    Allergies: Peanut-derived; Tradjenta; and Hydrocodone    Social History:    [employer] RN      Objective:    Tests and Measures: Cx positive from 4/7/17.  Pt is followed by ID and is on IV ABX    Orthotic, protective, supportive devices: none     Wound Characteristics                                                    Location: midline abdomen   Initial Evaluation  Date: 4/11/17 Encounter  4/15/17 Encounter Date:  4/22/17 04/27/17   Tissue Type and %: 100% marbled pink viable, adherent yellow, and % pink, viable. 50% pink viable, 25% white necrotic, 25% red granulation 80% pink, 15% white  5% yellow adhered    Periwound: intact intact Intact intact   Drainage: Large thin yellow Scant ss Min ss Min ss   Exposed structures Sutures suture Blue suture Blue suture  @9:00   Wound Edges:   Open Open Open intact   Odor: None None None none   S&S of Infection:   drainage None None none   Edema: Local None None none "   Sensation: intact intact intact intact              Measurements:  Midline abdomen Initial Evaluation  Date: 17 Encounter Date:  17   Length (cm) 1.6 1.0   Width (cm) 4.1 3.5   Depth (cm) 3 1.0   Area (cm2) 6.56 3.05cm2   Tract/undermine 12 o'clock   0.6cm   3 o'clock    1cm   6 o'clock   0 cm   9 o'clock   0.5 cm none        Procedures: 2 black piece removed from wound bed and one button. Wound bed inspected with flashlight and cotton tipped applicator, no foam retained   Debridement: Non selective with NS, gauze and cotton tipped applicator to remove biofilm.   Cleansed with:   NS, gauze, cotton tipped applicator                                                                       Periwound protected with: Benzoin, drape tape   Primary dressin piece black foam to wound bed, one button for trac pad (2 pieces)               Secondary Dressing: Drape tape   Other: Initiated NPWT @ 125mmHg, continuous, no leaks noted     Patient Education: Pt knowlegable re BS, contacting MD, will go to ED if sx do not resolve or he is unable to reach MD.    Professional Collaboration: None today      Assessment:      Wound etiology: surgical     Wound Progress: Granulating    Rationale for Treatment: NPWT to facilitate tissue granulation and wound healing, manage exudate, minimize risk for infection, expedite wound closure    Patient tolerance/compliance: pt motivated to heal wound and would like to have it healed by the fall when he goes on his honeymoon    Complicating factors: IDDM, infection (pt on IV ABX)    Need for ongoing Advanced Wound Care services: Patient requires skilled therapeutic wound care services for product selection, application of product, debridement, close monitoring with clinical assessment, compression for expedite of wound healing.        Plan:      Treatment Plan and Recommendations:  Diagnosis/ICD9: open surgical abdominal wound    Procedures/CPT:NPWT, selective  debridement    Frequency:3x/week VAC/NPWT       Treatment Goals: STG 2 Weeks  LTG 4 Weeks   Granulation Tissue: 75% 100%   Decrease Necrotic Tissue to: 50% 0%   Wound Phase:  inflammatory proliferative   Decrease Size by: 25% 50%   Periwound:  intact intact   Decrease tracts/undermining by: 50% 75%   Decrease Pain:  2 none                        At the time of each visit a thorough assessment of the patient is completed to assure the  appropriateness of our plan of care.  The dressings or modalities may need to be adapted   from the original plan to address any significant changes in the wound environment.

## 2017-04-29 ENCOUNTER — APPOINTMENT (OUTPATIENT)
Dept: RADIOLOGY | Facility: MEDICAL CENTER | Age: 52
DRG: 856 | End: 2017-04-29
Attending: EMERGENCY MEDICINE
Payer: COMMERCIAL

## 2017-04-29 ENCOUNTER — RESOLUTE PROFESSIONAL BILLING HOSPITAL PROF FEE (OUTPATIENT)
Dept: HOSPITALIST | Facility: MEDICAL CENTER | Age: 52
End: 2017-04-29
Payer: COMMERCIAL

## 2017-04-29 ENCOUNTER — APPOINTMENT (OUTPATIENT)
Dept: RADIOLOGY | Facility: MEDICAL CENTER | Age: 52
DRG: 856 | End: 2017-04-29
Attending: ANESTHESIOLOGY
Payer: COMMERCIAL

## 2017-04-29 ENCOUNTER — HOSPITAL ENCOUNTER (INPATIENT)
Facility: MEDICAL CENTER | Age: 52
LOS: 9 days | DRG: 856 | End: 2017-05-08
Attending: EMERGENCY MEDICINE | Admitting: HOSPITALIST
Payer: COMMERCIAL

## 2017-04-29 PROBLEM — A41.9 SEVERE SEPSIS (HCC): Status: ACTIVE | Noted: 2017-04-29

## 2017-04-29 PROBLEM — R65.20 SEVERE SEPSIS (HCC): Status: ACTIVE | Noted: 2017-04-29

## 2017-04-29 LAB
ALBUMIN SERPL BCP-MCNC: 4.3 G/DL (ref 3.2–4.9)
ALBUMIN/GLOB SERPL: 1.2 G/DL
ALP SERPL-CCNC: 151 U/L (ref 30–99)
ALT SERPL-CCNC: 80 U/L (ref 2–50)
ANION GAP SERPL CALC-SCNC: 15 MMOL/L (ref 0–11.9)
ANISOCYTOSIS BLD QL SMEAR: ABNORMAL
APPEARANCE UR: CLEAR
AST SERPL-CCNC: 35 U/L (ref 12–45)
BASOPHILS # BLD AUTO: 0 % (ref 0–1.8)
BASOPHILS # BLD: 0 K/UL (ref 0–0.12)
BILIRUB SERPL-MCNC: 1.2 MG/DL (ref 0.1–1.5)
BILIRUB UR QL STRIP.AUTO: NEGATIVE
BUN SERPL-MCNC: 23 MG/DL (ref 8–22)
CALCIUM SERPL-MCNC: 9.2 MG/DL (ref 8.5–10.5)
CHLORIDE SERPL-SCNC: 90 MMOL/L (ref 96–112)
CO2 SERPL-SCNC: 22 MMOL/L (ref 20–33)
COLOR UR: ABNORMAL
CREAT SERPL-MCNC: 1.11 MG/DL (ref 0.5–1.4)
EOSINOPHIL # BLD AUTO: 0 K/UL (ref 0–0.51)
EOSINOPHIL NFR BLD: 0 % (ref 0–6.9)
ERYTHROCYTE [DISTWIDTH] IN BLOOD BY AUTOMATED COUNT: 40 FL (ref 35.9–50)
GFR SERPL CREATININE-BSD FRML MDRD: >60 ML/MIN/1.73 M 2
GLOBULIN SER CALC-MCNC: 3.5 G/DL (ref 1.9–3.5)
GLUCOSE BLD-MCNC: 259 MG/DL (ref 65–99)
GLUCOSE SERPL-MCNC: 390 MG/DL (ref 65–99)
GLUCOSE UR STRIP.AUTO-MCNC: >1000 MG/DL
GRAM STN SPEC: NORMAL
GRAM STN SPEC: NORMAL
HCT VFR BLD AUTO: 36 % (ref 42–52)
HGB BLD-MCNC: 12.6 G/DL (ref 14–18)
KETONES UR STRIP.AUTO-MCNC: 20 MG/DL
LACTATE BLD-SCNC: 1.3 MMOL/L (ref 0.5–2)
LACTATE BLD-SCNC: 1.5 MMOL/L (ref 0.5–2)
LACTATE BLD-SCNC: 2 MMOL/L (ref 0.5–2)
LACTATE BLD-SCNC: 3.6 MMOL/L (ref 0.5–2)
LEUKOCYTE ESTERASE UR QL STRIP.AUTO: NEGATIVE
LYMPHOCYTES # BLD AUTO: 0.43 K/UL (ref 1–4.8)
LYMPHOCYTES NFR BLD: 2.6 % (ref 22–41)
MAGNESIUM SERPL-MCNC: 2 MG/DL (ref 1.5–2.5)
MANUAL DIFF BLD: NORMAL
MCH RBC QN AUTO: 29.9 PG (ref 27–33)
MCHC RBC AUTO-ENTMCNC: 35 G/DL (ref 33.7–35.3)
MCV RBC AUTO: 85.3 FL (ref 81.4–97.8)
MICRO URNS: ABNORMAL
MONOCYTES # BLD AUTO: 0 K/UL (ref 0–0.85)
MONOCYTES NFR BLD AUTO: 0 % (ref 0–13.4)
MORPHOLOGY BLD-IMP: NORMAL
NEUTROPHILS # BLD AUTO: 16.27 K/UL (ref 1.82–7.42)
NEUTROPHILS NFR BLD: 88.6 % (ref 44–72)
NEUTS BAND NFR BLD MANUAL: 8.8 % (ref 0–10)
NITRITE UR QL STRIP.AUTO: NEGATIVE
NRBC # BLD AUTO: 0 K/UL
NRBC BLD AUTO-RTO: 0 /100 WBC
PH UR STRIP.AUTO: 6.5 [PH]
PHOSPHATE SERPL-MCNC: <1 MG/DL (ref 2.5–4.5)
PLATELET # BLD AUTO: 192 K/UL (ref 164–446)
PLATELET BLD QL SMEAR: NORMAL
PMV BLD AUTO: 9.6 FL (ref 9–12.9)
POTASSIUM SERPL-SCNC: 3.9 MMOL/L (ref 3.6–5.5)
PROT SERPL-MCNC: 7.8 G/DL (ref 6–8.2)
PROT UR QL STRIP: NEGATIVE MG/DL
RBC # BLD AUTO: 4.22 M/UL (ref 4.7–6.1)
RBC BLD AUTO: PRESENT
RBC UR QL AUTO: ABNORMAL
SIGNIFICANT IND 70042: NORMAL
SITE SITE: NORMAL
SODIUM SERPL-SCNC: 127 MMOL/L (ref 135–145)
SOURCE SOURCE: NORMAL
SP GR UR STRIP.AUTO: 1.02
WBC # BLD AUTO: 16.7 K/UL (ref 4.8–10.8)
WBC #/AREA URNS HPF: ABNORMAL /HPF

## 2017-04-29 PROCEDURE — 96361 HYDRATE IV INFUSION ADD-ON: CPT

## 2017-04-29 PROCEDURE — 71010 DX-CHEST-LIMITED (1 VIEW): CPT

## 2017-04-29 PROCEDURE — 500054 HCHG BANDAGE, ELASTIC 6: Performed by: SURGERY

## 2017-04-29 PROCEDURE — 110371 HCHG SHELL REV 272: Performed by: SURGERY

## 2017-04-29 PROCEDURE — 700101 HCHG RX REV CODE 250: Performed by: HOSPITALIST

## 2017-04-29 PROCEDURE — 700111 HCHG RX REV CODE 636 W/ 250 OVERRIDE (IP)

## 2017-04-29 PROCEDURE — 99291 CRITICAL CARE FIRST HOUR: CPT | Performed by: HOSPITALIST

## 2017-04-29 PROCEDURE — 73701 CT LOWER EXTREMITY W/DYE: CPT | Mod: RT

## 2017-04-29 PROCEDURE — 502240 HCHG MISC OR SUPPLY RC 0272: Performed by: SURGERY

## 2017-04-29 PROCEDURE — 84100 ASSAY OF PHOSPHORUS: CPT

## 2017-04-29 PROCEDURE — 36415 COLL VENOUS BLD VENIPUNCTURE: CPT

## 2017-04-29 PROCEDURE — 87040 BLOOD CULTURE FOR BACTERIA: CPT | Mod: 91

## 2017-04-29 PROCEDURE — 700111 HCHG RX REV CODE 636 W/ 250 OVERRIDE (IP): Performed by: HOSPITALIST

## 2017-04-29 PROCEDURE — 87102 FUNGUS ISOLATION CULTURE: CPT

## 2017-04-29 PROCEDURE — A6403 STERILE GAUZE>16 <= 48 SQ IN: HCPCS | Performed by: SURGERY

## 2017-04-29 PROCEDURE — 83735 ASSAY OF MAGNESIUM: CPT

## 2017-04-29 PROCEDURE — 0J9L0ZZ DRAINAGE OF RIGHT UPPER LEG SUBCUTANEOUS TISSUE AND FASCIA, OPEN APPROACH: ICD-10-PCS | Performed by: SURGERY

## 2017-04-29 PROCEDURE — 99291 CRITICAL CARE FIRST HOUR: CPT

## 2017-04-29 PROCEDURE — 501487 HCHG STRYKER TIP: Performed by: SURGERY

## 2017-04-29 PROCEDURE — 501838 HCHG SUTURE GENERAL: Performed by: SURGERY

## 2017-04-29 PROCEDURE — 160035 HCHG PACU - 1ST 60 MINS PHASE I: Performed by: SURGERY

## 2017-04-29 PROCEDURE — 770022 HCHG ROOM/CARE - ICU (200)

## 2017-04-29 PROCEDURE — 501486 HCHG STRYKER IRRIG SET HC W/TUBING: Performed by: SURGERY

## 2017-04-29 PROCEDURE — 500881 HCHG PACK, EXTREMITY: Performed by: SURGERY

## 2017-04-29 PROCEDURE — 160002 HCHG RECOVERY MINUTES (STAT): Performed by: SURGERY

## 2017-04-29 PROCEDURE — 87075 CULTR BACTERIA EXCEPT BLOOD: CPT

## 2017-04-29 PROCEDURE — 85027 COMPLETE CBC AUTOMATED: CPT

## 2017-04-29 PROCEDURE — 87106 FUNGI IDENTIFICATION YEAST: CPT

## 2017-04-29 PROCEDURE — 160028 HCHG SURGERY MINUTES - 1ST 30 MINS LEVEL 3: Performed by: SURGERY

## 2017-04-29 PROCEDURE — 160039 HCHG SURGERY MINUTES - EA ADDL 1 MIN LEVEL 3: Performed by: SURGERY

## 2017-04-29 PROCEDURE — 500440 HCHG DRESSING, STERILE ROLL (KERLIX): Performed by: SURGERY

## 2017-04-29 PROCEDURE — 501402 HCHG SPLASH GUARD, FOR PULSEVAC: Performed by: SURGERY

## 2017-04-29 PROCEDURE — 85007 BL SMEAR W/DIFF WBC COUNT: CPT

## 2017-04-29 PROCEDURE — 302132 K THERMIA MOTOR: Performed by: HOSPITALIST

## 2017-04-29 PROCEDURE — 87077 CULTURE AEROBIC IDENTIFY: CPT | Mod: 91

## 2017-04-29 PROCEDURE — 160009 HCHG ANES TIME/MIN: Performed by: SURGERY

## 2017-04-29 PROCEDURE — 110382 HCHG SHELL REV 271: Performed by: SURGERY

## 2017-04-29 PROCEDURE — 0KBQ0ZZ EXCISION OF RIGHT UPPER LEG MUSCLE, OPEN APPROACH: ICD-10-PCS | Performed by: SURGERY

## 2017-04-29 PROCEDURE — 83605 ASSAY OF LACTIC ACID: CPT

## 2017-04-29 PROCEDURE — 302154 K THERMIA BLANKET 24X60: Performed by: HOSPITALIST

## 2017-04-29 PROCEDURE — 87070 CULTURE OTHR SPECIMN AEROBIC: CPT

## 2017-04-29 PROCEDURE — 80053 COMPREHEN METABOLIC PANEL: CPT

## 2017-04-29 PROCEDURE — 700111 HCHG RX REV CODE 636 W/ 250 OVERRIDE (IP): Performed by: PHARMACIST

## 2017-04-29 PROCEDURE — 81001 URINALYSIS AUTO W/SCOPE: CPT

## 2017-04-29 PROCEDURE — 87205 SMEAR GRAM STAIN: CPT

## 2017-04-29 PROCEDURE — 96366 THER/PROPH/DIAG IV INF ADDON: CPT

## 2017-04-29 PROCEDURE — 700117 HCHG RX CONTRAST REV CODE 255: Performed by: EMERGENCY MEDICINE

## 2017-04-29 PROCEDURE — 700105 HCHG RX REV CODE 258: Performed by: EMERGENCY MEDICINE

## 2017-04-29 PROCEDURE — 160048 HCHG OR STATISTICAL LEVEL 1-5: Performed by: SURGERY

## 2017-04-29 PROCEDURE — 302128 INFUSION PUMP: Performed by: EMERGENCY MEDICINE

## 2017-04-29 PROCEDURE — 88304 TISSUE EXAM BY PATHOLOGIST: CPT

## 2017-04-29 PROCEDURE — 500888 HCHG PACK, MINOR BASIN: Performed by: SURGERY

## 2017-04-29 PROCEDURE — 700105 HCHG RX REV CODE 258: Performed by: HOSPITALIST

## 2017-04-29 PROCEDURE — 96367 TX/PROPH/DG ADDL SEQ IV INF: CPT

## 2017-04-29 PROCEDURE — 96375 TX/PRO/DX INJ NEW DRUG ADDON: CPT

## 2017-04-29 PROCEDURE — 82962 GLUCOSE BLOOD TEST: CPT

## 2017-04-29 PROCEDURE — 700105 HCHG RX REV CODE 258: Performed by: PHARMACIST

## 2017-04-29 PROCEDURE — 87086 URINE CULTURE/COLONY COUNT: CPT

## 2017-04-29 PROCEDURE — 87186 SC STD MICRODIL/AGAR DIL: CPT | Mod: 91

## 2017-04-29 PROCEDURE — 96376 TX/PRO/DX INJ SAME DRUG ADON: CPT

## 2017-04-29 PROCEDURE — 700111 HCHG RX REV CODE 636 W/ 250 OVERRIDE (IP): Performed by: EMERGENCY MEDICINE

## 2017-04-29 PROCEDURE — 700101 HCHG RX REV CODE 250

## 2017-04-29 PROCEDURE — 3E043XZ INTRODUCTION OF VASOPRESSOR INTO CENTRAL VEIN, PERCUTANEOUS APPROACH: ICD-10-PCS | Performed by: HOSPITALIST

## 2017-04-29 PROCEDURE — A4606 OXYGEN PROBE USED W OXIMETER: HCPCS | Performed by: SURGERY

## 2017-04-29 PROCEDURE — 96365 THER/PROPH/DIAG IV INF INIT: CPT

## 2017-04-29 RX ORDER — ONDANSETRON 2 MG/ML
8 INJECTION INTRAMUSCULAR; INTRAVENOUS ONCE
Status: DISCONTINUED | OUTPATIENT
Start: 2017-04-29 | End: 2017-04-29

## 2017-04-29 RX ORDER — ONDANSETRON 2 MG/ML
4 INJECTION INTRAMUSCULAR; INTRAVENOUS EVERY 4 HOURS PRN
Status: DISCONTINUED | OUTPATIENT
Start: 2017-04-29 | End: 2017-05-08 | Stop reason: HOSPADM

## 2017-04-29 RX ORDER — ONDANSETRON 4 MG/1
4 TABLET, ORALLY DISINTEGRATING ORAL EVERY 4 HOURS PRN
Status: DISCONTINUED | OUTPATIENT
Start: 2017-04-29 | End: 2017-05-08 | Stop reason: HOSPADM

## 2017-04-29 RX ORDER — POLYETHYLENE GLYCOL 3350 17 G/17G
1 POWDER, FOR SOLUTION ORAL
Status: DISCONTINUED | OUTPATIENT
Start: 2017-04-29 | End: 2017-05-08 | Stop reason: HOSPADM

## 2017-04-29 RX ORDER — SODIUM CHLORIDE AND POTASSIUM CHLORIDE 150; 900 MG/100ML; MG/100ML
INJECTION, SOLUTION INTRAVENOUS CONTINUOUS
Status: DISCONTINUED | OUTPATIENT
Start: 2017-04-29 | End: 2017-05-01

## 2017-04-29 RX ORDER — PROMETHAZINE HYDROCHLORIDE 25 MG/1
12.5-25 TABLET ORAL EVERY 4 HOURS PRN
Status: DISCONTINUED | OUTPATIENT
Start: 2017-04-29 | End: 2017-05-08 | Stop reason: HOSPADM

## 2017-04-29 RX ORDER — ACETAMINOPHEN 325 MG/1
650 TABLET ORAL ONCE
COMMUNITY
End: 2017-05-09

## 2017-04-29 RX ORDER — ACETAMINOPHEN 325 MG/1
650 TABLET ORAL EVERY 6 HOURS PRN
Status: DISCONTINUED | OUTPATIENT
Start: 2017-04-29 | End: 2017-05-01

## 2017-04-29 RX ORDER — AMPICILLIN AND SULBACTAM 2; 1 G/1; G/1
3 INJECTION, POWDER, FOR SOLUTION INTRAMUSCULAR; INTRAVENOUS ONCE
Status: COMPLETED | OUTPATIENT
Start: 2017-04-29 | End: 2017-04-29

## 2017-04-29 RX ORDER — SODIUM CHLORIDE 9 MG/ML
30 INJECTION, SOLUTION INTRAVENOUS ONCE
Status: COMPLETED | OUTPATIENT
Start: 2017-04-29 | End: 2017-04-29

## 2017-04-29 RX ORDER — ONDANSETRON 4 MG/1
8 TABLET, ORALLY DISINTEGRATING ORAL ONCE
Status: COMPLETED | OUTPATIENT
Start: 2017-04-29 | End: 2017-04-29

## 2017-04-29 RX ORDER — THYROID 30 MG/1
75 TABLET ORAL EVERY EVENING
Status: DISCONTINUED | OUTPATIENT
Start: 2017-04-29 | End: 2017-04-29

## 2017-04-29 RX ORDER — HYDROMORPHONE HYDROCHLORIDE 2 MG/ML
INJECTION, SOLUTION INTRAMUSCULAR; INTRAVENOUS; SUBCUTANEOUS
Status: COMPLETED
Start: 2017-04-29 | End: 2017-04-29

## 2017-04-29 RX ORDER — ONDANSETRON 2 MG/ML
INJECTION INTRAMUSCULAR; INTRAVENOUS
Status: COMPLETED
Start: 2017-04-29 | End: 2017-04-29

## 2017-04-29 RX ORDER — BISACODYL 10 MG
10 SUPPOSITORY, RECTAL RECTAL
Status: DISCONTINUED | OUTPATIENT
Start: 2017-04-29 | End: 2017-05-08 | Stop reason: HOSPADM

## 2017-04-29 RX ORDER — SODIUM CHLORIDE 9 MG/ML
30 INJECTION, SOLUTION INTRAVENOUS
Status: DISCONTINUED | OUTPATIENT
Start: 2017-04-29 | End: 2017-04-29

## 2017-04-29 RX ORDER — SODIUM CHLORIDE 9 MG/ML
1000 INJECTION, SOLUTION INTRAVENOUS
Status: DISCONTINUED | OUTPATIENT
Start: 2017-04-29 | End: 2017-05-01

## 2017-04-29 RX ORDER — AMOXICILLIN 250 MG
2 CAPSULE ORAL 2 TIMES DAILY
Status: DISCONTINUED | OUTPATIENT
Start: 2017-04-29 | End: 2017-05-08 | Stop reason: HOSPADM

## 2017-04-29 RX ORDER — PROMETHAZINE HYDROCHLORIDE 25 MG/1
12.5-25 SUPPOSITORY RECTAL EVERY 4 HOURS PRN
Status: DISCONTINUED | OUTPATIENT
Start: 2017-04-29 | End: 2017-05-08 | Stop reason: HOSPADM

## 2017-04-29 RX ORDER — THYROID 30 MG/1
75 TABLET ORAL
Status: DISCONTINUED | OUTPATIENT
Start: 2017-04-30 | End: 2017-05-08 | Stop reason: HOSPADM

## 2017-04-29 RX ADMIN — SODIUM CHLORIDE 3063 ML: 9 INJECTION, SOLUTION INTRAVENOUS at 11:07

## 2017-04-29 RX ADMIN — ALBUTEROL SULFATE 5 MG: 2.5 SOLUTION RESPIRATORY (INHALATION) at 19:33

## 2017-04-29 RX ADMIN — Medication 0.75 MG/HR: at 23:23

## 2017-04-29 RX ADMIN — HYDROMORPHONE HYDROCHLORIDE 0.2 MG: 2 INJECTION INTRAMUSCULAR; INTRAVENOUS; SUBCUTANEOUS at 20:00

## 2017-04-29 RX ADMIN — HYDROMORPHONE HYDROCHLORIDE 0.5 MG: 2 INJECTION INTRAMUSCULAR; INTRAVENOUS; SUBCUTANEOUS at 20:30

## 2017-04-29 RX ADMIN — PIPERACILLIN SODIUM AND TAZOBACTAM SODIUM 4.5 G: 4; .5 INJECTION, POWDER, FOR SOLUTION INTRAVENOUS at 17:07

## 2017-04-29 RX ADMIN — HYDROMORPHONE HYDROCHLORIDE 0.2 MG: 2 INJECTION INTRAMUSCULAR; INTRAVENOUS; SUBCUTANEOUS at 20:15

## 2017-04-29 RX ADMIN — Medication: at 16:48

## 2017-04-29 RX ADMIN — VANCOMYCIN HYDROCHLORIDE 2600 MG: 100 INJECTION, POWDER, LYOPHILIZED, FOR SOLUTION INTRAVENOUS at 13:38

## 2017-04-29 RX ADMIN — ONDANSETRON 8 MG: 4 TABLET, ORALLY DISINTEGRATING ORAL at 11:00

## 2017-04-29 RX ADMIN — ONDANSETRON: 2 INJECTION, SOLUTION INTRAMUSCULAR; INTRAVENOUS at 18:00

## 2017-04-29 RX ADMIN — HYDROMORPHONE HYDROCHLORIDE 0.4 MG: 2 INJECTION INTRAMUSCULAR; INTRAVENOUS; SUBCUTANEOUS at 20:05

## 2017-04-29 RX ADMIN — HYDROMORPHONE HYDROCHLORIDE 1 MG: 1 INJECTION, SOLUTION INTRAMUSCULAR; INTRAVENOUS; SUBCUTANEOUS at 11:55

## 2017-04-29 RX ADMIN — PIPERACILLIN SODIUM AND TAZOBACTAM SODIUM 4.5 G: 4; .5 INJECTION, POWDER, FOR SOLUTION INTRAVENOUS at 23:46

## 2017-04-29 RX ADMIN — HYDROMORPHONE HYDROCHLORIDE 1 MG: 1 INJECTION, SOLUTION INTRAMUSCULAR; INTRAVENOUS; SUBCUTANEOUS at 11:05

## 2017-04-29 RX ADMIN — AMPICILLIN SODIUM AND SULBACTAM SODIUM 3 G: 2; 1 INJECTION, POWDER, FOR SOLUTION INTRAMUSCULAR; INTRAVENOUS at 12:59

## 2017-04-29 RX ADMIN — HYDROMORPHONE HYDROCHLORIDE 0.4 MG: 2 INJECTION INTRAMUSCULAR; INTRAVENOUS; SUBCUTANEOUS at 20:10

## 2017-04-29 RX ADMIN — HYDROMORPHONE HYDROCHLORIDE 1 MG: 1 INJECTION, SOLUTION INTRAMUSCULAR; INTRAVENOUS; SUBCUTANEOUS at 13:57

## 2017-04-29 RX ADMIN — IOHEXOL 100 ML: 350 INJECTION, SOLUTION INTRAVENOUS at 12:50

## 2017-04-29 RX ADMIN — POTASSIUM CHLORIDE AND SODIUM CHLORIDE: 900; 150 INJECTION, SOLUTION INTRAVENOUS at 16:48

## 2017-04-29 ASSESSMENT — PAIN SCALES - GENERAL
PAINLEVEL_OUTOF10: ASSUMED PAIN PRESENT
PAINLEVEL_OUTOF10: 8
PAINLEVEL_OUTOF10: 7
PAINLEVEL_OUTOF10: ASSUMED PAIN PRESENT
PAINLEVEL_OUTOF10: ASSUMED PAIN PRESENT
PAINLEVEL_OUTOF10: 5
PAINLEVEL_OUTOF10: 9
PAINLEVEL_OUTOF10: ASSUMED PAIN PRESENT

## 2017-04-29 ASSESSMENT — LIFESTYLE VARIABLES: EVER_SMOKED: NEVER

## 2017-04-29 ASSESSMENT — COPD QUESTIONNAIRES
DURING THE PAST 4 WEEKS HOW MUCH DID YOU FEEL SHORT OF BREATH: NONE/LITTLE OF THE TIME
COPD SCREENING SCORE: 1
HAVE YOU SMOKED AT LEAST 100 CIGARETTES IN YOUR ENTIRE LIFE: NO/DON'T KNOW
DO YOU EVER COUGH UP ANY MUCUS OR PHLEGM?: NO/ONLY WITH OCCASIONAL COLDS OR INFECTIONS

## 2017-04-29 NOTE — IP AVS SNAPSHOT
Kamelio Access Code: [email or web address]  Expires: 5/26/2017  1:24 PM    Kamelio  A secure, online tool to manage your health information     [employer]’s Kamelio® is a secure, online tool that connects you to your personalized health information from the privacy of your home -- day or night - making it very easy for you to manage your healthcare. Once the activation process is completed, you can even access your medical information using the Kamelio princess, which is available for free in the Apple Princess store or Google Play store.     Kamelio provides the following levels of access (as shown below):   My Chart Features   [facility] Primary Care Doctor [facility]  Specialists [facility]  Urgent  Care Non-[facility]  Primary Care  Doctor   Email your healthcare team securely and privately 24/7 X X X X   Manage appointments: schedule your next appointment; view details of past/upcoming appointments X      Request prescription refills. X      View recent personal medical records, including lab and immunizations X X X X   View health record, including health history, allergies, medications X X X X   Read reports about your outpatient visits, procedures, consult and ER notes X X X X   See your discharge summary, which is a recap of your hospital and/or ER visit that includes your diagnosis, lab results, and care plan. X X       How to register for Kamelio:  1. Go to  [email or web address].  2. Click on the Sign Up Now box, which takes you to the New Member Sign Up page. You will need to provide the following information:  a. Enter your Kamelio Access Code exactly as it appears at the top of this page. (You will not need to use this code after you’ve completed the sign-up process. If you do not sign up before the expiration date, you must request a new code.)   b. Enter your date of birth.   c. Enter your home email address.   d. Click Submit, and follow the next screen’s instructions.  3. Create a Kamelio ID. This will be your Kamelio  login ID and cannot be changed, so think of one that is secure and easy to remember.  4. Create a [employer] password. You can change your password at any time.  5. Enter your Password Reset Question and Answer. This can be used at a later time if you forget your password.   6. Enter your e-mail address. This allows you to receive e-mail notifications when new information is available in AlertaPhone.  7. Click Sign Up. You can now view your health information.    For assistance activating your AlertaPhone account, call [phone]

## 2017-04-29 NOTE — PROGRESS NOTES
"Pharmacy Kinetics 51 y.o. male on vancomycin day # 1 2017    Currently on Vancomycin New Start 2600 mg IV x   Other antibiotics: Zosyn 4.5 g IV q6h    Indication for Treatment: SSTI/abscess    Pertinent history per medical record: Admitted on 2017 for evaluation of wound to right thigh and draining wounds to right wrist and wound to L hand. Wound on thigh has needle stick scab and appears to be secondary to testosterone injection per patient.   All wounds are warm to the touch.  Patient also has wound vac to abdomen and had recent hospitalizations secondary to infection of surgical site of hernia revision.  History of wound culture (17) positive for Klebsiella pneumoniae and Enterococcus faecalis (S to vancomycin).    Allergies: Peanut-derived; Tradjenta; and Hydrocodone     List concerns for renal function: elevated BUN/SCr, obese (BMI ~34), hypotensive but responding to fluids     Pertinent cultures to date:    - PBC x 2: in process    - Urine cx: in process     Recent Labs      17   1100   WBC  16.7*   NEUTSPOLYS  88.60*   BANDSSTABS  8.80     Recent Labs      17   1100   BUN  23*   CREATININE  1.11   ALBUMIN  4.3     No results for input(s): VANCOTROUGH, VANCOPEAK, VANCORANDOM in the last 72 hours.  Intake/Output Summary (Last 24 hours) at 17 1648  Last data filed at 17 1600   Gross per 24 hour   Intake   3438 ml   Output      0 ml   Net   3438 ml      Blood pressure 125/68, pulse 111, temperature 39 °C (102.2 °F), resp. rate 33, height 1.74 m (5' 8.5\"), weight 102.059 kg (225 lb), SpO2 99 %. Temp (24hrs), Av.2 °C (100.8 °F), Min:37.4 °C (99.4 °F), Max:39 °C (102.2 °F)      A/P   1. Vancomycin dose change: Pulse dosing   2. Next vancomycin level:  @ 0700  3. Goal trough: 12-16 mcg/mL    4. Comments: Patient to go to OR tonight for I&D of wounds.  Patient received 2600 mg dose of vancomycin at ~1330 today.  Patient has been on vancomycin during previous " admissions but no trough levels at steady state to get idea of clearance.  Concerns for accumulation due to elevated BUN/SCr compared to labs earlier this month and hypotension.  Will order an 18 hour to ensure patient is appropriately clearing vancomycin.  Clinical pharmacist to follow-up with level tomorrow morning and start scheduled dosing as appropriate.     Nettie Charles, PharmD, BCPS

## 2017-04-29 NOTE — PROGRESS NOTES
Dr. Gomez updated on pt temperature and tachycardia, sepsis bolus infusing at this time, IVF infusing, pt assessed, per Dr. Gomez, pt will go to OR at 1730

## 2017-04-29 NOTE — ED NOTES
US guided IV placed by erp. Labs collected and sent. Pt medicated as ordered for pain 8/10 to right leg. Placed on 2l nc for comfort.

## 2017-04-29 NOTE — PROGRESS NOTES
Pt picked up from [facility] by ACLS RN and tech, transported with monitor to S118, attached to S118 carescape, vitals taken (see flowsheets)

## 2017-04-29 NOTE — IP AVS SNAPSHOT
" Home Care Instructions                                                                                                                  Name:Mahesh Bradshaw  Medical Record Number:[id number]  CSN: [id number]    YOB: 1965   Age: 51 y.o.  Sex: male  HT:1.74 m (5' 8.5\") WT: 99.6 kg (219 lb 9.3 oz)          Admit Date: 4/29/2017     Discharge Date:   Today's Date: 5/8/2017  Attending Doctor:  Lala Rendon M.D.                  Allergies:  Peanut-derived; Tradjenta; and Hydrocodone            Discharge Instructions       Discharge Instructions    Discharged to home by car with relative. Discharged via wheelchair, hospital escort: Yes.  Special equipment needed: Walker and Wound VAC    Be sure to schedule a follow-up appointment with your primary care doctor or any specialists as instructed.     Discharge Plan:   Diet Plan: Discussed  Activity Level: Discussed  Confirmed Follow up Appointment: Patient to Call and Schedule Appointment  Confirmed Symptoms Management: Discussed  Medication Reconciliation Updated: Yes  Influenza Vaccine Indication: Not indicated: Previously immunized this influenza season and > 8 years of age    I understand that a diet low in cholesterol, fat, and sodium is recommended for good health. Unless I have been given specific instructions below for another diet, I accept this instruction as my diet prescription.   Other diet: diabetic diet as tolerated    Special Instructions: None    · Is patient discharged on Warfarin / Coumadin?   No     · Is patient Post Blood Transfusion?  No    Depression / Suicide Risk    As you are discharged from this [facility] facility, it is important to learn how to keep safe from harming yourself.    Recognize the warning signs:  · Abrupt changes in personality, positive or negative- including increase in energy   · Giving away possessions  · Change in eating patterns- significant weight changes-  positive or negative  · Change in sleeping " patterns- unable to sleep or sleeping all the time   · Unwillingness or inability to communicate  · Depression  · Unusual sadness, discouragement and loneliness  · Talk of wanting to die  · Neglect of personal appearance   · Rebelliousness- reckless behavior  · Withdrawal from people/activities they love  · Confusion- inability to concentrate     If you or a loved one observes any of these behaviors or has concerns about self-harm, here's what you can do:  · Talk about it- your feelings and reasons for harming yourself  · Remove any means that you might use to hurt yourself (examples: pills, rope, extension cords, firearm)  · Get professional help from the community (Mental Health, Substance Abuse, psychological counseling)  · Do not be alone:Call your Safe Contact- someone whom you trust who will be there for you.  · Call your local CRISIS HOTLINE 679-0353 or 355-794-3938  · Call your local Children's Mobile Crisis Response Team [place] (320) 785-1237 or www.SpiderSuite  · Call the toll free National Suicide Prevention Hotlines   · National Suicide Prevention Lifeline 820-894-WVUS (4519)  · National Hope Line Network 800-SUICIDE (403-3670)        Vacuum-Assisted Closure Therapy Home Guide  Vacuum-assisted closure therapy (VAC therapy) is a device that helps wounds heal. It is used on wounds that cannot be closed with stitches. They often heal slowly. VAC therapy helps the wound stay clean and healthy while its edges slowly grow back together.  VAC therapy uses a bandage (dressing) that is made of foam. It is put inside the wound. Then, a drape is placed over the wound. This drape sticks to your skin (adhesive) to keep air out. A tube is hooked up to a small pump and is attached to the drape. The pump sucks fluid and germs from the wound. It can also decrease any bad smell that comes from the wound.  RISKS AND COMPLICATIONS  VAC therapy is usually safe to use at home. Your skin may get sore from the  adhesive drape. That is the most common problem. However, more serious problems can develop, such as:   · Bleeding. This can happen if the dressing in the wound comes into contact with blood vessels. A little bleeding may occur when the dressing is being changed. This is normal now and then. Major bleeding can happen if a large blood vessel breaks. This is more likely if you are taking blood-thinning medicine. Emergency surgery may be needed.  · Infection. This can happen if the dressing has an air leak that is not repaired within a couple of hours.  · Dehydration. This can happen if the pump sucks out too much body fluid.  DRESSING CHANGES  Your dressing will have to be changed. Sometimes this is needed once a day. Other times, a dressing change must be done 3 times a week. How often you change your dressing will depend on what your wound is like. A trained caregiver will most likely change the dressing. However, a family member or friend may be trained to change the dressing. Below are steps to change a dressing in order to prevent an infection. The steps apply to you or the person that changes your dressing.  2. Wash your hands with soap and water before and after each dressing change.  3. Wear gloves and protective clothing. This may include eye protection.  4. Do not allow anyone to change your dressing if they have an infection or a skin condition. Even a small cut can be a problem.  To change the dressin. Turn off the pump.  3. Take off the adhesive drape.  4. Disconnect the tube from the dressing.  5. Take out the dressing that is inside the wound. If the dressing sticks, use a germ-free (sterile), saltwater solution to wet the dressing. This helps it come out more easily. If it hurts when the dressing is changed, take pain medicine 30 minutes before the dressing change.  6. Cleanse the wound with normal saline or sterile water.  7. Apply a skin barrier film to the skin that will be covered with the  drape. This will protect the skin.  8. Put a new dressing into the wound.  9. Apply a new drape and tube.  10. Replace the container in the pump that collects fluid if it is full. Do this at least once per week.  11. Turn the pump back on.  12. Your doctor will decide what setting of suction is best. Do not change the settings on the machine without talking to your nurse or doctor.  HOME CARE INSTRUCTIONS   2. The VAC pump has an alarm. It goes off if there are any problems such as a leak.  1. Ask your caregiver what to do if the alarm goes off.  2. Call your caregiver right away if the alarm goes off and you cannot fix the problem.  3. Do not turn off the pump for more than 2 hours.  4. Check your wound carefully at each dressing change for signs of infection. Watch for redness, swelling, or any fluid leaking from the wound. If you develop an infection:  1. You may have to stop VAC therapy.  2. The wound will need to be cleaned and washed out.  3. You will have to take antibiotic medicine.  5. Ask your caregiver what activities you should or should not do while you are getting VAC therapy. This will depend on your particular wound.  6. Ask if it is okay to turn off the pump so you can take a shower. If it is okay, make sure the wound is covered with plastic. The wound area must stay dry.  7. Drink enough fluids to keep your urine clear or pale yellow.  8. Eat foods that contain a lot of protein. Examples are meat, poultry, seafood, eggs, nuts, beans, and peas. Protein can help your wound heal.  SEEK MEDICAL CARE IF:  2. Your wound itches or hurts.  3. Dressing changes are often painful or bleeding often occurs.  4. You have a headache.  5. You have diarrhea.  6. You have a sore throat.  7. You have a rash.  8. You feel nauseous.  9. You feel dizzy or weak.  SEEK IMMEDIATE MEDICAL CARE IF:   · You have very bad pain.  · You have bleeding that will not stop.  · Your wound smells bad.  · You have redness, swelling, or  "fluid leaking from your wound.  · Your alarm goes off and you do not know what to do.  · You have a fever.     This information is not intended to replace advice given to you by your health care provider. Make sure you discuss any questions you have with your health care provider.     Document Released: 2013 Document Reviewed: 2013  [employer] Interactive Patient Education © [employer]      PICC Home Guide  A peripherally inserted central catheter (PICC) is a long, thin, flexible tube that is inserted into a vein in the upper arm. It is a form of intravenous (IV) access. It is considered to be a \"central\" line because the tip of the PICC ends in a large vein in your chest. This large vein is called the superior vena cava (SVC). The PICC tip ends in the SVC because there is a lot of blood flow in the SVC. This allows medicines and IV fluids to be quickly distributed throughout the body. The PICC is inserted using a sterile technique by a specially trained nurse or physician. After the PICC is inserted, a chest X-ray exam is done to be sure it is in the correct place.   A PICC may be placed for different reasons, such as:  · To give medicines and liquid nutrition that can only be given through a central line. Examples are:  · Certain antibiotic treatments.  · Chemotherapy.  · Total parenteral nutrition (TPN).  · To take frequent blood samples.  · To give IV fluids and blood products.  · If there is difficulty placing a peripheral intravenous (PIV) catheter.  If taken care of properly, a PICC can remain in place for several months. A PICC can also allow a person to go home from the hospital early. Medicine and PICC care can be managed at home by a family member or home health care team.  WHAT PROBLEMS CAN HAPPEN WHEN I HAVE A PICC?  Problems with a PICC can occasionally occur. These may include the followin. A blood clot (thrombus) forming in or at the tip of the PICC. This can cause the PICC to " "become clogged. A clot-dissolving medicine called tissue plasminogen activator (tPA) can be given through the PICC to help break up the clot.  6. Inflammation of the vein (phlebitis) in which the PICC is placed. Signs of inflammation may include redness, pain at the insertion site, red streaks, or being able to feel a \"cord\" in the vein where the PICC is located.  7. Infection in the PICC or at the insertion site. Signs of infection may include fever, chills, redness, swelling, or pus drainage from the PICC insertion site.  8. PICC movement (malposition). The PICC tip may move from its original position due to excessive physical activity, forceful coughing, sneezing, or vomiting.  9. A break or cut in the PICC. It is important to not use scissors near the PICC.  10. Nerve or tendon irritation or injury during PICC insertion.  WHAT SHOULD I KEEP IN MIND ABOUT ACTIVITIES WHEN I HAVE A PICC?  13. You may bend your arm and move it freely. If your PICC is near or at the bend of your elbow, avoid activity with repeated motion at the elbow.  14. Rest at home for the remainder of the day following PICC line insertion.  15. Avoid lifting heavy objects as instructed by your health care provider.  16. Avoid using a crutch with the arm on the same side as your PICC. You may need to use a walker.  WHAT SHOULD I KNOW ABOUT MY PICC DRESSING?  9. Keep your PICC bandage (dressing) clean and dry to prevent infection.  1. Ask your health care provider when you may shower. Ask your health care provider to teach you how to wrap the PICC when you do take a shower.  10. Change the PICC dressing as instructed by your health care provider.  11. Change your PICC dressing if it becomes loose or wet.  WHAT SHOULD I KNOW ABOUT PICC CARE?  10. Check the PICC insertion site daily for leakage, redness, swelling, or pain.  11. Do not take a bath, swim, or use hot tubs when you have a PICC. Cover PICC line with clear plastic wrap and tape to keep it " "dry while showering.  12. Flush the PICC as directed by your health care provider. Let your health care provider know right away if the PICC is difficult to flush or does not flush. Do not use force to flush the PICC.  13. Do not use a syringe that is less than 10 mL to flush the PICC.  14. Never pull or tug on the PICC.  15. Avoid blood pressure checks on the arm with the PICC.  16. Keep your PICC identification card with you at all times.  17. Do not take the PICC out yourself. Only a trained clinical professional should remove the PICC.  SEEK IMMEDIATE MEDICAL CARE IF:  · Your PICC is accidentally pulled all the way out. If this happens, cover the insertion site with a bandage or gauze dressing. Do not throw the PICC away. Your health care provider will need to inspect it.  · Your PICC was tugged or pulled and has partially come out. Do not  push the PICC back in.  · There is any type of drainage, redness, or swelling where the PICC enters the skin.  · You cannot flush the PICC, it is difficult to flush, or the PICC leaks around the insertion site when it is flushed.  · You hear a \"flushing\" sound when the PICC is flushed.  · You have pain, discomfort, or numbness in your arm, shoulder, or jaw on the same side as the PICC.  · You feel your heart \"racing\" or skipping beats.  · You notice a hole or tear in the PICC.  · You develop chills or a fever.  MAKE SURE YOU:   · Understand these instructions.  · Will watch your condition.  · Will get help right away if you are not doing well or get worse.     This information is not intended to replace advice given to you by your health care provider. Make sure you discuss any questions you have with your health care provider.     Document Released: 06/23/2004 Document Revised: 01/08/2016 Document Reviewed: 08/25/2014  [employer] Interactive Patient Education ©2016 [employer]    Incision and Drainage, Care After  Refer to this sheet in the next few weeks. These instructions " provide you with information on caring for yourself after your procedure. Your caregiver may also give you more specific instructions. Your treatment has been planned according to current medical practices, but problems sometimes occur. Call your caregiver if you have any problems or questions after your procedure.  HOME CARE INSTRUCTIONS   · If antibiotic medicine is given, take it as directed. Finish it even if you start to feel better.  · Only take over-the-counter or prescription medicines for pain, discomfort, or fever as directed by your caregiver.  · Keep all follow-up appointments as directed by your caregiver.  · Change any bandages (dressings) as directed by your caregiver. Replace old dressings with clean dressings.  · Wash your hands before and after caring for your wound.  You will receive specific instructions for cleansing and caring for your wound.   SEEK MEDICAL CARE IF:   11. You have increased pain, swelling, or redness around the wound.  12. You have increased drainage, smell, or bleeding from the wound.  13. You have muscle aches, chills, or you feel generally sick.  14. You have a fever.  MAKE SURE YOU:   17. Understand these instructions.  18. Will watch your condition.  19. Will get help right away if you are not doing well or get worse.     This information is not intended to replace advice given to you by your health care provider. Make sure you discuss any questions you have with your health care provider.     Document Released: 03/11/2013 Document Revised: 01/08/2016 Document Reviewed: 03/11/2013  [employer] Interactive Patient Education ©2016 [employer]    Pain Medicine Instructions  HOW CAN PAIN MEDICINE AFFECT ME?  You were given a prescription for pain medicine. This medicine may make you tired or drowsy and may affect your ability to think clearly. Pain medicine may also affect your ability to drive or perform certain physical activities. It may not be possible to make all of your  pain go away, but you should be comfortable enough to move, breathe, and take care of yourself.  HOW OFTEN SHOULD I TAKE PAIN MEDICINE AND HOW MUCH SHOULD I TAKE?  · Take pain medicine only as directed by your health care provider and only as needed for pain.  · You do not need to take pain medicine if you are not having pain, unless directed by your health care provider.  · You can take less than the prescribed dose if you find that a smaller amount of medicine controls your pain.  WHAT RESTRICTIONS DO I HAVE WHILE TAKING PAIN MEDICINE?  Follow these instructions after you start taking pain medicine, while you are taking the medicine, and for 8 hours after you stop taking the medicine:  15. Do not drive.  16. Do not operate machinery.  17. Do not operate power tools.  18. Do not sign legal documents.  19. Do not drink alcohol.  20. Do not take sleeping pills.  21. Do not supervise children by yourself.  22. Do not participate in activities that require climbing or being in high places.  23. Do not enter a body of water--such as a lake, river, ocean, spa, or swimming pool--without an adult nearby who can monitor and help you.  HOW CAN I KEEP OTHERS SAFE WHILE I AM TAKING PAIN MEDICINE?  20. Store your pain medicine as directed by your health care provider. Make sure that it is placed where children and pets cannot reach it.  21. Never share your pain medicine with anyone.  22. Do not save any leftover pills. If you have any leftover pain medicine, get rid of it or destroy it as directed by your health care provider.  WHAT ELSE DO I NEED TO KNOW ABOUT TAKING PAIN MEDICINE?  12. Use a stool softener if you become constipated from your pain medicine. Increasing your intake of fruits and vegetables will also help with constipation.  13. Write down the times when you take your pain medicine. Look at the times before you take your next dose of medicine. It is easy to become confused while on pain medicine. Recording the  times helps you to avoid an overdose.  14. If your pain is severe, do not try to treat it yourself by taking more pills than instructed on your prescription. Contact your health care provider for help.  15. You may have been prescribed a pain medicine that contains acetaminophen. Do not take any other acetaminophen while taking this medicine. An overdose of acetaminophen can result in severe liver damage. Acetaminophen is found in many over-the-counter (OTC) and prescription medicines. If you are taking any medicines in addition to your pain medicine, check the active ingredients on those medicines to see if acetaminophen is listed.  WHEN SHOULD I CALL MY HEALTH CARE PROVIDER?  18. Your medicine is not helping to make the pain go away.  19. You vomit or have diarrhea shortly after taking the medicine.  20. You develop new pain in areas that did not hurt before.  21. You have an allergic reaction to your medicine. This may include:  1. Itchiness.  2. Swelling.  3. Dizziness.  4. Developing a new rash.  WHEN SHOULD I CALL 911 OR GO TO THE EMERGENCY ROOM?  · You feel dizzy or you faint.  · You are very confused or disoriented.  · You repeatedly vomit.  · Your skin or lips turn pale or bluish in color.  · You have shortness of breath or you are breathing much more slowly than usual.  · You have a severe allergic reaction to your medicine. This includes:  ¨ Developing tongue swelling.  ¨ Having difficulty breathing.     This information is not intended to replace advice given to you by your health care provider. Make sure you discuss any questions you have with your health care provider.     Document Released: 03/26/2002 Document Revised: 05/03/2016 Document Reviewed: 10/22/2015  [employer] Interactive Patient Education ©2016 [employer]      Antibiotic Medication  Antibiotic medicine helps fight germs. Germs cause infections. This type of medicine will not work for colds, flu, or other viral infections. Tell your doctor  if you:  · Are allergic to any medicines.  · Are pregnant or are trying to get pregnant.  · Are taking other medicines.  · Have other medical problems.  HOME CARE  24. Take your medicine with a glass of water or food as told by your doctor.  25. Take the medicine as told. Finish them even if you start to feel better.  26. Do not give your medicine to other people.  27. Do not use your medicine in the future for a different infection.  28. Ask your doctor about which side effects to watch for.  29. Try not to miss any doses. If you miss a dose, take it as soon as possible. If it is almost time for your next dose, and your dosing schedule is:  1. Two doses a day, take the missed dose and the next dose 5 to 6 hours later.  2. Three or more doses a day, take the missed dose and the next dose 2 to 4 hours later, or double your next dose.  3. Then go back to your normal schedule.  GET HELP RIGHT AWAY IF:   23. You get worse or do not get better within a few days.  24. The medicine makes you sick.  25. You develop a rash or any other side effects.  26. You have questions or concerns.  MAKE SURE YOU:  16. Understand these instructions.  17. Will watch your condition.  18. Will get help right away if you are not doing well or get worse.     This information is not intended to replace advice given to you by your health care provider. Make sure you discuss any questions you have with your health care provider.     Document Released: 09/26/2009 Document Revised: 03/11/2013 Document Reviewed: 11/23/2010  [employer] Interactive Patient Education ©2016 [employer]        Follow-up Information     1. Follow up with Anai Santoro M.D. In 1 week.    Specialty:  Infectious Disease    Contact information    [address]  [facility] [address] [phone]          2. Follow up with Esau Dooley M.D. In 1 week.    Specialty:  Surgery    Contact information    [address]  [facility]  [address]  [phone]          3. Follow up with Rodolfo Stein M.D. In 1 week.    Specialty:  Internal Medicine    Contact information    [address]  [address]  [place] [address]  [phone]           Discharge Medication Instructions:    Below are the medications your physician expects you to take upon discharge:    Review all your home medications and newly ordered medications with your doctor and/or pharmacist. Follow medication instructions as directed by your doctor and/or pharmacist.    Please keep your medication list with you and share with your physician.               Medication List      START taking these medications        Instructions    Morning Afternoon Evening Bedtime    fluconazole 100 MG Tabs   Last time this was given:  100 mg on 5/8/2017  8:50 AM   Commonly known as:  DIFLUCAN        Take 1 Tab by mouth every day.   Dose:  100 mg                        linezolid 600 MG Tabs   Commonly known as:  ZYVOX        Take 1 Tab by mouth 2 times a day for 14 days.   Dose:  600 mg                        metronidazole 500 MG Tabs   Last time this was given:  500 mg on 5/8/2017  2:29 PM   Commonly known as:  FLAGYL        Take 1 Tab by mouth every 8 hours.   Dose:  500 mg                        morphine ER 30 MG Tbcr tablet   Last time this was given:  30 mg on 5/8/2017  8:50 AM   Commonly known as:  MS CONTIN        Doctor's comments:  Take one tablet in the morning and two tablets at night   Take 1 Tab by mouth every 12 hours.   Dose:  30 mg                        NS SOLN 100 mL with ertapenem 1 GM SOLR 1,000 mg   Last time this was given:  1,000 mg on 5/8/2017  4:12 PM        1,000 mg by Intravenous route every 24 hours.   Dose:  1 g                          CONTINUE taking these medications        Instructions    Morning Afternoon Evening Bedtime    acetaminophen 325 MG Tabs   Last time this was given:  650 mg on 5/8/2017  5:29 PM   Commonly known as:  TYLENOL        Take 650 mg by mouth Once.    Dose:  650 mg                        * ARMOUR THYROID 60 MG Tabs   Last time this was given:  75 mg on 5/8/2017  6:05 AM   Generic drug:  thyroid        Take 75 mg by mouth every evening. Pt uses a 60 mg and 1/2 of a 30 mg, total dose = 75mg QPM   Dose:  75 mg                        * thyroid 30 MG Tabs   Last time this was given:  75 mg on 5/8/2017  6:05 AM   Commonly known as:  ARMOUR THYROID        Take 75 mg by mouth every evening. Pt uses a 60 mg and 1/2 of a 30 mg, total dose = 75mg QPM   Dose:  75 mg                        buPROPion 300 MG XL tablet   Commonly known as:  WELLBUTRIN XL        Take 300 mg by mouth every evening.   Dose:  300 mg                        insulin lispro 100 UNIT/ML Soln   Commonly known as:  HUMALOG        Inject 2-20 Units as instructed 3 times a day before meals. Sliding Scale - 2 units every 50 > 150   Dose:  2-20 Units                        LIALDA 1.2 GM Tbec   Generic drug:  mesalamine        Take 2.4 g by mouth every evening.   Dose:  2.4 g                        magnesium oxide 400 (241.3 MG) MG Tabs tablet   Commonly known as:  MAG-OX        Take 1 Tab by mouth every day.   Dose:  400 mg                        oxycodone-acetaminophen  MG Tabs   Commonly known as:  PERCOCET-10        Take 1 Tab by mouth every 8 hours as needed for Severe Pain.   Dose:  1 Tab                        TOUJEO SOLOSTAR 300 UNIT/ML Sopn   Generic drug:  Insulin Glargine        Inject 62 Units as instructed every day. Adjusts based on blood sugar.   Dose:  62 Units                        Vitamin D3 5000 UNITS Tabs        Take 5,000 Units by mouth every evening.   Dose:  5000 Units                        * Notice:  This list has 2 medication(s) that are the same as other medications prescribed for you. Read the directions carefully, and ask your doctor or other care provider to review them with you.      STOP taking these medications     testosterone cypionate 200 MG/ML Soln injection    Commonly known as:  DEPO-TESTOSTERONE                    Where to Get Your Medications      Information about where to get these medications is not yet available     ! Ask your nurse or doctor about these medications    - fluconazole 100 MG Tabs  - linezolid 600 MG Tabs  - metronidazole 500 MG Tabs  - morphine ER 30 MG Tbcr tablet  - NS SOLN 100 mL with ertapenem 1 GM SOLR 1,000 mg  - oxycodone-acetaminophen  MG Tabs            Orders for after discharge     DME Walker    Complete by:  As directed        REFERRAL TO HOME HEALTH    Complete by:  As directed    Home health will create and establish a plan of care             Instructions           Diet / Nutrition:    Follow any diet instructions given to you by your doctor or the dietician, including how much salt (sodium) you are allowed each day.    If you are overweight, talk to your doctor about a weight reduction plan.    Activity:    Remain physically active following your doctor's instructions about exercise and activity.    Rest often.     Any time you become even a little tired or short of breath, SIT DOWN and rest.    Worsening Symptoms:    Report any of the following signs and symptoms to the doctor's office immediately:    *Pain of jaw, arm, or neck  *Chest pain not relieved by medication                               *Dizziness or loss of consciousness  *Difficulty breathing even when at rest   *More tired than usual                                       *Bleeding drainage or swelling of surgical site  *Swelling of feet, ankles, legs or stomach                 *Fever (>100ºF)  *Pink or blood tinged sputum  *Weight gain (3lbs/day or 5lbs /week)           *Shock from internal defibrillator (if applicable)  *Palpitations or irregular heartbeats                *Cool and/or numb extremities    Stroke Awareness    Common Risk Factors for Stroke include:    Age  Atrial Fibrillation  Carotid Artery Stenosis  Diabetes Mellitus  Excessive alcohol  consumption  High blood pressure  Overweight   Physical inactivity  Smoking    Warning signs and symptoms of a stroke include:    *Sudden numbness or weakness of the face, arm or leg (especially on one side of the body).  *Sudden confusion, trouble speaking or understanding.  *Sudden trouble seeing in one or both eyes.  *Sudden trouble walking, dizziness, loss of balance or coordination.Sudden severe headache with no known cause.    It is very important to get treatment quickly when a stroke occurs. If you experience any of the above warning signs, call 911 immediately.                   Disclaimer         Quit Smoking / Tobacco Use:    I understand the use of any tobacco products increases my chance of suffering from future heart disease or stroke and could cause other illnesses which may shorten my life. Quitting the use of tobacco products is the single most important thing I can do to improve my health. For further information on smoking / tobacco cessation call a Toll Free Quit Line at 1-100.495.7573 (*[employer] [place]) or 1-840.134.3610 (American [employer] Association) or you can access the web based program at www.lungBoosted Boards.org.    [place] Tobacco Users Help Line:  (770) 606-5300       Toll Free: 1-444.763.5338  Quit Tobacco Program [facility] Management [facility] (706)148-4058    Crisis Hotline:    [place] Crisis Hotline:  5-687-FIVUJUF or 1-354.356.5045    [place] Crisis Hotline:    1-345.479.5235 or 649-655-8371    Discharge Survey:   Thank you for choosing [facility]. We hope we did everything we could to make your hospital stay a pleasant one. You may be receiving a phone survey and we would appreciate your time and participation in answering the questions. Your input is very valuable to us in our efforts to improve our service to our patients and their families.        My signature on this form indicates that:    1. I have reviewed and understand the above information.  2. My questions regarding  this information have been answered to my satisfaction.  3. I have formulated a plan with my discharge nurse to obtain my prescribed medications for home.                  Disclaimer         __________________________________                     __________       ________                       Patient Signature                                                 Date                    Time

## 2017-04-29 NOTE — IP AVS SNAPSHOT
5/8/2017    Mahesh Bradshaw  [address]  [place] [address]    Dear Mahesh:    [facility] wants to ensure your discharge home is safe and you or your loved ones have had all of your questions answered regarding your care after you leave the hospital.    Below is a list of resources and contact information should you have any questions regarding your hospital stay, follow-up instructions, or active medical symptoms.    Questions or Concerns Regarding… Contact   Medical Questions Related to Your Discharge  (7 days a week, 8am-5pm) Contact a Nurse Care Coordinator   [phone]   Medical Questions Not Related to Your Discharge  (24 hours a day / 7 days a week)  Contact the Nurse Health Line   [phone]    Medications or Discharge Instructions Refer to your discharge packet   or contact your [facility] Primary Care Provider   [phone]   Follow-up Appointment(s) Schedule your appointment via [employer] ([employer])   or contact Scheduling [phone]   Billing Review your statement via [employer] ([employer])  or contact Billing [phone]   Medical Records Review your records via [employer] ([employer])   or contact Medical Records [phone]     You may receive a telephone call within two days of discharge. This call is to make certain you understand your discharge instructions and have the opportunity to have any questions answered. You can also easily access your medical information, test results and upcoming appointments via the [employer] ([employer]) free online health management tool. You can learn more and sign up at [employer] ([employer]). For assistance setting up your [employer] ([employer]) account, please call [phone].    Once again, we want to ensure your discharge home is safe and that you have a clear understanding of any next steps in your care. If you have any questions or concerns, please do not hesitate to contact us, we are here for you. Thank you for choosing [facility] for your healthcare needs.    Sincerely,    Your [facility] Healthcare Team

## 2017-04-29 NOTE — PROGRESS NOTES
"I examined the patient 4/29/2017 3:54 PM  Vital Signs:/68 mmHg  Pulse 105  Temp(Src) 37.4 °C (99.4 °F)  Resp 18  Ht 1.74 m (5' 8.5\")  Wt 102.059 kg (225 lb)  BMI 33.71 kg/m2  SpO2 97%  Cardiac examination significant for Tachycardia  Pulmonary examination significant for Clear lung fileds  Capillary refill is brisk  Peripheral Pulse is 2+   Skin is pale  "

## 2017-04-29 NOTE — IP AVS SNAPSHOT
" <p align=\"LEFT\"><IMG SRC=\"//[email or web address]\" alt=\"Image\" WIDTH=\"50%\" HEIGHT=\"200\" BORDER=\"\"></p>                   Name:Mahesh Bradshaw  Medical Record Number:[id number]  CSN: [id number]    YOB: 1965   Age: 51 y.o.  Sex: male  HT:1.74 m (5' 8.5\") WT: 99.6 kg (219 lb 9.3 oz)          Admit Date: 4/29/2017     Discharge Date:   Today's Date: 5/8/2017  Attending Doctor:  Lala Rendon M.D.                  Allergies:  Peanut-derived; Tradjenta; and Hydrocodone          Follow-up Information     1. Follow up with Anai Santoro M.D. In 1 week.    Specialty:  Infectious Disease    Contact information    [address]  [address]  [place] [address] [phone]          2. Follow up with Esau Dooley M.D. In 1 week.    Specialty:  Surgery    Contact information    [address]  [address]  [place] [address] [phone]          3. Follow up with Rodolfo Stein M.D. In 1 week.    Specialty:  Internal Medicine    Contact information    [address]  [address]  [place] [address] [phone]           Medication List      Take these Medications        Instructions    acetaminophen 325 MG Tabs   Commonly known as:  TYLENOL    Take 650 mg by mouth Once.   Dose:  650 mg       * ARMOUR THYROID 60 MG Tabs   Generic drug:  thyroid    Take 75 mg by mouth every evening. Pt uses a 60 mg and 1/2 of a 30 mg, total dose = 75mg QPM   Dose:  75 mg       * thyroid 30 MG Tabs   Commonly known as:  ARMOUR THYROID    Take 75 mg by mouth every evening. Pt uses a 60 mg and 1/2 of a 30 mg, total dose = 75mg QPM   Dose:  75 mg       buPROPion 300 MG XL tablet   Commonly known as:  WELLBUTRIN XL    Take 300 mg by mouth every evening.   Dose:  300 mg       fluconazole 100 MG Tabs   Commonly known as:  DIFLUCAN    Take 1 Tab by mouth every day.   Dose:  100 mg       insulin lispro 100 UNIT/ML Soln   Commonly known as:  HUMALOG    Inject 2-20 Units as instructed 3 times a day before meals. Sliding Scale " - 2 units every 50 > 150   Dose:  2-20 Units       LIALDA 1.2 GM Tbec   Generic drug:  mesalamine    Take 2.4 g by mouth every evening.   Dose:  2.4 g       linezolid 600 MG Tabs   Commonly known as:  ZYVOX    Take 1 Tab by mouth 2 times a day for 14 days.   Dose:  600 mg       magnesium oxide 400 (241.3 MG) MG Tabs tablet   Commonly known as:  MAG-OX    Take 1 Tab by mouth every day.   Dose:  400 mg       metronidazole 500 MG Tabs   Commonly known as:  FLAGYL    Take 1 Tab by mouth every 8 hours.   Dose:  500 mg       morphine ER 30 MG Tbcr tablet   Commonly known as:  MS CONTIN    Doctor's comments:  Take one tablet in the morning and two tablets at night   Take 1 Tab by mouth every 12 hours.   Dose:  30 mg       NS SOLN 100 mL with ertapenem 1 GM SOLR 1,000 mg    1,000 mg by Intravenous route every 24 hours.   Dose:  1 g       oxycodone-acetaminophen  MG Tabs   Commonly known as:  PERCOCET-10    Take 1 Tab by mouth every 8 hours as needed for Severe Pain.   Dose:  1 Tab       TOUJEO SOLOSTAR 300 UNIT/ML Sopn   Generic drug:  Insulin Glargine    Inject 62 Units as instructed every day. Adjusts based on blood sugar.   Dose:  62 Units       Vitamin D3 5000 UNITS Tabs    Take 5,000 Units by mouth every evening.   Dose:  5000 Units       * Notice:  This list has 2 medication(s) that are the same as other medications prescribed for you. Read the directions carefully, and ask your doctor or other care provider to review them with you.

## 2017-04-29 NOTE — ED PROVIDER NOTES
"ED Provider Note    Scribed for [place] D.O. by Monty Perera. 4/29/2017  10:35 AM    Primary care provider: Rodolfo Stein M.D.  Means of arrival: Wheel Chair  History obtained from: Patient  History limited by: None    CHIEF COMPLAINT  Chief Complaint   Patient presents with   • Fever   • Wound Infection       HPI  Mahesh Bradshaw is a 51 y.o. male who presents to the Emergency Department for evaluation of abdominal pain secondary to wound vac following a prior hernia mesh repair in February. The patient reports that this has been frequently infected in the past. The patient reports that he has a wound vac to his abdomen. The patient has recently had a PICC line removed following course of IV antibiotics. He is experiencing pain to the area which is reported to be a 6/10 in severity. Additionally, the patient reports giving himself a gluco testosterone shot to his right thigh, and has been unable to move this leg since then and reports a wound to the area. He reports two episodes of vomiting today, and has had a fever of 100.3. He has taken a 1000mg tablet of Tylenol. The patient is followed by [facility].     REVIEW OF SYSTEMS  Pertinent positives include abdominal pain, fever, nausea, vomiting. Pertinent negatives include no chest pain or shortness of breath.  All other systems reviewed and negative.    PAST MEDICAL HISTORY  Past Medical History   Diagnosis Date   • Migraine    • Gout    • Indigestion    • UC (ulcerative colitis) (CMS-HCC) 3-3-17     \"Five BM's per day, takes Lialda\"   • Difficult intubation 2-2016   • Arthritis 3-3-17     \"Spine\"   • Sepsis (CMS-HCC) 1/21/2016   • Essential hypertension 1/22/2016   • Snoring 3-3-17     Has not had a sleep study   • High cholesterol 3-3-17     Does not currently take medication for   • Congestive heart failure (CMS-HCC) 2-2016      3-3-17 \"Not a current issue\"   • ASTHMA 3-3-17     \"Hasn't had to use inhaler in 2 years\"   • " "Aspiration pneumonia (CMS-HCC) 3-2016   • Breath shortness 3-3-17     \"With Exercise\"   • Hypothyroid 3-3-17     Takes [place] Thyroid   • Pain 3-3-17     \"Left Flank\"   • Heart burn    • Depression 11/26/2014   • Anesthesia      \"Was difficult to intubate 2-2016\"   • Pancreatitis 2-2016     \"D/T Tradjenta was hospitialized for 15 days\"   • Elevated liver enzymes 2-2016   • Electrolyte imbalance 2-2016   • Kidney stones    • ADRIANE (acute kidney injury) (CMS-HCC) 2/24/2016   • RF (renal failure) 2-2016   • Diabetes (CMS-HCC) 3-3-17     Takes Insulin   • DKA (diabetic ketoacidoses) (CMS-HCC) 2-2016     \"10 days on vent with DKA, Renal failure, CHF, elevated liver enzymes and electrolyte imbalance\"   • On mechanically assisted ventilation (CMS-HCC) 2-2016     HX \"On Vent for 10 days at [facility]\".  \"DX Pancreatitis, DKA, CHF, Elevated Liver Enzymes & Electrolyte Imbalance\".       SURGICAL HISTORY  Past Surgical History   Procedure Laterality Date   • Carmenza by laparoscopy  2005   • Colonoscopy with biopsy  11/26/2014     Performed by Solo Higginbotham M.D. at ENDOSCOPY [facility]   • Umbilical hernia repair N/A 11/6/2016     Procedure: UMBILICAL HERNIA REPAIR;  Surgeon: Esau Dooley M.D.;  Location: SURGERY [facility];  Service:    • Other orthopedic surgery  1997 or 1998     Left Wrist ORIF   • Umbilical hernia repair  3/10/2017     Procedure: UMBILICAL HERNIA REPAIR- INCISION AND DRAINAGE OF UMBILICAL WOUND AND MESH REMOVAL;  Surgeon: Esau Dooley M.D.;  Location: SURGERY SAME DAY [facility];  Service:    • Incision and drainage general  4/7/2017     Procedure: INCISION AND DRAINAGE GENERAL;  Surgeon: Esau Dooley M.D.;  Location: SURGERY SAME DAY [facility];  Service:         SOCIAL HISTORY  Social History   Substance Use Topics   • Smoking status: Never Smoker    • Smokeless tobacco: Not on file   • Alcohol Use: No      Comment: occ      History   Drug Use No       CURRENT MEDICATIONS  Home " "Medications     Reviewed by Oliva Delgado, Pharmacy Int (Pharmacy Intern) on 04/29/17 at 1124  Med List Status: Complete    Medication Last Dose Status    acetaminophen (TYLENOL) 325 MG Tab 4/29/2017 Active    buPROPion (WELLBUTRIN XL) 300 MG XL tablet 4/28/2017 Active    Cholecalciferol (VITAMIN D3) 5000 UNITS Tab 4/28/2017 Active    Insulin Glargine (TOUJEO SOLOSTAR) 300 UNIT/ML Solution Pen-injector 4/28/2017 Active    insulin lispro (HUMALOG) 100 UNIT/ML Solution 4/29/2017 Active    magnesium oxide (MAG-OX) 400 (241.3 MG) MG Tab tablet 4/28/2017 Active    mesalamine (LIALDA) 1.2 GM TBEC 4/28/2017 Active    oxycodone-acetaminophen (PERCOCET-10)  MG Tab 4/28/2017 Active    testosterone cypionate (DEPO-TESTOSTERONE) 200 MG/ML SOLN injection 4/25/2017 Active    thyroid (ARMOUR THYROID) 30 MG Tab 4/28/2017 Active    thyroid (ARMOUR THYROID) 60 MG Tab 4/28/2017 Active                ALLERGIES  Allergies   Allergen Reactions   • Peanut-Derived Anaphylaxis     Peanuts   RXN=age 36   • Tradjenta [Linagliptin] Unspecified     Pt developed pancreatitis   • Hydrocodone Hives     Tolerates Morphine and oxycodone  Rxn Age - 29       PHYSICAL EXAM  VITAL SIGNS: /68 mmHg  Pulse 99  Temp(Src) 37.4 °C (99.4 °F)  Resp 20  Ht 1.74 m (5' 8.5\")  Wt 102.059 kg (225 lb)  BMI 33.71 kg/m2  SpO2 100%    Nursing notes and vitals reviewed.  Constitutional: Well developed, Well nourished, No acute distress, Non-toxic appearance.   Eyes: PERRLA, EOMI, Conjunctiva normal, No discharge.   Cardiovascular: Tachycardic, Normal rhythm, Grade 2/6 murmurs, No rubs, No gallops.   Thorax & Lungs: No respiratory distress, No rales, No rhonchi, No wheezing, No chest tenderness.   Abdomen: Bowel sounds normal, Soft, Diffuse tenderness without focality, wound vac to the umbilical area without signs of infection, No guarding, No rebound, No masses, No pulsatile masses.   Skin: Warm, Dry, right lower extremity with significant edema " and tenderness, edema and tenderness to the right medial malleolus, diffuse erythema anterior aspect right lower extremity from the thigh to the ankle. No subcutaneous air.  Musculoskeletal: Intact distal pulses, No cyanosis, No clubbing. Good range of motion in all major joints. right lower extremity with significant edema and tenderness, edema and tenderness to the right medial malleolus,  no CVA tenderness, no midline back tenderness.   Neurologic: Alert & oriented x 3, Normal motor function, Normal sensory function, No focal deficits noted.  Psychiatric: Affect normal for clinical presentation.    DIAGNOSTIC STUDIES/PROCEDURES    LABS  Results for orders placed or performed during the hospital encounter of 04/29/17   Lactic acid (lactate)   Result Value Ref Range    Lactic Acid 3.6 (H) 0.5 - 2.0 mmol/L   Lactic acid (lactate)   Result Value Ref Range    Lactic Acid 2.0 0.5 - 2.0 mmol/L   CBC WITH DIFFERENTIAL   Result Value Ref Range    WBC 16.7 (H) 4.8 - 10.8 K/uL    RBC 4.22 (L) 4.70 - 6.10 M/uL    Hemoglobin 12.6 (L) 14.0 - 18.0 g/dL    Hematocrit 36.0 (L) 42.0 - 52.0 %    MCV 85.3 81.4 - 97.8 fL    MCH 29.9 27.0 - 33.0 pg    MCHC 35.0 33.7 - 35.3 g/dL    RDW 40.0 35.9 - 50.0 fL    Platelet Count 192 164 - 446 K/uL    MPV 9.6 9.0 - 12.9 fL    Nucleated RBC 0.00 /100 WBC    NRBC (Absolute) 0.00 K/uL    Neutrophils-Polys 88.60 (H) 44.00 - 72.00 %    Lymphocytes 2.60 (L) 22.00 - 41.00 %    Monocytes 0.00 0.00 - 13.40 %    Eosinophils 0.00 0.00 - 6.90 %    Basophils 0.00 0.00 - 1.80 %    Neutrophils (Absolute) 16.27 (H) 1.82 - 7.42 K/uL    Lymphs (Absolute) 0.43 (L) 1.00 - 4.80 K/uL    Monos (Absolute) 0.00 0.00 - 0.85 K/uL    Eos (Absolute) 0.00 0.00 - 0.51 K/uL    Baso (Absolute) 0.00 0.00 - 0.12 K/uL    Anisocytosis 1+    COMP METABOLIC PANEL   Result Value Ref Range    Sodium 127 (L) 135 - 145 mmol/L    Potassium 3.9 3.6 - 5.5 mmol/L    Chloride 90 (L) 96 - 112 mmol/L    Co2 22 20 - 33 mmol/L    Anion Gap  15.0 (H) 0.0 - 11.9    Glucose 390 (H) 65 - 99 mg/dL    Bun 23 (H) 8 - 22 mg/dL    Creatinine 1.11 0.50 - 1.40 mg/dL    Calcium 9.2 8.5 - 10.5 mg/dL    AST(SGOT) 35 12 - 45 U/L    ALT(SGPT) 80 (H) 2 - 50 U/L    Alkaline Phosphatase 151 (H) 30 - 99 U/L    Total Bilirubin 1.2 0.1 - 1.5 mg/dL    Albumin 4.3 3.2 - 4.9 g/dL    Total Protein 7.8 6.0 - 8.2 g/dL    Globulin 3.5 1.9 - 3.5 g/dL    A-G Ratio 1.2 g/dL   URINALYSIS   Result Value Ref Range    Micro Urine Req Microscopic     Color Lt. Yellow     Character Clear     Specific Gravity 1.024 <1.035    Ph 6.5 5.0-8.0    Glucose >1000 (A) Negative mg/dL    Ketones 20 (A) Negative mg/dL    Protein Negative Negative mg/dL    Bilirubin Negative Negative    Nitrite Negative Negative    Leukocyte Esterase Negative Negative    Occult Blood Small (A) Negative   MAGNESIUM   Result Value Ref Range    Magnesium 2.0 1.5 - 2.5 mg/dL   PHOSPHORUS   Result Value Ref Range    Phosphorus <1.0 (LL) 2.5 - 4.5 mg/dL   ESTIMATED GFR   Result Value Ref Range    GFR If African American >60 >60 mL/min/1.73 m 2    GFR If Non African American >60 >60 mL/min/1.73 m 2   DIFFERENTIAL MANUAL   Result Value Ref Range    Bands-Stabs 8.80 0.00 - 10.00 %    Manual Diff Status PERFORMED    PERIPHERAL SMEAR REVIEW   Result Value Ref Range    Peripheral Smear Review see below    PLATELET ESTIMATE   Result Value Ref Range    Plt Estimation Normal    MORPHOLOGY   Result Value Ref Range    RBC Morphology Present    URINE MICROSCOPIC (W/UA)   Result Value Ref Range    WBC 0-2 (A) /hpf   Lactic Acid -STAT Once   Result Value Ref Range    Lactic Acid 1.5 0.5 - 2.0 mmol/L   All labs reviewed by me.    RADIOLOGY  CT-EXTREMITY, LOWER WITH RIGHT   Final Result      14 cm distal anterior thigh abscess with no CT evidence of septic arthropathy or osteomyelitis      Findings were discussed with ANCELMO SCOTT on 4/29/2017 1:12 PM.      The radiologist's interpretation of all radiological studies have been  reviewed by me.    COURSE & MEDICAL DECISION MAKING  Pertinent Labs & Imaging studies reviewed. (See chart for details)    10:35 AM - Patient seen and examined at bedside. Patient will be treated with 3,063mL NS bolus, 1mg injection of Dilaudid, 8mg dispertab of Zofran. Ordered lactic acid, CBC with differentials, CMP, UA, urine culture,blood culture to evaluate his symptoms. The differential diagnoses include but are not limited to: sepsis, right thigh abscess, cellulitis. The patient was informed that I would order for labs to evaluate. Additionally, I informed him that I would order for a CT of his right LE to evaluate for abscess. The patient verbalizes understanding.     11:01 AM US guided IV placed by me at bedside.     11:48 AM Consulted with Pharmacy regarding antibiotic treatment. Ordered for 3g injection of Unasyn and 2,600mg Vancomycin.     11:52 AM Patient's RN informs me of the patient's lactic acid of 3.6 and phosporus of less than 1.     1:17 PM Consulted with Dr. Gomez, Hospitalist regarding patient's case. They were informed of the results of the patient's diagnostic results as documented above, including the 14cm abscess to his distal thigh. They agree to admit the patient to their services.     1:18 PM The patient was informed of the results of his diagnostic studies, as documented above. I informed him that he would need to be admitted to the hospital at this time, and would require surgery for drainage of his abscess. The patient verbalizes understanding.     1:38 PM Consulted with Dr. Dooley, General Surgery regarding patient's case. They were informed of the patient's 14cm abscess to his distal anterior thigh. They agree to see the patient.     2:36 PM The patient was reevaluated and informed that I had consulted with General Surgery who planned on taking him to the OR. The patient verbalizes understanding.     CRITICAL CARE  The very real possibility of a deterioration of this patient's  condition required the highest level of my preparedness for sudden, emergent intervention.  I provided critical care services, which included medication orders, frequent reevaluations of the patient's condition and response to treatment, ordering and reviewing test results, and discussing the case with various consultants.  The critical care time associated with the care of the patient was 35 minutes. Review chart for interventions. This time is exclusive of any other billable procedures.     This is a charming 51 y.o. male that presents with sepsis and right lateral thigh abscess. His concern for possible infection of her CT scan IV contrast is completed and revealed a sepsis. Discussed the abscess with surgery and the patient was admitted to hospitalist. The patient is hemodynamically stable does not require vasopressors or central venous access or intubation. The patient received 30 mL's per kilogram normal saline fluid bolus, cultures have been sent.    Disposition:  Patient will be admitted to the hospital under the care of Dr. Gomez (Hospitalist) in guarded condition.    FINAL IMPRESSION  Sepsis  Right thigh abscess  Critical care time 35 minutes     IMonty (Gareth), am scribing for, and in the presence of, [place] D.O    Electronically signed by: Monty Perera (Gareth), 4/29/2017    [place] EPSTEIN D.O. personally performed the services described in this documentation, as scribed by Monty Perera in my presence, and it is both accurate and complete.    The note accurately reflects work and decisions made by me.  [place]  4/29/2017  5:58 PM

## 2017-04-29 NOTE — ED NOTES
".  Chief Complaint   Patient presents with   • Fever   • Wound Infection     Ambulated to room. Pt has wound vac to abdomen abdominal pain 6/10. Pt more concerned about wound to right thigh, post inj on Wednesday. Pt also has draining wound x 4 to right wrist and thumb post lab draws. 2 wounds to left palm and thumb. Right ankle sore from hitting leg on night stand. HX DM.  t max 100.3 pta pt took tylenol 1000 mg. ./68 mmHg  Pulse 102  Temp(Src) 37.4 °C (99.4 °F)  Resp 20  Ht 1.74 m (5' 8.5\")  Wt 102.059 kg (225 lb)  BMI 33.71 kg/m2    "

## 2017-04-29 NOTE — PROGRESS NOTES
"ID MD at bedside for examination, possible \"metastatic staph infection,\" will update Dr. Gomez  "

## 2017-04-30 PROBLEM — R78.81 BACTEREMIA DUE TO GRAM-NEGATIVE BACTERIA: Status: ACTIVE | Noted: 2017-04-30

## 2017-04-30 LAB
ALBUMIN SERPL BCP-MCNC: 3.1 G/DL (ref 3.2–4.9)
ALBUMIN/GLOB SERPL: 1.3 G/DL
ALP SERPL-CCNC: 104 U/L (ref 30–99)
ALT SERPL-CCNC: 42 U/L (ref 2–50)
ANION GAP SERPL CALC-SCNC: 10 MMOL/L (ref 0–11.9)
APTT PPP: 26.2 SEC (ref 24.7–36)
AST SERPL-CCNC: 24 U/L (ref 12–45)
BACTERIA BLD CULT: NORMAL
BILIRUB SERPL-MCNC: 0.6 MG/DL (ref 0.1–1.5)
BUN SERPL-MCNC: 17 MG/DL (ref 8–22)
CALCIUM SERPL-MCNC: 7.3 MG/DL (ref 8.5–10.5)
CHLORIDE SERPL-SCNC: 105 MMOL/L (ref 96–112)
CO2 SERPL-SCNC: 22 MMOL/L (ref 20–33)
CREAT SERPL-MCNC: 0.75 MG/DL (ref 0.5–1.4)
ERYTHROCYTE [DISTWIDTH] IN BLOOD BY AUTOMATED COUNT: 44.8 FL (ref 35.9–50)
EST. AVERAGE GLUCOSE BLD GHB EST-MCNC: 209 MG/DL
GFR SERPL CREATININE-BSD FRML MDRD: >60 ML/MIN/1.73 M 2
GLOBULIN SER CALC-MCNC: 2.3 G/DL (ref 1.9–3.5)
GLUCOSE BLD-MCNC: 225 MG/DL (ref 65–99)
GLUCOSE BLD-MCNC: 262 MG/DL (ref 65–99)
GLUCOSE BLD-MCNC: 289 MG/DL (ref 65–99)
GLUCOSE BLD-MCNC: 291 MG/DL (ref 65–99)
GLUCOSE SERPL-MCNC: 340 MG/DL (ref 65–99)
HBA1C MFR BLD: 8.9 % (ref 0–5.6)
HCT VFR BLD AUTO: 26.6 % (ref 42–52)
HGB BLD-MCNC: 9 G/DL (ref 14–18)
INR PPP: 1.12 (ref 0.87–1.13)
MCH RBC QN AUTO: 30.3 PG (ref 27–33)
MCHC RBC AUTO-ENTMCNC: 33.8 G/DL (ref 33.7–35.3)
MCV RBC AUTO: 89.6 FL (ref 81.4–97.8)
PHOSPHATE SERPL-MCNC: 2.3 MG/DL (ref 2.5–4.5)
PLATELET # BLD AUTO: 130 K/UL (ref 164–446)
PMV BLD AUTO: 9.7 FL (ref 9–12.9)
POTASSIUM SERPL-SCNC: 4.2 MMOL/L (ref 3.6–5.5)
PROT SERPL-MCNC: 5.4 G/DL (ref 6–8.2)
PROTHROMBIN TIME: 14.8 SEC (ref 12–14.6)
RBC # BLD AUTO: 2.97 M/UL (ref 4.7–6.1)
SIGNIFICANT IND 70042: NORMAL
SIGNIFICANT IND 70042: NORMAL
SITE SITE: NORMAL
SITE SITE: NORMAL
SODIUM SERPL-SCNC: 137 MMOL/L (ref 135–145)
SOURCE SOURCE: NORMAL
SOURCE SOURCE: NORMAL
VANCOMYCIN SERPL-MCNC: 13.8 UG/ML
WBC # BLD AUTO: 10.2 K/UL (ref 4.8–10.8)

## 2017-04-30 PROCEDURE — 700111 HCHG RX REV CODE 636 W/ 250 OVERRIDE (IP): Performed by: HOSPITALIST

## 2017-04-30 PROCEDURE — 80053 COMPREHEN METABOLIC PANEL: CPT

## 2017-04-30 PROCEDURE — 85730 THROMBOPLASTIN TIME PARTIAL: CPT

## 2017-04-30 PROCEDURE — 80202 ASSAY OF VANCOMYCIN: CPT

## 2017-04-30 PROCEDURE — 85027 COMPLETE CBC AUTOMATED: CPT

## 2017-04-30 PROCEDURE — A9270 NON-COVERED ITEM OR SERVICE: HCPCS | Performed by: HOSPITALIST

## 2017-04-30 PROCEDURE — 700102 HCHG RX REV CODE 250 W/ 637 OVERRIDE(OP): Performed by: HOSPITALIST

## 2017-04-30 PROCEDURE — 700111 HCHG RX REV CODE 636 W/ 250 OVERRIDE (IP)

## 2017-04-30 PROCEDURE — 770001 HCHG ROOM/CARE - MED/SURG/GYN PRIV*

## 2017-04-30 PROCEDURE — 83036 HEMOGLOBIN GLYCOSYLATED A1C: CPT

## 2017-04-30 PROCEDURE — 700105 HCHG RX REV CODE 258

## 2017-04-30 PROCEDURE — 99233 SBSQ HOSP IP/OBS HIGH 50: CPT | Performed by: HOSPITALIST

## 2017-04-30 PROCEDURE — 82962 GLUCOSE BLOOD TEST: CPT | Mod: 91

## 2017-04-30 PROCEDURE — 700101 HCHG RX REV CODE 250: Performed by: HOSPITALIST

## 2017-04-30 PROCEDURE — 306263 VAC CANNISTER W/GEL 500ML: Performed by: SURGERY

## 2017-04-30 PROCEDURE — 85610 PROTHROMBIN TIME: CPT

## 2017-04-30 PROCEDURE — 700105 HCHG RX REV CODE 258: Performed by: HOSPITALIST

## 2017-04-30 PROCEDURE — 84100 ASSAY OF PHOSPHORUS: CPT

## 2017-04-30 RX ORDER — ZOLPIDEM TARTRATE 5 MG/1
5 TABLET ORAL
Status: DISCONTINUED | OUTPATIENT
Start: 2017-04-30 | End: 2017-05-08 | Stop reason: HOSPADM

## 2017-04-30 RX ORDER — MESALAMINE 400 MG/1
800 CAPSULE, DELAYED RELEASE ORAL 3 TIMES DAILY
Status: DISCONTINUED | OUTPATIENT
Start: 2017-04-30 | End: 2017-05-08 | Stop reason: HOSPADM

## 2017-04-30 RX ORDER — TRAMADOL HYDROCHLORIDE 50 MG/1
100 TABLET ORAL EVERY 6 HOURS PRN
Status: DISCONTINUED | OUTPATIENT
Start: 2017-04-30 | End: 2017-05-01

## 2017-04-30 RX ORDER — BUPROPION HYDROCHLORIDE 150 MG/1
150 TABLET, EXTENDED RELEASE ORAL 2 TIMES DAILY
Status: DISCONTINUED | OUTPATIENT
Start: 2017-04-30 | End: 2017-05-08 | Stop reason: HOSPADM

## 2017-04-30 RX ADMIN — PIPERACILLIN SODIUM AND TAZOBACTAM SODIUM 4.5 G: 4; .5 INJECTION, POWDER, FOR SOLUTION INTRAVENOUS at 17:36

## 2017-04-30 RX ADMIN — BUPROPION HYDROCHLORIDE 150 MG: 150 TABLET, FILM COATED, EXTENDED RELEASE ORAL at 09:37

## 2017-04-30 RX ADMIN — POTASSIUM CHLORIDE AND SODIUM CHLORIDE: 900; 150 INJECTION, SOLUTION INTRAVENOUS at 17:31

## 2017-04-30 RX ADMIN — INSULIN HUMAN 9 UNITS: 100 INJECTION, SOLUTION PARENTERAL at 12:00

## 2017-04-30 RX ADMIN — Medication 0.7 MG/HR: at 02:14

## 2017-04-30 RX ADMIN — HYDROMORPHONE HYDROCHLORIDE 5 MG: 2 INJECTION INTRAMUSCULAR; INTRAVENOUS; SUBCUTANEOUS at 13:32

## 2017-04-30 RX ADMIN — HYDROMORPHONE HYDROCHLORIDE 5 MG: 2 INJECTION INTRAMUSCULAR; INTRAVENOUS; SUBCUTANEOUS at 16:56

## 2017-04-30 RX ADMIN — PIPERACILLIN SODIUM AND TAZOBACTAM SODIUM 4.5 G: 4; .5 INJECTION, POWDER, FOR SOLUTION INTRAVENOUS at 05:30

## 2017-04-30 RX ADMIN — INSULIN HUMAN 6 UNITS: 100 INJECTION, SOLUTION PARENTERAL at 17:37

## 2017-04-30 RX ADMIN — MESALAMINE 800 MG: 400 CAPSULE, DELAYED RELEASE ORAL at 22:02

## 2017-04-30 RX ADMIN — Medication 0.7 MG/HR: at 06:43

## 2017-04-30 RX ADMIN — VANCOMYCIN HYDROCHLORIDE 1600 MG: 100 INJECTION, POWDER, LYOPHILIZED, FOR SOLUTION INTRAVENOUS at 09:30

## 2017-04-30 RX ADMIN — VANCOMYCIN HYDROCHLORIDE 1600 MG: 100 INJECTION, POWDER, LYOPHILIZED, FOR SOLUTION INTRAVENOUS at 22:48

## 2017-04-30 RX ADMIN — PROMETHAZINE HYDROCHLORIDE 25 MG: 25 TABLET ORAL at 04:22

## 2017-04-30 RX ADMIN — LEVOTHYROXINE, LIOTHYRONINE 75 MG: 19; 4.5 TABLET ORAL at 08:43

## 2017-04-30 RX ADMIN — PIPERACILLIN SODIUM AND TAZOBACTAM SODIUM 4.5 G: 4; .5 INJECTION, POWDER, FOR SOLUTION INTRAVENOUS at 11:59

## 2017-04-30 RX ADMIN — INSULIN HUMAN 25 UNITS: 100 INJECTION, SUSPENSION SUBCUTANEOUS at 17:36

## 2017-04-30 RX ADMIN — HYDROMORPHONE HYDROCHLORIDE 5 MG: 2 INJECTION INTRAMUSCULAR; INTRAVENOUS; SUBCUTANEOUS at 08:49

## 2017-04-30 RX ADMIN — ZOLPIDEM TARTRATE 5 MG: 5 TABLET, FILM COATED ORAL at 23:35

## 2017-04-30 RX ADMIN — HYDROMORPHONE HYDROCHLORIDE 0.7 MG/HR: 2 INJECTION INTRAMUSCULAR; INTRAVENOUS; SUBCUTANEOUS at 20:15

## 2017-04-30 RX ADMIN — BUPROPION HYDROCHLORIDE 150 MG: 150 TABLET, FILM COATED, EXTENDED RELEASE ORAL at 22:02

## 2017-04-30 RX ADMIN — INSULIN HUMAN 9 UNITS: 100 INJECTION, SOLUTION PARENTERAL at 22:02

## 2017-04-30 RX ADMIN — POTASSIUM CHLORIDE AND SODIUM CHLORIDE: 900; 150 INJECTION, SOLUTION INTRAVENOUS at 04:23

## 2017-04-30 RX ADMIN — INSULIN HUMAN 25 UNITS: 100 INJECTION, SUSPENSION SUBCUTANEOUS at 09:30

## 2017-04-30 ASSESSMENT — ENCOUNTER SYMPTOMS
DIZZINESS: 0
DIARRHEA: 0
NERVOUS/ANXIOUS: 0
FEVER: 0
BACK PAIN: 0
SHORTNESS OF BREATH: 0
EYE DISCHARGE: 0
PALPITATIONS: 0
SPEECH CHANGE: 0
DEPRESSION: 0
SENSORY CHANGE: 0
ABDOMINAL PAIN: 0
STRIDOR: 0
NECK PAIN: 0
NAUSEA: 0
CHILLS: 0
VOMITING: 0
MYALGIAS: 1
HEADACHES: 0
COUGH: 0

## 2017-04-30 ASSESSMENT — PAIN SCALES - GENERAL
PAINLEVEL_OUTOF10: 3
PAINLEVEL_OUTOF10: 4
PAINLEVEL_OUTOF10: 5
PAINLEVEL_OUTOF10: 3
PAINLEVEL_OUTOF10: 4
PAINLEVEL_OUTOF10: 3
PAINLEVEL_OUTOF10: 6
PAINLEVEL_OUTOF10: 5
PAINLEVEL_OUTOF10: 4
PAINLEVEL_OUTOF10: 4
PAINLEVEL_OUTOF10: 5
PAINLEVEL_OUTOF10: 3
PAINLEVEL_OUTOF10: 5

## 2017-04-30 ASSESSMENT — LIFESTYLE VARIABLES
TOTAL SCORE: 0
TOTAL SCORE: 0
HAVE PEOPLE ANNOYED YOU BY CRITICIZING YOUR DRINKING: NO
AVERAGE NUMBER OF DAYS PER WEEK YOU HAVE A DRINK CONTAINING ALCOHOL: 0
TOTAL SCORE: 0
ON A TYPICAL DAY WHEN YOU DRINK ALCOHOL HOW MANY DRINKS DO YOU HAVE: 0
HAVE YOU EVER FELT YOU SHOULD CUT DOWN ON YOUR DRINKING: NO
ALCOHOL_USE: YES
CONSUMPTION TOTAL: NEGATIVE
EVER_SMOKED: NEVER
EVER HAD A DRINK FIRST THING IN THE MORNING TO STEADY YOUR NERVES TO GET RID OF A HANGOVER: NO
HOW MANY TIMES IN THE PAST YEAR HAVE YOU HAD 5 OR MORE DRINKS IN A DAY: 0
EVER FELT BAD OR GUILTY ABOUT YOUR DRINKING: NO

## 2017-04-30 ASSESSMENT — PATIENT HEALTH QUESTIONNAIRE - PHQ9
SUM OF ALL RESPONSES TO PHQ9 QUESTIONS 1 AND 2: 0
SUM OF ALL RESPONSES TO PHQ QUESTIONS 1-9: 0
2. FEELING DOWN, DEPRESSED, IRRITABLE, OR HOPELESS: NOT AT ALL
1. LITTLE INTEREST OR PLEASURE IN DOING THINGS: NOT AT ALL

## 2017-04-30 NOTE — OR SURGEON
Immediate Post-Operative Note      PreOp Diagnosis: right thigh abscess    PostOp Diagnosis: same, right thigh necrotizing myositis    Procedure(s):  IRRIGATION & DEBRIDEMENT GENERAL - Wound Class: Dirty or Infected    Surgeon(s):  Esau Dooley M.D.    Anesthesiologist/Type of Anesthesia:  Anesthesiologist: Melvin Pfeiffer M.D./General    Surgical Staff:  Circulator: Asael Fuentes R.N.  Relief Circulator: Gerard Tadeo R.N.  Scrub Person: Leopold von Garcia    Specimen: right thigh abscess fluid for culture, right thigh muscle    Estimated Blood Loss: 250 cc    Findings: right thigh intramuscular abscess with adjacent necrotizing myositis    Complications: none    Plan for second look in 24-48 hours        4/29/2017 7:26 PM Esau Dooley

## 2017-04-30 NOTE — WOUND TEAM
Spoke to JAZMINE Crane regarding ABD wound.  Re-ordered wound VAC and cannister and advised RN how to switch patient to hospital VAC.  Advised to leave home VAC in room.  Patient returning to surgery for leg wound, per RN wound team to manage wound VAC dressing to ABD.  Advised RN will begin dressing changes tomorrow.

## 2017-04-30 NOTE — OR NURSING
Patient started to wake up, pulling enough volume on his own, inspiratory wheezes less, oral airway and LMA dc'd, sats on oxymask holding above 90's.

## 2017-04-30 NOTE — PROGRESS NOTES
"Pharmacy Kinetics 51 y.o. male on vancomycin day # 2 2017    Currently on Vancomycin 1600 mg iv q12hr (0900, 2100)  Other antibiotics: Zosyn 4.5g IV q6hr    Indication for Treatment: SSTI/abscess    Pertinent history per medical record: Admitted on 2017 for evaluation of wound to right thigh and draining wounds to right wrist and wound to L hand. Wound on thigh has needle stick scab and appears to be secondary to testosterone injection per patient.  All wounds are warm to the touch.  Patient also has wound vac to abdomen and had recent hospitalizations secondary to infection of surgical site of hernia revision.  History of wound culture (17) positive for Klebsiella pneumoniae and Enterococcus faecalis (S to vancomycin).  ID has been consulted.  Recommending an echocardiogram due to presence of multiple abscesses.     Allergies: Peanut-derived; Tradjenta; and Hydrocodone     List concerns for renal function: elevated BUN/SCr, obese (BMI ~34), hypotensive but responding to fluids (resolved)     Pertinent cultures to date:    - R thigh abscess: many mixed bacteria    - PBC x 2: GNR in both sets    - Urine cx: NGTD     Recent Labs      17   1100  17   0530   WBC  16.7*  10.2   NEUTSPOLYS  88.60*   --    BANDSSTABS  8.80   --      Recent Labs      17   1100  17   0530   BUN  23*  17   CREATININE  1.11  0.75   ALBUMIN  4.3  3.1*     Recent Labs      17   0640   VANCORANDOM  13.8     Intake/Output Summary (Last 24 hours) at 17 1624  Last data filed at 17 1400   Gross per 24 hour   Intake 5769.6 ml   Output   2800 ml   Net 2969.6 ml      Blood pressure 125/68, pulse 88, temperature 37.7 °C (99.9 °F), resp. rate 15, height 1.74 m (5' 8.5\"), weight 108.1 kg (238 lb 5.1 oz), SpO2 98 %. Temp (24hrs), Av.3 °C (100.9 °F), Min:37.7 °C (99.9 °F), Max:39.3 °C (102.7 °F)      A/P   1. Vancomycin dose change: 1600 mg IV q12h   2. Next vancomycin level:  at " 0830  3. Goal trough: 16-20 mcg/mL until bacteremia is ruled out  4. Comments: 18 hour level demonstrating patient able to tolerate q12h dosing.  Will start patient conservatively on 15 mg/kg q12h.  Renal indices today have improved and UOP at 1.6 mL/kg/hr.  Increased trough goal in light of positive blood cultures and ID wanting echocardiogram ordered due to presence of multiple abscesses.  Clinical pharmacist to follow-up with level tomorrow morning and adjust dose as indicated.     Nettie Charles, PharmD, BCPS

## 2017-04-30 NOTE — PROGRESS NOTES
Call placed to wound at  to notify them that pt was admitted with wound vac and is currently using his home portable wound vac

## 2017-04-30 NOTE — PROGRESS NOTES
"Hospital Medicine Progress Note, Adult, Complex               Author: Pradip Scott Date & Time created: 4/30/2017  7:40 AM     Interval History:  ID: 50yo M with recent abdominal hernia operation with complications.  Here with sepsis from anterior thigh abscess from testosterone self injection.    Today:  Severe pain after wound care change/packing.  Pain \"throughout\" body.  Wound/cellulitis on bilateral base of thumb/wrist and right medial ankle.  His speech is clear and he is oriented.  Care discussed in am ICU MDR.  Okay to transfer to gen surgical floor.    Review of Systems:  Review of Systems   Constitutional: Positive for malaise/fatigue. Negative for fever and chills.   Eyes: Negative for discharge.   Respiratory: Negative for cough, shortness of breath and stridor.    Cardiovascular: Negative for chest pain, palpitations and leg swelling.   Gastrointestinal: Negative for nausea, vomiting, abdominal pain and diarrhea.   Genitourinary: Negative for dysuria and hematuria.   Musculoskeletal: Positive for myalgias and joint pain. Negative for back pain and neck pain.   Skin: Negative for rash.   Neurological: Negative for dizziness, speech change and headaches.   Psychiatric/Behavioral: Negative for depression. The patient is not nervous/anxious.        Physical Exam:  Physical Exam   Constitutional: He is oriented to person, place, and time. He appears well-developed and well-nourished. No distress.   Overweight   HENT:   Head: Normocephalic and atraumatic.   Nose: Nose normal.   Mouth/Throat: Oropharynx is clear and moist.   Eyes: Conjunctivae and EOM are normal. Right eye exhibits no discharge. Left eye exhibits no discharge. No scleral icterus.   Neck: No tracheal deviation present.   Right IJ central line   Cardiovascular: Normal rate, regular rhythm, normal heart sounds and intact distal pulses.    No murmur heard.  Pulmonary/Chest: Effort normal and breath sounds normal. No stridor. No respiratory " distress. He has no wheezes.   Abdominal: Soft. Bowel sounds are normal. He exhibits no distension. There is no tenderness.   Musculoskeletal: He exhibits no edema.   Right anterior lateral thigh with open wound packed and largely covered with clean dressing.   Neurological: He is alert and oriented to person, place, and time. No cranial nerve deficit.   Skin: Skin is warm. He is not diaphoretic.   Erythema on bilateral posterior wrist near base of thumbs, right ankle erythema.   Psychiatric: He has a normal mood and affect. His behavior is normal. Thought content normal.   Vitals reviewed.      Labs:        Invalid input(s): [id number]      Recent Labs      17   1100  17   0530  17   0640   SODIUM  127*  137   --    POTASSIUM  3.9  4.2   --    CHLORIDE  90*  105   --    CO2  22  22   --    BUN  23*  17   --    CREATININE  1.11  0.75   --    MAGNESIUM  2.0   --    --    PHOSPHORUS  <1.0*   --   2.3*   CALCIUM  9.2  7.3*   --      Recent Labs      17   1100  17   0530   ALTSGPT  80*  42   ASTSGOT  35  24   ALKPHOSPHAT  151*  104*   TBILIRUBIN  1.2  0.6   GLUCOSE  390*  340*     Recent Labs      17   1100  17   0530   RBC  4.22*  2.97*   HEMOGLOBIN  12.6*  9.0*   HEMATOCRIT  36.0*  26.6*   PLATELETCT  192  130*     Recent Labs      17   1100  17   0530   WBC  16.7*  10.2   NEUTSPOLYS  88.60*   --    LYMPHOCYTES  2.60*   --    MONOCYTES  0.00   --    EOSINOPHILS  0.00   --    BASOPHILS  0.00   --    ASTSGOT  35  24   ALTSGPT  80*  42   ALKPHOSPHAT  151*  104*   TBILIRUBIN  1.2  0.6           Hemodynamics:  Temp (24hrs), Av.3 °C (100.9 °F), Min:37.4 °C (99.4 °F), Max:39.3 °C (102.7 °F)  Temperature: 37.7 °C (99.9 °F), Monitored Temp: 36 °C (96.8 °F)  Pulse  Av.4  Min: 77  Max: 115Heart Rate (Monitored): 77  Blood Pressure: 125/68 mmHg, Arterial BP: (!) 93/62 mmHg, NIBP: (!) 96/53 mmHg  CVP (mm Hg): (!) 7 MM HG  Respiratory:    Respiration: (!) 9, Pulse  Oximetry: 96 %, O2 Daily Delivery Respiratory : Silicone Nasal Cannula        RUL Breath Sounds: Clear;Diminished, RML Breath Sounds: Diminished, RLL Breath Sounds: Diminished, NADEEN Breath Sounds: Clear;Diminished, LLL Breath Sounds: Diminished  Fluids:    Intake/Output Summary (Last 24 hours) at 04/30/17 0740  Last data filed at 04/30/17 0600   Gross per 24 hour   Intake 7617.6 ml   Output   2300 ml   Net 5317.6 ml     Weight: 108.1 kg (238 lb 5.1 oz)  GI/Nutrition:  Orders Placed This Encounter   Procedures   • DIET NPO     Standing Status: Standing      Number of Occurrences: 1      Standing Expiration Date:      Order Specific Question:  Restrict to:     Answer:  Sips with Medications [3]     Medical Decision Making, by Problem:  Active Hospital Problems    Diagnosis   • *Severe sepsis (CMS-HCC) [A41.9, R65.20]  - follow up on blood cultures.  NGTD  - infectious disease, Dr Santoro consulting.  - Wound care to right thigh with pain management of time of dressing changes  - Need tight control of blood glucose     • Type 2 diabetes mellitus (CMS-HCC) [E11.9]  - Add SSI, monitor accuchecks.  - NPH 25 units BID.     • Chronic ulcerative colitis (CMS-HCC) [K51.90]  - mesalamine restarted     • Complicated wound infection (CMS-HCC) [T79.8XXA]  - s/p I+D of thigh abscess by surgeon 4/29pm  - f/u on cultures.  Continue vancomycin and zosyn for now.     • Thrombocytopenia (CMS-HCC) [D69.6]     • Hypothyroid [E03.9]  - [place] thyroid       Radiology images reviewed, Labs reviewed and Medications reviewed    Central line in place: Vasopressors and Sepsis          Antibiotics: Treating active infection/contamination beyond 24 hours perioperative coverage

## 2017-04-30 NOTE — CONSULTS
DATE OF SERVICE:  04/29/2017    INFECTIOUS DISEASE CONSULTATION    DATE OF ADMISSION:  04/29/2017    DATE OF CONSULTATION:  04/29/2017    REFERRING PHYSICIAN:  Christopher Gomez MD    REASON FOR CONSULTATION:  Septic shock.    HISTORY OF PRESENT ILLNESS:  The patient is a 51-year-old white male who is   known to us from his previous admission.  He was seen by me in consultation on   04/07/2017 for infection of his umbilical hernia site.  He has underlying   history of diabetes mellitus, ulcerative colitis, hypertension, and obesity.    His periumbilical abscess cultures have grown E. faecalis, Bacteroides   fragilis and Klebsiella pneumoniae.  He apparently gave himself a testosterone   injection in his right thigh 4 days ago and he woke up the next morning with   fever pain in the right tight.  It has been continued to get worse.  He was   vomiting.  Hence, he came into the emergency room.  In the ER, his WBC count   was 16,000 and blood pressure was 80/40 and he had a CT of his right lower   extremity, which showed 14 cm anterior thigh abscess.  He is being seen by   surgery and is being taken to the OR and infectious disease consult was called   for antibiotics.    REVIEW OF SYSTEMS:  Complains of fevers, complains of chills, complains of   right thigh pain, complains of nausea and vomiting.  Other review of systems   is negative per AMA and CMS criteria.    ALLERGIES:  HYDROCODONE, TRADJENTA.    PAST MEDICAL HISTORY:  History of ulcerative colitis, diabetes mellitus,   pancreatitis, hypothyroidism.    PAST SURGICAL HISTORY:  Cholecystectomy, left wrist surgery, umbilical hernia   repair, lumbar surgery.    SOCIAL HISTORY:  Does not smoke, does not drink.    FAMILY HISTORY:  Positive for non-Hodgkin's lymphoma in mother.    MEDICATIONS:  Currently, he is on Dilaudid, vancomycin, Levophed, vasopressin,   Zofran, Zosyn, Phenergan, Colace, MiraLax, [place] Thyroid.    LABORATORY DATA:  His WBC count is 16.7, platelets  of 192, creatinine is 1.11,   ALT is 80, alkaline phosphatase is 151.  UA is negative.  Blood cultures on   April 19 and April 25 were negative.    PHYSICAL EXAMINATION:  GENERAL:  Patient looks ill.  VITAL SIGNS:  Temperature is 102.7, blood pressure is 82/59, pulse is 110.  HEAD AND ENT:  Mucosa is moist.  No oral thrush.  NECK:  Supple.  No lymphadenopathy.  CHEST:  Bilateral air entry, clear to auscultation.  CARDIOVASCULAR:  Tachycardic.  Regular.  No murmurs, no gallops heard.  ABDOMEN:  Soft, has a wound VAC in place.  EXTREMITIES:  Right thigh significant induration, swelling and some erythema,   also both dorsal surfaces of the thumbs have swelling and erythema, also has   swelling and erythema above his right ankle.  CENTRAL NERVOUS SYSTEM:  Alert and oriented x3.  No focal neurological   deficit.    ASSESSMENT:  1.  Septic shock.  2.  Right thigh abscess.  3.  Possible abscesses in hands and ankle.  4.  Ulcerative colitis.  5.  Umbilical hernia site infection seems to be resolving.    RECOMMENDATIONS:  At this time, I would recommend to continue with the   vancomycin and Zosyn.  He is scheduled  for surgery today.  follow all the culture   Results.  Continue  with the supportive measures.  He will need possibly I and D   of multiple sites.  I have reviewed all the records.  Plan was discussed with   internal medicine.    Thank you for the consultation.       ____________________________________     ARYAN ROBLES MD JD / ST[facility]    DD:  04/29/2017 18:04:41  DT:  04/29/2017 19:55:45    D#:  [id number]  Job#:  [id number]

## 2017-04-30 NOTE — CARE PLAN
Problem: Pain Management  Goal: Pain level will decrease to patient’s comfort goal  Patient assessed q2h and PRN for pain and effectiveness and medication (see MAR - dilaudid PCA). Non-pharmacological interventions in use.     Problem: Skin Integrity  Goal: Risk for impaired skin integrity will decrease  Full skin assessment complete. Pressure ulcer prevention interventions in place.

## 2017-04-30 NOTE — OR NURSING
Patient arrived to PACU, bedside report from anesthesia and JAZMINE Toscano.  Patient has LMA and oral airway in place, audible inspiratory wheezes, inline albuterol neb started. Patient connected to monitors and oxygen, as indicated.

## 2017-04-30 NOTE — CONSULTS
"Surgical Consult    Date: 4/29/2017    Requesting Physician: Dr. Kan    Consulting Physician: Esau Dooley M.D. [facility]    Reason for consultation: thigh abscess    CC: leg pain    HPI: This is a 51 y.o. male who is well known to me for umbilical hernia repair and subsequent infection requiring mesh excision. He was feeling well until 3 days ago, when he had onset of fevers and malaise. He also had pain and decreased movement in his right leg in the area of his testosterone injection site. The pain and fevers became worse, and he presented to ED. He did not have any drainage. His last wound VAC change at his umbilicus showed a well healing wound reportedly. On admission here, he was febrile to ~104, and became hypotensive.     Past Medical History   Diagnosis Date   • Migraine    • Gout    • Indigestion    • UC (ulcerative colitis) (CMS-HCC) 3-3-17     \"Five BM's per day, takes Lialda\"   • Difficult intubation 2-2016   • Arthritis 3-3-17     \"Spine\"   • Sepsis (CMS-HCC) 1/21/2016   • Essential hypertension 1/22/2016   • Snoring 3-3-17     Has not had a sleep study   • High cholesterol 3-3-17     Does not currently take medication for   • Congestive heart failure (CMS-HCC) 2-2016      3-3-17 \"Not a current issue\"   • ASTHMA 3-3-17     \"Hasn't had to use inhaler in 2 years\"   • Aspiration pneumonia (CMS-HCC) 3-2016   • Breath shortness 3-3-17     \"With Exercise\"   • Hypothyroid 3-3-17     Takes [place] Thyroid   • Pain 3-3-17     \"Left Flank\"   • Heart burn    • Depression 11/26/2014   • Anesthesia      \"Was difficult to intubate 2-2016\"   • Pancreatitis 2-2016     \"D/T Tradjenta was hospitialized for 15 days\"   • Elevated liver enzymes 2-2016   • Electrolyte imbalance 2-2016   • Kidney stones    • ADRIANE (acute kidney injury) (CMS-HCC) 2/24/2016   • RF (renal failure) 2-2016   • Diabetes (CMS-HCC) 3-3-17     Takes Insulin   • DKA (diabetic ketoacidoses) (CMS-HCC) 2-2016     \"10 days on vent with DKA, " "Renal failure, CHF, elevated liver enzymes and electrolyte imbalance\"   • On mechanically assisted ventilation (CMS-HCC) 2-2016     HX \"On Vent for 10 days at [facility]\".  \"DX Pancreatitis, DKA, CHF, Elevated Liver Enzymes & Electrolyte Imbalance\".       Past Surgical History   Procedure Laterality Date   • Carmenza by laparoscopy  2005   • Colonoscopy with biopsy  11/26/2014     Performed by Solo Higginbotham M.D. at ENDOSCOPY [facility]   • Umbilical hernia repair N/A 11/6/2016     Procedure: UMBILICAL HERNIA REPAIR;  Surgeon: Esau Dooley M.D.;  Location: SURGERY [facility];  Service:    • Other orthopedic surgery  1997 or 1998     Left Wrist ORIF   • Umbilical hernia repair  3/10/2017     Procedure: UMBILICAL HERNIA REPAIR- INCISION AND DRAINAGE OF UMBILICAL WOUND AND MESH REMOVAL;  Surgeon: Esau Dooley M.D.;  Location: SURGERY SAME DAY [facility];  Service:    • Incision and drainage general  4/7/2017     Procedure: INCISION AND DRAINAGE GENERAL;  Surgeon: Esau Dooley M.D.;  Location: SURGERY SAME DAY [facility];  Service:        Current Facility-Administered Medications   Medication Dose Route Frequency Provider Last Rate Last Dose   • NS infusion 3,063 mL  30 mL/kg Intravenous Once PRN Christopher Gomez M.D.       • senna-docusate (PERICOLACE or SENOKOT S) 8.6-50 MG per tablet 2 Tab  2 Tab Oral BID Christopher Gomez M.D.        And   • polyethylene glycol/lytes (MIRALAX) PACKET 1 Packet  1 Packet Oral QDAY PRN Christopher Gomez M.D.        And   • magnesium hydroxide (MILK OF MAGNESIA) suspension 30 mL  30 mL Oral QDAY PRN Christopher Gomez M.D.        And   • bisacodyl (DULCOLAX) suppository 10 mg  10 mg Rectal QDAY PRN Christopher Gomez M.D.       • Respiratory Care per Protocol   Nebulization Continuous RT Christopher Gomez M.D.       • 0.9 % NaCl with KCl 20 mEq infusion   Intravenous Continuous Christopher Gomez M.D. 125 mL/hr at 04/29/17 1648     • NS (BOLUS) infusion 1,000 mL  1,000 mL " Intravenous Once PRN Christopher Gomez M.D.       • norepinephrine (LEVOPHED) 8 mg in  mL Infusion  0-30 mcg/min Intravenous Continuous Christopher Gomez M.D.   Stopped at 04/29/17 1615    And   • vasopressin (VASOSTRICT) 20 Units in  mL Infusion  0.03 Units/min Intravenous Continuous Christopher Gomez M.D.   Stopped at 04/29/17 1615   • ondansetron (ZOFRAN) syringe/vial injection 4 mg  4 mg Intravenous Q4HRS PRN Christopher Gomez M.D.       • ondansetron (ZOFRAN ODT) dispertab 4 mg  4 mg Oral Q4HRS PRN Christopher Gomez M.D.       • promethazine (PHENERGAN) tablet 12.5-25 mg  12.5-25 mg Oral Q4HRS PRN Christopher Gomez M.D.       • promethazine (PHENERGAN) suppository 12.5-25 mg  12.5-25 mg Rectal Q4HRS PRN Christopher Gomez M.D.       • prochlorperazine (COMPAZINE) injection 5-10 mg  5-10 mg Intravenous Q4HRS PRN Christopher Gomez M.D.       • MD ALERT... vancomycin per pharmacy protocol   Other pharmacy to dose Christopher Gomez M.D.       • HYDROmorphone (DILAUDID) 0.1 mg/mL in 50mL NS (PCA)   Intravenous Continuous Christopher Gomez M.D.        And   • Pharmacy Consult Request ...Pain Management Review 1 Each  1 Each Other PRN Christopher Gomez M.D.       • sodium phosphate 30 mmol in 1/2  mL ivpb  30 mmol Intravenous Once Christopher Gomez M.D.       • [START ON 4/30/2017] thyroid ([place]OUR THYROID) tablet 75 mg  75 mg Oral AM ES Christopher Gomez M.D.       • piperacillin-tazobactam (ZOSYN) 4.5 g in  mL IVPB  4.5 g Intravenous Q6HRS Christopher Gomez M.D. 100 mL/hr at 04/29/17 1707 4.5 g at 04/29/17 1707       Social History     Social History   • Marital Status: Single     Spouse Name: N/A   • Number of Children: N/A   • Years of Education: N/A     Occupational History   • Not on file.     Social History Main Topics   • Smoking status: Never Smoker    • Smokeless tobacco: Not on file   • Alcohol Use: No      Comment: occ   • Drug Use: No   • Sexual Activity: Not on file     Other Topics Concern   • Not on file     Social  "History Narrative    ** Merged History Encounter **         ** Merged History Encounter **            No family history on file. reviewed, non-contributory    Allergies:  Peanut-derived; Tradjenta; and Hydrocodone    Review of Systems:  Constitutional: as above  HENT: Negative for hearing loss, ear pain, nosebleeds, congestion, sore throat, neck pain, tinnitus and ear discharge.    Eyes: Negative for blurred vision, double vision, photophobia, pain, discharge and redness.   Respiratory: Negative for cough, hemoptysis, sputum production, shortness of breath, wheezing and stridor.    Cardiovascular: Negative for chest pain, palpitations, orthopnea, claudication, leg swelling and PND.   Gastrointestinal: Negative for heartburn, nausea, vomiting, abdominal pain, diarrhea, constipation, blood in stool and melena.   Genitourinary: Negative for dysuria, urgency, frequency, hematuria and flank pain.   Musculoskeletal: as above   Skin: Negative for itching and rash.  Neurological: Negative for dizziness, tingling, tremors, sensory change, speech change, focal weakness, seizures, loss of consciousness, weakness and headaches.   Endo/Heme/Allergies: Negative for environmental allergies and polydipsia. Does not bruise/bleed easily.   Psychiatric/Behavioral: Negative for depression, suicidal ideas, hallucinations, memory loss and substance abuse. The patient is not nervous/anxious and does not have insomnia.    Physical Exam:  Blood pressure 125/68, pulse 111, temperature 39 °C (102.2 °F), resp. rate 33, height 1.74 m (5' 8.5\"), weight 102.059 kg (225 lb), SpO2 99 %.    Constitutional: he is oriented to person, place, and time.  he appears well-developed and well-nourished. Mild distress.   Head: Normocephalic and atraumatic.   Neck: Normal range of motion. Neck supple. No JVD present. No tracheal deviation present. No thyromegaly present.   Cardiovascular: tachycardia, regular rhythm, normal heart sounds and intact distal " pulses.  Exam reveals no gallop and no friction rub.  No murmur heard.  Pulmonary/Chest: Effort normal and breath sounds normal. No stridor. No respiratory distress. he has no wheezes. She has no rales.   Abdominal: Soft. Bowel sounds are normal. he exhibits no distension and no mass. There is no tenderness. There is no rebound and no guarding. Wound VAC is in place with no surrounding erythema or induration.   Musculoskeletal: Normal range of motion except right LE. Right thigh is indurated, erythematous, and hot to touch. Decreased flexion and extension at knee. Erythema and warmth extends to proximal leg.   Neurological: he is alert and oriented to person, place, and time. he has normal reflexes. No cranial nerve deficit. Coordination normal.   Skin: Skin is warm and dry. No rash noted. he is not diaphoretic. No erythema. No pallor.   Psychiatric: he has a normal mood and affect.  Behavior is normal.       Labs:  Recent Labs      04/29/17   1100   WBC  16.7*   RBC  4.22*   HEMOGLOBIN  12.6*   HEMATOCRIT  36.0*   MCV  85.3   MCH  29.9   MCHC  35.0   RDW  40.0   PLATELETCT  192   MPV  9.6     Recent Labs      04/29/17   1100   SODIUM  127*   POTASSIUM  3.9   CHLORIDE  90*   CO2  22   GLUCOSE  390*   BUN  23*   CREATININE  1.11   CALCIUM  9.2         Recent Labs      04/29/17   1100   ASTSGOT  35   ALTSGPT  80*   TBILIRUBIN  1.2   ALKPHOSPHAT  151*   GLOBULIN  3.5       Radiology:  CT right lower extremity:  14 cm distal anterior thigh abscess with no CT evidence of septic arthropathy or osteomyelitis    Assessment: This is a 51 y.o. With sepsis and right thigh abscess. Presentation is concerning for possible necrotizing soft tissue infection or necrotizing fasciitis    Plan: to OR for emergent drainage and possible debridement. I explained that he may have extensive surgery or require repeat procedures depending on findings at time of OR. Continue aggressive resuscitation and broad spectrum antibiotics per primary  team.     Thank you very much for this consultation.    Esau Dooley M.D.  [facility]  [phone]

## 2017-04-30 NOTE — RESPIRATORY CARE
COPD EDUCATION by COPD CLINICAL EDUCATOR  4/30/2017 at 7:51 AM by Jacey Blackwell     Patient reviewed by COPD education team. Patient does not qualify for COPD program.

## 2017-04-30 NOTE — H&P
IDENTIFICATION:  A 51-year-old male, patient of Dr. Stein, as well as Dr. Dooley, surgery; and [facility] [employer] [facility].    CHIEF COMPLAINT:  Right thigh pain.    HISTORY OF PRESENT ILLNESS:  The patient has a complex medical history of   diabetes mellitus and ulcerative colitis with recent septic shock from   umbilical hernia abscess.  He underwent surgery by Dr. Dooley on 4/7/2017,   has subsequently had a wound VAC on.  He has been followed by infectious   disease.  He gave himself  testosterone injection 4 days ago in the right   thigh, woke up the next morning with a fever, pain in the right thigh and   swelling, this is worsened.  He has been vomiting 6-7 times per day.  He   presented to the emergency room with these complaints.  Here, his white blood   cell count is 16,000.  He is hypotensive with a blood pressure in the 80s/40s,   and a CAT scan here reveals a 14 cm distal thigh abscess.  He will be   admitted to the intensive care unit in guarded condition and Dr. Dooley,   surgery, has planned on surgery this afternoon around 5:30 in the afternoon.    REVIEW OF SYSTEMS:  As above.  He states he has had vomiting, no diarrhea.    Otherwise, full review of systems discussed and negative except those   mentioned above.    PAST MEDICAL HISTORY:  Ulcerative colitis, diabetes mellitus, pancreatitis   secondary to the medicine TRADJENTA, gout, hypothyroidism.  His last   echocardiogram on 1/25/2016 revealed ejection fraction of 60%.    PAST SURGICAL HISTORY:  Umbilical hernia repair followed by then umbilical   abscess, cholecystectomy, left wrist surgery, and lumbar surgery.    SOCIAL HISTORY:  Does not drink or smoke.  He is a nurse at [employer].    FAMILY HISTORY:  Significant for non-Hodgkin's lymphoma in his mother.    ALLERGIES:  PEANUTS, TRADJENTA, HYDROCODONE, MORPHINE, OXYCODONE.    HOME MEDICATIONS:  Tylenol 650 mg as needed for pain or fever, Wellbutrin 300   mg extended release daily, vitamin D  5000 units daily, insulin glargine 62   units daily, Humalog insulin sliding scale, magnesium oxide 400 mg daily,   Lialda 2.4 g daily, Percocet 10/325 one every 8 hours as needed for pain,   testosterone 200 mg intramuscularly weekly, [place] Thyroid 75 mg daily.    PHYSICAL EXAMINATION:  VITAL SIGNS:  Pulse is 105, blood pressure is 92/45, O2 sat 97% on 2 liters   nasal cannula oxygen, temperature is 99.4.  GENERAL:  Patient is well-developed, well-nourished appearing.  PSYCHIATRIC:  He is awake, alert and oriented x4.  HEENT:  Dry mucous membranes.  No scleral icterus.  NECK:  Supple without masses.  No adenopathy.  HEART:  Tachycardic without murmurs.  LUNGS:  Clear bilaterally with normal respiratory effort.  ABDOMEN:  Distended.  He has a wound VAC placed in the umbilicus.  EXTREMITIES:  The right thigh is markedly swollen, tender, red and hot to the   touch.  INTEGUMENT:  His right hand has redness on the thumb as well as some redness   in the right foot.    LABORATORY DATA:  CAT scan of the extremities shows at 14 cm distal anterior   thigh abscess.  His white blood cell count is 16,700.  Lactic acid is 3.6.    Phosphorus is measuring low less than 1, sodium is 127, creatinine is 1.1.    ASSESSMENT AND PLAN:  1.  Septic shock secondary to right thigh abscess, the patient is hypotensive,   will be admitted to intensive care unit, intravenous pressors have been   ordered.  After IV fluids have been given aggressively, I have ordered   vancomycin, Zosyn, reviewed the cultures from last admission when he had his   abdominal abscess.  He grew out enterococcus and Klebsiella.  I spoke with Dr. Santoro, infectious disease, for consultation.  The organ system dysfunction   associated septic shock is cardiovascular system given the fact that he is on   current pressors.  2.  Recent umbilical abscess, wound VAC is in place and Dr. Dooley will   follow.  3.  Severe right thigh pain.  The patient will be placed on a  PCA drip.  4.  Pseudohyponatremia, this is secondary to glucose of 390.  IV fluids will   be given aggressively and put on sliding scale once he is out of surgery.  5.  Hypophosphatemia.  We will replenish intravenously.  6.  History of ulcerative colitis.  7.  Diabetes mellitus.  We will use sliding scale once he is out of surgery.  8.  Hypothyroidism.    The patient is critically ill, 45 minutes critical care time spent on   admission of patient, not including procedures.       ____________________________________     TYRA HENRY MD    [facility] / [facility]    DD:  04/29/2017 16:07:52  DT:  04/29/2017 17:18:57    D#:  [id number]  Job#:  [id number]

## 2017-04-30 NOTE — PROGRESS NOTES
Patient reports uncontrolled pain via PCA. Basal rate and 4 hour lockout parameters changed per Dr. Bolaños. Parameters further verified with pharmacy.     Basal rate: 0.75 mg/hr  Bolus rate: 0.5 mg q15min  8 mg/4hr Lockout

## 2017-04-30 NOTE — PROGRESS NOTES
Infectious Disease Progress Note    Author: Anai Santoro M.D. Date of service & Time created: 2017  12:36 PM    Chief Complaint:  Chief Complaint   Patient presents with   • Fever   • Wound Infection       Interval History:  51-year-old white male with ulcerative colitis diabetes mellitus admitted with right thigh infection  2017- underwent I&D on 2017. Feels better. Fevers up to 102.7.   Labs Reviewed, Medications Reviewed, Radiology Reviewed and Wound Reviewed.    Review of Systems:  Review of Systems   Constitutional: Positive for malaise/fatigue. Negative for fever.   Respiratory: Negative for cough and shortness of breath.    Cardiovascular: Negative for chest pain.   Gastrointestinal: Negative for nausea and vomiting.   Genitourinary: Negative for dysuria.   Musculoskeletal: Positive for myalgias.        Right thigh and left hand   Neurological: Negative for sensory change, speech change and headaches.       Hemodynamics:  Temp (24hrs), Av.4 °C (101.2 °F), Min:37.7 °C (99.9 °F), Max:39.3 °C (102.7 °F)  Temperature: 37.7 °C (99.9 °F), Monitored Temp: 36 °C (96.8 °F)  Pulse  Av.4  Min: 77  Max: 115Heart Rate (Monitored): 77  Arterial BP: (!) 93/62 mmHg, NIBP: (!) 96/53 mmHg  CVP (mm Hg): (!) 7 MM HG    Physical Exam:  Physical Exam   Constitutional: He is oriented to person, place, and time. No distress.   HENT:   Mouth/Throat: No oropharyngeal exudate.   Eyes: No scleral icterus.   Neck: Neck supple.   Cardiovascular: Regular rhythm.    No murmur heard.  Pulmonary/Chest: He has no wheezes. He has no rales.   Abdominal: Soft. There is no tenderness. There is no rebound.   Wound VAC present   Musculoskeletal:   Right thigh bandaged  Swelling and erythema of the left hand extending to the thenar eminence  Erythema of the ankle has resolved  Right hand swelling is better   Neurological: He is alert and oriented to person, place, and time.   Skin: There is erythema.   Vitals  reviewed.      Meds:    Current facility-administered medications:   •  insulin regular (HUMULIN R) injection 3-15 Units, 3-15 Units, Subcutaneous, 4X/DAY ACHS, Pradip Scott D.O., 9 Units at 04/30/17 1200  •  insulin NPH (HUMULIN,NOVOLIN) injection 25 Units, 25 Units, Subcutaneous, BID INSULIN, Pradip Scott D.O., 25 Units at 04/30/17 0930  •  buPROPion SR (WELLBUTRIN-SR) tablet 150 mg, 150 mg, Oral, BID, Pradip Scott D.O., 150 mg at 04/30/17 0937  •  HYDROmorphone (DILAUDID) 0.1 mg/mL in 50mL NS (PCA), , Intravenous, Continuous, 5 mg at 04/30/17 0849 **AND** Pharmacy Consult Request ...Pain Management Review 1 Each, 1 Each, Other, PRN, Pradip Scott D.O.  •  vancomycin 1,600 mg in  mL IVPB, 15 mg/kg, Intravenous, Q12HR, Nettie Charles, PHARMD, Stopped at 04/30/17 1100  •  senna-docusate (PERICOLACE or SENOKOT S) 8.6-50 MG per tablet 2 Tab, 2 Tab, Oral, BID, 2 Tab at 04/29/17 2100 **AND** polyethylene glycol/lytes (MIRALAX) PACKET 1 Packet, 1 Packet, Oral, QDAY PRN **AND** magnesium hydroxide (MILK OF MAGNESIA) suspension 30 mL, 30 mL, Oral, QDAY PRN **AND** bisacodyl (DULCOLAX) suppository 10 mg, 10 mg, Rectal, QDAY PRN, Christopher Gomez M.D.  •  Respiratory Care per Protocol, , Nebulization, Continuous RT, Christopher Gomez M.D.  •  0.9 % NaCl with KCl 20 mEq infusion, , Intravenous, Continuous, Christopher Gomez M.D., Last Rate: 125 mL/hr at 04/30/17 0423  •  NS (BOLUS) infusion 1,000 mL, 1,000 mL, Intravenous, Once PRN, Christopher Gomez M.D.  •  ondansetron (ZOFRAN) syringe/vial injection 4 mg, 4 mg, Intravenous, Q4HRS PRN, Christopher Gomez M.D.  •  ondansetron (ZOFRAN ODT) dispertab 4 mg, 4 mg, Oral, Q4HRS PRN, Christopher Gomez M.D.  •  promethazine (PHENERGAN) tablet 12.5-25 mg, 12.5-25 mg, Oral, Q4HRS PRN, Christopher Gomez M.D., 25 mg at 04/30/17 0422  •  promethazine (PHENERGAN) suppository 12.5-25 mg, 12.5-25 mg, Rectal, Q4HRS PRN, Christopher Gomez M.D.  •  prochlorperazine (COMPAZINE) injection 5-10 mg, 5-10  mg, Intravenous, Q4HRS PRN, Christopher Gomez M.D.  •  MD ALERT... vancomycin per pharmacy protocol, , Other, pharmacy to dose, Christopher Gomez M.D.  •  thyroid (ARMOUR THYROID) tablet 75 mg, 75 mg, Oral, AM ES, Christopher Gomez M.D., 75 mg at 04/30/17 0843  •  piperacillin-tazobactam (ZOSYN) 4.5 g in  mL IVPB, 4.5 g, Intravenous, Q6HRS, Christopher Gomez M.D., Last Rate: 100 mL/hr at 04/30/17 1159, 4.5 g at 04/30/17 1159  •  acetaminophen (TYLENOL) tablet 650 mg, 650 mg, Oral, Q6HRS PRN, Esau Dooley M.D.    Labs:  Recent Labs      04/29/17   1100  04/30/17   0530   WBC  16.7*  10.2   RBC  4.22*  2.97*   HEMOGLOBIN  12.6*  9.0*   HEMATOCRIT  36.0*  26.6*   MCV  85.3  89.6   MCH  29.9  30.3   RDW  40.0  44.8   PLATELETCT  192  130*   MPV  9.6  9.7   NEUTSPOLYS  88.60*   --    LYMPHOCYTES  2.60*   --    MONOCYTES  0.00   --    EOSINOPHILS  0.00   --    BASOPHILS  0.00   --    RBCMORPHOLO  Present   --      Recent Labs      04/29/17   1100  04/30/17   0530   SODIUM  127*  137   POTASSIUM  3.9  4.2   CHLORIDE  90*  105   CO2  22  22   GLUCOSE  390*  340*   BUN  23*  17     Recent Labs      04/29/17   1100  04/30/17   0530   ALBUMIN  4.3  3.1*   TBILIRUBIN  1.2  0.6   ALKPHOSPHAT  151*  104*   TOTPROTEIN  7.8  5.4*   ALTSGPT  80*  42   ASTSGOT  35  24   CREATININE  1.11  0.75       Imaging:  Ct-abdomen-pelvis With    4/6/2017 4/6/2017 12:04 PM HISTORY/REASON FOR EXAM:  Pain. Nausea and vomiting. TECHNIQUE/EXAM DESCRIPTION:  CT scan of the abdomen and pelvis with contrast. Contrast-enhanced helical scanning was obtained from the diaphragmatic domes through the pubic symphysis following the bolus administration of nonionic contrast without complication. 100 mL of Omnipaque 350 nonionic contrast was administered without complication. Low dose optimization technique was utilized for this CT exam including automated exposure control and adjustment of the mA and/or kV according to patient size. COMPARISON: 3/21/2017  FINDINGS: Lung bases: There is mild bibasilar atelectasis. There is a calcified subcarinal lymph node. Abdomen: No free air seen in the abdomen or pelvis. The liver is hypodense compatible with hepatic steatosis. There is a tiny calcified granuloma within the liver. No adrenal mass is identified. There is no evidence of hydronephrosis. Pancreas is unremarkable. Aorta is not aneurysmal. There is minimal atherosclerotic plaque. There are small inguinal, retroperitoneal and mesenteric lymph nodes. There is no evidence of bowel obstruction. Terminal ileum and visualized appendix are unremarkable. Patient is status post cholecystectomy. There is no biliary ductal dilatation. Portal vein is patent. Bladder is mildly distended. Calcific densities in the pelvis likely represent phleboliths. No free fluid is seen in the abdomen or pelvis. There is a periumbilical wound with surrounding infiltration and mild stranding. Degenerative changes are seen in the spine.     4/6/2017  Periumbilical wound with induration and mild inflammatory stranding. Hepatic steatosis. Status post cholecystectomy. Sequelae of prior granulomatous exposure.     Ct-extremity, Lower With Right    4/29/2017 4/29/2017 12:26 PM HISTORY/REASON FOR EXAM:  Thigh wound, draining, pain. Diabetes. Fever. TECHNIQUE/EXAM DESCRIPTION AND NUMBER OF VIEWS:  CT scan of the RIGHT thigh and leg with contrast, with reconstructions. Thin helical 3 mm sections were obtained from the distal femur through the proximal tibia/fibula. Sagittal and coronal multiplanar reconstructions were generated from the axial images. A total of 100 mL of Omnipaque 350 nonionic contrast was administered  IV without complication. Up to date radiation dose reduction adjustments have been utilized to meet ALARA standards for radiation dose reduction. COMPARISON: None. FINDINGS: There is a gas and fluid collection measuring 14 x 3 x 5 cm within the anterior thigh musculature, centered in the  vastus lateralis. There is severe adjacent subcutaneous fat stranding and this also extends into the leg laterally No other abnormal fluid collection is identified. No other soft tissue gas. Review of the leg shows lateral subcutaneous fat stranding but no fluid or abnormal gas density. No knee joint effusion. No lymphadenopathy. Mild atherosclerotic change. No aneurysm. Limited visualization of the pelvis shows no free fluid. No acute bony destruction is visualized throughout the examination. Note that the abscess does not contact the femoral cortex.     4/29/2017  14 cm distal anterior thigh abscess with no CT evidence of septic arthropathy or osteomyelitis Findings were discussed with ANCELMO SCOTT on 4/29/2017 1:12 PM.    Dx-chest-limited (1 View)    4/29/2017 4/29/2017 8:01 PM HISTORY/REASON FOR EXAM:  Central line placement TECHNIQUE/EXAM DESCRIPTION AND NUMBER OF VIEWS: Single portable view of the chest. COMPARISON: 04/06/2017 FINDINGS: Right central line is noted with tip at the level of the lower SVC. Appearance of cardiac silhouette is unchanged compared to prior exam. No new infiltrates or consolidations appear to have developed since the prior exam. Ill-defined opacifications centered in the perihilar regions of the lungs appear to have increased compared to prior exam. No new pleural fluid collections or pneumothorax appear to have developed since the prior radiograph.     4/29/2017  1.  Ill-defined opacifications in each lung have increased compared to the prior radiograph. This could indicate worsening of pulmonary edema or inflammation. 2.  Right central line is noted at the level of the lower SVC. No pneumothorax identified.    Dx-chest-portable (1 View)    4/6/2017 4/6/2017 10:57 AM HISTORY/REASON FOR EXAM:  Weakness. TECHNIQUE/EXAM DESCRIPTION AND NUMBER OF VIEWS: Single portable view of the chest. COMPARISON: 10/25/2016 FINDINGS: The heart is not enlarged. No focal consolidation, pleural  effusion or pneumothorax is identified.  Costophrenic angles are sharp.     4/6/2017  No acute cardiopulmonary process is seen.    Dx-small Bowel Series    4/6/2017 4/6/2017 7:57 PM HISTORY/REASON FOR EXAM:  Nausea and vomiting. Drainage post hernia repair. TECHNIQUE/EXAM DESCRIPTION AND NUMBER OF VIEWS: Air contrast small bowel follow-through performed. Fluoroscopic images were obtained. 0 fluoroscopic images obtained. Total fluoroscopy time: 0 minutes COMPARISON: CT from the same day FINDINGS:  image demonstrates contrast within the renal collecting systems and bladder. There is a nonspecific bowel gas pattern. Surgical clips are seen in the right upper quadrant. The patient swallowed contrast without difficulty. There was no evidence of small bowel obstruction, abnormal wall thickening or stricture. The duodenojejunal junction was in a normal location. Contrast was seen within the colon by 3 hours and 20 minutes. A lateral view was performed and no enterocutaneous fistula was identified     4/6/2017  No enterocutaneous fistula identified. No evidence of small bowel obstruction.    Ir-picc Line Placement > Age 5    4/9/2017  HISTORY/REASON FOR EXAM:   PICC placement. TECHNIQUE/EXAM DESCRIPTION AND NUMBER OF VIEWS:   PICC line insertion with ultrasound guidance.  The procedure was performed using maximal sterile barrier technique including sterile gown, mask, cap, and donning of sterile gloves following appropriate hand hygiene and/or sterile scrub. Patient skin site was prepped with 2% Chlorhexidine solution. FINDINGS:  PICC line insertion with Ultrasound Guidance was performed by qualified nursing staff without the assistance of a Radiologist.  A follow up chest x-ray will be performed to determine appropriateness of PICC positioning.     4/9/2017           Ultrasound-guided PICC placement performed by qualified nursing staff as above.     Le Venous Duplex - Dvt ([facility] Only)    4/23/2017    Vascular Laboratory  CONCLUSIONS  1.  Normal bilateral superficial and deep venous examination of the lower  extremities.  DEVIN MO  Exam Date:     2017 15:10  Room #:     Inpatient  Priority:     Routine  Ht (in):             Wt (lb):  Ordering Physician:        RENNY SCHWARZ  Referring Physician:  Sonographer:               Akiko Vivas  Study Type:                Complete Bilateral  Technical Quality:         Adequate  Age:    51    Gender:     M  MRN:    [id number]  :    1965      BSA:  Indications:     Pain in Limb  CPT Codes:       49673  ICD Codes:       729.5  History:         Bilateral lower extremity pain.  Limitations:  PROCEDURES:  Bilateral lower extremity venous duplex imaging.  The following venous structures were evaluated: common femoral, profunda  femoral, greater saphenous, femoral, popliteal , peroneal and posterior  tibial veins.  Serial compression, augmentation maneuvers,  color and spectral Doppler  flow evaluations were performed.  FINDINGS:  Bilateral lower extremities -  Complete color filling and compressibility with normal venous flow dynamics  including spontaneous flow, response to augmentation maneuvers, and  respiratory phasicity.  Jessi Luke M.D.  (Electronically Signed)  Final Date:      2017                   15:40      Micro:  BLOOD CULTURE   Date Value Ref Range Status   2017   Preliminary    No Growth    Note: Blood cultures are incubated for 5 days and  are monitored continuously.Positive blood cultures  are called to the RN and reported as soon as  they are identified.       BLOOD CULTURE HOLD   Date Value Ref Range Status   2017 Collected  Final        Assessment:  Active Hospital Problems    Diagnosis   • *Severe sepsis (CMS-HCC) [A41.9, R65.20]   • Type 2 diabetes mellitus (CMS-HCC) [E11.9]   • Chronic ulcerative colitis (CMS-HCC) [K51.90]   • Complicated wound infection (CMS-HCC)  [T79.8XXA]   • Thrombocytopenia (CMS-HCC) [D69.6]   • Hypothyroid [E03.9]       Plan:  Septic shock  Slightly  Improved hemodynamically    Right thigh abscess  It is post-debridement on 4/29/2017  Cultures are negative so far    Likely  left hand abscess  Check a MRI of the left hand  Check an echocardiogram in view of multiple abscesses    Postoperative wound infection of umbilical hernia site  Improving    Ulcerative colitis  If cultures remain negative then consider extraintestinal manifestations of ulcerative colitis as a diagnosis        Obesity  Diabetes mellitus  Keep blood sugars under control for healing of the wounds    Discussed with internal Medicine

## 2017-04-30 NOTE — CARE PLAN
Problem: Communication  Goal: The ability to communicate needs accurately and effectively will improve  Outcome: PROGRESSING AS EXPECTED  Calls appropriately, [email or web address]    Problem: Venous Thromboembolism (VTW)/Deep Vein Thrombosis (DVT) Prevention:  Goal: Patient will participate in Venous Thrombosis (VTE)/Deep Vein Thrombosis (DVT)Prevention Measures  Outcome: PROGRESSING AS EXPECTED  SCDs in use    Problem: Pain Management  Goal: Pain level will decrease to patient’s comfort goal  Outcome: PROGRESSING AS EXPECTED  PCA in use

## 2017-04-30 NOTE — PROGRESS NOTES
"Surgical Progress Note:      Feels much better  Improved hemodynamically    PE:  /68 mmHg  Pulse 77  Temp(Src) 37.7 °C (99.9 °F)  Resp 9  Ht 1.74 m (5' 8.5\")  Wt 108.1 kg (238 lb 5.1 oz)  BMI 35.70 kg/m2  SpO2 96%    I/O:   Intake/Output Summary (Last 24 hours) at 04/30/17 1153  Last data filed at 04/30/17 0600   Gross per 24 hour   Intake 7617.6 ml   Output   2300 ml   Net 5317.6 ml     UOP:  PO:  IV:    GEN: NAd  COR: RRR  PULM: CTA  ABD: soft, wound VAC in place  EXT: right thigh wound with viable muscle at base    Labs:  Recent Labs      04/29/17   1100  04/30/17   0530   WBC  16.7*  10.2   RBC  4.22*  2.97*   HEMOGLOBIN  12.6*  9.0*   HEMATOCRIT  36.0*  26.6*   MCV  85.3  89.6   MCH  29.9  30.3   RDW  40.0  44.8   PLATELETCT  192  130*   MPV  9.6  9.7   NEUTSPOLYS  88.60*   --    LYMPHOCYTES  2.60*   --    MONOCYTES  0.00   --    EOSINOPHILS  0.00   --    BASOPHILS  0.00   --    RBCMORPHOLO  Present   --      Recent Labs      04/29/17   1100  04/30/17   0530   SODIUM  127*  137   POTASSIUM  3.9  4.2   CHLORIDE  90*  105   CO2  22  22   GLUCOSE  390*  340*   BUN  23*  17         A/P: POD# 1  S/P right thigh debridement  Much improved  No apparent additional necrotic muscle  No plan for repeat surgical intervention at this time       Esau Dooley M.D.  [facility]  [phone]      "

## 2017-04-30 NOTE — OP REPORT
DATE OF SERVICE:  04/29/2017    DATE OF SERVICE:  04/29/2017    PREOPERATIVE DIAGNOSES:  1.  Open abdominal wound.  2.  Right thigh abscess.    POSTOPERATIVE DIAGNOSES:  1.  Open abdominal wound.  2.  Right thigh abscess.  3.  Right thigh necrotizing myositis.    SURGEON:  Esau Dooley MD    ANESTHESIOLOGIST:  Melvin Pfeiffer MD    PROCEDURES:  1.  Placement of negative pressure wound therapy dressing.  2.  Incision and drainage with debridement of muscle and fascia of the right   thigh.    ESTIMATED BLOOD LOSS:  250 mL.    COMPLICATIONS:  None.    SPECIMENS:  1.  Right thigh abscess fluid for culture.  2.  Right thigh muscle.    FINDINGS:  Within the anterior lateral right thigh musculature, there was a   large intramuscular abscess cavity, there was adjacent necrotic muscle.  This   did not extend further than the abscess cavity and there was no evidence of   fasciitis extending beyond the abscess cavity.  The necrotic muscle was   debrided back to healthy muscle, which bled easily.    INDICATIONS:  The patient is a very pleasant 51-year-old male who is well   known to me status post umbilical hernia repair, which unfortunately got   infected and required excision of the mass with reconstruction.  He has had a   chronic open wound at this area due to wound infection and has been having   serial wound VAC changes.  In addition, he injects testosterone.  Over the   last several days, he has had fever and malaise and increasing pain and   swelling at the right side injection site of his testosterone injection site.    On presentation to the emergency department, he was found to have a large   abscess within the vastus lateralis.  He was exhibiting signs of sepsis and   was admitted to the intensive care unit.  I was consulted due to my therapeutic   relationship with the patient in order to perform drainage and debridement as   necessary.  Due to patient's clinical presentation, I did discuss with him   the  possibility of that in addition to abscess,  there may be necrotizing   myositis or fasciitis, which may require extensive debridement and repeat   trips to the operating room for further debridement as necessary.  He   understood this and now was willing to proceed.    PROCEDURE IN DETAIL:  Informed consent was obtained.  Patient was brought back   to the operating room and placed in supine position on the operating table.    General anesthesia was induced.  Radial arterial line and central venous line   were placed by anesthesia.  The patient's right lower extremity was prepped   circumferentially and draped in sterile fashion.  A time-out procedure was   performed.  An incision was made over the area of the abscess cavity and   dissected down through the soft tissues.  The fascia overlying the anterior   compartment of the thigh was incised.  Within the belly of the muscle,   expression of foul smelling purulent drainage, this was cultured.  On further   dissection within the abscess cavity revealed some proximal dissection.  There   was some necrotic musculature in the area, which was sharply debrided back to   viable bleeding muscle.  Inspection revealed, although there was some   necrotic muscle.  The tissue planes adjacent to the fascia were not easily   dilated suggesting that there was not a component of necrotizing fasciitis.    The necrotic muscle was really only in the area immediately adjacent to the   abscess cavity.  Once the necrotic muscle was fully debrided, we achieved   hemostasis using electrocautery.  The wound was thoroughly irrigated using the   Pulsavac device.  The wound was then packed with Betadine and saline-soaked   Kerlix and dressed with dry gauze Kerlix and Ace wrap.  In addition, we   performed the change of the umbilical wound negative pressure dressing.  The   old dressing was removed.  The black sponge measuring approximately 5x3x3 cm   was placed into the wound.  The plastic  drapes were placed over this and the   suction adaptor was placed over the sponge and connected to the negative   pressure wound therapy device.  Patient tolerated the procedure well and all   sponge and instrument counts were correct.  He was taken to the intensive care   unit.  Plan will be for second look in 24-48 hours to evaluate for further   need for debridement.       ____________________________________     MD BEE Wyman / STORM    DD:  04/29/2017 19:38:09  DT:  04/29/2017 20:22:24    D#:  [id number]  Job#:  [id number]

## 2017-04-30 NOTE — PROGRESS NOTES
Pt transported to OR with ACLS RN and transport, on OR monitor, ambu bag and chart with pt, report given to anesthesia

## 2017-05-01 PROBLEM — L02.415 ABSCESS OF RIGHT THIGH: Status: ACTIVE | Noted: 2017-05-01

## 2017-05-01 PROBLEM — A41.9 SEPTIC SHOCK (HCC): Status: ACTIVE | Noted: 2017-05-01

## 2017-05-01 PROBLEM — R65.21 SEPTIC SHOCK (HCC): Status: ACTIVE | Noted: 2017-05-01

## 2017-05-01 PROBLEM — R78.81 BACTEREMIA: Status: ACTIVE | Noted: 2017-05-01

## 2017-05-01 PROBLEM — E87.1 HYPONATREMIA: Status: ACTIVE | Noted: 2017-05-01

## 2017-05-01 LAB
ALBUMIN SERPL BCP-MCNC: 2.9 G/DL (ref 3.2–4.9)
ALBUMIN/GLOB SERPL: 1.1 G/DL
ALP SERPL-CCNC: 97 U/L (ref 30–99)
ALT SERPL-CCNC: 33 U/L (ref 2–50)
ANION GAP SERPL CALC-SCNC: 11 MMOL/L (ref 0–11.9)
AST SERPL-CCNC: 18 U/L (ref 12–45)
BACTERIA BLD CULT: ABNORMAL
BACTERIA UR CULT: NORMAL
BACTERIA UR CULT: NORMAL
BACTERIA WND AEROBE CULT: ABNORMAL
BASOPHILS # BLD AUTO: 0.3 % (ref 0–1.8)
BASOPHILS # BLD: 0.03 K/UL (ref 0–0.12)
BILIRUB SERPL-MCNC: 0.4 MG/DL (ref 0.1–1.5)
BUN SERPL-MCNC: 21 MG/DL (ref 8–22)
CALCIUM SERPL-MCNC: 7.2 MG/DL (ref 8.5–10.5)
CHLORIDE SERPL-SCNC: 107 MMOL/L (ref 96–112)
CO2 SERPL-SCNC: 20 MMOL/L (ref 20–33)
CREAT SERPL-MCNC: 0.8 MG/DL (ref 0.5–1.4)
EOSINOPHIL # BLD AUTO: 0 K/UL (ref 0–0.51)
EOSINOPHIL NFR BLD: 0 % (ref 0–6.9)
ERYTHROCYTE [DISTWIDTH] IN BLOOD BY AUTOMATED COUNT: 46 FL (ref 35.9–50)
ERYTHROCYTE [SEDIMENTATION RATE] IN BLOOD BY WESTERGREN METHOD: >120 MM/HOUR (ref 0–20)
FOLATE SERPL-MCNC: 16 NG/ML
GFR SERPL CREATININE-BSD FRML MDRD: >60 ML/MIN/1.73 M 2
GLOBULIN SER CALC-MCNC: 2.6 G/DL (ref 1.9–3.5)
GLUCOSE BLD-MCNC: 102 MG/DL (ref 65–99)
GLUCOSE BLD-MCNC: 118 MG/DL (ref 65–99)
GLUCOSE BLD-MCNC: 181 MG/DL (ref 65–99)
GLUCOSE BLD-MCNC: 191 MG/DL (ref 65–99)
GLUCOSE SERPL-MCNC: 242 MG/DL (ref 65–99)
GRAM STN SPEC: ABNORMAL
HCT VFR BLD AUTO: 28.2 % (ref 42–52)
HGB BLD-MCNC: 9.7 G/DL (ref 14–18)
IMM GRANULOCYTES # BLD AUTO: 0.16 K/UL (ref 0–0.11)
IMM GRANULOCYTES NFR BLD AUTO: 1.4 % (ref 0–0.9)
IRON SATN MFR SERPL: 19 % (ref 15–55)
IRON SERPL-MCNC: 50 UG/DL (ref 50–180)
LV EJECT FRACT  99904: 65
LV EJECT FRACT MOD 2C 99903: 57.87
LV EJECT FRACT MOD 4C 99902: 50.76
LV EJECT FRACT MOD BP 99901: 55.98
LYMPHOCYTES # BLD AUTO: 0.76 K/UL (ref 1–4.8)
LYMPHOCYTES NFR BLD: 6.8 % (ref 22–41)
MCH RBC QN AUTO: 30.7 PG (ref 27–33)
MCHC RBC AUTO-ENTMCNC: 34.4 G/DL (ref 33.7–35.3)
MCV RBC AUTO: 89.2 FL (ref 81.4–97.8)
MONOCYTES # BLD AUTO: 0.4 K/UL (ref 0–0.85)
MONOCYTES NFR BLD AUTO: 3.6 % (ref 0–13.4)
NEUTROPHILS # BLD AUTO: 9.89 K/UL (ref 1.82–7.42)
NEUTROPHILS NFR BLD: 87.9 % (ref 44–72)
NRBC # BLD AUTO: 0 K/UL
NRBC BLD AUTO-RTO: 0 /100 WBC
PLATELET # BLD AUTO: 172 K/UL (ref 164–446)
PMV BLD AUTO: 10 FL (ref 9–12.9)
POTASSIUM SERPL-SCNC: 4.2 MMOL/L (ref 3.6–5.5)
PROT SERPL-MCNC: 5.5 G/DL (ref 6–8.2)
RBC # BLD AUTO: 3.16 M/UL (ref 4.7–6.1)
SIGNIFICANT IND 70042: ABNORMAL
SIGNIFICANT IND 70042: NORMAL
SITE SITE: ABNORMAL
SITE SITE: NORMAL
SODIUM SERPL-SCNC: 138 MMOL/L (ref 135–145)
SOURCE SOURCE: ABNORMAL
SOURCE SOURCE: NORMAL
TIBC SERPL-MCNC: 258 UG/DL (ref 250–450)
VANCOMYCIN TROUGH SERPL-MCNC: 12.8 UG/ML (ref 10–20)
VIT B12 SERPL-MCNC: 612 PG/ML (ref 211–911)
WBC # BLD AUTO: 11.2 K/UL (ref 4.8–10.8)

## 2017-05-01 PROCEDURE — A9270 NON-COVERED ITEM OR SERVICE: HCPCS | Performed by: HOSPITALIST

## 2017-05-01 PROCEDURE — 160009 HCHG ANES TIME/MIN: Performed by: SURGERY

## 2017-05-01 PROCEDURE — 700102 HCHG RX REV CODE 250 W/ 637 OVERRIDE(OP)

## 2017-05-01 PROCEDURE — 700111 HCHG RX REV CODE 636 W/ 250 OVERRIDE (IP): Performed by: HOSPITALIST

## 2017-05-01 PROCEDURE — 82746 ASSAY OF FOLIC ACID SERUM: CPT

## 2017-05-01 PROCEDURE — 160036 HCHG PACU - EA ADDL 30 MINS PHASE I: Performed by: SURGERY

## 2017-05-01 PROCEDURE — 83550 IRON BINDING TEST: CPT

## 2017-05-01 PROCEDURE — 700111 HCHG RX REV CODE 636 W/ 250 OVERRIDE (IP)

## 2017-05-01 PROCEDURE — 700105 HCHG RX REV CODE 258: Performed by: NURSE PRACTITIONER

## 2017-05-01 PROCEDURE — 160028 HCHG SURGERY MINUTES - 1ST 30 MINS LEVEL 3: Performed by: SURGERY

## 2017-05-01 PROCEDURE — 99233 SBSQ HOSP IP/OBS HIGH 50: CPT | Performed by: HOSPITALIST

## 2017-05-01 PROCEDURE — A6266 IMPREG GAUZE NO H20/SAL/YARD: HCPCS | Performed by: SURGERY

## 2017-05-01 PROCEDURE — 0X9K0ZZ DRAINAGE OF LEFT HAND, OPEN APPROACH: ICD-10-PCS | Performed by: SURGERY

## 2017-05-01 PROCEDURE — 82962 GLUCOSE BLOOD TEST: CPT | Mod: 91

## 2017-05-01 PROCEDURE — 82607 VITAMIN B-12: CPT

## 2017-05-01 PROCEDURE — 97605 NEG PRS WND THER DME<=50SQCM: CPT

## 2017-05-01 PROCEDURE — 700102 HCHG RX REV CODE 250 W/ 637 OVERRIDE(OP): Performed by: HOSPITALIST

## 2017-05-01 PROCEDURE — 160035 HCHG PACU - 1ST 60 MINS PHASE I: Performed by: SURGERY

## 2017-05-01 PROCEDURE — 0Y9K0ZZ DRAINAGE OF RIGHT ANKLE REGION, OPEN APPROACH: ICD-10-PCS | Performed by: SURGERY

## 2017-05-01 PROCEDURE — 700101 HCHG RX REV CODE 250

## 2017-05-01 PROCEDURE — 700111 HCHG RX REV CODE 636 W/ 250 OVERRIDE (IP): Performed by: NURSE PRACTITIONER

## 2017-05-01 PROCEDURE — 85025 COMPLETE CBC W/AUTO DIFF WBC: CPT

## 2017-05-01 PROCEDURE — 160039 HCHG SURGERY MINUTES - EA ADDL 1 MIN LEVEL 3: Performed by: SURGERY

## 2017-05-01 PROCEDURE — 770021 HCHG ROOM/CARE - ISO PRIVATE

## 2017-05-01 PROCEDURE — 700105 HCHG RX REV CODE 258: Performed by: HOSPITALIST

## 2017-05-01 PROCEDURE — 160002 HCHG RECOVERY MINUTES (STAT): Performed by: SURGERY

## 2017-05-01 PROCEDURE — 700101 HCHG RX REV CODE 250: Performed by: HOSPITALIST

## 2017-05-01 PROCEDURE — 93306 TTE W/DOPPLER COMPLETE: CPT

## 2017-05-01 PROCEDURE — 160048 HCHG OR STATISTICAL LEVEL 1-5: Performed by: SURGERY

## 2017-05-01 PROCEDURE — 700105 HCHG RX REV CODE 258

## 2017-05-01 PROCEDURE — 97161 PT EVAL LOW COMPLEX 20 MIN: CPT

## 2017-05-01 PROCEDURE — 36415 COLL VENOUS BLD VENIPUNCTURE: CPT

## 2017-05-01 PROCEDURE — A9270 NON-COVERED ITEM OR SERVICE: HCPCS

## 2017-05-01 PROCEDURE — 80053 COMPREHEN METABOLIC PANEL: CPT

## 2017-05-01 PROCEDURE — 83540 ASSAY OF IRON: CPT

## 2017-05-01 PROCEDURE — 80202 ASSAY OF VANCOMYCIN: CPT

## 2017-05-01 PROCEDURE — 85652 RBC SED RATE AUTOMATED: CPT

## 2017-05-01 PROCEDURE — 93306 TTE W/DOPPLER COMPLETE: CPT | Mod: 26 | Performed by: INTERNAL MEDICINE

## 2017-05-01 RX ORDER — ONDANSETRON 2 MG/ML
INJECTION INTRAMUSCULAR; INTRAVENOUS
Status: COMPLETED
Start: 2017-05-01 | End: 2017-05-01

## 2017-05-01 RX ORDER — HEPARIN SODIUM 5000 [USP'U]/ML
5000 INJECTION, SOLUTION INTRAVENOUS; SUBCUTANEOUS EVERY 8 HOURS
Status: DISCONTINUED | OUTPATIENT
Start: 2017-05-01 | End: 2017-05-08 | Stop reason: HOSPADM

## 2017-05-01 RX ORDER — SODIUM CHLORIDE 9 MG/ML
INJECTION, SOLUTION INTRAVENOUS CONTINUOUS
Status: DISCONTINUED | OUTPATIENT
Start: 2017-05-01 | End: 2017-05-08 | Stop reason: HOSPADM

## 2017-05-01 RX ORDER — L. ACIDOPHILUS/L.BULGARICUS 100MM CELL
1 GRANULES IN PACKET (EA) ORAL
Status: DISCONTINUED | OUTPATIENT
Start: 2017-05-01 | End: 2017-05-02

## 2017-05-01 RX ORDER — ACETAMINOPHEN 325 MG/1
650 TABLET ORAL EVERY 6 HOURS
Status: DISCONTINUED | OUTPATIENT
Start: 2017-05-01 | End: 2017-05-08 | Stop reason: HOSPADM

## 2017-05-01 RX ADMIN — MESALAMINE 800 MG: 400 CAPSULE, DELAYED RELEASE ORAL at 21:38

## 2017-05-01 RX ADMIN — BUPROPION HYDROCHLORIDE 150 MG: 150 TABLET, FILM COATED, EXTENDED RELEASE ORAL at 21:38

## 2017-05-01 RX ADMIN — MESALAMINE 800 MG: 400 CAPSULE, DELAYED RELEASE ORAL at 17:33

## 2017-05-01 RX ADMIN — BUPROPION HYDROCHLORIDE 150 MG: 150 TABLET, FILM COATED, EXTENDED RELEASE ORAL at 09:54

## 2017-05-01 RX ADMIN — HYDROMORPHONE HYDROCHLORIDE 5 MG: 2 INJECTION INTRAMUSCULAR; INTRAVENOUS; SUBCUTANEOUS at 21:55

## 2017-05-01 RX ADMIN — HEPARIN SODIUM 5000 UNITS: 5000 INJECTION, SOLUTION INTRAVENOUS; SUBCUTANEOUS at 21:37

## 2017-05-01 RX ADMIN — PIPERACILLIN SODIUM AND TAZOBACTAM SODIUM 4.5 G: 4; .5 INJECTION, POWDER, FOR SOLUTION INTRAVENOUS at 01:23

## 2017-05-01 RX ADMIN — ACETAMINOPHEN 650 MG: 325 TABLET, FILM COATED ORAL at 17:32

## 2017-05-01 RX ADMIN — ACETAMINOPHEN 650 MG: 325 TABLET, FILM COATED ORAL at 09:54

## 2017-05-01 RX ADMIN — INSULIN HUMAN 3 UNITS: 100 INJECTION, SOLUTION PARENTERAL at 06:15

## 2017-05-01 RX ADMIN — PIPERACILLIN SODIUM AND TAZOBACTAM SODIUM 4.5 G: 4; .5 INJECTION, POWDER, FOR SOLUTION INTRAVENOUS at 06:06

## 2017-05-01 RX ADMIN — MESALAMINE 800 MG: 400 CAPSULE, DELAYED RELEASE ORAL at 10:02

## 2017-05-01 RX ADMIN — ONDANSETRON 4 MG: 2 INJECTION, SOLUTION INTRAMUSCULAR; INTRAVENOUS at 14:54

## 2017-05-01 RX ADMIN — INSULIN HUMAN 3 UNITS: 100 INJECTION, SOLUTION PARENTERAL at 21:47

## 2017-05-01 RX ADMIN — LACTOBACILLUS ACIDOPHILUS / LACTOBACILLUS BULGARICUS 1 PACKET: 100 MILLION CFU STRENGTH GRANULES at 17:33

## 2017-05-01 RX ADMIN — HYDROMORPHONE HYDROCHLORIDE: 2 INJECTION INTRAMUSCULAR; INTRAVENOUS; SUBCUTANEOUS at 17:09

## 2017-05-01 RX ADMIN — POTASSIUM CHLORIDE AND SODIUM CHLORIDE: 900; 150 INJECTION, SOLUTION INTRAVENOUS at 06:06

## 2017-05-01 RX ADMIN — SODIUM CHLORIDE: 900 INJECTION INTRAVENOUS at 07:45

## 2017-05-01 RX ADMIN — PROMETHAZINE HYDROCHLORIDE 25 MG: 25 TABLET ORAL at 11:51

## 2017-05-01 RX ADMIN — HYDROMORPHONE HYDROCHLORIDE 5 MG: 2 INJECTION INTRAMUSCULAR; INTRAVENOUS; SUBCUTANEOUS at 01:52

## 2017-05-01 RX ADMIN — LEVOTHYROXINE, LIOTHYRONINE 75 MG: 19; 4.5 TABLET ORAL at 06:06

## 2017-05-01 RX ADMIN — CEFTAROLINE FOSAMIL 600 MG: 600 POWDER, FOR SOLUTION INTRAVENOUS at 12:36

## 2017-05-01 RX ADMIN — SODIUM CHLORIDE: 900 INJECTION INTRAVENOUS at 09:55

## 2017-05-01 RX ADMIN — INSULIN HUMAN 25 UNITS: 100 INJECTION, SUSPENSION SUBCUTANEOUS at 06:17

## 2017-05-01 RX ADMIN — INSULIN HUMAN 25 UNITS: 100 INJECTION, SUSPENSION SUBCUTANEOUS at 17:51

## 2017-05-01 RX ADMIN — CEFTAROLINE FOSAMIL 600 MG: 600 POWDER, FOR SOLUTION INTRAVENOUS at 21:38

## 2017-05-01 RX ADMIN — HYDROMORPHONE HYDROCHLORIDE 5 MG: 2 INJECTION INTRAMUSCULAR; INTRAVENOUS; SUBCUTANEOUS at 06:18

## 2017-05-01 RX ADMIN — VANCOMYCIN HYDROCHLORIDE 1600 MG: 100 INJECTION, POWDER, LYOPHILIZED, FOR SOLUTION INTRAVENOUS at 09:00

## 2017-05-01 RX ADMIN — HYDROMORPHONE HYDROCHLORIDE 5 MG: 2 INJECTION INTRAMUSCULAR; INTRAVENOUS; SUBCUTANEOUS at 11:10

## 2017-05-01 ASSESSMENT — ENCOUNTER SYMPTOMS
SHORTNESS OF BREATH: 0
VOMITING: 0
NAUSEA: 0
MYALGIAS: 1
ABDOMINAL PAIN: 1
CHILLS: 0
INSOMNIA: 1
HEADACHES: 1
SPEECH CHANGE: 0
FEVER: 0
SENSORY CHANGE: 0
COUGH: 0

## 2017-05-01 ASSESSMENT — PAIN SCALES - GENERAL
PAINLEVEL_OUTOF10: 5
PAINLEVEL_OUTOF10: 7
PAINLEVEL_OUTOF10: 0
PAINLEVEL_OUTOF10: 7
PAINLEVEL_OUTOF10: 0
PAINLEVEL_OUTOF10: 7
PAINLEVEL_OUTOF10: 0
PAINLEVEL_OUTOF10: 2
PAINLEVEL_OUTOF10: 2

## 2017-05-01 NOTE — PROGRESS NOTES
Hospital Medicine Progress Note, Adult, Complex               Author: Baldo Moncada Date & Time created: 5/1/2017  4:10 PM     Interval History:  Admitted for R-thigh and L-thumb abscess.  He is s/p R-thigh I&D and sched'd to have L-thumb I&D tomorrow.  He was in the process of getting evaluated for his occurrences of skin abscesses.  He has not had a dermatologist evaluate him.    Review of Systems:  Review of Systems   Constitutional: Negative for fever and chills.   Gastrointestinal: Negative for nausea and vomiting.   Musculoskeletal: Positive for myalgias.   Psychiatric/Behavioral: The patient has insomnia.    All other systems reviewed and are negative.      Physical Exam:  Physical Exam  Nursing note and vitals reviewed.  Constitutional: He is oriented to person, place, and time. He appears well-developed and well-nourished overweight.   HENT:   Head: Normocephalic and atraumatic.   Right Ear: External ear normal.   Left Ear: External ear normal.   Nose: Nose normal.   Mouth/Throat: Oropharynx is small with Mallanpati score of 4.  Mucosa is clear and moist.   Eyes: Conjunctivae and extraocular motions are normal. Pupils are equal, round, and reactive to light.   Neck: Normal range of motion. Neck supple.   Cardiovascular: Normal rate, regular rhythm, normal heart sounds and intact distal pulses.    Pulmonary/Chest: Effort normal and breath sounds normal.   Abdominal: Soft. Bowel sounds are normal.   Musculoskeletal: Decreased BLE range of motion.   Neurological: He is alert and oriented to person, place, and time.   Skin: Skin is warm and dry.  R-thigh dressing C/D/I and drain in place.      Labs:        Invalid input(s): [id number]      Recent Labs      04/29/17   1100  04/30/17   0530  04/30/17   0640  05/01/17   0136   SODIUM  127*  137   --   138   POTASSIUM  3.9  4.2   --   4.2   CHLORIDE  90*  105   --   107   CO2  22  22   --   20   BUN  23*  17   --   21   CREATININE  1.11  0.75   --   0.80    MAGNESIUM  2.0   --    --    --    PHOSPHORUS  <1.0*   --   2.3*   --    CALCIUM  9.2  7.3*   --   7.2*     Recent Labs      176   ALTSGPT  80*  42  33   ASTSGOT  35  24  18   ALKPHOSPHAT  151*  104*  97   TBILIRUBIN  1.2  0.6  0.4   GLUCOSE  390*  340*  242*     Recent Labs      17   08   RBC  4.22*  2.97*  3.16*   --    HEMOGLOBIN  12.6*  9.0*  9.7*   --    HEMATOCRIT  36.0*  26.6*  28.2*   --    PLATELETCT  192  130*  172   --    PROTHROMBTM   --   14.8*   --    --    APTT   --   26.2   --    --    INR   --   1.12   --    --    IRON   --    --    --   50   TOTIRONBC   --    --    --   258     Recent Labs      17   WBC  16.7*  10.2  11.2*   NEUTSPOLYS  88.60*   --   87.90*   LYMPHOCYTES  2.60*   --   6.80*   MONOCYTES  0.00   --   3.60   EOSINOPHILS  0.00   --   0.00   BASOPHILS  0.00   --   0.30   ASTSGOT  35  24  18   ALTSGPT  80*  42  33   ALKPHOSPHAT  151*  104*  97   TBILIRUBIN  1.2  0.6  0.4           Hemodynamics:  Temp (24hrs), Av.2 °C (97.1 °F), Min:34.9 °C (94.8 °F), Max:37.2 °C (99 °F)  Temperature: 36.6 °C (97.9 °F), Monitored Temp: 37.3 °C (99.1 °F)  Pulse  Av.3  Min: 57  Max: 123Heart Rate (Monitored): (!) 105  Blood Pressure: 109/81 mmHg, Arterial BP: (!) 96/67 mmHg, NIBP: 127/85 mmHg    Respiratory:    Respiration: 15, Pulse Oximetry: 94 %        RUL Breath Sounds: Clear, RML Breath Sounds: Diminished, RLL Breath Sounds: Diminished, NADEEN Breath Sounds: Clear, LLL Breath Sounds: Diminished  Fluids:    Intake/Output Summary (Last 24 hours) at 17 1610  Last data filed at 17 1413   Gross per 24 hour   Intake 2170.25 ml   Output      1 ml   Net 2169.25 ml        GI/Nutrition:  Orders Placed This Encounter   Procedures   • DIET NPO     Standing Status: Standing      Number of Occurrences: 1      Standing Expiration Date:      Order  Specific Question:  Restrict to:     Answer:  Sips with Medications [3]     Medical Decision Making, by Problem:  Active Hospital Problems    Diagnosis   • Complicated wound infection (CMS-HCC) [T79.8XXA]/Bacteremia due to Gram-negative bacteria [R78.81]/*Severe sepsis (CMS-HCC) [A41.9, R65.20]/shock - improving.  He is s/p R-thigh I&D.  ID and surg on.   • Type 2 diabetes mellitus (CMS-HCC) [E11.9] - uncontrolled.  Home reg and cover c ISS.  Check HbA1c.  He does not want to take metformin.  We'll DC it.   • Chronic ulcerative colitis (CMS-HCC) [K51.90] - start Lactinex.   • Anemia - check Fe, B12, folate and FOB.  Follow H/H.   • Obesity - encourage Kcal restriction.   • Thrombocytopenia (CMS-HCC) [D69.6] - labile.  Follow.   • Insomnia - outpatient follow up.   Stable issues - med hx (hypothyroid, pancreatitis, DKA, GERD, asthma, DLD, gout), hyponatremia  Preventives - IS, Vax, stool soft, DVTP.  Dispo - complex/guarded.      Medications reviewed, Radiology images reviewed and Labs reviewed  Sousa catheter: One or Two Days Post Surgery (Day of Surgery being Day 0)      DVT Prophylaxis: Heparin    Ulcer prophylaxis: Not indicated  Antibiotics: Treating active infection/contamination beyond 24 hours perioperative coverage  Assessed for rehab: Patient unable to tolerate rehabilitation therapeutic regimen

## 2017-05-01 NOTE — PROGRESS NOTES
Patient to floor from ICU.  Resting comfortably in bed.  Bed alarm activated.  Call light and personal belongings within reach.  No additional needs at this time

## 2017-05-01 NOTE — PROGRESS NOTES
Pt transferred to T409 via wheelchair with personal belongings and chart. VSS. Pt tolerated trip well. Report given to Rupal LUCERO via phone.

## 2017-05-01 NOTE — PROGRESS NOTES
Infectious Disease Progress Note    Author: JESSICA Parada Date of service & Time created: 2017  11:22 AM    Chief Complaint:  Chief Complaint   Patient presents with   • Fever   • Wound Infection       Interval History:  51-year-old white male with ulcerative colitis diabetes mellitus admitted with right thigh infection  2017- underwent I&D on 2017. Feels better. Fevers up to 102.7.   5/1- Tm 99.1, WBC 11.2.  R thigh pain 8/10, L hand pain 7/10.  Tolerating abx without issue.  Labs Reviewed, Medications Reviewed, Radiology Reviewed and Wound Reviewed.    Review of Systems:  Review of Systems   Constitutional: Positive for malaise/fatigue. Negative for fever and chills.   HENT:        Intermittent.   Respiratory: Negative for cough and shortness of breath.    Cardiovascular: Negative for chest pain.   Gastrointestinal: Positive for abdominal pain. Negative for nausea and vomiting.        Multiple soft stools- chronic with UC.   Genitourinary: Negative for dysuria.   Musculoskeletal: Positive for myalgias.        Abd, Right thigh, Right medial ankle and Left hand.   Skin: Negative for rash.   Neurological: Positive for headaches. Negative for sensory change and speech change.       Hemodynamics:  Temp (24hrs), Av.3 °C (97.4 °F), Min:35.8 °C (96.5 °F), Max:37.2 °C (99 °F)  Temperature: 35.8 °C (96.5 °F), Monitored Temp: 37.3 °C (99.1 °F)  Pulse  Av.1  Min: 57  Max: 123Heart Rate (Monitored): 100  Blood Pressure: 113/73 mmHg, Arterial BP: (!) 96/67 mmHg, NIBP: (!) 94/52 mmHg  CVP (mm Hg): (!) 13 MM HG    Physical Exam:  Physical Exam   Constitutional: He is oriented to person, place, and time. He appears well-developed and well-nourished. No distress.   Obese   HENT:   Head: Normocephalic and atraumatic.   Mouth/Throat: No oropharyngeal exudate.   Poor dentition- molar decay   Eyes: EOM are normal. Pupils are equal, round, and reactive to light. No scleral icterus.   Neck: Normal  range of motion. Neck supple.   Cardiovascular: Normal rate, regular rhythm and intact distal pulses.    No murmur heard.  Pulmonary/Chest: Effort normal and breath sounds normal. No respiratory distress. He has no wheezes.   Abdominal: Soft. There is no tenderness. There is no rebound.   Wound VAC present, no surrounding erythema.   Musculoskeletal: He exhibits edema and tenderness.   Right thigh- wound bed pink and red with brownish eschar, adipose tissue and muscle exposed, mild surrounding erythema, moderate serosanguineous drainage, very tender to touch.   Right medial ankle- quarter size, red/ hot spot with fluctuance, slightly tender to touch, no openings/drainage.  Left hand- quarter size, swollen red/hot spot extending to the thenar aspect with fluctuance, very tender to touch, no openings/drainge.   Neurological: He is alert and oriented to person, place, and time.   Skin: There is erythema.   Nursing note and vitals reviewed.      Meds:    Current facility-administered medications:   •  acetaminophen (TYLENOL) tablet 650 mg, 650 mg, Oral, Q6HRS, Baldo Moncada M.D., Stopped at 05/01/17 1200  •  lactobacillus granules (LACTINEX/FLORANEX) packet 1 Packet, 1 Packet, Oral, TID WITH MEALS, Baldo Moncada M.D., Stopped at 05/01/17 1130  •  NS infusion, , Intravenous, Continuous, Baldo Moncada M.D., Last Rate: 83 mL/hr at 05/01/17 0955  •  metformin (GLUCOPHAGE) tablet 850 mg, 850 mg, Oral, BID WITH MEALS, Baldo Moncada M.D., 850 mg at 05/01/17 0954  •  heparin injection 5,000 Units, 5,000 Units, Subcutaneous, Q8HRS, Baldo Moncada M.D., Stopped at 05/01/17 0745  •  insulin regular (HUMULIN R) injection 3-15 Units, 3-15 Units, Subcutaneous, 4X/DAY ACHS, Pradip Scott D.O., 3 Units at 05/01/17 0615  •  insulin NPH (HUMULIN,NOVOLIN) injection 25 Units, 25 Units, Subcutaneous, BID INSULIN, JOSE ValadezO., 25 Units at 05/01/17 0617  •  buPROPion SR (WELLBUTRIN-SR) tablet 150 mg, 150 mg, Oral, BID, Pradip Scott,  D.O., 150 mg at 05/01/17 0954  •  vancomycin 1,600 mg in  mL IVPB, 15 mg/kg, Intravenous, Q12HR, Nettie Charles, PHARMD, Last Rate: 166.7 mL/hr at 05/01/17 0900, 1,600 mg at 05/01/17 0900  •  mesalamine delayed-release (DELZICOL) capsule 800 mg, 800 mg, Oral, TID, KHARI Valadez.O., 800 mg at 05/01/17 1002  •  HYDROmorphone (DILAUDID) 0.1 mg/mL in 50mL NS (PCA), , Intravenous, Continuous, Last Rate: 7 mL/hr at 04/30/17 2015, 5 mg at 05/01/17 1110 **AND** Pharmacy Consult Request ...Pain Management Review 1 Each, 1 Each, Other, PRN, Pradip Scott D.O.  •  zolpidem (AMBIEN) tablet 5 mg, 5 mg, Oral, HS PRN - MR X 1, JOSE ValadezO., 5 mg at 04/30/17 2335  •  senna-docusate (PERICOLACE or SENOKOT S) 8.6-50 MG per tablet 2 Tab, 2 Tab, Oral, BID, 2 Tab at 04/29/17 2100 **AND** polyethylene glycol/lytes (MIRALAX) PACKET 1 Packet, 1 Packet, Oral, QDAY PRN **AND** magnesium hydroxide (MILK OF MAGNESIA) suspension 30 mL, 30 mL, Oral, QDAY PRN **AND** bisacodyl (DULCOLAX) suppository 10 mg, 10 mg, Rectal, QDAY PRN, Christopher Gomez M.D.  •  Respiratory Care per Protocol, , Nebulization, Continuous RT, Christopher Gomez M.D.  •  NS (BOLUS) infusion 1,000 mL, 1,000 mL, Intravenous, Once PRN, Christopher Gomez M.D.  •  ondansetron (ZOFRAN) syringe/vial injection 4 mg, 4 mg, Intravenous, Q4HRS PRN, Christopher Gomez M.D.  •  ondansetron (ZOFRAN ODT) dispertab 4 mg, 4 mg, Oral, Q4HRS PRN, Christopher Gomez M.D.  •  promethazine (PHENERGAN) tablet 12.5-25 mg, 12.5-25 mg, Oral, Q4HRS PRN, Christopher Gomez M.D., 25 mg at 04/30/17 0422  •  promethazine (PHENERGAN) suppository 12.5-25 mg, 12.5-25 mg, Rectal, Q4HRS PRN, Christopher Gomez M.D.  •  prochlorperazine (COMPAZINE) injection 5-10 mg, 5-10 mg, Intravenous, Q4HRS PRN, Christopher Gomez M.D.  •  MD ALERT... vancomycin per pharmacy protocol, , Other, pharmacy to dose, Christopher Gomez M.D.  •  thyroid (ARMOUR THYROID) tablet 75 mg, 75 mg, Oral, AM ES, Christopher Gomez M.D., 75 mg at 05/01/17  0606  •  piperacillin-tazobactam (ZOSYN) 4.5 g in  mL IVPB, 4.5 g, Intravenous, Q6HRS, Christopher Gomez M.D., Stopped at 05/01/17 0706    Labs:  Recent Labs      04/29/17   1100  04/30/17   0530  05/01/17   0136   WBC  16.7*  10.2  11.2*   RBC  4.22*  2.97*  3.16*   HEMOGLOBIN  12.6*  9.0*  9.7*   HEMATOCRIT  36.0*  26.6*  28.2*   MCV  85.3  89.6  89.2   MCH  29.9  30.3  30.7   RDW  40.0  44.8  46.0   PLATELETCT  192  130*  172   MPV  9.6  9.7  10.0   NEUTSPOLYS  88.60*   --   87.90*   LYMPHOCYTES  2.60*   --   6.80*   MONOCYTES  0.00   --   3.60   EOSINOPHILS  0.00   --   0.00   BASOPHILS  0.00   --   0.30   RBCMORPHOLO  Present   --    --      Recent Labs      04/29/17   1100  04/30/17   0530  05/01/17   0136   SODIUM  127*  137  138   POTASSIUM  3.9  4.2  4.2   CHLORIDE  90*  105  107   CO2  22  22  20   GLUCOSE  390*  340*  242*   BUN  23*  17  21     Recent Labs      04/29/17   1100  04/30/17   0530  05/01/17   0136   ALBUMIN  4.3  3.1*  2.9*   TBILIRUBIN  1.2  0.6  0.4   ALKPHOSPHAT  151*  104*  97   TOTPROTEIN  7.8  5.4*  5.5*   ALTSGPT  80*  42  33   ASTSGOT  35  24  18   CREATININE  1.11  0.75  0.80       Imaging:    ECHOCARDIOGRAM COMP W/O CONT  05/01/2017   CONCLUSIONS  Normal left ventricular chamber size.  Normal left ventricular systolic function.  Left ventricular ejection fraction is visually estimated to be 65%.  Normal regional wall motion.  Mild mitral regurgitation.  The left atrium is normal in size.  Structurally normal aortic valve without significant stenosis or   regurgitation.  Right ventricular systolic pressure is estimated to be 50 mmHg.  Mild tricuspid regurgitation.  Normal inferior vena cava size and inspiratory collapse.  Mildly dilated right ventricle.  Normal right ventricular systolic function.    Ct-extremity, Lower With Right  4/29/2017  14 cm distal anterior thigh abscess with no CT evidence of septic arthropathy or osteomyelitis Findings were discussed with ANCELMO SPICER  SONIA on 4/29/2017 1:12 PM.    Dx-chest-limited (1 View)  4/29/2017  1.  Ill-defined opacifications in each lung have increased compared to the prior radiograph. This could indicate worsening of pulmonary edema or inflammation. 2.  Right central line is noted at the level of the lower SVC. No pneumothorax identified.    Micro:    Results     Procedure Component Value Units Date/Time    CULTURE WOUND W/ GRAM STAIN [[id number]]  (Abnormal)  (Susceptibility) Collected:  04/29/17 1855    Order Status:  Completed Specimen Information:  Wound Updated:  05/01/17 1044     Gram Stain Result --      Result:        Many WBCs.  Many mixed bacteria, no predominant organism seen.       Significant Indicator POS (POS)      Source WND      Site Right Thigh Abscess      Culture Result Wound -- (A)      Result:        Heavy growth Mixed skin yesenia predominantly Viridans  Streptococcus.       Culture Result Wound -- (A)      Result:        Klebsiella pneumoniae  Heavy growth       Culture Result Wound -- (A)      Result:        Methicillin Resistant Staphylococcus aureus  Heavy growth      Narrative:      CALL  Alvarez  141 tel. [phone],  CALLED  141 tel. [phone] 05/01/2017, 10:43, RB PERF. RESULTS CALLED  TO:[id number](MRSA) and faxed to [facility]    Culture & Susceptibility     KLEBSIELLA PNEUMONIAE     Antibiotic Sensitivity Microscan Unit Status    Ampicillin Resistant >16 mcg/mL Final    Cefepime Sensitive <=8 mcg/mL Final    Cefotaxime Sensitive <=2 mcg/mL Final    Cefotetan Sensitive <=16 mcg/mL Final    Ceftazidime Sensitive <=1 mcg/mL Final    Ceftriaxone Sensitive <=8 mcg/mL Final    Cefuroxime Sensitive <=4 mcg/mL Final    Ciprofloxacin Sensitive <=1 mcg/mL Final    Ertapenem Sensitive <=1 mcg/mL Final    Gentamicin Sensitive <=4 mcg/mL Final    Pip/Tazobactam Sensitive <=16 mcg/mL Final    Tigecycline Sensitive <=2 mcg/mL Final    Tobramycin Sensitive <=4 mcg/mL Final    Trimeth/Sulfa Sensitive <=2/38 mcg/mL Final               METHICILLIN RESISTANT STAPHYLOCOCCUS AUREUS     Antibiotic Sensitivity Microscan Unit Status    Ampicillin/sulbactam Resistant >16/8 mcg/mL Final    Clindamycin Sensitive <=0.5 mcg/mL Final    Daptomycin Sensitive 1 mcg/mL Final    Erythromycin Resistant >4 mcg/mL Final    Moxifloxacin Sensitive <=0.5 mcg/mL Final    Oxacillin Resistant >2 mcg/mL Final    Penicillin Resistant >8 mcg/mL Final    Tetracycline Sensitive <=4 mcg/mL Final    Trimeth/Sulfa Sensitive <=0.5/9.5 mcg/mL Final    Vancomycin Sensitive 2 mcg/mL Final                       ANAEROBIC CULTURE [[id number]] Collected:  04/29/17 1855    Order Status:  Completed Specimen Information:  Wound Updated:  05/01/17 1044     Significant Indicator NEG      Source WND      Site Right Thigh Abscess      Anaerobic Culture, Culture Res Culture in progress.     Narrative:      CALL  Alvarez  141 tel. [phone],  CALLED  141 tel. [phone] 05/01/2017, 10:43, RB PERF. RESULTS CALLED  TO:[id number](MRSA) and faxed to [facility]    FUNGAL CULTURE [[id number]] Collected:  04/29/17 1855    Order Status:  Completed Specimen Information:  Wound Updated:  05/01/17 1044     Significant Indicator NEG      Source WND      Site Right Thigh Abscess      Fungal Culture Culture in progress.     Narrative:      CALL  Alvarez  141 tel. [phone],  CALLED  141 tel. [phone] 05/01/2017, 10:43, RB PERF. RESULTS CALLED  TO:[id number](MRSA) and faxed to [facility]    BLOOD CULTURE [[id number]]  (Abnormal) Collected:  04/29/17 1100    Order Status:  Completed Specimen Information:  Blood from Peripheral Updated:  05/01/17 1014     Significant Indicator POS (POS)      Source BLD      Site PERIPHERAL      Blood Culture        Result:        Growth detected by Bactec instrument.  04/30/2017  14:03 (A)     Blood Culture Anaerobic Gram negative dung (A)     Narrative:      CALL  Alvarez  19 tel. [phone],  CALLED  19 tel. [phone] 04/30/2017, 14:05, RB PERF. RESULTS CALLED  TO:Chidi [id number]  Per Hospital  "Policy: Only change Specimen Src: to \"Line\" if  specified by physician order.    BLOOD CULTURE [[id number]]  (Abnormal) Collected:  04/29/17 1038    Order Status:  Completed Specimen Information:  Blood from Peripheral Updated:  05/01/17 1013     Significant Indicator POS (POS)      Source BLD      Site PERIPHERAL      Blood Culture        Result:        Growth detected by Bactec instrument.  04/30/2017  14:03 (A)     Blood Culture -- (A)      Result:        Anaerobic Gram negative dung  Identification to follow      Narrative:      CALL  Alvarez  19 tel. [phone],  CALLED  19 tel. [phone] 04/30/2017, 14:06, RB PERF. RESULTS CALLED  TO:Chidi [phone]  Per Hospital Policy: Only change Specimen Src: to \"Line\" if  specified by physician order.    URINE CULTURE(NEW) [[id number]] Collected:  04/29/17 1146    Order Status:  Completed Specimen Information:  Urine Updated:  05/01/17 0814     Significant Indicator NEG      Source UR      Site --      Urine Culture No growth at 48 hours     Narrative:      Indication for culture:->Emergency Room Patient    GRAM STAIN [[id number]] Collected:  04/29/17 1855    Order Status:  Completed Specimen Information:  Wound Updated:  04/29/17 2156     Significant Indicator .      Source WND      Site Right Thigh Abscess      Gram Stain Result --      Result:        Many WBCs.  Many mixed bacteria, no predominant organism seen.      URINALYSIS [[id number]]  (Abnormal) Collected:  04/29/17 1146    Order Status:  Completed Specimen Information:  Urine Updated:  04/29/17 1208     Micro Urine Req Microscopic      Color Lt. Yellow      Character Clear      Specific Gravity 1.024      Ph 6.5      Glucose >1000 (A) mg/dL      Ketones 20 (A) mg/dL      Protein Negative mg/dL      Bilirubin Negative      Nitrite Negative      Leukocyte Esterase Negative      Occult Blood Small (A)     Narrative:      Indication for culture:->Emergency Room Patient             Assessment:  Active Hospital Problems    " Diagnosis   • *Severe sepsis (CMS-[facility]) [A41.9, R65.20]   • Type 2 diabetes mellitus (CMS-[facility]) [E11.9]   • Chronic ulcerative colitis (CMS-[facility]) [K51.90]   • Complicated wound infection (CMS-[facility]) [T79.8XXA]   • Thrombocytopenia (CMS-[facility]) [D69.6]   • Hypothyroid [E03.9]       Plan:  Septic shock  Slightly improved   Hemodynamically stable    Right thigh abscess  S/p I&D on 4/29/2017 with Dr. Reyes  OR cx +MRSA, Kleb pneumoniae & Strep Viridans  D/C IV Vanco and Zosyn  Start IV Ceftaroline  Anticipate at least 4 weeks IV abx  PICC ordered  TTE on 5/1 negative  OR today, possible additional I&D    Likely Right medial ankle abscess  Plan for OR today for I&D of R ankle    Likely Left hand abscess  MRI ordered 4/30 of the left hand- MRI pending  Plan for OR today for I&D of L hand    Postoperative wound infection of umbilical hernia site  Improving, WV in place    Ulcerative colitis  If cultures remain negative then consider extraintestinal manifestations of ulcerative colitis as a diagnosis    Obesity  Diabetes mellitus  Keep blood sugars <150, elevated BS will impair wound healing and worsen infection.

## 2017-05-01 NOTE — PROGRESS NOTES
"Surgical Progress Note:      Feels ok this AM  Tolerating diet  Ambulating in room    PE:  /60 mmHg  Pulse 100  Temp(Src) 36.1 °C (97 °F)  Resp 17  Ht 1.74 m (5' 8.5\")  Wt 108.1 kg (238 lb 5.1 oz)  BMI 35.70 kg/m2  SpO2 93%    I/O:   Intake/Output Summary (Last 24 hours) at 05/01/17 0742  Last data filed at 05/01/17 0711   Gross per 24 hour   Intake 4390.25 ml   Output    750 ml   Net 3640.25 ml     UOP:  PO:  IV:    GEN: NAD  COR: tachy  PULM: CTA  ABD: soft  EXT: right thigh wound stable. Left hand fluctuant area on thenar aspect. Right ankle fluctuant area    Labs:  Recent Labs      04/29/17   1100  04/30/17   0530 05/01/17   0136   WBC  16.7*  10.2  11.2*   RBC  4.22*  2.97*  3.16*   HEMOGLOBIN  12.6*  9.0*  9.7*   HEMATOCRIT  36.0*  26.6*  28.2*   MCV  85.3  89.6  89.2   MCH  29.9  30.3  30.7   RDW  40.0  44.8  46.0   PLATELETCT  192  130*  172   MPV  9.6  9.7  10.0   NEUTSPOLYS  88.60*   --   87.90*   LYMPHOCYTES  2.60*   --   6.80*   MONOCYTES  0.00   --   3.60   EOSINOPHILS  0.00   --   0.00   BASOPHILS  0.00   --   0.30   RBCMORPHOLO  Present   --    --      Recent Labs      04/29/17   1100  04/30/17   0530  05/01/17   0136   SODIUM  127*  137  138   POTASSIUM  3.9  4.2  4.2   CHLORIDE  90*  105  107   CO2  22  22  20   GLUCOSE  390*  340*  242*   BUN  23*  17  21         A/P: POD# 2  S/P I and D right thigh  Doing well from that, continue dressing changes  Has some areas concerning for abscess/supparative phlebitis, plan OR today for I and D  Ok to ambulate     Esau Dooley M.D.  [facility]  [phone]      "

## 2017-05-01 NOTE — DIETARY
Nutrition Services: Consult received for diabetes diet education. The patient is currently strict NPO  Plan/Recommendations: RD to follow up with the diet education as able, a referral to an outpatient diabetes clinic after d/c  RD monitoring

## 2017-05-01 NOTE — CARE PLAN
Problem: Pain Management  Goal: Pain level will decrease to patient’s comfort goal  Intervention: Follow pain managment plan developed in collaboration with patient and Interdisciplinary Team  PCA pump in use.  Patient educated on use of PCA and  - verbally demonstrates understanding.  Right leg elevated on pillow and ice pack in use.  Patient reports pain is being controlled by PCA.        Problem: Urinary Elimination:  Goal: Ability to reestablish a normal urinary elimination pattern will improve  Intervention: Assess and monitor for signs and symptoms of urinary retention  Sousa d/c'd before transfer to floor from ICU.  Patient able to void without difficulty.  Encouraged PO intake of fluids.

## 2017-05-01 NOTE — OR SURGEON
Immediate Post-Operative Note      PreOp Diagnosis: left hand and right ankle abscesses    PostOp Diagnosis: same    Procedure(s):  IRRIGATION & DEBRIDEMENT GENERAL-Left HAND AND right  ANKLE - Wound Class: Dirty or Infected    Surgeon(s):  Esau Dooley M.D.    Anesthesiologist/Type of Anesthesia:  Anesthesiologist: Royce Mckinley M.D./General    Surgical Staff:  Circulator: Chiqui Colin R.N.  Scrub Person: Gina Smith    Specimen: none    Estimated Blood Loss: minimal    Findings: purulent drainage after incision on right ankle and left hand    Complications: none        5/1/2017 1:56 PM Esau Dooley

## 2017-05-02 ENCOUNTER — APPOINTMENT (OUTPATIENT)
Dept: RADIOLOGY | Facility: MEDICAL CENTER | Age: 52
DRG: 856 | End: 2017-05-02
Attending: NURSE PRACTITIONER
Payer: COMMERCIAL

## 2017-05-02 PROBLEM — E87.1 HYPONATREMIA: Status: RESOLVED | Noted: 2017-05-01 | Resolved: 2017-05-02

## 2017-05-02 LAB
BACTERIA SPEC ANAEROBE CULT: ABNORMAL
BACTERIA SPEC ANAEROBE CULT: ABNORMAL
BASOPHILS # BLD AUTO: 0 % (ref 0–1.8)
BASOPHILS # BLD: 0 K/UL (ref 0–0.12)
EOSINOPHIL # BLD AUTO: 0.24 K/UL (ref 0–0.51)
EOSINOPHIL NFR BLD: 4.4 % (ref 0–6.9)
ERYTHROCYTE [DISTWIDTH] IN BLOOD BY AUTOMATED COUNT: 44.1 FL (ref 35.9–50)
GLUCOSE BLD-MCNC: 105 MG/DL (ref 65–99)
GLUCOSE BLD-MCNC: 136 MG/DL (ref 65–99)
GLUCOSE BLD-MCNC: 155 MG/DL (ref 65–99)
GLUCOSE BLD-MCNC: 98 MG/DL (ref 65–99)
HCT VFR BLD AUTO: 30.1 % (ref 42–52)
HGB BLD-MCNC: 9.9 G/DL (ref 14–18)
LYMPHOCYTES # BLD AUTO: 1.01 K/UL (ref 1–4.8)
LYMPHOCYTES NFR BLD: 18.4 % (ref 22–41)
MANUAL DIFF BLD: NORMAL
MCH RBC QN AUTO: 29.6 PG (ref 27–33)
MCHC RBC AUTO-ENTMCNC: 32.9 G/DL (ref 33.7–35.3)
MCV RBC AUTO: 89.9 FL (ref 81.4–97.8)
MONOCYTES # BLD AUTO: 0.24 K/UL (ref 0–0.85)
MONOCYTES NFR BLD AUTO: 4.4 % (ref 0–13.4)
MORPHOLOGY BLD-IMP: NORMAL
MYELOCYTES NFR BLD MANUAL: 0.9 %
NEUTROPHILS # BLD AUTO: 3.95 K/UL (ref 1.82–7.42)
NEUTROPHILS NFR BLD: 71.9 % (ref 44–72)
NRBC # BLD AUTO: 0.02 K/UL
NRBC BLD AUTO-RTO: 0.4 /100 WBC
PLATELET # BLD AUTO: 212 K/UL (ref 164–446)
PLATELET BLD QL SMEAR: NORMAL
PMV BLD AUTO: 9.5 FL (ref 9–12.9)
RBC # BLD AUTO: 3.35 M/UL (ref 4.7–6.1)
RBC BLD AUTO: NORMAL
SIGNIFICANT IND 70042: ABNORMAL
SITE SITE: ABNORMAL
SOURCE SOURCE: ABNORMAL
WBC # BLD AUTO: 5.5 K/UL (ref 4.8–10.8)

## 2017-05-02 PROCEDURE — 700111 HCHG RX REV CODE 636 W/ 250 OVERRIDE (IP): Performed by: HOSPITALIST

## 2017-05-02 PROCEDURE — 700102 HCHG RX REV CODE 250 W/ 637 OVERRIDE(OP): Performed by: HOSPITALIST

## 2017-05-02 PROCEDURE — 99233 SBSQ HOSP IP/OBS HIGH 50: CPT | Performed by: HOSPITALIST

## 2017-05-02 PROCEDURE — 36569 INSJ PICC 5 YR+ W/O IMAGING: CPT

## 2017-05-02 PROCEDURE — 97605 NEG PRS WND THER DME<=50SQCM: CPT

## 2017-05-02 PROCEDURE — 700101 HCHG RX REV CODE 250: Performed by: INTERNAL MEDICINE

## 2017-05-02 PROCEDURE — 700105 HCHG RX REV CODE 258: Performed by: NURSE PRACTITIONER

## 2017-05-02 PROCEDURE — 85007 BL SMEAR W/DIFF WBC COUNT: CPT

## 2017-05-02 PROCEDURE — 770021 HCHG ROOM/CARE - ISO PRIVATE

## 2017-05-02 PROCEDURE — 87040 BLOOD CULTURE FOR BACTERIA: CPT

## 2017-05-02 PROCEDURE — 700105 HCHG RX REV CODE 258: Performed by: HOSPITALIST

## 2017-05-02 PROCEDURE — A9270 NON-COVERED ITEM OR SERVICE: HCPCS | Performed by: HOSPITALIST

## 2017-05-02 PROCEDURE — 700111 HCHG RX REV CODE 636 W/ 250 OVERRIDE (IP): Performed by: NURSE PRACTITIONER

## 2017-05-02 PROCEDURE — C1751 CATH, INF, PER/CENT/MIDLINE: HCPCS

## 2017-05-02 PROCEDURE — 85027 COMPLETE CBC AUTOMATED: CPT

## 2017-05-02 PROCEDURE — 82962 GLUCOSE BLOOD TEST: CPT | Mod: 91

## 2017-05-02 PROCEDURE — 36415 COLL VENOUS BLD VENIPUNCTURE: CPT

## 2017-05-02 PROCEDURE — 36597 REPOSITION VENOUS CATHETER: CPT

## 2017-05-02 RX ORDER — SODIUM CHLORIDE 9 MG/ML
INJECTION, SOLUTION INTRAVENOUS
Status: ACTIVE
Start: 2017-05-02 | End: 2017-05-02

## 2017-05-02 RX ADMIN — CEFTAROLINE FOSAMIL 600 MG: 600 POWDER, FOR SOLUTION INTRAVENOUS at 09:34

## 2017-05-02 RX ADMIN — SODIUM CHLORIDE: 900 INJECTION INTRAVENOUS at 00:34

## 2017-05-02 RX ADMIN — ACETAMINOPHEN 650 MG: 325 TABLET, FILM COATED ORAL at 23:59

## 2017-05-02 RX ADMIN — INSULIN HUMAN 3 UNITS: 100 INJECTION, SOLUTION PARENTERAL at 20:48

## 2017-05-02 RX ADMIN — HYDROMORPHONE HYDROCHLORIDE 5 MG: 2 INJECTION INTRAMUSCULAR; INTRAVENOUS; SUBCUTANEOUS at 04:00

## 2017-05-02 RX ADMIN — INSULIN HUMAN 25 UNITS: 100 INJECTION, SUSPENSION SUBCUTANEOUS at 17:54

## 2017-05-02 RX ADMIN — ACETAMINOPHEN 650 MG: 325 TABLET, FILM COATED ORAL at 00:33

## 2017-05-02 RX ADMIN — HYDROMORPHONE HYDROCHLORIDE 5 MG: 2 INJECTION INTRAMUSCULAR; INTRAVENOUS; SUBCUTANEOUS at 22:07

## 2017-05-02 RX ADMIN — MESALAMINE 800 MG: 400 CAPSULE, DELAYED RELEASE ORAL at 15:01

## 2017-05-02 RX ADMIN — LEVOTHYROXINE, LIOTHYRONINE 75 MG: 19; 4.5 TABLET ORAL at 06:03

## 2017-05-02 RX ADMIN — SODIUM CHLORIDE: 900 INJECTION INTRAVENOUS at 13:54

## 2017-05-02 RX ADMIN — HEPARIN SODIUM 5000 UNITS: 5000 INJECTION, SOLUTION INTRAVENOUS; SUBCUTANEOUS at 06:03

## 2017-05-02 RX ADMIN — MESALAMINE 800 MG: 400 CAPSULE, DELAYED RELEASE ORAL at 20:52

## 2017-05-02 RX ADMIN — MESALAMINE 800 MG: 400 CAPSULE, DELAYED RELEASE ORAL at 09:31

## 2017-05-02 RX ADMIN — METRONIDAZOLE 500 MG: 500 INJECTION, SOLUTION INTRAVENOUS at 09:35

## 2017-05-02 RX ADMIN — BUPROPION HYDROCHLORIDE 150 MG: 150 TABLET, FILM COATED, EXTENDED RELEASE ORAL at 09:33

## 2017-05-02 RX ADMIN — LACTOBACILLUS ACIDOPHILUS / LACTOBACILLUS BULGARICUS 1 PACKET: 100 MILLION CFU STRENGTH GRANULES at 09:32

## 2017-05-02 RX ADMIN — ACETAMINOPHEN 650 MG: 325 TABLET, FILM COATED ORAL at 06:03

## 2017-05-02 RX ADMIN — CEFTAROLINE FOSAMIL 600 MG: 600 POWDER, FOR SOLUTION INTRAVENOUS at 20:52

## 2017-05-02 RX ADMIN — ACETAMINOPHEN 650 MG: 325 TABLET, FILM COATED ORAL at 14:59

## 2017-05-02 RX ADMIN — BUPROPION HYDROCHLORIDE 150 MG: 150 TABLET, FILM COATED, EXTENDED RELEASE ORAL at 20:53

## 2017-05-02 RX ADMIN — ACETAMINOPHEN 650 MG: 325 TABLET, FILM COATED ORAL at 17:57

## 2017-05-02 RX ADMIN — HYDROMORPHONE HYDROCHLORIDE 5 MG: 2 INJECTION INTRAMUSCULAR; INTRAVENOUS; SUBCUTANEOUS at 14:53

## 2017-05-02 RX ADMIN — HEPARIN SODIUM 5000 UNITS: 5000 INJECTION, SOLUTION INTRAVENOUS; SUBCUTANEOUS at 22:07

## 2017-05-02 RX ADMIN — METRONIDAZOLE 500 MG: 500 INJECTION, SOLUTION INTRAVENOUS at 13:55

## 2017-05-02 RX ADMIN — METRONIDAZOLE 500 MG: 500 INJECTION, SOLUTION INTRAVENOUS at 22:07

## 2017-05-02 RX ADMIN — HYDROMORPHONE HYDROCHLORIDE 5 MG: 2 INJECTION INTRAMUSCULAR; INTRAVENOUS; SUBCUTANEOUS at 10:17

## 2017-05-02 RX ADMIN — HEPARIN SODIUM 5000 UNITS: 5000 INJECTION, SOLUTION INTRAVENOUS; SUBCUTANEOUS at 13:55

## 2017-05-02 ASSESSMENT — PAIN SCALES - GENERAL
PAINLEVEL_OUTOF10: 4
PAINLEVEL_OUTOF10: 3

## 2017-05-02 ASSESSMENT — ENCOUNTER SYMPTOMS
HEADACHES: 0
ABDOMINAL PAIN: 1
SPEECH CHANGE: 0
CHILLS: 0
COUGH: 0
MYALGIAS: 1
SENSORY CHANGE: 0
SHORTNESS OF BREATH: 0
NAUSEA: 0
VOMITING: 0
FEVER: 0

## 2017-05-02 ASSESSMENT — COPD QUESTIONNAIRES
COPD SCREENING SCORE: 1
DURING THE PAST 4 WEEKS HOW MUCH DID YOU FEEL SHORT OF BREATH: NONE/LITTLE OF THE TIME
DO YOU EVER COUGH UP ANY MUCUS OR PHLEGM?: NO/ONLY WITH OCCASIONAL COLDS OR INFECTIONS
HAVE YOU SMOKED AT LEAST 100 CIGARETTES IN YOUR ENTIRE LIFE: NO/DON'T KNOW

## 2017-05-02 NOTE — PROGRESS NOTES
Infectious Disease Progress Note    Author: JESSICA Parada Date of service & Time created: 2017  11:29 AM    Chief Complaint:  Chief Complaint   Patient presents with   • Fever   • Wound Infection       Interval History:  51-year-old white male with ulcerative colitis diabetes mellitus admitted with right thigh infection  2017- underwent I&D on 2017. Feels better. Fevers up to 102.7.   5/- Tm 99.1, WBC 11.2.  R thigh pain 8/10, L hand pain 7/10.  Tolerating abx without issue.   Tm 99.2, no CBC.  Pain to R thigh, R ankle and L hand, ranging from 5-7/10.  Feels flushed with body aches.  Tolerating abx.   Labs Reviewed, Medications Reviewed, Radiology Reviewed and Wound Reviewed.    Review of Systems:  Review of Systems   Constitutional: Negative for fever and chills.   Respiratory: Negative for cough and shortness of breath.    Cardiovascular: Negative for chest pain.   Gastrointestinal: Positive for abdominal pain. Negative for nausea and vomiting.        Multiple soft stools- chronic with UC.   Genitourinary: Negative for dysuria.   Musculoskeletal: Positive for myalgias and joint pain.        Abd, Right thigh, Right medial ankle and Left hand.   Skin: Negative for rash.   Neurological: Negative for sensory change, speech change and headaches.       Hemodynamics:  Temp (24hrs), Av.6 °C (97.8 °F), Min:34.9 °C (94.8 °F), Max:37.3 °C (99.2 °F)  Temperature: 36.3 °C (97.4 °F)  Pulse  Av.6  Min: 57  Max: 123Heart Rate (Monitored): (!) 105  Blood Pressure: 130/74 mmHg, NIBP: 127/85 mmHg      Physical Exam:  Physical Exam   Constitutional: He is oriented to person, place, and time. He appears well-developed and well-nourished. No distress.   Obese   HENT:   Head: Normocephalic and atraumatic.   Mouth/Throat: No oropharyngeal exudate.   Poor dentition- molar decay   Eyes: EOM are normal. Pupils are equal, round, and reactive to light. No scleral icterus.   Neck: Normal range of  motion. Neck supple.   Cardiovascular: Normal rate, regular rhythm and normal heart sounds.    No murmur heard.  Pulmonary/Chest: Effort normal and breath sounds normal. No respiratory distress. He has no wheezes.   Abdominal: Soft. Bowel sounds are normal. He exhibits no distension. There is no tenderness.   Wound VAC present, no surrounding erythema.   Musculoskeletal: He exhibits edema and tenderness.   Right thigh- elastic bandage with gauze.  Right medial ankle- gauze bandage.  Left hand- gauze bandage, improved flexion of hand and fingers.  RUE PICC- non tender, no erythema.   Neurological: He is alert and oriented to person, place, and time.   Skin: There is erythema.   Nursing note and vitals reviewed.      Meds:    Current facility-administered medications:   •  PICC Line Insertion has been implemented, , , Once **AND** May use Lidocaine 1% not to exceed 3 mls for local at insertion site, , , CONTINUOUS **AND** NOTIFY MD, , , Once **AND** Tip to dwell in the superior vena cava, , , CONTINUOUS **AND** Do not use PICC Line until placement verified by Chest X Ray, , , CONTINUOUS **AND** DX-CHEST-FOR PICC LINE Perform procedure in:: Patient's Room, , , Once **AND** If radiologist reading of chest X-ray states any of the following the PICC should be used, , , CONTINUOUS **AND** Further evaluation of the PICC placement can be retrieved from X-Ray and Imaging, , , CONTINUOUS **AND** Blood draws through PICC line; draws by RN only, , , CONTINUOUS **AND** FLUSHING GUIDELINES WHEN IN USE, , , CONTINUOUS **AND** normal saline PF 10-20 mL, 10-20 mL, Intravenous, PRN **AND** FLUSHING GUIDELINES WHEN NOT IN USE, , , CONTINUOUS **AND** DRESSING MAINTENANCE, , , Once **AND** Change needleless pressure ports and IV tubing every 72 hours per hospital policy, , , CONTINUOUS **AND** TUBING, , , CONTINUOUS **AND** If there is an MD order to remove the PICC line, any RN may remove the PICC line, , , CONTINUOUS **AND** []  PATIENT EDUCATION MATERIALS, , , Prior to discharge **AND** NURSING COMMUNICATION, , , CONTINUOUS, KEN Parada.P.R.N.  •  metronidazole (FLAGYL) IVPB 500 mg, 500 mg, Intravenous, Q8HRS, Luma Aguirre M.D., Stopped at 05/02/17 1035  •  acetaminophen (TYLENOL) tablet 650 mg, 650 mg, Oral, Q6HRS, Baldo Moncada M.D., 650 mg at 05/02/17 0603  •  lactobacillus granules (LACTINEX/FLORANEX) packet 1 Packet, 1 Packet, Oral, TID WITH MEALS, Baldo Moncada M.D., 1 Packet at 05/02/17 0932  •  NS infusion, , Intravenous, Continuous, Baldo Moncada M.D., Last Rate: 83 mL/hr at 05/02/17 0034  •  heparin injection 5,000 Units, 5,000 Units, Subcutaneous, Q8HRS, Baldo Moncada M.D., 5,000 Units at 05/02/17 0603  •  ceftaroline (TEFLARO) 600 mg in  mL IVPB, 600 mg, Intravenous, Q12HRS, JAS ParadaP.R.N., Stopped at 05/02/17 1034  •  insulin regular (HUMULIN R) injection 3-15 Units, 3-15 Units, Subcutaneous, 4X/DAY ACHS, KHARI Valadez.O., Stopped at 05/02/17 0700  •  insulin NPH (HUMULIN,NOVOLIN) injection 25 Units, 25 Units, Subcutaneous, BID INSULIN, JOSE ValadezO., Stopped at 05/02/17 0700  •  buPROPion SR (WELLBUTRIN-SR) tablet 150 mg, 150 mg, Oral, BID, JOSE ValadezO., 150 mg at 05/02/17 0933  •  mesalamine delayed-release (DELZICOL) capsule 800 mg, 800 mg, Oral, TID, KHARI Valadez.O., 800 mg at 05/02/17 0931  •  HYDROmorphone (DILAUDID) 0.1 mg/mL in 50mL NS (PCA), , Intravenous, Continuous, Last Rate: 7 mL/hr at 04/30/17 2015, 5 mg at 05/02/17 1017 **AND** Pharmacy Consult Request ...Pain Management Review 1 Each, 1 Each, Other, PRN, Pradip Scott D.O.  •  zolpidem (AMBIEN) tablet 5 mg, 5 mg, Oral, HS PRN - MR X 1, Pradip Scott D.O., 5 mg at 04/30/17 6427  •  senna-docusate (PERICOLACE or SENOKOT S) 8.6-50 MG per tablet 2 Tab, 2 Tab, Oral, BID, 2 Tab at 04/29/17 2100 **AND** polyethylene glycol/lytes (MIRALAX) PACKET 1 Packet, 1 Packet, Oral, QDAY PRN **AND** magnesium hydroxide  (MILK OF MAGNESIA) suspension 30 mL, 30 mL, Oral, QDAY PRN **AND** bisacodyl (DULCOLAX) suppository 10 mg, 10 mg, Rectal, QDAY PRN, Christopher Gomez M.D.  •  Respiratory Care per Protocol, , Nebulization, Continuous RT, Christopher Gomez M.D.  •  ondansetron (ZOFRAN) syringe/vial injection 4 mg, 4 mg, Intravenous, Q4HRS PRN, Christopher Gomez M.D.  •  ondansetron (ZOFRAN ODT) dispertab 4 mg, 4 mg, Oral, Q4HRS PRN, Christopher Gomez M.D.  •  promethazine (PHENERGAN) tablet 12.5-25 mg, 12.5-25 mg, Oral, Q4HRS PRN, Christopher Gomez M.D., 25 mg at 05/01/17 1151  •  promethazine (PHENERGAN) suppository 12.5-25 mg, 12.5-25 mg, Rectal, Q4HRS PRN, Christopher Gomez M.D.  •  prochlorperazine (COMPAZINE) injection 5-10 mg, 5-10 mg, Intravenous, Q4HRS PRN, Christopher Gomez M.D.  •  thyroid (ARMOUR THYROID) tablet 75 mg, 75 mg, Oral, AM ES, Christopher Gomez M.D., 75 mg at 05/02/17 0603    Labs:  Recent Labs      04/30/17   0530  05/01/17   0136   WBC  10.2  11.2*   RBC  2.97*  3.16*   HEMOGLOBIN  9.0*  9.7*   HEMATOCRIT  26.6*  28.2*   MCV  89.6  89.2   MCH  30.3  30.7   RDW  44.8  46.0   PLATELETCT  130*  172   MPV  9.7  10.0   NEUTSPOLYS   --   87.90*   LYMPHOCYTES   --   6.80*   MONOCYTES   --   3.60   EOSINOPHILS   --   0.00   BASOPHILS   --   0.30     Recent Labs      04/30/17   0530  05/01/17   0136   SODIUM  137  138   POTASSIUM  4.2  4.2   CHLORIDE  105  107   CO2  22  20   GLUCOSE  340*  242*   BUN  17  21     Recent Labs      04/30/17   0530  05/01/17   0136   ALBUMIN  3.1*  2.9*   TBILIRUBIN  0.6  0.4   ALKPHOSPHAT  104*  97   TOTPROTEIN  5.4*  5.5*   ALTSGPT  42  33   ASTSGOT  24  18   CREATININE  0.75  0.80       Imaging:  DX-CHEST-FOR PICC LINE   5/2/2017  Impression      1. Placement right PICC line projecting over the SVC with the tip in the mid to lower right atrium.  2.  Mild diffuse atelectasis/interstitial edema.  3.  Findings discussed with PICC team at 8:00 AM.     ECHOCARDIOGRAM COMP W/O CONT  05/01/2017  "  CONCLUSIONS  Normal left ventricular chamber size.  Normal left ventricular systolic function.  Left ventricular ejection fraction is visually estimated to be 65%.  Normal regional wall motion.  Mild mitral regurgitation.  The left atrium is normal in size.  Structurally normal aortic valve without significant stenosis or   regurgitation.  Right ventricular systolic pressure is estimated to be 50 mmHg.  Mild tricuspid regurgitation.  Normal inferior vena cava size and inspiratory collapse.  Mildly dilated right ventricle.  Normal right ventricular systolic function.    Ct-extremity, Lower With Right  4/29/2017  14 cm distal anterior thigh abscess with no CT evidence of septic arthropathy or osteomyelitis Findings were discussed with ANCELMO SCOTT on 4/29/2017 1:12 PM.    Micro:    Results     Procedure Component Value Units Date/Time    BLOOD CULTURE [[id number]]  (Abnormal) Collected:  04/29/17 1100    Order Status:  Completed Specimen Information:  Blood from Peripheral Updated:  05/01/17 1850     Significant Indicator POS (POS)      Source BLD      Site PERIPHERAL      Blood Culture        Result:        Growth detected by Bactec instrument.  04/30/2017  14:03 (A)     Blood Culture Bacteroides vulgatus (A)     Narrative:      CALL  Alvarez  19 tel. [phone],  CALLED  19 tel. [phone] 04/30/2017, 14:05, RB PERF. RESULTS CALLED  TO:Chidi [id number]  Per Hospital Policy: Only change Specimen Src: to \"Line\" if  specified by physician order.    BLOOD CULTURE [[id number]]  (Abnormal) Collected:  04/29/17 1038    Order Status:  Completed Specimen Information:  Blood from Peripheral Updated:  05/01/17 1849     Significant Indicator POS (POS)      Source BLD      Site PERIPHERAL      Blood Culture        Result:        Growth detected by Bactec instrument.  04/30/2017  14:03 (A)     Blood Culture Bacteroides vulgatus (A)     Narrative:      CALL  Alvarez  19 tel. [phone],  CALLED  19 tel. [phone] 04/30/2017, " "14:06, RB PERF. RESULTS CALLED  TO:Chidi [id number]  Per Hospital Policy: Only change Specimen Src: to \"Line\" if  specified by physician order.    CULTURE WOUND W/ GRAM STAIN [[id number]]  (Abnormal)  (Susceptibility) Collected:  04/29/17 0461    Order Status:  Completed Specimen Information:  Wound Updated:  05/01/17 1044     Gram Stain Result --      Result:        Many WBCs.  Many mixed bacteria, no predominant organism seen.       Significant Indicator POS (POS)      Source WND      Site Right Thigh Abscess      Culture Result Wound -- (A)      Result:        Heavy growth Mixed skin yesenia predominantly Viridans  Streptococcus.       Culture Result Wound -- (A)      Result:        Klebsiella pneumoniae  Heavy growth       Culture Result Wound -- (A)      Result:        Methicillin Resistant Staphylococcus aureus  Heavy growth      Narrative:      CALL  Alvarez  141 tel. [phone],  CALLED  141 tel. [phone] 05/01/2017, 10:43, RB PERF. RESULTS CALLED  TO:[id number](MRSA) and faxed to [facility]    Culture & Susceptibility     KLEBSIELLA PNEUMONIAE     Antibiotic Sensitivity Microscan Unit Status    Ampicillin Resistant >16 mcg/mL Final    Cefepime Sensitive <=8 mcg/mL Final    Cefotaxime Sensitive <=2 mcg/mL Final    Cefotetan Sensitive <=16 mcg/mL Final    Ceftazidime Sensitive <=1 mcg/mL Final    Ceftriaxone Sensitive <=8 mcg/mL Final    Cefuroxime Sensitive <=4 mcg/mL Final    Ciprofloxacin Sensitive <=1 mcg/mL Final    Ertapenem Sensitive <=1 mcg/mL Final    Gentamicin Sensitive <=4 mcg/mL Final    Pip/Tazobactam Sensitive <=16 mcg/mL Final    Tigecycline Sensitive <=2 mcg/mL Final    Tobramycin Sensitive <=4 mcg/mL Final    Trimeth/Sulfa Sensitive <=2/38 mcg/mL Final              METHICILLIN RESISTANT STAPHYLOCOCCUS AUREUS     Antibiotic Sensitivity Microscan Unit Status    Ampicillin/sulbactam Resistant >16/8 mcg/mL Final    Clindamycin Sensitive <=0.5 mcg/mL Final    Daptomycin Sensitive 1 mcg/mL Final    Erythromycin " Resistant >4 mcg/mL Final    Moxifloxacin Sensitive <=0.5 mcg/mL Final    Oxacillin Resistant >2 mcg/mL Final    Penicillin Resistant >8 mcg/mL Final    Tetracycline Sensitive <=4 mcg/mL Final    Trimeth/Sulfa Sensitive <=0.5/9.5 mcg/mL Final    Vancomycin Sensitive 2 mcg/mL Final                       ANAEROBIC CULTURE [[id number]] Collected:  04/29/17 1855    Order Status:  Completed Specimen Information:  Wound Updated:  05/01/17 1044     Significant Indicator NEG      Source WND      Site Right Thigh Abscess      Anaerobic Culture, Culture Res Culture in progress.     Narrative:      CALL  Alvarez  141 tel. [phone],  CALLED  141 tel. [phone] 05/01/2017, 10:43, RB PERF. RESULTS CALLED  TO:[id number](MRSA) and faxed to [facility]    FUNGAL CULTURE [[id number]] Collected:  04/29/17 1855    Order Status:  Completed Specimen Information:  Wound Updated:  05/01/17 1044     Significant Indicator NEG      Source WND      Site Right Thigh Abscess      Fungal Culture Culture in progress.     Narrative:      CALL  Alvarez  141 tel. [phone],  CALLED  141 tel. [phone] 05/01/2017, 10:43, RB PERF. RESULTS CALLED  TO:[id number](MRSA) and faxed to [facility]    URINE CULTURE(NEW) [[id number]] Collected:  04/29/17 1146    Order Status:  Completed Specimen Information:  Urine Updated:  05/01/17 0814     Significant Indicator NEG      Source UR      Site --      Urine Culture No growth at 48 hours     Narrative:      Indication for culture:->Emergency Room Patient    GRAM STAIN [[id number]] Collected:  04/29/17 1855    Order Status:  Completed Specimen Information:  Wound Updated:  04/29/17 2156     Significant Indicator .      Source WND      Site Right Thigh Abscess      Gram Stain Result --      Result:        Many WBCs.  Many mixed bacteria, no predominant organism seen.      URINALYSIS [[id number]]  (Abnormal) Collected:  04/29/17 1146    Order Status:  Completed Specimen Information:  Urine Updated:  04/29/17 1208     Micro Urine Req  Microscopic      Color Lt. Yellow      Character Clear      Specific Gravity 1.024      Ph 6.5      Glucose >1000 (A) mg/dL      Ketones 20 (A) mg/dL      Protein Negative mg/dL      Bilirubin Negative      Nitrite Negative      Leukocyte Esterase Negative      Occult Blood Small (A)     Narrative:      Indication for culture:->Emergency Room Patient             Assessment:  Active Hospital Problems    Diagnosis   • *Severe sepsis (CMS-[facility]) [A41.9, R65.20]   • Type 2 diabetes mellitus (CMS-[facility]) [E11.9]   • Chronic ulcerative colitis (CMS-[facility]) [K51.90]   • Complicated wound infection (CMS-[facility]) [T79.8XXA]   • Thrombocytopenia (CMS-[facility]) [D69.6]   • Hypothyroid [E03.9]       Plan:  Septic shock  Slightly improved   Hemodynamically stable  Bcx 4/19 negative    Right thigh abscess  S/p I&D on 4/29/2017 with Dr. Reyes  OR cx +MRSA, Kleb pneumoniae & Strep Viridans  D/C IV Vanco and Zosyn  Start IV Ceftaroline  Anticipate 2-4 weeks IV abx  TTE on 5/1 negative  Recommend NAMRATA    Right medial ankle abscess  S/p I&D on 5/1 with Dr. Reyes    Left hand abscess  S/p I&D on 5/1 with Dr. Reyes    Bacteroides fragilis bacteremia  Bcx 4/29 +Bacteroides vulgatus  Start IV Flagyl  Repeat Bcx x2 ordered    Postoperative wound infection of umbilical hernia site  Improving, WV in place    Ulcerative colitis  If cultures remain negative then consider extraintestinal manifestations of ulcerative colitis as a diagnosis    Obesity  Diabetes mellitus  Keep blood sugars <150, elevated BS will impair wound healing and worsen infection.    Discussed with RN.

## 2017-05-02 NOTE — DISCHARGE PLANNING
Care Transition Team Assessment    Information Source  Orientation : Oriented x 4  Information Given By: Patient  Informant's Name: Mahesh  Who is responsible for making decisions for patient? : Patient    Readmission Evaluation  Is this a readmission?: Yes - unplanned readmission  Why do you think you were readmitted?:  (infection)  Was an appointment arranged for you prior to discharge?: Yes, attended appointment  Were there new prescriptions you were supposed to fill after you were discharged?: Yes, prescriptions filled  Did you understand your discharge instructions?: Yes  Did you have enough support after your last discharge?: Yes    Elopement Risk  Legal Hold: No  Ambulatory or Self Mobile in Wheelchair: Yes  Disoriented: No  Psychiatric Symptoms: None  History of Wandering: No  Elopement this Admit: No  Vocalizing Wanting to Leave: No  Displays Behaviors, Body Language Wanting to Leave: No-Not at Risk for Elopement  Elopement Risk: Not at Risk for Elopement    Interdisciplinary Discharge Planning  Does Admitting Nurse Feel This Could be a Complex Discharge?: No  Primary Care Physician: Rodolfo Stein  Lives with - Patient's Self Care Capacity: Significant Other, Adult Children, Child Less than 18 Years of Age  Patient or legal guardian wants to designate a caregiver (see row info): No  Support Systems: Spouse / Significant Other, Friends / Neighbors, Family Member(s)  Housing / Facility: 1 Story House  Do You Take your Prescribed Medications Regularly: Yes  Able to Return to Previous ADL's: Yes  Mobility Issues: Yes (r/t R leg infection)    Discharge Preparedness  What is your plan after discharge?: Home with help  What are your discharge supports?: Partner  Prior Functional Level: Independent with Activities of Daily Living    Functional Assesment  Prior Functional Level: Independent with Activities of Daily Living    Finances  Financial Barriers to Discharge: No  Prescription Coverage: Yes    Vision / Hearing  Impairment  Right Eye Vision: Impaired, Wears Glasses  Left Eye Vision: Impaired, Wears Glasses  Hearing Impairment : No    Values / Beliefs / Concerns  Values / Beliefs Concerns : No         Domestic Abuse  Have you ever been the victim of abuse or violence?: No  Physical Abuse or Sexual Abuse: No  Verbal Abuse or Emotional Abuse: No  Possible Abuse Reported to:: Not Applicable    Psychological Assessment  History of Substance Abuse: None  Newly Diagnosed Illness: Yes    Discharge Risks or Barriers  Patient risk factors: Complex medical needs    Anticipated Discharge Information  Anticipated discharge disposition: Infusion Center (outpatient)

## 2017-05-02 NOTE — CONSULTS
Diabetes education: order received for diabetes education. Pt is known from previous admit. Pt's blood sugar on admit was 390 and Hg a1c was 8.9% (down from 12.1% from 1/16/17, when last seen).  Pt is in the process of getting dressing change. CDE will follow up to assess needs tomorrow. Please call [phone] if needs change.    Consults by Wanda Carson R.N. at 1/16/2017  1:48 PM      Author: Wanda Carson R.N. Service: (none) Author Type: Diabetes Health Educator     Filed: 1/16/2017  1:57 PM Note Time: 1/16/2017  1:48 PM Status: Signed     : Wanda Carson R.N. (Diabetes Health Educator)       Expand All Collapse All    Diabetes education: Met with Mahesh who has an almost 1 year hx of diabetes. Pt was originally on Trajenta, but was allergic, was on insulin for short time, did well went off insulin. Most recently had hernia repair and then steroid injection to his back. He was placed on Tresiba, which worked well for the short term, but after steroid injection was admitted and discharged on Toujeo. He could not get blood sugars down with large doses of Toujeo (BID though Toujeo is once a day) and sliding scale ac and hs. Pt was admitted 1/4/17 (blood sugar 728), discharged 1/5/17 and readmitted today with blood sugar of 589 ( was 628 on 1/13/17).  Reviewed Toujeo and handout given.  Pt is currently on Lantus 40 units bid and Humalog sliding scale ac and hs. No current blood sugars available. Hg A1c was 11.0%( was 8.8% on 9/19/16).   Plan: Pt has no diabetes education needs at this time. CDE will continue to follow. Please call [phone] if needs change.                       Pt is currently getting NPH 25 units BID and Regular sliding scale coverage ac and hs. Blood sugars have been: 181( 3 units), 105, and 136. Pt is to get a Diabetic diet as of order from 1016 ( was NPO).

## 2017-05-02 NOTE — PROCEDURES
PICC Insertion Procedure Note    Consents confirmed, vessel patency confirmed with ultrasound. Risks and benefits of procedure explained to patient/family and education regarding central line associated bloodstream infections provided. Questions answered.     PICC placed in RUE per MD order with ultrasound guidance. 4 Fr, single lumen PICC placed in basilic vein after 1 attempt(s). 3 cc's of 1% lidocaine injected intradermally, 21 gauge microintroducer needle and modified Seldinger technique used. 46 cm catheter with 2cm external measurement inserted with good blood return. Each lumen flushed without resistance with 10 mL 0.9% normal saline. PICC line secured with Biopatch and Tegaderm.    CXR ordered, PICC placement confirmation will be provided by the Radiologist via interpretation of the follow-up chest x-ray to confirm tip location. Pt tolerated procedure well.  Patient condition relayed to unit RN or ordering physician via this post procedure note in the EMR.     [employer] PICC ref # [id number], Lot # [id number]

## 2017-05-02 NOTE — PROGRESS NOTES
"Surgical Progress Note:      Doing well    PE:  /74 mmHg  Pulse 91  Temp(Src) 36.3 °C (97.4 °F)  Resp 18  Ht 1.74 m (5' 8.5\")  Wt 108.1 kg (238 lb 5.1 oz)  BMI 35.70 kg/m2  SpO2 91%    I/O:   Intake/Output Summary (Last 24 hours) at 05/02/17 0850  Last data filed at 05/02/17 0719   Gross per 24 hour   Intake 2499.7 ml   Output   1300 ml   Net 1199.7 ml     UOP:  PO:  IV:    GEN: NAD  COR: RRR  PULM: CTA  ABD: soft, VAC in place  EXT: right thigh, right ankle and left hand wounds packed, minimla surrounding erythema    Labs:  Recent Labs      04/29/17   1100  04/30/17   0530  05/01/17   0136   WBC  16.7*  10.2  11.2*   RBC  4.22*  2.97*  3.16*   HEMOGLOBIN  12.6*  9.0*  9.7*   HEMATOCRIT  36.0*  26.6*  28.2*   MCV  85.3  89.6  89.2   MCH  29.9  30.3  30.7   RDW  40.0  44.8  46.0   PLATELETCT  192  130*  172   MPV  9.6  9.7  10.0   NEUTSPOLYS  88.60*   --   87.90*   LYMPHOCYTES  2.60*   --   6.80*   MONOCYTES  0.00   --   3.60   EOSINOPHILS  0.00   --   0.00   BASOPHILS  0.00   --   0.30   RBCMORPHOLO  Present   --    --      Recent Labs      04/29/17   1100  04/30/17   0530  05/01/17   0136   SODIUM  127*  137  138   POTASSIUM  3.9  4.2  4.2   CHLORIDE  90*  105  107   CO2  22  22  20   GLUCOSE  390*  340*  242*   BUN  23*  17  21         A/P:   S/P s/o I and D right thigh, and right ankle and left hand  Dressing changes  Possible VAC tomorrow on thigh wound  Antibiotics per primary team and ID     Esau Dooley M.D.  [facility]  [phone]      "

## 2017-05-02 NOTE — OP REPORT
DATE OF SERVICE:  05/01/2017    DATE OF SERVICE:  05/01/2017.    PREOPERATIVE DIAGNOSIS:  Left hand and right ankle abscess.    POSTOPERATIVE DIAGNOSIS:  Left hand and right ankle abscess.    PROCEDURE:  Incision and drainage of left hand and right ankle abscesses.    SURGEON:  Esau Dooley MD.    ANESTHESIOLOGIST:  Royce Mckinley MD.    ANESTHESIA:  General endotracheal.    SPECIMENS:  None.    COMPLICATIONS:  None.    ESTIMATED BLOOD LOSS:  Minimal.    INDICATIONS:  This patient is a 51-year-old male who presented with sepsis   from a right thigh intramuscular abscess.  In addition, he had recently   undergone several blood draws and had findings concerning for cellulitis or   superficial thrombophlebitis at some of the sites.  At his initial   presentation, these did not appear to the abscesses; however, over the next   several days the physical exam showed fluctuance at these areas concerning for   abscesses.  After discussion with the patient, we decided to go back to the   operating room for incision and drainage of these particular areas.    FINDINGS:  In the left hand lateral aspect of the thenar area there was   approximately a 2x1 cm abscess cavity.  Over the right anterior medial ankle   there was another abscess cavity measuring approximately 2x1 cm.    PROCEDURE IN DETAIL:  Informed consent was obtained.  The patient was brought   back to the operating room, placed in a supine position on the operating   table.  General anesthesia was induced.  The patient's left hand and right   ankle were prepped and draped in the usual sterile fashion.  Time-out   procedure was performed.  Patient was therapeutic on antibiotics.  We first   turned our attention to the hand over the area of fluctuance an incision was   made and dissected down to the soft tissue to encounter the abscess cavity.    We irrigated the area thoroughly with warm saline and half inch iodoform   packing was placed.  Similarly over the  right ankle incision was made and the   abscess cavity entered.  This was similarly irrigated and packed with   iodoform.    Now, both wounds were then dressed with 4x4s and Kerlix.  The patient   tolerated the procedure well, was extubated and taken to the recovery unit in   stable condition.       ____________________________________     MD BEE Wyman / STORM    DD:  05/01/2017 14:02:17  DT:  05/01/2017 16:32:02    D#:  [id number]  Job#:  [id number]

## 2017-05-02 NOTE — WOUND TEAM
[facility]  Inpatient Services  Initial Wound and Skin Care Evaluation    Admission Date:   4/29/17  HPI, PMH, SH: Reviewed  Unit where seen by Wound Team:   T4    WOUND CONSULT RELATED TO:  md request for npwt drsg changes per protocol    SUBJECTIVE:  Pleasant/agreeable    Self Report / Pain Level:  No c/o    OBJECTIVE:   Prev npwt drsg remained in place    WOUND TYPE, LOCATION, CHARACTERISTICS (Pressure ulcers: location, stage, POA or date identified)    Location and type of wound: Full thickness surgical wnd midline abdomen        Periwound:      intact  Drainage:      Scant serosanguinous  Tissue Type and %:     80% pink/red -- 20% white fibrous  Wound Edges:     attached  Odor:       none  Exposed structure(s):   suture  S&S of Infection:    none      Measurements: (cm)   Taken 5/1/17  Length:      0.8  Width:       2.8  Depth:     1.4      INTERVENTIONS BY WOUND TEAM:  Prev drsg removed -- wnd irrigated with wnd cleanser -- measurements and photograph done -- benzoin and drape skylar-wnd -- 1 piece black foam strip to fill the wnd with a button to cover -- sealed with drape and neg pressure applied at 125mmhg    Interdisciplinary consultation:  Nsg; pt; sig other    EVALUATION:   Clean surgical wnd that should make good progress with npwt    Factors affecting wound healing:  Infection/sepsis -- abscesses -- obese -- dm    Goals:  100% granulation in 2 weeks -- decrease size X 5% every 2 weeks    NURSING PLAN OF CARE ORDERS (x):    Dressing changes: See Dressing Maintenance orders: x  Skin care: See Skin Care orders: x  Rectal tube care: See Rectal Tube Care orders:   Other orders:    RSKIN: CURRENT (X) ORDERED (O)  Q shift Garrett:  X  Q shift pressure point assessments:  X  Pressure redistribution mattress        BRANDON      Bariatric BRANDON      Bariatric foam        Heel float boots       Heels floated on pillows    x  Barrier wipes      Barrier Cream      Barrier paste      Sacral silicone dressing       Silicone O2 tubing      Anchorfast      Trach with Optifoam split foam       Waffle cushion      Rectal tube or BMS      Antifungal tx    Turn q 2 hours     Up to chair     Ambulate x  PT/OT     Dietician      PO     TF   TPN     PVN    NPO   # days   Other       WOUND TEAM PLAN OF CARE (x):   NPWT change 3 x week:      x  Dressing changes by wound team:       Follow up as needed:     x  Other (explain):    Anticipated discharge plans (x):  SNF:           Home Care:           Outpatient Wound Center:     x       Self Care:            Other:

## 2017-05-02 NOTE — DISCHARGE PLANNING
DC needs undetermined at this time. S/P I and D right thigh, and right ankle and left hand. Also pt has a wound vac to abdomen and per ID MD note, may have one placed to right thigh. PICC placed and currently on IV ABX. DC plan may be home with outpatient wound care and outpatient IV infusion vs Skilled. CM/SS to follow for dc needs.

## 2017-05-02 NOTE — CARE PLAN
Problem: Safety  Goal: Will remain free from falls  Outcome: PROGRESSING AS EXPECTED  Pt remains free from fall at this time.  Pt educated on fall risk.  Pt is A&O x4 and demonstrates appropriate use of call light to call for assistance.  CLIP, hourly rounding in place    Problem: Venous Thromboembolism (VTW)/Deep Vein Thrombosis (DVT) Prevention:  Goal: Patient will participate in Venous Thrombosis (VTE)/Deep Vein Thrombosis (DVT)Prevention Measures  Outcome: PROGRESSING AS EXPECTED    05/01/17 2125   OTHER   Risk Assessment Score 4   VTE RISK High   Mechanical Prophylaxis SCDs, Sequentials (Intermittent Pneumatic Compression Devices)   Pharmacologic Prophylaxis Used Unfractionated Heparin

## 2017-05-02 NOTE — PROGRESS NOTES
Assumed care of Mr Parker at 1900.    Pt is A&O x4.  Pain 7/10.  Pt medicated per MAR, PCA  Nausea denied  Tolerating Diet   Midline abdominal wound vac, Suction, Patent, no leaks  Lt wrist, Dressing in place.  Small amount of serosanguineous drainage  Rt thigh, Dressing reinforced.  Small amount of purulent drainage  Rt ankle, Dressing in place.  Clean dry intact.    Positive Urine output  Positive BM Void   Positive Flatus  2 assist  Bed in lowest position and locked.    ** Bed alarm refused despite pt education on fall risk.  Pt is A&O x4 and demonstrates appropriate use of call light to call for assistance.  CLIP, hourly rounding in place.    .  Pt resting comfortably now.  Review plan of care with patient  Call light within reach  Hourly rounds in place  All needs met at this time

## 2017-05-03 LAB
ANISOCYTOSIS BLD QL SMEAR: ABNORMAL
BASOPHILS # BLD AUTO: 1.7 % (ref 0–1.8)
BASOPHILS # BLD: 0.11 K/UL (ref 0–0.12)
EOSINOPHIL # BLD AUTO: 0.16 K/UL (ref 0–0.51)
EOSINOPHIL NFR BLD: 2.6 % (ref 0–6.9)
ERYTHROCYTE [DISTWIDTH] IN BLOOD BY AUTOMATED COUNT: 44.5 FL (ref 35.9–50)
FUNGUS SPEC CULT: NORMAL
FUNGUS SPEC CULT: NORMAL
GLUCOSE BLD-MCNC: 115 MG/DL (ref 65–99)
GLUCOSE BLD-MCNC: 116 MG/DL (ref 65–99)
GLUCOSE BLD-MCNC: 129 MG/DL (ref 65–99)
GLUCOSE BLD-MCNC: 132 MG/DL (ref 65–99)
HCT VFR BLD AUTO: 29.1 % (ref 42–52)
HEMOCCULT STL QL: POSITIVE
HGB BLD-MCNC: 9.8 G/DL (ref 14–18)
LV EJECT FRACT  99904: 55
LYMPHOCYTES # BLD AUTO: 1.04 K/UL (ref 1–4.8)
LYMPHOCYTES NFR BLD: 16.7 % (ref 22–41)
MANUAL DIFF BLD: NORMAL
MCH RBC QN AUTO: 30.1 PG (ref 27–33)
MCHC RBC AUTO-ENTMCNC: 33.7 G/DL (ref 33.7–35.3)
MCV RBC AUTO: 89.3 FL (ref 81.4–97.8)
MICROCYTES BLD QL SMEAR: ABNORMAL
MONOCYTES # BLD AUTO: 0.27 K/UL (ref 0–0.85)
MONOCYTES NFR BLD AUTO: 4.4 % (ref 0–13.4)
MORPHOLOGY BLD-IMP: NORMAL
MYELOCYTES NFR BLD MANUAL: 0.9 %
NEUTROPHILS # BLD AUTO: 4.57 K/UL (ref 1.82–7.42)
NEUTROPHILS NFR BLD: 73.7 % (ref 44–72)
NRBC # BLD AUTO: 0.04 K/UL
NRBC BLD AUTO-RTO: 0.6 /100 WBC
PLATELET # BLD AUTO: 224 K/UL (ref 164–446)
PLATELET BLD QL SMEAR: NORMAL
PMV BLD AUTO: 9.5 FL (ref 9–12.9)
RBC # BLD AUTO: 3.26 M/UL (ref 4.7–6.1)
RBC BLD AUTO: PRESENT
SIGNIFICANT IND 70042: NORMAL
SITE SITE: NORMAL
SOURCE SOURCE: NORMAL
WBC # BLD AUTO: 6.2 K/UL (ref 4.8–10.8)

## 2017-05-03 PROCEDURE — 700111 HCHG RX REV CODE 636 W/ 250 OVERRIDE (IP): Performed by: HOSPITALIST

## 2017-05-03 PROCEDURE — A9270 NON-COVERED ITEM OR SERVICE: HCPCS | Performed by: HOSPITALIST

## 2017-05-03 PROCEDURE — 700102 HCHG RX REV CODE 250 W/ 637 OVERRIDE(OP): Performed by: PHARMACIST

## 2017-05-03 PROCEDURE — 700102 HCHG RX REV CODE 250 W/ 637 OVERRIDE(OP): Performed by: HOSPITALIST

## 2017-05-03 PROCEDURE — 82962 GLUCOSE BLOOD TEST: CPT | Mod: 91

## 2017-05-03 PROCEDURE — 85027 COMPLETE CBC AUTOMATED: CPT

## 2017-05-03 PROCEDURE — 700111 HCHG RX REV CODE 636 W/ 250 OVERRIDE (IP)

## 2017-05-03 PROCEDURE — 700101 HCHG RX REV CODE 250

## 2017-05-03 PROCEDURE — 82272 OCCULT BLD FECES 1-3 TESTS: CPT

## 2017-05-03 PROCEDURE — A9270 NON-COVERED ITEM OR SERVICE: HCPCS | Performed by: PHARMACIST

## 2017-05-03 PROCEDURE — 700101 HCHG RX REV CODE 250: Performed by: PHARMACIST

## 2017-05-03 PROCEDURE — 93321 DOPPLER ECHO F-UP/LMTD STD: CPT

## 2017-05-03 PROCEDURE — A9270 NON-COVERED ITEM OR SERVICE: HCPCS | Performed by: NURSE PRACTITIONER

## 2017-05-03 PROCEDURE — 700105 HCHG RX REV CODE 258: Performed by: HOSPITALIST

## 2017-05-03 PROCEDURE — 770021 HCHG ROOM/CARE - ISO PRIVATE

## 2017-05-03 PROCEDURE — 700105 HCHG RX REV CODE 258: Performed by: NURSE PRACTITIONER

## 2017-05-03 PROCEDURE — B246ZZ4 ULTRASONOGRAPHY OF RIGHT AND LEFT HEART, TRANSESOPHAGEAL: ICD-10-PCS | Performed by: INTERNAL MEDICINE

## 2017-05-03 PROCEDURE — 700102 HCHG RX REV CODE 250 W/ 637 OVERRIDE(OP): Performed by: NURSE PRACTITIONER

## 2017-05-03 PROCEDURE — 85007 BL SMEAR W/DIFF WBC COUNT: CPT

## 2017-05-03 PROCEDURE — 700111 HCHG RX REV CODE 636 W/ 250 OVERRIDE (IP): Performed by: NURSE PRACTITIONER

## 2017-05-03 PROCEDURE — 93325 DOPPLER ECHO COLOR FLOW MAPG: CPT

## 2017-05-03 PROCEDURE — 700101 HCHG RX REV CODE 250: Performed by: INTERNAL MEDICINE

## 2017-05-03 PROCEDURE — 93312 ECHO TRANSESOPHAGEAL: CPT

## 2017-05-03 PROCEDURE — 99233 SBSQ HOSP IP/OBS HIGH 50: CPT | Performed by: HOSPITALIST

## 2017-05-03 RX ORDER — OXYCODONE HYDROCHLORIDE 5 MG/1
5 TABLET ORAL
Status: DISCONTINUED | OUTPATIENT
Start: 2017-05-03 | End: 2017-05-08 | Stop reason: HOSPADM

## 2017-05-03 RX ORDER — OXYCODONE HYDROCHLORIDE 10 MG/1
10 TABLET ORAL
Status: DISCONTINUED | OUTPATIENT
Start: 2017-05-03 | End: 2017-05-08 | Stop reason: HOSPADM

## 2017-05-03 RX ORDER — METRONIDAZOLE 500 MG/1
500 TABLET ORAL EVERY 8 HOURS
Status: DISCONTINUED | OUTPATIENT
Start: 2017-05-03 | End: 2017-05-08 | Stop reason: HOSPADM

## 2017-05-03 RX ORDER — FLUCONAZOLE 200 MG/1
100 TABLET ORAL DAILY
Status: DISCONTINUED | OUTPATIENT
Start: 2017-05-03 | End: 2017-05-08 | Stop reason: HOSPADM

## 2017-05-03 RX ADMIN — BUPROPION HYDROCHLORIDE 150 MG: 150 TABLET, FILM COATED, EXTENDED RELEASE ORAL at 21:43

## 2017-05-03 RX ADMIN — INSULIN HUMAN 25 UNITS: 100 INJECTION, SUSPENSION SUBCUTANEOUS at 18:13

## 2017-05-03 RX ADMIN — STANDARDIZED SENNA CONCENTRATE AND DOCUSATE SODIUM 2 TABLET: 8.6; 5 TABLET, FILM COATED ORAL at 21:43

## 2017-05-03 RX ADMIN — HEPARIN SODIUM 5000 UNITS: 5000 INJECTION, SOLUTION INTRAVENOUS; SUBCUTANEOUS at 05:41

## 2017-05-03 RX ADMIN — LEVOTHYROXINE, LIOTHYRONINE 75 MG: 19; 4.5 TABLET ORAL at 05:40

## 2017-05-03 RX ADMIN — SODIUM CHLORIDE: 900 INJECTION INTRAVENOUS at 06:22

## 2017-05-03 RX ADMIN — MESALAMINE 800 MG: 400 CAPSULE, DELAYED RELEASE ORAL at 21:44

## 2017-05-03 RX ADMIN — METRONIDAZOLE 500 MG: 500 INJECTION, SOLUTION INTRAVENOUS at 05:44

## 2017-05-03 RX ADMIN — HEPARIN SODIUM 5000 UNITS: 5000 INJECTION, SOLUTION INTRAVENOUS; SUBCUTANEOUS at 16:15

## 2017-05-03 RX ADMIN — MESALAMINE 800 MG: 400 CAPSULE, DELAYED RELEASE ORAL at 16:14

## 2017-05-03 RX ADMIN — CEFTAROLINE FOSAMIL 600 MG: 600 POWDER, FOR SOLUTION INTRAVENOUS at 09:20

## 2017-05-03 RX ADMIN — BUPROPION HYDROCHLORIDE 150 MG: 150 TABLET, FILM COATED, EXTENDED RELEASE ORAL at 09:20

## 2017-05-03 RX ADMIN — HYDROMORPHONE HYDROCHLORIDE 0.5 MG: 1 INJECTION, SOLUTION INTRAMUSCULAR; INTRAVENOUS; SUBCUTANEOUS at 23:43

## 2017-05-03 RX ADMIN — HYDROMORPHONE HYDROCHLORIDE 5 MG: 2 INJECTION INTRAMUSCULAR; INTRAVENOUS; SUBCUTANEOUS at 06:23

## 2017-05-03 RX ADMIN — HYDROMORPHONE HYDROCHLORIDE 0.5 MG: 1 INJECTION, SOLUTION INTRAMUSCULAR; INTRAVENOUS; SUBCUTANEOUS at 20:26

## 2017-05-03 RX ADMIN — CEFTAROLINE FOSAMIL 600 MG: 600 POWDER, FOR SOLUTION INTRAVENOUS at 21:44

## 2017-05-03 RX ADMIN — OXYCODONE HYDROCHLORIDE 10 MG: 10 TABLET ORAL at 12:38

## 2017-05-03 RX ADMIN — FLUCONAZOLE 100 MG: 200 TABLET ORAL at 16:15

## 2017-05-03 RX ADMIN — HEPARIN SODIUM 5000 UNITS: 5000 INJECTION, SOLUTION INTRAVENOUS; SUBCUTANEOUS at 21:42

## 2017-05-03 RX ADMIN — ACETAMINOPHEN 650 MG: 325 TABLET, FILM COATED ORAL at 05:41

## 2017-05-03 RX ADMIN — METRONIDAZOLE 500 MG: 500 INJECTION, SOLUTION INTRAVENOUS at 16:23

## 2017-05-03 RX ADMIN — SODIUM CHLORIDE: 900 INJECTION INTRAVENOUS at 21:42

## 2017-05-03 RX ADMIN — METRONIDAZOLE 500 MG: 500 TABLET ORAL at 21:43

## 2017-05-03 RX ADMIN — MESALAMINE 800 MG: 400 CAPSULE, DELAYED RELEASE ORAL at 09:20

## 2017-05-03 RX ADMIN — OXYCODONE HYDROCHLORIDE 10 MG: 10 TABLET ORAL at 18:15

## 2017-05-03 RX ADMIN — OXYCODONE HYDROCHLORIDE 10 MG: 10 TABLET ORAL at 21:43

## 2017-05-03 RX ADMIN — ONDANSETRON 4 MG: 2 INJECTION INTRAMUSCULAR; INTRAVENOUS at 11:13

## 2017-05-03 RX ADMIN — ACETAMINOPHEN 650 MG: 325 TABLET, FILM COATED ORAL at 23:43

## 2017-05-03 RX ADMIN — ACETAMINOPHEN 650 MG: 325 TABLET, FILM COATED ORAL at 18:14

## 2017-05-03 RX ADMIN — HYDROMORPHONE HYDROCHLORIDE 1 MG: 1 INJECTION, SOLUTION INTRAMUSCULAR; INTRAVENOUS; SUBCUTANEOUS at 09:17

## 2017-05-03 RX ADMIN — INSULIN HUMAN 25 UNITS: 100 INJECTION, SUSPENSION SUBCUTANEOUS at 06:28

## 2017-05-03 ASSESSMENT — ENCOUNTER SYMPTOMS
VOMITING: 0
VOMITING: 1
COUGH: 0
SENSORY CHANGE: 0
NAUSEA: 1
FEVER: 0
MYALGIAS: 1
CHILLS: 0
DIAPHORESIS: 0
ABDOMINAL PAIN: 1
NAUSEA: 0
HEADACHES: 0
SPEECH CHANGE: 0
SHORTNESS OF BREATH: 0

## 2017-05-03 ASSESSMENT — PAIN SCALES - GENERAL
PAINLEVEL_OUTOF10: 6
PAINLEVEL_OUTOF10: 6
PAINLEVEL_OUTOF10: 5
PAINLEVEL_OUTOF10: 3

## 2017-05-03 ASSESSMENT — COPD QUESTIONNAIRES
COPD SCREENING SCORE: 1
HAVE YOU SMOKED AT LEAST 100 CIGARETTES IN YOUR ENTIRE LIFE: NO/DON'T KNOW
DO YOU EVER COUGH UP ANY MUCUS OR PHLEGM?: NO/ONLY WITH OCCASIONAL COLDS OR INFECTIONS
DURING THE PAST 4 WEEKS HOW MUCH DID YOU FEEL SHORT OF BREATH: NONE/LITTLE OF THE TIME

## 2017-05-03 NOTE — ASSESSMENT & PLAN NOTE
Due to polymicrobial infections of the skin and soft tissue.  Discussed with ID  NAMRATA negative  continue night time dose of ms contin 60mg, 30mg in am to be more alert during the day

## 2017-05-03 NOTE — PROGRESS NOTES
"Surgical Progress Note:      Feels well  Pain improved  ambulating    PE:  /84 mmHg  Pulse 77  Temp(Src) 36.8 °C (98.3 °F)  Resp 17  Ht 1.74 m (5' 8.5\")  Wt 108.1 kg (238 lb 5.1 oz)  BMI 35.70 kg/m2  SpO2 93%    I/O:   Intake/Output Summary (Last 24 hours) at 05/03/17 0934  Last data filed at 05/03/17 0623   Gross per 24 hour   Intake 2333.9 ml   Output   2800 ml   Net -466.1 ml     UOP:  PO:  IV:    GEN: NAD  COR: RRR  PULM: CTA  ABD: soft, NTND, VAC in place  EXT: right ankle and left hand wounds no surrounding erythema, clean at base of wounds, no drainage. Right thigh with viable muscle at base, no purulent drainage, granulation forming.     Labs:  Recent Labs      05/01/17   0136  05/02/17   2040  05/03/17   0240   WBC  11.2*  5.5  6.2   RBC  3.16*  3.35*  3.26*   HEMOGLOBIN  9.7*  9.9*  9.8*   HEMATOCRIT  28.2*  30.1*  29.1*   MCV  89.2  89.9  89.3   MCH  30.7  29.6  30.1   RDW  46.0  44.1  44.5   PLATELETCT  172  212  224   MPV  10.0  9.5  9.5   NEUTSPOLYS  87.90*  71.90  73.70*   LYMPHOCYTES  6.80*  18.40*  16.70*   MONOCYTES  3.60  4.40  4.40   EOSINOPHILS  0.00  4.40  2.60   BASOPHILS  0.30  0.00  1.70   RBCMORPHOLO   --   Normal  Present     Recent Labs      05/01/17   0136   SODIUM  138   POTASSIUM  4.2   CHLORIDE  107   CO2  20   GLUCOSE  242*   BUN  21         A/P:   S/P I and D right thigh, ankle, left hand  No need to pack hand and ankle wounds  Ok to place wound VAC in thigh wound tomorrow     Esau Dooley M.D.  [facility]  [phone]      "

## 2017-05-03 NOTE — PROGRESS NOTES
Pt is currently resting comfortably in bed at this time. Pt rates pain about a 4/10 when he  Moves his right leg. Pt is on a dilaudid PCA pump. Pt is trying to sleep still currently. Breakfast is at bedside. Pt has no further needs at this time. Call light is within reach.

## 2017-05-03 NOTE — ASSESSMENT & PLAN NOTE
Pain improving  D/c PCA  Change to oral oxycodone and IV dilaudid as needed before dressing changes and severe pain

## 2017-05-03 NOTE — PROGRESS NOTES
Infectious Disease Progress Note    Author: JESSICA Parada Date of service & Time created: 5/3/2017  1:31 PM    Chief Complaint:  Chief Complaint   Patient presents with   • Fever   • Wound Infection       Interval History:  51-year-old white male with ulcerative colitis diabetes mellitus admitted with right thigh infection  2017- underwent I&D on 2017. Feels better. Fevers up to 102.7.   5/1- Tm 99.1, WBC 11.2.  R thigh pain 8/10, L hand pain 7/10.  Tolerating abx without issue.  5/2 Tm 99.2, no CBC.  Pain to R thigh, R ankle and L hand, ranging from 5-7/10.  Feels flushed with body aches.  Tolerating abx.   /3 AF, WBC 6.2.  Continuous pain, worse with activity.  R thigh- 6/10.  R ankle- 3/10.  L hand- 4/10.  Tolerating abx, no SE.  Labs Reviewed, Medications Reviewed, Radiology Reviewed and Wound Reviewed.    Review of Systems:  Review of Systems   Constitutional: Negative for fever, chills and diaphoresis.   Respiratory: Negative for cough and shortness of breath.    Cardiovascular: Negative for chest pain.   Gastrointestinal: Positive for abdominal pain. Negative for nausea and vomiting.        Multiple soft stools- chronic with UC.  Minimal abd pain.   Genitourinary: Negative for dysuria.   Musculoskeletal: Positive for myalgias and joint pain.        Right thigh, Right medial ankle and Left hand.   Skin: Negative for rash.   Neurological: Negative for sensory change, speech change and headaches.       Hemodynamics:  Temp (24hrs), Av.8 °C (98.2 °F), Min:36.4 °C (97.6 °F), Max:37.1 °C (98.7 °F)  Temperature: 36.7 °C (98.1 °F)  Pulse  Av.7  Min: 57  Max: 123  Blood Pressure: 124/80 mmHg      Physical Exam:  Physical Exam   Constitutional: He is oriented to person, place, and time. He appears well-developed and well-nourished. No distress.   Obese   HENT:   Head: Normocephalic and atraumatic.   Mouth/Throat: No oropharyngeal exudate.   Poor dentition- molar decay   Eyes: EOM are  normal. Pupils are equal, round, and reactive to light. No scleral icterus.   Neck: Normal range of motion. Neck supple.   Cardiovascular: Normal rate and regular rhythm.    No murmur heard.  Pulmonary/Chest: Effort normal and breath sounds normal. No respiratory distress.   Abdominal: Soft. Bowel sounds are normal. He exhibits no distension. There is tenderness.   Wound VAC present, no surrounding erythema.   Musculoskeletal: He exhibits edema and tenderness.   Right thigh- elastic bandage with gauze.  Right medial ankle- gauze bandage.  Left hand- gauze bandage, greatly improved flexion of hand and fingers.  RUE PICC- non tender, no erythema.   Neurological: He is alert and oriented to person, place, and time.   Skin: There is erythema.   Nursing note and vitals reviewed.      Meds:    Current facility-administered medications:   •  HYDROmorphone (DILAUDID) injection 0.5 mg, 0.5 mg, Intravenous, Once, Esau Dooley M.D.  •  oxycodone immediate-release (ROXICODONE) tablet 5 mg, 5 mg, Oral, Q3HRS PRN, Lala Rendon M.D.  •  oxycodone immediate release (ROXICODONE) tablet 10 mg, 10 mg, Oral, Q3HRS PRN, Lala Rendon M.D., 10 mg at 05/03/17 1238  •  HYDROmorphone (DILAUDID) injection 0.5 mg, 0.5 mg, Intravenous, Q3HRS PRN, Lala Rendon M.D.  •  PICC Line Insertion has been implemented, , , Once **AND** May use Lidocaine 1% not to exceed 3 mls for local at insertion site, , , CONTINUOUS **AND** NOTIFY MD, , , Once **AND** Tip to dwell in the superior vena cava, , , CONTINUOUS **AND** Do not use PICC Line until placement verified by Chest X Ray, , , CONTINUOUS **AND** DX-CHEST-FOR PICC LINE Perform procedure in:: Patient's Room, , , Once **AND** If radiologist reading of chest X-ray states any of the following the PICC should be used, , , CONTINUOUS **AND** Further evaluation of the PICC placement can be retrieved from X-Ray and Imaging, , , CONTINUOUS **AND** Blood draws through PICC line; draws by RN  only, , , CONTINUOUS **AND** FLUSHING GUIDELINES WHEN IN USE, , , CONTINUOUS **AND** normal saline PF 10-20 mL, 10-20 mL, Intravenous, PRN **AND** FLUSHING GUIDELINES WHEN NOT IN USE, , , CONTINUOUS **AND** DRESSING MAINTENANCE, , , Once **AND** Change needleless pressure ports and IV tubing every 72 hours per hospital policy, , , CONTINUOUS **AND** TUBING, , , CONTINUOUS **AND** If there is an MD order to remove the PICC line, any RN may remove the PICC line, , , CONTINUOUS **AND** [] PATIENT EDUCATION MATERIALS, , , Prior to discharge **AND** NURSING COMMUNICATION, , , CONTINUOUS, KEN Parada.P.R.N.  •  metronidazole (FLAGYL) IVPB 500 mg, 500 mg, Intravenous, Q8HRS, Luma Aguirre M.D., Stopped at 17 0644  •  acetaminophen (TYLENOL) tablet 650 mg, 650 mg, Oral, Q6HRS, Baldo Moncada M.D., 650 mg at 17 0541  •  NS infusion, , Intravenous, Continuous, Baldo Moncada M.D., Last Rate: 83 mL/hr at 17 0622  •  heparin injection 5,000 Units, 5,000 Units, Subcutaneous, Q8HRS, Baldo Moncada M.D., 5,000 Units at 17 0541  •  ceftaroline (TEFLARO) 600 mg in  mL IVPB, 600 mg, Intravenous, Q12HRS, JAS ParadaP.R.N., Stopped at 17 1020  •  insulin regular (HUMULIN R) injection 3-15 Units, 3-15 Units, Subcutaneous, 4X/DAY ACHS, JOSE ValadezO., Stopped at 17 0700  •  insulin NPH (HUMULIN,NOVOLIN) injection 25 Units, 25 Units, Subcutaneous, BID INSULIN, JOSE ValadezO., 25 Units at 17 0628  •  buPROPion SR (WELLBUTRIN-SR) tablet 150 mg, 150 mg, Oral, BID, JOSE ValadezO., 150 mg at 17 0920  •  mesalamine delayed-release (DELZICOL) capsule 800 mg, 800 mg, Oral, TID, Pradip Scott D.O., 800 mg at 17 0920  •  zolpidem (AMBIEN) tablet 5 mg, 5 mg, Oral, HS PRN - MR X 1, Pradip Scott D.O., 5 mg at 17 2335  •  senna-docusate (PERICOLACE or SENOKOT S) 8.6-50 MG per tablet 2 Tab, 2 Tab, Oral, BID, 2 Tab at 17 2100 **AND**  polyethylene glycol/lytes (MIRALAX) PACKET 1 Packet, 1 Packet, Oral, QDAY PRN **AND** magnesium hydroxide (MILK OF MAGNESIA) suspension 30 mL, 30 mL, Oral, QDAY PRN **AND** bisacodyl (DULCOLAX) suppository 10 mg, 10 mg, Rectal, QDAY PRN, Christopher Gomez M.D.  •  Respiratory Care per Protocol, , Nebulization, Continuous RT, Christopher Gomez M.D.  •  ondansetron (ZOFRAN) syringe/vial injection 4 mg, 4 mg, Intravenous, Q4HRS PRN, Christopher Gomez M.D., 4 mg at 05/03/17 1113  •  ondansetron (ZOFRAN ODT) dispertab 4 mg, 4 mg, Oral, Q4HRS PRN, Christopher Gomez M.D.  •  promethazine (PHENERGAN) tablet 12.5-25 mg, 12.5-25 mg, Oral, Q4HRS PRN, Christopher Gomez M.D., 25 mg at 05/01/17 1151  •  promethazine (PHENERGAN) suppository 12.5-25 mg, 12.5-25 mg, Rectal, Q4HRS PRN, Christopher Gomez M.D.  •  prochlorperazine (COMPAZINE) injection 5-10 mg, 5-10 mg, Intravenous, Q4HRS PRN, Christopher Gomez M.D.  •  thyroid (ARMOUR THYROID) tablet 75 mg, 75 mg, Oral, AM ES, Christopher Gomez M.D., 75 mg at 05/03/17 0540    Labs:  Recent Labs      05/01/17   0136  05/02/17   2040  05/03/17   0240   WBC  11.2*  5.5  6.2   RBC  3.16*  3.35*  3.26*   HEMOGLOBIN  9.7*  9.9*  9.8*   HEMATOCRIT  28.2*  30.1*  29.1*   MCV  89.2  89.9  89.3   MCH  30.7  29.6  30.1   RDW  46.0  44.1  44.5   PLATELETCT  172  212  224   MPV  10.0  9.5  9.5   NEUTSPOLYS  87.90*  71.90  73.70*   LYMPHOCYTES  6.80*  18.40*  16.70*   MONOCYTES  3.60  4.40  4.40   EOSINOPHILS  0.00  4.40  2.60   BASOPHILS  0.30  0.00  1.70   RBCMORPHOLO   --   Normal  Present     Recent Labs      05/01/17   0136   SODIUM  138   POTASSIUM  4.2   CHLORIDE  107   CO2  20   GLUCOSE  242*   BUN  21     Recent Labs      05/01/17   0136   ALBUMIN  2.9*   TBILIRUBIN  0.4   ALKPHOSPHAT  97   TOTPROTEIN  5.5*   ALTSGPT  33   ASTSGOT  18   CREATININE  0.80       Imaging:  DX-CHEST-FOR PICC LINE   5/2/2017  Impression      1. Placement right PICC line projecting over the SVC with the tip in the mid to lower right  atrium.  2.  Mild diffuse atelectasis/interstitial edema.  3.  Findings discussed with PICC team at 8:00 AM.     ECHOCARDIOGRAM COMP W/O CONT  05/01/2017   CONCLUSIONS  Normal left ventricular chamber size.  Normal left ventricular systolic function.  Left ventricular ejection fraction is visually estimated to be 65%.  Normal regional wall motion.  Mild mitral regurgitation.  The left atrium is normal in size.  Structurally normal aortic valve without significant stenosis or   regurgitation.  Right ventricular systolic pressure is estimated to be 50 mmHg.  Mild tricuspid regurgitation.  Normal inferior vena cava size and inspiratory collapse.  Mildly dilated right ventricle.  Normal right ventricular systolic function.    Ct-extremity, Lower With Right  4/29/2017  14 cm distal anterior thigh abscess with no CT evidence of septic arthropathy or osteomyelitis Findings were discussed with ANCELMO SCOTT on 4/29/2017 1:12 PM.    Micro:    Results     Procedure Component Value Units Date/Time    CULTURE WOUND W/ GRAM STAIN [[id number]]  (Abnormal)  (Susceptibility) Collected:  04/29/17 2165    Order Status:  Completed Specimen Information:  Wound Updated:  05/03/17 1034     Significant Indicator POS (POS)      Source WND      Site Right Thigh Abscess      Culture Result Wound -- (A)      Result:        Heavy growth Mixed skin yesenia predominantly Viridans  Streptococcus.       Gram Stain Result --      Result:        Many WBCs.  Many mixed bacteria, no predominant organism seen.       Culture Result Wound -- (A)      Result:        Klebsiella pneumoniae  Heavy growth       Culture Result Wound -- (A)      Result:        Methicillin Resistant Staphylococcus aureus  Heavy growth      Narrative:      CALL  Alvarez  141 tel. [phone],  CALLED  141 tel. [phone] 05/01/2017, 10:43, RB PERF. RESULTS CALLED  TO:[id number](MRSA) and faxed to [facility]    Culture & Susceptibility     KLEBSIELLA PNEUMONIAE     Antibiotic Sensitivity  Microscan Unit Status    Ampicillin Resistant >16 mcg/mL Final    Cefepime Sensitive <=8 mcg/mL Final    Cefotaxime Sensitive <=2 mcg/mL Final    Cefotetan Sensitive <=16 mcg/mL Final    Ceftazidime Sensitive <=1 mcg/mL Final    Ceftriaxone Sensitive <=8 mcg/mL Final    Cefuroxime Sensitive <=4 mcg/mL Final    Ciprofloxacin Sensitive <=1 mcg/mL Final    Ertapenem Sensitive <=1 mcg/mL Final    Gentamicin Sensitive <=4 mcg/mL Final    Pip/Tazobactam Sensitive <=16 mcg/mL Final    Tigecycline Sensitive <=2 mcg/mL Final    Tobramycin Sensitive <=4 mcg/mL Final    Trimeth/Sulfa Sensitive <=2/38 mcg/mL Final              METHICILLIN RESISTANT STAPHYLOCOCCUS AUREUS     Antibiotic Sensitivity Microscan Unit Status    Ampicillin/sulbactam Resistant >16/8 mcg/mL Final    Clindamycin Sensitive <=0.5 mcg/mL Final    Daptomycin Sensitive 1 mcg/mL Final    Erythromycin Resistant >4 mcg/mL Final    Moxifloxacin Sensitive <=0.5 mcg/mL Final    Oxacillin Resistant >2 mcg/mL Final    Penicillin Resistant >8 mcg/mL Final    Tetracycline Sensitive <=4 mcg/mL Final    Trimeth/Sulfa Sensitive <=0.5/9.5 mcg/mL Final    Vancomycin Sensitive 2 mcg/mL Final                       ANAEROBIC CULTURE [[id number]]  (Abnormal) Collected:  04/29/17 1855    Order Status:  Completed Specimen Information:  Wound Updated:  05/03/17 1034     Significant Indicator POS (POS)      Source WND      Site Right Thigh Abscess      Anaerobic Culture, Culture Res -- (A)      Result:        Growth noted after further incubation,see below for  organism identification.       Anaerobic Culture, Culture Res -- (A)      Result:        Bacteroides fragilis Group  Heavy growth      Narrative:      CALL  Alvarez  141 tel. [phone],  CALLED  141 tel. [phone] 05/01/2017, 10:43, RB PERF. RESULTS CALLED  TO:[id number](MRSA) and faxed to [facility]    FUNGAL CULTURE [[id number]]  (Abnormal) Collected:  04/29/17 1855    Order Status:  Completed Specimen Information:  Wound Updated:   "05/03/17 1034     Significant Indicator POS (POS)      Source WND      Site Right Thigh Abscess      Fungal Culture -- (A)      Result:        Growth noted after further incubation,see below for  organism identification.       Fungal Culture -- (A)      Result:        Yeast  Light growth      Narrative:      CALL  Alvarez  141 tel. [phone],  CALLED  141 tel. [phone] 05/01/2017, 10:43, RB PERF. RESULTS CALLED  TO:[id number](MRSA) and faxed to [facility]    BLOOD CULTURE [[id number]] Collected:  05/02/17 1335    Order Status:  Completed Specimen Information:  Blood from Peripheral Updated:  05/03/17 0643     Significant Indicator NEG      Source BLD      Site PERIPHERAL      Blood Culture --      Result:        No Growth    Note: Blood cultures are incubated for 5 days and  are monitored continuously.Positive blood cultures  are called to the RN and reported as soon as  they are identified.      Narrative:      Contact  Per Hospital Policy: Only change Specimen Src: to \"Line\" if  specified by physician order.    BLOOD CULTURE [[id number]] Collected:  05/02/17 1335    Order Status:  Completed Specimen Information:  Blood from Peripheral Updated:  05/03/17 0643     Significant Indicator NEG      Source BLD      Site PERIPHERAL      Blood Culture --      Result:        No Growth    Note: Blood cultures are incubated for 5 days and  are monitored continuously.Positive blood cultures  are called to the RN and reported as soon as  they are identified.      Narrative:      Contact  Per Hospital Policy: Only change Specimen Src: to \"Line\" if  specified by physician order.    BLOOD CULTURE [[id number]]  (Abnormal) Collected:  04/29/17 1100    Order Status:  Completed Specimen Information:  Blood from Peripheral Updated:  05/01/17 1850     Significant Indicator POS (POS)      Source BLD      Site PERIPHERAL      Blood Culture        Result:        Growth detected by Bactec instrument.  04/30/2017  14:03 (A)     Blood Culture " "Bacteroides vulgatus (A)     Narrative:      CALL  Alvarez  19 tel. [phone],  CALLED  19 tel. [phone] 04/30/2017, 14:05, RB PERF. RESULTS CALLED  TO:[id number]  Per Hospital Policy: Only change Specimen Src: to \"Line\" if  specified by physician order.    BLOOD CULTURE [[id number]]  (Abnormal) Collected:  04/29/17 1038    Order Status:  Completed Specimen Information:  Blood from Peripheral Updated:  05/01/17 1849     Significant Indicator POS (POS)      Source BLD      Site PERIPHERAL      Blood Culture        Result:        Growth detected by Bactec instrument.  04/30/2017  14:03 (A)     Blood Culture Bacteroides vulgatus (A)     Narrative:      CALL  Alvarez  19 tel. [phone],  CALLED  19 tel. [phone] 04/30/2017, 14:06, RB PERF. RESULTS CALLED  TO:Chidi [phone]  Per Hospital Policy: Only change Specimen Src: to \"Line\" if  specified by physician order.    URINE CULTURE(NEW) [[id number]] Collected:  04/29/17 1146    Order Status:  Completed Specimen Information:  Urine Updated:  05/01/17 0814     Significant Indicator NEG      Source UR      Site --      Urine Culture No growth at 48 hours     Narrative:      Indication for culture:->Emergency Room Patient    GRAM STAIN [[id number]] Collected:  04/29/17 1855    Order Status:  Completed Specimen Information:  Wound Updated:  04/29/17 2156     Significant Indicator .      Source WND      Site Right Thigh Abscess      Gram Stain Result --      Result:        Many WBCs.  Many mixed bacteria, no predominant organism seen.      URINALYSIS [[id number]]  (Abnormal) Collected:  04/29/17 1146    Order Status:  Completed Specimen Information:  Urine Updated:  04/29/17 1208     Micro Urine Req Microscopic      Color Lt. Yellow      Character Clear      Specific Gravity 1.024      Ph 6.5      Glucose >1000 (A) mg/dL      Ketones 20 (A) mg/dL      Protein Negative mg/dL      Bilirubin Negative      Nitrite Negative      Leukocyte Esterase Negative      Occult Blood Small (A) "     Narrative:      Indication for culture:->Emergency Room Patient             Assessment:  Active Hospital Problems    Diagnosis   • *Severe sepsis (CMS-HCC) [A41.9, R65.20]   • Type 2 diabetes mellitus (CMS-HCC) [E11.9]   • Chronic ulcerative colitis (CMS-[facility]) [K51.90]   • Complicated wound infection (CMS-HCC) [T79.8XXA]   • Thrombocytopenia (CMS-[facility]) [D69.6]   • Hypothyroid [E03.9]       Plan:  Septic shock  Slightly improved   Hemodynamically stable  Bcx 4/19 negative    Right thigh abscess  S/p I&D on 4/29/2017 with Dr. Reyes  OR cx +MRSA, Kleb pneumoniae, Strep Viridans, B fragilis & Yeast  D/C IV Vanco and Zosyn  Continue IV Ceftaroline and IV Flagyl  Add PO Diflucan for yeast  Anticipate 2-4 weeks IV abx  TTE on 5/1 negative  Recommend NAMRATA, ordered     Right medial ankle abscess  S/p I&D on 5/1 with Dr. Reyes    Left hand abscess  S/p I&D on 5/1 with Dr. Reyes    Bacteroides fragilis bacteremia  Bcx 4/29 +Bacteroides vulgatus  IV Flagyl as above.  Repeat Bcx x2 on 5/2- NGTD    Postoperative wound infection of umbilical hernia site  Improving, WV in place    Ulcerative colitis  If cultures remain negative then consider extraintestinal manifestations of ulcerative colitis as a diagnosis    Obesity  Diabetes mellitus  Keep blood sugars <150, elevated BS will impair wound healing and worsen infection.

## 2017-05-03 NOTE — ASSESSMENT & PLAN NOTE
Due to polymicrobial infections of the skin and soft tissue.  Discussed with ID  TTE negative, recommend NAMRATA, ordering through cardiology  S/p multiple I&D right thigh and hand, per surgery will attempt wound vac maybe tomorrow

## 2017-05-03 NOTE — ASSESSMENT & PLAN NOTE
Continue mesalamine.  Multiple abscesses could be extra intestinal manifestations of ulcerative colitis  Not a candidate for biologic therapy at this time due to active infection but is worth considering when infections are healed, patient will follow up with Dr. Myles to discuss.

## 2017-05-03 NOTE — ASSESSMENT & PLAN NOTE
Continue sliding scale insulin  Diabetes education has worked with patient in the past and provided education on multiple occasions, his A1c has been improving.

## 2017-05-03 NOTE — PROGRESS NOTES
Hospital Medicine Interval Note  Date of Service:  5/3/2017    Chief Complaint  51 y.o.-year-old male admitted 4/29/2017 with multiple recurrent soft tissue abscesses and poorly controlled diabetes. Latest abscess at site of monthly testosterone injection site.    Interval Problem Update  Nauseated due to being npo to take pills awaiting NAMRATA    Consultants/Specialty  ID  Surgery    Disposition  Most likely home with infusion center visits.     Review of Systems   Constitutional: Negative for fever and chills.   Respiratory: Negative for cough and shortness of breath.    Cardiovascular: Negative for chest pain.   Gastrointestinal: Positive for nausea, vomiting and abdominal pain.        Multiple soft stools- chronic with UC.   Genitourinary: Negative for dysuria.   Musculoskeletal: Positive for myalgias and joint pain.        Abd, Right thigh, Right medial ankle and Left hand.   Skin: Negative for rash.   Neurological: Negative for sensory change, speech change and headaches.      Physical Exam Laboratory/Imaging   Filed Vitals:    05/03/17 0000 05/03/17 0400 05/03/17 0700 05/03/17 1130   BP: 127/74 120/68 121/84 124/80   Pulse: 91 82 77 93   Temp: 37.1 °C (98.7 °F) 36.8 °C (98.3 °F) 36.8 °C (98.3 °F) 36.7 °C (98.1 °F)   Resp: 16 16 17 17   Height:       Weight:       SpO2: 92% 94% 93% 92%     Physical Exam   Constitutional: He is oriented to person, place, and time. He appears well-developed and well-nourished. No distress.   HENT:   Nose: Nose normal.   Mouth/Throat: Oropharynx is clear and moist.   Eyes: Conjunctivae and EOM are normal. Right eye exhibits no discharge. Left eye exhibits no discharge. No scleral icterus.   Neck: No tracheal deviation present.   Cardiovascular: Normal rate and regular rhythm.    No murmur heard.  Pulmonary/Chest: Effort normal and breath sounds normal. No stridor. No respiratory distress. He has no wheezes.   Abdominal: Soft. Bowel sounds are normal. He exhibits no distension. There  is no tenderness.   Musculoskeletal: He exhibits no edema.   Right anterior lateral thigh with wound vac  Left hand clean and dry dressing  Right thumb cellulitis improved   Neurological: He is alert and oriented to person, place, and time. No cranial nerve deficit.   Skin: Skin is warm. He is not diaphoretic.   Psychiatric: He has a normal mood and affect. His behavior is normal. Thought content normal.   Nursing note and vitals reviewed.   Lab Results   Component Value Date/Time    WBC 6.2 05/03/2017 02:40 AM    HEMOGLOBIN 9.8* 05/03/2017 02:40 AM    HEMATOCRIT 29.1* 05/03/2017 02:40 AM    PLATELET COUNT 224 05/03/2017 02:40 AM     Lab Results   Component Value Date/Time    SODIUM 138 05/01/2017 01:36 AM    POTASSIUM 4.2 05/01/2017 01:36 AM    CHLORIDE 107 05/01/2017 01:36 AM    CO2 20 05/01/2017 01:36 AM    GLUCOSE 242* 05/01/2017 01:36 AM    BUN 21 05/01/2017 01:36 AM    CREATININE 0.80 05/01/2017 01:36 AM      Assessment/Plan    Hypothyroid (present on admission)  Assessment & Plan  Continue home dose of armour thyroid    Complicated wound infection (CMS-HCC) (present on admission)  Assessment & Plan  Pain improving  D/c PCA  Change to oral oxycodone and IV dilaudid as needed before dressing changes and severe pain    Bacteremia due to Gram-negative bacteria  Assessment & Plan  Due to polymicrobial infections of the skin and soft tissue.  Discussed with ID  NAMRATA pending today, npo for procedure  S/p multiple I&D right thigh and hand, wound vac possibly today    Septic shock (CMS-[facility])  Assessment & Plan  Resolved.    DM (diabetes mellitus), type 2, uncontrolled (CMS-HCC)  Assessment & Plan  Continue sliding scale insulin  Diabetes education has worked with patient in the past and provided education on multiple occasions, his A1c has been improving.    Chronic ulcerative colitis (CMS-HCC) (present on admission)  Assessment & Plan  Continue mesalamine.     Core Measures

## 2017-05-03 NOTE — PROGRESS NOTES
Hospital Medicine Interval Note  Date of Service:  5/3/2017    Chief Complaint  51 y.o.-year-old male admitted 4/29/2017 with multiple recurrent soft tissue abscesses and poorly controlled diabetes. Latest abscess at site of monthly testosterone injection site.    Interval Problem Update  Nauseated due to being npo to take pills awaiting NAMRATA    Consultants/Specialty  ID  Surgery    Disposition  Skilled vs infusion center     Review of Systems   Constitutional: Negative for fever and chills.   Respiratory: Negative for cough and shortness of breath.    Cardiovascular: Negative for chest pain.   Gastrointestinal: Positive for nausea, vomiting and abdominal pain.        Multiple soft stools- chronic with UC.   Genitourinary: Negative for dysuria.   Musculoskeletal: Positive for myalgias and joint pain.        Abd, Right thigh, Right medial ankle and Left hand.   Skin: Negative for rash.   Neurological: Negative for sensory change, speech change and headaches.      Physical Exam Laboratory/Imaging   Filed Vitals:    05/03/17 0000 05/03/17 0400 05/03/17 0700 05/03/17 1130   BP: 127/74 120/68 121/84 124/80   Pulse: 91 82 77 93   Temp: 37.1 °C (98.7 °F) 36.8 °C (98.3 °F) 36.8 °C (98.3 °F) 36.7 °C (98.1 °F)   Resp: 16 16 17 17   Height:       Weight:       SpO2: 92% 94% 93% 92%     Physical Exam   Constitutional: He is oriented to person, place, and time. He appears well-developed and well-nourished. No distress.   HENT:   Nose: Nose normal.   Mouth/Throat: Oropharynx is clear and moist.   Eyes: Conjunctivae and EOM are normal. Right eye exhibits no discharge. Left eye exhibits no discharge. No scleral icterus.   Neck: No tracheal deviation present.   Cardiovascular: Normal rate and regular rhythm.    No murmur heard.  Pulmonary/Chest: Effort normal and breath sounds normal. No stridor. No respiratory distress. He has no wheezes.   Abdominal: Soft. Bowel sounds are normal. He exhibits no distension. There is no tenderness.    Musculoskeletal: He exhibits no edema.   Right anterior lateral thigh with wound vac  Left hand clean and dry dressing  Right thumb cellulitis improved   Neurological: He is alert and oriented to person, place, and time. No cranial nerve deficit.   Skin: Skin is warm. He is not diaphoretic.   Psychiatric: He has a normal mood and affect. His behavior is normal. Thought content normal.   Nursing note and vitals reviewed.   Lab Results   Component Value Date/Time    WBC 6.2 05/03/2017 02:40 AM    HEMOGLOBIN 9.8* 05/03/2017 02:40 AM    HEMATOCRIT 29.1* 05/03/2017 02:40 AM    PLATELET COUNT 224 05/03/2017 02:40 AM     Lab Results   Component Value Date/Time    SODIUM 138 05/01/2017 01:36 AM    POTASSIUM 4.2 05/01/2017 01:36 AM    CHLORIDE 107 05/01/2017 01:36 AM    CO2 20 05/01/2017 01:36 AM    GLUCOSE 242* 05/01/2017 01:36 AM    BUN 21 05/01/2017 01:36 AM    CREATININE 0.80 05/01/2017 01:36 AM      Assessment/Plan    Hypothyroid  Assessment & Plan  Continue home dose of armour thyroid    Bacteremia due to Gram-negative bacteria  Assessment & Plan  Due to polymicrobial infections of the skin and soft tissue.  Discussed with ID  TTE negative, recommend NAMRATA, ordering through cardiology  S/p multiple I&D right thigh and hand, per surgery will attempt wound vac maybe tomorrow    Septic shock (CMS-HCC)  Assessment & Plan  Resolved.    DM (diabetes mellitus), type 2, uncontrolled (CMS-HCC)  Assessment & Plan  Continue sliding scale insulin  Diabetes education has worked with patient in the past and provided education on multiple occasions, his A1c has been improving.    Chronic ulcerative colitis (CMS-HCC)  Assessment & Plan  Continue mesalamine.  Stop lactinex this is not indicated for treatment of ulcerative colitis.       Core Measures

## 2017-05-03 NOTE — PROGRESS NOTES
Assumed care of Mr Parker at 1900.     Pt is A&O x4.  Pain 3/10.  Pt medicated per MAR, PCA  Nausea denied  Tolerating Diet    Midline abdominal wound vac, Suction, Patent, no leaks  Lt wrist, Dressing in place.  clean dry intact  Rt thigh, Dressing in place.  Clean dry intact  Rt ankle, Dressing in place.  Clean dry intact.     Positive Urine output  Positive BM Void   Positive Flatus  2 assist  Bed in lowest position and locked.    ** Bed alarm refused despite pt education on fall risk.  Pt is A&O x4 and demonstrates appropriate use of call light to call for assistance.  CLIP, hourly rounding in place.     .  Pt resting comfortably now.  Review plan of care with patient  Call light within reach  Hourly rounds in place  All needs met at this time

## 2017-05-03 NOTE — PROGRESS NOTES
PICC line retracted 3cm per Radiolgist request, for a total of 5cm external measurement.  Additional chest xray ordered and reports PICC in satisfactory position at cavoatrial junction.

## 2017-05-03 NOTE — CARE PLAN
Problem: Safety  Goal: Will remain free from falls  Outcome: PROGRESSING AS EXPECTED  Pt remains free from fall at this time.  Pt educated on fall risk.  Pt is A&O x4 and demonstrates appropriate use of call light to call for assistance.  CLIP, hourly rounding in place    Problem: Venous Thromboembolism (VTW)/Deep Vein Thrombosis (DVT) Prevention:  Goal: Patient will participate in Venous Thrombosis (VTE)/Deep Vein Thrombosis (DVT)Prevention Measures  Outcome: PROGRESSING AS EXPECTED    05/02/17 2040   OTHER   Risk Assessment Score 5   VTE RISK Very High   Mechanical Prophylaxis SCDs, Sequentials (Intermittent Pneumatic Compression Devices)   Pharmacologic Prophylaxis Used Unfractionated Heparin

## 2017-05-03 NOTE — PROGRESS NOTES
Hospital Medicine Interval Note  Date of Service:  5/2/2017    Chief Complaint  51 y.o.-year-old male admitted 4/29/2017 with multiple recurrent soft tissue abscesses and poorly controlled diabetes. Latest abscess at site of monthly testosterone injection site.    Interval Problem Update  Doing well today other than pain in the thigh.    Consultants/Specialty  ID  Surgery    Disposition  Skilled for antibiotics most likely.     Review of Systems   Constitutional: Negative for fever and chills.   Respiratory: Negative for cough and shortness of breath.    Cardiovascular: Negative for chest pain.   Gastrointestinal: Positive for abdominal pain. Negative for nausea and vomiting.        Multiple soft stools- chronic with UC.   Genitourinary: Negative for dysuria.   Musculoskeletal: Positive for myalgias and joint pain.        Abd, Right thigh, Right medial ankle and Left hand.   Skin: Negative for rash.   Neurological: Negative for sensory change, speech change and headaches.      Physical Exam Laboratory/Imaging   Filed Vitals:    05/02/17 0400 05/02/17 0719 05/02/17 1128 05/02/17 1512   BP: 117/73 130/74 126/81 122/81   Pulse: 65 91 91 92   Temp: 37.1 °C (98.7 °F) 36.3 °C (97.4 °F) 36.3 °C (97.4 °F) 36.4 °C (97.6 °F)   Resp: 16 18 18 18   Height:       Weight:       SpO2: 92% 91% 92% 92%     Physical Exam   Constitutional: He is oriented to person, place, and time. He appears well-developed and well-nourished. No distress.   HENT:   Nose: Nose normal.   Mouth/Throat: Oropharynx is clear and moist.   Eyes: Conjunctivae and EOM are normal. Right eye exhibits no discharge. Left eye exhibits no discharge. No scleral icterus.   Neck: No tracheal deviation present.   Cardiovascular: Normal rate and regular rhythm.    No murmur heard.  Pulmonary/Chest: Effort normal and breath sounds normal. No stridor. No respiratory distress. He has no wheezes.   Abdominal: Soft. Bowel sounds are normal. He exhibits no distension. There is  no tenderness.   Musculoskeletal: He exhibits no edema.   Right anterior lateral thigh with wound vac  Left hand clean and dry dressing   Neurological: He is alert and oriented to person, place, and time. No cranial nerve deficit.   Skin: Skin is warm. He is not diaphoretic.   Psychiatric: He has a normal mood and affect. His behavior is normal. Thought content normal.   Nursing note and vitals reviewed.   Lab Results   Component Value Date/Time    WBC 11.2* 05/01/2017 01:36 AM    HEMOGLOBIN 9.7* 05/01/2017 01:36 AM    HEMATOCRIT 28.2* 05/01/2017 01:36 AM    PLATELET COUNT 172 05/01/2017 01:36 AM     Lab Results   Component Value Date/Time    SODIUM 138 05/01/2017 01:36 AM    POTASSIUM 4.2 05/01/2017 01:36 AM    CHLORIDE 107 05/01/2017 01:36 AM    CO2 20 05/01/2017 01:36 AM    GLUCOSE 242* 05/01/2017 01:36 AM    BUN 21 05/01/2017 01:36 AM    CREATININE 0.80 05/01/2017 01:36 AM      Assessment/Plan    Hypothyroid  Assessment & Plan  Continue home dose of armour thyroid    Bacteremia due to Gram-negative bacteria  Assessment & Plan  Due to polymicrobial infections of the skin and soft tissue.  Discussed with ID  TTE negative, recommend NAMRATA, ordering through cardiology  S/p multiple I&D right thigh and hand, per surgery will attempt wound vac maybe tomorrow    Septic shock (CMS-HCC)  Assessment & Plan  Resolved.    DM (diabetes mellitus), type 2, uncontrolled (CMS-HCC)  Assessment & Plan  Continue sliding scale insulin  Diabetes education has worked with patient in the past and provided education on multiple occasions, his A1c has been improving.    Chronic ulcerative colitis (CMS-HCC)  Assessment & Plan  Continue mesalamine.  Stop lactinex this is not indicated for treatment of ulcerative colitis.     Core Measures

## 2017-05-03 NOTE — PROGRESS NOTES
Report given to sherry rn over phone, bedside report given to Jasmine RN, bed in lowest position, call light in hand

## 2017-05-03 NOTE — WOUND TEAM
"[facility]  Inpatient Services  Wound and Skin Care Progress Note    HPI, PMH, SH: Reviewed  Unit where seen by Wound Team: [id number]    WOUND TEAM FOLLOW UP: NPWT Change r/t drsg dislodged    SUBJECTIVE:  \"I was wrapped up in it.\"    Self Report / Pain Level: no c/o pain  OBJECTIVE: on pressure redistribution mattress, foam in wound, trac pad dislodged  WOUND TYPE, LOCATION, CHARACTERISTICS:  Wound POA Open Surgical (Complicated) Umbilicus Medial  Periwound Skin: Intact  Drainage : Scant, Serosanguinous      Tissue Type and %:    80% Red/pink, 20% white  Wound Edges:    open    Odor:     None   Exposed structure(s):   suture   Signs and Symptoms of Infection: none    Measurements : taken 5/1/17  Length (cm): 0.8  Width (cm): 2.8  Depth (cm): 1.4      Tracts/undermining:   None       INTERVENTIONS BY WOUND TEAM: removed drsg 1 piece of black with a button.  cleaned wound with wound cleanser. 1 piece of black foam into wound bed. Sealed and then button applied and then trac pad. NPWT resumed  mmHg. Anchored tubing to abd to try to decrease dislodgement.   Dressing Options: Wound Vac    Interdisciplinary consultation:  RN, pt    EVALUATION AND PROGRESS OF WOUND(S): wound appears clean      Factors affecting wound healing: infection/sepsi, abscesses, obesity, DM     Goals: decreased wound size 1% each week    NURSING PLAN OF CARE:    Dressing changes: Continue previous Dressing Maintenance orders: x       See new Dressing Maintenance orders:       Skin care: See Skin Care orders:        Rectal tube care: See Rectal Tube Care orders:      Other orders:           WOUND TEAM PLAN OF CARE (X):   NPWT change 3 x week:  x      Dressing changes:     x  Follow up as needed:       Other:    "

## 2017-05-04 LAB
ANISOCYTOSIS BLD QL SMEAR: ABNORMAL
BASOPHILS # BLD AUTO: 0.9 % (ref 0–1.8)
BASOPHILS # BLD: 0.05 K/UL (ref 0–0.12)
EOSINOPHIL # BLD AUTO: 0.16 K/UL (ref 0–0.51)
EOSINOPHIL NFR BLD: 2.6 % (ref 0–6.9)
ERYTHROCYTE [DISTWIDTH] IN BLOOD BY AUTOMATED COUNT: 42.7 FL (ref 35.9–50)
GIANT PLATELETS BLD QL SMEAR: NORMAL
GLUCOSE BLD-MCNC: 116 MG/DL (ref 65–99)
GLUCOSE BLD-MCNC: 116 MG/DL (ref 65–99)
GLUCOSE BLD-MCNC: 126 MG/DL (ref 65–99)
GLUCOSE BLD-MCNC: 87 MG/DL (ref 65–99)
HCT VFR BLD AUTO: 30.4 % (ref 42–52)
HGB BLD-MCNC: 10.3 G/DL (ref 14–18)
LG PLATELETS BLD QL SMEAR: NORMAL
LYMPHOCYTES # BLD AUTO: 0.96 K/UL (ref 1–4.8)
LYMPHOCYTES NFR BLD: 15.8 % (ref 22–41)
MACROCYTES BLD QL SMEAR: ABNORMAL
MANUAL DIFF BLD: NORMAL
MCH RBC QN AUTO: 29.9 PG (ref 27–33)
MCHC RBC AUTO-ENTMCNC: 33.9 G/DL (ref 33.7–35.3)
MCV RBC AUTO: 88.4 FL (ref 81.4–97.8)
METAMYELOCYTES NFR BLD MANUAL: 0.9 %
MONOCYTES # BLD AUTO: 0.05 K/UL (ref 0–0.85)
MONOCYTES NFR BLD AUTO: 0.9 % (ref 0–13.4)
MORPHOLOGY BLD-IMP: NORMAL
NEUTROPHILS # BLD AUTO: 4.81 K/UL (ref 1.82–7.42)
NEUTROPHILS NFR BLD: 78.9 % (ref 44–72)
NRBC # BLD AUTO: 0 K/UL
NRBC BLD AUTO-RTO: 0 /100 WBC
PLATELET # BLD AUTO: 259 K/UL (ref 164–446)
PLATELET BLD QL SMEAR: NORMAL
PMV BLD AUTO: 9.4 FL (ref 9–12.9)
POLYCHROMASIA BLD QL SMEAR: NORMAL
RBC # BLD AUTO: 3.44 M/UL (ref 4.7–6.1)
RBC BLD AUTO: PRESENT
WBC # BLD AUTO: 6.1 K/UL (ref 4.8–10.8)

## 2017-05-04 PROCEDURE — 700102 HCHG RX REV CODE 250 W/ 637 OVERRIDE(OP): Performed by: NURSE PRACTITIONER

## 2017-05-04 PROCEDURE — 85027 COMPLETE CBC AUTOMATED: CPT

## 2017-05-04 PROCEDURE — 700102 HCHG RX REV CODE 250 W/ 637 OVERRIDE(OP): Performed by: HOSPITALIST

## 2017-05-04 PROCEDURE — 700111 HCHG RX REV CODE 636 W/ 250 OVERRIDE (IP): Performed by: HOSPITALIST

## 2017-05-04 PROCEDURE — 700105 HCHG RX REV CODE 258: Performed by: NURSE PRACTITIONER

## 2017-05-04 PROCEDURE — 99233 SBSQ HOSP IP/OBS HIGH 50: CPT | Performed by: HOSPITALIST

## 2017-05-04 PROCEDURE — 306263 VAC CANNISTER W/GEL 500ML: Performed by: SURGERY

## 2017-05-04 PROCEDURE — A9270 NON-COVERED ITEM OR SERVICE: HCPCS | Performed by: NURSE PRACTITIONER

## 2017-05-04 PROCEDURE — A9270 NON-COVERED ITEM OR SERVICE: HCPCS | Performed by: HOSPITALIST

## 2017-05-04 PROCEDURE — 770021 HCHG ROOM/CARE - ISO PRIVATE

## 2017-05-04 PROCEDURE — 700102 HCHG RX REV CODE 250 W/ 637 OVERRIDE(OP): Performed by: PHARMACIST

## 2017-05-04 PROCEDURE — A9270 NON-COVERED ITEM OR SERVICE: HCPCS | Performed by: PHARMACIST

## 2017-05-04 PROCEDURE — 700111 HCHG RX REV CODE 636 W/ 250 OVERRIDE (IP): Performed by: NURSE PRACTITIONER

## 2017-05-04 PROCEDURE — 700105 HCHG RX REV CODE 258: Performed by: HOSPITALIST

## 2017-05-04 PROCEDURE — 82962 GLUCOSE BLOOD TEST: CPT | Mod: 91

## 2017-05-04 PROCEDURE — 85007 BL SMEAR W/DIFF WBC COUNT: CPT

## 2017-05-04 RX ORDER — LINEZOLID 600 MG/1
600 TABLET, FILM COATED ORAL 2 TIMES DAILY
Qty: 28 TAB | Refills: 0 | Status: SHIPPED | OUTPATIENT
Start: 2017-05-04 | End: 2017-05-11

## 2017-05-04 RX ADMIN — INSULIN HUMAN 25 UNITS: 100 INJECTION, SUSPENSION SUBCUTANEOUS at 06:37

## 2017-05-04 RX ADMIN — ONDANSETRON 4 MG: 2 INJECTION INTRAMUSCULAR; INTRAVENOUS at 21:39

## 2017-05-04 RX ADMIN — OXYCODONE HYDROCHLORIDE 10 MG: 10 TABLET ORAL at 21:36

## 2017-05-04 RX ADMIN — CEFTAROLINE FOSAMIL 600 MG: 600 POWDER, FOR SOLUTION INTRAVENOUS at 09:12

## 2017-05-04 RX ADMIN — METRONIDAZOLE 500 MG: 500 TABLET ORAL at 21:36

## 2017-05-04 RX ADMIN — ZOLPIDEM TARTRATE 5 MG: 5 TABLET, FILM COATED ORAL at 21:36

## 2017-05-04 RX ADMIN — METRONIDAZOLE 500 MG: 500 TABLET ORAL at 06:30

## 2017-05-04 RX ADMIN — BUPROPION HYDROCHLORIDE 150 MG: 150 TABLET, FILM COATED, EXTENDED RELEASE ORAL at 09:11

## 2017-05-04 RX ADMIN — LEVOTHYROXINE, LIOTHYRONINE 75 MG: 19; 4.5 TABLET ORAL at 06:31

## 2017-05-04 RX ADMIN — MESALAMINE 800 MG: 400 CAPSULE, DELAYED RELEASE ORAL at 09:00

## 2017-05-04 RX ADMIN — ZOLPIDEM TARTRATE 5 MG: 5 TABLET, FILM COATED ORAL at 01:03

## 2017-05-04 RX ADMIN — OXYCODONE HYDROCHLORIDE 10 MG: 10 TABLET ORAL at 07:26

## 2017-05-04 RX ADMIN — HYDROMORPHONE HYDROCHLORIDE 0.5 MG: 1 INJECTION, SOLUTION INTRAMUSCULAR; INTRAVENOUS; SUBCUTANEOUS at 11:12

## 2017-05-04 RX ADMIN — HEPARIN SODIUM 5000 UNITS: 5000 INJECTION, SOLUTION INTRAVENOUS; SUBCUTANEOUS at 21:36

## 2017-05-04 RX ADMIN — BUPROPION HYDROCHLORIDE 150 MG: 150 TABLET, FILM COATED, EXTENDED RELEASE ORAL at 21:36

## 2017-05-04 RX ADMIN — OXYCODONE HYDROCHLORIDE 10 MG: 10 TABLET ORAL at 16:58

## 2017-05-04 RX ADMIN — ACETAMINOPHEN 650 MG: 325 TABLET, FILM COATED ORAL at 16:58

## 2017-05-04 RX ADMIN — SODIUM CHLORIDE: 900 INJECTION INTRAVENOUS at 22:38

## 2017-05-04 RX ADMIN — CEFTAROLINE FOSAMIL 600 MG: 600 POWDER, FOR SOLUTION INTRAVENOUS at 21:36

## 2017-05-04 RX ADMIN — ACETAMINOPHEN 650 MG: 325 TABLET, FILM COATED ORAL at 11:46

## 2017-05-04 RX ADMIN — ACETAMINOPHEN 650 MG: 325 TABLET, FILM COATED ORAL at 06:30

## 2017-05-04 RX ADMIN — HEPARIN SODIUM 5000 UNITS: 5000 INJECTION, SOLUTION INTRAVENOUS; SUBCUTANEOUS at 06:31

## 2017-05-04 RX ADMIN — OXYCODONE HYDROCHLORIDE 10 MG: 10 TABLET ORAL at 11:50

## 2017-05-04 RX ADMIN — HYDROMORPHONE HYDROCHLORIDE 0.5 MG: 1 INJECTION, SOLUTION INTRAMUSCULAR; INTRAVENOUS; SUBCUTANEOUS at 20:38

## 2017-05-04 RX ADMIN — MESALAMINE 800 MG: 400 CAPSULE, DELAYED RELEASE ORAL at 22:38

## 2017-05-04 RX ADMIN — OXYCODONE HYDROCHLORIDE 10 MG: 10 TABLET ORAL at 04:15

## 2017-05-04 RX ADMIN — OXYCODONE HYDROCHLORIDE 10 MG: 10 TABLET ORAL at 01:02

## 2017-05-04 RX ADMIN — INSULIN HUMAN 25 UNITS: 100 INJECTION, SUSPENSION SUBCUTANEOUS at 16:59

## 2017-05-04 RX ADMIN — HYDROMORPHONE HYDROCHLORIDE 0.5 MG: 1 INJECTION, SOLUTION INTRAMUSCULAR; INTRAVENOUS; SUBCUTANEOUS at 10:32

## 2017-05-04 RX ADMIN — MESALAMINE 800 MG: 400 CAPSULE, DELAYED RELEASE ORAL at 14:20

## 2017-05-04 RX ADMIN — SODIUM CHLORIDE: 900 INJECTION INTRAVENOUS at 12:16

## 2017-05-04 RX ADMIN — HYDROMORPHONE HYDROCHLORIDE 0.5 MG: 1 INJECTION, SOLUTION INTRAMUSCULAR; INTRAVENOUS; SUBCUTANEOUS at 14:28

## 2017-05-04 RX ADMIN — FLUCONAZOLE 100 MG: 200 TABLET ORAL at 09:12

## 2017-05-04 RX ADMIN — METRONIDAZOLE 500 MG: 500 TABLET ORAL at 14:20

## 2017-05-04 RX ADMIN — HEPARIN SODIUM 5000 UNITS: 5000 INJECTION, SOLUTION INTRAVENOUS; SUBCUTANEOUS at 14:20

## 2017-05-04 ASSESSMENT — PAIN SCALES - GENERAL
PAINLEVEL_OUTOF10: 7
PAINLEVEL_OUTOF10: 7
PAINLEVEL_OUTOF10: 6
PAINLEVEL_OUTOF10: 7
PAINLEVEL_OUTOF10: 5
PAINLEVEL_OUTOF10: 6
PAINLEVEL_OUTOF10: 5
PAINLEVEL_OUTOF10: 6
PAINLEVEL_OUTOF10: 6

## 2017-05-04 ASSESSMENT — ENCOUNTER SYMPTOMS
MYALGIAS: 1
HEADACHES: 0
FEVER: 0
COUGH: 0
VOMITING: 0
SENSORY CHANGE: 0
ABDOMINAL PAIN: 0
ABDOMINAL PAIN: 1
CHILLS: 0
DIAPHORESIS: 1
NAUSEA: 0
SHORTNESS OF BREATH: 0
SPEECH CHANGE: 0

## 2017-05-04 NOTE — DISCHARGE PLANNING
Medical Social Work    Referral: Discharge planning     Intervention: Pt is S/P I & D right thigh, ankle, left hand. He has a wound vac to his umbilical area and per [facility]-pt's home wound vac has been delivered for this wound and already obtained through them for this wound. [facility] will just need updated wound note showing measurements before pt goes home. Pt had another wound vac placed on 05/04/17 on his right thigh. Will need to obtain home wound vac for this wound.     Pt is also on IV Abx therapy until 05/18/17 once daily.     Met w/ pt and he would like to receive home infusion as well as have home health do wound vac dressing changes. Faxed choice for [facility] for home health and for [facility] care for IV abx therapy to [facility] @ [phone]. Received fax confirmation. Will need MD order for home health.     Plan: SW to complete wound vac application for pt's second wound Vac to his thigh. Awaiting HHC and infusion acceptance and delivery of second wound vac to bedside.

## 2017-05-04 NOTE — CARE PLAN
Problem: Discharge Barriers/Planning  Goal: Patient’s continuum of care needs will be met  Outcome: PROGRESSING SLOWER THAN EXPECTED  Two wound vacs now, requiring IV pain medication for dressing changes, on IV antibiotics.     Problem: Mobility  Goal: Risk for activity intolerance will decrease  Outcome: PROGRESSING SLOWER THAN EXPECTED  Reluctant to get out of bed, states too much pain at this time, continue to encourage out of bed.

## 2017-05-04 NOTE — CARE PLAN
Problem: Pain Management  Goal: Pain level will decrease to patient’s comfort goal  Outcome: PROGRESSING AS EXPECTED  Pain managed with PRN Oxycodone and IV Dilaudid.     Problem: Skin Integrity  Goal: Risk for impaired skin integrity will decrease  Outcome: PROGRESSING AS EXPECTED  Dressings intact. No need for dressing change at this time.

## 2017-05-04 NOTE — PROGRESS NOTES
Patient is A&Ox4.   Reports pain to right thigh, medicated per MAR.   ROJAS, CMS intact, mobilizes with SBA and calls appropriately, denies new onset numbness and tingling.  On RA, denies SOB.   Normoactive BS x 4. Tolerating diet. Denies n/v. +flatus, +BM. Pt is voiding q shift.   Wound vac to abdomen c/d/i. No leak noted.   Dressing to left hand, right thigh, and right ankle c/d/i.   RUE PICC - IVF running, dressing dated and intact.   Updated on POC. Belongings and call light within reach. All needs met at this time.   Contact precautions continued.

## 2017-05-04 NOTE — PROGRESS NOTES
Hospital Medicine Interval Note  Date of Service:  5/4/2017    Chief Complaint  51 y.o.-year-old male admitted 4/29/2017 with multiple recurrent soft tissue abscesses and poorly controlled diabetes. Latest abscess at site of monthly testosterone injection site.    Interval Problem Update  NAMRATA negative  Lots of pain right thigh from wound vac placement    Consultants/Specialty  ID  Surgery    Disposition  Home with infusion center for antibiotics     Review of Systems   Constitutional: Positive for malaise/fatigue. Negative for fever and chills.   Respiratory: Negative for cough and shortness of breath.    Cardiovascular: Negative for chest pain.   Gastrointestinal: Negative for nausea, vomiting and abdominal pain.        Multiple soft stools- chronic with UC.   Genitourinary: Negative for dysuria.   Musculoskeletal: Positive for myalgias and joint pain.        Abd, Right thigh, Right medial ankle and Left hand.   Skin: Negative for rash.   Neurological: Negative for sensory change, speech change and headaches.      Physical Exam Laboratory/Imaging   Filed Vitals:    05/03/17 1600 05/03/17 2023 05/04/17 0340 05/04/17 0701   BP: 132/82 124/74 116/72 128/75   Pulse: 85 79 80 76   Temp: 36.8 °C (98.3 °F) 36.2 °C (97.1 °F) 36.3 °C (97.4 °F) 36.7 °C (98 °F)   Resp: 17 18 14 18   Height:       Weight:       SpO2: 92% 96% 94% 94%     Physical Exam   Constitutional: He is oriented to person, place, and time. He appears well-developed and well-nourished. No distress.   HENT:   Nose: Nose normal.   Mouth/Throat: Oropharynx is clear and moist.   Eyes: Conjunctivae and EOM are normal. Right eye exhibits no discharge. Left eye exhibits no discharge. No scleral icterus.   Neck: No tracheal deviation present.   Cardiovascular: Normal rate and regular rhythm.    No murmur heard.  Pulmonary/Chest: Effort normal and breath sounds normal. No stridor. No respiratory distress. He has no wheezes.   Abdominal: Soft. Bowel sounds are  normal. He exhibits no distension. There is no tenderness.   Musculoskeletal: He exhibits no edema.   Right anterior lateral thigh with wound vac  Left hand clean and dry dressing  Right thumb cellulitis improved   Neurological: He is alert and oriented to person, place, and time. No cranial nerve deficit.   Skin: Skin is warm. He is not diaphoretic.   Psychiatric: He has a normal mood and affect. His behavior is normal. Thought content normal.   Nursing note and vitals reviewed.   Lab Results   Component Value Date/Time    WBC 6.1 05/04/2017 04:23 AM    HEMOGLOBIN 10.3* 05/04/2017 04:23 AM    HEMATOCRIT 30.4* 05/04/2017 04:23 AM    PLATELET COUNT 259 05/04/2017 04:23 AM     Lab Results   Component Value Date/Time    SODIUM 138 05/01/2017 01:36 AM    POTASSIUM 4.2 05/01/2017 01:36 AM    CHLORIDE 107 05/01/2017 01:36 AM    CO2 20 05/01/2017 01:36 AM    GLUCOSE 242* 05/01/2017 01:36 AM    BUN 21 05/01/2017 01:36 AM    CREATININE 0.80 05/01/2017 01:36 AM      Assessment/Plan    Hypothyroid (present on admission)  Assessment & Plan  Continue home dose of armour thyroid    Complicated wound infection (CMS-HCC) (present on admission)  Assessment & Plan  Pain improving  D/c PCA  Change to oral oxycodone and IV dilaudid as needed before dressing changes and severe pain    Bacteremia due to Gram-negative bacteria  Assessment & Plan  Due to polymicrobial infections of the skin and soft tissue.  Discussed with ID  NAMRATA negative  Right thigh wound vac today, increased dilaudid to 1mg to deal with acute pain from wound care    Abscess of right thigh  Assessment & Plan  Recommend patient discontinue testosterone injections  Topical gel would be a good alternative he can address with his primary physician    Septic shock (CMS-HCC)  Assessment & Plan  Resolved.    DM (diabetes mellitus), type 2, uncontrolled (CMS-[facility])  Assessment & Plan  Continue sliding scale insulin  Diabetes education has worked with patient in the past and  provided education on multiple occasions, his A1c has been improving.    Chronic ulcerative colitis (CMS-HCC) (present on admission)  Assessment & Plan  Continue mesalamine.  Multiple abscesses could be extra intestinal manifestations of ulcerative colitis       Core Measures

## 2017-05-04 NOTE — PROGRESS NOTES
Late entry, pt awakened from sleep this am, states he did not sleep well, wound vac intact abd. Dressings intact left wrist, right ankle and right thigh area. Tolerated solid food. Now with wound vac dressing change and medicated for this with good relief.

## 2017-05-04 NOTE — PROGRESS NOTES
"Surgical Progress Note:       VAC placed today  Pain with VAC, but otherwise doing well    PE:  /75 mmHg  Pulse 76  Temp(Src) 36.7 °C (98 °F)  Resp 18  Ht 1.74 m (5' 8.5\")  Wt 108.1 kg (238 lb 5.1 oz)  BMI 35.70 kg/m2  SpO2 94%    I/O:   Intake/Output Summary (Last 24 hours) at 05/04/17 1308  Last data filed at 05/04/17 1200   Gross per 24 hour   Intake   2897 ml   Output   4200 ml   Net  -1303 ml     UOP:  PO:  IV:    GEN: NAD  COR: RRR  PULM: CTA  ABD: soft, NTND, VAC in place  EXT: right thigh VAC in place, left hand and right ankle wounds clean/dry    Labs:  Recent Labs      05/02/17   2040  05/03/17   0240  05/04/17   0423   WBC  5.5  6.2  6.1   RBC  3.35*  3.26*  3.44*   HEMOGLOBIN  9.9*  9.8*  10.3*   HEMATOCRIT  30.1*  29.1*  30.4*   MCV  89.9  89.3  88.4   MCH  29.6  30.1  29.9   RDW  44.1  44.5  42.7   PLATELETCT  212  224  259   MPV  9.5  9.5  9.4   NEUTSPOLYS  71.90  73.70*  78.90*   LYMPHOCYTES  18.40*  16.70*  15.80*   MONOCYTES  4.40  4.40  0.90   EOSINOPHILS  4.40  2.60  2.60   BASOPHILS  0.00  1.70  0.90   RBCMORPHOLO  Normal  Present  Present     No results for input(s): SODIUM, POTASSIUM, CHLORIDE, CO2, GLUCOSE, BUN, CPKTOTAL in the last 72 hours.      A/P:   S/P I and D right thigh, ankle, left hand  Doing well from surgical standpoint  Continue wound care per current  dispo per ID and primary team     Esau Dooley M.D.  [facility]  [phone]      "

## 2017-05-04 NOTE — PROGRESS NOTES
Infectious Disease Progress Note    Author: JESSICA Parada Date of service & Time created: 2017  11:41 AM    Chief Complaint:  Chief Complaint   Patient presents with   • Fever   • Wound Infection       Interval History:  51-year-old white male with ulcerative colitis diabetes mellitus admitted with right thigh infection  2017- underwent I&D on 2017. Feels better. Fevers up to 102.7.   5/1- Tm 99.1, WBC 11.2.  R thigh pain 8/10, L hand pain 7/10.  Tolerating abx without issue.  5/2 Tm 99.2, no CBC.  Pain to R thigh, R ankle and L hand, ranging from 5-7/10.  Feels flushed with body aches.  Tolerating abx.   5/3 AF, WBC 6.2.  Continuous pain, worse with activity.  R thigh- 6/10.  R ankle- 3/10.  L hand- 4/10.  Tolerating abx, no SE.  5/4 AF, WBC 6.1.  Continuous pain: R thigh 5/10, R ankle 3/10, L hand 1/10 and Abd 3/10.  Denies SE with abx.  Labs Reviewed, Medications Reviewed, Radiology Reviewed and Wound Reviewed.    Review of Systems:  Review of Systems   Constitutional: Positive for diaphoresis. Negative for fever and chills.        Overnight, nothing this am.   Respiratory: Negative for cough and shortness of breath.    Cardiovascular: Negative for chest pain.   Gastrointestinal: Positive for abdominal pain. Negative for nausea and vomiting.        Soft stools- chronic with UC.  Minimal abd pain.   Genitourinary: Negative for dysuria.   Musculoskeletal: Positive for myalgias and joint pain.        Right thigh, Right medial ankle and Left hand.   Skin: Negative for rash.   Neurological: Negative for sensory change, speech change and headaches.       Hemodynamics:  Temp (24hrs), Av.5 °C (97.7 °F), Min:36.2 °C (97.1 °F), Max:36.8 °C (98.3 °F)  Temperature: 36.7 °C (98 °F)  Pulse  Av.5  Min: 57  Max: 123  Blood Pressure: 128/75 mmHg, NIBP: 121/77 mmHg      Physical Exam:  Physical Exam   Constitutional: He is oriented to person, place, and time. He appears well-developed and  well-nourished. No distress.   Obese   HENT:   Head: Normocephalic and atraumatic.   Mouth/Throat: No oropharyngeal exudate.   Poor dentition- molar decay   Eyes: EOM are normal. Pupils are equal, round, and reactive to light.   Neck: Normal range of motion. Neck supple.   Cardiovascular: Normal rate, regular rhythm, normal heart sounds and intact distal pulses.    No murmur heard.  Pulmonary/Chest: Effort normal and breath sounds normal. No respiratory distress. He has no wheezes.   Abdominal: Soft. Bowel sounds are normal. He exhibits no distension. There is tenderness.   Wound VAC present, no surrounding erythema.   Musculoskeletal: He exhibits edema and tenderness.   Right thigh- elastic bandage with gauze.  Right medial ankle- gauze bandage.  Left hand- gauze bandage, greatly improved flexion of hand and fingers.  RUE PICC- non tender, no erythema.   Neurological: He is alert and oriented to person, place, and time.   Skin: There is erythema.   Nursing note and vitals reviewed.      Meds:    Current facility-administered medications:   •  HYDROmorphone (DILAUDID) injection 1 mg, 1 mg, Intravenous, QDAY PRN, Lala Rendon M.D.  •  oxycodone immediate-release (ROXICODONE) tablet 5 mg, 5 mg, Oral, Q3HRS PRN, Lala Rendon M.D.  •  oxycodone immediate release (ROXICODONE) tablet 10 mg, 10 mg, Oral, Q3HRS PRN, Lala Rendon M.D., 10 mg at 05/04/17 0726  •  HYDROmorphone (DILAUDID) injection 0.5 mg, 0.5 mg, Intravenous, Q3HRS PRN, Lala Rendon M.D., 0.5 mg at 05/04/17 1032  •  metronidazole (FLAGYL) tablet 500 mg, 500 mg, Oral, Q8HRS, Jacey Barnhart, PHARMD, 500 mg at 05/04/17 0630  •  fluconazole (DIFLUCAN) tablet 100 mg, 100 mg, Oral, DAILY, KEN Parada.P.R.N., 100 mg at 05/04/17 0912  •  PICC Line Insertion has been implemented, , , Once **AND** May use Lidocaine 1% not to exceed 3 mls for local at insertion site, , , CONTINUOUS **AND** NOTIFY MD, , , Once **AND** Tip to dwell in the  superior vena cava, , , CONTINUOUS **AND** Do not use PICC Line until placement verified by Chest X Ray, , , CONTINUOUS **AND** DX-CHEST-FOR PICC LINE Perform procedure in:: Patient's Room, , , Once **AND** If radiologist reading of chest X-ray states any of the following the PICC should be used, , , CONTINUOUS **AND** Further evaluation of the PICC placement can be retrieved from X-Ray and Imaging, , , CONTINUOUS **AND** Blood draws through PICC line; draws by RN only, , , CONTINUOUS **AND** FLUSHING GUIDELINES WHEN IN USE, , , CONTINUOUS **AND** normal saline PF 10-20 mL, 10-20 mL, Intravenous, PRN **AND** FLUSHING GUIDELINES WHEN NOT IN USE, , , CONTINUOUS **AND** DRESSING MAINTENANCE, , , Once **AND** Change needleless pressure ports and IV tubing every 72 hours per hospital policy, , , CONTINUOUS **AND** TUBING, , , CONTINUOUS **AND** If there is an MD order to remove the PICC line, any RN may remove the PICC line, , , CONTINUOUS **AND** [] PATIENT EDUCATION MATERIALS, , , Prior to discharge **AND** NURSING COMMUNICATION, , , CONTINUOUS, Oliva Ramirez, A.P.R.N.  •  acetaminophen (TYLENOL) tablet 650 mg, 650 mg, Oral, Q6HRS, Baldo Moncada M.D., 650 mg at 17 0630  •  NS infusion, , Intravenous, Continuous, Baldo Moncada M.D., Last Rate: 83 mL/hr at 17 2142  •  heparin injection 5,000 Units, 5,000 Units, Subcutaneous, Q8HRS, Baldo Moncada M.D., 5,000 Units at 17 0631  •  ceftaroline (TEFLARO) 600 mg in  mL IVPB, 600 mg, Intravenous, Q12HRS, Oliva Ramirez, A.P.R.N., Stopped at 17 1012  •  insulin regular (HUMULIN R) injection 3-15 Units, 3-15 Units, Subcutaneous, 4X/DAY ACHS, Pradip Scott D.O., Stopped at 17 0700  •  insulin NPH (HUMULIN,NOVOLIN) injection 25 Units, 25 Units, Subcutaneous, BID INSULIN, Pradip Scott D.O., 25 Units at 17 0637  •  buPROPion SR (WELLBUTRIN-SR) tablet 150 mg, 150 mg, Oral, BID, Pradip Scott D.O., 150 mg at 17 0911  •   mesalamine delayed-release (DELZICOL) capsule 800 mg, 800 mg, Oral, TID, Pradip Scott D.O., 800 mg at 05/04/17 0900  •  zolpidem (AMBIEN) tablet 5 mg, 5 mg, Oral, HS PRN - MR X 1, Pradip Scott D.O., 5 mg at 05/04/17 0103  •  senna-docusate (PERICOLACE or SENOKOT S) 8.6-50 MG per tablet 2 Tab, 2 Tab, Oral, BID, 2 Tab at 05/03/17 2143 **AND** polyethylene glycol/lytes (MIRALAX) PACKET 1 Packet, 1 Packet, Oral, QDAY PRN **AND** magnesium hydroxide (MILK OF MAGNESIA) suspension 30 mL, 30 mL, Oral, QDAY PRN **AND** bisacodyl (DULCOLAX) suppository 10 mg, 10 mg, Rectal, QDAY PRN, Christopher Gomez M.D.  •  Respiratory Care per Protocol, , Nebulization, Continuous RT, Christopher Gomez M.D.  •  ondansetron (ZOFRAN) syringe/vial injection 4 mg, 4 mg, Intravenous, Q4HRS PRN, Christopher Gomez M.D., 4 mg at 05/03/17 1113  •  ondansetron (ZOFRAN ODT) dispertab 4 mg, 4 mg, Oral, Q4HRS PRN, Christopher Gomez M.D.  •  promethazine (PHENERGAN) tablet 12.5-25 mg, 12.5-25 mg, Oral, Q4HRS PRN, Christopher Gomez M.D., 25 mg at 05/01/17 1151  •  promethazine (PHENERGAN) suppository 12.5-25 mg, 12.5-25 mg, Rectal, Q4HRS PRN, Christopher Gomez M.D.  •  prochlorperazine (COMPAZINE) injection 5-10 mg, 5-10 mg, Intravenous, Q4HRS PRN, Christopher Gomez M.D.  •  thyroid (ARMOUR THYROID) tablet 75 mg, 75 mg, Oral, AM ES, Christopher Gomez M.D., 75 mg at 05/04/17 0631    Labs:  Recent Labs      05/02/17   2040  05/03/17   0240  05/04/17   0423   WBC  5.5  6.2  6.1   RBC  3.35*  3.26*  3.44*   HEMOGLOBIN  9.9*  9.8*  10.3*   HEMATOCRIT  30.1*  29.1*  30.4*   MCV  89.9  89.3  88.4   MCH  29.6  30.1  29.9   RDW  44.1  44.5  42.7   PLATELETCT  212  224  259   MPV  9.5  9.5  9.4   NEUTSPOLYS  71.90  73.70*  78.90*   LYMPHOCYTES  18.40*  16.70*  15.80*   MONOCYTES  4.40  4.40  0.90   EOSINOPHILS  4.40  2.60  2.60   BASOPHILS  0.00  1.70  0.90   RBCMORPHOLO  Normal  Present  Present     No results for input(s): SODIUM, POTASSIUM, CHLORIDE, CO2, GLUCOSE, BUN, CPKTOTAL in  the last 72 hours.  No results for input(s): ALBUMIN, TBILIRUBIN, ALKPHOSPHAT, TOTPROTEIN, ALTSGPT, ASTSGOT, CREATININE in the last 72 hours.    Imaging:  TRANSESOPHAGEAL ECHO W/O CONT  5/3/2017   CONCLUSIONS  No vegetations noted, no evidence of endocarditis or abscess.    DX-CHEST-FOR PICC LINE   5/2/2017  Impression      1. Placement right PICC line projecting over the SVC with the tip in the mid to lower right atrium.  2.  Mild diffuse atelectasis/interstitial edema.  3.  Findings discussed with PICC team at 8:00 AM.     ECHOCARDIOGRAM COMP W/O CONT  05/01/2017   CONCLUSIONS  Normal left ventricular chamber size.  Normal left ventricular systolic function.  Left ventricular ejection fraction is visually estimated to be 65%.  Normal regional wall motion.  Mild mitral regurgitation.  The left atrium is normal in size.  Structurally normal aortic valve without significant stenosis or   regurgitation.  Right ventricular systolic pressure is estimated to be 50 mmHg.  Mild tricuspid regurgitation.  Normal inferior vena cava size and inspiratory collapse.  Mildly dilated right ventricle.  Normal right ventricular systolic function.    Micro:    Results     Procedure Component Value Units Date/Time    CULTURE WOUND W/ GRAM STAIN [[id number]]  (Abnormal)  (Susceptibility) Collected:  04/29/17 1790    Order Status:  Completed Specimen Information:  Wound Updated:  05/03/17 1034     Significant Indicator POS (POS)      Source WND      Site Right Thigh Abscess      Culture Result Wound -- (A)      Result:        Heavy growth Mixed skin yesenia predominantly Viridans  Streptococcus.       Gram Stain Result --      Result:        Many WBCs.  Many mixed bacteria, no predominant organism seen.       Culture Result Wound -- (A)      Result:        Klebsiella pneumoniae  Heavy growth       Culture Result Wound -- (A)      Result:        Methicillin Resistant Staphylococcus aureus  Heavy growth      Narrative:      CALL  Alvarez  141  tel. [phone],  CALLED  141 tel. [phone] 05/01/2017, 10:43, RB PERF. RESULTS CALLED  TO:[id number](MRSA) and faxed to [facility]    Culture & Susceptibility     KLEBSIELLA PNEUMONIAE     Antibiotic Sensitivity Microscan Unit Status    Ampicillin Resistant >16 mcg/mL Final    Cefepime Sensitive <=8 mcg/mL Final    Cefotaxime Sensitive <=2 mcg/mL Final    Cefotetan Sensitive <=16 mcg/mL Final    Ceftazidime Sensitive <=1 mcg/mL Final    Ceftriaxone Sensitive <=8 mcg/mL Final    Cefuroxime Sensitive <=4 mcg/mL Final    Ciprofloxacin Sensitive <=1 mcg/mL Final    Ertapenem Sensitive <=1 mcg/mL Final    Gentamicin Sensitive <=4 mcg/mL Final    Pip/Tazobactam Sensitive <=16 mcg/mL Final    Tigecycline Sensitive <=2 mcg/mL Final    Tobramycin Sensitive <=4 mcg/mL Final    Trimeth/Sulfa Sensitive <=2/38 mcg/mL Final              METHICILLIN RESISTANT STAPHYLOCOCCUS AUREUS     Antibiotic Sensitivity Microscan Unit Status    Ampicillin/sulbactam Resistant >16/8 mcg/mL Final    Clindamycin Sensitive <=0.5 mcg/mL Final    Daptomycin Sensitive 1 mcg/mL Final    Erythromycin Resistant >4 mcg/mL Final    Moxifloxacin Sensitive <=0.5 mcg/mL Final    Oxacillin Resistant >2 mcg/mL Final    Penicillin Resistant >8 mcg/mL Final    Tetracycline Sensitive <=4 mcg/mL Final    Trimeth/Sulfa Sensitive <=0.5/9.5 mcg/mL Final    Vancomycin Sensitive 2 mcg/mL Final                       ANAEROBIC CULTURE [[id number]]  (Abnormal) Collected:  04/29/17 1855    Order Status:  Completed Specimen Information:  Wound Updated:  05/03/17 1034     Significant Indicator POS (POS)      Source WND      Site Right Thigh Abscess      Anaerobic Culture, Culture Res -- (A)      Result:        Growth noted after further incubation,see below for  organism identification.       Anaerobic Culture, Culture Res -- (A)      Result:        Bacteroides fragilis Group  Heavy growth      Narrative:      CALL  Alvarez  141 tel. [phone],  CALLED  141 tel. [phone]  "05/01/2017, 10:43, RB PERF. RESULTS CALLED  TO:[id number](MRSA) and faxed to [facility]    FUNGAL CULTURE [[id number]]  (Abnormal) Collected:  04/29/17 1855    Order Status:  Completed Specimen Information:  Wound Updated:  05/03/17 1034     Significant Indicator POS (POS)      Source WND      Site Right Thigh Abscess      Fungal Culture -- (A)      Result:        Growth noted after further incubation,see below for  organism identification.       Fungal Culture -- (A)      Result:        Yeast  Light growth      Narrative:      CALL  Alvarez  141 tel. [phone],  CALLED  141 tel. [phone] 05/01/2017, 10:43, RB PERF. RESULTS CALLED  TO:[id number](MRSA) and faxed to [facility]    BLOOD CULTURE [[id number]] Collected:  05/02/17 1335    Order Status:  Completed Specimen Information:  Blood from Peripheral Updated:  05/03/17 0643     Significant Indicator NEG      Source BLD      Site PERIPHERAL      Blood Culture --      Result:        No Growth    Note: Blood cultures are incubated for 5 days and  are monitored continuously.Positive blood cultures  are called to the RN and reported as soon as  they are identified.      Narrative:      Contact  Per Hospital Policy: Only change Specimen Src: to \"Line\" if  specified by physician order.    BLOOD CULTURE [[id number]] Collected:  05/02/17 1335    Order Status:  Completed Specimen Information:  Blood from Peripheral Updated:  05/03/17 0643     Significant Indicator NEG      Source BLD      Site PERIPHERAL      Blood Culture --      Result:        No Growth    Note: Blood cultures are incubated for 5 days and  are monitored continuously.Positive blood cultures  are called to the RN and reported as soon as  they are identified.      Narrative:      Contact  Per Hospital Policy: Only change Specimen Src: to \"Line\" if  specified by physician order.    BLOOD CULTURE [[id number]]  (Abnormal) Collected:  04/29/17 1100    Order Status:  Completed Specimen Information:  Blood from Peripheral Updated:  " "05/01/17 1850     Significant Indicator POS (POS)      Source BLD      Site PERIPHERAL      Blood Culture        Result:        Growth detected by Bactec instrument.  04/30/2017  14:03 (A)     Blood Culture Bacteroides vulgatus (A)     Narrative:      CALL  Alvarez  19 tel. [phone],  CALLED  19 tel. [phone] 04/30/2017, 14:05, RB PERF. RESULTS CALLED  TO:[phone]  Per Hospital Policy: Only change Specimen Src: to \"Line\" if  specified by physician order.    BLOOD CULTURE [[id number]]  (Abnormal) Collected:  04/29/17 1038    Order Status:  Completed Specimen Information:  Blood from Peripheral Updated:  05/01/17 1849     Significant Indicator POS (POS)      Source BLD      Site PERIPHERAL      Blood Culture        Result:        Growth detected by Bactec instrument.  04/30/2017  14:03 (A)     Blood Culture Bacteroides vulgatus (A)     Narrative:      CALL  Alvarez  19 tel. [phone],  CALLED  19 tel. [phone] 04/30/2017, 14:06, RB PERF. RESULTS CALLED  TO:Chidi [phone]  Per Hospital Policy: Only change Specimen Src: to \"Line\" if  specified by physician order.    URINE CULTURE(NEW) [[id number]] Collected:  04/29/17 1146    Order Status:  Completed Specimen Information:  Urine Updated:  05/01/17 0814     Significant Indicator NEG      Source UR      Site --      Urine Culture No growth at 48 hours     Narrative:      Indication for culture:->Emergency Room Patient    GRAM STAIN [[id number]] Collected:  04/29/17 1855    Order Status:  Completed Specimen Information:  Wound Updated:  04/29/17 2156     Significant Indicator .      Source WND      Site Right Thigh Abscess      Gram Stain Result --      Result:        Many WBCs.  Many mixed bacteria, no predominant organism seen.      URINALYSIS [[id number]]  (Abnormal) Collected:  04/29/17 1146    Order Status:  Completed Specimen Information:  Urine Updated:  04/29/17 1208     Micro Urine Req Microscopic      Color Lt. Yellow      Character Clear      Specific Gravity " 1.024      Ph 6.5      Glucose >1000 (A) mg/dL      Ketones 20 (A) mg/dL      Protein Negative mg/dL      Bilirubin Negative      Nitrite Negative      Leukocyte Esterase Negative      Occult Blood Small (A)     Narrative:      Indication for culture:->Emergency Room Patient             Assessment:  Active Hospital Problems    Diagnosis   • *Severe sepsis (CMS-[facility]) [A41.9, R65.20]   • Type 2 diabetes mellitus (CMS-[facility]) [E11.9]   • Chronic ulcerative colitis (CMS-[facility]) [K51.90]   • Complicated wound infection (CMS-[facility]) [T79.8XXA]   • Thrombocytopenia (CMS-[facility]) [D69.6]   • Hypothyroid [E03.9]       Plan:  Septic shock  Slightly improved   Hemodynamically stable  Bcx 4/19 negative    Right thigh abscess  S/p I&D on 4/29/2017 with Dr. Reyes  OR cx +MRSA, Kleb pneumoniae, Strep Viridans, B fragilis & Yeast  TTE on 5/1 negative  NAMRATA on 5/3 negative (reviewed today)  D/C IV Vanco and Zosyn  Continue IV Ceftaroline and IV Flagyl  Continue PO Diflucan for yeast  Will plan for 2 weeks IV from today, end date 5/18/17.  Orders for [facility] faxed to [facility] on 5/4.  Plan for IV Ertapenem 1 g daily, in addition to PO Linezolid and Fluconazole.  Pt will need dose of Ertapenem prior to discharge.  [facility] will work on pre auth for Linezolid.    Right medial ankle abscess  S/p I&D on 5/1 with Dr. Reyes    Left hand abscess  S/p I&D on 5/1 with Dr. Reyes    Bacteroides fragilis bacteremia  Bcx 4/29 +Bacteroides vulgatus  IV Flagyl as above.  Repeat Bcx x2 on 5/2- NGTD    Postoperative wound infection of umbilical hernia site  Improving, WV in place    Ulcerative colitis  If cultures remain negative then consider extraintestinal manifestations of ulcerative colitis as a diagnosis    Obesity  Diabetes mellitus  Keep blood sugars <150, elevated BS will impair wound healing and worsen infection.    Discussed with Hospitalist- Dr. Rendon and SW.

## 2017-05-04 NOTE — PROGRESS NOTES
Pt is complaining of 3/10 right leg pain. Pt is on dilaudid PCA. Pt is resting comfortably at this time and denies any other needs. Call light within reach.

## 2017-05-04 NOTE — FACE TO FACE
Face to Face Supporting Documentation - Home Health    The encounter with this patient was in whole or in part the primary reason for home health admission.    Date of encounter:   Patient:                    MRN:                       YOB: 2017  Mahesh Bradshaw  [id number]  1965     Home health to see patient for:  Skilled Nursing care for assessment, interventions & education and Wound Care    Skilled need for:  Surgical Aftercare multiple abscesses of skin, wound vacs    Skilled nursing interventions to include:  Wound Care    Homebound status evidenced by:  Need the aid of supportive devices such as crutches, canes, wheelchairs or walkers. Leaving home requires a considerable and taxing effort. There is a normal inability to leave the home.    Community Physician to provide follow up care: Rodolfo Stein M.D.     Optional Interventions? Yes, Details: home antibiotic infusion      I certify the face to face encounter for this home health care referral meets the CMS requirements and the encounter/clinical assessment with the patient was, in whole, or in part, for the medical condition(s) listed above, which is the primary reason for home health care. Based on my clinical findings: the service(s) are medically necessary, support the need for home health care, and the homebound criteria are met.  I certify that this patient has had a face to face encounter by myself.  Lala Rendon M.D. - NPI: [id number]

## 2017-05-04 NOTE — WOUND TEAM
"[facility]  Inpatient Services  Wound and Skin Care Progress Note    HPI, PMH, SH: Reviewed  Unit where seen by Wound Team: T409/00    WOUND TEAM FOLLOW UP: Placement of NPWT on right thigh, lateral open complicated. Schedule NPWT dressing change for umbilical wound so that both dressings are on the same day.     SUBJECTIVE:  \"I need more pain medicine.\"    Self Report / Pain Level: 9/10. JAZMINE Oleary obtained a order for more pain medication.    OBJECTIVE: Pleasant and engaging.    WOUND TYPE, LOCATION, CHARACTERISTICS:    Wound POA Open Surgical (Complicated) Umbilicus Medial  Periwound Skin: Intact  Drainage : None     Tissue Type and %:    90% Red/pink, 10% pale.  Wound Edges:    open    Odor:     None   Exposed structure(s):   suture   Signs and Symptoms of Infection: none    Measurements : taken 5/1/17  Length (cm): 0.8  Width (cm): 2.8  Depth (cm): 1.4      Tracts/undermining:   None     Wound POA Open Surgical (Complicated) Right upper thigh, lateral.  Periwound Skin: Edematous, indurated.  Drainage :     Tissue Type and %:    80% Red/pink, 20% white  Wound Edges:    Attached.   Odor:     None   Exposed structure(s):   Fascia, muscle.  Signs and Symptoms of Infection: Induration.    Measurements : 05/04/2017  Length (cm): 17 cm  Width (cm): 4.8 cm  Depth (cm): 2.7 cm      Tracts/undermining:   None    INTERVENTIONS BY WOUND TEAM: Umbilicus: removed drsg 1 piece of black with a button.  cleaned wound with wound cleanser. 1 piece of black foam into wound bed. Sealed and then button applied and then trac pad. NPWT resumed  mmHg. Anchored tubing to abd to try to decrease dislodgement.   Right lateral upper thigh: Removed wet to dry dressing. Cleaned wound with wound cleanser, blotted with gauze. Photo and measurements taken. Benzoin and drape to periwound. Wound filled with 1 piece of black foam, draped, hole cut for trac pad, placed trac pad. Had a leak distally, fixed with benzoin and " drape. Suction obtained at 125 mmHg continuous.  Dressing Options: Wound Vac    Interdisciplinary consultation: Mamta RN, patient.     EVALUATION AND PROGRESS OF WOUND(S): Both wounds clean and will benefit from NPWT.       Factors affecting wound healing: infection/sepsis, abscesses, obesity, DM.     Goals: decreased wound size 1% each week    NURSING PLAN OF CARE:    Dressing changes: Continue previous Dressing Maintenance orders: x       See new Dressing Maintenance orders:       Skin care: See Skin Care orders:        Rectal tube care: See Rectal Tube Care orders:      Other orders:           WOUND TEAM PLAN OF CARE (X):   NPWT change 3 x week:  x      Dressing changes:     x  Follow up as needed:       Other:

## 2017-05-05 ENCOUNTER — HOME HEALTH ADMISSION (OUTPATIENT)
Dept: HOME HEALTH SERVICES | Facility: HOME HEALTHCARE | Age: 52
End: 2017-05-05
Payer: COMMERCIAL

## 2017-05-05 LAB
BASOPHILS # BLD AUTO: 0.9 % (ref 0–1.8)
BASOPHILS # BLD: 0.05 K/UL (ref 0–0.12)
EOSINOPHIL # BLD AUTO: 0.05 K/UL (ref 0–0.51)
EOSINOPHIL NFR BLD: 0.8 % (ref 0–6.9)
ERYTHROCYTE [DISTWIDTH] IN BLOOD BY AUTOMATED COUNT: 42.4 FL (ref 35.9–50)
GLUCOSE BLD-MCNC: 114 MG/DL (ref 65–99)
GLUCOSE BLD-MCNC: 126 MG/DL (ref 65–99)
GLUCOSE BLD-MCNC: 128 MG/DL (ref 65–99)
GLUCOSE BLD-MCNC: 92 MG/DL (ref 65–99)
HCT VFR BLD AUTO: 32.3 % (ref 42–52)
HGB BLD-MCNC: 10.8 G/DL (ref 14–18)
LG PLATELETS BLD QL SMEAR: NORMAL
LYMPHOCYTES # BLD AUTO: 0.95 K/UL (ref 1–4.8)
LYMPHOCYTES NFR BLD: 16.4 % (ref 22–41)
MANUAL DIFF BLD: NORMAL
MCH RBC QN AUTO: 29.3 PG (ref 27–33)
MCHC RBC AUTO-ENTMCNC: 33.4 G/DL (ref 33.7–35.3)
MCV RBC AUTO: 87.8 FL (ref 81.4–97.8)
METAMYELOCYTES NFR BLD MANUAL: 0.9 %
MONOCYTES # BLD AUTO: 0.3 K/UL (ref 0–0.85)
MONOCYTES NFR BLD AUTO: 5.2 % (ref 0–13.4)
MORPHOLOGY BLD-IMP: NORMAL
NEUTROPHILS # BLD AUTO: 4.4 K/UL (ref 1.82–7.42)
NEUTROPHILS NFR BLD: 74.1 % (ref 44–72)
NEUTS BAND NFR BLD MANUAL: 1.7 % (ref 0–10)
NRBC # BLD AUTO: 0 K/UL
NRBC BLD AUTO-RTO: 0 /100 WBC
PLATELET # BLD AUTO: 286 K/UL (ref 164–446)
PLATELET BLD QL SMEAR: NORMAL
PMV BLD AUTO: 9.5 FL (ref 9–12.9)
RBC # BLD AUTO: 3.68 M/UL (ref 4.7–6.1)
RBC BLD AUTO: PRESENT
TOXIC GRANULES BLD QL SMEAR: SLIGHT
WBC # BLD AUTO: 5.8 K/UL (ref 4.8–10.8)

## 2017-05-05 PROCEDURE — 82962 GLUCOSE BLOOD TEST: CPT

## 2017-05-05 PROCEDURE — 700111 HCHG RX REV CODE 636 W/ 250 OVERRIDE (IP): Performed by: HOSPITALIST

## 2017-05-05 PROCEDURE — 700105 HCHG RX REV CODE 258: Performed by: NURSE PRACTITIONER

## 2017-05-05 PROCEDURE — 700102 HCHG RX REV CODE 250 W/ 637 OVERRIDE(OP): Performed by: NURSE PRACTITIONER

## 2017-05-05 PROCEDURE — A9270 NON-COVERED ITEM OR SERVICE: HCPCS | Performed by: HOSPITALIST

## 2017-05-05 PROCEDURE — 85007 BL SMEAR W/DIFF WBC COUNT: CPT

## 2017-05-05 PROCEDURE — 700102 HCHG RX REV CODE 250 W/ 637 OVERRIDE(OP): Performed by: HOSPITALIST

## 2017-05-05 PROCEDURE — 700111 HCHG RX REV CODE 636 W/ 250 OVERRIDE (IP): Performed by: NURSE PRACTITIONER

## 2017-05-05 PROCEDURE — A9270 NON-COVERED ITEM OR SERVICE: HCPCS | Performed by: NURSE PRACTITIONER

## 2017-05-05 PROCEDURE — A9270 NON-COVERED ITEM OR SERVICE: HCPCS | Performed by: PHARMACIST

## 2017-05-05 PROCEDURE — 99233 SBSQ HOSP IP/OBS HIGH 50: CPT | Performed by: HOSPITALIST

## 2017-05-05 PROCEDURE — 85027 COMPLETE CBC AUTOMATED: CPT

## 2017-05-05 PROCEDURE — 700102 HCHG RX REV CODE 250 W/ 637 OVERRIDE(OP): Performed by: PHARMACIST

## 2017-05-05 PROCEDURE — 770021 HCHG ROOM/CARE - ISO PRIVATE

## 2017-05-05 PROCEDURE — 700105 HCHG RX REV CODE 258: Performed by: HOSPITALIST

## 2017-05-05 RX ORDER — MORPHINE SULFATE 15 MG/1
30 TABLET, FILM COATED, EXTENDED RELEASE ORAL EVERY 12 HOURS
Status: DISCONTINUED | OUTPATIENT
Start: 2017-05-05 | End: 2017-05-07

## 2017-05-05 RX ADMIN — ACETAMINOPHEN 650 MG: 325 TABLET, FILM COATED ORAL at 11:45

## 2017-05-05 RX ADMIN — OXYCODONE HYDROCHLORIDE 10 MG: 10 TABLET ORAL at 04:03

## 2017-05-05 RX ADMIN — SODIUM CHLORIDE: 900 INJECTION INTRAVENOUS at 22:29

## 2017-05-05 RX ADMIN — ZOLPIDEM TARTRATE 5 MG: 5 TABLET, FILM COATED ORAL at 22:29

## 2017-05-05 RX ADMIN — METRONIDAZOLE 500 MG: 500 TABLET ORAL at 06:44

## 2017-05-05 RX ADMIN — ACETAMINOPHEN 650 MG: 325 TABLET, FILM COATED ORAL at 17:19

## 2017-05-05 RX ADMIN — BUPROPION HYDROCHLORIDE 150 MG: 150 TABLET, FILM COATED, EXTENDED RELEASE ORAL at 08:24

## 2017-05-05 RX ADMIN — METRONIDAZOLE 500 MG: 500 TABLET ORAL at 22:23

## 2017-05-05 RX ADMIN — MESALAMINE 800 MG: 400 CAPSULE, DELAYED RELEASE ORAL at 08:24

## 2017-05-05 RX ADMIN — MESALAMINE 800 MG: 400 CAPSULE, DELAYED RELEASE ORAL at 21:00

## 2017-05-05 RX ADMIN — CEFTAROLINE FOSAMIL 600 MG: 600 POWDER, FOR SOLUTION INTRAVENOUS at 08:24

## 2017-05-05 RX ADMIN — INSULIN HUMAN 25 UNITS: 100 INJECTION, SUSPENSION SUBCUTANEOUS at 06:51

## 2017-05-05 RX ADMIN — HEPARIN SODIUM 5000 UNITS: 5000 INJECTION, SOLUTION INTRAVENOUS; SUBCUTANEOUS at 14:04

## 2017-05-05 RX ADMIN — HYDROMORPHONE HYDROCHLORIDE 0.5 MG: 1 INJECTION, SOLUTION INTRAMUSCULAR; INTRAVENOUS; SUBCUTANEOUS at 06:44

## 2017-05-05 RX ADMIN — HYDROMORPHONE HYDROCHLORIDE 0.5 MG: 1 INJECTION, SOLUTION INTRAMUSCULAR; INTRAVENOUS; SUBCUTANEOUS at 22:23

## 2017-05-05 RX ADMIN — CEFTAROLINE FOSAMIL 600 MG: 600 POWDER, FOR SOLUTION INTRAVENOUS at 21:01

## 2017-05-05 RX ADMIN — FLUCONAZOLE 100 MG: 200 TABLET ORAL at 08:25

## 2017-05-05 RX ADMIN — HYDROMORPHONE HYDROCHLORIDE 0.5 MG: 1 INJECTION, SOLUTION INTRAMUSCULAR; INTRAVENOUS; SUBCUTANEOUS at 14:04

## 2017-05-05 RX ADMIN — HYDROMORPHONE HYDROCHLORIDE 0.5 MG: 1 INJECTION, SOLUTION INTRAMUSCULAR; INTRAVENOUS; SUBCUTANEOUS at 17:19

## 2017-05-05 RX ADMIN — OXYCODONE HYDROCHLORIDE 10 MG: 10 TABLET ORAL at 08:24

## 2017-05-05 RX ADMIN — MORPHINE SULFATE 30 MG: 15 TABLET, EXTENDED RELEASE ORAL at 21:01

## 2017-05-05 RX ADMIN — SODIUM CHLORIDE: 900 INJECTION INTRAVENOUS at 08:31

## 2017-05-05 RX ADMIN — HYDROMORPHONE HYDROCHLORIDE 0.5 MG: 1 INJECTION, SOLUTION INTRAMUSCULAR; INTRAVENOUS; SUBCUTANEOUS at 10:05

## 2017-05-05 RX ADMIN — ONDANSETRON 4 MG: 2 INJECTION INTRAMUSCULAR; INTRAVENOUS at 10:05

## 2017-05-05 RX ADMIN — HEPARIN SODIUM 5000 UNITS: 5000 INJECTION, SOLUTION INTRAVENOUS; SUBCUTANEOUS at 06:43

## 2017-05-05 RX ADMIN — ACETAMINOPHEN 650 MG: 325 TABLET, FILM COATED ORAL at 06:44

## 2017-05-05 RX ADMIN — HEPARIN SODIUM 5000 UNITS: 5000 INJECTION, SOLUTION INTRAVENOUS; SUBCUTANEOUS at 22:22

## 2017-05-05 RX ADMIN — BUPROPION HYDROCHLORIDE 150 MG: 150 TABLET, FILM COATED, EXTENDED RELEASE ORAL at 21:01

## 2017-05-05 RX ADMIN — MESALAMINE 800 MG: 400 CAPSULE, DELAYED RELEASE ORAL at 14:04

## 2017-05-05 RX ADMIN — MORPHINE SULFATE 30 MG: 15 TABLET, EXTENDED RELEASE ORAL at 11:45

## 2017-05-05 RX ADMIN — LEVOTHYROXINE, LIOTHYRONINE 75 MG: 19; 4.5 TABLET ORAL at 06:44

## 2017-05-05 RX ADMIN — HYDROMORPHONE HYDROCHLORIDE 0.5 MG: 1 INJECTION, SOLUTION INTRAMUSCULAR; INTRAVENOUS; SUBCUTANEOUS at 01:55

## 2017-05-05 RX ADMIN — METRONIDAZOLE 500 MG: 500 TABLET ORAL at 14:04

## 2017-05-05 RX ADMIN — INSULIN HUMAN 25 UNITS: 100 INJECTION, SUSPENSION SUBCUTANEOUS at 17:16

## 2017-05-05 ASSESSMENT — PAIN SCALES - GENERAL
PAINLEVEL_OUTOF10: 6
PAINLEVEL_OUTOF10: 4
PAINLEVEL_OUTOF10: 6
PAINLEVEL_OUTOF10: 6
PAINLEVEL_OUTOF10: 7
PAINLEVEL_OUTOF10: 6
PAINLEVEL_OUTOF10: 5
PAINLEVEL_OUTOF10: 5

## 2017-05-05 ASSESSMENT — ENCOUNTER SYMPTOMS
NAUSEA: 0
HEADACHES: 0
FEVER: 0
SENSORY CHANGE: 0
CHILLS: 0
SHORTNESS OF BREATH: 0
VOMITING: 0
ABDOMINAL PAIN: 0
NAUSEA: 1
ABDOMINAL PAIN: 1
COUGH: 0
SPEECH CHANGE: 0
DIAPHORESIS: 1
MYALGIAS: 1

## 2017-05-05 NOTE — CARE PLAN
Problem: Venous Thromboembolism (VTW)/Deep Vein Thrombosis (DVT) Prevention:  Goal: Patient will participate in Venous Thrombosis (VTE)/Deep Vein Thrombosis (DVT)Prevention Measures  Outcome: PROGRESSING AS EXPECTED  Refused to mobilize this shift. Pt was educated, however continues to refuse.     Problem: Pain Management  Goal: Pain level will decrease to patient’s comfort goal  Outcome: PROGRESSING AS EXPECTED  Pain managed with PRN Oxycodone and IV Dilaudid

## 2017-05-05 NOTE — PROGRESS NOTES
Patient is A&Ox4.   Reports pain to right thigh, medicated per MAR.    ROJAS, CMS intact, mobilizes with SBA and calls appropriately, denies new onset numbness and tingling.  On RA, denies SOB.    Normoactive BS x 4. Tolerating diet. Reports nausea, medicated per MAR. +flatus, -BM. Pt is voiding q shift.    Wound vac to abdomen and R thigh c/d/i. No leak noted.    Dressing to left hand and right ankle c/d/i.    RUE PICC - IVF running, dressing dated and intact.    Updated on POC. Belongings and call light within reach. All needs met at this time.    Contact precautions continued.              Refuses BA, educated on risk to fall, verbalizes understanding and still declines. Call light in reach, calls appropriately for assistance.

## 2017-05-05 NOTE — ASSESSMENT & PLAN NOTE
Multiple wounds with polymicrobial infections.  Continue ceftaroline, flagyl and fluconazole  Two wound vacs in place, awaiting delivery of second home unit prior to discharge  Dilaudid IV 1mg with vac changes

## 2017-05-05 NOTE — DISCHARGE PLANNING
Medication Zyvox has been approved, PA# [id number] effect 5/5/2017. Message left at [phone] .

## 2017-05-05 NOTE — PROGRESS NOTES
Wound vacs to suction, patient assessed for pain and pain medication given, and pain medication regime changed,  answered questions, patient anticipated discharge on Monday. Updated on plan of care, continue to monitor patient.

## 2017-05-05 NOTE — DISCHARGE PLANNING
Call received from Antwan at [facility], she states that they have been contacted for home health with [facility]. A copy of the order and face to face is needed to be faxed to her to process this request.

## 2017-05-05 NOTE — PROGRESS NOTES
Hospital Medicine Interval Note  Date of Service:  5/5/2017    Chief Complaint  51 y.o.-year-old male admitted 4/29/2017 with multiple recurrent soft tissue abscesses and poorly controlled diabetes. Latest abscess at site of monthly testosterone injection site.    Interval Problem Update  Having a lot of pain right thigh dilaudid and oxy work but wear off quickly and he awakens in a lot of pain.  Agreeable to topical testosterone supplementation    Consultants/Specialty  ID  Surgery    Disposition  Home with infusion center for antibiotics, discussed with   ID writing antbiotic orders     Review of Systems   Constitutional: Positive for malaise/fatigue. Negative for fever and chills.   Respiratory: Negative for cough and shortness of breath.    Cardiovascular: Negative for chest pain.   Gastrointestinal: Positive for nausea. Negative for vomiting and abdominal pain.        Multiple soft stools- chronic with UC.   Genitourinary: Negative for dysuria.   Musculoskeletal: Positive for myalgias and joint pain.        Abd, Right thigh, Right medial ankle and Left hand.   Skin: Negative for rash.   Neurological: Negative for sensory change, speech change and headaches.      Physical Exam Laboratory/Imaging   Filed Vitals:    05/04/17 1456 05/04/17 2128 05/05/17 0409 05/05/17 0707   BP: 118/79 116/75 132/82 126/79   Pulse: 73 77 83 82   Temp: 36.3 °C (97.4 °F) 36.4 °C (97.6 °F) 36.6 °C (97.8 °F) 36.8 °C (98.2 °F)   Resp: 18 17 16 16   Height:       Weight:       SpO2: 94% 94% 97% 94%     Physical Exam   Constitutional: He is oriented to person, place, and time. He appears well-developed and well-nourished. No distress.   HENT:   Nose: Nose normal.   Mouth/Throat: Oropharynx is clear and moist.   Eyes: Conjunctivae and EOM are normal. Right eye exhibits no discharge. Left eye exhibits no discharge. No scleral icterus.   Neck: No tracheal deviation present.   Cardiovascular: Normal rate and regular rhythm.     No murmur heard.  Pulmonary/Chest: Effort normal and breath sounds normal. No stridor. No respiratory distress. He has no wheezes.   Abdominal: Soft. Bowel sounds are normal. He exhibits no distension. There is no tenderness.   Musculoskeletal: He exhibits no edema.   Right anterior lateral thigh with wound vac  Left hand clean and dry dressing  Right thumb cellulitis improved   Neurological: He is alert and oriented to person, place, and time. No cranial nerve deficit.   Skin: Skin is warm. He is not diaphoretic.   Psychiatric: He has a normal mood and affect. His behavior is normal. Thought content normal.   Nursing note and vitals reviewed.   Lab Results   Component Value Date/Time    WBC 5.8 05/05/2017 02:00 AM    HEMOGLOBIN 10.8* 05/05/2017 02:00 AM    HEMATOCRIT 32.3* 05/05/2017 02:00 AM    PLATELET COUNT 286 05/05/2017 02:00 AM     Lab Results   Component Value Date/Time    SODIUM 138 05/01/2017 01:36 AM    POTASSIUM 4.2 05/01/2017 01:36 AM    CHLORIDE 107 05/01/2017 01:36 AM    CO2 20 05/01/2017 01:36 AM    GLUCOSE 242* 05/01/2017 01:36 AM    BUN 21 05/01/2017 01:36 AM    CREATININE 0.80 05/01/2017 01:36 AM      Assessment/Plan    Hypothyroid (present on admission)  Assessment & Plan  Continue home dose of armour thyroid    Complicated wound infection (CMS-HCC) (present on admission)  Assessment & Plan  Multiple wounds with polymicrobial infections.  Continue ceftaroline, flagyl and fluconazole    Bacteremia due to Gram-negative bacteria  Assessment & Plan  Due to polymicrobial infections of the skin and soft tissue.  Discussed with ID  NAMRATA negative  Right thigh wound vac remains very painful, dilaudid numbs the pain but frequently needing prn medication, trial of MScontin 30mg q 12 forming home pain regimen    Abscess of right thigh  Assessment & Plan  Recommend patient discontinue testosterone injections  Topical gel alternative, prescribe at discharge patient agreeable    Septic shock  (CMS-HCC)  Assessment & Plan  Resolved.    DM (diabetes mellitus), type 2, uncontrolled (CMS-HCC)  Assessment & Plan  Continue sliding scale insulin  Diabetes education has worked with patient in the past and provided education on multiple occasions, his A1c has been improving.    Chronic ulcerative colitis (CMS-HCC) (present on admission)  Assessment & Plan  Continue mesalamine.  Multiple abscesses could be extra intestinal manifestations of ulcerative colitis       Core Measures

## 2017-05-05 NOTE — DISCHARGE PLANNING
Met with patient to answer questions and provide progress report pertaining to his discharge plan. He anticipates discharge home hopefully Monday 5/8/2017.   I called [facility] pharmacy at [phone] and spoke with pharmacist re: the patient's Zyvox. Pharmacist states the medication requires prior auth and pharmacist will fax over forms necessary to pursue same to me and I will hand off to Bed Day staff.  Faxed [facility] completed forms including MD signature for home VAC for thigh wound to [facility].

## 2017-05-05 NOTE — PROGRESS NOTES
"Surgical Progress Note:      No acute events  Some pain overnight    PE:  /79 mmHg  Pulse 82  Temp(Src) 36.8 °C (98.2 °F)  Resp 16  Ht 1.74 m (5' 8.5\")  Wt 108.1 kg (238 lb 5.1 oz)  BMI 35.70 kg/m2  SpO2 94%    I/O:   Intake/Output Summary (Last 24 hours) at 05/05/17 1021  Last data filed at 05/05/17 0409   Gross per 24 hour   Intake   3017 ml   Output   2700 ml   Net    317 ml     UOP:  PO:  IV:    GEN: NAD  COR: RRR  PULM: CTA  ABD: soft, NTND, VAC in place  EXT: right thigh VAC in place, right ankle and left hand wounds clean, no surrounding erythema    Labs:  Recent Labs      05/03/17   0240  05/04/17   0423  05/05/17   0200   WBC  6.2  6.1  5.8   RBC  3.26*  3.44*  3.68*   HEMOGLOBIN  9.8*  10.3*  10.8*   HEMATOCRIT  29.1*  30.4*  32.3*   MCV  89.3  88.4  87.8   MCH  30.1  29.9  29.3   RDW  44.5  42.7  42.4   PLATELETCT  224  259  286   MPV  9.5  9.4  9.5   NEUTSPOLYS  73.70*  78.90*  74.10*   LYMPHOCYTES  16.70*  15.80*  16.40*   MONOCYTES  4.40  0.90  5.20   EOSINOPHILS  2.60  2.60  0.80   BASOPHILS  1.70  0.90  0.90   RBCMORPHOLO  Present  Present  Present     No results for input(s): SODIUM, POTASSIUM, CHLORIDE, CO2, GLUCOSE, BUN, CPKTOTAL in the last 72 hours.      A/P:   S/P I and D  dispo per primary team  Please call for questions     Esau Dooley M.D.  [facility]  [phone]      "

## 2017-05-05 NOTE — PROGRESS NOTES
Infectious Disease Progress Note    Author: Luma Aguirre M.D. Date of service & Time created: 2017  2:58 PM    Chief Complaint:  Chief Complaint   Patient presents with   • Fever   • Wound Infection       Interval History:  51-year-old white male with ulcerative colitis diabetes mellitus admitted with right thigh infection  2017- underwent I&D on 2017. Feels better. Fevers up to 102.7.   5/1- Tm 99.1, WBC 11.2.  R thigh pain 8/10, L hand pain 7/10.  Tolerating abx without issue.  5/2 Tm 99.2, no CBC.  Pain to R thigh, R ankle and L hand, ranging from 5-7/10.  Feels flushed with body aches.  Tolerating abx.   5/3 AF, WBC 6.2.  Continuous pain, worse with activity.  R thigh- 6/10.  R ankle- 3/10.  L hand- 4/10.  Tolerating abx, no SE.  5/4 AF, WBC 6.1.  Continuous pain: R thigh 5/10, R ankle 3/10, L hand 1/10 and Abd 3/10.  Denies SE with abx.  5/5 AF feeling better-improved ROM joints-denies SE abx  Labs Reviewed, Medications Reviewed, Radiology Reviewed and Wound Reviewed.    Review of Systems:  Review of Systems   Constitutional: Positive for diaphoresis. Negative for fever and chills.        Overnight, nothing this am.   Respiratory: Negative for cough and shortness of breath.    Cardiovascular: Negative for chest pain.   Gastrointestinal: Positive for abdominal pain. Negative for nausea and vomiting.        Soft stools- chronic with UC.  Minimal abd pain.   Genitourinary: Negative for dysuria.   Musculoskeletal: Positive for myalgias and joint pain.        Right thigh, Right medial ankle and Left hand.   Skin: Negative for rash.   Neurological: Negative for sensory change, speech change and headaches.       Hemodynamics:  Temp (24hrs), Av.6 °C (97.9 °F), Min:36.4 °C (97.6 °F), Max:36.8 °C (98.2 °F)  Temperature: 36.8 °C (98.2 °F)  Pulse  Av.3  Min: 57  Max: 123  Blood Pressure: 126/79 mmHg      Physical Exam:  Physical Exam   Constitutional: He is oriented to person, place, and time.  He appears well-developed and well-nourished. No distress.   Obese   HENT:   Head: Normocephalic and atraumatic.   Mouth/Throat: No oropharyngeal exudate.   Poor dentition- molar decay   Eyes: EOM are normal. Pupils are equal, round, and reactive to light.   Neck: Normal range of motion. Neck supple.   Cardiovascular: Normal rate, regular rhythm, normal heart sounds and intact distal pulses.    No murmur heard.  Pulmonary/Chest: Effort normal and breath sounds normal. No respiratory distress. He has no wheezes.   Abdominal: Soft. Bowel sounds are normal. He exhibits no distension. There is tenderness.   Wound VAC present, no surrounding erythema.   Musculoskeletal: He exhibits edema and tenderness.   Right thigh- elastic bandage with gauze.  Right medial ankle- gauze bandage.  Left hand- gauze bandage, greatly improved flexion of hand and fingers.  RUE PICC- non tender, no erythema.   Neurological: He is alert and oriented to person, place, and time.   Skin: There is erythema.   Nursing note and vitals reviewed.      Meds:    Current facility-administered medications:   •  morphine ER (MS CONTIN) tablet 30 mg, 30 mg, Oral, Q12HRS, Lala Rendon M.D., 30 mg at 05/05/17 1145  •  HYDROmorphone (DILAUDID) injection 1 mg, 1 mg, Intravenous, QDAY PRN, Lala Rendon M.D., Stopped at 05/05/17 0958  •  oxycodone immediate-release (ROXICODONE) tablet 5 mg, 5 mg, Oral, Q3HRS PRN, Lala Rendon M.D.  •  oxycodone immediate release (ROXICODONE) tablet 10 mg, 10 mg, Oral, Q3HRS PRN, Lala Rendon M.D., 10 mg at 05/05/17 0824  •  HYDROmorphone (DILAUDID) injection 0.5 mg, 0.5 mg, Intravenous, Q3HRS PRN, Lala Rendon M.D., 0.5 mg at 05/05/17 1404  •  metronidazole (FLAGYL) tablet 500 mg, 500 mg, Oral, Q8HRS, Jacey Barnhart PHARMD, 500 mg at 05/05/17 1404  •  fluconazole (DIFLUCAN) tablet 100 mg, 100 mg, Oral, DAILY, TENISHA ParadaRMynorN., 100 mg at 05/05/17 0805  •  PICC Line Insertion has been  implemented, , , Once **AND** May use Lidocaine 1% not to exceed 3 mls for local at insertion site, , , CONTINUOUS **AND** NOTIFY MD, , , Once **AND** Tip to dwell in the superior vena cava, , , CONTINUOUS **AND** Do not use PICC Line until placement verified by Chest X Ray, , , CONTINUOUS **AND** DX-CHEST-FOR PICC LINE Perform procedure in:: Patient's Room, , , Once **AND** If radiologist reading of chest X-ray states any of the following the PICC should be used, , , CONTINUOUS **AND** Further evaluation of the PICC placement can be retrieved from X-Ray and Imaging, , , CONTINUOUS **AND** Blood draws through PICC line; draws by RN only, , , CONTINUOUS **AND** FLUSHING GUIDELINES WHEN IN USE, , , CONTINUOUS **AND** normal saline PF 10-20 mL, 10-20 mL, Intravenous, PRN **AND** FLUSHING GUIDELINES WHEN NOT IN USE, , , CONTINUOUS **AND** DRESSING MAINTENANCE, , , Once **AND** Change needleless pressure ports and IV tubing every 72 hours per hospital policy, , , CONTINUOUS **AND** TUBING, , , CONTINUOUS **AND** If there is an MD order to remove the PICC line, any RN may remove the PICC line, , , CONTINUOUS **AND** [] PATIENT EDUCATION MATERIALS, , , Prior to discharge **AND** NURSING COMMUNICATION, , , CONTINUOUS, Oliva Ramirez, KEN.P.R.N.  •  acetaminophen (TYLENOL) tablet 650 mg, 650 mg, Oral, Q6HRS, Baldo Moncada M.D., 650 mg at 17 1145  •  NS infusion, , Intravenous, Continuous, Baldo Moncada M.D., Last Rate: 83 mL/hr at 17 0831  •  heparin injection 5,000 Units, 5,000 Units, Subcutaneous, Q8HRS, Baldo Moncada M.D., 5,000 Units at 17 1404  •  ceftaroline (TEFLARO) 600 mg in  mL IVPB, 600 mg, Intravenous, Q12HRS, Oliva Ramirez, KEN.P.R.N., Stopped at 17 0924  •  insulin regular (HUMULIN R) injection 3-15 Units, 3-15 Units, Subcutaneous, 4X/DAY Pradip DENNEY D.O., Stopped at 17 0700  •  insulin NPH (HUMULIN,NOVOLIN) injection 25 Units, 25 Units, Subcutaneous, BID  INSULIN, Pradip Scott D.O., 25 Units at 05/05/17 0651  •  buPROPion SR (WELLBUTRIN-SR) tablet 150 mg, 150 mg, Oral, BID, Pradip Scott D.O., 150 mg at 05/05/17 0824  •  mesalamine delayed-release (DELZICOL) capsule 800 mg, 800 mg, Oral, TID, Pradip Scott D.O., 800 mg at 05/05/17 1404  •  zolpidem (AMBIEN) tablet 5 mg, 5 mg, Oral, HS PRN - MR X 1, Pradip Scott D.O., 5 mg at 05/04/17 2136  •  senna-docusate (PERICOLACE or SENOKOT S) 8.6-50 MG per tablet 2 Tab, 2 Tab, Oral, BID, Stopped at 05/05/17 0900 **AND** polyethylene glycol/lytes (MIRALAX) PACKET 1 Packet, 1 Packet, Oral, QDAY PRN **AND** magnesium hydroxide (MILK OF MAGNESIA) suspension 30 mL, 30 mL, Oral, QDAY PRN **AND** bisacodyl (DULCOLAX) suppository 10 mg, 10 mg, Rectal, QDAY PRN, Christopher Gomez M.D.  •  Respiratory Care per Protocol, , Nebulization, Continuous RT, Christopher Gomez M.D.  •  ondansetron (ZOFRAN) syringe/vial injection 4 mg, 4 mg, Intravenous, Q4HRS PRN, Christopher Gomez M.D., 4 mg at 05/05/17 1005  •  ondansetron (ZOFRAN ODT) dispertab 4 mg, 4 mg, Oral, Q4HRS PRN, Christopher Gomez M.D.  •  promethazine (PHENERGAN) tablet 12.5-25 mg, 12.5-25 mg, Oral, Q4HRS PRN, Christopher Gomez M.D., 25 mg at 05/01/17 1151  •  promethazine (PHENERGAN) suppository 12.5-25 mg, 12.5-25 mg, Rectal, Q4HRS PRN, Christopher M Jason, M.D.  •  prochlorperazine (COMPAZINE) injection 5-10 mg, 5-10 mg, Intravenous, Q4HRS PRN, Christopher Gomez M.D.  •  thyroid (ARMOUR THYROID) tablet 75 mg, 75 mg, Oral, AM ES, Christopher Gomez M.D., 75 mg at 05/05/17 0644    Labs:  Recent Labs      05/03/17   0240  05/04/17   0423  05/05/17   0200   WBC  6.2  6.1  5.8   RBC  3.26*  3.44*  3.68*   HEMOGLOBIN  9.8*  10.3*  10.8*   HEMATOCRIT  29.1*  30.4*  32.3*   MCV  89.3  88.4  87.8   MCH  30.1  29.9  29.3   RDW  44.5  42.7  42.4   PLATELETCT  224  259  286   MPV  9.5  9.4  9.5   NEUTSPOLYS  73.70*  78.90*  74.10*   LYMPHOCYTES  16.70*  15.80*  16.40*   MONOCYTES  4.40  0.90  5.20   EOSINOPHILS   2.60  2.60  0.80   BASOPHILS  1.70  0.90  0.90   RBCMORPHOLO  Present  Present  Present     No results for input(s): SODIUM, POTASSIUM, CHLORIDE, CO2, GLUCOSE, BUN, CPKTOTAL in the last 72 hours.  No results for input(s): ALBUMIN, TBILIRUBIN, ALKPHOSPHAT, TOTPROTEIN, ALTSGPT, ASTSGOT, CREATININE in the last 72 hours.    Imaging:  TRANSESOPHAGEAL ECHO W/O CONT  5/3/2017   CONCLUSIONS  No vegetations noted, no evidence of endocarditis or abscess.    DX-CHEST-FOR PICC LINE   5/2/2017  Impression      1. Placement right PICC line projecting over the SVC with the tip in the mid to lower right atrium.  2.  Mild diffuse atelectasis/interstitial edema.  3.  Findings discussed with PICC team at 8:00 AM.     ECHOCARDIOGRAM COMP W/O CONT  05/01/2017   CONCLUSIONS  Normal left ventricular chamber size.  Normal left ventricular systolic function.  Left ventricular ejection fraction is visually estimated to be 65%.  Normal regional wall motion.  Mild mitral regurgitation.  The left atrium is normal in size.  Structurally normal aortic valve without significant stenosis or   regurgitation.  Right ventricular systolic pressure is estimated to be 50 mmHg.  Mild tricuspid regurgitation.  Normal inferior vena cava size and inspiratory collapse.  Mildly dilated right ventricle.  Normal right ventricular systolic function.    Micro:    Results     Procedure Component Value Units Date/Time    CULTURE WOUND W/ GRAM STAIN [[id number]]  (Abnormal)  (Susceptibility) Collected:  04/29/17 6862    Order Status:  Completed Specimen Information:  Wound Updated:  05/03/17 1034     Significant Indicator POS (POS)      Source WND      Site Right Thigh Abscess      Culture Result Wound -- (A)      Result:        Heavy growth Mixed skin yeesnia predominantly Viridans  Streptococcus.       Gram Stain Result --      Result:        Many WBCs.  Many mixed bacteria, no predominant organism seen.       Culture Result Wound -- (A)      Result:        Klebsiella  pneumoniae  Heavy growth       Culture Result Wound -- (A)      Result:        Methicillin Resistant Staphylococcus aureus  Heavy growth      Narrative:      CALL  Alvarez  141 tel. [phone],  CALLED  141 tel. [phone] 05/01/2017, 10:43, RB PERF. RESULTS CALLED  TO:[id number](MRSA) and faxed to [facility]    Culture & Susceptibility     KLEBSIELLA PNEUMONIAE     Antibiotic Sensitivity Microscan Unit Status    Ampicillin Resistant >16 mcg/mL Final    Cefepime Sensitive <=8 mcg/mL Final    Cefotaxime Sensitive <=2 mcg/mL Final    Cefotetan Sensitive <=16 mcg/mL Final    Ceftazidime Sensitive <=1 mcg/mL Final    Ceftriaxone Sensitive <=8 mcg/mL Final    Cefuroxime Sensitive <=4 mcg/mL Final    Ciprofloxacin Sensitive <=1 mcg/mL Final    Ertapenem Sensitive <=1 mcg/mL Final    Gentamicin Sensitive <=4 mcg/mL Final    Pip/Tazobactam Sensitive <=16 mcg/mL Final    Tigecycline Sensitive <=2 mcg/mL Final    Tobramycin Sensitive <=4 mcg/mL Final    Trimeth/Sulfa Sensitive <=2/38 mcg/mL Final              METHICILLIN RESISTANT STAPHYLOCOCCUS AUREUS     Antibiotic Sensitivity Microscan Unit Status    Ampicillin/sulbactam Resistant >16/8 mcg/mL Final    Clindamycin Sensitive <=0.5 mcg/mL Final    Daptomycin Sensitive 1 mcg/mL Final    Erythromycin Resistant >4 mcg/mL Final    Moxifloxacin Sensitive <=0.5 mcg/mL Final    Oxacillin Resistant >2 mcg/mL Final    Penicillin Resistant >8 mcg/mL Final    Tetracycline Sensitive <=4 mcg/mL Final    Trimeth/Sulfa Sensitive <=0.5/9.5 mcg/mL Final    Vancomycin Sensitive 2 mcg/mL Final                       ANAEROBIC CULTURE [[id number]]  (Abnormal) Collected:  04/29/17 0355    Order Status:  Completed Specimen Information:  Wound Updated:  05/03/17 1034     Significant Indicator POS (POS)      Source WND      Site Right Thigh Abscess      Anaerobic Culture, Culture Res -- (A)      Result:        Growth noted after further incubation,see below for  organism identification.       Anaerobic Culture,  "Culture Res -- (A)      Result:        Bacteroides fragilis Group  Heavy growth      Narrative:      CALL  Alvarez  141 tel. [phone],  CALLED  141 tel. [phone] 05/01/2017, 10:43, RB PERF. RESULTS CALLED  TO:[id number](MRSA) and faxed to [facility]    FUNGAL CULTURE [[id number]]  (Abnormal) Collected:  04/29/17 1855    Order Status:  Completed Specimen Information:  Wound Updated:  05/03/17 1034     Significant Indicator POS (POS)      Source WND      Site Right Thigh Abscess      Fungal Culture -- (A)      Result:        Growth noted after further incubation,see below for  organism identification.       Fungal Culture -- (A)      Result:        Yeast  Light growth      Narrative:      CALL  Alvarez  141 tel. [phone],  CALLED  141 tel. [phone] 05/01/2017, 10:43, RB PERF. RESULTS CALLED  TO:[id number](MRSA) and faxed to [facility]    BLOOD CULTURE [[id number]] Collected:  05/02/17 1335    Order Status:  Completed Specimen Information:  Blood from Peripheral Updated:  05/03/17 0643     Significant Indicator NEG      Source BLD      Site PERIPHERAL      Blood Culture --      Result:        No Growth    Note: Blood cultures are incubated for 5 days and  are monitored continuously.Positive blood cultures  are called to the RN and reported as soon as  they are identified.      Narrative:      Contact  Per Hospital Policy: Only change Specimen Src: to \"Line\" if  specified by physician order.    BLOOD CULTURE [[id number]] Collected:  05/02/17 1335    Order Status:  Completed Specimen Information:  Blood from Peripheral Updated:  05/03/17 0643     Significant Indicator NEG      Source BLD      Site PERIPHERAL      Blood Culture --      Result:        No Growth    Note: Blood cultures are incubated for 5 days and  are monitored continuously.Positive blood cultures  are called to the RN and reported as soon as  they are identified.      Narrative:      Contact  Per Hospital Policy: Only change Specimen Src: to \"Line\" if  specified by " "physician order.    BLOOD CULTURE [[id number]]  (Abnormal) Collected:  04/29/17 1100    Order Status:  Completed Specimen Information:  Blood from Peripheral Updated:  05/01/17 1850     Significant Indicator POS (POS)      Source BLD      Site PERIPHERAL      Blood Culture        Result:        Growth detected by Bactec instrument.  04/30/2017  14:03 (A)     Blood Culture Bacteroides vulgatus (A)     Narrative:      CALL  Alvarez  19 tel. [phone],  CALLED  19 tel. [phone] 04/30/2017, 14:05, RB PERF. RESULTS CALLED  TO:Chidi [id number]  Per Hospital Policy: Only change Specimen Src: to \"Line\" if  specified by physician order.    BLOOD CULTURE [[id number]]  (Abnormal) Collected:  04/29/17 1038    Order Status:  Completed Specimen Information:  Blood from Peripheral Updated:  05/01/17 1849     Significant Indicator POS (POS)      Source BLD      Site PERIPHERAL      Blood Culture        Result:        Growth detected by Bactec instrument.  04/30/2017  14:03 (A)     Blood Culture Bacteroides vulgatus (A)     Narrative:      CALL  Alvarez  19 tel. [phone],  CALLED  19 tel. [phone] 04/30/2017, 14:06, RB PERF. RESULTS CALLED  TO:[id number]  Per Hospital Policy: Only change Specimen Src: to \"Line\" if  specified by physician order.    URINE CULTURE(NEW) [[id number]] Collected:  04/29/17 1146    Order Status:  Completed Specimen Information:  Urine Updated:  05/01/17 0814     Significant Indicator NEG      Source UR      Site --      Urine Culture No growth at 48 hours     Narrative:      Indication for culture:->Emergency Room Patient    GRAM STAIN [[id number]] Collected:  04/29/17 1855    Order Status:  Completed Specimen Information:  Wound Updated:  04/29/17 2156     Significant Indicator .      Source WND      Site Right Thigh Abscess      Gram Stain Result --      Result:        Many WBCs.  Many mixed bacteria, no predominant organism seen.      URINALYSIS [[id number]]  (Abnormal) Collected:  04/29/17 1146    Order " Status:  Completed Specimen Information:  Urine Updated:  04/29/17 1208     Micro Urine Req Microscopic      Color Lt. Yellow      Character Clear      Specific Gravity 1.024      Ph 6.5      Glucose >1000 (A) mg/dL      Ketones 20 (A) mg/dL      Protein Negative mg/dL      Bilirubin Negative      Nitrite Negative      Leukocyte Esterase Negative      Occult Blood Small (A)     Narrative:      Indication for culture:->Emergency Room Patient             Assessment:  Active Hospital Problems    Diagnosis   • *Severe sepsis (CMS-HCC) [A41.9, R65.20]   • Type 2 diabetes mellitus (CMS-HCC) [E11.9]   • Chronic ulcerative colitis (CMS-HCC) [K51.90]   • Complicated wound infection (CMS-HCC) [T79.8XXA]   • Thrombocytopenia (CMS-HCC) [D69.6]   • Hypothyroid [E03.9]       Plan:  Right thigh abscess with septic shock-  S/p I&D on 4/29/2017 with Dr. Reyes  OR cx +MRSA, Kleb pneumoniae, Strep Viridans, B fragilis & Yeast  TTE on 5/1 negative-NAMRATA on 5/3 negative   Continue IV Ceftaroline and IV Flagyl  Continue PO Diflucan for yeast  Will plan for 2 weeks IV from today, end date 5/18/17.  Orders for [facility] faxed to SW on 5/4.  Plan for IV Ertapenem 1 g daily, in addition to PO Linezolid and Fluconazole.  Pt will need dose of Ertapenem prior to discharge.  SW will work on pre auth for Linezolid.  Agrees no more testosterone injections    Right medial ankle abscess, improved  S/p I&D on 5/1 with Dr. Reyes    Left hand abscess, improved  S/p I&D on 5/1 with Dr. Reyes    Bacteroides fragilis septic shock, improved  Bcx 4/29 +Bacteroides vulgatus  IV Flagyl as above.  Repeat Bcx x2 on 5/2- NGTD  Covered by above abx    Postoperative wound infection of umbilical hernia site  Improving, WV in place    Ulcerative colitis  Diarrhea controlled  Per GI-current meds not contributing to infection    Diabetes mellitus  Keep blood sugars <150, elevated BS will impair wound healing and worsen infection.    Discussed with Hospitalist-   [facility]

## 2017-05-05 NOTE — DISCHARGE PLANNING
"Received a message back from Arcelia at [facility] stating that patient's current home wound vac for abdominal wound has been in place since 4/13. She instructs me to include on the prescription for the new home wound vac for patient's thigh wound, \"new wound added to rental order # [id number] which I did do and refaxed to [facility]. [facility] has already received wound measurements, updated, for abdominal wound as well as thigh wound. This should serve as a renewed RX for abdominal wound VAC.   "

## 2017-05-05 NOTE — CARE PLAN
Problem: Safety  Goal: Will remain free from injury  Bed locked in lowest position, call light within reach, tread socks on patient, patient calls out appropriately    Problem: Infection  Goal: Will remain free from infection  Monitor for s/sx of infection

## 2017-05-05 NOTE — DISCHARGE PLANNING
Maddy called regarding prior auth for Linezolid 600mg  Tab , one tab twice a day for 14 days.  [facility] have received the forms and information they needed after I called [phone].  They will call back with PA# after their review.  Message left on [email or web address] phone at [phone].

## 2017-05-05 NOTE — DISCHARGE PLANNING
Received word back from Zina at [facility] who states she contacted [facility], patient's insurance co. And was informed that the pharmacy had submitted the prior auth paper work directly to [facility] and that [facility] is working on auth. Zina will place outcome in DC planning notes.

## 2017-05-06 LAB
ANISOCYTOSIS BLD QL SMEAR: ABNORMAL
BASOPHILS # BLD AUTO: 0.9 % (ref 0–1.8)
BASOPHILS # BLD: 0.06 K/UL (ref 0–0.12)
EOSINOPHIL # BLD AUTO: 0.06 K/UL (ref 0–0.51)
EOSINOPHIL NFR BLD: 0.9 % (ref 0–6.9)
ERYTHROCYTE [DISTWIDTH] IN BLOOD BY AUTOMATED COUNT: 42.3 FL (ref 35.9–50)
GLUCOSE BLD-MCNC: 107 MG/DL (ref 65–99)
GLUCOSE BLD-MCNC: 109 MG/DL (ref 65–99)
GLUCOSE BLD-MCNC: 118 MG/DL (ref 65–99)
GLUCOSE BLD-MCNC: 121 MG/DL (ref 65–99)
HCT VFR BLD AUTO: 32.1 % (ref 42–52)
HGB BLD-MCNC: 10.7 G/DL (ref 14–18)
LYMPHOCYTES # BLD AUTO: 1.6 K/UL (ref 1–4.8)
LYMPHOCYTES NFR BLD: 22.5 % (ref 22–41)
MACROCYTES BLD QL SMEAR: ABNORMAL
MANUAL DIFF BLD: NORMAL
MCH RBC QN AUTO: 29.6 PG (ref 27–33)
MCHC RBC AUTO-ENTMCNC: 33.3 G/DL (ref 33.7–35.3)
MCV RBC AUTO: 88.7 FL (ref 81.4–97.8)
METAMYELOCYTES NFR BLD MANUAL: 0.9 %
MICROCYTES BLD QL SMEAR: ABNORMAL
MONOCYTES # BLD AUTO: 0.19 K/UL (ref 0–0.85)
MONOCYTES NFR BLD AUTO: 2.7 % (ref 0–13.4)
MORPHOLOGY BLD-IMP: NORMAL
NEUTROPHILS # BLD AUTO: 5.06 K/UL (ref 1.82–7.42)
NEUTROPHILS NFR BLD: 70.3 % (ref 44–72)
NEUTS BAND NFR BLD MANUAL: 0.9 % (ref 0–10)
NRBC # BLD AUTO: 0 K/UL
NRBC BLD AUTO-RTO: 0 /100 WBC
PLATELET # BLD AUTO: 291 K/UL (ref 164–446)
PLATELET BLD QL SMEAR: NORMAL
PMV BLD AUTO: 9.7 FL (ref 9–12.9)
PROMYELOCYTES NFR BLD MANUAL: 0.9 %
RBC # BLD AUTO: 3.62 M/UL (ref 4.7–6.1)
RBC BLD AUTO: PRESENT
SMUDGE CELLS BLD QL SMEAR: NORMAL
WBC # BLD AUTO: 7.1 K/UL (ref 4.8–10.8)

## 2017-05-06 PROCEDURE — 700102 HCHG RX REV CODE 250 W/ 637 OVERRIDE(OP): Performed by: HOSPITALIST

## 2017-05-06 PROCEDURE — 85007 BL SMEAR W/DIFF WBC COUNT: CPT

## 2017-05-06 PROCEDURE — 700111 HCHG RX REV CODE 636 W/ 250 OVERRIDE (IP): Performed by: HOSPITALIST

## 2017-05-06 PROCEDURE — 99232 SBSQ HOSP IP/OBS MODERATE 35: CPT | Performed by: HOSPITALIST

## 2017-05-06 PROCEDURE — A9270 NON-COVERED ITEM OR SERVICE: HCPCS | Performed by: HOSPITALIST

## 2017-05-06 PROCEDURE — A9270 NON-COVERED ITEM OR SERVICE: HCPCS | Performed by: NURSE PRACTITIONER

## 2017-05-06 PROCEDURE — 700111 HCHG RX REV CODE 636 W/ 250 OVERRIDE (IP): Performed by: NURSE PRACTITIONER

## 2017-05-06 PROCEDURE — 770021 HCHG ROOM/CARE - ISO PRIVATE

## 2017-05-06 PROCEDURE — 700102 HCHG RX REV CODE 250 W/ 637 OVERRIDE(OP): Performed by: PHARMACIST

## 2017-05-06 PROCEDURE — 85027 COMPLETE CBC AUTOMATED: CPT

## 2017-05-06 PROCEDURE — 700105 HCHG RX REV CODE 258: Performed by: NURSE PRACTITIONER

## 2017-05-06 PROCEDURE — 82962 GLUCOSE BLOOD TEST: CPT

## 2017-05-06 PROCEDURE — A9270 NON-COVERED ITEM OR SERVICE: HCPCS | Performed by: PHARMACIST

## 2017-05-06 PROCEDURE — 700105 HCHG RX REV CODE 258: Performed by: HOSPITALIST

## 2017-05-06 PROCEDURE — 700102 HCHG RX REV CODE 250 W/ 637 OVERRIDE(OP): Performed by: NURSE PRACTITIONER

## 2017-05-06 PROCEDURE — 97606 NEG PRS WND THER DME>50 SQCM: CPT

## 2017-05-06 RX ADMIN — METRONIDAZOLE 500 MG: 500 TABLET ORAL at 06:36

## 2017-05-06 RX ADMIN — MESALAMINE 800 MG: 400 CAPSULE, DELAYED RELEASE ORAL at 10:19

## 2017-05-06 RX ADMIN — HYDROMORPHONE HYDROCHLORIDE 1 MG: 1 INJECTION, SOLUTION INTRAMUSCULAR; INTRAVENOUS; SUBCUTANEOUS at 11:21

## 2017-05-06 RX ADMIN — LEVOTHYROXINE, LIOTHYRONINE 75 MG: 19; 4.5 TABLET ORAL at 06:35

## 2017-05-06 RX ADMIN — SODIUM CHLORIDE: 900 INJECTION INTRAVENOUS at 21:40

## 2017-05-06 RX ADMIN — MESALAMINE 800 MG: 400 CAPSULE, DELAYED RELEASE ORAL at 16:35

## 2017-05-06 RX ADMIN — MORPHINE SULFATE 30 MG: 15 TABLET, EXTENDED RELEASE ORAL at 21:18

## 2017-05-06 RX ADMIN — OXYCODONE HYDROCHLORIDE 10 MG: 10 TABLET ORAL at 06:35

## 2017-05-06 RX ADMIN — HEPARIN SODIUM 5000 UNITS: 5000 INJECTION, SOLUTION INTRAVENOUS; SUBCUTANEOUS at 06:36

## 2017-05-06 RX ADMIN — OXYCODONE HYDROCHLORIDE 10 MG: 10 TABLET ORAL at 14:28

## 2017-05-06 RX ADMIN — HEPARIN SODIUM 5000 UNITS: 5000 INJECTION, SOLUTION INTRAVENOUS; SUBCUTANEOUS at 14:28

## 2017-05-06 RX ADMIN — ACETAMINOPHEN 650 MG: 325 TABLET, FILM COATED ORAL at 06:36

## 2017-05-06 RX ADMIN — ACETAMINOPHEN 650 MG: 325 TABLET, FILM COATED ORAL at 00:05

## 2017-05-06 RX ADMIN — HYDROMORPHONE HYDROCHLORIDE 0.5 MG: 1 INJECTION, SOLUTION INTRAMUSCULAR; INTRAVENOUS; SUBCUTANEOUS at 16:35

## 2017-05-06 RX ADMIN — MESALAMINE 800 MG: 400 CAPSULE, DELAYED RELEASE ORAL at 21:48

## 2017-05-06 RX ADMIN — CEFTAROLINE FOSAMIL 600 MG: 600 POWDER, FOR SOLUTION INTRAVENOUS at 21:47

## 2017-05-06 RX ADMIN — METRONIDAZOLE 500 MG: 500 TABLET ORAL at 21:18

## 2017-05-06 RX ADMIN — MORPHINE SULFATE 30 MG: 15 TABLET, EXTENDED RELEASE ORAL at 10:19

## 2017-05-06 RX ADMIN — HYDROMORPHONE HYDROCHLORIDE 0.5 MG: 1 INJECTION, SOLUTION INTRAMUSCULAR; INTRAVENOUS; SUBCUTANEOUS at 21:39

## 2017-05-06 RX ADMIN — CEFTAROLINE FOSAMIL 600 MG: 600 POWDER, FOR SOLUTION INTRAVENOUS at 10:19

## 2017-05-06 RX ADMIN — HEPARIN SODIUM 5000 UNITS: 5000 INJECTION, SOLUTION INTRAVENOUS; SUBCUTANEOUS at 21:19

## 2017-05-06 RX ADMIN — INSULIN HUMAN 25 UNITS: 100 INJECTION, SUSPENSION SUBCUTANEOUS at 06:36

## 2017-05-06 RX ADMIN — METRONIDAZOLE 500 MG: 500 TABLET ORAL at 14:28

## 2017-05-06 RX ADMIN — OXYCODONE HYDROCHLORIDE 10 MG: 10 TABLET ORAL at 10:19

## 2017-05-06 RX ADMIN — ACETAMINOPHEN 650 MG: 325 TABLET, FILM COATED ORAL at 17:30

## 2017-05-06 RX ADMIN — FLUCONAZOLE 100 MG: 200 TABLET ORAL at 10:19

## 2017-05-06 RX ADMIN — ZOLPIDEM TARTRATE 5 MG: 5 TABLET, FILM COATED ORAL at 21:18

## 2017-05-06 RX ADMIN — BUPROPION HYDROCHLORIDE 150 MG: 150 TABLET, FILM COATED, EXTENDED RELEASE ORAL at 21:17

## 2017-05-06 RX ADMIN — INSULIN HUMAN 25 UNITS: 100 INJECTION, SUSPENSION SUBCUTANEOUS at 16:41

## 2017-05-06 RX ADMIN — ACETAMINOPHEN 650 MG: 325 TABLET, FILM COATED ORAL at 11:25

## 2017-05-06 RX ADMIN — HYDROMORPHONE HYDROCHLORIDE 0.5 MG: 1 INJECTION, SOLUTION INTRAMUSCULAR; INTRAVENOUS; SUBCUTANEOUS at 11:53

## 2017-05-06 RX ADMIN — OXYCODONE HYDROCHLORIDE 10 MG: 10 TABLET ORAL at 21:19

## 2017-05-06 RX ADMIN — SODIUM CHLORIDE: 900 INJECTION INTRAVENOUS at 10:23

## 2017-05-06 RX ADMIN — OXYCODONE HYDROCHLORIDE 10 MG: 10 TABLET ORAL at 00:05

## 2017-05-06 RX ADMIN — BUPROPION HYDROCHLORIDE 150 MG: 150 TABLET, FILM COATED, EXTENDED RELEASE ORAL at 10:19

## 2017-05-06 ASSESSMENT — PAIN SCALES - GENERAL
PAINLEVEL_OUTOF10: 7
PAINLEVEL_OUTOF10: 5
PAINLEVEL_OUTOF10: 7
PAINLEVEL_OUTOF10: 4
PAINLEVEL_OUTOF10: 5
PAINLEVEL_OUTOF10: 5
PAINLEVEL_OUTOF10: 9

## 2017-05-06 ASSESSMENT — ENCOUNTER SYMPTOMS
NAUSEA: 1
VOMITING: 0
DIAPHORESIS: 0
NAUSEA: 0
SHORTNESS OF BREATH: 0
COUGH: 0
SENSORY CHANGE: 0
SPEECH CHANGE: 0
FEVER: 0
CHILLS: 0
MYALGIAS: 1
ABDOMINAL PAIN: 0
HEADACHES: 0

## 2017-05-06 NOTE — CARE PLAN
Problem: Safety  Goal: Will remain free from falls  Intervention: Implement fall precautions  Patient refusing bed alarm despite previous fall. IS calling appropriately

## 2017-05-06 NOTE — CARE PLAN
Problem: Discharge Barriers/Planning  Goal: Patient’s continuum of care needs will be met  Intervention: Assess potential discharge barriers on admission and throughout hospital stay  Wound care, IV antibiotics

## 2017-05-06 NOTE — PROGRESS NOTES
Hospital Medicine Interval Note  Date of Service:  5/6/2017    Chief Complaint  51 y.o.-year-old male admitted 4/29/2017 with multiple recurrent soft tissue abscesses and poorly controlled diabetes. Latest abscess at site of monthly testosterone injection site.    Interval Problem Update  Pain control improving with MS Contin      Consultants/Specialty  ID  Surgery    Disposition  Home with infusion center for antibiotics, discussed with   ID writing antbiotic orders     Review of Systems   Constitutional: Positive for malaise/fatigue. Negative for fever and chills.   Respiratory: Negative for cough and shortness of breath.    Cardiovascular: Negative for chest pain.   Gastrointestinal: Positive for nausea. Negative for vomiting and abdominal pain.        Multiple soft stools- chronic with UC.   Genitourinary: Negative for dysuria.   Musculoskeletal: Positive for myalgias and joint pain.        Abd, Right thigh, Right medial ankle and Left hand.   Skin: Negative for rash.   Neurological: Negative for sensory change, speech change and headaches.      Physical Exam Laboratory/Imaging   Filed Vitals:    05/05/17 1505 05/05/17 1947 05/06/17 0332 05/06/17 0713   BP: 132/64 125/77 107/67 136/76   Pulse: 82 86 91 76   Temp: 37 °C (98.6 °F) 36.7 °C (98.1 °F) 36.1 °C (97 °F) 36.4 °C (97.6 °F)   Resp: 16 18 16 18   Height:       Weight:       SpO2: 96% 97% 92% 95%     Physical Exam   Constitutional: He is oriented to person, place, and time. He appears well-developed and well-nourished. No distress.   HENT:   Nose: Nose normal.   Mouth/Throat: Oropharynx is clear and moist.   Eyes: Conjunctivae and EOM are normal. Right eye exhibits no discharge. Left eye exhibits no discharge. No scleral icterus.   Neck: No tracheal deviation present.   Cardiovascular: Normal rate and regular rhythm.    No murmur heard.  Pulmonary/Chest: Effort normal and breath sounds normal. No stridor. No respiratory distress. He has no  wheezes.   Abdominal: Soft. Bowel sounds are normal. He exhibits no distension. There is no tenderness.   Musculoskeletal: He exhibits no edema.   Right anterior lateral thigh with wound vac  Left hand clean and dry dressing  Right thumb cellulitis improved   Neurological: He is alert and oriented to person, place, and time. No cranial nerve deficit.   Skin: Skin is warm. He is not diaphoretic.   Psychiatric: He has a normal mood and affect. His behavior is normal. Thought content normal.   Nursing note and vitals reviewed.   Lab Results   Component Value Date/Time    WBC 7.1 05/06/2017 12:10 AM    HEMOGLOBIN 10.7* 05/06/2017 12:10 AM    HEMATOCRIT 32.1* 05/06/2017 12:10 AM    PLATELET COUNT 291 05/06/2017 12:10 AM     Lab Results   Component Value Date/Time    SODIUM 138 05/01/2017 01:36 AM    POTASSIUM 4.2 05/01/2017 01:36 AM    CHLORIDE 107 05/01/2017 01:36 AM    CO2 20 05/01/2017 01:36 AM    GLUCOSE 242* 05/01/2017 01:36 AM    BUN 21 05/01/2017 01:36 AM    CREATININE 0.80 05/01/2017 01:36 AM      Assessment/Plan    Hypothyroid (present on admission)  Assessment & Plan  Continue home dose of armour thyroid    Complicated wound infection (CMS-HCC) (present on admission)  Assessment & Plan  Multiple wounds with polymicrobial infections.  Continue ceftaroline, flagyl and fluconazole    Bacteremia due to Gram-negative bacteria  Assessment & Plan  Due to polymicrobial infections of the skin and soft tissue.  Discussed with ID  NAMRATA negative  Right thigh wound vac remains very painful, dilaudid numbs the pain but frequently needing prn medication, continue trial of MScontin 30mg q 12, pain is better controlled, no change in medication today discussed with patient will continue current regimen    Abscess of right thigh  Assessment & Plan  Recommend patient discontinue testosterone injections  Topical gel alternative, prescribe at discharge patient agreeable    Septic shock (CMS-HCC)  Assessment & Plan  Resolved.    DM  (diabetes mellitus), type 2, uncontrolled (CMS-[facility])  Assessment & Plan  Continue sliding scale insulin  Diabetes education has worked with patient in the past and provided education on multiple occasions, his A1c has been improving.    Chronic ulcerative colitis (CMS-HCC) (present on admission)  Assessment & Plan  Continue mesalamine.  Multiple abscesses could be extra intestinal manifestations of ulcerative colitis       Core Measures

## 2017-05-06 NOTE — PROGRESS NOTES
"Patient pain controlled with PO and IV medication. Patient still having significant pain with wound vac changes, wound care RN will discuss with MD. Patient ambulating with standby assist. Refusing bed alarm despite education. All dressing were change last night by RN, ABRAM. Wound vac's changed today. MD orders reviewed, all questions answered. /76 mmHg  Pulse 76  Temp(Src) 36.4 °C (97.6 °F)  Resp 18  Ht 1.74 m (5' 8.5\")  Wt 108.1 kg (238 lb 5.1 oz)  BMI 35.70 kg/m2  SpO2 95%    "

## 2017-05-06 NOTE — CARE PLAN
Problem: Infection  Goal: Will remain free from infection  Outcome: PROGRESSING AS EXPECTED  Precautions in place for MRSA, proper hand hygeine performed before, during, and after patient care    Problem: Pain Management  Goal: Pain level will decrease to patient’s comfort goal  Outcome: PROGRESSING AS EXPECTED  Pain managed with PRN pain meds, encouraged to transition to oral Oxy for greater sustainability

## 2017-05-06 NOTE — PROGRESS NOTES
AxO x4, VSS, medicated for 7/10 pain. Tolerating a diabetic diet, denies n/v. Wound vac to abd and R inner thigh functioning properly. Kerlix noted around L wrist and R ankle. Voiding adequately, +BS, last BM today, refused stool softeners. All needs met at this time, call light in reach.     Patient refused a bed alarm despite education, patient calls appropriately.

## 2017-05-06 NOTE — WOUND TEAM
"[facility]  Inpatient Services  Wound and Skin Care Progress Note    HPI, PMH, SH: Reviewed  Unit where seen by Wound Team: [id number]    WOUND TEAM FOLLOW UP: Scheduled NPWT dressing change.    SUBJECTIVE:  \"I'm doing ok.\"    Self Report / Pain Level: 9/10. When removing dressing to right thigh and when suction obtained. Patient premedicated with everything he has for pain by JAZMINE Becker.    OBJECTIVE: Pleasant and engaging.    WOUND TYPE, LOCATION, CHARACTERISTICS:    Wound POA Open Surgical (Complicated) Umbilicus Medial  Periwound Skin: Intact  Drainage : None     Tissue Type and %:    90% Red/pink, 10% pale.  Wound Edges:    open    Odor:     None   Exposed structure(s):   suture   Signs and Symptoms of Infection: none    Measurements : taken 5/1/17  Length (cm): 0.8  Width (cm): 2.8  Depth (cm): 1.4      Tracts/undermining:   None     Wound POA Open Surgical (Complicated) Right upper thigh, lateral.  Periwound Skin: Edematous, indurated.  Drainage :     Tissue Type and %:    100% Red/pink.  Wound Edges:    Attached.   Odor:     None   Exposed structure(s):   Fascia, muscle.  Signs and Symptoms of Infection: Induration.    Measurements : Taken 05/04/2017  Length (cm): 17 cm  Width (cm): 4.8 cm  Depth (cm): 2.7 cm      Tracts/undermining:   None    INTERVENTIONS BY WOUND TEAM: Umbilicus: removed drsg 1 piece of black with a button.  cleaned wound with wound cleanser. 1 piece of black foam into wound bed. Sealed and then button applied and then trac pad. NPWT resumed  mmHg. Anchored tubing to abd to try to decrease dislodgement. 2 PIECES OF BLACK FOAM.  Right lateral upper thigh: Removed wet to dry dressing. Cleaned wound with wound cleanser, blotted with gauze. Photo and measurements taken. Benzoin and drape to periwound. Wound filled with 1 piece of black foam, draped, hole cut for trac pad, placed trac pad. 1 PIECE OF BLACK FOAM. Suction obtained at 125 mmHg continuous.  Dressing Options: Wound " Vac    Interdisciplinary consultation: Rosita RN, patient.     EVALUATION AND PROGRESS OF WOUND(S): Both wounds clean and will benefit from NPWT.       Factors affecting wound healing: infection/sepsis, abscesses, obesity, DM.     Goals: decreased wound size 1% each week    NURSING PLAN OF CARE:    Dressing changes: Continue previous Dressing Maintenance orders: x       See new Dressing Maintenance orders:       Skin care: See Skin Care orders:        Rectal tube care: See Rectal Tube Care orders:      Other orders:           WOUND TEAM PLAN OF CARE (X):   NPWT change 3 x week:  x      Dressing changes:     x  Follow up as needed:       Other:

## 2017-05-07 LAB
BACTERIA BLD CULT: NORMAL
BASOPHILS # BLD AUTO: 0.4 % (ref 0–1.8)
BASOPHILS # BLD: 0.03 K/UL (ref 0–0.12)
EOSINOPHIL # BLD AUTO: 0.1 K/UL (ref 0–0.51)
EOSINOPHIL NFR BLD: 1.3 % (ref 0–6.9)
ERYTHROCYTE [DISTWIDTH] IN BLOOD BY AUTOMATED COUNT: 43.9 FL (ref 35.9–50)
GLUCOSE BLD-MCNC: 110 MG/DL (ref 65–99)
GLUCOSE BLD-MCNC: 126 MG/DL (ref 65–99)
GLUCOSE BLD-MCNC: 90 MG/DL (ref 65–99)
GLUCOSE BLD-MCNC: 97 MG/DL (ref 65–99)
HCT VFR BLD AUTO: 32.5 % (ref 42–52)
HGB BLD-MCNC: 10.9 G/DL (ref 14–18)
IMM GRANULOCYTES # BLD AUTO: 0.34 K/UL (ref 0–0.11)
IMM GRANULOCYTES NFR BLD AUTO: 4.5 % (ref 0–0.9)
LYMPHOCYTES # BLD AUTO: 1.45 K/UL (ref 1–4.8)
LYMPHOCYTES NFR BLD: 19.2 % (ref 22–41)
MCH RBC QN AUTO: 29.8 PG (ref 27–33)
MCHC RBC AUTO-ENTMCNC: 33.5 G/DL (ref 33.7–35.3)
MCV RBC AUTO: 88.8 FL (ref 81.4–97.8)
MONOCYTES # BLD AUTO: 0.37 K/UL (ref 0–0.85)
MONOCYTES NFR BLD AUTO: 4.9 % (ref 0–13.4)
NEUTROPHILS # BLD AUTO: 5.25 K/UL (ref 1.82–7.42)
NEUTROPHILS NFR BLD: 69.7 % (ref 44–72)
NRBC # BLD AUTO: 0 K/UL
NRBC BLD AUTO-RTO: 0 /100 WBC
PLATELET # BLD AUTO: 295 K/UL (ref 164–446)
PMV BLD AUTO: 9.2 FL (ref 9–12.9)
RBC # BLD AUTO: 3.66 M/UL (ref 4.7–6.1)
SIGNIFICANT IND 70042: NORMAL
SIGNIFICANT IND 70042: NORMAL
SITE SITE: NORMAL
SITE SITE: NORMAL
SOURCE SOURCE: NORMAL
SOURCE SOURCE: NORMAL
WBC # BLD AUTO: 7.5 K/UL (ref 4.8–10.8)

## 2017-05-07 PROCEDURE — 85025 COMPLETE CBC W/AUTO DIFF WBC: CPT

## 2017-05-07 PROCEDURE — 700102 HCHG RX REV CODE 250 W/ 637 OVERRIDE(OP): Performed by: HOSPITALIST

## 2017-05-07 PROCEDURE — 770021 HCHG ROOM/CARE - ISO PRIVATE

## 2017-05-07 PROCEDURE — 700111 HCHG RX REV CODE 636 W/ 250 OVERRIDE (IP): Performed by: NURSE PRACTITIONER

## 2017-05-07 PROCEDURE — A9270 NON-COVERED ITEM OR SERVICE: HCPCS | Performed by: NURSE PRACTITIONER

## 2017-05-07 PROCEDURE — 700111 HCHG RX REV CODE 636 W/ 250 OVERRIDE (IP): Performed by: HOSPITALIST

## 2017-05-07 PROCEDURE — A9270 NON-COVERED ITEM OR SERVICE: HCPCS | Performed by: HOSPITALIST

## 2017-05-07 PROCEDURE — 82962 GLUCOSE BLOOD TEST: CPT | Mod: 91

## 2017-05-07 PROCEDURE — 700105 HCHG RX REV CODE 258: Performed by: NURSE PRACTITIONER

## 2017-05-07 PROCEDURE — 700105 HCHG RX REV CODE 258: Performed by: HOSPITALIST

## 2017-05-07 PROCEDURE — 99233 SBSQ HOSP IP/OBS HIGH 50: CPT | Performed by: HOSPITALIST

## 2017-05-07 PROCEDURE — 700102 HCHG RX REV CODE 250 W/ 637 OVERRIDE(OP): Performed by: NURSE PRACTITIONER

## 2017-05-07 PROCEDURE — A9270 NON-COVERED ITEM OR SERVICE: HCPCS | Performed by: PHARMACIST

## 2017-05-07 PROCEDURE — 700102 HCHG RX REV CODE 250 W/ 637 OVERRIDE(OP): Performed by: PHARMACIST

## 2017-05-07 RX ORDER — MORPHINE SULFATE 60 MG/1
60 TABLET, FILM COATED, EXTENDED RELEASE ORAL EVERY EVENING
Status: DISCONTINUED | OUTPATIENT
Start: 2017-05-07 | End: 2017-05-08 | Stop reason: HOSPADM

## 2017-05-07 RX ORDER — MORPHINE SULFATE 15 MG/1
30 TABLET, FILM COATED, EXTENDED RELEASE ORAL EVERY 12 HOURS
Status: DISCONTINUED | OUTPATIENT
Start: 2017-05-07 | End: 2017-05-07

## 2017-05-07 RX ORDER — MORPHINE SULFATE 15 MG/1
30 TABLET, FILM COATED, EXTENDED RELEASE ORAL EVERY MORNING
Status: DISCONTINUED | OUTPATIENT
Start: 2017-05-08 | End: 2017-05-08 | Stop reason: HOSPADM

## 2017-05-07 RX ADMIN — MORPHINE SULFATE 60 MG: 60 TABLET, EXTENDED RELEASE ORAL at 21:26

## 2017-05-07 RX ADMIN — CEFTAROLINE FOSAMIL 600 MG: 600 POWDER, FOR SOLUTION INTRAVENOUS at 21:26

## 2017-05-07 RX ADMIN — OXYCODONE HYDROCHLORIDE 10 MG: 10 TABLET ORAL at 18:52

## 2017-05-07 RX ADMIN — ZOLPIDEM TARTRATE 5 MG: 5 TABLET, FILM COATED ORAL at 21:26

## 2017-05-07 RX ADMIN — HYDROMORPHONE HYDROCHLORIDE 0.5 MG: 1 INJECTION, SOLUTION INTRAMUSCULAR; INTRAVENOUS; SUBCUTANEOUS at 21:35

## 2017-05-07 RX ADMIN — MESALAMINE 800 MG: 400 CAPSULE, DELAYED RELEASE ORAL at 21:27

## 2017-05-07 RX ADMIN — MORPHINE SULFATE 30 MG: 15 TABLET, EXTENDED RELEASE ORAL at 08:20

## 2017-05-07 RX ADMIN — OXYCODONE HYDROCHLORIDE 10 MG: 10 TABLET ORAL at 11:52

## 2017-05-07 RX ADMIN — ACETAMINOPHEN 650 MG: 325 TABLET, FILM COATED ORAL at 06:00

## 2017-05-07 RX ADMIN — METRONIDAZOLE 500 MG: 500 TABLET ORAL at 14:28

## 2017-05-07 RX ADMIN — OXYCODONE HYDROCHLORIDE 10 MG: 10 TABLET ORAL at 03:41

## 2017-05-07 RX ADMIN — LEVOTHYROXINE, LIOTHYRONINE 75 MG: 19; 4.5 TABLET ORAL at 05:59

## 2017-05-07 RX ADMIN — ACETAMINOPHEN 650 MG: 325 TABLET, FILM COATED ORAL at 11:52

## 2017-05-07 RX ADMIN — BUPROPION HYDROCHLORIDE 150 MG: 150 TABLET, FILM COATED, EXTENDED RELEASE ORAL at 08:19

## 2017-05-07 RX ADMIN — HYDROMORPHONE HYDROCHLORIDE 1 MG: 1 INJECTION, SOLUTION INTRAMUSCULAR; INTRAVENOUS; SUBCUTANEOUS at 21:36

## 2017-05-07 RX ADMIN — HEPARIN SODIUM 5000 UNITS: 5000 INJECTION, SOLUTION INTRAVENOUS; SUBCUTANEOUS at 14:29

## 2017-05-07 RX ADMIN — OXYCODONE HYDROCHLORIDE 10 MG: 10 TABLET ORAL at 14:39

## 2017-05-07 RX ADMIN — METRONIDAZOLE 500 MG: 500 TABLET ORAL at 21:26

## 2017-05-07 RX ADMIN — SODIUM CHLORIDE: 900 INJECTION INTRAVENOUS at 14:36

## 2017-05-07 RX ADMIN — INSULIN HUMAN 25 UNITS: 100 INJECTION, SUSPENSION SUBCUTANEOUS at 08:12

## 2017-05-07 RX ADMIN — INSULIN HUMAN 25 UNITS: 100 INJECTION, SUSPENSION SUBCUTANEOUS at 17:27

## 2017-05-07 RX ADMIN — HEPARIN SODIUM 5000 UNITS: 5000 INJECTION, SOLUTION INTRAVENOUS; SUBCUTANEOUS at 05:58

## 2017-05-07 RX ADMIN — CEFTAROLINE FOSAMIL 600 MG: 600 POWDER, FOR SOLUTION INTRAVENOUS at 08:19

## 2017-05-07 RX ADMIN — ACETAMINOPHEN 650 MG: 325 TABLET, FILM COATED ORAL at 17:23

## 2017-05-07 RX ADMIN — FLUCONAZOLE 100 MG: 200 TABLET ORAL at 08:20

## 2017-05-07 RX ADMIN — HEPARIN SODIUM 5000 UNITS: 5000 INJECTION, SOLUTION INTRAVENOUS; SUBCUTANEOUS at 21:27

## 2017-05-07 RX ADMIN — METRONIDAZOLE 500 MG: 500 TABLET ORAL at 06:00

## 2017-05-07 RX ADMIN — MESALAMINE 800 MG: 400 CAPSULE, DELAYED RELEASE ORAL at 14:28

## 2017-05-07 RX ADMIN — MESALAMINE 800 MG: 400 CAPSULE, DELAYED RELEASE ORAL at 08:19

## 2017-05-07 RX ADMIN — BUPROPION HYDROCHLORIDE 150 MG: 150 TABLET, FILM COATED, EXTENDED RELEASE ORAL at 21:26

## 2017-05-07 ASSESSMENT — ENCOUNTER SYMPTOMS
NAUSEA: 0
VOMITING: 0
SENSORY CHANGE: 0
CHILLS: 0
INSOMNIA: 1
DIAPHORESIS: 0
HEADACHES: 0
SHORTNESS OF BREATH: 0
ABDOMINAL PAIN: 0
MYALGIAS: 1
FEVER: 0
COUGH: 0
NAUSEA: 1
SPEECH CHANGE: 0

## 2017-05-07 ASSESSMENT — PAIN SCALES - GENERAL
PAINLEVEL_OUTOF10: 3
PAINLEVEL_OUTOF10: 5
PAINLEVEL_OUTOF10: 3
PAINLEVEL_OUTOF10: 3
PAINLEVEL_OUTOF10: 5
PAINLEVEL_OUTOF10: 6

## 2017-05-07 NOTE — PROGRESS NOTES
Bandages changed to right ankle and left hand.   Right hand wrapped for comfort d/t pt c/o discomfort with rubbing raw/peeling area against bed sheets.   Pt medicated per MAR; PRN. IVF running TKO, VSS. +BS, +flatus, +void.   Wound vac to abdomen and right thigh intact. No air leak noted.   Call light and belongings in reach.   Bed locked in low position. Hourly rounding in place.

## 2017-05-07 NOTE — PROGRESS NOTES
Infectious Disease Progress Note    Author: Anai Santoro M.D. Date of service & Time created: 2017  9:55 AM    Chief Complaint:  Chief Complaint   Patient presents with   • Fever   • Wound Infection       Interval History:  51-year-old white male with ulcerative colitis diabetes mellitus admitted with right thigh infection  2017- underwent I&D on 2017. Feels better. Fevers up to 102.7.   5/1- Tm 99.1, WBC 11.2.  R thigh pain 8/10, L hand pain 7/10.  Tolerating abx without issue.  5/2 Tm 99.2, no CBC.  Pain to R thigh, R ankle and L hand, ranging from 5-7/10.  Feels flushed with body aches.  Tolerating abx.   5/3 AF, WBC 6.2.  Continuous pain, worse with activity.  R thigh- 6/10.  R ankle- 3/10.  L hand- 4/10.  Tolerating abx, no SE.  5/4 AF, WBC 6.1.  Continuous pain: R thigh 5/10, R ankle 3/10, L hand 1/10 and Abd 3/10.  Denies SE with abx.  5/5 AF feeling better-improved ROM joints-denies SE abx  2017- no fevers. Feels better. Had some itching on his right hand  17-no fevers. Denies any increased pain. WBC 7.5  Labs Reviewed, Medications Reviewed, Radiology Reviewed and Wound Reviewed.    Review of Systems:  Review of Systems   Constitutional: Negative for fever, chills and diaphoresis.   Respiratory: Negative for cough and shortness of breath.    Cardiovascular: Negative for chest pain.   Gastrointestinal: Negative for nausea, vomiting and abdominal pain.        Soft stools- chronic with UC.  Minimal abd pain.   Genitourinary: Negative for dysuria.   Musculoskeletal: Positive for myalgias and joint pain.        Right thigh, Right medial ankle and Left hand.     Skin: Positive for itching. Negative for rash.        Complains of some itching on the right-hand   Neurological: Negative for sensory change, speech change and headaches.       Hemodynamics:  Temp (24hrs), Av.6 °C (97.9 °F), Min:36.4 °C (97.6 °F), Max:36.8 °C (98.3 °F)  Temperature: 36.8 °C (98.3 °F)  Pulse  Av  Min: 57   Max: 123  Blood Pressure: 123/67 mmHg      Physical Exam:  Physical Exam   Constitutional: He is oriented to person, place, and time. He appears well-developed and well-nourished. No distress.   Obese   HENT:   Head: Normocephalic and atraumatic.   Mouth/Throat: No oropharyngeal exudate.   Poor dentition- molar decay   Eyes: EOM are normal. Pupils are equal, round, and reactive to light.   Neck: Normal range of motion. Neck supple.   Cardiovascular: Normal rate, regular rhythm, normal heart sounds and intact distal pulses.    No murmur heard.  Pulmonary/Chest: Effort normal and breath sounds normal. No respiratory distress. He has no wheezes.   Abdominal: Soft. Bowel sounds are normal. He exhibits no distension. There is no tenderness.   Wound VAC present, no surrounding erythema.   Musculoskeletal: He exhibits edema and tenderness.   Right thigh- elastic bandage with gauze.  Right medial ankle- gauze bandage.  Left hand- gauze bandage, greatly improved flexion of hand and fingers.  RUE PICC- non tender, no erythema.   Neurological: He is alert and oriented to person, place, and time.   Skin: There is erythema.   Nursing note and vitals reviewed.      Meds:    Current facility-administered medications:   •  morphine ER (MS CONTIN) tablet 30 mg, 30 mg, Oral, Q12HRS, Lala Rendon M.D., 30 mg at 05/07/17 0820  •  HYDROmorphone (DILAUDID) injection 1 mg, 1 mg, Intravenous, QDAY PRN, Lala Rendon M.D., 1 mg at 05/06/17 1121  •  oxycodone immediate-release (ROXICODONE) tablet 5 mg, 5 mg, Oral, Q3HRS PRN, Lala Rendon M.D.  •  oxycodone immediate release (ROXICODONE) tablet 10 mg, 10 mg, Oral, Q3HRS PRNLala M.D., 10 mg at 05/07/17 0341  •  HYDROmorphone (DILAUDID) injection 0.5 mg, 0.5 mg, Intravenous, Q3HRS PRN, Lala Rendon M.D., 0.5 mg at 05/06/17 2139  •  metronidazole (FLAGYL) tablet 500 mg, 500 mg, Oral, Q8HRS, Jacey Barnhart, PHARMD, 500 mg at 05/07/17 0600  •  fluconazole  (DIFLUCAN) tablet 100 mg, 100 mg, Oral, DAILY, KEN Parada.P.R.N., 100 mg at 17 0820  •  PICC Line Insertion has been implemented, , , Once **AND** May use Lidocaine 1% not to exceed 3 mls for local at insertion site, , , CONTINUOUS **AND** NOTIFY MD, , , Once **AND** Tip to dwell in the superior vena cava, , , CONTINUOUS **AND** Do not use PICC Line until placement verified by Chest X Ray, , , CONTINUOUS **AND** DX-CHEST-FOR PICC LINE Perform procedure in:: Patient's Room, , , Once **AND** If radiologist reading of chest X-ray states any of the following the PICC should be used, , , CONTINUOUS **AND** Further evaluation of the PICC placement can be retrieved from X-Ray and Imaging, , , CONTINUOUS **AND** Blood draws through PICC line; draws by RN only, , , CONTINUOUS **AND** FLUSHING GUIDELINES WHEN IN USE, , , CONTINUOUS **AND** normal saline PF 10-20 mL, 10-20 mL, Intravenous, PRN **AND** FLUSHING GUIDELINES WHEN NOT IN USE, , , CONTINUOUS **AND** DRESSING MAINTENANCE, , , Once **AND** Change needleless pressure ports and IV tubing every 72 hours per hospital policy, , , CONTINUOUS **AND** TUBING, , , CONTINUOUS **AND** If there is an MD order to remove the PICC line, any RN may remove the PICC line, , , CONTINUOUS **AND** [] PATIENT EDUCATION MATERIALS, , , Prior to discharge **AND** NURSING COMMUNICATION, , , CONTINUOUS, Oliva Ramirez, KEN.P.R.N.  •  acetaminophen (TYLENOL) tablet 650 mg, 650 mg, Oral, Q6HRS, Baldo Moncada M.D., 650 mg at 17 0600  •  NS infusion, , Intravenous, Continuous, Baldo Moncada M.D., Last Rate: 83 mL/hr at 17 2140  •  heparin injection 5,000 Units, 5,000 Units, Subcutaneous, Q8HRS, Baldo Moncada M.D., 5,000 Units at 17 0558  •  ceftaroline (TEFLARO) 600 mg in  mL IVPB, 600 mg, Intravenous, Q12HRS, JESSICA Parada, Last Rate: 100 mL/hr at 17, 600 mg at 17  •  insulin regular (HUMULIN R) injection 3-15  Units, 3-15 Units, Subcutaneous, 4X/DAY ACHS, Pradip Scott D.O., Stopped at 05/03/17 0700  •  insulin NPH (HUMULIN,NOVOLIN) injection 25 Units, 25 Units, Subcutaneous, BID INSULIN, Pradip Scott D.O., 25 Units at 05/07/17 0812  •  buPROPion SR (WELLBUTRIN-SR) tablet 150 mg, 150 mg, Oral, BID, Pradip Scott D.O., 150 mg at 05/07/17 0819  •  mesalamine delayed-release (DELZICOL) capsule 800 mg, 800 mg, Oral, TID, Pradip Scott D.O., 800 mg at 05/07/17 0819  •  zolpidem (AMBIEN) tablet 5 mg, 5 mg, Oral, HS PRN - MR X 1, Pradip Scott D.O., 5 mg at 05/06/17 2118  •  senna-docusate (PERICOLACE or SENOKOT S) 8.6-50 MG per tablet 2 Tab, 2 Tab, Oral, BID, Stopped at 05/05/17 0900 **AND** polyethylene glycol/lytes (MIRALAX) PACKET 1 Packet, 1 Packet, Oral, QDAY PRN **AND** magnesium hydroxide (MILK OF MAGNESIA) suspension 30 mL, 30 mL, Oral, QDAY PRN **AND** bisacodyl (DULCOLAX) suppository 10 mg, 10 mg, Rectal, QDAY PRN, Christopher Gomez M.D.  •  Respiratory Care per Protocol, , Nebulization, Continuous RT, Christopher Gomez M.D.  •  ondansetron (ZOFRAN) syringe/vial injection 4 mg, 4 mg, Intravenous, Q4HRS PRN, Christopher Gomez M.D., 4 mg at 05/05/17 1005  •  ondansetron (ZOFRAN ODT) dispertab 4 mg, 4 mg, Oral, Q4HRS PRN, Christopher Gomez M.D.  •  promethazine (PHENERGAN) tablet 12.5-25 mg, 12.5-25 mg, Oral, Q4HRS PRN, Christopher Gomez M.D., 25 mg at 05/01/17 1151  •  promethazine (PHENERGAN) suppository 12.5-25 mg, 12.5-25 mg, Rectal, Q4HRS PRN, Christopher Gomez M.D.  •  prochlorperazine (COMPAZINE) injection 5-10 mg, 5-10 mg, Intravenous, Q4HRS PRN, Christopher Gomez M.D.  •  thyroid (ARMOUR THYROID) tablet 75 mg, 75 mg, Oral, AM ES, Christopher Gomez M.D., 75 mg at 05/07/17 0559    Labs:  Recent Labs      05/05/17   0200  05/06/17   0010  05/07/17   0600   WBC  5.8  7.1  7.5   RBC  3.68*  3.62*  3.66*   HEMOGLOBIN  10.8*  10.7*  10.9*   HEMATOCRIT  32.3*  32.1*  32.5*   MCV  87.8  88.7  88.8   MCH  29.3  29.6  29.8   RDW  42.4  42.3   43.9   PLATELETCT  286  291  295   MPV  9.5  9.7  9.2   NEUTSPOLYS  74.10*  70.30  69.70   LYMPHOCYTES  16.40*  22.50  19.20*   MONOCYTES  5.20  2.70  4.90   EOSINOPHILS  0.80  0.90  1.30   BASOPHILS  0.90  0.90  0.40   RBCMORPHOLO  Present  Present   --      No results for input(s): SODIUM, POTASSIUM, CHLORIDE, CO2, GLUCOSE, BUN, CPKTOTAL in the last 72 hours.  No results for input(s): ALBUMIN, TBILIRUBIN, ALKPHOSPHAT, TOTPROTEIN, ALTSGPT, ASTSGOT, CREATININE in the last 72 hours.    Imaging:  TRANSESOPHAGEAL ECHO W/O CONT  5/3/2017   CONCLUSIONS  No vegetations noted, no evidence of endocarditis or abscess.    DX-CHEST-FOR PICC LINE   5/2/2017  Impression      1. Placement right PICC line projecting over the SVC with the tip in the mid to lower right atrium.  2.  Mild diffuse atelectasis/interstitial edema.  3.  Findings discussed with PICC team at 8:00 AM.     ECHOCARDIOGRAM COMP W/O CONT  05/01/2017   CONCLUSIONS  Normal left ventricular chamber size.  Normal left ventricular systolic function.  Left ventricular ejection fraction is visually estimated to be 65%.  Normal regional wall motion.  Mild mitral regurgitation.  The left atrium is normal in size.  Structurally normal aortic valve without significant stenosis or   regurgitation.  Right ventricular systolic pressure is estimated to be 50 mmHg.  Mild tricuspid regurgitation.  Normal inferior vena cava size and inspiratory collapse.  Mildly dilated right ventricle.  Normal right ventricular systolic function.    Micro:    Results     Procedure Component Value Units Date/Time    CULTURE WOUND W/ GRAM STAIN [[id number]]  (Abnormal)  (Susceptibility) Collected:  04/29/17 5591    Order Status:  Completed Specimen Information:  Wound Updated:  05/03/17 1034     Significant Indicator POS (POS)      Source WND      Site Right Thigh Abscess      Culture Result Wound -- (A)      Result:        Heavy growth Mixed skin yesenia predominantly Viridans  Streptococcus.        Gram Stain Result --      Result:        Many WBCs.  Many mixed bacteria, no predominant organism seen.       Culture Result Wound -- (A)      Result:        Klebsiella pneumoniae  Heavy growth       Culture Result Wound -- (A)      Result:        Methicillin Resistant Staphylococcus aureus  Heavy growth      Narrative:      CALL  Alvarez  141 tel. [phone],  CALLED  141 tel. [phone] 05/01/2017, 10:43, RB PERF. RESULTS CALLED  TO:[id number](MRSA) and faxed to [facility]    Culture & Susceptibility     KLEBSIELLA PNEUMONIAE     Antibiotic Sensitivity Microscan Unit Status    Ampicillin Resistant >16 mcg/mL Final    Cefepime Sensitive <=8 mcg/mL Final    Cefotaxime Sensitive <=2 mcg/mL Final    Cefotetan Sensitive <=16 mcg/mL Final    Ceftazidime Sensitive <=1 mcg/mL Final    Ceftriaxone Sensitive <=8 mcg/mL Final    Cefuroxime Sensitive <=4 mcg/mL Final    Ciprofloxacin Sensitive <=1 mcg/mL Final    Ertapenem Sensitive <=1 mcg/mL Final    Gentamicin Sensitive <=4 mcg/mL Final    Pip/Tazobactam Sensitive <=16 mcg/mL Final    Tigecycline Sensitive <=2 mcg/mL Final    Tobramycin Sensitive <=4 mcg/mL Final    Trimeth/Sulfa Sensitive <=2/38 mcg/mL Final              METHICILLIN RESISTANT STAPHYLOCOCCUS AUREUS     Antibiotic Sensitivity Microscan Unit Status    Ampicillin/sulbactam Resistant >16/8 mcg/mL Final    Clindamycin Sensitive <=0.5 mcg/mL Final    Daptomycin Sensitive 1 mcg/mL Final    Erythromycin Resistant >4 mcg/mL Final    Moxifloxacin Sensitive <=0.5 mcg/mL Final    Oxacillin Resistant >2 mcg/mL Final    Penicillin Resistant >8 mcg/mL Final    Tetracycline Sensitive <=4 mcg/mL Final    Trimeth/Sulfa Sensitive <=0.5/9.5 mcg/mL Final    Vancomycin Sensitive 2 mcg/mL Final                       ANAEROBIC CULTURE [[id number]]  (Abnormal) Collected:  04/29/17 5868    Order Status:  Completed Specimen Information:  Wound Updated:  05/03/17 1034     Significant Indicator POS (POS)      Source WND      Site Right Thigh  "Abscess      Anaerobic Culture, Culture Res -- (A)      Result:        Growth noted after further incubation,see below for  organism identification.       Anaerobic Culture, Culture Res -- (A)      Result:        Bacteroides fragilis Group  Heavy growth      Narrative:      CALL  Alvarez  141 tel. [phone],  CALLED  141 tel. [phone] 05/01/2017, 10:43, RB PERF. RESULTS CALLED  TO:[id number](MRSA) and faxed to [facility]    FUNGAL CULTURE [[id number]]  (Abnormal) Collected:  04/29/17 1855    Order Status:  Completed Specimen Information:  Wound Updated:  05/03/17 1034     Significant Indicator POS (POS)      Source WND      Site Right Thigh Abscess      Fungal Culture -- (A)      Result:        Growth noted after further incubation,see below for  organism identification.       Fungal Culture -- (A)      Result:        Yeast  Light growth      Narrative:      CALL  Alvarez  141 tel. [phone],  CALLED  141 tel. [phone] 05/01/2017, 10:43, RB PERF. RESULTS CALLED  TO:[id number](MRSA) and faxed to [facility]    BLOOD CULTURE [[id number]] Collected:  05/02/17 1335    Order Status:  Completed Specimen Information:  Blood from Peripheral Updated:  05/03/17 0643     Significant Indicator NEG      Source BLD      Site PERIPHERAL      Blood Culture --      Result:        No Growth    Note: Blood cultures are incubated for 5 days and  are monitored continuously.Positive blood cultures  are called to the RN and reported as soon as  they are identified.      Narrative:      Contact  Per Hospital Policy: Only change Specimen Src: to \"Line\" if  specified by physician order.    BLOOD CULTURE [[id number]] Collected:  05/02/17 1335    Order Status:  Completed Specimen Information:  Blood from Peripheral Updated:  05/03/17 0643     Significant Indicator NEG      Source BLD      Site PERIPHERAL      Blood Culture --      Result:        No Growth    Note: Blood cultures are incubated for 5 days and  are monitored continuously.Positive blood cultures  are " "called to the RN and reported as soon as  they are identified.      Narrative:      Contact  Per Hospital Policy: Only change Specimen Src: to \"Line\" if  specified by physician order.    BLOOD CULTURE [[id number]]  (Abnormal) Collected:  04/29/17 1100    Order Status:  Completed Specimen Information:  Blood from Peripheral Updated:  05/01/17 1850     Significant Indicator POS (POS)      Source BLD      Site PERIPHERAL      Blood Culture        Result:        Growth detected by Bactec instrument.  04/30/2017  14:03 (A)     Blood Culture Bacteroides vulgatus (A)     Narrative:      CALL  Alvarez  19 tel. [phone],  CALLED  19 tel. [phone] 04/30/2017, 14:05, RB PERF. RESULTS CALLED  TO:Chidi [id number]  Per Hospital Policy: Only change Specimen Src: to \"Line\" if  specified by physician order.    BLOOD CULTURE [[id number]]  (Abnormal) Collected:  04/29/17 1038    Order Status:  Completed Specimen Information:  Blood from Peripheral Updated:  05/01/17 1849     Significant Indicator POS (POS)      Source BLD      Site PERIPHERAL      Blood Culture        Result:        Growth detected by Bactec instrument.  04/30/2017  14:03 (A)     Blood Culture Bacteroides vulgatus (A)     Narrative:      CALL  Alvarez  19 tel. [phone],  CALLED  19 tel. [phone] 04/30/2017, 14:06, RB PERF. RESULTS CALLED  TO:Chidi [phone]  Per Hospital Policy: Only change Specimen Src: to \"Line\" if  specified by physician order.    URINE CULTURE(NEW) [[id number]] Collected:  04/29/17 1146    Order Status:  Completed Specimen Information:  Urine Updated:  05/01/17 0814     Significant Indicator NEG      Source UR      Site --      Urine Culture No growth at 48 hours     Narrative:      Indication for culture:->Emergency Room Patient             Assessment:  Active Hospital Problems    Diagnosis   • *Severe sepsis (CMS-HCC) [A41.9, R65.20]   • Type 2 diabetes mellitus (CMS-HCC) [E11.9]   • Chronic ulcerative colitis (CMS-[facility]) [K51.90]   • Complicated wound " infection (CMS-HCC) [T79.8XXA]   • Thrombocytopenia (CMS-HCC) [D69.6]   • Hypothyroid [E03.9]       Plan:  Right thigh abscess with septic shock-  S/p I&D on 4/29/2017 with Dr. Reyes  OR cx +MRSA, Kleb pneumoniae, Strep Viridans, B fragilis & Yeast  TTE on 5/1 negative-NAMRATA on 5/3 negative   Continue IV Ceftaroline and IV Flagyl  Continue PO Diflucan for yeast  end date 5/18/17.  Orders for [facility] faxed to SW on 5/4.  Plan for IV Ertapenem 1 g daily, in addition to PO Linezolid and Fluconazole.  Pt will need dose of Ertapenem prior to discharge.  SW will work on pre auth for Linezolid.  Agrees no more testosterone injections  Awaiting set up of antibiotics  Right medial ankle abscess, improved  S/p I&D on 5/1 with Dr. Reyes    Left hand abscess, improved  S/p I&D on 5/1 with Dr. Reyes    Bacteroides fragilis septic shock, improved  Bcx 4/29 +Bacteroides vulgatus  IV Flagyl as above.  Repeat Bcx x2 on 5/2- NGTD  Covered by above abx    Postoperative wound infection of umbilical hernia site  Improving, WV in place    Ulcerative colitis  Diarrhea controlled  Per GI-current meds not contributing to infection    Diabetes mellitus  Keep blood sugars <150, elevated BS will impair wound healing and worsen infection.    Discussed with Hospitalist-

## 2017-05-07 NOTE — PROGRESS NOTES
AO x 4, Sitting up in bed for am meal. 5/10 R upper leg pain, medicated per mar with long acting pain medication. Wound vac to R upper leg and abdomen c/d/i, suction maintained.  PICC line patent, dressing intact, no signs of infection.  Plan of care discussed, questions answered, verbalized understanding.

## 2017-05-07 NOTE — PROGRESS NOTES
Hospital Medicine Interval Note  Date of Service:  5/7/2017    Chief Complaint  51 y.o.-year-old male admitted 4/29/2017 with multiple recurrent soft tissue abscesses and poorly controlled diabetes. Latest abscess at site of monthly testosterone injection site.    Interval Problem Update  Pain control improving with MS Contin but still having a lot of pain at night and not sleeping much though better last night    Consultants/Specialty  ID  Surgery    Disposition  Home with infusion center for antibiotics, discussed with   ID writing antbiotic orders     Review of Systems   Constitutional: Positive for malaise/fatigue. Negative for fever and chills.   Respiratory: Negative for cough and shortness of breath.    Cardiovascular: Negative for chest pain.   Gastrointestinal: Positive for nausea. Negative for vomiting and abdominal pain.        Multiple soft stools- chronic with UC.   Genitourinary: Negative for dysuria.   Musculoskeletal: Positive for myalgias and joint pain.        Abd, Right thigh, Right medial ankle and Left hand.   Skin: Negative for rash.   Neurological: Negative for sensory change, speech change and headaches.   Psychiatric/Behavioral: The patient has insomnia.       Physical Exam Laboratory/Imaging   Filed Vitals:    05/06/17 1457 05/06/17 1905 05/07/17 0250 05/07/17 0755   BP: 124/71 124/76 124/86 123/67   Pulse: 65 82 80 72   Temp: 36.5 °C (97.7 °F) 36.7 °C (98 °F) 36.4 °C (97.6 °F) 36.8 °C (98.3 °F)   Resp: 18 17 17 16   Height:       Weight: 99.6 kg (219 lb 9.3 oz)      SpO2: 96% 97% 90% 94%     Physical Exam   Constitutional: He is oriented to person, place, and time. He appears well-developed and well-nourished. No distress.   HENT:   Nose: Nose normal.   Mouth/Throat: Oropharynx is clear and moist.   Eyes: Conjunctivae and EOM are normal. Right eye exhibits no discharge. Left eye exhibits no discharge. No scleral icterus.   Neck: No tracheal deviation present.    Cardiovascular: Normal rate and regular rhythm.    No murmur heard.  Pulmonary/Chest: Effort normal and breath sounds normal. No stridor. No respiratory distress. He has no wheezes.   Abdominal: Soft. Bowel sounds are normal. He exhibits no distension. There is no tenderness.   Musculoskeletal: He exhibits no edema.   Right anterior lateral thigh with wound vac  Left hand clean and dry dressing  Right thumb cellulitis improved   Neurological: He is alert and oriented to person, place, and time. No cranial nerve deficit.   Skin: Skin is warm. He is not diaphoretic.   Psychiatric: He has a normal mood and affect. His behavior is normal. Thought content normal.   Nursing note and vitals reviewed.   Lab Results   Component Value Date/Time    WBC 7.5 05/07/2017 06:00 AM    HEMOGLOBIN 10.9* 05/07/2017 06:00 AM    HEMATOCRIT 32.5* 05/07/2017 06:00 AM    PLATELET COUNT 295 05/07/2017 06:00 AM     Lab Results   Component Value Date/Time    SODIUM 138 05/01/2017 01:36 AM    POTASSIUM 4.2 05/01/2017 01:36 AM    CHLORIDE 107 05/01/2017 01:36 AM    CO2 20 05/01/2017 01:36 AM    GLUCOSE 242* 05/01/2017 01:36 AM    BUN 21 05/01/2017 01:36 AM    CREATININE 0.80 05/01/2017 01:36 AM      Assessment/Plan    Hypothyroid (present on admission)  Assessment & Plan  Continue home dose of armour thyroid    Complicated wound infection (CMS-HCC) (present on admission)  Assessment & Plan  Multiple wounds with polymicrobial infections.  Continue ceftaroline, flagyl and fluconazole    Bacteremia due to Gram-negative bacteria  Assessment & Plan  Due to polymicrobial infections of the skin and soft tissue.  Discussed with ID  NAMRATA negative  Increase night time dose of ms contin to 60mg, 30mg in am to be more alert during the day    Abscess of right thigh  Assessment & Plan  Recommend patient discontinue testosterone injections  Topical gel alternative, prescribe at discharge patient agreeable    Septic shock (CMS-HCC)  Assessment &  Plan  Resolved.    DM (diabetes mellitus), type 2, uncontrolled (CMS-HCC)  Assessment & Plan  Continue sliding scale insulin  Diabetes education has worked with patient in the past and provided education on multiple occasions, his A1c has been improving.    Chronic ulcerative colitis (CMS-HCC) (present on admission)  Assessment & Plan  Continue mesalamine.  Multiple abscesses could be extra intestinal manifestations of ulcerative colitis  Not a candidate for biologic therapy at this time due to active infection but is worth considering when infections are healed, patient will follow up with Dr. Myles to discuss.       Labs reviewed, Medications reviewed and Radiology images reviewed  Sousa catheter: No Sousa            Antibiotics: Treating active infection/contamination beyond 24 hours perioperative coverage

## 2017-05-07 NOTE — PROGRESS NOTES
Infectious Disease Progress Note    Author: Anai Santoro M.D. Date of service & Time created: 2017  5:13 PM    Chief Complaint:  Chief Complaint   Patient presents with   • Fever   • Wound Infection       Interval History:  51-year-old white male with ulcerative colitis diabetes mellitus admitted with right thigh infection  2017- underwent I&D on 2017. Feels better. Fevers up to 102.7.   5/1- Tm 99.1, WBC 11.2.  R thigh pain 8/10, L hand pain 7/10.  Tolerating abx without issue.  5/2 Tm 99.2, no CBC.  Pain to R thigh, R ankle and L hand, ranging from 5-7/10.  Feels flushed with body aches.  Tolerating abx.   5/3 AF, WBC 6.2.  Continuous pain, worse with activity.  R thigh- 6/10.  R ankle- 3/10.  L hand- 4/10.  Tolerating abx, no SE.  5/4 AF, WBC 6.1.  Continuous pain: R thigh 5/10, R ankle 3/10, L hand 1/10 and Abd 3/10.  Denies SE with abx.  5/5 AF feeling better-improved ROM joints-denies SE abx  2017- no fevers. Feels better. Had some itching on his right hand  Labs Reviewed, Medications Reviewed, Radiology Reviewed and Wound Reviewed.    Review of Systems:  Review of Systems   Constitutional: Negative for fever, chills and diaphoresis.   Respiratory: Negative for cough and shortness of breath.    Cardiovascular: Negative for chest pain.   Gastrointestinal: Negative for nausea, vomiting and abdominal pain.        Soft stools- chronic with UC.  Minimal abd pain.   Genitourinary: Negative for dysuria.   Musculoskeletal: Positive for myalgias and joint pain.        Right thigh, Right medial ankle and Left hand.     Skin: Positive for itching. Negative for rash.        Complains of some itching on the right-hand   Neurological: Negative for sensory change, speech change and headaches.       Hemodynamics:  Temp (24hrs), Av.4 °C (97.6 °F), Min:36.1 °C (97 °F), Max:36.7 °C (98.1 °F)  Temperature: 36.5 °C (97.7 °F)  Pulse  Av.1  Min: 57  Max: 123  Blood Pressure: 124/71 mmHg      Physical  Exam:  Physical Exam   Constitutional: He is oriented to person, place, and time. He appears well-developed and well-nourished. No distress.   Obese   HENT:   Head: Normocephalic and atraumatic.   Mouth/Throat: No oropharyngeal exudate.   Poor dentition- molar decay   Eyes: EOM are normal. Pupils are equal, round, and reactive to light.   Neck: Normal range of motion. Neck supple.   Cardiovascular: Normal rate, regular rhythm, normal heart sounds and intact distal pulses.    No murmur heard.  Pulmonary/Chest: Effort normal and breath sounds normal. No respiratory distress. He has no wheezes.   Abdominal: Soft. Bowel sounds are normal. He exhibits no distension. There is no tenderness.   Wound VAC present, no surrounding erythema.   Musculoskeletal: He exhibits edema and tenderness.   Right thigh- elastic bandage with gauze.  Right medial ankle- gauze bandage.  Left hand- gauze bandage, greatly improved flexion of hand and fingers.  RUE PICC- non tender, no erythema.   Neurological: He is alert and oriented to person, place, and time.   Skin: There is erythema.   Nursing note and vitals reviewed.      Meds:    Current facility-administered medications:   •  morphine ER (MS CONTIN) tablet 30 mg, 30 mg, Oral, Q12HRS, Lala Rendon M.D., 30 mg at 05/06/17 1019  •  HYDROmorphone (DILAUDID) injection 1 mg, 1 mg, Intravenous, QDAY PRN, Lala Rendon M.D., 1 mg at 05/06/17 1121  •  oxycodone immediate-release (ROXICODONE) tablet 5 mg, 5 mg, Oral, Q3HRS PRN, Lala Rendon M.D.  •  oxycodone immediate release (ROXICODONE) tablet 10 mg, 10 mg, Oral, Q3HRS PRN, Lala Rendon M.D., 10 mg at 05/06/17 1428  •  HYDROmorphone (DILAUDID) injection 0.5 mg, 0.5 mg, Intravenous, Q3HRS PRN, Lala Rendon M.D., 0.5 mg at 05/06/17 1635  •  metronidazole (FLAGYL) tablet 500 mg, 500 mg, Oral, Q8HRS, Jacey Barnhart, PHARMD, 500 mg at 05/06/17 1428  •  fluconazole (DIFLUCAN) tablet 100 mg, 100 mg, Oral, DAILY, Oliva WATERS  JAS RamirezP.R.N., 100 mg at 17 1019  •  PICC Line Insertion has been implemented, , , Once **AND** May use Lidocaine 1% not to exceed 3 mls for local at insertion site, , , CONTINUOUS **AND** NOTIFY MD, , , Once **AND** Tip to dwell in the superior vena cava, , , CONTINUOUS **AND** Do not use PICC Line until placement verified by Chest X Ray, , , CONTINUOUS **AND** DX-CHEST-FOR PICC LINE Perform procedure in:: Patient's Room, , , Once **AND** If radiologist reading of chest X-ray states any of the following the PICC should be used, , , CONTINUOUS **AND** Further evaluation of the PICC placement can be retrieved from X-Ray and Imaging, , , CONTINUOUS **AND** Blood draws through PICC line; draws by RN only, , , CONTINUOUS **AND** FLUSHING GUIDELINES WHEN IN USE, , , CONTINUOUS **AND** normal saline PF 10-20 mL, 10-20 mL, Intravenous, PRN **AND** FLUSHING GUIDELINES WHEN NOT IN USE, , , CONTINUOUS **AND** DRESSING MAINTENANCE, , , Once **AND** Change needleless pressure ports and IV tubing every 72 hours per hospital policy, , , CONTINUOUS **AND** TUBING, , , CONTINUOUS **AND** If there is an MD order to remove the PICC line, any RN may remove the PICC line, , , CONTINUOUS **AND** [] PATIENT EDUCATION MATERIALS, , , Prior to discharge **AND** NURSING COMMUNICATION, , , CONTINUOUS, JAS ParadaP.R.N.  •  acetaminophen (TYLENOL) tablet 650 mg, 650 mg, Oral, Q6HRS, Baldo Moncada M.D., 650 mg at 17 1125  •  NS infusion, , Intravenous, Continuous, Baldo Moncada M.D., Last Rate: 83 mL/hr at 17 1023  •  heparin injection 5,000 Units, 5,000 Units, Subcutaneous, Q8HRS, Baldo Moncada M.D., 5,000 Units at 17 1428  •  ceftaroline (TEFLARO) 600 mg in  mL IVPB, 600 mg, Intravenous, Q12HRS, Oliva Ramirez A.P.R.NMynor, Stopped at 17 1119  •  insulin regular (HUMULIN R) injection 3-15 Units, 3-15 Units, Subcutaneous, 4X/DAY Pradip DENNEY D.O., Stopped at 17 0700  •   insulin NPH (HUMULIN,NOVOLIN) injection 25 Units, 25 Units, Subcutaneous, BID INSULIN, Pradip Scott D.O., 25 Units at 05/06/17 1641  •  buPROPion SR (WELLBUTRIN-SR) tablet 150 mg, 150 mg, Oral, BID, JOSE ValadezO., 150 mg at 05/06/17 1019  •  mesalamine delayed-release (DELZICOL) capsule 800 mg, 800 mg, Oral, TID, Pradip Scott D.O., 800 mg at 05/06/17 1635  •  zolpidem (AMBIEN) tablet 5 mg, 5 mg, Oral, HS PRN - MR X 1, JOSE ValadezO., 5 mg at 05/05/17 2229  •  senna-docusate (PERICOLACE or SENOKOT S) 8.6-50 MG per tablet 2 Tab, 2 Tab, Oral, BID, Stopped at 05/05/17 0900 **AND** polyethylene glycol/lytes (MIRALAX) PACKET 1 Packet, 1 Packet, Oral, QDAY PRN **AND** magnesium hydroxide (MILK OF MAGNESIA) suspension 30 mL, 30 mL, Oral, QDAY PRN **AND** bisacodyl (DULCOLAX) suppository 10 mg, 10 mg, Rectal, QDAY PRN, Christopher Gomez M.D.  •  Respiratory Care per Protocol, , Nebulization, Continuous RT, Christopher Gomez M.D.  •  ondansetron (ZOFRAN) syringe/vial injection 4 mg, 4 mg, Intravenous, Q4HRS PRN, Christopher Gomez M.D., 4 mg at 05/05/17 1005  •  ondansetron (ZOFRAN ODT) dispertab 4 mg, 4 mg, Oral, Q4HRS PRN, Christopher Gomez M.D.  •  promethazine (PHENERGAN) tablet 12.5-25 mg, 12.5-25 mg, Oral, Q4HRS PRN, Christopher Gomez M.D., 25 mg at 05/01/17 1151  •  promethazine (PHENERGAN) suppository 12.5-25 mg, 12.5-25 mg, Rectal, Q4HRS PRN, Christopher Gomez M.D.  •  prochlorperazine (COMPAZINE) injection 5-10 mg, 5-10 mg, Intravenous, Q4HRS PRN, Christopher Gomez M.D.  •  thyroid (ARMOUR THYROID) tablet 75 mg, 75 mg, Oral, AM ES, Christopher Gomez M.D., 75 mg at 05/06/17 0635    Labs:  Recent Labs      05/04/17   0423  05/05/17   0200  05/06/17   0010   WBC  6.1  5.8  7.1   RBC  3.44*  3.68*  3.62*   HEMOGLOBIN  10.3*  10.8*  10.7*   HEMATOCRIT  30.4*  32.3*  32.1*   MCV  88.4  87.8  88.7   MCH  29.9  29.3  29.6   RDW  42.7  42.4  42.3   PLATELETCT  259  286  291   MPV  9.4  9.5  9.7   NEUTSPOLYS  78.90*  74.10*  70.30    LYMPHOCYTES  15.80*  16.40*  22.50   MONOCYTES  0.90  5.20  2.70   EOSINOPHILS  2.60  0.80  0.90   BASOPHILS  0.90  0.90  0.90   RBCMORPHOLO  Present  Present  Present     No results for input(s): SODIUM, POTASSIUM, CHLORIDE, CO2, GLUCOSE, BUN, CPKTOTAL in the last 72 hours.  No results for input(s): ALBUMIN, TBILIRUBIN, ALKPHOSPHAT, TOTPROTEIN, ALTSGPT, ASTSGOT, CREATININE in the last 72 hours.    Imaging:  TRANSESOPHAGEAL ECHO W/O CONT  5/3/2017   CONCLUSIONS  No vegetations noted, no evidence of endocarditis or abscess.    DX-CHEST-FOR PICC LINE   5/2/2017  Impression      1. Placement right PICC line projecting over the SVC with the tip in the mid to lower right atrium.  2.  Mild diffuse atelectasis/interstitial edema.  3.  Findings discussed with PICC team at 8:00 AM.     ECHOCARDIOGRAM COMP W/O CONT  05/01/2017   CONCLUSIONS  Normal left ventricular chamber size.  Normal left ventricular systolic function.  Left ventricular ejection fraction is visually estimated to be 65%.  Normal regional wall motion.  Mild mitral regurgitation.  The left atrium is normal in size.  Structurally normal aortic valve without significant stenosis or   regurgitation.  Right ventricular systolic pressure is estimated to be 50 mmHg.  Mild tricuspid regurgitation.  Normal inferior vena cava size and inspiratory collapse.  Mildly dilated right ventricle.  Normal right ventricular systolic function.    Micro:    Results     Procedure Component Value Units Date/Time    CULTURE WOUND W/ GRAM STAIN [[id number]]  (Abnormal)  (Susceptibility) Collected:  04/29/17 7232    Order Status:  Completed Specimen Information:  Wound Updated:  05/03/17 1034     Significant Indicator POS (POS)      Source WND      Site Right Thigh Abscess      Culture Result Wound -- (A)      Result:        Heavy growth Mixed skin yesenia predominantly Viridans  Streptococcus.       Gram Stain Result --      Result:        Many WBCs.  Many mixed bacteria, no  predominant organism seen.       Culture Result Wound -- (A)      Result:        Klebsiella pneumoniae  Heavy growth       Culture Result Wound -- (A)      Result:        Methicillin Resistant Staphylococcus aureus  Heavy growth      Narrative:      CALL  Alvarez  141 tel. [phone],  CALLED  141 tel. [phone] 05/01/2017, 10:43, RB PERF. RESULTS CALLED  TO:[id number](MRSA) and faxed to [facility]    Culture & Susceptibility     KLEBSIELLA PNEUMONIAE     Antibiotic Sensitivity Microscan Unit Status    Ampicillin Resistant >16 mcg/mL Final    Cefepime Sensitive <=8 mcg/mL Final    Cefotaxime Sensitive <=2 mcg/mL Final    Cefotetan Sensitive <=16 mcg/mL Final    Ceftazidime Sensitive <=1 mcg/mL Final    Ceftriaxone Sensitive <=8 mcg/mL Final    Cefuroxime Sensitive <=4 mcg/mL Final    Ciprofloxacin Sensitive <=1 mcg/mL Final    Ertapenem Sensitive <=1 mcg/mL Final    Gentamicin Sensitive <=4 mcg/mL Final    Pip/Tazobactam Sensitive <=16 mcg/mL Final    Tigecycline Sensitive <=2 mcg/mL Final    Tobramycin Sensitive <=4 mcg/mL Final    Trimeth/Sulfa Sensitive <=2/38 mcg/mL Final              METHICILLIN RESISTANT STAPHYLOCOCCUS AUREUS     Antibiotic Sensitivity Microscan Unit Status    Ampicillin/sulbactam Resistant >16/8 mcg/mL Final    Clindamycin Sensitive <=0.5 mcg/mL Final    Daptomycin Sensitive 1 mcg/mL Final    Erythromycin Resistant >4 mcg/mL Final    Moxifloxacin Sensitive <=0.5 mcg/mL Final    Oxacillin Resistant >2 mcg/mL Final    Penicillin Resistant >8 mcg/mL Final    Tetracycline Sensitive <=4 mcg/mL Final    Trimeth/Sulfa Sensitive <=0.5/9.5 mcg/mL Final    Vancomycin Sensitive 2 mcg/mL Final                       ANAEROBIC CULTURE [[id number]]  (Abnormal) Collected:  04/29/17 0690    Order Status:  Completed Specimen Information:  Wound Updated:  05/03/17 1034     Significant Indicator POS (POS)      Source WND      Site Right Thigh Abscess      Anaerobic Culture, Culture Res -- (A)      Result:        Growth  "noted after further incubation,see below for  organism identification.       Anaerobic Culture, Culture Res -- (A)      Result:        Bacteroides fragilis Group  Heavy growth      Narrative:      CALL  Alvarez  141 tel. [phone],  CALLED  141 tel. [phone] 05/01/2017, 10:43, RB PERF. RESULTS CALLED  TO:[id number](MRSA) and faxed to [facility]    FUNGAL CULTURE [[id number]]  (Abnormal) Collected:  04/29/17 1855    Order Status:  Completed Specimen Information:  Wound Updated:  05/03/17 1034     Significant Indicator POS (POS)      Source WND      Site Right Thigh Abscess      Fungal Culture -- (A)      Result:        Growth noted after further incubation,see below for  organism identification.       Fungal Culture -- (A)      Result:        Yeast  Light growth      Narrative:      CALL  Alvarez  141 tel. [phone],  CALLED  141 tel. [phone] 05/01/2017, 10:43, RB PERF. RESULTS CALLED  TO:[id number](MRSA) and faxed to [facility]    BLOOD CULTURE [[id number]] Collected:  05/02/17 1335    Order Status:  Completed Specimen Information:  Blood from Peripheral Updated:  05/03/17 0643     Significant Indicator NEG      Source BLD      Site PERIPHERAL      Blood Culture --      Result:        No Growth    Note: Blood cultures are incubated for 5 days and  are monitored continuously.Positive blood cultures  are called to the RN and reported as soon as  they are identified.      Narrative:      Contact  Per Hospital Policy: Only change Specimen Src: to \"Line\" if  specified by physician order.    BLOOD CULTURE [[id number]] Collected:  05/02/17 1335    Order Status:  Completed Specimen Information:  Blood from Peripheral Updated:  05/03/17 0643     Significant Indicator NEG      Source BLD      Site PERIPHERAL      Blood Culture --      Result:        No Growth    Note: Blood cultures are incubated for 5 days and  are monitored continuously.Positive blood cultures  are called to the RN and reported as soon as  they are identified.      " "Narrative:      Contact  Per Hospital Policy: Only change Specimen Src: to \"Line\" if  specified by physician order.    BLOOD CULTURE [[id number]]  (Abnormal) Collected:  04/29/17 1100    Order Status:  Completed Specimen Information:  Blood from Peripheral Updated:  05/01/17 1850     Significant Indicator POS (POS)      Source BLD      Site PERIPHERAL      Blood Culture        Result:        Growth detected by Bactec instrument.  04/30/2017  14:03 (A)     Blood Culture Bacteroides vulgatus (A)     Narrative:      CALL  Alvarez  19 tel. [phone],  CALLED  19 tel. [phone] 04/30/2017, 14:05, RB PERF. RESULTS CALLED  TO:Chidi [phone]  Per Hospital Policy: Only change Specimen Src: to \"Line\" if  specified by physician order.    BLOOD CULTURE [[id number]]  (Abnormal) Collected:  04/29/17 1038    Order Status:  Completed Specimen Information:  Blood from Peripheral Updated:  05/01/17 1849     Significant Indicator POS (POS)      Source BLD      Site PERIPHERAL      Blood Culture        Result:        Growth detected by Bactec instrument.  04/30/2017  14:03 (A)     Blood Culture Bacteroides vulgatus (A)     Narrative:      CALL  Alvarez  19 tel. [phone],  CALLED  19 tel. [phone] 04/30/2017, 14:06, RB PERF. RESULTS CALLED  TO:Chidi [phone]  Per Hospital Policy: Only change Specimen Src: to \"Line\" if  specified by physician order.    URINE CULTURE(NEW) [[id number]] Collected:  04/29/17 1146    Order Status:  Completed Specimen Information:  Urine Updated:  05/01/17 0814     Significant Indicator NEG      Source UR      Site --      Urine Culture No growth at 48 hours     Narrative:      Indication for culture:->Emergency Room Patient    GRAM STAIN [[id number]] Collected:  04/29/17 1855    Order Status:  Completed Specimen Information:  Wound Updated:  04/29/17 2156     Significant Indicator .      Source WND      Site Right Thigh Abscess      Gram Stain Result --      Result:        Many WBCs.  Many mixed bacteria, no " predominant organism seen.               Assessment:  Active Hospital Problems    Diagnosis   • *Severe sepsis (CMS-[facility]) [A41.9, R65.20]   • Type 2 diabetes mellitus (CMS-[facility]) [E11.9]   • Chronic ulcerative colitis (CMS-[facility]) [K51.90]   • Complicated wound infection (CMS-[facility]) [T79.8XXA]   • Thrombocytopenia (CMS-[facility]) [D69.6]   • Hypothyroid [E03.9]       Plan:  Right thigh abscess with septic shock-  S/p I&D on 4/29/2017 with Dr. Reyes  OR cx +MRSA, Kleb pneumoniae, Strep Viridans, B fragilis & Yeast  TTE on 5/1 negative-NAMRATA on 5/3 negative   Continue IV Ceftaroline and IV Flagyl  Continue PO Diflucan for yeast  end date 5/18/17.  Orders for [facility] faxed to SW on 5/4.  Plan for IV Ertapenem 1 g daily, in addition to PO Linezolid and Fluconazole.  Pt will need dose of Ertapenem prior to discharge.  SW will work on pre auth for Linezolid.  Agrees no more testosterone injections  Awaiting set up of antibiotics  Right medial ankle abscess, improved  S/p I&D on 5/1 with Dr. Reyes    Left hand abscess, improved  S/p I&D on 5/1 with Dr. Reyes    Bacteroides fragilis septic shock, improved  Bcx 4/29 +Bacteroides vulgatus  IV Flagyl as above.  Repeat Bcx x2 on 5/2- NGTD  Covered by above abx    Postoperative wound infection of umbilical hernia site  Improving, WV in place    Ulcerative colitis  Diarrhea controlled  Per GI-current meds not contributing to infection    Diabetes mellitus  Keep blood sugars <150, elevated BS will impair wound healing and worsen infection.    Discussed with Hospitalist-

## 2017-05-08 VITALS
SYSTOLIC BLOOD PRESSURE: 137 MMHG | WEIGHT: 219.58 LBS | RESPIRATION RATE: 16 BRPM | DIASTOLIC BLOOD PRESSURE: 98 MMHG | TEMPERATURE: 98 F | HEIGHT: 69 IN | BODY MASS INDEX: 32.52 KG/M2 | OXYGEN SATURATION: 93 % | HEART RATE: 100 BPM

## 2017-05-08 LAB
BASOPHILS # BLD AUTO: 0.7 % (ref 0–1.8)
BASOPHILS # BLD: 0.04 K/UL (ref 0–0.12)
EOSINOPHIL # BLD AUTO: 0.11 K/UL (ref 0–0.51)
EOSINOPHIL NFR BLD: 2 % (ref 0–6.9)
ERYTHROCYTE [DISTWIDTH] IN BLOOD BY AUTOMATED COUNT: 43.7 FL (ref 35.9–50)
GLUCOSE BLD-MCNC: 103 MG/DL (ref 65–99)
GLUCOSE BLD-MCNC: 87 MG/DL (ref 65–99)
GLUCOSE BLD-MCNC: 87 MG/DL (ref 65–99)
HCT VFR BLD AUTO: 33 % (ref 42–52)
HGB BLD-MCNC: 10.9 G/DL (ref 14–18)
IMM GRANULOCYTES # BLD AUTO: 0.24 K/UL (ref 0–0.11)
IMM GRANULOCYTES NFR BLD AUTO: 4.3 % (ref 0–0.9)
LYMPHOCYTES # BLD AUTO: 1.66 K/UL (ref 1–4.8)
LYMPHOCYTES NFR BLD: 29.6 % (ref 22–41)
MCH RBC QN AUTO: 29.5 PG (ref 27–33)
MCHC RBC AUTO-ENTMCNC: 33 G/DL (ref 33.7–35.3)
MCV RBC AUTO: 89.4 FL (ref 81.4–97.8)
MONOCYTES # BLD AUTO: 0.4 K/UL (ref 0–0.85)
MONOCYTES NFR BLD AUTO: 7.1 % (ref 0–13.4)
NEUTROPHILS # BLD AUTO: 3.15 K/UL (ref 1.82–7.42)
NEUTROPHILS NFR BLD: 56.3 % (ref 44–72)
NRBC # BLD AUTO: 0 K/UL
NRBC BLD AUTO-RTO: 0 /100 WBC
PLATELET # BLD AUTO: 309 K/UL (ref 164–446)
PMV BLD AUTO: 9.4 FL (ref 9–12.9)
RBC # BLD AUTO: 3.69 M/UL (ref 4.7–6.1)
WBC # BLD AUTO: 5.6 K/UL (ref 4.8–10.8)

## 2017-05-08 PROCEDURE — A9270 NON-COVERED ITEM OR SERVICE: HCPCS | Performed by: NURSE PRACTITIONER

## 2017-05-08 PROCEDURE — A9270 NON-COVERED ITEM OR SERVICE: HCPCS | Performed by: HOSPITALIST

## 2017-05-08 PROCEDURE — 700111 HCHG RX REV CODE 636 W/ 250 OVERRIDE (IP): Performed by: HOSPITALIST

## 2017-05-08 PROCEDURE — 99239 HOSP IP/OBS DSCHRG MGMT >30: CPT | Performed by: HOSPITALIST

## 2017-05-08 PROCEDURE — 700102 HCHG RX REV CODE 250 W/ 637 OVERRIDE(OP): Performed by: HOSPITALIST

## 2017-05-08 PROCEDURE — 700111 HCHG RX REV CODE 636 W/ 250 OVERRIDE (IP): Performed by: NURSE PRACTITIONER

## 2017-05-08 PROCEDURE — 85025 COMPLETE CBC W/AUTO DIFF WBC: CPT

## 2017-05-08 PROCEDURE — 97606 NEG PRS WND THER DME>50 SQCM: CPT

## 2017-05-08 PROCEDURE — 306372 DRESSING,VAC SIMPLACE MED: Performed by: SURGERY

## 2017-05-08 PROCEDURE — 700102 HCHG RX REV CODE 250 W/ 637 OVERRIDE(OP): Performed by: PHARMACIST

## 2017-05-08 PROCEDURE — 700105 HCHG RX REV CODE 258: Performed by: HOSPITALIST

## 2017-05-08 PROCEDURE — 700102 HCHG RX REV CODE 250 W/ 637 OVERRIDE(OP): Performed by: NURSE PRACTITIONER

## 2017-05-08 PROCEDURE — A9270 NON-COVERED ITEM OR SERVICE: HCPCS | Performed by: PHARMACIST

## 2017-05-08 PROCEDURE — 700105 HCHG RX REV CODE 258: Performed by: NURSE PRACTITIONER

## 2017-05-08 PROCEDURE — 82962 GLUCOSE BLOOD TEST: CPT

## 2017-05-08 RX ORDER — FLUCONAZOLE 100 MG/1
100 TABLET ORAL DAILY
Qty: 30 TAB | Refills: 0 | Status: ON HOLD | OUTPATIENT
Start: 2017-05-08 | End: 2017-05-15

## 2017-05-08 RX ORDER — MORPHINE SULFATE 30 MG/1
30 TABLET, FILM COATED, EXTENDED RELEASE ORAL EVERY 12 HOURS
Qty: 90 TAB | Refills: 0 | Status: SHIPPED | OUTPATIENT
Start: 2017-05-08 | End: 2017-07-09

## 2017-05-08 RX ORDER — OXYCODONE AND ACETAMINOPHEN 10; 325 MG/1; MG/1
1 TABLET ORAL EVERY 8 HOURS PRN
Qty: 30 TAB | Refills: 0 | Status: SHIPPED | OUTPATIENT
Start: 2017-05-08 | End: 2017-07-09

## 2017-05-08 RX ORDER — METRONIDAZOLE 500 MG/1
500 TABLET ORAL EVERY 8 HOURS
Qty: 90 TAB | Refills: 0 | Status: SHIPPED | OUTPATIENT
Start: 2017-05-08 | End: 2017-05-11

## 2017-05-08 RX ADMIN — HYDROMORPHONE HYDROCHLORIDE 1 MG: 1 INJECTION, SOLUTION INTRAMUSCULAR; INTRAVENOUS; SUBCUTANEOUS at 08:43

## 2017-05-08 RX ADMIN — METRONIDAZOLE 500 MG: 500 TABLET ORAL at 06:05

## 2017-05-08 RX ADMIN — METRONIDAZOLE 500 MG: 500 TABLET ORAL at 14:29

## 2017-05-08 RX ADMIN — SODIUM CHLORIDE: 900 INJECTION INTRAVENOUS at 17:46

## 2017-05-08 RX ADMIN — ONDANSETRON 4 MG: 2 INJECTION INTRAMUSCULAR; INTRAVENOUS at 08:43

## 2017-05-08 RX ADMIN — INSULIN HUMAN 25 UNITS: 100 INJECTION, SUSPENSION SUBCUTANEOUS at 17:35

## 2017-05-08 RX ADMIN — ACETAMINOPHEN 650 MG: 325 TABLET, FILM COATED ORAL at 06:05

## 2017-05-08 RX ADMIN — SODIUM CHLORIDE: 900 INJECTION INTRAVENOUS at 03:55

## 2017-05-08 RX ADMIN — FLUCONAZOLE 100 MG: 200 TABLET ORAL at 08:50

## 2017-05-08 RX ADMIN — HEPARIN SODIUM 5000 UNITS: 5000 INJECTION, SOLUTION INTRAVENOUS; SUBCUTANEOUS at 14:29

## 2017-05-08 RX ADMIN — LEVOTHYROXINE, LIOTHYRONINE 75 MG: 19; 4.5 TABLET ORAL at 06:05

## 2017-05-08 RX ADMIN — MESALAMINE 800 MG: 400 CAPSULE, DELAYED RELEASE ORAL at 08:51

## 2017-05-08 RX ADMIN — OXYCODONE HYDROCHLORIDE 10 MG: 10 TABLET ORAL at 12:19

## 2017-05-08 RX ADMIN — CEFTAROLINE FOSAMIL 600 MG: 600 POWDER, FOR SOLUTION INTRAVENOUS at 08:53

## 2017-05-08 RX ADMIN — SODIUM CHLORIDE: 900 INJECTION INTRAVENOUS at 16:09

## 2017-05-08 RX ADMIN — INSULIN HUMAN 25 UNITS: 100 INJECTION, SUSPENSION SUBCUTANEOUS at 06:12

## 2017-05-08 RX ADMIN — ACETAMINOPHEN 650 MG: 325 TABLET, FILM COATED ORAL at 17:29

## 2017-05-08 RX ADMIN — SODIUM CHLORIDE 1000 MG: 900 INJECTION INTRAVENOUS at 16:12

## 2017-05-08 RX ADMIN — HEPARIN SODIUM 5000 UNITS: 5000 INJECTION, SOLUTION INTRAVENOUS; SUBCUTANEOUS at 06:06

## 2017-05-08 RX ADMIN — MORPHINE SULFATE 30 MG: 15 TABLET, EXTENDED RELEASE ORAL at 08:50

## 2017-05-08 RX ADMIN — OXYCODONE HYDROCHLORIDE 10 MG: 10 TABLET ORAL at 16:13

## 2017-05-08 RX ADMIN — HYDROMORPHONE HYDROCHLORIDE 0.5 MG: 1 INJECTION, SOLUTION INTRAMUSCULAR; INTRAVENOUS; SUBCUTANEOUS at 03:56

## 2017-05-08 RX ADMIN — HYDROMORPHONE HYDROCHLORIDE 1 MG: 1 INJECTION, SOLUTION INTRAMUSCULAR; INTRAVENOUS; SUBCUTANEOUS at 10:41

## 2017-05-08 RX ADMIN — ACETAMINOPHEN 650 MG: 325 TABLET, FILM COATED ORAL at 12:19

## 2017-05-08 RX ADMIN — HYDROMORPHONE HYDROCHLORIDE 0.5 MG: 1 INJECTION, SOLUTION INTRAMUSCULAR; INTRAVENOUS; SUBCUTANEOUS at 19:38

## 2017-05-08 RX ADMIN — BUPROPION HYDROCHLORIDE 150 MG: 150 TABLET, FILM COATED, EXTENDED RELEASE ORAL at 08:50

## 2017-05-08 RX ADMIN — OXYCODONE HYDROCHLORIDE 10 MG: 10 TABLET ORAL at 08:50

## 2017-05-08 RX ADMIN — MESALAMINE 800 MG: 400 CAPSULE, DELAYED RELEASE ORAL at 14:30

## 2017-05-08 ASSESSMENT — PAIN SCALES - GENERAL
PAINLEVEL_OUTOF10: 6
PAINLEVEL_OUTOF10: 6
PAINLEVEL_OUTOF10: 5
PAINLEVEL_OUTOF10: 6
PAINLEVEL_OUTOF10: 8
PAINLEVEL_OUTOF10: 3
PAINLEVEL_OUTOF10: 5
PAINLEVEL_OUTOF10: 3

## 2017-05-08 ASSESSMENT — ENCOUNTER SYMPTOMS
NERVOUS/ANXIOUS: 0
SENSORY CHANGE: 0
CARDIOVASCULAR NEGATIVE: 1
INSOMNIA: 0
DEPRESSION: 0
NAUSEA: 0
HEADACHES: 0
CHILLS: 0
WEAKNESS: 0
SHORTNESS OF BREATH: 0
FEVER: 0
ABDOMINAL PAIN: 0
VOMITING: 0
MYALGIAS: 1
COUGH: 0
SPEECH CHANGE: 0

## 2017-05-08 NOTE — PROGRESS NOTES
Infectious Disease Progress Note    Author: JESSICA Parada Date of service & Time created: 5/8/2017  1:49 PM    Chief Complaint:  Chief Complaint   Patient presents with   • Fever   • Wound Infection       Interval History:  51-year-old white male with ulcerative colitis diabetes mellitus admitted with right thigh infection  4/30/2017- underwent I&D on 4/29/2017. Feels better. Fevers up to 102.7.   5/1- Tm 99.1, WBC 11.2.  R thigh pain 8/10, L hand pain 7/10.  Tolerating abx without issue.  5/2 Tm 99.2, no CBC.  Pain to R thigh, R ankle and L hand, ranging from 5-7/10.  Feels flushed with body aches.  Tolerating abx.   5/3 AF, WBC 6.2.  Continuous pain, worse with activity.  R thigh- 6/10.  R ankle- 3/10.  L hand- 4/10.  Tolerating abx, no SE.  5/4 AF, WBC 6.1.  Continuous pain: R thigh 5/10, R ankle 3/10, L hand 1/10 and Abd 3/10.  Denies SE with abx.  5/5 AF feeling better-improved ROM joints-denies SE abx  5/6/2017- no fevers. Feels better. Had some itching on his right hand  5/7/17-no fevers. Denies any increased pain. WBC 7.5  5/8- AF, WBC 5.6.  Tolerating abx without issue.  R thigh pain elevated d/t WV change, 7/10.  L hand a little more tender and stiff, likely d/t more frequent use of hand.  Labs Reviewed, Medications Reviewed, Radiology Reviewed and Wound Reviewed.    Review of Systems:  Review of Systems   Constitutional: Negative for fever and chills.   Respiratory: Negative for cough and shortness of breath.    Cardiovascular: Negative for chest pain.   Gastrointestinal: Negative for nausea, vomiting and abdominal pain.        Soft stools- chronic with UC.   Genitourinary: Negative for dysuria.   Musculoskeletal: Positive for myalgias and joint pain.        Right thigh, Right medial ankle and Left hand.   Skin: Positive for itching. Negative for rash.        Itching on the right-hand   Neurological: Negative for sensory change, speech change and headaches.       Hemodynamics:  Temp (24hrs),  Av.8 °C (98.2 °F), Min:36.7 °C (98 °F), Max:37 °C (98.6 °F)  Temperature: 37 °C (98.6 °F)  Pulse  Av.7  Min: 57  Max: 123  Blood Pressure: 105/66 mmHg      Physical Exam:  Physical Exam   Constitutional: He is oriented to person, place, and time. He appears well-developed and well-nourished. No distress.   Obese   HENT:   Head: Normocephalic and atraumatic.   Mouth/Throat: No oropharyngeal exudate.   Poor dentition- molar decay   Eyes: EOM are normal. Pupils are equal, round, and reactive to light.   Neck: Normal range of motion. Neck supple.   Cardiovascular: Normal rate, regular rhythm and intact distal pulses.    No murmur heard.  Pulmonary/Chest: Effort normal. No respiratory distress. He has no wheezes.   Abdominal: Soft. Bowel sounds are normal. He exhibits no distension. There is no tenderness.   WV in place, no surrounding erythema.   Musculoskeletal: He exhibits edema and tenderness.   Right thigh- WV in place, no surrounding erythema.  Right medial ankle- gauze bandage, toes warm.  Left hand- gauze bandage, fingers warm, improved flexion of hand and fingers.  RUE PICC- non tender, no erythema.   Neurological: He is alert and oriented to person, place, and time.   Skin: Skin is warm and dry. There is erythema.   Nursing note and vitals reviewed.      Meds:    Current facility-administered medications:   •  morphine ER (MS CONTIN) tablet 30 mg, 30 mg, Oral, QAM, 30 mg at 17 0850 **AND** morphine ER (MS CONTIN) tablet 60 mg, 60 mg, Oral, Q EVENING, Lala Rendon M.D., 60 mg at 17 2126  •  HYDROmorphone (DILAUDID) injection 1 mg, 1 mg, Intravenous, QDAY PRN, Lala Rendon M.D., 1 mg at 17 1041  •  oxycodone immediate-release (ROXICODONE) tablet 5 mg, 5 mg, Oral, Q3HRS PRN, Lala Rendon M.D.  •  oxycodone immediate release (ROXICODONE) tablet 10 mg, 10 mg, Oral, Q3HRS PRN, Lala Rendon M.D., 10 mg at 17 1219  •  HYDROmorphone (DILAUDID) injection 0.5 mg, 0.5 mg,  Intravenous, Q3HRS PRN, Lala Rendon M.D., 0.5 mg at 17 0356  •  metronidazole (FLAGYL) tablet 500 mg, 500 mg, Oral, Q8HRS, Jacey Barnhart, PHARMD, 500 mg at 17 0605  •  fluconazole (DIFLUCAN) tablet 100 mg, 100 mg, Oral, DAILY, KEN Parada.P.R.N., 100 mg at 17 0850  •  PICC Line Insertion has been implemented, , , Once **AND** May use Lidocaine 1% not to exceed 3 mls for local at insertion site, , , CONTINUOUS **AND** NOTIFY MD, , , Once **AND** Tip to dwell in the superior vena cava, , , CONTINUOUS **AND** Do not use PICC Line until placement verified by Chest X Ray, , , CONTINUOUS **AND** DX-CHEST-FOR PICC LINE Perform procedure in:: Patient's Room, , , Once **AND** If radiologist reading of chest X-ray states any of the following the PICC should be used, , , CONTINUOUS **AND** Further evaluation of the PICC placement can be retrieved from X-Ray and Imaging, , , CONTINUOUS **AND** Blood draws through PICC line; draws by RN only, , , CONTINUOUS **AND** FLUSHING GUIDELINES WHEN IN USE, , , CONTINUOUS **AND** normal saline PF 10-20 mL, 10-20 mL, Intravenous, PRN **AND** FLUSHING GUIDELINES WHEN NOT IN USE, , , CONTINUOUS **AND** DRESSING MAINTENANCE, , , Once **AND** Change needleless pressure ports and IV tubing every 72 hours per hospital policy, , , CONTINUOUS **AND** TUBING, , , CONTINUOUS **AND** If there is an MD order to remove the PICC line, any RN may remove the PICC line, , , CONTINUOUS **AND** [] PATIENT EDUCATION MATERIALS, , , Prior to discharge **AND** NURSING COMMUNICATION, , , CONTINUOUS, Oliva Ramirez, A.P.R.N.  •  acetaminophen (TYLENOL) tablet 650 mg, 650 mg, Oral, Q6HRS, Baldo Moncada M.D., 650 mg at 17 1219  •  NS infusion, , Intravenous, Continuous, Baldo Moncada M.D., Last Rate: 83 mL/hr at 17 0355  •  heparin injection 5,000 Units, 5,000 Units, Subcutaneous, Q8HRS, Baldo Moncada M.D., 5,000 Units at 17 0606  •  ceftaroline  (TEFLARO) 600 mg in  mL IVPB, 600 mg, Intravenous, Q12HRS, JESSICA Parada, Stopped at 05/08/17 0953  •  insulin regular (HUMULIN R) injection 3-15 Units, 3-15 Units, Subcutaneous, 4X/DAY ACHS, JOSE ValadezO., Stopped at 05/03/17 0700  •  insulin NPH (HUMULIN,NOVOLIN) injection 25 Units, 25 Units, Subcutaneous, BID INSULIN, JOSE ValadezO., 25 Units at 05/08/17 0612  •  buPROPion SR (WELLBUTRIN-SR) tablet 150 mg, 150 mg, Oral, BID, JOSE ValadezO., 150 mg at 05/08/17 0850  •  mesalamine delayed-release (DELZICOL) capsule 800 mg, 800 mg, Oral, TID, KHARI Valadez.O., 800 mg at 05/08/17 0851  •  zolpidem (AMBIEN) tablet 5 mg, 5 mg, Oral, HS PRN - MR X 1, Pradip Scott D.O., 5 mg at 05/07/17 2126  •  senna-docusate (PERICOLACE or SENOKOT S) 8.6-50 MG per tablet 2 Tab, 2 Tab, Oral, BID, Stopped at 05/05/17 0900 **AND** polyethylene glycol/lytes (MIRALAX) PACKET 1 Packet, 1 Packet, Oral, QDAY PRN **AND** magnesium hydroxide (MILK OF MAGNESIA) suspension 30 mL, 30 mL, Oral, QDAY PRN **AND** bisacodyl (DULCOLAX) suppository 10 mg, 10 mg, Rectal, QDAY PRN, Christopher Gomez M.D.  •  Respiratory Care per Protocol, , Nebulization, Continuous RT, Christopher Gomez M.D.  •  ondansetron (ZOFRAN) syringe/vial injection 4 mg, 4 mg, Intravenous, Q4HRS PRN, Christopher Gomez M.D., 4 mg at 05/08/17 0843  •  ondansetron (ZOFRAN ODT) dispertab 4 mg, 4 mg, Oral, Q4HRS PRN, Christopher Gomez M.D.  •  promethazine (PHENERGAN) tablet 12.5-25 mg, 12.5-25 mg, Oral, Q4HRS PRN, Christopher Gomez M.D., 25 mg at 05/01/17 1151  •  promethazine (PHENERGAN) suppository 12.5-25 mg, 12.5-25 mg, Rectal, Q4HRS PRN, Christopher Gomez M.D.  •  prochlorperazine (COMPAZINE) injection 5-10 mg, 5-10 mg, Intravenous, Q4HRS PRN, Christopher Gomez M.D.  •  thyroid (ARMOUR THYROID) tablet 75 mg, 75 mg, Oral, AM ES, Christopher Gomez M.D., 75 mg at 05/08/17 0605    Labs:  Recent Labs      05/06/17   0010  05/07/17   0600  05/08/17   0415   WBC  7.1  7.5   5.6   RBC  3.62*  3.66*  3.69*   HEMOGLOBIN  10.7*  10.9*  10.9*   HEMATOCRIT  32.1*  32.5*  33.0*   MCV  88.7  88.8  89.4   MCH  29.6  29.8  29.5   RDW  42.3  43.9  43.7   PLATELETCT  291  295  309   MPV  9.7  9.2  9.4   NEUTSPOLYS  70.30  69.70  56.30   LYMPHOCYTES  22.50  19.20*  29.60   MONOCYTES  2.70  4.90  7.10   EOSINOPHILS  0.90  1.30  2.00   BASOPHILS  0.90  0.40  0.70   RBCMORPHOLO  Present   --    --      No results for input(s): SODIUM, POTASSIUM, CHLORIDE, CO2, GLUCOSE, BUN, CPKTOTAL in the last 72 hours.  No results for input(s): ALBUMIN, TBILIRUBIN, ALKPHOSPHAT, TOTPROTEIN, ALTSGPT, ASTSGOT, CREATININE in the last 72 hours.    Imaging:  TRANSESOPHAGEAL ECHO W/O CONT  5/3/2017   CONCLUSIONS  No vegetations noted, no evidence of endocarditis or abscess.    DX-CHEST-FOR PICC LINE   5/2/2017  Impression      1. Placement right PICC line projecting over the SVC with the tip in the mid to lower right atrium.  2.  Mild diffuse atelectasis/interstitial edema.  3.  Findings discussed with PICC team at 8:00 AM.     ECHOCARDIOGRAM COMP W/O CONT  05/01/2017   CONCLUSIONS  Normal left ventricular chamber size.  Normal left ventricular systolic function.  Left ventricular ejection fraction is visually estimated to be 65%.  Normal regional wall motion.  Mild mitral regurgitation.  The left atrium is normal in size.  Structurally normal aortic valve without significant stenosis or   regurgitation.  Right ventricular systolic pressure is estimated to be 50 mmHg.  Mild tricuspid regurgitation.  Normal inferior vena cava size and inspiratory collapse.  Mildly dilated right ventricle.  Normal right ventricular systolic function.    Micro:    Results     Procedure Component Value Units Date/Time    BLOOD CULTURE [[id number]] Collected:  05/02/17 1335    Order Status:  Completed Specimen Information:  Blood from Peripheral Updated:  05/07/17 1500     Significant Indicator NEG      Source BLD      Site PERIPHERAL       "Blood Culture No growth after 5 days of incubation.     Narrative:      Contact  Per Hospital Policy: Only change Specimen Src: to \"Line\" if  specified by physician order.    BLOOD CULTURE [[id number]] Collected:  05/02/17 1335    Order Status:  Completed Specimen Information:  Blood from Peripheral Updated:  05/07/17 1500     Significant Indicator NEG      Source BLD      Site PERIPHERAL      Blood Culture No growth after 5 days of incubation.     Narrative:      Contact  Per Hospital Policy: Only change Specimen Src: to \"Line\" if  specified by physician order.    CULTURE WOUND W/ GRAM STAIN [[id number]]  (Abnormal)  (Susceptibility) Collected:  04/29/17 1855    Order Status:  Completed Specimen Information:  Wound Updated:  05/03/17 1034     Significant Indicator POS (POS)      Source WND      Site Right Thigh Abscess      Culture Result Wound -- (A)      Result:        Heavy growth Mixed skin yesenia predominantly Viridans  Streptococcus.       Gram Stain Result --      Result:        Many WBCs.  Many mixed bacteria, no predominant organism seen.       Culture Result Wound -- (A)      Result:        Klebsiella pneumoniae  Heavy growth       Culture Result Wound -- (A)      Result:        Methicillin Resistant Staphylococcus aureus  Heavy growth      Narrative:      CALL  Alvarez  141 tel. [phone],  CALLED  141 tel. [phone] 05/01/2017, 10:43, RB PERF. RESULTS CALLED  TO:[id number](MRSA) and faxed to [facility]    Culture & Susceptibility     KLEBSIELLA PNEUMONIAE     Antibiotic Sensitivity Microscan Unit Status    Ampicillin Resistant >16 mcg/mL Final    Cefepime Sensitive <=8 mcg/mL Final    Cefotaxime Sensitive <=2 mcg/mL Final    Cefotetan Sensitive <=16 mcg/mL Final    Ceftazidime Sensitive <=1 mcg/mL Final    Ceftriaxone Sensitive <=8 mcg/mL Final    Cefuroxime Sensitive <=4 mcg/mL Final    Ciprofloxacin Sensitive <=1 mcg/mL Final    Ertapenem Sensitive <=1 mcg/mL Final    Gentamicin Sensitive <=4 mcg/mL Final    " Pip/Tazobactam Sensitive <=16 mcg/mL Final    Tigecycline Sensitive <=2 mcg/mL Final    Tobramycin Sensitive <=4 mcg/mL Final    Trimeth/Sulfa Sensitive <=2/38 mcg/mL Final              METHICILLIN RESISTANT STAPHYLOCOCCUS AUREUS     Antibiotic Sensitivity Microscan Unit Status    Ampicillin/sulbactam Resistant >16/8 mcg/mL Final    Clindamycin Sensitive <=0.5 mcg/mL Final    Daptomycin Sensitive 1 mcg/mL Final    Erythromycin Resistant >4 mcg/mL Final    Moxifloxacin Sensitive <=0.5 mcg/mL Final    Oxacillin Resistant >2 mcg/mL Final    Penicillin Resistant >8 mcg/mL Final    Tetracycline Sensitive <=4 mcg/mL Final    Trimeth/Sulfa Sensitive <=0.5/9.5 mcg/mL Final    Vancomycin Sensitive 2 mcg/mL Final                       ANAEROBIC CULTURE [[id number]]  (Abnormal) Collected:  04/29/17 1855    Order Status:  Completed Specimen Information:  Wound Updated:  05/03/17 1034     Significant Indicator POS (POS)      Source WND      Site Right Thigh Abscess      Anaerobic Culture, Culture Res -- (A)      Result:        Growth noted after further incubation,see below for  organism identification.       Anaerobic Culture, Culture Res -- (A)      Result:        Bacteroides fragilis Group  Heavy growth      Narrative:      CALL  Alvarez  141 tel. [phone],  CALLED  141 tel. [phone] 05/01/2017, 10:43, RB PERF. RESULTS CALLED  TO:[id number](MRSA) and faxed to [facility]    FUNGAL CULTURE [[id number]]  (Abnormal) Collected:  04/29/17 1855    Order Status:  Completed Specimen Information:  Wound Updated:  05/03/17 1034     Significant Indicator POS (POS)      Source WND      Site Right Thigh Abscess      Fungal Culture -- (A)      Result:        Growth noted after further incubation,see below for  organism identification.       Fungal Culture -- (A)      Result:        Yeast  Light growth      Narrative:      CALL  Alvarez  141 tel. [phone],  CALLED  141 tel. [phone] 05/01/2017, 10:43, RB PERF. RESULTS CALLED  TO:[id number](MRSA) and  "faxed to [facility]    BLOOD CULTURE [[id number]]  (Abnormal) Collected:  04/29/17 1100    Order Status:  Completed Specimen Information:  Blood from Peripheral Updated:  05/01/17 1850     Significant Indicator POS (POS)      Source BLD      Site PERIPHERAL      Blood Culture        Result:        Growth detected by Bactec instrument.  04/30/2017  14:03 (A)     Blood Culture Bacteroides vulgatus (A)     Narrative:      CALL  Alvarez  19 tel. [phone],  CALLED  19 tel. [phone] 04/30/2017, 14:05, RB PERF. RESULTS CALLED  TO:Chidi [phone]  Per Hospital Policy: Only change Specimen Src: to \"Line\" if  specified by physician order.    BLOOD CULTURE [[id number]]  (Abnormal) Collected:  04/29/17 1038    Order Status:  Completed Specimen Information:  Blood from Peripheral Updated:  05/01/17 1849     Significant Indicator POS (POS)      Source BLD      Site PERIPHERAL      Blood Culture        Result:        Growth detected by Bactec instrument.  04/30/2017  14:03 (A)     Blood Culture Bacteroides vulgatus (A)     Narrative:      CALL  Alvarez  19 tel. [phone],  CALLED  19 tel. [phone] 04/30/2017, 14:06, RB PERF. RESULTS CALLED  TO:Chidi [phone]  Per Hospital Policy: Only change Specimen Src: to \"Line\" if  specified by physician order.             Assessment:  Active Hospital Problems    Diagnosis   • *Severe sepsis (CMS-[facility]) [A41.9, R65.20]   • Type 2 diabetes mellitus (CMS-[facility]) [E11.9]   • Chronic ulcerative colitis (CMS-[facility]) [K51.90]   • Complicated wound infection (CMS-[facility]) [T79.8XXA]   • Thrombocytopenia (CMS-[facility]) [D69.6]   • Hypothyroid [E03.9]       Plan:  Right thigh abscess with septic shock-  S/p I&D on 4/29/2017 with Dr. Reyes  OR cx +MRSA, Kleb pneumoniae, Strep Viridans, B fragilis & Yeast  TTE on 5/1 negative-NAMRATA on 5/3 negative   Continue IV Ceftaroline and IV Flagyl  Continue PO Diflucan for yeast  Orders for [facility] faxed to  on 5/4.  Plan for IV Ertapenem 1 g daily, in addition to PO Linezolid and " Fluconazole end date 5/18/17.  Pt will need dose of Ertapenem prior to discharge home.  Pre auth for Linezolid complete and approved.  Pt agrees no more testosterone injections.  Waiting for 2nd WV delivery to pt's home for discharge.    Right medial ankle abscess, improved  S/p I&D on 5/1 with Dr. Reyes    Left hand abscess, improved  S/p I&D on 5/1 with Dr. Reyes    Bacteroides fragilis septic shock, improved  Bcx 4/29 +Bacteroides vulgatus  IV Flagyl as above.  Repeat Bcx x2 on 5/2- NGTD  Covered by above abx    Postoperative wound infection of umbilical hernia site  Improving, WV in place    Ulcerative colitis  Diarrhea controlled  Per GI-current meds not contributing to infection    Diabetes mellitus  Keep blood sugars <150, elevated BS will impair wound healing and worsen infection.

## 2017-05-08 NOTE — FACE TO FACE
Face to Face Note  -  Durable Medical Equipment    Lala Rendon M.D. - NPI: [id number]  I certify that this patient is under my care and that they had a durable medical equipment(DME)face to face encounter by myself that meets the physician DME face-to-face encounter requirements with this patient on:    Date of encounter:   Patient:                    MRN:                       YOB: 2017  Mahesh Bradshaw  [id number]  1965     The encounter with the patient was in whole, or in part, for the following medical condition, which is the primary reason for durable medical equipment:  Wound Care    I certify that, based on my findings, the following durable medical equipment is medically necessary:  Walkers.    HOME O2 Saturation Measurements:(Values must be present for Home Oxygen orders)         ,     ,         My Clinical findings support the need for the above equipment due to:  Wound infection    Supporting Symptoms: wound infections, skin abscesses

## 2017-05-08 NOTE — DISCHARGE SUMMARY
CHIEF COMPLAINT ON ADMISSION  Chief Complaint   Patient presents with   • Fever   • Wound Infection       CODE STATUS  Full Code    HPI & HOSPITAL COURSE  HISTORY OF PRESENT ILLNESS:  The patient has a complex medical history of    diabetes mellitus and ulcerative colitis with recent septic shock from    umbilical hernia abscess.  He underwent surgery by Dr. Dooley on 4/7/2017,    has subsequently had a wound VAC on.  He has been followed by infectious    disease.  He gave himself  testosterone injection 4 days ago in the right    thigh, woke up the next morning with a fever, pain in the right thigh and    swelling, this is worsened.  He has been vomiting 6-7 times per day.  He    presented to the emergency room with these complaints.  Here, his white blood    cell count is 16,000.  He is hypotensive with a blood pressure in the 80s/40s,   and a CAT scan here reveals a 14 cm distal thigh abscess.  He will be    admitted to the intensive care unit in guarded condition and Dr. Dooley,    surgery, has planned on surgery this afternoon around 5:30 in the afternoon.    HOSPITAL COURSE: He was taken to the OR by Dr. Dooley that day for placement of a wound vac on the right thigh wound and I&D due to necrotizing myositis of the right thigh. The patient then went back to the OR due to development of a left hand an right ankle abscess. The wounds grew multiple organisms including yeast, MRSA, klebsiella and streptococcus. He received a PICC line and will be discharged on a combination of IV and oral antibiotics as outlined below. He has been having quite a lot of pain that is being controlled with MS Contin that I will discharge him on to be tapered by his outpatient doctor as the wounds improve. The patient is advised not to give himself testosterone injections and will switch to topical testosterone. The patient had a NAMRATA done during this hospital stay which was negative for any evidence of endocarditis.     It may  be that the infections are extra-intestinal manifestations of his ulcerative colitis. He will follow up with Dr. Higginbotham, his GI specialist, to discuss treatment and remain on mesalamine in the meanwhile.    Therefore, he is discharged in good and stable condition with close outpatient follow-up.    SPECIFIC OUTPATIENT FOLLOW-UP  GI, Dr. Myles  [facility], infusion services  ID  Dr. Dooley    DISCHARGE PROBLEM LIST  Principal Problem:    Severe sepsis (CMS-HCC) POA: Unknown  Active Problems:    Chronic ulcerative colitis (CMS-[facility]) POA: Yes    Hypothyroid POA: Yes    Complicated wound infection (CMS-[facility]) POA: Yes    Bacteremia due to Gram-negative bacteria POA: Unknown    Abscess of right thigh POA: Unknown    Septic shock (CMS-[facility]) POA: Unknown    DM (diabetes mellitus), type 2, uncontrolled (CMS-[facility]) POA: Unknown  Resolved Problems:    Thrombocytopenia (CMS-[facility]) POA: Yes    Hyponatremia POA: Unknown      FOLLOW UP  No future appointments.  No follow-up provider specified.    MEDICATIONS ON DISCHARGE   [facility] Medication Instructions CALVIN:[id number]    Printed on:05/08/17 3775   Medication Information                      acetaminophen (TYLENOL) 325 MG Tab  Take 650 mg by mouth Once.             buPROPion (WELLBUTRIN XL) 300 MG XL tablet  Take 300 mg by mouth every evening.             Cholecalciferol (VITAMIN D3) 5000 UNITS Tab  Take 5,000 Units by mouth every evening.             fluconazole (DIFLUCAN) 100 MG Tab  Take 1 Tab by mouth every day.             Insulin Glargine (TOUJEO SOLOSTAR) 300 UNIT/ML Solution Pen-injector  Inject 62 Units as instructed every day. Adjusts based on blood sugar.             insulin lispro (HUMALOG) 100 UNIT/ML Solution  Inject 2-20 Units as instructed 3 times a day before meals. Sliding Scale - 2 units every 50 > 150             linezolid (ZYVOX) 600 MG Tab  Take 1 Tab by mouth 2 times a day for 14 days.             magnesium oxide (MAG-OX) 400 (241.3 MG) MG  Tab tablet  Take 1 Tab by mouth every day.             mesalamine (LIALDA) 1.2 GM TBEC  Take 2.4 g by mouth every evening.             metronidazole (FLAGYL) 500 MG Tab  Take 1 Tab by mouth every 8 hours.             morphine ER (MS CONTIN) 30 MG Tab CR tablet  Take 1 Tab by mouth every 12 hours.             NS SOLN 100 mL with ertapenem 1 GM SOLR 1,000 mg  1,000 mg by Intravenous route every 24 hours.             oxycodone-acetaminophen (PERCOCET-10)  MG Tab  Take 1 Tab by mouth every 8 hours as needed for Severe Pain.             thyroid (ARMOUR THYROID) 30 MG Tab  Take 75 mg by mouth every evening. Pt uses a 60 mg and 1/2 of a 30 mg, total dose = 75mg QPM             thyroid (ARMOUR THYROID) 60 MG Tab  Take 75 mg by mouth every evening. Pt uses a 60 mg and 1/2 of a 30 mg, total dose = 75mg QPM                 DIET  Orders Placed This Encounter   Procedures   • DIET ORDER     Standing Status: Standing      Number of Occurrences: 1      Standing Expiration Date:      Order Specific Question:  Diet:     Answer:  Diabetic [3]       ACTIVITY  As tolerated.      CONSULTATIONS  DR. Dooley, Surgery  Dr. Santoro, ID    PROCEDURES  DATE OF SERVICE:  04/29/2017  DATE OF SERVICE:  04/29/2017  PREOPERATIVE DIAGNOSES:  1.  Open abdominal wound.  2.  Right thigh abscess.  POSTOPERATIVE DIAGNOSES:  1.  Open abdominal wound.  2.  Right thigh abscess.  3.  Right thigh necrotizing myositis.  SURGEON:  Esau Dooley MD  ANESTHESIOLOGIST:  Melvin Pfeiffer MD  PROCEDURES:  1.  Placement of negative pressure wound therapy dressing.  2.  Incision and drainage with debridement of muscle and fascia of the right    Thigh.    DATE OF SERVICE:  05/01/2017  DATE OF SERVICE:  05/01/2017.  PREOPERATIVE DIAGNOSIS:  Left hand and right ankle abscess.  POSTOPERATIVE DIAGNOSIS:  Left hand and right ankle abscess.  PROCEDURE:  Incision and drainage of left hand and right ankle abscesses.  SURGEON:  Esau Dooley  MD.    LABORATORY  Lab Results   Component Value Date/Time    SODIUM 138 05/01/2017 01:36 AM    POTASSIUM 4.2 05/01/2017 01:36 AM    CHLORIDE 107 05/01/2017 01:36 AM    CO2 20 05/01/2017 01:36 AM    GLUCOSE 242* 05/01/2017 01:36 AM    BUN 21 05/01/2017 01:36 AM    CREATININE 0.80 05/01/2017 01:36 AM        Lab Results   Component Value Date/Time    WBC 5.6 05/08/2017 04:15 AM    HEMOGLOBIN 10.9* 05/08/2017 04:15 AM    HEMATOCRIT 33.0* 05/08/2017 04:15 AM    PLATELET COUNT 309 05/08/2017 04:15 AM        Total time of the discharge process exceeds 40 minutes.

## 2017-05-08 NOTE — PROGRESS NOTES
"AO x 4, medicated for pain prior to dressing change, good results during the dressing change.  Patient stated \"It hurt less this time\", but patient did c/o pulsating pain to R thigh afterwards.  Ice packs in use and this RN notified Dr. Rendon.  Order for one time dose of extra dilaudid received.  Med administered, patient stated relief. Wound vacs x 2, suction maintained, dressings c/d/i.  Dressings to L hand and R ankle in place, c/d/i.  Up to chair for lunch time meal, continues to sit in chair now.  Consumed % of meals, but requesting protein drink in between meals to assist with wound healing. Supplements entered to epic.PICC line patent, dressing c/d/i, no signs of infection noted.  Plan of care discussed, questions answered, verbalized understanding.   "

## 2017-05-08 NOTE — PROGRESS NOTES
Hospital Medicine Interval Note  Date of Service:  5/8/2017    Chief Complaint  51 y.o.-year-old male admitted 4/29/2017 with multiple recurrent soft tissue abscesses and poorly controlled diabetes. Latest abscess at site of monthly testosterone injection site.    Interval Problem Update  Slept well last night with higher dose of MS Contin at night  Still needing IV pain med with vac change    Consultants/Specialty  ID  Surgery    Disposition  Home with infusion center for antibiotics, discussed with   ID writing antbiotic orders     Review of Systems   Constitutional: Negative for fever, chills and malaise/fatigue.   HENT: Negative.    Respiratory: Negative for cough and shortness of breath.    Cardiovascular: Negative.    Gastrointestinal: Negative for nausea, vomiting and abdominal pain.   Genitourinary: Negative for dysuria.   Musculoskeletal: Positive for myalgias and joint pain.        Abd, Right thigh, Right medial ankle and Left hand.   Skin: Negative.    Neurological: Negative for sensory change, speech change and weakness.   Psychiatric/Behavioral: Negative for depression. The patient is not nervous/anxious and does not have insomnia.       Physical Exam Laboratory/Imaging   Filed Vitals:    05/07/17 1554 05/07/17 1920 05/08/17 0310 05/08/17 0755   BP: 121/65 118/64 136/74 105/66   Pulse: 72 73 79 64   Temp: 36.8 °C (98.2 °F) 36.7 °C (98.1 °F) 36.7 °C (98 °F) 37 °C (98.6 °F)   Resp: 16 17 16 16   Height:       Weight:       SpO2: 98% 97% 96% 96%     Physical Exam   Constitutional: He is oriented to person, place, and time. He appears well-developed and well-nourished. No distress.   HENT:   Nose: Nose normal.   Mouth/Throat: Oropharynx is clear and moist.   Eyes: Conjunctivae and EOM are normal. Right eye exhibits no discharge. Left eye exhibits no discharge. No scleral icterus.   Neck: No tracheal deviation present.   Cardiovascular: Normal rate and regular rhythm.    No murmur  heard.  Pulmonary/Chest: Effort normal and breath sounds normal. No stridor. No respiratory distress. He has no wheezes.   Abdominal: Soft. Bowel sounds are normal. He exhibits no distension. There is no tenderness.   Musculoskeletal: He exhibits no edema.   Right anterior lateral thigh with wound vac  Left hand clean and dry dressing  Right thumb cellulitis improved   Neurological: He is alert and oriented to person, place, and time. No cranial nerve deficit.   Skin: Skin is warm. He is not diaphoretic.   Psychiatric: He has a normal mood and affect. His behavior is normal. Thought content normal.   Nursing note and vitals reviewed.   Lab Results   Component Value Date/Time    WBC 5.6 05/08/2017 04:15 AM    HEMOGLOBIN 10.9* 05/08/2017 04:15 AM    HEMATOCRIT 33.0* 05/08/2017 04:15 AM    PLATELET COUNT 309 05/08/2017 04:15 AM     Lab Results   Component Value Date/Time    SODIUM 138 05/01/2017 01:36 AM    POTASSIUM 4.2 05/01/2017 01:36 AM    CHLORIDE 107 05/01/2017 01:36 AM    CO2 20 05/01/2017 01:36 AM    GLUCOSE 242* 05/01/2017 01:36 AM    BUN 21 05/01/2017 01:36 AM    CREATININE 0.80 05/01/2017 01:36 AM      Assessment/Plan    Hypothyroid (present on admission)  Assessment & Plan  Continue home dose of armour thyroid    Complicated wound infection (CMS-HCC) (present on admission)  Assessment & Plan  Multiple wounds with polymicrobial infections.  Continue ceftaroline, flagyl and fluconazole  Two wound vacs in place, awaiting delivery of second home unit prior to discharge  Dilaudid IV 1mg with vac changes    Bacteremia due to Gram-negative bacteria  Assessment & Plan  Due to polymicrobial infections of the skin and soft tissue.  Discussed with ID  NAMRATA negative  continue night time dose of ms contin 60mg, 30mg in am to be more alert during the day    Abscess of right thigh  Assessment & Plan  Recommend patient discontinue testosterone injections  Prescribe topical gel at discharge    Septic shock  (CMS-HCC)  Assessment & Plan  Resolved.    DM (diabetes mellitus), type 2, uncontrolled (CMS-HCC)  Assessment & Plan  Continue sliding scale insulin  Diabetes education has worked with patient in the past and provided education on multiple occasions, his A1c has been improving.    Chronic ulcerative colitis (CMS-HCC) (present on admission)  Assessment & Plan  Continue mesalamine.  Multiple abscesses could be extra intestinal manifestations of ulcerative colitis  Not a candidate for biologic therapy at this time due to active infection but is worth considering when infections are healed, patient will follow up with Dr. Myles to discuss.       Labs reviewed, Medications reviewed and Radiology images reviewed  Sousa catheter: No Sousa            Antibiotics: Treating active infection/contamination beyond 24 hours perioperative coverage

## 2017-05-08 NOTE — DISCHARGE PLANNING
Received choice form for DME services, referral has been sent to [facility] for per patient and choice form request.

## 2017-05-08 NOTE — PROGRESS NOTES
Patient refused bed alarm.  Patient educated on fall risks and importance of using call light.  Discussed safety and fall prevention.  Patient verbalized understanding, but declined bed alarm. Calls for assistance. Fall precautions in place: treaded slipper socks, bed is in low position, personal belongings, wastebasket, call light, and phone are within patient's reach.

## 2017-05-08 NOTE — CONSULTS
Diabetes Eduction:  Mahesh denies any questions or needs regarding his diabetes at this time.  He states he has a meter and is taking Toujeo and Humalog at home.  He states he is just trying to get rid of the infections.  He understands the need to keep his blood sugars in good control to help heal and prevent further infections.

## 2017-05-08 NOTE — DISCHARGE PLANNING
Reviewed with pt at bedside.   1. Plan home with home health through [facility]. [facility] has accepted pt.   2. Wound vac with 3 x week dressing changes from [facility]. Per [facility] they will not be delivering another wound vac. Pt will need to have his home wound vac brought in to change it out with the hospital’s wound vac. Plan is to send pt home on his home vac and connect the 2 wounds to the one wound vac via a Y connection. Arcelia from [facility] reviewed with the wound care nurses and that is what was determined to be the plan.   3. Daily IV ABX through [facility]. [facility] has authorized the IV ABX, however, pt needs to receive the first dose of Ertepenem prior to discharging home to monitor for reaction.  Reviewed above with bedside RN who will review with MD.

## 2017-05-08 NOTE — DISCHARGE PLANNING
Order rec’d for [facility]. Reviewed with pt at bedside and referral to be sent to [facility] who is contracted with pt’s insurance of [employer]. Faxed to [facility] to send.

## 2017-05-08 NOTE — PROGRESS NOTES
Pt A&O x4. Up to chair most of the night. Now back to bed to rest for the evening. Pt reports pain 6/10. Medicating per MAR PRN. Bandages to right ankle and left hand CDI. To be changed this shift. Wound vac to Right thigh and mid abdomen intact. No air leaks apparent. Pt does complain of a lump to the Right side of the neck at the old IJ site. Pt expresses concern about this d/t history of abscess formation with needle sticks and broken skin. IVF running TKO, VSS. +BS, +flatus, +void. Call light and belongings in reach. Bed locked in low position. Hourly rounding in place.

## 2017-05-09 ENCOUNTER — HOME CARE VISIT (OUTPATIENT)
Dept: HOME HEALTH SERVICES | Facility: HOME HEALTHCARE | Age: 52
End: 2017-05-09
Payer: COMMERCIAL

## 2017-05-09 VITALS
TEMPERATURE: 97.4 F | SYSTOLIC BLOOD PRESSURE: 112 MMHG | HEIGHT: 68 IN | RESPIRATION RATE: 16 BRPM | DIASTOLIC BLOOD PRESSURE: 78 MMHG

## 2017-05-09 PROCEDURE — G0162 HHC RN E&M PLAN SVS, 15 MIN: HCPCS

## 2017-05-09 PROCEDURE — 665001 SOC-HOME HEALTH

## 2017-05-09 SDOH — ECONOMIC STABILITY: HOUSING INSECURITY: UNSAFE COOKING RANGE AREA: 0

## 2017-05-09 SDOH — ECONOMIC STABILITY: HOUSING INSECURITY: UNSAFE APPLIANCES: 0

## 2017-05-09 ASSESSMENT — ENCOUNTER SYMPTOMS
DEBILITATING PAIN: 1
NAUSEA: NO
VOMITING: NO

## 2017-05-09 ASSESSMENT — PATIENT HEALTH QUESTIONNAIRE - PHQ9
1. LITTLE INTEREST OR PLEASURE IN DOING THINGS: 00
2. FEELING DOWN, DEPRESSED, IRRITABLE, OR HOPELESS: 00

## 2017-05-09 NOTE — WOUND TEAM
[facility]  Inpatient Services  Wound and Skin Care Progress Note    HPI, PMH, SH: Reviewed  Unit where seen by Wound Team: T4    WOUND TEAM FOLLOW UP: Scheduled NPWT dressing change.    SUBJECTIVE:  Pleasant/agreeable    Self Report / Pain Level:   Right thigh painful despite premed    OBJECTIVE:  Prev npwt drsg remained intact    WOUND TYPE, LOCATION, CHARACTERISTICS:    Wound POA Open Surgical (Complicated) Umbilicus Medial  Periwound Skin:   Intact  Drainage :    None     Tissue Type and %:    90% pink/red -- 10% pale/white fibrous.  Wound Edges:    attached   Odor:     None   Exposed structure(s):   suture   S/S of Infection:   none    Measurements :   taken 5/1/17  Length (cm):    0.8  Width (cm):    2.8  Depth (cm):    1.4         Wound POA Open Surgical (Complicated) Right upper thigh, lateral.  Periwound Skin:   Edematous, indurated.  Drainage :   Min sanguinous  Tissue Type and %:    100% Red/pink.  Wound Edges:    Attached.   Odor:     None   Exposed structure(s):   Fascia, muscle.  S/S of infectionn:  Induration.    Measurements :   Taken 05/04/2017  Length (cm):    17 cm  Width (cm):    4.8 cm  Depth (cm):    2.7 cm          INTERVENTIONS BY WOUND TEAM: prev drsgs removed -- wnds irrigated with wnd cleanser -- benzoin and drape skylar-wnd -- mepitel placed over the base of the right thigh wnd -- black foam to fill/cover the wnds (1 piece over the abdominal wnd and 2 pieces over the right thigh) -- each sealed with drape and negative pressure applied at 125mmhg/dpc (used the 2 machines in the room but wnds could be y-connected and 1 machine used when d/c'ed home)    Interdisciplinary consultation: RN, patient.     EVALUATION AND PROGRESS OF WOUND(S):  wnds remain clean and should cont to progress with npwt; abd wnd should be closed soon    Factors affecting wound healing: infection/sepsis, abscesses, obesity, DM.     Goals:  Closure of the abdominal wnd in 2 weeks -- decrease size of thigh  wnd X 5% every 2 weeks    NURSING PLAN OF CARE:    Dressing changes: Continue previous Dressing Maintenance orders: x       See new Dressing Maintenance orders:     x  Skin care: See Skin Care orders:        Rectal tube care: See Rectal Tube Care orders:      Other orders:           WOUND TEAM PLAN OF CARE (x):   NPWT change 3 x week:  x      Dressing changes:      Follow up as needed:     x  Other:

## 2017-05-09 NOTE — DISCHARGE INSTRUCTIONS
Discharge Instructions    Discharged to home by car with relative. Discharged via wheelchair, hospital escort: Yes.  Special equipment needed: Walker and Wound VAC    Be sure to schedule a follow-up appointment with your primary care doctor or any specialists as instructed.     Discharge Plan:   Diet Plan: Discussed  Activity Level: Discussed  Confirmed Follow up Appointment: Patient to Call and Schedule Appointment  Confirmed Symptoms Management: Discussed  Medication Reconciliation Updated: Yes  Influenza Vaccine Indication: Not indicated: Previously immunized this influenza season and > 8 years of age    I understand that a diet low in cholesterol, fat, and sodium is recommended for good health. Unless I have been given specific instructions below for another diet, I accept this instruction as my diet prescription.   Other diet: diabetic diet as tolerated    Special Instructions: None    · Is patient discharged on Warfarin / Coumadin?   No     · Is patient Post Blood Transfusion?  No    Depression / Suicide Risk    As you are discharged from [facility], it is important to learn how to keep safe from harming yourself.    Recognize the warning signs:  · Abrupt changes in personality, positive or negative- including increase in energy   · Giving away possessions  · Change in eating patterns- significant weight changes-  positive or negative  · Change in sleeping patterns- unable to sleep or sleeping all the time   · Unwillingness or inability to communicate  · Depression  · Unusual sadness, discouragement and loneliness  · Talk of wanting to die  · Neglect of personal appearance   · Rebelliousness- reckless behavior  · Withdrawal from people/activities they love  · Confusion- inability to concentrate     If you or a loved one observes any of these behaviors or has concerns about self-harm, here's what you can do:  · Talk about it- your feelings and reasons for harming yourself  · Remove any means  that you might use to hurt yourself (examples: pills, rope, extension cords, firearm)  · Get professional help from the community (Mental Health, Substance Abuse, psychological counseling)  · Do not be alone:Call your Safe Contact- someone whom you trust who will be there for you.  · Call your local CRISIS HOTLINE 658-8329 or 798-179-5067  · Call your local Children's Mobile Crisis Response Team [place] (361) 888-3297 or wwwPathwright  · Call the toll free National Suicide Prevention Hotlines   · National Suicide Prevention Lifeline 183-842-HBZK (4406)  · National Hope Line Network 800-SUICIDE (886-3326)        Vacuum-Assisted Closure Therapy Home Guide  Vacuum-assisted closure therapy (VAC therapy) is a device that helps wounds heal. It is used on wounds that cannot be closed with stitches. They often heal slowly. VAC therapy helps the wound stay clean and healthy while its edges slowly grow back together.  VAC therapy uses a bandage (dressing) that is made of foam. It is put inside the wound. Then, a drape is placed over the wound. This drape sticks to your skin (adhesive) to keep air out. A tube is hooked up to a small pump and is attached to the drape. The pump sucks fluid and germs from the wound. It can also decrease any bad smell that comes from the wound.  RISKS AND COMPLICATIONS  VAC therapy is usually safe to use at home. Your skin may get sore from the adhesive drape. That is the most common problem. However, more serious problems can develop, such as:   · Bleeding. This can happen if the dressing in the wound comes into contact with blood vessels. A little bleeding may occur when the dressing is being changed. This is normal now and then. Major bleeding can happen if a large blood vessel breaks. This is more likely if you are taking blood-thinning medicine. Emergency surgery may be needed.  · Infection. This can happen if the dressing has an air leak that is not repaired within a couple of  hours.  · Dehydration. This can happen if the pump sucks out too much body fluid.  DRESSING CHANGES  Your dressing will have to be changed. Sometimes this is needed once a day. Other times, a dressing change must be done 3 times a week. How often you change your dressing will depend on what your wound is like. A trained caregiver will most likely change the dressing. However, a family member or friend may be trained to change the dressing. Below are steps to change a dressing in order to prevent an infection. The steps apply to you or the person that changes your dressing.  2. Wash your hands with soap and water before and after each dressing change.  3. Wear gloves and protective clothing. This may include eye protection.  4. Do not allow anyone to change your dressing if they have an infection or a skin condition. Even a small cut can be a problem.  To change the dressin. Turn off the pump.  3. Take off the adhesive drape.  4. Disconnect the tube from the dressing.  5. Take out the dressing that is inside the wound. If the dressing sticks, use a germ-free (sterile), saltwater solution to wet the dressing. This helps it come out more easily. If it hurts when the dressing is changed, take pain medicine 30 minutes before the dressing change.  6. Cleanse the wound with normal saline or sterile water.  7. Apply a skin barrier film to the skin that will be covered with the drape. This will protect the skin.  8. Put a new dressing into the wound.  9. Apply a new drape and tube.  10. Replace the container in the pump that collects fluid if it is full. Do this at least once per week.  11. Turn the pump back on.  12. Your doctor will decide what setting of suction is best. Do not change the settings on the machine without talking to your nurse or doctor.  HOME CARE INSTRUCTIONS   2. The VAC pump has an alarm. It goes off if there are any problems such as a leak.  1. Ask your caregiver what to do if the alarm goes  off.  2. Call your caregiver right away if the alarm goes off and you cannot fix the problem.  3. Do not turn off the pump for more than 2 hours.  4. Check your wound carefully at each dressing change for signs of infection. Watch for redness, swelling, or any fluid leaking from the wound. If you develop an infection:  1. You may have to stop VAC therapy.  2. The wound will need to be cleaned and washed out.  3. You will have to take antibiotic medicine.  5. Ask your caregiver what activities you should or should not do while you are getting VAC therapy. This will depend on your particular wound.  6. Ask if it is okay to turn off the pump so you can take a shower. If it is okay, make sure the wound is covered with plastic. The wound area must stay dry.  7. Drink enough fluids to keep your urine clear or pale yellow.  8. Eat foods that contain a lot of protein. Examples are meat, poultry, seafood, eggs, nuts, beans, and peas. Protein can help your wound heal.  SEEK MEDICAL CARE IF:  2. Your wound itches or hurts.  3. Dressing changes are often painful or bleeding often occurs.  4. You have a headache.  5. You have diarrhea.  6. You have a sore throat.  7. You have a rash.  8. You feel nauseous.  9. You feel dizzy or weak.  SEEK IMMEDIATE MEDICAL CARE IF:   · You have very bad pain.  · You have bleeding that will not stop.  · Your wound smells bad.  · You have redness, swelling, or fluid leaking from your wound.  · Your alarm goes off and you do not know what to do.  · You have a fever.     This information is not intended to replace advice given to you by your health care provider. Make sure you discuss any questions you have with your health care provider.     Document Released: 03/11/2013 Document Reviewed: 03/11/2013  Elsevier Interactive Patient Education ©2016 Elsevier Inc.      PICC Home Guide  A peripherally inserted central catheter (PICC) is a long, thin, flexible tube that is inserted into a vein in the  "upper arm. It is a form of intravenous (IV) access. It is considered to be a \"central\" line because the tip of the PICC ends in a large vein in your chest. This large vein is called the superior vena cava (SVC). The PICC tip ends in the SVC because there is a lot of blood flow in the SVC. This allows medicines and IV fluids to be quickly distributed throughout the body. The PICC is inserted using a sterile technique by a specially trained nurse or physician. After the PICC is inserted, a chest X-ray exam is done to be sure it is in the correct place.   A PICC may be placed for different reasons, such as:  · To give medicines and liquid nutrition that can only be given through a central line. Examples are:  · Certain antibiotic treatments.  · Chemotherapy.  · Total parenteral nutrition (TPN).  · To take frequent blood samples.  · To give IV fluids and blood products.  · If there is difficulty placing a peripheral intravenous (PIV) catheter.  If taken care of properly, a PICC can remain in place for several months. A PICC can also allow a person to go home from the hospital early. Medicine and PICC care can be managed at home by a family member or home health care team.  WHAT PROBLEMS CAN HAPPEN WHEN I HAVE A PICC?  Problems with a PICC can occasionally occur. These may include the followin. A blood clot (thrombus) forming in or at the tip of the PICC. This can cause the PICC to become clogged. A clot-dissolving medicine called tissue plasminogen activator (tPA) can be given through the PICC to help break up the clot.  6. Inflammation of the vein (phlebitis) in which the PICC is placed. Signs of inflammation may include redness, pain at the insertion site, red streaks, or being able to feel a \"cord\" in the vein where the PICC is located.  7. Infection in the PICC or at the insertion site. Signs of infection may include fever, chills, redness, swelling, or pus drainage from the PICC insertion site.  8. PICC " movement (malposition). The PICC tip may move from its original position due to excessive physical activity, forceful coughing, sneezing, or vomiting.  9. A break or cut in the PICC. It is important to not use scissors near the PICC.  10. Nerve or tendon irritation or injury during PICC insertion.  WHAT SHOULD I KEEP IN MIND ABOUT ACTIVITIES WHEN I HAVE A PICC?  13. You may bend your arm and move it freely. If your PICC is near or at the bend of your elbow, avoid activity with repeated motion at the elbow.  14. Rest at home for the remainder of the day following PICC line insertion.  15. Avoid lifting heavy objects as instructed by your health care provider.  16. Avoid using a crutch with the arm on the same side as your PICC. You may need to use a walker.  WHAT SHOULD I KNOW ABOUT MY PICC DRESSING?  9. Keep your PICC bandage (dressing) clean and dry to prevent infection.  1. Ask your health care provider when you may shower. Ask your health care provider to teach you how to wrap the PICC when you do take a shower.  10. Change the PICC dressing as instructed by your health care provider.  11. Change your PICC dressing if it becomes loose or wet.  WHAT SHOULD I KNOW ABOUT PICC CARE?  10. Check the PICC insertion site daily for leakage, redness, swelling, or pain.  11. Do not take a bath, swim, or use hot tubs when you have a PICC. Cover PICC line with clear plastic wrap and tape to keep it dry while showering.  12. Flush the PICC as directed by your health care provider. Let your health care provider know right away if the PICC is difficult to flush or does not flush. Do not use force to flush the PICC.  13. Do not use a syringe that is less than 10 mL to flush the PICC.  14. Never pull or tug on the PICC.  15. Avoid blood pressure checks on the arm with the PICC.  16. Keep your PICC identification card with you at all times.  17. Do not take the PICC out yourself. Only a trained clinical professional should remove  "the PICC.  SEEK IMMEDIATE MEDICAL CARE IF:  · Your PICC is accidentally pulled all the way out. If this happens, cover the insertion site with a bandage or gauze dressing. Do not throw the PICC away. Your health care provider will need to inspect it.  · Your PICC was tugged or pulled and has partially come out. Do not  push the PICC back in.  · There is any type of drainage, redness, or swelling where the PICC enters the skin.  · You cannot flush the PICC, it is difficult to flush, or the PICC leaks around the insertion site when it is flushed.  · You hear a \"flushing\" sound when the PICC is flushed.  · You have pain, discomfort, or numbness in your arm, shoulder, or jaw on the same side as the PICC.  · You feel your heart \"racing\" or skipping beats.  · You notice a hole or tear in the PICC.  · You develop chills or a fever.  MAKE SURE YOU:   · Understand these instructions.  · Will watch your condition.  · Will get help right away if you are not doing well or get worse.     This information is not intended to replace advice given to you by your health care provider. Make sure you discuss any questions you have with your health care provider.     Document Released: 06/23/2004 Document Revised: 01/08/2016 Document Reviewed: 08/25/2014  [email or web address] Interactive Patient Education ©2016 [email or web address]    Incision and Drainage, Care After  Refer to this sheet in the next few weeks. These instructions provide you with information on caring for yourself after your procedure. Your caregiver may also give you more specific instructions. Your treatment has been planned according to current medical practices, but problems sometimes occur. Call your caregiver if you have any problems or questions after your procedure.  HOME CARE INSTRUCTIONS   · If antibiotic medicine is given, take it as directed. Finish it even if you start to feel better.  · Only take over-the-counter or prescription medicines for pain, discomfort, or fever as " directed by your caregiver.  · Keep all follow-up appointments as directed by your caregiver.  · Change any bandages (dressings) as directed by your caregiver. Replace old dressings with clean dressings.  · Wash your hands before and after caring for your wound.  You will receive specific instructions for cleansing and caring for your wound.   SEEK MEDICAL CARE IF:   11. You have increased pain, swelling, or redness around the wound.  12. You have increased drainage, smell, or bleeding from the wound.  13. You have muscle aches, chills, or you feel generally sick.  14. You have a fever.  MAKE SURE YOU:   17. Understand these instructions.  18. Will watch your condition.  19. Will get help right away if you are not doing well or get worse.     This information is not intended to replace advice given to you by your health care provider. Make sure you discuss any questions you have with your health care provider.     Document Released: 03/11/2013 Document Revised: 01/08/2016 Document Reviewed: 03/11/2013  [employer] Interactive Patient Education ©2016 Elsevier Inc.    Pain Medicine Instructions  HOW CAN PAIN MEDICINE AFFECT ME?  You were given a prescription for pain medicine. This medicine may make you tired or drowsy and may affect your ability to think clearly. Pain medicine may also affect your ability to drive or perform certain physical activities. It may not be possible to make all of your pain go away, but you should be comfortable enough to move, breathe, and take care of yourself.  HOW OFTEN SHOULD I TAKE PAIN MEDICINE AND HOW MUCH SHOULD I TAKE?  · Take pain medicine only as directed by your health care provider and only as needed for pain.  · You do not need to take pain medicine if you are not having pain, unless directed by your health care provider.  · You can take less than the prescribed dose if you find that a smaller amount of medicine controls your pain.  WHAT RESTRICTIONS DO I HAVE WHILE TAKING PAIN  MEDICINE?  Follow these instructions after you start taking pain medicine, while you are taking the medicine, and for 8 hours after you stop taking the medicine:  15. Do not drive.  16. Do not operate machinery.  17. Do not operate power tools.  18. Do not sign legal documents.  19. Do not drink alcohol.  20. Do not take sleeping pills.  21. Do not supervise children by yourself.  22. Do not participate in activities that require climbing or being in high places.  23. Do not enter a body of water--such as a lake, river, ocean, spa, or swimming pool--without an adult nearby who can monitor and help you.  HOW CAN I KEEP OTHERS SAFE WHILE I AM TAKING PAIN MEDICINE?  20. Store your pain medicine as directed by your health care provider. Make sure that it is placed where children and pets cannot reach it.  21. Never share your pain medicine with anyone.  22. Do not save any leftover pills. If you have any leftover pain medicine, get rid of it or destroy it as directed by your health care provider.  WHAT ELSE DO I NEED TO KNOW ABOUT TAKING PAIN MEDICINE?  12. Use a stool softener if you become constipated from your pain medicine. Increasing your intake of fruits and vegetables will also help with constipation.  13. Write down the times when you take your pain medicine. Look at the times before you take your next dose of medicine. It is easy to become confused while on pain medicine. Recording the times helps you to avoid an overdose.  14. If your pain is severe, do not try to treat it yourself by taking more pills than instructed on your prescription. Contact your health care provider for help.  15. You may have been prescribed a pain medicine that contains acetaminophen. Do not take any other acetaminophen while taking this medicine. An overdose of acetaminophen can result in severe liver damage. Acetaminophen is found in many over-the-counter (OTC) and prescription medicines. If you are taking any medicines in addition  to your pain medicine, check the active ingredients on those medicines to see if acetaminophen is listed.  WHEN SHOULD I CALL MY HEALTH CARE PROVIDER?  18. Your medicine is not helping to make the pain go away.  19. You vomit or have diarrhea shortly after taking the medicine.  20. You develop new pain in areas that did not hurt before.  21. You have an allergic reaction to your medicine. This may include:  1. Itchiness.  2. Swelling.  3. Dizziness.  4. Developing a new rash.  WHEN SHOULD I CALL 911 OR GO TO THE EMERGENCY ROOM?  · You feel dizzy or you faint.  · You are very confused or disoriented.  · You repeatedly vomit.  · Your skin or lips turn pale or bluish in color.  · You have shortness of breath or you are breathing much more slowly than usual.  · You have a severe allergic reaction to your medicine. This includes:  ¨ Developing tongue swelling.  ¨ Having difficulty breathing.     This information is not intended to replace advice given to you by your health care provider. Make sure you discuss any questions you have with your health care provider.     Document Released: 03/26/2002 Document Revised: 05/03/2016 Document Reviewed: 10/22/2015  [employer] Interactive Patient Education ©2016 Elsevier Inc.      Antibiotic Medication  Antibiotic medicine helps fight germs. Germs cause infections. This type of medicine will not work for colds, flu, or other viral infections. Tell your doctor if you:  · Are allergic to any medicines.  · Are pregnant or are trying to get pregnant.  · Are taking other medicines.  · Have other medical problems.  HOME CARE  24. Take your medicine with a glass of water or food as told by your doctor.  25. Take the medicine as told. Finish them even if you start to feel better.  26. Do not give your medicine to other people.  27. Do not use your medicine in the future for a different infection.  28. Ask your doctor about which side effects to watch for.  29. Try not to miss any doses. If  you miss a dose, take it as soon as possible. If it is almost time for your next dose, and your dosing schedule is:  1. Two doses a day, take the missed dose and the next dose 5 to 6 hours later.  2. Three or more doses a day, take the missed dose and the next dose 2 to 4 hours later, or double your next dose.  3. Then go back to your normal schedule.  GET HELP RIGHT AWAY IF:   23. You get worse or do not get better within a few days.  24. The medicine makes you sick.  25. You develop a rash or any other side effects.  26. You have questions or concerns.  MAKE SURE YOU:  16. Understand these instructions.  17. Will watch your condition.  18. Will get help right away if you are not doing well or get worse.     This information is not intended to replace advice given to you by your health care provider. Make sure you discuss any questions you have with your health care provider.     Document Released: 09/26/2009 Document Revised: 03/11/2013 Document Reviewed: 11/23/2010  [employer] Interactive Patient Education ©2016 [email or web address]

## 2017-05-09 NOTE — DISCHARGE PLANNING
Per the request from Anjelica at [facility], ABX order is needed. ABX order was faxed to [phone] to the attn: Anjelica at 44.

## 2017-05-09 NOTE — PROGRESS NOTES
Patient dressings changed, discharge education reviewed with patient and significant other, scripts in hand. Patient left with wheelchair, wound vac, and other patient belongings. PICC to [facility] left in place for antibx therapy. Patient discharged and wheeled down around 2015.

## 2017-05-10 ENCOUNTER — HOME CARE VISIT (OUTPATIENT)
Dept: HOME HEALTH SERVICES | Facility: HOME HEALTHCARE | Age: 52
End: 2017-05-10
Payer: COMMERCIAL

## 2017-05-10 PROCEDURE — A4216 STERILE WATER/SALINE, 10 ML: HCPCS

## 2017-05-10 PROCEDURE — A6212 FOAM DRG <=16 SQ IN W/BORDER: HCPCS

## 2017-05-10 PROCEDURE — A5120 SKIN BARRIER, WIPE OR SWAB: HCPCS

## 2017-05-10 PROCEDURE — A4456 ADHESIVE REMOVER, WIPES: HCPCS

## 2017-05-10 PROCEDURE — G0299 HHS/HOSPICE OF RN EA 15 MIN: HCPCS

## 2017-05-10 ASSESSMENT — ACTIVITIES OF DAILY LIVING (ADL)
HOME_HEALTH_OASIS: 01
HOME_HEALTH_OASIS: 02

## 2017-05-11 ENCOUNTER — HOSPITAL ENCOUNTER (INPATIENT)
Facility: MEDICAL CENTER | Age: 52
LOS: 5 days | DRG: 603 | End: 2017-05-16
Attending: EMERGENCY MEDICINE | Admitting: INTERNAL MEDICINE
Payer: COMMERCIAL

## 2017-05-11 ENCOUNTER — APPOINTMENT (OUTPATIENT)
Dept: RADIOLOGY | Facility: MEDICAL CENTER | Age: 52
DRG: 603 | End: 2017-05-11
Attending: EMERGENCY MEDICINE
Payer: COMMERCIAL

## 2017-05-11 ENCOUNTER — RESOLUTE PROFESSIONAL BILLING HOSPITAL PROF FEE (OUTPATIENT)
Dept: HOSPITALIST | Facility: MEDICAL CENTER | Age: 52
End: 2017-05-11
Payer: COMMERCIAL

## 2017-05-11 VITALS — RESPIRATION RATE: 16 BRPM | HEART RATE: 88 BPM | TEMPERATURE: 97.7 F

## 2017-05-11 DIAGNOSIS — L08.9 WOUND INFECTION: ICD-10-CM

## 2017-05-11 DIAGNOSIS — T14.8XXA WOUND INFECTION: ICD-10-CM

## 2017-05-11 PROBLEM — R50.9 FEVER: Status: ACTIVE | Noted: 2017-05-11

## 2017-05-11 LAB
ALBUMIN SERPL BCP-MCNC: 4 G/DL (ref 3.2–4.9)
ALBUMIN/GLOB SERPL: 1.3 G/DL
ALP SERPL-CCNC: 166 U/L (ref 30–99)
ALT SERPL-CCNC: 17 U/L (ref 2–50)
ANION GAP SERPL CALC-SCNC: 13 MMOL/L (ref 0–11.9)
AST SERPL-CCNC: 16 U/L (ref 12–45)
BASOPHILS # BLD AUTO: 0.5 % (ref 0–1.8)
BASOPHILS # BLD: 0.03 K/UL (ref 0–0.12)
BILIRUB SERPL-MCNC: 0.4 MG/DL (ref 0.1–1.5)
BUN SERPL-MCNC: 20 MG/DL (ref 8–22)
CALCIUM SERPL-MCNC: 9.4 MG/DL (ref 8.5–10.5)
CHLORIDE SERPL-SCNC: 104 MMOL/L (ref 96–112)
CO2 SERPL-SCNC: 22 MMOL/L (ref 20–33)
CREAT SERPL-MCNC: 1.01 MG/DL (ref 0.5–1.4)
EOSINOPHIL # BLD AUTO: 0.06 K/UL (ref 0–0.51)
EOSINOPHIL NFR BLD: 1 % (ref 0–6.9)
ERYTHROCYTE [DISTWIDTH] IN BLOOD BY AUTOMATED COUNT: 44.4 FL (ref 35.9–50)
GFR SERPL CREATININE-BSD FRML MDRD: >60 ML/MIN/1.73 M 2
GLOBULIN SER CALC-MCNC: 3.1 G/DL (ref 1.9–3.5)
GLUCOSE BLD-MCNC: 97 MG/DL (ref 65–99)
GLUCOSE BLD-MCNC: 98 MG/DL (ref 65–99)
GLUCOSE SERPL-MCNC: 129 MG/DL (ref 65–99)
HCT VFR BLD AUTO: 33.9 % (ref 42–52)
HGB BLD-MCNC: 11.3 G/DL (ref 14–18)
IMM GRANULOCYTES # BLD AUTO: 0.03 K/UL (ref 0–0.11)
IMM GRANULOCYTES NFR BLD AUTO: 0.5 % (ref 0–0.9)
LACTATE BLD-SCNC: 1.6 MMOL/L (ref 0.5–2)
LYMPHOCYTES # BLD AUTO: 1.66 K/UL (ref 1–4.8)
LYMPHOCYTES NFR BLD: 27.9 % (ref 22–41)
MCH RBC QN AUTO: 30.1 PG (ref 27–33)
MCHC RBC AUTO-ENTMCNC: 33.3 G/DL (ref 33.7–35.3)
MCV RBC AUTO: 90.2 FL (ref 81.4–97.8)
MONOCYTES # BLD AUTO: 0.35 K/UL (ref 0–0.85)
MONOCYTES NFR BLD AUTO: 5.9 % (ref 0–13.4)
NEUTROPHILS # BLD AUTO: 3.83 K/UL (ref 1.82–7.42)
NEUTROPHILS NFR BLD: 64.2 % (ref 44–72)
NRBC # BLD AUTO: 0 K/UL
NRBC BLD AUTO-RTO: 0 /100 WBC
PLATELET # BLD AUTO: 359 K/UL (ref 164–446)
PMV BLD AUTO: 9 FL (ref 9–12.9)
POTASSIUM SERPL-SCNC: 3.8 MMOL/L (ref 3.6–5.5)
PROT SERPL-MCNC: 7.1 G/DL (ref 6–8.2)
RBC # BLD AUTO: 3.76 M/UL (ref 4.7–6.1)
SODIUM SERPL-SCNC: 139 MMOL/L (ref 135–145)
WBC # BLD AUTO: 6 K/UL (ref 4.8–10.8)

## 2017-05-11 PROCEDURE — A9270 NON-COVERED ITEM OR SERVICE: HCPCS | Performed by: PHARMACIST

## 2017-05-11 PROCEDURE — 80053 COMPREHEN METABOLIC PANEL: CPT

## 2017-05-11 PROCEDURE — 700102 HCHG RX REV CODE 250 W/ 637 OVERRIDE(OP): Performed by: PHARMACIST

## 2017-05-11 PROCEDURE — 85025 COMPLETE CBC W/AUTO DIFF WBC: CPT

## 2017-05-11 PROCEDURE — 700105 HCHG RX REV CODE 258: Performed by: INTERNAL MEDICINE

## 2017-05-11 PROCEDURE — 96376 TX/PRO/DX INJ SAME DRUG ADON: CPT

## 2017-05-11 PROCEDURE — 70486 CT MAXILLOFACIAL W/O DYE: CPT

## 2017-05-11 PROCEDURE — 770020 HCHG ROOM/CARE - TELE (206)

## 2017-05-11 PROCEDURE — 87040 BLOOD CULTURE FOR BACTERIA: CPT

## 2017-05-11 PROCEDURE — 96375 TX/PRO/DX INJ NEW DRUG ADDON: CPT

## 2017-05-11 PROCEDURE — 96365 THER/PROPH/DIAG IV INF INIT: CPT

## 2017-05-11 PROCEDURE — 99223 1ST HOSP IP/OBS HIGH 75: CPT | Performed by: INTERNAL MEDICINE

## 2017-05-11 PROCEDURE — 82962 GLUCOSE BLOOD TEST: CPT

## 2017-05-11 PROCEDURE — A9270 NON-COVERED ITEM OR SERVICE: HCPCS | Performed by: INTERNAL MEDICINE

## 2017-05-11 PROCEDURE — 87070 CULTURE OTHR SPECIMN AEROBIC: CPT

## 2017-05-11 PROCEDURE — 83605 ASSAY OF LACTIC ACID: CPT

## 2017-05-11 PROCEDURE — 87205 SMEAR GRAM STAIN: CPT

## 2017-05-11 PROCEDURE — 71010 DX-CHEST-PORTABLE (1 VIEW): CPT

## 2017-05-11 PROCEDURE — 306263 VAC CANNISTER W/GEL 500ML: Performed by: EMERGENCY MEDICINE

## 2017-05-11 PROCEDURE — 700111 HCHG RX REV CODE 636 W/ 250 OVERRIDE (IP): Performed by: EMERGENCY MEDICINE

## 2017-05-11 PROCEDURE — 99285 EMERGENCY DEPT VISIT HI MDM: CPT

## 2017-05-11 PROCEDURE — 700102 HCHG RX REV CODE 250 W/ 637 OVERRIDE(OP): Performed by: INTERNAL MEDICINE

## 2017-05-11 PROCEDURE — 96367 TX/PROPH/DG ADDL SEQ IV INF: CPT

## 2017-05-11 PROCEDURE — 36415 COLL VENOUS BLD VENIPUNCTURE: CPT

## 2017-05-11 PROCEDURE — 700111 HCHG RX REV CODE 636 W/ 250 OVERRIDE (IP): Performed by: INTERNAL MEDICINE

## 2017-05-11 PROCEDURE — 700105 HCHG RX REV CODE 258: Performed by: EMERGENCY MEDICINE

## 2017-05-11 RX ORDER — LINEZOLID 2 MG/ML
600 INJECTION, SOLUTION INTRAVENOUS ONCE
Status: COMPLETED | OUTPATIENT
Start: 2017-05-11 | End: 2017-05-11

## 2017-05-11 RX ORDER — OXYCODONE AND ACETAMINOPHEN 10; 325 MG/1; MG/1
1 TABLET ORAL EVERY 8 HOURS PRN
Status: DISCONTINUED | OUTPATIENT
Start: 2017-05-11 | End: 2017-05-16 | Stop reason: HOSPADM

## 2017-05-11 RX ORDER — LINEZOLID 600 MG/1
600 TABLET, FILM COATED ORAL EVERY 12 HOURS
Status: DISCONTINUED | OUTPATIENT
Start: 2017-05-11 | End: 2017-05-16 | Stop reason: HOSPADM

## 2017-05-11 RX ORDER — THYROID 30 MG/1
60 TABLET ORAL
Status: DISCONTINUED | OUTPATIENT
Start: 2017-05-12 | End: 2017-05-16 | Stop reason: HOSPADM

## 2017-05-11 RX ORDER — INSULIN GLARGINE 100 [IU]/ML
50 INJECTION, SOLUTION SUBCUTANEOUS
Status: DISCONTINUED | OUTPATIENT
Start: 2017-05-11 | End: 2017-05-16 | Stop reason: HOSPADM

## 2017-05-11 RX ORDER — AMOXICILLIN 250 MG
2 CAPSULE ORAL 2 TIMES DAILY
Status: DISCONTINUED | OUTPATIENT
Start: 2017-05-11 | End: 2017-05-16 | Stop reason: HOSPADM

## 2017-05-11 RX ORDER — LINEZOLID 2 MG/ML
600 INJECTION, SOLUTION INTRAVENOUS EVERY 12 HOURS
Status: DISCONTINUED | OUTPATIENT
Start: 2017-05-11 | End: 2017-05-11

## 2017-05-11 RX ORDER — POLYETHYLENE GLYCOL 3350 17 G/17G
1 POWDER, FOR SOLUTION ORAL
Status: DISCONTINUED | OUTPATIENT
Start: 2017-05-11 | End: 2017-05-16 | Stop reason: HOSPADM

## 2017-05-11 RX ORDER — METRONIDAZOLE 500 MG/1
500 TABLET ORAL 3 TIMES DAILY
Status: ON HOLD | COMMUNITY
Start: 2017-05-09 | End: 2017-05-15

## 2017-05-11 RX ORDER — PROMETHAZINE HYDROCHLORIDE 25 MG/1
12.5-25 SUPPOSITORY RECTAL EVERY 4 HOURS PRN
Status: DISCONTINUED | OUTPATIENT
Start: 2017-05-11 | End: 2017-05-16 | Stop reason: HOSPADM

## 2017-05-11 RX ORDER — LINEZOLID 600 MG/1
600 TABLET, FILM COATED ORAL 2 TIMES DAILY
Status: ON HOLD | COMMUNITY
Start: 2017-05-09 | End: 2017-07-10

## 2017-05-11 RX ORDER — MORPHINE SULFATE 30 MG/1
30 TABLET, FILM COATED, EXTENDED RELEASE ORAL EVERY 12 HOURS
Status: DISCONTINUED | OUTPATIENT
Start: 2017-05-11 | End: 2017-05-16 | Stop reason: HOSPADM

## 2017-05-11 RX ORDER — DEXTROSE MONOHYDRATE 25 G/50ML
25 INJECTION, SOLUTION INTRAVENOUS
Status: DISCONTINUED | OUTPATIENT
Start: 2017-05-11 | End: 2017-05-16 | Stop reason: HOSPADM

## 2017-05-11 RX ORDER — SODIUM CHLORIDE 9 MG/ML
500 INJECTION, SOLUTION INTRAVENOUS
Status: DISCONTINUED | OUTPATIENT
Start: 2017-05-11 | End: 2017-05-16 | Stop reason: HOSPADM

## 2017-05-11 RX ORDER — ONDANSETRON 2 MG/ML
4 INJECTION INTRAMUSCULAR; INTRAVENOUS EVERY 4 HOURS PRN
Status: DISCONTINUED | OUTPATIENT
Start: 2017-05-11 | End: 2017-05-16 | Stop reason: HOSPADM

## 2017-05-11 RX ORDER — FLUCONAZOLE 100 MG/1
100 TABLET ORAL DAILY
Status: DISCONTINUED | OUTPATIENT
Start: 2017-05-11 | End: 2017-05-11

## 2017-05-11 RX ORDER — BISACODYL 10 MG
10 SUPPOSITORY, RECTAL RECTAL
Status: DISCONTINUED | OUTPATIENT
Start: 2017-05-11 | End: 2017-05-16 | Stop reason: HOSPADM

## 2017-05-11 RX ORDER — SODIUM CHLORIDE 9 MG/ML
30 INJECTION, SOLUTION INTRAVENOUS
Status: DISCONTINUED | OUTPATIENT
Start: 2017-05-11 | End: 2017-05-16 | Stop reason: HOSPADM

## 2017-05-11 RX ORDER — ONDANSETRON 4 MG/1
4 TABLET, ORALLY DISINTEGRATING ORAL EVERY 4 HOURS PRN
Status: DISCONTINUED | OUTPATIENT
Start: 2017-05-11 | End: 2017-05-11

## 2017-05-11 RX ORDER — PROMETHAZINE HYDROCHLORIDE 25 MG/1
12.5-25 TABLET ORAL EVERY 4 HOURS PRN
Status: DISCONTINUED | OUTPATIENT
Start: 2017-05-11 | End: 2017-05-16 | Stop reason: HOSPADM

## 2017-05-11 RX ORDER — MESALAMINE 400 MG/1
400 CAPSULE, DELAYED RELEASE ORAL 3 TIMES DAILY
Status: DISCONTINUED | OUTPATIENT
Start: 2017-05-11 | End: 2017-05-16 | Stop reason: HOSPADM

## 2017-05-11 RX ORDER — BUPROPION HYDROCHLORIDE 150 MG/1
150 TABLET, EXTENDED RELEASE ORAL 2 TIMES DAILY
Status: DISCONTINUED | OUTPATIENT
Start: 2017-05-11 | End: 2017-05-16 | Stop reason: HOSPADM

## 2017-05-11 RX ORDER — ONDANSETRON 2 MG/ML
4 INJECTION INTRAMUSCULAR; INTRAVENOUS ONCE
Status: COMPLETED | OUTPATIENT
Start: 2017-05-11 | End: 2017-05-11

## 2017-05-11 RX ORDER — SODIUM CHLORIDE 9 MG/ML
INJECTION, SOLUTION INTRAVENOUS CONTINUOUS
Status: DISCONTINUED | OUTPATIENT
Start: 2017-05-11 | End: 2017-05-16 | Stop reason: HOSPADM

## 2017-05-11 RX ORDER — FLUCONAZOLE 100 MG/1
200 TABLET ORAL DAILY
Status: DISCONTINUED | OUTPATIENT
Start: 2017-05-12 | End: 2017-05-16 | Stop reason: HOSPADM

## 2017-05-11 RX ORDER — ONDANSETRON 4 MG/1
4 TABLET, ORALLY DISINTEGRATING ORAL EVERY 4 HOURS PRN
Status: DISCONTINUED | OUTPATIENT
Start: 2017-05-11 | End: 2017-05-16 | Stop reason: HOSPADM

## 2017-05-11 RX ORDER — MESALAMINE 1.2 G/1
1.2 TABLET, DELAYED RELEASE ORAL 2 TIMES DAILY
Status: DISCONTINUED | OUTPATIENT
Start: 2017-05-11 | End: 2017-05-11

## 2017-05-11 RX ORDER — ZOLPIDEM TARTRATE 5 MG/1
5 TABLET ORAL NIGHTLY PRN
Status: DISCONTINUED | OUTPATIENT
Start: 2017-05-11 | End: 2017-05-16 | Stop reason: HOSPADM

## 2017-05-11 RX ADMIN — VITAMIN D, TAB 1000IU (100/BT) 5000 UNITS: 25 TAB at 20:39

## 2017-05-11 RX ADMIN — HYDROMORPHONE HYDROCHLORIDE 1 MG: 1 INJECTION, SOLUTION INTRAMUSCULAR; INTRAVENOUS; SUBCUTANEOUS at 11:34

## 2017-05-11 RX ADMIN — MESALAMINE 400 MG: 400 CAPSULE, DELAYED RELEASE ORAL at 20:40

## 2017-05-11 RX ADMIN — ONDANSETRON 4 MG: 2 INJECTION INTRAMUSCULAR; INTRAVENOUS at 11:34

## 2017-05-11 RX ADMIN — BUPROPION HYDROCHLORIDE 150 MG: 150 TABLET, FILM COATED, EXTENDED RELEASE ORAL at 20:40

## 2017-05-11 RX ADMIN — INSULIN GLARGINE 25 UNITS: 100 INJECTION, SOLUTION SUBCUTANEOUS at 20:45

## 2017-05-11 RX ADMIN — SODIUM CHLORIDE: 9 INJECTION, SOLUTION INTRAVENOUS at 21:04

## 2017-05-11 RX ADMIN — PIPERACILLIN SODIUM AND TAZOBACTAM SODIUM 4.5 G: 4; .5 INJECTION, POWDER, FOR SOLUTION INTRAVENOUS at 12:23

## 2017-05-11 RX ADMIN — ENOXAPARIN SODIUM 40 MG: 100 INJECTION SUBCUTANEOUS at 16:39

## 2017-05-11 RX ADMIN — PIPERACILLIN SODIUM AND TAZOBACTAM SODIUM 3.38 G: 3; .375 INJECTION, POWDER, FOR SOLUTION INTRAVENOUS at 17:51

## 2017-05-11 RX ADMIN — MESALAMINE 400 MG: 400 CAPSULE, DELAYED RELEASE ORAL at 18:33

## 2017-05-11 RX ADMIN — SODIUM CHLORIDE: 9 INJECTION, SOLUTION INTRAVENOUS at 13:10

## 2017-05-11 RX ADMIN — ZOLPIDEM TARTRATE 5 MG: 5 TABLET, FILM COATED ORAL at 20:41

## 2017-05-11 RX ADMIN — OXYCODONE HYDROCHLORIDE AND ACETAMINOPHEN 1 TABLET: 10; 325 TABLET ORAL at 12:59

## 2017-05-11 RX ADMIN — LINEZOLID 600 MG: 600 TABLET, FILM COATED ORAL at 20:40

## 2017-05-11 RX ADMIN — LINEZOLID 600 MG: 600 INJECTION, SOLUTION INTRAVENOUS at 13:31

## 2017-05-11 RX ADMIN — ONDANSETRON 4 MG: 2 INJECTION INTRAMUSCULAR; INTRAVENOUS at 15:30

## 2017-05-11 RX ADMIN — MORPHINE SULFATE 30 MG: 30 TABLET, EXTENDED RELEASE ORAL at 20:40

## 2017-05-11 RX ADMIN — HYDROMORPHONE HYDROCHLORIDE 1 MG: 1 INJECTION, SOLUTION INTRAMUSCULAR; INTRAVENOUS; SUBCUTANEOUS at 15:40

## 2017-05-11 ASSESSMENT — ENCOUNTER SYMPTOMS: DEBILITATING PAIN: 1

## 2017-05-11 ASSESSMENT — LIFESTYLE VARIABLES
HAVE YOU EVER FELT YOU SHOULD CUT DOWN ON YOUR DRINKING: NO
AVERAGE NUMBER OF DAYS PER WEEK YOU HAVE A DRINK CONTAINING ALCOHOL: 0
ON A TYPICAL DAY WHEN YOU DRINK ALCOHOL HOW MANY DRINKS DO YOU HAVE: 1
HAVE PEOPLE ANNOYED YOU BY CRITICIZING YOUR DRINKING: NO
TOTAL SCORE: 0
EVER FELT BAD OR GUILTY ABOUT YOUR DRINKING: NO
HOW MANY TIMES IN THE PAST YEAR HAVE YOU HAD 5 OR MORE DRINKS IN A DAY: 0
TOTAL SCORE: 0
CONSUMPTION TOTAL: NEGATIVE
EVER_SMOKED: NEVER
EVER HAD A DRINK FIRST THING IN THE MORNING TO STEADY YOUR NERVES TO GET RID OF A HANGOVER: NO
TOTAL SCORE: 0
ALCOHOL_USE: YES

## 2017-05-11 ASSESSMENT — PAIN SCALES - GENERAL: PAINLEVEL_OUTOF10: 6

## 2017-05-11 NOTE — H&P
CHIEF COMPLAINT:  Fever.    PRIMARY CARE PHYSICIAN:  Dr. Stein.    SURGERY:  Dr. Dooley.    HISTORY OF PRESENT ILLNESS:  This is a 52-year-old male with a complex past   medical history significant for type 2 diabetes and ulcerative colitis, who   was discharged from our service on 05/08/2017 after he presented for right   thigh wound, status post incision and drainage due to necrotizing myositis of   the right thigh.  Patient while in-house also developed left hand and right   ankle abscess.  His wounds were cultured and they grew out yeast, MRSA,   Klebsiella and streptococcus.  Infectious disease, at that time, was consulted   and a PICC line was placed.  He was discharged home on Diflucan, Zyvox, Ertapenem and   Flagyl.  Patient states that he was doing well until this morning when he   noticed that he was febrile.  At that time, his wife brought him to the ER for   further evaluation.  Patient's wife is a RN in the ER at our facility.  The   patient denies any shortness of breath.  He denies any chest pain, shortness   of breath, abdominal pain, diarrhea, constipation, nausea/vomiting, headache,   vision changes, cough, sputum production, any urinary or bowel symptoms.    REVIEW OF SYSTEMS:  A comprehensive review of systems was conducted and all   pertinent positive and negative are documented in the HPI.    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    PAST SURGICAL HISTORY:  Significant for umbilical hernia, status post repair;   mesh infection, status post removal and replacement; history of necrotizing   myositis of the right thigh, status post incision and drainage and wound VAC   placement; right ankle abscess, left hand abscess, status post incision and   drainage, cholecystectomy, lumbar surgery, left wrist surgery.    SOCIAL HISTORY:  Patient denies any alcohol, tobacco or illicit drug use.  The   patient  is a former nurse at [employer].  His wife is also a nurse at [employer].    FAMILY HISTORY:  Significant for non-Hodgkin's lymphoma in his mother and   diabetes in his grandfather.    ALLERGIES:  PATIENT IS ALLERGIC TO PEANUT DERIVATIVE TRADJENTA, HYDROCODONE.    HOME MEDICATIONS:  1.  Wellbutrin 300 mg daily.  2.  Vitamin D3, 5000 units daily.  3.  Fluconazole 100 mg daily.  4.  Insulin glargine 42 units at bedtime.  5.  Insulin sliding scale.  6.  Zyvox 600 mg twice a day.  7.  Mesalamine 1.2 mg twice a day.  8.  Flagyl 500 mg 3 times a day.  9.  MS Contin 30 mg twice a day.  10.  Ertapenem 1 g every 24 hours.  11.  Zofran 4 mg q. 4 hours p.r.n. for nausea and vomiting.  12.  Percocet 10/325 mg every 8 hours p.r.n.  13.  [place] Thyroid 60 mg daily.    PHYSICAL EXAMINATION:  VITAL SIGNS:  Temperature of 36.8, heart rate of 95, respirations of 18, blood   pressure of 118/75, and O2 saturation of 96% on room air.  GENERAL:  This is a 52-year-old pleasant, cooperative male, in no acute   distress.  HEENT:  Normocephalic and atraumatic.  Pupils are equal and reactive to light.    Extraocular motions are intact.  Moist oral mucosa noted.  No oral exudate   or erythema noted.  NECK:  Supple, no lymphadenopathy, no JVD noted.  CARDIOVASCULAR:  Regular rate and rhythm.  No murmurs, rubs, gallops noted.  LUNGS:  Clear to auscultation bilaterally.  No rhonchi, wheezes, or rales   heard.  ABDOMEN:  Soft, bowel sounds are present.  Wound VAC noted.  EXTREMITIES:  Right lateral thigh wound VAC noted.  No clubbing, cyanosis or   edema noted. Right Upper Extremity PICC line in place, clean/dry/intact. Right medial  Foot 0.1 cm X 0.1 cm wound noted, erythematous, no exudate/foul discharge noted.  Non tender to palpation.  NEUROLOGIC:  Alert, awake and oriented x3.  No focal deficits noted.  PSYCHIATRIC:  Normal mood, normal affect, normal judgment.    LABORATORY DATA:  WBC of 6, hemoglobin of 11.3, hematocrit of 33.9, and   platelets of  359.  Anion gap of 13 and glucose of 129.  Lactic acid of 1.6.    Chest x-ray shows mild cardiomegaly.    IMAGING:  CT of maxillofacial area shows mucous retention, cyst in the right   maxillary sinus with minimal mucosal thickening in the left maxillary sinus,   leftward deviation of the nasal septum with a nasal septal spur.    ASSESSMENT AND PLAN:  1.  Fever, source of infection is presently unknown.  Blood, sputum, and urine   cultures have been ordered.  Dr. Santoro from infectious disease has been consulted.  Patient   will be continued on Diflucan and started on Zyvox and Zosyn.  Patient, at   this time, has SIRS 0/4 criteria.  We will monitor the patient closely.  2.  Right foot wound.  Wound cultures have been ordered.  Wound care consult   has been placed.  3.  Type 2 diabetes.  Patient is continued on Lantus 50 units daily and   insulin sliding scale.  4.  Ulcerative colitis.  The patient is not in acute exacerbation at this   time.  Patient will be continued on mesalamine 400 mg 3 times a day.  5.  Chronic pain.  Patient continued on [place] Contin 30 mg every 12 hours.  6.  Hypothyroidism.  Patient continued on [place] thyroid 60 mg daily.  7.  Vitamin D deficiency.  Patient continued on vitamin D 5000 units daily.  8.  Normocytic anemia, near baseline.  We will continue to monitor.  9.  Right thigh necrotizing myofascitis, status post incision and drainage.    Continue the patient on his wound VAC.  Wound care consultation has been   ordered.  10.  Recent umbilical abscess, wound VAC in place.    DISPOSITION:  Inpatient, patient will likely require greater than 2 midnights   of care.    PROPHYLAXIS:  Patient is started on Lovenox subQ for DVT prophylaxis.  No GI   prophylaxis indicated.  Bowel protocol initiated.    CODE STATUS:  FULL.    Plan of care was discussed with the patient and wife at bedside and all   questions were answered.       ____________________________________     RONI HENDERSON MD    MS /  STORM    DD:  05/11/2017 15:54:20  DT:  05/11/2017 16:24:46    D#:  [id number]  Job#:  [id number]

## 2017-05-11 NOTE — DISCHARGE PLANNING
[facility]. Please write home care orders upon discharge to home for continuum of care.[facility] at  /[phone] if there are any questions.

## 2017-05-11 NOTE — IP AVS SNAPSHOT
5/16/2017    Po Richards  [address]  [place] [address]    Dear Po:    [facility] wants to ensure your discharge home is safe and you or your loved ones have had all of your questions answered regarding your care after you leave the hospital.    Below is a list of resources and contact information should you have any questions regarding your hospital stay, follow-up instructions, or active medical symptoms.    Questions or Concerns Regarding… Contact   Medical Questions Related to Your Discharge  (7 days a week, 8am-5pm) Contact a Nurse Care Coordinator   [phone]   Medical Questions Not Related to Your Discharge  (24 hours a day / 7 days a week)  Contact the Nurse Health Line   [phone]    Medications or Discharge Instructions Refer to your discharge packet   or contact your [facility] Primary Care Provider   [phone]   Follow-up Appointment(s) Schedule your appointment via [employer]   or contact Scheduling [phone]   Billing Review your statement via [employer]  or contact Billing [phone]   Medical Records Review your records via [employer]   or contact Medical Records [phone]     You may receive a telephone call within two days of discharge. This call is to make certain you understand your discharge instructions and have the opportunity to have any questions answered. You can also easily access your medical information, test results and upcoming appointments via the [employer] free online health management tool. You can learn more and sign up at [employer]. For assistance setting up your [employer] account, please call [phone].    Once again, we want to ensure your discharge home is safe and that you have a clear understanding of any next steps in your care. If you have any questions or concerns, please do not hesitate to contact us, we are here for you. Thank you for choosing [facility] for your healthcare needs.    Sincerely,    Your [facility] Healthcare Team

## 2017-05-11 NOTE — WOUND TEAM
Wound Team came down to [address] and attached the patient's wound vac dressings to a hospital wound vac. Patient's significant other will be taking patient's home vac home.

## 2017-05-11 NOTE — IP AVS SNAPSHOT
ShowEvidence Access Code: [email or web address]  Expires: 5/26/2017  1:24 PM    ShowEvidence  A secure, online tool to manage your health information     [employer]’s ShowEvidence® is a secure, online tool that connects you to your personalized health information from the privacy of your home -- day or night - making it very easy for you to manage your healthcare. Once the activation process is completed, you can even access your medical information using the ShowEvidence princess, which is available for free in the [employer] Princess store or Google Play store.     ShowEvidence provides the following levels of access (as shown below):   My Chart Features   [facility] Primary Care Doctor [facility]  Specialists [facility]  Urgent  Care Non-[facility]  Primary Care  Doctor   Email your healthcare team securely and privately 24/7 X X X X   Manage appointments: schedule your next appointment; view details of past/upcoming appointments X      Request prescription refills. X      View recent personal medical records, including lab and immunizations X X X X   View health record, including health history, allergies, medications X X X X   Read reports about your outpatient visits, procedures, consult and ER notes X X X X   See your discharge summary, which is a recap of your hospital and/or ER visit that includes your diagnosis, lab results, and care plan. X X       How to register for ShowEvidence:  1. Go to  [email or web address].  2. Click on the Sign Up Now box, which takes you to the New Member Sign Up page. You will need to provide the following information:  a. Enter your ShowEvidence Access Code exactly as it appears at the top of this page. (You will not need to use this code after you’ve completed the sign-up process. If you do not sign up before the expiration date, you must request a new code.)   b. Enter your date of birth.   c. Enter your home email address.   d. Click Submit, and follow the next screen’s instructions.  3. Create a ShowEvidence ID. This will be your ShowEvidence  login ID and cannot be changed, so think of one that is secure and easy to remember.  4. Create a [employer] password. You can change your password at any time.  5. Enter your Password Reset Question and Answer. This can be used at a later time if you forget your password.   6. Enter your e-mail address. This allows you to receive e-mail notifications when new information is available in [employer].  7. Click Sign Up. You can now view your health information.    For assistance activating your [employer] account, call [phone]

## 2017-05-11 NOTE — IP AVS SNAPSHOT
" Home Care Instructions                                                                                                                  Name:Po Richards  Medical Record Number:[id number]  CSN: [id number]    YOB: 1965   Age: 52 y.o.  Sex: male  HT:1.727 m (5' 8\") WT: 98.6 kg (217 lb 6 oz)          Admit Date: 5/11/2017     Discharge Date:   Today's Date: 5/16/2017  Attending Doctor:  Emerson Summers D.O.                  Allergies:  Peanut-derived; Tradjenta; and Hydrocodone            Discharge Instructions       Discharge Instructions    IV infusion: stop date 5/19/17  linezolid (ZYVOX): stop date 5/19/17  Augmentin: Start 5/19/17 stop date: Jun 2, 2017    For home health:  DRESSING CARE EVERY FORTY-EIGHT HOURS     Comments: Nursing to change dressings to UMBILICUS, LEFT HAND, RIGHT ANTERIOR ANKLE Q48 hours. Remove dressings. Irrigate with NS and pat dry. Place a strip of hydrofiber silver into/on the wounds. Cover umbilicus and right ankle wound with adhesive silicone foams. Cover left hand wound with nonadherent pad and secure with hypafix tape.            Discharged to home by car with friend. Discharged via wheelchair, hospital escort: Yes.  Special equipment needed: Wound VAC    Be sure to schedule a follow-up appointment with your primary care doctor or any specialists as instructed.     Discharge Plan:   Diet Plan: Discussed  Activity Level: Discussed  Confirmed Follow up Appointment: Patient to Call and Schedule Appointment  Confirmed Symptoms Management: Discussed  Medication Reconciliation Updated: Yes  Influenza Vaccine Indication: Not indicated: Previously immunized this influenza season and > 8 years of age    I understand that a diet low in cholesterol, fat, and sodium is recommended for good health. Unless I have been given specific instructions below for another diet, I accept this instruction as my diet prescription.   Other diet: diabetic     Special Instructions: None    · Is " "patient discharged on Warfarin / Coumadin?   No     · Is patient Post Blood Transfusion?  No    Depression / Suicide Risk    As you are discharged from this [facility] facility, it is important to learn how to keep safe from harming yourself.    Recognize the warning signs:  · Abrupt changes in personality, positive or negative- including increase in energy   · Giving away possessions  · Change in eating patterns- significant weight changes-  positive or negative  · Change in sleeping patterns- unable to sleep or sleeping all the time   · Unwillingness or inability to communicate  · Depression  · Unusual sadness, discouragement and loneliness  · Talk of wanting to die  · Neglect of personal appearance   · Rebelliousness- reckless behavior  · Withdrawal from people/activities they love  · Confusion- inability to concentrate     If you or a loved one observes any of these behaviors or has concerns about self-harm, here's what you can do:  · Talk about it- your feelings and reasons for harming yourself  · Remove any means that you might use to hurt yourself (examples: pills, rope, extension cords, firearm)  · Get professional help from the community (Mental Health, Substance Abuse, psychological counseling)  · Do not be alone:Call your Safe Contact- someone whom you trust who will be there for you.  · Call your local CRISIS HOTLINE 399-5802 or 195-530-8247  · Call your local Children's Mobile Crisis Response Team [place] (725) 718-8621 or www.[place]  · Call the toll free National Suicide Prevention Hotlines   · National Suicide Prevention Lifeline 479-973-EGAT (6360)  · National Hope Line Network 800-SUICIDE (889-1963)  PICC Home Guide  A peripherally inserted central catheter (PICC) is a long, thin, flexible tube that is inserted into a vein in the upper arm. It is a form of intravenous (IV) access. It is considered to be a \"central\" line because the tip of the PICC ends in a large vein in your " "chest. This large vein is called the superior vena cava (SVC). The PICC tip ends in the SVC because there is a lot of blood flow in the SVC. This allows medicines and IV fluids to be quickly distributed throughout the body. The PICC is inserted using a sterile technique by a specially trained nurse or physician. After the PICC is inserted, a chest X-ray exam is done to be sure it is in the correct place.   A PICC may be placed for different reasons, such as:  · To give medicines and liquid nutrition that can only be given through a central line. Examples are:  · Certain antibiotic treatments.  · Chemotherapy.  · Total parenteral nutrition (TPN).  · To take frequent blood samples.  · To give IV fluids and blood products.  · If there is difficulty placing a peripheral intravenous (PIV) catheter.  If taken care of properly, a PICC can remain in place for several months. A PICC can also allow a person to go home from the hospital early. Medicine and PICC care can be managed at home by a family member or home health care team.  WHAT PROBLEMS CAN HAPPEN WHEN I HAVE A PICC?  Problems with a PICC can occasionally occur. These may include the following:  · A blood clot (thrombus) forming in or at the tip of the PICC. This can cause the PICC to become clogged. A clot-dissolving medicine called tissue plasminogen activator (tPA) can be given through the PICC to help break up the clot.  · Inflammation of the vein (phlebitis) in which the PICC is placed. Signs of inflammation may include redness, pain at the insertion site, red streaks, or being able to feel a \"cord\" in the vein where the PICC is located.  · Infection in the PICC or at the insertion site. Signs of infection may include fever, chills, redness, swelling, or pus drainage from the PICC insertion site.  · PICC movement (malposition). The PICC tip may move from its original position due to excessive physical activity, forceful coughing, sneezing, or vomiting.  · A " break or cut in the PICC. It is important to not use scissors near the PICC.  · Nerve or tendon irritation or injury during PICC insertion.  WHAT SHOULD I KEEP IN MIND ABOUT ACTIVITIES WHEN I HAVE A PICC?  · You may bend your arm and move it freely. If your PICC is near or at the bend of your elbow, avoid activity with repeated motion at the elbow.  · Rest at home for the remainder of the day following PICC line insertion.  · Avoid lifting heavy objects as instructed by your health care provider.  · Avoid using a crutch with the arm on the same side as your PICC. You may need to use a walker.  WHAT SHOULD I KNOW ABOUT MY PICC DRESSING?  · Keep your PICC bandage (dressing) clean and dry to prevent infection.  ¨ Ask your health care provider when you may shower. Ask your health care provider to teach you how to wrap the PICC when you do take a shower.  · Change the PICC dressing as instructed by your health care provider.  · Change your PICC dressing if it becomes loose or wet.  WHAT SHOULD I KNOW ABOUT PICC CARE?  · Check the PICC insertion site daily for leakage, redness, swelling, or pain.  · Do not take a bath, swim, or use hot tubs when you have a PICC. Cover PICC line with clear plastic wrap and tape to keep it dry while showering.  · Flush the PICC as directed by your health care provider. Let your health care provider know right away if the PICC is difficult to flush or does not flush. Do not use force to flush the PICC.  · Do not use a syringe that is less than 10 mL to flush the PICC.  · Never pull or tug on the PICC.  · Avoid blood pressure checks on the arm with the PICC.  · Keep your PICC identification card with you at all times.  · Do not take the PICC out yourself. Only a trained clinical professional should remove the PICC.  SEEK IMMEDIATE MEDICAL CARE IF:  · Your PICC is accidentally pulled all the way out. If this happens, cover the insertion site with a bandage or gauze dressing. Do not throw the  "PICC away. Your health care provider will need to inspect it.  · Your PICC was tugged or pulled and has partially come out. Do not  push the PICC back in.  · There is any type of drainage, redness, or swelling where the PICC enters the skin.  · You cannot flush the PICC, it is difficult to flush, or the PICC leaks around the insertion site when it is flushed.  · You hear a \"flushing\" sound when the PICC is flushed.  · You have pain, discomfort, or numbness in your arm, shoulder, or jaw on the same side as the PICC.  · You feel your heart \"racing\" or skipping beats.  · You notice a hole or tear in the PICC.  · You develop chills or a fever.  MAKE SURE YOU:   · Understand these instructions.  · Will watch your condition.  · Will get help right away if you are not doing well or get worse.     This information is not intended to replace advice given to you by your health care provider. Make sure you discuss any questions you have with your health care provider.     Document Released: 06/23/2004 Document Revised: 01/08/2016 Document Reviewed: 08/25/2014  [employer] Interactive Patient Education ©2016 [employer]    Vacuum-Assisted Closure Therapy Home Guide  Vacuum-assisted closure therapy (VAC therapy) is a device that helps wounds heal. It is used on wounds that cannot be closed with stitches. They often heal slowly. VAC therapy helps the wound stay clean and healthy while its edges slowly grow back together.  VAC therapy uses a bandage (dressing) that is made of foam. It is put inside the wound. Then, a drape is placed over the wound. This drape sticks to your skin (adhesive) to keep air out. A tube is hooked up to a small pump and is attached to the drape. The pump sucks fluid and germs from the wound. It can also decrease any bad smell that comes from the wound.  RISKS AND COMPLICATIONS  VAC therapy is usually safe to use at home. Your skin may get sore from the adhesive drape. That is the most common problem. " However, more serious problems can develop, such as:   · Bleeding. This can happen if the dressing in the wound comes into contact with blood vessels. A little bleeding may occur when the dressing is being changed. This is normal now and then. Major bleeding can happen if a large blood vessel breaks. This is more likely if you are taking blood-thinning medicine. Emergency surgery may be needed.  · Infection. This can happen if the dressing has an air leak that is not repaired within a couple of hours.  · Dehydration. This can happen if the pump sucks out too much body fluid.  DRESSING CHANGES  Your dressing will have to be changed. Sometimes this is needed once a day. Other times, a dressing change must be done 3 times a week. How often you change your dressing will depend on what your wound is like. A trained caregiver will most likely change the dressing. However, a family member or friend may be trained to change the dressing. Below are steps to change a dressing in order to prevent an infection. The steps apply to you or the person that changes your dressing.  · Wash your hands with soap and water before and after each dressing change.  · Wear gloves and protective clothing. This may include eye protection.  · Do not allow anyone to change your dressing if they have an infection or a skin condition. Even a small cut can be a problem.  To change the dressing:   · Turn off the pump.  · Take off the adhesive drape.  · Disconnect the tube from the dressing.  · Take out the dressing that is inside the wound. If the dressing sticks, use a germ-free (sterile), saltwater solution to wet the dressing. This helps it come out more easily. If it hurts when the dressing is changed, take pain medicine 30 minutes before the dressing change.  · Cleanse the wound with normal saline or sterile water.  · Apply a skin barrier film to the skin that will be covered with the drape. This will protect the skin.  · Put a new dressing  into the wound.  · Apply a new drape and tube.  · Replace the container in the pump that collects fluid if it is full. Do this at least once per week.  · Turn the pump back on.  · Your doctor will decide what setting of suction is best. Do not change the settings on the machine without talking to your nurse or doctor.  HOME CARE INSTRUCTIONS   · The VAC pump has an alarm. It goes off if there are any problems such as a leak.  ¨ Ask your caregiver what to do if the alarm goes off.  ¨ Call your caregiver right away if the alarm goes off and you cannot fix the problem.  · Do not turn off the pump for more than 2 hours.  · Check your wound carefully at each dressing change for signs of infection. Watch for redness, swelling, or any fluid leaking from the wound. If you develop an infection:  ¨ You may have to stop VAC therapy.  ¨ The wound will need to be cleaned and washed out.  ¨ You will have to take antibiotic medicine.  · Ask your caregiver what activities you should or should not do while you are getting VAC therapy. This will depend on your particular wound.  · Ask if it is okay to turn off the pump so you can take a shower. If it is okay, make sure the wound is covered with plastic. The wound area must stay dry.  · Drink enough fluids to keep your urine clear or pale yellow.  · Eat foods that contain a lot of protein. Examples are meat, poultry, seafood, eggs, nuts, beans, and peas. Protein can help your wound heal.  SEEK MEDICAL CARE IF:  · Your wound itches or hurts.  · Dressing changes are often painful or bleeding often occurs.  · You have a headache.  · You have diarrhea.  · You have a sore throat.  · You have a rash.  · You feel nauseous.  · You feel dizzy or weak.  SEEK IMMEDIATE MEDICAL CARE IF:   · You have very bad pain.  · You have bleeding that will not stop.  · Your wound smells bad.  · You have redness, swelling, or fluid leaking from your wound.  · Your alarm goes off and you do not know what to  do.  · You have a fever.     This information is not intended to replace advice given to you by your health care provider. Make sure you discuss any questions you have with your health care provider.     Document Released: 03/11/2013 Document Reviewed: 03/11/2013  [employer] Interactive Patient Education ©2016 [employer]      Your appointments     May 16, 2017 To Be Determined   [facility] RESUMPTION OF CARE with Mary Jo Johnson R.N.   [facility] (--)    [facility]  [place] [address]   [phone]              Follow-up Information     1. Follow up with KRISSY Brunner In 1 week.    Specialty:  Family Medicine    Contact information    [address]  [place] [address]  [phone]           Discharge Medication Instructions:    Below are the medications your physician expects you to take upon discharge:    Review all your home medications and newly ordered medications with your doctor and/or pharmacist. Follow medication instructions as directed by your doctor and/or pharmacist.    Please keep your medication list with you and share with your physician.               Medication List      START taking these medications        Instructions    Morning Afternoon Evening Bedtime    amoxicillin-clavulanate 875-125 MG Tabs   Start taking on:  5/19/2017   Commonly known as:  AUGMENTIN        Take 1 Tab by mouth 2 times a day for 14 days.   Dose:  1 Tab                        NS SOLN 100 mL with piperacillin-tazobactam 3.375 (3-0.375) G SOLR 3.375 g   Last time this was given:  3.375 g on 5/16/2017  5:52 AM        3.375 g by Intravenous route every 6 hours.   Dose:  3.375 g                          CHANGE how you take these medications        Instructions    Morning Afternoon Evening Bedtime    fluconazole 100 MG Tabs   What changed:  how much to take   Last time this was given:  200 mg on 5/16/2017  8:09 AM   Commonly known as:  DIFLUCAN        Take 2 Tabs by mouth every day.   Dose:   200 mg                          CONTINUE taking these medications        Instructions    Morning Afternoon Evening Bedtime    ARMOUR THYROID 60 MG Tabs   Last time this was given:  60 mg on 5/16/2017  5:52 AM   Generic drug:  thyroid        Take 60 mg by mouth every day.   Dose:  60 mg                        buPROPion 300 MG XL tablet   Commonly known as:  WELLBUTRIN XL        Take 300 mg by mouth every morning.   Dose:  300 mg                        insulin lispro 100 UNIT/ML Soln   Commonly known as:  HUMALOG        Inject 2-20 Units as instructed 3 times a day before meals. Sliding Scale - 2 units every 50 > 150   Dose:  2-20 Units                        LIALDA 1.2 GM Tbec   Generic drug:  mesalamine        Take 1.2 g by mouth 2 times a day.   Dose:  1.2 g                        linezolid 600 MG Tabs   Last time this was given:  600 mg on 5/16/2017  8:09 AM   Commonly known as:  ZYVOX        Take 600 mg by mouth 2 times a day. 14 day course started 5/9/17   Dose:  600 mg                        morphine ER 30 MG Tbcr tablet   Last time this was given:  30 mg on 5/16/2017  8:09 AM   Commonly known as:  MS CONTIN        Doctor's comments:  Take one tablet in the morning and two tablets at night   Take 1 Tab by mouth every 12 hours.   Dose:  30 mg                        ondansetron 4 MG Tbdp   Last time this was given:  4 mg on 5/16/2017  9:19 AM   Commonly known as:  ZOFRAN ODT        Take 4 mg by mouth every four hours as needed for Nausea/Vomiting.   Dose:  4 mg                        oxycodone-acetaminophen  MG Tabs   Last time this was given:  1 Tab on 5/15/2017  9:18 PM   Commonly known as:  PERCOCET-10        Take 1 Tab by mouth every 8 hours as needed for Severe Pain.   Dose:  1 Tab                        TOUJEO SOLOSTAR 300 UNIT/ML Sopn   Generic drug:  Insulin Glargine        Inject 42 Units as instructed every day. Adjusts based on blood sugar.   Dose:  42 Units                        Vitamin D3 5000  UNITS Tabs   Last time this was given:  5,000 Units on 5/15/2017  8:06 PM        Take 5,000 Units by mouth every day.   Dose:  5000 Units                          STOP taking these medications     metronidazole 500 MG Tabs   Commonly known as:  FLAGYL               NS SOLN 100 mL with ertapenem 1 GM SOLR 1,000 mg                    Where to Get Your Medications      These medications were sent to [id number] - [place], [place] - [address]  [address] [place] [address]     Phone:  [phone]    - fluconazole 100 MG Tabs      Information about where to get these medications is not yet available     ! Ask your nurse or doctor about these medications    - amoxicillin-clavulanate 875-125 MG Tabs  - NS SOLN 100 mL with piperacillin-tazobactam 3.375 (3-0.375) G SOLR 3.375 g            Orders for after discharge     REFERRAL TO HOME HEALTH    Complete by:  As directed    Home health will create and establish a plan of care       REFERRAL TO SKILLED NURSING FACILITY    Complete by:  As directed              Instructions           Diet / Nutrition:    Follow any diet instructions given to you by your doctor or the dietician, including how much salt (sodium) you are allowed each day.    If you are overweight, talk to your doctor about a weight reduction plan.    Activity:    Remain physically active following your doctor's instructions about exercise and activity.    Rest often.     Any time you become even a little tired or short of breath, SIT DOWN and rest.    Worsening Symptoms:    Report any of the following signs and symptoms to the doctor's office immediately:    *Pain of jaw, arm, or neck  *Chest pain not relieved by medication                               *Dizziness or loss of consciousness  *Difficulty breathing even when at rest   *More tired than usual                                       *Bleeding drainage or swelling of surgical site  *Swelling of feet, ankles, legs or stomach                  *Fever (>100ºF)  *Pink or blood tinged sputum  *Weight gain (3lbs/day or 5lbs /week)           *Shock from internal defibrillator (if applicable)  *Palpitations or irregular heartbeats                *Cool and/or numb extremities    Stroke Awareness    Common Risk Factors for Stroke include:    Age  Atrial Fibrillation  Carotid Artery Stenosis  Diabetes Mellitus  Excessive alcohol consumption  High blood pressure  Overweight   Physical inactivity  Smoking    Warning signs and symptoms of a stroke include:    *Sudden numbness or weakness of the face, arm or leg (especially on one side of the body).  *Sudden confusion, trouble speaking or understanding.  *Sudden trouble seeing in one or both eyes.  *Sudden trouble walking, dizziness, loss of balance or coordination.Sudden severe headache with no known cause.    It is very important to get treatment quickly when a stroke occurs. If you experience any of the above warning signs, call 911 immediately.                   Disclaimer         Quit Smoking / Tobacco Use:    I understand the use of any tobacco products increases my chance of suffering from future heart disease or stroke and could cause other illnesses which may shorten my life. Quitting the use of tobacco products is the single most important thing I can do to improve my health. For further information on smoking / tobacco cessation call a Toll Free Quit Line at 1-113.194.2815 (*[employer]) or 1-320.437.1953 (American Lung Association) or you can access the web based program at www.lungusa.org.    [place] Tobacco Users Help Line:  (524) 255-3466       Toll Free: 1-149.824.8999  Quit Tobacco Program [facility] (812)795-8887    Crisis Hotline:    [place] Crisis Hotline:  4-632-YBALBHP or 1-347.496.9722    [place] Crisis Hotline:    1-802.952.2033 or 302-220-7777    Discharge Survey:   Thank you for choosing [facility]. We hope we did everything we could to make your  hospital stay a pleasant one. You may be receiving a phone survey and we would appreciate your time and participation in answering the questions. Your input is very valuable to us in our efforts to improve our service to our patients and their families.        My signature on this form indicates that:    1. I have reviewed and understand the above information.  2. My questions regarding this information have been answered to my satisfaction.  3. I have formulated a plan with my discharge nurse to obtain my prescribed medications for home.                  Disclaimer         __________________________________                     __________       ________                       Patient Signature                                                 Date                    Time

## 2017-05-11 NOTE — IP AVS SNAPSHOT
" <p align=\"LEFT\"><IMG SRC=\"//[email or web address]\" alt=\"Image\" WIDTH=\"50%\" HEIGHT=\"200\" BORDER=\"\"></p>                   Name:Po Richards  Medical Record Number:[id number]  CSN: [id number]    YOB: 1965   Age: 52 y.o.  Sex: male  HT:1.727 m (5' 8\") WT: 98.6 kg (217 lb 6 oz)          Admit Date: 5/11/2017     Discharge Date:   Today's Date: 5/16/2017  Attending Doctor:  Emerson Summers D.O.                  Allergies:  Peanut-derived; Tradjenta; and Hydrocodone          Your appointments     May 16, 2017 To Be Determined   [facility] RESUMPTION OF CARE with Mary Jo Johnson R.N.   [facility] (--)    [address] [place] [address].  [facility] [id number] [phone]              Follow-up Information     1. Follow up with KRISSY Brunner In 1 week.    Specialty:  Family Medicine    Contact information    [address]  [facility] [id number] [phone]           Medication List      Take these Medications        Instructions    amoxicillin-clavulanate 875-125 MG Tabs   Start taking on:  5/19/2017   Commonly known as:  AUGMENTIN    Take 1 Tab by mouth 2 times a day for 14 days.   Dose:  1 Tab       ARMOUR THYROID 60 MG Tabs   Generic drug:  thyroid    Take 60 mg by mouth every day.   Dose:  60 mg       buPROPion 300 MG XL tablet   Commonly known as:  WELLBUTRIN XL    Take 300 mg by mouth every morning.   Dose:  300 mg       fluconazole 100 MG Tabs   What changed:  how much to take   Commonly known as:  DIFLUCAN    Take 2 Tabs by mouth every day.   Dose:  200 mg       insulin lispro 100 UNIT/ML Soln   Commonly known as:  HUMALOG    Inject 2-20 Units as instructed 3 times a day before meals. Sliding Scale - 2 units every 50 > 150   Dose:  2-20 Units       LIALDA 1.2 GM Tbec   Generic drug:  mesalamine    Take 1.2 g by mouth 2 times a day.   Dose:  1.2 g       linezolid 600 MG Tabs   Commonly known as:  ZYVOX    Take 600 mg by mouth 2 times a day. 14 day course started 5/9/17   Dose:  600 mg "       morphine ER 30 MG Tbcr tablet   Commonly known as:  MS CONTIN    Doctor's comments:  Take one tablet in the morning and two tablets at night   Take 1 Tab by mouth every 12 hours.   Dose:  30 mg       NS SOLN 100 mL with piperacillin-tazobactam 3.375 (3-0.375) G SOLR 3.375 g    3.375 g by Intravenous route every 6 hours.   Dose:  3.375 g       ondansetron 4 MG Tbdp   Commonly known as:  ZOFRAN ODT    Take 4 mg by mouth every four hours as needed for Nausea/Vomiting.   Dose:  4 mg       oxycodone-acetaminophen  MG Tabs   Commonly known as:  PERCOCET-10    Take 1 Tab by mouth every 8 hours as needed for Severe Pain.   Dose:  1 Tab       TOUJEO SOLOSTAR 300 UNIT/ML Sopn   Generic drug:  Insulin Glargine    Inject 42 Units as instructed every day. Adjusts based on blood sugar.   Dose:  42 Units       Vitamin D3 5000 UNITS Tabs    Take 5,000 Units by mouth every day.   Dose:  5000 Units

## 2017-05-12 ENCOUNTER — HOME CARE VISIT (OUTPATIENT)
Dept: HOME HEALTH SERVICES | Facility: HOME HEALTHCARE | Age: 52
End: 2017-05-12
Payer: COMMERCIAL

## 2017-05-12 PROBLEM — A41.4 SEPTICEMIA DUE TO BACTEROIDES (HCC): Status: ACTIVE | Noted: 2017-05-12

## 2017-05-12 LAB
ANION GAP SERPL CALC-SCNC: 9 MMOL/L (ref 0–11.9)
BASOPHILS # BLD AUTO: 0.5 % (ref 0–1.8)
BASOPHILS # BLD: 0.02 K/UL (ref 0–0.12)
BUN SERPL-MCNC: 17 MG/DL (ref 8–22)
CALCIUM SERPL-MCNC: 8.8 MG/DL (ref 8.5–10.5)
CHLORIDE SERPL-SCNC: 110 MMOL/L (ref 96–112)
CO2 SERPL-SCNC: 25 MMOL/L (ref 20–33)
CREAT SERPL-MCNC: 1.11 MG/DL (ref 0.5–1.4)
EOSINOPHIL # BLD AUTO: 0.13 K/UL (ref 0–0.51)
EOSINOPHIL NFR BLD: 3 % (ref 0–6.9)
ERYTHROCYTE [DISTWIDTH] IN BLOOD BY AUTOMATED COUNT: 45 FL (ref 35.9–50)
GFR SERPL CREATININE-BSD FRML MDRD: >60 ML/MIN/1.73 M 2
GLUCOSE BLD-MCNC: 108 MG/DL (ref 65–99)
GLUCOSE BLD-MCNC: 117 MG/DL (ref 65–99)
GLUCOSE BLD-MCNC: 78 MG/DL (ref 65–99)
GLUCOSE BLD-MCNC: 85 MG/DL (ref 65–99)
GLUCOSE SERPL-MCNC: 84 MG/DL (ref 65–99)
GRAM STN SPEC: NORMAL
HCT VFR BLD AUTO: 31.3 % (ref 42–52)
HGB BLD-MCNC: 10.3 G/DL (ref 14–18)
IMM GRANULOCYTES # BLD AUTO: 0.02 K/UL (ref 0–0.11)
IMM GRANULOCYTES NFR BLD AUTO: 0.5 % (ref 0–0.9)
LYMPHOCYTES # BLD AUTO: 1.48 K/UL (ref 1–4.8)
LYMPHOCYTES NFR BLD: 34.4 % (ref 22–41)
MCH RBC QN AUTO: 29.8 PG (ref 27–33)
MCHC RBC AUTO-ENTMCNC: 32.9 G/DL (ref 33.7–35.3)
MCV RBC AUTO: 90.5 FL (ref 81.4–97.8)
MONOCYTES # BLD AUTO: 0.42 K/UL (ref 0–0.85)
MONOCYTES NFR BLD AUTO: 9.8 % (ref 0–13.4)
NEUTROPHILS # BLD AUTO: 2.23 K/UL (ref 1.82–7.42)
NEUTROPHILS NFR BLD: 51.8 % (ref 44–72)
NRBC # BLD AUTO: 0 K/UL
NRBC BLD AUTO-RTO: 0 /100 WBC
PLATELET # BLD AUTO: 299 K/UL (ref 164–446)
PMV BLD AUTO: 8.9 FL (ref 9–12.9)
POTASSIUM SERPL-SCNC: 3.9 MMOL/L (ref 3.6–5.5)
RBC # BLD AUTO: 3.46 M/UL (ref 4.7–6.1)
SIGNIFICANT IND 70042: NORMAL
SIGNIFICANT IND 70042: NORMAL
SITE SITE: NORMAL
SITE SITE: NORMAL
SODIUM SERPL-SCNC: 144 MMOL/L (ref 135–145)
SOURCE SOURCE: NORMAL
SOURCE SOURCE: NORMAL
WBC # BLD AUTO: 4.3 K/UL (ref 4.8–10.8)

## 2017-05-12 PROCEDURE — 700102 HCHG RX REV CODE 250 W/ 637 OVERRIDE(OP): Performed by: INTERNAL MEDICINE

## 2017-05-12 PROCEDURE — A9270 NON-COVERED ITEM OR SERVICE: HCPCS | Performed by: INTERNAL MEDICINE

## 2017-05-12 PROCEDURE — 99232 SBSQ HOSP IP/OBS MODERATE 35: CPT | Performed by: HOSPITALIST

## 2017-05-12 PROCEDURE — 306372 DRESSING,VAC SIMPLACE MED: Performed by: HOSPITALIST

## 2017-05-12 PROCEDURE — 82962 GLUCOSE BLOOD TEST: CPT

## 2017-05-12 PROCEDURE — 700111 HCHG RX REV CODE 636 W/ 250 OVERRIDE (IP): Performed by: INTERNAL MEDICINE

## 2017-05-12 PROCEDURE — 85025 COMPLETE CBC W/AUTO DIFF WBC: CPT

## 2017-05-12 PROCEDURE — 700102 HCHG RX REV CODE 250 W/ 637 OVERRIDE(OP): Performed by: PHARMACIST

## 2017-05-12 PROCEDURE — 700111 HCHG RX REV CODE 636 W/ 250 OVERRIDE (IP): Performed by: HOSPITALIST

## 2017-05-12 PROCEDURE — 770020 HCHG ROOM/CARE - TELE (206)

## 2017-05-12 PROCEDURE — A9270 NON-COVERED ITEM OR SERVICE: HCPCS | Performed by: PHARMACIST

## 2017-05-12 PROCEDURE — 700105 HCHG RX REV CODE 258: Performed by: INTERNAL MEDICINE

## 2017-05-12 PROCEDURE — 80048 BASIC METABOLIC PNL TOTAL CA: CPT

## 2017-05-12 RX ADMIN — BUPROPION HYDROCHLORIDE 150 MG: 150 TABLET, FILM COATED, EXTENDED RELEASE ORAL at 10:05

## 2017-05-12 RX ADMIN — OXYCODONE HYDROCHLORIDE AND ACETAMINOPHEN 1 TABLET: 10; 325 TABLET ORAL at 11:55

## 2017-05-12 RX ADMIN — PIPERACILLIN SODIUM AND TAZOBACTAM SODIUM 3.38 G: 3; .375 INJECTION, POWDER, FOR SOLUTION INTRAVENOUS at 17:58

## 2017-05-12 RX ADMIN — PIPERACILLIN SODIUM AND TAZOBACTAM SODIUM 3.38 G: 3; .375 INJECTION, POWDER, FOR SOLUTION INTRAVENOUS at 11:58

## 2017-05-12 RX ADMIN — LINEZOLID 600 MG: 600 TABLET, FILM COATED ORAL at 20:40

## 2017-05-12 RX ADMIN — ENOXAPARIN SODIUM 40 MG: 100 INJECTION SUBCUTANEOUS at 10:05

## 2017-05-12 RX ADMIN — MESALAMINE 400 MG: 400 CAPSULE, DELAYED RELEASE ORAL at 20:40

## 2017-05-12 RX ADMIN — HYDROMORPHONE HYDROCHLORIDE 0.5 MG: 1 INJECTION, SOLUTION INTRAMUSCULAR; INTRAVENOUS; SUBCUTANEOUS at 10:50

## 2017-05-12 RX ADMIN — SODIUM CHLORIDE: 9 INJECTION, SOLUTION INTRAVENOUS at 12:00

## 2017-05-12 RX ADMIN — PIPERACILLIN SODIUM AND TAZOBACTAM SODIUM 3.38 G: 3; .375 INJECTION, POWDER, FOR SOLUTION INTRAVENOUS at 23:30

## 2017-05-12 RX ADMIN — VITAMIN D, TAB 1000IU (100/BT) 5000 UNITS: 25 TAB at 20:41

## 2017-05-12 RX ADMIN — MORPHINE SULFATE 30 MG: 30 TABLET, EXTENDED RELEASE ORAL at 20:40

## 2017-05-12 RX ADMIN — BUPROPION HYDROCHLORIDE 150 MG: 150 TABLET, FILM COATED, EXTENDED RELEASE ORAL at 20:40

## 2017-05-12 RX ADMIN — LEVOTHYROXINE, LIOTHYRONINE 60 MG: 19; 4.5 TABLET ORAL at 05:55

## 2017-05-12 RX ADMIN — LINEZOLID 600 MG: 600 TABLET, FILM COATED ORAL at 10:05

## 2017-05-12 RX ADMIN — PIPERACILLIN SODIUM AND TAZOBACTAM SODIUM 3.38 G: 3; .375 INJECTION, POWDER, FOR SOLUTION INTRAVENOUS at 00:03

## 2017-05-12 RX ADMIN — HYDROMORPHONE HYDROCHLORIDE 0.5 MG: 1 INJECTION, SOLUTION INTRAMUSCULAR; INTRAVENOUS; SUBCUTANEOUS at 12:22

## 2017-05-12 RX ADMIN — PIPERACILLIN SODIUM AND TAZOBACTAM SODIUM 3.38 G: 3; .375 INJECTION, POWDER, FOR SOLUTION INTRAVENOUS at 05:54

## 2017-05-12 RX ADMIN — MESALAMINE 400 MG: 400 CAPSULE, DELAYED RELEASE ORAL at 17:57

## 2017-05-12 RX ADMIN — INSULIN GLARGINE 25 UNITS: 100 INJECTION, SOLUTION SUBCUTANEOUS at 20:41

## 2017-05-12 RX ADMIN — ZOLPIDEM TARTRATE 5 MG: 5 TABLET, FILM COATED ORAL at 20:44

## 2017-05-12 RX ADMIN — SODIUM CHLORIDE: 9 INJECTION, SOLUTION INTRAVENOUS at 23:30

## 2017-05-12 RX ADMIN — MORPHINE SULFATE 30 MG: 30 TABLET, EXTENDED RELEASE ORAL at 10:05

## 2017-05-12 RX ADMIN — MESALAMINE 400 MG: 400 CAPSULE, DELAYED RELEASE ORAL at 10:05

## 2017-05-12 RX ADMIN — FLUCONAZOLE 200 MG: 100 TABLET ORAL at 10:05

## 2017-05-12 RX ADMIN — MAGNESIUM GLUCONATE 500 MG ORAL TABLET 400 MG: 500 TABLET ORAL at 10:05

## 2017-05-12 ASSESSMENT — ENCOUNTER SYMPTOMS
FOCAL WEAKNESS: 0
DIZZINESS: 0
VOMITING: 0
ABDOMINAL PAIN: 0
FEVER: 1
HEADACHES: 0
FEVER: 0
CHILLS: 0
MYALGIAS: 1
WEAKNESS: 1
NAUSEA: 0
SHORTNESS OF BREATH: 0
PALPITATIONS: 0
CONSTIPATION: 0
DIARRHEA: 0
COUGH: 0
SPEECH CHANGE: 0

## 2017-05-12 ASSESSMENT — PAIN SCALES - GENERAL
PAINLEVEL_OUTOF10: 6
PAINLEVEL_OUTOF10: 5
PAINLEVEL_OUTOF10: 6

## 2017-05-12 NOTE — PROGRESS NOTES
Hospital Medicine ICU Interval Note    Date of Service: 2017    Chief Complaint  52 y.o.-year-old male admitted 2017 with worsening wound infections    Interval Problem Update   - discussed with patient at length. Reviewed chart, abx, orders. Feeling a little better than yesterday. Discussed plan of care. Pain generally well controlled except with vac changes. Questions answered.     Consultants/Specialty  Yvan, ID    Disposition  Clinical         Review of Systems   Constitutional: Positive for fever. Negative for chills.   Respiratory: Negative for cough and shortness of breath.    Cardiovascular: Negative for chest pain and palpitations.   Gastrointestinal: Negative for nausea, vomiting, abdominal pain, diarrhea and constipation.   Genitourinary: Negative for dysuria.   Musculoskeletal: Positive for myalgias.   Skin: Negative for itching.   Neurological: Positive for weakness. Negative for dizziness, focal weakness and headaches.   All other systems reviewed and are negative.     Physical Exam  Laboratory/Imaging   Hemodynamics  Temp (24hrs), Av.6 °C (97.9 °F), Min:36.2 °C (97.2 °F), Max:36.9 °C (98.4 °F)   Temperature: 36.4 °C (97.6 °F)  Pulse  Av.8  Min: 67  Max: 99    Blood Pressure: 123/72 mmHg      Respiratory      Respiration: 16, Pulse Oximetry: 94 %        RUL Breath Sounds: Clear, RML Breath Sounds: Clear, RLL Breath Sounds: Clear, NADEEN Breath Sounds: Clear, LLL Breath Sounds: Clear    Fluids       Nutrition  Orders Placed This Encounter   Procedures   • Diet Order     Standing Status: Standing      Number of Occurrences: 1      Standing Expiration Date:      Order Specific Question:  Diet:     Answer:  Diabetic [3]     Physical Exam   Constitutional: He is oriented to person, place, and time. He appears well-developed and well-nourished.   HENT:   Head: Normocephalic and atraumatic.   Mouth/Throat: Oropharynx is clear and moist.   Eyes: Conjunctivae and EOM are normal. Pupils  are equal, round, and reactive to light. No scleral icterus.   Neck: Normal range of motion. Neck supple. No tracheal deviation present. No thyromegaly present.   Cardiovascular: Normal rate, regular rhythm, normal heart sounds and intact distal pulses.    No murmur heard.  Pulmonary/Chest: Effort normal and breath sounds normal. No respiratory distress. He has no wheezes.   Abdominal: Soft. Bowel sounds are normal. He exhibits no distension. There is tenderness.   Umbilical wound vac   Musculoskeletal: Normal range of motion. He exhibits no edema or tenderness.   Right thigh wound vac, tender  Right foot wrapped  Left hand wrapped   Lymphadenopathy:     He has no cervical adenopathy.        Right: No supraclavicular adenopathy present.        Left: No supraclavicular adenopathy present.   Neurological: He is alert and oriented to person, place, and time. No cranial nerve deficit.   Skin: Skin is warm and dry.   Vitals reviewed.      Recent Labs      05/11/17   1110  05/12/17   0340   WBC  6.0  4.3*   RBC  3.76*  3.46*   HEMOGLOBIN  11.3*  10.3*   HEMATOCRIT  33.9*  31.3*   MCV  90.2  90.5   MCH  30.1  29.8   MCHC  33.3*  32.9*   RDW  44.4  45.0   PLATELETCT  359  299   MPV  9.0  8.9*     Recent Labs      05/11/17   1110  05/12/17   0340   SODIUM  139  144   POTASSIUM  3.8  3.9   CHLORIDE  104  110   CO2  22  25   GLUCOSE  129*  84   BUN  20  17   CREATININE  1.01  1.11   CALCIUM  9.4  8.8                      Assessment/Plan     * Septicemia due to bacteroides (CMS-[facility]) (present on admission)  Assessment & Plan  Abx per ID  Appreciate ID assistance    Abscess of right thigh (present on admission)  Assessment & Plan  Active Orders     Ordered     Ordering Provider       Thu May 11, 2017  5:28 PM    05/11/17 1728  fluconazole (DIFLUCAN) tablet 200 mg   DAILY      Anai Santoro M.D.    05/11/17 1728  linezolid (ZYVOX) tablet 600 mg   EVERY 12 HOURS     Question:  Indication and durations for linezolid  Answer:   Cellulitis, VRE or MRSA (duration 10 days)    Anai Santoro M.D.       u May 11, 2017 12:22 PM    05/11/17 1222  piperacillin-tazobactam (ZOSYN) 3.375 g in  mL IVPB   EVERY 6 HOURS      Chelsey Jaquez M.D.          Ulcerative colitis (CMS-HCC) (present on admission)  Assessment & Plan  Mesalamine  Probably some component of immunosuppression    Essential hypertension (present on admission)  Assessment & Plan  SBP goal less than 140 mmHg  DBP goal less than 90 mmHg  PRN and antihypertensives to titrate by hospitalist towards goal  Most recent Blood Pressure: 123/72 mmHg     DM (diabetes mellitus), type 2, uncontrolled (CMS-HCC) (present on admission)  Assessment & Plan  In-hospital FSBG goal less than 180 mg/dL  SSI qAC & qHS when eating, q6 when NPO  GLUCOSE - ACCU-CK   Date/Time Value Ref Range Status   05/12/2017 11:06 AM 85 65 - 99 mg/dL Final   05/12/2017 06:02 AM 78 65 - 99 mg/dL Final   05/11/2017 08:44 PM 97 65 - 99 mg/dL Final   05/11/2017 04:37 PM 98 65 - 99 mg/dL Final     Medications reviewed and Labs reviewed        DVT Prophylaxis: Heparin    Ulcer prophylaxis: Not indicated

## 2017-05-12 NOTE — CARE PLAN
Problem: Safety  Goal: Will remain free from injury  Outcome: PROGRESSING AS EXPECTED  Pt educated about use of call light and bed alarm when getting out of bed. Call light within reach. Bed alarm in use. Pt verbalized understanding and calls appropriately. Treaded socks in place. Bed in low and locked position. Appropriate sign outside of room. Pt up to bathroom with FWW, with SBA to help with poles and equipment.     Problem: Knowledge Deficit  Goal: Knowledge of disease process/condition, treatment plan, diagnostic tests, and medications will improve  Outcome: PROGRESSING AS EXPECTED  Discussed POC with pt. Answered all questions appropriately. White board updated. All medications and interventions explained before performed. Pt verbalized understanding.

## 2017-05-12 NOTE — ASSESSMENT & PLAN NOTE
SBP goal less than 140 mmHg  DBP goal less than 90 mmHg  PRN and antihypertensives to titrate by hospitalist towards goal

## 2017-05-12 NOTE — ASSESSMENT & PLAN NOTE
In-hospital FSBG goal less than 180 mg/dL  SSI qAC & qHS when eating, q6 when NPO  GLUCOSE - ACCU-CK   Date/Time Value Ref Range Status   05/14/2017 06:09 * 65 - 99 mg/dL Final   05/13/2017 08:34 * 65 - 99 mg/dL Final   05/13/2017 05:13 PM 86 65 - 99 mg/dL Final   05/13/2017 11:58 * 65 - 99 mg/dL Final

## 2017-05-12 NOTE — CONSULTS
DATE OF SERVICE:  05/11/2017    INFECTIOUS DISEASE CONSULTATION    DATE OF ADMISSION:  05/11/2017    DATE OF CONSULTATION:  05/11/2017    REFERRING PHYSICIAN:  Salvatore Bonilla MD, emergency medicine.    REASON FOR CONSULTATION:  Fever.    HISTORY OF PRESENT ILLNESS:  The patient is a 52-year-old white male who is   well known to us from his multiple previous admissions.  He was most recently   discharged on 05/08/20/17.  He has history of diabetes mellitus, ulcerative   colitis.  His last admission was on 04/29/2017 for right thigh infection after   he gave himself a testosterone shot.  He also had abscess in his left hand   and right lower extremity as well.  He was taken to the OR on 04/29/2017 for   right thigh abscess and on 05/01/2017 for left hand and right ankle abscess.    On 04/29/2017, his blood cultures were positive for Bacteroides vulgatus and   his right thigh was positive for Klebsiella and MRSA and Bacteroides fragilis.    He was discharged home on ertapenem,  Zyvox and fluconazole through   05/18/2017.  He came back into the emergency room today because he felt   nauseous and was throwing up as well as he had a fever.  In view of his   complicated history, infectious disease consult has been called.    REVIEW OF SYSTEMS:  Complains of nausea and vomiting.  Complains of some   abdominal pain, but overall well.  No seizures.  No rash.  The abscesses on   his right ankle and left hand are doing well.  No cough.  No shortness of   breath.  Other review of systems is negative per AMA and CMS criteria.    ALLERGIES:  HE IS ALLERGIC TO TRADJENTA AND HYDROCODONE.    PAST MEDICAL HISTORY:  History of ulcerative colitis, diabetes mellitus,   pancreatitis, hypothyroidism, gout.    PAST SURGICAL HISTORY:  Umbilical hernia repair mesh infection, necrotizing   myositis of the right thigh I and D's, lumbar surgery, cholecystectomy, left   wrist surgery.    SOCIAL HISTORY:  Does not smoke, does not drink.  No drug  use.    FAMILY HISTORY:  Positive for non-Hodgkin lymphoma in his mother.    MEDICATIONS:  He is on Lovenox, Diflucan, Lantus, Humalog, Zyvox, Delzicol,   Zofran, Percocet, Compazine, Phenergan, milk of magnesia, and Zosyn.    LABORATORY DATA:  His WBC count is 6, platelets of 359, creatinine is 1.01.    PHYSICAL EXAMINATION:  GENERAL:  He is in no acute distress.  VITAL SIGNS:  T-max is 98.2, blood pressure is 101/71, pulse is 68.  HEAD AND ENT:  Mucosa is moist.  No oral thrush.  NECK:  Supple.  No lymphadenopathy.  CHEST:  Bilateral air entry, clear to auscultation.  CARDIOVASCULAR:  Regular.  No murmurs, no gallops heard.  ABDOMEN:  Slight tenderness.  Wound VAC present.  EXTREMITIES:  Right thigh wound VAC.  Right ankle wound is clean.  Left hand   wound is clean.  CENTRAL NERVOUS SYSTEM:  Alert and oriented x3.  No focal neurological   deficit.    ASSESSMENT:  1.  New fevers, source is unclear.  2.  Right thigh abscess, status post incision and drainage.  3.  Left hand abscess, status post incision and drainage.  4.  Right ankle abscess, status post incision and drainage.  5.  Bacteroides bacteremia, on treatment.  6.  Ulcerative colitis.  7.  Diabetes mellitus.    RECOMMENDATIONS:  At this time, I would recommend that we increase the dose of   Diflucan to 200, change the Zyvox to oral.  Continue with the Zosyn.  Follow   all the culture results.  Continue with the wound care.  I have reviewed all   the records.  Plan was discussed with internal medicine.    Thank you for the consultation.       ____________________________________     ARYAN ROBLES MD JD / STORM    DD:  05/11/2017 17:28:41  DT:  05/11/2017 20:59:07    D#:  [id number]  Job#:  [id number]

## 2017-05-12 NOTE — PROGRESS NOTES
Infectious Disease Progress Note    Author: Anai Santoro M.D. Date of service & Time created: 2017  1:16 PM    Chief Complaint:  Chief Complaint   Patient presents with   • Wound Infection       Interval History:  52 year old male with UC, Dm ,abdominal wall infection, recent multiple SSTI readmitted with fevers  17- no fevers. Wbc 4.3. No significant issues overnight  Labs Reviewed, Medications Reviewed, Radiology Reviewed and Wound Reviewed.    Review of Systems:  Review of Systems   Constitutional: Positive for malaise/fatigue. Negative for fever.   Respiratory: Negative for cough and shortness of breath.    Cardiovascular: Negative for chest pain.   Gastrointestinal: Negative for nausea, vomiting and diarrhea.   Genitourinary: Negative for dysuria.   Musculoskeletal: Positive for myalgias.   Neurological: Negative for speech change, focal weakness and headaches.       Hemodynamics:  Temp (24hrs), Av.6 °C (97.8 °F), Min:36.2 °C (97.2 °F), Max:36.9 °C (98.4 °F)  Temperature: 36.7 °C (98.1 °F)  Pulse  Av.7  Min: 67  Max: 99Heart Rate (Monitored): 78  Blood Pressure: 111/83 mmHg, NIBP: 110/68 mmHg       Physical Exam:  Physical Exam   Constitutional: He is oriented to person, place, and time. No distress.   HENT:   Mouth/Throat: No oropharyngeal exudate.   Eyes: No scleral icterus.   Neck: Neck supple.   Cardiovascular: Regular rhythm.    No murmur heard.  Pulmonary/Chest: He has no wheezes. He has no rales.   Abdominal: Soft. There is no tenderness. There is no rebound.   Wound vac present   Musculoskeletal: He exhibits no edema.   Rt thigh wound vac  Left hand and rt ankle bandaged     Neurological: He is alert and oriented to person, place, and time.   Skin: No erythema.   Vitals reviewed.      Meds:    Current facility-administered medications:   •  HYDROmorphone (DILAUDID) injection 0.5 mg, 0.5 mg, Intravenous, Q4HRS PRN, Carlos Kapoor M.D., 0.5 mg at 17 1222  •  buPROPion SR  (WELLBUTRIN-SR) tablet 150 mg, 150 mg, Oral, BID, Chelsey Jaquez M.D., 150 mg at 05/12/17 1005  •  vitamin D (cholecalciferol) tablet 5,000 Units, 5,000 Units, Oral, Q EVENING, Chelsey Jaquez M.D., 5,000 Units at 05/11/17 2039  •  insulin glargine (LANTUS) injection 50 Units, 50 Units, Subcutaneous, QHS, Chelsey Jaquez M.D., 25 Units at 05/11/17 2045  •  magnesium oxide (MAG-OX) tablet 400 mg, 400 mg, Oral, DAILY, Chelsey Jaquez M.D., 400 mg at 05/12/17 1005  •  morphine ER (MS CONTIN) tablet 30 mg, 30 mg, Oral, Q12HRS, Chelsey Jaquez M.D., 30 mg at 05/12/17 1005  •  oxycodone-acetaminophen (PERCOCET-10)  MG per tablet 1 Tab, 1 Tab, Oral, Q8HRS PRN, Chelsey Jaquez M.D., 1 Tab at 05/12/17 1155  •  thyroid (ARMOUR THYROID) tablet 60 mg, 60 mg, Oral, QAM AC, Chelsey Jaquez M.D., 60 mg at 05/12/17 0555  •  NS infusion 2,844 mL, 30 mL/kg, Intravenous, Once PRN, Chelsey Jaquez M.D.  •  senna-docusate (PERICOLACE or SENOKOT S) 8.6-50 MG per tablet 2 Tab, 2 Tab, Oral, BID, 2 Tab at 05/11/17 1245 **AND** polyethylene glycol/lytes (MIRALAX) PACKET 1 Packet, 1 Packet, Oral, QDAY PRN **AND** magnesium hydroxide (MILK OF MAGNESIA) suspension 30 mL, 30 mL, Oral, QDAY PRN **AND** bisacodyl (DULCOLAX) suppository 10 mg, 10 mg, Rectal, QDAY PRN, Chelsey Jaquez M.D.  •  NS infusion, , Intravenous, Continuous, Chelsey Jaquez M.D., Last Rate: 83 mL/hr at 05/12/17 1200  •  NS (BOLUS) infusion 500 mL, 500 mL, Intravenous, Once PRN, Chelsey Jaquez M.D.  •  enoxaparin (LOVENOX) inj 40 mg, 40 mg, Subcutaneous, DAILY, Chelsey Jaquez M.D., 40 mg at 05/12/17 1005  •  insulin lispro (HUMALOG) injection 2-9 Units, 2-9 Units, Subcutaneous, 4X/DAY ACHS, Chelsey Jaquez M.D., Stopped at 05/11/17 1700  •  Action is required: Protocol 1073 Hypoglycemia has been implemented, , , Once **AND** Protocol 1073 Inclusion Criteria, , , CONTINUOUS **AND** Protocol 1073 NOTIFY, , , Once **AND** Protocol 1073 Initiate protocol immediately if FSBG is less than or equal to 70 mg/dL, , ,  CONTINUOUS **AND** glucose 4 g chewable tablet 16 g, 16 g, Oral, Q15 MIN PRN **AND** dextrose 50% (D50W) injection 25 mL, 25 mL, Intravenous, Q15 MIN PRN, Chelsey Jaquez M.D.  •  ondansetron (ZOFRAN) syringe/vial injection 4 mg, 4 mg, Intravenous, Q4HRS PRN, Chelsey Jaquez M.D., 4 mg at 05/11/17 1530  •  ondansetron (ZOFRAN ODT) dispertab 4 mg, 4 mg, Oral, Q4HRS PRN, Chelsey Jaquez M.D.  •  promethazine (PHENERGAN) tablet 12.5-25 mg, 12.5-25 mg, Oral, Q4HRS PRN, Chelsey Jaquez M.D.  •  promethazine (PHENERGAN) suppository 12.5-25 mg, 12.5-25 mg, Rectal, Q4HRS PRN, Chelsey Jaquez M.D.  •  prochlorperazine (COMPAZINE) injection 5-10 mg, 5-10 mg, Intravenous, Q4HRS PRN, Chelsey Jaquez M.D.  •  piperacillin-tazobactam (ZOSYN) 3.375 g in  mL IVPB, 3.375 g, Intravenous, Q6HRS, Chlesey Jaquez M.D., Last Rate: 200 mL/hr at 05/12/17 1158, 3.375 g at 05/12/17 1158  •  mesalamine delayed-release (DELZICOL) capsule 400 mg, 400 mg, Oral, TID, Oliva Portillo, PHARMD, 400 mg at 05/12/17 1005  •  fluconazole (DIFLUCAN) tablet 200 mg, 200 mg, Oral, DAILY, Anai Santoro M.D., 200 mg at 05/12/17 1005  •  linezolid (ZYVOX) tablet 600 mg, 600 mg, Oral, Q12HRS, Anai Santoro M.D., 600 mg at 05/12/17 1005  •  zolpidem (AMBIEN) tablet 5 mg, 5 mg, Oral, HS PRN, Chelsey Jaquez M.D., 5 mg at 05/11/17 2041    Labs:  Recent Labs      05/11/17   1110  05/12/17   0340   WBC  6.0  4.3*   RBC  3.76*  3.46*   HEMOGLOBIN  11.3*  10.3*   HEMATOCRIT  33.9*  31.3*   MCV  90.2  90.5   MCH  30.1  29.8   RDW  44.4  45.0   PLATELETCT  359  299   MPV  9.0  8.9*   NEUTSPOLYS  64.20  51.80   LYMPHOCYTES  27.90  34.40   MONOCYTES  5.90  9.80   EOSINOPHILS  1.00  3.00   BASOPHILS  0.50  0.50     Recent Labs      05/11/17   1110  05/12/17   0340   SODIUM  139  144   POTASSIUM  3.8  3.9   CHLORIDE  104  110   CO2  22  25   GLUCOSE  129*  84   BUN  20  17     Recent Labs      05/11/17   1110  05/12/17   0340   ALBUMIN  4.0   --    TBILIRUBIN  0.4   --    ALKPHOSPHAT  166*    --    TOTPROTEIN  7.1   --    ALTSGPT  17   --    ASTSGOT  16   --    CREATININE  1.01  1.11       Imaging:  Ct-extremity, Lower With Right    4/29/2017 4/29/2017 12:26 PM HISTORY/REASON FOR EXAM:  Thigh wound, draining, pain. Diabetes. Fever. TECHNIQUE/EXAM DESCRIPTION AND NUMBER OF VIEWS:  CT scan of the RIGHT thigh and leg with contrast, with reconstructions. Thin helical 3 mm sections were obtained from the distal femur through the proximal tibia/fibula. Sagittal and coronal multiplanar reconstructions were generated from the axial images. A total of 100 mL of Omnipaque 350 nonionic contrast was administered  IV without complication. Up to date radiation dose reduction adjustments have been utilized to meet ALARA standards for radiation dose reduction. COMPARISON: None. FINDINGS: There is a gas and fluid collection measuring 14 x 3 x 5 cm within the anterior thigh musculature, centered in the vastus lateralis. There is severe adjacent subcutaneous fat stranding and this also extends into the leg laterally No other abnormal fluid collection is identified. No other soft tissue gas. Review of the leg shows lateral subcutaneous fat stranding but no fluid or abnormal gas density. No knee joint effusion. No lymphadenopathy. Mild atherosclerotic change. No aneurysm. Limited visualization of the pelvis shows no free fluid. No acute bony destruction is visualized throughout the examination. Note that the abscess does not contact the femoral cortex.     4/29/2017  14 cm distal anterior thigh abscess with no CT evidence of septic arthropathy or osteomyelitis Findings were discussed with ANCELMO SCOTT on 4/29/2017 1:12 PM.    Ct-maxillofacial W/o Plus Recons    5/11/2017 5/11/2017 1:49 PM HISTORY/REASON FOR EXAM:  Evaluate for sinusitis. Wound VAC. TECHNIQUE/EXAM DESCRIPTION AND NUMBER OF VIEWS:  CT scan maxillofacial without contrast, with reconstructions. Helical imaging was obtained of the face from the orbital  roofs through the mandible. Coronal and sagittal reformats were  generated from the axial data. Low dose optimization technique was utilized for this CT exam including automated exposure control and adjustment of the mA and/or kV according to patient size. COMPARISON: 7/13/2012 FINDINGS: There is mucous retention cyst in the right maxillary sinus. Minimal mucosal thickening is seen in the left maxillary sinus. Remaining visualized paranasal sinuses are clear. Walls of the paranasal sinuses are intact. Lamina papyracea is intact bilaterally. Visualized orbital rims are maintained. No nasal bone fracture is seen. Nasal spine of the maxilla is intact. There is mild leftward deviation of the nasal septum with a nasal septal spur. Pterygoid plates are maintained. Visualized mastoid air cells are clear. There is a right conchal bullosa. Maxillary ostia are patent bilaterally. Maxilla and mandible are intact. Temporomandibular joints are maintained. Degenerative changes are seen at the temporal mandibular joints. Globes are intact.     5/11/2017  Mucous retention cyst in the right maxillary sinus with minimal mucosal thickening in the left maxillary sinus. Leftward deviation of the nasal septum with a nasal septal spur. Right devin bullosa.    Dx-chest-for Picc Line Perform Procedure In:: Patient's Room    5/2/2017 5/2/2017 4:30 PM HISTORY/REASON FOR EXAM:  PICC line placement. TECHNIQUE/EXAM DESCRIPTION AND NUMBER OF VIEWS: Single view of the chest. COMPARISON: Earlier x-ray FINDINGS: PICC line position has been adjusted. The catheter projects over the SVC with tip at the cavoatrial juncture. Mild diffuse interstitial opacities. No pleural effusion. Cardiomegaly.     5/2/2017  Right upper extremity PICC line tip overlies the SVC. Mild diffuse atelectasis/interstitial edema.    Dx-chest-for Picc Line Perform Procedure In:: Patient's Room    5/2/2017 5/2/2017 7:43 AM HISTORY/REASON FOR EXAM:  PICC line placement.  TECHNIQUE/EXAM DESCRIPTION AND NUMBER OF VIEWS: Single view of the chest. COMPARISON: 4/29/2017 FINDINGS: Right upper extremity PICC line tip overlies the SVC and right atrium. Cardiomegaly. Mild diffuse interstitial opacities. No pleural effusion.     5/2/2017  1.  Placement right PICC line projecting over the SVC with the tip in the mid to lower right atrium. 2.  Mild diffuse atelectasis/interstitial edema. 3.  Findings discussed with PICC team at 8:00 AM.    Dx-chest-limited (1 View)    4/29/2017 4/29/2017 8:01 PM HISTORY/REASON FOR EXAM:  Central line placement TECHNIQUE/EXAM DESCRIPTION AND NUMBER OF VIEWS: Single portable view of the chest. COMPARISON: 04/06/2017 FINDINGS: Right central line is noted with tip at the level of the lower SVC. Appearance of cardiac silhouette is unchanged compared to prior exam. No new infiltrates or consolidations appear to have developed since the prior exam. Ill-defined opacifications centered in the perihilar regions of the lungs appear to have increased compared to prior exam. No new pleural fluid collections or pneumothorax appear to have developed since the prior radiograph.     4/29/2017  1.  Ill-defined opacifications in each lung have increased compared to the prior radiograph. This could indicate worsening of pulmonary edema or inflammation. 2.  Right central line is noted at the level of the lower SVC. No pneumothorax identified.    Dx-chest-portable (1 View)    5/11/2017  The right PICC line tip is unchanged in appearance with the tip again located at the cavoatrial junction.    5/11/2017 5/11/2017 11:25 AM HISTORY/REASON FOR EXAM: Fever chills and body aches TECHNIQUE/EXAM DESCRIPTION AND NUMBER OF VIEWS: Single portable view of the chest. COMPARISON: 5/2/2017 FINDINGS: There is no evidence of focal consolidation or evidence of pulmonary edema. There is no pleural effusion. The heart is mildly enlarged.     5/11/2017  Mild cardiomegaly.    Ir-picc Line Placement >  Age 5    2017  HISTORY/REASON FOR EXAM:   PICC placement. TECHNIQUE/EXAM DESCRIPTION AND NUMBER OF VIEWS:   PICC line insertion with ultrasound guidance.  The procedure was performed using maximal sterile barrier technique including sterile gown, mask, cap, and donning of sterile gloves following appropriate hand hygiene and/or sterile scrub. Patient skin site was prepped with 2% Chlorhexidine solution. FINDINGS:  PICC line insertion with Ultrasound Guidance was performed by qualified nursing staff without the assistance of a Radiologist.  A follow up chest x-ray will be performed to determine appropriateness of PICC positioning.     2017           Ultrasound-guided PICC placement performed by qualified nursing staff as above.     Echocardiogram Comp W/o Cont    2017  Transthoracic Echo Report Echocardiography Laboratory CONCLUSIONS Normal left ventricular chamber size. Normal left ventricular systolic function. Left ventricular ejection fraction is visually estimated to be 65%. Normal regional wall motion. Mild mitral regurgitation. The left atrium is normal in size. Structurally normal aortic valve without significant stenosis or regurgitation. Right ventricular systolic pressure is estimated to be 50 mmHg. Mild tricuspid regurgitation. Normal inferior vena cava size and inspiratory collapse. Mildly dilated right ventricle. Normal right ventricular systolic function. DEVIN MO Exam Date:         2017                    09:57 Exam Location:     Inpatient Priority:          Routine Ordering Physician:        DEVIKA LINDSEY Referring Physician:       CÉSAR Sumner Sonographer:               Nini Mcintosh RDCS Age:    51     Gender:    M MRN:    [id number] :    1965 BSA:    2.2    Ht (in):    68     Wt (lb):    238 Exam Type:     Complete Indications:     Fever, unknown origin ICD Codes:       780.6 CPT Codes:       68139 BP:   125    /   68     HR:   98 Technical Quality:       Fair  MEASUREMENTS  (Male / Female) Normal Values 2D ECHO LV Diastolic Diameter PLAX        5.1 cm                4.2 - 5.9 / 3.9 - 5.3 cm LV Systolic Diameter PLAX         3.2 cm                2.1 - 4.0 cm IVS Diastolic Thickness           1.1 cm                LVPW Diastolic Thickness          1.1 cm                LVOT Diameter                     2 cm                  Estimated LV Ejection Fraction    65 %                  LV Ejection Fraction MOD BP       56 %                  >= 55  % LV Ejection Fraction MOD 4C       50.8 %                LV Ejection Fraction MOD 2C       57.9 %                IVC Diameter                      2 cm                  M-MODE Aortic Root Diameter MM           3.2 cm                DOPPLER AV Peak Velocity                  1.8 m/s               AV Peak Gradient                  13.2 mmHg             AV Mean Gradient                  8.4 mmHg              LVOT Peak Velocity                1.3 m/s               AV Area Cont Eq vti               2.1 cm²               Mitral E Point Velocity           0.92 m/s              Mitral E to A Ratio               1.2                   Mitral A Duration                 125 ms                MV Pressure Half Time             42.8 ms               MV Area PHT                       5.1 cm²               MV Deceleration Time              148 ms                MR ERO PISA                       0.051 cm²             MR Regurgitant Volume PISA        7 cm³                 Pulmonary Vein Systolic Velocity  0.58 m/s              Pulmonary Vein Diastolic Velocit  0.66 m/s              Pulmonary Vein S/D Ratio          0.87                  Pulmonary Vein A Velocity         0.38 m/s              TR Peak Velocity                  332 cm/s              TR Peak Gradient                  44.1 mmHg             PV Peak Velocity                  1.2 m/s               PV Peak Gradient                  5.7 mmHg              RVOT Peak Velocity                1.1 m/s                * Indicates values subject to auto-interpretation LV EF:  65    % FINDINGS Left Ventricle Normal left ventricular chamber size. Normal left ventricular wall thickness. Normal left ventricular systolic function. Left ventricular ejection fraction is visually estimated to be 65%. Normal regional wall motion. Normal diastolic function. Right Ventricle Mildly dilated right ventricle. Normal right ventricular systolic function. Right Atrium The right atrium is normal in size. Normal inferior vena cava size and inspiratory collapse. Left Atrium The left atrium is normal in size. Mitral Valve Structurally normal mitral valve without significant stenosis.  Mild mitral regurgitation. Aortic Valve Structurally normal aortic valve without significant stenosis or regurgitation. Tricuspid Valve Structurally normal tricuspid valve without significant stenosis.  Mild tricuspid regurgitation. Right ventricular systolic pressure is estimated to be 50 mmHg. Pulmonic Valve Structurally normal pulmonic valve without significant stenosis or regurgitation. Pericardium Normal pericardium without effusion. Aorta The aortic root is normal. Cornell Lindo M.D. (Electronically Signed) Final Date:     01 May 2017 13:15    Transesophageal Echo W/o Cont    5/3/2017  Transesophageal Echo Report Echocardiography Laboratory CONCLUSIONS No vegetations noted, no evidence of endocarditis or abscess. DEVIN MO Exam Date:          2017                     14:34 Exam Location:      Inpatient Priority:            Routine Ordering Physician:        VIKASH ROCK Referring Physician:       [id number] Rodriguez Sonographer:               JOHN Gaspar Age:    51     Gender:    M MRN:    [id number] :    1965 BSA:    2.2    Ht (in):    68     Wt (lb):    238 Report Type:      Complete Indications:     Endocarditis and heart valve disorders in diseases                  classified elsewhere ICD Codes:       I39 CPT Codes:        35067 BP:          /          HR:   89 Technical Quality:       Good MEASUREMENTS  (Male / Female) Normal Values 2D ECHO Estimated LV Ejection Fraction    55 %                  * Indicates values subject to auto-interpretation LV EF:  55    % Medications Medications determined by anesthesiologist. Limitations Complications No complications. Proc. Components The probe was inserted and manipulated by Dr Patterson. Probe #3  was used for this procedure. FINDINGS Left Ventricle Normal left ventricular size, thickness, systolic function, and diastolic function. Right Ventricle Normal right ventricular size and systolic function. Right Atrium Normal right atrial size. Left Atrium Normal left atrial size. LA Appendage Normal left atrial appendage. No thrombus detected in the left atrial appendage. IA Septum Normal interatrial septum. IV Septum Mitral Valve Structurally normal mitral valve. No mitral stenosis. Trace mitral regurgitation. Aortic Valve Structurally normal aortic valve. No aortic stenosis. No aortic insufficiency. Tricuspid Valve Structurally normal tricuspid valve. No tricuspid stenosis. Trace tricuspid regurgitation. Pulmonic Valve Structurally normal pulmonic valve. No pulmonic stenosis. No pulmonic insufficiency. Pericardium Normal pericardium without effusion. Aorta Normal aortic root for body surface area. Kiran Patterson (Electronically Signed) Final Date:     03 May 2017 15:39    Le Venous Duplex - Dvt ([facility] Only)    4/23/2017   Vascular Laboratory  CONCLUSIONS  1.  Normal bilateral superficial and deep venous examination of the lower  extremities.  DEVIN MO  Exam Date:     04/21/2017 15:10  Room #:     Inpatient  Priority:     Routine  Ht (in):             Wt (lb):  Ordering Physician:        RENNY SCHWARZ  Referring Physician:  Sonographer:               Akiko Vivas  Study Type:                Complete Bilateral  Technical  Quality:         Adequate  Age:    51    Gender:     M  MRN:    [id number]  :    1965      BSA:  Indications:     Pain in Limb  CPT Codes:       94685  ICD Codes:       729.5  History:         Bilateral lower extremity pain.  Limitations:  PROCEDURES:  Bilateral lower extremity venous duplex imaging.  The following venous structures were evaluated: common femoral, profunda  femoral, greater saphenous, femoral, popliteal , peroneal and posterior  tibial veins.  Serial compression, augmentation maneuvers,  color and spectral Doppler  flow evaluations were performed.  FINDINGS:  Bilateral lower extremities -  Complete color filling and compressibility with normal venous flow dynamics  including spontaneous flow, response to augmentation maneuvers, and  respiratory phasicity.  Jessi Luke M.D.  (Electronically Signed)  Final Date:      2017                   15:40      Micro:  BLOOD CULTURE   Date Value Ref Range Status   2017   Preliminary    No Growth    Note: Blood cultures are incubated for 5 days and  are monitored continuously.Positive blood cultures  are called to the RN and reported as soon as  they are identified.       BLOOD CULTURE HOLD   Date Value Ref Range Status   2017 Collected  Final        Assessment:  Active Hospital Problems    Diagnosis   • Fever [R50.9]       Plan:  Right thigh abscess  Status post I&D on 17  Cultures were Klebsiella pneumonia, MRSA and Bacteroides fragilis and heavy growth viridans strep  Continue Zosyn and Zyvox. Aim  through . Extend as needed  Continue wound care    Right ankle and left hand abscess  Status post I&D on 17  The cultures from this site and negative so far  The wounds are healing  Continue wound care     Leukopenia  Monitor  Zyvox may have to be DC'd    Abdominal wall infection  Status post treatment  Continue wound care    Ulcerative colitis  Treatment per GI  Worry about these being extraintestinal manifestations of  ulcerative colitis  Advised to get a rheumatology consultation as an outpatient  Disposition  Patient wants to go to snf to complete the treatment and heal the wounds  Discussed with internal Medicine

## 2017-05-12 NOTE — PROGRESS NOTES
2 RN skin check done with Buffy LUCERO. L wrist wound covered with gauze, CDI. R wrist redness and peeling skin. Umbilicus wound covered and attached to wound vac. R thigh wound covered and attached to wound vac. Sacrum red and blanching. L shin old scab from snow shoe per pt. R ankle wound covered with gauze, CDI. No other skin breakdown at this time.

## 2017-05-12 NOTE — ASSESSMENT & PLAN NOTE
Active Orders     Ordered     Ordering Provider       Thu May 11, 2017  5:28 PM    05/11/17 1728  fluconazole (DIFLUCAN) tablet 200 mg   DAILY      Anai Santoro M.D.    05/11/17 1728  linezolid (ZYVOX) tablet 600 mg   EVERY 12 HOURS     Question:  Indication and durations for linezolid  Answer:  Cellulitis, VRE or MRSA (duration 10 days)    Anai Santoro M.D.       Thu May 11, 2017 12:22 PM    05/11/17 1222  piperacillin-tazobactam (ZOSYN) 3.375 g in  mL IVPB   EVERY 6 HOURS      Chelsey Jaquez M.D.

## 2017-05-12 NOTE — RESPIRATORY CARE
COPD EDUCATION by COPD CLINICAL EDUCATOR  5/12/2017 at 6:44 AM by Carleen Ohara     Patient reviewed by COPD education team. Patient does not qualify for COPD program.

## 2017-05-12 NOTE — WOUND TEAM
"[facility]  Inpatient Services  Initial Wound and Skin Care Evaluation    Admission Date:   05/11/2017.  HPI, PMH, SH: Reviewed  Unit where seen by Wound Team: T8.    WOUND CONSULT RELATED TO: Scheduled Negative Pressure Wound Therapy (NPWT) dressing change.    SUBJECTIVE:  \"It was very painful when it was changed at home.\"    Self Report / Pain Level: 9/10 during dressing change to right thigh. Patient premedicated.    OBJECTIVE: Pleasant and engaging.    WOUND TYPE, LOCATION, CHARACTERISTICS (Pressure ulcers: location, stage, POA or date identified)    Location and type of wound: Right lateral thigh: Open complicated surgical.        Periwound:     Slight induration from 0200 to 0500.  Drainage:     Scant, serosanguinous.  Tissue Type and %:    100% red.  Wound Edges:    Attached.  Odor:      None.  Exposed structure(s):  Muscle.  S&S of Infection:   None.      Measurements:   05/12/2017  Length:     15.3 cm  Width:      3.6 cm  Depth:    1.2 cm    Location and type of wound: Umbilicus: Open complicated surgical.        Periwound:     Intact.  Drainage:     None.  Tissue Type and %:    100% pink.  Wound Edges:    Attached.  Odor:      None.  Exposed structure(s):  None.  S&S of Infection:   None.      Measurements:   05/12/2017.  Length:     0.5 cm  Width:      1.5 cm  Depth:    0.5 cm    Location and type of wound: Left hand: Open complicated surgical.         Periwound:     Induration, erythema.  Drainage:     None.  Tissue Type and %:    100% red/brown.  Wound Edges:    Epibole.  Odor:      None.  Exposed structure(s):  None.  S&S of Infection:   Erythema, induration.      Measurements:   05/12/2017.  Length:     2 cm  Width:      0.4 cm  Depth:    0.4 cm    Location and type of wound: Right ankle, anteriomedial: Open complicated surgical.        Periwound:     Intact.  Drainage:     Scant, yellow.  Tissue Type and %:    90% red, 10% yellow.  Wound Edges:    Slight epibole.  Odor:     "  None.  Exposed structure(s):  None.  S&S of Infection:   None.        Measurements:   05/12/2017.  Length:     2.5 cm  Width:      0.6 cm  Depth:    0.3 cm    INTERVENTIONS BY WOUND TEAM: Measurements and photos taken of all wounds. Dressing removed from umbilical wound. This wound now small enough to d/c NPWT. Removed 1 piece of black foam from inside wound, 1 button. This wound cleaned with wound cleanser, dressed with hydrofiber silver and covered with adhesive silicone foam. Removed 2 pieces of black foam from right thigh wound. Wound cleansed with wound cleanser, gently blotted. Benzoin and drape applied to periwound. 1 strip of black foam placed into depth of wound, then 1 flat piece to fill wound bed, TOTAL OF 2 PIECES OF BLACK FOAM. Hole cut for trac pad, trac pad placed, suction obtained at 125 mmHg continuous. Patient requested for this RN to assess the wounds to left hand and right ankle. Left hand cleaned with wound cleanser, dressed with hydrofiber silver, covered with non-adherent pad, secured with hypafix tape. Right ankle cleaned with wound cleanser, hydrofiber silver placed into wound, then covered with adhesive silicone foam.   Nursing to change dressings to left hand, right ankle, and umbilicus Q 48 hours.       Interdisciplinary consultation: Patient, nursing.    EVALUATION: Clean wound on right thigh is benefiting from NPWT. Hydrofiber silver provides topical antimicrobial treatment, absorbs exudate without macerating periwounds.     Factors affecting wound healing: Type 2 diabetes, Infection.  Goals: wounds will decrease by 1% per week.     NURSING PLAN OF CARE ORDERS (X):    Dressing changes: See Dressing Maintenance orders: X  Skin care: See Skin Care orders:   Rectal tube care: See Rectal Tube Care orders:   Other orders:    RSKIN: CURRENT (X) ORDERED (O)  Q shift Garrett:  X  Q shift pressure point assessments:  X  Pressure redistribution mattress    X    BRANDON      Bariatric BRANDON       Bariatric foam        Heel float boots       Heels floated on pillows   X   Barrier wipes      Barrier Cream      Barrier paste      Sacral silicone dressing      Silicone O2 tubing   X   Anchorfast      Trach with Optifoam split foam       Waffle cushion      Rectal tube or BMS      Antifungal tx    Turn q 2 hours   X  Up to chair     Ambulate   PT/OT     Dietician      PO X    TF   TPN     PVN    NPO   # days   Other       WOUND TEAM PLAN OF CARE (X):   NPWT change 3 x week:  X      Dressing changes by wound team:  X     Follow up as needed:       Other (explain):    Anticipated discharge plans (X): TBD  SNF:           Home Care:           Outpatient Wound Center:            Self Care:            Other:

## 2017-05-12 NOTE — CARE PLAN
Problem: Nutritional:  Goal: Achieve adequate nutritional intake  Patient will consume >50% of meals  Outcome: MET Date Met:  05/12/17

## 2017-05-12 NOTE — PROGRESS NOTES
Assumed care report received. Assessment completed. AOx4. Pt resting in bed complains of pain 6/10, medicated see MAR. No other complaints at this time. Plan of care discussed, verbalized understanding. Fall precautions in place. Call light within reach. Tele monitor in place. White board updated. Bed alarm in use. Wound vac at bedside. New ice pack provided to pt per request.

## 2017-05-12 NOTE — ED PROVIDER NOTES
"ED Provider Note    CHIEF COMPLAINT  Chief Complaint   Patient presents with   • Wound Infection       HPI  Seventeen Susie is a 52 y.o. male who presents to the emergency department with recurrent fever. Mahesh has a very complex recent medical history including recurrent infection and sepsis. He has been out of the hospital for a couple of days receiving intravenous antibiotics via a PICC line however despite continued antibiotic therapy today he developed fever and diaphoresis and felt very weak. The patient was seen by his doctor and directed to the emergency department for further evaluation. At this point the fever has subsided. Apparently his primary care doctor recommended CT scan of the sinuses and then attempt to identify the source of these recurrent infections.    REVIEW OF SYSTEMS no chest pain no hemoptysis, no new skin lesions, no new vomiting or diarrhea. All other systems negative    PAST MEDICAL HISTORY  Past Medical History   Diagnosis Date   • Migraine    • Gout    • Indigestion    • UC (ulcerative colitis) (CMS-HCC) 3-3-17     \"Five BM's per day, takes Lialda\"   • Difficult intubation 2-2016   • Arthritis 3-3-17     \"Spine\"   • Sepsis (CMS-HCC) 1/21/2016   • Essential hypertension 1/22/2016   • Snoring 3-3-17     Has not had a sleep study   • High cholesterol 3-3-17     Does not currently take medication for   • Congestive heart failure (CMS-HCC) 2-2016      3-3-17 \"Not a current issue\"   • ASTHMA 3-3-17     \"Hasn't had to use inhaler in 2 years\"   • Aspiration pneumonia (CMS-HCC) 3-2016   • Breath shortness 3-3-17     \"With Exercise\"   • Hypothyroid 3-3-17     Takes [place] Thyroid   • Pain 3-3-17     \"Left Flank\"   • Heart burn    • Depression 11/26/2014   • Anesthesia      \"Was difficult to intubate 2-2016\"   • Pancreatitis 2-2016     \"D/T Tradjenta was hospitialized for 15 days\"   • Elevated liver enzymes 2-2016   • Electrolyte imbalance 2-2016   • Kidney stones    • ADRIANE (acute kidney " "injury) (CMS-HCC) 2/24/2016   • RF (renal failure) 2-2016   • Diabetes (CMS-HCC) 3-3-17     Takes Insulin   • DKA (diabetic ketoacidoses) (CMS-HCC) 2-2016     \"10 days on vent with DKA, Renal failure, CHF, elevated liver enzymes and electrolyte imbalance\"   • On mechanically assisted ventilation (CMS-HCC) 2-2016     HX \"On Vent for 10 days at [facility]\".  \"DX Pancreatitis, DKA, CHF, Elevated Liver Enzymes & Electrolyte Imbalance\".       FAMILY HISTORY  No family history on file.    SOCIAL HISTORY  Social History     Social History   • Marital Status: Single     Spouse Name: N/A   • Number of Children: N/A   • Years of Education: N/A     Social History Main Topics   • Smoking status: Never Smoker    • Smokeless tobacco: Not on file   • Alcohol Use: No      Comment: occ   • Drug Use: No   • Sexual Activity: Not on file     Other Topics Concern   • Not on file     Social History Narrative    ** Merged History Encounter **         ** Merged History Encounter **            SURGICAL HISTORY  Past Surgical History   Procedure Laterality Date   • Carmenza by laparoscopy  2005   • Colonoscopy with biopsy  11/26/2014     Performed by Solo Higginbotham M.D. at ENDOSCOPY [facility]   • Umbilical hernia repair N/A 11/6/2016     Procedure: UMBILICAL HERNIA REPAIR;  Surgeon: Esau Dooley M.D.;  Location: SURGERY [facility];  Service:    • Other orthopedic surgery  1997 or 1998     Left Wrist ORIF   • Umbilical hernia repair  3/10/2017     Procedure: UMBILICAL HERNIA REPAIR- INCISION AND DRAINAGE OF UMBILICAL WOUND AND MESH REMOVAL;  Surgeon: Esau Dooley M.D.;  Location: SURGERY SAME DAY [facility];  Service:    • Incision and drainage general  4/7/2017     Procedure: INCISION AND DRAINAGE GENERAL;  Surgeon: Esau Dooley M.D.;  Location: SURGERY SAME DAY [facility];  Service:    • Irrigation & debridement general Right 4/29/2017     Procedure: IRRIGATION & DEBRIDEMENT GENERAL;  Surgeon: Esau Dooley, " "M.D.;  Location: SURGERY [facility];  Service:    • Irrigation & debridement general Right 5/1/2017     Procedure: IRRIGATION & DEBRIDEMENT GENERAL-Left HAND AND right  ANKLE;  Surgeon: Esau Dooley M.D.;  Location: SURGERY [facility];  Service:        CURRENT MEDICATIONS  Home Medications     Reviewed by Darleen Hernandez (Pharmacy Tech) on 05/11/17 at 1350  Med List Status: Complete    Medication Last Dose Status    buPROPion (WELLBUTRIN XL) 300 MG XL tablet 5/11/2017 Active    Cholecalciferol (VITAMIN D3) 5000 UNITS Tab 5/11/2017 Active    fluconazole (DIFLUCAN) 100 MG Tab 5/11/2017 Active    Insulin Glargine (TOUJEO SOLOSTAR) 300 UNIT/ML Solution Pen-injector 5/11/2017 Active    insulin lispro (HUMALOG) 100 UNIT/ML Solution 4/29/2017 Active    linezolid (ZYVOX) 600 MG Tab 5/11/2017 Active    mesalamine (LIALDA) 1.2 GM TBEC 5/11/2017 Active    metronidazole (FLAGYL) 500 MG Tab 5/11/2017 Active    morphine ER (MS CONTIN) 30 MG Tab CR tablet 5/11/2017 Active    NS SOLN 100 mL with ertapenem 1 GM SOLR 1,000 mg 5/10/2017 Active    ondansetron (ZOFRAN ODT) 4 MG TABLET DISPERSIBLE 5/11/2017 Active    oxycodone-acetaminophen (PERCOCET-10)  MG Tab 5/10/2017 Active    thyroid (ARMOUR THYROID) 60 MG Tab 5/11/2017 Active                ALLERGIES  Allergies   Allergen Reactions   • Peanut-Derived Anaphylaxis     Peanuts   RXN=age 36   • Tradjenta [Linagliptin] Unspecified     Pt developed pancreatitis   • Hydrocodone Hives     Tolerates Morphine and oxycodone  Rxn Age - 29       PHYSICAL EXAM  VITAL SIGNS: /83 mmHg  Pulse 84  Temp(Src) 36.7 °C (98.1 °F)  Resp 16  Ht 1.727 m (5' 8\")  Wt 99.5 kg (219 lb 5.7 oz)  BMI 33.36 kg/m2  SpO2 95%   Oxygen saturation is interpreted as adequate  Constitutional: Awake and nontoxic appearing  HENT: Mucous membranes moist, no facial erythema or swelling  Eyes: No erythema or discharge or jaundice  Neck: Trachea midline no JVD  Cardiovascular: Regular " rate and rhythm  Lungs: Clear and equal bilaterally with no apparent difficulty breathing  Abdomen/Back: No peritoneal findings, nondistended, wound VAC is attached to a wound in the mid abdomen there is no surrounding erythema  Skin: Warm and dry,  Musculoskeletal: There is a wound VAC attached to a recent surgical wound of the right thigh there is minimal if any surrounding erythema. There are also slight, not acute wounds to the left hand and the right ankle  Neurologic: Awake to verbal and moving all extremities    CHART REVIEW  I reviewed recent chart materials regarding multiple recurrent infections.    Laboratory  A CBC showed a white blood cell count of 6.0 hemoglobins adequate 11.3 immature granulocyte counts were normal. LFTs showed minimal elevation of the alk phos 166 glucose is minimally elevated at 129 and anion gap is minimally elevated at 13. Lactic acid is normal at 1.6    Radiology  DX-CHEST-PORTABLE (1 VIEW)   Final Result   Addendum 1 of 1   The right PICC line tip is unchanged in appearance with the tip again    located at the cavoatrial junction.      Final      Mild cardiomegaly.      CT-MAXILLOFACIAL W/O PLUS RECONS   Final Result      Mucous retention cyst in the right maxillary sinus with minimal mucosal thickening in the left maxillary sinus.      Leftward deviation of the nasal septum with a nasal septal spur.      Right devin bullosa.        MEDICAL DECISION MAKING and DISPOSITION  In the emergency department the PICC line was accessed. The patient was placed on a cardiac monitor. I reviewed the case with her clinical pharmacist. I reviewed the case with Dr. Santoro who will provide infectious disease consultation. I have reviewed the case with the hospitalist and the patient will be admitted for further evaluation and treatment. I have ordered intravenous Zosyn and Zyvox. Mahesh remains hemodynamically stable while in the emergency department. He was given intravenous pain medications  for pain associated with his surgical wounds and sores.    IMPRESSION  1. Recurrent fever  2. Multiple areas of wound infection  3. Recurrent infections of unknown cause         Electronically signed by: Salvatore Bonilla, 5/12/2017 1:49 PM

## 2017-05-12 NOTE — DIETARY
Nutrition Services: Unplanned weight loss noted in admit screen + pressure ulcer  52 year old male admitted for fever.  Pertinent past medical history includes: indigestion, UC, arthritis, sepsis, HTN, high cholesterol, CHF, asthma, hypothyroid, depression, pancreatitis, renal failure, DKA.  Patient currently admitted to T8 and receiving a diabetic diet - intake has been adequate with % of meals since admit.    Ht: 5'8''  Wt: 99.5 kg  BMI: 33.36  Pertinent Labs: reviewed  Pertinent Meds: diflucan, lantus, ssi humalog, zyvox, magnesium-oxide, zosyn, pericolace, thyroid, vitamin D, dilaudid and percocet (prn)  Fluids: NS infusion @ 83 ml/hr  Skin: wound to hand and ankle; open surgical wound to right leg and abdomen.  -  history of i/d at previous admit. Wound vac in place.  No stage 2 or great pressure ulcers noted  GI: Last BM 5/12/17  Diet: Diabetic    PLAN/RECOMMEND -   1) Nutrition rep to see patient daily for meal and snack preferences.  2) Encourage PO  3) Weekly weights to monitor fluid and nutrition status  4) Obtain supplement order per RD as needed.  5) Patient may benefit from daily multivitamin (theragran-m) + 500 mg vitamin C for complex wounds with wound vac.     RD available PRN.

## 2017-05-13 PROBLEM — Z79.4 TYPE 2 DIABETES MELLITUS WITHOUT COMPLICATION, WITH LONG-TERM CURRENT USE OF INSULIN (HCC): Status: ACTIVE | Noted: 2017-05-01

## 2017-05-13 PROBLEM — E11.9 TYPE 2 DIABETES MELLITUS WITHOUT COMPLICATION, WITH LONG-TERM CURRENT USE OF INSULIN (HCC): Status: ACTIVE | Noted: 2017-05-01

## 2017-05-13 LAB
GLUCOSE BLD-MCNC: 111 MG/DL (ref 65–99)
GLUCOSE BLD-MCNC: 113 MG/DL (ref 65–99)
GLUCOSE BLD-MCNC: 86 MG/DL (ref 65–99)
GLUCOSE BLD-MCNC: 88 MG/DL (ref 65–99)

## 2017-05-13 PROCEDURE — 770001 HCHG ROOM/CARE - MED/SURG/GYN PRIV*

## 2017-05-13 PROCEDURE — 82962 GLUCOSE BLOOD TEST: CPT | Mod: 91

## 2017-05-13 PROCEDURE — 700102 HCHG RX REV CODE 250 W/ 637 OVERRIDE(OP): Performed by: INTERNAL MEDICINE

## 2017-05-13 PROCEDURE — 700102 HCHG RX REV CODE 250 W/ 637 OVERRIDE(OP): Performed by: PHARMACIST

## 2017-05-13 PROCEDURE — A9270 NON-COVERED ITEM OR SERVICE: HCPCS | Performed by: INTERNAL MEDICINE

## 2017-05-13 PROCEDURE — 700111 HCHG RX REV CODE 636 W/ 250 OVERRIDE (IP): Performed by: INTERNAL MEDICINE

## 2017-05-13 PROCEDURE — 700105 HCHG RX REV CODE 258: Performed by: INTERNAL MEDICINE

## 2017-05-13 PROCEDURE — 99232 SBSQ HOSP IP/OBS MODERATE 35: CPT | Performed by: HOSPITALIST

## 2017-05-13 PROCEDURE — A9270 NON-COVERED ITEM OR SERVICE: HCPCS | Performed by: PHARMACIST

## 2017-05-13 RX ADMIN — VITAMIN D, TAB 1000IU (100/BT) 5000 UNITS: 25 TAB at 20:35

## 2017-05-13 RX ADMIN — ZOLPIDEM TARTRATE 5 MG: 5 TABLET, FILM COATED ORAL at 22:54

## 2017-05-13 RX ADMIN — MORPHINE SULFATE 30 MG: 30 TABLET, EXTENDED RELEASE ORAL at 20:36

## 2017-05-13 RX ADMIN — PIPERACILLIN SODIUM AND TAZOBACTAM SODIUM 3.38 G: 3; .375 INJECTION, POWDER, FOR SOLUTION INTRAVENOUS at 12:01

## 2017-05-13 RX ADMIN — OXYCODONE HYDROCHLORIDE AND ACETAMINOPHEN 1 TABLET: 10; 325 TABLET ORAL at 20:36

## 2017-05-13 RX ADMIN — FLUCONAZOLE 200 MG: 100 TABLET ORAL at 08:12

## 2017-05-13 RX ADMIN — MESALAMINE 400 MG: 400 CAPSULE, DELAYED RELEASE ORAL at 17:14

## 2017-05-13 RX ADMIN — MORPHINE SULFATE 30 MG: 30 TABLET, EXTENDED RELEASE ORAL at 08:12

## 2017-05-13 RX ADMIN — MAGNESIUM GLUCONATE 500 MG ORAL TABLET 400 MG: 500 TABLET ORAL at 08:12

## 2017-05-13 RX ADMIN — ENOXAPARIN SODIUM 40 MG: 100 INJECTION SUBCUTANEOUS at 08:12

## 2017-05-13 RX ADMIN — LEVOTHYROXINE, LIOTHYRONINE 60 MG: 19; 4.5 TABLET ORAL at 06:11

## 2017-05-13 RX ADMIN — PIPERACILLIN SODIUM AND TAZOBACTAM SODIUM 3.38 G: 3; .375 INJECTION, POWDER, FOR SOLUTION INTRAVENOUS at 06:11

## 2017-05-13 RX ADMIN — INSULIN GLARGINE 25 UNITS: 100 INJECTION, SOLUTION SUBCUTANEOUS at 20:39

## 2017-05-13 RX ADMIN — OXYCODONE HYDROCHLORIDE AND ACETAMINOPHEN 1 TABLET: 10; 325 TABLET ORAL at 12:01

## 2017-05-13 RX ADMIN — SODIUM CHLORIDE: 9 INJECTION, SOLUTION INTRAVENOUS at 17:19

## 2017-05-13 RX ADMIN — OXYCODONE HYDROCHLORIDE AND ACETAMINOPHEN 1 TABLET: 10; 325 TABLET ORAL at 02:18

## 2017-05-13 RX ADMIN — MESALAMINE 400 MG: 400 CAPSULE, DELAYED RELEASE ORAL at 08:12

## 2017-05-13 RX ADMIN — LINEZOLID 600 MG: 600 TABLET, FILM COATED ORAL at 08:12

## 2017-05-13 RX ADMIN — VITAMIN D, TAB 1000IU (100/BT) 1000 UNITS: 25 TAB at 22:54

## 2017-05-13 RX ADMIN — MESALAMINE 400 MG: 400 CAPSULE, DELAYED RELEASE ORAL at 20:37

## 2017-05-13 RX ADMIN — LINEZOLID 600 MG: 600 TABLET, FILM COATED ORAL at 20:36

## 2017-05-13 RX ADMIN — BUPROPION HYDROCHLORIDE 150 MG: 150 TABLET, FILM COATED, EXTENDED RELEASE ORAL at 08:12

## 2017-05-13 RX ADMIN — PIPERACILLIN SODIUM AND TAZOBACTAM SODIUM 3.38 G: 3; .375 INJECTION, POWDER, FOR SOLUTION INTRAVENOUS at 17:15

## 2017-05-13 RX ADMIN — PIPERACILLIN SODIUM AND TAZOBACTAM SODIUM 3.38 G: 3; .375 INJECTION, POWDER, FOR SOLUTION INTRAVENOUS at 23:18

## 2017-05-13 RX ADMIN — BUPROPION HYDROCHLORIDE 150 MG: 150 TABLET, FILM COATED, EXTENDED RELEASE ORAL at 20:37

## 2017-05-13 ASSESSMENT — PAIN SCALES - GENERAL
PAINLEVEL_OUTOF10: 3
PAINLEVEL_OUTOF10: 6
PAINLEVEL_OUTOF10: 6

## 2017-05-13 ASSESSMENT — ENCOUNTER SYMPTOMS
PALPITATIONS: 0
COUGH: 0
CONSTIPATION: 0
CHILLS: 0
ABDOMINAL PAIN: 0
MYALGIAS: 1
SPEECH CHANGE: 0
FOCAL WEAKNESS: 0
DIARRHEA: 0
NAUSEA: 0
SHORTNESS OF BREATH: 0
VOMITING: 0
HEADACHES: 0
FEVER: 0
DIZZINESS: 0
WEAKNESS: 1
FEVER: 1

## 2017-05-13 NOTE — PROGRESS NOTES
Hospital Medicine ICU Interval Note    Date of Service: 2017    Chief Complaint  52 y.o.-year-old male admitted 2017 with worsening wound infections    Interval Problem Update   - discussed with patient. He states he would actually rather go home if possible, but would go to a SNF if necessary, i.e., if abx could not be adjusted to be infrequent enough to do at home. His pain is generally well controlled, 3/10, more with movement. Questions answered.     - discussed with patient at length. Reviewed chart, abx, orders. Feeling a little better than yesterday. Discussed plan of care. Pain generally well controlled except with vac changes. Questions answered.     Consultants/Specialty  Yvan, ID    Disposition  Home vs. SNF        Review of Systems   Constitutional: Positive for fever. Negative for chills.   Respiratory: Negative for cough and shortness of breath.    Cardiovascular: Negative for chest pain and palpitations.   Gastrointestinal: Negative for nausea, vomiting, abdominal pain, diarrhea and constipation.   Genitourinary: Negative for dysuria.   Musculoskeletal: Positive for myalgias.   Skin: Negative for itching.   Neurological: Positive for weakness. Negative for dizziness, focal weakness and headaches.   All other systems reviewed and are negative.     Physical Exam  Laboratory/Imaging   Hemodynamics  Temp (24hrs), Av.7 °C (98 °F), Min:36 °C (96.8 °F), Max:37.1 °C (98.8 °F)   Temperature: 36 °C (96.8 °F)  Pulse  Av.3  Min: 59  Max: 99    Blood Pressure: 125/69 mmHg      Respiratory      Respiration: 18, Pulse Oximetry: 96 %        RUL Breath Sounds: Clear, RML Breath Sounds: Clear, RLL Breath Sounds: Clear, NADEEN Breath Sounds: Clear, LLL Breath Sounds: Clear    Fluids       Intake/Output Summary (Last 24 hours) at 17 1124  Last data filed at 17 0400   Gross per 24 hour   Intake    240 ml   Output   1300 ml   Net  -1060 ml       Nutrition  Orders Placed This Encounter    Procedures   • Diet Order     Standing Status: Standing      Number of Occurrences: 1      Standing Expiration Date:      Order Specific Question:  Diet:     Answer:  Diabetic [3]     Physical Exam   Constitutional: He is oriented to person, place, and time. He appears well-developed and well-nourished.   HENT:   Head: Normocephalic and atraumatic.   Mouth/Throat: Oropharynx is clear and moist.   Eyes: Conjunctivae and EOM are normal. Pupils are equal, round, and reactive to light. No scleral icterus.   Neck: Normal range of motion. Neck supple. No tracheal deviation present. No thyromegaly present.   Cardiovascular: Normal rate, regular rhythm, normal heart sounds and intact distal pulses.    No murmur heard.  Pulmonary/Chest: Effort normal and breath sounds normal. No respiratory distress. He has no wheezes.   Abdominal: Soft. Bowel sounds are normal. He exhibits no distension. There is tenderness.   Umbilical wound vac   Musculoskeletal: Normal range of motion. He exhibits no edema or tenderness.   Right thigh wound vac, tender  Right foot wrapped  Left hand wrapped   Lymphadenopathy:     He has no cervical adenopathy.        Right: No supraclavicular adenopathy present.        Left: No supraclavicular adenopathy present.   Neurological: He is alert and oriented to person, place, and time. No cranial nerve deficit.   Skin: Skin is warm and dry.   Vitals reviewed.      Recent Labs      05/11/17   1110  05/12/17   0340   WBC  6.0  4.3*   RBC  3.76*  3.46*   HEMOGLOBIN  11.3*  10.3*   HEMATOCRIT  33.9*  31.3*   MCV  90.2  90.5   MCH  30.1  29.8   MCHC  33.3*  32.9*   RDW  44.4  45.0   PLATELETCT  359  299   MPV  9.0  8.9*     Recent Labs      05/11/17   1110  05/12/17   0340   SODIUM  139  144   POTASSIUM  3.8  3.9   CHLORIDE  104  110   CO2  22  25   GLUCOSE  129*  84   BUN  20  17   CREATININE  1.01  1.11   CALCIUM  9.4  8.8                      Assessment/Plan     * Septicemia due to bacteroides (CMS-[facility])  (present on admission)  Assessment & Plan  Abx per ID  Appreciate ID assistance    Abscess of right thigh (present on admission)  Assessment & Plan  Active Orders     Ordered     Ordering Provider       Thu May 11, 2017  5:28 PM    05/11/17 1728  fluconazole (DIFLUCAN) tablet 200 mg   DAILY      Anai Santoro M.D.    05/11/17 1728  linezolid (ZYVOX) tablet 600 mg   EVERY 12 HOURS     Question:  Indication and durations for linezolid  Answer:  Cellulitis, VRE or MRSA (duration 10 days)    Anai Santoro M.D.       Thu May 11, 2017 12:22 PM    05/11/17 1222  piperacillin-tazobactam (ZOSYN) 3.375 g in  mL IVPB   EVERY 6 HOURS      Chelsey Jaquez M.D.          Ulcerative colitis (CMS-HCC) (present on admission)  Assessment & Plan  Mesalamine  Probably some component of immunosuppression    Essential hypertension (present on admission)  Assessment & Plan  SBP goal less than 140 mmHg  DBP goal less than 90 mmHg  PRN and antihypertensives to titrate by hospitalist towards goal  Most recent Blood Pressure: 125/69 mmHg     Type 2 diabetes mellitus without complication, with long-term current use of insulin (CMS-HCC) (present on admission)  Assessment & Plan  In-hospital FSBG goal less than 180 mg/dL  SSI qAC & qHS when eating, q6 when NPO  GLUCOSE - ACCU-CK   Date/Time Value Ref Range Status   05/13/2017 06:10 AM 88 65 - 99 mg/dL Final   05/12/2017 08:39 * 65 - 99 mg/dL Final   05/12/2017 06:00 * 65 - 99 mg/dL Final   05/12/2017 11:06 AM 85 65 - 99 mg/dL Final         Medications reviewed and Labs reviewed        DVT Prophylaxis: Heparin    Ulcer prophylaxis: Not indicated

## 2017-05-13 NOTE — PROGRESS NOTES
Assumed care of patient bedside report received from JAZMINE Girard updated on POC, call light within reach and fall precautions in place. Bed locked and in lowest position. Patient instructed to call for assistance before getting out of bed. All questions answered, no other needs at this time.

## 2017-05-13 NOTE — CARE PLAN
Problem: Communication  Goal: The ability to communicate needs accurately and effectively will improve  Outcome: PROGRESSING AS EXPECTED  Patient updated on POC and medications. Patient verbalized understanding.     Problem: Safety  Goal: Will remain free from injury  Outcome: PROGRESSING AS EXPECTED  Bedside table and call light are within reach. Bed is locked and in the lowest position. Treaded socks are on. Patient educated to call for assistance.

## 2017-05-14 LAB
BACTERIA WND AEROBE CULT: NORMAL
GLUCOSE BLD-MCNC: 101 MG/DL (ref 65–99)
GLUCOSE BLD-MCNC: 103 MG/DL (ref 65–99)
GLUCOSE BLD-MCNC: 190 MG/DL (ref 65–99)
GLUCOSE BLD-MCNC: 70 MG/DL (ref 65–99)
GRAM STN SPEC: NORMAL
SIGNIFICANT IND 70042: NORMAL
SIGNIFICANT IND 70042: NORMAL
SITE SITE: NORMAL
SITE SITE: NORMAL
SOURCE SOURCE: NORMAL
SOURCE SOURCE: NORMAL

## 2017-05-14 PROCEDURE — 700102 HCHG RX REV CODE 250 W/ 637 OVERRIDE(OP): Performed by: INTERNAL MEDICINE

## 2017-05-14 PROCEDURE — 700102 HCHG RX REV CODE 250 W/ 637 OVERRIDE(OP): Performed by: PHARMACIST

## 2017-05-14 PROCEDURE — 99232 SBSQ HOSP IP/OBS MODERATE 35: CPT | Performed by: HOSPITALIST

## 2017-05-14 PROCEDURE — 82962 GLUCOSE BLOOD TEST: CPT

## 2017-05-14 PROCEDURE — 770001 HCHG ROOM/CARE - MED/SURG/GYN PRIV*

## 2017-05-14 PROCEDURE — 700111 HCHG RX REV CODE 636 W/ 250 OVERRIDE (IP): Performed by: HOSPITALIST

## 2017-05-14 PROCEDURE — A9270 NON-COVERED ITEM OR SERVICE: HCPCS | Performed by: INTERNAL MEDICINE

## 2017-05-14 PROCEDURE — 700105 HCHG RX REV CODE 258: Performed by: INTERNAL MEDICINE

## 2017-05-14 PROCEDURE — A9270 NON-COVERED ITEM OR SERVICE: HCPCS | Performed by: PHARMACIST

## 2017-05-14 PROCEDURE — 700111 HCHG RX REV CODE 636 W/ 250 OVERRIDE (IP): Performed by: INTERNAL MEDICINE

## 2017-05-14 RX ADMIN — INSULIN GLARGINE 50 UNITS: 100 INJECTION, SOLUTION SUBCUTANEOUS at 21:00

## 2017-05-14 RX ADMIN — LINEZOLID 600 MG: 600 TABLET, FILM COATED ORAL at 20:49

## 2017-05-14 RX ADMIN — LINEZOLID 600 MG: 600 TABLET, FILM COATED ORAL at 10:32

## 2017-05-14 RX ADMIN — SODIUM CHLORIDE: 9 INJECTION, SOLUTION INTRAVENOUS at 07:17

## 2017-05-14 RX ADMIN — OXYCODONE HYDROCHLORIDE AND ACETAMINOPHEN 1 TABLET: 10; 325 TABLET ORAL at 12:27

## 2017-05-14 RX ADMIN — SODIUM CHLORIDE: 9 INJECTION, SOLUTION INTRAVENOUS at 20:49

## 2017-05-14 RX ADMIN — MORPHINE SULFATE 30 MG: 30 TABLET, EXTENDED RELEASE ORAL at 20:49

## 2017-05-14 RX ADMIN — BUPROPION HYDROCHLORIDE 150 MG: 150 TABLET, FILM COATED, EXTENDED RELEASE ORAL at 10:35

## 2017-05-14 RX ADMIN — MESALAMINE 400 MG: 400 CAPSULE, DELAYED RELEASE ORAL at 10:35

## 2017-05-14 RX ADMIN — ENOXAPARIN SODIUM 40 MG: 100 INJECTION SUBCUTANEOUS at 10:32

## 2017-05-14 RX ADMIN — MESALAMINE 400 MG: 400 CAPSULE, DELAYED RELEASE ORAL at 20:49

## 2017-05-14 RX ADMIN — BUPROPION HYDROCHLORIDE 150 MG: 150 TABLET, FILM COATED, EXTENDED RELEASE ORAL at 20:48

## 2017-05-14 RX ADMIN — MAGNESIUM GLUCONATE 500 MG ORAL TABLET 400 MG: 500 TABLET ORAL at 10:33

## 2017-05-14 RX ADMIN — HYDROMORPHONE HYDROCHLORIDE 0.5 MG: 1 INJECTION, SOLUTION INTRAMUSCULAR; INTRAVENOUS; SUBCUTANEOUS at 17:46

## 2017-05-14 RX ADMIN — PIPERACILLIN SODIUM AND TAZOBACTAM SODIUM 3.38 G: 3; .375 INJECTION, POWDER, FOR SOLUTION INTRAVENOUS at 17:33

## 2017-05-14 RX ADMIN — ZOLPIDEM TARTRATE 5 MG: 5 TABLET, FILM COATED ORAL at 20:49

## 2017-05-14 RX ADMIN — VITAMIN D, TAB 1000IU (100/BT) 5000 UNITS: 25 TAB at 20:49

## 2017-05-14 RX ADMIN — PIPERACILLIN SODIUM AND TAZOBACTAM SODIUM 3.38 G: 3; .375 INJECTION, POWDER, FOR SOLUTION INTRAVENOUS at 06:10

## 2017-05-14 RX ADMIN — PIPERACILLIN SODIUM AND TAZOBACTAM SODIUM 3.38 G: 3; .375 INJECTION, POWDER, FOR SOLUTION INTRAVENOUS at 12:27

## 2017-05-14 RX ADMIN — MESALAMINE 400 MG: 400 CAPSULE, DELAYED RELEASE ORAL at 16:03

## 2017-05-14 RX ADMIN — LEVOTHYROXINE, LIOTHYRONINE 60 MG: 19; 4.5 TABLET ORAL at 06:09

## 2017-05-14 RX ADMIN — MORPHINE SULFATE 30 MG: 30 TABLET, EXTENDED RELEASE ORAL at 10:32

## 2017-05-14 RX ADMIN — FLUCONAZOLE 200 MG: 100 TABLET ORAL at 10:33

## 2017-05-14 ASSESSMENT — ENCOUNTER SYMPTOMS
ABDOMINAL PAIN: 0
FEVER: 1
VOMITING: 0
PALPITATIONS: 0
FOCAL WEAKNESS: 0
COUGH: 0
SPEECH CHANGE: 0
NAUSEA: 0
CONSTIPATION: 0
FEVER: 0
MYALGIAS: 1
WEAKNESS: 1
DIZZINESS: 0
SHORTNESS OF BREATH: 0
DIARRHEA: 0
HEADACHES: 0
CHILLS: 0

## 2017-05-14 ASSESSMENT — PAIN SCALES - GENERAL
PAINLEVEL_OUTOF10: 6
PAINLEVEL_OUTOF10: 3
PAINLEVEL_OUTOF10: 6

## 2017-05-14 NOTE — PROGRESS NOTES
Hospital Medicine ICU Interval Note    Date of Service: 2017    Chief Complaint  52 y.o.-year-old male admitted 2017 with worsening wound infections    Interval Problem Update   - he is feeling well today. Discussed with Dr. Aguirre; may be able to do home infusion of Zosyn so that he could go home rather than to SNF. Questions answered. Advised will work on it tomorrow when the infusion company is open.     - discussed with patient. He states he would actually rather go home if possible, but would go to a SNF if necessary, i.e., if abx could not be adjusted to be infrequent enough to do at home. His pain is generally well controlled, 3/10, more with movement. Questions answered.     Consultants/Specialty  Yvan, ID    Disposition  Home vs. SNF        Review of Systems   Constitutional: Positive for fever. Negative for chills.   Respiratory: Negative for cough and shortness of breath.    Cardiovascular: Negative for chest pain and palpitations.   Gastrointestinal: Negative for nausea, vomiting, abdominal pain, diarrhea and constipation.   Genitourinary: Negative for dysuria.   Musculoskeletal: Positive for myalgias.   Skin: Negative for itching.   Neurological: Positive for weakness. Negative for dizziness, focal weakness and headaches.   All other systems reviewed and are negative.     Physical Exam  Laboratory/Imaging   Hemodynamics  Temp (24hrs), Av °C (96.8 °F), Min:35.9 °C (96.6 °F), Max:36.1 °C (97 °F)   Temperature: 35.9 °C (96.6 °F)  Pulse  Av.3  Min: 59  Max: 99    Blood Pressure: 120/70 mmHg      Respiratory      Respiration: 18, Pulse Oximetry: 96 %        RUL Breath Sounds: Clear, RML Breath Sounds: Clear, RLL Breath Sounds: Clear, NADEEN Breath Sounds: Clear, LLL Breath Sounds: Clear    Fluids       Intake/Output Summary (Last 24 hours) at 17 1125  Last data filed at 17 0600   Gross per 24 hour   Intake     60 ml   Output    650 ml   Net   -590 ml        Nutrition  Orders Placed This Encounter   Procedures   • Diet Order     Standing Status: Standing      Number of Occurrences: 1      Standing Expiration Date:      Order Specific Question:  Diet:     Answer:  Diabetic [3]     Physical Exam   Constitutional: He is oriented to person, place, and time. He appears well-developed and well-nourished.   HENT:   Head: Normocephalic and atraumatic.   Mouth/Throat: Oropharynx is clear and moist.   Eyes: Conjunctivae and EOM are normal. Pupils are equal, round, and reactive to light. No scleral icterus.   Neck: Normal range of motion. Neck supple. No tracheal deviation present. No thyromegaly present.   Cardiovascular: Normal rate, regular rhythm, normal heart sounds and intact distal pulses.    No murmur heard.  Pulmonary/Chest: Effort normal and breath sounds normal. No respiratory distress. He has no wheezes.   Abdominal: Soft. Bowel sounds are normal. He exhibits no distension. There is tenderness.   Umbilical wound vac   Musculoskeletal: Normal range of motion. He exhibits no edema or tenderness.   Right thigh wound vac, tender  Right foot wrapped  Left hand wrapped   Lymphadenopathy:     He has no cervical adenopathy.        Right: No supraclavicular adenopathy present.        Left: No supraclavicular adenopathy present.   Neurological: He is alert and oriented to person, place, and time. No cranial nerve deficit.   Skin: Skin is warm and dry.   Vitals reviewed.      Recent Labs      05/12/17   0340   WBC  4.3*   RBC  3.46*   HEMOGLOBIN  10.3*   HEMATOCRIT  31.3*   MCV  90.5   MCH  29.8   MCHC  32.9*   RDW  45.0   PLATELETCT  299   MPV  8.9*     Recent Labs      05/12/17   0340   SODIUM  144   POTASSIUM  3.9   CHLORIDE  110   CO2  25   GLUCOSE  84   BUN  17   CREATININE  1.11   CALCIUM  8.8                      Assessment/Plan     * Septicemia due to bacteroides (CMS-HCC) (present on admission)  Assessment & Plan  Abx per ID  Appreciate ID assistance    Abscess of  right thigh (present on admission)  Assessment & Plan  Active Orders     Ordered     Ordering Provider       u May 11, 2017  5:28 PM    05/11/17 1728  fluconazole (DIFLUCAN) tablet 200 mg   DAILY      Anai Santoro M.D.    05/11/17 1728  linezolid (ZYVOX) tablet 600 mg   EVERY 12 HOURS     Question:  Indication and durations for linezolid  Answer:  Cellulitis, VRE or MRSA (duration 10 days)    Anai Santoro M.D.       Thu May 11, 2017 12:22 PM    05/11/17 1222  piperacillin-tazobactam (ZOSYN) 3.375 g in  mL IVPB   EVERY 6 HOURS      Chelsey Jaquez M.D.          Ulcerative colitis (CMS-HCC) (present on admission)  Assessment & Plan  Mesalamine  Probably some component of immunosuppression    Essential hypertension (present on admission)  Assessment & Plan  SBP goal less than 140 mmHg  DBP goal less than 90 mmHg  PRN and antihypertensives to titrate by hospitalist towards goal  Most recent Blood Pressure: 120/70 mmHg     Type 2 diabetes mellitus without complication, with long-term current use of insulin (CMS-HCC) (present on admission)  Assessment & Plan  In-hospital FSBG goal less than 180 mg/dL  SSI qAC & qHS when eating, q6 when NPO  GLUCOSE - ACCU-CK   Date/Time Value Ref Range Status   05/14/2017 06:09 * 65 - 99 mg/dL Final   05/13/2017 08:34 * 65 - 99 mg/dL Final   05/13/2017 05:13 PM 86 65 - 99 mg/dL Final   05/13/2017 11:58 * 65 - 99 mg/dL Final         Medications reviewed and Labs reviewed        DVT Prophylaxis: Heparin    Ulcer prophylaxis: Not indicated

## 2017-05-14 NOTE — CARE PLAN
Problem: Communication  Goal: The ability to communicate needs accurately and effectively will improve  Outcome: PROGRESSING AS EXPECTED    Problem: Safety  Goal: Will remain free from injury  Outcome: PROGRESSING AS EXPECTED  Bedside table and call light are within reach. Bed is locked and in the lowest position. Treaded socks are on. Patient educated to call for assistance.     Problem: Infection  Goal: Will remain free from infection  Outcome: PROGRESSING AS EXPECTED  Vital signs and labs assessed. VSS. Central line dressing changed.

## 2017-05-14 NOTE — PROGRESS NOTES
Assumed care of patient bedside report received from JAZMINE Hussein updated on POC, call light within reach and fall precautions in place. Bed locked and in lowest position. Patient instructed to call for assistance before getting out of bed. All questions answered, no other needs at this time. Patient is complain of some pain at wound vac site and would like pain medication at scheduled time.

## 2017-05-14 NOTE — PROGRESS NOTES
Infectious Disease Progress Note    Author: Luma Aguirre M.D. Date of service & Time created: 2017  3:05 PM    Chief Complaint:  Chief Complaint   Patient presents with   • Wound Infection       Interval History:  52 year old male with UC, Dm ,abdominal wall infection, recent multiple SSTI readmitted with fevers  17- no fevers. Wbc 4.3. No significant issues overnight   AF WBC 4.3 hand improved; thigh about the same   AF does not want to go to SNF now-wants to go home  Labs Reviewed, Medications Reviewed, Radiology Reviewed and Wound Reviewed.    Review of Systems:  Review of Systems   Constitutional: Positive for malaise/fatigue. Negative for fever.   Respiratory: Negative for cough and shortness of breath.    Cardiovascular: Negative for chest pain.   Gastrointestinal: Negative for nausea, vomiting and diarrhea.   Genitourinary: Negative for dysuria.   Musculoskeletal: Positive for myalgias.   Neurological: Negative for speech change, focal weakness and headaches.       Hemodynamics:  Temp (24hrs), Av °C (96.8 °F), Min:35.9 °C (96.6 °F), Max:36.1 °C (97 °F)  Temperature: 35.9 °C (96.6 °F)  Pulse  Av.3  Min: 59  Max: 99   Blood Pressure: 120/70 mmHg       Physical Exam:  Physical Exam   Constitutional: He is oriented to person, place, and time. He appears well-developed and well-nourished. No distress.   HENT:   Head: Normocephalic and atraumatic.   Mouth/Throat: No oropharyngeal exudate.   Eyes: EOM are normal. Pupils are equal, round, and reactive to light. No scleral icterus.   Neck: Neck supple.   Cardiovascular: Normal rate and regular rhythm.    No murmur heard.  Pulmonary/Chest: Effort normal. No respiratory distress.   Abdominal: Soft. He exhibits no distension. There is no tenderness.   Wound dressed-no packing   Musculoskeletal: He exhibits no edema.   Rt thigh wound vac-tender  Left hand and rt ankle-surgical sites healing  Decreased erythema     Neurological: He is  alert and oriented to person, place, and time.   Skin: There is erythema.   Nursing note and vitals reviewed.      Meds:    Current facility-administered medications:   •  HYDROmorphone (DILAUDID) injection 0.5 mg, 0.5 mg, Intravenous, Q4HRS PRN, Carlos Kapoor M.D., 0.5 mg at 05/12/17 1222  •  buPROPion SR (WELLBUTRIN-SR) tablet 150 mg, 150 mg, Oral, BID, Chelsey Jaquez M.D., 150 mg at 05/14/17 1035  •  vitamin D (cholecalciferol) tablet 5,000 Units, 5,000 Units, Oral, Q EVENING, Chelsey Jaquez M.D., 5,000 Units at 05/13/17 2035  •  insulin glargine (LANTUS) injection 50 Units, 50 Units, Subcutaneous, QHS, Chelsey Jaquez M.D., 25 Units at 05/13/17 2039  •  magnesium oxide (MAG-OX) tablet 400 mg, 400 mg, Oral, DAILY, Chelsey Jaquez M.D., 400 mg at 05/14/17 1033  •  morphine ER (MS CONTIN) tablet 30 mg, 30 mg, Oral, Q12HRS, Chelsey Jaquez M.D., 30 mg at 05/14/17 1032  •  oxycodone-acetaminophen (PERCOCET-10)  MG per tablet 1 Tab, 1 Tab, Oral, Q8HRS PRN, Chelsey Jaquez M.D., 1 Tab at 05/14/17 1227  •  thyroid (ARMOUR THYROID) tablet 60 mg, 60 mg, Oral, QAM AC, Chelsey Jaquez M.D., 60 mg at 05/14/17 0609  •  NS infusion 2,844 mL, 30 mL/kg, Intravenous, Once PRN, Chelsey Jaquez M.D.  •  senna-docusate (PERICOLACE or SENOKOT S) 8.6-50 MG per tablet 2 Tab, 2 Tab, Oral, BID, 2 Tab at 05/11/17 1245 **AND** polyethylene glycol/lytes (MIRALAX) PACKET 1 Packet, 1 Packet, Oral, QDAY PRN **AND** magnesium hydroxide (MILK OF MAGNESIA) suspension 30 mL, 30 mL, Oral, QDAY PRN **AND** bisacodyl (DULCOLAX) suppository 10 mg, 10 mg, Rectal, QDAY PRN, Chelsey Jauqez M.D.  •  NS infusion, , Intravenous, Continuous, Chelsey Jaquez M.D., Last Rate: 83 mL/hr at 05/14/17 0717  •  NS (BOLUS) infusion 500 mL, 500 mL, Intravenous, Once PRN, Chelsey Jaquez M.D.  •  enoxaparin (LOVENOX) inj 40 mg, 40 mg, Subcutaneous, DAILY, Chelsey Jaquez M.D., 40 mg at 05/14/17 1032  •  insulin lispro (HUMALOG) injection 2-9 Units, 2-9 Units, Subcutaneous, 4X/DAY ACHSChelsey,  M.D., Stopped at 05/11/17 1700  •  Action is required: Protocol 1073 Hypoglycemia has been implemented, , , Once **AND** Protocol 1073 Inclusion Criteria, , , CONTINUOUS **AND** Protocol 1073 NOTIFY, , , Once **AND** Protocol 1073 Initiate protocol immediately if FSBG is less than or equal to 70 mg/dL, , , CONTINUOUS **AND** glucose 4 g chewable tablet 16 g, 16 g, Oral, Q15 MIN PRN **AND** dextrose 50% (D50W) injection 25 mL, 25 mL, Intravenous, Q15 MIN PRN, Chelsey Jaquez M.D.  •  ondansetron (ZOFRAN) syringe/vial injection 4 mg, 4 mg, Intravenous, Q4HRS PRN, Chelsey Jaquez M.D., 4 mg at 05/11/17 1530  •  ondansetron (ZOFRAN ODT) dispertab 4 mg, 4 mg, Oral, Q4HRS PRN, Chelsey Jaquez M.D.  •  promethazine (PHENERGAN) tablet 12.5-25 mg, 12.5-25 mg, Oral, Q4HRS PRN, Chelsey Jaquez M.D.  •  promethazine (PHENERGAN) suppository 12.5-25 mg, 12.5-25 mg, Rectal, Q4HRS PRN, Chelsey Jaquez M.D.  •  prochlorperazine (COMPAZINE) injection 5-10 mg, 5-10 mg, Intravenous, Q4HRS PRN, Chelsey Jaquez M.D.  •  piperacillin-tazobactam (ZOSYN) 3.375 g in  mL IVPB, 3.375 g, Intravenous, Q6HRS, Chelsey Jaquez M.D., Stopped at 05/14/17 1257  •  mesalamine delayed-release (DELZICOL) capsule 400 mg, 400 mg, Oral, TID, PEREZ LopezD, 400 mg at 05/14/17 1035  •  fluconazole (DIFLUCAN) tablet 200 mg, 200 mg, Oral, DAILY, Anai Santoro M.D., 200 mg at 05/14/17 1033  •  linezolid (ZYVOX) tablet 600 mg, 600 mg, Oral, Q12HRS, Anai Santoro M.D., 600 mg at 05/14/17 1032  •  zolpidem (AMBIEN) tablet 5 mg, 5 mg, Oral, HS PRN, Chelsey Jaquez M.D., 5 mg at 05/13/17 6094    Labs:  Recent Labs      05/12/17   0340   WBC  4.3*   RBC  3.46*   HEMOGLOBIN  10.3*   HEMATOCRIT  31.3*   MCV  90.5   MCH  29.8   RDW  45.0   PLATELETCT  299   MPV  8.9*   NEUTSPOLYS  51.80   LYMPHOCYTES  34.40   MONOCYTES  9.80   EOSINOPHILS  3.00   BASOPHILS  0.50     Recent Labs      05/12/17   0340   SODIUM  144   POTASSIUM  3.9   CHLORIDE  110   CO2  25   GLUCOSE  84   BUN  17      Recent Labs      05/12/17   0340   CREATININE  1.11       Imaging:  Ct-extremity, Lower With Right  4/29/2017  14 cm distal anterior thigh abscess with no CT evidence of septic arthropathy or osteomyelitis Findings were discussed with ANCELMO SCOTT on 4/29/2017 1:12 PM.    Ct-maxillofacial W/o Plus Recons  5/11/2017  Mucous retention cyst in the right maxillary sinus with minimal mucosal thickening in the left maxillary sinus. Leftward deviation of the nasal septum with a nasal septal spur. Right devin bullosa.    Dx-chest-portable (1 View)    5/11/2017  The right PICC line tip is unchanged in appearance with the tip again located at the cavoatrial junction.    5/11/2017 5/11/2017 11:25 AM HISTORY/REASON FOR EXAM: Fever chills and body aches TECHNIQUE/EXAM DESCRIPTION AND NUMBER OF VIEWS: Single portable view of the chest. COMPARISON: 5/2/2017 FINDINGS: There is no evidence of focal consolidation or evidence of pulmonary edema. There is no pleural effusion. The heart is mildly enlarged.     5/11/2017  Mild cardiomegaly.    Ir-picc Line Placement > Age 5    5/2/2017  HISTORY/REASON FOR EXAM:   PICC placement. TECHNIQUE/EXAM DESCRIPTION AND NUMBER OF VIEWS:   PICC line insertion with ultrasound guidance.  The procedure was performed using maximal sterile barrier technique including sterile gown, mask, cap, and donning of sterile gloves following appropriate hand hygiene and/or sterile scrub. Patient skin site was prepped with 2% Chlorhexidine solution. FINDINGS:  PICC line insertion with Ultrasound Guidance was performed by qualified nursing staff without the assistance of a Radiologist.  A follow up chest x-ray will be performed to determine appropriateness of PICC positioning.     5/2/2017           Ultrasound-guided PICC placement performed by qualified nursing staff as above.     Echocardiogram Comp W/o Cont    5/1/2017  Transthoracic Echo Report Echocardiography Laboratory CONCLUSIONS Normal left  ventricular chamber size. Normal left ventricular systolic function. Left ventricular ejection fraction is visually estimated to be 65%. Normal regional wall motion. Mild mitral regurgitation. The left atrium is normal in size. Structurally normal aortic valve without significant stenosis or regurgitation. Right ventricular systolic pressure is estimated to be 50 mmHg. Mild tricuspid regurgitation. Normal inferior vena cava size and inspiratory collapse. Mildly dilated right ventricle. Normal right ventricular systolic function.    Transesophageal Echo W/o Cont    5/3/2017  Transesophageal Echo Report Echocardiography Laboratory CONCLUSIONS No vegetations noted, no evidence of endocarditis or abscess. DEVIN MO Exam Date:          05/03/2017                     Micro:  BLOOD CULTURE   Date Value Ref Range Status   05/11/2017   Preliminary    No Growth    Note: Blood cultures are incubated for 5 days and  are monitored continuously.Positive blood cultures  are called to the RN and reported as soon as  they are identified.       BLOOD CULTURE HOLD   Date Value Ref Range Status   01/04/2017 Collected  Final      Results     Procedure Component Value Units Date/Time    CULTURE WOUND W/ GRAM STAIN [[id number]] Collected:  05/11/17 1455    Order Status:  Completed Specimen Information:  Wound Updated:  05/14/17 0821     Gram Stain Result No organisms seen.      Significant Indicator NEG      Source WND      Site Left Hand      Culture Result Wound --      Result:        Mixed skin yesenia Isolated from enrichment broth only, please  correlate with clinical condition.      CULTURE WOUND W/ GRAM STAIN [[id number]] Collected:  05/11/17 1756    Order Status:  Completed Specimen Information:  Wound from Right Foot Updated:  05/14/17 0810     Gram Stain Result No organisms seen.      Significant Indicator NEG      Source WND      Site RIGHT ANKLE      Culture Result Wound No growth at 72 hours.     Narrative:      Collected  "By:[id number] INNA VALLADARES    Blood Culture [[id number]] Collected:  05/11/17 1110    Order Status:  Completed Specimen Information:  Blood from Peripheral Updated:  05/12/17 0945     Significant Indicator NEG      Source BLD      Site PERIPHERAL      Blood Culture --      Result:        No Growth    Note: Blood cultures are incubated for 5 days and  are monitored continuously.Positive blood cultures  are called to the RN and reported as soon as  they are identified.      Narrative:      From different peripheral sites, if not done within the last  24 hours (Per Hospital Policy: Only change specimen source to  \"Line\" if specified by physician order)    GRAM STAIN [[id number]] Collected:  05/11/17 1756    Order Status:  Completed Specimen Information:  Wound Updated:  05/12/17 0736     Significant Indicator .      Source WND      Site RIGHT ANKLE      Gram Stain Result No organisms seen.     Narrative:      Collected By:[id number] INNA VALLADARES    GRAM STAIN [[id number]] Collected:  05/11/17 1455    Order Status:  Completed Specimen Information:  Wound Updated:  05/12/17 0731     Significant Indicator .      Source WND      Site Left Hand      Gram Stain Result No organisms seen.     Urine Culture [[id number]]     Order Status:  Sent Specimen Information:  Urine from Urine, Clean Catch     Blood Culture [[id number]]     Order Status:  Sent Specimen Information:  Blood from Peripheral     Culture Respiratory W/ GRM STN [[id number]]     Order Status:  Completed Specimen Information:  Respirate from Sputum             Assessment:  Active Hospital Problems    Diagnosis   • Fever [R50.9]       Plan:  Right thigh abscess  Status post I&D on 4/29/17  Cultures were Klebsiella pneumonia, MRSA and Bacteroides fragilis and heavy growth viridans strep  Continue Zosyn and Zyvox.   Continue wound care  May be candidate for home IV abx if covered by insurance (Zosyn by infusion pump or ertapenem)  Stop date IV 5/19 then follow with " PO    Right ankle and left hand abscess  Status post I&D on 5/1/17  Improving  Continue wound care     Leukopenia  Monitor  Zyvox dc if drops below 3  Recheck CBC in am    Abdominal wall infection  Status post treatment  Covered by above abx  Continue wound care-VAC off and simple dressing    Ulcerative colitis  Treatment per GI  Worry about these being extraintestinal manifestations of ulcerative colitis  Advised to get a rheumatology consultation as an outpatient    Discussed with internal Medicine-Dr Kapoor

## 2017-05-14 NOTE — PROGRESS NOTES
Infectious Disease Progress Note    Author: Luma Aguirre M.D. Date of service & Time created: 2017  5:13 PM    Chief Complaint:  Chief Complaint   Patient presents with   • Wound Infection       Interval History:  52 year old male with UC, Dm ,abdominal wall infection, recent multiple SSTI readmitted with fevers  17- no fevers. Wbc 4.3. No significant issues overnight   AF WBC 4.3 hand improved; thigh about the same  Labs Reviewed, Medications Reviewed, Radiology Reviewed and Wound Reviewed.    Review of Systems:  Review of Systems   Constitutional: Positive for malaise/fatigue. Negative for fever.   Respiratory: Negative for cough and shortness of breath.    Cardiovascular: Negative for chest pain.   Gastrointestinal: Negative for nausea, vomiting and diarrhea.   Genitourinary: Negative for dysuria.   Musculoskeletal: Positive for myalgias.   Neurological: Negative for speech change, focal weakness and headaches.       Hemodynamics:  Temp (24hrs), Av.6 °C (97.8 °F), Min:36 °C (96.8 °F), Max:37.1 °C (98.8 °F)  Temperature: 36.1 °C (97 °F)  Pulse  Av.2  Min: 59  Max: 99   Blood Pressure: 120/76 mmHg       Physical Exam:  Physical Exam   Constitutional: He is oriented to person, place, and time. He appears well-developed and well-nourished. No distress.   HENT:   Head: Normocephalic and atraumatic.   Mouth/Throat: No oropharyngeal exudate.   Eyes: EOM are normal. Pupils are equal, round, and reactive to light. No scleral icterus.   Neck: Neck supple.   Cardiovascular: Normal rate and regular rhythm.    No murmur heard.  Pulmonary/Chest: Effort normal. No respiratory distress.   Abdominal: Soft. He exhibits no distension. There is no tenderness.   Wound dressed   Musculoskeletal: He exhibits no edema.   Rt thigh wound vac  Left hand and rt ankle-surgical sites healing  Decreased erythema     Neurological: He is alert and oriented to person, place, and time.   Skin: There is erythema.    Nursing note and vitals reviewed.      Meds:    Current facility-administered medications:   •  HYDROmorphone (DILAUDID) injection 0.5 mg, 0.5 mg, Intravenous, Q4HRS PRN, Carlos Kapoor M.D., 0.5 mg at 05/12/17 1222  •  buPROPion SR (WELLBUTRIN-SR) tablet 150 mg, 150 mg, Oral, BID, Chelsey Jaquez M.D., 150 mg at 05/13/17 0812  •  vitamin D (cholecalciferol) tablet 5,000 Units, 5,000 Units, Oral, Q EVENING, Chelsey Jaquez M.D., 5,000 Units at 05/12/17 2041  •  insulin glargine (LANTUS) injection 50 Units, 50 Units, Subcutaneous, QHS, Chelsey Jaquez M.D., 25 Units at 05/12/17 2041  •  magnesium oxide (MAG-OX) tablet 400 mg, 400 mg, Oral, DAILY, Chelsey Jaquez M.D., 400 mg at 05/13/17 0812  •  morphine ER (MS CONTIN) tablet 30 mg, 30 mg, Oral, Q12HRS, Chelsey Jaquez M.D., 30 mg at 05/13/17 0812  •  oxycodone-acetaminophen (PERCOCET-10)  MG per tablet 1 Tab, 1 Tab, Oral, Q8HRS PRN, Chelsey Jaquez M.D., 1 Tab at 05/13/17 1201  •  thyroid (ARMOUR THYROID) tablet 60 mg, 60 mg, Oral, QAM AC, Chelsey Jaquez M.D., 60 mg at 05/13/17 0611  •  NS infusion 2,844 mL, 30 mL/kg, Intravenous, Once PRN, Chelsey Jaquez M.D.  •  senna-docusate (PERICOLACE or SENOKOT S) 8.6-50 MG per tablet 2 Tab, 2 Tab, Oral, BID, 2 Tab at 05/11/17 1245 **AND** polyethylene glycol/lytes (MIRALAX) PACKET 1 Packet, 1 Packet, Oral, QDAY PRN **AND** magnesium hydroxide (MILK OF MAGNESIA) suspension 30 mL, 30 mL, Oral, QDAY PRN **AND** bisacodyl (DULCOLAX) suppository 10 mg, 10 mg, Rectal, QDAY PRN, Chelsey Jaquez M.D.  •  NS infusion, , Intravenous, Continuous, Chelsey Jaquez M.D., Last Rate: 83 mL/hr at 05/12/17 2330  •  NS (BOLUS) infusion 500 mL, 500 mL, Intravenous, Once PRN, Chelsey Jaquez M.D.  •  enoxaparin (LOVENOX) inj 40 mg, 40 mg, Subcutaneous, DAILY, Chelsey Jaquez M.D., 40 mg at 05/13/17 0812  •  insulin lispro (HUMALOG) injection 2-9 Units, 2-9 Units, Subcutaneous, 4X/DAY ACHS, Chelsey Jaquez M.D., Stopped at 05/11/17 1700  •  Action is required: Protocol 1073  Hypoglycemia has been implemented, , , Once **AND** Protocol 1073 Inclusion Criteria, , , CONTINUOUS **AND** Protocol 1073 NOTIFY, , , Once **AND** Protocol 1073 Initiate protocol immediately if FSBG is less than or equal to 70 mg/dL, , , CONTINUOUS **AND** glucose 4 g chewable tablet 16 g, 16 g, Oral, Q15 MIN PRN **AND** dextrose 50% (D50W) injection 25 mL, 25 mL, Intravenous, Q15 MIN PRN, Chelsey Jaquez M.D.  •  ondansetron (ZOFRAN) syringe/vial injection 4 mg, 4 mg, Intravenous, Q4HRS PRN, Chelsey Jaquez M.D., 4 mg at 05/11/17 1530  •  ondansetron (ZOFRAN ODT) dispertab 4 mg, 4 mg, Oral, Q4HRS PRN, Chelsey Jaquez M.D.  •  promethazine (PHENERGAN) tablet 12.5-25 mg, 12.5-25 mg, Oral, Q4HRS PRN, Chelsey Jaquez M.D.  •  promethazine (PHENERGAN) suppository 12.5-25 mg, 12.5-25 mg, Rectal, Q4HRS PRN, Chelsey Jaquez M.D.  •  prochlorperazine (COMPAZINE) injection 5-10 mg, 5-10 mg, Intravenous, Q4HRS PRN, Chelsey Jaquez M.D.  •  piperacillin-tazobactam (ZOSYN) 3.375 g in  mL IVPB, 3.375 g, Intravenous, Q6HRS, Chelsey Jaquez M.D., Stopped at 05/13/17 1231  •  mesalamine delayed-release (DELZICOL) capsule 400 mg, 400 mg, Oral, TID, Oliva Portillo, PHARMD, 400 mg at 05/13/17 0812  •  fluconazole (DIFLUCAN) tablet 200 mg, 200 mg, Oral, DAILY, Anai Santoro M.D., 200 mg at 05/13/17 0812  •  linezolid (ZYVOX) tablet 600 mg, 600 mg, Oral, Q12HRS, Anai Santoro M.D., 600 mg at 05/13/17 0812  •  zolpidem (AMBIEN) tablet 5 mg, 5 mg, Oral, HS PRN, Chelsey Jaquez M.D., 5 mg at 05/12/17 2044    Labs:  Recent Labs      05/11/17   1110  05/12/17   0340   WBC  6.0  4.3*   RBC  3.76*  3.46*   HEMOGLOBIN  11.3*  10.3*   HEMATOCRIT  33.9*  31.3*   MCV  90.2  90.5   MCH  30.1  29.8   RDW  44.4  45.0   PLATELETCT  359  299   MPV  9.0  8.9*   NEUTSPOLYS  64.20  51.80   LYMPHOCYTES  27.90  34.40   MONOCYTES  5.90  9.80   EOSINOPHILS  1.00  3.00   BASOPHILS  0.50  0.50     Recent Labs      05/11/17   1110  05/12/17   0340   SODIUM  139  144    POTASSIUM  3.8  3.9   CHLORIDE  104  110   CO2  22  25   GLUCOSE  129*  84   BUN  20  17     Recent Labs      05/11/17   1110  05/12/17   0340   ALBUMIN  4.0   --    TBILIRUBIN  0.4   --    ALKPHOSPHAT  166*   --    TOTPROTEIN  7.1   --    ALTSGPT  17   --    ASTSGOT  16   --    CREATININE  1.01  1.11       Imaging:  Ct-extremity, Lower With Right  4/29/2017  14 cm distal anterior thigh abscess with no CT evidence of septic arthropathy or osteomyelitis Findings were discussed with ANCELMO SCOTT on 4/29/2017 1:12 PM.    Ct-maxillofacial W/o Plus Recons  5/11/2017  Mucous retention cyst in the right maxillary sinus with minimal mucosal thickening in the left maxillary sinus. Leftward deviation of the nasal septum with a nasal septal spur. Right devin bullosa.    Dx-chest-portable (1 View)    5/11/2017  The right PICC line tip is unchanged in appearance with the tip again located at the cavoatrial junction.    5/11/2017 5/11/2017 11:25 AM HISTORY/REASON FOR EXAM: Fever chills and body aches TECHNIQUE/EXAM DESCRIPTION AND NUMBER OF VIEWS: Single portable view of the chest. COMPARISON: 5/2/2017 FINDINGS: There is no evidence of focal consolidation or evidence of pulmonary edema. There is no pleural effusion. The heart is mildly enlarged.     5/11/2017  Mild cardiomegaly.    Ir-picc Line Placement > Age 5    5/2/2017  HISTORY/REASON FOR EXAM:   PICC placement. TECHNIQUE/EXAM DESCRIPTION AND NUMBER OF VIEWS:   PICC line insertion with ultrasound guidance.  The procedure was performed using maximal sterile barrier technique including sterile gown, mask, cap, and donning of sterile gloves following appropriate hand hygiene and/or sterile scrub. Patient skin site was prepped with 2% Chlorhexidine solution. FINDINGS:  PICC line insertion with Ultrasound Guidance was performed by qualified nursing staff without the assistance of a Radiologist.  A follow up chest x-ray will be performed to determine appropriateness of  PICC positioning.     5/2/2017           Ultrasound-guided PICC placement performed by qualified nursing staff as above.     Echocardiogram Comp W/o Cont    5/1/2017  Transthoracic Echo Report Echocardiography Laboratory CONCLUSIONS Normal left ventricular chamber size. Normal left ventricular systolic function. Left ventricular ejection fraction is visually estimated to be 65%. Normal regional wall motion. Mild mitral regurgitation. The left atrium is normal in size. Structurally normal aortic valve without significant stenosis or regurgitation. Right ventricular systolic pressure is estimated to be 50 mmHg. Mild tricuspid regurgitation. Normal inferior vena cava size and inspiratory collapse. Mildly dilated right ventricle. Normal right ventricular systolic function.    Transesophageal Echo W/o Cont    5/3/2017  Transesophageal Echo Report Echocardiography Laboratory CONCLUSIONS No vegetations noted, no evidence of endocarditis or abscess. DEVIN MO Exam Date:          05/03/2017                     Micro:  BLOOD CULTURE   Date Value Ref Range Status   05/11/2017   Preliminary    No Growth    Note: Blood cultures are incubated for 5 days and  are monitored continuously.Positive blood cultures  are called to the RN and reported as soon as  they are identified.       BLOOD CULTURE HOLD   Date Value Ref Range Status   01/04/2017 Collected  Final      Results     Procedure Component Value Units Date/Time    CULTURE WOUND W/ GRAM STAIN [[id number]] Collected:  05/11/17 1455    Order Status:  Completed Specimen Information:  Wound Updated:  05/13/17 1054     Significant Indicator NEG      Source WND      Site Left Hand      Culture Result Wound No growth at 48 hours.      Gram Stain Result No organisms seen.     CULTURE WOUND W/ GRAM STAIN [[id number]] Collected:  05/11/17 1756    Order Status:  Completed Specimen Information:  Wound from Right Foot Updated:  05/13/17 1046     Significant Indicator NEG      Source  "WND      Site RIGHT ANKLE      Culture Result Wound No growth at 48 hours.      Gram Stain Result No organisms seen.     Narrative:      Collected By:[id number] INNA VALLADARES    Blood Culture [[id number]] Collected:  05/11/17 1110    Order Status:  Completed Specimen Information:  Blood from Peripheral Updated:  05/12/17 0945     Significant Indicator NEG      Source BLD      Site PERIPHERAL      Blood Culture --      Result:        No Growth    Note: Blood cultures are incubated for 5 days and  are monitored continuously.Positive blood cultures  are called to the RN and reported as soon as  they are identified.      Narrative:      From different peripheral sites, if not done within the last  24 hours (Per Hospital Policy: Only change specimen source to  \"Line\" if specified by physician order)    GRAM STAIN [[id number]] Collected:  05/11/17 1756    Order Status:  Completed Specimen Information:  Wound Updated:  05/12/17 0736     Significant Indicator .      Source WND      Site RIGHT ANKLE      Gram Stain Result No organisms seen.     Narrative:      Collected By:[id number] INNA VALLADARES    GRAM STAIN [[id number]] Collected:  05/11/17 1455    Order Status:  Completed Specimen Information:  Wound Updated:  05/12/17 0731     Significant Indicator .      Source WND      Site Left Hand      Gram Stain Result No organisms seen.     Urine Culture [[id number]]     Order Status:  Sent Specimen Information:  Urine from Urine, Clean Catch     Blood Culture [[id number]]     Order Status:  Sent Specimen Information:  Blood from Peripheral     Culture Respiratory W/ GRM STN [[id number]]     Order Status:  Completed Specimen Information:  Respirate from Sputum             Assessment:  Active Hospital Problems    Diagnosis   • Fever [R50.9]       Plan:  Right thigh abscess  Status post I&D on 4/29/17  Cultures were Klebsiella pneumonia, MRSA and Bacteroides fragilis and heavy growth viridans strep  Continue Zosyn and Zyvox. Aim  " through 5/19. Extend as needed  Continue wound care    Right ankle and left hand abscess  Status post I&D on 5/1/17    The wounds are healing  Continue wound care     Leukopenia  Monitor  Zyvox dc if drops below 3    Abdominal wall infection  Status post treatment  Covered by above abx  Continue wound care    Ulcerative colitis  Treatment per GI  Worry about these being extraintestinal manifestations of ulcerative colitis  Advised to get a rheumatology consultation as an outpatient    Discussed with internal Medicine

## 2017-05-15 PROBLEM — A41.4 SEPTICEMIA DUE TO BACTEROIDES (HCC): Status: RESOLVED | Noted: 2017-05-12 | Resolved: 2017-05-15

## 2017-05-15 LAB
GLUCOSE BLD-MCNC: 96 MG/DL (ref 65–99)
GLUCOSE BLD-MCNC: 96 MG/DL (ref 65–99)
GLUCOSE BLD-MCNC: 97 MG/DL (ref 65–99)

## 2017-05-15 PROCEDURE — 700111 HCHG RX REV CODE 636 W/ 250 OVERRIDE (IP): Performed by: INTERNAL MEDICINE

## 2017-05-15 PROCEDURE — 700111 HCHG RX REV CODE 636 W/ 250 OVERRIDE (IP): Performed by: HOSPITALIST

## 2017-05-15 PROCEDURE — 700105 HCHG RX REV CODE 258: Performed by: INTERNAL MEDICINE

## 2017-05-15 PROCEDURE — 770001 HCHG ROOM/CARE - MED/SURG/GYN PRIV*

## 2017-05-15 PROCEDURE — 700102 HCHG RX REV CODE 250 W/ 637 OVERRIDE(OP): Performed by: INTERNAL MEDICINE

## 2017-05-15 PROCEDURE — 306372 DRESSING,VAC SIMPLACE MED: Performed by: HOSPITALIST

## 2017-05-15 PROCEDURE — A9270 NON-COVERED ITEM OR SERVICE: HCPCS | Performed by: PHARMACIST

## 2017-05-15 PROCEDURE — 99232 SBSQ HOSP IP/OBS MODERATE 35: CPT | Performed by: HOSPITALIST

## 2017-05-15 PROCEDURE — 700102 HCHG RX REV CODE 250 W/ 637 OVERRIDE(OP): Performed by: PHARMACIST

## 2017-05-15 PROCEDURE — A9270 NON-COVERED ITEM OR SERVICE: HCPCS | Performed by: INTERNAL MEDICINE

## 2017-05-15 PROCEDURE — 82962 GLUCOSE BLOOD TEST: CPT | Mod: 91

## 2017-05-15 PROCEDURE — 97606 NEG PRS WND THER DME>50 SQCM: CPT

## 2017-05-15 RX ORDER — FLUCONAZOLE 100 MG/1
200 TABLET ORAL DAILY
Qty: 60 TAB | Refills: 0 | Status: SHIPPED | OUTPATIENT
Start: 2017-05-15 | End: 2017-05-31

## 2017-05-15 RX ADMIN — PIPERACILLIN SODIUM AND TAZOBACTAM SODIUM 3.38 G: 3; .375 INJECTION, POWDER, FOR SOLUTION INTRAVENOUS at 11:27

## 2017-05-15 RX ADMIN — OXYCODONE HYDROCHLORIDE AND ACETAMINOPHEN 1 TABLET: 10; 325 TABLET ORAL at 21:18

## 2017-05-15 RX ADMIN — ZOLPIDEM TARTRATE 5 MG: 5 TABLET, FILM COATED ORAL at 20:15

## 2017-05-15 RX ADMIN — PIPERACILLIN SODIUM AND TAZOBACTAM SODIUM 3.38 G: 3; .375 INJECTION, POWDER, FOR SOLUTION INTRAVENOUS at 00:45

## 2017-05-15 RX ADMIN — MESALAMINE 400 MG: 400 CAPSULE, DELAYED RELEASE ORAL at 16:00

## 2017-05-15 RX ADMIN — LEVOTHYROXINE, LIOTHYRONINE 60 MG: 19; 4.5 TABLET ORAL at 05:58

## 2017-05-15 RX ADMIN — MESALAMINE 400 MG: 400 CAPSULE, DELAYED RELEASE ORAL at 20:06

## 2017-05-15 RX ADMIN — ONDANSETRON 4 MG: 2 INJECTION INTRAMUSCULAR; INTRAVENOUS at 14:36

## 2017-05-15 RX ADMIN — OXYCODONE HYDROCHLORIDE AND ACETAMINOPHEN 1 TABLET: 10; 325 TABLET ORAL at 11:27

## 2017-05-15 RX ADMIN — BUPROPION HYDROCHLORIDE 150 MG: 150 TABLET, FILM COATED, EXTENDED RELEASE ORAL at 08:46

## 2017-05-15 RX ADMIN — BUPROPION HYDROCHLORIDE 150 MG: 150 TABLET, FILM COATED, EXTENDED RELEASE ORAL at 20:06

## 2017-05-15 RX ADMIN — MORPHINE SULFATE 30 MG: 30 TABLET, EXTENDED RELEASE ORAL at 20:06

## 2017-05-15 RX ADMIN — ENOXAPARIN SODIUM 40 MG: 100 INJECTION SUBCUTANEOUS at 08:46

## 2017-05-15 RX ADMIN — PIPERACILLIN SODIUM AND TAZOBACTAM SODIUM 3.38 G: 3; .375 INJECTION, POWDER, FOR SOLUTION INTRAVENOUS at 05:58

## 2017-05-15 RX ADMIN — HYDROMORPHONE HYDROCHLORIDE 0.5 MG: 1 INJECTION, SOLUTION INTRAMUSCULAR; INTRAVENOUS; SUBCUTANEOUS at 13:25

## 2017-05-15 RX ADMIN — MORPHINE SULFATE 30 MG: 30 TABLET, EXTENDED RELEASE ORAL at 08:46

## 2017-05-15 RX ADMIN — PIPERACILLIN SODIUM AND TAZOBACTAM SODIUM 3.38 G: 3; .375 INJECTION, POWDER, FOR SOLUTION INTRAVENOUS at 17:32

## 2017-05-15 RX ADMIN — LINEZOLID 600 MG: 600 TABLET, FILM COATED ORAL at 08:46

## 2017-05-15 RX ADMIN — MESALAMINE 400 MG: 400 CAPSULE, DELAYED RELEASE ORAL at 08:46

## 2017-05-15 RX ADMIN — OXYCODONE HYDROCHLORIDE AND ACETAMINOPHEN 1 TABLET: 10; 325 TABLET ORAL at 02:04

## 2017-05-15 RX ADMIN — SODIUM CHLORIDE: 9 INJECTION, SOLUTION INTRAVENOUS at 08:55

## 2017-05-15 RX ADMIN — LINEZOLID 600 MG: 600 TABLET, FILM COATED ORAL at 20:06

## 2017-05-15 RX ADMIN — HYDROMORPHONE HYDROCHLORIDE 1 MG: 1 INJECTION, SOLUTION INTRAMUSCULAR; INTRAVENOUS; SUBCUTANEOUS at 14:36

## 2017-05-15 RX ADMIN — FLUCONAZOLE 200 MG: 100 TABLET ORAL at 08:46

## 2017-05-15 RX ADMIN — SODIUM CHLORIDE: 9 INJECTION, SOLUTION INTRAVENOUS at 21:15

## 2017-05-15 RX ADMIN — VITAMIN D, TAB 1000IU (100/BT) 5000 UNITS: 25 TAB at 20:06

## 2017-05-15 RX ADMIN — PIPERACILLIN SODIUM AND TAZOBACTAM SODIUM 3.38 G: 3; .375 INJECTION, POWDER, FOR SOLUTION INTRAVENOUS at 23:35

## 2017-05-15 RX ADMIN — MAGNESIUM GLUCONATE 500 MG ORAL TABLET 400 MG: 500 TABLET ORAL at 08:46

## 2017-05-15 ASSESSMENT — PAIN SCALES - GENERAL
PAINLEVEL_OUTOF10: 4
PAINLEVEL_OUTOF10: 6
PAINLEVEL_OUTOF10: 3
PAINLEVEL_OUTOF10: 4
PAINLEVEL_OUTOF10: 6
PAINLEVEL_OUTOF10: 7
PAINLEVEL_OUTOF10: 5
PAINLEVEL_OUTOF10: 6
PAINLEVEL_OUTOF10: 6

## 2017-05-15 ASSESSMENT — ENCOUNTER SYMPTOMS
HEADACHES: 0
COUGH: 0
SPEECH CHANGE: 0
SHORTNESS OF BREATH: 0
FEVER: 0
NAUSEA: 0
FOCAL WEAKNESS: 0
MYALGIAS: 1
VOMITING: 0
DIARRHEA: 0

## 2017-05-15 NOTE — DISCHARGE PLANNING
Medical Social Work    Assigned MD inquired about pt's acceptance to home infusion services. BRIAN called [facility] and was informed that the referral is being refaxed by [facility] as only part of it was received, but more than likely his insurance will cover this services. BRIAN further advised that HH needs to be in place prior to pt being discharged with home infusion services, so they would like to be called when he has been accepted by an agency. BRIAN confirmed they would be notified as soon as an acceptance has been given.     Plan: BRIAN will notify [facility] that HH order and face to face is now in the system and the referral can be sent. [facility] will complete referral process.

## 2017-05-15 NOTE — DISCHARGE PLANNING
Received choice form from [facility]IK[facility] at [phone]. Notified BRIAN Vinson via phone that we will need HH order and F2F before we can send referral.

## 2017-05-15 NOTE — DISCHARGE PLANNING
Medical Social Work    BRIAN spoke with  Carleen from [facility]. SW advised pt can be accepted for services if there is an order changing the pt's antibiotic dosing schedule until pt gets home and his home health nurse can see him this evening after 1700. BRIAN contacted Dr. Foreman and it was indicated this would be fine, but the way in which to make this happen is unclear. BRIAN requested Dr. Foreman speak with [facility]  Carleen to discuss this and provided phone number. SW waiting for confirmation of pt's acceptance to [facility] and [facility].

## 2017-05-15 NOTE — PROGRESS NOTES
Infectious Disease Progress Note    Author: Kandy Foreman M.D. Date of service & Time created: 5/15/2017  8:17 AM    Chief Complaint:  Chief Complaint   Patient presents with   • Wound Infection       Interval History:  52 year old male with UC, Dm ,abdominal wall infection, recent multiple SSTI readmitted with fevers  17- no fevers. Wbc 4.3. No significant issues overnight   AF WBC 4.3 hand improved; thigh about the same   AF does not want to go to SNF now-wants to go home  5/15 AF, no CBC, has pain in left thumb with dressing changes, plan for wound vac change on RLE today, BM stable  Labs Reviewed, Medications Reviewed, Radiology Reviewed and Wound Reviewed.    Review of Systems:  Review of Systems   Constitutional: Positive for malaise/fatigue. Negative for fever.   Respiratory: Negative for cough and shortness of breath.    Cardiovascular: Negative for chest pain.   Gastrointestinal: Negative for nausea, vomiting and diarrhea.   Genitourinary: Negative for dysuria.   Musculoskeletal: Positive for myalgias.   Neurological: Negative for speech change, focal weakness and headaches.       Hemodynamics:  Temp (24hrs), Av.3 °C (97.3 °F), Min:35.9 °C (96.7 °F), Max:36.5 °C (97.7 °F)  Temperature: 36.5 °C (97.7 °F)  Pulse  Av.8  Min: 59  Max: 99   Blood Pressure: 113/62 mmHg       Physical Exam:  Physical Exam   Constitutional: He is oriented to person, place, and time. He appears well-developed and well-nourished. No distress.   HENT:   Head: Normocephalic and atraumatic.   Mouth/Throat: No oropharyngeal exudate.   Eyes: EOM are normal. Pupils are equal, round, and reactive to light. No scleral icterus.   Neck: Neck supple.   Cardiovascular: Normal rate and regular rhythm.    No murmur heard.  Pulmonary/Chest: Effort normal. No respiratory distress.   Abdominal: Soft. He exhibits no distension. There is no tenderness.   Wound dressed-no packing   Musculoskeletal: He exhibits no edema.   Rt  thigh wound vac-tender  Left hand and rt ankle-surgical sites healing. No drainage or surrounding erythema      RUE PICC nontender     Neurological: He is alert and oriented to person, place, and time.   Skin: There is erythema.   Nursing note and vitals reviewed.      Meds:    Current facility-administered medications:   •  HYDROmorphone (DILAUDID) injection 0.5 mg, 0.5 mg, Intravenous, Q4HRS PRN, Carlos Kapoor M.D., 0.5 mg at 05/14/17 1746  •  buPROPion SR (WELLBUTRIN-SR) tablet 150 mg, 150 mg, Oral, BID, Chelsey Jaquez M.D., 150 mg at 05/14/17 2048  •  vitamin D (cholecalciferol) tablet 5,000 Units, 5,000 Units, Oral, Q EVENING, Chelsey Jaquez M.D., 5,000 Units at 05/14/17 2049  •  insulin glargine (LANTUS) injection 50 Units, 50 Units, Subcutaneous, QHS, Chelsey Jaquez M.D., 50 Units at 05/14/17 2100  •  magnesium oxide (MAG-OX) tablet 400 mg, 400 mg, Oral, DAILY, Chelsey Jaquez M.D., 400 mg at 05/14/17 1033  •  morphine ER (MS CONTIN) tablet 30 mg, 30 mg, Oral, Q12HRS, Chelsey Jaquez M.D., 30 mg at 05/14/17 2049  •  oxycodone-acetaminophen (PERCOCET-10)  MG per tablet 1 Tab, 1 Tab, Oral, Q8HRS PRN, Chelsey Jaquez M.D., 1 Tab at 05/15/17 0204  •  thyroid (ARMOUR THYROID) tablet 60 mg, 60 mg, Oral, QAM AC, Chelsey Jaquez M.D., 60 mg at 05/15/17 0558  •  NS infusion 2,844 mL, 30 mL/kg, Intravenous, Once PRN, Chelsey Jaquez M.D.  •  senna-docusate (PERICOLACE or SENOKOT S) 8.6-50 MG per tablet 2 Tab, 2 Tab, Oral, BID, 2 Tab at 05/11/17 1245 **AND** polyethylene glycol/lytes (MIRALAX) PACKET 1 Packet, 1 Packet, Oral, QDAY PRN **AND** magnesium hydroxide (MILK OF MAGNESIA) suspension 30 mL, 30 mL, Oral, QDAY PRN **AND** bisacodyl (DULCOLAX) suppository 10 mg, 10 mg, Rectal, QDAY PRN, Chelsey Jaquez M.D., 10 mg at 05/14/17 2043  •  NS infusion, , Intravenous, Continuous, Chelsey Jaquez M.D., Last Rate: 83 mL/hr at 05/14/17 2049  •  NS (BOLUS) infusion 500 mL, 500 mL, Intravenous, Once PRN, Chelsey Jaquez M.D.  •  enoxaparin (LOVENOX) inj  40 mg, 40 mg, Subcutaneous, DAILY, Chelsey Jaquez M.D., 40 mg at 05/14/17 1032  •  insulin lispro (HUMALOG) injection 2-9 Units, 2-9 Units, Subcutaneous, 4X/DAY ACHS, Chelsey Jaquez M.D., Stopped at 05/11/17 1700  •  Action is required: Protocol 1073 Hypoglycemia has been implemented, , , Once **AND** Protocol 1073 Inclusion Criteria, , , CONTINUOUS **AND** Protocol 1073 NOTIFY, , , Once **AND** Protocol 1073 Initiate protocol immediately if FSBG is less than or equal to 70 mg/dL, , , CONTINUOUS **AND** glucose 4 g chewable tablet 16 g, 16 g, Oral, Q15 MIN PRN **AND** dextrose 50% (D50W) injection 25 mL, 25 mL, Intravenous, Q15 MIN PRN, Chelsey Jaquez M.D.  •  ondansetron (ZOFRAN) syringe/vial injection 4 mg, 4 mg, Intravenous, Q4HRS PRN, Chelsey Jaquez M.D., 4 mg at 05/11/17 1530  •  ondansetron (ZOFRAN ODT) dispertab 4 mg, 4 mg, Oral, Q4HRS PRN, Chelsey Jaquez M.D.  •  promethazine (PHENERGAN) tablet 12.5-25 mg, 12.5-25 mg, Oral, Q4HRS PRN, Chelsey Jaquez M.D.  •  promethazine (PHENERGAN) suppository 12.5-25 mg, 12.5-25 mg, Rectal, Q4HRS PRN, Chelsey Jaquez M.D.  •  prochlorperazine (COMPAZINE) injection 5-10 mg, 5-10 mg, Intravenous, Q4HRS PRN, Chelsey Jaquez M.D.  •  piperacillin-tazobactam (ZOSYN) 3.375 g in  mL IVPB, 3.375 g, Intravenous, Q6HRS, Chelsey Jaquez M.D., Stopped at 05/15/17 0645  •  mesalamine delayed-release (DELZICOL) capsule 400 mg, 400 mg, Oral, TID, Oliva Portillo PHARMD, 400 mg at 05/14/17 2049  •  fluconazole (DIFLUCAN) tablet 200 mg, 200 mg, Oral, DAILY, Anai Santoro M.D., 200 mg at 05/14/17 1033  •  linezolid (ZYVOX) tablet 600 mg, 600 mg, Oral, Q12HRS, Anai Santoro M.D., 600 mg at 05/14/17 2049  •  zolpidem (AMBIEN) tablet 5 mg, 5 mg, Oral, HS PRN, Chelsey Jaquez M.D., 5 mg at 05/14/17 2049    Labs:  No results for input(s): WBC, RBC, HEMOGLOBIN, HEMATOCRIT, MCV, MCH, RDW, PLATELETCT, MPV, NEUTSPOLYS, LYMPHOCYTES, MONOCYTES, EOSINOPHILS, BASOPHILS, RBCMORPHOLO in the last 72 hours.  No results for  input(s): SODIUM, POTASSIUM, CHLORIDE, CO2, GLUCOSE, BUN, CPKTOTAL in the last 72 hours.  No results for input(s): ALBUMIN, TBILIRUBIN, ALKPHOSPHAT, TOTPROTEIN, ALTSGPT, ASTSGOT, CREATININE in the last 72 hours.    Imaging:  Ct-extremity, Lower With Right  4/29/2017  14 cm distal anterior thigh abscess with no CT evidence of septic arthropathy or osteomyelitis Findings were discussed with ANCELMO SCOTT on 4/29/2017 1:12 PM.    Ct-maxillofacial W/o Plus Recons  5/11/2017  Mucous retention cyst in the right maxillary sinus with minimal mucosal thickening in the left maxillary sinus. Leftward deviation of the nasal septum with a nasal septal spur. Right devin bullosa.    Dx-chest-portable (1 View)    5/11/2017  The right PICC line tip is unchanged in appearance with the tip again located at the cavoatrial junction.    5/11/2017 5/11/2017 11:25 AM HISTORY/REASON FOR EXAM: Fever chills and body aches TECHNIQUE/EXAM DESCRIPTION AND NUMBER OF VIEWS: Single portable view of the chest. COMPARISON: 5/2/2017 FINDINGS: There is no evidence of focal consolidation or evidence of pulmonary edema. There is no pleural effusion. The heart is mildly enlarged.     5/11/2017  Mild cardiomegaly.    Ir-picc Line Placement > Age 5    5/2/2017  HISTORY/REASON FOR EXAM:   PICC placement. TECHNIQUE/EXAM DESCRIPTION AND NUMBER OF VIEWS:   PICC line insertion with ultrasound guidance.  The procedure was performed using maximal sterile barrier technique including sterile gown, mask, cap, and donning of sterile gloves following appropriate hand hygiene and/or sterile scrub. Patient skin site was prepped with 2% Chlorhexidine solution. FINDINGS:  PICC line insertion with Ultrasound Guidance was performed by qualified nursing staff without the assistance of a Radiologist.  A follow up chest x-ray will be performed to determine appropriateness of PICC positioning.     5/2/2017           Ultrasound-guided PICC placement performed by  qualified nursing staff as above.     Echocardiogram Comp W/o Cont    5/1/2017  Transthoracic Echo Report Echocardiography Laboratory CONCLUSIONS Normal left ventricular chamber size. Normal left ventricular systolic function. Left ventricular ejection fraction is visually estimated to be 65%. Normal regional wall motion. Mild mitral regurgitation. The left atrium is normal in size. Structurally normal aortic valve without significant stenosis or regurgitation. Right ventricular systolic pressure is estimated to be 50 mmHg. Mild tricuspid regurgitation. Normal inferior vena cava size and inspiratory collapse. Mildly dilated right ventricle. Normal right ventricular systolic function.    Transesophageal Echo W/o Cont    5/3/2017  Transesophageal Echo Report Echocardiography Laboratory CONCLUSIONS No vegetations noted, no evidence of endocarditis or abscess. DEVIN MO Exam Date:          05/03/2017                     Micro:  BLOOD CULTURE   Date Value Ref Range Status   05/11/2017   Preliminary    No Growth    Note: Blood cultures are incubated for 5 days and  are monitored continuously.Positive blood cultures  are called to the RN and reported as soon as  they are identified.       BLOOD CULTURE HOLD   Date Value Ref Range Status   01/04/2017 Collected  Final      Results     Procedure Component Value Units Date/Time    CULTURE WOUND W/ GRAM STAIN [[id number]] Collected:  05/11/17 1455    Order Status:  Completed Specimen Information:  Wound Updated:  05/14/17 0821     Gram Stain Result No organisms seen.      Significant Indicator NEG      Source WND      Site Left Hand      Culture Result Wound --      Result:        Mixed skin yesenia Isolated from enrichment broth only, please  correlate with clinical condition.      CULTURE WOUND W/ GRAM STAIN [[id number]] Collected:  05/11/17 1756    Order Status:  Completed Specimen Information:  Wound from Right Foot Updated:  05/14/17 0810     Gram Stain Result No  "organisms seen.      Significant Indicator NEG      Source WND      Site RIGHT ANKLE      Culture Result Wound No growth at 72 hours.     Narrative:      Collected By:[id number] INNA VALLADARES    Blood Culture [[id number]] Collected:  05/11/17 1110    Order Status:  Completed Specimen Information:  Blood from Peripheral Updated:  05/12/17 0945     Significant Indicator NEG      Source BLD      Site PERIPHERAL      Blood Culture --      Result:        No Growth    Note: Blood cultures are incubated for 5 days and  are monitored continuously.Positive blood cultures  are called to the RN and reported as soon as  they are identified.      Narrative:      From different peripheral sites, if not done within the last  24 hours (Per Hospital Policy: Only change specimen source to  \"Line\" if specified by physician order)    GRAM STAIN [[id number]] Collected:  05/11/17 1756    Order Status:  Completed Specimen Information:  Wound Updated:  05/12/17 0736     Significant Indicator .      Source WND      Site RIGHT ANKLE      Gram Stain Result No organisms seen.     Narrative:      Collected By:[id number] INNA VALLADARES    GRAM STAIN [[id number]] Collected:  05/11/17 1455    Order Status:  Completed Specimen Information:  Wound Updated:  05/12/17 0731     Significant Indicator .      Source WND      Site Left Hand      Gram Stain Result No organisms seen.     Blood Culture [[id number]] Collected:  05/11/17 0000    Order Status:  Canceled Specimen Information:  Other from Peripheral     Narrative:      TEST Blood Culture WAS CANCELLED, 05/15/17 01:05 Test autocancelled, not  collected or received  From different peripheral sites, if not done within the last  24 hours (Per Hospital Policy: Only change specimen source to  \"Line\" if specified by physician order)    Culture Respiratory W/ GRM STN [[id number]] Collected:  05/11/17 0000    Order Status:  Canceled Specimen Information:  Sputum from Sputum     Narrative:      TEST " Respiratory Culture w/ GS WAS CANCELLED, 05/15/17 01:05 Test  autocancelled, not collected or received  Sputum cultures, induced if needed. If not done within the  last 24 hours    Urine Culture [[id number]] Collected:  05/11/17 0000    Order Status:  Canceled Specimen Information:  Other from Urine, Clean Catch     Narrative:      TEST Urine Culture WAS CANCELLED, 05/15/17 01:05 Test autocancelled, not  collected or received  Indication for culture:->Suspected Sepsis            Assessment:  Active Hospital Problems    Diagnosis   • Fever [R50.9]       Plan:  Right thigh abscess  Status post I&D on 4/29/17  Cultures were Klebsiella pneumonia, MRSA and Bacteroides fragilis and heavy growth viridans strep  Continue Zosyn and Zyvox.   Continue wound care  May be candidate for home IV abx if covered by insurance (Zosyn by infusion pump)  Stop date IV 5/19 then follow with PO augmentin  IV abx orders for zosyn continuous infusion given to MSW today    Right ankle and left hand abscess  Status post I&D on 5/1/17  Improving  Continue wound care     Leukopenia  Monitor  WBC 4.3 at last check on 5/12  Will dc linezolid if drops below 3    Abdominal wall infection  Status post treatment  Covered by above abx  Continue wound care-VAC off and simple dressing    Ulcerative colitis  Treatment per GI  Worry about these being extraintestinal manifestations of ulcerative colitis  Advised to get a rheumatology consultation as an outpatient    Dispo: Home with IV abx. Pt will be discharged on IV zosyn and PO linezolid.    Discussed with Dr Kapoor and MSW

## 2017-05-15 NOTE — DISCHARGE PLANNING
Transitional Care Navigator:    Met with patient at the bedside regarding post discharge follow up care.  Pt has been on service with [facility] for wound vac, and [facility] for home infusion.  Pt wishes to resume care with all.  Choice forms completed and faxed; SW aware.

## 2017-05-15 NOTE — DISCHARGE PLANNING
Medical Social Work    SW checked [facility] website and confirmed pt does have a home wound vac that was delivered. SW met with pt at bedside and confirmed he does have the home wound vac and will ensure his girlfriend brings it when he is ready for discharge. SW awaiting HH order to be entered so HH Referral can be sent to [facility] for resumption of services. Pt will require HH due to his wound care needs, PICC dressing changes and home infusion.

## 2017-05-15 NOTE — DISCHARGE PLANNING
Medical Social Work    BRIAN called [facility] and inquired about pt's acceptance to services. [facility] will contact [facility] to coordinate. A skilled nurse will meet with pt this evening and teach him how to set up his IV abx for ordered dosing. It was indicated there is not concern regarding pt's acceptance and pt's care team may begin the discharge process.     BRIAN notified assigned RN and assigned MD of the above. BRIAN advised assigned RN pt would need to get his home wound vac here prior to leaving, which was told to pt earlier this morning when pt indicated he did have it and it would not be a problem to bring back to bedside.     *Addendum: Pt unable to get his wound vac to hospital until after 7:00 pm tonight. BRIAN spoke with [facility] and it was determined pt would need to d/c on 5/16/17 and his home infusion will need to start at noon. Pt will need to d/c at approximately 1030 to ensure he is home for the  nurse. Pt's medications will be delivered beside tonight in a refrigerated pack that [facility] confirmed would stay cold until pt is d/c'd in the morning. BRIAN spoke with assigned RN and updated him of the above.     Plan: Pt to d/c home with [facility] and home infusion through [facility]. Pt has a home wound vac through [facility] that was delivered previously.

## 2017-05-15 NOTE — DISCHARGE PLANNING
Care Transition Team Discharge Planning    Anticipated Discharge Information  Anticipated discharge disposition: HHC, Infusion / Enteral - home, Other (comment) (Home infusion.)  Discharge Address: [address] [place] [address]  Discharge Contact Phone Number: [phone]    Care Transition Team Interventions  Were Resources Provided?: Yes  Medical Referrals: Home Health referral, Infusion referral ([facility]  and [facility]. )

## 2017-05-15 NOTE — FACE TO FACE
Face to Face Supporting Documentation - Home Health    The encounter with this patient was in whole or in part the primary reason for home health admission.    Date of encounter:   Patient:                    MRN:                       YOB: 2017  Po Richards  [id number]  1965     Home health to see patient for:  Skilled Nursing care for assessment, interventions & education, Wound Care, Medical social work consult and Home health aide    Skilled need for:  Surgical Aftercare abdominal and right thigh wound I&D    Skilled nursing interventions to include:  Venous access care, Home IV infusion therapy and Wound Care    Homebound status evidenced by:  Need the aid of supportive devices such as crutches, canes, wheelchairs or walkers or Needs the assistance of another person in order to leave the home. Leaving home requires a considerable and taxing effort. There is a normal inability to leave the home.    Community Physician to provide follow up care: KRISSY Brunner     Optional Interventions? No      I certify the face to face encounter for this home health care referral meets the CMS requirements and the encounter/clinical assessment with the patient was, in whole, or in part, for the medical condition(s) listed above, which is the primary reason for home health care. Based on my clinical findings: the service(s) are medically necessary, support the need for home health care, and the homebound criteria are met.  I certify that this patient has had a face to face encounter by myself.  Carlos Kapoor M.D. - NPI: [id number]

## 2017-05-16 ENCOUNTER — HOME CARE VISIT (OUTPATIENT)
Dept: HOME HEALTH SERVICES | Facility: HOME HEALTHCARE | Age: 52
End: 2017-05-16
Payer: COMMERCIAL

## 2017-05-16 VITALS
BODY MASS INDEX: 32.94 KG/M2 | SYSTOLIC BLOOD PRESSURE: 110 MMHG | RESPIRATION RATE: 18 BRPM | HEART RATE: 67 BPM | TEMPERATURE: 98.4 F | DIASTOLIC BLOOD PRESSURE: 78 MMHG | WEIGHT: 217.37 LBS | HEIGHT: 68 IN

## 2017-05-16 VITALS
OXYGEN SATURATION: 96 % | SYSTOLIC BLOOD PRESSURE: 135 MMHG | TEMPERATURE: 98.3 F | HEART RATE: 60 BPM | DIASTOLIC BLOOD PRESSURE: 86 MMHG | WEIGHT: 217.37 LBS | RESPIRATION RATE: 20 BRPM | HEIGHT: 68 IN | BODY MASS INDEX: 32.94 KG/M2

## 2017-05-16 LAB
BACTERIA BLD CULT: NORMAL
BACTERIA BLD CULT: NORMAL
GLUCOSE BLD-MCNC: 103 MG/DL (ref 65–99)
GLUCOSE BLD-MCNC: 115 MG/DL (ref 65–99)
GLUCOSE BLD-MCNC: 90 MG/DL (ref 65–99)
SIGNIFICANT IND 70042: NORMAL
SITE SITE: NORMAL
SOURCE SOURCE: NORMAL

## 2017-05-16 PROCEDURE — 700111 HCHG RX REV CODE 636 W/ 250 OVERRIDE (IP): Performed by: HOSPITALIST

## 2017-05-16 PROCEDURE — 700102 HCHG RX REV CODE 250 W/ 637 OVERRIDE(OP): Performed by: PHARMACIST

## 2017-05-16 PROCEDURE — 82962 GLUCOSE BLOOD TEST: CPT

## 2017-05-16 PROCEDURE — A9270 NON-COVERED ITEM OR SERVICE: HCPCS | Performed by: PHARMACIST

## 2017-05-16 PROCEDURE — A9270 NON-COVERED ITEM OR SERVICE: HCPCS | Performed by: INTERNAL MEDICINE

## 2017-05-16 PROCEDURE — 700111 HCHG RX REV CODE 636 W/ 250 OVERRIDE (IP): Performed by: INTERNAL MEDICINE

## 2017-05-16 PROCEDURE — G0162 HHC RN E&M PLAN SVS, 15 MIN: HCPCS

## 2017-05-16 PROCEDURE — 700102 HCHG RX REV CODE 250 W/ 637 OVERRIDE(OP): Performed by: INTERNAL MEDICINE

## 2017-05-16 PROCEDURE — 99239 HOSP IP/OBS DSCHRG MGMT >30: CPT | Performed by: INTERNAL MEDICINE

## 2017-05-16 PROCEDURE — 700105 HCHG RX REV CODE 258: Performed by: INTERNAL MEDICINE

## 2017-05-16 RX ORDER — AMOXICILLIN AND CLAVULANATE POTASSIUM 875; 125 MG/1; MG/1
1 TABLET, FILM COATED ORAL 2 TIMES DAILY
Qty: 28 TAB | Refills: 0 | Status: SHIPPED | OUTPATIENT
Start: 2017-05-19 | End: 2017-05-31

## 2017-05-16 RX ADMIN — MAGNESIUM GLUCONATE 500 MG ORAL TABLET 400 MG: 500 TABLET ORAL at 08:09

## 2017-05-16 RX ADMIN — PIPERACILLIN SODIUM AND TAZOBACTAM SODIUM 3.38 G: 3; .375 INJECTION, POWDER, FOR SOLUTION INTRAVENOUS at 05:52

## 2017-05-16 RX ADMIN — FLUCONAZOLE 200 MG: 100 TABLET ORAL at 08:09

## 2017-05-16 RX ADMIN — ENOXAPARIN SODIUM 40 MG: 100 INJECTION SUBCUTANEOUS at 08:10

## 2017-05-16 RX ADMIN — LEVOTHYROXINE, LIOTHYRONINE 60 MG: 19; 4.5 TABLET ORAL at 05:52

## 2017-05-16 RX ADMIN — HYDROMORPHONE HYDROCHLORIDE 0.5 MG: 1 INJECTION, SOLUTION INTRAMUSCULAR; INTRAVENOUS; SUBCUTANEOUS at 10:41

## 2017-05-16 RX ADMIN — ONDANSETRON 4 MG: 4 TABLET, ORALLY DISINTEGRATING ORAL at 09:19

## 2017-05-16 RX ADMIN — LINEZOLID 600 MG: 600 TABLET, FILM COATED ORAL at 08:09

## 2017-05-16 RX ADMIN — BUPROPION HYDROCHLORIDE 150 MG: 150 TABLET, FILM COATED, EXTENDED RELEASE ORAL at 08:08

## 2017-05-16 RX ADMIN — MESALAMINE 400 MG: 400 CAPSULE, DELAYED RELEASE ORAL at 08:09

## 2017-05-16 RX ADMIN — MORPHINE SULFATE 30 MG: 30 TABLET, EXTENDED RELEASE ORAL at 08:09

## 2017-05-16 SDOH — ECONOMIC STABILITY: HOUSING INSECURITY: UNSAFE COOKING RANGE AREA: 0

## 2017-05-16 SDOH — ECONOMIC STABILITY: HOUSING INSECURITY: UNSAFE APPLIANCES: 0

## 2017-05-16 ASSESSMENT — ENCOUNTER SYMPTOMS
DIZZINESS: 0
COUGH: 0
CONSTIPATION: 0
DIARRHEA: 0
NAUSEA: 0
VOMITING: 0
MYALGIAS: 1
NAUSEA: INTERMITTENT
FEVER: 1
SPEECH CHANGE: 0
FEVER: 0
HEADACHES: 0
SHORTNESS OF BREATH: 0
WEAKNESS: 1
FOCAL WEAKNESS: 0
ABDOMINAL PAIN: 0
CHILLS: 0
PALPITATIONS: 0

## 2017-05-16 ASSESSMENT — ACTIVITIES OF DAILY LIVING (ADL)
OASIS_M1830: 03
HOME_HEALTH_OASIS: 01

## 2017-05-16 ASSESSMENT — PAIN SCALES - GENERAL
PAINLEVEL_OUTOF10: 6
PAINLEVEL_OUTOF10: 5
PAINLEVEL_OUTOF10: 6
PAINLEVEL_OUTOF10: 7
PAINLEVEL_OUTOF10: 5

## 2017-05-16 NOTE — CARE PLAN
Problem: Safety  Goal: Will remain free from injury  Outcome: PROGRESSING AS EXPECTED  Fall precautions in place. Bed in lowest position. Non-skid socks in place. Personal possessions within reach. Mobility sign on door. Bed-alarm on. Call light within reach. Pt educated regarding fall prevention and states understanding.     Problem: Pain Management  Goal: Pain level will decrease to patient’s comfort goal  Outcome: PROGRESSING AS EXPECTED  PRN pain meds given per MAR, relaxation techniques provided

## 2017-05-16 NOTE — CARE PLAN
Problem: Safety  Goal: Will remain free from falls  Intervention: Implement fall precautions  Call light in reach, hourly rounding in practice.       Problem: Mobility  Goal: Risk for activity intolerance will decrease  Intervention: Encourage patient to increase activity level in collaboration with Interdisciplinary Team  Pt will get OOB for all meals.

## 2017-05-16 NOTE — DISCHARGE INSTRUCTIONS
Discharge Instructions    IV infusion: stop date 5/19/17  linezolid (ZYVOX): stop date 5/19/17  Augmentin: Start 5/19/17 stop date: Jun 2, 2017    For home health:  DRESSING CARE EVERY FORTY-EIGHT HOURS     Comments: Nursing to change dressings to UMBILICUS, LEFT HAND, RIGHT ANTERIOR ANKLE Q48 hours. Remove dressings. Irrigate with NS and pat dry. Place a strip of hydrofiber silver into/on the wounds. Cover umbilicus and right ankle wound with adhesive silicone foams. Cover left hand wound with nonadherent pad and secure with hypafix tape.            Discharged to home by car with friend. Discharged via wheelchair, hospital escort: Yes.  Special equipment needed: Wound VAC    Be sure to schedule a follow-up appointment with your primary care doctor or any specialists as instructed.     Discharge Plan:   Diet Plan: Discussed  Activity Level: Discussed  Confirmed Follow up Appointment: Patient to Call and Schedule Appointment  Confirmed Symptoms Management: Discussed  Medication Reconciliation Updated: Yes  Influenza Vaccine Indication: Not indicated: Previously immunized this influenza season and > 8 years of age    I understand that a diet low in cholesterol, fat, and sodium is recommended for good health. Unless I have been given specific instructions below for another diet, I accept this instruction as my diet prescription.   Other diet: diabetic     Special Instructions: None    · Is patient discharged on Warfarin / Coumadin?   No     · Is patient Post Blood Transfusion?  No    Depression / Suicide Risk    As you are discharged from this [facility] facility, it is important to learn how to keep safe from harming yourself.    Recognize the warning signs:  · Abrupt changes in personality, positive or negative- including increase in energy   · Giving away possessions  · Change in eating patterns- significant weight changes-  positive or negative  · Change in sleeping patterns- unable to sleep or sleeping all the  "time   · Unwillingness or inability to communicate  · Depression  · Unusual sadness, discouragement and loneliness  · Talk of wanting to die  · Neglect of personal appearance   · Rebelliousness- reckless behavior  · Withdrawal from people/activities they love  · Confusion- inability to concentrate     If you or a loved one observes any of these behaviors or has concerns about self-harm, here's what you can do:  · Talk about it- your feelings and reasons for harming yourself  · Remove any means that you might use to hurt yourself (examples: pills, rope, extension cords, firearm)  · Get professional help from the community (Mental Health, Substance Abuse, psychological counseling)  · Do not be alone:Call your Safe Contact- someone whom you trust who will be there for you.  · Call your local CRISIS HOTLINE 828-3425 or 974-666-0718  · Call your local Children's Mobile Crisis Response Team [place] (608) 266-8042 or www.[employer]  · Call the toll free National Suicide Prevention Hotlines   · National Suicide Prevention Lifeline 487-666-DSUL (1314)  · National Hope Line Network 800-SUICIDE (918-8811)  PICC Home Guide  A peripherally inserted central catheter (PICC) is a long, thin, flexible tube that is inserted into a vein in the upper arm. It is a form of intravenous (IV) access. It is considered to be a \"central\" line because the tip of the PICC ends in a large vein in your chest. This large vein is called the superior vena cava (SVC). The PICC tip ends in the SVC because there is a lot of blood flow in the SVC. This allows medicines and IV fluids to be quickly distributed throughout the body. The PICC is inserted using a sterile technique by a specially trained nurse or physician. After the PICC is inserted, a chest X-ray exam is done to be sure it is in the correct place.   A PICC may be placed for different reasons, such as:  · To give medicines and liquid nutrition that can only be given through a " "central line. Examples are:  · Certain antibiotic treatments.  · Chemotherapy.  · Total parenteral nutrition (TPN).  · To take frequent blood samples.  · To give IV fluids and blood products.  · If there is difficulty placing a peripheral intravenous (PIV) catheter.  If taken care of properly, a PICC can remain in place for several months. A PICC can also allow a person to go home from the hospital early. Medicine and PICC care can be managed at home by a family member or home health care team.  WHAT PROBLEMS CAN HAPPEN WHEN I HAVE A PICC?  Problems with a PICC can occasionally occur. These may include the following:  · A blood clot (thrombus) forming in or at the tip of the PICC. This can cause the PICC to become clogged. A clot-dissolving medicine called tissue plasminogen activator (tPA) can be given through the PICC to help break up the clot.  · Inflammation of the vein (phlebitis) in which the PICC is placed. Signs of inflammation may include redness, pain at the insertion site, red streaks, or being able to feel a \"cord\" in the vein where the PICC is located.  · Infection in the PICC or at the insertion site. Signs of infection may include fever, chills, redness, swelling, or pus drainage from the PICC insertion site.  · PICC movement (malposition). The PICC tip may move from its original position due to excessive physical activity, forceful coughing, sneezing, or vomiting.  · A break or cut in the PICC. It is important to not use scissors near the PICC.  · Nerve or tendon irritation or injury during PICC insertion.  WHAT SHOULD I KEEP IN MIND ABOUT ACTIVITIES WHEN I HAVE A PICC?  · You may bend your arm and move it freely. If your PICC is near or at the bend of your elbow, avoid activity with repeated motion at the elbow.  · Rest at home for the remainder of the day following PICC line insertion.  · Avoid lifting heavy objects as instructed by your health care provider.  · Avoid using a crutch with the arm on " "the same side as your PICC. You may need to use a walker.  WHAT SHOULD I KNOW ABOUT MY PICC DRESSING?  · Keep your PICC bandage (dressing) clean and dry to prevent infection.  ¨ Ask your health care provider when you may shower. Ask your health care provider to teach you how to wrap the PICC when you do take a shower.  · Change the PICC dressing as instructed by your health care provider.  · Change your PICC dressing if it becomes loose or wet.  WHAT SHOULD I KNOW ABOUT PICC CARE?  · Check the PICC insertion site daily for leakage, redness, swelling, or pain.  · Do not take a bath, swim, or use hot tubs when you have a PICC. Cover PICC line with clear plastic wrap and tape to keep it dry while showering.  · Flush the PICC as directed by your health care provider. Let your health care provider know right away if the PICC is difficult to flush or does not flush. Do not use force to flush the PICC.  · Do not use a syringe that is less than 10 mL to flush the PICC.  · Never pull or tug on the PICC.  · Avoid blood pressure checks on the arm with the PICC.  · Keep your PICC identification card with you at all times.  · Do not take the PICC out yourself. Only a trained clinical professional should remove the PICC.  SEEK IMMEDIATE MEDICAL CARE IF:  · Your PICC is accidentally pulled all the way out. If this happens, cover the insertion site with a bandage or gauze dressing. Do not throw the PICC away. Your health care provider will need to inspect it.  · Your PICC was tugged or pulled and has partially come out. Do not  push the PICC back in.  · There is any type of drainage, redness, or swelling where the PICC enters the skin.  · You cannot flush the PICC, it is difficult to flush, or the PICC leaks around the insertion site when it is flushed.  · You hear a \"flushing\" sound when the PICC is flushed.  · You have pain, discomfort, or numbness in your arm, shoulder, or jaw on the same side as the PICC.  · You feel your heart " "\"racing\" or skipping beats.  · You notice a hole or tear in the PICC.  · You develop chills or a fever.  MAKE SURE YOU:   · Understand these instructions.  · Will watch your condition.  · Will get help right away if you are not doing well or get worse.     This information is not intended to replace advice given to you by your health care provider. Make sure you discuss any questions you have with your health care provider.     Document Released: 06/23/2004 Document Revised: 01/08/2016 Document Reviewed: 08/25/2014  [employer] Patient Education ©2016 [employer]    Vacuum-Assisted Closure Therapy Home Guide  Vacuum-assisted closure therapy (VAC therapy) is a device that helps wounds heal. It is used on wounds that cannot be closed with stitches. They often heal slowly. VAC therapy helps the wound stay clean and healthy while its edges slowly grow back together.  VAC therapy uses a bandage (dressing) that is made of foam. It is put inside the wound. Then, a drape is placed over the wound. This drape sticks to your skin (adhesive) to keep air out. A tube is hooked up to a small pump and is attached to the drape. The pump sucks fluid and germs from the wound. It can also decrease any bad smell that comes from the wound.  RISKS AND COMPLICATIONS  VAC therapy is usually safe to use at home. Your skin may get sore from the adhesive drape. That is the most common problem. However, more serious problems can develop, such as:   · Bleeding. This can happen if the dressing in the wound comes into contact with blood vessels. A little bleeding may occur when the dressing is being changed. This is normal now and then. Major bleeding can happen if a large blood vessel breaks. This is more likely if you are taking blood-thinning medicine. Emergency surgery may be needed.  · Infection. This can happen if the dressing has an air leak that is not repaired within a couple of hours.  · Dehydration. This can happen if the " pump sucks out too much body fluid.  DRESSING CHANGES  Your dressing will have to be changed. Sometimes this is needed once a day. Other times, a dressing change must be done 3 times a week. How often you change your dressing will depend on what your wound is like. A trained caregiver will most likely change the dressing. However, a family member or friend may be trained to change the dressing. Below are steps to change a dressing in order to prevent an infection. The steps apply to you or the person that changes your dressing.  · Wash your hands with soap and water before and after each dressing change.  · Wear gloves and protective clothing. This may include eye protection.  · Do not allow anyone to change your dressing if they have an infection or a skin condition. Even a small cut can be a problem.  To change the dressing:   · Turn off the pump.  · Take off the adhesive drape.  · Disconnect the tube from the dressing.  · Take out the dressing that is inside the wound. If the dressing sticks, use a germ-free (sterile), saltwater solution to wet the dressing. This helps it come out more easily. If it hurts when the dressing is changed, take pain medicine 30 minutes before the dressing change.  · Cleanse the wound with normal saline or sterile water.  · Apply a skin barrier film to the skin that will be covered with the drape. This will protect the skin.  · Put a new dressing into the wound.  · Apply a new drape and tube.  · Replace the container in the pump that collects fluid if it is full. Do this at least once per week.  · Turn the pump back on.  · Your doctor will decide what setting of suction is best. Do not change the settings on the machine without talking to your nurse or doctor.  HOME CARE INSTRUCTIONS   · The VAC pump has an alarm. It goes off if there are any problems such as a leak.  ¨ Ask your caregiver what to do if the alarm goes off.  ¨ Call your caregiver right away if the alarm goes off and you  cannot fix the problem.  · Do not turn off the pump for more than 2 hours.  · Check your wound carefully at each dressing change for signs of infection. Watch for redness, swelling, or any fluid leaking from the wound. If you develop an infection:  ¨ You may have to stop VAC therapy.  ¨ The wound will need to be cleaned and washed out.  ¨ You will have to take antibiotic medicine.  · Ask your caregiver what activities you should or should not do while you are getting VAC therapy. This will depend on your particular wound.  · Ask if it is okay to turn off the pump so you can take a shower. If it is okay, make sure the wound is covered with plastic. The wound area must stay dry.  · Drink enough fluids to keep your urine clear or pale yellow.  · Eat foods that contain a lot of protein. Examples are meat, poultry, seafood, eggs, nuts, beans, and peas. Protein can help your wound heal.  SEEK MEDICAL CARE IF:  · Your wound itches or hurts.  · Dressing changes are often painful or bleeding often occurs.  · You have a headache.  · You have diarrhea.  · You have a sore throat.  · You have a rash.  · You feel nauseous.  · You feel dizzy or weak.  SEEK IMMEDIATE MEDICAL CARE IF:   · You have very bad pain.  · You have bleeding that will not stop.  · Your wound smells bad.  · You have redness, swelling, or fluid leaking from your wound.  · Your alarm goes off and you do not know what to do.  · You have a fever.     This information is not intended to replace advice given to you by your health care provider. Make sure you discuss any questions you have with your health care provider.     Document Released: 03/11/2013 Document Reviewed: 03/11/2013  [employer] Interactive Patient Education ©2016 [employer]

## 2017-05-16 NOTE — PROGRESS NOTES
Hospital Medicine ICU Interval Note    Date of Service: 2017    Chief Complaint  52 y.o.-year-old male admitted 2017 with worsening wound infections    Interval Problem Update  5/15 - late entry note as he was discharged but could not get his wound vac till late. Outpatient antibiotics arranged.      - he is feeling well today. Discussed with Dr. Aguirre; may be able to do home infusion of Zosyn so that he could go home rather than to SNF. Questions answered. Advised will work on it tomorrow when the infusion company is open.      Consultants/Specialty  Yvan, ID    Disposition  Home vs. SNF    I spent a total of 25 minutes during this clinical encounter of which > 50% was devoted to counseling and coordinating care including review of records, pertinent lab data and studies, as well as discussing diagnostic evaluation and work up, planned therapeutic interventions and future disposition of care. Where indicated, the assessment and plan reflect discussion of patient with consultants, other healthcare providers, family members, and additional research needed to obtain further information in formulating the plan of care of this patient.         Review of Systems   Constitutional: Positive for fever. Negative for chills.   Respiratory: Negative for cough and shortness of breath.    Cardiovascular: Negative for chest pain and palpitations.   Gastrointestinal: Negative for nausea, vomiting, abdominal pain, diarrhea and constipation.   Genitourinary: Negative for dysuria.   Musculoskeletal: Positive for myalgias.   Skin: Negative for itching.   Neurological: Positive for weakness. Negative for dizziness, focal weakness and headaches.   All other systems reviewed and are negative.     Physical Exam  Laboratory/Imaging   Hemodynamics  Temp (24hrs), Av.3 °C (97.4 °F), Min:36 °C (96.8 °F), Max:36.8 °C (98.3 °F)   Temperature: 36.8 °C (98.3 °F)  Pulse  Av.9  Min: 59  Max: 99    Blood Pressure: 135/86  mmHg      Respiratory      Respiration: 20, Pulse Oximetry: 96 %        RUL Breath Sounds: Clear, RML Breath Sounds: Clear, RLL Breath Sounds: Diminished, NADEEN Breath Sounds: Clear, LLL Breath Sounds: Diminished    Fluids       Intake/Output Summary (Last 24 hours) at 05/16/17 1227  Last data filed at 05/16/17 0344   Gross per 24 hour   Intake      0 ml   Output   1075 ml   Net  -1075 ml       Nutrition  Orders Placed This Encounter   Procedures   • Diet Order     Standing Status: Standing      Number of Occurrences: 1      Standing Expiration Date:      Order Specific Question:  Diet:     Answer:  Diabetic [3]     Physical Exam   Constitutional: He is oriented to person, place, and time. He appears well-developed and well-nourished.   HENT:   Head: Normocephalic and atraumatic.   Mouth/Throat: Oropharynx is clear and moist.   Eyes: Conjunctivae and EOM are normal. Pupils are equal, round, and reactive to light. No scleral icterus.   Neck: Normal range of motion. Neck supple. No tracheal deviation present. No thyromegaly present.   Cardiovascular: Normal rate, regular rhythm, normal heart sounds and intact distal pulses.    No murmur heard.  Pulmonary/Chest: Effort normal and breath sounds normal. No respiratory distress. He has no wheezes.   Abdominal: Soft. Bowel sounds are normal. He exhibits no distension. There is tenderness.   Umbilical wound vac   Musculoskeletal: Normal range of motion. He exhibits no edema or tenderness.   Right thigh wound vac, tender  Right foot wrapped  Left hand wrapped   Lymphadenopathy:     He has no cervical adenopathy.        Right: No supraclavicular adenopathy present.        Left: No supraclavicular adenopathy present.   Neurological: He is alert and oriented to person, place, and time. No cranial nerve deficit.   Skin: Skin is warm and dry.   Vitals reviewed.                               Assessment/Plan     Abscess of right thigh (present on admission)  Assessment & Plan  Active  Orders     Ordered     Ordering Provider       Thu May 11, 2017  5:28 PM    05/11/17 1728  fluconazole (DIFLUCAN) tablet 200 mg   DAILY      Anai Santoro M.D.    05/11/17 1728  linezolid (ZYVOX) tablet 600 mg   EVERY 12 HOURS     Question:  Indication and durations for linezolid  Answer:  Cellulitis, VRE or MRSA (duration 10 days)    Anai Santoro M.D.       Thu May 11, 2017 12:22 PM    05/11/17 1222  piperacillin-tazobactam (ZOSYN) 3.375 g in  mL IVPB   EVERY 6 HOURS      Chelsey Jaquez M.D.          Ulcerative colitis (CMS-HCC) (present on admission)  Assessment & Plan  Mesalamine  Probably some component of immunosuppression    Essential hypertension (present on admission)  Assessment & Plan  SBP goal less than 140 mmHg  DBP goal less than 90 mmHg  PRN and antihypertensives to titrate by hospitalist towards goal    Type 2 diabetes mellitus without complication, with long-term current use of insulin (CMS-HCC) (present on admission)  Assessment & Plan  In-hospital FSBG goal less than 180 mg/dL  SSI qAC & qHS when eating, q6 when NPO  GLUCOSE - ACCU-CK   Date/Time Value Ref Range Status   05/14/2017 06:09 * 65 - 99 mg/dL Final   05/13/2017 08:34 * 65 - 99 mg/dL Final   05/13/2017 05:13 PM 86 65 - 99 mg/dL Final   05/13/2017 11:58 * 65 - 99 mg/dL Final         Medications reviewed and Labs reviewed        DVT Prophylaxis: Heparin    Ulcer prophylaxis: Not indicated

## 2017-05-16 NOTE — PROGRESS NOTES
SBAR report received, assumed primary care and bedside report completed. All questions and concerns addressed with patient. Pt denies any needs at this time. Call light in reach

## 2017-05-16 NOTE — PROGRESS NOTES
Infectious Disease Progress Note    Author: Kandy Foreman M.D. Date of service & Time created: 2017  8:32 AM    Chief Complaint:  Chief Complaint   Patient presents with   • Wound Infection       Interval History:  52 year old male with UC, Dm ,abdominal wall infection, recent multiple SSTI readmitted with fevers  17- no fevers. Wbc 4.3. No significant issues overnight   AF WBC 4.3 hand improved; thigh about the same   AF does not want to go to SNF now-wants to go home  5/15 AF, no CBC, has pain in left thumb with dressing changes, plan for wound vac change on RLE today, BM stable   AF, no CBC, no new issues, happy to go home today  Labs Reviewed, Medications Reviewed, Radiology Reviewed and Wound Reviewed.    Review of Systems:  Review of Systems   Constitutional: Positive for malaise/fatigue. Negative for fever.   Respiratory: Negative for cough and shortness of breath.    Cardiovascular: Negative for chest pain.   Gastrointestinal: Negative for nausea, vomiting and diarrhea.   Genitourinary: Negative for dysuria.   Musculoskeletal: Positive for myalgias.   Neurological: Negative for speech change, focal weakness and headaches.       Hemodynamics:  Temp (24hrs), Av.5 °C (97.7 °F), Min:36 °C (96.8 °F), Max:37.1 °C (98.7 °F)  Temperature: 36.8 °C (98.3 °F)  Pulse  Av.9  Min: 59  Max: 99   Blood Pressure: 135/86 mmHg       Physical Exam:  Physical Exam   Constitutional: He is oriented to person, place, and time. He appears well-developed and well-nourished. No distress.   HENT:   Head: Normocephalic and atraumatic.   Mouth/Throat: No oropharyngeal exudate.   Eyes: EOM are normal. Pupils are equal, round, and reactive to light. No scleral icterus.   Neck: Neck supple.   Cardiovascular: Normal rate and regular rhythm.    No murmur heard.  Pulmonary/Chest: Effort normal. No respiratory distress.   Abdominal: Soft. He exhibits no distension. There is no tenderness.   Wound dressed-no  packing   Musculoskeletal: He exhibits no edema.   Rt thigh wound vac-tender  Left hand and rt ankle-surgical sites healing. No drainage or surrounding erythema      RUE PICC nontender     Neurological: He is alert and oriented to person, place, and time.   Nursing note and vitals reviewed.      Meds:    Current facility-administered medications:   •  HYDROmorphone (DILAUDID) injection 0.5 mg, 0.5 mg, Intravenous, Q4HRS PRN, Carlos Kapoor M.D., 0.5 mg at 05/15/17 1325  •  buPROPion SR (WELLBUTRIN-SR) tablet 150 mg, 150 mg, Oral, BID, Chelsey Jaquez M.D., 150 mg at 05/16/17 0808  •  vitamin D (cholecalciferol) tablet 5,000 Units, 5,000 Units, Oral, Q EVENING, Chelsey Jaquez M.D., 5,000 Units at 05/15/17 2006  •  insulin glargine (LANTUS) injection 50 Units, 50 Units, Subcutaneous, QHS, Chelsey Jaquez M.D., Stopped at 05/15/17 2100  •  magnesium oxide (MAG-OX) tablet 400 mg, 400 mg, Oral, DAILY, Chelsey Jaquez M.D., 400 mg at 05/16/17 0809  •  morphine ER (MS CONTIN) tablet 30 mg, 30 mg, Oral, Q12HRS, Chelsey Jaquez M.D., 30 mg at 05/16/17 0809  •  oxycodone-acetaminophen (PERCOCET-10)  MG per tablet 1 Tab, 1 Tab, Oral, Q8HRS PRN, Chelsey Jaquez M.D., 1 Tab at 05/15/17 2118  •  thyroid (ARMOUR THYROID) tablet 60 mg, 60 mg, Oral, QAM AC, Chelsey Jaquez M.D., 60 mg at 05/16/17 0552  •  NS infusion 2,844 mL, 30 mL/kg, Intravenous, Once PRN, Chelsey Jaquez M.D.  •  senna-docusate (PERICOLACE or SENOKOT S) 8.6-50 MG per tablet 2 Tab, 2 Tab, Oral, BID, 2 Tab at 05/11/17 1245 **AND** polyethylene glycol/lytes (MIRALAX) PACKET 1 Packet, 1 Packet, Oral, QDAY PRN **AND** magnesium hydroxide (MILK OF MAGNESIA) suspension 30 mL, 30 mL, Oral, QDAY PRN **AND** bisacodyl (DULCOLAX) suppository 10 mg, 10 mg, Rectal, QDAY PRN, Chelsey Jaquez M.D., 10 mg at 05/14/17 2043  •  NS infusion, , Intravenous, Continuous, Chelsey Jaquez M.D., Last Rate: 83 mL/hr at 05/15/17 2115  •  NS (BOLUS) infusion 500 mL, 500 mL, Intravenous, Once PRN, Chelsey Jaquez M.D.  •   enoxaparin (LOVENOX) inj 40 mg, 40 mg, Subcutaneous, DAILY, Chelsey Jaquez M.D., 40 mg at 05/16/17 0810  •  insulin lispro (HUMALOG) injection 2-9 Units, 2-9 Units, Subcutaneous, 4X/DAY ACHS, Chelsey Jaquez M.D., Stopped at 05/11/17 1700  •  Action is required: Protocol 1073 Hypoglycemia has been implemented, , , Once **AND** Protocol 1073 Inclusion Criteria, , , CONTINUOUS **AND** Protocol 1073 NOTIFY, , , Once **AND** Protocol 1073 Initiate protocol immediately if FSBG is less than or equal to 70 mg/dL, , , CONTINUOUS **AND** glucose 4 g chewable tablet 16 g, 16 g, Oral, Q15 MIN PRN **AND** dextrose 50% (D50W) injection 25 mL, 25 mL, Intravenous, Q15 MIN PRN, Chelsey Jaquez M.D.  •  ondansetron (ZOFRAN) syringe/vial injection 4 mg, 4 mg, Intravenous, Q4HRS PRN, Chelsey Jaquez M.D., 4 mg at 05/15/17 1436  •  ondansetron (ZOFRAN ODT) dispertab 4 mg, 4 mg, Oral, Q4HRS PRN, Chelsey Jaquez M.D.  •  promethazine (PHENERGAN) tablet 12.5-25 mg, 12.5-25 mg, Oral, Q4HRS PRN, Chelsey Jaquez M.D.  •  promethazine (PHENERGAN) suppository 12.5-25 mg, 12.5-25 mg, Rectal, Q4HRS PRN, Chelsey Jaquez M.D.  •  prochlorperazine (COMPAZINE) injection 5-10 mg, 5-10 mg, Intravenous, Q4HRS PRN, Chelsey Jaquez M.D.  •  piperacillin-tazobactam (ZOSYN) 3.375 g in  mL IVPB, 3.375 g, Intravenous, Q6HRS, Chelsey Jaquez M.D., Stopped at 05/16/17 0622  •  mesalamine delayed-release (DELZICOL) capsule 400 mg, 400 mg, Oral, TID, Oliva Portillo, PHARMD, 400 mg at 05/16/17 0809  •  fluconazole (DIFLUCAN) tablet 200 mg, 200 mg, Oral, DAILY, Anai Santoro M.D., 200 mg at 05/16/17 0809  •  linezolid (ZYVOX) tablet 600 mg, 600 mg, Oral, Q12HRS, Anai Santoro M.D., 600 mg at 05/16/17 0809  •  zolpidem (AMBIEN) tablet 5 mg, 5 mg, Oral, HS PRN, Chelsey Jaquez M.D., 5 mg at 05/15/17 2015    Labs:  No results for input(s): WBC, RBC, HEMOGLOBIN, HEMATOCRIT, MCV, MCH, RDW, PLATELETCT, MPV, NEUTSPOLYS, LYMPHOCYTES, MONOCYTES, EOSINOPHILS, BASOPHILS, RBCMORPHOLO in the last 72  hours.  No results for input(s): SODIUM, POTASSIUM, CHLORIDE, CO2, GLUCOSE, BUN, CPKTOTAL in the last 72 hours.  No results for input(s): ALBUMIN, TBILIRUBIN, ALKPHOSPHAT, TOTPROTEIN, ALTSGPT, ASTSGOT, CREATININE in the last 72 hours.    Imaging:  Ct-extremity, Lower With Right  4/29/2017  14 cm distal anterior thigh abscess with no CT evidence of septic arthropathy or osteomyelitis Findings were discussed with ANCELMO SCOTT on 4/29/2017 1:12 PM.    Ct-maxillofacial W/o Plus Recons  5/11/2017  Mucous retention cyst in the right maxillary sinus with minimal mucosal thickening in the left maxillary sinus. Leftward deviation of the nasal septum with a nasal septal spur. Right devin bullosa.    Dx-chest-portable (1 View)    5/11/2017  The right PICC line tip is unchanged in appearance with the tip again located at the cavoatrial junction.    5/11/2017 5/11/2017 11:25 AM HISTORY/REASON FOR EXAM: Fever chills and body aches TECHNIQUE/EXAM DESCRIPTION AND NUMBER OF VIEWS: Single portable view of the chest. COMPARISON: 5/2/2017 FINDINGS: There is no evidence of focal consolidation or evidence of pulmonary edema. There is no pleural effusion. The heart is mildly enlarged.     5/11/2017  Mild cardiomegaly.    Ir-picc Line Placement > Age 5    5/2/2017  HISTORY/REASON FOR EXAM:   PICC placement. TECHNIQUE/EXAM DESCRIPTION AND NUMBER OF VIEWS:   PICC line insertion with ultrasound guidance.  The procedure was performed using maximal sterile barrier technique including sterile gown, mask, cap, and donning of sterile gloves following appropriate hand hygiene and/or sterile scrub. Patient skin site was prepped with 2% Chlorhexidine solution. FINDINGS:  PICC line insertion with Ultrasound Guidance was performed by qualified nursing staff without the assistance of a Radiologist.  A follow up chest x-ray will be performed to determine appropriateness of PICC positioning.     5/2/2017           Ultrasound-guided PICC  placement performed by qualified nursing staff as above.     Echocardiogram Comp W/o Cont    5/1/2017  Transthoracic Echo Report Echocardiography Laboratory CONCLUSIONS Normal left ventricular chamber size. Normal left ventricular systolic function. Left ventricular ejection fraction is visually estimated to be 65%. Normal regional wall motion. Mild mitral regurgitation. The left atrium is normal in size. Structurally normal aortic valve without significant stenosis or regurgitation. Right ventricular systolic pressure is estimated to be 50 mmHg. Mild tricuspid regurgitation. Normal inferior vena cava size and inspiratory collapse. Mildly dilated right ventricle. Normal right ventricular systolic function.    Transesophageal Echo W/o Cont    5/3/2017  Transesophageal Echo Report Echocardiography Laboratory CONCLUSIONS No vegetations noted, no evidence of endocarditis or abscess. DEVIN MO Exam Date:          05/03/2017                     Micro:  BLOOD CULTURE   Date Value Ref Range Status   05/11/2017   Preliminary    No Growth    Note: Blood cultures are incubated for 5 days and  are monitored continuously.Positive blood cultures  are called to the RN and reported as soon as  they are identified.       BLOOD CULTURE HOLD   Date Value Ref Range Status   01/04/2017 Collected  Final      Results     Procedure Component Value Units Date/Time    CULTURE WOUND W/ GRAM STAIN [[id number]] Collected:  05/11/17 1455    Order Status:  Completed Specimen Information:  Wound Updated:  05/14/17 0821     Gram Stain Result No organisms seen.      Significant Indicator NEG      Source WND      Site Left Hand      Culture Result Wound --      Result:        Mixed skin yesenia Isolated from enrichment broth only, please  correlate with clinical condition.      CULTURE WOUND W/ GRAM STAIN [[id number]] Collected:  05/11/17 1756    Order Status:  Completed Specimen Information:  Wound from Right Foot Updated:  05/14/17 0810     Gram  "Stain Result No organisms seen.      Significant Indicator NEG      Source WND      Site RIGHT ANKLE      Culture Result Wound No growth at 72 hours.     Narrative:      Collected By:[id number] INNA VALLADARES    Blood Culture [[id number]] Collected:  05/11/17 1110    Order Status:  Completed Specimen Information:  Blood from Peripheral Updated:  05/12/17 0945     Significant Indicator NEG      Source BLD      Site PERIPHERAL      Blood Culture --      Result:        No Growth    Note: Blood cultures are incubated for 5 days and  are monitored continuously.Positive blood cultures  are called to the RN and reported as soon as  they are identified.      Narrative:      From different peripheral sites, if not done within the last  24 hours (Per Hospital Policy: Only change specimen source to  \"Line\" if specified by physician order)    GRAM STAIN [[id number]] Collected:  05/11/17 1756    Order Status:  Completed Specimen Information:  Wound Updated:  05/12/17 0736     Significant Indicator .      Source WND      Site RIGHT ANKLE      Gram Stain Result No organisms seen.     Narrative:      Collected By:[id number] INNA VALLADARES    GRAM STAIN [[id number]] Collected:  05/11/17 1455    Order Status:  Completed Specimen Information:  Wound Updated:  05/12/17 0731     Significant Indicator .      Source WND      Site Left Hand      Gram Stain Result No organisms seen.     Blood Culture [[id number]] Collected:  05/11/17 0000    Order Status:  Canceled Specimen Information:  Other from Peripheral     Narrative:      TEST Blood Culture WAS CANCELLED, 05/15/17 01:05 Test autocancelled, not  collected or received  From different peripheral sites, if not done within the last  24 hours (Per Hospital Policy: Only change specimen source to  \"Line\" if specified by physician order)    Culture Respiratory W/ GRM STN [[id number]] Collected:  05/11/17 0000    Order Status:  Canceled Specimen Information:  Sputum from Sputum     Narrative:   "    TEST Respiratory Culture w/ GS WAS CANCELLED, 05/15/17 01:05 Test  autocancelled, not collected or received  Sputum cultures, induced if needed. If not done within the  last 24 hours    Urine Culture [[id number]] Collected:  05/11/17 0000    Order Status:  Canceled Specimen Information:  Other from Urine, Clean Catch     Narrative:      TEST Urine Culture WAS CANCELLED, 05/15/17 01:05 Test autocancelled, not  collected or received  Indication for culture:->Suspected Sepsis            Assessment:  Active Hospital Problems    Diagnosis   • Fever [R50.9]       Plan:  Right thigh abscess  Status post I&D on 4/29/17  Cultures were Klebsiella pneumonia, MRSA and Bacteroides fragilis and heavy growth viridans strep  Continue Zosyn and Zyvox.   Continue wound care  May be candidate for home IV abx if covered by insurance (Zosyn by infusion pump)  Stop date of zosyn and linezolid is 05/19/17 then follow with PO augmentin x 2 weeks  IV abx orders for zosyn continuous infusion given to MSW 5/15  Please provide Rx for augmentin for pt as pt will not likely be able to follow up in the ID clinic prior to 5/19/17.    Right ankle and left hand abscess  Status post I&D on 5/1/17  Improving  Continue wound care     Leukopenia  Monitor  WBC 4.3 at last check on 5/12  Will dc linezolid if drops below 3    Abdominal wall infection  Status post treatment  Covered by above abx  Continue wound care-VAC off and simple dressing    Ulcerative colitis  Treatment per GI  Worry about these being extraintestinal manifestations of ulcerative colitis  Advised to get a rheumatology consultation as an outpatient    Dispo: Home with IV abx today. Pt should be discharged on IV zosyn and PO linezolid. Please provide Rx for 2 weeks of Augmentin.    FU ID clinic    Discussed with RN at bedside. ID will sign off. Please re-consult if needed.

## 2017-05-16 NOTE — CARE PLAN
Problem: Safety  Goal: Will remain free from injury  Outcome: PROGRESSING AS EXPECTED  Safety measures maintained    Problem: Knowledge Deficit  Goal: Knowledge of disease process/condition, treatment plan, diagnostic tests, and medications will improve  Outcome: PROGRESSING AS EXPECTED  Pt educated on antibiotic therapy for discharge

## 2017-05-16 NOTE — WOUND TEAM
[facility]  Inpatient Services  Wound and Skin Care Progress Note    Admission Date:   05/11/2017.  HPI, PMH, SH: Reviewed  Unit where seen by Wound Team: T8    WOUND CONSULT RELATED TO: Scheduled Negative Pressure Wound Therapy (NPWT) dressing change.    SUBJECTIVE:  Pleasant/agreeable    Self Report / Pain Level:  Remains painful despite pre-med    OBJECTIVE:    Prev npwt drsg remained intact    WOUND TYPE, LOCATION, CHARACTERISTICS (Pressure ulcers: location, stage, POA or date identified)    Location and type of wound: Right lateral thigh: Open complicated surgical.        Periwound:     intact  Drainage:     Scant, serosanguinous.  Tissue Type and %:    100% pink/red.  Wound Edges:    Attached.  Odor:      None.  Exposed structure(s):  Muscle.  S&S of Infection:   None.      Measurements:   05/12/2017  Length:     15.3 cm  Width:      3.6 cm  Depth:    1.2 cm      INTERVENTIONS BY WOUND TEAM:  Prev drsg removed -- wnd irrigated with wnd cleanser -- benzoin and drape skylar-wnd -- adaptic to cover the wnd bed -- 1 piece black foam to fill the wnd plus a button -- sealed with drape and neg pressure applied at 125mmhg/dpc    Interdisciplinary consultation: Patient, nursing, sig other    EVALUATION:  wnd remains clean with 100% granulation; should make good progress    Factors affecting wound healing: Type 2 diabetes, Infection.    Goals: wound size will decrease by 5% per week.     NURSING PLAN OF CARE ORDERS (x):    Dressing changes: See Dressing Maintenance orders: x  Skin care: See Skin Care orders: x  Rectal tube care: See Rectal Tube Care orders:   Other orders:    WOUND TEAM PLAN OF CARE (x):   NPWT change 3 x week:  x      Dressing changes by wound team:       Follow up as needed:     x  Other (explain):    Anticipated discharge plans (x):  SNF:           Home Care:      x   Outpatient Wound Center:            Self Care:            Other:

## 2017-05-17 ENCOUNTER — PATIENT OUTREACH (OUTPATIENT)
Dept: HEALTH INFORMATION MANAGEMENT | Facility: OTHER | Age: 52
End: 2017-05-17

## 2017-05-17 ENCOUNTER — HOME CARE VISIT (OUTPATIENT)
Dept: HOME HEALTH SERVICES | Facility: HOME HEALTHCARE | Age: 52
End: 2017-05-17
Payer: COMMERCIAL

## 2017-05-17 VITALS
RESPIRATION RATE: 16 BRPM | DIASTOLIC BLOOD PRESSURE: 60 MMHG | TEMPERATURE: 97.5 F | HEART RATE: 78 BPM | SYSTOLIC BLOOD PRESSURE: 110 MMHG

## 2017-05-17 PROCEDURE — G0299 HHS/HOSPICE OF RN EA 15 MIN: HCPCS

## 2017-05-17 ASSESSMENT — ENCOUNTER SYMPTOMS: DEBILITATING PAIN: 1

## 2017-05-17 NOTE — ADDENDUM NOTE
Encounter addended by: Octavia Lui R.N. on: 5/17/2017  2:07 PM<BR>     Documentation filed: Utilization Review, Inpatient Document Flowsheet

## 2017-05-18 NOTE — DISCHARGE SUMMARY
ATTENDING:  [facility] hospitalist.    CONSULTS:  Infectious disease, Aani Santoro MD    PRIMARY CARE PROVIDER:  YAIR Mohan    DATE OF ADMISSION:  05/11/2017    DATE OF DISCHARGE:  05/16/2017    DISCHARGE DIAGNOSES:  1.  Right thigh abscess.  2.  Ulcerative colitis.  3.  Hypertension.  4.  Diabetes mellitus.    IMAGING:  There was a chest x-ray on May 11 that showed mild cardiomegaly.    There was a CT maxillofacial on May 11 that showed a mucous retention cyst in   the right maxillary sinus with minimal mucosal thickening in the left   maxillary sinus.    PROCEDURES:  None.    REASON FOR HOSPITALIZATION AND HOSPITAL COURSE:  Please see previously   dictated H and P for full medical history as well as reason for   hospitalization.  In short, this is a 52-year-old male who presented to the   emergency department due to right thigh wound.  Patient had had a necrotizing   myositis of the right thigh on a previous admission, did have an incision and   drainage.  Patient did present with infection at the wound site.  Infectious   disease has seen the patient in the past and was consulted for this admission   too.  Patient previously was discharged home on Diflucan, Zyvox, ertapenem and   Flagyl.  Patient states he was doing well until the morning of admission when   he began having a fever.  Patient was seen by infectious disease, he was on   IV Zosyn and Zyvox, Zyvox to be switched to oral, stop date for Zyvox as well   as Zosyn is 05/19/2017, then transitioning to p.o. Augmentin with a stop date   of 05/24/2017.  Patient did have a mild leukopenia, anemia with no sign of   gross bleeding, no significant lab abnormalities otherwise.  Patient's culture   from his wounds were negative.  At this point in time, patient is now close   to his baseline, does have a wound VAC now set up, does have antibiotics   written.  Patient will follow up with primary care as well as infectious   disease as an  outpatient.    DISPOSITION:  Discharge home.    CONDITION:  Stable.    MEDICATIONS:  Please see medical records for full medication list.  1.  Zofran 4 mg p.r.n.  2.  Lantus 42 units every day.  3.  Eagle thyroid 60 mg daily.  4.  Vitamin D3 5000 units daily.  5.  Insulin 2-20 units 3 times a day.  6.  Wellbutrin 300 mg daily.  7.  Mesalamine 1.2 g b.i.d.  8.  Augmentin 875/125 mg b.i.d. starting on 05/19/2017.  9.  Zosyn 3.375 g IV q. 6 hours, stop date 05/19/2017.  10.  Diflucan 100 mg 2 tabs every day.  11.  Zyvox 600 mg b.i.d., stop date 5/19/2017.  12.  MS Contin 30 mg q. 12h.  13.  Percocet 10/325 q. 8 hours p.r.n.    INSTRUCTIONS:  Patient was told to follow up with his primary care provider   within 1 week.  He was also told to follow up with infectious disease as   scheduled.  The patient was also told to follow up with surgery as scheduled.    The patient was told if emergency arises, he is to call 911 or visit the   emergency department.  Patient agreed and understood.  All questions were   answered and patient expressed the desire to leave the hospital.    TIME SPENT ON DISCHARGE:  Thirty seven minutes.       ____________________________________     DO IRAM MATUTE / STORM    DD:  05/17/2017 07:39:58  DT:  05/17/2017 17:17:10    D#:  [id number]  Job#:  [id number]    cc: JUSTO [email or web address]

## 2017-05-19 ENCOUNTER — HOME CARE VISIT (OUTPATIENT)
Dept: HOME HEALTH SERVICES | Facility: HOME HEALTHCARE | Age: 52
End: 2017-05-19
Payer: COMMERCIAL

## 2017-05-19 PROCEDURE — G0300 HHS/HOSPICE OF LPN EA 15 MIN: HCPCS

## 2017-05-19 ASSESSMENT — ACTIVITIES OF DAILY LIVING (ADL): OASIS_M1830: 05

## 2017-05-20 VITALS
DIASTOLIC BLOOD PRESSURE: 60 MMHG | RESPIRATION RATE: 18 BRPM | TEMPERATURE: 98.4 F | SYSTOLIC BLOOD PRESSURE: 110 MMHG | HEART RATE: 94 BPM

## 2017-05-20 ASSESSMENT — ACTIVITIES OF DAILY LIVING (ADL)
TOILETING_ASSISTANCE: 0
HOUSEKEEPING_ASSISTANCE: 5
EATING_ASSISTANCE: 0
LAUNDRY_ASSISTANCE: 5
ADLS_COMMENTS: <!--EPICS-->PT HAS FAMILY TO HELP WITH ADL'S AS NEEDED.<!--EPICE-->
BATHING_ASSISTANCE: 5
ORAL_CARE_ASSISTANCE: 0
DRESSING_UB_ASSISTANCE: 0
IADLS_COMMENTS: <!--EPICS-->PT HAS FAMILY TO HELP WITH IADL'S AS NEEDED.<!--EPICE-->
TELEPHONE_ASSISTANCE: 0
SHOPPING_ASSISTANCE: 5
DRESSING_LB_ASSISTANCE: 4
MEAL_PREP_ASSISTANCE: 5
TRANSPORTATION_ASSISTANCE: 6
GROOMING_ASSISTANCE: 0

## 2017-05-20 ASSESSMENT — ENCOUNTER SYMPTOMS: MENTAL STATUS CHANGE: 0

## 2017-05-22 ENCOUNTER — HOME CARE VISIT (OUTPATIENT)
Dept: HOME HEALTH SERVICES | Facility: HOME HEALTHCARE | Age: 52
End: 2017-05-22
Payer: COMMERCIAL

## 2017-05-22 PROCEDURE — A6212 FOAM DRG <=16 SQ IN W/BORDER: HCPCS

## 2017-05-22 PROCEDURE — A4456 ADHESIVE REMOVER, WIPES: HCPCS

## 2017-05-22 PROCEDURE — A5120 SKIN BARRIER, WIPE OR SWAB: HCPCS

## 2017-05-22 PROCEDURE — G0299 HHS/HOSPICE OF RN EA 15 MIN: HCPCS

## 2017-05-23 VITALS
RESPIRATION RATE: 20 BRPM | SYSTOLIC BLOOD PRESSURE: 126 MMHG | TEMPERATURE: 96.7 F | HEART RATE: 92 BPM | DIASTOLIC BLOOD PRESSURE: 80 MMHG

## 2017-05-23 ASSESSMENT — ENCOUNTER SYMPTOMS: DEBILITATING PAIN: 1

## 2017-05-24 ENCOUNTER — HOME CARE VISIT (OUTPATIENT)
Dept: HOME HEALTH SERVICES | Facility: HOME HEALTHCARE | Age: 52
End: 2017-05-24
Payer: COMMERCIAL

## 2017-05-24 VITALS
DIASTOLIC BLOOD PRESSURE: 66 MMHG | SYSTOLIC BLOOD PRESSURE: 130 MMHG | RESPIRATION RATE: 18 BRPM | TEMPERATURE: 97.9 F | HEART RATE: 90 BPM

## 2017-05-24 PROCEDURE — G0300 HHS/HOSPICE OF LPN EA 15 MIN: HCPCS

## 2017-05-24 ASSESSMENT — ENCOUNTER SYMPTOMS: RESPIRATORY SYMPTOMS COMMENTS: NO SOB NOTED OR REPORTED.

## 2017-05-24 ASSESSMENT — ACTIVITIES OF DAILY LIVING (ADL)
GROOMING_ASSISTANCE: 0
EATING_ASSISTANCE: 0
ORAL_CARE_ASSISTANCE: 0
MEAL_PREP_ASSISTANCE: 5
SHOPPING_ASSISTANCE: 5
LAUNDRY_ASSISTANCE: 5
DRESSING_UB_ASSISTANCE: 0
HOUSEKEEPING_ASSISTANCE: 5
TELEPHONE_ASSISTANCE: 0
TRANSPORTATION_ASSISTANCE: 6
BATHING_ASSISTANCE: 4
TOILETING_ASSISTANCE: 0
DRESSING_LB_ASSISTANCE: 0

## 2017-05-26 ENCOUNTER — OFFICE VISIT (OUTPATIENT)
Dept: INFECTIOUS DISEASES | Facility: MEDICAL CENTER | Age: 52
End: 2017-05-26
Payer: COMMERCIAL

## 2017-05-26 ENCOUNTER — HOME CARE VISIT (OUTPATIENT)
Dept: HOME HEALTH SERVICES | Facility: HOME HEALTHCARE | Age: 52
End: 2017-05-26
Payer: COMMERCIAL

## 2017-05-26 VITALS
DIASTOLIC BLOOD PRESSURE: 78 MMHG | HEART RATE: 100 BPM | OXYGEN SATURATION: 96 % | TEMPERATURE: 96.5 F | BODY MASS INDEX: 32.43 KG/M2 | SYSTOLIC BLOOD PRESSURE: 122 MMHG | HEIGHT: 68 IN | WEIGHT: 214 LBS

## 2017-05-26 DIAGNOSIS — L02.415 ABSCESS OF RIGHT THIGH: ICD-10-CM

## 2017-05-26 DIAGNOSIS — L02.419 ANKLE ABSCESS: ICD-10-CM

## 2017-05-26 DIAGNOSIS — L02.512 ABSCESS OF LEFT HAND: ICD-10-CM

## 2017-05-26 PROCEDURE — 99215 OFFICE O/P EST HI 40 MIN: CPT | Performed by: NURSE PRACTITIONER

## 2017-05-26 PROCEDURE — G0299 HHS/HOSPICE OF RN EA 15 MIN: HCPCS

## 2017-05-26 PROCEDURE — A4456 ADHESIVE REMOVER, WIPES: HCPCS

## 2017-05-26 PROCEDURE — A6212 FOAM DRG <=16 SQ IN W/BORDER: HCPCS

## 2017-05-26 PROCEDURE — A5120 SKIN BARRIER, WIPE OR SWAB: HCPCS

## 2017-05-26 PROCEDURE — A4216 STERILE WATER/SALINE, 10 ML: HCPCS

## 2017-05-26 RX ORDER — LINEZOLID 600 MG/1
600 TABLET, FILM COATED ORAL 2 TIMES DAILY
COMMUNITY
End: 2017-05-31

## 2017-05-26 RX ORDER — METRONIDAZOLE 500 MG/1
500 TABLET ORAL 3 TIMES DAILY
COMMUNITY
End: 2017-05-31

## 2017-05-26 NOTE — MR AVS SNAPSHOT
"        Po Richards   2017 10:30 AM   Office Visit   MRN: [id number]    Department:  [facility]   Dept Phone:  [phone]    Description:  Male : 1965   Provider:  KRISSY Cano           Reason for Visit     Follow-Up HF Right thigh wound      Allergies as of 2017     Allergen Noted Reactions    Peanut-Derived 06/15/2015   Anaphylaxis    Peanuts   RXN=age 36    Tradjenta [Linagliptin] 2017   Unspecified    Pt developed pancreatitis    Hydrocodone 2017   Hives    Tolerates Morphine and oxycodone  Rxn Age - 29      You were diagnosed with     Abscess   [2049]         Vital Signs     Blood Pressure Pulse Temperature Height Weight Body Mass Index    122/78 mmHg 100 35.8 °C (96.5 °F) 1.727 m (5' 7.99\") 97.07 kg (214 lb) 32.55 kg/m2    Oxygen Saturation Smoking Status                96% Never Smoker           Basic Information     Date Of Birth Sex Race Ethnicity Preferred Language    1965 Male White Non- English      Your appointments     May 26, 2017  1:00 PM   SN-HH-HOME VISIT with Agustin Guerra L.P.N.   [facility] (--)    [facility] [facility]  [facility] [address]   [phone]            May 29, 2017 To Be Determined   SN-HH-OASIS D/C+LPN/HHA SUP with Risa Diggs R.N.   [facility] (--)    [address]  [facility] [address]   [phone]            May 31, 2017 To Be Determined   SN-HH-HOME VISIT with Agustin Guerra L.P.N.   [facility] (--)    [address]  [facility] [address]   [phone]            2017 To Be Determined   SN-HH-HOME VISIT with Agustin Guerra L.P.N.   [facility] (--)    [address]  [facility] [address]   [phone]            2017 To Be Determined   SN-HH-HOME VISIT + LPN SUP [facility] ([facility]    [address] [address]   [phone]            2017 To Be Determined   [facility]    [address]  [place] [address]   [phone]            Jun 09, 2017 To Be Determined   SN-HH-HOME VISIT with Agustin Guerra L.P.N.   [facility] (--)    [address]  [place] [address]   [phone]            Jun 12, 2017 To Be Determined   SN-HH-HOME VISIT with Agustin Guerra L.P.N.   [facility] (--)    [address]  [place] [address]   [phone]            Jun 14, 2017 To Be Determined   SN-HH-HOME VISIT with Agustin Guerra L.P.N.   [facility] (--)    [address]  [place] [address]   [phone]            Jun 16, 2017 To Be Determined   SN-HH-HOME VISIT with Agustin Guerra L.P.N.   [facility] (--)    [address]  [place] [address]   [phone]            Jun 19, 2017 To Be Determined   SN-HH-HOME VISIT with Agustin Guerra L.P.N.   [facility] (--)    [address]  [place] [address]   [phone]            Jun 21, 2017 To Be Determined   SN-HH-HOME VISIT with Agustin Guerra L.P.N.   [facility] (--)    [address]  [place] [address]   [phone]            Jun 23, 2017 To Be Determined   SN-HH-HOME VISIT with Agustin Guerra L.P.N.   [facility] (--)    [address]  [place] [address]   [phone]            Jun 26, 2017 To Be Determined   SN-HH-HOME VISIT with Agustin Guerra L.P.N.   [facility] (--)    [address]  [place] [address]   [phone]            Jun 28, 2017 To Be Determined   [facility]    [facility] [address] [facility].  [facility] [address]   [phone]            Jun 30, 2017 To Be Determined   SN-HH-HOME VISIT + LPN SUP with Risa Diggs R.N.   [facility] (--)    [facility] [address] [facility].  [facility] [address]   [phone]            Jul 03, 2017 To Be Determined   SN-HH-HOME VISIT with Agustin Guerra L.P.N.   [facility] (--)    [facility] [address] [facility].  [facility] [address]   [phone]            Jul 05, 2017 To Be  Determined   SN-HH-HOME VISIT with Agustin Guerra L.P.N.   [facility] (--)    [address] [facility].  [place] [address]   [phone]            Jul 07, 2017 To Be Determined   SN-HH-HOME VISIT + LPN SUP with Risa Diggs R.N.   [facility] (--)    [address]  [place] [address]   [phone]              Problem List              ICD-10-CM Priority Class Noted - Resolved    Elevated liver enzymes R74.8   11/26/2014 - Present    Hypothyroid E03.9   11/26/2014 - Present    Depression F32.9   11/26/2014 - Present    Chest pain R07.9   6/15/2015 - Present    Gout M10.9   6/15/2015 - Present    GERD (gastroesophageal reflux disease) K21.9   6/15/2015 - Present    Ulcerative colitis (CMS-HCC) K51.90   6/15/2015 - Present    Sepsis (CMS-HCC) A41.9 High  1/21/2016 - Present    Essential hypertension I10   1/22/2016 - Present    Type 2 diabetes mellitus (CMS-HCC) E11.9 Medium  1/22/2016 - Present    Tachycardia R00.0 Low  2/24/2016 - Present    Leukocytosis D72.829 Low  2/24/2016 - Present    HHNC (hyperglycemic hyperosmolar nonketotic coma) (CMS-HCC) E11.01 High  2/24/2016 - Present    ADRIANE (acute kidney injury) (CMS-HCC) N17.9 High  2/24/2016 - Present    Hypernatremia E87.0 Low  2/25/2016 - Present    Chronic ulcerative colitis (CMS-HCC) K51.90 Low  2/25/2016 - Present    Encephalopathy G93.40 High  2/25/2016 - Present    Elevated lipase R74.8 Medium  2/25/2016 - Present    Pancreatitis K85.90   3/7/2016 - Present    Serum phosphate elevated E83.39   3/7/2016 - Present    Altered mental status R41.82   3/7/2016 - Present    Incarcerated umbilical hernia K42.0   11/6/2016 - Present    Back pain M54.9   1/16/2017 - Present    Hyperglycemia due to type 2 diabetes mellitus (CMS-HCC) E11.65   1/16/2017 - Present    Subcutaneous abscess L02.91   3/10/2017 - Present    DKA (diabetic ketoacidoses) (CMS-HCC) E13.10   4/6/2017 - Present    Normocytic anemia D64.9   4/7/2017 - Present    Transaminitis R74.0    4/7/2017 - Present    Complicated wound infection (CMS-HCC) T79.8XXA   4/19/2017 - Present    Severe sepsis (CMS-HCC) A41.9, R65.20 High  4/29/2017 - Present    Bacteremia due to Gram-negative bacteria R78.81   4/30/2017 - Present    Abscess of right thigh L02.415 High  5/1/2017 - Present    Septic shock (CMS-HCC) A41.9, R65.21   5/1/2017 - Present    Type 2 diabetes mellitus without complication, with long-term current use of insulin (CMS-HCC) E11.9, Z79.4 Low  5/1/2017 - Present    Bacteremia R78.81   5/1/2017 - Present      Health Maintenance        Date Due Completion Dates    IMM HEP B VACCINE (1 of 3 - Primary Series) 1965 ---    DIABETES MONOFILAMENT / LE EXAM 1965 ---    RETINAL SCREENING 5/9/1983 ---    URINE ACR / MICROALBUMIN 5/9/1983 ---    IMM DTaP/Tdap/Td Vaccine (1 - Tdap) 5/9/1984 ---    A1C SCREENING 10/30/2017 4/30/2017, 1/16/2017, 1/4/2017, 9/19/2016, 5/12/2016, 3/9/2016, 2/25/2016, 11/4/2015    FASTING LIPID PROFILE 4/7/2018 4/7/2017, 5/12/2016, 3/9/2016, 11/4/2015, 2/26/2013, 11/27/2010, 2/14/2010    SERUM CREATININE 5/12/2018 5/12/2017, 5/11/2017, 5/1/2017, 4/30/2017, 4/29/2017, 4/25/2017, 4/21/2017, 4/20/2017, 4/18/2017, 4/17/2017, 4/10/2017, 4/9/2017, 4/8/2017, 4/7/2017, 4/6/2017, 4/6/2017, 3/24/2017, 3/21/2017, 3/10/2017, 2/19/2017, 1/18/2017, 1/17/2017, 1/16/2017, 1/13/2017, 1/5/2017, 1/4/2017, 1/4/2017, 11/14/2016, 11/12/2016, 11/6/2016, 10/25/2016, 9/19/2016, 5/12/2016, 3/10/2016, 3/9/2016, 3/8/2016, 3/7/2016, 3/6/2016, 3/6/2016, 3/5/2016, 3/4/2016, 3/3/2016, 3/2/2016, 3/1/2016, 2/29/2016, 2/28/2016, 2/28/2016, 2/28/2016, 2/28/2016, 2/27/2016, 2/27/2016, 2/27/2016, 2/27/2016, 2/27/2016, 2/27/2016, 2/26/2016, 2/26/2016, 2/26/2016, 2/26/2016, 2/26/2016, 2/26/2016, 2/26/2016, 2/25/2016, 2/25/2016, 2/25/2016, 2/25/2016, 2/25/2016, 2/25/2016, 2/25/2016, 2/25/2016, 2/24/2016, 1/23/2016, 1/21/2016, 11/4/2015, 6/15/2015, 12/10/2014, 11/30/2014, 11/29/2014, 11/28/2014, 11/27/2014,  11/26/2014, 11/26/2014, 11/25/2014, 2/25/2013, 6/25/2012, 11/27/2010, 2/18/2008, 10/17/2006    COLONOSCOPY 11/26/2024 11/26/2014            Current Immunizations     Influenza Vaccine Quad Inj (Pf) 10/25/2016, 10/5/2015 10:02 AM    Pneumococcal polysaccharide vaccine (PPSV-23) 11/1/2009      Below and/or attached are the medications your provider expects you to take. Review all of your home medications and newly ordered medications with your provider and/or pharmacist. Follow medication instructions as directed by your provider and/or pharmacist. Please keep your medication list with you and share with your provider. Update the information when medications are discontinued, doses are changed, or new medications (including over-the-counter products) are added; and carry medication information at all times in the event of emergency situations     Allergies:  PEANUT-DERIVED - Anaphylaxis     TRADJENTA - Unspecified     HYDROCODONE - Hives               Medications  Valid as of: May 26, 2017 - 11:40 AM    Generic Name Brand Name Tablet Size Instructions for use    Amoxicillin-Pot Clavulanate (Tab) AUGMENTIN 875-125 MG Take 1 Tab by mouth 2 times a day for 14 days.        BuPROPion HCl (TABLET SR 24 HR) WELLBUTRIN  MG Take 300 mg by mouth every morning.        Cholecalciferol (Tab) Vitamin D3 5000 UNITS Take 5,000 Units by mouth every day.        Fluconazole (Tab) DIFLUCAN 100 MG Take 2 Tabs by mouth every day.        Heparin Lock Flush   5 mL by Intravenous route as needed (Heparin lock after IV infusion).        Insulin Glargine (Solution Pen-injector) Insulin Glargine 300 UNIT/ML Inject 42 Units as instructed every day. Adjusts based on blood sugar.        Insulin Lispro (Solution) HUMALOG 100 UNIT/ML Inject 2-20 Units as instructed 3 times a day before meals. Sliding Scale - 2 units every 50 > 150        Linezolid (Tab) ZYVOX 600 MG Take 600 mg by mouth 2 times a day. 14 day course started 5/9/17          Linezolid (Tab) ZYVOX 600 MG Take 600 mg by mouth 2 times a day.        Mesalamine (Tablet Delayed Response) LIALDA 1.2 GM Take 1.2 g by mouth 2 times a day.        MetroNIDAZOLE (Tab) FLAGYL 500 MG Take 500 mg by mouth 3 times a day.        Morphine Sulfate (Tab CR) MS CONTIN 30 MG Take 1 Tab by mouth every 12 hours.        NS SOLN 100 mL with piperacillin-tazobactam 3.375 (3-0.375) G SOLR 3.375 g   3.375 g by Intravenous route every 6 hours.        Ondansetron (TABLET DISPERSIBLE) ZOFRAN ODT 4 MG Take 4 mg by mouth every four hours as needed for Nausea/Vomiting.        Oxycodone-Acetaminophen (Tab) PERCOCET-10  MG Take 1 Tab by mouth every 8 hours as needed for Severe Pain.        Sodium Chloride Flush   10 mL by Intravenous route as needed (Before and after IV medications, and as needed).        Thyroid (Tab) ARMOUR THYROID 60 MG Take 60 mg by mouth every day.        .                 Medicines prescribed today were sent to:     [place] [id number] [facility]    [address] [facility] [address]    Phone: [phone] Fax: [phone]    Open 24 Hours?: No      Medication refill instructions:       If your prescription bottle indicates you have medication refills left, it is not necessary to call your provider’s office. Please contact your pharmacy and they will refill your medication.    If your prescription bottle indicates you do not have any refills left, you may request refills at any time through one of the following ways: The online [employer] system (except Urgent Care), by calling your provider’s office, or by asking your pharmacy to contact your provider’s office with a refill request. Medication refills are processed only during regular business hours and may not be available until the next business day. Your provider may request additional information or to have a follow-up visit with you prior to refilling your medication.   *Please Note: Medication refills are  assigned a new Rx number when refilled electronically. Your pharmacy may indicate that no refills were authorized even though a new prescription for the same medication is available at the pharmacy. Please request the medicine by name with the pharmacy before contacting your provider for a refill.        Your To Do List     Future Labs/Procedures Complete By Expires    CBC WITH DIFFERENTIAL  As directed 5/26/2018    COMP METABOLIC PANEL  As directed 5/26/2018         MeinProspekt Access Code: [email or web address]  Expires: 6/24/2017  4:05 AM    MeinProspekt  A secure, online tool to manage your health information     [employer]’s MeinProspekt® is a secure, online tool that connects you to your personalized health information from the privacy of your home -- day or night - making it very easy for you to manage your healthcare. Once the activation process is completed, you can even access your medical information using the MeinProspekt princess, which is available for free in the Apple Princess store or Google Play store.     MeinProspekt provides the following levels of access (as shown below):   My Chart Features   [facility] Doctor [facility]  Specialists [facility]  Urgent  Care [facility]  Primary Care  Doctor   Email your healthcare team securely and privately 24/7 X X X    Manage appointments: schedule your next appointment; view details of past/upcoming appointments X      Request prescription refills. X      View recent personal medical records, including lab and immunizations X X X X   View health record, including health history, allergies, medications X X X X   Read reports about your outpatient visits, procedures, consult and ER notes X X X X   See your discharge summary, which is a recap of your hospital and/or ER visit that includes your diagnosis, lab results, and care plan. X X       How to register for MeinProspekt:  1. Go to  [email or web address].  2. Click on the Sign Up Now box, which takes you to the New Member Sign Up page. You  will need to provide the following information:  a. Enter your [employer] Access Code exactly as it appears at the top of this page. (You will not need to use this code after you’ve completed the sign-up process. If you do not sign up before the expiration date, you must request a new code.)   b. Enter your date of birth.   c. Enter your home email address.   d. Click Submit, and follow the next screen’s instructions.  3. Create a Voodle [employer] Memories in Motiont ID. This will be your GluMetrics login ID and cannot be changed, so think of one that is secure and easy to remember.  4. Create a [employer] password. You can change your password at any time.  5. Enter your Password Reset Question and Answer. This can be used at a later time if you forget your password.   6. Enter your e-mail address. This allows you to receive e-mail notifications when new information is available in Glu[employer].  7. Click Sign Up. You can now view your health information.    For assistance activating your GluMetrics account, call [phone]

## 2017-05-26 NOTE — PROGRESS NOTES
"Subjective:     Chief Complaint   Patient presents with   • Follow-Up     HF Right thigh wound     Infectious Disease clinic follow up  Po Richards 52 y.o.male in clinic today for evaluation and management of multiple abscesses/wounds. Primary care provider: KRISSY Brunner. This is my first time meeting Mr Richards (allias used per pts request).  Pt accompanied today by his Fiance, Jeana.  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  Hx of DM and ulcerative colitis.  He reports that BS are well controlled stating that this am his BS was 149.  Last A1C 8.9. Pt is known to the inpatient ID service due to multiple previous admissions.  This is his first time following up outpatient for multiple admissions dating back to 4/6/17.     Interval History: Hospitalized 4/6/17-4/10/17 due to intra-abdominal infection.  S/p umblical hernia repair 11/6/16. Removal of mesh 3/10/17.  I&D 4/6-imaging for fistula neg. C/s +Enterococcus faecalis and Klebsiella. Discharged on Daptomycin and ceftriaxone for outpatient infusion.  Wound vac to abdominal wound. Stop date IV abx 4/20/2017 4/18/17-4/21/17- returned to the hospital with diffuse cramping in his arms and legs. Magnesium level was low at 1.3. Lactic acid was elevated at 4.9. CPK normal. Daptomycin and ceftriaxone stopped and patient transitioned to zosyn.  Completed antibiotics while in the hospital.     4/29/17-5/8/17- he returned to the hospital with a right  thigh abscess, septic shock d/t testosterone injection. He also had erythema and swelling consistent with abscess to his Left wrist which he states was due to a lab draw and his Right ankle which he states was due to \"bumping in to something.\"  S/p I&D on 4/29/2017 with Dr. Reyes.  OR cx +MRSA, Kleb pneumoniae, Strep Viridans, B fragilis & Yeast  Discharged home on IV Ertapenem 1 g daily, in addition to PO Linezolid and Fluconazole. end date 5/18/17.    5/11/17-5/16/17- returned to the hospital with n/v, fever. " "Discharged home on Iv Zosyn and PO Zyvox. End date 5/19/17, followed by 2 weeks PO Augmentin. Script for Augmentin was provided as there was concern that patient would not follow up with ID prior to 5/19/17.     Significant time spent reviewing hospital records with patient and his fiance.     Today 5/26/17: Patient reports feeling well. He states that he did not stop the IV Zosyn until 5/22/17.  He has continued to take PO Zyvox and restarted taking PO flagyl (which he had at some point prior).  Pt did not start Augmentin, stating he never received a prescription. Pt stating that the wounds are healing well. He is very happy with the healing of the abscesses/I&D sites. Pt being followed by the [facility] for wound vac changes and PICC care. Wound pictures reviewed in EPIC. Denies drainage, pungent odor, redness, pain. Denies feeling generally ill, fevers/chills,  Headache. He reports some mild fatigue and mild nausea. Denies abdominal pain, cramping, vomiting, diarrhea. It is unclear why, but home health has not yet removed the patients PICC line and he is requesting that it not be removed.  He states he is concerned because in the past it was taken out and then needed to be replaced.     Past Medical History   Diagnosis Date   • Migraine    • Gout    • Indigestion    • UC (ulcerative colitis) (CMS-HCC) 3-3-17     \"Five BM's per day, takes Lialda\"   • Difficult intubation 2-2016   • Arthritis 3-3-17     \"Spine\"   • Sepsis (CMS-HCC) 1/21/2016   • Essential hypertension 1/22/2016   • Snoring 3-3-17     Has not had a sleep study   • High cholesterol 3-3-17     Does not currently take medication for   • Congestive heart failure (CMS-HCC) 2-2016      3-3-17 \"Not a current issue\"   • ASTHMA 3-3-17     \"Hasn't had to use inhaler in 2 years\"   • Aspiration pneumonia (CMS-HCC) 3-2016   • Breath shortness 3-3-17     \"With Exercise\"   • Hypothyroid 3-3-17     Takes [place] Thyroid   • Pain 3-3-17     \"Left Flank\"   • " "Heart burn    • Depression 11/26/2014   • Anesthesia      \"Was difficult to intubate 2-2016\"   • Pancreatitis 2-2016     \"D/T Tradjenta was hospitialized for 15 days\"   • Elevated liver enzymes 2-2016   • Electrolyte imbalance 2-2016   • Kidney stones    • ADRIANE (acute kidney injury) (CMS-HCC) 2/24/2016   • RF (renal failure) 2-2016   • Diabetes (CMS-HCC) 3-3-17     Takes Insulin   • DKA (diabetic ketoacidoses) (CMS-HCC) 2-2016     \"10 days on vent with DKA, Renal failure, CHF, elevated liver enzymes and electrolyte imbalance\"   • On mechanically assisted ventilation (CMS-HCC) 2-2016     HX \"On Vent for 10 days at [facility]\".  \"DX Pancreatitis, DKA, CHF, Elevated Liver Enzymes & Electrolyte Imbalance\".       Allergies: Peanut-derived; Tradjenta; and Hydrocodone    Current medications and problem list reviewed with patient and updated in EPIC.    ROS  As documented above in my HPI       Objective:     PE:  /78 mmHg  Pulse 100  Temp(Src) 35.8 °C (96.5 °F)  Ht 1.727 m (5' 7.99\")  Wt 97.07 kg (214 lb)  BMI 32.55 kg/m2  SpO2 96%     Vital signs reviewed  Constitutional: patient is oriented to person, place, and time. He appears well-developed and well-nourished. No distress  Eyes: Conjunctivae normal and EOM are normal. Pupils are equal, round, and reactive to light.   Mouth/Throat: Lips without lesions, good dentition, oropharynx is clear and moist.  Neck: Trachea midline. Normal range of motion. Neck supple. No masses  Cardiovascular: Normal rate, regular rhythm, normal heart sounds and intact distal pulses. No murmur, gallop, or friction rub. No edema.  Pulmonary/Chest: No respiratory distress. Unlabored respiratory effort, lungs clear to auscultation. No wheezes or rales.   Abdominal: Soft, non tender. BS + x 4. No masses or hepatosplenomegaly.   Musculoskeletal: Normal range of motion. No tenderness, swelling, erythema, deformity noted.  Neurological: He is alert and oriented to person, place, and time. No " cranial nerve deficit. Coordination normal.   Skin: Skin is warm and dry. Good turgor. No rashes visable.  Right thigh- wound vac in place. Photos from 5/24/17 reviewed in EPIC. Wound bed red. No drainage. periwound intact. From photo- no concern for infection at this time.   Left wrist- healing surgical incision s/p I&D. Healing well. Approximated. No erythema, swelling, or drainage.   Right ankle- small open wound, healing well. No erythema, drainage, swelling, odor.   No cellulitis  JARRETT PICC in place- CDI. No erythema. Non tender.     Psychiatric: He has a normal mood and affect. His behavior is normal.     Last labs 5/12/17    Assessment and Plan:   The following treatment plan was discussed with patient at length  1. Abscess of right thigh  CBC WITH DIFFERENTIAL    COMP METABOLIC PANEL   2. Abscess of left hand     3. Ankle abscess       Abscesses are healing well  Patient has not be following the ID recommended plan from hospital [place]. He continued IV Zosyn through 5/22/17. He has continued PO Zyvox and started taking an old script of Flagyl. He never started Augmentin as prescribed.   At this point I have advised patient to stop Zyvox and Flagyl.   He has been on IV antibiotics 4 days. DC PICC asap. I called is home health RN who informed me that she would DC the PICC within the hour.  Continue home health for wound vac/wound care  Follow up: 2 weeks, RTC sooner if needed. FU with PCP for ongoing chronic medical conditions.          45 min spent face to face with patient, >50% of time spent counseling, coordinating care, reviewing records, discussing POC, educating patient

## 2017-05-29 ENCOUNTER — HOME CARE VISIT (OUTPATIENT)
Dept: HOME HEALTH SERVICES | Facility: HOME HEALTHCARE | Age: 52
End: 2017-05-29
Payer: COMMERCIAL

## 2017-05-29 VITALS
DIASTOLIC BLOOD PRESSURE: 50 MMHG | RESPIRATION RATE: 20 BRPM | TEMPERATURE: 96.5 F | SYSTOLIC BLOOD PRESSURE: 104 MMHG | HEART RATE: 114 BPM

## 2017-05-29 ASSESSMENT — ENCOUNTER SYMPTOMS: DEBILITATING PAIN: 1

## 2017-05-30 ENCOUNTER — HOME CARE VISIT (OUTPATIENT)
Dept: HOME HEALTH SERVICES | Facility: HOME HEALTHCARE | Age: 52
End: 2017-05-30
Payer: COMMERCIAL

## 2017-05-30 VITALS
SYSTOLIC BLOOD PRESSURE: 120 MMHG | DIASTOLIC BLOOD PRESSURE: 70 MMHG | HEART RATE: 81 BPM | RESPIRATION RATE: 18 BRPM | TEMPERATURE: 207 F

## 2017-05-30 PROCEDURE — A6212 FOAM DRG <=16 SQ IN W/BORDER: HCPCS

## 2017-05-30 PROCEDURE — G0299 HHS/HOSPICE OF RN EA 15 MIN: HCPCS

## 2017-05-30 PROCEDURE — A4216 STERILE WATER/SALINE, 10 ML: HCPCS

## 2017-05-30 NOTE — DOCUMENTATION QUERY
DOCUMENTATION QUERY    PROVIDERS: Please select “Cosign w/ note”to reply to query.    To better represent the severity of illness of your patient, please review the following information and exercise your independent professional judgment in responding to this query.     Septicemia due to bacteroides is documented in the Progress Notes 5/12/2017 through 5/14/2017 by Dr. Carlos Kapoor. There is no documentation of sepsis on the discharge summary.  Based upon the clinical findings, risk factors, and treatment, can a diagnosis be provided to support this finding?    Please select all that apply:   • Sepsis present on admission (specify causative organism if known and any associated organ dysfunction if known)  • Sepsis developed after admission  • Sepsis has been ruled out  • Other explanation of clinical findings (please document)  • Unable to determine      The medical record reflects the following:   Clinical Findings  WBC were 6.0 and 4.3; 0/4 SIRS criteria; No growth from blood cultures   Treatment  Diflucan, Change Zyvox to oral, Zosyn   Risk Factors  R thigh abscess; L Hand Abscess; R ankle abscess; Bacteroides Bacteremia   Location within medical record  Progress Notes     Thank you,   Wendy Valiente, CCS

## 2017-05-30 NOTE — ADDENDUM NOTE
Encounter addended by: Wendy Valiente on: 5/30/2017  3:09 PM<BR>     Documentation filed: Clinical Notes

## 2017-05-31 LAB
FUNGUS SPEC CULT: ABNORMAL
FUNGUS SPEC CULT: ABNORMAL
SIGNIFICANT IND 70042: ABNORMAL
SITE SITE: ABNORMAL
SOURCE SOURCE: ABNORMAL

## 2017-05-31 NOTE — ADDENDUM NOTE
Encounter addended by: Wendy Valiente on: 5/31/2017  2:58 PM<BR>     Documentation filed: Clinical Notes

## 2017-06-01 ENCOUNTER — HOME CARE VISIT (OUTPATIENT)
Dept: HOME HEALTH SERVICES | Facility: HOME HEALTHCARE | Age: 52
End: 2017-06-01
Payer: COMMERCIAL

## 2017-06-01 PROCEDURE — G0300 HHS/HOSPICE OF LPN EA 15 MIN: HCPCS

## 2017-06-01 SDOH — ECONOMIC STABILITY: HOUSING INSECURITY: UNSAFE COOKING RANGE AREA: 0

## 2017-06-01 SDOH — ECONOMIC STABILITY: HOUSING INSECURITY: UNSAFE APPLIANCES: 0

## 2017-06-01 ASSESSMENT — ENCOUNTER SYMPTOMS: RESPIRATORY SYMPTOMS COMMENTS: OXYGEN SATURATION MEASURED ON RA. TEMPERATURE MEASURED WITH TEMPORAL THERMOMETER.

## 2017-06-02 ENCOUNTER — HOME CARE VISIT (OUTPATIENT)
Dept: HOME HEALTH SERVICES | Facility: HOME HEALTHCARE | Age: 52
End: 2017-06-02
Payer: COMMERCIAL

## 2017-06-02 VITALS
DIASTOLIC BLOOD PRESSURE: 70 MMHG | RESPIRATION RATE: 16 BRPM | TEMPERATURE: 98.3 F | SYSTOLIC BLOOD PRESSURE: 122 MMHG | HEART RATE: 65 BPM

## 2017-06-03 ENCOUNTER — NON-PROVIDER VISIT (OUTPATIENT)
Dept: WOUND CARE | Facility: MEDICAL CENTER | Age: 52
End: 2017-06-03
Attending: SURGERY
Payer: COMMERCIAL

## 2017-06-03 PROCEDURE — 97597 DBRDMT OPN WND 1ST 20 CM/<: CPT

## 2017-06-03 PROCEDURE — 97162 PT EVAL MOD COMPLEX 30 MIN: CPT

## 2017-06-03 PROCEDURE — A6402 STERILE GAUZE <= 16 SQ IN: HCPCS

## 2017-06-03 PROCEDURE — A6212 FOAM DRG <=16 SQ IN W/BORDER: HCPCS

## 2017-06-03 NOTE — CERTIFICATION
"Advanced Wound Care  [facility]  [address]  [address]  [place] [address]  [phone] Fax: [phone]      Initial Evaluation  For Certification Period:6/3/17-7/3/17      Referring Physician: Esau Dooley MD  Primary Physician:      YAIR Mohan  Consulting Physicians:     MD Caroline for wound care    Wound(s):R lateral thigh  Start of Care: 6/3/17       Subjective:        HPI:        52 year old male referred to [facility] by Home Health for continued treatment R lateral thigh wound w/NPWT.  Onset:  Abscess post surgical I/D w/hospital admission 4/29/17-5/11/17. Pt seen previously at [facility] for abdominal wound, now resolved. Pt was receiving tx to left wrist and R medial ank;e during Home Care admission.  These wounds have also resolved.     Pain:     Mild wound pain w/contact at edges        Past Medical History:  Past Medical History   Diagnosis Date   • Migraine    • Gout    • Indigestion    • UC (ulcerative colitis) (CMS-HCC) 3-3-17     \"Five BM's per day, takes Lialda\"   • Difficult intubation 2-2016   • Arthritis 3-3-17     \"Spine\"   • Sepsis (CMS-HCC) 1/21/2016   • Essential hypertension 1/22/2016   • Snoring 3-3-17     Has not had a sleep study   • High cholesterol 3-3-17     Does not currently take medication for   • Congestive heart failure (CMS-HCC) 2-2016      3-3-17 \"Not a current issue\"   • ASTHMA 3-3-17     \"Hasn't had to use inhaler in 2 years\"   • Aspiration pneumonia (CMS-HCC) 3-2016   • Breath shortness 3-3-17     \"With Exercise\"   • Hypothyroid 3-3-17     Takes Circle Thyroid   • Pain 3-3-17     \"Left Flank\"   • Heart burn    • Depression 11/26/2014   • Anesthesia      \"Was difficult to intubate 2-2016\"   • Pancreatitis 2-2016     \"D/T Tradjenta was hospitialized for 15 days\"   • Elevated liver enzymes 2-2016   • Electrolyte imbalance 2-2016   • Kidney stones    • ADRIANE (acute kidney injury) (CMS-HCC) 2/24/2016   • RF (renal failure) 2-2016   • Diabetes (CMS-HCC) 3-3-17 " "    Takes Insulin   • DKA (diabetic ketoacidoses) (CMS-HCC) -     \"10 days on vent with DKA, Renal failure, CHF, elevated liver enzymes and electrolyte imbalance\"   • On mechanically assisted ventilation (CMS-HCC)      HX \"On Vent for 10 days at [facility]\".  \"DX Pancreatitis, DKA, CHF, Elevated Liver Enzymes & Electrolyte Imbalance\".       Current Medications:  Current outpatient prescriptions:   •  [] linezolid (ZYVOX) 600 MG Tab, Take 600 mg by mouth 2 times a day. 14 day course started 17 , Disp: , Rfl:   •  ondansetron (ZOFRAN ODT) 4 MG TABLET DISPERSIBLE, Take 4 mg by mouth every four hours as needed for Nausea/Vomiting., Disp: , Rfl:   •  morphine ER (MS CONTIN) 30 MG Tab CR tablet, Take 1 Tab by mouth every 12 hours., Disp: 90 Tab, Rfl: 0  •  oxycodone-acetaminophen (PERCOCET-10)  MG Tab, Take 1 Tab by mouth every 8 hours as needed for Severe Pain., Disp: 30 Tab, Rfl: 0  •  Insulin Glargine (TOUJEO SOLOSTAR) 300 UNIT/ML Solution Pen-injector, Inject 42 Units as instructed every day. Adjusts based on blood sugar., Disp: , Rfl:   •  thyroid (ARMOUR THYROID) 60 MG Tab, Take 60 mg by mouth every day., Disp: , Rfl:   •  Cholecalciferol (VITAMIN D3) 5000 UNITS Tab, Take 5,000 Units by mouth every day., Disp: , Rfl:   •  insulin lispro (HUMALOG) 100 UNIT/ML Solution, Inject 2-20 Units as instructed 3 times a day before meals. Sliding Scale - 2 units every 50 > 150, Disp: , Rfl:   •  buPROPion (WELLBUTRIN XL) 300 MG XL tablet, Take 300 mg by mouth every morning., Disp: , Rfl:   •  mesalamine (LIALDA) 1.2 GM TBEC, Take 1.2 g by mouth 2 times a day., Disp: , Rfl:     Allergies: Peanut-derived; Tradjenta; and Hydrocodone    Past Surgical History:   Past Surgical History   Procedure Laterality Date   • Carmenza by laparoscopy     • Colonoscopy with biopsy  2014     Performed by Solosamanta Higginbotham M.D. at ENDOSCOPY [facility]   • Umbilical hernia repair N/A 2016     Procedure: " UMBILICAL HERNIA REPAIR;  Surgeon: Esau Dooley M.D.;  Location: SURGERY [facility];  Service:    • Other orthopedic surgery  1997 or 1998     Left Wrist ORIF   • Umbilical hernia repair  3/10/2017     Procedure: UMBILICAL HERNIA REPAIR- INCISION AND DRAINAGE OF UMBILICAL WOUND AND MESH REMOVAL;  Surgeon: Esau Dooley M.D.;  Location: SURGERY SAME DAY [facility];  Service:    • Incision and drainage general  4/7/2017     Procedure: INCISION AND DRAINAGE GENERAL;  Surgeon: Esau Dooley M.D.;  Location: SURGERY SAME DAY [facility];  Service:    • Irrigation & debridement general Right 4/29/2017     Procedure: IRRIGATION & DEBRIDEMENT GENERAL;  Surgeon: Esau Dooley M.D.;  Location: SURGERY [facility];  Service:    • Irrigation & debridement general Right 5/1/2017     Procedure: IRRIGATION & DEBRIDEMENT GENERAL-Left HAND AND right  ANKLE;  Surgeon: Esau Dooley M.D.;  Location: SURGERY [facility];  Service:        Social History:    Social History     Social History   • Marital Status: Single     Spouse Name: N/A   • Number of Children: N/A   • Years of Education: N/A     Occupational History   • Not on file.     Social History Main Topics   • Smoking status: Never Smoker    • Smokeless tobacco: Not on file   • Alcohol Use: No      Comment: occ   • Drug Use: No   • Sexual Activity: Not on file     Other Topics Concern   • Not on file     Social History Narrative    ** Merged History Encounter **         ** Merged History Encounter **                Objective:      Tests and Measures:    Fall Risk Assessment (manjula all that apply with an X):6/3/17 (-) fall risk   65 years or older     Fall within the last 2 years, uses   Ambulatory devices  Loss of protective sensation in feet,   Use of prostethic/orthotic, years    Presence of lower extremity/foot/toe amputation   xTaking medication that increases risk (per facility policy)    Interventions Recommended (if any of the above are  selected):   Use of Assistive Device:________________________   Supervision with ambulation:  Caregiver   Assistance with ambulation:  Caregiver   Home safety education:  Educational material provided    Orthotic, protective, supportive devices: none     Wound Characteristics                                                    Location:  R lateral thigh   Initial Evaluation  Date:6/3/17   Tissue Type and %: 100% red/pink granular   Periwound: intact   Drainage: Mod ss-canister changed 17   Exposed structures none   Wound Edges:   Attached, mild maceration   Odor: none   S&S of Infection:   none   Edema: none   Sensation: intact               Measurements:  R lateral thigh Initial Evaluation  Date:6/3/17   Length (cm) 11.5   Width (cm) 1.2   Depth (cm) 0.1   Area (cm2) 13.8   Tract/undermine         Procedures:     Debridement :  Selective using scalpel to remove 13.8cm2 biofilm   Cleansed with:      NS   Periwound protected with:benzoin, drape tape   Primary dressin piece black granufoam   Secondary Dressing:drape tape, trac pad   Other: NPWT resumed at 125mmHg, continuous     Patient Education: Ptinstructed in wound care POC, dressing selection rationale and maintenances/s infection. .Reviewed NPWT, leak trouble shooting, [employer] contact number;  pt well educated on NPWT and s/s infection.    Professional Collaboration: Initial assessment sent to Dr. Patton via [employer]      Assessment:      PT Evaluation 27005  Reference:  History: 31096  Exam of body systems: 08089  Clinical presentation: 08185    Wound etiology:  surgical    Wound Progress: Initial assessment; progress to be determined     Rationale for Treatment:NPWT to facilitate tissue granulation and wound healing, manage exudate, minimize risk for infection, expedite wound closure    Patient tolerance/compliance: Pt verbalized understanding of initial instructions and education; consented to POC    Complicating factors:DM, multiple wounds    Need for  ongoing Advanced Wound Care services:Pt requires continued skilled wound care for sharp debridement prn, dressing management and application, patient education, and clinical observation       Plan:      Treatment Plan and Recommendations:  Diagnosis/ICD10:     Procedures/CPT:selective, non-selective debridement, NPWT    Frequency: 3x/week      Treatment Goals: STG 2 Weeks  LTG 4 Weeks   Granulation Tissue: 100% resolved   Decrease Necrotic Tissue to: %    Wound Phase:      Decrease Size by: 50%    Periwound:      Decrease tracts/undermining by: %    Decrease Pain:          At the time of each visit a thorough assessment of the patient is completed to assure the  appropriateness of our plan of care.  The dressings or modalities may need to be adapted   from the original plan to address any significant changes in the wound environment.          Clinician Signature:_______Za Clemente PT________ __Date____6/3/17____      Physician Signature:______________________________Date:__________________

## 2017-06-06 ENCOUNTER — NON-PROVIDER VISIT (OUTPATIENT)
Dept: WOUND CARE | Facility: MEDICAL CENTER | Age: 52
End: 2017-06-06
Attending: SURGERY
Payer: COMMERCIAL

## 2017-06-06 ENCOUNTER — HOSPITAL ENCOUNTER (OUTPATIENT)
Dept: LAB | Facility: MEDICAL CENTER | Age: 52
End: 2017-06-06
Attending: NURSE PRACTITIONER
Payer: COMMERCIAL

## 2017-06-06 DIAGNOSIS — L02.415 ABSCESS OF RIGHT THIGH: ICD-10-CM

## 2017-06-06 LAB
ALBUMIN SERPL BCP-MCNC: 4.3 G/DL (ref 3.2–4.9)
ALBUMIN/GLOB SERPL: 1.7 G/DL
ALP SERPL-CCNC: 74 U/L (ref 30–99)
ALT SERPL-CCNC: 31 U/L (ref 2–50)
ANION GAP SERPL CALC-SCNC: 12 MMOL/L (ref 0–11.9)
AST SERPL-CCNC: 20 U/L (ref 12–45)
BASOPHILS # BLD AUTO: 0.4 % (ref 0–1.8)
BASOPHILS # BLD: 0.03 K/UL (ref 0–0.12)
BILIRUB SERPL-MCNC: 0.4 MG/DL (ref 0.1–1.5)
BUN SERPL-MCNC: 16 MG/DL (ref 8–22)
CALCIUM SERPL-MCNC: 9.1 MG/DL (ref 8.5–10.5)
CHLORIDE SERPL-SCNC: 103 MMOL/L (ref 96–112)
CO2 SERPL-SCNC: 23 MMOL/L (ref 20–33)
CREAT SERPL-MCNC: 0.81 MG/DL (ref 0.5–1.4)
EOSINOPHIL # BLD AUTO: 0.06 K/UL (ref 0–0.51)
EOSINOPHIL NFR BLD: 0.8 % (ref 0–6.9)
ERYTHROCYTE [DISTWIDTH] IN BLOOD BY AUTOMATED COUNT: 49.1 FL (ref 35.9–50)
GFR SERPL CREATININE-BSD FRML MDRD: >60 ML/MIN/1.73 M 2
GLOBULIN SER CALC-MCNC: 2.5 G/DL (ref 1.9–3.5)
GLUCOSE SERPL-MCNC: 175 MG/DL (ref 65–99)
HCT VFR BLD AUTO: 35.6 % (ref 42–52)
HGB BLD-MCNC: 11.8 G/DL (ref 14–18)
IMM GRANULOCYTES # BLD AUTO: 0.08 K/UL (ref 0–0.11)
IMM GRANULOCYTES NFR BLD AUTO: 1 % (ref 0–0.9)
LYMPHOCYTES # BLD AUTO: 1.02 K/UL (ref 1–4.8)
LYMPHOCYTES NFR BLD: 12.8 % (ref 22–41)
MCH RBC QN AUTO: 29.6 PG (ref 27–33)
MCHC RBC AUTO-ENTMCNC: 33.1 G/DL (ref 33.7–35.3)
MCV RBC AUTO: 89.2 FL (ref 81.4–97.8)
MONOCYTES # BLD AUTO: 0.42 K/UL (ref 0–0.85)
MONOCYTES NFR BLD AUTO: 5.3 % (ref 0–13.4)
NEUTROPHILS # BLD AUTO: 6.34 K/UL (ref 1.82–7.42)
NEUTROPHILS NFR BLD: 79.7 % (ref 44–72)
NRBC # BLD AUTO: 0 K/UL
NRBC BLD AUTO-RTO: 0 /100 WBC
PLATELET # BLD AUTO: 257 K/UL (ref 164–446)
PMV BLD AUTO: 9.7 FL (ref 9–12.9)
POTASSIUM SERPL-SCNC: 3.6 MMOL/L (ref 3.6–5.5)
PROT SERPL-MCNC: 6.8 G/DL (ref 6–8.2)
RBC # BLD AUTO: 3.99 M/UL (ref 4.7–6.1)
SODIUM SERPL-SCNC: 138 MMOL/L (ref 135–145)
WBC # BLD AUTO: 8 K/UL (ref 4.8–10.8)

## 2017-06-06 PROCEDURE — 80053 COMPREHEN METABOLIC PANEL: CPT

## 2017-06-06 PROCEDURE — 97605 NEG PRS WND THER DME<=50SQCM: CPT

## 2017-06-06 PROCEDURE — 85025 COMPLETE CBC W/AUTO DIFF WBC: CPT

## 2017-06-06 PROCEDURE — 36415 COLL VENOUS BLD VENIPUNCTURE: CPT

## 2017-06-06 PROCEDURE — A6402 STERILE GAUZE <= 16 SQ IN: HCPCS

## 2017-06-07 ENCOUNTER — OFFICE VISIT (OUTPATIENT)
Dept: INFECTIOUS DISEASES | Facility: MEDICAL CENTER | Age: 52
End: 2017-06-07
Payer: COMMERCIAL

## 2017-06-07 VITALS
TEMPERATURE: 98.1 F | HEIGHT: 68 IN | WEIGHT: 226.2 LBS | DIASTOLIC BLOOD PRESSURE: 84 MMHG | SYSTOLIC BLOOD PRESSURE: 128 MMHG | OXYGEN SATURATION: 96 % | HEART RATE: 81 BPM | BODY MASS INDEX: 34.28 KG/M2

## 2017-06-07 DIAGNOSIS — L02.415 ABSCESS OF RIGHT THIGH: ICD-10-CM

## 2017-06-07 PROCEDURE — 99213 OFFICE O/P EST LOW 20 MIN: CPT | Performed by: NURSE PRACTITIONER

## 2017-06-07 NOTE — MR AVS SNAPSHOT
"        Mahesh Bradshaw   2017 10:30 AM   Office Visit   MRN: [id number]    Department:  Counts include 234 beds at the [facility]   Dept Phone:  [phone]    Description:  Male : 1965   Provider:  KRISSY Cano           Reason for Visit     Follow-Up Abscess of right thigh      Allergies as of 2017     Allergen Noted Reactions    Peanut-Derived 06/15/2015   Anaphylaxis    Peanuts   RXN=age 36    Tradjenta [Linagliptin] 2017   Unspecified    Pt developed pancreatitis    Hydrocodone 2017   Hives    Tolerates Morphine and oxycodone  Rxn Age - 29      Vital Signs     Blood Pressure Pulse Temperature Height Weight Body Mass Index    128/84 mmHg 81 36.7 °C (98.1 °F) 1.727 m (5' 7.99\") 102.604 kg (226 lb 3.2 oz) 34.40 kg/m2    Oxygen Saturation Smoking Status                96% Never Smoker           Basic Information     Date Of Birth Sex Race Ethnicity Preferred Language    1965 Male White Non- English      Your appointments     2017  8:30 AM   Wound 30 Minute Procedure with Za Clemente P.T.   [facility] ([address] [place] [address])    [address]  [place] [address]   [phone]            Jose 10, 2017  9:00 AM   Wound 30 Minute Procedure with Za Clemente P.T.   [facility] ([address] [place] [address])    [address]  [place] [address]   [phone]            2017 11:00 AM   Wound 30 Minute Procedure with Eleni Oakes R.N.   [facility] ([address] [place] [address])    [address]  [place] [address]   [phone]            2017  8:00 AM   Wound 30 Minute Procedure with Moira Lozoya R.N.   [facility] ([address] [place] [address])    [address]  [place] [address]   [phone]            2017 11:00 AM   Wound 30 Minute Procedure with Rupali Sow R.N.   [facility] ([address] [place] [address])    [address]  [place] [address]   [phone]            2017  9:30 AM   Wound 30 Minute Procedure with Rupali Sow R.N.   [facility] ([address] [place] [address])    [address]  [place] [address]   [phone]            Jun 22, 2017  3:00 PM   Wound 30 Minute Procedure with Za Clemente P.T.   [facility] ([address] [place] [address])    [address]  [place] [address]   [phone]            Jun 24, 2017 11:00 AM   Wound 30 Minute Procedure with Eleni Oakes R.N.   [facility] ([address] [place] [address])    [address]  [place] [address]   [phone]            Jun 27, 2017 10:30 AM   Wound 30 Minute Procedure with Rachele Dodson R.N.   [facility] ([address] [place] [address])    [address]  [place] [address]   [phone]            Jun 29, 2017 10:30 AM   Wound 30 Minute Procedure with Rachele Dodson R.N.   [facility] ([address] [place] [address])    [address]  [place] [address]   [phone]            Jul 01, 2017 10:30 AM   Wound 30 Minute Procedure with Za Clemente P.T.   [facility] ([address] [place] [address])    [address]  [place] [address]   [phone]              Problem List              ICD-10-CM Priority Class Noted - Resolved    Elevated liver enzymes R74.8   11/26/2014 - Present    Hypothyroid E03.9   11/26/2014 - Present    Depression F32.9   11/26/2014 - Present    Chest pain R07.9   6/15/2015 - Present    Gout M10.9   6/15/2015 - Present    GERD (gastroesophageal reflux disease) K21.9   6/15/2015 - Present    Ulcerative colitis (CMS-HCC) K51.90   6/15/2015 - Present    Sepsis (CMS-HCC) A41.9 High  1/21/2016 - Present    Essential hypertension I10   1/22/2016 - Present    Type 2 diabetes mellitus (CMS-HCC) E11.9 Medium  1/22/2016 - Present    Tachycardia R00.0 Low  2/24/2016 - Present    Leukocytosis D72.829 Low  2/24/2016 - Present    HHNC (hyperglycemic hyperosmolar nonketotic coma) (CMS-HCC) E11.01 High  2/24/2016 - Present    ADRIANE (acute kidney injury) (CMS-HCC) N17.9 High  2/24/2016 - Present    Hypernatremia E87.0 Low  2/25/2016 - Present    Chronic ulcerative colitis (CMS-HCC) K51.90  Low  2/25/2016 - Present    Encephalopathy G93.40 High  2/25/2016 - Present    Elevated lipase R74.8 Medium  2/25/2016 - Present    Pancreatitis K85.90   3/7/2016 - Present    Serum phosphate elevated E83.39   3/7/2016 - Present    Altered mental status R41.82   3/7/2016 - Present    Incarcerated umbilical hernia K42.0   11/6/2016 - Present    Back pain M54.9   1/16/2017 - Present    Hyperglycemia due to type 2 diabetes mellitus (CMS-HCC) E11.65   1/16/2017 - Present    Subcutaneous abscess L02.91   3/10/2017 - Present    DKA (diabetic ketoacidoses) (CMS-HCC) E13.10   4/6/2017 - Present    Normocytic anemia D64.9   4/7/2017 - Present    Transaminitis R74.0   4/7/2017 - Present    Complicated wound infection (CMS-HCC) T79.8XXA   4/19/2017 - Present    Severe sepsis (CMS-HCC) A41.9, R65.20 High  4/29/2017 - Present    Bacteremia due to Gram-negative bacteria R78.81   4/30/2017 - Present    Abscess of right thigh L02.415 High  5/1/2017 - Present    Septic shock (CMS-HCC) A41.9, R65.21   5/1/2017 - Present    Type 2 diabetes mellitus without complication, with long-term current use of insulin (CMS-HCC) E11.9, Z79.4 Low  5/1/2017 - Present    Bacteremia R78.81   5/1/2017 - Present      Health Maintenance        Date Due Completion Dates    IMM HEP B VACCINE (1 of 3 - Primary Series) 1965 ---    DIABETES MONOFILAMENT / LE EXAM 1965 ---    RETINAL SCREENING 5/9/1983 ---    URINE ACR / MICROALBUMIN 5/9/1983 ---    IMM DTaP/Tdap/Td Vaccine (1 - Tdap) 5/9/1984 ---    A1C SCREENING 10/30/2017 4/30/2017, 1/16/2017, 1/4/2017, 9/19/2016, 5/12/2016, 3/9/2016, 2/25/2016, 11/4/2015    FASTING LIPID PROFILE 4/7/2018 4/7/2017, 5/12/2016, 3/9/2016, 11/4/2015, 2/26/2013, 11/27/2010, 2/14/2010    SERUM CREATININE 6/6/2018 6/6/2017, 5/12/2017, 5/11/2017, 5/1/2017, 4/30/2017, 4/29/2017, 4/25/2017, 4/21/2017, 4/20/2017, 4/18/2017, 4/17/2017, 4/10/2017, 4/9/2017, 4/8/2017, 4/7/2017, 4/6/2017, 4/6/2017, 3/24/2017, 3/21/2017,  3/10/2017, 2/19/2017, 1/18/2017, 1/17/2017, 1/16/2017, 1/13/2017, 1/5/2017, 1/4/2017, 1/4/2017, 11/14/2016, 11/12/2016, 11/6/2016, 10/25/2016, 9/19/2016, 5/12/2016, 3/10/2016, 3/9/2016, 3/8/2016, 3/7/2016, 3/6/2016, 3/6/2016, 3/5/2016, 3/4/2016, 3/3/2016, 3/2/2016, 3/1/2016, 2/29/2016, 2/28/2016, 2/28/2016, 2/28/2016, 2/28/2016, 2/27/2016, 2/27/2016, 2/27/2016, 2/27/2016, 2/27/2016, 2/27/2016, 2/26/2016, 2/26/2016, 2/26/2016, 2/26/2016, 2/26/2016, 2/26/2016, 2/26/2016, 2/25/2016, 2/25/2016, 2/25/2016, 2/25/2016, 2/25/2016, 2/25/2016, 2/25/2016, 2/25/2016, 2/24/2016, 1/23/2016, 1/21/2016, 11/4/2015, 6/15/2015, 12/10/2014, 11/30/2014, 11/29/2014, 11/28/2014, 11/27/2014, 11/26/2014, 11/26/2014, 11/25/2014, 2/25/2013, 6/25/2012, 11/27/2010, 2/18/2008, 10/17/2006    COLONOSCOPY 11/26/2024 11/26/2014            Current Immunizations     Influenza Vaccine Quad Inj (Pf) 10/25/2016, 10/5/2015 10:02 AM    Pneumococcal polysaccharide vaccine (PPSV-23) 11/1/2009      Below and/or attached are the medications your provider expects you to take. Review all of your home medications and newly ordered medications with your provider and/or pharmacist. Follow medication instructions as directed by your provider and/or pharmacist. Please keep your medication list with you and share with your provider. Update the information when medications are discontinued, doses are changed, or new medications (including over-the-counter products) are added; and carry medication information at all times in the event of emergency situations     Allergies:  PEANUT-DERIVED - Anaphylaxis     TRADJENTA - Unspecified     HYDROCODONE - Hives               Medications  Valid as of: June 07, 2017 - 11:11 AM    Generic Name Brand Name Tablet Size Instructions for use    BuPROPion HCl (TABLET SR 24 HR) WELLBUTRIN  MG Take 300 mg by mouth every morning.        Cholecalciferol (Tab) Vitamin D3 5000 UNITS Take 5,000 Units by mouth every day.        Insulin  Glargine (Solution Pen-injector) Insulin Glargine 300 UNIT/ML Inject 42 Units as instructed every day. Adjusts based on blood sugar.        Insulin Lispro (Solution) HUMALOG 100 UNIT/ML Inject 2-20 Units as instructed 3 times a day before meals. Sliding Scale - 2 units every 50 > 150        Linezolid (Tab) ZYVOX 600 MG Take 600 mg by mouth 2 times a day. 14 day course started 5/9/17         Mesalamine (Tablet Delayed Response) LIALDA 1.2 GM Take 1.2 g by mouth 2 times a day.        Morphine Sulfate (Tab CR) MS CONTIN 30 MG Take 1 Tab by mouth every 12 hours.        Ondansetron (TABLET DISPERSIBLE) ZOFRAN ODT 4 MG Take 4 mg by mouth every four hours as needed for Nausea/Vomiting.        Oxycodone-Acetaminophen (Tab) PERCOCET-10  MG Take 1 Tab by mouth every 8 hours as needed for Severe Pain.        Thyroid (Tab) ARMOUR THYROID 60 MG Take 60 mg by mouth every day.        .                 Medicines prescribed today were sent to:     [address] [facility]    [address] [facility] [address]    Phone: [phone] Fax: [phone]    Open 24 Hours?: No      Medication refill instructions:       If your prescription bottle indicates you have medication refills left, it is not necessary to call your provider’s office. Please contact your pharmacy and they will refill your medication.    If your prescription bottle indicates you do not have any refills left, you may request refills at any time through one of the following ways: The online [employer] system (except Urgent Care), by calling your provider’s office, or by asking your pharmacy to contact your provider’s office with a refill request. Medication refills are processed only during regular business hours and may not be available until the next business day. Your provider may request additional information or to have a follow-up visit with you prior to refilling your medication.   *Please Note: Medication refills are  assigned a new Rx number when refilled electronically. Your pharmacy may indicate that no refills were authorized even though a new prescription for the same medication is available at the pharmacy. Please request the medicine by name with the pharmacy before contacting your provider for a refill.           CareFlash Access Code: [email or web address]  Expires: 6/24/2017  4:05 AM    CareFlash  A secure, online tool to manage your health information     [email or web address]’s CareFlash® is a secure, online tool that connects you to your personalized health information from the privacy of your home -- day or night - making it very easy for you to manage your healthcare. Once the activation process is completed, you can even access your medical information using the CareFlash princess, which is available for free in the [employer] Princess store or Google Play store.     CareFlash provides the following levels of access (as shown below):   My Chart Features   [facility] Primary Care Doctor [facility]  Specialists [facility]  Urgent  Care Non-[facility]  Primary Care  Doctor   Email your healthcare team securely and privately 24/7 X X X    Manage appointments: schedule your next appointment; view details of past/upcoming appointments X      Request prescription refills. X      View recent personal medical records, including lab and immunizations X X X X   View health record, including health history, allergies, medications X X X X   Read reports about your outpatient visits, procedures, consult and ER notes X X X X   See your discharge summary, which is a recap of your hospital and/or ER visit that includes your diagnosis, lab results, and care plan. X X       How to register for CareFlash:  1. Go to  [email or web address].  2. Click on the Sign Up Now box, which takes you to the New Member Sign Up page. You will need to provide the following information:  a. Enter your CareFlash Access Code exactly as it appears at the top of this page. (You will not need to use this  code after you’ve completed the sign-up process. If you do not sign up before the expiration date, you must request a new code.)   b. Enter your date of birth.   c. Enter your home email address.   d. Click Submit, and follow the next screen’s instructions.  3. Create a [employer] ID. This will be your [employer] login ID and cannot be changed, so think of one that is secure and easy to remember.  4. Create a [employer] password. You can change your password at any time.  5. Enter your Password Reset Question and Answer. This can be used at a later time if you forget your password.   6. Enter your e-mail address. This allows you to receive e-mail notifications when new information is available in [employer].  7. Click Sign Up. You can now view your health information.    For assistance activating your [employer] account, call [phone]

## 2017-06-07 NOTE — PROGRESS NOTES
"Subjective:     Chief Complaint   Patient presents with   • Follow-Up     Abscess of right thigh     Infectious Disease clinic follow up  S[age][place] 52 y.o.male in clinic today for evaluation and management of multiple abscesses/wounds. Primary care provider: KRISSY Brunner. Hx of DM and ulcerative colitis.  He reports that BS are \"around 150s.\"    Interval History: Hospitalized 4/6/17-4/10/17 due to intra-abdominal infection.  S/p umblical hernia repair 11/6/16. Removal of mesh 3/10/17.  I&D 4/6-imaging for fistula neg. C/s +Enterococcus faecalis and Klebsiella. Discharged on Daptomycin and ceftriaxone for outpatient infusion.  Wound vac to abdominal wound. Stop date IV abx 4/20/2017 4/18/17-4/21/17- returned to the hospital with diffuse cramping in his arms and legs. Magnesium level was low at 1.3. Lactic acid was elevated at 4.9. CPK normal. Daptomycin and ceftriaxone stopped and patient transitioned to zosyn.  Completed antibiotics while in the hospital.     4/29/17-5/8/17- he returned to the hospital with a right  thigh abscess, septic shock d/t testosterone injection. He also had erythema and swelling consistent with abscess to his Left wrist which he states was due to a lab draw and his Right ankle which he states was due to \"bumping in to something.\"  S/p I&D on 4/29/2017 with Dr. Reyes.  OR cx +MRSA, Kleb pneumoniae, Strep Viridans, B fragilis & Yeast  Discharged home on IV Ertapenem 1 g daily, in addition to PO Linezolid and Fluconazole. end date 5/18/17.    5/11/17-5/16/17- returned to the hospital with n/v, fever. Discharged home on Iv Zosyn and PO Zyvox. End date 5/19/17, followed by 2 weeks PO Augmentin. Script for Augmentin was provided as there was concern that patient would not follow up with ID prior to 5/19/17.     Outpatient ID FU appt 5/26/17- IV Zosyn stopped 5/22/17. When seen he reported that he continued to take PO Zyvox and restarted taking PO flagyl (which he had at some " "point prior).  Pt did not start Augmentin. PICC was still in place.     Today 6/7/17: Patient reports feeling well. He has been off all antibiotics since 5/26/17. PICC DCd by home health RN 5/26/17. Pt stating that the wounds are healing well. He is very happy with the healing of the abscesses/I&D sites. Pt being followed now by the [facility] wound clinic. Wound vac to right upper thigh in place. Healed I&D sites to both left wrist and right ankle- pt stating that they no longer required any wound care. Wound pictures reviewed in EPIC from 6/3/17. Denies drainage, pungent odor, redness, pain. Denies feeling generally ill, fevers/chills,  Headache, n/v/d.     Past Medical History   Diagnosis Date   • Migraine    • Gout    • Indigestion    • UC (ulcerative colitis) (CMS-HCC) 3-3-17     \"Five BM's per day, takes Lialda\"   • Difficult intubation 2-2016   • Arthritis 3-3-17     \"Spine\"   • Sepsis (CMS-HCC) 1/21/2016   • Essential hypertension 1/22/2016   • Snoring 3-3-17     Has not had a sleep study   • High cholesterol 3-3-17     Does not currently take medication for   • Congestive heart failure (CMS-HCC) 2-2016      3-3-17 \"Not a current issue\"   • ASTHMA 3-3-17     \"Hasn't had to use inhaler in 2 years\"   • Aspiration pneumonia (CMS-HCC) 3-2016   • Breath shortness 3-3-17     \"With Exercise\"   • Hypothyroid 3-3-17     Takes [place] Thyroid   • Pain 3-3-17     \"Left Flank\"   • Heart burn    • Depression 11/26/2014   • Anesthesia      \"Was difficult to intubate 2-2016\"   • Pancreatitis 2-2016     \"D/T Tradjenta was hospitialized for 15 days\"   • Elevated liver enzymes 2-2016   • Electrolyte imbalance 2-2016   • Kidney stones    • ADRIANE (acute kidney injury) (CMS-HCC) 2/24/2016   • RF (renal failure) 2-2016   • Diabetes (CMS-HCC) 3-3-17     Takes Insulin   • DKA (diabetic ketoacidoses) (CMS-HCC) 2-2016     \"10 days on vent with DKA, Renal failure, CHF, elevated liver enzymes and electrolyte imbalance\"   • On mechanically " "assisted ventilation (CMS-HCC) 2-2016     HX \"On Vent for 10 days at [facility]\".  \"DX Pancreatitis, DKA, CHF, Elevated Liver Enzymes & Electrolyte Imbalance\".       Allergies: Peanut-derived; Tradjenta; and Hydrocodone    Current medications and problem list reviewed with patient and updated in [facility].    ROS  As documented above in my HPI       Objective:     PE:  /84 mmHg  Pulse 81  Temp(Src) 36.7 °C (98.1 °F)  Ht 1.727 m (5' 7.99\")  Wt 102.604 kg (226 lb 3.2 oz)  BMI 34.40 kg/m2  SpO2 96%     Vital signs reviewed  Constitutional: patient is oriented to person, place, and time. He appears well-developed and well-nourished. No distress  Eyes: Conjunctivae normal and EOM are normal. Pupils are equal, round, and reactive to light.   Mouth/Throat: Lips without lesions, good dentition, oropharynx is clear and moist.  Neck: Trachea midline. Normal range of motion. Neck supple. No masses  Cardiovascular: Normal rate, regular rhythm, normal heart sounds and intact distal pulses. No murmur, gallop, or friction rub. No edema.  Pulmonary/Chest: No respiratory distress. Unlabored respiratory effort, lungs clear to auscultation. No wheezes or rales.   Musculoskeletal: Normal range of motion. No tenderness, swelling, erythema, deformity noted.  Neurological: He is alert and oriented to person, place, and time. No cranial nerve deficit. Coordination normal.   Skin: Skin is warm and dry. Good turgor. No rashes visable.  Right thigh- wound vac in place. Photos from 6/3/17 reviewed in EPIC. Wound bed red. No drainage. periwound intact. From photo- no concern for infection at this time.   Left wrist- healing/scabbed surgical incision s/p I&D. Healing well. Approximated. No erythema, swelling, or drainage.   Right ankle- healing/scabbed surgical incision s/p I&D. Healing well. Approximated. No erythema, swelling, or drainage.   No cellulitis  Psychiatric: He has a normal mood and affect. His behavior is normal. "       Assessment and Plan:   The following treatment plan was discussed with patient at length    1. Abscess of right thigh       Wounds/incision sites are healing well  Patient has been off all antibiotics for 2 weeks- no concerns  Continue wound care for wound vac/wound care  POC discussed with patient. Call/ RTC if having increased pain, drainage, odor, fevers/chills, erythema  Follow up: PRN, RTC sooner if needed. FU with PCP for ongoing chronic medical conditions.

## 2017-06-08 ENCOUNTER — NON-PROVIDER VISIT (OUTPATIENT)
Dept: WOUND CARE | Facility: MEDICAL CENTER | Age: 52
End: 2017-06-08
Attending: SURGERY
Payer: COMMERCIAL

## 2017-06-08 PROCEDURE — A6402 STERILE GAUZE <= 16 SQ IN: HCPCS

## 2017-06-08 PROCEDURE — 97605 NEG PRS WND THER DME<=50SQCM: CPT

## 2017-06-08 NOTE — WOUND TEAM
"Advanced Wound Care  [facility]  [address]  [address]  [place] [address]  [phone] Fax: [phone]      Encounter note  For Certification Period:6/3/17-7/3/17      Referring Physician: Esau Dooley MD  Primary Physician:      YAIR Mohan  Consulting Physicians:     MD Caroline for wound care    Wound(s):R lateral thigh  Start of Care: 6/3/17       Subjective:        HPI:        52 year old male referred to [facility] by Home Health for continued treatment R lateral thigh wound w/NPWT.  Onset:  Abscess post surgical I/D w/hospital admission 4/29/17-5/11/17. Pt seen previously at [facility] for abdominal wound, now resolved. Pt was receiving tx to left wrist and R medial ank;e during Home Care admission.  These wounds have also resolved.     Pain:   C/o tenderness with palpation of wound        Past Medical History:  Past Medical History   Diagnosis Date   • Migraine    • Gout    • Indigestion    • UC (ulcerative colitis) (CMS-HCC) 3-3-17     \"Five BM's per day, takes Lialda\"   • Difficult intubation 2-2016   • Arthritis 3-3-17     \"Spine\"   • Sepsis (CMS-HCC) 1/21/2016   • Essential hypertension 1/22/2016   • Snoring 3-3-17     Has not had a sleep study   • High cholesterol 3-3-17     Does not currently take medication for   • Congestive heart failure (CMS-HCC) 2-2016      3-3-17 \"Not a current issue\"   • ASTHMA 3-3-17     \"Hasn't had to use inhaler in 2 years\"   • Aspiration pneumonia (CMS-HCC) 3-2016   • Breath shortness 3-3-17     \"With Exercise\"   • Hypothyroid 3-3-17     Takes [place] Thyroid   • Pain 3-3-17     \"Left Flank\"   • Heart burn    • Depression 11/26/2014   • Anesthesia      \"Was difficult to intubate 2-2016\"   • Pancreatitis 2-2016     \"D/T Tradjenta was hospitialized for 15 days\"   • Elevated liver enzymes 2-2016   • Electrolyte imbalance 2-2016   • Kidney stones    • ADRIANE (acute kidney injury) (CMS-HCC) 2/24/2016   • RF (renal failure) 2-2016   • Diabetes (CMS-HCC) 3-3-17    " " Takes Insulin   • DKA (diabetic ketoacidoses) (CMS-HCC) 2-2016     \"10 days on vent with DKA, Renal failure, CHF, elevated liver enzymes and electrolyte imbalance\"   • On mechanically assisted ventilation (CMS-HCC) 2-2016     HX \"On Vent for 10 days at [facility]\".  \"DX Pancreatitis, DKA, CHF, Elevated Liver Enzymes & Electrolyte Imbalance\".       Current Medications:no changes per pt    Allergies: Peanut-derived; Tradjenta; and Hydrocodone    Past Surgical History:   Past Surgical History   Procedure Laterality Date   • Carmenza by laparoscopy  2005   • Colonoscopy with biopsy  11/26/2014     Performed by Solo Higginbotham M.D. at ENDOSCOPY [facility]   • Umbilical hernia repair N/A 11/6/2016     Procedure: UMBILICAL HERNIA REPAIR;  Surgeon: Esau Dooley M.D.;  Location: SURGERY [facility];  Service:    • Other orthopedic surgery  1997 or 1998     Left Wrist ORIF   • Umbilical hernia repair  3/10/2017     Procedure: UMBILICAL HERNIA REPAIR- INCISION AND DRAINAGE OF UMBILICAL WOUND AND MESH REMOVAL;  Surgeon: Esau Dooley M.D.;  Location: SURGERY SAME DAY [facility];  Service:    • Incision and drainage general  4/7/2017     Procedure: INCISION AND DRAINAGE GENERAL;  Surgeon: Esau Dooley M.D.;  Location: SURGERY SAME DAY [facility];  Service:    • Irrigation & debridement general Right 4/29/2017     Procedure: IRRIGATION & DEBRIDEMENT GENERAL;  Surgeon: Esau Dooley M.D.;  Location: SURGERY [facility];  Service:    • Irrigation & debridement general Right 5/1/2017     Procedure: IRRIGATION & DEBRIDEMENT GENERAL-Left HAND AND right  ANKLE;  Surgeon: Esau Dooley M.D.;  Location: SURGERY [facility];  Service:          Objective:      Tests and Measures: none today    Fall Risk Assessment (manjula all that apply with an X):6/3/17 (-) fall risk     Interventions Recommended (if any of the above are selected):   Use of Assistive Device:________________________   Supervision with " ambulation:  Caregiver   Assistance with ambulation:  Caregiver   Home safety education:  Educational material provided    Orthotic, protective, supportive devices: none     Wound Characteristics                                                    Location:  R lateral thigh   Initial Evaluation  Date:6/3/17 Encounter note  Date: 17   Tissue Type and %: 100% red/pink granular 100% pink/red hypergranular tissue   Periwound: intact intact   Drainage: Mod ss-canister changed 17 Scant ss   Exposed structures none none   Wound Edges:   Attached, mild maceration attached   Odor: none none   S&S of Infection:   none none   Edema: none none   Sensation: intact intact               Measurements:  R lateral thigh Initial Evaluation  Date:6/3/17 Encounter note  Date: 2017   Length (cm) 11.5 9.7   Width (cm) 1.2 1.5   Depth (cm) 0.1 + 0.1   Area (cm2) 13.8 14.55 cm2   Tract/undermine  none        Procedures:  All foam removed by tech, no retained foam noted, wound has no depth and no tracts   Debridement :  Non selective debridement with gauze and cotton tipped applicator.              Cleansed with:      NS to wound, no rinse foam cleanser to leg   Periwound protected with: no sting skin prep, benzoin   Primary dressin piece black granufoam cut to fit wound, and 1 piece of black granufoam for button to attach trac pad (2 pieces total)   Secondary Dressing:drape tape   Other: NPWT resumed at 125mmHg, continuous     Patient Education:  Reinforced education on how to trouble shoot vac issues, problems and order supplies. 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 Discussed POC, wound care rationale, dressing selection and to return to [facility] three times weekly for appts. Pt instructed to keep dressing CDI and to return to ER for any s/s infection, n/v, fever or chills. Pt verbalized understanding to all.    Professional Collaboration: none today      Assessment:      Wound etiology:   surgical    Wound Progress: slightly larger per measurements, hypergranular tissue    Rationale for Treatment:NPWT to facilitate tissue granulation and wound healing, manage exudate, minimize risk for infection, expedite wound closure    Patient tolerance/compliance: Pt verbalized understanding of initial instructions and education; consented to POC    Complicating factors:DM, multiple wounds    Need for ongoing Advanced Wound Care services:Pt requires continued skilled wound care for sharp debridement prn, dressing management and application, patient education, and clinical observation       Plan:      Treatment Plan and Recommendations:  Diagnosis/ICD10:     Procedures/CPT:  NPWT 61521    Frequency: 3x/week      Treatment Goals: STG 2 Weeks  LTG 4 Weeks   Granulation Tissue: 100% resolved   Decrease Necrotic Tissue to: %    Wound Phase:      Decrease Size by: 50%    Periwound:      Decrease tracts/undermining by: %    Decrease Pain:          At the time of each visit a thorough assessment of the patient is completed to assure the  appropriateness of our plan of care.  The dressings or modalities may need to be adapted   from the original plan to address any significant changes in the wound environment.          Clinician Signature:_____________ __Date________      Physician Signature:______________________________Date:__________________

## 2017-06-10 ENCOUNTER — NON-PROVIDER VISIT (OUTPATIENT)
Dept: WOUND CARE | Facility: MEDICAL CENTER | Age: 52
End: 2017-06-10
Attending: SURGERY
Payer: COMMERCIAL

## 2017-06-10 PROCEDURE — 97597 DBRDMT OPN WND 1ST 20 CM/<: CPT

## 2017-06-10 PROCEDURE — A6402 STERILE GAUZE <= 16 SQ IN: HCPCS

## 2017-06-10 NOTE — WOUND TEAM
Advanced Wound Care  [facility]  [address]  [address]  [place] [address]  [phone] Fax: [phone]      Encounter note  For Certification Period:6/3/17-7/3/17      Referring Physician: Esau Dooley MD  Primary Physician:      YAIR Mohan  Consulting Physicians:     MD Caroline for wound care    Wound(s):R lateral thigh  Start of Care: 6/3/17       Subjective:        HPI:        52 year old male referred to [facility] by Home Health for continued treatment R lateral thigh wound w/NPWT.  Onset:  Abscess post surgical I/D w/hospital admission 4/29/17-5/11/17. Pt seen previously at [facility] for abdominal wound, now resolved. Pt was receiving tx to left wrist and R medial ank;e during Home Care admission.  These wounds have also resolved.     Pain:   C/o tenderness with palpation of wound        Current Medications:no changes per pt    Allergies: Peanut-derived; Tradjenta; and Hydrocodone         Objective:      Tests and Measures: none today    Fall Risk Assessment (manjula all that apply with an X):6/3/17 (-) fall risk    Orthotic, protective, supportive devices: none     Wound Characteristics                                                    Location:  R lateral thigh   Initial Evaluation  Date:6/3/17 Encounter note  Date: 06/10/17   Tissue Type and %: 100% red/pink granular 100% pink/red hypergranular tissue   Periwound: intact intact   Drainage: Mod ss-canister changed 5/31/17 Scant ss   Exposed structures none none   Wound Edges:   Attached, mild maceration attached   Odor: none none   S&S of Infection:   none none   Edema: none none   Sensation: intact intact               Measurements:  R lateral thigh Initial Evaluation  Date:6/3/17 Encounter note  Date: 06/08/2017   Length (cm) 11.5 9.7   Width (cm) 1.2 1.5   Depth (cm) 0.1 + 0.1   Area (cm2) 13.8 14.55 cm2   Tract/undermine  none        Procedures:  All foam removed by tech, no retained foam noted, wound has no depth and no  tracts   Debridement :  Selective debridement with scalpel to remove 14cm2 biofilm, AgNO3 to hypergranulation tissue.              Cleansed with:      NS to wound, no rinse foam cleanser to leg   Periwound protected with: no sting skin prep, benzoin   Primary dressin piece black granufoam cut to fit wound and to accommodate trac pad (1 pieces total)   Secondary Dressing:drape tape   Other: NPWT resumed at 125mmHg, continuous     Patient Education:  Wound progress, anticipate NPWT DC within 1 week.   Professional Collaboration: none today      Assessment:      Wound etiology:  surgical    Wound Progress: wound armando,  Mild wound edge hypergranular tissue treated this tx w/AgNO3    Rationale for Treatment:NPWT to facilitate tissue granulation and wound healing, manage exudate, minimize risk for infection, expedite wound closure    Patient tolerance/compliance: Pt verbalized understanding of initial instructions and education; consented to POC    Complicating factors:DM, multiple wounds    Need for ongoing Advanced Wound Care services:Pt requires continued skilled wound care for sharp debridement prn, dressing management and application, patient education, and clinical observation       Plan:      Treatment Plan and Recommendations:  Diagnosis/ICD10:     Procedures/CPT:  NPWT 77494    Frequency: 3x/week      Treatment Goals: STG 2 Weeks  LTG 4 Weeks   Granulation Tissue: 100% resolved   Decrease Necrotic Tissue to: %    Wound Phase:      Decrease Size by: 50%    Periwound:      Decrease tracts/undermining by: %    Decrease Pain:          At the time of each visit a thorough assessment of the patient is completed to assure the  appropriateness of our plan of care.  The dressings or modalities may need to be adapted   from the original plan to address any significant changes in the wound environment.

## 2017-06-12 ENCOUNTER — NON-PROVIDER VISIT (OUTPATIENT)
Dept: WOUND CARE | Facility: MEDICAL CENTER | Age: 52
End: 2017-06-12
Attending: SURGERY
Payer: COMMERCIAL

## 2017-06-12 PROCEDURE — A6402 STERILE GAUZE <= 16 SQ IN: HCPCS

## 2017-06-12 PROCEDURE — A6209 FOAM DRSG <=16 SQ IN W/O BDR: HCPCS

## 2017-06-12 PROCEDURE — 97602 WOUND(S) CARE NON-SELECTIVE: CPT

## 2017-06-12 PROCEDURE — A6212 FOAM DRG <=16 SQ IN W/BORDER: HCPCS

## 2017-06-12 NOTE — WOUND TEAM
Advanced Wound Care  [facility]  [address]  [address]  [place] [address]  [phone] Fax: [phone]      Encounter note  For Certification Period:6/3/17-7/3/17      Referring Physician: Esau Dooley MD  Primary Physician:      YAIR Mohan  Consulting Physicians:     MD Caroline for wound care    Wound(s):R lateral thigh  Start of Care: 6/3/17       Subjective:        HPI:        52 year old male referred to [facility] by Home Health for continued treatment R lateral thigh wound w/NPWT.  Onset:  Abscess post surgical I/D w/hospital admission 4/29/17-5/11/17. Pt seen previously at [facility] for abdominal wound, now resolved. Pt was receiving tx to left wrist and R medial ank;e during Home Care admission.  These wounds have also resolved.     Pain:   C/o tenderness with palpation of wound        Current Medications:no changes per pt    Allergies: Peanut-derived; Tradjenta; and Hydrocodone         Objective:      Tests and Measures: none today    Fall Risk Assessment (manjula all that apply with an X):6/3/17 (-) fall risk    Orthotic, protective, supportive devices: none     Wound Characteristics                                                    Location:  R lateral thigh   Initial Evaluation  Date:6/3/17 Encounter note  Date: 06/12/17   Tissue Type and %: 100% red/pink granular 100% pink/red granular tissue, hypergranulation at distal end.   Periwound: intact intact   Drainage: Mod ss-canister changed 5/31/17 Scant ss   Exposed structures none none   Wound Edges:   Attached, mild maceration attached   Odor: none none   S&S of Infection:   none none   Edema: none none   Sensation: intact intact               Measurements:  R lateral thigh Initial Evaluation  Date:6/3/17 Encounter note  Date: 06/08/2017   Length (cm) 11.5 9.7   Width (cm) 1.2 1.5   Depth (cm) 0.1 + 0.1   Area (cm2) 13.8 14.55 cm2   Tract/undermine  none     VAC on hold 6/12/17. Instr. Pt that VAC will go back on if wound does not  continue to heal.     Procedures:  All foam removed by tech, no retained foam noted, wound has no depth and no tracts   Debridement :  Non selective debridement with gauze and cotton tipped applicator.              Cleansed with:      NS to wound, no rinse foam cleanser to leg   Periwound protected with: no sting skin prep   Primary dressing:  Hydrofera Blue fenestrated.    Secondary nonadhesive foam and Hypafix tape   Other:     Patient Education: Wound has no depth, and part of it has grown up above skin level. VAC placed on hold today.    Previous appt:  Wound progress, anticipate NPWT DC within 1 week.     Professional Collaboration: none today      Assessment:      Wound etiology:  surgical    Wound Progress: wound armando, distal portion of wound with hypergranulation.  Rationale for Treatment:NPWT to facilitate tissue granulation and wound healing, manage exudate, minimize risk for infection, expedite wound closure    Patient tolerance/compliance: Pt verbalized understanding of initial instructions and education; consented to POC    Complicating factors:DM, multiple wounds    Need for ongoing Advanced Wound Care services:Pt requires continued skilled wound care for sharp debridement prn, dressing management and application, patient education, and clinical observation       Plan:      Treatment Plan and Recommendations:  Diagnosis/ICD10:     Procedures/CPT:  NPWT 45114    Frequency: 3x/week      Treatment Goals: STG 2 Weeks  LTG 4 Weeks   Granulation Tissue: 100% resolved   Decrease Necrotic Tissue to: %    Wound Phase:      Decrease Size by: 50%    Periwound:      Decrease tracts/undermining by: %    Decrease Pain:          At the time of each visit a thorough assessment of the patient is completed to assure the  appropriateness of our plan of care.  The dressings or modalities may need to be adapted   from the original plan to address any significant changes in the wound environment.

## 2017-06-14 ENCOUNTER — NON-PROVIDER VISIT (OUTPATIENT)
Dept: WOUND CARE | Facility: MEDICAL CENTER | Age: 52
End: 2017-06-14
Attending: SURGERY
Payer: COMMERCIAL

## 2017-06-14 PROCEDURE — A6212 FOAM DRG <=16 SQ IN W/BORDER: HCPCS

## 2017-06-14 PROCEDURE — 97597 DBRDMT OPN WND 1ST 20 CM/<: CPT

## 2017-06-14 PROCEDURE — 303972 HCHG HYPAFIX RET DRST 18SQ PC"

## 2017-06-14 PROCEDURE — A6402 STERILE GAUZE <= 16 SQ IN: HCPCS

## 2017-06-14 SDOH — ECONOMIC STABILITY: HOUSING INSECURITY: UNSAFE APPLIANCES: 0

## 2017-06-14 SDOH — ECONOMIC STABILITY: HOUSING INSECURITY: UNSAFE COOKING RANGE AREA: 0

## 2017-06-14 ASSESSMENT — ACTIVITIES OF DAILY LIVING (ADL)
HOME_HEALTH_OASIS: 01
HOME_HEALTH_OASIS: 00
OASIS_M1830: 00

## 2017-06-14 NOTE — WOUND TEAM
Advanced Wound Care  [facility]  [address]  [address]  [place] [address]  [phone] Fax: [phone]      Encounter note  For Certification Period:6/3/17-7/3/17      Referring Physician: Esau Dooley MD  Primary Physician:      YAIR Mohan  Consulting Physicians:     MD Caroline for wound care    Wound(s):R lateral thigh  Start of Care: 6/3/17       Subjective:        HPI:        52 year old male referred to [facility] by Home Health for continued treatment R lateral thigh wound w/NPWT.  Onset:  Abscess post surgical I/D w/hospital admission 4/29/17-5/11/17. Pt seen previously at [facility] for abdominal wound, now resolved. Pt was receiving tx to left wrist and R medial ank;e during Home Care admission.  These wounds have also resolved.     Pain:   C/o tenderness with palpation of wound        Current Medications:no changes per pt    Allergies: Peanut-derived; Tradjenta; and Hydrocodone         Objective:      Tests and Measures: none today    Fall Risk Assessment (manjula all that apply with an X):6/3/17 (-) fall risk    Orthotic, protective, supportive devices: none     Wound Characteristics                                                    Location:  R lateral thigh   Initial Evaluation  Date:6/3/17 Encounter note  Date: 06/14/17   Tissue Type and %: 100% red/pink granular 100% pink/red granular tissue intermixed with hypergranulation   Periwound: intact intact   Drainage: Mod ss-canister changed 5/31/17 moderate ss   Exposed structures none none   Wound Edges:   Attached, mild maceration open   Odor: none none   S&S of Infection:   none none   Edema: none none   Sensation: intact intact               Measurements:  R lateral thigh Initial Evaluation  Date:6/3/17 Encounter note  Date: 06/08/2017   Length (cm) 11.5 9.7   Width (cm) 1.2 1.5   Depth (cm) 0.1 + 0.1   Area (cm2) 13.8 14.55 cm2   Tract/undermine  none     VAC on hold 6/12/17. Instr. Pt that VAC will go back on if wound does not  continue to heal.     Procedures:     Debridement :  CSWD with scalpel to biofilm of ~10 cm2 with silver nitrate to hypergranulation tissue              Cleansed with:      NS to wound, no rinse foam cleanser to leg   Periwound protected with: no sting skin prep   Primary dressing:  Hydrofera Blue fenestrated.    Secondary: adhesive foam and Hypafix tape   Other:     Patient Education: Importance of nutrition & hydration for wound healing; as well as tight management of blood sugars.     Professional Collaboration: none today      Assessment:      Wound etiology:  surgical    Wound Progress: wound armando    Rationale for Treatment:NPWT to facilitate tissue granulation and wound healing, hydrofera blue for antimicrobial & manage drainage    Patient tolerance/compliance: Pt verbalized understanding of initial instructions and education; consented to POC    Complicating factors:DM, multiple wounds    Need for ongoing Advanced Wound Care services:Pt requires continued skilled wound care for sharp debridement prn, dressing management and application, patient education, and clinical observation       Plan:      Treatment Plan and Recommendations:  Diagnosis/ICD10:     Procedures/CPT:  NPWT 85951    Frequency: 3x/week for NPWT then 2x/ week if discontinued      Treatment Goals: STG 2 Weeks  LTG 4 Weeks   Granulation Tissue: 100% resolved   Decrease Necrotic Tissue to: %    Wound Phase:      Decrease Size by: 50%    Periwound:      Decrease tracts/undermining by: %    Decrease Pain:          At the time of each visit a thorough assessment of the patient is completed to assure the  appropriateness of our plan of care.  The dressings or modalities may need to be adapted   from the original plan to address any significant changes in the wound environment.

## 2017-06-14 NOTE — WOUND TEAM
"Pt is a no call, no show, called pt at home. Pt states, \" I'm sorry, I overslept.\" Instructed pt to call and reschedule if he would like to be seen today. Vac break was started on 6/12. Pt verbalized understanding.  "

## 2017-06-16 ENCOUNTER — NON-PROVIDER VISIT (OUTPATIENT)
Dept: WOUND CARE | Facility: MEDICAL CENTER | Age: 52
End: 2017-06-16
Attending: SURGERY
Payer: COMMERCIAL

## 2017-06-16 PROCEDURE — A6402 STERILE GAUZE <= 16 SQ IN: HCPCS

## 2017-06-16 PROCEDURE — 97602 WOUND(S) CARE NON-SELECTIVE: CPT

## 2017-06-16 PROCEDURE — 303972 HCHG HYPAFIX RET DRST 18SQ PC"

## 2017-06-16 PROCEDURE — A6212 FOAM DRG <=16 SQ IN W/BORDER: HCPCS

## 2017-06-16 PROCEDURE — A6209 FOAM DRSG <=16 SQ IN W/O BDR: HCPCS

## 2017-06-16 NOTE — WOUND TEAM
Advanced Wound Care  [facility]  [address]  [address]  [place] [address]  [phone] Fax: [phone]      Encounter note  For Certification Period:6/3/17-7/3/17      Referring Physician: Esau Dooley MD  Primary Physician:      YAIR Mohan  Consulting Physicians:     MD Caroline for wound care    Wound(s):R lateral thigh  Start of Care: 6/3/17       Subjective:        HPI:        52 year old male referred to [facility] by Home Health for continued treatment R lateral thigh wound w/NPWT.  Onset:  Abscess post surgical I/D w/hospital admission 4/29/17-5/11/17. Pt seen previously at [facility] for abdominal wound, now resolved. Pt was receiving tx to left wrist and R medial ank;e during Home Care admission.  These wounds have also resolved.     Pain:   C/o tenderness with palpation of wound        Current Medications:no changes per pt    Allergies: Peanut-derived; Tradjenta; and Hydrocodone         Objective:      Tests and Measures: none today    Fall Risk Assessment (manjula all that apply with an X):6/3/17 (-) fall risk    Orthotic, protective, supportive devices: none     Wound Characteristics                                                    Location:  R lateral thigh   Initial Evaluation  Date:6/3/17 Encounter note  Date: 06/16/17   Tissue Type and %: 100% red/pink granular 100% pink/red granular tissue intermixed with hypergranulation   Periwound: intact Mild maceration   Drainage: Mod ss-canister changed 5/31/17 moderate ss   Exposed structures none none   Wound Edges:   Attached, mild maceration open   Odor: none none   S&S of Infection:   none none   Edema: none none   Sensation: intact intact               Measurements:  R lateral thigh Initial Evaluation  Date:6/3/17 Encounter note  Date: 06/16/2017   Length (cm) 11.5 8.5   Width (cm) 1.2 1.1   Depth (cm) 0.1 + 0.1   Area (cm2) 13.8 9.35 cm2   Tract/undermine  none     D/C VAC 06/16     Procedures:     Debridement :  Nonselective with  NS moistened gauze              Cleansed with:      NS to wound   Periwound protected with: no sting skin prep   Primary dressing:  Hydrofera Blue fenestrated.    Secondary: adhesive foam and Hypafix tape   Other:     Patient Education: Importance of nutrition & hydration for wound healing; as well as tight management of blood sugars.     Professional Collaboration: none today      Assessment:      Wound etiology:  surgical    Wound Progress: wound armando    Rationale for Treatment: Hydrofera blue for antimicrobial & manage drainage    Patient tolerance/compliance: Pt verbalized understanding of initial instructions and education; consented to POC    Complicating factors:DM, multiple wounds    Need for ongoing Advanced Wound Care services:Pt requires continued skilled wound care for sharp debridement prn, dressing management and application, patient education, and clinical observation       Plan:      Treatment Plan and Recommendations:  Diagnosis/ICD10:     Procedures/CPT:  NPWT 28475    Frequency: 3x/week for NPWT then 2x/ week if discontinued      Treatment Goals: STG 2 Weeks  LTG 4 Weeks   Granulation Tissue: 100% resolved   Decrease Necrotic Tissue to: %    Wound Phase:      Decrease Size by: 50%    Periwound:      Decrease tracts/undermining by: %    Decrease Pain:          At the time of each visit a thorough assessment of the patient is completed to assure the  appropriateness of our plan of care.  The dressings or modalities may need to be adapted   from the original plan to address any significant changes in the wound environment.

## 2017-06-20 ENCOUNTER — NON-PROVIDER VISIT (OUTPATIENT)
Dept: WOUND CARE | Facility: MEDICAL CENTER | Age: 52
End: 2017-06-20
Attending: SURGERY
Payer: COMMERCIAL

## 2017-06-20 PROCEDURE — A6402 STERILE GAUZE <= 16 SQ IN: HCPCS

## 2017-06-20 PROCEDURE — 97602 WOUND(S) CARE NON-SELECTIVE: CPT

## 2017-06-20 PROCEDURE — A6212 FOAM DRG <=16 SQ IN W/BORDER: HCPCS

## 2017-06-20 PROCEDURE — 303972 HCHG HYPAFIX RET DRST 18SQ PC"

## 2017-06-20 NOTE — WOUND TEAM
Advanced Wound Care  [facility]  [address]  [address]  [place] [address]  [phone] Fax: [phone]      Encounter note  For Certification Period:6/3/17-7/3/17      Referring Physician: Esau Dooley MD  Primary Physician:      YAIR Mohan  Consulting Physicians:     MD Caroline for wound care    Wound(s):R lateral thigh  Start of Care: 6/3/17       Subjective:        HPI:        52 year old male referred to [facility] by Home Health for continued treatment R lateral thigh wound w/NPWT.  Onset:  Abscess post surgical I/D w/hospital admission 4/29/17-5/11/17. Pt seen previously at [facility] for abdominal wound, now resolved. Pt was receiving tx to left wrist and R medial ank;e during Home Care admission.  These wounds have also resolved.     Pain:   C/o tenderness with palpation of wound        Current Medications:no changes per pt    Allergies: Peanut-derived; Tradjenta; and Hydrocodone         Objective:      Tests and Measures: none today    Fall Risk Assessment (manjula all that apply with an X):6/3/17 (-) fall risk    Orthotic, protective, supportive devices: none     Wound Characteristics                                                    Location:  R lateral thigh   Initial Evaluation  Date:6/3/17 Encounter note  Date: 06/20/17   Tissue Type and %: 100% red/pink granular 100% red granular tissue intermixed with hypergranulation   Periwound: intact Scar tissue   Drainage: Mod ss-canister changed 5/31/17 moderate ss   Exposed structures none none   Wound Edges:   Attached, mild maceration open   Odor: none none   S&S of Infection:   none none   Edema: none none   Sensation: intact intact               Measurements:  R lateral thigh Initial Evaluation  Date:6/3/17 Encounter note  Date: 06/20/2017   Length (cm) 11.5 7.3   Width (cm) 1.2 0.7   Depth (cm) 0.1 + 0.1   Area (cm2) 13.8 5.11 cm2   Tract/undermine  none     D/C VAC 06/16     Procedures:     Debridement :  Nonselective with NS  moistened gauze              Cleansed with:      NS to wound   Periwound protected with: skin prep   Primary dressing:  Hydrofera Blue transfer   Secondary: adhesive foam and Hypafix tape   Other:     Patient Education: Importance of nutrition & hydration for wound healing; as well as tight management of blood sugars.     Professional Collaboration: none today      Assessment:      Wound etiology:  surgical    Wound Progress: wound smaller per measurement    Rationale for Treatment: Hydrofera blue for antimicrobial & manage drainage    Patient tolerance/compliance: Pt verbalized understanding of initial instructions and education; consented to POC    Complicating factors:DM, multiple wounds    Need for ongoing Advanced Wound Care services:Pt requires continued skilled wound care for sharp debridement prn, dressing management and application, patient education, and clinical observation       Plan:      Treatment Plan and Recommendations:  Diagnosis/ICD10:     Procedures/CPT:  NPWT 60448    Frequency: 3x/week for NPWT then 2x/ week if discontinued      Treatment Goals: STG 2 Weeks  LTG 4 Weeks   Granulation Tissue: 100% resolved   Decrease Necrotic Tissue to: %    Wound Phase:      Decrease Size by: 50%    Periwound:      Decrease tracts/undermining by: %    Decrease Pain:          At the time of each visit a thorough assessment of the patient is completed to assure the  appropriateness of our plan of care.  The dressings or modalities may need to be adapted   from the original plan to address any significant changes in the wound environment.

## 2017-06-22 ENCOUNTER — APPOINTMENT (OUTPATIENT)
Dept: WOUND CARE | Facility: MEDICAL CENTER | Age: 52
End: 2017-06-22
Attending: SURGERY
Payer: COMMERCIAL

## 2017-06-24 ENCOUNTER — NON-PROVIDER VISIT (OUTPATIENT)
Dept: WOUND CARE | Facility: MEDICAL CENTER | Age: 52
End: 2017-06-24
Attending: SURGERY
Payer: COMMERCIAL

## 2017-06-24 PROCEDURE — 97602 WOUND(S) CARE NON-SELECTIVE: CPT

## 2017-06-24 PROCEDURE — A6402 STERILE GAUZE <= 16 SQ IN: HCPCS

## 2017-06-24 PROCEDURE — A6212 FOAM DRG <=16 SQ IN W/BORDER: HCPCS

## 2017-06-24 NOTE — WOUND TEAM
Advanced Wound Care  [facility]  [address]  [address]  [place] [address]  [phone] Fax: [phone]      Encounter note  For Certification Period:6/3/17-7/3/17      Referring Physician: Esau Dooley MD  Primary Physician:      YAIR Mohan  Consulting Physicians:     MD Caroline for wound care    Wound(s):R lateral thigh  Start of Care: 6/3/17       Subjective:        HPI:        52 year old male referred to [facility] by Home Health for continued treatment R lateral thigh wound w/NPWT.  Onset:  Abscess post surgical I/D w/hospital admission 4/29/17-5/11/17. Pt seen previously at [facility] for abdominal wound, now resolved. Pt was receiving tx to left wrist and R medial ank;e during Home Care admission.  These wounds have also resolved.     Pain:   C/o tenderness with palpation of wound        Current Medications:no changes per pt    Allergies: Peanut-derived; Tradjenta; and Hydrocodone         Objective:      Tests and Measures: none today    Fall Risk Assessment (manjula all that apply with an X):6/3/17 (-) fall risk    Orthotic, protective, supportive devices: none     Wound Characteristics                                                    Location:  R lateral thigh   Initial Evaluation  Date:6/3/17 Encounter note  Date: 06/20/17 Encounter  6/24/17   Tissue Type and %: 100% red/pink granular 100% red granular tissue intermixed with hypergranulation 100% red granulation tissue   Periwound: intact Scar tissue Scar tissue, new epithelial tissue   Drainage: Mod ss-canister changed 5/31/17 moderate ss Moderate ss   Exposed structures none none none   Wound Edges:   Attached, mild maceration open open   Odor: none none none   S&S of Infection:   none none none   Edema: none none none   Sensation: intact intact intact               Measurements:  R lateral thigh Initial Evaluation  Date:6/3/17 Encounter note  Date: 06/20/2017   Length (cm) 11.5 7.3   Width (cm) 1.2 0.7   Depth (cm) 0.1 + 0.1    Area (cm2) 13.8 5.11 cm2   Tract/undermine  none     D/C VAC 06/16     Procedures:     Debridement :  Nonselective with NS moistened gauze              Cleansed with:      NS to wound   Periwound protected with: skin prep   Primary dressing:  Hydrofera Blue transfer   Secondary:Non adhesive foam and Hypafix tape   Other:     Patient Education: Importance of nutrition & hydration for wound healing; as well as tight management of blood sugars.     Professional Collaboration: none today      Assessment:      Wound etiology:  surgical    Wound Progress: Wound bed with granulation tissue.    Rationale for Treatment: Hydrofera blue for antimicrobial & manage drainage    Patient tolerance/compliance: Pt verbalized understanding of initial instructions and education; consented to POC    Complicating factors:DM, multiple wounds    Need for ongoing Advanced Wound Care services:Pt requires continued skilled wound care for sharp debridement prn, dressing management and application, patient education, and clinical observation       Plan:      Treatment Plan and Recommendations:  Diagnosis/ICD10:     Procedures/CPT:  NPWT 66574    Frequency: 3x/week for NPWT then 2x/ week if discontinued      Treatment Goals: STG 2 Weeks  LTG 4 Weeks   Granulation Tissue: 100% resolved   Decrease Necrotic Tissue to: %    Wound Phase:      Decrease Size by: 50%    Periwound:      Decrease tracts/undermining by: %    Decrease Pain:          At the time of each visit a thorough assessment of the patient is completed to assure the  appropriateness of our plan of care.  The dressings or modalities may need to be adapted   from the original plan to address any significant changes in the wound environment.

## 2017-06-27 ENCOUNTER — NON-PROVIDER VISIT (OUTPATIENT)
Dept: WOUND CARE | Facility: MEDICAL CENTER | Age: 52
End: 2017-06-27
Attending: SURGERY
Payer: COMMERCIAL

## 2017-06-27 ENCOUNTER — PATIENT OUTREACH (OUTPATIENT)
Dept: HEALTH INFORMATION MANAGEMENT | Facility: OTHER | Age: 52
End: 2017-06-27

## 2017-06-27 PROCEDURE — 97597 DBRDMT OPN WND 1ST 20 CM/<: CPT

## 2017-06-27 PROCEDURE — A6402 STERILE GAUZE <= 16 SQ IN: HCPCS

## 2017-06-27 PROCEDURE — 303972 HCHG HYPAFIX RET DRST 18SQ PC"

## 2017-06-27 NOTE — WOUND TEAM
Advanced Wound Care  [facility]  [address]  [address]  [place] [address]  [phone] Fax: [phone]    Encounter note  For Certification Period: 6/3/17 - 7/3/17      Referring Physician: Esau Dooley MD  Primary Physician: YAIR Mohan  Consulting Physicians: MD Caroline for wound care    Wound(s): R lateral thigh  Start of Care: 6/3/17       Subjective:        HPI: 52 year old male referred to [facility] by Home Health for continued treatment R lateral thigh wound w/NPWT. Onset:  Abscess post surgical I/D w/hospital admission 4/29/17-5/11/17. Pt seen previously at [facility] for abdominal wound, now resolved. Pt was receiving tx to left wrist and R medial ank;e during Home Care admission. These wounds have also resolved.     Pain:  C/o slight tenderness with debridement of scab in the very center of wound bed      Current Medications: No changes per pt    Allergies: Peanut-derived; Tradjenta; and Hydrocodone         Objective:      Tests and Measures: none today    Fall Risk Assessment (manjula all that apply with an X): 6/3/17 (-) fall risk    Orthotic, protective, supportive devices: none     Wound Characteristics                                                    Location:  R lateral thigh   Initial Evaluation  Date:6/3/17 Encounter note  Date: 06/20/17 Encounter Date:  6/27/17   Tissue Type and %: 100% red/pink granular 100% red granular tissue intermixed with hypergranulation 100% red granulation tissue with small amount of scab in the center of wound bed - patient could not tolerate to have removed today   Periwound: intact Scar tissue Scar tissue, new epithelial tissue   Drainage: Mod ss-canister changed 5/31/17 moderate ss Moderate ss   Exposed structures none none None   Wound Edges:   Attached, mild maceration Open Open   Odor: None None None   S&S of Infection:   None None None   Edema: None None None   Sensation: intact intact Intact               Measurements:  R lateral thigh  Initial Evaluation  Date:6/3/17 Encounter Date: 6/27/2017   Length (cm) 11.5 3.1   Width (cm) 1.2 0.3   Depth (cm) 0.1 0.1   Area (cm2) 13.8 0.93 cm2   Tract/undermine  None     D/C VAC 06/16     Procedures:     Debridement: CSWD with forceps to remove ~0.5 cm2 of lifting scab from wound bed.               Cleansed with: NS to wound   Periwound protected with: Skin prep, zinc barrier paste   Primary dressing:  Hydrofera Blue transfer   Secondary: Non adhesive foam, hypafix tape   Other:     Patient Education: Patient reported that dressing fell of approximately two hours after last appointment and he just covered with gauze and tape as he was out of town. As such, there was a lifting scab on wound bed. Removed what patient could tolerate today. The proximal and distal ends of wound are epithelialized. Anticipate closure in the next 1-2 weeks. Reinforced importance of nutrition & hydration for wound healing; as well as tight management of blood sugars.     Professional Collaboration: None today      Assessment:      Wound etiology:  surgical    Wound Progress: Wound bed with granulation tissue, smaller per measurements.     Rationale for Treatment: Hydrofera blue for antimicrobial & manage drainage    Patient tolerance/compliance: Pt verbalized understanding of initial instructions and education; consented to POC    Complicating factors:DM, multiple wounds    Need for ongoing Advanced Wound Care services:Pt requires continued skilled wound care for sharp debridement prn, dressing management and application, patient education, and clinical observation       Plan:      Treatment Plan and Recommendations:  Diagnosis/ICD10:     Procedures/CPT:  NPWT 92528    Frequency: 3x/week for NPWT then 2x/ week if discontinued      Treatment Goals: STG 2 Weeks  LTG 4 Weeks   Granulation Tissue: 100% resolved   Decrease Necrotic Tissue to: %    Wound Phase:      Decrease Size by: 50%    Periwound:      Decrease tracts/undermining by:  %    Decrease Pain:          At the time of each visit a thorough assessment of the patient is completed to assure the  appropriateness of our plan of care.  The dressings or modalities may need to be adapted   from the original plan to address any significant changes in the wound environment.

## 2017-06-27 NOTE — Clinical Note
6/27/2017      Mahesh Santi Leeann  1975 [address] [facility]  [place] [address]      Dear Mahesh,    Based on your medical history, our records indicate that you are eligible for [facility] Care Coordination, a free program that focuses on coordinating the care of individuals with ongoing or complex medical needs. Coordinated care can decrease the total cost of care and, most importantly, improve your overall health.    As a Care Coordination participant, you’ll be assigned a personal care coordinator - a medical provider that specializes in carefully monitoring your health and providing care in between visits with your primary care provider and specialists. This program is of no cost to you as it’s a part of [facility]’s ongoing commitment to serving the people of our community.    Benefits of our free Care Coordination program include:  • Priority appointment scheduling with your [facility] healthcare team  • A personal care manager to coordinate your healthcare   • A comprehensive needs assessment  • Development of an individualized care plan   • Chronic disease education  • A free in-home monitoring device (for eligible patients)    Please contact us at [phone] as soon as possible to confirm your enrollment. Once you have confirmed your participation in this program, we will schedule an introduction with your personal care manager.     For your convenience, we are available Monday through Friday, 8:30 am to 5:00 pm.     We look forward to speaking with you soon.    Sincerely,   Grace PHILLIPS  Health  Medical Assistant

## 2017-06-29 ENCOUNTER — APPOINTMENT (OUTPATIENT)
Dept: WOUND CARE | Facility: MEDICAL CENTER | Age: 52
End: 2017-06-29
Attending: SURGERY
Payer: COMMERCIAL

## 2017-07-01 ENCOUNTER — APPOINTMENT (OUTPATIENT)
Dept: WOUND CARE | Facility: MEDICAL CENTER | Age: 52
End: 2017-07-01
Attending: SURGERY
Payer: COMMERCIAL

## 2017-07-03 ENCOUNTER — APPOINTMENT (OUTPATIENT)
Dept: WOUND CARE | Facility: MEDICAL CENTER | Age: 52
End: 2017-07-03
Attending: SURGERY
Payer: COMMERCIAL

## 2017-07-06 NOTE — PROGRESS NOTES
Outcome: Patient returned call . He is not interested in the Care Coordination Program      Attempt # 3

## 2017-07-07 ENCOUNTER — NON-PROVIDER VISIT (OUTPATIENT)
Dept: WOUND CARE | Facility: MEDICAL CENTER | Age: 52
End: 2017-07-07
Attending: SURGERY
Payer: COMMERCIAL

## 2017-07-07 PROCEDURE — A6402 STERILE GAUZE <= 16 SQ IN: HCPCS

## 2017-07-07 PROCEDURE — A6257 TRANSPARENT FILM <= 16 SQ IN: HCPCS

## 2017-07-07 PROCEDURE — 97602 WOUND(S) CARE NON-SELECTIVE: CPT

## 2017-07-07 NOTE — CERTIFICATION
Advanced Wound Care  [facility]  [address]  [address]  [place] [address]  [phone] Fax: [phone]    30 Day Certification Note  For Certification Period: 7/3/17 - 8/3/17      Referring Physician: Esau Dooley MD  Primary Physician: YAIR Mohan  Consulting Physicians: MD Caroline for wound care    Wound(s): R lateral thigh  Start of Care: 6/3/17       Subjective:        HPI: 52 year old male referred to [facility] by Home Health for continued treatment R lateral thigh wound post NPWT. Onset:  Abscess post surgical I/D w/hospital admission 4/29/17-5/11/17. Pt seen previously at [facility] for abdominal wound, now resolved. Pt was receiving tx to left wrist and R medial ankle during Home Care admission; these wounds have also resolved.     Pain: denies any     Current Medications: No changes per pt    Allergies: Peanut-derived; Tradjenta; and Hydrocodone         Objective:      Tests and Measures: none today    Fall Risk Assessment (manjula all that apply with an X): 6/3/17 (-) fall risk    Orthotic, protective, supportive devices: none     Wound Characteristics                                                    Location:  R lateral thigh   Initial Evaluation  Date:6/3/17 Encounter note  Date: 06/20/17 30 Day Certification Note: 7/7/17   Tissue Type and %: 100% red/pink granular 100% red granular tissue intermixed with hypergranulation 100% dry red   Periwound: intact Scar tissue Scar tissue   Drainage: Mod ss-canister changed 5/31/17 moderate ss none   Exposed structures none none None   Wound Edges:   Attached, mild maceration Open Open   Odor: None None None   S&S of Infection:   None None None   Edema: None None None   Sensation: intact intact Intact               Measurements:  R lateral thigh Initial Evaluation  Date:6/3/17 30 Day Certification Note: 7/7/17   Length (cm) 11.5 0.7   Width (cm) 1.2 0.3   Depth (cm) 0.1 0.1   Area (cm2) 13.8 0.21 cm2   Tract/undermine  None     D/C VAC  06/16     Procedures:     Debridement: nonselective with moist gauze              Cleansed with: NS to wound   Periwound protected with: benzoin, applied lotion to scar tissue   Primary dressing: tegaderm film   Secondary: wound edge outlined in black sharpie on tegaderm   Other:     Patient Education: Patient reported that dressing rubs on his pants and pulls off shortly after application; he replaced with bandaid or open to air. Instructed on patient to change dressing if drainage increases larger than black outline of tegaderm. Discussed wound healing principles & maturation phase of scar tissue. Reinforced importance of nutrition & hydration for wound healing; as well as tight management of blood sugars.     Professional Collaboration: 30 Day sent to provider & schedule request for provider for 30 day      Assessment:      Wound etiology:  surgical    Wound Progress: smaller per measurements, very dry wound bed     Rationale for Treatment: optimize moisture    Patient tolerance/compliance: Pt verbalized understanding of instructions and education; consented to POC; very eager to heal wound    Complicating factors: DM    Need for ongoing Advanced Wound Care services:Pt requires continued skilled wound care for sharp debridement prn, dressing management and application, patient education, and clinical observation       Plan:      Treatment Plan and Recommendations:  Diagnosis/ICD10:     Procedures/CPT:  NPWT 55053    Frequency: 1x/week      Treatment Goals: STG 2 Weeks  LTG 4 Weeks   Granulation Tissue: 100% maturation   Decrease Necrotic Tissue to: %    Wound Phase:      Decrease Size by: 100% to be resolved    Periwound:      Decrease tracts/undermining by: %    Decrease Pain:          At the time of each visit a thorough assessment of the patient is completed to assure the  appropriateness of our plan of care.  The dressings or modalities may need to be adapted   from the original plan to address any  significant changes in the wound environment.

## 2017-07-09 ENCOUNTER — RESOLUTE PROFESSIONAL BILLING HOSPITAL PROF FEE (OUTPATIENT)
Dept: HOSPITALIST | Facility: MEDICAL CENTER | Age: 52
End: 2017-07-09
Payer: COMMERCIAL

## 2017-07-09 ENCOUNTER — HOSPITAL ENCOUNTER (INPATIENT)
Facility: MEDICAL CENTER | Age: 52
LOS: 1 days | DRG: 641 | End: 2017-07-10
Attending: EMERGENCY MEDICINE | Admitting: INTERNAL MEDICINE
Payer: COMMERCIAL

## 2017-07-09 ENCOUNTER — APPOINTMENT (OUTPATIENT)
Dept: RADIOLOGY | Facility: MEDICAL CENTER | Age: 52
DRG: 641 | End: 2017-07-09
Attending: EMERGENCY MEDICINE
Payer: COMMERCIAL

## 2017-07-09 DIAGNOSIS — R50.9 FEVER, UNSPECIFIED FEVER CAUSE: ICD-10-CM

## 2017-07-09 DIAGNOSIS — R11.2 INTRACTABLE VOMITING WITH NAUSEA, UNSPECIFIED VOMITING TYPE: ICD-10-CM

## 2017-07-09 DIAGNOSIS — R73.9 HYPERGLYCEMIA: ICD-10-CM

## 2017-07-09 PROBLEM — A41.9 SEPSIS, UNSPECIFIED: Status: ACTIVE | Noted: 2017-07-09

## 2017-07-09 PROBLEM — E87.1 HYPONATREMIA: Status: ACTIVE | Noted: 2017-07-09

## 2017-07-09 LAB
ALBUMIN SERPL BCP-MCNC: 3.3 G/DL (ref 3.2–4.9)
ALBUMIN SERPL BCP-MCNC: 4.4 G/DL (ref 3.2–4.9)
ALBUMIN/GLOB SERPL: 1.4 G/DL
ALP SERPL-CCNC: 82 U/L (ref 30–99)
ALT SERPL-CCNC: 20 U/L (ref 2–50)
ANION GAP SERPL CALC-SCNC: 14 MMOL/L (ref 0–11.9)
APPEARANCE UR: CLEAR
AST SERPL-CCNC: 18 U/L (ref 12–45)
B-OH-BUTYR SERPL-MCNC: 0.19 MMOL/L (ref 0.02–0.27)
BASOPHILS # BLD AUTO: 0.2 % (ref 0–1.8)
BASOPHILS # BLD: 0.02 K/UL (ref 0–0.12)
BILIRUB SERPL-MCNC: 0.7 MG/DL (ref 0.1–1.5)
BILIRUB UR QL STRIP.AUTO: NEGATIVE
BUN SERPL-MCNC: 13 MG/DL (ref 8–22)
BUN SERPL-MCNC: 14 MG/DL (ref 8–22)
CALCIUM SERPL-MCNC: 8.1 MG/DL (ref 8.5–10.5)
CALCIUM SERPL-MCNC: 9 MG/DL (ref 8.5–10.5)
CHLORIDE SERPL-SCNC: 104 MMOL/L (ref 96–112)
CHLORIDE SERPL-SCNC: 95 MMOL/L (ref 96–112)
CO2 SERPL-SCNC: 18 MMOL/L (ref 20–33)
CO2 SERPL-SCNC: 22 MMOL/L (ref 20–33)
COLOR UR: YELLOW
CREAT SERPL-MCNC: 0.75 MG/DL (ref 0.5–1.4)
CREAT SERPL-MCNC: 1.02 MG/DL (ref 0.5–1.4)
CRP SERPL HS-MCNC: 5.65 MG/DL (ref 0–0.75)
EKG IMPRESSION: NORMAL
EOSINOPHIL # BLD AUTO: 0 K/UL (ref 0–0.51)
EOSINOPHIL NFR BLD: 0 % (ref 0–6.9)
ERYTHROCYTE [DISTWIDTH] IN BLOOD BY AUTOMATED COUNT: 45.3 FL (ref 35.9–50)
GFR SERPL CREATININE-BSD FRML MDRD: >60 ML/MIN/1.73 M 2
GFR SERPL CREATININE-BSD FRML MDRD: >60 ML/MIN/1.73 M 2
GLOBULIN SER CALC-MCNC: 3.2 G/DL (ref 1.9–3.5)
GLUCOSE BLD-MCNC: 178 MG/DL (ref 65–99)
GLUCOSE BLD-MCNC: 220 MG/DL (ref 65–99)
GLUCOSE BLD-MCNC: 261 MG/DL (ref 65–99)
GLUCOSE BLD-MCNC: 296 MG/DL (ref 65–99)
GLUCOSE BLD-MCNC: 450 MG/DL (ref 65–99)
GLUCOSE SERPL-MCNC: 193 MG/DL (ref 65–99)
GLUCOSE SERPL-MCNC: 474 MG/DL (ref 65–99)
GLUCOSE UR STRIP.AUTO-MCNC: >=1000 MG/DL
HCT VFR BLD AUTO: 38.9 % (ref 42–52)
HGB BLD-MCNC: 13.5 G/DL (ref 14–18)
IMM GRANULOCYTES # BLD AUTO: 0.04 K/UL (ref 0–0.11)
IMM GRANULOCYTES NFR BLD AUTO: 0.3 % (ref 0–0.9)
KETONES UR STRIP.AUTO-MCNC: NEGATIVE MG/DL
LACTATE BLD-SCNC: 1.5 MMOL/L (ref 0.5–2)
LACTATE BLD-SCNC: 1.9 MMOL/L (ref 0.5–2)
LACTATE BLD-SCNC: 3.3 MMOL/L (ref 0.5–2)
LACTATE BLD-SCNC: 5.7 MMOL/L (ref 0.5–2)
LEUKOCYTE ESTERASE UR QL STRIP.AUTO: NEGATIVE
LIPASE SERPL-CCNC: 10 U/L (ref 11–82)
LYMPHOCYTES # BLD AUTO: 1.25 K/UL (ref 1–4.8)
LYMPHOCYTES NFR BLD: 10.7 % (ref 22–41)
MAGNESIUM SERPL-MCNC: 1.7 MG/DL (ref 1.5–2.5)
MCH RBC QN AUTO: 30.2 PG (ref 27–33)
MCHC RBC AUTO-ENTMCNC: 34.7 G/DL (ref 33.7–35.3)
MCV RBC AUTO: 87 FL (ref 81.4–97.8)
MICRO URNS: ABNORMAL
MONOCYTES # BLD AUTO: 0.43 K/UL (ref 0–0.85)
MONOCYTES NFR BLD AUTO: 3.7 % (ref 0–13.4)
NEUTROPHILS # BLD AUTO: 9.95 K/UL (ref 1.82–7.42)
NEUTROPHILS NFR BLD: 85.1 % (ref 44–72)
NITRITE UR QL STRIP.AUTO: NEGATIVE
NRBC # BLD AUTO: 0 K/UL
NRBC BLD AUTO-RTO: 0 /100 WBC
PH UR STRIP.AUTO: 5 [PH]
PHOSPHATE SERPL-MCNC: 2.7 MG/DL (ref 2.5–4.5)
PHOSPHATE SERPL-MCNC: 2.9 MG/DL (ref 2.5–4.5)
PLATELET # BLD AUTO: 201 K/UL (ref 164–446)
PMV BLD AUTO: 10.2 FL (ref 9–12.9)
POTASSIUM SERPL-SCNC: 3.8 MMOL/L (ref 3.6–5.5)
POTASSIUM SERPL-SCNC: 4 MMOL/L (ref 3.6–5.5)
PROT SERPL-MCNC: 7.6 G/DL (ref 6–8.2)
PROT UR QL STRIP: NEGATIVE MG/DL
RBC # BLD AUTO: 4.47 M/UL (ref 4.7–6.1)
RBC UR QL AUTO: NEGATIVE
SODIUM SERPL-SCNC: 127 MMOL/L (ref 135–145)
SODIUM SERPL-SCNC: 134 MMOL/L (ref 135–145)
SP GR UR STRIP.AUTO: 1.04
UROBILINOGEN UR STRIP.AUTO-MCNC: 0.2 MG/DL
WBC # BLD AUTO: 11.7 K/UL (ref 4.8–10.8)

## 2017-07-09 PROCEDURE — 700111 HCHG RX REV CODE 636 W/ 250 OVERRIDE (IP): Performed by: INTERNAL MEDICINE

## 2017-07-09 PROCEDURE — 36415 COLL VENOUS BLD VENIPUNCTURE: CPT

## 2017-07-09 PROCEDURE — 700105 HCHG RX REV CODE 258: Performed by: HOSPITALIST

## 2017-07-09 PROCEDURE — 82010 KETONE BODYS QUAN: CPT

## 2017-07-09 PROCEDURE — 83735 ASSAY OF MAGNESIUM: CPT

## 2017-07-09 PROCEDURE — 700102 HCHG RX REV CODE 250 W/ 637 OVERRIDE(OP): Performed by: INTERNAL MEDICINE

## 2017-07-09 PROCEDURE — 770022 HCHG ROOM/CARE - ICU (200)

## 2017-07-09 PROCEDURE — A9270 NON-COVERED ITEM OR SERVICE: HCPCS | Performed by: EMERGENCY MEDICINE

## 2017-07-09 PROCEDURE — 93005 ELECTROCARDIOGRAM TRACING: CPT | Performed by: EMERGENCY MEDICINE

## 2017-07-09 PROCEDURE — 700102 HCHG RX REV CODE 250 W/ 637 OVERRIDE(OP): Performed by: EMERGENCY MEDICINE

## 2017-07-09 PROCEDURE — 99223 1ST HOSP IP/OBS HIGH 75: CPT | Performed by: INTERNAL MEDICINE

## 2017-07-09 PROCEDURE — 80069 RENAL FUNCTION PANEL: CPT

## 2017-07-09 PROCEDURE — 700105 HCHG RX REV CODE 258: Performed by: EMERGENCY MEDICINE

## 2017-07-09 PROCEDURE — 71010 DX-CHEST-PORTABLE (1 VIEW): CPT

## 2017-07-09 PROCEDURE — 87040 BLOOD CULTURE FOR BACTERIA: CPT

## 2017-07-09 PROCEDURE — 83690 ASSAY OF LIPASE: CPT

## 2017-07-09 PROCEDURE — 74176 CT ABD & PELVIS W/O CONTRAST: CPT

## 2017-07-09 PROCEDURE — 87086 URINE CULTURE/COLONY COUNT: CPT

## 2017-07-09 PROCEDURE — 700101 HCHG RX REV CODE 250: Performed by: INTERNAL MEDICINE

## 2017-07-09 PROCEDURE — 700111 HCHG RX REV CODE 636 W/ 250 OVERRIDE (IP): Performed by: EMERGENCY MEDICINE

## 2017-07-09 PROCEDURE — 700105 HCHG RX REV CODE 258: Performed by: INTERNAL MEDICINE

## 2017-07-09 PROCEDURE — 85025 COMPLETE CBC W/AUTO DIFF WBC: CPT

## 2017-07-09 PROCEDURE — A9270 NON-COVERED ITEM OR SERVICE: HCPCS | Performed by: INTERNAL MEDICINE

## 2017-07-09 PROCEDURE — 96375 TX/PRO/DX INJ NEW DRUG ADDON: CPT

## 2017-07-09 PROCEDURE — 86140 C-REACTIVE PROTEIN: CPT

## 2017-07-09 PROCEDURE — 96365 THER/PROPH/DIAG IV INF INIT: CPT

## 2017-07-09 PROCEDURE — 83605 ASSAY OF LACTIC ACID: CPT | Mod: 91

## 2017-07-09 PROCEDURE — 80053 COMPREHEN METABOLIC PANEL: CPT

## 2017-07-09 PROCEDURE — 99291 CRITICAL CARE FIRST HOUR: CPT

## 2017-07-09 PROCEDURE — 84100 ASSAY OF PHOSPHORUS: CPT

## 2017-07-09 PROCEDURE — 81003 URINALYSIS AUTO W/O SCOPE: CPT

## 2017-07-09 PROCEDURE — 82962 GLUCOSE BLOOD TEST: CPT | Mod: 91

## 2017-07-09 RX ORDER — MORPHINE SULFATE 4 MG/ML
2-4 INJECTION, SOLUTION INTRAMUSCULAR; INTRAVENOUS EVERY 4 HOURS PRN
Status: DISCONTINUED | OUTPATIENT
Start: 2017-07-09 | End: 2017-07-10 | Stop reason: HOSPADM

## 2017-07-09 RX ORDER — MESALAMINE 400 MG/1
1200 CAPSULE, DELAYED RELEASE ORAL 2 TIMES DAILY
Status: DISCONTINUED | OUTPATIENT
Start: 2017-07-09 | End: 2017-07-10 | Stop reason: HOSPADM

## 2017-07-09 RX ORDER — PROMETHAZINE HYDROCHLORIDE 25 MG/1
12.5-25 TABLET ORAL EVERY 4 HOURS PRN
Status: DISCONTINUED | OUTPATIENT
Start: 2017-07-09 | End: 2017-07-10 | Stop reason: HOSPADM

## 2017-07-09 RX ORDER — OXYCODONE HYDROCHLORIDE 5 MG/1
2.5 TABLET ORAL
Status: DISCONTINUED | OUTPATIENT
Start: 2017-07-09 | End: 2017-07-10 | Stop reason: HOSPADM

## 2017-07-09 RX ORDER — SODIUM CHLORIDE 9 MG/ML
500 INJECTION, SOLUTION INTRAVENOUS
Status: DISCONTINUED | OUTPATIENT
Start: 2017-07-09 | End: 2017-07-10 | Stop reason: HOSPADM

## 2017-07-09 RX ORDER — BISACODYL 10 MG
10 SUPPOSITORY, RECTAL RECTAL
Status: DISCONTINUED | OUTPATIENT
Start: 2017-07-09 | End: 2017-07-09

## 2017-07-09 RX ORDER — SODIUM CHLORIDE 9 MG/ML
2000 INJECTION, SOLUTION INTRAVENOUS ONCE
Status: COMPLETED | OUTPATIENT
Start: 2017-07-09 | End: 2017-07-09

## 2017-07-09 RX ORDER — AMOXICILLIN 250 MG
2 CAPSULE ORAL 2 TIMES DAILY
Status: DISCONTINUED | OUTPATIENT
Start: 2017-07-09 | End: 2017-07-09

## 2017-07-09 RX ORDER — ACETAMINOPHEN 325 MG/1
650 TABLET ORAL EVERY 6 HOURS PRN
Status: DISCONTINUED | OUTPATIENT
Start: 2017-07-09 | End: 2017-07-10 | Stop reason: HOSPADM

## 2017-07-09 RX ORDER — BUPROPION HYDROCHLORIDE 150 MG/1
150 TABLET, EXTENDED RELEASE ORAL 2 TIMES DAILY
Status: DISCONTINUED | OUTPATIENT
Start: 2017-07-10 | End: 2017-07-10 | Stop reason: HOSPADM

## 2017-07-09 RX ORDER — PROMETHAZINE HYDROCHLORIDE 25 MG/1
12.5-25 SUPPOSITORY RECTAL EVERY 4 HOURS PRN
Status: DISCONTINUED | OUTPATIENT
Start: 2017-07-09 | End: 2017-07-10 | Stop reason: HOSPADM

## 2017-07-09 RX ORDER — INSULIN GLARGINE 100 [IU]/ML
28 INJECTION, SOLUTION SUBCUTANEOUS EVERY EVENING
Status: DISCONTINUED | OUTPATIENT
Start: 2017-07-09 | End: 2017-07-10 | Stop reason: HOSPADM

## 2017-07-09 RX ORDER — SODIUM CHLORIDE 9 MG/ML
INJECTION, SOLUTION INTRAVENOUS CONTINUOUS
Status: DISCONTINUED | OUTPATIENT
Start: 2017-07-09 | End: 2017-07-10 | Stop reason: HOSPADM

## 2017-07-09 RX ORDER — ACETAMINOPHEN 325 MG/1
650 TABLET ORAL ONCE
Status: COMPLETED | OUTPATIENT
Start: 2017-07-09 | End: 2017-07-09

## 2017-07-09 RX ORDER — SODIUM CHLORIDE 9 MG/ML
1000 INJECTION, SOLUTION INTRAVENOUS ONCE
Status: DISCONTINUED | OUTPATIENT
Start: 2017-07-09 | End: 2017-07-09

## 2017-07-09 RX ORDER — FAMOTIDINE 20 MG/1
20 TABLET, FILM COATED ORAL DAILY
Status: DISCONTINUED | OUTPATIENT
Start: 2017-07-09 | End: 2017-07-10 | Stop reason: HOSPADM

## 2017-07-09 RX ORDER — THYROID 30 MG/1
60 TABLET ORAL DAILY
Status: DISCONTINUED | OUTPATIENT
Start: 2017-07-09 | End: 2017-07-10 | Stop reason: HOSPADM

## 2017-07-09 RX ORDER — OXYCODONE HYDROCHLORIDE 5 MG/1
5 TABLET ORAL
Status: DISCONTINUED | OUTPATIENT
Start: 2017-07-09 | End: 2017-07-10 | Stop reason: HOSPADM

## 2017-07-09 RX ORDER — ONDANSETRON 4 MG/1
4 TABLET, ORALLY DISINTEGRATING ORAL EVERY 4 HOURS PRN
Status: DISCONTINUED | OUTPATIENT
Start: 2017-07-09 | End: 2017-07-10 | Stop reason: HOSPADM

## 2017-07-09 RX ORDER — SODIUM CHLORIDE 9 MG/ML
30 INJECTION, SOLUTION INTRAVENOUS
Status: DISCONTINUED | OUTPATIENT
Start: 2017-07-09 | End: 2017-07-10

## 2017-07-09 RX ORDER — POLYETHYLENE GLYCOL 3350 17 G/17G
1 POWDER, FOR SOLUTION ORAL
Status: DISCONTINUED | OUTPATIENT
Start: 2017-07-09 | End: 2017-07-09

## 2017-07-09 RX ORDER — ONDANSETRON 2 MG/ML
4 INJECTION INTRAMUSCULAR; INTRAVENOUS
Status: DISCONTINUED | OUTPATIENT
Start: 2017-07-09 | End: 2017-07-09

## 2017-07-09 RX ORDER — ONDANSETRON 2 MG/ML
4 INJECTION INTRAMUSCULAR; INTRAVENOUS EVERY 4 HOURS PRN
Status: DISCONTINUED | OUTPATIENT
Start: 2017-07-09 | End: 2017-07-10 | Stop reason: HOSPADM

## 2017-07-09 RX ORDER — MORPHINE SULFATE 4 MG/ML
4 INJECTION, SOLUTION INTRAMUSCULAR; INTRAVENOUS ONCE
Status: COMPLETED | OUTPATIENT
Start: 2017-07-09 | End: 2017-07-09

## 2017-07-09 RX ORDER — ZOLPIDEM TARTRATE 5 MG/1
5 TABLET ORAL NIGHTLY PRN
Status: DISCONTINUED | OUTPATIENT
Start: 2017-07-09 | End: 2017-07-10 | Stop reason: HOSPADM

## 2017-07-09 RX ADMIN — METRONIDAZOLE 500 MG: 500 INJECTION, SOLUTION INTRAVENOUS at 20:34

## 2017-07-09 RX ADMIN — METRONIDAZOLE 500 MG: 500 INJECTION, SOLUTION INTRAVENOUS at 15:10

## 2017-07-09 RX ADMIN — SODIUM CHLORIDE: 9 INJECTION, SOLUTION INTRAVENOUS at 15:09

## 2017-07-09 RX ADMIN — FAMOTIDINE 20 MG: 20 TABLET, FILM COATED ORAL at 16:35

## 2017-07-09 RX ADMIN — HYDROMORPHONE HYDROCHLORIDE 0.25 MG: 1 INJECTION, SOLUTION INTRAMUSCULAR; INTRAVENOUS; SUBCUTANEOUS at 19:56

## 2017-07-09 RX ADMIN — LEVOTHYROXINE, LIOTHYRONINE 60 MG: 19; 4.5 TABLET ORAL at 15:11

## 2017-07-09 RX ADMIN — INSULIN LISPRO 4 UNITS: 100 INJECTION, SOLUTION INTRAVENOUS; SUBCUTANEOUS at 17:19

## 2017-07-09 RX ADMIN — ACETAMINOPHEN 650 MG: 325 TABLET, FILM COATED ORAL at 12:15

## 2017-07-09 RX ADMIN — SODIUM CHLORIDE 2000 ML: 9 INJECTION, SOLUTION INTRAVENOUS at 12:15

## 2017-07-09 RX ADMIN — ONDANSETRON 4 MG: 2 INJECTION INTRAMUSCULAR; INTRAVENOUS at 12:15

## 2017-07-09 RX ADMIN — THERA TABS 1 TABLET: TAB at 15:10

## 2017-07-09 RX ADMIN — MORPHINE SULFATE 2 MG: 4 INJECTION INTRAVENOUS at 21:09

## 2017-07-09 RX ADMIN — MESALAMINE 1200 MG: 400 CAPSULE, DELAYED RELEASE ORAL at 15:11

## 2017-07-09 RX ADMIN — MESALAMINE 1200 MG: 400 CAPSULE, DELAYED RELEASE ORAL at 20:33

## 2017-07-09 RX ADMIN — MORPHINE SULFATE 4 MG: 4 INJECTION INTRAVENOUS at 12:15

## 2017-07-09 RX ADMIN — INSULIN HUMAN 8 UNITS: 100 INJECTION, SOLUTION PARENTERAL at 12:30

## 2017-07-09 RX ADMIN — INSULIN GLARGINE 28 UNITS: 100 INJECTION, SOLUTION SUBCUTANEOUS at 20:31

## 2017-07-09 RX ADMIN — CEFTRIAXONE SODIUM 2 G: 2 INJECTION, POWDER, FOR SOLUTION INTRAMUSCULAR; INTRAVENOUS at 13:29

## 2017-07-09 RX ADMIN — INSULIN LISPRO 3 UNITS: 100 INJECTION, SOLUTION INTRAVENOUS; SUBCUTANEOUS at 20:31

## 2017-07-09 RX ADMIN — OXYCODONE HYDROCHLORIDE 2.5 MG: 5 TABLET ORAL at 17:25

## 2017-07-09 ASSESSMENT — COPD QUESTIONNAIRES
COPD SCREENING SCORE: 1
DO YOU EVER COUGH UP ANY MUCUS OR PHLEGM?: NO/ONLY WITH OCCASIONAL COLDS OR INFECTIONS
HAVE YOU SMOKED AT LEAST 100 CIGARETTES IN YOUR ENTIRE LIFE: NO/DON'T KNOW
DURING THE PAST 4 WEEKS HOW MUCH DID YOU FEEL SHORT OF BREATH: NONE/LITTLE OF THE TIME

## 2017-07-09 ASSESSMENT — PAIN SCALES - GENERAL
PAINLEVEL_OUTOF10: 5
PAINLEVEL_OUTOF10: 3
PAINLEVEL_OUTOF10: 5
PAINLEVEL_OUTOF10: 5
PAINLEVEL_OUTOF10: 3
PAINLEVEL_OUTOF10: 4
PAINLEVEL_OUTOF10: 2
PAINLEVEL_OUTOF10: 2
PAINLEVEL_OUTOF10: 4

## 2017-07-09 ASSESSMENT — LIFESTYLE VARIABLES
ON A TYPICAL DAY WHEN YOU DRINK ALCOHOL HOW MANY DRINKS DO YOU HAVE: 2
EVER FELT BAD OR GUILTY ABOUT YOUR DRINKING: NO
HOW MANY TIMES IN THE PAST YEAR HAVE YOU HAD 5 OR MORE DRINKS IN A DAY: 0
CONSUMPTION TOTAL: NEGATIVE
EVER_SMOKED: NEVER
EVER HAD A DRINK FIRST THING IN THE MORNING TO STEADY YOUR NERVES TO GET RID OF A HANGOVER: NO
TOTAL SCORE: 0
HAVE YOU EVER FELT YOU SHOULD CUT DOWN ON YOUR DRINKING: NO
DO YOU DRINK ALCOHOL: YES
EVER_SMOKED: UNABLE TO EVALUATE AT THIS TIME - NEEDS ASSESSMENT PRIOR TO DISCHARGE
AVERAGE NUMBER OF DAYS PER WEEK YOU HAVE A DRINK CONTAINING ALCOHOL: 0
HAVE PEOPLE ANNOYED YOU BY CRITICIZING YOUR DRINKING: NO
EVER_SMOKED: NEVER
TOTAL SCORE: 0
TOTAL SCORE: 0

## 2017-07-09 NOTE — ED NOTES
Ira from Lab called with critical result of lactic acid of 5.7 at 1103. Critical lab result read back to Ira.   Dr. Heck notified of critical lab result at 1103.  Critical lab result read back by Dr. Heck.

## 2017-07-09 NOTE — IP AVS SNAPSHOT
7/10/2017    Mahesh Bradshaw  [address]  [place] [address]    Dear Mahesh:    [facility] wants to ensure your discharge home is safe and you or your loved ones have had all of your questions answered regarding your care after you leave the hospital.    Below is a list of resources and contact information should you have any questions regarding your hospital stay, follow-up instructions, or active medical symptoms.    Questions or Concerns Regarding… Contact   Medical Questions Related to Your Discharge  (7 days a week, 8am-5pm) Contact a Nurse Care Coordinator   [phone]   Medical Questions Not Related to Your Discharge  (24 hours a day / 7 days a week)  Contact the Nurse Health Line   [phone]    Medications or Discharge Instructions Refer to your discharge packet   or contact your [facility] Primary Care Provider   [phone]   Follow-up Appointment(s) Schedule your appointment via [employer]   or contact Scheduling [phone]   Billing Review your statement via [employer]  or contact Billing [phone]   Medical Records Review your records via [employer]   or contact Medical Records [phone]     You may receive a telephone call within two days of discharge. This call is to make certain you understand your discharge instructions and have the opportunity to have any questions answered. You can also easily access your medical information, test results and upcoming appointments via the [employer] free online health management tool. You can learn more and sign up at [employer]. For assistance setting up your [employer] account, please call [phone].    Once again, we want to ensure your discharge home is safe and that you have a clear understanding of any next steps in your care. If you have any questions or concerns, please do not hesitate to contact us, we are here for you. Thank you for choosing [facility] for your healthcare needs.    Sincerely,    Your [facility] Healthcare Team

## 2017-07-09 NOTE — H&P
HOSPITAL MEDICINE ADMISSION NOTE    Date of Service: 2017   Name: Mahesh Bradshaw   : 1965  MRN: [id number]  CSN: [id number]  Primary Care Physician: KRISSY Brunner    Chief Complaint  Chief Complaint   Patient presents with   • Nausea/Vomiting/Diarrhea   • High Blood Sugar       History of Present Illness  Mahesh Bradshaw is a 52 y.o. diabetic male with history of ulcerative colitis on immunosuppressants, hernia repair with mesh complicated by infection and necrotizing fasciitis, operated by Dr. Dooley and follows Dr. Kat, completed a course of linezolid and follows Wound clinic  Presents nausea, vomiting, 474 BG, took insulin at home   Ate at cafeteria, got diarrhea, BG up yestreday and had trouble keeping it down since. Subjective f or chills. He had episodes of diarrhea that is loose, nonbloody, no different than his usual for ulcerative colitis.  At ED he had a fever. Not hypotensive. Leukocytosis. Lactic 5.7. I instructed ED doctor to do infection work-up including CT abdomen. CXR at endorsement was not done yet either. I instructed ED doctor to look at that as well.   When I saw him at ED, he has midabdominal tenderness, mild to moderate at incision site where he had the mesh placed for hernia repair. L thigh incision from fasciotomy CDI no erythema.     Home Medications  No current facility-administered medications on file prior to encounter.     Current Outpatient Prescriptions on File Prior to Encounter   Medication Sig Dispense Refill   • [] linezolid (ZYVOX) 600 MG Tab Take 600 mg by mouth 2 times a day. 14 day course started 17      • ondansetron (ZOFRAN ODT) 4 MG TABLET DISPERSIBLE Take 4 mg by mouth every four hours as needed for Nausea/Vomiting.     • oxycodone-acetaminophen (PERCOCET-10)  MG Tab Take 1 Tab by mouth every 8 hours as needed for Severe Pain. 30 Tab 0   • thyroid (ARMOUR THYROID) 60 MG Tab Take 60 mg by mouth every day.     •  "Cholecalciferol (VITAMIN D3) 5000 UNITS Tab Take 5,000 Units by mouth every day.     • insulin lispro (HUMALOG) 100 UNIT/ML Solution Inject 2-20 Units as instructed 3 times a day before meals. Sliding Scale - 2 units every 50 > 150     • buPROPion (WELLBUTRIN XL) 300 MG XL tablet Take 300 mg by mouth every morning.     • mesalamine (LIALDA) 1.2 GM TBEC Take 1.2 g by mouth 2 times a day.         Allergies  Peanut-derived; Tradjenta; and Hydrocodone    Past Medical and Surgical History  Past Medical History   Diagnosis Date   • Migraine    • Gout    • Indigestion    • UC (ulcerative colitis) (CMS-HCC) 3-3-17     \"Five BM's per day, takes Lialda\"   • Difficult intubation 2-2016   • Arthritis 3-3-17     \"Spine\"   • Sepsis (CMS-HCC) 1/21/2016   • Essential hypertension 1/22/2016   • Snoring 3-3-17     Has not had a sleep study   • High cholesterol 3-3-17     Does not currently take medication for   • Congestive heart failure (CMS-HCC) 2-2016      3-3-17 \"Not a current issue\"   • ASTHMA 3-3-17     \"Hasn't had to use inhaler in 2 years\"   • Aspiration pneumonia (CMS-HCC) 3-2016   • Breath shortness 3-3-17     \"With Exercise\"   • Hypothyroid 3-3-17     Takes [place] Thyroid   • Pain 3-3-17     \"Left Flank\"   • Heart burn    • Depression 11/26/2014   • Anesthesia      \"Was difficult to intubate 2-2016\"   • Pancreatitis 2-2016     \"D/T Tradjenta was hospitialized for 15 days\"   • Elevated liver enzymes 2-2016   • Electrolyte imbalance 2-2016   • Kidney stones    • ADRIANE (acute kidney injury) (CMS-HCC) 2/24/2016   • RF (renal failure) 2-2016   • Diabetes (CMS-HCC) 3-3-17     Takes Insulin   • DKA (diabetic ketoacidoses) (CMS-HCC) 2-2016     \"10 days on vent with DKA, Renal failure, CHF, elevated liver enzymes and electrolyte imbalance\"   • On mechanically assisted ventilation (CMS-HCC) 2-2016     HX \"On Vent for 10 days at [facility]\".  \"DX Pancreatitis, DKA, CHF, Elevated Liver Enzymes & Electrolyte Imbalance\".     Past Surgical " History   Procedure Laterality Date   • Carmenza by laparoscopy  2005   • Colonoscopy with biopsy  11/26/2014     Performed by Solo Higginbotham M.D. at ENDOSCOPY [facility]   • Umbilical hernia repair N/A 11/6/2016     Procedure: UMBILICAL HERNIA REPAIR;  Surgeon: Esau Dooley M.D.;  Location: SURGERY [facility];  Service:    • Other orthopedic surgery  1997 or 1998     Left Wrist ORIF   • Umbilical hernia repair  3/10/2017     Procedure: UMBILICAL HERNIA REPAIR- INCISION AND DRAINAGE OF UMBILICAL WOUND AND MESH REMOVAL;  Surgeon: Esau Dooley M.D.;  Location: SURGERY SAME DAY [facility];  Service:    • Incision and drainage general  4/7/2017     Procedure: INCISION AND DRAINAGE GENERAL;  Surgeon: Esau Dooley M.D.;  Location: SURGERY SAME DAY [facility];  Service:    • Irrigation & debridement general Right 4/29/2017     Procedure: IRRIGATION & DEBRIDEMENT GENERAL;  Surgeon: Esau Dooley M.D.;  Location: SURGERY [facility];  Service:    • Irrigation & debridement general Right 5/1/2017     Procedure: IRRIGATION & DEBRIDEMENT GENERAL-Left HAND AND right  ANKLE;  Surgeon: Esau Dooley M.D.;  Location: SURGERY [facility];  Service:        Family History  No family history on file.    Social History  Social History     Social History   • Marital Status: Single     Spouse Name: N/A   • Number of Children: N/A   • Years of Education: N/A     Occupational History   • Not on file.     Social History Main Topics   • Smoking status: Never Smoker    • Smokeless tobacco: Not on file   • Alcohol Use: No      Comment: occ   • Drug Use: No   • Sexual Activity: Not on file     Other Topics Concern   • Not on file     Social History Narrative    ** Merged History Encounter **         ** Merged History Encounter **            Review of Systems  ROS    General. Subjective fevers or chills.  Eyes: No vision loss.  ENT: No nasal congestion, epistaxis. No hearing loss.  Respiratory: No  "shortness of breath, productive coughing, wheezing. No hemoptysis.  Cardiovascular: No chest pain, palpitations, murmur or claudications. No orthopnea, exertional chest pain. No dyspnea on exertion.  Gastrointestinal: Nausea, vomiting, diarrhea, abdominal pain.  Genitourinary: No dysuria. No incontinence.  Musoskeletal: No falls, myalgias or arthralgias.  Integumentary: No new rashes  Heme: No obvious lymphadenopathy  Neurologic: No headaches, loss of consciousness or seizure activity.  Psychiatric: Stable mood. Not currently anxious or depressed.    Physical Exam  Filed Vitals:    07/09/17 1030   BP: 141/90   Pulse: 122   Temp: 38.1 °C (100.6 °F)   Resp: 20   Height: 1.727 m (5' 8\")   Weight: 97.523 kg (215 lb)       Physical Exam    General/Constitutional: No acute distress.   Head: Normocephalic, atraumatic  ENT: Oral mucosa is moist. No obvious pharyngeal exudates  Eyes: Pink conjunctiva, no scleral icterus  Neck: Supple, no lymphadenopathy  Cardiovascular: Normal rate and regular rhythm. S1,2 noted. No murmurs, gallops or rubs.  Pulmonary: Clear to auscultation bilaterally. No wheezes, rales or rhonchi.  Abdominal: Soft, nontender, not distended, bowel sounds normoactive. No guarding or peritoneal signs.  Musculoskeletal: No tenderness to palpation of chest wall.  Neurologic: Alert and oriented. Grossly nonfocal, moving all extremities.  Genitourinary: No gross hematuria  Skin: No obvious rash.  Psychiatric: Pleasant, cooperative.  Vitals Reviewed  Labs Reviewed  Imaging reviewed  Nursing notes reviewed    Labs  Lab Results   Component Value Date/Time    WBC 11.7* 07/09/2017 10:47 AM    RBC 4.47* 07/09/2017 10:47 AM    HEMOGLOBIN 13.5* 07/09/2017 10:47 AM    HEMATOCRIT 38.9* 07/09/2017 10:47 AM    MCV 87.0 07/09/2017 10:47 AM    MCH 30.2 07/09/2017 10:47 AM    MCHC 34.7 07/09/2017 10:47 AM    MPV 10.2 07/09/2017 10:47 AM    NEUTROPHILS-POLYS 85.10* 07/09/2017 10:47 AM    LYMPHOCYTES 10.70* 07/09/2017 10:47 AM "    MONOCYTES 3.70 07/09/2017 10:47 AM    EOSINOPHILS 0.00 07/09/2017 10:47 AM    BASOPHILS 0.20 07/09/2017 10:47 AM    ANISOCYTOSIS 1+ 05/06/2017 12:10 AM      Lab Results   Component Value Date/Time    SODIUM 127* 07/09/2017 10:47 AM    POTASSIUM 4.0 07/09/2017 10:47 AM    CHLORIDE 95* 07/09/2017 10:47 AM    CO2 18* 07/09/2017 10:47 AM    GLUCOSE 474* 07/09/2017 10:47 AM    BUN 14 07/09/2017 10:47 AM    CREATININE 1.02 07/09/2017 10:47 AM      Lab Results   Component Value Date/Time    PT 14.8* 04/30/2017 05:30 AM    INR 1.12 04/30/2017 05:30 AM        Imaging  No results found.    Assessment and Plan    Sepsis. Not hypotensive. Mild lactic acidosis. Likely related to diarrhea and abdominal process. Sepsis protocol, trend lactic acid.    Hyperglycemia, uncontrolled. Diabetes type 2. Negative hydrozybutyrate, not likely DKA. Uncontrolled because of infection/fever. High dose sliding scale/correctional insulin, order A1c.     Diarrhea. History of ulcertaive colitis. Because of presentation rule out and cover for community acquired diarrhea. Could also be an ulcerative colitis flare. CT Ab/P not very revealing for acute process. ESR, CRP, consider consulting Dr. Kat GI group for ulcerative colitis. Meanwhile on Rocephin-flagyl, IVF, famotidine, clears, IVF.    Hepatic steatosis. Encourage weight loss, avoidance of alcohol.    Hyponatremia. Mild to moderate. Received normal saline 0.9 from ED. Order STAT sodium level, trend sodium level do sodium studies.    Paharmacologic DVT prophylaxis.    I spent 72 minutes evaluating Mahesh Bradshaw, reviewing the chart, vitals, labs and imaging, discussing the case with ED physician, medication reconciliation, placing orders and enacting the plan above.    CC NASIR Brunner.

## 2017-07-09 NOTE — IP AVS SNAPSHOT
[employer] Access Code: [email or web address]  Expires: 7/23/2017  4:15 AM    [employer]  A secure, online tool to manage your health information     [employer]® is a secure, online tool that connects you to your personalized health information from the privacy of your home -- day or night - making it very easy for you to manage your healthcare. Once the activation process is completed, you can even access your medical information using the Defense.Net princess, which is available for free in the [employer] Princess store or Google Play store.     [employer]Net provides the following levels of access (as shown below):   My Chart Features   [facility] Primary Care Doctor [facility]  Specialists [facility]  Urgent  Care Non-[facility]  Primary Care  Doctor   Email your healthcare team securely and privately 24/7 X X X X   Manage appointments: schedule your next appointment; view details of past/upcoming appointments X      Request prescription refills. X      View recent personal medical records, including lab and immunizations X X X X   View health record, including health history, allergies, medications X X X X   Read reports about your outpatient visits, procedures, consult and ER notes X X X X   See your discharge summary, which is a recap of your hospital and/or ER visit that includes your diagnosis, lab results, and care plan. X X       How to register for [employer]Net:  1. Go to  [email or web address].  2. Click on the Sign Up Now box, which takes you to the New Member Sign Up page. You will need to provide the following information:  a. Enter your [employer]Net Access Code exactly as it appears at the top of this page. (You will not need to use this code after you’ve completed the sign-up process. If you do not sign up before the expiration date, you must request a new code.)   b. Enter your date of birth.   c. Enter your home email address.   d. Click Submit, and follow the next screen’s instructions.  3. Create a [employer]Net ID. This will be your [employer]Net  login ID and cannot be changed, so think of one that is secure and easy to remember.  4. Create a [employer] password. You can change your password at any time.  5. Enter your Password Reset Question and Answer. This can be used at a later time if you forget your password.   6. Enter your e-mail address. This allows you to receive e-mail notifications when new information is available in [employer].  7. Click Sign Up. You can now view your health information.    For assistance activating your [employer] account, call [phone]

## 2017-07-09 NOTE — PROGRESS NOTES
Pt admitted earlier today by my partner Dr Tyler  Re examined in ICU  Resting comfortably  Tolerating some po  States the N is better  Min TTP with palpation in eprigastrium however no peritoneal findings.  Good skin turgor, extremities warm and pink, good pulses  Remainder of exam is benign  Lab showing some dehydration, hypona, no evidence of DKA.  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  All questions answered

## 2017-07-09 NOTE — ED PROVIDER NOTES
"ED Provider Note    CHIEF COMPLAINT  Chief Complaint   Patient presents with   • Nausea/Vomiting/Diarrhea   • High Blood Sugar       HPI  Mahesh Bradshaw is a 52 y.o. male with a history of insulin-dependent diabetes mellitus, ulcerative colitis, gout, migraine headaches, hypertension, aspiration pneumonia, sepsis secondary to a left thigh abscess, and another episode secondary to an infected abdominal hernia, who presents later fever, chills, nausea, vomiting, and abdominal pain since just today. The patient says that shortly after eating at the cafeteria, he developed diarrhea. His blood sugars and began to rise any developed nausea, vomiting, upper abdominal pain. The patient is unable to tolerate any fluids since last night. He checked his blood sugar this morning and it was 498, takes some insulin, and the blood sugar remained elevated at 426. The patient has had diarrhea, but denies any bloody diarrhea. He says that usually when his blood sugars get elevated he develops nausea, vomiting, abdominal pain, and body aches and pains. He has had a sore throat from the vomiting, denies any cough, congestion, or any difficulty breathing.    REVIEW OF SYSTEMS  See HPI for further details. All other systems are negative.     PAST MEDICAL HISTORY  Past Medical History   Diagnosis Date   • Migraine    • Gout    • Indigestion    • UC (ulcerative colitis) (CMS-HCC) 3-3-17     \"Five BM's per day, takes Lialda\"   • Difficult intubation 2-2016   • Arthritis 3-3-17     \"Spine\"   • Sepsis (CMS-HCC) 1/21/2016   • Essential hypertension 1/22/2016   • Snoring 3-3-17     Has not had a sleep study   • High cholesterol 3-3-17     Does not currently take medication for   • Congestive heart failure (CMS-HCC) 2-2016      3-3-17 \"Not a current issue\"   • ASTHMA 3-3-17     \"Hasn't had to use inhaler in 2 years\"   • Aspiration pneumonia (CMS-HCC) 3-2016   • Breath shortness 3-3-17     \"With Exercise\"   • Hypothyroid 3-3-17     Takes " "Ava Thyroid   • Pain 3-3-17     \"Left Flank\"   • Heart burn    • Depression 11/26/2014   • Anesthesia      \"Was difficult to intubate 2-2016\"   • Pancreatitis 2-2016     \"D/T Tradjenta was hospitialized for 15 days\"   • Elevated liver enzymes 2-2016   • Electrolyte imbalance 2-2016   • Kidney stones    • ADRIANE (acute kidney injury) (CMS-HCC) 2/24/2016   • RF (renal failure) 2-2016   • Diabetes (CMS-HCC) 3-3-17     Takes Insulin   • DKA (diabetic ketoacidoses) (CMS-HCC) 2-2016     \"10 days on vent with DKA, Renal failure, CHF, elevated liver enzymes and electrolyte imbalance\"   • On mechanically assisted ventilation (CMS-HCC) 2-2016     HX \"On Vent for 10 days at [facility]\".  \"DX Pancreatitis, DKA, CHF, Elevated Liver Enzymes & Electrolyte Imbalance\".       FAMILY HISTORY  No family history on file.    SOCIAL HISTORY  Social History     Social History   • Marital Status: Single     Spouse Name: N/A   • Number of Children: N/A   • Years of Education: N/A     Social History Main Topics   • Smoking status: Never Smoker    • Smokeless tobacco: Not on file   • Alcohol Use: No      Comment: occ   • Drug Use: No   • Sexual Activity: Not on file     Other Topics Concern   • Not on file     Social History Narrative    ** Merged History Encounter **         ** Merged History Encounter **            SURGICAL HISTORY  Past Surgical History   Procedure Laterality Date   • Carmenza by laparoscopy  2005   • Colonoscopy with biopsy  11/26/2014     Performed by Solo Higginbotham M.D. at [facility]   • Umbilical hernia repair N/A 11/6/2016     Procedure: UMBILICAL HERNIA REPAIR;  Surgeon: Esau Dooley M.D.;  Location: SURGERY [facility];  Service:    • Other orthopedic surgery  1997 or 1998     Left Wrist ORIF   • Umbilical hernia repair  3/10/2017     Procedure: UMBILICAL HERNIA REPAIR- INCISION AND DRAINAGE OF UMBILICAL WOUND AND MESH REMOVAL;  Surgeon: Esau Dooley M.D.;  Location: SURGERY SAME DAY " "[facility] ORS;  Service:    • Incision and drainage general  4/7/2017     Procedure: INCISION AND DRAINAGE GENERAL;  Surgeon: Esau Dooley M.D.;  Location: SURGERY SAME DAY [facility] ORS;  Service:    • Irrigation & debridement general Right 4/29/2017     Procedure: IRRIGATION & DEBRIDEMENT GENERAL;  Surgeon: Esau Dooley M.D.;  Location: SURGERY [facility] ORS;  Service:    • Irrigation & debridement general Right 5/1/2017     Procedure: IRRIGATION & DEBRIDEMENT GENERAL-Left HAND AND right  ANKLE;  Surgeon: Esau Dooley M.D.;  Location: SURGERY [facility] ORS;  Service:        CURRENT MEDICATIONS  Home Medications     **Home medications have not yet been reviewed for this encounter**          ALLERGIES  Allergies   Allergen Reactions   • Peanut-Derived Anaphylaxis     Peanuts   RXN=age 36   • Tradjenta [Linagliptin] Unspecified     Pt developed pancreatitis   • Hydrocodone Hives     Tolerates Morphine and oxycodone  Rxn Age - 29       PHYSICAL EXAM  VITAL SIGNS: /90 mmHg  Pulse 122  Temp(Src) 38.1 °C (100.6 °F)  Resp 20  Ht 1.727 m (5' 8\")  Wt 97.523 kg (215 lb)  BMI 32.70 kg/m2  Constitutional: Awake, alert, in no acute distress, Non-toxic appearance.   HENT: Atraumatic. Bilateral external ears normal, mucous membranes dry, throat nonerythematous without exudates, nose is normal.  Eyes: PERRL, EOMI, conjunctiva moist, noninjected.  Neck: Nontender, Normal range of motion, No nuchal rigidity, No stridor.   Lymphatic: No lymphadenopathy noted.   Cardiovascular: Regular rhythm, tachycardic, rate in the 120s, no murmurs, rubs, gallops.  Thorax & Lungs:  Good breath sounds bilaterally, no wheezes, rales, or retractions.  No chest tenderness.  Abdomen: Bowel sounds normal, Soft, nondistended, mild tenderness to the epigastrium, no tenderness to the known coronary of her lower abdomen, no rebound, guarding, masses. There is a well-healed incision over the abdomen with no surrounding erythema, " induration, or tenderness.  Back: No CVA or spinal tenderness.  Extremities: Intact distal pulses, No edema, No tenderness. There is a well-healed wound over the left anterior thigh from a previous thigh abscess. No surrounding erythema, induration, or tenderness.  Skin: Warm, Dry, No rashes.   Musculoskeletal: No joint swelling or tenderness.  Neurologic: Alert & oriented x 3, sensory and motor function normal. No focal deficits.   Psychiatric: Affect normal, Judgment normal, Mood normal.     EKG  Twelve-lead EKG shows sinus tachycardia, rate of 115, normal intervals, normal axis, no acute ST-T wave elevations or ST depressions, no pathologic Q waves, no evidence of an acute injury or ischemic pattern on my reading, in comparison to previous EKG from April, 2017, there are no acute changes.        RADIOLOGY/PROCEDURES  CT-RENAL COLIC EVALUATION(A/P W/O)   Final Result         1. No urinary tract calculus identified. No renal collecting system in dilatation.      2. No evidence of inflammatory change in the abdomen or pelvis.  The study is however limited due to nonuse of intravenous contrast      4. Hepatomegaly and diffuse hepatic steatosis with focal fatty infiltration near the gallbladder fossa.      5. Fat-containing umbilical hernia.      DX-CHEST-PORTABLE (1 VIEW)   Final Result      No acute cardiac or pulmonary abnormalities are identified.            COURSE & MEDICAL DECISION MAKING  Pertinent Labs & Imaging studies reviewed. (See chart for details)  The patient presents with the above complaints. IV was placed, he was given a 2 L bolus of normal saline, morphine, and Zofran. EKG shows a sinus tachycardia.  CBC shows a white count 11,700, hemoglobin 13.5, 85% polys, chemistry sodium 127, CO2 18, anion gap 14, glucose 474, otherwise normal, beta hydroxybutyric acid normal, urine shows greater than 1000 glucose, otherwise negative.  The patient's only obvious source for his fever is gastrointestinal, as he  has had diarrhea since yesterday. His abdominal exam is benign with no peritoneal signs. His chest x-ray was clear, he has no respiratory symptoms, urine is clear.  He is previous abscess sites over the left thigh and abdominal wall show no signs of infection. The patient says the pain is similar to when he said previous episodes of uncontrolled blood sugars and vomiting.  Case was discussed with Dr. Ortiz for admission. At his request a CT scan of abdomen/pelvis without contrast was obtained which shows no acute inflammatory changes, obstruction, or urinary tract calculus.  Critical care time of 30 minutes.    FINAL IMPRESSION  1. Nausea, vomiting  2. Fever  3. Hyperglycemia  4. Lactic acidosis      Electronically signed by: Sam Heck, 7/9/2017 11:08 AM

## 2017-07-09 NOTE — ED NOTES
Pt presents with n/v/d since last night. This morning pt's blood sugar 498, took home insulin rechecked 2 hrs later 426. Pt febrile and tachycardic, sepsis started. vss no acute distress noted.

## 2017-07-09 NOTE — ED NOTES
erp at bedside for eval. Blood collected and sent. Unable to establish PIV at this time, another RN trying with ultrasound.

## 2017-07-09 NOTE — IP AVS SNAPSHOT
" Home Care Instructions                                                                                                                  Name:Mahesh Bradshaw  Medical Record Number:[id number]  CSN: [id number]    YOB: 1965   Age: 52 y.o.  Sex: male  HT:1.727 m (5' 8\") WT: 100 kg (220 lb 7.4 oz)          Admit Date: 7/9/2017     Discharge Date:   Today's Date: 7/10/2017  Attending Doctor:  No att. providers found                  Allergies:  Peanut-derived; Tradjenta; and Hydrocodone            Discharge Instructions       Discharge Instructions    Discharged to home by car with self. Discharged via walking, hospital escort: Refused.  Special equipment needed: Not Applicable    Be sure to schedule a follow-up appointment with your primary care doctor or any specialists as instructed.     Discharge Plan:   Diet Plan: Discussed  Activity Level: Discussed  Confirmed Follow up Appointment: No (Comments)  Confirmed Symptoms Management: Discussed  Medication Reconciliation Updated: Yes  Influenza Vaccine Indication: Not indicated: Previously immunized this influenza season and > 8 years of age    I understand that a diet low in cholesterol, fat, and sodium is recommended for good health. Unless I have been given specific instructions below for another diet, I accept this instruction as my diet prescription.   Other diet: per home diet    Special Instructions: None    · Is patient discharged on Warfarin / Coumadin?   No     · Is patient Post Blood Transfusion?  No    Depression / Suicide Risk    As you are discharged from this [facility] facility, it is important to learn how to keep safe from harming yourself.    Recognize the warning signs:  · Abrupt changes in personality, positive or negative- including increase in energy   · Giving away possessions  · Change in eating patterns- significant weight changes-  positive or negative  · Change in sleeping patterns- unable to sleep or sleeping all the " time   · Unwillingness or inability to communicate  · Depression  · Unusual sadness, discouragement and loneliness  · Talk of wanting to die  · Neglect of personal appearance   · Rebelliousness- reckless behavior  · Withdrawal from people/activities they love  · Confusion- inability to concentrate     If you or a loved one observes any of these behaviors or has concerns about self-harm, here's what you can do:  · Talk about it- your feelings and reasons for harming yourself  · Remove any means that you might use to hurt yourself (examples: pills, rope, extension cords, firearm)  · Get professional help from the community (Mental Health, Substance Abuse, psychological counseling)  · Do not be alone:Call your Safe Contact- someone whom you trust who will be there for you.  · Call your local CRISIS HOTLINE 487-0919 or 159-572-9098  · Call your local Children's Mobile Crisis Response Team [place] (532) 078-7406 or www.Stockleap  · Call the toll free National Suicide Prevention Hotlines   · National Suicide Prevention Lifeline 121-696-NICL (9431)  · [employer] - Financial Reconciliation Line Network 800-SUICIDE (590-9784)        Your appointments     Jul 13, 2017  9:30 AM   Wound 30 Minute Procedure with Rachele Dodson R.N.   [facility] ([address])    [address] [place] [id number] [phone]                 Discharge Medication Instructions:    Below are the medications your physician expects you to take upon discharge:    Review all your home medications and newly ordered medications with your doctor and/or pharmacist. Follow medication instructions as directed by your doctor and/or pharmacist.    Please keep your medication list with you and share with your physician.               Medication List      START taking these medications        Instructions    Morning Afternoon Evening Bedtime    amoxicillin-clavulanate 875-125 MG Tabs   Commonly known as:  AUGMENTIN        Take 1 Tab by mouth 2 times a day.    Dose:  1 Tab                          CONTINUE taking these medications        Instructions    Morning Afternoon Evening Bedtime    HUYEN THYROID 60 MG Tabs   Last time this was given:  60 mg on 7/10/2017  8:37 AM   Generic drug:  thyroid        Take 60 mg by mouth every day.   Dose:  60 mg                        buPROPion 300 MG XL tablet   Commonly known as:  WELLBUTRIN XL        Take 300 mg by mouth every morning.   Dose:  300 mg                        insulin lispro 100 UNIT/ML Soln   Last time this was given:  3 Units on 7/10/2017  6:27 AM   Commonly known as:  HUMALOG        Inject 10 Units as instructed Once. Sliding Scale - 2 units every 50 > 150   Dose:  10 Units                        LIALDA 1.2 GM Tbec   Generic drug:  mesalamine        Take 1.2 g by mouth 2 times a day.   Dose:  1.2 g                        multivitamin Tabs   Last time this was given:  1 Tab on 7/10/2017  8:37 AM        Take 1 Tab by mouth every day.   Dose:  1 Tab                        * TOUJEO SOLOSTAR 300 UNIT/ML Sopn   Generic drug:  Insulin Glargine        Inject 36 Units as instructed every evening.   Dose:  36 Units                        * TOUJEO SOLOSTAR 300 UNIT/ML Sopn   Generic drug:  Insulin Glargine        Inject 50 Units as instructed Once.   Dose:  50 Units                        Vitamin D3 5000 UNITS Tabs   Last time this was given:  2,000 Units on 7/10/2017  8:37 AM        Take 5,000 Units by mouth every day.   Dose:  5000 Units                        ZINC PO        Take 1 Tab by mouth every day.   Dose:  1 Tab                        * Notice:  This list has 2 medication(s) that are the same as other medications prescribed for you. Read the directions carefully, and ask your doctor or other care provider to review them with you.      STOP taking these medications     linezolid 600 MG Tabs   Commonly known as:  ZYVOX                    Where to Get Your Medications      Information about where to get these  medications is not yet available     ! Ask your nurse or doctor about these medications    - amoxicillin-clavulanate 875-125 MG Tabs            Instructions           Diet / Nutrition:    Follow any diet instructions given to you by your doctor or the dietician, including how much salt (sodium) you are allowed each day.    If you are overweight, talk to your doctor about a weight reduction plan.    Activity:    Remain physically active following your doctor's instructions about exercise and activity.    Rest often.     Any time you become even a little tired or short of breath, SIT DOWN and rest.    Worsening Symptoms:    Report any of the following signs and symptoms to the doctor's office immediately:    *Pain of jaw, arm, or neck  *Chest pain not relieved by medication                               *Dizziness or loss of consciousness  *Difficulty breathing even when at rest   *More tired than usual                                       *Bleeding drainage or swelling of surgical site  *Swelling of feet, ankles, legs or stomach                 *Fever (>100ºF)  *Pink or blood tinged sputum  *Weight gain (3lbs/day or 5lbs /week)           *Shock from internal defibrillator (if applicable)  *Palpitations or irregular heartbeats                *Cool and/or numb extremities    Stroke Awareness    Common Risk Factors for Stroke include:    Age  Atrial Fibrillation  Carotid Artery Stenosis  Diabetes Mellitus  Excessive alcohol consumption  High blood pressure  Overweight   Physical inactivity  Smoking    Warning signs and symptoms of a stroke include:    *Sudden numbness or weakness of the face, arm or leg (especially on one side of the body).  *Sudden confusion, trouble speaking or understanding.  *Sudden trouble seeing in one or both eyes.  *Sudden trouble walking, dizziness, loss of balance or coordination.Sudden severe headache with no known cause.    It is very important to get treatment quickly when a stroke occurs.  If you experience any of the above warning signs, call 911 immediately.                   Disclaimer         Quit Smoking / Tobacco Use:    I understand the use of any tobacco products increases my chance of suffering from future heart disease or stroke and could cause other illnesses which may shorten my life. Quitting the use of tobacco products is the single most important thing I can do to improve my health. For further information on smoking / tobacco cessation call a Toll Free Quit Line at 1-197.393.6146 (*[employer]) or 1-297.235.5465 (American Lung Association) or you can access the web based program at www.lungusa.org.    [place] Tobacco Users Help Line:  (740) 887-8135       Toll Free: 1-571.963.2183  Quit Tobacco Program [facility] (396)093-7706    Crisis Hotline:    [place] Crisis Hotline:  3-705-JAZLMJC or 1-552.514.7571    [place] Crisis Hotline:    1-154.448.2775 or 451-079-0190    Discharge Survey:   Thank you for choosing [facility]. We hope we did everything we could to make your hospital stay a pleasant one. You may be receiving a phone survey and we would appreciate your time and participation in answering the questions. Your input is very valuable to us in our efforts to improve our service to our patients and their families.        My signature on this form indicates that:    1. I have reviewed and understand the above information.  2. My questions regarding this information have been answered to my satisfaction.  3. I have formulated a plan with my discharge nurse to obtain my prescribed medications for home.                  Disclaimer         __________________________________                     __________       ________                       Patient Signature                                                 Date                    Time

## 2017-07-09 NOTE — IP AVS SNAPSHOT
" <p align=\"LEFT\"><IMG SRC=\"[email or web address]\" alt=\"Image\" WIDTH=\"50%\" HEIGHT=\"200\" BORDER=\"\"></p>                   Name:Mahesh Bradshaw  Medical Record Number:[id number]  CSN: [id number]    YOB: 1965   Age: 52 y.o.  Sex: male  HT:1.727 m (5' 8\") WT: 100 kg (220 lb 7.4 oz)          Admit Date: 7/9/2017     Discharge Date:   Today's Date: 7/10/2017  Attending Doctor:  No att. providers found                  Allergies:  Peanut-derived; Tradjenta; and Hydrocodone          Your appointments     Jul 13, 2017  9:30 AM   Wound 30 Minute Procedure with Rachele Dodson R.N.   [facility] ([address] [place] [address])    [address] [place] [id number]   [phone]                 Medication List      Take these Medications        Instructions    amoxicillin-clavulanate 875-125 MG Tabs   Commonly known as:  AUGMENTIN    Take 1 Tab by mouth 2 times a day.   Dose:  1 Tab       ARMOUR THYROID 60 MG Tabs   Generic drug:  thyroid    Take 60 mg by mouth every day.   Dose:  60 mg       buPROPion 300 MG XL tablet   Commonly known as:  WELLBUTRIN XL    Take 300 mg by mouth every morning.   Dose:  300 mg       insulin lispro 100 UNIT/ML Soln   Commonly known as:  HUMALOG    Inject 10 Units as instructed Once. Sliding Scale - 2 units every 50 > 150   Dose:  10 Units       LIALDA 1.2 GM Tbec   Generic drug:  mesalamine    Take 1.2 g by mouth 2 times a day.   Dose:  1.2 g       multivitamin Tabs    Take 1 Tab by mouth every day.   Dose:  1 Tab       * TOUJEO SOLOSTAR 300 UNIT/ML Sopn   Generic drug:  Insulin Glargine    Inject 36 Units as instructed every evening.   Dose:  36 Units       * TOUJEO SOLOSTAR 300 UNIT/ML Sopn   Generic drug:  Insulin Glargine    Inject 50 Units as instructed Once.   Dose:  50 Units       Vitamin D3 5000 UNITS Tabs    Take 5,000 Units by mouth every day.   Dose:  5000 Units       ZINC PO    Take 1 Tab by mouth every day.   Dose:  1 Tab       * Notice:  This list has 2 " medication(s) that are the same as other medications prescribed for you. Read the directions carefully, and ask your doctor or other care provider to review them with you.

## 2017-07-09 NOTE — PROGRESS NOTES
Pt arrived on unit from ED at 1420. HR ST (low 100s), all other VSS on 2L O2 via NC. C/o 4/10 abd cramping, slightly nauseous - per pt recently received zofran in ED.  upon arrival. Dr. Desai aware of pt's arrival, awaiting orders. Resting in bed watching tv. Will continue to monitor.

## 2017-07-09 NOTE — CARE PLAN
Problem: Communication  Goal: The ability to communicate needs accurately and effectively will improve  Outcome: PROGRESSING AS EXPECTED  Pt alert and oriented, able to communicate needs clearly. Will continue to monitor.    Problem: Safety  Goal: Will remain free from falls  Outcome: PROGRESSING AS EXPECTED  Pt remains free from falls at this time - bed in lowest position, treaded socks and appropriate rails in use, bed alarm on, call bell within reach which pt utilizes appropriately. Pt rounded on hourly to address any needs. Will continue to monitor.

## 2017-07-10 VITALS
RESPIRATION RATE: 20 BRPM | BODY MASS INDEX: 33.41 KG/M2 | WEIGHT: 220.46 LBS | OXYGEN SATURATION: 96 % | DIASTOLIC BLOOD PRESSURE: 90 MMHG | HEIGHT: 68 IN | HEART RATE: 99 BPM | TEMPERATURE: 98.6 F | SYSTOLIC BLOOD PRESSURE: 141 MMHG

## 2017-07-10 LAB
ANION GAP SERPL CALC-SCNC: 7 MMOL/L (ref 0–11.9)
BUN SERPL-MCNC: 13 MG/DL (ref 8–22)
CALCIUM SERPL-MCNC: 8 MG/DL (ref 8.5–10.5)
CHLORIDE SERPL-SCNC: 105 MMOL/L (ref 96–112)
CO2 SERPL-SCNC: 24 MMOL/L (ref 20–33)
CREAT SERPL-MCNC: 0.64 MG/DL (ref 0.5–1.4)
ERYTHROCYTE [DISTWIDTH] IN BLOOD BY AUTOMATED COUNT: 45.1 FL (ref 35.9–50)
GFR SERPL CREATININE-BSD FRML MDRD: >60 ML/MIN/1.73 M 2
GLUCOSE BLD-MCNC: 157 MG/DL (ref 65–99)
GLUCOSE SERPL-MCNC: 173 MG/DL (ref 65–99)
HCT VFR BLD AUTO: 35.9 % (ref 42–52)
HGB BLD-MCNC: 12.3 G/DL (ref 14–18)
MCH RBC QN AUTO: 29.6 PG (ref 27–33)
MCHC RBC AUTO-ENTMCNC: 34.3 G/DL (ref 33.7–35.3)
MCV RBC AUTO: 86.3 FL (ref 81.4–97.8)
PLATELET # BLD AUTO: 168 K/UL (ref 164–446)
PMV BLD AUTO: 9.7 FL (ref 9–12.9)
POTASSIUM SERPL-SCNC: 3.5 MMOL/L (ref 3.6–5.5)
RBC # BLD AUTO: 4.16 M/UL (ref 4.7–6.1)
SODIUM SERPL-SCNC: 136 MMOL/L (ref 135–145)
WBC # BLD AUTO: 5.9 K/UL (ref 4.8–10.8)

## 2017-07-10 PROCEDURE — 82962 GLUCOSE BLOOD TEST: CPT

## 2017-07-10 PROCEDURE — 99239 HOSP IP/OBS DSCHRG MGMT >30: CPT | Performed by: HOSPITALIST

## 2017-07-10 PROCEDURE — 700102 HCHG RX REV CODE 250 W/ 637 OVERRIDE(OP): Performed by: HOSPITALIST

## 2017-07-10 PROCEDURE — 700105 HCHG RX REV CODE 258: Performed by: INTERNAL MEDICINE

## 2017-07-10 PROCEDURE — A9270 NON-COVERED ITEM OR SERVICE: HCPCS | Performed by: INTERNAL MEDICINE

## 2017-07-10 PROCEDURE — 700105 HCHG RX REV CODE 258: Performed by: HOSPITALIST

## 2017-07-10 PROCEDURE — A9270 NON-COVERED ITEM OR SERVICE: HCPCS | Performed by: HOSPITALIST

## 2017-07-10 PROCEDURE — 700111 HCHG RX REV CODE 636 W/ 250 OVERRIDE (IP): Performed by: INTERNAL MEDICINE

## 2017-07-10 PROCEDURE — 80048 BASIC METABOLIC PNL TOTAL CA: CPT

## 2017-07-10 PROCEDURE — 85027 COMPLETE CBC AUTOMATED: CPT

## 2017-07-10 PROCEDURE — 700101 HCHG RX REV CODE 250: Performed by: INTERNAL MEDICINE

## 2017-07-10 PROCEDURE — 700102 HCHG RX REV CODE 250 W/ 637 OVERRIDE(OP): Performed by: INTERNAL MEDICINE

## 2017-07-10 RX ORDER — IBUPROFEN 200 MG
600 TABLET ORAL EVERY 6 HOURS PRN
Status: DISCONTINUED | OUTPATIENT
Start: 2017-07-10 | End: 2017-07-10 | Stop reason: HOSPADM

## 2017-07-10 RX ORDER — AMOXICILLIN AND CLAVULANATE POTASSIUM 875; 125 MG/1; MG/1
1 TABLET, FILM COATED ORAL 2 TIMES DAILY
Qty: 10 TAB | Refills: 0 | Status: SHIPPED | OUTPATIENT
Start: 2017-07-10 | End: 2017-08-08

## 2017-07-10 RX ADMIN — BUPROPION HYDROCHLORIDE 150 MG: 150 TABLET, EXTENDED RELEASE ORAL at 06:27

## 2017-07-10 RX ADMIN — VITAMIN D, TAB 1000IU (100/BT) 2000 UNITS: 25 TAB at 08:37

## 2017-07-10 RX ADMIN — SODIUM CHLORIDE: 9 INJECTION, SOLUTION INTRAVENOUS at 09:39

## 2017-07-10 RX ADMIN — CEFTRIAXONE SODIUM 2 G: 2 INJECTION, POWDER, FOR SOLUTION INTRAMUSCULAR; INTRAVENOUS at 08:38

## 2017-07-10 RX ADMIN — LEVOTHYROXINE, LIOTHYRONINE 60 MG: 19; 4.5 TABLET ORAL at 08:37

## 2017-07-10 RX ADMIN — IBUPROFEN 600 MG: 200 TABLET, FILM COATED ORAL at 10:10

## 2017-07-10 RX ADMIN — INSULIN LISPRO 3 UNITS: 100 INJECTION, SOLUTION INTRAVENOUS; SUBCUTANEOUS at 06:27

## 2017-07-10 RX ADMIN — METRONIDAZOLE 500 MG: 500 INJECTION, SOLUTION INTRAVENOUS at 06:27

## 2017-07-10 RX ADMIN — THERA TABS 1 TABLET: TAB at 08:37

## 2017-07-10 RX ADMIN — MESALAMINE 1200 MG: 400 CAPSULE, DELAYED RELEASE ORAL at 08:37

## 2017-07-10 RX ADMIN — FAMOTIDINE 20 MG: 20 TABLET, FILM COATED ORAL at 08:37

## 2017-07-10 ASSESSMENT — PAIN SCALES - GENERAL
PAINLEVEL_OUTOF10: 1
PAINLEVEL_OUTOF10: 0
PAINLEVEL_OUTOF10: 4
PAINLEVEL_OUTOF10: 1

## 2017-07-10 NOTE — CARE PLAN
Problem: Safety  Goal: Will remain free from falls  Intervention: Assess risk factors for falls  Pt's bed in lowest, locked position; bed alarm on, call light within reach       Problem: Pain Management  Goal: Pain level will decrease to patient’s comfort goal  Intervention: Follow pain managment plan developed in collaboration with patient and Interdisciplinary Team  Pt's pain is assessed at a minimum of every 4 hours; prn medications available and given per MAR

## 2017-07-10 NOTE — DISCHARGE INSTRUCTIONS
Discharge Instructions    Discharged to home by car with self. Discharged via walking, hospital escort: Refused.  Special equipment needed: Not Applicable    Be sure to schedule a follow-up appointment with your primary care doctor or any specialists as instructed.     Discharge Plan:   Diet Plan: Discussed  Activity Level: Discussed  Confirmed Follow up Appointment: No (Comments)  Confirmed Symptoms Management: Discussed  Medication Reconciliation Updated: Yes  Influenza Vaccine Indication: Not indicated: Previously immunized this influenza season and > 8 years of age    I understand that a diet low in cholesterol, fat, and sodium is recommended for good health. Unless I have been given specific instructions below for another diet, I accept this instruction as my diet prescription.   Other diet: per home diet    Special Instructions: None    · Is patient discharged on Warfarin / Coumadin?   No     · Is patient Post Blood Transfusion?  No    Depression / Suicide Risk    As you are discharged from this [facility] facility, it is important to learn how to keep safe from harming yourself.    Recognize the warning signs:  · Abrupt changes in personality, positive or negative- including increase in energy   · Giving away possessions  · Change in eating patterns- significant weight changes-  positive or negative  · Change in sleeping patterns- unable to sleep or sleeping all the time   · Unwillingness or inability to communicate  · Depression  · Unusual sadness, discouragement and loneliness  · Talk of wanting to die  · Neglect of personal appearance   · Rebelliousness- reckless behavior  · Withdrawal from people/activities they love  · Confusion- inability to concentrate     If you or a loved one observes any of these behaviors or has concerns about self-harm, here's what you can do:  · Talk about it- your feelings and reasons for harming yourself  · Remove any means that you might use to hurt yourself (examples:  pills, rope, extension cords, firearm)  · Get professional help from the community (Mental Health, Substance Abuse, psychological counseling)  · Do not be alone:Call your Safe Contact- someone whom you trust who will be there for you.  · Call your local CRISIS HOTLINE 073-7493 or 831-041-4923  · Call your local Children's Mobile Crisis Response Team [place] (248) 913-0432 or www.Careerflo  · Call the toll free National Suicide Prevention Hotlines   · National Suicide Prevention Lifeline 296-682-QCON (8926)  · National Hope Line Network 800-SUICIDE (505-3970)

## 2017-07-10 NOTE — PROGRESS NOTES
Spoke with Dr. Soto regarding pt's unrelieved abdominal pain with medications available on MAR; orders received and entered in EPIC.

## 2017-07-10 NOTE — DISCHARGE SUMMARY
CHIEF COMPLAINT ON ADMISSION  Chief Complaint   Patient presents with   • Nausea/Vomiting/Diarrhea   • High Blood Sugar       CODE STATUS  Prior    HPI & HOSPITAL COURSE  This is a 52 y.o. male here with Hx of TIIDM, IBD, acute pancreatitis.  He presented for evaluation on 7/9/17.  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  About 2 hrs later he developed N/V and diarrhea which were quite severe.  On evaluation in the ED he had a lactate of 5, he was significantly dehydrated.  A CT of his abdomen and pelvis were done and benign.  Pt was admitted under sepsis protocol to the ICU and fluid resuscitated.  He was treated empirically for abd sepsis though no source was identified.  He was given Rocephin and Metro.  He started to feel better almost immediately with fluid resuscitation.  His lactate and labs normalized rapidly.  His abd pain markedly improved.  On Hospital day two he is toleratingt a po diet wihtout issues he is up and ambulating without issues.    His cultures at this time remain neg.  We will dc the pt today with a short course of Augmentin.  I have given him my cell phone and asked him to call me tomorrow so I can see how he is doing and review the culture results with him.  He is to come back to the Hospital immediately if he were to worsen in any  Way and I have reviewed this with him at length.  I've also asked him to call me if there are any issues or questions.     Pt did not have sepsis but lactic acidosis due to dehydration and subsequent hypoperfusion.  As it was not sepsis the pt did improve very quickly and was able to be dc'd early    Therefore, he is discharged in good and stable condition with close outpatient follow-up.    SPECIFIC OUTPATIENT FOLLOW-UP  With me by phone tomorrow    DISCHARGE PROBLEM LIST  Active Problems:    Type 2 diabetes mellitus (CMS-HCC) POA: Yes    Hypothyroid POA: Yes    Ulcerative colitis (CMS-HCC) POA: Yes    Essential  hypertension POA: Yes    Sepsis (CMS-HCC) POA: Yes    Hyperglycemia POA: Yes    Hyponatremia POA: Yes  Resolved Problems:    * No resolved hospital problems. *      FOLLOW UP  Future Appointments  Date Time Provider Department Center   7/13/2017 9:30 AM Rachele Dodson R.N. [facility] [address]     No follow-up provider specified.    MEDICATIONS ON DISCHARGE   Mahesh Bradshaw   Home Medication Instructions CALVIN:[id number]    Printed on:07/10/17 1601   Medication Information                      amoxicillin-clavulanate (AUGMENTIN) 875-125 MG Tab  Take 1 Tab by mouth 2 times a day.             buPROPion (WELLBUTRIN XL) 300 MG XL tablet  Take 300 mg by mouth every morning.             Cholecalciferol (VITAMIN D3) 5000 UNITS Tab  Take 5,000 Units by mouth every day.             Insulin Glargine (TOUJEO SOLOSTAR) 300 UNIT/ML Solution Pen-injector  Inject 36 Units as instructed every evening.             Insulin Glargine (TOUJEO SOLOSTAR) 300 UNIT/ML Solution Pen-injector  Inject 50 Units as instructed Once.             insulin lispro (HUMALOG) 100 UNIT/ML Solution  Inject 10 Units as instructed Once. Sliding Scale - 2 units every 50 > 150             mesalamine (LIALDA) 1.2 GM TBEC  Take 1.2 g by mouth 2 times a day.             Multiple Vitamins-Minerals (ZINC PO)  Take 1 Tab by mouth every day.             multivitamin (THERAGRAN) Tab  Take 1 Tab by mouth every day.             thyroid (ARMOUR THYROID) 60 MG Tab  Take 60 mg by mouth every day.                 DIET  No orders of the defined types were placed in this encounter.       ACTIVITY  As tolerated.  Weight bearing as tolerated      CONSULTATIONS      PROCEDURES        LABORATORY  Lab Results   Component Value Date/Time    SODIUM 136 07/10/2017 09:50 AM    POTASSIUM 3.5* 07/10/2017 09:50 AM    CHLORIDE 105 07/10/2017 09:50 AM    CO2 24 07/10/2017 09:50 AM    GLUCOSE 173* 07/10/2017 09:50 AM    BUN 13 07/10/2017 09:50 AM    CREATININE 0.64 07/10/2017 09:50 AM         Lab Results   Component Value Date/Time    WBC 5.9 07/10/2017 09:50 AM    HEMOGLOBIN 12.3* 07/10/2017 09:50 AM    HEMATOCRIT 35.9* 07/10/2017 09:50 AM    PLATELET COUNT 168 07/10/2017 09:50 AM        Total time of the discharge process exceeds 40 minutes

## 2017-07-10 NOTE — PROGRESS NOTES
Pt's pocket knife sent to security for safe keeping - per pt's request, wallet and money, iphone and ipad all remain at bedside.

## 2017-07-11 ENCOUNTER — PATIENT OUTREACH (OUTPATIENT)
Dept: HEALTH INFORMATION MANAGEMENT | Facility: OTHER | Age: 52
End: 2017-07-11

## 2017-07-11 ENCOUNTER — APPOINTMENT (OUTPATIENT)
Dept: WOUND CARE | Facility: MEDICAL CENTER | Age: 52
End: 2017-07-11
Attending: SURGERY
Payer: COMMERCIAL

## 2017-07-11 LAB
BACTERIA UR CULT: NORMAL
BACTERIA UR CULT: NORMAL
SIGNIFICANT IND 70042: NORMAL
SITE SITE: NORMAL
SOURCE SOURCE: NORMAL

## 2017-07-15 ENCOUNTER — APPOINTMENT (OUTPATIENT)
Dept: WOUND CARE | Facility: MEDICAL CENTER | Age: 52
End: 2017-07-15
Attending: SURGERY
Payer: COMMERCIAL

## 2017-07-19 ENCOUNTER — HOSPITAL ENCOUNTER (EMERGENCY)
Facility: MEDICAL CENTER | Age: 52
End: 2017-07-19
Attending: EMERGENCY MEDICINE
Payer: COMMERCIAL

## 2017-07-19 VITALS
SYSTOLIC BLOOD PRESSURE: 154 MMHG | HEIGHT: 68 IN | TEMPERATURE: 98.2 F | RESPIRATION RATE: 16 BRPM | DIASTOLIC BLOOD PRESSURE: 86 MMHG | BODY MASS INDEX: 34.95 KG/M2 | WEIGHT: 230.6 LBS | HEART RATE: 94 BPM

## 2017-07-19 DIAGNOSIS — L02.519 ABSCESS, HAND: ICD-10-CM

## 2017-07-19 LAB
GRAM STN SPEC: NORMAL
GRAM STN SPEC: NORMAL
SIGNIFICANT IND 70042: NORMAL
SITE SITE: NORMAL
SOURCE SOURCE: NORMAL

## 2017-07-19 PROCEDURE — 87186 SC STD MICRODIL/AGAR DIL: CPT

## 2017-07-19 PROCEDURE — 303977 HCHG I & D

## 2017-07-19 PROCEDURE — 87205 SMEAR GRAM STAIN: CPT

## 2017-07-19 PROCEDURE — 99283 EMERGENCY DEPT VISIT LOW MDM: CPT

## 2017-07-19 PROCEDURE — 87070 CULTURE OTHR SPECIMN AEROBIC: CPT

## 2017-07-19 PROCEDURE — 87077 CULTURE AEROBIC IDENTIFY: CPT

## 2017-07-19 ASSESSMENT — PAIN SCALES - GENERAL
PAINLEVEL_OUTOF10: 0
PAINLEVEL_OUTOF10: 6

## 2017-07-19 ASSESSMENT — PAIN SCALES - WONG BAKER: WONGBAKER_NUMERICALRESPONSE: DOESN'T HURT AT ALL

## 2017-07-19 NOTE — ED AVS SNAPSHOT
Home Care Instructions                                                                                                                Mahesh Bradshaw   MRN: [id number]    Department:  [facility], Emergency Dept   Date of Visit:  7/19/2017            [facility], Emergency Dept    [address]    [place] [address]    Phone:  [phone]      You were seen by     Renato Frost M.D.      Your Diagnosis Was     Abscess, hand     L02.519       Follow-up Information     1. Follow up with KRISSY Brunner. Schedule an appointment as soon as possible for a visit in 1 week.    Specialty:  Family Medicine    Why:  For re-check, Return if any symptoms worsen    Contact information    [address]  [place] [id number] [phone]        Medication Information     Review all of your home medications and newly ordered medications with your primary doctor and/or pharmacist as soon as possible. Follow medication instructions as directed by your doctor and/or pharmacist.     Please keep your complete medication list with you and share with your physician. Update the information when medications are discontinued, doses are changed, or new medications (including over-the-counter products) are added; and carry medication information at all times in the event of emergency situations.               Medication List      ASK your doctor about these medications        Instructions    Morning Afternoon Evening Bedtime    amoxicillin-clavulanate 875-125 MG Tabs   Commonly known as:  AUGMENTIN        Take 1 Tab by mouth 2 times a day.   Dose:  1 Tab                        ARMOUR THYROID 60 MG Tabs   Generic drug:  thyroid        Take 60 mg by mouth every day.   Dose:  60 mg                        buPROPion 300 MG XL tablet   Commonly known as:  WELLBUTRIN XL        Take 300 mg by mouth every morning.   Dose:  300 mg                        insulin lispro 100  UNIT/ML Soln   Commonly known as:  HUMALOG        Inject 10 Units as instructed Once. Sliding Scale - 2 units every 50 > 150   Dose:  10 Units                        LIALDA 1.2 GM Tbec   Generic drug:  mesalamine        Take 1.2 g by mouth 2 times a day.   Dose:  1.2 g                        multivitamin Tabs        Take 1 Tab by mouth every day.   Dose:  1 Tab                        * TOUJEO SOLOSTAR 300 UNIT/ML Sopn   Generic drug:  Insulin Glargine        Inject 36 Units as instructed every evening.   Dose:  36 Units                        * TOUJEO SOLOSTAR 300 UNIT/ML Sopn   Generic drug:  Insulin Glargine        Inject 50 Units as instructed Once.   Dose:  50 Units                        Vitamin D3 5000 UNITS Tabs        Take 5,000 Units by mouth every day.   Dose:  5000 Units                        ZINC PO        Take 1 Tab by mouth every day.   Dose:  1 Tab                        * Notice:  This list has 2 medication(s) that are the same as other medications prescribed for you. Read the directions carefully, and ask your doctor or other care provider to review them with you.            Procedures and tests performed during your visit     CULTURE WOUND W/ GRAM STAIN            Patient Information     Patient Information    Following emergency treatment: all patient requiring follow-up care must return either to a private physician or a clinic if your condition worsens before you are able to obtain further medical attention, please return to the emergency room.     Billing Information    At [facility], we work to make the billing process streamlined for our patients.  Our Representatives are here to answer any questions you may have regarding your hospital bill.  If you have insurance coverage and have supplied your insurance information to us, we will submit a claim to your insurer on your behalf.  Should you have any questions regarding your bill, we can be reached online or by phone as follows:  Online:  You are able pay your bills online or live chat with our representatives about any billing questions you may have. We are here to help Monday - Friday from 8:00am to 7:30pm and 9:00am - 12:00pm on Saturdays.  Please visit [email or web address]  for more information.   Phone:  [phone] or [phone]    Please note that your emergency physician, surgeon, pathologist, radiologist, anesthesiologist, and other specialists are not employed by [facility] and will therefore bill separately for their services.  Please contact them directly for any questions concerning their bills at the numbers below:     Emergency Physician Services:  [phone]  [facility]:  [phone]  [facility]:  [phone]  [facility]:  [phone]    1. Your final bill may vary from the amount quoted upon discharge if all procedures are not complete at that time, or if your doctor has additional procedures of which we are not aware. You will receive an additional bill if you return to the Emergency Department at [facility] for suture removal regardless of the facility of which the sutures were placed.     2. Please arrange for settlement of this account at the emergency registration.    3. All self-pay accounts are due in full at the time of treatment.  If you are unable to meet this obligation then payment is expected within 4-5 days.     4. If you have had radiology studies (CT, X-ray, Ultrasound, MRI), you have received a preliminary result during your emergency department visit. Please contact the radiology department [phone] to receive a copy of your final result. Please discuss the Final result with your primary physician or with the follow up physician provided.     Crisis Hotline:  [place] Crisis Hotline:  3-749-IUIADGR or 1-208.307.3209  [place] Crisis Hotline:    1-843.208.2893 or 848-967-7353         ED Discharge Follow Up  Questions    1. In order to provide you with very good care, we would like to follow up with a phone call in the next few days.  May we have your permission to contact you?     YES /  NO    2. What is the best phone number to call you? (       )_____-__________    3. What is the best time to call you?      Morning  /  Afternoon  /  Evening                   Patient Signature:  ____________________________________________________________    Date:  ____________________________________________________________

## 2017-07-19 NOTE — ED AVS SNAPSHOT
7/19/2017    Mahesh Bradshaw  [address]  [place] [address]    Dear Mahesh:    [facility] wants to ensure your discharge home is safe and you or your loved ones have had all of your questions answered regarding your care after you leave the hospital.    Below is a list of resources and contact information should you have any questions regarding your hospital stay, follow-up instructions, or active medical symptoms.    Questions or Concerns Regarding… Contact   Medical Questions Related to Your Discharge  (7 days a week, 8am-5pm) Contact a Nurse Care Coordinator   [phone]   Medical Questions Not Related to Your Discharge  (24 hours a day / 7 days a week)  Contact the Nurse Health Line   [phone]    Medications or Discharge Instructions Refer to your discharge packet   or contact your [facility] Primary Care Provider   [phone]   Follow-up Appointment(s) Schedule your appointment via [employer]   or contact Scheduling [phone]   Billing Review your statement via [employer]  or contact Billing [phone]   Medical Records Review your records via [employer]   or contact Medical Records [phone]     You may receive a telephone call within two days of discharge. This call is to make certain you understand your discharge instructions and have the opportunity to have any questions answered. You can also easily access your medical information, test results and upcoming appointments via the [employer] free online health management tool. You can learn more and sign up at [employer]. For assistance setting up your [employer] account, please call [phone].    Once again, we want to ensure your discharge home is safe and that you have a clear understanding of any next steps in your care. If you have any questions or concerns, please do not hesitate to contact us, we are here for you. Thank you for choosing [facility] for your healthcare needs.    Sincerely,    Your [facility] Healthcare Team

## 2017-07-19 NOTE — ED AVS SNAPSHOT
[employer] Access Code: [email or web address]  Expires: 7/23/2017  4:15 AM    [employer]  A secure, online tool to manage your health information     [employer]’s [employer]® is a secure, online tool that connects you to your personalized health information from the privacy of your home -- day or night - making it very easy for you to manage your healthcare. Once the activation process is completed, you can even access your medical information using the [employer] princess, which is available for free in the [employer] Princess store or Google Play store.     [employer] provides the following levels of access (as shown below):   My Chart Features   [facility] Primary Care Doctor [facility]  Specialists [facility]  Urgent  Care Non-[facility]  Primary Care  Doctor   Email your healthcare team securely and privately 24/7 X X X X   Manage appointments: schedule your next appointment; view details of past/upcoming appointments X      Request prescription refills. X      View recent personal medical records, including lab and immunizations X X X X   View health record, including health history, allergies, medications X X X X   Read reports about your outpatient visits, procedures, consult and ER notes X X X X   See your discharge summary, which is a recap of your hospital and/or ER visit that includes your diagnosis, lab results, and care plan. X X       How to register for [employer]:  1. Go to  [email or web address].  2. Click on the Sign Up Now box, which takes you to the New Member Sign Up page. You will need to provide the following information:  a. Enter your [employer] Access Code exactly as it appears at the top of this page. (You will not need to use this code after you’ve completed the sign-up process. If you do not sign up before the expiration date, you must request a new code.)   b. Enter your date of birth.   c. Enter your home email address.   d. Click Submit, and follow the next screen’s instructions.  3. Create a [employer] ID. This will be your [employer]  login ID and cannot be changed, so think of one that is secure and easy to remember.  4. Create a [employer] password. You can change your password at any time.  5. Enter your Password Reset Question and Answer. This can be used at a later time if you forget your password.   6. Enter your e-mail address. This allows you to receive e-mail notifications when new information is available in [employer].  7. Click Sign Up. You can now view your health information.    For assistance activating your [employer] account, call [phone]

## 2017-07-20 NOTE — ED PROVIDER NOTES
"ED Provider Note    CHIEF COMPLAINT  Chief Complaint   Patient presents with   • Abscess        HPI  Mahesh Bradshaw is a 52 y.o. male who presents to the ED with complaints of an abscess to the right dorsum of the hand. Patient just recently in the hospital. A blood draw was taken from that site. Started having some erythema 2 days ago, was given a prescription of Augmentin and rifampin from his primary care physician. Patient has not filled it yet and it turn into a small abscess today, so he presents to ED for I&D and further treatment. Upon arrival, describes pain to the area. Denies any fever, chills, nausea, vomiting, or any other symptoms.    REVIEW OF SYSTEMS  See HPI for further details. All other systems are negative.     PAST MEDICAL HISTORY  Past Medical History   Diagnosis Date   • Migraine    • Gout    • Indigestion    • UC (ulcerative colitis) (CMS-HCC) 3-3-17     \"Five BM's per day, takes Lialda\"   • Difficult intubation 2-2016   • Arthritis 3-3-17     \"Spine\"   • Sepsis (CMS-HCC) 1/21/2016   • Essential hypertension 1/22/2016   • Snoring 3-3-17     Has not had a sleep study   • High cholesterol 3-3-17     Does not currently take medication for   • Congestive heart failure (CMS-HCC) 2-2016      3-3-17 \"Not a current issue\"   • ASTHMA 3-3-17     \"Hasn't had to use inhaler in 2 years\"   • Aspiration pneumonia (CMS-HCC) 3-2016   • Breath shortness 3-3-17     \"With Exercise\"   • Hypothyroid 3-3-17     Takes [place] Thyroid   • Pain 3-3-17     \"Left Flank\"   • Heart burn    • Depression 11/26/2014   • Anesthesia      \"Was difficult to intubate 2-2016\"   • Pancreatitis 2-2016     \"D/T Tradjenta was hospitialized for 15 days\"   • Elevated liver enzymes 2-2016   • Electrolyte imbalance 2-2016   • Kidney stones    • ADRIANE (acute kidney injury) (CMS-HCC) 2/24/2016   • RF (renal failure) 2-2016   • Diabetes (CMS-HCC) 3-3-17     Takes Insulin   • DKA (diabetic ketoacidoses) (CMS-HCC) 2-2016     \"10 days on " "vent with DKA, Renal failure, CHF, elevated liver enzymes and electrolyte imbalance\"   • On mechanically assisted ventilation (CMS-[facility]) 2-2016     HX \"On Vent for 10 days at [facility]\".  \"DX Pancreatitis, DKA, CHF, Elevated Liver Enzymes & Electrolyte Imbalance\".       FAMILY HISTORY  No family history on file.  Patient's family history has been discussed and is been found to be noncontributory to his present illness  SOCIAL HISTORY  Social History     Social History   • Marital Status: Single     Spouse Name: N/A   • Number of Children: N/A   • Years of Education: N/A     Social History Main Topics   • Smoking status: Never Smoker    • Smokeless tobacco: Not on file   • Alcohol Use: Yes      Comment: occ   • Drug Use: No   • Sexual Activity: Not on file     Other Topics Concern   • Not on file     Social History Narrative    ** Merged History Encounter **         ** Merged History Encounter **           NASIR Brunner        SURGICAL HISTORY  Past Surgical History   Procedure Laterality Date   • Carmenza by laparoscopy  2005   • Colonoscopy with biopsy  11/26/2014     Performed by Solo Higginbotham M.D. at ENDOSCOPY [facility]   • Umbilical hernia repair N/A 11/6/2016     Procedure: UMBILICAL HERNIA REPAIR;  Surgeon: Esau Dooley M.D.;  Location: SURGERY [facility];  Service:    • Other orthopedic surgery  1997 or 1998     Left Wrist ORIF   • Umbilical hernia repair  3/10/2017     Procedure: UMBILICAL HERNIA REPAIR- INCISION AND DRAINAGE OF UMBILICAL WOUND AND MESH REMOVAL;  Surgeon: Esau Dooley M.D.;  Location: SURGERY SAME DAY [facility];  Service:    • Incision and drainage general  4/7/2017     Procedure: INCISION AND DRAINAGE GENERAL;  Surgeon: Esau Dooley M.D.;  Location: SURGERY SAME DAY [facility];  Service:    • Irrigation & debridement general Right 4/29/2017     Procedure: IRRIGATION & DEBRIDEMENT GENERAL;  Surgeon: Esau Dooley M.D.;  Location: [facility]" "ORS;  Service:    • Irrigation & debridement general Right 5/1/2017     Procedure: IRRIGATION & DEBRIDEMENT GENERAL-Left HAND AND right  ANKLE;  Surgeon: Esau Dooley M.D.;  Location: SURGERY [facility] ORS;  Service:        CURRENT MEDICATIONS   Home Medications     **Home medications have not yet been reviewed for this encounter**        No current facility-administered medications on file prior to encounter.     Current Outpatient Prescriptions on File Prior to Encounter   Medication Sig Dispense Refill   • amoxicillin-clavulanate (AUGMENTIN) 875-125 MG Tab Take 1 Tab by mouth 2 times a day. 10 Tab 0   • Insulin Glargine (TOUJEO SOLOSTAR) 300 UNIT/ML Solution Pen-injector Inject 36 Units as instructed every evening.     • Insulin Glargine (TOUJEO SOLOSTAR) 300 UNIT/ML Solution Pen-injector Inject 50 Units as instructed Once.     • multivitamin (THERAGRAN) Tab Take 1 Tab by mouth every day.     • Multiple Vitamins-Minerals (ZINC PO) Take 1 Tab by mouth every day.     • thyroid (ARMOUR THYROID) 60 MG Tab Take 60 mg by mouth every day.     • Cholecalciferol (VITAMIN D3) 5000 UNITS Tab Take 5,000 Units by mouth every day.     • insulin lispro (HUMALOG) 100 UNIT/ML Solution Inject 10 Units as instructed Once. Sliding Scale - 2 units every 50 > 150     • buPROPion (WELLBUTRIN XL) 300 MG XL tablet Take 300 mg by mouth every morning.     • mesalamine (LIALDA) 1.2 GM TBEC Take 1.2 g by mouth 2 times a day.           ALLERGIES   Allergies   Allergen Reactions   • Peanut-Derived Anaphylaxis     Peanuts   RXN=age 36   • Tradjenta [Linagliptin] Unspecified     Pt developed pancreatitis   • Hydrocodone Hives     Tolerates Morphine and oxycodone  Rxn Age - 29       PHYSICAL EXAM  VITAL SIGNS: /104 mmHg  Pulse 98  Temp(Src) 36.8 °C (98.2 °F)  Resp 16  Ht 1.727 m (5' 8\")  Wt 104.6 kg (230 lb 9.6 oz)  BMI 35.07 kg/m2       Constitutional: Well developed, Well nourished, No acute distress, Non-toxic appearance. "   Cardiovascular: Regular rate and rhythm without murmurs gallops or rubs.   Thorax & Lungs: Lungs are clear to auscultation bilaterally, there are no wheezes no rales. Chest wall is nontender.  Abdomen: Soft, nontender nondistended. Bowel sounds are present.   Skin: Warm, Dry, No erythema,   Musculoskeletal: Right dorsum of the hand. There is area of cellulitis. His approximate 4 cm in the center of that. There is a 1 cm abscess on clinical evaluation. Distal pulses are intact  Neurologic: Alert & oriented x 3, Normal motor function, Normal sensory function, No focal deficits noted.     RADIOLOGY/PROCEDURES  No orders to display     Incision and Drainage Procedure Note    Indication: Abscess    Procedure: The patient was positioned appropriately and the skin over the incision site was prepped with alcohol. Local anesthesia was obtained by infiltration using 2% Lidocaine with epinephrine.  An incision was then made over the apex of the lesion and approximately 1 cc of purulent material was expressed. Loculations were not present. The drainage cavity was then packed with sterile gauze. The patient’s tetanus status was up to date and did not require a booster dose.    The patient tolerated the procedure well.    Complications: None        COURSE & MEDICAL DECISION MAKING  Pertinent Labs & Imaging studies reviewed. (See chart for details)  Patient has a history of intermittent MRSA abscesses. The patient is just recently in the hospital, has a new abscess. Patient was just prescribed a prescription of Augmentin and rifampin but has not started it yet. Patient does have an area of cellulitis with an abscess. I did get cultures on this percent. At this point, I recommended wound care. The patient is to fill his Augmentin, rifampin and follow-up his primary care physician for recheck in 2-4 days. 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 The patient is return if any symptoms worsen.    FINAL  IMPRESSION  1. Abscess, hand          The patient will return for new or worsening symptoms and is stable at the time of discharge.    The patient is referred to a primary physician for blood pressure management, diabetic screening, and for all other preventative health concerns.    DISPOSITION:  Patient will be discharged home in stable condition.    FOLLOW UP:  KRISSY Brunner  [address]  [facility] [address]  [phone]    Schedule an appointment as soon as possible for a visit in 1 week  For re-check, Return if any symptoms worsen      OUTPATIENT MEDICATIONS:  New Prescriptions    No medications on file                 Electronically signed by: Renato Frost, 7/19/2017 5:09 PM

## 2017-07-24 NOTE — ED NOTES
ED Positive Culture Follow-up/Notification Note:    Date: 7/24/17     Patient seen in the ED on 7/19/2017 for abscess to the right dorsum of the hand. S/p I&D.   1. Abscess, hand       Discharged on Augmentin and Rifampin per PCP.    Allergies: Peanut-derived; Tradjenta; and Hydrocodone     Final cultures:   Results     Procedure Component Value Units Date/Time    CULTURE WOUND W/ GRAM STAIN [[id number]]  (Abnormal) Collected:  07/19/17 1724    Order Status:  Completed Specimen Information:  Wound from Abscess Updated:  07/22/17 1511     Gram Stain Result --      Result:        Testing performed at:  [facility]  [address] [place] [address]  Moderate WBCs.  Few Gram positive cocci.  Few Gram positive rods.       Significant Indicator POS (POS)      Source WND      Site Right Hand      Culture Result Wound        Result:        Moderate growth Mixed skin yesenia predominantly (A)     Culture Result Wound Viridans Streptococcus (A)     GRAM STAIN [[id number]] Collected:  07/19/17 1724    Order Status:  Completed Specimen Information:  Wound Updated:  07/19/17 1821     Significant Indicator .      Source WND      Site ABSCESS      Gram Stain Result --      Result:        Testing performed at:  [facility]  [address] [place] [address]  Moderate WBCs.  Few Gram positive cocci.  Few Gram positive rods.            Plan:   Appropriate antibiotic therapy prescribed. No changes required based upon culture result.      Oliva Portillo

## 2017-07-27 ENCOUNTER — PATIENT OUTREACH (OUTPATIENT)
Dept: HEALTH INFORMATION MANAGEMENT | Facility: OTHER | Age: 52
End: 2017-07-27

## 2017-07-27 NOTE — PROGRESS NOTES
Outbound call to patient to discuss possible gaps in care. Patient states he was taken off of a statin in January 2017 due to an elevated ALT (152). Patient was instructed to follow up with GI specialist (Endy) to assess safety of statin therapy.  Patient's LFTs have returned to the normal range.     ADA guidelines recommend a high intensity statin for any patient with diabetes between the ages of 40-75 with additional ASCVD risk factors. Patient's additional risk factors include LDL >100 and HDL <40. Patient may benefit from restarting a statin.      ADA guidelines also recommend adding an ACE-I or an ARB to DM patients with HTN. Patient does have hypertension and is not currently on any medications for this.  Patient may benefit from addition of a renal protective agent.        Plan:  Left msg with Dr. Schumacher's Diana POWELL asking her to clarify whether or not pt should be on a statin despite recent normal LFTs.     Left msg with answering service for Dr. Stein'johnny POWELL to return call regarding renal protective agent as well as initiating statin therapy.    Will follow up with patient when Dr. Schumacher/Dr. Stein return phone calls.

## 2017-08-01 ENCOUNTER — PATIENT OUTREACH (OUTPATIENT)
Dept: HEALTH INFORMATION MANAGEMENT | Facility: OTHER | Age: 52
End: 2017-08-01

## 2017-08-01 ENCOUNTER — TELEPHONE (OUTPATIENT)
Dept: HEALTH INFORMATION MANAGEMENT | Facility: OTHER | Age: 52
End: 2017-08-01

## 2017-08-01 NOTE — PROGRESS NOTES
Returned phone call from Swathi at Dr. Schumacher's office.  Dr. Schumacher recommends reinitiating statin therapy as long as patient is not experiencing muscle pain or cramping and he would also like LFTs checked 2 weeks after initiating.     Plan:  Discuss restarting statin therapy with PCP.  Follow up with patient regarding PCP decision  Have patient make a follow up appt. With Dr. Schumacher and for lab work in 2 weeks.

## 2017-08-08 ENCOUNTER — APPOINTMENT (OUTPATIENT)
Dept: RADIOLOGY | Facility: MEDICAL CENTER | Age: 52
End: 2017-08-08
Attending: EMERGENCY MEDICINE
Payer: COMMERCIAL

## 2017-08-08 ENCOUNTER — HOSPITAL ENCOUNTER (EMERGENCY)
Facility: MEDICAL CENTER | Age: 52
End: 2017-08-08
Attending: EMERGENCY MEDICINE
Payer: COMMERCIAL

## 2017-08-08 VITALS
SYSTOLIC BLOOD PRESSURE: 133 MMHG | DIASTOLIC BLOOD PRESSURE: 90 MMHG | RESPIRATION RATE: 16 BRPM | BODY MASS INDEX: 32.43 KG/M2 | OXYGEN SATURATION: 97 % | WEIGHT: 214 LBS | HEART RATE: 99 BPM | HEIGHT: 68 IN | TEMPERATURE: 97.7 F

## 2017-08-08 DIAGNOSIS — R10.33 PERIUMBILICAL ABDOMINAL PAIN: ICD-10-CM

## 2017-08-08 DIAGNOSIS — K42.9 UMBILICAL HERNIA WITHOUT OBSTRUCTION AND WITHOUT GANGRENE: ICD-10-CM

## 2017-08-08 DIAGNOSIS — K04.7 DENTAL INFECTION: ICD-10-CM

## 2017-08-08 LAB
ALBUMIN SERPL BCP-MCNC: 4.3 G/DL (ref 3.2–4.9)
ALBUMIN/GLOB SERPL: 1.4 G/DL
ALP SERPL-CCNC: 88 U/L (ref 30–99)
ALT SERPL-CCNC: 17 U/L (ref 2–50)
ANION GAP SERPL CALC-SCNC: 13 MMOL/L (ref 0–11.9)
APPEARANCE UR: CLEAR
AST SERPL-CCNC: 30 U/L (ref 12–45)
BASOPHILS # BLD AUTO: 0.4 % (ref 0–1.8)
BASOPHILS # BLD: 0.03 K/UL (ref 0–0.12)
BILIRUB SERPL-MCNC: 0.8 MG/DL (ref 0.1–1.5)
BILIRUB UR QL STRIP.AUTO: NEGATIVE
BUN SERPL-MCNC: 16 MG/DL (ref 8–22)
CALCIUM SERPL-MCNC: 10.1 MG/DL (ref 8.5–10.5)
CHLORIDE SERPL-SCNC: 92 MMOL/L (ref 96–112)
CO2 SERPL-SCNC: 18 MMOL/L (ref 20–33)
COLOR UR: YELLOW
CREAT SERPL-MCNC: 1.38 MG/DL (ref 0.5–1.4)
EOSINOPHIL # BLD AUTO: 0.06 K/UL (ref 0–0.51)
EOSINOPHIL NFR BLD: 0.9 % (ref 0–6.9)
ERYTHROCYTE [DISTWIDTH] IN BLOOD BY AUTOMATED COUNT: 41.8 FL (ref 35.9–50)
GFR SERPL CREATININE-BSD FRML MDRD: 54 ML/MIN/1.73 M 2
GLOBULIN SER CALC-MCNC: 3.1 G/DL (ref 1.9–3.5)
GLUCOSE SERPL-MCNC: 378 MG/DL (ref 65–99)
GLUCOSE UR STRIP.AUTO-MCNC: >=1000 MG/DL
HCT VFR BLD AUTO: 40.9 % (ref 42–52)
HGB BLD-MCNC: 15.2 G/DL (ref 14–18)
IMM GRANULOCYTES # BLD AUTO: 0.03 K/UL (ref 0–0.11)
IMM GRANULOCYTES NFR BLD AUTO: 0.4 % (ref 0–0.9)
KETONES UR STRIP.AUTO-MCNC: 15 MG/DL
LEUKOCYTE ESTERASE UR QL STRIP.AUTO: NEGATIVE
LIPASE SERPL-CCNC: 66 U/L (ref 11–82)
LYMPHOCYTES # BLD AUTO: 1.94 K/UL (ref 1–4.8)
LYMPHOCYTES NFR BLD: 28.5 % (ref 22–41)
MCH RBC QN AUTO: 31.1 PG (ref 27–33)
MCHC RBC AUTO-ENTMCNC: 37.2 G/DL (ref 33.7–35.3)
MCV RBC AUTO: 83.8 FL (ref 81.4–97.8)
MICRO URNS: ABNORMAL
MONOCYTES # BLD AUTO: 0.29 K/UL (ref 0–0.85)
MONOCYTES NFR BLD AUTO: 4.3 % (ref 0–13.4)
NEUTROPHILS # BLD AUTO: 4.45 K/UL (ref 1.82–7.42)
NEUTROPHILS NFR BLD: 65.5 % (ref 44–72)
NITRITE UR QL STRIP.AUTO: NEGATIVE
NRBC # BLD AUTO: 0 K/UL
NRBC BLD AUTO-RTO: 0 /100 WBC
PH UR STRIP.AUTO: 5 [PH]
PLATELET # BLD AUTO: 242 K/UL (ref 164–446)
PMV BLD AUTO: 10.4 FL (ref 9–12.9)
POTASSIUM SERPL-SCNC: 4.4 MMOL/L (ref 3.6–5.5)
PROT SERPL-MCNC: 7.4 G/DL (ref 6–8.2)
PROT UR QL STRIP: NEGATIVE MG/DL
RBC # BLD AUTO: 4.88 M/UL (ref 4.7–6.1)
RBC UR QL AUTO: NEGATIVE
SODIUM SERPL-SCNC: 123 MMOL/L (ref 135–145)
SP GR UR REFRACTOMETRY: >1.035
UROBILINOGEN UR STRIP.AUTO-MCNC: 0.2 MG/DL
WBC # BLD AUTO: 6.8 K/UL (ref 4.8–10.8)

## 2017-08-08 PROCEDURE — 700111 HCHG RX REV CODE 636 W/ 250 OVERRIDE (IP): Performed by: EMERGENCY MEDICINE

## 2017-08-08 PROCEDURE — 74177 CT ABD & PELVIS W/CONTRAST: CPT

## 2017-08-08 PROCEDURE — 83690 ASSAY OF LIPASE: CPT

## 2017-08-08 PROCEDURE — 96361 HYDRATE IV INFUSION ADD-ON: CPT

## 2017-08-08 PROCEDURE — 700117 HCHG RX CONTRAST REV CODE 255: Performed by: EMERGENCY MEDICINE

## 2017-08-08 PROCEDURE — 80053 COMPREHEN METABOLIC PANEL: CPT

## 2017-08-08 PROCEDURE — 99285 EMERGENCY DEPT VISIT HI MDM: CPT

## 2017-08-08 PROCEDURE — 700105 HCHG RX REV CODE 258: Performed by: EMERGENCY MEDICINE

## 2017-08-08 PROCEDURE — 96376 TX/PRO/DX INJ SAME DRUG ADON: CPT

## 2017-08-08 PROCEDURE — 81003 URINALYSIS AUTO W/O SCOPE: CPT

## 2017-08-08 PROCEDURE — 85025 COMPLETE CBC W/AUTO DIFF WBC: CPT

## 2017-08-08 PROCEDURE — 96374 THER/PROPH/DIAG INJ IV PUSH: CPT

## 2017-08-08 PROCEDURE — 96375 TX/PRO/DX INJ NEW DRUG ADDON: CPT

## 2017-08-08 RX ORDER — ONDANSETRON 4 MG/1
4 TABLET, ORALLY DISINTEGRATING ORAL EVERY 8 HOURS PRN
Qty: 10 TAB | Refills: 0 | Status: SHIPPED | OUTPATIENT
Start: 2017-08-08 | End: 2017-12-09

## 2017-08-08 RX ORDER — ONDANSETRON 2 MG/ML
4 INJECTION INTRAMUSCULAR; INTRAVENOUS ONCE
Status: COMPLETED | OUTPATIENT
Start: 2017-08-08 | End: 2017-08-08

## 2017-08-08 RX ORDER — SODIUM CHLORIDE 9 MG/ML
1000 INJECTION, SOLUTION INTRAVENOUS ONCE
Status: COMPLETED | OUTPATIENT
Start: 2017-08-08 | End: 2017-08-08

## 2017-08-08 RX ORDER — PENICILLIN V POTASSIUM 500 MG/1
500 TABLET ORAL EVERY 6 HOURS
Qty: 40 TAB | Refills: 0 | Status: SHIPPED | OUTPATIENT
Start: 2017-08-08 | End: 2017-08-22

## 2017-08-08 RX ORDER — MORPHINE SULFATE 4 MG/ML
4 INJECTION, SOLUTION INTRAMUSCULAR; INTRAVENOUS ONCE
Status: COMPLETED | OUTPATIENT
Start: 2017-08-08 | End: 2017-08-08

## 2017-08-08 RX ADMIN — IOHEXOL 100 ML: 350 INJECTION, SOLUTION INTRAVENOUS at 14:39

## 2017-08-08 RX ADMIN — MORPHINE SULFATE 4 MG: 4 INJECTION INTRAVENOUS at 13:26

## 2017-08-08 RX ADMIN — HYDROMORPHONE HYDROCHLORIDE 1 MG: 1 INJECTION, SOLUTION INTRAMUSCULAR; INTRAVENOUS; SUBCUTANEOUS at 14:57

## 2017-08-08 RX ADMIN — ONDANSETRON 4 MG: 2 INJECTION INTRAMUSCULAR; INTRAVENOUS at 13:26

## 2017-08-08 RX ADMIN — ONDANSETRON 4 MG: 2 INJECTION INTRAMUSCULAR; INTRAVENOUS at 14:57

## 2017-08-08 RX ADMIN — SODIUM CHLORIDE 1000 ML: 9 INJECTION, SOLUTION INTRAVENOUS at 14:57

## 2017-08-08 ASSESSMENT — PAIN SCALES - GENERAL
PAINLEVEL_OUTOF10: 6
PAINLEVEL_OUTOF10: 4

## 2017-08-08 NOTE — ED PROVIDER NOTES
"ED Provider Note    CHIEF COMPLAINT  Chief Complaint   Patient presents with   • Abdominal Pain       \[facility] [place] [facility]\""  Mahesh Bradshaw is a 52 y.o. male who presents for evaluation of abdominal pain. The patient is complaining of left-sided abdominal pain. He describes it as very sharp. He states it was a 10 out of scale of 10 initially and now states that it's gotten better and it's a 5 out of 10. He's had no vomiting or diarrhea. He is a diabetic. He's had no fevers. He does have a history of umbilical hernia repair with revision. He is scheduled to see Dr. Beasley for evaluation in 10 days.    REVIEW OF SYSTEMS  See HPI for further details. All other systems are negative.     PAST MEDICAL HISTORY  Past Medical History   Diagnosis Date   • Migraine    • Gout    • Indigestion    • UC (ulcerative colitis) (CMS-HCC) 3-3-17     \"Five BM's per day, takes Lialda\"   • Difficult intubation 2-2016   • Arthritis 3-3-17     \"Spine\"   • Sepsis (CMS-HCC) 1/21/2016   • Essential hypertension 1/22/2016   • Snoring 3-3-17     Has not had a sleep study   • High cholesterol 3-3-17     Does not currently take medication for   • Congestive heart failure (CMS-HCC) 2-2016      3-3-17 \"Not a current issue\"   • ASTHMA 3-3-17     \"Hasn't had to use inhaler in 2 years\"   • Aspiration pneumonia (CMS-HCC) 3-2016   • Breath shortness 3-3-17     \"With Exercise\"   • Hypothyroid 3-3-17     Takes [place] Thyroid   • Pain 3-3-17     \"Left Flank\"   • Heart burn    • Depression 11/26/2014   • Anesthesia      \"Was difficult to intubate 2-2016\"   • Pancreatitis 2-2016     \"D/T Tradjenta was hospitialized for 15 days\"   • Elevated liver enzymes 2-2016   • Electrolyte imbalance 2-2016   • Kidney stones    • ADRIANE (acute kidney injury) (CMS-HCC) 2/24/2016   • RF (renal failure) 2-2016   • Diabetes (CMS-HCC) 3-3-17     Takes Insulin   • DKA (diabetic ketoacidoses) (CMS-HCC) 2-2016     \"10 days on vent with DKA, Renal failure, CHF, elevated liver enzymes and " "electrolyte imbalance\"   • On mechanically assisted ventilation (CMS-[facility]) 2-2016     HX \"On Vent for 10 days at [facility]\".  \"DX Pancreatitis, DKA, CHF, Elevated Liver Enzymes & Electrolyte Imbalance\".       FAMILY HISTORY  History reviewed. No pertinent family history.    SOCIAL HISTORY  Social History     Social History   • Marital Status: Single     Spouse Name: N/A   • Number of Children: N/A   • Years of Education: N/A     Social History Main Topics   • Smoking status: Never Smoker    • Smokeless tobacco: None   • Alcohol Use: Yes      Comment: occ   • Drug Use: No   • Sexual Activity: Not Asked     Other Topics Concern   • None     Social History Narrative    ** Merged History Encounter **         ** Merged History Encounter **            SURGICAL HISTORY  Past Surgical History   Procedure Laterality Date   • Carmenza by laparoscopy  2005   • Colonoscopy with biopsy  11/26/2014     Performed by Solo Higginbotham M.D. at ENDOSCOPY [facility]   • Umbilical hernia repair N/A 11/6/2016     Procedure: UMBILICAL HERNIA REPAIR;  Surgeon: Esau Dooley M.D.;  Location: [facility];  Service:    • Other orthopedic surgery  1997 or 1998     Left Wrist ORIF   • Umbilical hernia repair  3/10/2017     Procedure: UMBILICAL HERNIA REPAIR- INCISION AND DRAINAGE OF UMBILICAL WOUND AND MESH REMOVAL;  Surgeon: Esau Dooley M.D.;  Location: SURGERY SAME DAY [facility];  Service:    • Incision and drainage general  4/7/2017     Procedure: INCISION AND DRAINAGE GENERAL;  Surgeon: Esau Dooley M.D.;  Location: SURGERY SAME DAY [facility];  Service:    • Irrigation & debridement general Right 4/29/2017     Procedure: IRRIGATION & DEBRIDEMENT GENERAL;  Surgeon: Esau Dooley M.D.;  Location: [facility];  Service:    • Irrigation & debridement general Right 5/1/2017     Procedure: IRRIGATION & DEBRIDEMENT GENERAL-Left HAND AND right  ANKLE;  Surgeon: Esau Dooley M.D.;  Location: " "SURGERY [facility] ORS;  Service:        CURRENT MEDICATIONS  Home Medications     Reviewed by Isael Gordon R.N. (Registered Nurse) on 08/08/17 at 1139  Med List Status: Partial    Medication Last Dose Status    buPROPion (WELLBUTRIN XL) 300 MG XL tablet  Active    Cholecalciferol (VITAMIN D3) 5000 UNITS Tab  Active    Insulin Glargine (TOUJEO SOLOSTAR) 300 UNIT/ML Solution Pen-injector  Active    Insulin Glargine (TOUJEO SOLOSTAR) 300 UNIT/ML Solution Pen-injector  Active    insulin lispro (HUMALOG) 100 UNIT/ML Solution  Active    mesalamine (LIALDA) 1.2 GM TBEC  Active    Multiple Vitamins-Minerals (ZINC PO)  Active    multivitamin (THERAGRAN) Tab  Active    thyroid (ARMOUR THYROID) 60 MG Tab  Active                ALLERGIES  Allergies   Allergen Reactions   • Peanut-Derived Anaphylaxis     Peanuts   RXN=age 36   • Tradjenta [Linagliptin] Unspecified     Pt developed pancreatitis   • Hydrocodone Hives     Tolerates Morphine and oxycodone  Rxn Age - 29       PHYSICAL EXAM  VITAL SIGNS: /90 mmHg  Pulse 92  Temp(Src) 36.5 °C (97.7 °F)  Resp 18  Ht 1.727 m (5' 7.99\")  Wt 97.07 kg (214 lb)  BMI 32.55 kg/m2  SpO2 95%    Constitutional: Well developed, Well nourished, Non-toxic appearance.   HENT: Normocephalic, Atraumatic.   Eyes:  EOMI, Conjunctiva normal, No discharge.   Cardiovascular: Normal heart rate.   Thorax & Lungs: No respiratory distress.   Abdomen: Soft, well-healed umbilical surgical scar with no evidence of umbilical hernia. Just left of his umbilical region does have tenderness to palpation with no obvious masses..  Skin: Warm, Dry.   Musculoskeletal: Good range of motion in all major joints.  Neurologic: Awake alert.    RADIOLOGY/PROCEDURES  CT-ABDOMEN-PELVIS WITH    (Results Pending)         COURSE & MEDICAL DECISION MAKING  Pertinent Labs & Imaging studies reviewed. (See chart for details)  This is a 52-year-old here for evaluation of abdominal pain. 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 He is having left-sided periumbilical abdominal pain which came on acutely. He is afebrile. He's had no vomiting. He does have a history of insulin-dependent diabetes. An IV established and laboratories are obtained. These included urinalysis is positive for greater than 1000 glucose and 15 ketones. No evidence of infection. CBC shows normal white count and differential. [place]. The labs and CT scan of the abdomen and pelvis is pending. I discussed the case with Dr. Almendarez my partner and he will assume care of the patient and follow-up on the patient's currently pending orders.    FINAL IMPRESSION  1. Abdominal pain  2. History of insulin-dependent diabetes  3.         Electronically signed by: Keshawn Otero, 8/8/2017 11:56 AM

## 2017-08-08 NOTE — ED AVS SNAPSHOT
Home Care Instructions                                                                                                                Mahesh Bradshaw   MRN: [id number]    Department:  [facility], Emergency Dept   Date of Visit:  8/8/2017            [facility], Emergency Dept    [address] [facility] [address]    Phone:  [phone]      You were seen by     1. Keshawn Otero M.D.    2. Oniel Childs M.D.      Your Diagnosis Was     Periumbilical abdominal pain     R10.33       These are the medications you received during your hospitalization from 08/08/2017 1128 to 08/08/2017 1555     Date/Time Order Dose Route Action    08/08/2017 1326 morphine (pf) 4 mg/ml injection 4 mg 4 mg Intravenous Given    08/08/2017 1326 ondansetron (ZOFRAN) syringe/vial injection 4 mg 4 mg Intravenous Given    08/08/2017 1439 iohexol (OMNIPAQUE) 350 mg/mL 100 mL Intravenous Given    08/08/2017 1457 NS infusion 1,000 mL 1,000 mL Intravenous New Bag    08/08/2017 1457 HYDROmorphone (DILAUDID) injection 1 mg 1 mg Intravenous Given    08/08/2017 1457 ondansetron (ZOFRAN) syringe/vial injection 4 mg 4 mg Intravenous Given      Follow-up Information     1. Follow up with KRISSY Brunner In 1 day.    Specialty:  Family Medicine    Contact information    [address]  [facility] [address]  [phone]          2. Follow up with Dr. Beasley.    Why:  as scheduled      Medication Information     Review all of your home medications and newly ordered medications with your primary doctor and/or pharmacist as soon as possible. Follow medication instructions as directed by your doctor and/or pharmacist.     Please keep your complete medication list with you and share with your physician. Update the information when medications are discontinued, doses are changed, or new medications (including over-the-counter products) are added; and carry medication information at all times in the  event of emergency situations.               Medication List      START taking these medications        Instructions    Morning Afternoon Evening Bedtime    ondansetron 4 MG Tbdp   Commonly known as:  ZOFRAN ODT        Take 1 Tab by mouth every 8 hours as needed for Nausea/Vomiting.   Dose:  4 mg                        penicillin v potassium 500 MG Tabs   Commonly known as:  VEETID        Take 1 Tab by mouth every 6 hours.   Dose:  500 mg                          ASK your doctor about these medications        Instructions    Morning Afternoon Evening Bedtime    ARMOUR THYROID 60 MG Tabs   Generic drug:  thyroid        Take 60 mg by mouth every day.   Dose:  60 mg                        buPROPion 300 MG XL tablet   Commonly known as:  WELLBUTRIN XL        Take 300 mg by mouth every morning.   Dose:  300 mg                        insulin lispro 100 UNIT/ML Soln   Commonly known as:  HUMALOG        Inject 10 Units as instructed Once. Sliding Scale - 2 units every 50 > 150   Dose:  10 Units                        LIALDA 1.2 GM Tbec   Generic drug:  mesalamine        Take 1.2 g by mouth 2 times a day.   Dose:  1.2 g                        multivitamin Tabs        Take 1 Tab by mouth every day.   Dose:  1 Tab                        * TOUJEO SOLOSTAR 300 UNIT/ML Sopn   Generic drug:  Insulin Glargine        Inject 36 Units as instructed every evening.   Dose:  36 Units                        * TOUJEO SOLOSTAR 300 UNIT/ML Sopn   Generic drug:  Insulin Glargine        Inject 50 Units as instructed Once.   Dose:  50 Units                        Vitamin D3 5000 UNITS Tabs        Take 5,000 Units by mouth every day.   Dose:  5000 Units                        ZINC PO        Take 1 Tab by mouth every day.   Dose:  1 Tab                        * Notice:  This list has 2 medication(s) that are the same as other medications prescribed for you. Read the directions carefully, and ask your doctor or other care provider to review them  with you.         Where to Get Your Medications      These medications were sent to [facility] [place], [place] - [address]  [address] [facility] [address] [place] [address]     Phone:  [phone]    - ondansetron 4 MG Tbdp  - penicillin v potassium 500 MG Tabs            Procedures and tests performed during your visit     CBC WITH DIFFERENTIAL    COMP METABOLIC PANEL    CONSENT FOR CONTRAST INJECTION    CT-ABDOMEN-PELVIS WITH    ESTIMATED GFR    LIPASE    REFRACTOMETER SG    URINALYSIS (UA)        Discharge Instructions       Return immediately to the Emergency Department if you experience continuing or worsening discomfort in your abdomen, fever, chest pain, difficulty breathing, back pain, or any other new or worsening symptoms.       Hernia, Adult  A hernia is the bulging of an organ or tissue through a weak spot in the muscles of the abdomen (abdominal wall). Hernias develop most often near the navel or groin.  There are many kinds of hernias. Common kinds include:  · Femoral hernia. This kind of hernia develops under the groin in the upper thigh area.  · Inguinal hernia. This kind of hernia develops in the groin or scrotum.  · Umbilical hernia. This kind of hernia develops near the navel.  · Hiatal hernia. This kind of hernia causes part of the stomach to be pushed up into the chest.  · Incisional hernia. This kind of hernia bulges through a scar from an abdominal surgery.  CAUSES  This condition may be caused by:  · Heavy lifting.  · Coughing over a long period of time.  · Straining to have a bowel movement.  · An incision made during an abdominal surgery.  · A birth defect (congenital defect).  · Excess weight or obesity.  · Smoking.  · Poor nutrition.  · Cystic fibrosis.  · Excess fluid in the abdomen.  · Undescended testicles.  SYMPTOMS  Symptoms of a hernia include:  · A lump on the abdomen. This is the first sign of a hernia. The lump may become more obvious with standing, straining, or  coughing. It may get bigger over time if it is not treated or if the condition causing it is not treated.  · Pain. A hernia is usually painless, but it may become painful over time if treatment is delayed. The pain is usually dull and may get worse with standing or lifting heavy objects.  Sometimes a hernia gets tightly squeezed in the weak spot (strangulated) or stuck there (incarcerated) and causes additional symptoms. These symptoms may include:  · Vomiting.  · Nausea.  · Constipation.  · Irritability.  DIAGNOSIS  A hernia may be diagnosed with:  · A physical exam. During the exam your health care provider may ask you to cough or to make a specific movement, because a hernia is usually more visible when you move.  · Imaging tests. These can include:  ¨ X-rays.  ¨ Ultrasound.  ¨ CT scan.  TREATMENT  A hernia that is small and painless may not need to be treated. A hernia that is large or painful may be treated with surgery. Inguinal hernias may be treated with surgery to prevent incarceration or strangulation. Strangulated hernias are always treated with surgery, because lack of blood to the trapped organ or tissue can cause it to die.  Surgery to treat a hernia involves pushing the bulge back into place and repairing the weak part of the abdomen.  HOME CARE INSTRUCTIONS  · Avoid straining.  · Do not lift anything heavier than 10 lb (4.5 kg).  · Lift with your leg muscles, not your back muscles. This helps avoid strain.  · When coughing, try to cough gently.  · Prevent constipation. Constipation leads to straining with bowel movements, which can make a hernia worse or cause a hernia repair to break down. You can prevent constipation by:  ¨ Eating a high-fiber diet that includes plenty of fruits and vegetables.  ¨ Drinking enough fluids to keep your urine clear or pale yellow. Aim to drink 6-8 glasses of water per day.  ¨ Using a stool softener as directed by your health care provider.  · Lose weight, if you are  overweight.  · Do not use any tobacco products, including cigarettes, chewing tobacco, or electronic cigarettes. If you need help quitting, ask your health care provider.  · Keep all follow-up visits as directed by your health care provider. This is important. Your health care provider may need to monitor your condition.  SEEK MEDICAL CARE IF:  · You have swelling, redness, and pain in the affected area.  · Your bowel habits change.  SEEK IMMEDIATE MEDICAL CARE IF:  · You have a fever.  · You have abdominal pain that is getting worse.  · You feel nauseous or you vomit.  · You cannot push the hernia back in place by gently pressing on it while you are lying down.  · The hernia:  ¨ Changes in shape or size.  ¨ Is stuck outside the abdomen.  ¨ Becomes discolored.  ¨ Feels hard or tender.     This information is not intended to replace advice given to you by your health care provider. Make sure you discuss any questions you have with your health care provider.     Document Released: 12/18/2006 Document Revised: 01/08/2016 Document Reviewed: 10/28/2015  [employer] Interactive Patient Education ©2016 [employer]        Abdominal Pain, Adult  Many things can cause abdominal pain. Usually, abdominal pain is not caused by a disease and will improve without treatment. It can often be observed and treated at home. Your health care provider will do a physical exam and possibly order blood tests and X-rays to help determine the seriousness of your pain. However, in many cases, more time must pass before a clear cause of the pain can be found. Before that point, your health care provider may not know if you need more testing or further treatment.  HOME CARE INSTRUCTIONS   Monitor your abdominal pain for any changes. The following actions may help to alleviate any discomfort you are experiencing:  · Only take over-the-counter or prescription medicines as directed by your health care provider.  · Do not take laxatives unless  directed to do so by your health care provider.  · Try a clear liquid diet (broth, tea, or water) as directed by your health care provider. Slowly move to a bland diet as tolerated.  SEEK MEDICAL CARE IF:  · You have unexplained abdominal pain.  · You have abdominal pain associated with nausea or diarrhea.  · You have pain when you urinate or have a bowel movement.  · You experience abdominal pain that wakes you in the night.  · You have abdominal pain that is worsened or improved by eating food.  · You have abdominal pain that is worsened with eating fatty foods.  · You have a fever.  SEEK IMMEDIATE MEDICAL CARE IF:   · Your pain does not go away within 2 hours.  · You keep throwing up (vomiting).  · Your pain is felt only in portions of the abdomen, such as the right side or the left lower portion of the abdomen.  · You pass bloody or black tarry stools.  MAKE SURE YOU:  · Understand these instructions.    · Will watch your condition.    · Will get help right away if you are not doing well or get worse.       This information is not intended to replace advice given to you by your health care provider. Make sure you discuss any questions you have with your health care provider.     Document Released: 09/27/2006 Document Revised: 01/08/2016 Document Reviewed: 08/27/2014  [employer] Interactive Patient Education ©2016 [employer]            Patient Information     Patient Information    Following emergency treatment: all patient requiring follow-up care must return either to a private physician or a clinic if your condition worsens before you are able to obtain further medical attention, please return to the emergency room.     Billing Information    At [facility], we work to make the billing process streamlined for our patients.  Our Representatives are here to answer any questions you may have regarding your hospital bill.  If you have insurance coverage and have supplied your insurance information to us, we  will submit a claim to your insurer on your behalf.  Should you have any questions regarding your bill, we can be reached online or by phone as follows:  Online: You are able pay your bills online or live chat with our representatives about any billing questions you may have. We are here to help Monday - Friday from 8:00am to 7:30pm and 9:00am - 12:00pm on Saturdays.  Please visit [email or web address]  for more information.   Phone:  [phone] or [phone]    Please note that your emergency physician, surgeon, pathologist, radiologist, anesthesiologist, and other specialists are not employed by [facility] and will therefore bill separately for their services.  Please contact them directly for any questions concerning their bills at the numbers below:     Emergency Physician Services:  [phone]  [facility]:  [phone]  [facility]:  [phone]  [facility]:  [phone]    1. Your final bill may vary from the amount quoted upon discharge if all procedures are not complete at that time, or if your doctor has additional procedures of which we are not aware. You will receive an additional bill if you return to the Emergency Department at [facility] for suture removal regardless of the facility of which the sutures were placed.     2. Please arrange for settlement of this account at the emergency registration.    3. All self-pay accounts are due in full at the time of treatment.  If you are unable to meet this obligation then payment is expected within 4-5 days.     4. If you have had radiology studies (CT, X-ray, Ultrasound, MRI), you have received a preliminary result during your emergency department visit. Please contact the radiology department [phone] to receive a copy of your final result. Please discuss the Final result with your primary physician or with the follow up physician provided.     Crisis  Hotline:  National Crisis Hotline:  1-419-NITFJTU or 1-519.980.7487  [place] Crisis Hotline:    1-328.879.9164 or 157-731-9504         ED Discharge Follow Up Questions    1. In order to provide you with very good care, we would like to follow up with a phone call in the next few days.  May we have your permission to contact you?     YES /  NO    2. What is the best phone number to call you? (       )_____-__________    3. What is the best time to call you?      Morning  /  Afternoon  /  Evening                   Patient Signature:  ____________________________________________________________    Date:  ____________________________________________________________

## 2017-08-08 NOTE — ED AVS SNAPSHOT
8/8/2017    Mahesh Bradshaw  [address]  [place] [address]    Dear Mahesh:    [facility] wants to ensure your discharge home is safe and you or your loved ones have had all of your questions answered regarding your care after you leave the hospital.    Below is a list of resources and contact information should you have any questions regarding your hospital stay, follow-up instructions, or active medical symptoms.    Questions or Concerns Regarding… Contact   Medical Questions Related to Your Discharge  (7 days a week, 8am-5pm) Contact a Nurse Care Coordinator   [phone]   Medical Questions Not Related to Your Discharge  (24 hours a day / 7 days a week)  Contact the Nurse Health Line   [phone]    Medications or Discharge Instructions Refer to your discharge packet   or contact your [facility] Primary Care Provider   [phone]   Follow-up Appointment(s) Schedule your appointment via [employer]   or contact Scheduling [phone]   Billing Review your statement via [employer]  or contact Billing [phone]   Medical Records Review your records via [employer]   or contact Medical Records [phone]     You may receive a telephone call within two days of discharge. This call is to make certain you understand your discharge instructions and have the opportunity to have any questions answered. You can also easily access your medical information, test results and upcoming appointments via the [employer] free online health management tool. You can learn more and sign up at [employer]. For assistance setting up your [employer] account, please call [phone].    Once again, we want to ensure your discharge home is safe and that you have a clear understanding of any next steps in your care. If you have any questions or concerns, please do not hesitate to contact us, we are here for you. Thank you for choosing [facility] for your healthcare needs.    Sincerely,    Your [facility] Healthcare Team

## 2017-08-08 NOTE — DISCHARGE INSTRUCTIONS
Return immediately to the Emergency Department if you experience continuing or worsening discomfort in your abdomen, fever, chest pain, difficulty breathing, back pain, or any other new or worsening symptoms.       Hernia, Adult  A hernia is the bulging of an organ or tissue through a weak spot in the muscles of the abdomen (abdominal wall). Hernias develop most often near the navel or groin.  There are many kinds of hernias. Common kinds include:  · Femoral hernia. This kind of hernia develops under the groin in the upper thigh area.  · Inguinal hernia. This kind of hernia develops in the groin or scrotum.  · Umbilical hernia. This kind of hernia develops near the navel.  · Hiatal hernia. This kind of hernia causes part of the stomach to be pushed up into the chest.  · Incisional hernia. This kind of hernia bulges through a scar from an abdominal surgery.  CAUSES  This condition may be caused by:  · Heavy lifting.  · Coughing over a long period of time.  · Straining to have a bowel movement.  · An incision made during an abdominal surgery.  · A birth defect (congenital defect).  · Excess weight or obesity.  · Smoking.  · Poor nutrition.  · Cystic fibrosis.  · Excess fluid in the abdomen.  · Undescended testicles.  SYMPTOMS  Symptoms of a hernia include:  · A lump on the abdomen. This is the first sign of a hernia. The lump may become more obvious with standing, straining, or coughing. It may get bigger over time if it is not treated or if the condition causing it is not treated.  · Pain. A hernia is usually painless, but it may become painful over time if treatment is delayed. The pain is usually dull and may get worse with standing or lifting heavy objects.  Sometimes a hernia gets tightly squeezed in the weak spot (strangulated) or stuck there (incarcerated) and causes additional symptoms. These symptoms may include:  · Vomiting.  · Nausea.  · Constipation.  · Irritability.  DIAGNOSIS  A hernia may be diagnosed  with:  · A physical exam. During the exam your health care provider may ask you to cough or to make a specific movement, because a hernia is usually more visible when you move.  · Imaging tests. These can include:  ¨ X-rays.  ¨ Ultrasound.  ¨ CT scan.  TREATMENT  A hernia that is small and painless may not need to be treated. A hernia that is large or painful may be treated with surgery. Inguinal hernias may be treated with surgery to prevent incarceration or strangulation. Strangulated hernias are always treated with surgery, because lack of blood to the trapped organ or tissue can cause it to die.  Surgery to treat a hernia involves pushing the bulge back into place and repairing the weak part of the abdomen.  HOME CARE INSTRUCTIONS  · Avoid straining.  · Do not lift anything heavier than 10 lb (4.5 kg).  · Lift with your leg muscles, not your back muscles. This helps avoid strain.  · When coughing, try to cough gently.  · Prevent constipation. Constipation leads to straining with bowel movements, which can make a hernia worse or cause a hernia repair to break down. You can prevent constipation by:  ¨ Eating a high-fiber diet that includes plenty of fruits and vegetables.  ¨ Drinking enough fluids to keep your urine clear or pale yellow. Aim to drink 6-8 glasses of water per day.  ¨ Using a stool softener as directed by your health care provider.  · Lose weight, if you are overweight.  · Do not use any tobacco products, including cigarettes, chewing tobacco, or electronic cigarettes. If you need help quitting, ask your health care provider.  · Keep all follow-up visits as directed by your health care provider. This is important. Your health care provider may need to monitor your condition.  SEEK MEDICAL CARE IF:  · You have swelling, redness, and pain in the affected area.  · Your bowel habits change.  SEEK IMMEDIATE MEDICAL CARE IF:  · You have a fever.  · You have abdominal pain that is getting worse.  · You  feel nauseous or you vomit.  · You cannot push the hernia back in place by gently pressing on it while you are lying down.  · The hernia:  ¨ Changes in shape or size.  ¨ Is stuck outside the abdomen.  ¨ Becomes discolored.  ¨ Feels hard or tender.     This information is not intended to replace advice given to you by your health care provider. Make sure you discuss any questions you have with your health care provider.     Document Released: 12/18/2006 Document Revised: 01/08/2016 Document Reviewed: 10/28/2015  [employer] Interactive Patient Education ©2016 [employer] Inc.        Abdominal Pain, Adult  Many things can cause abdominal pain. Usually, abdominal pain is not caused by a disease and will improve without treatment. It can often be observed and treated at home. Your health care provider will do a physical exam and possibly order blood tests and X-rays to help determine the seriousness of your pain. However, in many cases, more time must pass before a clear cause of the pain can be found. Before that point, your health care provider may not know if you need more testing or further treatment.  HOME CARE INSTRUCTIONS   Monitor your abdominal pain for any changes. The following actions may help to alleviate any discomfort you are experiencing:  · Only take over-the-counter or prescription medicines as directed by your health care provider.  · Do not take laxatives unless directed to do so by your health care provider.  · Try a clear liquid diet (broth, tea, or water) as directed by your health care provider. Slowly move to a bland diet as tolerated.  SEEK MEDICAL CARE IF:  · You have unexplained abdominal pain.  · You have abdominal pain associated with nausea or diarrhea.  · You have pain when you urinate or have a bowel movement.  · You experience abdominal pain that wakes you in the night.  · You have abdominal pain that is worsened or improved by eating food.  · You have abdominal pain that is worsened with  eating fatty foods.  · You have a fever.  SEEK IMMEDIATE MEDICAL CARE IF:   · Your pain does not go away within 2 hours.  · You keep throwing up (vomiting).  · Your pain is felt only in portions of the abdomen, such as the right side or the left lower portion of the abdomen.  · You pass bloody or black tarry stools.  MAKE SURE YOU:  · Understand these instructions.    · Will watch your condition.    · Will get help right away if you are not doing well or get worse.       This information is not intended to replace advice given to you by your health care provider. Make sure you discuss any questions you have with your health care provider.     Document Released: 09/27/2006 Document Revised: 01/08/2016 Document Reviewed: 08/27/2014  [employer] Interactive Patient Education ©2016 [employer] Inc.

## 2017-08-08 NOTE — ED AVS SNAPSHOT
[employer] Access Code: [email or web address]  Expires: 8/21/2017  4:12 AM    [employer]  A secure, online tool to manage your health information     [employer]’s [employer]® is a secure, online tool that connects you to your personalized health information from the privacy of your home -- day or night - making it very easy for you to manage your healthcare. Once the activation process is completed, you can even access your medical information using the [employer] princess, which is available for free in the Apple Princess store or Google Play store.     [employer] provides the following levels of access (as shown below):   My Chart Features   [facility] Primary Care Doctor [facility]  Specialists [facility]  Urgent  Care Non-[facility]  Primary Care  Doctor   Email your healthcare team securely and privately 24/7 X X X X   Manage appointments: schedule your next appointment; view details of past/upcoming appointments X      Request prescription refills. X      View recent personal medical records, including lab and immunizations X X X X   View health record, including health history, allergies, medications X X X X   Read reports about your outpatient visits, procedures, consult and ER notes X X X X   See your discharge summary, which is a recap of your hospital and/or ER visit that includes your diagnosis, lab results, and care plan. X X       How to register for [employer]:  1. Go to  [email or web address].  2. Click on the Sign Up Now box, which takes you to the New Member Sign Up page. You will need to provide the following information:  a. Enter your [employer] Access Code exactly as it appears at the top of this page. (You will not need to use this code after you’ve completed the sign-up process. If you do not sign up before the expiration date, you must request a new code.)   b. Enter your date of birth.   c. Enter your home email address.   d. Click Submit, and follow the next screen’s instructions.  3. Create a [employer] ID. This will be your [employer]  login ID and cannot be changed, so think of one that is secure and easy to remember.  4. Create a [employer] password. You can change your password at any time.  5. Enter your Password Reset Question and Answer. This can be used at a later time if you forget your password.   6. Enter your e-mail address. This allows you to receive e-mail notifications when new information is available in [employer].  7. Click Sign Up. You can now view your health information.    For assistance activating your [employer] account, call [phone]

## 2017-08-08 NOTE — ED PROVIDER NOTES
"ED Provider Note    CHIEF COMPLAINT  Chief Complaint   Patient presents with   • Abdominal Pain       HPI  Mahesh Bradshaw is a 52 y.o. male who presents for evaluation of periumbilical abdominal pain, he is a known hernia in this location but states he went to bend over today and experienced acutely increasing sharp pain in this location. The patient is a nurse, he is concerned that there is strangulation of dominant contents through this hernia. He reports his pain was 10 out of 10 initially. No vomiting, no diarrhea, no blood in his stool, no fever. Patient's a known diabetic and states that his glucose has been high secondary to a dental infection, he has a scheduled appointment with his dentist today unfortunately is going to miss that appointment because he is here. The patient has a complicated past medical history including multiple surgeries for this hernia with mesh problems. He scheduled next week to see a new surgeon, Dr. Beasley, Dr. Dooley has been a surgeon up until now. He offers no other specific complaints at this time    REVIEW OF SYSTEMS  Negative for fever, rash, chest pain, dyspnea. All other systems are negative.     PAST MEDICAL HISTORY  Past Medical History   Diagnosis Date   • Migraine    • Gout    • Indigestion    • UC (ulcerative colitis) (CMS-HCC) 3-3-17     \"Five BM's per day, takes Lialda\"   • Difficult intubation 2-2016   • Arthritis 3-3-17     \"Spine\"   • Sepsis (CMS-HCC) 1/21/2016   • Essential hypertension 1/22/2016   • Snoring 3-3-17     Has not had a sleep study   • High cholesterol 3-3-17     Does not currently take medication for   • Congestive heart failure (CMS-HCC) 2-2016      3-3-17 \"Not a current issue\"   • ASTHMA 3-3-17     \"Hasn't had to use inhaler in 2 years\"   • Aspiration pneumonia (CMS-HCC) 3-2016   • Breath shortness 3-3-17     \"With Exercise\"   • Hypothyroid 3-3-17     Takes [place] Thyroid   • Pain 3-3-17     \"Left Flank\"   • Heart burn    • Depression " "11/26/2014   • Anesthesia      \"Was difficult to intubate 2-2016\"   • Pancreatitis 2-2016     \"D/T Tradjenta was hospitialized for 15 days\"   • Elevated liver enzymes 2-2016   • Electrolyte imbalance 2-2016   • Kidney stones    • ADRIANE (acute kidney injury) (CMS-HCC) 2/24/2016   • RF (renal failure) 2-2016   • Diabetes (CMS-HCC) 3-3-17     Takes Insulin   • DKA (diabetic ketoacidoses) (CMS-HCC) 2-2016     \"10 days on vent with DKA, Renal failure, CHF, elevated liver enzymes and electrolyte imbalance\"   • On mechanically assisted ventilation (CMS-HCC) 2-2016     HX \"On Vent for 10 days at [facility]\".  \"DX Pancreatitis, DKA, CHF, Elevated Liver Enzymes & Electrolyte Imbalance\".       FAMILY HISTORY  History reviewed. No pertinent family history.    SOCIAL HISTORY  Social History   Substance Use Topics   • Smoking status: Never Smoker    • Smokeless tobacco: None   • Alcohol Use: Yes      Comment: occ       SURGICAL HISTORY  Past Surgical History   Procedure Laterality Date   • Carmenza by laparoscopy  2005   • Colonoscopy with biopsy  11/26/2014     Performed by Solo Higginbotham M.D. at ENDOSCOPY [facility]   • Umbilical hernia repair N/A 11/6/2016     Procedure: UMBILICAL HERNIA REPAIR;  Surgeon: Esau Dooley M.D.;  Location: SURGERY [facility];  Service:    • Other orthopedic surgery  1997 or 1998     Left Wrist ORIF   • Umbilical hernia repair  3/10/2017     Procedure: UMBILICAL HERNIA REPAIR- INCISION AND DRAINAGE OF UMBILICAL WOUND AND MESH REMOVAL;  Surgeon: Esau Dooley M.D.;  Location: SURGERY SAME DAY [facility];  Service:    • Incision and drainage general  4/7/2017     Procedure: INCISION AND DRAINAGE GENERAL;  Surgeon: Esau Dooley M.D.;  Location: SURGERY SAME DAY [facility];  Service:    • Irrigation & debridement general Right 4/29/2017     Procedure: IRRIGATION & DEBRIDEMENT GENERAL;  Surgeon: Esau Dooley M.D.;  Location: SURGERY [facility];  Service:    • Irrigation & " "debridement general Right 5/1/2017     Procedure: IRRIGATION & DEBRIDEMENT GENERAL-Left HAND AND right  ANKLE;  Surgeon: Esau Dooley M.D.;  Location: SURGERY [facility];  Service:        CURRENT MEDICATIONS  I personally reviewed the medication list in the charting documentation.     ALLERGIES  Allergies   Allergen Reactions   • Peanut-Derived Anaphylaxis     Peanuts   RXN=age 36   • Tradjenta [Linagliptin] Unspecified     Pt developed pancreatitis   • Hydrocodone Hives     Tolerates Morphine and oxycodone  Rxn Age - 29       MEDICAL RECORD  I have reviewed patient's medical record and pertinent results are listed above.      PHYSICAL EXAM  VITAL SIGNS: /90 mmHg  Pulse 99  Temp(Src) 36.5 °C (97.7 °F)  Resp 16  Ht 1.727 m (5' 7.99\")  Wt 97.07 kg (214 lb)  BMI 32.55 kg/m2  SpO2 97%   Constitutional: Well appearing patient in no acute distress.  Not toxic, nor ill in appearance.  HENT: Mucus membranes moist.  Right mandibular molar has significant decay without surrounding erythema  Eyes: No scleral icterus. Normal conjunctiva   Neck: Supple, comfortable, nonpainful range of motion.   Cardiovascular: Regular heart rate and rhythm.   Thorax & Lungs: Chest is nontender.  Lungs are clear to auscultation with good air movement bilaterally.  No wheeze, rhonchi, nor rales.   Abdomen: Soft, nondistended. Periumbilical fullness and tenderness appreciated on exam rebound and no guarding  Skin: Warm, dry. No rash appreciated  Extremities/Musculoskeletal: No sign of trauma. No asymmetric calf tenderness, erythema or edema. Normal range of motion   Neurologic: Alert & oriented. No focal deficits observed.   Psychiatric: Normal affect appropriate for the clinical situation.    DIAGNOSTIC STUDIES / PROCEDURES    LABS  Results for orders placed or performed during the hospital encounter of 08/08/17   CBC WITH DIFFERENTIAL   Result Value Ref Range    WBC 6.8 4.8 - 10.8 K/uL    RBC 4.88 4.70 - 6.10 M/uL    " Hemoglobin 15.2 14.0 - 18.0 g/dL    Hematocrit 40.9 (L) 42.0 - 52.0 %    MCV 83.8 81.4 - 97.8 fL    MCH 31.1 27.0 - 33.0 pg    MCHC 37.2 (H) 33.7 - 35.3 g/dL    RDW 41.8 35.9 - 50.0 fL    Platelet Count 242 164 - 446 K/uL    MPV 10.4 9.0 - 12.9 fL    Neutrophils-Polys 65.50 44.00 - 72.00 %    Lymphocytes 28.50 22.00 - 41.00 %    Monocytes 4.30 0.00 - 13.40 %    Eosinophils 0.90 0.00 - 6.90 %    Basophils 0.40 0.00 - 1.80 %    Immature Granulocytes 0.40 0.00 - 0.90 %    Nucleated RBC 0.00 /100 WBC    Neutrophils (Absolute) 4.45 1.82 - 7.42 K/uL    Lymphs (Absolute) 1.94 1.00 - 4.80 K/uL    Monos (Absolute) 0.29 0.00 - 0.85 K/uL    Eos (Absolute) 0.06 0.00 - 0.51 K/uL    Baso (Absolute) 0.03 0.00 - 0.12 K/uL    Immature Granulocytes (abs) 0.03 0.00 - 0.11 K/uL    NRBC (Absolute) 0.00 K/uL   COMP METABOLIC PANEL   Result Value Ref Range    Sodium 123 (L) 135 - 145 mmol/L    Potassium 4.4 3.6 - 5.5 mmol/L    Chloride 92 (L) 96 - 112 mmol/L    Co2 18 (L) 20 - 33 mmol/L    Anion Gap 13.0 (H) 0.0 - 11.9    Glucose 378 (H) 65 - 99 mg/dL    Bun 16 8 - 22 mg/dL    Creatinine 1.38 0.50 - 1.40 mg/dL    Calcium 10.1 8.5 - 10.5 mg/dL    AST(SGOT) 30 12 - 45 U/L    ALT(SGPT) 17 2 - 50 U/L    Alkaline Phosphatase 88 30 - 99 U/L    Total Bilirubin 0.8 0.1 - 1.5 mg/dL    Albumin 4.3 3.2 - 4.9 g/dL    Total Protein 7.4 6.0 - 8.2 g/dL    Globulin 3.1 1.9 - 3.5 g/dL    A-G Ratio 1.4 g/dL   LIPASE   Result Value Ref Range    Lipase 66 11 - 82 U/L   URINALYSIS (UA)   Result Value Ref Range    Micro Urine Req see below     Color Yellow     Character Clear     Ph 5.0 5.0-8.0    Glucose >=1000 (A) Negative mg/dL    Ketones 15 (A) Negative mg/dL    Protein Negative Negative mg/dL    Bilirubin Negative Negative    Urobilinogen, Urine 0.2 Negative    Nitrite Negative Negative    Leukocyte Esterase Negative Negative    Occult Blood Negative Negative   REFRACTOMETER SG   Result Value Ref Range    Specific Gravity >1.035 (A)    ESTIMATED GFR    Result Value Ref Range    GFR If African American >60 >60 mL/min/1.73 m 2    GFR If Non African American 54 (A) >60 mL/min/1.73 m 2         RADIOLOGY  CT-ABDOMEN-PELVIS WITH   Final Result      1.  No evidence of bowel obstruction or acute appendicitis. No free fluid.   2.  No hydronephrosis.   3.  Periumbilical hernia containing fat again demonstrated.   4.  2.7 cm hypodensity within the medial segment left lobe of the liver unchanged compared to most recent examinations but not identified with certainty on 2/24/2016.. Possible hemangioma. Consideration for further evaluation including ultrasound or MRI.            COURSE & MEDICAL DECISION MAKING  I have reviewed any medical record information, laboratory studies and radiographic results as noted above.  Differential diagnoses includes: Incarcerated hernia, dehydration, electrolyte abnormalities, DKA until infection, anemia    Encounter Summary: This is a 52 y.o. male with periumbilical abdominal pains from the known hernia, began acutely as he bent down to pick something up, concerned about entrapment. The patient has had multiple surgeries on this hernia. Tenderness on exam, he was initially triaged by another physician, the blood work and urinalysis that was ordered reveals hyperglycemia with really no other pertinent abnormalities. CT scan that was ordered reveals no acute findings in the abdomen, there is a hernia that is only fat-containing without any evidence of entrapment. There is a hypodensity within the liver which I discussed with the patient, is to be followed up on. At this point, I don't think there is any need for emergent surgical intervention here in the emergency department, I did the patient is appropriate to be discharged to follow-up with surgeon as scheduled next week. In the meantime I will prescribe some Zofran at his request and he states he does not need me to prescribe him any pain medication. I will also prescribed penicillin for  his dental infection that has no evidence of drainable abscess on exam. Patient has been given strict return instructions and is stable and appropriate for discharge.      DISPOSITION: Discharge home in stable condition      FINAL IMPRESSION  1. Periumbilical abdominal pain    2. Umbilical hernia without obstruction and without gangrene    3. Dental infection           This dictation was created using voice recognition software. The accuracy of the dictation is limited to the abilities of the software. I expect there may be some errors of grammar and possibly content. The nursing notes were reviewed and certain aspects of this information were incorporated into this note.    Electronically signed by: Oniel Childs, 8/8/2017 3:39 PM

## 2017-08-08 NOTE — ED NOTES
PT ready for discharge.  PT discharge instructions provided, pt verbalizes understanding and signed. PT ambulated off unit, steady gait.  All possessions with pt upon discharge.

## 2017-08-22 ENCOUNTER — HOSPITAL ENCOUNTER (EMERGENCY)
Facility: MEDICAL CENTER | Age: 52
End: 2017-08-22
Attending: EMERGENCY MEDICINE
Payer: COMMERCIAL

## 2017-08-22 VITALS
HEART RATE: 92 BPM | HEIGHT: 69 IN | RESPIRATION RATE: 18 BRPM | TEMPERATURE: 98.4 F | OXYGEN SATURATION: 97 % | WEIGHT: 227.96 LBS | BODY MASS INDEX: 33.76 KG/M2 | DIASTOLIC BLOOD PRESSURE: 90 MMHG | SYSTOLIC BLOOD PRESSURE: 156 MMHG

## 2017-08-22 DIAGNOSIS — R74.8 ELEVATED LIPASE: ICD-10-CM

## 2017-08-22 DIAGNOSIS — Z79.4 TYPE 2 DIABETES MELLITUS WITH HYPERGLYCEMIA, WITH LONG-TERM CURRENT USE OF INSULIN (HCC): ICD-10-CM

## 2017-08-22 DIAGNOSIS — R73.9 HYPERGLYCEMIA: ICD-10-CM

## 2017-08-22 DIAGNOSIS — K85.90 ACUTE PANCREATITIS, UNSPECIFIED COMPLICATION STATUS, UNSPECIFIED PANCREATITIS TYPE: ICD-10-CM

## 2017-08-22 DIAGNOSIS — E11.65 TYPE 2 DIABETES MELLITUS WITH HYPERGLYCEMIA, WITH LONG-TERM CURRENT USE OF INSULIN (HCC): ICD-10-CM

## 2017-08-22 LAB
ALBUMIN SERPL BCP-MCNC: 4 G/DL (ref 3.2–4.9)
ALBUMIN/GLOB SERPL: 1.3 G/DL
ALP SERPL-CCNC: 89 U/L (ref 30–99)
ALT SERPL-CCNC: 18 U/L (ref 2–50)
ANION GAP SERPL CALC-SCNC: 15 MMOL/L (ref 0–11.9)
APPEARANCE UR: CLEAR
AST SERPL-CCNC: 17 U/L (ref 12–45)
B-OH-BUTYR SERPL-MCNC: 1.27 MMOL/L (ref 0.02–0.27)
BASOPHILS # BLD AUTO: 0.2 % (ref 0–1.8)
BASOPHILS # BLD: 0.01 K/UL (ref 0–0.12)
BILIRUB SERPL-MCNC: 0.5 MG/DL (ref 0.1–1.5)
BUN SERPL-MCNC: 15 MG/DL (ref 8–22)
CALCIUM SERPL-MCNC: 9.6 MG/DL (ref 8.5–10.5)
CHLORIDE SERPL-SCNC: 93 MMOL/L (ref 96–112)
CO2 SERPL-SCNC: 19 MMOL/L (ref 20–33)
COLOR UR AUTO: YELLOW
CREAT SERPL-MCNC: 1.01 MG/DL (ref 0.5–1.4)
EOSINOPHIL # BLD AUTO: 0.03 K/UL (ref 0–0.51)
EOSINOPHIL NFR BLD: 0.6 % (ref 0–6.9)
ERYTHROCYTE [DISTWIDTH] IN BLOOD BY AUTOMATED COUNT: 41.9 FL (ref 35.9–50)
GFR SERPL CREATININE-BSD FRML MDRD: >60 ML/MIN/1.73 M 2
GLOBULIN SER CALC-MCNC: 3.2 G/DL (ref 1.9–3.5)
GLUCOSE BLD-MCNC: 283 MG/DL (ref 65–99)
GLUCOSE BLD-MCNC: 345 MG/DL (ref 65–99)
GLUCOSE BLD-MCNC: 573 MG/DL (ref 65–99)
GLUCOSE SERPL-MCNC: 558 MG/DL (ref 65–99)
GLUCOSE UR QL STRIP.AUTO: 500 MG/DL
HCT VFR BLD AUTO: 37.9 % (ref 42–52)
HGB BLD-MCNC: 13.6 G/DL (ref 14–18)
IMM GRANULOCYTES # BLD AUTO: 0.02 K/UL (ref 0–0.11)
IMM GRANULOCYTES NFR BLD AUTO: 0.4 % (ref 0–0.9)
KETONES UR QL STRIP.AUTO: ABNORMAL MG/DL
LEUKOCYTE ESTERASE UR QL STRIP.AUTO: NEGATIVE
LIPASE SERPL-CCNC: 102 U/L (ref 11–82)
LYMPHOCYTES # BLD AUTO: 1.44 K/UL (ref 1–4.8)
LYMPHOCYTES NFR BLD: 29.7 % (ref 22–41)
MCH RBC QN AUTO: 30 PG (ref 27–33)
MCHC RBC AUTO-ENTMCNC: 35.9 G/DL (ref 33.7–35.3)
MCV RBC AUTO: 83.7 FL (ref 81.4–97.8)
MONOCYTES # BLD AUTO: 0.22 K/UL (ref 0–0.85)
MONOCYTES NFR BLD AUTO: 4.5 % (ref 0–13.4)
NEUTROPHILS # BLD AUTO: 3.13 K/UL (ref 1.82–7.42)
NEUTROPHILS NFR BLD: 64.6 % (ref 44–72)
NITRITE UR QL STRIP.AUTO: NEGATIVE
NRBC # BLD AUTO: 0 K/UL
NRBC BLD AUTO-RTO: 0 /100 WBC
PH UR STRIP.AUTO: 5 [PH]
PLATELET # BLD AUTO: 218 K/UL (ref 164–446)
PMV BLD AUTO: 9.8 FL (ref 9–12.9)
POTASSIUM SERPL-SCNC: 4.3 MMOL/L (ref 3.6–5.5)
PROT SERPL-MCNC: 7.2 G/DL (ref 6–8.2)
PROT UR QL STRIP: NEGATIVE MG/DL
PSA SERPL-MCNC: 0.88 NG/ML (ref 0–4)
RBC # BLD AUTO: 4.53 M/UL (ref 4.7–6.1)
RBC UR QL AUTO: NEGATIVE
SODIUM SERPL-SCNC: 127 MMOL/L (ref 135–145)
SP GR UR: <=1.005
T4 FREE SERPL-MCNC: 0.84 NG/DL (ref 0.53–1.43)
TSH SERPL DL<=0.005 MIU/L-ACNC: 2.05 UIU/ML (ref 0.3–3.7)
WBC # BLD AUTO: 4.9 K/UL (ref 4.8–10.8)

## 2017-08-22 PROCEDURE — 82962 GLUCOSE BLOOD TEST: CPT

## 2017-08-22 PROCEDURE — 99284 EMERGENCY DEPT VISIT MOD MDM: CPT

## 2017-08-22 PROCEDURE — 81002 URINALYSIS NONAUTO W/O SCOPE: CPT

## 2017-08-22 PROCEDURE — 96374 THER/PROPH/DIAG INJ IV PUSH: CPT

## 2017-08-22 PROCEDURE — 96375 TX/PRO/DX INJ NEW DRUG ADDON: CPT

## 2017-08-22 PROCEDURE — 84443 ASSAY THYROID STIM HORMONE: CPT

## 2017-08-22 PROCEDURE — 84439 ASSAY OF FREE THYROXINE: CPT

## 2017-08-22 PROCEDURE — 96361 HYDRATE IV INFUSION ADD-ON: CPT

## 2017-08-22 PROCEDURE — 96376 TX/PRO/DX INJ SAME DRUG ADON: CPT

## 2017-08-22 PROCEDURE — 80053 COMPREHEN METABOLIC PANEL: CPT

## 2017-08-22 PROCEDURE — 700102 HCHG RX REV CODE 250 W/ 637 OVERRIDE(OP): Performed by: EMERGENCY MEDICINE

## 2017-08-22 PROCEDURE — 82010 KETONE BODYS QUAN: CPT

## 2017-08-22 PROCEDURE — 83690 ASSAY OF LIPASE: CPT

## 2017-08-22 PROCEDURE — 700111 HCHG RX REV CODE 636 W/ 250 OVERRIDE (IP): Performed by: EMERGENCY MEDICINE

## 2017-08-22 PROCEDURE — 700105 HCHG RX REV CODE 258: Performed by: EMERGENCY MEDICINE

## 2017-08-22 PROCEDURE — 85025 COMPLETE CBC W/AUTO DIFF WBC: CPT

## 2017-08-22 PROCEDURE — 84153 ASSAY OF PSA TOTAL: CPT

## 2017-08-22 RX ORDER — SODIUM CHLORIDE 9 MG/ML
2000 INJECTION, SOLUTION INTRAVENOUS ONCE
Status: COMPLETED | OUTPATIENT
Start: 2017-08-22 | End: 2017-08-22

## 2017-08-22 RX ORDER — EPINEPHRINE 0.3 MG/.3ML
0.3 INJECTION SUBCUTANEOUS ONCE
COMMUNITY
End: 2017-10-04

## 2017-08-22 RX ORDER — SODIUM CHLORIDE 9 MG/ML
1000 INJECTION, SOLUTION INTRAVENOUS ONCE
Status: COMPLETED | OUTPATIENT
Start: 2017-08-22 | End: 2017-08-22

## 2017-08-22 RX ORDER — IBUPROFEN 200 MG
600-800 TABLET ORAL EVERY 8 HOURS PRN
COMMUNITY
End: 2018-04-19

## 2017-08-22 RX ORDER — ONDANSETRON 2 MG/ML
4 INJECTION INTRAMUSCULAR; INTRAVENOUS ONCE
Status: COMPLETED | OUTPATIENT
Start: 2017-08-22 | End: 2017-08-22

## 2017-08-22 RX ORDER — PENICILLIN V POTASSIUM 500 MG/1
500 TABLET ORAL 4 TIMES DAILY
COMMUNITY
Start: 2017-08-08 | End: 2017-09-01

## 2017-08-22 RX ORDER — HYDROMORPHONE HYDROCHLORIDE 2 MG/1
2-4 TABLET ORAL 2 TIMES DAILY PRN
COMMUNITY
End: 2017-12-09

## 2017-08-22 RX ADMIN — HYDROMORPHONE HYDROCHLORIDE 1 MG: 1 INJECTION, SOLUTION INTRAMUSCULAR; INTRAVENOUS; SUBCUTANEOUS at 12:53

## 2017-08-22 RX ADMIN — ONDANSETRON 4 MG: 2 INJECTION INTRAMUSCULAR; INTRAVENOUS at 12:52

## 2017-08-22 RX ADMIN — INSULIN HUMAN 10 UNITS: 100 INJECTION, SOLUTION PARENTERAL at 13:55

## 2017-08-22 RX ADMIN — SODIUM CHLORIDE 1000 ML: 9 INJECTION, SOLUTION INTRAVENOUS at 14:59

## 2017-08-22 RX ADMIN — SODIUM CHLORIDE 2000 ML: 9 INJECTION, SOLUTION INTRAVENOUS at 12:52

## 2017-08-22 RX ADMIN — ONDANSETRON 4 MG: 2 INJECTION INTRAMUSCULAR; INTRAVENOUS at 14:32

## 2017-08-22 RX ADMIN — HYDROMORPHONE HYDROCHLORIDE 1 MG: 1 INJECTION, SOLUTION INTRAMUSCULAR; INTRAVENOUS; SUBCUTANEOUS at 14:32

## 2017-08-22 ASSESSMENT — PAIN SCALES - GENERAL
PAINLEVEL_OUTOF10: 3
PAINLEVEL_OUTOF10: 5
PAINLEVEL_OUTOF10: 6

## 2017-08-22 NOTE — ED NOTES
Patient verbalizes wish to be discharged as he is feeling better. Hospitalist aware and to speak w/ ERP about disposition.

## 2017-08-22 NOTE — ED AVS SNAPSHOT
[employer] Access Code: [email or web address]  Expires: 9/19/2017  4:11 AM    [employer]  A secure, online tool to manage your health information     [employer]s [employer]® is a secure, online tool that connects you to your personalized health information from the privacy of your home -- day or night - making it very easy for you to manage your healthcare. Once the activation process is completed, you can even access your medical information using the [employer] princess, which is available for free in the Apple Princess store or Google Play store.     [employer] provides the following levels of access (as shown below):   My Chart Features   [facility] Primary Care Doctor [facility]  Specialists [facility]  Urgent  Care Non-[facility]  Primary Care  Doctor   Email your healthcare team securely and privately 24/7 X X X X   Manage appointments: schedule your next appointment; view details of past/upcoming appointments X      Request prescription refills. X      View recent personal medical records, including lab and immunizations X X X X   View health record, including health history, allergies, medications X X X X   Read reports about your outpatient visits, procedures, consult and ER notes X X X X   See your discharge summary, which is a recap of your hospital and/or ER visit that includes your diagnosis, lab results, and care plan. X X       How to register for [employer]:  1. Go to  [email or web address].  2. Click on the Sign Up Now box, which takes you to the New Member Sign Up page. You will need to provide the following information:  a. Enter your [employer] Access Code exactly as it appears at the top of this page. (You will not need to use this code after you’ve completed the sign-up process. If you do not sign up before the expiration date, you must request a new code.)   b. Enter your date of birth.   c. Enter your home email address.   d. Click Submit, and follow the next screen’s instructions.  3. Create a [employer] ID. This will be your [employer]  login ID and cannot be changed, so think of one that is secure and easy to remember.  4. Create a [employer] password. You can change your password at any time.  5. Enter your Password Reset Question and Answer. This can be used at a later time if you forget your password.   6. Enter your e-mail address. This allows you to receive e-mail notifications when new information is available in [employer].  7. Click Sign Up. You can now view your health information.    For assistance activating your [employer] account, call [phone]

## 2017-08-22 NOTE — ED PROVIDER NOTES
"ED Provider Note    CHIEF COMPLAINT  Chief Complaint   Patient presents with   • Hyperglycemia       HPI  Mahesh Bradshaw is a 52 y.o. male who presents for evaluation of hyperglycemia.  The patient states that he broke a tooth couple of weeks ago resulting in an infection in his right lower molar region.  The patient has been on Pen-Vee K until last Wednesday.  Since that time he's been having extremely high blood sugars including some of which may have been over 600.  The patient has been attempting to adjust his insulin regimen accordingly but has not had any improvement in getting his blood sugar under control.  The patient has some abdominal pain is gone quite a long time.  He has an umbilical hernia that had multiple complications including the development of seroma and failure of the mesh.  He is awaiting repeat surgery by Dr. Beasley.  The patient also has been admitted for sepsis within the last 6 months.  The patient denies: Fever, chills, cough, cardiorespiratory symptoms, hematemesis, melena hematochezia, hematuria.  No other acute symptomatology or complaints.    REVIEW OF SYSTEMS  See HPI for further details. All other systems negative.    PAST MEDICAL HISTORY  Past Medical History   Diagnosis Date   • Migraine    • Gout    • Indigestion    • UC (ulcerative colitis) (CMS-HCC) 3-3-17     \"Five BM's per day, takes Lialda\"   • Difficult intubation 2-2016   • Arthritis 3-3-17     \"Spine\"   • Sepsis (CMS-HCC) 1/21/2016   • Essential hypertension 1/22/2016   • Snoring 3-3-17     Has not had a sleep study   • High cholesterol 3-3-17     Does not currently take medication for   • Congestive heart failure (CMS-HCC) 2-2016      3-3-17 \"Not a current issue\"   • ASTHMA 3-3-17     \"Hasn't had to use inhaler in 2 years\"   • Aspiration pneumonia (CMS-HCC) 3-2016   • Breath shortness 3-3-17     \"With Exercise\"   • Hypothyroid 3-3-17     Takes [place] Thyroid   • Pain 3-3-17     \"Left Flank\"   • Heart burn    • " "Depression 11/26/2014   • Anesthesia      \"Was difficult to intubate 2-2016\"   • Pancreatitis 2-2016     \"D/T Tradjenta was hospitialized for 15 days\"   • Elevated liver enzymes 2-2016   • Electrolyte imbalance 2-2016   • Kidney stones    • ADRIANE (acute kidney injury) (CMS-HCC) 2/24/2016   • RF (renal failure) 2-2016   • Diabetes (CMS-HCC) 3-3-17     Takes Insulin   • DKA (diabetic ketoacidoses) (CMS-HCC) 2-2016     \"10 days on vent with DKA, Renal failure, CHF, elevated liver enzymes and electrolyte imbalance\"   • On mechanically assisted ventilation (CMS-HCC) 2-2016     HX \"On Vent for 10 days at [facility]\".  \"DX Pancreatitis, DKA, CHF, Elevated Liver Enzymes & Electrolyte Imbalance\".       FAMILY HISTORY  No family history on file.    SOCIAL HISTORY  Nonsmoker; occasional alcohol use;    SURGICAL HISTORY  Past Surgical History   Procedure Laterality Date   • Carmenza by laparoscopy  2005   • Colonoscopy with biopsy  11/26/2014     Performed by Solo Higginbotham M.D. at ENDOSCOPY [facility]   • Umbilical hernia repair N/A 11/6/2016     Procedure: UMBILICAL HERNIA REPAIR;  Surgeon: Esau Dooley M.D.;  Location: SURGERY [facility];  Service:    • Other orthopedic surgery  1997 or 1998     Left Wrist ORIF   • Umbilical hernia repair  3/10/2017     Procedure: UMBILICAL HERNIA REPAIR- INCISION AND DRAINAGE OF UMBILICAL WOUND AND MESH REMOVAL;  Surgeon: Esau Dooley M.D.;  Location: SURGERY SAME DAY [facility];  Service:    • Incision and drainage general  4/7/2017     Procedure: INCISION AND DRAINAGE GENERAL;  Surgeon: Esau Dooley M.D.;  Location: SURGERY SAME DAY [facility];  Service:    • Irrigation & debridement general Right 4/29/2017     Procedure: IRRIGATION & DEBRIDEMENT GENERAL;  Surgeon: Esau Dooley M.D.;  Location: SURGERY [facility];  Service:    • Irrigation & debridement general Right 5/1/2017     Procedure: IRRIGATION & DEBRIDEMENT GENERAL-Left HAND AND right  ANKLE;  " "Surgeon: Esau Dooley M.D.;  Location: SURGERY [facility];  Service:        CURRENT MEDICATIONS  See nurses notes    ALLERGIES  Allergies   Allergen Reactions   • Peanut-Derived Anaphylaxis     Peanuts   RXN=age 36   • Tradjenta [Linagliptin] Unspecified     Pt developed pancreatitis   • Hydrocodone Hives     Tolerates Morphine and oxycodone  Rxn Age - 29       PHYSICAL EXAM  VITAL SIGNS: /90 mmHg  Pulse 102  Temp(Src) 36.9 °C (98.4 °F)  Resp 18  Ht 1.74 m (5' 8.5\")  Wt 103.4 kg (227 lb 15.3 oz)  BMI 34.15 kg/m2  SpO2 98%   Constitutional: A 52-year-old male, pleasant, awake, oriented ×3   HENT: ,Atraumatic, Bilateral external ears normal, tympanic membranes clear, Oropharynx mildly dry, No oral exudates, Nose normal.   Eyes: PERRL, EOMI, Conjunctiva normal, No discharge.   Neck: Normal range of motion, No tenderness, Supple, No stridor.   Lymphatic: No lymphadenopathy noted.   Cardiovascular: Normal heart rate, Normal rhythm, No murmurs, No rubs, No gallops.   Thorax & Lungs: Normal Equal breath sounds, No respiratory distress, No wheezing, no stridor, no rales. No chest tenderness.   Abdomen: Umbilical hernia identified with surgical scar in the suprapubic umbilical area; mild tenderness, nondistended, no organomegaly, positive bowel sounds normal in quality. No guarding or rebound.  Skin: Decreased skin turgor, pink, warm, dry. No rashes, petechiae, purpura. Normal capillary refill.   Back: No tenderness, No CVA tenderness.   Extremities: Intact distal pulses, No edema, No tenderness, No cyanosis, No clubbing. Vascular: Pulses are 2+, symmetric in the upper and lower extremities.  Musculoskeletal: Good range of motion in all major joints. No tenderness to palpation or major deformities noted.   Neurologic: Alert & oriented x 3, Normal motor function, Normal sensory function, No gross focal deficits noted.   Psychiatric: Affect normal, Judgment normal, Mood normal.     COURSE & MEDICAL " DECISION MAKING  Pertinent Labs & Imaging studies reviewed. (See chart for details)  1.  Monitor  2.  IV normal saline  3.  Zofran, titrated  4.  Dilaudid, titrated  5.  Insulin 10 units of regular IV    Laboratory studies: CBC shows white count 4.9, 64% neutrophils, 29% lymphocytes, 4% monocytes, hemoglobin 13.6, hematocrit 37.9; urinalysis positive for trace ketones, specific gravity less than 1.005, glucose; CMP shows sodium 127, chloride 93, CO2 19, anion gap 15, glucose 558, otherwise within normal; lipase 102; beta hydroxybutyric acid 1.27; PSA 0.88; T4 0.84;    Discussion/consultation: This time, the patient presents with significant hyperglycemia.  Patient has pseudohyponatremia associated with this.  There is mild elevation in his lipase as well as his beta hydroxybutyric acid.  Treatment is initiated as noted above.  I spoke with the hospitalist on-call.  The patient will be admitted for further monitoring, treatment, and care.  Subsequent, patient did not want to be admitted.  The patient was treated as noted above.  A repeat blood sugar was 283.  The patient states he feels well enough to go home and would like to try to be treated as an outpatient.  They saw on his presentation and findings and response to treatment, and feel this is not unreasonable.  He is to monitor his blood sugars closely and rechecked at any time should he develop any: Fever, URI symptoms, cardiorespiratory symptoms, gastrointestinal symptoms intractable hyperglycemia.  The patient indicates is comfortable with this exploration and disposition.    FINAL IMPRESSION  1. Hyperglycemia    2. Elevated lipase    3. Type 2 diabetes mellitus with hyperglycemia, with long-term current use of insulin (CMS-[facility])    4. Acute pancreatitis, unspecified complication status, unspecified pancreatitis type           PLAN  1.  Appropriate discharge instructions given  2.  Follow-up with primary care  3.  Return if any problems or concerns as  discussed    Electronically signed by: Guy G Gansert, 8/22/2017 12:09 PM

## 2017-08-22 NOTE — DISCHARGE INSTRUCTIONS
Blood Glucose Monitoring, Adult  Monitoring your blood glucose (also know as blood sugar) helps you to manage your diabetes. It also helps you and your health care provider monitor your diabetes and determine how well your treatment plan is working.  WHY SHOULD YOU MONITOR YOUR BLOOD GLUCOSE?  · It can help you understand how food, exercise, and medicine affect your blood glucose.  · It allows you to know what your blood glucose is at any given moment. You can quickly tell if you are having low blood glucose (hypoglycemia) or high blood glucose (hyperglycemia).  · It can help you and your health care provider know how to adjust your medicines.  · It can help you understand how to manage an illness or adjust medicine for exercise.  WHEN SHOULD YOU TEST?  Your health care provider will help you decide how often you should check your blood glucose. This may depend on the type of diabetes you have, your diabetes control, or the types of medicines you are taking. Be sure to write down all of your blood glucose readings so that this information can be reviewed with your health care provider. See below for examples of testing times that your health care provider may suggest.  Type 1 Diabetes  · Test at least 2 times per day if your diabetes is well controlled, if you are using an insulin pump, or if you perform multiple daily injections.  · If your diabetes is not well controlled or if you are sick, you may need to test more often.  · It is a good idea to also test:  ¨ Before every insulin injection.  ¨ Before and after exercise.  ¨ Between meals and 2 hours after a meal.  ¨ Occasionally between 2:00 a.m. and 3:00 a.m.  Type 2 Diabetes  · If you are taking insulin, test at least 2 times per day. However, it is best to test before every insulin injection.  · If you take medicines by mouth (orally), test 2 times a day.  · If you are on a controlled diet, test once a day.  · If your diabetes is not well controlled or if you  "are sick, you may need to monitor more often.  HOW TO MONITOR YOUR BLOOD GLUCOSE  Supplies Needed  · Blood glucose meter.  · Test strips for your meter. Each meter has its own strips. You must use the strips that go with your own meter.  · A pricking needle (lancet).  · A device that holds the lancet (lancing device).  · A journal or log book to write down your results.  Procedure  · Wash your hands with soap and water. Alcohol is not preferred.  · Prick the side of your finger (not the tip) with the lancet.  · Gently milk the finger until a small drop of blood appears.  · Follow the instructions that come with your meter for inserting the test strip, applying blood to the strip, and using your blood glucose meter.  Other Areas to Get Blood for Testing  Some meters allow you to use other areas of your body (other than your finger) to test your blood. These areas are called alternative sites. The most common alternative sites are:  · The forearm.  · The thigh.  · The back area of the lower leg.  · The palm of the hand.  The blood flow in these areas is slower. Therefore, the blood glucose values you get may be delayed, and the numbers are different from what you would get from your fingers. Do not use alternative sites if you think you are having hypoglycemia. Your reading will not be accurate. Always use a finger if you are having hypoglycemia. Also, if you cannot feel your lows (hypoglycemia unawareness), always use your fingers for your blood glucose checks.  ADDITIONAL TIPS FOR GLUCOSE MONITORING  · Do not reuse lancets.  · Always carry your supplies with you.  · All blood glucose meters have a 24-hour \"hotline\" number to call if you have questions or need help.  · Adjust (calibrate) your blood glucose meter with a control solution after finishing a few boxes of strips.  BLOOD GLUCOSE RECORD KEEPING  It is a good idea to keep a daily record or log of your blood glucose readings. Most glucose meters, if not all, " keep your glucose records stored in the meter. Some meters come with the ability to download your records to your home computer. Keeping a record of your blood glucose readings is especially helpful if you are wanting to look for patterns. Make notes to go along with the blood glucose readings because you might forget what happened at that exact time. Keeping good records helps you and your health care provider to work together to achieve good diabetes management.      This information is not intended to replace advice given to you by your health care provider. Make sure you discuss any questions you have with your health care provider.     Document Released: 12/20/2004 Document Revised: 01/08/2016 Document Reviewed: 05/12/2014  Elsevier Interactive Patient Education ©2016 [employer]

## 2017-08-22 NOTE — ED NOTES
"Patient states BGL has been running high lately, which patient states may be due to a tooth infection x 2 weeks (states has taken course of abx, finishing on Wednesday). This weekend states his glucose at home was between 490-\"HIGH\" which is >600. Patient had BGL of 690 today at PCP today and was instructed to come to ED. Patient also complaining of abdominal cramping and pain x 1 year, has hx of ABD hernia repair 11/16 which has required multiple revisions, pain worse since Saturday.   "

## 2017-08-22 NOTE — ED NOTES
Patient dc'd to home w/ self. Ambulatory with steady gait. Patient alert and oriented x 4. Patient verbalizes understanding of dc instructions and follow up care. Patient denies questions upon DC.

## 2017-08-22 NOTE — ED NOTES
Pharmacy requesting repeat BMP; pharmacist notified that patient is potentially going to be dc'd, if patient is to be admitted, RN will complete repeat BMP.

## 2017-08-22 NOTE — ED AVS SNAPSHOT
8/22/2017    Mahesh Bradshaw  [address]  [place] [address]    Dear Mahesh:    [facility] wants to ensure your discharge home is safe and you or your loved ones have had all of your questions answered regarding your care after you leave the hospital.    Below is a list of resources and contact information should you have any questions regarding your hospital stay, follow-up instructions, or active medical symptoms.    Questions or Concerns Regarding… Contact   Medical Questions Related to Your Discharge  (7 days a week, 8am-5pm) Contact a Nurse Care Coordinator   [phone]   Medical Questions Not Related to Your Discharge  (24 hours a day / 7 days a week)  Contact the Nurse Health Line   [phone]    Medications or Discharge Instructions Refer to your discharge packet   or contact your [facility] Primary Care Provider   [phone]   Follow-up Appointment(s) Schedule your appointment via [employer]   or contact Scheduling [phone]   Billing Review your statement via [employer]  or contact Billing [phone]   Medical Records Review your records via [employer]   or contact Medical Records [phone]     You may receive a telephone call within two days of discharge. This call is to make certain you understand your discharge instructions and have the opportunity to have any questions answered. You can also easily access your medical information, test results and upcoming appointments via the [employer] free online health management tool. You can learn more and sign up at [facility]/Chamate. For assistance setting up your [employer] account, please call [phone].    Once again, we want to ensure your discharge home is safe and that you have a clear understanding of any next steps in your care. If you have any questions or concerns, please do not hesitate to contact us, we are here for you. Thank you for choosing [facility] for your healthcare needs.    Sincerely,    Your [facility] Healthcare Team

## 2017-08-22 NOTE — ED AVS SNAPSHOT
Home Care Instructions                                                                                                                Mahesh Bradshaw   MRN: [id number]    Department:  [facility], Emergency Dept   Date of Visit:  8/22/2017            [facility], Emergency Dept    [address]    [place] [address]    Phone:  [phone]      You were seen by     Guy G Gansert, M.D.      Your Diagnosis Was     Hyperglycemia     R73.9       These are the medications you received during your hospitalization from 08/22/2017 1151 to 08/22/2017 1551     Date/Time Order Dose Route Action    08/22/2017 1252 NS infusion 2,000 mL 2,000 mL Intravenous New Bag    08/22/2017 1253 HYDROmorphone (DILAUDID) injection 1 mg 1 mg Intravenous Given    08/22/2017 1252 ondansetron (ZOFRAN) syringe/vial injection 4 mg 4 mg Intravenous Given    08/22/2017 1355 insulin regular (HUMULIN R) injection 10 Units 10 Units Intravenous Given    08/22/2017 1432 ondansetron (ZOFRAN) syringe/vial injection 4 mg 4 mg Intravenous Given    08/22/2017 1432 HYDROmorphone (DILAUDID) injection 1 mg 1 mg Intravenous Given    08/22/2017 1459 NS (BOLUS) infusion 1,000 mL 1,000 mL Intravenous Given      Follow-up Information     1. Follow up with KRISSY Brunner.    Specialty:  Family Medicine    Why:  1.  Monitor blood sugars closely; 2.  Follow-up with primary care; 3.  Return at any time for any problems or concerns    Contact information    [address]  [facility] [address]  [phone]        Medication Information     Review all of your home medications and newly ordered medications with your primary doctor and/or pharmacist as soon as possible. Follow medication instructions as directed by your doctor and/or pharmacist.     Please keep your complete medication list with you and share with your physician. Update the information when medications are discontinued, doses are changed, or new  "medications (including over-the-counter products) are added; and carry medication information at all times in the event of emergency situations.               Medication List      ASK your doctor about these medications        Instructions    Morning Afternoon Evening Bedtime    ARMOUR THYROID 60 MG Tabs   Generic drug:  thyroid        Take 60 mg by mouth every evening.   Dose:  60 mg                        buPROPion 300 MG XL tablet   Commonly known as:  WELLBUTRIN XL        Take 300 mg by mouth every evening.   Dose:  300 mg                        EPIPEN 2-ANNE-MARIE 0.3 MG/0.3ML Soaj solution for injection   Generic drug:  EPINEPHrine        0.3 mg by Intramuscular route Once. Indications: Life-Threatening Allergic Reaction   Dose:  0.3 mg                        HYDROmorphone 2 MG Tabs   Commonly known as:  DILAUDID        Take 2-4 mg by mouth 2 times a day as needed for Severe Pain.   Dose:  2-4 mg                        ibuprofen 200 MG Tabs   Commonly known as:  MOTRIN        Take 600-800 mg by mouth every 6 hours as needed for Mild Pain.   Dose:  600-800 mg                        insulin lispro 100 UNIT/ML Soln   Commonly known as:  HUMALOG        Inject 4-10 Units as instructed 3 times a day before meals. Sliding Scale - 2 units every 50 > 150   Dose:  4-10 Units                        LIALDA 1.2 GM Tbec   Generic drug:  mesalamine        Take 1.2 g by mouth 2 times a day.   Dose:  1.2 g                        ondansetron 4 MG Tbdp   Commonly known as:  ZOFRAN ODT        Take 1 Tab by mouth every 8 hours as needed for Nausea/Vomiting.   Dose:  4 mg                        penicillin v potassium 500 MG Tabs   Commonly known as:  VEETID        Take 500 mg by mouth 4 times a day. Pt started on 8/8/2017 for 10 day course.   Dose:  500 mg                        TOUJEO SOLOSTAR 300 UNIT/ML Sopn   Generic drug:  Insulin Glargine        Inject 36-56 Units as instructed every evening. Pt states \"I use a sliding scale\".   "   Dose:  36-56 Units                        Vitamin D3 5000 units Tabs        Take 5,000 Units by mouth every evening.   Dose:  5000 Units                        ZINC PO        Take 1 Tab by mouth every evening.   Dose:  1 Tab                                Procedures and tests performed during your visit     Procedure/Test Number of Times Performed    ACCU-CHECK 1    ACCU-CHEK GLUCOSE 2    BETA-HYDROXYBUTYRIC ACID 1    CARDIAC MONITORING 1    CBC WITH DIFFERENTIAL 1    COMP METABOLIC PANEL 1    ESTIMATED GFR 1    Free Thyroxine (add to TSH for patients with existing thyroid dysfunction) 1    If insulin, ordered initiate hypoglycemia protocol, 1    LIPASE 1    O2 Protocol 1    POC UA 1    PROSTATE SPECIFIC AG DIAGNOSTIC 1    SALINE LOCK 1    TSH (for screening thyroid dysfunction) 1        Discharge Instructions       Blood Glucose Monitoring, Adult  Monitoring your blood glucose (also know as blood sugar) helps you to manage your diabetes. It also helps you and your health care provider monitor your diabetes and determine how well your treatment plan is working.  WHY SHOULD YOU MONITOR YOUR BLOOD GLUCOSE?  · It can help you understand how food, exercise, and medicine affect your blood glucose.  · It allows you to know what your blood glucose is at any given moment. You can quickly tell if you are having low blood glucose (hypoglycemia) or high blood glucose (hyperglycemia).  · It can help you and your health care provider know how to adjust your medicines.  · It can help you understand how to manage an illness or adjust medicine for exercise.  WHEN SHOULD YOU TEST?  Your health care provider will help you decide how often you should check your blood glucose. This may depend on the type of diabetes you have, your diabetes control, or the types of medicines you are taking. Be sure to write down all of your blood glucose readings so that this information can be reviewed with your health care provider. See below for  examples of testing times that your health care provider may suggest.  Type 1 Diabetes  · Test at least 2 times per day if your diabetes is well controlled, if you are using an insulin pump, or if you perform multiple daily injections.  · If your diabetes is not well controlled or if you are sick, you may need to test more often.  · It is a good idea to also test:  ¨ Before every insulin injection.  ¨ Before and after exercise.  ¨ Between meals and 2 hours after a meal.  ¨ Occasionally between 2:00 a.m. and 3:00 a.m.  Type 2 Diabetes  · If you are taking insulin, test at least 2 times per day. However, it is best to test before every insulin injection.  · If you take medicines by mouth (orally), test 2 times a day.  · If you are on a controlled diet, test once a day.  · If your diabetes is not well controlled or if you are sick, you may need to monitor more often.  HOW TO MONITOR YOUR BLOOD GLUCOSE  Supplies Needed  · Blood glucose meter.  · Test strips for your meter. Each meter has its own strips. You must use the strips that go with your own meter.  · A pricking needle (lancet).  · A device that holds the lancet (lancing device).  · A journal or log book to write down your results.  Procedure  · Wash your hands with soap and water. Alcohol is not preferred.  · Prick the side of your finger (not the tip) with the lancet.  · Gently milk the finger until a small drop of blood appears.  · Follow the instructions that come with your meter for inserting the test strip, applying blood to the strip, and using your blood glucose meter.  Other Areas to Get Blood for Testing  Some meters allow you to use other areas of your body (other than your finger) to test your blood. These areas are called alternative sites. The most common alternative sites are:  · The forearm.  · The thigh.  · The back area of the lower leg.  · The palm of the hand.  The blood flow in these areas is slower. Therefore, the blood glucose values you  "get may be delayed, and the numbers are different from what you would get from your fingers. Do not use alternative sites if you think you are having hypoglycemia. Your reading will not be accurate. Always use a finger if you are having hypoglycemia. Also, if you cannot feel your lows (hypoglycemia unawareness), always use your fingers for your blood glucose checks.  ADDITIONAL TIPS FOR GLUCOSE MONITORING  · Do not reuse lancets.  · Always carry your supplies with you.  · All blood glucose meters have a 24-hour \"hotline\" number to call if you have questions or need help.  · Adjust (calibrate) your blood glucose meter with a control solution after finishing a few boxes of strips.  BLOOD GLUCOSE RECORD KEEPING  It is a good idea to keep a daily record or log of your blood glucose readings. Most glucose meters, if not all, keep your glucose records stored in the meter. Some meters come with the ability to download your records to your home computer. Keeping a record of your blood glucose readings is especially helpful if you are wanting to look for patterns. Make notes to go along with the blood glucose readings because you might forget what happened at that exact time. Keeping good records helps you and your health care provider to work together to achieve good diabetes management.      This information is not intended to replace advice given to you by your health care provider. Make sure you discuss any questions you have with your health care provider.     Document Released: 12/20/2004 Document Revised: 01/08/2016 Document Reviewed: 05/12/2014  [employer] Patient Education ©2016 [employer]            Patient Information     Patient Information    Following emergency treatment: all patient requiring follow-up care must return either to a private physician or a clinic if your condition worsens before you are able to obtain further medical attention, please return to the emergency room.     Billing " Information    At [facility], we work to make the billing process streamlined for our patients.  Our Representatives are here to answer any questions you may have regarding your hospital bill.  If you have insurance coverage and have supplied your insurance information to us, we will submit a claim to your insurer on your behalf.  Should you have any questions regarding your bill, we can be reached online or by phone as follows:  Online: You are able pay your bills online or live chat with our representatives about any billing questions you may have. We are here to help Monday - Friday from 8:00am to 7:30pm and 9:00am - 12:00pm on Saturdays.  Please visit [email or web address]  for more information.   Phone:  [phone] or [phone]    Please note that your emergency physician, surgeon, pathologist, radiologist, anesthesiologist, and other specialists are not employed by [facility] and will therefore bill separately for their services.  Please contact them directly for any questions concerning their bills at the numbers below:     Emergency Physician Services:  [phone]  [facility]:  [phone]  [facility]:  [phone]  [facility]:  [phone]    1. Your final bill may vary from the amount quoted upon discharge if all procedures are not complete at that time, or if your doctor has additional procedures of which we are not aware. You will receive an additional bill if you return to the Emergency Department at [facility] for suture removal regardless of the facility of which the sutures were placed.     2. Please arrange for settlement of this account at the emergency registration.    3. All self-pay accounts are due in full at the time of treatment.  If you are unable to meet this obligation then payment is expected within 4-5 days.     4. If you have had radiology studies (CT, X-ray, Ultrasound, MRI), you have  received a preliminary result during your emergency department visit. Please contact the radiology department [phone] to receive a copy of your final result. Please discuss the Final result with your primary physician or with the follow up physician provided.     Crisis Hotline:  [place] Crisis Hotline:  2-461-ZOPJQFI or 1-381.562.7588  [place] Crisis Hotline:    1-115.994.3018 or 475-456-7229         ED Discharge Follow Up Questions    1. In order to provide you with very good care, we would like to follow up with a phone call in the next few days.  May we have your permission to contact you?     YES /  NO    2. What is the best phone number to call you? (       )_____-__________    3. What is the best time to call you?      Morning  /  Afternoon  /  Evening                   Patient Signature:  ____________________________________________________________    Date:  ____________________________________________________________

## 2017-08-23 ENCOUNTER — PATIENT OUTREACH (OUTPATIENT)
Dept: HEALTH INFORMATION MANAGEMENT | Facility: OTHER | Age: 52
End: 2017-08-23

## 2017-08-25 ENCOUNTER — NON-PROVIDER VISIT (OUTPATIENT)
Dept: URGENT CARE | Facility: CLINIC | Age: 52
End: 2017-08-25

## 2017-08-25 DIAGNOSIS — Z02.89 ENCOUNTER FOR OCCUPATIONAL HEALTH EXAMINATION: ICD-10-CM

## 2017-08-25 PROCEDURE — 8907 PR URINE COLLECT ONLY: Performed by: PHYSICIAN ASSISTANT

## 2017-09-01 ENCOUNTER — HOSPITAL ENCOUNTER (EMERGENCY)
Facility: MEDICAL CENTER | Age: 52
End: 2017-09-01
Attending: EMERGENCY MEDICINE
Payer: COMMERCIAL

## 2017-09-01 ENCOUNTER — APPOINTMENT (OUTPATIENT)
Dept: RADIOLOGY | Facility: MEDICAL CENTER | Age: 52
End: 2017-09-01
Attending: EMERGENCY MEDICINE
Payer: COMMERCIAL

## 2017-09-01 VITALS
BODY MASS INDEX: 33.18 KG/M2 | RESPIRATION RATE: 21 BRPM | WEIGHT: 218.92 LBS | HEART RATE: 90 BPM | TEMPERATURE: 98.4 F | DIASTOLIC BLOOD PRESSURE: 78 MMHG | HEIGHT: 68 IN | SYSTOLIC BLOOD PRESSURE: 123 MMHG | OXYGEN SATURATION: 93 %

## 2017-09-01 DIAGNOSIS — R73.9 HYPERGLYCEMIA: ICD-10-CM

## 2017-09-01 DIAGNOSIS — K08.89 TOOTH PAIN: ICD-10-CM

## 2017-09-01 LAB
ALBUMIN SERPL BCP-MCNC: 3.5 G/DL (ref 3.2–4.9)
ALBUMIN/GLOB SERPL: 1.2 G/DL
ALP SERPL-CCNC: 137 U/L (ref 30–99)
ALT SERPL-CCNC: 40 U/L (ref 2–50)
ANION GAP SERPL CALC-SCNC: 11 MMOL/L (ref 0–11.9)
APPEARANCE UR: CLEAR
AST SERPL-CCNC: 19 U/L (ref 12–45)
B-OH-BUTYR SERPL-MCNC: 0.41 MMOL/L (ref 0.02–0.27)
BILIRUB SERPL-MCNC: 0.4 MG/DL (ref 0.1–1.5)
BILIRUB UR QL STRIP.AUTO: NEGATIVE
BUN SERPL-MCNC: 14 MG/DL (ref 8–22)
CALCIUM SERPL-MCNC: 8.2 MG/DL (ref 8.5–10.5)
CHLORIDE SERPL-SCNC: 95 MMOL/L (ref 96–112)
CO2 SERPL-SCNC: 18 MMOL/L (ref 20–33)
COLOR UR: YELLOW
CREAT SERPL-MCNC: 0.83 MG/DL (ref 0.5–1.4)
CULTURE IF INDICATED INDCX: NO UA CULTURE
GFR SERPL CREATININE-BSD FRML MDRD: >60 ML/MIN/1.73 M 2
GLOBULIN SER CALC-MCNC: 3 G/DL (ref 1.9–3.5)
GLUCOSE BLD-MCNC: 213 MG/DL (ref 65–99)
GLUCOSE BLD-MCNC: 408 MG/DL (ref 65–99)
GLUCOSE SERPL-MCNC: 806 MG/DL (ref 65–99)
GLUCOSE UR STRIP.AUTO-MCNC: >=1000 MG/DL
KETONES UR STRIP.AUTO-MCNC: ABNORMAL MG/DL
LEUKOCYTE ESTERASE UR QL STRIP.AUTO: NEGATIVE
MAGNESIUM SERPL-MCNC: 1.8 MG/DL (ref 1.5–2.5)
MICRO URNS: ABNORMAL
NITRITE UR QL STRIP.AUTO: NEGATIVE
PH UR STRIP.AUTO: 5 [PH]
PHOSPHATE SERPL-MCNC: 2.3 MG/DL (ref 2.5–4.5)
POTASSIUM SERPL-SCNC: 4.7 MMOL/L (ref 3.6–5.5)
PROT SERPL-MCNC: 6.5 G/DL (ref 6–8.2)
PROT UR QL STRIP: NEGATIVE MG/DL
RBC UR QL AUTO: NEGATIVE
SODIUM SERPL-SCNC: 124 MMOL/L (ref 135–145)
SP GR UR STRIP.AUTO: 1.03
UROBILINOGEN UR STRIP.AUTO-MCNC: 0.2 MG/DL

## 2017-09-01 PROCEDURE — 96375 TX/PRO/DX INJ NEW DRUG ADDON: CPT

## 2017-09-01 PROCEDURE — 83735 ASSAY OF MAGNESIUM: CPT

## 2017-09-01 PROCEDURE — 700102 HCHG RX REV CODE 250 W/ 637 OVERRIDE(OP): Performed by: EMERGENCY MEDICINE

## 2017-09-01 PROCEDURE — 700105 HCHG RX REV CODE 258: Performed by: EMERGENCY MEDICINE

## 2017-09-01 PROCEDURE — 81003 URINALYSIS AUTO W/O SCOPE: CPT

## 2017-09-01 PROCEDURE — 96374 THER/PROPH/DIAG INJ IV PUSH: CPT

## 2017-09-01 PROCEDURE — 82962 GLUCOSE BLOOD TEST: CPT | Mod: 91

## 2017-09-01 PROCEDURE — 700111 HCHG RX REV CODE 636 W/ 250 OVERRIDE (IP): Performed by: EMERGENCY MEDICINE

## 2017-09-01 PROCEDURE — 96361 HYDRATE IV INFUSION ADD-ON: CPT

## 2017-09-01 PROCEDURE — 96376 TX/PRO/DX INJ SAME DRUG ADON: CPT

## 2017-09-01 PROCEDURE — 71020 DX-CHEST-2 VIEWS: CPT

## 2017-09-01 PROCEDURE — 36415 COLL VENOUS BLD VENIPUNCTURE: CPT

## 2017-09-01 PROCEDURE — 82010 KETONE BODYS QUAN: CPT

## 2017-09-01 PROCEDURE — 99284 EMERGENCY DEPT VISIT MOD MDM: CPT

## 2017-09-01 PROCEDURE — 84100 ASSAY OF PHOSPHORUS: CPT

## 2017-09-01 PROCEDURE — 80053 COMPREHEN METABOLIC PANEL: CPT

## 2017-09-01 RX ORDER — RIFAMPIN 300 MG/1
300 CAPSULE ORAL 2 TIMES DAILY
COMMUNITY
End: 2017-10-04

## 2017-09-01 RX ORDER — SULFAMETHOXAZOLE AND TRIMETHOPRIM 800; 160 MG/1; MG/1
1 TABLET ORAL 2 TIMES DAILY
COMMUNITY
Start: 2017-09-29 | End: 2017-12-08

## 2017-09-01 RX ORDER — MORPHINE SULFATE 4 MG/ML
4 INJECTION, SOLUTION INTRAMUSCULAR; INTRAVENOUS ONCE
Status: COMPLETED | OUTPATIENT
Start: 2017-09-01 | End: 2017-09-01

## 2017-09-01 RX ORDER — OXYCODONE AND ACETAMINOPHEN 10; 325 MG/1; MG/1
1-2 TABLET ORAL EVERY 4 HOURS PRN
COMMUNITY
End: 2017-10-04

## 2017-09-01 RX ORDER — TRAZODONE HYDROCHLORIDE 50 MG/1
50 TABLET ORAL NIGHTLY PRN
COMMUNITY
End: 2018-11-14

## 2017-09-01 RX ORDER — MORPHINE SULFATE 4 MG/ML
4 INJECTION, SOLUTION INTRAMUSCULAR; INTRAVENOUS ONCE
Status: DISCONTINUED | OUTPATIENT
Start: 2017-09-01 | End: 2017-09-01 | Stop reason: HOSPADM

## 2017-09-01 RX ORDER — ONDANSETRON 2 MG/ML
4 INJECTION INTRAMUSCULAR; INTRAVENOUS ONCE
Status: COMPLETED | OUTPATIENT
Start: 2017-09-01 | End: 2017-09-01

## 2017-09-01 RX ORDER — SODIUM CHLORIDE 9 MG/ML
1000 INJECTION, SOLUTION INTRAVENOUS ONCE
Status: COMPLETED | OUTPATIENT
Start: 2017-09-01 | End: 2017-09-01

## 2017-09-01 RX ADMIN — SODIUM CHLORIDE 1000 ML: 9 INJECTION, SOLUTION INTRAVENOUS at 12:43

## 2017-09-01 RX ADMIN — ONDANSETRON 4 MG: 2 INJECTION INTRAMUSCULAR; INTRAVENOUS at 12:00

## 2017-09-01 RX ADMIN — INSULIN HUMAN 6 UNITS: 100 INJECTION, SOLUTION PARENTERAL at 15:14

## 2017-09-01 RX ADMIN — SODIUM CHLORIDE 1000 ML: 9 INJECTION, SOLUTION INTRAVENOUS at 11:00

## 2017-09-01 RX ADMIN — SODIUM CHLORIDE 1000 ML: 9 INJECTION, SOLUTION INTRAVENOUS at 15:16

## 2017-09-01 RX ADMIN — INSULIN HUMAN 10 UNITS: 100 INJECTION, SOLUTION PARENTERAL at 12:48

## 2017-09-01 RX ADMIN — MORPHINE SULFATE 4 MG: 4 INJECTION INTRAVENOUS at 12:00

## 2017-09-01 RX ADMIN — MORPHINE SULFATE 4 MG: 4 INJECTION INTRAVENOUS at 14:03

## 2017-09-01 ASSESSMENT — PAIN SCALES - GENERAL: PAINLEVEL_OUTOF10: 7

## 2017-09-01 ASSESSMENT — LIFESTYLE VARIABLES: DO YOU DRINK ALCOHOL: NO

## 2017-09-01 NOTE — ED NOTES
All lines and monitors D/Cd.  Discharge instructions given, questions answered.  Ambulatory out of ER. Pt states all belongings in possession.

## 2017-09-01 NOTE — ED PROVIDER NOTES
"ED Provider Note    Scribed for [place] D.O. by Jose Martin Kiran. 9/1/2017  11:10 AM    Primary care provider: KRISSY Brunner  Means of arrival: Walk in  History obtained from: Patient  History limited by: None    CHIEF COMPLAINT  Chief Complaint   Patient presents with   • High Blood Sugar     >600, reads HI.  T2DM.         HPI  Mahesh Bradshaw is a 52 y.o. male who presents to the Emergency Department for evaluation of elevated blood sugar levels which have been reading high for two days now. Patient had a series of skylar-umbilical hernia repair surgeries which started in November 2016. He states the surgeries have not been going well. He notes developing a seroma after a mesh was applied during one of the surgeries. The mesh ultimately was rejected. Patient states his blood sugar has been reading high for two days now. Despite taking his insulin and fluids, his blood sugar levels were still high, prompting him to visit the ED.  Patient reports associated low grade fever this morning, productive cough, vomiting, and abdominal pain. He does not report any shortness of breath or chills on exam. Patient is currently taking antibiotics including augmentin and bactrim since before having his surgery.      REVIEW OF SYSTEMS  Pertinent positives include high blood sugar, low grade fever, productive cough, vomiting, abdominal pain. Pertinent negatives include no shortness of breath, chills.  All other systems reviewed and negative.  C    PAST MEDICAL HISTORY  Past Medical History:   Diagnosis Date   • UC (ulcerative colitis) (CMS-[facility]) 3-3-17    \"Five BM's per day, takes Lialda\"   • Arthritis 3-3-17    \"Spine\"   • Snoring 3-3-17    Has not had a sleep study   • High cholesterol 3-3-17    Does not currently take medication for   • ASTHMA 3-3-17    \"Hasn't had to use inhaler in 2 years\"   • Breath shortness 3-3-17    \"With Exercise\"   • Hypothyroid 3-3-17    Takes [place] Thyroid   • Pain 3-3-17 " "   \"Left Flank\"   • Diabetes (CMS-HCC) 3-3-17    Takes Insulin   • Aspiration pneumonia (CMS-HCC) 3-2016   • ADRIANE (acute kidney injury) (CMS-HCC) 2/24/2016   • Difficult intubation 2-2016   • Congestive heart failure (CMS-HCC) 2-2016     3-3-17 \"Not a current issue\"   • Pancreatitis 2-2016    \"D/T Tradjenta was hospitialized for 15 days\"   • Elevated liver enzymes 2-2016   • Electrolyte imbalance 2-2016   • RF (renal failure) 2-2016   • DKA (diabetic ketoacidoses) (CMS-HCC) 2-2016    \"10 days on vent with DKA, Renal failure, CHF, elevated liver enzymes and electrolyte imbalance\"   • On mechanically assisted ventilation (CMS-HCC) 2-2016    HX \"On Vent for 10 days at [facility]\".  \"DX Pancreatitis, DKA, CHF, Elevated Liver Enzymes & Electrolyte Imbalance\".   • Essential hypertension 1/22/2016   • Sepsis (CMS-HCC) 1/21/2016   • Depression 11/26/2014   • Anesthesia     \"Was difficult to intubate 2-2016\"   • Gout    • Heart burn    • Indigestion    • Kidney stones    • Migraine        SURGICAL HISTORY  Past Surgical History:   Procedure Laterality Date   • IRRIGATION & DEBRIDEMENT GENERAL Right 5/1/2017    Procedure: IRRIGATION & DEBRIDEMENT GENERAL-Left HAND AND right  ANKLE;  Surgeon: Esau Dooley M.D.;  Location: [facility];  Service:    • IRRIGATION & DEBRIDEMENT GENERAL Right 4/29/2017    Procedure: IRRIGATION & DEBRIDEMENT GENERAL;  Surgeon: Esau Dooley M.D.;  Location: SURGERY [facility];  Service:    • INCISION AND DRAINAGE GENERAL  4/7/2017    Procedure: INCISION AND DRAINAGE GENERAL;  Surgeon: Esau Dooley M.D.;  Location: SURGERY SAME DAY [facility];  Service:    • UMBILICAL HERNIA REPAIR  3/10/2017    Procedure: UMBILICAL HERNIA REPAIR- INCISION AND DRAINAGE OF UMBILICAL WOUND AND MESH REMOVAL;  Surgeon: Esau Dooley M.D.;  Location: SURGERY SAME DAY [facility];  Service:    • UMBILICAL HERNIA REPAIR N/A 11/6/2016    Procedure: UMBILICAL HERNIA REPAIR;  Surgeon: Esau WHITNEY" "CLINT Dooley;  Location: SURGERY [facility];  Service:    • COLONOSCOPY WITH BIOPSY  11/26/2014    Performed by Solo Higginbotham M.D. at ENDOSCOPY [facility]   • LIVE BY LAPAROSCOPY  2005   • OTHER ORTHOPEDIC SURGERY  1997 or 1998    Left Wrist ORIF        SOCIAL HISTORY  Social History   Substance Use Topics   • Smoking status: Never Smoker   • Smokeless tobacco: Never Used   • Alcohol use Yes      Comment: occ      History   Drug Use No       FAMILY HISTORY  None noted    CURRENT MEDICATIONS  No current facility-administered medications on file prior to encounter.      Current Outpatient Prescriptions on File Prior to Encounter   Medication Sig Dispense Refill   • HYDROmorphone (DILAUDID) 2 MG Tab Take 2-4 mg by mouth 2 times a day as needed for Severe Pain.     • ibuprofen (MOTRIN) 200 MG Tab Take 600-800 mg by mouth every 6 hours as needed for Mild Pain.     • EPINEPHrine (EPIPEN 2-ANNE-MARIE) 0.3 MG/0.3ML Solution Auto-injector solution for injection 0.3 mg by Intramuscular route Once. Indications: Life-Threatening Allergic Reaction     • ondansetron (ZOFRAN ODT) 4 MG TABLET DISPERSIBLE Take 1 Tab by mouth every 8 hours as needed for Nausea/Vomiting. 10 Tab 0   • Insulin Glargine (TOUJEO SOLOSTAR) 300 UNIT/ML Solution Pen-injector Inject 36-56 Units as instructed every evening. Pt states \"I use a sliding scale\".     • Multiple Vitamins-Minerals (ZINC PO) Take 1 Tab by mouth every evening.     • thyroid (ARMOUR THYROID) 60 MG Tab Take 60 mg by mouth every evening.     • Cholecalciferol (VITAMIN D3) 5000 UNITS Tab Take 5,000 Units by mouth every evening.     • insulin lispro (HUMALOG) 100 UNIT/ML Solution Inject 4-10 Units as instructed 3 times a day before meals. Sliding Scale - 2 units every 50 > 150     • buPROPion (WELLBUTRIN XL) 300 MG XL tablet Take 300 mg by mouth every evening.     • mesalamine (LIALDA) 1.2 GM TBEC Take 1.2 g by mouth 2 times a day.        ALLERGIES  Allergies   Allergen " "Reactions   • Peanut-Derived Anaphylaxis     Peanuts   RXN=age 36   • Tradjenta [Linagliptin] Unspecified     Pt developed pancreatitis   • Hydrocodone Hives     Tolerates Morphine and oxycodone  Rxn Age - 29       PHYSICAL EXAM  VITAL SIGNS: /78   Pulse (!) 125   Temp 36.9 °C (98.4 °F)   Resp 20   Ht 1.727 m (5' 8\")   Wt 99.3 kg (218 lb 14.7 oz)   SpO2 95%   BMI 33.29 kg/m²   Nursing notes and vitals reviewed.  Constitutional: Well developed, Well nourished, No acute distress, Non-toxic appearance.   Eyes: PERRLA, EOMI, Conjunctiva normal, No discharge.   Cardiovascular: Tachycardic, Normal rhythm, No murmurs, No rubs, No gallops.   Thorax & Lungs: No respiratory distress, No rales, No rhonchi, No wheezing, No chest tenderness.   Abdomen: Bowel sounds normal, Soft, Diffuse abdominal tenderness especially to skylar-umbilical region. Skylar-umbilical region with a surgical scar. No guarding, No rebound, No masses, No pulsatile masses.   Skin: Warm, Dry, No erythema, No rash.   Musculoskeletal: Intact distal pulses, No edema, No cyanosis, No clubbing. Good range of motion in all major joints. No tenderness to palpation or major deformities noted, no CVA tenderness, no midline back tenderness.   Neurologic: Alert & oriented x 3, Normal motor function, Normal sensory function, No focal deficits noted.  Psychiatric: Affect normal for clinical presentation.    DIAGNOSTIC STUDIES/PROCEDURES    LABS  Results for orders placed or performed during the hospital encounter of 09/01/17   Complete Metabolic Panel (CMP)   Result Value Ref Range    Sodium 124 (L) 135 - 145 mmol/L    Potassium 4.7 3.6 - 5.5 mmol/L    Chloride 95 (L) 96 - 112 mmol/L    Co2 18 (L) 20 - 33 mmol/L    Anion Gap 11.0 0.0 - 11.9    Glucose 806 (HH) 65 - 99 mg/dL    Bun 14 8 - 22 mg/dL    Creatinine 0.83 0.50 - 1.40 mg/dL    Calcium 8.2 (L) 8.5 - 10.5 mg/dL    AST(SGOT) 19 12 - 45 U/L    ALT(SGPT) 40 2 - 50 U/L    Alkaline Phosphatase 137 (H) 30 - " 99 U/L    Total Bilirubin 0.4 0.1 - 1.5 mg/dL    Albumin 3.5 3.2 - 4.9 g/dL    Total Protein 6.5 6.0 - 8.2 g/dL    Globulin 3.0 1.9 - 3.5 g/dL    A-G Ratio 1.2 g/dL   BETA-HYDROXYBUTYRIC ACID   Result Value Ref Range    beta-Hydroxybutyric Acid 0.41 (H) 0.02 - 0.27 mmol/L   MAGNESIUM   Result Value Ref Range    Magnesium 1.8 1.5 - 2.5 mg/dL   PHOSPHORUS   Result Value Ref Range    Phosphorus 2.3 (L) 2.5 - 4.5 mg/dL   URINALYSIS,CULTURE IF INDICATED   Result Value Ref Range    Color Yellow     Character Clear     Specific Gravity 1.032 <1.035    Ph 5.0 5.0 - 8.0    Glucose >=1000 (A) Negative mg/dL    Ketones Trace (A) Negative mg/dL    Protein Negative Negative mg/dL    Bilirubin Negative Negative    Urobilinogen, Urine 0.2 Negative    Nitrite Negative Negative    Leukocyte Esterase Negative Negative    Occult Blood Negative Negative    Micro Urine Req see below     Culture Indicated No UA Culture   ESTIMATED GFR   Result Value Ref Range    GFR If African American >60 >60 mL/min/1.73 m 2    GFR If Non African American >60 >60 mL/min/1.73 m 2   ACCU-CHEK GLUCOSE   Result Value Ref Range    Glucose - Accu-Ck 408 (HH) 65 - 99 mg/dL      All labs reviewed by me.    RADIOLOGY  DX-CHEST-2 VIEWS   Final Result         1. No active cardiopulmonary abnormalities are identified.        The radiologist's interpretation of all radiological studies have been reviewed by me.    COURSE & MEDICAL DECISION MAKING  Pertinent Labs & Imaging studies reviewed. (See chart for details)    11:10 AM - Patient seen and examined at bedside. The patient will be resuscitated with NS IV due to hyperglycemia, tachycardia. Ordered CMP, beta-hydroxybutyric acid, magnesium, phosphorus, urinalysis culture, estimated GFR to evaluate his symptoms.     1:11 PM Patient reevaluated at bedside. Discussed lab and radiology results as seen above. Discussed further plan of care which includes getting his blood sugars lower, continuing iv fluids, and further  monitoring. Patient understands and agrees to plan.    2:46 PM Patient reevaluated at bedside. Patient is improved. Will order more fluids.     This is a charming 52 y.o. male that presents withHyperglycemia. The patient is well-known to us for sepsis and hyperglycemia and diabetic ketoacidosis. The patient had a normal anion gap and a very limited elevated ketone. At this point, he received 3 L normal saline insulin 10 units initially followed by 5 units. The patient's blood sugar responded appropriately and on discharge showed a blood sugar in the 200 range. The patient's heart rate did decrease significantly as well. He received morphine 4 mg IV ×2 secondary to his tooth pain. The patient is on 3 different antibiotics for his tooth and I do not believe he has an overall knee infection or sepsis. The patient does have a slight cough and exudate was completed was negative for pneumonia. The patient will be following up with his primary care physician for further evaluation and management. The patient will return for new or worsening symptoms and is stable at the time of discharge.    The patient is referred to a primary physician for blood pressure management, diabetic screening, and for all other preventative health concerns.    DISPOSITION:  Patient will be discharged home in stable condition.    FOLLOW UP:  [facility], Emergency Dept  [address] [facility] [address] [phone]    If symptoms worsen    NASIR Brunner.  [address]  [facility] [address]  [phone]    Schedule an appointment as soon as possible for a visit  As needed      OUTPATIENT MEDICATIONS:  New Prescriptions    No medications on file        FINAL IMPRESSION  1. Hyperglycemia    2. Tooth pain          Jose Martin EPSTEIN (Gareth), am scribing for, and in the presence of, [place] D.O    Electronically signed by: Jose Martin Kiran (Sheilae), 9/1/2017    [place] EPSTEIN  RAE. personally performed the services described in this documentation, as scribed by Jose Martin Kiran in my presence, and it is both accurate and complete.    The note accurately reflects work and decisions made by me.  [place]  9/1/2017  5:14 PM

## 2017-09-01 NOTE — ED NOTES
from Lab called with critical result of glucose 803 at 1205. Critical lab result read back to .     notified of critical lab result at 1206.  Critical lab result read back by  .

## 2017-09-01 NOTE — DISCHARGE INSTRUCTIONS
"Hyperglycemia  Hyperglycemia occurs when the glucose (sugar) in your blood is too high. Hyperglycemia can happen for many reasons, but it most often happens to people who do not know they have diabetes or are not managing their diabetes properly.   CAUSES   Whether you have diabetes or not, there are other causes of hyperglycemia. Hyperglycemia can occur when you have diabetes, but it can also occur in other situations that you might not be as aware of, such as:  Diabetes  · If you have diabetes and are having problems controlling your blood glucose, hyperglycemia could occur because of some of the following reasons:  ¨ Not following your meal plan.  ¨ Not taking your diabetes medications or not taking it properly.  ¨ Exercising less or doing less activity than you normally do.  ¨ Being sick.  Pre-diabetes  · This cannot be ignored. Before people develop Type 2 diabetes, they almost always have \"pre-diabetes.\" This is when your blood glucose levels are higher than normal, but not yet high enough to be diagnosed as diabetes. Research has shown that some long-term damage to the body, especially the heart and circulatory system, may already be occurring during pre-diabetes. If you take action to manage your blood glucose when you have pre-diabetes, you may delay or prevent Type 2 diabetes from developing.  Stress  · If you have diabetes, you may be \"diet\" controlled or on oral medications or insulin to control your diabetes. However, you may find that your blood glucose is higher than usual in the hospital whether you have diabetes or not. This is often referred to as \"stress hyperglycemia.\" Stress can elevate your blood glucose. This happens because of hormones put out by the body during times of stress. If stress has been the cause of your high blood glucose, it can be followed regularly by your caregiver. That way he/she can make sure your hyperglycemia does not continue to get worse or progress to " "diabetes.  Steroids  · Steroids are medications that act on the infection fighting system (immune system) to block inflammation or infection. One side effect can be a rise in blood glucose. Most people can produce enough extra insulin to allow for this rise, but for those who cannot, steroids make blood glucose levels go even higher. It is not unusual for steroid treatments to \"uncover\" diabetes that is developing. It is not always possible to determine if the hyperglycemia will go away after the steroids are stopped. A special blood test called an A1c is sometimes done to determine if your blood glucose was elevated before the steroids were started.  SYMPTOMS  · Thirsty.  · Frequent urination.  · Dry mouth.  · Blurred vision.  · Tired or fatigue.  · Weakness.  · Sleepy.  · Tingling in feet or leg.  DIAGNOSIS   Diagnosis is made by monitoring blood glucose in one or all of the following ways:  · A1c test. This is a chemical found in your blood.  · Fingerstick blood glucose monitoring.  · Laboratory results.  TREATMENT   First, knowing the cause of the hyperglycemia is important before the hyperglycemia can be treated. Treatment may include, but is not be limited to:  · Education.  · Change or adjustment in medications.  · Change or adjustment in meal plan.  · Treatment for an illness, infection, etc.  · More frequent blood glucose monitoring.  · Change in exercise plan.  · Decreasing or stopping steroids.  · Lifestyle changes.  HOME CARE INSTRUCTIONS   · Test your blood glucose as directed.  · Exercise regularly. Your caregiver will give you instructions about exercise. Pre-diabetes or diabetes which comes on with stress is helped by exercising.  · Eat wholesome, balanced meals. Eat often and at regular, fixed times. Your caregiver or nutritionist will give you a meal plan to guide your sugar intake.  · Being at an ideal weight is important. If needed, losing as little as 10 to 15 pounds may help improve blood " glucose levels.  SEEK MEDICAL CARE IF:   · You have questions about medicine, activity, or diet.  · You continue to have symptoms (problems such as increased thirst, urination, or weight gain).  SEEK IMMEDIATE MEDICAL CARE IF:   · You are vomiting or have diarrhea.  · Your breath smells fruity.  · You are breathing faster or slower.  · You are very sleepy or incoherent.  · You have numbness, tingling, or pain in your feet or hands.  · You have chest pain.  · Your symptoms get worse even though you have been following your caregiver's orders.  · If you have any other questions or concerns.     This information is not intended to replace advice given to you by your health care provider. Make sure you discuss any questions you have with your health care provider.     Document Released: 06/13/2002 Document Revised: 03/11/2013 Document Reviewed: 04/15/2013  [employer] Interactive Patient Education ©2016 [employer]

## 2017-09-01 NOTE — ED NOTES
Mahesh Bradshaw 52 y.o. male   Chief Complaint   Patient presents with   • High Blood Sugar     >600, reads HI.  T2DM.     PIV placed.  Chart placed for ERP eval.

## 2017-09-09 ENCOUNTER — HOSPITAL ENCOUNTER (EMERGENCY)
Facility: MEDICAL CENTER | Age: 52
End: 2017-09-09
Attending: EMERGENCY MEDICINE
Payer: COMMERCIAL

## 2017-09-09 ENCOUNTER — APPOINTMENT (OUTPATIENT)
Dept: RADIOLOGY | Facility: MEDICAL CENTER | Age: 52
End: 2017-09-09
Attending: EMERGENCY MEDICINE
Payer: COMMERCIAL

## 2017-09-09 VITALS
RESPIRATION RATE: 15 BRPM | DIASTOLIC BLOOD PRESSURE: 78 MMHG | BODY MASS INDEX: 32.58 KG/M2 | OXYGEN SATURATION: 98 % | WEIGHT: 215 LBS | SYSTOLIC BLOOD PRESSURE: 134 MMHG | HEART RATE: 85 BPM | HEIGHT: 68 IN | TEMPERATURE: 97.7 F

## 2017-09-09 DIAGNOSIS — R73.9 HYPERGLYCEMIA: ICD-10-CM

## 2017-09-09 LAB
ANION GAP SERPL CALC-SCNC: 16 MMOL/L (ref 0–11.9)
APPEARANCE UR: CLEAR
B-OH-BUTYR SERPL-MCNC: 0.25 MMOL/L (ref 0.02–0.27)
BASOPHILS # BLD AUTO: 0.4 % (ref 0–1.8)
BASOPHILS # BLD: 0.02 K/UL (ref 0–0.12)
BILIRUB UR QL STRIP.AUTO: NEGATIVE
BUN SERPL-MCNC: 16 MG/DL (ref 8–22)
CALCIUM SERPL-MCNC: 9.3 MG/DL (ref 8.5–10.5)
CHLORIDE SERPL-SCNC: 92 MMOL/L (ref 96–112)
CO2 SERPL-SCNC: 22 MMOL/L (ref 20–33)
COLOR UR: YELLOW
CREAT SERPL-MCNC: 0.94 MG/DL (ref 0.5–1.4)
CULTURE IF INDICATED INDCX: NO UA CULTURE
EOSINOPHIL # BLD AUTO: 0.03 K/UL (ref 0–0.51)
EOSINOPHIL NFR BLD: 0.6 % (ref 0–6.9)
ERYTHROCYTE [DISTWIDTH] IN BLOOD BY AUTOMATED COUNT: 42.5 FL (ref 35.9–50)
GFR SERPL CREATININE-BSD FRML MDRD: >60 ML/MIN/1.73 M 2
GLUCOSE BLD-MCNC: 315 MG/DL (ref 65–99)
GLUCOSE SERPL-MCNC: 538 MG/DL (ref 65–99)
GLUCOSE UR STRIP.AUTO-MCNC: >=1000 MG/DL
HCT VFR BLD AUTO: 36.7 % (ref 42–52)
HGB BLD-MCNC: 13 G/DL (ref 14–18)
IMM GRANULOCYTES # BLD AUTO: 0.04 K/UL (ref 0–0.11)
IMM GRANULOCYTES NFR BLD AUTO: 0.7 % (ref 0–0.9)
KETONES UR STRIP.AUTO-MCNC: NEGATIVE MG/DL
LEUKOCYTE ESTERASE UR QL STRIP.AUTO: NEGATIVE
LYMPHOCYTES # BLD AUTO: 1.27 K/UL (ref 1–4.8)
LYMPHOCYTES NFR BLD: 23.4 % (ref 22–41)
MCH RBC QN AUTO: 30.4 PG (ref 27–33)
MCHC RBC AUTO-ENTMCNC: 35.4 G/DL (ref 33.7–35.3)
MCV RBC AUTO: 85.9 FL (ref 81.4–97.8)
MICRO URNS: ABNORMAL
MONOCYTES # BLD AUTO: 0.32 K/UL (ref 0–0.85)
MONOCYTES NFR BLD AUTO: 5.9 % (ref 0–13.4)
NEUTROPHILS # BLD AUTO: 3.74 K/UL (ref 1.82–7.42)
NEUTROPHILS NFR BLD: 69 % (ref 44–72)
NITRITE UR QL STRIP.AUTO: NEGATIVE
NRBC # BLD AUTO: 0 K/UL
NRBC BLD AUTO-RTO: 0 /100 WBC
PH UR STRIP.AUTO: 5 [PH]
PLATELET # BLD AUTO: 241 K/UL (ref 164–446)
PMV BLD AUTO: 9.6 FL (ref 9–12.9)
POTASSIUM SERPL-SCNC: 4.1 MMOL/L (ref 3.6–5.5)
PROT UR QL STRIP: NEGATIVE MG/DL
RBC # BLD AUTO: 4.27 M/UL (ref 4.7–6.1)
RBC UR QL AUTO: NEGATIVE
SODIUM SERPL-SCNC: 130 MMOL/L (ref 135–145)
SP GR UR STRIP.AUTO: 1.04
UROBILINOGEN UR STRIP.AUTO-MCNC: 0.2 MG/DL
WBC # BLD AUTO: 5.4 K/UL (ref 4.8–10.8)

## 2017-09-09 PROCEDURE — 96376 TX/PRO/DX INJ SAME DRUG ADON: CPT

## 2017-09-09 PROCEDURE — 81003 URINALYSIS AUTO W/O SCOPE: CPT

## 2017-09-09 PROCEDURE — 99285 EMERGENCY DEPT VISIT HI MDM: CPT

## 2017-09-09 PROCEDURE — 82962 GLUCOSE BLOOD TEST: CPT

## 2017-09-09 PROCEDURE — 96361 HYDRATE IV INFUSION ADD-ON: CPT

## 2017-09-09 PROCEDURE — 36415 COLL VENOUS BLD VENIPUNCTURE: CPT

## 2017-09-09 PROCEDURE — 85025 COMPLETE CBC W/AUTO DIFF WBC: CPT

## 2017-09-09 PROCEDURE — 96375 TX/PRO/DX INJ NEW DRUG ADDON: CPT

## 2017-09-09 PROCEDURE — 96374 THER/PROPH/DIAG INJ IV PUSH: CPT

## 2017-09-09 PROCEDURE — 700105 HCHG RX REV CODE 258: Performed by: EMERGENCY MEDICINE

## 2017-09-09 PROCEDURE — 80048 BASIC METABOLIC PNL TOTAL CA: CPT

## 2017-09-09 PROCEDURE — 700102 HCHG RX REV CODE 250 W/ 637 OVERRIDE(OP): Performed by: EMERGENCY MEDICINE

## 2017-09-09 PROCEDURE — 82010 KETONE BODYS QUAN: CPT

## 2017-09-09 PROCEDURE — 700111 HCHG RX REV CODE 636 W/ 250 OVERRIDE (IP): Performed by: EMERGENCY MEDICINE

## 2017-09-09 PROCEDURE — 71010 DX-CHEST-PORTABLE (1 VIEW): CPT

## 2017-09-09 RX ORDER — SODIUM CHLORIDE 9 MG/ML
2000 INJECTION, SOLUTION INTRAVENOUS ONCE
Status: COMPLETED | OUTPATIENT
Start: 2017-09-09 | End: 2017-09-09

## 2017-09-09 RX ORDER — ONDANSETRON 2 MG/ML
4 INJECTION INTRAMUSCULAR; INTRAVENOUS ONCE
Status: COMPLETED | OUTPATIENT
Start: 2017-09-09 | End: 2017-09-09

## 2017-09-09 RX ORDER — MORPHINE SULFATE 4 MG/ML
4 INJECTION, SOLUTION INTRAMUSCULAR; INTRAVENOUS ONCE
Status: COMPLETED | OUTPATIENT
Start: 2017-09-09 | End: 2017-09-09

## 2017-09-09 RX ADMIN — SODIUM CHLORIDE 2000 ML: 9 INJECTION, SOLUTION INTRAVENOUS at 15:04

## 2017-09-09 RX ADMIN — INSULIN HUMAN 10 UNITS: 100 INJECTION, SOLUTION PARENTERAL at 16:22

## 2017-09-09 RX ADMIN — ONDANSETRON 4 MG: 2 INJECTION, SOLUTION INTRAMUSCULAR; INTRAVENOUS at 15:14

## 2017-09-09 RX ADMIN — MORPHINE SULFATE 4 MG: 4 INJECTION INTRAVENOUS at 15:14

## 2017-09-09 RX ADMIN — MORPHINE SULFATE 4 MG: 4 INJECTION INTRAVENOUS at 16:22

## 2017-09-09 ASSESSMENT — LIFESTYLE VARIABLES: DO YOU DRINK ALCOHOL: NO

## 2017-09-09 ASSESSMENT — PAIN SCALES - GENERAL: PAINLEVEL_OUTOF10: 0

## 2017-09-09 ASSESSMENT — PAIN SCALES - WONG BAKER: WONGBAKER_NUMERICALRESPONSE: DOESN'T HURT AT ALL

## 2017-09-09 NOTE — ED NOTES
"Pt c/o elevated blood sugars for the past few days. \"High\" reading from home glucometer. (+) Nausea (+) Polyuria    "

## 2017-09-09 NOTE — ED NOTES
Lab called with critical result of glucose at 538. Critical lab result read back to lab.  Dr. Bonilla notified of critical lab result at 1557.  Critical lab result read back by Dr. Bonilla

## 2017-09-10 NOTE — DISCHARGE INSTRUCTIONS
Return here if new or worse symptoms. See your doctor during the week for recheck and follow up with endocrinology as soon as you can

## 2017-09-10 NOTE — ED PROVIDER NOTES
"ED Provider Note    CHIEF COMPLAINT  Chief Complaint   Patient presents with   • High Blood Sugar       HPI  Mahesh Bradshaw is a 52 y.o. male who presentsTo the emergency department complaining that his blood sugar has been difficult to control over the last 3 or 4 weeks and is now elevated again. The patient has had 2 recent emergency department visits for hyperglycemia and this seemed to start after he had a right lower molar extracted. He has also had a complex recent medical history with multiple infections and abscesses but these finally seem to be clearing up. The patient has been seeing his primary care doctor and has been trying to get into see an endocrinologist but has not yet been seen     REVIEW OF SYSTEMS no fever or chills no chest pain or difficulty breathing although he has recently had a nonproductive cough. All other systems negative    PAST MEDICAL HISTORY  Past Medical History:   Diagnosis Date   • UC (ulcerative colitis) (CMS-HCC) 3-3-17    \"Five BM's per day, takes Lialda\"   • Arthritis 3-3-17    \"Spine\"   • Snoring 3-3-17    Has not had a sleep study   • High cholesterol 3-3-17    Does not currently take medication for   • ASTHMA 3-3-17    \"Hasn't had to use inhaler in 2 years\"   • Breath shortness 3-3-17    \"With Exercise\"   • Hypothyroid 3-3-17    Takes [place] Thyroid   • Pain 3-3-17    \"Left Flank\"   • Diabetes (CMS-HCC) 3-3-17    Takes Insulin   • Aspiration pneumonia (CMS-HCC) 3-2016   • ADRIANE (acute kidney injury) (CMS-HCC) 2/24/2016   • Difficult intubation 2-2016   • Congestive heart failure (CMS-HCC) 2-2016     3-3-17 \"Not a current issue\"   • Pancreatitis 2-2016    \"D/T Tradjenta was hospitialized for 15 days\"   • Elevated liver enzymes 2-2016   • Electrolyte imbalance 2-2016   • RF (renal failure) 2-2016   • DKA (diabetic ketoacidoses) (CMS-HCC) 2-2016    \"10 days on vent with DKA, Renal failure, CHF, elevated liver enzymes and electrolyte imbalance\"   • On mechanically assisted " "ventilation (CMS-HCC) 2-2016    HX \"On Vent for 10 days at [facility]\".  \"DX Pancreatitis, DKA, CHF, Elevated Liver Enzymes & Electrolyte Imbalance\".   • Essential hypertension 1/22/2016   • Sepsis (CMS-HCC) 1/21/2016   • Depression 11/26/2014   • Anesthesia     \"Was difficult to intubate 2-2016\"   • Gout    • Heart burn    • Indigestion    • Kidney stones    • Migraine        FAMILY HISTORY  No family history on file.    SOCIAL HISTORY  Social History     Social History   • Marital status: Single     Spouse name: N/A   • Number of children: N/A   • Years of education: N/A     Social History Main Topics   • Smoking status: Never Smoker   • Smokeless tobacco: Never Used   • Alcohol use Yes      Comment: occ   • Drug use: No   • Sexual activity: Not on file     Other Topics Concern   • Not on file     Social History Narrative    ** Merged History Encounter **         ** Merged History Encounter **            SURGICAL HISTORY  Past Surgical History:   Procedure Laterality Date   • IRRIGATION & DEBRIDEMENT GENERAL Right 5/1/2017    Procedure: IRRIGATION & DEBRIDEMENT GENERAL-Left HAND AND right  ANKLE;  Surgeon: Esau Dooley M.D.;  Location: [facility];  Service:    • IRRIGATION & DEBRIDEMENT GENERAL Right 4/29/2017    Procedure: IRRIGATION & DEBRIDEMENT GENERAL;  Surgeon: Esau Dooley M.D.;  Location: [facility];  Service:    • INCISION AND DRAINAGE GENERAL  4/7/2017    Procedure: INCISION AND DRAINAGE GENERAL;  Surgeon: Esau Dooley M.D.;  Location: SURGERY SAME DAY [facility];  Service:    • UMBILICAL HERNIA REPAIR  3/10/2017    Procedure: UMBILICAL HERNIA REPAIR- INCISION AND DRAINAGE OF UMBILICAL WOUND AND MESH REMOVAL;  Surgeon: Esau Dooley M.D.;  Location: SURGERY SAME DAY [facility];  Service:    • UMBILICAL HERNIA REPAIR N/A 11/6/2016    Procedure: UMBILICAL HERNIA REPAIR;  Surgeon: Esau Dooley M.D.;  Location: [facility];  Service:    • COLONOSCOPY " "WITH BIOPSY  11/26/2014    Performed by Solo Higginbotham M.D. at ENDOSCOPY [facility] ORS   • LIVE BY LAPAROSCOPY  2005   • OTHER ORTHOPEDIC SURGERY  1997 or 1998    Left Wrist ORIF       CURRENT MEDICATIONS  Home Medications     Reviewed by Minnie Brady R.N. (Registered Nurse) on 09/09/17 at 1416  Med List Status: Partial   Medication Last Dose Status   Amoxicillin-Pot Clavulanate (AUGMENTIN PO) 9/9/2017 Active   buPROPion (WELLBUTRIN XL) 300 MG XL tablet 9/8/2017 Active   Cholecalciferol (VITAMIN D3) 5000 UNITS Tab 9/9/2017 Active   EPINEPHrine (EPIPEN 2-ANNE-MARIE) 0.3 MG/0.3ML Solution Auto-injector solution for injection > Never used Active   HYDROmorphone (DILAUDID) 2 MG Tab 08/27/2017 Active   ibuprofen (MOTRIN) 200 MG Tab 9/9/2017 Active   Insulin Glargine (TOUJEO SOLOSTAR) 300 UNIT/ML Solution Pen-injector 8/31/2017 Active   insulin lispro (HUMALOG) 100 UNIT/ML Solution 8/22/2017 Active   mesalamine (LIALDA) 1.2 GM TBEC 8/22/2017 Active   Multiple Vitamins-Minerals (ZINC PO) 8/21/2017 Active   ondansetron (ZOFRAN ODT) 4 MG TABLET DISPERSIBLE 8/22/2017 Active   oxycodone-acetaminophen (PERCOCET-10)  MG Tab 9/8/2017 Active   rifampin (RIFADINE) 300 MG Cap 9/1/2017 Active   sulfamethoxazole-trimethoprim (BACTRIM DS) 800-160 MG tablet 9/9/2017 Active   thyroid (ARMOUR THYROID) 60 MG Tab 8/21/2017 Active   trazodone (DESYREL) 100 MG Tab PRN Active                ALLERGIES  Allergies   Allergen Reactions   • Peanut-Derived Anaphylaxis     Peanuts   RXN=age 36   • Tradjenta [Linagliptin] Unspecified     Pt developed pancreatitis   • Hydrocodone Hives     Tolerates Morphine and oxycodone  Rxn Age - 29       PHYSICAL EXAM  VITAL SIGNS: /78   Pulse 85   Temp 36.5 °C (97.7 °F)   Resp 15   Ht 1.727 m (5' 8\")   Wt 97.5 kg (215 lb)   SpO2 98%   BMI 32.69 kg/m²    Oxygen saturation is interpreted asAdequate  Constitutional: Awake verbal and generally nontoxic appearing  HENT: Mucous membranes are " slightly dry  Eyes: No erythema or discharge or jaundice  Neck: Trachea midline no JVD  Cardiovascular: Regular rate and rhythm  Lungs: Clear and equal bilaterally with no apparent difficulty breathing  Abdomen/Back: Soft nontender nondistended no peritoneal findings  Skin: Warm and dry  Musculoskeletal: No acute bony deformity  Neurologic: Awake verbal and moving all extremities    CHART REVIEW  I reviewed notes from the last 2 ER visits indicating that during those visits the patient did have elevated blood sugars up in the 800 range and slightly elevated ketones and he was treated with intravenous fluids and insulin and was able to be discharged home.    Laboratory  A CBC shows white blood count of 5.4 which is normal, hemoglobin is adequate 13, basic metabolic panel shows an elevated blood sugar 538 the bicarb is normal at 22 serum ketones are normal. Urinalysis was negative for nitrite and leukocyte esterase and blood and ketones are negative    Radiology  DX-CHEST-PORTABLE (1 VIEW)   Final Result      Bibasilar linear atelectasis.            MEDICAL DECISION MAKING and DISPOSITION  In the emergency department an IV was established the patient was given 2 L of intravenous normal saline and 10 units of regular insulin. He was also given a small dose of morphine and Zofran for headache. The patient is feeling well he is not ketotic his blood sugar has come down to 318 and at this point in time if he will be safe for him to go home. I have advised him to follow-up with his doctor soon as possible during the week and to continue his efforts to follow-up with an endocrinologist and if he has new or worsening symptoms he is to return at once for recheck    IMPRESSION  1. Hyperglycemia         Electronically signed by: Salvatore Bonilla, 9/10/2017 3:24 PM

## 2017-10-04 ENCOUNTER — HOSPITAL ENCOUNTER (INPATIENT)
Facility: MEDICAL CENTER | Age: 52
LOS: 2 days | DRG: 638 | End: 2017-10-06
Attending: EMERGENCY MEDICINE | Admitting: INTERNAL MEDICINE
Payer: COMMERCIAL

## 2017-10-04 ENCOUNTER — RESOLUTE PROFESSIONAL BILLING HOSPITAL PROF FEE (OUTPATIENT)
Dept: HOSPITALIST | Facility: MEDICAL CENTER | Age: 52
End: 2017-10-04
Payer: COMMERCIAL

## 2017-10-04 ENCOUNTER — APPOINTMENT (OUTPATIENT)
Dept: RADIOLOGY | Facility: MEDICAL CENTER | Age: 52
DRG: 638 | End: 2017-10-04
Attending: EMERGENCY MEDICINE
Payer: COMMERCIAL

## 2017-10-04 DIAGNOSIS — E08.10 DIABETIC KETOACIDOSIS WITHOUT COMA ASSOCIATED WITH DIABETES MELLITUS DUE TO UNDERLYING CONDITION (HCC): ICD-10-CM

## 2017-10-04 DIAGNOSIS — R73.9 HYPERGLYCEMIA: ICD-10-CM

## 2017-10-04 PROBLEM — R10.9 AP (ABDOMINAL PAIN): Status: ACTIVE | Noted: 2017-10-04

## 2017-10-04 LAB
ALBUMIN SERPL BCP-MCNC: 4.4 G/DL (ref 3.2–4.9)
ALBUMIN/GLOB SERPL: 1.4 G/DL
ALP SERPL-CCNC: 88 U/L (ref 30–99)
ALT SERPL-CCNC: 16 U/L (ref 2–50)
ANION GAP SERPL CALC-SCNC: 14 MMOL/L (ref 0–11.9)
ANION GAP SERPL CALC-SCNC: 16 MMOL/L (ref 0–11.9)
ANION GAP SERPL CALC-SCNC: 17 MMOL/L (ref 0–11.9)
APPEARANCE UR: CLEAR
AST SERPL-CCNC: 16 U/L (ref 12–45)
B-OH-BUTYR SERPL-MCNC: 0.29 MMOL/L (ref 0.02–0.27)
BASOPHILS # BLD AUTO: 0.3 % (ref 0–1.8)
BASOPHILS # BLD: 0.02 K/UL (ref 0–0.12)
BILIRUB SERPL-MCNC: 0.5 MG/DL (ref 0.1–1.5)
BILIRUB UR QL STRIP.AUTO: NEGATIVE
BUN SERPL-MCNC: 15 MG/DL (ref 8–22)
BUN SERPL-MCNC: 23 MG/DL (ref 8–22)
BUN SERPL-MCNC: 26 MG/DL (ref 8–22)
CALCIUM SERPL-MCNC: 8.7 MG/DL (ref 8.5–10.5)
CALCIUM SERPL-MCNC: 9.3 MG/DL (ref 8.5–10.5)
CALCIUM SERPL-MCNC: 9.8 MG/DL (ref 8.5–10.5)
CHLORIDE SERPL-SCNC: 88 MMOL/L (ref 96–112)
CHLORIDE SERPL-SCNC: 89 MMOL/L (ref 96–112)
CHLORIDE SERPL-SCNC: 98 MMOL/L (ref 96–112)
CO2 SERPL-SCNC: 17 MMOL/L (ref 20–33)
CO2 SERPL-SCNC: 20 MMOL/L (ref 20–33)
CO2 SERPL-SCNC: 22 MMOL/L (ref 20–33)
COLOR UR: ABNORMAL
CREAT SERPL-MCNC: 0.8 MG/DL (ref 0.5–1.4)
CREAT SERPL-MCNC: 1.25 MG/DL (ref 0.5–1.4)
CREAT SERPL-MCNC: 1.3 MG/DL (ref 0.5–1.4)
CULTURE IF INDICATED INDCX: NO UA CULTURE
EOSINOPHIL # BLD AUTO: 0.01 K/UL (ref 0–0.51)
EOSINOPHIL NFR BLD: 0.2 % (ref 0–6.9)
ERYTHROCYTE [DISTWIDTH] IN BLOOD BY AUTOMATED COUNT: 50.4 FL (ref 35.9–50)
GFR SERPL CREATININE-BSD FRML MDRD: 58 ML/MIN/1.73 M 2
GFR SERPL CREATININE-BSD FRML MDRD: >60 ML/MIN/1.73 M 2
GFR SERPL CREATININE-BSD FRML MDRD: >60 ML/MIN/1.73 M 2
GLOBULIN SER CALC-MCNC: 3.1 G/DL (ref 1.9–3.5)
GLUCOSE BLD-MCNC: 215 MG/DL (ref 65–99)
GLUCOSE BLD-MCNC: 240 MG/DL (ref 65–99)
GLUCOSE BLD-MCNC: 288 MG/DL (ref 65–99)
GLUCOSE BLD-MCNC: 326 MG/DL (ref 65–99)
GLUCOSE BLD-MCNC: 337 MG/DL (ref 65–99)
GLUCOSE BLD-MCNC: 338 MG/DL (ref 65–99)
GLUCOSE BLD-MCNC: 355 MG/DL (ref 65–99)
GLUCOSE BLD-MCNC: >600 MG/DL (ref 65–99)
GLUCOSE SERPL-MCNC: 1008 MG/DL (ref 65–99)
GLUCOSE SERPL-MCNC: 1100 MG/DL (ref 65–99)
GLUCOSE SERPL-MCNC: 307 MG/DL (ref 65–99)
GLUCOSE UR STRIP.AUTO-MCNC: 150 MG/DL
HCT VFR BLD AUTO: 40.6 % (ref 42–52)
HGB BLD-MCNC: 13.2 G/DL (ref 14–18)
IMM GRANULOCYTES # BLD AUTO: 0.02 K/UL (ref 0–0.11)
IMM GRANULOCYTES NFR BLD AUTO: 0.3 % (ref 0–0.9)
KETONES UR STRIP.AUTO-MCNC: NEGATIVE MG/DL
LEUKOCYTE ESTERASE UR QL STRIP.AUTO: NEGATIVE
LIPASE SERPL-CCNC: 12 U/L (ref 11–82)
LYMPHOCYTES # BLD AUTO: 0.66 K/UL (ref 1–4.8)
LYMPHOCYTES NFR BLD: 10.9 % (ref 22–41)
MAGNESIUM SERPL-MCNC: 2.1 MG/DL (ref 1.5–2.5)
MCH RBC QN AUTO: 31 PG (ref 27–33)
MCHC RBC AUTO-ENTMCNC: 32.5 G/DL (ref 33.7–35.3)
MCV RBC AUTO: 95.3 FL (ref 81.4–97.8)
MICRO URNS: ABNORMAL
MONOCYTES # BLD AUTO: 0.4 K/UL (ref 0–0.85)
MONOCYTES NFR BLD AUTO: 6.6 % (ref 0–13.4)
NEUTROPHILS # BLD AUTO: 4.97 K/UL (ref 1.82–7.42)
NEUTROPHILS NFR BLD: 81.7 % (ref 44–72)
NITRITE UR QL STRIP.AUTO: NEGATIVE
NRBC # BLD AUTO: 0 K/UL
NRBC BLD AUTO-RTO: 0 /100 WBC
PH UR STRIP.AUTO: 5 [PH]
PHOSPHATE SERPL-MCNC: 4.8 MG/DL (ref 2.5–4.5)
PLATELET # BLD AUTO: 236 K/UL (ref 164–446)
PMV BLD AUTO: 10.2 FL (ref 9–12.9)
POTASSIUM SERPL-SCNC: 3.4 MMOL/L (ref 3.6–5.5)
POTASSIUM SERPL-SCNC: 4.7 MMOL/L (ref 3.6–5.5)
POTASSIUM SERPL-SCNC: 5.1 MMOL/L (ref 3.6–5.5)
PROT SERPL-MCNC: 7.5 G/DL (ref 6–8.2)
PROT UR QL STRIP: NEGATIVE MG/DL
RBC # BLD AUTO: 4.26 M/UL (ref 4.7–6.1)
RBC UR QL AUTO: NEGATIVE
SODIUM SERPL-SCNC: 123 MMOL/L (ref 135–145)
SODIUM SERPL-SCNC: 124 MMOL/L (ref 135–145)
SODIUM SERPL-SCNC: 134 MMOL/L (ref 135–145)
SP GR UR STRIP.AUTO: 1
UROBILINOGEN UR STRIP.AUTO-MCNC: NORMAL MG/DL
WBC # BLD AUTO: 6.1 K/UL (ref 4.8–10.8)

## 2017-10-04 PROCEDURE — 85025 COMPLETE CBC W/AUTO DIFF WBC: CPT

## 2017-10-04 PROCEDURE — 770022 HCHG ROOM/CARE - ICU (200)

## 2017-10-04 PROCEDURE — 700105 HCHG RX REV CODE 258: Performed by: INTERNAL MEDICINE

## 2017-10-04 PROCEDURE — 700105 HCHG RX REV CODE 258: Performed by: EMERGENCY MEDICINE

## 2017-10-04 PROCEDURE — A9270 NON-COVERED ITEM OR SERVICE: HCPCS | Performed by: INTERNAL MEDICINE

## 2017-10-04 PROCEDURE — 700111 HCHG RX REV CODE 636 W/ 250 OVERRIDE (IP): Performed by: INTERNAL MEDICINE

## 2017-10-04 PROCEDURE — 80048 BASIC METABOLIC PNL TOTAL CA: CPT

## 2017-10-04 PROCEDURE — 83690 ASSAY OF LIPASE: CPT

## 2017-10-04 PROCEDURE — 700111 HCHG RX REV CODE 636 W/ 250 OVERRIDE (IP): Performed by: EMERGENCY MEDICINE

## 2017-10-04 PROCEDURE — 700102 HCHG RX REV CODE 250 W/ 637 OVERRIDE(OP): Performed by: HOSPITALIST

## 2017-10-04 PROCEDURE — 96375 TX/PRO/DX INJ NEW DRUG ADDON: CPT

## 2017-10-04 PROCEDURE — 304562 HCHG STAT O2 MASK/CANNULA

## 2017-10-04 PROCEDURE — 82962 GLUCOSE BLOOD TEST: CPT

## 2017-10-04 PROCEDURE — 96376 TX/PRO/DX INJ SAME DRUG ADON: CPT

## 2017-10-04 PROCEDURE — 99291 CRITICAL CARE FIRST HOUR: CPT

## 2017-10-04 PROCEDURE — 81003 URINALYSIS AUTO W/O SCOPE: CPT

## 2017-10-04 PROCEDURE — 700102 HCHG RX REV CODE 250 W/ 637 OVERRIDE(OP): Performed by: INTERNAL MEDICINE

## 2017-10-04 PROCEDURE — 82010 KETONE BODYS QUAN: CPT

## 2017-10-04 PROCEDURE — 83735 ASSAY OF MAGNESIUM: CPT

## 2017-10-04 PROCEDURE — 94760 N-INVAS EAR/PLS OXIMETRY 1: CPT

## 2017-10-04 PROCEDURE — 80053 COMPREHEN METABOLIC PANEL: CPT

## 2017-10-04 PROCEDURE — 96365 THER/PROPH/DIAG IV INF INIT: CPT

## 2017-10-04 PROCEDURE — 304561 HCHG STAT O2

## 2017-10-04 PROCEDURE — 74176 CT ABD & PELVIS W/O CONTRAST: CPT

## 2017-10-04 PROCEDURE — 84100 ASSAY OF PHOSPHORUS: CPT

## 2017-10-04 PROCEDURE — 96366 THER/PROPH/DIAG IV INF ADDON: CPT

## 2017-10-04 PROCEDURE — 96361 HYDRATE IV INFUSION ADD-ON: CPT

## 2017-10-04 PROCEDURE — 36415 COLL VENOUS BLD VENIPUNCTURE: CPT

## 2017-10-04 PROCEDURE — 99291 CRITICAL CARE FIRST HOUR: CPT | Performed by: INTERNAL MEDICINE

## 2017-10-04 PROCEDURE — 700105 HCHG RX REV CODE 258: Performed by: HOSPITALIST

## 2017-10-04 RX ORDER — LORAZEPAM 1 MG/1
0.5 TABLET ORAL EVERY 6 HOURS PRN
Status: DISCONTINUED | OUTPATIENT
Start: 2017-10-04 | End: 2017-10-06 | Stop reason: HOSPADM

## 2017-10-04 RX ORDER — HYDROMORPHONE HYDROCHLORIDE 2 MG/1
2 TABLET ORAL 2 TIMES DAILY PRN
Status: DISCONTINUED | OUTPATIENT
Start: 2017-10-04 | End: 2017-10-06 | Stop reason: HOSPADM

## 2017-10-04 RX ORDER — POTASSIUM CHLORIDE 750 MG/1
10 TABLET, EXTENDED RELEASE ORAL DAILY
COMMUNITY
End: 2017-12-08

## 2017-10-04 RX ORDER — MESALAMINE 1.2 G/1
1.2 TABLET, DELAYED RELEASE ORAL 2 TIMES DAILY
Status: DISCONTINUED | OUTPATIENT
Start: 2017-10-04 | End: 2017-10-04

## 2017-10-04 RX ORDER — SODIUM CHLORIDE 9 MG/ML
2000 INJECTION, SOLUTION INTRAVENOUS ONCE
Status: COMPLETED | OUTPATIENT
Start: 2017-10-04 | End: 2017-10-04

## 2017-10-04 RX ORDER — THYROID 30 MG/1
60 TABLET ORAL
Status: DISCONTINUED | OUTPATIENT
Start: 2017-10-04 | End: 2017-10-06 | Stop reason: HOSPADM

## 2017-10-04 RX ORDER — MORPHINE SULFATE 4 MG/ML
4 INJECTION, SOLUTION INTRAMUSCULAR; INTRAVENOUS ONCE
Status: COMPLETED | OUTPATIENT
Start: 2017-10-04 | End: 2017-10-04

## 2017-10-04 RX ORDER — AMOXICILLIN AND CLAVULANATE POTASSIUM 875; 125 MG/1; MG/1
1 TABLET, FILM COATED ORAL 2 TIMES DAILY
COMMUNITY
Start: 2017-09-09 | End: 2017-12-08

## 2017-10-04 RX ORDER — BUPROPION HYDROCHLORIDE 150 MG/1
150 TABLET, EXTENDED RELEASE ORAL 2 TIMES DAILY
Status: DISCONTINUED | OUTPATIENT
Start: 2017-10-04 | End: 2017-10-06 | Stop reason: HOSPADM

## 2017-10-04 RX ORDER — POLYETHYLENE GLYCOL 3350 17 G/17G
1 POWDER, FOR SOLUTION ORAL
Status: DISCONTINUED | OUTPATIENT
Start: 2017-10-04 | End: 2017-10-06 | Stop reason: HOSPADM

## 2017-10-04 RX ORDER — HYDROMORPHONE HYDROCHLORIDE 2 MG/1
2-4 TABLET ORAL EVERY 6 HOURS PRN
Status: DISCONTINUED | OUTPATIENT
Start: 2017-10-04 | End: 2017-10-04

## 2017-10-04 RX ORDER — ONDANSETRON 2 MG/ML
4 INJECTION INTRAMUSCULAR; INTRAVENOUS EVERY 4 HOURS PRN
Status: DISCONTINUED | OUTPATIENT
Start: 2017-10-04 | End: 2017-10-06 | Stop reason: HOSPADM

## 2017-10-04 RX ORDER — SODIUM CHLORIDE 9 MG/ML
1000 INJECTION, SOLUTION INTRAVENOUS ONCE
Status: COMPLETED | OUTPATIENT
Start: 2017-10-04 | End: 2017-10-04

## 2017-10-04 RX ORDER — HYDROMORPHONE HYDROCHLORIDE 2 MG/1
4 TABLET ORAL 2 TIMES DAILY PRN
Status: DISCONTINUED | OUTPATIENT
Start: 2017-10-04 | End: 2017-10-06 | Stop reason: HOSPADM

## 2017-10-04 RX ORDER — M-VIT,TX,IRON,MINS/CALC/FOLIC 27MG-0.4MG
1 TABLET ORAL DAILY
COMMUNITY
End: 2018-04-19

## 2017-10-04 RX ORDER — TRAZODONE HYDROCHLORIDE 50 MG/1
100 TABLET ORAL NIGHTLY
Status: DISCONTINUED | OUTPATIENT
Start: 2017-10-04 | End: 2017-10-06 | Stop reason: HOSPADM

## 2017-10-04 RX ORDER — ONDANSETRON 4 MG/1
4 TABLET, ORALLY DISINTEGRATING ORAL EVERY 4 HOURS PRN
Status: DISCONTINUED | OUTPATIENT
Start: 2017-10-04 | End: 2017-10-06 | Stop reason: HOSPADM

## 2017-10-04 RX ORDER — LABETALOL HYDROCHLORIDE 5 MG/ML
10 INJECTION, SOLUTION INTRAVENOUS EVERY 4 HOURS PRN
Status: DISCONTINUED | OUTPATIENT
Start: 2017-10-04 | End: 2017-10-06 | Stop reason: HOSPADM

## 2017-10-04 RX ORDER — DEXTROSE MONOHYDRATE 25 G/50ML
25 INJECTION, SOLUTION INTRAVENOUS PRN
Status: DISCONTINUED | OUTPATIENT
Start: 2017-10-04 | End: 2017-10-06 | Stop reason: HOSPADM

## 2017-10-04 RX ORDER — ONDANSETRON 2 MG/ML
4 INJECTION INTRAMUSCULAR; INTRAVENOUS ONCE
Status: COMPLETED | OUTPATIENT
Start: 2017-10-04 | End: 2017-10-04

## 2017-10-04 RX ORDER — BISACODYL 10 MG
10 SUPPOSITORY, RECTAL RECTAL
Status: DISCONTINUED | OUTPATIENT
Start: 2017-10-04 | End: 2017-10-06 | Stop reason: HOSPADM

## 2017-10-04 RX ORDER — OXYCODONE AND ACETAMINOPHEN 10; 325 MG/1; MG/1
1-2 TABLET ORAL EVERY 4 HOURS PRN
Status: DISCONTINUED | OUTPATIENT
Start: 2017-10-04 | End: 2017-10-06 | Stop reason: HOSPADM

## 2017-10-04 RX ORDER — PROMETHAZINE HYDROCHLORIDE 25 MG/1
12.5-25 TABLET ORAL EVERY 4 HOURS PRN
Status: DISCONTINUED | OUTPATIENT
Start: 2017-10-04 | End: 2017-10-06 | Stop reason: HOSPADM

## 2017-10-04 RX ORDER — SODIUM CHLORIDE 9 MG/ML
INJECTION, SOLUTION INTRAVENOUS CONTINUOUS
Status: DISCONTINUED | OUTPATIENT
Start: 2017-10-04 | End: 2017-10-05

## 2017-10-04 RX ORDER — MAGNESIUM OXIDE 400 MG/1
400 TABLET ORAL DAILY
COMMUNITY
End: 2018-04-19

## 2017-10-04 RX ORDER — ZINC SULFATE 50(220)MG
220 CAPSULE ORAL DAILY
COMMUNITY
End: 2018-04-19

## 2017-10-04 RX ORDER — LORAZEPAM 2 MG/ML
0.5 INJECTION INTRAMUSCULAR EVERY 6 HOURS PRN
Status: DISCONTINUED | OUTPATIENT
Start: 2017-10-04 | End: 2017-10-06 | Stop reason: HOSPADM

## 2017-10-04 RX ORDER — ACETAMINOPHEN 325 MG/1
650 TABLET ORAL EVERY 6 HOURS PRN
Status: DISCONTINUED | OUTPATIENT
Start: 2017-10-04 | End: 2017-10-06 | Stop reason: HOSPADM

## 2017-10-04 RX ORDER — BUPROPION HYDROCHLORIDE 300 MG/1
300 TABLET ORAL EVERY EVENING
Status: DISCONTINUED | OUTPATIENT
Start: 2017-10-04 | End: 2017-10-04

## 2017-10-04 RX ORDER — AMOXICILLIN 250 MG
2 CAPSULE ORAL 2 TIMES DAILY
Status: DISCONTINUED | OUTPATIENT
Start: 2017-10-04 | End: 2017-10-06 | Stop reason: HOSPADM

## 2017-10-04 RX ORDER — PROMETHAZINE HYDROCHLORIDE 25 MG/1
12.5-25 SUPPOSITORY RECTAL EVERY 4 HOURS PRN
Status: DISCONTINUED | OUTPATIENT
Start: 2017-10-04 | End: 2017-10-06 | Stop reason: HOSPADM

## 2017-10-04 RX ADMIN — INSULIN LISPRO 12 UNITS: 100 INJECTION, SOLUTION INTRAVENOUS; SUBCUTANEOUS at 18:13

## 2017-10-04 RX ADMIN — ONDANSETRON 4 MG: 2 INJECTION INTRAMUSCULAR; INTRAVENOUS at 20:55

## 2017-10-04 RX ADMIN — SODIUM CHLORIDE 2000 ML: 9 INJECTION, SOLUTION INTRAVENOUS at 16:23

## 2017-10-04 RX ADMIN — HYDROMORPHONE HYDROCHLORIDE 1 MG: 1 INJECTION, SOLUTION INTRAMUSCULAR; INTRAVENOUS; SUBCUTANEOUS at 21:05

## 2017-10-04 RX ADMIN — ONDANSETRON 4 MG: 2 INJECTION INTRAMUSCULAR; INTRAVENOUS at 11:10

## 2017-10-04 RX ADMIN — HYDROMORPHONE HYDROCHLORIDE 1 MG: 1 INJECTION, SOLUTION INTRAMUSCULAR; INTRAVENOUS; SUBCUTANEOUS at 14:59

## 2017-10-04 RX ADMIN — MESALAMINE 250 MG: 250 CAPSULE ORAL at 20:34

## 2017-10-04 RX ADMIN — TRAZODONE HYDROCHLORIDE 100 MG: 50 TABLET ORAL at 20:34

## 2017-10-04 RX ADMIN — INSULIN HUMAN 14 UNITS: 100 INJECTION, SOLUTION PARENTERAL at 12:48

## 2017-10-04 RX ADMIN — INSULIN LISPRO 7 UNITS: 100 INJECTION, SOLUTION INTRAVENOUS; SUBCUTANEOUS at 20:40

## 2017-10-04 RX ADMIN — SODIUM CHLORIDE 6 UNITS/HR: 9 INJECTION, SOLUTION INTRAVENOUS at 12:44

## 2017-10-04 RX ADMIN — SODIUM CHLORIDE 1000 ML: 9 INJECTION, SOLUTION INTRAVENOUS at 11:44

## 2017-10-04 RX ADMIN — BUPROPION HYDROCHLORIDE 150 MG: 150 TABLET, EXTENDED RELEASE ORAL at 20:34

## 2017-10-04 RX ADMIN — HYDROMORPHONE HYDROCHLORIDE 2 MG: 2 TABLET ORAL at 16:21

## 2017-10-04 RX ADMIN — ONDANSETRON 4 MG: 2 INJECTION INTRAMUSCULAR; INTRAVENOUS at 16:19

## 2017-10-04 RX ADMIN — SODIUM CHLORIDE: 9 INJECTION, SOLUTION INTRAVENOUS at 14:59

## 2017-10-04 RX ADMIN — SODIUM CHLORIDE 1000 ML: 9 INJECTION, SOLUTION INTRAVENOUS at 11:10

## 2017-10-04 RX ADMIN — SODIUM CHLORIDE: 9 INJECTION, SOLUTION INTRAVENOUS at 21:55

## 2017-10-04 RX ADMIN — MORPHINE SULFATE 4 MG: 4 INJECTION INTRAVENOUS at 11:53

## 2017-10-04 RX ADMIN — ONDANSETRON 4 MG: 2 INJECTION INTRAMUSCULAR; INTRAVENOUS at 11:53

## 2017-10-04 ASSESSMENT — ENCOUNTER SYMPTOMS
WEAKNESS: 0
NAUSEA: 1
FALLS: 0
MYALGIAS: 0
LOSS OF CONSCIOUSNESS: 0
TINGLING: 0
CONSTIPATION: 0
ABDOMINAL PAIN: 1
DIZZINESS: 0
FEVER: 0
STRIDOR: 0
COUGH: 0
SPUTUM PRODUCTION: 0
PALPITATIONS: 0
CHILLS: 0
HEADACHES: 0
VOMITING: 1
DEPRESSION: 0
DIARRHEA: 0
BACK PAIN: 1
SHORTNESS OF BREATH: 0

## 2017-10-04 ASSESSMENT — PAIN SCALES - GENERAL
PAINLEVEL_OUTOF10: 8
PAINLEVEL_OUTOF10: 4
PAINLEVEL_OUTOF10: 6
PAINLEVEL_OUTOF10: 3
PAINLEVEL_OUTOF10: 7
PAINLEVEL_OUTOF10: 8

## 2017-10-04 ASSESSMENT — PATIENT HEALTH QUESTIONNAIRE - PHQ9
2. FEELING DOWN, DEPRESSED, IRRITABLE, OR HOPELESS: NOT AT ALL
SUM OF ALL RESPONSES TO PHQ QUESTIONS 1-9: 0
SUM OF ALL RESPONSES TO PHQ9 QUESTIONS 1 AND 2: 0
1. LITTLE INTEREST OR PLEASURE IN DOING THINGS: NOT AT ALL

## 2017-10-04 ASSESSMENT — LIFESTYLE VARIABLES
ALCOHOL_USE: NO
EVER_SMOKED: NEVER

## 2017-10-04 NOTE — ED NOTES
Med Rec completed per patient at bedside.  Allergies reviewed    Augmentin 875/125 mg  10 day course completed 9-19-17  Bactrim 800-160 mg 14 day course started 9-29-17    Patient is not able to bring in Mesalamine 1.2gm

## 2017-10-04 NOTE — ED PROVIDER NOTES
"ED Provider Note  CHIEF COMPLAINT  Chief Complaint   Patient presents with   • N/V   • Hyperglycemia       HPI  Mahesh Bradshaw is a 52 y.o. male who presentsFor evaluation of elevated blood sugar nausea vomiting. The patient is insulin-dependent diabetic. The patient states, are read very high. He has had multiple episodes of vomiting cannot keep down food or urine. He denies any fever chills cough. He has abdominal distention and some pain at a previous site of umbilical hernia. Nothing takes his symptoms worse or better. He's had multiple medical problems which are well documented in his previous chart.     REVIEW OF SYSTEMS  See HPI for further details. All other systems reviewed and negative except as noted above    PAST MEDICAL HISTORY  Past Medical History:   Diagnosis Date   • UC (ulcerative colitis) (CMS-HCC) 3-3-17    \"Five BM's per day, takes Lialda\"   • Arthritis 3-3-17    \"Spine\"   • Snoring 3-3-17    Has not had a sleep study   • High cholesterol 3-3-17    Does not currently take medication for   • ASTHMA 3-3-17    \"Hasn't had to use inhaler in 2 years\"   • Breath shortness 3-3-17    \"With Exercise\"   • Hypothyroid 3-3-17    Takes [place] Thyroid   • Pain 3-3-17    \"Left Flank\"   • Diabetes (CMS-HCC) 3-3-17    Takes Insulin   • Aspiration pneumonia (CMS-HCC) 3-2016   • ADRIANE (acute kidney injury) (CMS-HCC) 2/24/2016   • Difficult intubation 2-2016   • Congestive heart failure (CMS-HCC) 2-2016     3-3-17 \"Not a current issue\"   • Pancreatitis 2-2016    \"D/T Tradjenta was hospitialized for 15 days\"   • Elevated liver enzymes 2-2016   • Electrolyte imbalance 2-2016   • RF (renal failure) 2-2016   • DKA (diabetic ketoacidoses) (CMS-HCC) 2-2016    \"10 days on vent with DKA, Renal failure, CHF, elevated liver enzymes and electrolyte imbalance\"   • On mechanically assisted ventilation (CMS-HCC) 2-2016    HX \"On Vent for 10 days at [facility]\".  \"DX Pancreatitis, DKA, CHF, Elevated Liver Enzymes & Electrolyte " "Imbalance\".   • Essential hypertension 1/22/2016   • Sepsis (CMS-HCC) 1/21/2016   • Depression 11/26/2014   • Anesthesia     \"Was difficult to intubate 2-2016\"   • Gout    • Heart burn    • Indigestion    • Kidney stones    • Migraine        FAMILY HISTORY  No family history on file.    SOCIAL HISTORY  Social History     Social History   • Marital status: Single     Spouse name: N/A   • Number of children: N/A   • Years of education: N/A     Social History Main Topics   • Smoking status: Never Smoker   • Smokeless tobacco: Never Used   • Alcohol use Yes      Comment: occ   • Drug use: No   • Sexual activity: Not on file     Other Topics Concern   • Not on file     Social History Narrative    ** Merged History Encounter **         ** Merged History Encounter **            SURGICAL HISTORY  Past Surgical History:   Procedure Laterality Date   • IRRIGATION & DEBRIDEMENT GENERAL Right 5/1/2017    Procedure: IRRIGATION & DEBRIDEMENT GENERAL-Left HAND AND right  ANKLE;  Surgeon: Esau Dooley M.D.;  Location: SURGERY [facility];  Service:    • IRRIGATION & DEBRIDEMENT GENERAL Right 4/29/2017    Procedure: IRRIGATION & DEBRIDEMENT GENERAL;  Surgeon: Esau Dooley M.D.;  Location: SURGERY [facility];  Service:    • INCISION AND DRAINAGE GENERAL  4/7/2017    Procedure: INCISION AND DRAINAGE GENERAL;  Surgeon: Esau Dooley M.D.;  Location: SURGERY SAME DAY [facility];  Service:    • UMBILICAL HERNIA REPAIR  3/10/2017    Procedure: UMBILICAL HERNIA REPAIR- INCISION AND DRAINAGE OF UMBILICAL WOUND AND MESH REMOVAL;  Surgeon: Esau Dooley M.D.;  Location: SURGERY SAME DAY [facility];  Service:    • UMBILICAL HERNIA REPAIR N/A 11/6/2016    Procedure: UMBILICAL HERNIA REPAIR;  Surgeon: Esau Dooley M.D.;  Location: SURGERY [facility];  Service:    • COLONOSCOPY WITH BIOPSY  11/26/2014    Performed by Solo Higginbotham M.D. at ENDOSCOPY [facility]   • LIVE BY LAPAROSCOPY  2005   • " "OTHER ORTHOPEDIC SURGERY  1997 or 1998    Left Wrist ORIF       CURRENT MEDICATIONS  Home Medications     Reviewed by Prmea Sepulveda R.N. (Registered Nurse) on 10/04/17 at 0940  Med List Status: <None>   Medication Last Dose Status   Amoxicillin-Pot Clavulanate (AUGMENTIN PO) 9/9/2017 Active   buPROPion (WELLBUTRIN XL) 300 MG XL tablet 9/8/2017 Active   Cholecalciferol (VITAMIN D3) 5000 UNITS Tab 9/9/2017 Active   EPINEPHrine (EPIPEN 2-ANNE-MARIE) 0.3 MG/0.3ML Solution Auto-injector solution for injection > Never used Active   HYDROmorphone (DILAUDID) 2 MG Tab 08/27/2017 Active   ibuprofen (MOTRIN) 200 MG Tab 9/9/2017 Active   Insulin Glargine (TOUJEO SOLOSTAR) 300 UNIT/ML Solution Pen-injector 8/31/2017 Active   insulin lispro (HUMALOG) 100 UNIT/ML Solution 8/22/2017 Active   mesalamine (LIALDA) 1.2 GM TBEC 8/22/2017 Active   Multiple Vitamins-Minerals (ZINC PO) 8/21/2017 Active   ondansetron (ZOFRAN ODT) 4 MG TABLET DISPERSIBLE 8/22/2017 Active   oxycodone-acetaminophen (PERCOCET-10)  MG Tab 9/8/2017 Active   rifampin (RIFADINE) 300 MG Cap 9/1/2017 Active   sulfamethoxazole-trimethoprim (BACTRIM DS) 800-160 MG tablet 9/9/2017 Active   thyroid (ARMOUR THYROID) 60 MG Tab 8/21/2017 Active   trazodone (DESYREL) 100 MG Tab PRN Active                 ALLERGIES  Allergies   Allergen Reactions   • Peanut-Derived Anaphylaxis     Peanuts   RXN=age 36   • Tradjenta [Linagliptin] Unspecified     Pt developed pancreatitis   • Hydrocodone Hives     Tolerates Morphine and oxycodone  Rxn Age - 29       PHYSICAL EXAM  VITAL SIGNS: BP (!) 161/97   Pulse (!) 124   Temp 36.2 °C (97.2 °F)   Resp 17   Ht 1.727 m (5' 8\")   Wt 97.5 kg (215 lb)   SpO2 94%   BMI 32.69 kg/m²   Constitutional :  Well developed, Well nourished, He appears somewhat ill  HENT:Head is atraumatic normocephalic, dry mucous membranes are noted  Eyes: Normal-appearing nonicteric  Neck: Normal range of motion, No tenderness, Supple, No stridor.   Lymphatic: No " cervical regional adenopathy.   Cardiovascular: Normal heart rate, Normal rhythm, No murmurs, No rubs, No gallops.   Thorax & Lungs: Equal breath sounds noted bilaterally without rhonchi  Skin: Warm, Dry, No erythema, No rash.   Abdomen soft he is distended, tenderness noted in the umbilical area he has a umbilical hernia noted  Extremities no cyanosis or edema  Neurological he is awake and alert is no focal neurological finding  Psychiatric appropriate mood and affect    Results for orders placed or performed during the hospital encounter of 10/04/17   CBC w/ Differential   Result Value Ref Range    WBC 6.1 4.8 - 10.8 K/uL    RBC 4.26 (L) 4.70 - 6.10 M/uL    Hemoglobin 13.2 (L) 14.0 - 18.0 g/dL    Hematocrit 40.6 (L) 42.0 - 52.0 %    MCV 95.3 81.4 - 97.8 fL    MCH 31.0 27.0 - 33.0 pg    MCHC 32.5 (L) 33.7 - 35.3 g/dL    RDW 50.4 (H) 35.9 - 50.0 fL    Platelet Count 236 164 - 446 K/uL    MPV 10.2 9.0 - 12.9 fL    Neutrophils-Polys 81.70 (H) 44.00 - 72.00 %    Lymphocytes 10.90 (L) 22.00 - 41.00 %    Monocytes 6.60 0.00 - 13.40 %    Eosinophils 0.20 0.00 - 6.90 %    Basophils 0.30 0.00 - 1.80 %    Immature Granulocytes 0.30 0.00 - 0.90 %    Nucleated RBC 0.00 /100 WBC    Neutrophils (Absolute) 4.97 1.82 - 7.42 K/uL    Lymphs (Absolute) 0.66 (L) 1.00 - 4.80 K/uL    Monos (Absolute) 0.40 0.00 - 0.85 K/uL    Eos (Absolute) 0.01 0.00 - 0.51 K/uL    Baso (Absolute) 0.02 0.00 - 0.12 K/uL    Immature Granulocytes (abs) 0.02 0.00 - 0.11 K/uL    NRBC (Absolute) 0.00 K/uL   Complete Metabolic Panel (CMP)   Result Value Ref Range    Sodium 123 (L) 135 - 145 mmol/L    Potassium 5.1 3.6 - 5.5 mmol/L    Chloride 89 (L) 96 - 112 mmol/L    Co2 17 (L) 20 - 33 mmol/L    Anion Gap 17.0 (H) 0.0 - 11.9    Glucose 1100 (HH) 65 - 99 mg/dL    Bun 26 (H) 8 - 22 mg/dL    Creatinine 1.25 0.50 - 1.40 mg/dL    Calcium 9.8 8.5 - 10.5 mg/dL    AST(SGOT) 16 12 - 45 U/L    ALT(SGPT) 16 2 - 50 U/L    Alkaline Phosphatase 88 30 - 99 U/L    Total  Bilirubin 0.5 0.1 - 1.5 mg/dL    Albumin 4.4 3.2 - 4.9 g/dL    Total Protein 7.5 6.0 - 8.2 g/dL    Globulin 3.1 1.9 - 3.5 g/dL    A-G Ratio 1.4 g/dL   Urinalysis, culture if indicated   Result Value Ref Range    Color Straw     Character Clear     Specific Gravity 1.005 <1.035    Ph 5.0 5.0 - 8.0    Glucose 150 (A) Negative mg/dL    Ketones Negative Negative mg/dL    Protein Negative Negative mg/dL    Bilirubin Negative Negative    Urobilinogen, Urine Normal Negative    Nitrite Negative Negative    Leukocyte Esterase Negative Negative    Occult Blood Negative Negative    Micro Urine Req see below     Culture Indicated No UA Culture   MAGNESIUM   Result Value Ref Range    Magnesium 2.1 1.5 - 2.5 mg/dL   PHOSPHORUS   Result Value Ref Range    Phosphorus 4.8 (H) 2.5 - 4.5 mg/dL   BETA-HYDROXYBUTYRIC ACID   Result Value Ref Range    beta-Hydroxybutyric Acid 0.29 (H) 0.02 - 0.27 mmol/L   LIPASE   Result Value Ref Range    Lipase 12 11 - 82 U/L   ESTIMATED GFR   Result Value Ref Range    GFR If African American >60 >60 mL/min/1.73 m 2    GFR If Non African American >60 >60 mL/min/1.73 m 2   BASIC METABOLIC PANEL   Result Value Ref Range    Sodium 124 (L) 135 - 145 mmol/L    Potassium 4.7 3.6 - 5.5 mmol/L    Chloride 88 (L) 96 - 112 mmol/L    Co2 20 20 - 33 mmol/L    Glucose 1008 (HH) 65 - 99 mg/dL    Bun 23 (H) 8 - 22 mg/dL    Creatinine 1.30 0.50 - 1.40 mg/dL    Calcium 9.3 8.5 - 10.5 mg/dL    Anion Gap 16.0 (H) 0.0 - 11.9   ESTIMATED GFR   Result Value Ref Range    GFR If African American >60 >60 mL/min/1.73 m 2    GFR If Non African American 58 (A) >60 mL/min/1.73 m 2   ACCU-CHEK GLUCOSE   Result Value Ref Range    Glucose - Accu-Ck >600 (HH) 65 - 99 mg/dL       CT-ABDOMEN-PELVIS W/O   Final Result      Moderate size ventral abdominal wall hernia containing fat. No herniated bowel. No evidence of bowel obstruction.   No evidence of acute appendicitis. No free fluid identified.   No hydronephrosis or urolithiasis.    Marked gastric distention.   Hepatic steatosis. Small hypodensity left lobe liver corresponding to 2.7 cm lesion demonstrated on 8/8/2017 CT. Is incompletely characterized.      CT-ABDOMEN-PELVIS W/O    (Results Pending)         COURSE & MEDICAL DECISION MAKING  Pertinent Labs & Imaging studies reviewed. (See chart for details)  Patient is presenting for evaluation of elevated blood sugar. He is found have ketoacidosis. He has been treated with saline infusion. He also has some mild abdominal pain with history of incarceration of hernia. He had CT imaging performed which showed no evidence of incarceration but gastric distention is noted. He will be given Zofran for vomiting and nausea, morphine for pain. Etiology of his symptoms are unclear at this time no obvious source of infection is noted. Discussed the case with Dr. Summers will be admitting the patient. Treatment was discussed with pharmacy staff.    FINAL IMPRESSION  1. Diabetic ketoacidosis  2. Gastric distention   3. Umbilical hernia    Electronically signed by: Sergey Blackwood, 10/4/2017

## 2017-10-04 NOTE — ED NOTES
"Chief Complaint   Patient presents with   • N/V   • Hyperglycemia     Ht 1.727 m (5' 8\")   Wt 97.5 kg (215 lb)   BMI 32.69 kg/m²     Ambulatory to room, reports glucometer at home read \"HI\", c/o n/v. Connected to monitor    Chart up for eval.    "

## 2017-10-04 NOTE — ED NOTES
Tech from Lab called with critical result of glucose 1100 at 1124. Critical lab result read back to .   Dr. Blackwood notified of critical lab result at 1125.  Critical lab result read back by Dr. Blackwood.

## 2017-10-04 NOTE — H&P
Hospital Medicine History and Physical    Date of Service  10/4/2017    Chief Complaint  Chief Complaint   Patient presents with   • N/V   • Hyperglycemia       History of Presenting Illness  52 y.o. male who presented 10/4/2017 withAbdominal pain nausea and vomiting. Patient does have difficult to control diabetes, he states over the last 3 months his sugars have been very high. Primary care did send him to an endocrinologist, did see him for the 1st time on Friday. Endocrinologist recommended for him just to increases as needed insulin until he gets it under control and continue his long-acting insulin. Patient continued to worsen, began having severe nausea vomiting, this caused worsening abdominal pain so he presented to the emergency department. Patient states 2 years ago he was on oral agents, was transitioned over to Victoriano gentle, developed pancreatitis. Patient then had a prolonged hospital stay. Patient had been somewhat stable until the last 3 months and these had significant worsening of his sugars. Patient states in November he had a strain related hernia which then he had a fistula. He has had multiple infections since then especially in his teeth but also he had a necrotizing fasciitis in his left leg. Patient states last night he began having severe stomach cramps then began having nausea vomiting, checked his glucose and was very high. Patient had significant and frequent vomiting attacks, felt that he tore his hernia.     Primary Care Physician  Shira Campos, A.P.N.    Consultants  None    Code Status  Full    Review of Systems  Review of Systems   Constitutional: Negative for chills, fever and malaise/fatigue.   HENT: Negative for congestion.    Respiratory: Negative for cough, sputum production, shortness of breath and stridor.    Cardiovascular: Negative for chest pain, palpitations and leg swelling.   Gastrointestinal: Positive for abdominal pain, nausea and vomiting. Negative for  "constipation and diarrhea.   Genitourinary: Negative for dysuria and urgency.   Musculoskeletal: Positive for back pain (chronic ). Negative for falls and myalgias.   Neurological: Negative for dizziness, tingling, loss of consciousness, weakness and headaches.   Psychiatric/Behavioral: Negative for depression and suicidal ideas.   All other systems reviewed and are negative.       Past Medical History  Past Medical History:   Diagnosis Date   • UC (ulcerative colitis) (CMS-HCC) 3-3-17    \"Five BM's per day, takes Lialda\"   • Arthritis 3-3-17    \"Spine\"   • Snoring 3-3-17    Has not had a sleep study   • High cholesterol 3-3-17    Does not currently take medication for   • ASTHMA 3-3-17    \"Hasn't had to use inhaler in 2 years\"   • Breath shortness 3-3-17    \"With Exercise\"   • Hypothyroid 3-3-17    Takes [place] Thyroid   • Pain 3-3-17    \"Left Flank\"   • Diabetes (CMS-HCC) 3-3-17    Takes Insulin   • Aspiration pneumonia (CMS-HCC) 3-2016   • ADRIANE (acute kidney injury) (CMS-HCC) 2/24/2016   • Difficult intubation 2-2016   • Congestive heart failure (CMS-HCC) 2-2016     3-3-17 \"Not a current issue\"   • Pancreatitis 2-2016    \"D/T Tradjenta was hospitialized for 15 days\"   • Elevated liver enzymes 2-2016   • Electrolyte imbalance 2-2016   • RF (renal failure) 2-2016   • DKA (diabetic ketoacidoses) (CMS-HCC) 2-2016    \"10 days on vent with DKA, Renal failure, CHF, elevated liver enzymes and electrolyte imbalance\"   • On mechanically assisted ventilation (CMS-HCC) 2-2016    HX \"On Vent for 10 days at [facility]\".  \"DX Pancreatitis, DKA, CHF, Elevated Liver Enzymes & Electrolyte Imbalance\".   • Essential hypertension 1/22/2016   • Sepsis (CMS-HCC) 1/21/2016   • Depression 11/26/2014   • Anesthesia     \"Was difficult to intubate 2-2016\"   • Gout    • Heart burn    • Indigestion    • Kidney stones    • Migraine        Surgical History  Past Surgical History:   Procedure Laterality Date   • IRRIGATION & DEBRIDEMENT GENERAL Right " 5/1/2017    Procedure: IRRIGATION & DEBRIDEMENT GENERAL-Left HAND AND right  ANKLE;  Surgeon: Esau Dooley M.D.;  Location: SURGERY [facility];  Service:    • IRRIGATION & DEBRIDEMENT GENERAL Right 4/29/2017    Procedure: IRRIGATION & DEBRIDEMENT GENERAL;  Surgeon: Esau Dooley M.D.;  Location: SURGERY [facility];  Service:    • INCISION AND DRAINAGE GENERAL  4/7/2017    Procedure: INCISION AND DRAINAGE GENERAL;  Surgeon: Esau Dooley M.D.;  Location: SURGERY SAME DAY [facility];  Service:    • UMBILICAL HERNIA REPAIR  3/10/2017    Procedure: UMBILICAL HERNIA REPAIR- INCISION AND DRAINAGE OF UMBILICAL WOUND AND MESH REMOVAL;  Surgeon: Esau Dooley M.D.;  Location: SURGERY SAME DAY [facility];  Service:    • UMBILICAL HERNIA REPAIR N/A 11/6/2016    Procedure: UMBILICAL HERNIA REPAIR;  Surgeon: Esau Dooley M.D.;  Location: SURGERY [facility];  Service:    • COLONOSCOPY WITH BIOPSY  11/26/2014    Performed by Solo Higginbotham M.D. at ENDOSCOPY [facility]   • LIVE BY LAPAROSCOPY  2005   • OTHER ORTHOPEDIC SURGERY  1997 or 1998    Left Wrist ORIF       Medications  No current facility-administered medications on file prior to encounter.      Current Outpatient Prescriptions on File Prior to Encounter   Medication Sig Dispense Refill   • oxycodone-acetaminophen (PERCOCET-10)  MG Tab Take 1-2 Tabs by mouth every four hours as needed for Severe Pain.     • Amoxicillin-Pot Clavulanate (AUGMENTIN PO) Take  by mouth.     • rifampin (RIFADINE) 300 MG Cap Take 300 mg by mouth 2 times a day.     • sulfamethoxazole-trimethoprim (BACTRIM DS) 800-160 MG tablet Take 1 Tab by mouth 2 times a day.     • trazodone (DESYREL) 100 MG Tab Take 100 mg by mouth every evening.     • HYDROmorphone (DILAUDID) 2 MG Tab Take 2-4 mg by mouth 2 times a day as needed for Severe Pain.     • ibuprofen (MOTRIN) 200 MG Tab Take 600-800 mg by mouth every 6 hours as needed for Mild Pain.     •  "EPINEPHrine (EPIPEN 2-ANNE-MARIE) 0.3 MG/0.3ML Solution Auto-injector solution for injection 0.3 mg by Intramuscular route Once. Indications: Life-Threatening Allergic Reaction     • ondansetron (ZOFRAN ODT) 4 MG TABLET DISPERSIBLE Take 1 Tab by mouth every 8 hours as needed for Nausea/Vomiting. 10 Tab 0   • Insulin Glargine (TOUJEO SOLOSTAR) 300 UNIT/ML Solution Pen-injector Inject 36-56 Units as instructed every evening. Pt states \"I use a sliding scale\".     • Multiple Vitamins-Minerals (ZINC PO) Take 1 Tab by mouth every evening.     • thyroid (ARMOUR THYROID) 60 MG Tab Take 60 mg by mouth every evening.     • Cholecalciferol (VITAMIN D3) 5000 UNITS Tab Take 5,000 Units by mouth every evening.     • insulin lispro (HUMALOG) 100 UNIT/ML Solution Inject 4-10 Units as instructed 3 times a day before meals. Sliding Scale - 2 units every 50 > 150     • buPROPion (WELLBUTRIN XL) 300 MG XL tablet Take 300 mg by mouth every evening.     • mesalamine (LIALDA) 1.2 GM TBEC Take 1.2 g by mouth 2 times a day.         Family History  No family history on file.    Social History  Social History   Substance Use Topics   • Smoking status: Never Smoker   • Smokeless tobacco: Never Used   • Alcohol use Yes      Comment: occ       Allergies  Allergies   Allergen Reactions   • Peanut-Derived Anaphylaxis     Peanuts   RXN=age 36   • Tradjenta [Linagliptin] Unspecified     Pt developed pancreatitis   • Hydrocodone Hives     Tolerates Morphine and oxycodone  Rxn Age - 29        Physical Exam  Laboratory   Hemodynamics  Temp (24hrs), Av.2 °C (97.2 °F), Min:36.2 °C (97.2 °F), Max:36.2 °C (97.2 °F)   Temperature: 36.2 °C (97.2 °F)  Pulse  Av.3  Min: 113  Max: 124 Heart Rate (Monitored): 96  Blood Pressure: (!) 161/97, NIBP: 132/85      Respiratory      Respiration: 17, Pulse Oximetry: 95 %             Physical Exam   Constitutional: He is oriented to person, place, and time. He appears well-developed.  Non-toxic appearance. No " distress.   HENT:   Head: Normocephalic and atraumatic.   Mouth/Throat: Oropharynx is clear and moist. No oropharyngeal exudate.   Eyes: Right eye exhibits no discharge. Left eye exhibits no discharge.   Neck: Neck supple. No tracheal deviation, no edema and no erythema present.   Cardiovascular: Normal rate and regular rhythm.  Exam reveals no gallop and no friction rub.    No murmur heard.  Pulmonary/Chest: Effort normal and breath sounds normal. No stridor. No respiratory distress. He has no wheezes. He has no rales. He exhibits no tenderness.   Abdominal: Soft. Bowel sounds are normal. He exhibits no distension. There is tenderness. There is guarding. There is no rebound.   Reducible umbilical hernia   Musculoskeletal: Normal range of motion. He exhibits no edema or tenderness.   Right lateral leg scar   Lymphadenopathy:     He has no cervical adenopathy.   Neurological: He is alert and oriented to person, place, and time. No cranial nerve deficit.   Skin: Skin is warm and dry. No rash noted. He is not diaphoretic. No erythema.   Psychiatric: He has a normal mood and affect. His behavior is normal. Judgment and thought content normal.   Nursing note and vitals reviewed.      Recent Labs      10/04/17   1034   WBC  6.1   RBC  4.26*   HEMOGLOBIN  13.2*   HEMATOCRIT  40.6*   MCV  95.3   MCH  31.0   MCHC  32.5*   RDW  50.4*   PLATELETCT  236   MPV  10.2     Recent Labs      10/04/17   1034   SODIUM  123*   POTASSIUM  5.1   CHLORIDE  89*   CO2  17*   GLUCOSE  1100*   BUN  26*   CREATININE  1.25   CALCIUM  9.8     Recent Labs      10/04/17   1034   ALTSGPT  16   ASTSGOT  16   ALKPHOSPHAT  88   TBILIRUBIN  0.5   LIPASE  12   GLUCOSE  1100*                 Lab Results   Component Value Date    TROPONINI <0.01 04/18/2017     Urinalysis:    Lab Results  Component Value Date/Time   SPECGRAVITY 1.005 10/04/2017 1108   GLUCOSEUR 150 (A) 10/04/2017 1108   KETONES Negative 10/04/2017 1108   NITRITE Negative 10/04/2017 1108    WBCURINE 0-2 (A) 04/29/2017 1146   RBCURINE 0-2 (A) 03/24/2017 1815   BACTERIA Rare (A) 09/19/2016 0946   EPITHELCELL Few 10/25/2016 2239        Imaging  CT abdomen/pelvis is pending    Assessment/Plan     I anticipate this patient will require at least two midnights for appropriate medical management, necessitating inpatient admission.    DKA (diabetic ketoacidoses) (CMS-[facility])   Assessment & Plan    - vs HHNC, has similarities with both, currently does have a mild acidosis and a gap, no ketones in his urine, mildly increased beta hydroxybutyric acid  - Does have severely elevated glucose at 1000  - He did see his endocrinologist for the 1st time recently, his endocrinologist recommended that if he comes to the emergency department with severely elevated glucose he should be placed on an insulin drip  - At this point I feel the acidosis and gap would improve with subcutaneous insulin however patient's home insulin sliding scale is larger than our large sliding scale  - Due to severity of his elevated glucose and his endocrinologist's previous recommendation I do feel insulin 150 protocol would be appropriate at this time  - Patient will be placed in ICU with close monitoring of his glucose as well as frequent BMPs        AP (abdominal pain)   Assessment & Plan    - Acute on chronic, concerned that he could've worsened his hernia with all his nausea and vomiting  - CT abdomen is pending  - Patient has had surgery for abdominal hernia, did not want mesh, has had complications after the surgery        Back pain- (present on admission)   Assessment & Plan    - Chronic, symptomatic care        Essential hypertension- (present on admission)   Assessment & Plan    - give when necessary medication and adjust as needed        Depression- (present on admission)   Assessment & Plan    - Continue home med        Hypothyroid- (present on admission)   Assessment & Plan    - Continue [place] Thyroid            VTE prophylaxis:SCDs  for now, may use pharmacologic relaxants once CT scan is back if no bleeding .    Patient is critically ill.   The patient continues to have : DKA/HHNC  The vital organ system that is effected is the: all are at risk from acidosis   If untreated there is a high chance of deterioration into: death   The critical care that I am providing today is: insulin gtt  The critical care that has been undertaken is medically complex.   There has been no overlap in critical care time.  Critical care time not including procedures, no overlap: 44 minutes

## 2017-10-04 NOTE — ASSESSMENT & PLAN NOTE
Improving n/v toy is likely to honnc. He is concerned about his hernia and has discussed his pain in the past with these episodes with surgery who do not feel that he needs surgery and that he may tear some wiht vomiting. His ct is unremarkable and his exam is not consistent with incarceration. Mild ttp on edge of hernia c/w small tearing. Will consult surgery with any evidence of incarceration or worsening of condition. Hold discharge given vomiting.

## 2017-10-04 NOTE — ASSESSMENT & PLAN NOTE
hnnc likely. Resolved now. Return to diet and glucose control. Change to lantus as his home insulin not working well adn lantus has worked well in the past for him. ssi

## 2017-10-04 NOTE — PROGRESS NOTES
Pt to cic 628 from blue 22 with phone and red bag on patient belongings containing shorts, shoes, jeans, shirt, wallet, and pocket knife.

## 2017-10-05 LAB
ALBUMIN SERPL BCP-MCNC: 3.7 G/DL (ref 3.2–4.9)
ALBUMIN/GLOB SERPL: 1.3 G/DL
ALP SERPL-CCNC: 75 U/L (ref 30–99)
ALT SERPL-CCNC: 30 U/L (ref 2–50)
ANION GAP SERPL CALC-SCNC: 12 MMOL/L (ref 0–11.9)
ANION GAP SERPL CALC-SCNC: 8 MMOL/L (ref 0–11.9)
AST SERPL-CCNC: 45 U/L (ref 12–45)
BASOPHILS # BLD AUTO: 0.5 % (ref 0–1.8)
BASOPHILS # BLD: 0.02 K/UL (ref 0–0.12)
BILIRUB SERPL-MCNC: 0.6 MG/DL (ref 0.1–1.5)
BUN SERPL-MCNC: 12 MG/DL (ref 8–22)
BUN SERPL-MCNC: 12 MG/DL (ref 8–22)
CALCIUM SERPL-MCNC: 7.9 MG/DL (ref 8.5–10.5)
CALCIUM SERPL-MCNC: 8.3 MG/DL (ref 8.5–10.5)
CHLORIDE SERPL-SCNC: 100 MMOL/L (ref 96–112)
CHLORIDE SERPL-SCNC: 102 MMOL/L (ref 96–112)
CO2 SERPL-SCNC: 25 MMOL/L (ref 20–33)
CO2 SERPL-SCNC: 26 MMOL/L (ref 20–33)
COMMENT 1642: NORMAL
CREAT SERPL-MCNC: 0.61 MG/DL (ref 0.5–1.4)
CREAT SERPL-MCNC: 0.68 MG/DL (ref 0.5–1.4)
EOSINOPHIL # BLD AUTO: 0.09 K/UL (ref 0–0.51)
EOSINOPHIL NFR BLD: 2.1 % (ref 0–6.9)
ERYTHROCYTE [DISTWIDTH] IN BLOOD BY AUTOMATED COUNT: 46 FL (ref 35.9–50)
GFR SERPL CREATININE-BSD FRML MDRD: >60 ML/MIN/1.73 M 2
GFR SERPL CREATININE-BSD FRML MDRD: >60 ML/MIN/1.73 M 2
GLOBULIN SER CALC-MCNC: 2.8 G/DL (ref 1.9–3.5)
GLUCOSE BLD-MCNC: 205 MG/DL (ref 65–99)
GLUCOSE BLD-MCNC: 225 MG/DL (ref 65–99)
GLUCOSE BLD-MCNC: 260 MG/DL (ref 65–99)
GLUCOSE BLD-MCNC: 271 MG/DL (ref 65–99)
GLUCOSE SERPL-MCNC: 214 MG/DL (ref 65–99)
GLUCOSE SERPL-MCNC: 251 MG/DL (ref 65–99)
HCT VFR BLD AUTO: 34.2 % (ref 42–52)
HGB BLD-MCNC: 11.5 G/DL (ref 14–18)
IMM GRANULOCYTES # BLD AUTO: 0.01 K/UL (ref 0–0.11)
IMM GRANULOCYTES NFR BLD AUTO: 0.2 % (ref 0–0.9)
LYMPHOCYTES # BLD AUTO: 1.02 K/UL (ref 1–4.8)
LYMPHOCYTES NFR BLD: 24.3 % (ref 22–41)
MCH RBC QN AUTO: 29.8 PG (ref 27–33)
MCHC RBC AUTO-ENTMCNC: 33.6 G/DL (ref 33.7–35.3)
MCV RBC AUTO: 88.6 FL (ref 81.4–97.8)
MONOCYTES # BLD AUTO: 0.27 K/UL (ref 0–0.85)
MONOCYTES NFR BLD AUTO: 6.4 % (ref 0–13.4)
MORPHOLOGY BLD-IMP: NORMAL
NEUTROPHILS # BLD AUTO: 2.78 K/UL (ref 1.82–7.42)
NEUTROPHILS NFR BLD: 66.5 % (ref 44–72)
NRBC # BLD AUTO: 0 K/UL
NRBC BLD AUTO-RTO: 0 /100 WBC
PLATELET # BLD AUTO: 138 K/UL (ref 164–446)
PMV BLD AUTO: 9.5 FL (ref 9–12.9)
POTASSIUM SERPL-SCNC: 3.3 MMOL/L (ref 3.6–5.5)
POTASSIUM SERPL-SCNC: 3.3 MMOL/L (ref 3.6–5.5)
PROT SERPL-MCNC: 6.5 G/DL (ref 6–8.2)
RBC # BLD AUTO: 3.86 M/UL (ref 4.7–6.1)
SODIUM SERPL-SCNC: 136 MMOL/L (ref 135–145)
SODIUM SERPL-SCNC: 137 MMOL/L (ref 135–145)
WBC # BLD AUTO: 4.2 K/UL (ref 4.8–10.8)

## 2017-10-05 PROCEDURE — 700102 HCHG RX REV CODE 250 W/ 637 OVERRIDE(OP): Performed by: HOSPITALIST

## 2017-10-05 PROCEDURE — A9270 NON-COVERED ITEM OR SERVICE: HCPCS | Performed by: INTERNAL MEDICINE

## 2017-10-05 PROCEDURE — A9270 NON-COVERED ITEM OR SERVICE: HCPCS | Performed by: HOSPITALIST

## 2017-10-05 PROCEDURE — 99233 SBSQ HOSP IP/OBS HIGH 50: CPT | Performed by: HOSPITALIST

## 2017-10-05 PROCEDURE — 80053 COMPREHEN METABOLIC PANEL: CPT

## 2017-10-05 PROCEDURE — 700111 HCHG RX REV CODE 636 W/ 250 OVERRIDE (IP): Performed by: INTERNAL MEDICINE

## 2017-10-05 PROCEDURE — 82962 GLUCOSE BLOOD TEST: CPT | Mod: 91

## 2017-10-05 PROCEDURE — 770001 HCHG ROOM/CARE - MED/SURG/GYN PRIV*

## 2017-10-05 PROCEDURE — 85025 COMPLETE CBC W/AUTO DIFF WBC: CPT

## 2017-10-05 PROCEDURE — 700105 HCHG RX REV CODE 258: Performed by: INTERNAL MEDICINE

## 2017-10-05 PROCEDURE — 700102 HCHG RX REV CODE 250 W/ 637 OVERRIDE(OP): Performed by: INTERNAL MEDICINE

## 2017-10-05 PROCEDURE — 80048 BASIC METABOLIC PNL TOTAL CA: CPT

## 2017-10-05 RX ORDER — IBUPROFEN 600 MG/1
600 TABLET ORAL EVERY 6 HOURS PRN
Status: DISCONTINUED | OUTPATIENT
Start: 2017-10-05 | End: 2017-10-06 | Stop reason: HOSPADM

## 2017-10-05 RX ORDER — INSULIN GLARGINE 100 [IU]/ML
35 INJECTION, SOLUTION SUBCUTANEOUS EVERY EVENING
Status: DISCONTINUED | OUTPATIENT
Start: 2017-10-05 | End: 2017-10-06 | Stop reason: HOSPADM

## 2017-10-05 RX ORDER — POTASSIUM CHLORIDE 20 MEQ/1
60 TABLET, EXTENDED RELEASE ORAL ONCE
Status: COMPLETED | OUTPATIENT
Start: 2017-10-05 | End: 2017-10-05

## 2017-10-05 RX ORDER — INSULIN GLARGINE 100 [IU]/ML
35 INJECTION, SOLUTION SUBCUTANEOUS 2 TIMES DAILY
Qty: 2 VIAL | Refills: 1 | Status: SHIPPED | OUTPATIENT
Start: 2017-10-05 | End: 2017-12-09

## 2017-10-05 RX ADMIN — HYDROMORPHONE HYDROCHLORIDE 1 MG: 1 INJECTION, SOLUTION INTRAMUSCULAR; INTRAVENOUS; SUBCUTANEOUS at 17:37

## 2017-10-05 RX ADMIN — INSULIN GLARGINE 35 UNITS: 100 INJECTION, SOLUTION SUBCUTANEOUS at 21:31

## 2017-10-05 RX ADMIN — HYDROMORPHONE HYDROCHLORIDE 2 MG: 2 TABLET ORAL at 05:56

## 2017-10-05 RX ADMIN — HYDROMORPHONE HYDROCHLORIDE 1 MG: 1 INJECTION, SOLUTION INTRAMUSCULAR; INTRAVENOUS; SUBCUTANEOUS at 09:45

## 2017-10-05 RX ADMIN — INSULIN LISPRO 7 UNITS: 100 INJECTION, SOLUTION INTRAVENOUS; SUBCUTANEOUS at 21:32

## 2017-10-05 RX ADMIN — SODIUM CHLORIDE: 9 INJECTION, SOLUTION INTRAVENOUS at 05:56

## 2017-10-05 RX ADMIN — LEVOTHYROXINE, LIOTHYRONINE 60 MG: 19; 4.5 TABLET ORAL at 05:56

## 2017-10-05 RX ADMIN — MESALAMINE 1000 MG: 250 CAPSULE ORAL at 21:30

## 2017-10-05 RX ADMIN — MESALAMINE 250 MG: 250 CAPSULE ORAL at 09:30

## 2017-10-05 RX ADMIN — INSULIN LISPRO 4 UNITS: 100 INJECTION, SOLUTION INTRAVENOUS; SUBCUTANEOUS at 11:45

## 2017-10-05 RX ADMIN — HYDROMORPHONE HYDROCHLORIDE 1 MG: 1 INJECTION, SOLUTION INTRAMUSCULAR; INTRAVENOUS; SUBCUTANEOUS at 01:38

## 2017-10-05 RX ADMIN — ONDANSETRON 4 MG: 2 INJECTION INTRAMUSCULAR; INTRAVENOUS at 12:16

## 2017-10-05 RX ADMIN — MESALAMINE 250 MG: 250 CAPSULE ORAL at 15:00

## 2017-10-05 RX ADMIN — BUPROPION HYDROCHLORIDE 150 MG: 150 TABLET, EXTENDED RELEASE ORAL at 21:30

## 2017-10-05 RX ADMIN — INSULIN LISPRO 4 UNITS: 100 INJECTION, SOLUTION INTRAVENOUS; SUBCUTANEOUS at 16:55

## 2017-10-05 RX ADMIN — HYDROMORPHONE HYDROCHLORIDE 1 MG: 1 INJECTION, SOLUTION INTRAMUSCULAR; INTRAVENOUS; SUBCUTANEOUS at 21:00

## 2017-10-05 RX ADMIN — BUPROPION HYDROCHLORIDE 150 MG: 150 TABLET, EXTENDED RELEASE ORAL at 09:30

## 2017-10-05 RX ADMIN — POTASSIUM CHLORIDE 60 MEQ: 1500 TABLET, EXTENDED RELEASE ORAL at 09:31

## 2017-10-05 RX ADMIN — INSULIN LISPRO 7 UNITS: 100 INJECTION, SOLUTION INTRAVENOUS; SUBCUTANEOUS at 05:58

## 2017-10-05 RX ADMIN — IBUPROFEN 600 MG: 600 TABLET, FILM COATED ORAL at 19:49

## 2017-10-05 ASSESSMENT — ENCOUNTER SYMPTOMS
SHORTNESS OF BREATH: 0
HEADACHES: 0
NAUSEA: 1
ABDOMINAL PAIN: 1
DEPRESSION: 0
NECK PAIN: 0
EYE PAIN: 0
INSOMNIA: 0
BACK PAIN: 0
TINGLING: 0
BLURRED VISION: 0
FEVER: 0
VOMITING: 1
PALPITATIONS: 0
SORE THROAT: 0
DIZZINESS: 0
CHILLS: 0
COUGH: 0

## 2017-10-05 ASSESSMENT — LIFESTYLE VARIABLES: EVER_SMOKED: NEVER

## 2017-10-05 ASSESSMENT — PAIN SCALES - GENERAL
PAINLEVEL_OUTOF10: 6
PAINLEVEL_OUTOF10: 5
PAINLEVEL_OUTOF10: 3
PAINLEVEL_OUTOF10: 7
PAINLEVEL_OUTOF10: 3
PAINLEVEL_OUTOF10: 5
PAINLEVEL_OUTOF10: 6
PAINLEVEL_OUTOF10: 8
PAINLEVEL_OUTOF10: 5
PAINLEVEL_OUTOF10: 6
PAINLEVEL_OUTOF10: 6
PAINLEVEL_OUTOF10: 3
PAINLEVEL_OUTOF10: 7
PAINLEVEL_OUTOF10: 5
PAINLEVEL_OUTOF10: 5
PAINLEVEL_OUTOF10: 7

## 2017-10-05 NOTE — PROGRESS NOTES
Patient complaining of nausea. Dr. Rendon decided to discontinue discharge orders and continue monitoring patient.

## 2017-10-05 NOTE — PROGRESS NOTES
Late Entry:    Assumed care of the patient at 1850. Report given bedside. Patient in bed. No family present. VSS. Educated on safety precautions and skin integrity. Patient verbalized understanding. Educated on plan of care of [facility] checks and frequent labs. Reviewed medications. Patient verbalized understanding. All concerns addressed at this time. Will continue to closely monitor.

## 2017-10-05 NOTE — PROGRESS NOTES
[facility] Hospitalist Progress Note    Date of Service: 10/5/2017    Chief Complaint  52 y.o. male admitted 10/4/2017 with [facility]    Interval Problem Update  Pain in hernia area persists  Requiring dilaudid for pain  In nsr 70-80 bp stable  No fevers  No [place]    Vomited later in the day today and discharge prevented.     Consultants/Specialty  none    Dispositionhome if pain and vomiting resolves. Consider surgical consult.         Review of Systems   Constitutional: Negative for chills and fever.   HENT: Negative for sore throat.    Eyes: Negative for blurred vision and pain.   Respiratory: Negative for cough and shortness of breath.    Cardiovascular: Negative for chest pain and palpitations.   Gastrointestinal: Positive for abdominal pain, nausea and vomiting.   Genitourinary: Negative for dysuria and urgency.   Musculoskeletal: Negative for back pain and neck pain.   Skin: Negative for itching and rash.   Neurological: Negative for dizziness, tingling and headaches.   Psychiatric/Behavioral: Negative for depression. The patient does not have insomnia.    All other systems reviewed and are negative.     Physical Exam  Laboratory/Imaging   Hemodynamics  Temp (24hrs), Av.1 °C (97 °F), Min:35.9 °C (96.6 °F), Max:36.4 °C (97.5 °F)   Temperature: 36 °C (96.8 °F)  Pulse  Av.6  Min: 73  Max: 125 Heart Rate (Monitored): 75  Blood Pressure: (!) 161/97, NIBP: (!) 95/61      Respiratory      Respiration: (!) 11, Pulse Oximetry: 100 %        RUL Breath Sounds: Clear, RML Breath Sounds: Clear, RLL Breath Sounds: Clear, NADEEN Breath Sounds: Clear, LLL Breath Sounds: Clear    Fluids    Intake/Output Summary (Last 24 hours) at 10/05/17 0856  Last data filed at 10/05/17 0600   Gross per 24 hour   Intake                0 ml   Output             2400 ml   Net            -2400 ml       Nutrition  Orders Placed This Encounter   Procedures   • DIET ORDER     Standing Status:   Standing     Number of Occurrences:   1     Order  Specific Question:   Diet:     Answer:   Diabetic [3]     Comments:   Peanut allergy. Also patient request no apples products     Physical Exam   Constitutional: He is oriented to person, place, and time. He appears well-developed and well-nourished. No distress.   HENT:   Right Ear: External ear normal.   Left Ear: External ear normal.   Nose: Nose normal.   Eyes: Right eye exhibits no discharge. Left eye exhibits no discharge. No scleral icterus.   Neck: No JVD present. No tracheal deviation present.   Cardiovascular: Normal rate, normal heart sounds and intact distal pulses.    No murmur heard.  Pulmonary/Chest: Effort normal and breath sounds normal. No respiratory distress. He has no wheezes. He has no rales.   Abdominal: Soft. Bowel sounds are normal. He exhibits no distension. There is no tenderness. There is no guarding.   Hernia. Soft and mild tenderness around site of hernia.    Musculoskeletal: He exhibits no edema or tenderness.   Neurological: He is alert and oriented to person, place, and time.   Skin: Skin is warm and dry. He is not diaphoretic. No erythema.   Psychiatric: He has a normal mood and affect. His behavior is normal.   Nursing note and vitals reviewed.      Recent Labs      10/04/17   1034  10/05/17   0208   WBC  6.1  4.2*   RBC  4.26*  3.86*   HEMOGLOBIN  13.2*  11.5*   HEMATOCRIT  40.6*  34.2*   MCV  95.3  88.6   MCH  31.0  29.8   MCHC  32.5*  33.6*   RDW  50.4*  46.0   PLATELETCT  236  138*   MPV  10.2  9.5     Recent Labs      10/04/17   1919  10/05/17   0208  10/05/17   0720   SODIUM  134*  137  136   POTASSIUM  3.4*  3.3*  3.3*   CHLORIDE  98  100  102   CO2  22  25  26   GLUCOSE  307*  214*  251*   BUN  15  12  12   CREATININE  0.80  0.68  0.61   CALCIUM  8.7  8.3*  7.9*                      Assessment/Plan     DKA (diabetic ketoacidoses) (CMS-[facility])   Assessment & Plan    hnnc likely. Resolved now. Return to diet and glucose control. Change to lantus as his home insulin not working  well adn lantus has worked well in the past for him. ssi        AP (abdominal pain)   Assessment & Plan    Improving n/v toy is likely to honnc. He is concerned about his hernia and has discussed his pain in the past with these episodes with surgery who do not feel that he needs surgery and that he may tear some wiht vomiting. His ct is unremarkable and his exam is not consistent with incarceration. Mild ttp on edge of hernia c/w small tearing. Will consult surgery with any evidence of incarceration or worsening of condition. Hold discharge given vomiting.         Back pain- (present on admission)   Assessment & Plan    - Chronic, symptomatic care        Essential hypertension- (present on admission)   Assessment & Plan    - give when necessary medication and adjust as needed        Depression- (present on admission)   Assessment & Plan    - Continue home med        Hypothyroid- (present on admission)   Assessment & Plan    - Continue [place] Thyroid            Reviewed items::  EKG reviewed, Medications reviewed, Radiology images reviewed and Labs reviewed  DVT prophylaxis pharmacological::  Heparin  Ulcer Prophylaxis::  Yes  Antibiotics:  Treating active infection/contamination beyond 24 hours perioperative coverage

## 2017-10-05 NOTE — DIETARY
Nutrition Services: Poor PO intake, weight loss, difficulty chewing (due to dental extractions)    Pt is a 52 y.o. Male with Dx: DKA    PMH: IDDM, UC  BMI= 32.69  Labs: HA1c 8.9% (4/2017)    Assessment/Plan:  Admit day 1. Pt admitted with N/v and hyperglycemia 10/4. Pt had D/c orders this morning, which were later cancelled due to nausea. Pt is on a diabetic diet.   Notable problems on this admit: Nausea, abdominal pain and distention @ site of umbilical hernia repair, inadequate PO intake (<50%).  Attempted to speak with pt this afternoon about dietary history (PO intake, weight loss, chewing ability, etc.), but he was asleep with door closed, lights out, and eye mask on.   ?Nutrition supplements.  Nutrition Representative will see pt for menu options.    RD following, will check back with pt for nutritional assessment and then monitor intake/needs.  Consider order for nutrition supplements.

## 2017-10-05 NOTE — RESPIRATORY CARE
COPD EDUCATION by COPD CLINICAL EDUCATOR  10/5/2017 at 1:01 PM by Carleen Ohara     Patient reviewed by COPD education team. Patient does not qualify for COPD program.

## 2017-10-05 NOTE — CARE PLAN
Problem: Discharge Barriers/Planning  Goal: Patient's continuum of care needs will be met    Intervention: Collaborate with Transitional Care Team and Interdisciplinary Team to meet discharge needs  Collaborated with interdisciplinary care team, patient to be discharged 10/5.       Problem: Pain Management  Goal: Pain level will decrease to patient's comfort goal  Patient communicating pain, managed with dilaudid and rest.

## 2017-10-05 NOTE — DISCHARGE PLANNING
This 52 year old male admitted on 10/4 in diabetic ketoacidosis.  He is currently in the cardiac ICU and had an order to discharge home today but because of c/o nausea the doctor has decided to discontinue the discharge order. The was recently treated for necrotizing fasciitis in his left leg.  He said that his blood sugars have been very high.  He just started seeing an endocrinologist.  He will likely need to follow up soon with the endocrinologist after discharge. He lives in [place] and names Akiko Yamilet ([phone]), his significant other, as his emergency contact.

## 2017-10-05 NOTE — CARE PLAN
Problem: Nutritional:  Goal: Achieve adequate nutritional intake  Patient will consume >50% of meals.  Outcome: PROGRESSING SLOWER THAN EXPECTED

## 2017-10-05 NOTE — CARE PLAN
Problem: Communication  Goal: The ability to communicate needs accurately and effectively will improve    Intervention: Educate patient and significant other/support system about the plan of care, procedures, treatments, medications and allow for questions  Educated patient and family bedside of current care plan and medications. Oriented to room and unit. All questions answered. Patient and family agree with current treatment plan. Call light within reach.       Problem: Pain Management  Goal: Pain level will decrease to patient's comfort goal    Intervention: Follow pain managment plan developed in collaboration with patient and Interdisciplinary Team  Assessed pain continuously. Provided pain management as ordered. Discussed with patient comfort level to be reached. Educated on staying on top of pain and to use call light when pain starts to increase as to make sure appropriately managed. Call light within reach.

## 2017-10-06 VITALS
RESPIRATION RATE: 14 BRPM | OXYGEN SATURATION: 95 % | HEIGHT: 68 IN | DIASTOLIC BLOOD PRESSURE: 97 MMHG | TEMPERATURE: 98.1 F | HEART RATE: 86 BPM | SYSTOLIC BLOOD PRESSURE: 161 MMHG | WEIGHT: 215 LBS | BODY MASS INDEX: 32.58 KG/M2

## 2017-10-06 LAB
ANION GAP SERPL CALC-SCNC: 8 MMOL/L (ref 0–11.9)
BUN SERPL-MCNC: 10 MG/DL (ref 8–22)
CALCIUM SERPL-MCNC: 8.9 MG/DL (ref 8.5–10.5)
CHLORIDE SERPL-SCNC: 105 MMOL/L (ref 96–112)
CO2 SERPL-SCNC: 23 MMOL/L (ref 20–33)
CREAT SERPL-MCNC: 0.55 MG/DL (ref 0.5–1.4)
GFR SERPL CREATININE-BSD FRML MDRD: >60 ML/MIN/1.73 M 2
GLUCOSE BLD-MCNC: 218 MG/DL (ref 65–99)
GLUCOSE BLD-MCNC: 262 MG/DL (ref 65–99)
GLUCOSE SERPL-MCNC: 221 MG/DL (ref 65–99)
POTASSIUM SERPL-SCNC: 3.6 MMOL/L (ref 3.6–5.5)
SODIUM SERPL-SCNC: 136 MMOL/L (ref 135–145)

## 2017-10-06 PROCEDURE — 700111 HCHG RX REV CODE 636 W/ 250 OVERRIDE (IP): Performed by: INTERNAL MEDICINE

## 2017-10-06 PROCEDURE — 99239 HOSP IP/OBS DSCHRG MGMT >30: CPT | Performed by: HOSPITALIST

## 2017-10-06 PROCEDURE — 700102 HCHG RX REV CODE 250 W/ 637 OVERRIDE(OP): Performed by: HOSPITALIST

## 2017-10-06 PROCEDURE — A9270 NON-COVERED ITEM OR SERVICE: HCPCS | Performed by: INTERNAL MEDICINE

## 2017-10-06 PROCEDURE — 80048 BASIC METABOLIC PNL TOTAL CA: CPT

## 2017-10-06 PROCEDURE — 82962 GLUCOSE BLOOD TEST: CPT | Mod: 91

## 2017-10-06 PROCEDURE — A9270 NON-COVERED ITEM OR SERVICE: HCPCS | Performed by: HOSPITALIST

## 2017-10-06 PROCEDURE — 700102 HCHG RX REV CODE 250 W/ 637 OVERRIDE(OP): Performed by: INTERNAL MEDICINE

## 2017-10-06 RX ADMIN — INSULIN LISPRO 4 UNITS: 100 INJECTION, SOLUTION INTRAVENOUS; SUBCUTANEOUS at 12:04

## 2017-10-06 RX ADMIN — HYDROMORPHONE HYDROCHLORIDE 1 MG: 1 INJECTION, SOLUTION INTRAMUSCULAR; INTRAVENOUS; SUBCUTANEOUS at 01:06

## 2017-10-06 RX ADMIN — MESALAMINE 1000 MG: 250 CAPSULE ORAL at 08:54

## 2017-10-06 RX ADMIN — OXYCODONE HYDROCHLORIDE AND ACETAMINOPHEN 1 TABLET: 10; 325 TABLET ORAL at 10:15

## 2017-10-06 RX ADMIN — BUPROPION HYDROCHLORIDE 150 MG: 150 TABLET, EXTENDED RELEASE ORAL at 08:54

## 2017-10-06 RX ADMIN — MESALAMINE 1000 MG: 250 CAPSULE ORAL at 12:04

## 2017-10-06 RX ADMIN — LEVOTHYROXINE, LIOTHYRONINE 60 MG: 19; 4.5 TABLET ORAL at 06:01

## 2017-10-06 RX ADMIN — INSULIN LISPRO 7 UNITS: 100 INJECTION, SOLUTION INTRAVENOUS; SUBCUTANEOUS at 06:02

## 2017-10-06 ASSESSMENT — PAIN SCALES - GENERAL
PAINLEVEL_OUTOF10: 1
PAINLEVEL_OUTOF10: 3
PAINLEVEL_OUTOF10: 1
PAINLEVEL_OUTOF10: 1
PAINLEVEL_OUTOF10: 8
PAINLEVEL_OUTOF10: 5
PAINLEVEL_OUTOF10: 3
PAINLEVEL_OUTOF10: 3

## 2017-10-06 ASSESSMENT — LIFESTYLE VARIABLES: EVER_SMOKED: NEVER

## 2017-10-06 NOTE — DISCHARGE INSTRUCTIONS
Discharge Instructions    Discharged to home by car with friend. Discharged via walking, hospital escort: Yes.  Special equipment needed: Not Applicable    Be sure to schedule a follow-up appointment with your primary care doctor or any specialists as instructed.     Discharge Plan:   Diet Plan: Discussed  Activity Level: Discussed  Confirmed Follow up Appointment: Patient to Call and Schedule Appointment  Confirmed Symptoms Management: Discussed  Medication Reconciliation Updated: Yes  Influenza Vaccine Indication: Patient Refuses (Patient will get flu shot at follow up appt)    I understand that a diet low in cholesterol, fat, and sodium is recommended for good health. Unless I have been given specific instructions below for another diet, I accept this instruction as my diet prescription.   Other diet: diabetic diet    Special Instructions: None    · Is patient discharged on Warfarin / Coumadin?   No     · Is patient Post Blood Transfusion?  No    Depression / Suicide Risk    As you are discharged from this [facility] facility, it is important to learn how to keep safe from harming yourself.    Recognize the warning signs:  · Abrupt changes in personality, positive or negative- including increase in energy   · Giving away possessions  · Change in eating patterns- significant weight changes-  positive or negative  · Change in sleeping patterns- unable to sleep or sleeping all the time   · Unwillingness or inability to communicate  · Depression  · Unusual sadness, discouragement and loneliness  · Talk of wanting to die  · Neglect of personal appearance   · Rebelliousness- reckless behavior  · Withdrawal from people/activities they love  · Confusion- inability to concentrate     If you or a loved one observes any of these behaviors or has concerns about self-harm, here's what you can do:  · Talk about it- your feelings and reasons for harming yourself  · Remove any means that you might use to hurt yourself  (examples: pills, rope, extension cords, firearm)  · Get professional help from the community (Mental Health, Substance Abuse, psychological counseling)  · Do not be alone:Call your Safe Contact- someone whom you trust who will be there for you.  · Call your local CRISIS HOTLINE 996-4751 or 244-624-3251  · Call your local Children's Mobile Crisis Response Team [place] (802) 029-1296 or www.[employer]  · Call the toll free National Suicide Prevention Hotlines   · National Suicide Prevention Lifeline 494-534-LIDP (1472)  · [employer] Mobile Hope Line Network 800-SUICIDE (288-8602)    Diabetic Ketoacidosis  Diabetic ketoacidosis is a life-threatening complication of diabetes. If it is not treated, it can cause severe dehydration and organ damage and can lead to a coma or death.  CAUSES  This condition develops when there is not enough of the hormone insulin in the body. Insulin helps the body to break down sugar for energy. Without insulin, the body cannot break down sugar, so it breaks down fats instead. This leads to the production of acids that are called ketones. Ketones are poisonous at high levels.  This condition can be triggered by:  · Stress on the body that is brought on by an illness.  · Medicines that raise blood glucose levels.  · Not taking diabetes medicine.  SYMPTOMS  Symptoms of this condition include:  · Fatigue.  · Weight loss.  · Excessive thirst.  · Light-headedness.  · Fruity or sweet-smelling breath.  · Excessive urination.  · Vision changes.  · Confusion or irritability.  · Nausea.  · Vomiting.  · Rapid breathing.  · Abdominal pain.  · Feeling flushed.  DIAGNOSIS  This condition is diagnosed based on a medical history, a physical exam, and blood tests. You may also have a urine test that checks for ketones.  TREATMENT  This condition may be treated with:  · Fluid replacement. This may be done to correct dehydration.  · Insulin injections. These may be given through the skin or through an IV  "tube.  · Electrolyte replacement. Electrolytes, such as potassium and sodium, may be given in pill form or through an IV tube.  · Antibiotic medicines. These may be prescribed if your condition was caused by an infection.  HOME CARE INSTRUCTIONS  Eating and Drinking  · Drink enough fluids to keep your urine clear or pale yellow.  · If you cannot eat, alternate between drinking fluids with sugar (such as juice) and salty fluids (such as broth or bouillon).  · If you can eat, follow your usual diet and drink sugar-free liquids, such as water.  Other Instructions  · Take insulin as directed by your health care provider. Do not skip insulin injections. Do not use  insulin.  · If your blood sugar is over 240 mg/dL, monitor your urine ketones every 4-6 hours.  · If you were prescribed an antibiotic medicine, finish all of it even if you start to feel better.  · Rest and exercise only as directed by your health care provider.  · If you get sick, call your health care provider and begin treatment quickly. Your body often needs extra insulin to fight an illness.  · Check your blood glucose levels regularly. If your blood glucose is high, drink plenty of fluids. This helps to flush out ketones.  SEEK MEDICAL CARE IF:  · Your blood glucose level is too high or too low.  · You have ketones in your urine.  · You have a fever.  · You cannot eat.  · You cannot tolerate fluids.  · You have been vomiting for more than 2 hours.  · You continue to have symptoms of this condition.  · You develop new symptoms.  SEEK IMMEDIATE MEDICAL CARE IF:  · Your blood glucose levels continue to be high (elevated).  · Your monitor reads \"high\" even when you are taking insulin.  · You faint.  · You have chest pain.  · You have trouble breathing.  · You have a sudden, severe headache.  · You have sudden weakness in one arm or one leg.  · You have sudden trouble speaking or swallowing.  · You have vomiting or diarrhea that gets worse after 3 " hours.  · You feel severely fatigued.  · You have trouble thinking.  · You have abdominal pain.  · You are severely dehydrated. Symptoms of severe dehydration include:  ¨ Extreme thirst.  ¨ Dry mouth.  ¨ Blue lips.  ¨ Cold hands and feet.  ¨ Rapid breathing.     This information is not intended to replace advice given to you by your health care provider. Make sure you discuss any questions you have with your health care provider.     Document Released: 12/15/2001 Document Revised: 05/03/2016 Document Reviewed: 11/25/2015  [employer] Interactive Patient Education ©2016 [employer]

## 2017-10-06 NOTE — CARE PLAN
Problem: Bowel/Gastric:  Goal: Normal bowel function is maintained or improved  Outcome: PROGRESSING AS EXPECTED  Patient reported normal bowel function for him. His baseline is 3-5 loose BMs a day.     Problem: Pain Management  Goal: Pain level will decrease to patient's comfort goal  Outcome: PROGRESSING SLOWER THAN EXPECTED  Patient remains on IV dilauded for his reported severe abdominal pain, it is effective and patient able to rest after PRN dose given.

## 2017-10-06 NOTE — PROGRESS NOTES
Discharge plan discussed with patient.  Patient states understanding.  RN removed all IVs.  Patient transported by wheelchair by RN to vehicle.  Patient's gait steady,all personal belongings accounted for.  Patient driven by significant other to home.

## 2017-10-07 NOTE — DISCHARGE SUMMARY
CHIEF COMPLAINT ON ADMISSION  Chief Complaint   Patient presents with   • N/V   • Hyperglycemia       CODE STATUS  Prior    HPI & HOSPITAL COURSE  This is a 52 y.o. male here with [facility]. He was recently operated on with teech extraction and had his insulin changed to a novel agent. He was hydrated, electrolytes were corrected. He had no evidence of infection but does have an dental diego that needs revision with dentistry in the future. He will be transitioned back to lantus and humalog as that has worked well for him in the past. He will titrate this as needed and we have discussed how this should be done. He has no sx of tooth infection at present. His hernia in his abdomen is painful after vomiting and likely had a recurrent trear during these episodes. This was not incarcerated on ct and he can follow with dr pruitt as an outpatient. He anais follow up [facility] endocrine and his pcp and surgery as an outpatient.      Therefore, he is discharged in good and stable condition with close outpatient follow-up.    SPECIFIC OUTPATIENT FOLLOW-UP  Pcp  endocrine  Surgery if needed       DISCHARGE PROBLEM LIST  Active Problems:    DKA (diabetic ketoacidoses) (CMS-HCC) POA: Unknown    Hypothyroid POA: Yes    Depression POA: Yes    Essential hypertension POA: Yes    Back pain POA: Yes    AP (abdominal pain) POA: Unknown  Resolved Problems:    * No resolved hospital problems. *      FOLLOW UP  No future appointments.  No follow-up provider specified.    MEDICATIONS ON DISCHARGE   Mahesh Bradshaw   Home Medication Instructions CALVIN:[id number]    Printed on:10/06/17 0525   Medication Information                      amoxicillin-clavulanate (AUGMENTIN) 875-125 MG Tab  Take 1 Tab by mouth 2 times a day. Started 9-9-17 10 day course for dental procedure.             buPROPion (WELLBUTRIN XL) 300 MG XL tablet  Take 300 mg by mouth every evening.             Cholecalciferol (VITAMIN D3) 5000 UNITS Tab  Take 5,000 Units by mouth every  "evening.             HYDROmorphone (DILAUDID) 2 MG Tab  Take 2-4 mg by mouth 2 times a day as needed for Severe Pain.             ibuprofen (MOTRIN) 200 MG Tab  Take 600-800 mg by mouth every 8 hours as needed for Mild Pain.             insulin glargine (LANTUS) 100 UNIT/ML Solution  Inject 35 Units as instructed 2 times a day.             insulin lispro (HUMALOG) 100 UNIT/ML Solution  Inject 30 Units as instructed 4 Times a Day,Before Meals and at Bedtime. Sliding Scale - 2 units every 50 > 150             Insulin Syringe-Needle U-100 26G X 1/2\" 1 ML Misc  1 Application by Does not apply route 2 times a day as needed (use as directed).             magnesium oxide (MAG-OX) 400 MG Tab  Take 400 mg by mouth every day.             mesalamine (LIALDA) 1.2 GM TBEC  Take 1.2 g by mouth 2 times a day.             ondansetron (ZOFRAN ODT) 4 MG TABLET DISPERSIBLE  Take 1 Tab by mouth every 8 hours as needed for Nausea/Vomiting.             potassium chloride SA (K-DUR) 10 MEQ Tab CR  Take 10 mEq by mouth every day.             sulfamethoxazole-trimethoprim (BACTRIM DS) 800-160 MG tablet  Take 1 Tab by mouth 2 times a day. 14 day course   Started 9-29-17             therapeutic multivitamin-minerals (THERAGRAN-M) Tab  Take 1 Tab by mouth every day.             thyroid (ARMOUR THYROID) 60 MG Tab  Take 60 mg by mouth every evening.             trazodone (DESYREL) 100 MG Tab  Take 100 mg by mouth every evening.             zinc sulfate (ZINCATE) 220 (50 Zn) MG Cap  Take 220 mg by mouth every day.                 DIET  No orders of the defined types were placed in this encounter.      ACTIVITY  As tolerated.  Weight bearing as tolerated      CONSULTATIONS  none    PROCEDURES  none    LABORATORY  Lab Results   Component Value Date/Time    SODIUM 136 10/06/2017 10:09 AM    POTASSIUM 3.6 10/06/2017 10:09 AM    CHLORIDE 105 10/06/2017 10:09 AM    CO2 23 10/06/2017 10:09 AM    GLUCOSE 221 (H) 10/06/2017 10:09 AM    BUN 10 10/06/2017 " 10:09 AM    CREATININE 0.55 10/06/2017 10:09 AM    CREATININE 1.1 02/18/2008 10:00 AM        Lab Results   Component Value Date/Time    WBC 4.2 (L) 10/05/2017 02:08 AM    HEMOGLOBIN 11.5 (L) 10/05/2017 02:08 AM    HEMATOCRIT 34.2 (L) 10/05/2017 02:08 AM    PLATELETCT 138 (L) 10/05/2017 02:08 AM        Total time of the discharge process exceeds 32 minutes

## 2017-11-02 ENCOUNTER — IMMUNIZATION (OUTPATIENT)
Dept: OCCUPATIONAL MEDICINE | Facility: CLINIC | Age: 52
End: 2017-11-02

## 2017-11-02 DIAGNOSIS — Z23 NEED FOR VACCINATION: ICD-10-CM

## 2017-11-02 PROCEDURE — 90686 IIV4 VACC NO PRSV 0.5 ML IM: CPT | Performed by: PREVENTIVE MEDICINE

## 2017-11-07 ENCOUNTER — NON-PROVIDER VISIT (OUTPATIENT)
Dept: HEALTH INFORMATION MANAGEMENT | Facility: MEDICAL CENTER | Age: 52
End: 2017-11-07
Payer: COMMERCIAL

## 2017-11-07 VITALS — BODY MASS INDEX: 33.22 KG/M2 | HEIGHT: 68 IN | WEIGHT: 219.2 LBS

## 2017-11-07 DIAGNOSIS — E66.9 OBESITY, UNSPECIFIED OBESITY SEVERITY, UNSPECIFIED OBESITY TYPE: ICD-10-CM

## 2017-11-07 DIAGNOSIS — E11.9 TYPE 2 DIABETES MELLITUS WITHOUT COMPLICATION, WITH LONG-TERM CURRENT USE OF INSULIN (HCC): ICD-10-CM

## 2017-11-07 DIAGNOSIS — Z79.4 TYPE 2 DIABETES MELLITUS WITHOUT COMPLICATION, WITH LONG-TERM CURRENT USE OF INSULIN (HCC): ICD-10-CM

## 2017-11-07 PROCEDURE — 97802 MEDICAL NUTRITION INDIV IN: CPT | Performed by: DIETITIAN, REGISTERED

## 2017-11-07 NOTE — PROGRESS NOTES
"11/7/2017 52 y.o.   Referring Provider: KRISSY Brunner       Time in/out:  1:07-2:04pm     Anthropometrics/Objective  Vitals:    11/07/17 1432   Weight: 99.4 kg (219 lb 3.2 oz)   Height: 1.727 m (5' 8\")       Body mass index is 33.33 kg/m².    Stated Goal Weight: 180-200lb   Estimated Daily Caloric needs 4890-0184  Kcal   See comprehensive patient history form for further information     Subjective:  Pt states he has a complicated medical hx. Has been hospitalized 45days this year. He has had periods of weight loss while hospitalized and followed by weight gain.   Weight gain over the years has also been due to less sedentary job. 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 Is not working out due to time restraints and also due to hernia cannot lift heavy weights.   He does enjoy walking in his neighborhood   Pt eats most meals at the [facility] cafeteria.   He admits that he prefers Southern style cooking. Cooks with a lot of butter and cream sauces. Also eats many red meats and fried foods.   Doesn't like water. Drinks soda, crystal light, tea when he makes it, and coffee with half-and-half   Check FSBS 3-4 times a day; fasting and before meals. FSBS= mid 200s. Down from 600s earlier this year.   Medications for diabetes include insulun: humalog sliding scale with meals, &  70/30 40units BID   See Patient History Form- Nutrition under Media for complete diet hx and patient information.      Nutrition Diagnosis (PES Statement)  · Food/nutrition knowledge deficit related to no previous nutrition education as evidenced by limited nutrition knowledge per discussion with patient     Client history:  Condition(s) associated with a diagnosis or treatment (specify) Type 2 DM, DKA, gout, UC, HTN, pancreatitis, h/o sepsis     Biochemical data, medical test and procedures  Lab Results   Component Value Date/Time    HBA1C 8.9 (H) 04/30/2017 04:45 PM   @  Lab Results   Component Value " Date/Time    POCGLUCOSE 218 (H) 10/06/2017 12:00 PM     Lab Results   Component Value Date/Time    CHOLSTRLTOT 187 04/07/2017 02:19 AM    LDL see below 04/07/2017 02:19 AM    HDL 28 (A) 04/07/2017 02:19 AM    TRIGLYCERIDE 407 (H) 04/07/2017 02:19 AM         Nutrition Intervention    Meal and Snack  Recommend a carb controlled heart healthy diet     Comprehensive Nutrition education Instruction or training leading to in-depth nutrition related knowledge about:  Combine carb, protein and fat at each meal, Eating out, Fast food, Meal timing and spacing, Menu Planning, Metabolism of carb, protein, fat, Physical activity/exercise, Portion control, Sweets and alcohol in moderation, Heart-healthy guidelines, Increasing/Decreasing PO intake and Handouts provided regarding topics discussed, and therapeutic diet for diabetes     Monitoring & Evaluation Plan    Behavioral-Environmental:  Behavior: Bring lunch and snacks to work. Plan ahead. Check FSBS 4 time a day and take insulin as prescribed.   Physical activity: Begin walking 3 days a week and increase to 5 days for at least 30mins each day.     Food / Nutrient Intake:  Fluid/Beverage intake: Avoid soda and sweetened beverages. Make tea unsweetened ahead to have in replace of soda. Other sugar free beverages are ok.   Food intake: Use plate method to control portions and balance macronutrients at meals. Eat 3 meals per day and healthy snacks in between as needed to avoid going >4hrs without eating.  Choose whole grains, low fat dairy, lean meats and increase vegetable consumption.  Give up breakfast meats at breakfast. Switch to low sodium V8 juice for morning snack. Avoid fried foods. Use healthier cooking methods to prepare foods.     Physical Signs / Symptoms:  HbA1c profiles 7%  and Weight change -1-2lb per week to achieve BMI in normal weight range     Assessment Notes:  Pt is currently eating a diet high in saturated fat and salt. He is aware of these unhealthy  habits, but is not quite willing to commit to changing these long term. Pt has a hx of eating healthier for shorts periods of time but then resorts back to this high fat, processed food diet. Today we focused on a few goals to start with , including giving up soda, choosing leaner meats, and reducing his portions. Pt likely can reduce his portions at lunch and dinner in half and still be satisfied. He is in the contemplative stage of change however.   Pt has the information needed to follow a healthy diet. It is not up to him to put it in to practice. He does not wish to follow up with me at this time. But can call in the future as needed to make an appointment.     Follow up PAMELA Mercado, RD, LD, CDE  [phone]

## 2017-12-04 ENCOUNTER — OFFICE VISIT (OUTPATIENT)
Dept: HEALTH INFORMATION MANAGEMENT | Facility: MEDICAL CENTER | Age: 52
End: 2017-12-04
Payer: COMMERCIAL

## 2017-12-04 VITALS
DIASTOLIC BLOOD PRESSURE: 84 MMHG | RESPIRATION RATE: 18 BRPM | HEIGHT: 68 IN | SYSTOLIC BLOOD PRESSURE: 128 MMHG | HEART RATE: 96 BPM | BODY MASS INDEX: 32.24 KG/M2 | WEIGHT: 212.7 LBS | TEMPERATURE: 99.6 F | OXYGEN SATURATION: 99 %

## 2017-12-04 DIAGNOSIS — R74.8 ELEVATED LIVER ENZYMES: ICD-10-CM

## 2017-12-04 DIAGNOSIS — E03.9 HYPOTHYROIDISM, UNSPECIFIED TYPE: ICD-10-CM

## 2017-12-04 DIAGNOSIS — R63.5 WEIGHT GAIN, ABNORMAL: ICD-10-CM

## 2017-12-04 DIAGNOSIS — G47.9 SLEEPING DIFFICULTY: ICD-10-CM

## 2017-12-04 PROCEDURE — 99205 OFFICE O/P NEW HI 60 MIN: CPT | Performed by: INTERNAL MEDICINE

## 2017-12-04 ASSESSMENT — PAIN SCALES - GENERAL: PAINLEVEL: NO PAIN

## 2017-12-04 NOTE — LETTER
December 14, 2017        NSAIR Brunner.  [address]  [place] [address]  Phone: [phone]  Fax: [phone]        Re:  Mahesh Bradshaw          Dear Ms. Campos,    It was a pleasure seeing your patient, Mahesh Bradshaw, on 12/4/2017.     Please find enclosed a copy of this visit encounter which includes the history I obtained from Mr. Bradshaw, my physical examination findings, my impression and recommendations.      Once again, it was a pleasure participating in your patient's care.  Please feel free to contact me if you have any questions or if I can be of any further assistance to your patients.        Sincerely,      Huma Carranaz MD, FACP, [facility]    Medical Weight Management  [facility]  [address], [address]  [place], [place] [address]                      Bariatric Medicine H&P  Chief Complaint   Patient presents with   • Weight Gain       Referred by:  Shira Campos A.P.N    History of Present Illness:   Mahesh Bradshaw is a 52 y.o. male who presents for weight management and to help address co-morbidities related to overweight, including diabetes mellitus type 2, uncontrolled, fatty liver, hypertension, tooth abscess, gout, GERD.      The patient presents with uncontrolled diabetes mellitus type 2. He knows he needs to lose weight and improve his eating. He did meet with the dietitian Phuong recently, tried to make some changes there, but feels he needs more help. He is not eager to use weight loss medications at this time, given his multiple medical problems in the past on different medications. He is eager to reduce his insulin requirements. Several years ago he was able to control his diabetes through diet alone, and he would like to go back to that if possible.    Last year, the patient's hemoglobin A1c was rising. He was placed on Linagliptin but developed pancreatitis. He became very sick, developed  nonketotic hyperglycemia and ended up in the ICU with blood glucoses in the 2000s. He was intubated, broke a tooth, but was able to be weaned off the ventilator. Due to high levels of PEEP, he developed an umbilical hernia which became strangulated, lead to a fistula and required emergency intervention. He developed necrotizing fasciitis as well after a testosterone injection. He did lose 70 pounds this year, with his blood sugars now in the 200s instead of the 400s. He changed to Lantus, which he likes much better. His chronic tooth infection is now healing as well. But he knows he needs to improve his blood glucoses more.    The patient's diet consists of 2 protein drinks each morning, which include 64 g of protein (2 cups of milk and a half a cup of berries), followed 3 hours later by 4 eggs and sausage. Lunch includes handful of meat and salad, with ranch dressing. He does get hungry between lunch and dinner. He eats a lot of take out for dinner because his boys are in high school and hungry, which includes pizza, hamburgers. He likes to cook Southern food for dinner. His fiancée is a baker. Before going to bed, he has another protein shake with 32 g of protein at night. He likes V8 juice or drinking water with Crystal light during the day. He estimates his total calorie intake about 3500 kcal per day.      Behavior-Related History:  Binge eating screen: Negative  Positive for eating large amounts of food in a short period of time and having some control issues around eating when he is very hungry at work or stressed.       Exercise:   None currently. Has asthma. Wishes to restart after getting a new prescription for inhalers from his PCP later this month.    Life Style Changes:  Recent significant hospitalization. 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     Review of Systems   Positive for lack of energy, weight loss and weight gain, sleepy at times, cold intolerance, increased  "appetite, diarrhea nausea and abdominal pain. Intermittent tooth pain, joint pain, muscle weakness, depression, blurred vision. He is not sure if the joint pain is due to gout.  Sleep apnea screen: Positive for worsening snoring.  Sleep study pending per PCP.  All other ROS were reviewed with patient today and are negative.      PMH/PSH:  I have reviewed the patient's medical, social and family history, allergies, and medications today.  Prior records reviewed.  FHx Obesity: Both sons  Personal Hx of Bariatric Surgery: No      Physical Exam:   Encounter Vitals  Temperature: 37.6 °C (99.6 °F)  Blood Pressure: 128/84  Pulse: 96  Respiration: 18  Pulse Oximetry: 99 %  Weight: 96.5 kg (212 lb 11.2 oz)  Height: 172.7 cm (5' 8\")  Waist: 46 in  BMI (Calculated): 32.34  Pain Score: No pain  Body fat % 35.9  REE 1979 kcal/day    Constitutional: Oriented to person, place, and time and well-developed, well-nourished, and in no distress.    HENT: Mild facial plethora.  No Cushingoid features.  No scalloped tongue.  No dental erosions.  No swollen parotids.  Head: Normocephalic.   Eyes: EOM are normal. Pupils are equal, round, and reactive to light. No periorbital edema.  No lateral thinning of eyebrows.  No vertical nystagmus.  Neck: Normal range of motion. Neck supple. No thyromegaly present. No buffalo hump.  Cardiovascular: Normal rate and regular rhythm.    No murmur heard.  Pulmonary/Chest: Effort normal and breath sounds normal. No wheezes.   Abdominal: Soft. Bowel sounds are normal.  Grade 1 pannus.  No ascites. Palpable hepatosplenomegaly.  No red striae. Umbilical hernia, easily reducible.  Musculoskeletal: Normal range of motion. No edema.   Neurological: Alert and oriented to person, place, and time. Normal reflexes. No cranial nerve deficit. No muscle weakness.  Gait normal.   Skin: Warm and dry. Not diaphoretic. No hirsuitism.  No acanthosis nigricans.  Not excessively dry, scaly.  No acne.  No bruising/ecchymosis. "  No hyperpigmentation.  No xanthomas or acrochordon.  No violaceous striae.  No keratosis pilaris.  Psychiatric: Mood, memory, affect and judgment normal.     Laboratory:   Prior labs, EKG reviewed. Updated labs all to be run next week via PCP order.    ASSESSMENT/PLAN:  Body mass index is 32.34 kg/m².   Obesity Stage ([place]) 2; Class 1    1. Diabetes mellitus due to underlying condition, uncontrolled, with complication, with long-term current use of insulin (CMS-HCC)     2. Weight gain, abnormal     3. Sleeping difficulty     4. Hypothyroidism, unspecified type     5. Elevated liver enzymes       The patient has had significant sequela due to obesity and uncontrolled diabetes mellitus type 2. His chronic infections and multiple surgeries are now behind him, which should help with his glucose control. However his total kcal intake and carbohydrate intake is still too high.He will be getting a sleep study to evaluate for obstructive sleep apnea. Awaiting lab results, including thyroid function tests and liver function tests.    The patient and I have discussed at length and agree to the following recommendations, which are all addressing the above diagnoses:    Weight Goal: 5% wt loss at one month after start (pt goal weight is 160-190 lb), reduced waist circumference 40 inches or less.  The patient's primary care physician suggests a weight of 160, the patient suggests a weight of 190 lb.  Diet: Change morning regimen to one protein shake each morning, to instead of 4 eggs.  Meal plan options given.Begin by reducing total kcal intake per day to under 2000.  Physical Activity: None. Consider exercise plan for 2018. Patient will need inhaler due to exercise-induced asthma.  Risk level for moderate/vigorous exercise program: Moderate  New Rx: None at this time  Side Effects: Will review consent if applicable.  Behavior change: Redirect calorie and carbohydrate intake, to reduce both, as detailed with pt  today.  Follow-up: 2 weeks; follow-up on recent labs at next visit.    Face to face time spent 60 minutes,  with >50% of time devoted to one on one counseling on weight management issues, as documented above.      Thank you for your referral!

## 2017-12-04 NOTE — PROGRESS NOTES
Bariatric Medicine H&P  Chief Complaint   Patient presents with   • Weight Gain       Referred by:  Shira Campos A.P.N    History of Present Illness:   Mahesh Bradshaw is a 52 y.o. male who presents for weight management and to help address co-morbidities related to overweight, including diabetes mellitus type 2, uncontrolled, fatty liver, hypertension, tooth abscess, gout, GERD.      The patient presents with uncontrolled diabetes mellitus type 2. He knows he needs to lose weight and improve his eating. He did meet with the dietitian Phuong recently, tried to make some changes there, but feels he needs more help. He is not eager to use weight loss medications at this time, given his multiple medical problems in the past on different medications. He is eager to reduce his insulin requirements. Several years ago he was able to control his diabetes through diet alone, and he would like to go back to that if possible.    Last year, the patient's hemoglobin A1c was rising. He was placed on Linagliptin but developed pancreatitis. He became very sick, developed nonketotic hyperglycemia and ended up in the ICU with blood glucoses in the 2000s. He was intubated, broke a tooth, but was able to be weaned off the ventilator. Due to high levels of PEEP, he developed an umbilical hernia which became strangulated, lead to a fistula and required emergency intervention. He developed necrotizing fasciitis as well after a testosterone injection. He did lose 70 pounds this year, with his blood sugars now in the 200s instead of the 400s. He changed to Lantus, which he likes much better. His chronic tooth infection is now healing as well. But he knows he needs to improve his blood glucoses more.    The patient's diet consists of 2 protein drinks each morning, which include 64 g of protein (2 cups of milk and a half a cup of berries), followed 3 hours later by 4 eggs and sausage. Lunch includes handful of meat and  "salad, with ranch dressing. He does get hungry between lunch and dinner. He eats a lot of take out for dinner because his boys are in high school and hungry, which includes pizza, hamburgers. He likes to cook Southern food for dinner. His fiancée is a baker. Before going to bed, he has another protein shake with 32 g of protein at night. He likes V8 juice or drinking water with Crystal light during the day. He estimates his total calorie intake about 3500 kcal per day.      Behavior-Related History:  Binge eating screen: Negative  Positive for eating large amounts of food in a short period of time and having some control issues around eating when he is very hungry at work or stressed.       Exercise:   None currently. Has asthma. Wishes to restart after getting a new prescription for inhalers from his PCP later this month.    Life Style Changes:  Recent significant hospitalization. 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     Review of Systems   Positive for lack of energy, weight loss and weight gain, sleepy at times, cold intolerance, increased appetite, diarrhea nausea and abdominal pain. Intermittent tooth pain, joint pain, muscle weakness, depression, blurred vision. He is not sure if the joint pain is due to gout.  Sleep apnea screen: Positive for worsening snoring.  Sleep study pending per PCP.  All other ROS were reviewed with patient today and are negative.      PMH/PSH:  I have reviewed the patient's medical, social and family history, allergies, and medications today.  Prior records reviewed.  FHx Obesity: Both sons  Personal Hx of Bariatric Surgery: No      Physical Exam:   Encounter Vitals  Temperature: 37.6 °C (99.6 °F)  Blood Pressure: 128/84  Pulse: 96  Respiration: 18  Pulse Oximetry: 99 %  Weight: 96.5 kg (212 lb 11.2 oz)  Height: 172.7 cm (5' 8\")  Waist: 46 in  BMI (Calculated): 32.34  Pain Score: No pain  Body fat % 35.9  REE 1979 kcal/day    Constitutional: Oriented " to person, place, and time and well-developed, well-nourished, and in no distress.    HENT: Mild facial plethora.  No Cushingoid features.  No scalloped tongue.  No dental erosions.  No swollen parotids.  Head: Normocephalic.   Eyes: EOM are normal. Pupils are equal, round, and reactive to light. No periorbital edema.  No lateral thinning of eyebrows.  No vertical nystagmus.  Neck: Normal range of motion. Neck supple. No thyromegaly present. No buffalo hump.  Cardiovascular: Normal rate and regular rhythm.    No murmur heard.  Pulmonary/Chest: Effort normal and breath sounds normal. No wheezes.   Abdominal: Soft. Bowel sounds are normal.  Grade 1 pannus.  No ascites. Palpable hepatosplenomegaly.  No red striae. Umbilical hernia, easily reducible.  Musculoskeletal: Normal range of motion. No edema.   Neurological: Alert and oriented to person, place, and time. Normal reflexes. No cranial nerve deficit. No muscle weakness.  Gait normal.   Skin: Warm and dry. Not diaphoretic. No hirsuitism.  No acanthosis nigricans.  Not excessively dry, scaly.  No acne.  No bruising/ecchymosis.  No hyperpigmentation.  No xanthomas or acrochordon.  No violaceous striae.  No keratosis pilaris.  Psychiatric: Mood, memory, affect and judgment normal.     Laboratory:   Prior labs, EKG reviewed. Updated labs all to be run next week via PCP order.    ASSESSMENT/PLAN:  Body mass index is 32.34 kg/m².   Obesity Stage ([place]) 2; Class 1    1. Diabetes mellitus due to underlying condition, uncontrolled, with complication, with long-term current use of insulin (CMS-HCC)     2. Weight gain, abnormal     3. Sleeping difficulty     4. Hypothyroidism, unspecified type     5. Elevated liver enzymes       The patient has had significant sequela due to obesity and uncontrolled diabetes mellitus type 2. His chronic infections and multiple surgeries are now behind him, which should help with his glucose control. However his total kcal intake and  carbohydrate intake is still too high.He will be getting a sleep study to evaluate for obstructive sleep apnea. Awaiting lab results, including thyroid function tests and liver function tests.    The patient and I have discussed at length and agree to the following recommendations, which are all addressing the above diagnoses:    Weight Goal: 5% wt loss at one month after start (pt goal weight is 160-190 lb), reduced waist circumference 40 inches or less.  The patient's primary care physician suggests a weight of 160, the patient suggests a weight of 190 lb.  Diet: Change morning regimen to one protein shake each morning, to instead of 4 eggs.  Meal plan options given.Begin by reducing total kcal intake per day to under 2000.  Physical Activity: None. Consider exercise plan for 2018. Patient will need inhaler due to exercise-induced asthma.  Risk level for moderate/vigorous exercise program: Moderate  New Rx: None at this time  Side Effects: Will review consent if applicable.  Behavior change: Redirect calorie and carbohydrate intake, to reduce both, as detailed with pt today.  Follow-up: 2 weeks; follow-up on recent labs at next visit.    Face to face time spent 60 minutes,  with >50% of time devoted to one on one counseling on weight management issues, as documented above.      Thank you for your referral!

## 2017-12-04 NOTE — PATIENT INSTRUCTIONS
Goal is 1800 kcal per day to start  One protein shake in early am  2 eggs instead of 4 eggs in am  Low carb lean protein lunch and dinner  One protein shake mid afternoon  Keep ne and carb intake log until next visit  Consider exercise routine for 2018  64 oz water daily  Adjust insulin down to keep blood gluc close to 100

## 2017-12-08 ENCOUNTER — HOSPITAL ENCOUNTER (EMERGENCY)
Facility: MEDICAL CENTER | Age: 52
End: 2017-12-08
Attending: EMERGENCY MEDICINE
Payer: COMMERCIAL

## 2017-12-08 VITALS
HEIGHT: 68 IN | RESPIRATION RATE: 16 BRPM | WEIGHT: 213.41 LBS | DIASTOLIC BLOOD PRESSURE: 79 MMHG | BODY MASS INDEX: 32.34 KG/M2 | OXYGEN SATURATION: 97 % | HEART RATE: 92 BPM | TEMPERATURE: 97.2 F | SYSTOLIC BLOOD PRESSURE: 140 MMHG

## 2017-12-08 DIAGNOSIS — Z86.14 HISTORY OF MRSA INFECTION: ICD-10-CM

## 2017-12-08 DIAGNOSIS — E11.65 TYPE 2 DIABETES MELLITUS WITH HYPERGLYCEMIA, WITH LONG-TERM CURRENT USE OF INSULIN (HCC): ICD-10-CM

## 2017-12-08 DIAGNOSIS — L02.512 ABSCESS OF LEFT HAND: ICD-10-CM

## 2017-12-08 DIAGNOSIS — Z79.4 TYPE 2 DIABETES MELLITUS WITH HYPERGLYCEMIA, WITH LONG-TERM CURRENT USE OF INSULIN (HCC): ICD-10-CM

## 2017-12-08 DIAGNOSIS — Z76.89 ENCOUNTER FOR INCISION AND DRAINAGE PROCEDURE: ICD-10-CM

## 2017-12-08 LAB
GLUCOSE BLD-MCNC: 302 MG/DL (ref 65–99)
GRAM STN SPEC: NORMAL
GRAM STN SPEC: NORMAL
SIGNIFICANT IND 70042: NORMAL
SITE SITE: NORMAL
SOURCE SOURCE: NORMAL

## 2017-12-08 PROCEDURE — 87070 CULTURE OTHR SPECIMN AEROBIC: CPT

## 2017-12-08 PROCEDURE — 700101 HCHG RX REV CODE 250: Performed by: EMERGENCY MEDICINE

## 2017-12-08 PROCEDURE — 87186 SC STD MICRODIL/AGAR DIL: CPT

## 2017-12-08 PROCEDURE — 99284 EMERGENCY DEPT VISIT MOD MDM: CPT

## 2017-12-08 PROCEDURE — 700102 HCHG RX REV CODE 250 W/ 637 OVERRIDE(OP)

## 2017-12-08 PROCEDURE — 87205 SMEAR GRAM STAIN: CPT

## 2017-12-08 PROCEDURE — 87077 CULTURE AEROBIC IDENTIFY: CPT

## 2017-12-08 PROCEDURE — 82962 GLUCOSE BLOOD TEST: CPT

## 2017-12-08 PROCEDURE — 700102 HCHG RX REV CODE 250 W/ 637 OVERRIDE(OP): Performed by: EMERGENCY MEDICINE

## 2017-12-08 PROCEDURE — 96372 THER/PROPH/DIAG INJ SC/IM: CPT

## 2017-12-08 PROCEDURE — A9270 NON-COVERED ITEM OR SERVICE: HCPCS | Performed by: EMERGENCY MEDICINE

## 2017-12-08 PROCEDURE — 303977 HCHG I & D

## 2017-12-08 RX ORDER — CLINDAMYCIN PHOSPHATE 150 MG/ML
600 INJECTION, SOLUTION INTRAVENOUS ONCE
Status: COMPLETED | OUTPATIENT
Start: 2017-12-08 | End: 2017-12-08

## 2017-12-08 RX ORDER — AMOXICILLIN AND CLAVULANATE POTASSIUM 875; 125 MG/1; MG/1
1 TABLET, FILM COATED ORAL ONCE
Status: COMPLETED | OUTPATIENT
Start: 2017-12-08 | End: 2017-12-08

## 2017-12-08 RX ORDER — CLINDAMYCIN PHOSPHATE 600 MG/50ML
INJECTION, SOLUTION INTRAVENOUS
Status: COMPLETED
Start: 2017-12-08 | End: 2017-12-08

## 2017-12-08 RX ORDER — AMOXICILLIN AND CLAVULANATE POTASSIUM 875; 125 MG/1; MG/1
1 TABLET, FILM COATED ORAL 2 TIMES DAILY
Qty: 20 TAB | Refills: 0 | Status: ON HOLD | OUTPATIENT
Start: 2017-12-08 | End: 2017-12-11

## 2017-12-08 RX ORDER — CLINDAMYCIN PHOSPHATE 150 MG/ML
INJECTION, SOLUTION INTRAVENOUS
Status: COMPLETED
Start: 2017-12-08 | End: 2017-12-08

## 2017-12-08 RX ORDER — CLINDAMYCIN HYDROCHLORIDE 300 MG/1
300 CAPSULE ORAL 4 TIMES DAILY
Qty: 40 CAP | Refills: 0 | Status: ON HOLD | OUTPATIENT
Start: 2017-12-08 | End: 2017-12-11

## 2017-12-08 RX ADMIN — CLINDAMYCIN PHOSPHATE 600 MG: 150 INJECTION, SOLUTION INTRAMUSCULAR; INTRAVENOUS at 04:22

## 2017-12-08 RX ADMIN — AMOXICILLIN AND CLAVULANATE POTASSIUM 1 TABLET: 875; 125 TABLET, FILM COATED ORAL at 04:22

## 2017-12-08 RX ADMIN — INSULIN HUMAN 8 UNITS: 100 INJECTION, SOLUTION PARENTERAL at 04:20

## 2017-12-08 ASSESSMENT — PAIN SCALES - GENERAL
PAINLEVEL_OUTOF10: 5
PAINLEVEL_OUTOF10: 6

## 2017-12-08 NOTE — DISCHARGE INSTRUCTIONS
Abscess  An abscess (boil or furuncle) is an infected area on or under the skin. This area is filled with yellowish-white fluid (pus) and other material (debris).  HOME CARE   · Only take medicines as told by your doctor.  · If you were given antibiotic medicine, take it as directed. Finish the medicine even if you start to feel better.  · If gauze is used, follow your doctor's directions for changing the gauze.  · To avoid spreading the infection:  ¨ Keep your abscess covered with a bandage.  ¨ Wash your hands well.  ¨ Do not share personal care items, towels, or whirlpools with others.  ¨ Avoid skin contact with others.  · Keep your skin and clothes clean around the abscess.  · Keep all doctor visits as told.  GET HELP RIGHT AWAY IF:   · You have more pain, puffiness (swelling), or redness in the wound site.  · You have more fluid or blood coming from the wound site.  · You have muscle aches, chills, or you feel sick.  · You have a fever.  MAKE SURE YOU:   · Understand these instructions.  · Will watch your condition.  · Will get help right away if you are not doing well or get worse.     This information is not intended to replace advice given to you by your health care provider. Make sure you discuss any questions you have with your health care provider.     Document Released: 06/05/2009 Document Revised: 06/18/2013 Document Reviewed: 03/02/2013  [employer] Interactive Patient Education ©2016 [employer]    Abscess  Care After  An abscess (also called a boil or furuncle) is an infected area that contains a collection of pus. Signs and symptoms of an abscess include pain, tenderness, redness, or hardness, or you may feel a moveable soft area under your skin. An abscess can occur anywhere in the body. The infection may spread to surrounding tissues causing cellulitis. A cut (incision) by the surgeon was made over your abscess and the pus was drained out. Gauze may have been packed into the space to provide a drain  that will allow the cavity to heal from the inside outwards. The boil may be painful for 5 to 7 days. Most people with a boil do not have high fevers. Your abscess, if seen early, may not have localized, and may not have been lanced. If not, another appointment may be required for this if it does not get better on its own or with medications.  HOME CARE INSTRUCTIONS   · Only take over-the-counter or prescription medicines for pain, discomfort, or fever as directed by your caregiver.  · When you bathe, soak and then remove gauze or iodoform packs at least daily or as directed by your caregiver. You may then wash the wound gently with mild soapy water. Repack with gauze or do as your caregiver directs.  SEEK IMMEDIATE MEDICAL CARE IF:   · You develop increased pain, swelling, redness, drainage, or bleeding in the wound site.  · You develop signs of generalized infection including muscle aches, chills, fever, or a general ill feeling.  · An oral temperature above 102° F (38.9° C) develops, not controlled by medication.  See your caregiver for a recheck if you develop any of the symptoms described above. If medications (antibiotics) were prescribed, take them as directed.  Document Released: 07/06/2006 Document Revised: 03/11/2013 Document Reviewed: 03/02/2009  [employer]® Patient Information ©2014 [employer], [employer].    Incision and Drainage  Incision and drainage is a procedure in which a sac-like structure (cystic structure) is opened and drained. The area to be drained usually contains material such as pus, fluid, or blood.   LET YOUR CAREGIVER KNOW ABOUT:   · Allergies to medicine.  · Medicines taken, including vitamins, herbs, eyedrops, over-the-counter medicines, and creams.  · Use of steroids (by mouth or creams).  · Previous problems with anesthetics or numbing medicines.  · History of bleeding problems or blood clots.  · Previous surgery.  · Other health problems, including diabetes and kidney  problems.  · Possibility of pregnancy, if this applies.  RISKS AND COMPLICATIONS  · Pain.  · Bleeding.  · Scarring.  · Infection.  BEFORE THE PROCEDURE   You may need to have an ultrasound or other imaging tests to see how large or deep your cystic structure is. Blood tests may also be used to determine if you have an infection or how severe the infection is. You may need to have a tetanus shot.  PROCEDURE   The affected area is cleaned with a cleaning fluid. The cyst area will then be numbed with a medicine (local anesthetic). A small incision will be made in the cystic structure. A syringe or catheter may be used to drain the contents of the cystic structure, or the contents may be squeezed out. The area will then be flushed with a cleansing solution. After cleansing the area, it is often gently packed with a gauze or another wound dressing. Once it is packed, it will be covered with gauze and tape or some other type of wound dressing.   AFTER THE PROCEDURE   · Often, you will be allowed to go home right after the procedure.  · You may be given antibiotic medicine to prevent or heal an infection.  · If the area was packed with gauze or some other wound dressing, you will likely need to come back in 1 to 2 days to get it removed.  · The area should heal in about 14 days.     This information is not intended to replace advice given to you by your health care provider. Make sure you discuss any questions you have with your health care provider.     Document Released: 06/13/2002 Document Revised: 06/18/2013 Document Reviewed: 02/11/2013  [employer] Interactive Patient Education ©2016 [employer]      Change the packing every other day  Warm compresses to the hand or soaks in Epsom salts.  Fingerstick glucose before every meal 3 times a day and at night  Watch closely for any increased redness, streaking up your arm, fever.

## 2017-12-08 NOTE — ED PROVIDER NOTES
"CHIEF COMPLAINT  Chief Complaint   Patient presents with   • Abscess     pt states he has had abscess on left hand, pt states he has a history of recurring abscesses       HPI  Mahesh Bradshaw is a 52 y.o. male who presents Tonight with a chief complaint of left hand pain, redness, swelling and abscess noted for the last several days. He states he thought he had a sliver in his hand and he poked at it and now has increased redness and swelling. He denies any fever, chills, nausea, vomiting. He is a known insulin-dependent diabetic with a history of MRSA as well. He states his sugar was running in the 300s earlier this evening he did take his insulin. He has had a history of multiple abscesses requiring drainage including necrotizing fasciitis. He has been resistant to clindamycin alone so that the last course of treatment was clindamycin, Augmentin and rifampin.    REVIEW OF SYSTEMS  See HPI for further details. All other system reviews are negative.    PAST MEDICAL HISTORY  Past Medical History:   Diagnosis Date   • ADRIANE (acute kidney injury) (CMS-HCC) 2/24/2016   • Anesthesia     \"Was difficult to intubate 2-2016\"   • Arthritis 3-3-17    \"Spine\"   • Aspiration pneumonia (CMS-HCC) 3-2016   • ASTHMA 3-3-17    \"Hasn't had to use inhaler in 2 years\"   • Breath shortness 3-3-17    \"With Exercise\"   • Congestive heart failure (CMS-HCC) 2-2016     3-3-17 \"Not a current issue\"   • Depression 11/26/2014   • Diabetes (CMS-HCC) 3-3-17    Takes Insulin   • Difficult intubation 2-2016   • DKA (diabetic ketoacidoses) (CMS-HCC) 2-2016    \"10 days on vent with DKA, Renal failure, CHF, elevated liver enzymes and electrolyte imbalance\"   • Electrolyte imbalance 2-2016   • Elevated LFTs    • Elevated liver enzymes 2-2016   • Essential hypertension 1/22/2016   • Gout    • Heart burn    • High cholesterol 3-3-17    Does not currently take medication for   • Hypothyroid 3-3-17    Takes Shelley Thyroid   • Indigestion    • Kidney " "stones    • Klebsiella infect    • Migraine    • MRSA cellulitis    • Necrotizing myositis (CMS-HCC)    • On mechanically assisted ventilation (CMS-HCC) 2-2016    HX \"On Vent for 10 days at [facility]\".  \"DX Pancreatitis, DKA, CHF, Elevated Liver Enzymes & Electrolyte Imbalance\".   • Pain 3-3-17    \"Left Flank\"   • Pancreatitis 2-2016    \"D/T Tradjenta was hospitialized for 15 days\"   • RF (renal failure) 2-2016   • Sepsis (CMS-HCC) 1/21/2016   • Snoring 3-3-17    Has not had a sleep study   • Streptococcus infection    • UC (ulcerative colitis) (CMS-HCC) 3-3-17    \"Five BM's per day, takes Lialda\"   • Vitamin D deficiency        FAMILY HISTORY  History reviewed. No pertinent family history.    SOCIAL HISTORY  Social History     Social History   • Marital status: Single     Spouse name: N/A   • Number of children: N/A   • Years of education: N/A     Social History Main Topics   • Smoking status: Never Smoker   • Smokeless tobacco: Never Used   • Alcohol use Yes      Comment: occ   • Drug use: No   • Sexual activity: Not on file     Other Topics Concern   • Not on file     Social History Narrative    ** Merged History Encounter **         ** Merged History Encounter **            SURGICAL HISTORY  Past Surgical History:   Procedure Laterality Date   • IRRIGATION & DEBRIDEMENT GENERAL Right 5/1/2017    Procedure: IRRIGATION & DEBRIDEMENT GENERAL-Left HAND AND right  ANKLE;  Surgeon: Esau Dooley M.D.;  Location: SURGERY [facility];  Service:    • IRRIGATION & DEBRIDEMENT GENERAL Right 4/29/2017    Procedure: IRRIGATION & DEBRIDEMENT GENERAL;  Surgeon: Esau Dooley M.D.;  Location: SURGERY [facility];  Service:    • INCISION AND DRAINAGE GENERAL  4/7/2017    Procedure: INCISION AND DRAINAGE GENERAL;  Surgeon: Esau Dooley M.D.;  Location: SURGERY SAME DAY [facility];  Service:    • UMBILICAL HERNIA REPAIR  3/10/2017    Procedure: UMBILICAL HERNIA REPAIR- INCISION AND DRAINAGE OF UMBILICAL WOUND AND " "MESH REMOVAL;  Surgeon: Esau Dooley M.D.;  Location: SURGERY SAME DAY [facility];  Service:    • UMBILICAL HERNIA REPAIR N/A 11/6/2016    Procedure: UMBILICAL HERNIA REPAIR;  Surgeon: Esau Dooley M.D.;  Location: SURGERY [facility];  Service:    • COLONOSCOPY WITH BIOPSY  11/26/2014    Performed by Solo Higginbotham M.D. at ENDOSCOPY [facility]   • LIVE BY LAPAROSCOPY  2005   • OTHER ORTHOPEDIC SURGERY  1997 or 1998    Left Wrist ORIF       CURRENT MEDICATIONS  See nurses notes    ALLERGIES  Allergies   Allergen Reactions   • Peanut-Derived Anaphylaxis     Peanuts   RXN=age 36   • Tradjenta [Linagliptin] Unspecified     Pt developed pancreatitis   • Hydrocodone Hives     Tolerates Morphine and oxycodone  Rxn Age - 29       PHYSICAL EXAM  VITAL SIGNS: /98   Pulse (!) 105   Temp 36.2 °C (97.2 °F)   Resp 20   Ht 1.727 m (5' 8\")   Wt 96.8 kg (213 lb 6.5 oz)   SpO2 99%   BMI 32.45 kg/m²     Constitutional: Patient is well developed, well nourished in mild distress and non-toxic appearing.   HENT: Normocephalic, Oropharynx moist , nose normal with no drainage.   Eyes: PERRL, EOMI, Conjunctiva without erythema.   Neck: Supple with Normal range of motion in flexion, extension and lateral rotation.   Lymphatic: No lymphadenopathy noted.   Cardiovascular: Normal heart rate and rhythm. No murmur.  Thorax & Lungs: Clear and equal breath sounds with good excursion. No respiratory distress.  Abdomen: Bowel sounds normal in all four quadrants. Soft,nontender, no rebound , guarding.   Skin: Warm, Dry, increased erythema left hand, please see extremity note.  Extremities: Peripheral pulses 4/4 , left hand reveals increased erythema and warmth over the metacarpal phalangeal region of the left thumb. There is a focal abscess noted approximately 3 cm in diameter in that region with some erythema extending up into the body of the hand. It does not extend with streaking into the forearm. " Neurovascular is intact, he has good radial and ulnar pulses, good capillary refill, good distal sensation. Patient is able to fully flex and extend the left thumb without any restriction. There are 2 darkened pinpoint lesions within the abscess site with no gross foreign bodies noted.   Musculoskeletal: Normal range of motion in all major joints.   Neurologic: Alert & oriented x 3, Normal motor function, Normal sensory function.  Psychiatric: Affect normal, Judgment normal, Mood normal.       COURSE & MEDICAL DECISION MAKING  Pertinent Labs & Imaging studies reviewed. (See chart for details)  Patient received clindamycin 600 mg intramuscular, Augmentin 875 mg. Fingerstick glucose was 302 and he was given 8 units of Humulin N insulin subcu. His wound edges were marked with surgical pen. Incision and drainage of abscess was performed please see procedure note. Patient tolerated procedure well. He will be sent home with prescriptions for clindamycin and Augmentin. He has his own pain medication. He is to do warm soaks in Epsom salts, remove the packing every other day and repack. Follow up with his primary care doctor within the next 24 hours for recheck. He is to return if increased redness, swelling, fever greater than 101 or streaking up his arm.  Procedure note: Patient's hand was prepped in sterile fashion with Betadine solution. Lidocaine 1% was used for local anesthesia. 11 blade scalpel was used for incision and the wound was probed and manually decompressed. All loculations were broken up. It was packed with 1/4 inch iodoform gauze. Patient tolerated procedure well. Sterile dressing was applied.    FINAL IMPRESSION  1. Left hand abscess  2. History of MRSA  3. Diabetes mellitus with hyperglycemia  4. Encounter for incision and drainage         Electronically signed by: Kaylan Brasher, 12/8/2017 4:34 [facility] Provider Note

## 2017-12-08 NOTE — ED NOTES
" Pt here alone with c/o    Chief Complaint   Patient presents with   • Abscess     pt states he has had abscess on left hand, pt states he has a history of recurring abscesses       /98   Pulse (!) 105   Temp 36.2 °C (97.2 °F)   Resp 20   Ht 1.727 m (5' 8\")   Wt 96.8 kg (213 lb 6.5 oz)   SpO2 99%   BMI 32.45 kg/m²   "

## 2017-12-09 ENCOUNTER — HOSPITAL ENCOUNTER (INPATIENT)
Facility: MEDICAL CENTER | Age: 52
LOS: 2 days | DRG: 988 | End: 2017-12-11
Attending: EMERGENCY MEDICINE | Admitting: HOSPITALIST
Payer: COMMERCIAL

## 2017-12-09 ENCOUNTER — RESOLUTE PROFESSIONAL BILLING HOSPITAL PROF FEE (OUTPATIENT)
Dept: HOSPITALIST | Facility: MEDICAL CENTER | Age: 52
End: 2017-12-09
Payer: COMMERCIAL

## 2017-12-09 ENCOUNTER — APPOINTMENT (OUTPATIENT)
Dept: RADIOLOGY | Facility: MEDICAL CENTER | Age: 52
DRG: 988 | End: 2017-12-09
Attending: ORTHOPAEDIC SURGERY
Payer: COMMERCIAL

## 2017-12-09 DIAGNOSIS — L02.512 ABSCESS OF LEFT THUMB: ICD-10-CM

## 2017-12-09 DIAGNOSIS — L03.114 CELLULITIS OF LEFT HAND: ICD-10-CM

## 2017-12-09 PROBLEM — L02.511 ABSCESS OF RIGHT THUMB: Status: ACTIVE | Noted: 2017-12-09

## 2017-12-09 PROBLEM — L03.113 CELLULITIS OF RIGHT HAND: Status: ACTIVE | Noted: 2017-12-09

## 2017-12-09 LAB
ALBUMIN SERPL BCP-MCNC: 3.3 G/DL (ref 3.2–4.9)
ALBUMIN/GLOB SERPL: 1.3 G/DL
ALP SERPL-CCNC: 124 U/L (ref 30–99)
ALT SERPL-CCNC: 33 U/L (ref 2–50)
ANION GAP SERPL CALC-SCNC: 6 MMOL/L (ref 0–11.9)
AST SERPL-CCNC: 59 U/L (ref 12–45)
BASOPHILS # BLD AUTO: 0.5 % (ref 0–1.8)
BASOPHILS # BLD: 0.03 K/UL (ref 0–0.12)
BILIRUB SERPL-MCNC: 0.5 MG/DL (ref 0.1–1.5)
BUN SERPL-MCNC: 17 MG/DL (ref 8–22)
CALCIUM SERPL-MCNC: 8.9 MG/DL (ref 8.5–10.5)
CHLORIDE SERPL-SCNC: 105 MMOL/L (ref 96–112)
CO2 SERPL-SCNC: 22 MMOL/L (ref 20–33)
CREAT SERPL-MCNC: 0.6 MG/DL (ref 0.5–1.4)
EOSINOPHIL # BLD AUTO: 0.06 K/UL (ref 0–0.51)
EOSINOPHIL NFR BLD: 0.9 % (ref 0–6.9)
ERYTHROCYTE [DISTWIDTH] IN BLOOD BY AUTOMATED COUNT: 41.4 FL (ref 35.9–50)
GFR SERPL CREATININE-BSD FRML MDRD: >60 ML/MIN/1.73 M 2
GLOBULIN SER CALC-MCNC: 2.5 G/DL (ref 1.9–3.5)
GLUCOSE BLD-MCNC: 271 MG/DL (ref 65–99)
GLUCOSE SERPL-MCNC: 287 MG/DL (ref 65–99)
HCT VFR BLD AUTO: 36.2 % (ref 42–52)
HGB BLD-MCNC: 12.7 G/DL (ref 14–18)
IMM GRANULOCYTES # BLD AUTO: 0.03 K/UL (ref 0–0.11)
IMM GRANULOCYTES NFR BLD AUTO: 0.5 % (ref 0–0.9)
LYMPHOCYTES # BLD AUTO: 1.7 K/UL (ref 1–4.8)
LYMPHOCYTES NFR BLD: 25.9 % (ref 22–41)
MCH RBC QN AUTO: 31.1 PG (ref 27–33)
MCHC RBC AUTO-ENTMCNC: 35.1 G/DL (ref 33.7–35.3)
MCV RBC AUTO: 88.5 FL (ref 81.4–97.8)
MONOCYTES # BLD AUTO: 0.38 K/UL (ref 0–0.85)
MONOCYTES NFR BLD AUTO: 5.8 % (ref 0–13.4)
NEUTROPHILS # BLD AUTO: 4.37 K/UL (ref 1.82–7.42)
NEUTROPHILS NFR BLD: 66.4 % (ref 44–72)
NRBC # BLD AUTO: 0 K/UL
NRBC BLD AUTO-RTO: 0 /100 WBC
PLATELET # BLD AUTO: 209 K/UL (ref 164–446)
PMV BLD AUTO: 9 FL (ref 9–12.9)
POTASSIUM SERPL-SCNC: 3.6 MMOL/L (ref 3.6–5.5)
PROT SERPL-MCNC: 5.8 G/DL (ref 6–8.2)
RBC # BLD AUTO: 4.09 M/UL (ref 4.7–6.1)
SODIUM SERPL-SCNC: 133 MMOL/L (ref 135–145)
WBC # BLD AUTO: 6.6 K/UL (ref 4.8–10.8)

## 2017-12-09 PROCEDURE — 82962 GLUCOSE BLOOD TEST: CPT

## 2017-12-09 PROCEDURE — 99285 EMERGENCY DEPT VISIT HI MDM: CPT

## 2017-12-09 PROCEDURE — 99222 1ST HOSP IP/OBS MODERATE 55: CPT | Performed by: HOSPITALIST

## 2017-12-09 PROCEDURE — 96375 TX/PRO/DX INJ NEW DRUG ADDON: CPT

## 2017-12-09 PROCEDURE — 700105 HCHG RX REV CODE 258: Performed by: EMERGENCY MEDICINE

## 2017-12-09 PROCEDURE — 73130 X-RAY EXAM OF HAND: CPT | Mod: LT

## 2017-12-09 PROCEDURE — 96366 THER/PROPH/DIAG IV INF ADDON: CPT

## 2017-12-09 PROCEDURE — 85025 COMPLETE CBC W/AUTO DIFF WBC: CPT

## 2017-12-09 PROCEDURE — 87040 BLOOD CULTURE FOR BACTERIA: CPT | Mod: 91

## 2017-12-09 PROCEDURE — 96365 THER/PROPH/DIAG IV INF INIT: CPT

## 2017-12-09 PROCEDURE — 700105 HCHG RX REV CODE 258: Performed by: HOSPITALIST

## 2017-12-09 PROCEDURE — 770001 HCHG ROOM/CARE - MED/SURG/GYN PRIV*

## 2017-12-09 PROCEDURE — 96367 TX/PROPH/DG ADDL SEQ IV INF: CPT

## 2017-12-09 PROCEDURE — 700102 HCHG RX REV CODE 250 W/ 637 OVERRIDE(OP): Performed by: HOSPITALIST

## 2017-12-09 PROCEDURE — 80053 COMPREHEN METABOLIC PANEL: CPT

## 2017-12-09 PROCEDURE — 700111 HCHG RX REV CODE 636 W/ 250 OVERRIDE (IP): Performed by: HOSPITALIST

## 2017-12-09 PROCEDURE — 36415 COLL VENOUS BLD VENIPUNCTURE: CPT

## 2017-12-09 PROCEDURE — 700111 HCHG RX REV CODE 636 W/ 250 OVERRIDE (IP): Performed by: EMERGENCY MEDICINE

## 2017-12-09 RX ORDER — INSULIN GLARGINE 100 [IU]/ML
6-18 INJECTION, SOLUTION SUBCUTANEOUS 2 TIMES DAILY PRN
COMMUNITY
End: 2018-07-16

## 2017-12-09 RX ORDER — AMOXICILLIN 250 MG
2 CAPSULE ORAL 2 TIMES DAILY
Status: DISCONTINUED | OUTPATIENT
Start: 2017-12-10 | End: 2017-12-11

## 2017-12-09 RX ORDER — SODIUM CHLORIDE 9 MG/ML
1000 INJECTION, SOLUTION INTRAVENOUS ONCE
Status: COMPLETED | OUTPATIENT
Start: 2017-12-09 | End: 2017-12-09

## 2017-12-09 RX ORDER — DEXTROSE MONOHYDRATE 25 G/50ML
25 INJECTION, SOLUTION INTRAVENOUS
Status: DISCONTINUED | OUTPATIENT
Start: 2017-12-09 | End: 2017-12-11 | Stop reason: HOSPADM

## 2017-12-09 RX ORDER — INSULIN GLARGINE 100 [IU]/ML
38 INJECTION, SOLUTION SUBCUTANEOUS 2 TIMES DAILY
Status: DISCONTINUED | OUTPATIENT
Start: 2017-12-09 | End: 2017-12-11 | Stop reason: HOSPADM

## 2017-12-09 RX ORDER — ONDANSETRON 2 MG/ML
4 INJECTION INTRAMUSCULAR; INTRAVENOUS ONCE
Status: COMPLETED | OUTPATIENT
Start: 2017-12-09 | End: 2017-12-09

## 2017-12-09 RX ORDER — MORPHINE SULFATE 4 MG/ML
2-4 INJECTION, SOLUTION INTRAMUSCULAR; INTRAVENOUS EVERY 4 HOURS PRN
Status: DISCONTINUED | OUTPATIENT
Start: 2017-12-09 | End: 2017-12-10

## 2017-12-09 RX ORDER — HYDROMORPHONE HYDROCHLORIDE 2 MG/ML
1 INJECTION, SOLUTION INTRAMUSCULAR; INTRAVENOUS; SUBCUTANEOUS ONCE
Status: COMPLETED | OUTPATIENT
Start: 2017-12-09 | End: 2017-12-09

## 2017-12-09 RX ORDER — TRAZODONE HYDROCHLORIDE 50 MG/1
100 TABLET ORAL NIGHTLY
Status: DISCONTINUED | OUTPATIENT
Start: 2017-12-09 | End: 2017-12-11 | Stop reason: HOSPADM

## 2017-12-09 RX ORDER — AMPICILLIN AND SULBACTAM 2; 1 G/1; G/1
3 INJECTION, POWDER, FOR SOLUTION INTRAMUSCULAR; INTRAVENOUS ONCE
Status: DISCONTINUED | OUTPATIENT
Start: 2017-12-09 | End: 2017-12-09

## 2017-12-09 RX ORDER — BUPROPION HYDROCHLORIDE 150 MG/1
300 TABLET, EXTENDED RELEASE ORAL EVERY MORNING
Status: DISCONTINUED | OUTPATIENT
Start: 2017-12-10 | End: 2017-12-11 | Stop reason: HOSPADM

## 2017-12-09 RX ORDER — SODIUM CHLORIDE 9 MG/ML
500 INJECTION, SOLUTION INTRAVENOUS
Status: DISCONTINUED | OUTPATIENT
Start: 2017-12-09 | End: 2017-12-11 | Stop reason: HOSPADM

## 2017-12-09 RX ORDER — ZINC SULFATE 50(220)MG
220 CAPSULE ORAL DAILY
Status: DISCONTINUED | OUTPATIENT
Start: 2017-12-10 | End: 2017-12-11 | Stop reason: HOSPADM

## 2017-12-09 RX ORDER — HYDROMORPHONE HYDROCHLORIDE 2 MG/1
2 TABLET ORAL 2 TIMES DAILY PRN
Status: DISCONTINUED | OUTPATIENT
Start: 2017-12-09 | End: 2017-12-11 | Stop reason: HOSPADM

## 2017-12-09 RX ORDER — BISACODYL 10 MG
10 SUPPOSITORY, RECTAL RECTAL
Status: DISCONTINUED | OUTPATIENT
Start: 2017-12-09 | End: 2017-12-11 | Stop reason: HOSPADM

## 2017-12-09 RX ORDER — THYROID 30 MG/1
60 TABLET ORAL EVERY EVENING
Status: DISCONTINUED | OUTPATIENT
Start: 2017-12-09 | End: 2017-12-11 | Stop reason: HOSPADM

## 2017-12-09 RX ORDER — POLYETHYLENE GLYCOL 3350 17 G/17G
1 POWDER, FOR SOLUTION ORAL
Status: DISCONTINUED | OUTPATIENT
Start: 2017-12-09 | End: 2017-12-11 | Stop reason: HOSPADM

## 2017-12-09 RX ADMIN — SODIUM CHLORIDE 1000 ML: 9 INJECTION, SOLUTION INTRAVENOUS at 19:49

## 2017-12-09 RX ADMIN — MORPHINE SULFATE 4 MG: 4 INJECTION INTRAVENOUS at 22:44

## 2017-12-09 RX ADMIN — INSULIN GLARGINE 38 UNITS: 100 INJECTION, SOLUTION SUBCUTANEOUS at 22:50

## 2017-12-09 RX ADMIN — VANCOMYCIN HYDROCHLORIDE 2400 MG: 100 INJECTION, POWDER, LYOPHILIZED, FOR SOLUTION INTRAVENOUS at 20:43

## 2017-12-09 RX ADMIN — INSULIN HUMAN 5 UNITS: 100 INJECTION, SOLUTION PARENTERAL at 22:49

## 2017-12-09 RX ADMIN — AMPICILLIN SODIUM AND SULBACTAM SODIUM 3 G: 2; 1 INJECTION, POWDER, FOR SOLUTION INTRAMUSCULAR; INTRAVENOUS at 19:40

## 2017-12-09 RX ADMIN — CEFAZOLIN 2 G: 10 INJECTION, POWDER, FOR SOLUTION INTRAVENOUS at 23:12

## 2017-12-09 RX ADMIN — ONDANSETRON 4 MG: 2 INJECTION INTRAMUSCULAR; INTRAVENOUS at 19:48

## 2017-12-09 RX ADMIN — HYDROMORPHONE HYDROCHLORIDE 1 MG: 2 INJECTION INTRAMUSCULAR; INTRAVENOUS; SUBCUTANEOUS at 19:48

## 2017-12-09 ASSESSMENT — PAIN SCALES - GENERAL
PAINLEVEL_OUTOF10: 5
PAINLEVEL_OUTOF10: 3
PAINLEVEL_OUTOF10: 3
PAINLEVEL_OUTOF10: 7
PAINLEVEL_OUTOF10: 5

## 2017-12-09 ASSESSMENT — ENCOUNTER SYMPTOMS
TINGLING: 0
PHOTOPHOBIA: 0
DIZZINESS: 0
DIARRHEA: 0
HEADACHES: 0
WHEEZING: 0
FOCAL WEAKNESS: 0
NAUSEA: 0
SORE THROAT: 0
VOMITING: 0
DEPRESSION: 0
MYALGIAS: 0
CHILLS: 0
ABDOMINAL PAIN: 0
COUGH: 0
SHORTNESS OF BREATH: 0
PALPITATIONS: 0
FEVER: 0

## 2017-12-09 ASSESSMENT — PATIENT HEALTH QUESTIONNAIRE - PHQ9
SUM OF ALL RESPONSES TO PHQ QUESTIONS 1-9: 0
1. LITTLE INTEREST OR PLEASURE IN DOING THINGS: NOT AT ALL
SUM OF ALL RESPONSES TO PHQ9 QUESTIONS 1 AND 2: 0
2. FEELING DOWN, DEPRESSED, IRRITABLE, OR HOPELESS: NOT AT ALL

## 2017-12-09 ASSESSMENT — LIFESTYLE VARIABLES
ALCOHOL_USE: NO
EVER_SMOKED: NEVER

## 2017-12-10 PROBLEM — L03.114 CELLULITIS OF LEFT HAND: Status: ACTIVE | Noted: 2017-12-09

## 2017-12-10 PROBLEM — G47.00 INSOMNIA DISORDER: Status: ACTIVE | Noted: 2017-12-10

## 2017-12-10 PROBLEM — E66.811 OBESITY (BMI 30.0-34.9): Status: ACTIVE | Noted: 2017-12-10

## 2017-12-10 PROBLEM — L02.512 ABSCESS OF LEFT THUMB: Status: ACTIVE | Noted: 2017-12-09

## 2017-12-10 PROBLEM — E11.65 UNCONTROLLED TYPE 2 DIABETES MELLITUS WITH HYPERGLYCEMIA (HCC): Status: ACTIVE | Noted: 2017-12-10

## 2017-12-10 PROBLEM — L03.114 CELLULITIS OF LEFT HAND: Status: ACTIVE | Noted: 2017-12-10

## 2017-12-10 PROBLEM — Z87.442 HISTORY OF NEPHROLITHIASIS: Status: ACTIVE | Noted: 2017-12-10

## 2017-12-10 PROBLEM — E66.9 OBESITY (BMI 30.0-34.9): Status: ACTIVE | Noted: 2017-12-10

## 2017-12-10 LAB
ANION GAP SERPL CALC-SCNC: 9 MMOL/L (ref 0–11.9)
BACTERIA WND AEROBE CULT: ABNORMAL
BUN SERPL-MCNC: 17 MG/DL (ref 8–22)
CALCIUM SERPL-MCNC: 8.7 MG/DL (ref 8.5–10.5)
CHLORIDE SERPL-SCNC: 107 MMOL/L (ref 96–112)
CO2 SERPL-SCNC: 21 MMOL/L (ref 20–33)
CREAT SERPL-MCNC: 0.64 MG/DL (ref 0.5–1.4)
ERYTHROCYTE [DISTWIDTH] IN BLOOD BY AUTOMATED COUNT: 42.3 FL (ref 35.9–50)
EST. AVERAGE GLUCOSE BLD GHB EST-MCNC: 321 MG/DL
GFR SERPL CREATININE-BSD FRML MDRD: >60 ML/MIN/1.73 M 2
GLUCOSE BLD-MCNC: 131 MG/DL (ref 65–99)
GLUCOSE BLD-MCNC: 134 MG/DL (ref 65–99)
GLUCOSE BLD-MCNC: 185 MG/DL (ref 65–99)
GLUCOSE BLD-MCNC: 231 MG/DL (ref 65–99)
GLUCOSE SERPL-MCNC: 246 MG/DL (ref 65–99)
GRAM STN SPEC: ABNORMAL
GRAM STN SPEC: NORMAL
GRAM STN SPEC: NORMAL
HBA1C MFR BLD: 12.8 % (ref 0–5.6)
HCT VFR BLD AUTO: 36.6 % (ref 42–52)
HGB BLD-MCNC: 12.8 G/DL (ref 14–18)
MCH RBC QN AUTO: 31.1 PG (ref 27–33)
MCHC RBC AUTO-ENTMCNC: 35 G/DL (ref 33.7–35.3)
MCV RBC AUTO: 89.1 FL (ref 81.4–97.8)
PLATELET # BLD AUTO: 204 K/UL (ref 164–446)
PMV BLD AUTO: 9.4 FL (ref 9–12.9)
POTASSIUM SERPL-SCNC: 4.1 MMOL/L (ref 3.6–5.5)
RBC # BLD AUTO: 4.11 M/UL (ref 4.7–6.1)
SIGNIFICANT IND 70042: ABNORMAL
SIGNIFICANT IND 70042: NORMAL
SITE SITE: ABNORMAL
SITE SITE: NORMAL
SODIUM SERPL-SCNC: 137 MMOL/L (ref 135–145)
SOURCE SOURCE: ABNORMAL
SOURCE SOURCE: NORMAL
WBC # BLD AUTO: 5.8 K/UL (ref 4.8–10.8)

## 2017-12-10 PROCEDURE — 87075 CULTR BACTERIA EXCEPT BLOOD: CPT

## 2017-12-10 PROCEDURE — 36415 COLL VENOUS BLD VENIPUNCTURE: CPT

## 2017-12-10 PROCEDURE — 500122 HCHG BOVIE, BLADE: Performed by: ORTHOPAEDIC SURGERY

## 2017-12-10 PROCEDURE — 87077 CULTURE AEROBIC IDENTIFY: CPT | Mod: 91

## 2017-12-10 PROCEDURE — 700111 HCHG RX REV CODE 636 W/ 250 OVERRIDE (IP): Performed by: HOSPITALIST

## 2017-12-10 PROCEDURE — 700102 HCHG RX REV CODE 250 W/ 637 OVERRIDE(OP): Performed by: HOSPITALIST

## 2017-12-10 PROCEDURE — 160048 HCHG OR STATISTICAL LEVEL 1-5: Performed by: ORTHOPAEDIC SURGERY

## 2017-12-10 PROCEDURE — 700111 HCHG RX REV CODE 636 W/ 250 OVERRIDE (IP)

## 2017-12-10 PROCEDURE — 501838 HCHG SUTURE GENERAL: Performed by: ORTHOPAEDIC SURGERY

## 2017-12-10 PROCEDURE — 160002 HCHG RECOVERY MINUTES (STAT): Performed by: ORTHOPAEDIC SURGERY

## 2017-12-10 PROCEDURE — 87205 SMEAR GRAM STAIN: CPT

## 2017-12-10 PROCEDURE — 87186 SC STD MICRODIL/AGAR DIL: CPT | Mod: 91

## 2017-12-10 PROCEDURE — A9270 NON-COVERED ITEM OR SERVICE: HCPCS

## 2017-12-10 PROCEDURE — 160035 HCHG PACU - 1ST 60 MINS PHASE I: Performed by: ORTHOPAEDIC SURGERY

## 2017-12-10 PROCEDURE — 700105 HCHG RX REV CODE 258: Performed by: HOSPITALIST

## 2017-12-10 PROCEDURE — 160036 HCHG PACU - EA ADDL 30 MINS PHASE I: Performed by: ORTHOPAEDIC SURGERY

## 2017-12-10 PROCEDURE — 0J9K0ZZ DRAINAGE OF LEFT HAND SUBCUTANEOUS TISSUE AND FASCIA, OPEN APPROACH: ICD-10-PCS | Performed by: ORTHOPAEDIC SURGERY

## 2017-12-10 PROCEDURE — A9270 NON-COVERED ITEM OR SERVICE: HCPCS | Performed by: HOSPITALIST

## 2017-12-10 PROCEDURE — 160027 HCHG SURGERY MINUTES - 1ST 30 MINS LEVEL 2: Performed by: ORTHOPAEDIC SURGERY

## 2017-12-10 PROCEDURE — 80048 BASIC METABOLIC PNL TOTAL CA: CPT

## 2017-12-10 PROCEDURE — 99232 SBSQ HOSP IP/OBS MODERATE 35: CPT | Performed by: HOSPITALIST

## 2017-12-10 PROCEDURE — 770001 HCHG ROOM/CARE - MED/SURG/GYN PRIV*

## 2017-12-10 PROCEDURE — 0JBK0ZZ EXCISION OF LEFT HAND SUBCUTANEOUS TISSUE AND FASCIA, OPEN APPROACH: ICD-10-PCS | Performed by: ORTHOPAEDIC SURGERY

## 2017-12-10 PROCEDURE — 83036 HEMOGLOBIN GLYCOSYLATED A1C: CPT

## 2017-12-10 PROCEDURE — 160009 HCHG ANES TIME/MIN: Performed by: ORTHOPAEDIC SURGERY

## 2017-12-10 PROCEDURE — 85027 COMPLETE CBC AUTOMATED: CPT

## 2017-12-10 PROCEDURE — 700101 HCHG RX REV CODE 250

## 2017-12-10 PROCEDURE — 82962 GLUCOSE BLOOD TEST: CPT | Mod: 91

## 2017-12-10 PROCEDURE — 160038 HCHG SURGERY MINUTES - EA ADDL 1 MIN LEVEL 2: Performed by: ORTHOPAEDIC SURGERY

## 2017-12-10 PROCEDURE — 500881 HCHG PACK, EXTREMITY: Performed by: ORTHOPAEDIC SURGERY

## 2017-12-10 PROCEDURE — 501330 HCHG SET, CYSTO IRRIG TUBING: Performed by: ORTHOPAEDIC SURGERY

## 2017-12-10 PROCEDURE — A6407 PACKING STRIPS, NON-IMPREG: HCPCS | Performed by: ORTHOPAEDIC SURGERY

## 2017-12-10 PROCEDURE — 700102 HCHG RX REV CODE 250 W/ 637 OVERRIDE(OP)

## 2017-12-10 PROCEDURE — 500440 HCHG DRESSING, STERILE ROLL (KERLIX): Performed by: ORTHOPAEDIC SURGERY

## 2017-12-10 PROCEDURE — 87070 CULTURE OTHR SPECIMN AEROBIC: CPT

## 2017-12-10 RX ORDER — HYDROMORPHONE HYDROCHLORIDE 2 MG/ML
INJECTION, SOLUTION INTRAMUSCULAR; INTRAVENOUS; SUBCUTANEOUS
Status: COMPLETED
Start: 2017-12-10 | End: 2017-12-10

## 2017-12-10 RX ORDER — MORPHINE SULFATE 4 MG/ML
2-4 INJECTION, SOLUTION INTRAMUSCULAR; INTRAVENOUS EVERY 4 HOURS PRN
Status: DISCONTINUED | OUTPATIENT
Start: 2017-12-10 | End: 2017-12-11 | Stop reason: HOSPADM

## 2017-12-10 RX ORDER — DIPHENHYDRAMINE HYDROCHLORIDE 50 MG/ML
12.5 INJECTION INTRAMUSCULAR; INTRAVENOUS ONCE
Status: COMPLETED | OUTPATIENT
Start: 2017-12-10 | End: 2017-12-10

## 2017-12-10 RX ORDER — MIDAZOLAM HYDROCHLORIDE 1 MG/ML
INJECTION INTRAMUSCULAR; INTRAVENOUS
Status: COMPLETED
Start: 2017-12-10 | End: 2017-12-10

## 2017-12-10 RX ORDER — ACETAMINOPHEN 325 MG/1
650 TABLET ORAL EVERY 6 HOURS
Status: DISCONTINUED | OUTPATIENT
Start: 2017-12-10 | End: 2017-12-11 | Stop reason: HOSPADM

## 2017-12-10 RX ORDER — OXYCODONE HCL 5 MG/5 ML
SOLUTION, ORAL ORAL
Status: COMPLETED
Start: 2017-12-10 | End: 2017-12-10

## 2017-12-10 RX ADMIN — MIDAZOLAM 1 MG: 1 INJECTION INTRAMUSCULAR; INTRAVENOUS at 01:16

## 2017-12-10 RX ADMIN — ACETAMINOPHEN 650 MG: 325 TABLET, FILM COATED ORAL at 17:06

## 2017-12-10 RX ADMIN — VANCOMYCIN HYDROCHLORIDE 1600 MG: 100 INJECTION, POWDER, LYOPHILIZED, FOR SOLUTION INTRAVENOUS at 20:20

## 2017-12-10 RX ADMIN — LEVOTHYROXINE, LIOTHYRONINE 60 MG: 19; 4.5 TABLET ORAL at 20:20

## 2017-12-10 RX ADMIN — HYDROMORPHONE HYDROCHLORIDE 0.5 MG: 2 INJECTION INTRAMUSCULAR; INTRAVENOUS; SUBCUTANEOUS at 01:32

## 2017-12-10 RX ADMIN — HYDROMORPHONE HYDROCHLORIDE 0.5 MG: 2 INJECTION INTRAMUSCULAR; INTRAVENOUS; SUBCUTANEOUS at 01:37

## 2017-12-10 RX ADMIN — MORPHINE SULFATE 4 MG: 4 INJECTION INTRAVENOUS at 13:42

## 2017-12-10 RX ADMIN — ACETAMINOPHEN 650 MG: 325 TABLET, FILM COATED ORAL at 08:31

## 2017-12-10 RX ADMIN — MORPHINE SULFATE 4 MG: 4 INJECTION INTRAVENOUS at 04:31

## 2017-12-10 RX ADMIN — MORPHINE SULFATE 4 MG: 4 INJECTION INTRAVENOUS at 17:59

## 2017-12-10 RX ADMIN — INSULIN GLARGINE 38 UNITS: 100 INJECTION, SOLUTION SUBCUTANEOUS at 20:27

## 2017-12-10 RX ADMIN — ACETAMINOPHEN 650 MG: 325 TABLET, FILM COATED ORAL at 23:01

## 2017-12-10 RX ADMIN — INSULIN HUMAN 3 UNITS: 100 INJECTION, SOLUTION PARENTERAL at 06:41

## 2017-12-10 RX ADMIN — HYDROMORPHONE HYDROCHLORIDE 0.5 MG: 2 INJECTION INTRAMUSCULAR; INTRAVENOUS; SUBCUTANEOUS at 01:27

## 2017-12-10 RX ADMIN — DIPHENHYDRAMINE HYDROCHLORIDE 12.5 MG: 50 INJECTION, SOLUTION INTRAMUSCULAR; INTRAVENOUS at 04:28

## 2017-12-10 RX ADMIN — MORPHINE SULFATE 4 MG: 4 INJECTION INTRAVENOUS at 22:48

## 2017-12-10 RX ADMIN — ACETAMINOPHEN 650 MG: 325 TABLET, FILM COATED ORAL at 11:49

## 2017-12-10 RX ADMIN — VANCOMYCIN HYDROCHLORIDE 1600 MG: 100 INJECTION, POWDER, LYOPHILIZED, FOR SOLUTION INTRAVENOUS at 10:27

## 2017-12-10 RX ADMIN — MIDAZOLAM 1 MG: 1 INJECTION INTRAMUSCULAR; INTRAVENOUS at 01:18

## 2017-12-10 RX ADMIN — OXYCODONE HYDROCHLORIDE 10 MG: 5 SOLUTION ORAL at 01:22

## 2017-12-10 RX ADMIN — CEFAZOLIN 2 G: 10 INJECTION, POWDER, FOR SOLUTION INTRAVENOUS at 13:42

## 2017-12-10 RX ADMIN — TRAZODONE HYDROCHLORIDE 100 MG: 50 TABLET ORAL at 20:20

## 2017-12-10 RX ADMIN — CEFAZOLIN 2 G: 10 INJECTION, POWDER, FOR SOLUTION INTRAVENOUS at 06:45

## 2017-12-10 RX ADMIN — BUPROPION HYDROCHLORIDE 300 MG: 150 TABLET, EXTENDED RELEASE ORAL at 08:32

## 2017-12-10 RX ADMIN — INSULIN HUMAN 2 UNITS: 100 INJECTION, SOLUTION PARENTERAL at 20:27

## 2017-12-10 RX ADMIN — MORPHINE SULFATE 4 MG: 4 INJECTION INTRAVENOUS at 08:31

## 2017-12-10 RX ADMIN — HYDROMORPHONE HYDROCHLORIDE 2 MG: 2 TABLET ORAL at 20:20

## 2017-12-10 RX ADMIN — INSULIN GLARGINE 38 UNITS: 100 INJECTION, SOLUTION SUBCUTANEOUS at 08:26

## 2017-12-10 RX ADMIN — Medication 220 MG: at 08:32

## 2017-12-10 RX ADMIN — CEFAZOLIN 2 G: 10 INJECTION, POWDER, FOR SOLUTION INTRAVENOUS at 22:49

## 2017-12-10 ASSESSMENT — PAIN SCALES - GENERAL
PAINLEVEL_OUTOF10: 5
PAINLEVEL_OUTOF10: ASSUMED PAIN PRESENT
PAINLEVEL_OUTOF10: ASSUMED PAIN PRESENT
PAINLEVEL_OUTOF10: 6
PAINLEVEL_OUTOF10: ASSUMED PAIN PRESENT
PAINLEVEL_OUTOF10: ASSUMED PAIN PRESENT
PAINLEVEL_OUTOF10: 7
PAINLEVEL_OUTOF10: ASSUMED PAIN PRESENT
PAINLEVEL_OUTOF10: 7
PAINLEVEL_OUTOF10: 9
PAINLEVEL_OUTOF10: 6
PAINLEVEL_OUTOF10: 4
PAINLEVEL_OUTOF10: 7

## 2017-12-10 ASSESSMENT — ENCOUNTER SYMPTOMS
NAUSEA: 0
MYALGIAS: 1
FEVER: 0
VOMITING: 0
CHILLS: 0
INSOMNIA: 1

## 2017-12-10 NOTE — CONSULTS
DATE OF SERVICE:  12/09/2017    REQUESTING PHYSICIAN:  Dr. Foster Solano, emergency department resident.    REASON FOR CONSULTATION:  Left hand infection.    CHIEF COMPLAINT:  Left hand, thumb redness, pain, swelling, and drainage.    HISTORY OF PRESENT ILLNESS:  The patient is a 52-year-old right hand dominant   male.  He does have a medical history significant for diabetes and multiple   other medical comorbidities.  He has a remote history of a left hand infection   in the past which was treated with surgical debridement and irrigation, also   had last of which was in May 2017.  He has also had an I and D of the similar   area, but just yesterday in the emergency department at [facility].    He states a couple weeks ago, he had some splinters in his hand, seems to get   red and swollen since then returned to the emergency department today because   he had some packing and that was too painful to remove and it seemed to be a   bigger collection of blistering and pus forming underneath there.  He is   currently being evaluated for admission to the hospitalist service and has   been started on IV antibiotics.  He does state he has had some tingling in his   thumb and index finger since the swelling came on.    ALLERGIES:  PEANUTS, TRADJENTA, HYDROCODONE.    PERTINENT PAST MEDICAL HISTORY:  Includes ulcerative colitis, history of   multiple strep infections and sepsis, history of renal failure, pancreatitis,   history of necrotizing myositis, MRSA cellulitis, Klebsiella infection,   hypothyroidism, high cholesterol, hypertension, elevated liver enzymes,   history of diabetic ketoacidosis, history of congestive heart failure.    PAST SURGICAL HISTORY:  Umbilical herniorrhaphy, left wrist ORIF, right and   left hand debridement, laparoscopic cholecystectomy.    FAMILY HISTORY:  Negative for significant medical problems according to the   medical record.    SOCIAL HISTORY:  The patient is a nonsmoker.  He  does drink alcohol   occasionally, denies illicit drug use.    REVIEW OF SYSTEMS:  He denies obvious fevers, chills, nausea, vomiting,   shortness of breath, chest pain currently, otherwise normal per AMA criteria   other than that already stated in the HPI.    PHYSICAL EXAMINATION:  VITAL SIGNS:  Temperature 97.9, heart rate 100, respiratory rate 18, blood   pressure 129/79, pulse oximetry not recorded.  GENERAL APPEARANCE:  Patient is alert.  He is oriented.  He is in no acute   distress.  HEAD, EYES, EARS, NOSE, AND THROAT:  Normocephalic and atraumatic.  Mucous   membranes are moist.  He wears corrective lenses.  PULMONARY:  Symmetric, unlabored breathing.  CARDIOVASCULAR:  Extremities are well perfused.  No obvious JVP is noted.  ABDOMEN:  Not obviously distended.  MUSCULOSKELETAL:  Left upper extremity, he has some erythema and induration   over the dorsal aspect of the left thumb over the metacarpophalangeal joint   region.  He does have some mild tenderness to palpation, but no erythema or   induration over the thenar eminence.  He is able to actively flex and extend   his thumb without significant discomfort.  He is able to actively extend it   without pain in the volar aspect of the thumb.  He has brisk capillary refill   in his thumb.  He has light touch sensation intact diffusely in all fingers   with subjective diminished sensation along the tip of the thumb and the radial   aspect of the index finger.  He has palpable radial pulse.  There is some   tenderness to palpation there.  He is able to actively flex and extend the   wrist without significant discomfort.  He is able to make a fist.  There is no   evidence of obvious lymphangitis tracking proximally at the forearm.    LABORATORY DATA:  White blood cell count is 4.2, hemoglobin 11.5, hematocrit   34.2, platelet count 138,000.  Glucose is 221, creatinine 0.55.    ASSESSMENT:  A 52-year-old male with multiple medical comorbidities including    history of multiple infections as well as diabetes with left hand abscess with   associated cellulitis.  He is being evaluated for admission to the   hospitalist service and has been started on IV antibiotics.    RECOMMENDATIONS:  1.  I discussed these findings with the patient.  I feel that he would benefit   from further incision and drainage of suspected incompletely decompressed   abscess in the left hand.  Does not seem to be signs of obvious septic   arthritis or infection in the tendon sheaths.  Patient is currently n.p.o. and   is in favor of proceeding to the operating room for incision and drainage of   his infection.  2.  We will try to coordinate getting him to operating room this evening if   there is availability.  3.  We will obtain an x-ray of the left hand just to rule out potential   radiopaque retained foreign body and rule out any signs of obvious changes   concerning for chronic septic arthritis or osteomyelitis.       ____________________________________     MD MABEL Lucio / STORM    DD:  12/09/2017 21:57:21  DT:  12/09/2017 22:46:47    D#:  [id number]  Job#:  [id number]

## 2017-12-10 NOTE — ED NOTES
Presents to the ED with c/o left hand pain after I&D yesterday. I&D to remove splinter. Inc pain to the hand with pus and swelling. A&OX4. Afebrile. Packing remains in the hand.

## 2017-12-10 NOTE — ED NOTES
Med rec complete per pt at bedside  Allergies reviewed  Pt started a 10 day course of Augmentin and Clindamycin yesterday

## 2017-12-10 NOTE — RESPIRATORY CARE
COPD EDUCATION by COPD CLINICAL EDUCATOR  12/10/2017 at 6:33 AM by Jacey Blackwell     Patient reviewed by COPD education team. Patient does not qualify for COPD program.

## 2017-12-10 NOTE — ED NOTES
Presents for eval of left hand. I&D of left hand yesterday, today endorses increased swelling and pus drainage. A&OX4.

## 2017-12-10 NOTE — PROGRESS NOTES
[facility]    Date of Service: 12/10/2017    Chief Complaint  52 y.o. male admitted 2017 with L-hand cellulitis/abscess.    Interval Problem Update  S/P L-hand I&D.  Feeling better.  States he has insomnia c frequent urination.    Consultants/Specialty  Ortho.      Review of Systems   Constitutional: Negative for chills and fever.   Gastrointestinal: Negative for nausea and vomiting.   Musculoskeletal: Positive for myalgias.   Psychiatric/Behavioral: The patient has insomnia.    All other systems reviewed and are negative.     Physical Exam  Laboratory/Imaging   Hemodynamics  Temp (24hrs), Av.4 °C (97.5 °F), Min:36.1 °C (97 °F), Max:36.8 °C (98.3 °F)   Temperature: 36.2 °C (97.2 °F)  Pulse  Av.3  Min: 79  Max: 103 Heart Rate (Monitored): 95  Blood Pressure: 121/72, NIBP: 117/81      Respiratory      Respiration: 17, Pulse Oximetry: 90 %        RUL Breath Sounds: Clear, RML Breath Sounds: Clear, RLL Breath Sounds: Diminished, NADEEN Breath Sounds: Clear, LLL Breath Sounds: Diminished    Fluids    Intake/Output Summary (Last 24 hours) at 12/10/17 1135  Last data filed at 12/10/17 0725   Gross per 24 hour   Intake              900 ml   Output               50 ml   Net              850 ml       Nutrition  Orders Placed This Encounter   Procedures   • Diet Order     Standing Status:   Standing     Number of Occurrences:   1     Order Specific Question:   Diet:     Answer:   Diabetic [3]     Order Specific Question:   Calorie modifications:     Answer:   1800 kcals [4]     Physical Exam  Nursing note and vitals reviewed.  Constitutional: He is oriented to person, place, and time. He appears well-developed,  well-nourished and overweight.   HENT:   Head: Normocephalic and atraumatic.   Right Ear: External ear normal.   Left Ear: External ear normal.   Nose: Nose normal.   Mouth/Throat: Oropharynx is small with Mallanpati score of 2-3.  Mucosa is clear and moist.   Eyes: Conjunctivae and  extraocular motions are normal. Pupils are equal, round, and reactive to light.   Neck: Normal range of motion. Neck supple.   Cardiovascular: Normal rate, regular rhythm, normal heart sounds and intact distal pulses. +3 L-hand edema.   Pulmonary/Chest: Effort normal and breath sounds normal.   Abdominal: Soft. Bowel sounds are normal.   Musculoskeletal: Normal range of motion.   Neurological: He is alert and oriented to person, place, and time.   Skin: Skin is warm and dry.  L-hand dressing C/D/I.      Recent Labs      12/09/17   2248  12/10/17   0346   WBC  6.6  5.8   RBC  4.09*  4.11*   HEMOGLOBIN  12.7*  12.8*   HEMATOCRIT  36.2*  36.6*   MCV  88.5  89.1   MCH  31.1  31.1   MCHC  35.1  35.0   RDW  41.4  42.3   PLATELETCT  209  204   MPV  9.0  9.4     Recent Labs      12/09/17   2248  12/10/17   0346   SODIUM  133*  137   POTASSIUM  3.6  4.1   CHLORIDE  105  107   CO2  22  21   GLUCOSE  287*  246*   BUN  17  17   CREATININE  0.60  0.64   CALCIUM  8.9  8.7                      Assessment/Plan     * Abscess of left thumb- (present on admission)   Assessment & Plan    S/P I&D.  Cont IV Abx.  Encourage arm elevation above heart when possible.  Ortho on.        Cellulitis of left hand- (present on admission)   Assessment & Plan    Treatment as noted above.        Insomnia disorder   Assessment & Plan    Outpatient follow up.        History of nephrolithiasis   Assessment & Plan    Outpatient follow up.        Uncontrolled type 2 diabetes mellitus with hyperglycemia (CMS-HCC)   Assessment & Plan    Home regimen, 1800 Kcal DM diet, check HbA1c and follow c infection management.        Obesity (BMI 30.0-34.9)   Assessment & Plan    Encourage Kcal restriction.        Stable issues - med hx (UC, myositis, GERD, gout, VDRF, pancreatitis, asthma, depression, back pain, hypothyroid, recent DKA),    Preventives - IS, Vax, stool soft, DVTP.    Dispo - complex/guarded.      Reviewed items::  Radiology images reviewed, Labs  reviewed and Medications reviewed  Sousa catheter::  No Sousa  DVT prophylaxis pharmacological::  Heparin  Ulcer Prophylaxis::  Not indicated  Antibiotics:  Treating active infection/contamination beyond 24 hours perioperative coverage

## 2017-12-10 NOTE — PROGRESS NOTES
52yoM with DM and left hand abscess s/p I&D early am 12/10.    S: Some pain in PACU waking from anesthesia     O:  Vitals:    12/09/17 1838 12/09/17 2210 12/09/17 2300 12/10/17 0105   BP: 129/79 127/69 136/82 124/75   Pulse: 100 95 79 (!) 103   Resp: 18 18 18 18   Temp: 36.6 °C (97.9 °F)  36.2 °C (97.2 °F) 36.8 °C (98.3 °F)   SpO2:   94% 88%   Weight:       Height:         Exam:  General-NAD, waking from anesthesia but following commands  LUE-dressing c/d/i, BCR in thumb and other fingers    A: 52yoM with DM and left hand abscess s/p I&D early am 12/10.    Recs:  --readmit medicine postop  --continue IV abx, fu pending cultures  --wound care to start daily packing changes tomorrow am  --continue hand elevation and ROM

## 2017-12-10 NOTE — OR NURSING
Patient arrived to PACU 3A, oral airway in place, on Ra, connected to monitors, 88% on RA, oxygen via 3 liters added to end of LMA, sats improved to95% on 3 lpm via n/c.  Left hand ace wrap has betadine slightly seeping through in a small section.  Left hand elevated on a pillow, hob 30 %.  Bedside report received from Dr. Patel. No fsbs per anesthesia.

## 2017-12-10 NOTE — OR SURGEON
Immediate Post OP Note    PreOp Diagnosis: left hand abscess    PostOp Diagnosis: same    Procedure(s):  IRRIGATION & DEBRIDEMENT ORTHO LEFT HAND - Wound Class: Dirty or Infected    Surgeon(s):  Chicho Ramos M.D.    Anesthesiologist/Type of Anesthesia:  Anesthesiologist: Pradip Patel M.D./General    Surgical Staff:  Circulator: Abby Nolan R.N.  Scrub Person: Leopold von C Garcia    Specimens: wound swab for culture    Estimated Blood Loss: minimal    Findings: see dictation    Complications: none known    PLAN:  --readmit medicine postop  --continue IV abx  --wound care to start daily packing changes tomorrow am  --continue hand elevation        12/10/2017 1:01 AM Chicho Ramos

## 2017-12-10 NOTE — ASSESSMENT & PLAN NOTE
Patient has had recently difficult to control blood sugars because of his infection, I will continue him on his home Lantus and start him on insulin sliding scale with close monitoring utilizing Accu-Cheks.

## 2017-12-10 NOTE — OR NURSING
Called to get report from JAZMINE Huerta, not available at this time.  [facility] charge JAZMINE Gray notified that pt had been called for to come to the OR.

## 2017-12-10 NOTE — ED NOTES
Assist RN: US guided PIV placed, second set of blood cultures drawn and sent to lab. Pt seen by hospitalist.

## 2017-12-10 NOTE — PROGRESS NOTES
Received report from evening RN.  Assumed care of patient.  Patient A&Ox4.  C/O pain 6/10 in LUE, medicated per MAR.  LUE with ace bandage in place, c/o numbness/tingling in thumb and first finger.  States it is improving.  No complaints of nausea or vomiting.  Tolerating diet.  +void.  Last BM yesterday.  Plan of care discussed.  Call light within reach.  Bed in lowest position.

## 2017-12-10 NOTE — CARE PLAN
Problem: Venous Thromboembolism (VTW)/Deep Vein Thrombosis (DVT) Prevention:  Goal: Patient will participate in Venous Thrombosis (VTE)/Deep Vein Thrombosis (DVT)Prevention Measures  Outcome: PROGRESSING AS EXPECTED  Patient given lovenox for DVT prophylaxis.      Problem: Pain Management  Goal: Pain level will decrease to patient's comfort goal  Outcome: PROGRESSING AS EXPECTED  Patient having complaints of pain 7/10 in LUE, medicated per MAR.

## 2017-12-10 NOTE — PROGRESS NOTES
"Pharmacy Kinetics 52 y.o. male on vancomycin day # 2 12/10/2017    Currently Dose: Vancomycin 1600 mg iv q12hr (~17 mg/kg)  Received Load Dose: Yes    Indication for Treatment: left thumb abscess/SSTI  ID Service Following: No    Pertinent history per medical record: Admitted on 12/9/2017 for abscess of left thumb with noted erythema, edema, and pain noted on admission. Patient reports event occurred ~1.5 weeks prior after cutting down a [place] tree when a splinter became lodged in thumb for which he trialed oral antibiotics after I&D. Patient subsequently returned to ED with worsening condition. Admitted to [facility].    Other antibiotics: cefazolin 2 gm iv q8h    Allergies: Peanut-derived; Tradjenta [linagliptin]; and Hydrocodone     List concerns for accumulation of vancomycin: age ~52, abnormal LFT's, BMI ~32    Pertinent cultures to date:   Results     Procedure Component Value Units Date/Time    GRAM STAIN [[id number]] Collected:  12/10/17 0036    Order Status:  Completed Specimen:  Wound Updated:  12/10/17 0922     Significant Indicator .     Source WND     Site Left Hand     Gram Stain Result --     Few WBCs.  Rare Gram positive cocci.      BLOOD CULTURE [[id number]] Collected:  12/09/17 1900    Order Status:  Completed Specimen:  Blood from Peripheral Updated:  12/10/17 0853     Significant Indicator NEG     Source BLD     Site PERIPHERAL     Blood Culture --     No Growth    Note: Blood cultures are incubated for 5 days and  are monitored continuously.Positive blood cultures  are called to the RN and reported as soon as  they are identified.      Narrative:       Per Hospital Policy: Only change Specimen Src: to \"Line\" if  specified by physician order.    BLOOD CULTURE [[id number]] Collected:  12/09/17 1927    Order Status:  Completed Specimen:  Blood from Peripheral Updated:  12/10/17 0853     Significant Indicator NEG     Source BLD     Site PERIPHERAL     Blood Culture --     No Growth    Note: Blood cultures " "are incubated for 5 days and  are monitored continuously.Positive blood cultures  are called to the RN and reported as soon as  they are identified.      Narrative:       Per Hospital Policy: Only change Specimen Src: to \"Line\" if  specified by physician order.    CULTURE WOUND W/ GRAM STAIN [[id number]] Collected:  12/10/17 0036    Order Status:  Completed Specimen:  Other Updated:  12/10/17 0213    ANAEROBIC CULTURE [[id number]] Collected:  12/10/17 0036    Order Status:  Completed Specimen:  Other Updated:  12/10/17 0213        Recent Labs      12/09/17   2248  12/10/17   0346   WBC  6.6  5.8   NEUTSPOLYS  66.40   --      Recent Labs      12/09/17   2248  12/10/17   0346   BUN     CREATININE  0.60  0.64   ALBUMIN  3.3   --      Intake/Output Summary (Last 24 hours) at 12/10/17 09  Last data filed at 12/10/17 0725   Gross per 24 hour   Intake              900 ml   Output               50 ml   Net              850 ml      Blood pressure 121/72, pulse 84, temperature 36.2 °C (97.2 °F), resp. rate 17, height 1.727 m (5' 8\"), weight 95.3 kg (210 lb), SpO2 90 %. Temp (24hrs), Av.4 °C (97.5 °F), Min:36.1 °C (97 °F), Max:36.8 °C (98.3 °F)    Estimated Creatinine Clearance: 151.3 mL/min (by C-G formula based on SCr of 0.64 mg/dL).    A/P   1. Vancomycin dose change: not indicated   2. Next vancomycin level: 17 @0830  3. Goal trough: 12-16 mcg/mL  4. Comments: VS stable. Afebrile. WBC unremarkable. CrCl ~151 mL/min. Microbiology pending. Minimal concerns for accumulation noted. Routine trough placed 17 prior to AM dose to ensure clearance. Pharmacy will follow and continue to adjust as appropriate.    Vipin Mcclure, PharmD  "

## 2017-12-10 NOTE — H&P
" Hospital Medicine History and Physical    Date of Service  12/9/2017    Chief Complaint  Chief Complaint   Patient presents with   • Hand Swelling       History of Presenting Illness  52 y.o. male who presented on 12/9/2017 with Worsening erythema, swelling, and pain of his left hand and thumb. The patient was cutting down [place] trees a week and a half ago when he was poked and had a splinter in his left hand. He reports that the splinter \"came to a head\" therefore he self extracted the wood this past Monday. By Wednesday, his thumb and his left hand were swollen and red. He presented himself to the emergency room yesterday where he underwent an I and D of a abscess of the left thumb base. He was given IV antibiotics and sent home on oral antibiotic therapy. He was given strict instructions to return to the hospital if there is any worsening. Today when he had dressing changes, he reports that the wound looked significantly worse and he also had worsening pain. Therefore he presented himself for further evaluation. He otherwise denies any systemic issues, he's had no fevers, chills, nausea, vomiting, headache, chest pain, abdominal pain, diarrhea, or dysuria. He reports that his sugars have been out of control for the last week and a half since this occurred.        Primary Care Physician  KEN Brunner.P.JT.    Consultants  None    Code Status  Full    Review of Systems  Review of Systems   Constitutional: Negative for chills and fever.   HENT: Negative for congestion and sore throat.    Eyes: Negative for photophobia.   Respiratory: Negative for cough, shortness of breath and wheezing.    Cardiovascular: Negative for chest pain and palpitations.   Gastrointestinal: Negative for abdominal pain, diarrhea, nausea and vomiting.   Genitourinary: Negative for dysuria.   Musculoskeletal: Negative for myalgias.        Thumb pain and swelling with redness and drainage   Skin: Negative.    Neurological: Negative " "for dizziness, tingling, focal weakness and headaches.        Tingling at the fingertips of the right hand, mild   Psychiatric/Behavioral: Negative for depression and suicidal ideas.        Past Medical History  Past Medical History:   Diagnosis Date   • UC (ulcerative colitis) (CMS-HCC) 3-3-17    \"Five BM's per day, takes Lialda\"   • Arthritis 3-3-17    \"Spine\"   • Snoring 3-3-17    Has not had a sleep study   • High cholesterol 3-3-17    Does not currently take medication for   • ASTHMA 3-3-17    \"Hasn't had to use inhaler in 2 years\"   • Breath shortness 3-3-17    \"With Exercise\"   • Hypothyroid 3-3-17    Takes Virgil Thyroid   • Pain 3-3-17    \"Left Flank\"   • Diabetes (CMS-HCC) 3-3-17    Takes Insulin   • Aspiration pneumonia (CMS-HCC) 3-2016   • ADRIANE (acute kidney injury) (CMS-HCC) 2/24/2016   • Difficult intubation 2-2016   • Pancreatitis 2-2016    \"D/T Tradjenta was hospitialized for 15 days\"   • Elevated liver enzymes 2-2016   • Electrolyte imbalance 2-2016   • DKA (diabetic ketoacidoses) (CMS-HCC) 2-2016    \"10 days on vent with DKA, Renal failure, CHF, elevated liver enzymes and electrolyte imbalance\"   • On mechanically assisted ventilation (CMS-HCC) 2-2016    HX \"On Vent for 10 days at [facility]\".  \"DX Pancreatitis, DKA, CHF, Elevated Liver Enzymes & Electrolyte Imbalance\".   • Congestive heart failure (CMS-HCC) 2-2016     3-3-17 \"Not a current issue\"   • RF (renal failure) 2-2016   • Essential hypertension 1/22/2016   • Sepsis (CMS-HCC) 1/21/2016   • Depression 11/26/2014   • Anesthesia     \"Was difficult to intubate 2-2016\"   • Elevated LFTs    • Gout    • Heart burn    • Indigestion    • Kidney stones    • Klebsiella infect    • Migraine    • MRSA cellulitis    • Necrotizing myositis (CMS-HCC)    • Streptococcus infection    • Vitamin D deficiency        Surgical History  Past Surgical History:   Procedure Laterality Date   • IRRIGATION & DEBRIDEMENT GENERAL Right 5/1/2017    Procedure: IRRIGATION & " "DEBRIDEMENT GENERAL-Left HAND AND right  ANKLE;  Surgeon: Esau Dooley M.D.;  Location: SURGERY [facility];  Service:    • IRRIGATION & DEBRIDEMENT GENERAL Right 4/29/2017    Procedure: IRRIGATION & DEBRIDEMENT GENERAL;  Surgeon: Esau Dooley M.D.;  Location: SURGERY [facility];  Service:    • INCISION AND DRAINAGE GENERAL  4/7/2017    Procedure: INCISION AND DRAINAGE GENERAL;  Surgeon: Esau Dooley M.D.;  Location: SURGERY SAME DAY [facility];  Service:    • UMBILICAL HERNIA REPAIR  3/10/2017    Procedure: UMBILICAL HERNIA REPAIR- INCISION AND DRAINAGE OF UMBILICAL WOUND AND MESH REMOVAL;  Surgeon: Esau Dooley M.D.;  Location: SURGERY SAME DAY [facility];  Service:    • UMBILICAL HERNIA REPAIR N/A 11/6/2016    Procedure: UMBILICAL HERNIA REPAIR;  Surgeon: Esau Dooley M.D.;  Location: SURGERY [facility];  Service:    • COLONOSCOPY WITH BIOPSY  11/26/2014    Performed by Solo Higginbotham M.D. at ENDOSCOPY [facility]   • LIVE BY LAPAROSCOPY  2005   • OTHER ORTHOPEDIC SURGERY  1997 or 1998    Left Wrist ORIF       Medications  No current facility-administered medications on file prior to encounter.      Current Outpatient Prescriptions on File Prior to Encounter   Medication Sig Dispense Refill   • clindamycin (CLEOCIN) 300 MG Cap Take 1 Cap by mouth 4 times a day for 10 days. 40 Cap 0   • amoxicillin-clavulanate (AUGMENTIN) 875-125 MG Tab Take 1 Tab by mouth 2 times a day for 10 days. 20 Tab 0   • insulin glargine (LANTUS) 100 UNIT/ML Solution Inject 35 Units as instructed 2 times a day. (Patient taking differently: Inject 38 Units as instructed 2 times a day.) 2 Vial 1   • Insulin Syringe-Needle U-100 26G X 1/2\" 1 ML Misc 1 Application by Does not apply route 2 times a day as needed (use as directed). 100 Each 2   • therapeutic multivitamin-minerals (THERAGRAN-M) Tab Take 1 Tab by mouth every day.     • zinc sulfate (ZINCATE) 220 (50 Zn) MG Cap Take 220 mg by mouth " every day.     • magnesium oxide (MAG-OX) 400 MG Tab Take 400 mg by mouth every day.     • trazodone (DESYREL) 100 MG Tab Take 100 mg by mouth every evening.     • HYDROmorphone (DILAUDID) 2 MG Tab Take 2-4 mg by mouth 2 times a day as needed for Severe Pain.     • ibuprofen (MOTRIN) 200 MG Tab Take 600-800 mg by mouth every 8 hours as needed for Mild Pain.     • ondansetron (ZOFRAN ODT) 4 MG TABLET DISPERSIBLE Take 1 Tab by mouth every 8 hours as needed for Nausea/Vomiting. 10 Tab 0   • thyroid (ARMOUR THYROID) 60 MG Tab Take 60 mg by mouth every evening.     • Cholecalciferol (VITAMIN D3) 5000 UNITS Tab Take 5,000 Units by mouth every evening.     • insulin lispro (HUMALOG) 100 UNIT/ML Solution Inject 30 Units as instructed 4 Times a Day,Before Meals and at Bedtime. Sliding Scale - 2 units every 50 > 150     • buPROPion (WELLBUTRIN XL) 300 MG XL tablet Take 300 mg by mouth every evening.         Family History  History reviewed. No pertinent family history.    Social History  Social History   Substance Use Topics   • Smoking status: Never Smoker   • Smokeless tobacco: Never Used   • Alcohol use Yes      Comment: occ       Allergies  Allergies   Allergen Reactions   • Peanut-Derived Anaphylaxis     Peanuts   RXN=age 36   • Tradjenta [Linagliptin] Unspecified     Pt developed pancreatitis   • Hydrocodone Hives     Tolerates Morphine and oxycodone  Rxn Age - 29        Physical Exam  Laboratory   Hemodynamics  Temp (24hrs), Av.6 °C (97.9 °F), Min:36.6 °C (97.9 °F), Max:36.6 °C (97.9 °F)   Temperature: 36.6 °C (97.9 °F)  Pulse  Av  Min: 100  Max: 100    Blood Pressure: 129/79      Respiratory      Respiration: 18             Physical Exam   Constitutional: He is oriented to person, place, and time. No distress.   HENT:   Head: Normocephalic and atraumatic.   Right Ear: External ear normal.   Left Ear: External ear normal.   Eyes: EOM are normal. Right eye exhibits no discharge. Left eye exhibits no discharge.    Neck: Neck supple. No JVD present.   Cardiovascular: Normal rate, regular rhythm and normal heart sounds.    Pulmonary/Chest: Effort normal and breath sounds normal. No respiratory distress. He exhibits no tenderness.   Abdominal: Soft. Bowel sounds are normal. He exhibits no distension. There is no tenderness.   Musculoskeletal: He exhibits no edema.   Neurological: He is alert and oriented to person, place, and time. No cranial nerve deficit.   Skin: Skin is dry. He is not diaphoretic. There is erythema.   The patient's left thenar prominence and left thumb are swollen, erythematous, and exquisitely tender to touch. Capillary refill remains intact and fingertips are warm. Range of motion limited secondary to pain but still intact. There is an area of serosanguineous drainage from his recent I&D.   Psychiatric: He has a normal mood and affect. His behavior is normal.   Nursing note and vitals reviewed.  Capillary refill less than 3 seconds, distal pulses intact            No results for input(s): ALTSGPT, ASTSGOT, ALKPHOSPHAT, TBILIRUBIN, DBILIRUBIN, GAMMAGT, AMYLASE, LIPASE, ALB, PREALBUMIN, GLUCOSE in the last 72 hours.              Lab Results   Component Value Date    TROPONINI <0.01 04/18/2017       Imaging  No results found.   Assessment/Plan     Anticipate that patient will needGreater than 2 midnights for management of the discussed medical issues.    This dictation was created using voice recognition software. The accuracy of the dictation is limited to the abilities of the software. I expect there may be some errors of grammar and possibly content.    * Abscess of left thumb- (present on admission)   Assessment & Plan    Patient underwent I and D in the emergency room yesterday and was started on antibiotic therapy but has failed to improve. I will admit the patient and broaden his antibiotics to Ancef and vancomycin. His blood cultures have grown back positive for strep and staph, we will continue to  monitor for sensitivity. Dr. Ramos of orthopedic surgery has been consulted by the emergency room physician and I look for today's recommendations. Given his medical comorbidities, we will also consider infectious disease consultation in the morning. He will receive pain for symptomatic management.        Cellulitis of left hand- (present on admission)   Assessment & Plan    Treatment as noted above.        Type 2 diabetes mellitus (CMS-[facility])- (present on admission)   Assessment & Plan    Patient has had recently difficult to control blood sugars because of his infection, I will continue him on his home Lantus and start him on insulin sliding scale with close monitoring utilizing Accu-Cheks.        Hypothyroid- (present on admission)   Assessment & Plan    This is chronic and stable, continue home [place] thyroid.          Prophylaxis: Sequential compression devices for DVT prophylaxis, no PPI indicated, stool softeners ordered

## 2017-12-10 NOTE — OR NURSING
Pt sleepy, awakens easily to verbal stimuli.  Pt denies nausea, pain is tolerable. No belongings to the floor, chart with patient to the floor.

## 2017-12-10 NOTE — PROGRESS NOTES
"Pharmacy Kinetics 52 y.o. male on vancomycin day # 1 2017    Received vancomycin loading dose.    Indication for Treatment: Abscess of left thumb requiring further incision and drainage     Pertinent history per medical record: Admitted on 2017 for Worsening erythema, swelling, and pain of his right hand and thumb. The patient was cutting down [place] trees a week and a half ago when he was poked and had a splinter in his right hand. He reports that the splinter \"came to a head\" therefore he self extracted the wood this past Monday. By Wednesday, his thumb and his right hand were swollen and red. He presented himself to the emergency room yesterday where he underwent an I and D of a abscess of the right thumb base. He was given IV antibiotics and sent home on oral antibiotic therapy. He was given strict instructions to return to the hospital if there is any worsening. Today when he had dressing changes, he reports that the wound looked significantly worse and he also had worsening pain.     Other antibiotics: Ancef    Allergies: Peanut-derived; Tradjenta [linagliptin]; and Hydrocodone     List concerns for renal function: obesity    Pertinent cultures to date:   17 left hand wound - Staph aureus and group B strep  17 right hand wound - viridans strep  17 right thigh abscess - polymicrobial (MRSA)    Recent Labs      17   2248   WBC  6.6   NEUTSPOLYS  66.40     Recent Labs      17   2248   BUN  17   CREATININE  0.60   ALBUMIN  3.3       Last data filed at 17 2300   Gross per 24 hour   Intake                0 ml   Output                0 ml   Net                0 ml      Blood pressure 136/82, pulse 79, temperature 36.2 °C (97.2 °F), resp. rate 18, height 1.727 m (5' 8\"), weight 95.3 kg (210 lb), SpO2 94 %. Temp (24hrs), Av.4 °C (97.6 °F), Min:36.2 °C (97.2 °F), Max:36.6 °C (97.9 °F)      A/P   1. Vancomycin dose change: Start vancomycin 17mg/kg q12hr (1600mg)  2. Next " vancomycin level: Prior to the 4th or 5th dose if vancomycin is to continue (not ordered)  3. Goal trough: 12-16 mcg/ml  4. Comments: On vancomycin at [facility] previously. Had a trough of 12.8 prior to the 3rd dose on 1600 mg q12hr, however renal function was worse and weight was higher at that time. In light of this will initiate the above dosing. Follow culture results and consider PO therapy once pt has sufficient source control. Pharmacy will follow.    Nicholas Patiño, PharmD, BCPS

## 2017-12-10 NOTE — OP REPORT
DATE OF SERVICE:  12/10/2017    DATE OF OPERATION:  12/10/2017.    PREOPERATIVE DIAGNOSIS:  Left hand abscess.    POSTOPERATIVE DIAGNOSIS:  Left hand abscess.    PROCEDURE PERFORMED:  Incision and drainage of the left hand abscess,   complicated.    SURGEON:  Chicho Ramos MD    ANESTHESIOLOGIST:  Pradip Patel MD    ANESTHESIA:  General.    ESTIMATED BLOOD LOSS:  Minimal.    WOUND PACKING:  Quarter inch iodoform gauze.    INDICATION FOR PROCEDURE:  Patient is a 52-year-old diabetic male who has a   history of multiple infections.  He states that he had some splinters in his   left base of the thumb area.  Subsequently he developed erythema to the point   where he presented yesterday to [facility] Emergency Department, had   an incision and drainage and some packing placed to the radial aspect of the   metacarpal area.  He states that today he could not tolerate packing changes   and had some residual purulence, and increased erythema, induration and some   blistering present.  He presented at the [facility] Emergency Department where he   was admitted to the hospitalist service.  I was consulted by the emergency   department physician for further evaluation.  I evaluated the patient and felt   that he would benefit from further incision and drainage and excisional   debridement of devitalized tissue in the operating room.  He signed informed   consent preoperatively and wished to proceed as outlined above.    DESCRIPTION OF PROCEDURE:  The patient was met in the preoperative holding   area.  Surgical site was signed and his consent was confirmed for accuracy.    He was taken back to the operating room and general anesthesia was induced.    He has already been receiving IV antibiotics.  A tourniquet was applied to the   left arm, but it was not inflated for the duration of procedure.  Left upper   extremity was prepped and draped in the usual sterile fashion.  Formal timeout   was performed to confirm  patient's correct name, correct surgical site,   correct procedure, and correct laterality.  The area where the small wound and   some purulent drainage was present in the central area where this hemorrhagic   blister had formed was incised lengthwise, about 3-4 cm in length down   through skin with a #15 blade scalpel.  Blunt dissection was performed under   the subcutaneous tissue.  There was some purulent debris and mild amount of   purulent drainage expressible.  This area was swabbed with wound swabs for   both aerobic and anaerobic culture.  The extensor pollicis longus tendon was   identified deep within the wound and was intact.  I probed with a hemostat and   to the thenar muscle area through fascia and there was no evidence of obvious   deep abscess there.  There is no evidence of obvious tracking palmarly.    There was some skin necrosis adjacent to the central aspect of this wound,   which was sharply excised with a tenotomy scissor down to inflamed but   bleeding dermal tissue.  Dorsally, there was some tracking in the subcutaneous   layer over the EPL tendon.  Thorough irrigation of the abscess cavity was   then performed with cysto tubing and 2 liters normal saline.  I then packed   the thenar space and the subcutaneous layer dorsally with a quarter inch   iodoform gauze and applied a sterile compressive dressing.  He was then awoken   from anesthesia and transferred on the [facility] and taken to postanesthesia   care unit in stable condition.    PLAN:  1.  Patient will be readmitted to the medical service postop.  2.  Recommend continuing IV antibiotics and following up intraoperative   cultures.  3.  I consulted wound care to start tomorrow daily packing changes to his   abscess cavity.  4.  He should continue hand elevation and finger range of motion.       ____________________________________     MD MABEL Lucio / STORM    DD:  12/10/2017 01:08:02  DT:  12/10/2017 02:08:48    D#:  [id number]   Job#:  [id number]

## 2017-12-10 NOTE — OR NURSING
Pt will wake up briefly to state he has some throbbing, but then falls back to sleep, explained to pt that we gave him all the medication anesthesia had ordered, spoke with anesthesia, no more narcotics in PACU.  Per anesthesia, pt can go back to the floor and receive floor meds to control his pain.

## 2017-12-11 VITALS
RESPIRATION RATE: 16 BRPM | WEIGHT: 219.4 LBS | HEIGHT: 68 IN | TEMPERATURE: 96.8 F | OXYGEN SATURATION: 98 % | SYSTOLIC BLOOD PRESSURE: 104 MMHG | BODY MASS INDEX: 33.25 KG/M2 | DIASTOLIC BLOOD PRESSURE: 65 MMHG | HEART RATE: 67 BPM

## 2017-12-11 LAB
ANION GAP SERPL CALC-SCNC: 6 MMOL/L (ref 0–11.9)
BASOPHILS # BLD AUTO: 0.6 % (ref 0–1.8)
BASOPHILS # BLD: 0.03 K/UL (ref 0–0.12)
BUN SERPL-MCNC: 13 MG/DL (ref 8–22)
CALCIUM SERPL-MCNC: 8.6 MG/DL (ref 8.5–10.5)
CHLORIDE SERPL-SCNC: 108 MMOL/L (ref 96–112)
CO2 SERPL-SCNC: 24 MMOL/L (ref 20–33)
CREAT SERPL-MCNC: 0.71 MG/DL (ref 0.5–1.4)
EOSINOPHIL # BLD AUTO: 0.05 K/UL (ref 0–0.51)
EOSINOPHIL NFR BLD: 1.1 % (ref 0–6.9)
ERYTHROCYTE [DISTWIDTH] IN BLOOD BY AUTOMATED COUNT: 42.3 FL (ref 35.9–50)
GFR SERPL CREATININE-BSD FRML MDRD: >60 ML/MIN/1.73 M 2
GLUCOSE BLD-MCNC: 110 MG/DL (ref 65–99)
GLUCOSE BLD-MCNC: 126 MG/DL (ref 65–99)
GLUCOSE BLD-MCNC: 126 MG/DL (ref 65–99)
GLUCOSE SERPL-MCNC: 115 MG/DL (ref 65–99)
HCT VFR BLD AUTO: 35.9 % (ref 42–52)
HGB BLD-MCNC: 12.3 G/DL (ref 14–18)
IMM GRANULOCYTES # BLD AUTO: 0.03 K/UL (ref 0–0.11)
IMM GRANULOCYTES NFR BLD AUTO: 0.6 % (ref 0–0.9)
LYMPHOCYTES # BLD AUTO: 1.27 K/UL (ref 1–4.8)
LYMPHOCYTES NFR BLD: 27.1 % (ref 22–41)
MCH RBC QN AUTO: 30.8 PG (ref 27–33)
MCHC RBC AUTO-ENTMCNC: 34.3 G/DL (ref 33.7–35.3)
MCV RBC AUTO: 90 FL (ref 81.4–97.8)
MONOCYTES # BLD AUTO: 0.34 K/UL (ref 0–0.85)
MONOCYTES NFR BLD AUTO: 7.2 % (ref 0–13.4)
NEUTROPHILS # BLD AUTO: 2.97 K/UL (ref 1.82–7.42)
NEUTROPHILS NFR BLD: 63.4 % (ref 44–72)
NRBC # BLD AUTO: 0 K/UL
NRBC BLD AUTO-RTO: 0 /100 WBC
PLATELET # BLD AUTO: 188 K/UL (ref 164–446)
PMV BLD AUTO: 9.2 FL (ref 9–12.9)
POTASSIUM SERPL-SCNC: 3.3 MMOL/L (ref 3.6–5.5)
RBC # BLD AUTO: 3.99 M/UL (ref 4.7–6.1)
SODIUM SERPL-SCNC: 138 MMOL/L (ref 135–145)
WBC # BLD AUTO: 4.7 K/UL (ref 4.8–10.8)

## 2017-12-11 PROCEDURE — 99239 HOSP IP/OBS DSCHRG MGMT >30: CPT | Performed by: HOSPITALIST

## 2017-12-11 PROCEDURE — 700111 HCHG RX REV CODE 636 W/ 250 OVERRIDE (IP): Performed by: HOSPITALIST

## 2017-12-11 PROCEDURE — 85025 COMPLETE CBC W/AUTO DIFF WBC: CPT

## 2017-12-11 PROCEDURE — 700102 HCHG RX REV CODE 250 W/ 637 OVERRIDE(OP): Performed by: HOSPITALIST

## 2017-12-11 PROCEDURE — A9270 NON-COVERED ITEM OR SERVICE: HCPCS | Performed by: HOSPITALIST

## 2017-12-11 PROCEDURE — 80048 BASIC METABOLIC PNL TOTAL CA: CPT

## 2017-12-11 PROCEDURE — 82962 GLUCOSE BLOOD TEST: CPT

## 2017-12-11 PROCEDURE — 36415 COLL VENOUS BLD VENIPUNCTURE: CPT

## 2017-12-11 RX ORDER — ACETAMINOPHEN 325 MG/1
650 TABLET ORAL EVERY 6 HOURS
Qty: 30 TAB | Refills: 0 | COMMUNITY
Start: 2017-12-11 | End: 2018-04-19

## 2017-12-11 RX ORDER — AMOXICILLIN AND CLAVULANATE POTASSIUM 875; 125 MG/1; MG/1
1 TABLET, FILM COATED ORAL 2 TIMES DAILY
Qty: 20 TAB | Refills: 0 | Status: SHIPPED | OUTPATIENT
Start: 2017-12-11 | End: 2017-12-21

## 2017-12-11 RX ORDER — AMOXICILLIN AND CLAVULANATE POTASSIUM 875; 125 MG/1; MG/1
1 TABLET, FILM COATED ORAL EVERY 12 HOURS
Status: DISCONTINUED | OUTPATIENT
Start: 2017-12-11 | End: 2017-12-11 | Stop reason: HOSPADM

## 2017-12-11 RX ORDER — L. ACIDOPHILUS/L.BULGARICUS 100MM CELL
1 GRANULES IN PACKET (EA) ORAL
Status: DISCONTINUED | OUTPATIENT
Start: 2017-12-11 | End: 2017-12-11 | Stop reason: HOSPADM

## 2017-12-11 RX ORDER — DOXYCYCLINE 100 MG/1
100 TABLET ORAL EVERY 12 HOURS
Qty: 20 TAB | Refills: 0 | Status: SHIPPED | OUTPATIENT
Start: 2017-12-11 | End: 2018-04-05

## 2017-12-11 RX ORDER — L. ACIDOPHILUS/L.BULGARICUS 100MM CELL
1 GRANULES IN PACKET (EA) ORAL
COMMUNITY
Start: 2017-12-11 | End: 2018-04-05

## 2017-12-11 RX ORDER — DOXYCYCLINE 100 MG/1
100 TABLET ORAL EVERY 12 HOURS
Status: DISCONTINUED | OUTPATIENT
Start: 2017-12-11 | End: 2017-12-11 | Stop reason: HOSPADM

## 2017-12-11 RX ORDER — POTASSIUM CHLORIDE 20 MEQ/1
60 TABLET, EXTENDED RELEASE ORAL ONCE
Status: DISCONTINUED | OUTPATIENT
Start: 2017-12-11 | End: 2017-12-11 | Stop reason: HOSPADM

## 2017-12-11 RX ADMIN — MORPHINE SULFATE 4 MG: 4 INJECTION INTRAVENOUS at 05:55

## 2017-12-11 RX ADMIN — AMOXICILLIN AND CLAVULANATE POTASSIUM 1 TABLET: 875; 125 TABLET, FILM COATED ORAL at 10:06

## 2017-12-11 RX ADMIN — ACETAMINOPHEN 650 MG: 325 TABLET, FILM COATED ORAL at 11:57

## 2017-12-11 RX ADMIN — BUPROPION HYDROCHLORIDE 300 MG: 150 TABLET, EXTENDED RELEASE ORAL at 10:06

## 2017-12-11 RX ADMIN — MORPHINE SULFATE 4 MG: 4 INJECTION INTRAVENOUS at 10:02

## 2017-12-11 RX ADMIN — CEFAZOLIN 2 G: 10 INJECTION, POWDER, FOR SOLUTION INTRAVENOUS at 05:56

## 2017-12-11 RX ADMIN — Medication 220 MG: at 10:06

## 2017-12-11 RX ADMIN — DOXYCYCLINE 100 MG: 100 TABLET ORAL at 10:06

## 2017-12-11 RX ADMIN — INSULIN GLARGINE 38 UNITS: 100 INJECTION, SOLUTION SUBCUTANEOUS at 10:13

## 2017-12-11 RX ADMIN — ACETAMINOPHEN 650 MG: 325 TABLET, FILM COATED ORAL at 05:55

## 2017-12-11 RX ADMIN — HYDROMORPHONE HYDROCHLORIDE 2 MG: 2 TABLET ORAL at 11:57

## 2017-12-11 RX ADMIN — LACTOBACILLUS ACIDOPHILUS / LACTOBACILLUS BULGARICUS 1 PACKET: 100 MILLION CFU STRENGTH GRANULES at 12:10

## 2017-12-11 ASSESSMENT — PAIN SCALES - GENERAL: PAINLEVEL_OUTOF10: 6

## 2017-12-11 NOTE — PROGRESS NOTES
Bedside report completed, assumed pt care. Pt resting in bed, A&Ox4. Assessment complete.  Pt has LUE hand wrapped, dressing CDI.   Left thumb and first finger has numbness and tingling per pt  Cap refill <2 seconds on all fingers LUE, warm  Lung bases diminished  +BM today, +flatus   complaints of pain, medicated per MAR  Pt denies chest pain SOB and nausea  Pt up self, steady.  Discussed plan of care with pt, IV antibiotics.   Call light within reach, bed in low position, possessions within reach, treaded socks on, floor free from trip hazard, Hourly rounding in place. SCDs refused despite education.

## 2017-12-11 NOTE — DISCHARGE INSTRUCTIONS
Discharge Instructions    Discharged to home by car with friend. Discharged via walking, hospital escort: Refused.  Special equipment needed: Not Applicable    Be sure to schedule a follow-up appointment with your primary care doctor or any specialists as instructed.     Discharge Plan:   Diet Plan: Discussed  Activity Level: Discussed  Confirmed Follow up Appointment: Patient to Call and Schedule Appointment  Confirmed Symptoms Management: Discussed  Medication Reconciliation Updated: Yes  Influenza Vaccine Indication: Not indicated: Previously immunized this influenza season and > 8 years of age    I understand that a diet low in cholesterol, fat, and sodium is recommended for good health. Unless I have been given specific instructions below for another diet, I accept this instruction as my diet prescription.   Other diet: Regular diabetic    Special Instructions: None    · Is patient discharged on Warfarin / Coumadin?   No     · Is patient Post Blood Transfusion?  No    Depression / Suicide Risk    As you are discharged from this [facility] facility, it is important to learn how to keep safe from harming yourself.    Recognize the warning signs:  · Abrupt changes in personality, positive or negative- including increase in energy   · Giving away possessions  · Change in eating patterns- significant weight changes-  positive or negative  · Change in sleeping patterns- unable to sleep or sleeping all the time   · Unwillingness or inability to communicate  · Depression  · Unusual sadness, discouragement and loneliness  · Talk of wanting to die  · Neglect of personal appearance   · Rebelliousness- reckless behavior  · Withdrawal from people/activities they love  · Confusion- inability to concentrate     If you or a loved one observes any of these behaviors or has concerns about self-harm, here's what you can do:  · Talk about it- your feelings and reasons for harming yourself  · Remove any means that you might use  to hurt yourself (examples: pills, rope, extension cords, firearm)  · Get professional help from the community (Mental Health, Substance Abuse, psychological counseling)  · Do not be alone:Call your Safe Contact- someone whom you trust who will be there for you.  · Call your local CRISIS HOTLINE 962-7130 or 020-329-9372  · Call your local Children's Mobile Crisis Response Team [place] (904) 213-3260 or www.SunnyBump  · Call the toll free National Suicide Prevention Hotlines   · National Suicide Prevention Lifeline 620-334-QJWU (4060)  · National Hope Line Network 800-SUICIDE (852-2680)      Infection Control in the Home  If you have an infection or you are taking care of someone who has an infection, it is important to know how to keep the infection from spreading. Follow these guidelines to help stop the spread of infection, and talk to your health care provider.  HOW ARE INFECTIONS SPREAD?  In order for an infection to spread, the following must be present:  · A germ. This may be a virus, bacteria, fungus, or parasite.  · A place for the germ to live. This may be:  ¨ On or in a person, animal, plant, or food.  ¨ In soil or water.  ¨ On surfaces, such as a door handle.  · A susceptible host. This is a person or animal who does not have resistance (immunity) to the germ.  · A way for the germ to enter the host. This may occur by:  ¨ Direct contact. This may happen by making contact--such as shaking hands or hugging--with an infected person or animal. Some germs can also travel through the air and spread to you if an infected person coughs or sneezes on you or near you.  ¨ Indirect contact. This is when the germ enters the host through contact with an infected object. Examples include eating contaminated food, drinking contaminated water, or touching a contaminated surface with your hands and then touching your face, nose, or mouth soon after that.  HOW CAN I HELP TO PREVENT INFECTION FROM  SPREADING?  There are several things that you can do to help prevent infection from spreading.  Hand Washing  It is very important to wash your hands correctly, following these steps:  2. Wet your hands with clean, running water.  3. Apply soap to your hands. Liquid soap is better than bar soap.  4. Rub your hands together quickly to create lather.  5. Keep rubbing your hands together for at least 20 seconds. Thoroughly scrub all parts of your hands, including under your fingernails and between your fingers.  6. Rinse your hands with clean, running water until all of the soap is gone.  7. Dry your hands with an air dryer or a clean paper or cloth towel, or let your hands air-dry. Do not use your clothing or a soiled towel to dry your hands.  8. If you are in a public restroom, use your towel to turn off the water faucet and to open the bathroom door.  Make sure to wash your hands:  · Before:  ¨ Visiting a baby or anyone with a weakened or lowered defense (immune) system.  ¨ Putting in and taking out any contact lenses.  · After:  ¨ Working or playing outside.  ¨ Touching an animal or its toys or leash.  ¨ Handling livestock.  ¨ Using the bathroom or helping a child or adult to use the bathroom.  ¨ Using household  or toxic chemicals.  ¨ Touching or taking out the garbage.  ¨ Touching anything dirty around your home.  ¨ Handling soiled clothes or rags.  ¨ Taking care of a sick child. This includes touching used tissues, toys, and clothes.  ¨ Sneezing, coughing, or blowing your nose.  ¨ Using public transportation.  ¨ Shaking hands.  ¨ Using a phone, including your mobile phone.  ¨ Touching money.  · Before and after:  ¨ Preparing food.  ¨ Preparing a bottle for a baby.  ¨ Feeding a baby or a young child.  ¨ Eating.  ¨ Visiting or taking care of someone who is sick.  ¨ Changing a diaper.  ¨ Changing a bandage (dressing) or taking care of an injury or wound.  ¨ Giving or taking medicine.  Taking Care of Your  Home  · Make sure that you have enough cleaning supplies at all times. These include:  ¨ Disinfectants.  ¨ Reusable cleaning cloths. Wash these after each use.  ¨ Paper towels.  ¨ Utility gloves. Replace your gloves if they are cracked or torn or if they start to peel.  · Use bleach safely. Never mix it with other cleaning products, especially those that contain ammonia. This mixture can create a dangerous gas that may be deadly.  · Take care of your cleaning supplies. Toilet brushes, mops, and sponges can breed germs. Soak them in bleach and water for 5 minutes after each use.  · Do not pour used mop water down the sink. Pour it down the toilet instead.  · Maintain proper ventilation in your home.  · If you have a pet, ensure that your pet stays clean. Do not let people with weak immune systems touch bird droppings, fish tank water, or a litter box.  ¨ If you have a cat, be sure to change the litter every day.  · In the bathroom, make sure you:  ¨ Provide liquid soap.  ¨ Change towels and washcloths frequently. Avoid sharing towels and washcloths.  ¨ Change toothbrushes often and store them separately in a clean, dry place.  ¨ Disinfect the toilet.  ¨ Clean the tub, shower, and sink with standard cleaning products.    ¨ Mop the floor with a standard .  ¨ Do not share personal items, such as razors, toothbrushes, drinking glasses, deodorant, mcdonough, brushes, towels, and washcloths.    · In the kitchen, make sure you:  ¨ Store food carefully.  ¨ Refrigerate leftovers promptly in covered containers.  ¨ Throw out stale or spoiled food.  ¨ Clean the inside of your refrigerator each week.  ¨ Keep your refrigerator set at 40°F (4°C) or less, and set your freezer at 0°F (-18°C) or less.  ¨ Thaw foods in the refrigerator or microwave, not at room temperature.  ¨ Serve foods at the proper temperature. Do not eat raw meat. Make sure it is cooked to the appropriate temperature. Cook eggs until they are firm.  ¨ Wash  fruits and vegetables under running water.  ¨ Use separate cutting boards, plates, and utensils for raw foods and cooked foods.  ¨ Keep work surfaces clean.  ¨ Use a clean spoon each time you sample food while cooking.  ¨ Wash your dishes in hot, soapy water. Air-dry your dishes or use a .  ¨ Do not share forks, cups, or spoons during meals.  · Wear gloves if laundry is visibly soiled.  · Change linens each week or whenever they are soiled.  · Do not shake soiled linens. Doing that may send germs into the air. Put dressings, sanitary or incontinence pads, diapers, and gloves in plastic garbage bags for disposal.     This information is not intended to replace advice given to you by your health care provider. Make sure you discuss any questions you have with your health care provider.     Document Released: 09/26/2009 Document Revised: 01/08/2016 Document Reviewed: 08/20/2015  [employer] Interactive Patient Education ©2016 [employer]    Incision and Drainage, Care After  Refer to this sheet in the next few weeks. These instructions provide you with information on caring for yourself after your procedure. Your caregiver may also give you more specific instructions. Your treatment has been planned according to current medical practices, but problems sometimes occur. Call your caregiver if you have any problems or questions after your procedure.  HOME CARE INSTRUCTIONS   · If antibiotic medicine is given, take it as directed. Finish it even if you start to feel better.  · Only take over-the-counter or prescription medicines for pain, discomfort, or fever as directed by your caregiver.  · Keep all follow-up appointments as directed by your caregiver.  · Change any bandages (dressings) as directed by your caregiver. Replace old dressings with clean dressings.  · Wash your hands before and after caring for your wound.  You will receive specific instructions for cleansing and caring for your wound.   SEEK MEDICAL  CARE IF:   · You have increased pain, swelling, or redness around the wound.  · You have increased drainage, smell, or bleeding from the wound.  · You have muscle aches, chills, or you feel generally sick.  · You have a fever.  MAKE SURE YOU:   · Understand these instructions.  · Will watch your condition.  · Will get help right away if you are not doing well or get worse.     This information is not intended to replace advice given to you by your health care provider. Make sure you discuss any questions you have with your health care provider.     Document Released: 03/11/2013 Document Revised: 01/08/2016 Document Reviewed: 03/11/2013  [employer] Interactive Patient Education ©2016 [employer].

## 2017-12-11 NOTE — WOUND TEAM
"[facility]  Inpatient Services  Initial Wound & Skin Care Evaluation    Admission Date: 12/09/2017           HPI, PMH, SH: Reviewed  Unit where seen by Wound Team: T [id number]    WOUND CONSULT RELATED TO: complicated open surgical wound left hand    SUBJECTIVE: \" I was taking care of this at home and then it got worse.\"     Self Report / Pain Level: 7/10 with IV pre-medication    OBJECTIVE: Patient in bed with roll gauze and ace wrap to left hand  WOUND TYPE, LOCATION, CHARACTERISTICS (Pressure ulcers: location, stage, POA or date identified)    Wound Type/Location: complicated open surgical, left hand    Periwound: red, indurated     Drainage: minimal sersosanguinous      Tissue Type and %:  70% red, 25% yellow tissue, 5% black    Wound Edges: open    Odor: none     Exposed structure(s): none    S&S of Infection: none      Measurements: taken 12/11/2017    Length: 1.9 cm   Width:  2 cm   Depth:  0.9 cm   Tracts/undermining: none        INTERVENTIONS BY WOUND TEAM: Ace wrap removed, roll gauze and packing removed. Wound irrigated with normal saline. Wound packed with 1/4\" iodoform packing strip, covered with non adhesive foam, secured with hypafix tape. Wrapped with roll gauze and then loosely with ace wrap.     Interdisciplinary Collaboration: staff RN, patient    EVALUATION: Patient well known to wound team for previous treatments of infected wound to right thigh. Patient is poorly controlled diabetic with last A1c of 12.8. Per patient, he was cutting down a [place] tree and got a splinter. It progressively got worse until he had had an I&D on 12/10 by Dr. Ramos. Wound will likely progress slowly due to diabetes and infection.    Factors affecting wound healing: diabetes, infection  Goals: decrease size by 1% each week    NURSING PLAN OF CARE: (X)  Dressing changes: See Dressing Maintenance orders: X  Skin care: See Skin Care orders:   Rectal tube care: See Rectal Tube Care orders:   Other " orders:    RSKIN: CURRENT (X) ORDERED (O)  Q shift Garrett:  X  Q shift pressure point assessments:  X  Atmosair        BRANDON      Bariatric BRANDON      Bariatric foam        Heel float boots       Heels floated on pillows patient independent with bed mobility     Barrier wipes      Barrier Cream      Barrier paste      Sacral silicone dressing      Silicone O2 tubing      Anchorfast      Trach with Optifoam split foam       Waffle cushion      Rectal tube or BMS      Antifungal tx    Turn q 2 hours see above  Up to chair    Ambulate   PT/OT     Dietician      PO  X   TF   TPN     PVN    NPO   # days   Other       WOUND TEAM PLAN OF CARE (X):   NPWT change 3 x week:        Dressing changes by wound team:       Follow up as needed: X      Other (explain):    Anticipated discharge plans (X):  SNF:           Home Care:           Outpatient Wound Center:            Self Care:            Other: TBD

## 2017-12-11 NOTE — CONSULTS
Diabetes education: Pt discharged before seen.  CDE will follow up with phone call.  1536: Message left for patient on his cell phone with number for CDE's office: [phone].

## 2017-12-11 NOTE — CARE PLAN
Problem: Knowledge Deficit  Goal: Knowledge of disease process/condition, treatment plan, diagnostic tests, and medications will improve  Educated pt on POC  IV antibiotics, wound care to see pt tomorrow  All questions answered.     Problem: Pain Management  Goal: Pain level will decrease to patient's comfort goal  Medicated per MAR, pt able to rest comfortably  Ice pack given

## 2017-12-11 NOTE — CARE PLAN
Problem: Infection  Goal: Will remain free from infection  Outcome: PROGRESSING AS EXPECTED  Reminded patient of the signs and symptoms of infection and encouraged patient to report any of these findings in order to promote best outcomes.    Problem: Pain Management  Goal: Pain level will decrease to patient's comfort goal  Outcome: PROGRESSING AS EXPECTED  Assessed pain and medicated per MAR.  Reminded patient to report any changes in severity or quality of pain in order to best manage pain.    Problem: Psychosocial Needs:  Goal: Level of anxiety will decrease  Outcome: PROGRESSING AS EXPECTED  Decreased stimuli, dimmed lighting, provided emotional support, reduced noise levels and minimized interruptions.

## 2017-12-11 NOTE — DISCHARGE SUMMARY
"CHIEF COMPLAINT ON ADMISSION  Chief Complaint   Patient presents with   • Hand Swelling       CODE STATUS  Full Code    HPI & HOSPITAL COURSE  52 y.o. male who presented on 12/9/2017 with Worsening erythema, swelling, and pain of his left hand and thumb. The patient was cutting down [place] trees a week and a half ago when he was poked and had a splinter in his left hand. He reports that the splinter \"came to a head\" therefore he self extracted the wood this past Monday. By Wednesday, his thumb and his left hand were swollen and red. He presented himself to the emergency room yesterday where he underwent an I and D of a abscess of the left thumb base. He was given IV antibiotics and sent home on oral antibiotic therapy. He was given strict instructions to return to the hospital if there is any worsening. Today when he had dressing changes, he reports that the wound looked significantly worse and he also had worsening pain. Therefore he presented himself for further evaluation. He otherwise denies any systemic issues, he's had no fevers, chills, nausea, vomiting, headache, chest pain, abdominal pain, diarrhea, or dysuria. He reports that his sugars have been out of control for the last week and a half since this occurred.    He was empirically started on Zosyn and then changed to Unasyn.  He underwent L-thumb I&D by orthopedic surgeon w/o complications.  The wound culture grew GBS and MSSA.  The IV antibiotic was changed to Augmentin and doxycycline.  The uncontrolled DM 2 was managed c home regimen and ISS.  His HbA1c was 12.8 and diabetes education was provided for reinforcment of good habits.  Medications for his chronic medical issues were continued.  With the above measures his symptoms improved significantly.  At the time of this summary the patient was eating and drinking well, having BM's and was hemodynamically stable.    Therefore, he is discharged in improved and stable condition with close outpatient " follow-up.    SPECIFIC OUTPATIENT FOLLOW-UP  Dr. JAS Ramos (ortho) per his rec.    DISCHARGE PROBLEM LIST  Principal Problem:    Abscess of left thumb POA: Yes  Active Problems:    Cellulitis of left hand POA: Yes    Obesity (BMI 30.0-34.9) POA: Unknown    Uncontrolled type 2 diabetes mellitus with hyperglycemia (CMS-[facility]) POA: Unknown    History of nephrolithiasis POA: Unknown    Insomnia disorder POA: Unknown  Resolved Problems:    * No resolved hospital problems. *      FOLLOW UP  Future Appointments  Date Time Provider Department Center   1/4/2018 3:50 PM CLINT Manning   1/4/2018 4:15 PM STACY Tucker       MEDICATIONS ON DISCHARGE   Mahesh Bradshaw   Home Medication Instructions CALVIN:[id number]    Printed on:12/11/17 1430   Medication Information                      acetaminophen (TYLENOL) 325 MG Tab  Take 2 Tabs by mouth every 6 hours.             amoxicillin-clavulanate (AUGMENTIN) 875-125 MG Tab  Take 1 Tab by mouth 2 times a day for 10 days.             buPROPion (WELLBUTRIN XL) 300 MG XL tablet  Take 300 mg by mouth every morning.             Cholecalciferol (VITAMIN D3) 5000 UNITS Tab  Take 5,000 Units by mouth every evening.             doxycycline monohydrate (ADOXA) 100 MG tablet  Take 1 Tab by mouth every 12 hours.             ibuprofen (MOTRIN) 200 MG Tab  Take 600-800 mg by mouth every 8 hours as needed for Mild Pain.             insulin glargine (LANTUS) 100 UNIT/ML Solution  Inject 38 Units as instructed 2 times a day.             insulin lispro (HUMALOG) 100 UNIT/ML Solution  Inject 2-10 Units as instructed 3 times a day before meals. Sliding Scale - 2 units every 50 > 150             lactobacillus granules (LACTINEX/FLORANEX) Pack  Take 1 Packet by mouth 3 times a day, with meals.             magnesium oxide (MAG-OX) 400 MG Tab  Take 400 mg by mouth every day.             therapeutic multivitamin-minerals (THERAGRAN-M) Tab  Take 1 Tab  by mouth every day.             thyroid (ARMOUR THYROID) 60 MG Tab  Take 60 mg by mouth every day.             trazodone (DESYREL) 100 MG Tab  Take 100 mg by mouth every evening.             zinc sulfate (ZINCATE) 220 (50 Zn) MG Cap  Take 220 mg by mouth every day.                 DIET  Orders Placed This Encounter   Procedures   • Diet Order     Standing Status:   Standing     Number of Occurrences:   1     Order Specific Question:   Diet:     Answer:   Diabetic [3]     Order Specific Question:   Calorie modifications:     Answer:   1800 kcals [4]       ACTIVITY  Gradual increase in activity.      CONSULTATIONS  Dr. JAS Ramos - ortho    PROCEDURES  DX-HAND 3+ LEFT   Final Result      1.  Soft tissue edema along the radial aspect of the first metacarpal.      2.  No radiographic evidence for osteomyelitis is identified.        L-thumb/hand I&D      LABORATORY  Lab Results   Component Value Date/Time    SODIUM 138 12/11/2017 12:30 AM    POTASSIUM 3.3 (L) 12/11/2017 12:30 AM    CHLORIDE 108 12/11/2017 12:30 AM    CO2 24 12/11/2017 12:30 AM    GLUCOSE 115 (H) 12/11/2017 12:30 AM    BUN 13 12/11/2017 12:30 AM    CREATININE 0.71 12/11/2017 12:30 AM    CREATININE 1.1 02/18/2008 10:00 AM        Lab Results   Component Value Date/Time    WBC 4.7 (L) 12/11/2017 12:30 AM    HEMOGLOBIN 12.3 (L) 12/11/2017 12:30 AM    HEMATOCRIT 35.9 (L) 12/11/2017 12:30 AM    PLATELETCT 188 12/11/2017 12:30 AM        Total time of the discharge process exceeds 35 min.

## 2017-12-11 NOTE — PROGRESS NOTES
"   Orthopaedic PA Progress Note    Interval changes:Spouse at bedside. Feels good, dressing CDI, eager to go home. Spouse is [facility] ED RN    ROS - Patient denies any new issues. No chest pain, dyspnea, or fever.  Pain well controlled.    Blood pressure 104/65, pulse 67, temperature 36 °C (96.8 °F), resp. rate 16, height 1.727 m (5' 8\"), weight 99.5 kg (219 lb 6.4 oz), SpO2 98 %.    Patient seen and examined  No acute distress  Breathing non labored  RRR  Surgical dressing is clean, dry, and intact. Patient clearly fires forearm flexors, forearm extensors, and moves all five fingers without issue . Sensation is intact to light touch throughout median, ulnar, and radial nerve distributions. Strong and palpable radial pulses with capillary refill less than 2 seconds. No arm or hand discomfort.    Recent Labs      12/09/17   2248  12/10/17   0346  12/11/17   0030   WBC  6.6  5.8  4.7*   RBC  4.09*  4.11*  3.99*   HEMOGLOBIN  12.7*  12.8*  12.3*   HEMATOCRIT  36.2*  36.6*  35.9*   MCV  88.5  89.1  90.0   MCH  31.1  31.1  30.8   MCHC  35.1  35.0  34.3   RDW  41.4  42.3  42.3   PLATELETCT  209  204  188   MPV  9.0  9.4  9.2       Active Hospital Problems    Diagnosis   • Abscess of left thumb [L02.512]     Priority: High   • Cellulitis of left hand [L03.114]     Priority: High   • Obesity (BMI 30.0-34.9) [E66.9]   • Uncontrolled type 2 diabetes mellitus with hyperglycemia (CMS-HCC) [E11.65]   • History of nephrolithiasis [Z87.442]   • Insomnia disorder [G47.00]       Assessment/Plan:  POD#1 S/P I&D left hand , DM; doing well      Micro - mixed Staph/strep    Wt bearing status - AT  PT/OT-initiated  Wound care:wound care team for routine packing changes, OK for HH  Drains - none  Sousa-no  Sutures/Staples out- 10-14 days post operatively  Antibiotics: IV complete, PO Augmentin/doxycycline started by med team  DVT Prophylaxis- TEDS/SCDs/Foot pumps.   Future Procedures - not anticipated  Case Coordination for Discharge " Planning - Disposition OK for DC home from Ortho perspective when cleared by med, f/u Dr Clarke 1-2 weeks

## 2017-12-14 LAB
BACTERIA BLD CULT: NORMAL
BACTERIA SPEC ANAEROBE CULT: ABNORMAL
BACTERIA SPEC ANAEROBE CULT: ABNORMAL
SIGNIFICANT IND 70042: ABNORMAL
SIGNIFICANT IND 70042: NORMAL
SIGNIFICANT IND 70042: NORMAL
SITE SITE: ABNORMAL
SITE SITE: NORMAL
SITE SITE: NORMAL
SOURCE SOURCE: ABNORMAL
SOURCE SOURCE: NORMAL
SOURCE SOURCE: NORMAL

## 2017-12-21 NOTE — ADDENDUM NOTE
Encounter addended by: Belén Gill R.N. on: 12/21/2017 12:08 PM<BR>    Actions taken: Flowsheet accepted

## 2017-12-27 ENCOUNTER — NON-PROVIDER VISIT (OUTPATIENT)
Dept: URGENT CARE | Facility: CLINIC | Age: 52
End: 2017-12-27
Payer: COMMERCIAL

## 2017-12-27 DIAGNOSIS — Z02.89 ENCOUNTER FOR OTHER ADMINISTRATIVE EXAMINATIONS: ICD-10-CM

## 2017-12-27 PROCEDURE — 8907 PR URINE COLLECT ONLY: Performed by: FAMILY MEDICINE

## 2018-01-04 ENCOUNTER — NON-PROVIDER VISIT (OUTPATIENT)
Dept: HEALTH INFORMATION MANAGEMENT | Facility: MEDICAL CENTER | Age: 53
End: 2018-01-04
Payer: COMMERCIAL

## 2018-01-04 VITALS
BODY MASS INDEX: 31.58 KG/M2 | SYSTOLIC BLOOD PRESSURE: 124 MMHG | TEMPERATURE: 98.5 F | HEIGHT: 68 IN | OXYGEN SATURATION: 94 % | DIASTOLIC BLOOD PRESSURE: 82 MMHG | HEART RATE: 110 BPM | WEIGHT: 208.4 LBS

## 2018-01-04 DIAGNOSIS — K42.9 UMBILICAL HERNIA WITHOUT OBSTRUCTION AND WITHOUT GANGRENE: ICD-10-CM

## 2018-01-04 DIAGNOSIS — E11.9 TYPE 2 DIABETES MELLITUS WITHOUT COMPLICATION, UNSPECIFIED LONG TERM INSULIN USE STATUS: ICD-10-CM

## 2018-01-04 DIAGNOSIS — E66.9 OBESITY, UNSPECIFIED OBESITY SEVERITY, UNSPECIFIED OBESITY TYPE: ICD-10-CM

## 2018-01-04 DIAGNOSIS — E11.65 UNCONTROLLED TYPE 2 DIABETES MELLITUS WITH HYPERGLYCEMIA, WITH LONG-TERM CURRENT USE OF INSULIN (HCC): ICD-10-CM

## 2018-01-04 DIAGNOSIS — I10 ESSENTIAL HYPERTENSION: ICD-10-CM

## 2018-01-04 DIAGNOSIS — Z79.4 UNCONTROLLED TYPE 2 DIABETES MELLITUS WITH HYPERGLYCEMIA, WITH LONG-TERM CURRENT USE OF INSULIN (HCC): ICD-10-CM

## 2018-01-04 DIAGNOSIS — E66.9 OBESITY (BMI 30.0-34.9): ICD-10-CM

## 2018-01-04 PROCEDURE — 97803 MED NUTRITION INDIV SUBSEQ: CPT | Performed by: DIETITIAN, REGISTERED

## 2018-01-04 PROCEDURE — 99213 OFFICE O/P EST LOW 20 MIN: CPT | Performed by: INTERNAL MEDICINE

## 2018-01-04 ASSESSMENT — PAIN SCALES - GENERAL: PAINLEVEL: NO PAIN

## 2018-01-05 NOTE — PATIENT INSTRUCTIONS
Goal is 1600 kcal per day  Goal is 100 gram protein per day and ideally 50 grams total carbs per day, at least under 100  Exercise is 30 min per day minimum, 60 min per day goal  64+ oz water per day  Consider desk that allows you to stand while working at your computer  Labs when you are able

## 2018-01-05 NOTE — PROGRESS NOTES
"Nutrition Reassess: Medical Weight Management   Today's date: 1/4/2018  Referring Provider: Shira Campos A.P.*      Time: in/out 1632 - 1650    Subjective:  -Trying to track kcal intake and struggles with knowing how accurate his kcal tracking is  -Started hiking 2 miles on 2-3 days/week   -Cut down on his consumption of eggs in the morning (2 instead of 4) and only one protein shake daily now    Anthropometrics/Objective  Today's weight: 208.4#  Height: 68\"  BMI: 31.7  Starting weight/date 212.7#    Total weight change : - 4.3#           ReAssesment/Notes:  Mahesh is going to do a better job with his estimating of foods, and if he thinks that the kcal count is off because he selected a leaner cut of beef than what he was given he will add ~100 kcal from a fat to the meal.  We are also asking him to start looking at his CHO intake better, with a goal of 50 grams or less per day.  We reviewed the past 3 days of tracking and he was consuming 100-150 grams/day of CHO; I have instructed him to look at the end of each day and assess where he can get better with his CHO intake, which should help him eventually get down to 50 grams or less each day.  He is going to call for a f/u once he knows when he is having surgery to repair his hernia.    Follow-up: prn    "

## 2018-01-05 NOTE — PROGRESS NOTES
Bariatric Medicine Follow Up  Chief Complaint   Patient presents with   • Weight Gain       History of Present Illness:   Mahesh Bradshaw is a 52 y.o. male who presents for weight management follow-up and to help address co-morbidities related to overweight, including uncontrolled diabetes mellitus, hypertension.    During the patient's last visit, the following were discussed and recommended:  Weight Goal: 5% wt loss at one month after start (pt goal weight is 160-190 lb), reduced waist circumference 40 inches or less.  The patient's primary care physician suggests a weight of 160, the patient suggests a weight of 190 lb.  Diet: Change morning regimen to one protein shake each morning, to instead of 4 eggs.  Meal plan options given.Begin by reducing total kcal intake per day to under 2000.  Physical Activity: None. Consider exercise plan for 2018. Patient will need inhaler due to exercise-induced asthma.  Risk level for moderate/vigorous exercise program: Moderate  New Rx: None at this time  Side Effects: Will review consent if applicable.  Behavior change: Redirect calorie and carbohydrate intake, to reduce both, as detailed with pt today.  Follow-up: 2 weeks; follow-up on recent labs at next visit      The patient has been having about 5390-7455 kcals per day. He is trying to use the phone malu lose it, and put in all of his intake, but has some trouble measuring what he is taking in. He has 2 eggs instead of 4 in the morning, and cut his a.m. protein shake out. He uses an evening protein shake from the [employer] store, which another physician recommended, along with milk and berries. He is trying to watch what he eats in the cafeteria, and using the plate method, but still having some trouble measuring. Did struggle over Benedicto and New Year's with eating more sweets and pies, as his fiancée is a former worrell.    His blood sugars have still been high at times, and he has trouble healing. He recently had  "a left hand splinter, which required IV antibiotics and an inpatient stay to heal. He is also hoping to get his umbilical hernia repaired in the next few weeks. His hypertension is currently controlled off antihypertensives.    Drinking 32 ounces of water per day.     Exercise:   Hiking 3 times per week for 1-2 miles each time, using weights and cardio the other days for about 30 minutes. Not lifting heavy weights due to umbilical hernia.    Life Style Changes:  Will be getting  this spring, has a more sedentary job at [employer]     Review of Systems   5 loose bowel movements per day, not concerned as he normally has 2. No diarrhea, no hematochezia or melena.  All other ROS were reviewed and are otherwise unchanged from my previous visit with patient.      Physical Exam:  Encounter Vitals  Temperature: 36.9 °C (98.5 °F)  Blood Pressure: 124/82  Pulse: (!) 110 - patient just ran up the stairs to make appointment  Pulse Oximetry: 94 %  Weight: 94.5 kg (208 lb 6.4 oz)  Height: 172.7 cm (5' 8\")  Waist: 47 in  BMI (Calculated): 31.69  Pain Score: No pain  Body fat % 36.2  REE 1958 kcal/day    Weight change since last visit: -4.3 lb (-4.3 lb total)  Waist Circum change since last visit: +1 in (+1 in total) - patient thinks related to larger umbilical hernia    Constitutional: Oriented to person, place, and time and well-developed, well-nourished, and in no distress.    Musculoskeletal: Normal range of motion. No edema.   Neurological: Alert and oriented to person, place, and time. No muscle weakness.  Gait normal.   Skin: Warm and dry. Not diaphoretic.  Healing wound left hand  Psychiatric: Mood, memory, affect and judgment normal.     Laboratory:   Did not obtain, due to hand infection    Dietitian Assessment: I have reviewed the Dietitian's assessment related to this encounter.       ASSESSMENT/PLAN:  Body mass index is 31.69 kg/m².    Obesity Stage ([place]):  2; Class 1    1. Uncontrolled type 2 diabetes " mellitus with hyperglycemia, with long-term current use of insulin (CMS-HCC)     2. Obesity (BMI 30.0-34.9)     3. Essential hypertension     4. Umbilical hernia without obstruction and without gangrene       The patient is trying hard to reduce his intake and reduce carbohydrates as well. He had a setback with the holidays, due to a hand infection. Once his umbilical hernia has been repaired, he plans to exercise more. He will be working with the dietitian today to improve his intake, and should use MyFitnessPal instead of Lose It to track intake. His blood pressure is controlled. His blood glucoses should be much easier to control by lowering his carbohydrate intake further.      The patient and I have discussed at length and agree to the following recommendations, which are all addressing the above diagnoses:    Weight Goal: 5% wt loss at one month after start (pt goal weight is 160-190 lb)  Diet: 2 eggs in the morning, protein shake each evening, reduce intake to 1600 kcal per day, with carbohydrate intake closer to 50 g per day  64+ ounces water intake per day  Physical Activity: 30 minutes per day minimum, 60 minutes per day goal for the next month  Risk level for moderate/vigorous exercise program: Moderate  New Rx: None  Side Effects: Will review consent if applicable.  Behavior change: Continue to plan meals and track intake  Get a desk where he can stand to work at the computer, which he is planning on in 1-2 months time  Follow-up: One month (per patient request, maybe 6 weeks due to hernia repair)  Labs when okay with PCP.

## 2018-01-19 ENCOUNTER — HOSPITAL ENCOUNTER (OUTPATIENT)
Dept: LAB | Facility: MEDICAL CENTER | Age: 53
End: 2018-01-19
Attending: NURSE PRACTITIONER
Payer: COMMERCIAL

## 2018-01-19 ENCOUNTER — HOSPITAL ENCOUNTER (OUTPATIENT)
Dept: LAB | Facility: MEDICAL CENTER | Age: 53
End: 2018-01-19
Payer: COMMERCIAL

## 2018-01-19 LAB
25(OH)D3 SERPL-MCNC: 31 NG/ML (ref 30–100)
ALBUMIN SERPL BCP-MCNC: 4.4 G/DL (ref 3.2–4.9)
ALBUMIN/GLOB SERPL: 1.5 G/DL
ALP SERPL-CCNC: 61 U/L (ref 30–99)
ALT SERPL-CCNC: 18 U/L (ref 2–50)
ANION GAP SERPL CALC-SCNC: 9 MMOL/L (ref 0–11.9)
APPEARANCE UR: CLEAR
AST SERPL-CCNC: 19 U/L (ref 12–45)
BASOPHILS # BLD AUTO: 0.3 % (ref 0–1.8)
BASOPHILS # BLD: 0.01 K/UL (ref 0–0.12)
BDY FAT % MEASURED: 29.9 %
BILIRUB SERPL-MCNC: 0.5 MG/DL (ref 0.1–1.5)
BILIRUB UR QL STRIP.AUTO: NEGATIVE
BP DIAS: 76 MMHG
BP SYS: 122 MMHG
BUN SERPL-MCNC: 21 MG/DL (ref 8–22)
CALCIUM SERPL-MCNC: 9.6 MG/DL (ref 8.5–10.5)
CHLORIDE SERPL-SCNC: 105 MMOL/L (ref 96–112)
CHOLEST SERPL-MCNC: 184 MG/DL (ref 100–199)
CHOLEST SERPL-MCNC: 187 MG/DL (ref 100–199)
CO2 SERPL-SCNC: 23 MMOL/L (ref 20–33)
COLOR UR: YELLOW
CREAT SERPL-MCNC: 0.88 MG/DL (ref 0.5–1.4)
CREAT UR-MCNC: 153.4 MG/DL
CULTURE IF INDICATED INDCX: NO UA CULTURE
DIABETES HTDIA: YES
EOSINOPHIL # BLD AUTO: 0.05 K/UL (ref 0–0.51)
EOSINOPHIL NFR BLD: 1.3 % (ref 0–6.9)
ERYTHROCYTE [DISTWIDTH] IN BLOOD BY AUTOMATED COUNT: 46.3 FL (ref 35.9–50)
EST. AVERAGE GLUCOSE BLD GHB EST-MCNC: 249 MG/DL
EST. AVERAGE GLUCOSE BLD GHB EST-MCNC: 252 MG/DL
EVENT NAME HTEVT: NORMAL
FASTING HTFAS: YES
FERRITIN SERPL-MCNC: 84.7 NG/ML (ref 22–322)
FOLATE SERPL-MCNC: >23.5 NG/ML
GLOBULIN SER CALC-MCNC: 2.9 G/DL (ref 1.9–3.5)
GLUCOSE SERPL-MCNC: 123 MG/DL (ref 65–99)
GLUCOSE SERPL-MCNC: 129 MG/DL (ref 65–99)
GLUCOSE UR STRIP.AUTO-MCNC: NEGATIVE MG/DL
HBA1C MFR BLD: 10.3 % (ref 0–5.6)
HBA1C MFR BLD: 10.4 % (ref 0–5.6)
HCT VFR BLD AUTO: 42.6 % (ref 42–52)
HDLC SERPL-MCNC: 39 MG/DL
HDLC SERPL-MCNC: 40 MG/DL
HGB BLD-MCNC: 14.6 G/DL (ref 14–18)
HYPERTENSION HTHYP: NO
IMM GRANULOCYTES # BLD AUTO: 0.01 K/UL (ref 0–0.11)
IMM GRANULOCYTES NFR BLD AUTO: 0.3 % (ref 0–0.9)
IRON SATN MFR SERPL: 22 % (ref 15–55)
IRON SERPL-MCNC: 106 UG/DL (ref 50–180)
KETONES UR STRIP.AUTO-MCNC: 15 MG/DL
LDLC SERPL CALC-MCNC: 117 MG/DL
LDLC SERPL CALC-MCNC: 121 MG/DL
LEUKOCYTE ESTERASE UR QL STRIP.AUTO: NEGATIVE
LYMPHOCYTES # BLD AUTO: 1.15 K/UL (ref 1–4.8)
LYMPHOCYTES NFR BLD: 30.4 % (ref 22–41)
MCH RBC QN AUTO: 31.5 PG (ref 27–33)
MCHC RBC AUTO-ENTMCNC: 34.3 G/DL (ref 33.7–35.3)
MCV RBC AUTO: 91.8 FL (ref 81.4–97.8)
MICRO URNS: ABNORMAL
MICROALBUMIN UR-MCNC: <0.7 MG/DL
MICROALBUMIN/CREAT UR: NORMAL MG/G (ref 0–30)
MONOCYTES # BLD AUTO: 0.25 K/UL (ref 0–0.85)
MONOCYTES NFR BLD AUTO: 6.6 % (ref 0–13.4)
NEUTROPHILS # BLD AUTO: 2.31 K/UL (ref 1.82–7.42)
NEUTROPHILS NFR BLD: 61.1 % (ref 44–72)
NITRITE UR QL STRIP.AUTO: NEGATIVE
NRBC # BLD AUTO: 0 K/UL
NRBC BLD-RTO: 0 /100 WBC
PH UR STRIP.AUTO: 5 [PH]
PLATELET # BLD AUTO: 227 K/UL (ref 164–446)
PMV BLD AUTO: 9.6 FL (ref 9–12.9)
POTASSIUM SERPL-SCNC: 4.1 MMOL/L (ref 3.6–5.5)
PROT SERPL-MCNC: 7.3 G/DL (ref 6–8.2)
PROT UR QL STRIP: NEGATIVE MG/DL
PSA SERPL-MCNC: 2.33 NG/ML (ref 0–4)
RBC # BLD AUTO: 4.64 M/UL (ref 4.7–6.1)
RBC UR QL AUTO: NEGATIVE
SCREENING LOC CITY HTCIT: NORMAL
SCREENING LOC STATE HTSTA: NORMAL
SCREENING LOCATION HTLOC: NORMAL
SMOKING HTSMO: NO
SODIUM SERPL-SCNC: 137 MMOL/L (ref 135–145)
SP GR UR STRIP.AUTO: 1.02
SUBSCRIBER ID HTSID: NORMAL
T3 SERPL-MCNC: 81.7 NG/DL (ref 60–181)
T4 FREE SERPL-MCNC: 0.85 NG/DL (ref 0.53–1.43)
T4 SERPL-MCNC: 5.4 UG/DL (ref 4–12)
TIBC SERPL-MCNC: 480 UG/DL (ref 250–450)
TRANSFERRIN SERPL-MCNC: 342 MG/DL (ref 200–370)
TRIGL SERPL-MCNC: 133 MG/DL (ref 0–149)
TRIGL SERPL-MCNC: 134 MG/DL (ref 0–149)
TSH SERPL DL<=0.005 MIU/L-ACNC: 2.73 UIU/ML (ref 0.38–5.33)
UROBILINOGEN UR STRIP.AUTO-MCNC: 0.2 MG/DL
VIT B12 SERPL-MCNC: 552 PG/ML (ref 211–911)
WBC # BLD AUTO: 3.8 K/UL (ref 4.8–10.8)

## 2018-01-19 PROCEDURE — 82607 VITAMIN B-12: CPT

## 2018-01-19 PROCEDURE — 84153 ASSAY OF PSA TOTAL: CPT

## 2018-01-19 PROCEDURE — 83540 ASSAY OF IRON: CPT

## 2018-01-19 PROCEDURE — 84270 ASSAY OF SEX HORMONE GLOBUL: CPT

## 2018-01-19 PROCEDURE — 83036 HEMOGLOBIN GLYCOSYLATED A1C: CPT

## 2018-01-19 PROCEDURE — 84466 ASSAY OF TRANSFERRIN: CPT

## 2018-01-19 PROCEDURE — 85025 COMPLETE CBC W/AUTO DIFF WBC: CPT

## 2018-01-19 PROCEDURE — 82947 ASSAY GLUCOSE BLOOD QUANT: CPT

## 2018-01-19 PROCEDURE — S5190 WELLNESS ASSESSMENT BY NONPH: HCPCS

## 2018-01-19 PROCEDURE — 84439 ASSAY OF FREE THYROXINE: CPT

## 2018-01-19 PROCEDURE — 84403 ASSAY OF TOTAL TESTOSTERONE: CPT

## 2018-01-19 PROCEDURE — 84402 ASSAY OF FREE TESTOSTERONE: CPT

## 2018-01-19 PROCEDURE — 82728 ASSAY OF FERRITIN: CPT

## 2018-01-19 PROCEDURE — 82043 UR ALBUMIN QUANTITATIVE: CPT

## 2018-01-19 PROCEDURE — 36415 COLL VENOUS BLD VENIPUNCTURE: CPT

## 2018-01-19 PROCEDURE — 84480 ASSAY TRIIODOTHYRONINE (T3): CPT

## 2018-01-19 PROCEDURE — 84443 ASSAY THYROID STIM HORMONE: CPT

## 2018-01-19 PROCEDURE — 82570 ASSAY OF URINE CREATININE: CPT

## 2018-01-19 PROCEDURE — 81003 URINALYSIS AUTO W/O SCOPE: CPT

## 2018-01-19 PROCEDURE — 84425 ASSAY OF VITAMIN B-1: CPT

## 2018-01-19 PROCEDURE — 82746 ASSAY OF FOLIC ACID SERUM: CPT

## 2018-01-19 PROCEDURE — 83036 HEMOGLOBIN GLYCOSYLATED A1C: CPT | Mod: 91

## 2018-01-19 PROCEDURE — 83550 IRON BINDING TEST: CPT

## 2018-01-19 PROCEDURE — 82306 VITAMIN D 25 HYDROXY: CPT

## 2018-01-19 PROCEDURE — 80061 LIPID PANEL: CPT

## 2018-01-19 PROCEDURE — 80061 LIPID PANEL: CPT | Mod: 91

## 2018-01-19 PROCEDURE — 80053 COMPREHEN METABOLIC PANEL: CPT

## 2018-01-20 LAB
SHBG SERPL-SCNC: 27 NMOL/L (ref 11–80)
TESTOST FREE MFR SERPL: 2.1 % (ref 1.6–2.9)
TESTOST FREE SERPL-MCNC: 85 PG/ML (ref 47–244)
TESTOST SERPL-MCNC: 414 NG/DL (ref 300–890)

## 2018-01-23 LAB — VIT B1 BLD-MCNC: 72 NMOL/L (ref 70–180)

## 2018-02-07 ENCOUNTER — NON-PROVIDER VISIT (OUTPATIENT)
Dept: HEALTH INFORMATION MANAGEMENT | Facility: MEDICAL CENTER | Age: 53
End: 2018-02-07
Payer: COMMERCIAL

## 2018-02-07 VITALS
WEIGHT: 202.2 LBS | HEIGHT: 68 IN | OXYGEN SATURATION: 98 % | SYSTOLIC BLOOD PRESSURE: 102 MMHG | TEMPERATURE: 97.8 F | DIASTOLIC BLOOD PRESSURE: 72 MMHG | HEART RATE: 68 BPM | BODY MASS INDEX: 30.65 KG/M2

## 2018-02-07 VITALS — BODY MASS INDEX: 30.65 KG/M2 | WEIGHT: 202.2 LBS | HEIGHT: 68 IN

## 2018-02-07 DIAGNOSIS — E11.9 TYPE 2 DIABETES MELLITUS WITHOUT COMPLICATION, WITH LONG-TERM CURRENT USE OF INSULIN (HCC): ICD-10-CM

## 2018-02-07 DIAGNOSIS — K42.9 UMBILICAL HERNIA WITHOUT OBSTRUCTION AND WITHOUT GANGRENE: ICD-10-CM

## 2018-02-07 DIAGNOSIS — K21.9 GASTROESOPHAGEAL REFLUX DISEASE WITHOUT ESOPHAGITIS: ICD-10-CM

## 2018-02-07 DIAGNOSIS — Z79.4 TYPE 2 DIABETES MELLITUS WITHOUT COMPLICATION, WITH LONG-TERM CURRENT USE OF INSULIN (HCC): ICD-10-CM

## 2018-02-07 DIAGNOSIS — I10 ESSENTIAL HYPERTENSION: ICD-10-CM

## 2018-02-07 DIAGNOSIS — E66.9 OBESITY, UNSPECIFIED OBESITY SEVERITY, UNSPECIFIED OBESITY TYPE: ICD-10-CM

## 2018-02-07 DIAGNOSIS — E11.9 TYPE 2 DIABETES MELLITUS WITHOUT COMPLICATION, UNSPECIFIED LONG TERM INSULIN USE STATUS: ICD-10-CM

## 2018-02-07 DIAGNOSIS — E66.9 OBESITY (BMI 30.0-34.9): ICD-10-CM

## 2018-02-07 PROCEDURE — 99213 OFFICE O/P EST LOW 20 MIN: CPT | Performed by: INTERNAL MEDICINE

## 2018-02-07 PROCEDURE — 97803 MED NUTRITION INDIV SUBSEQ: CPT | Performed by: DIETITIAN, REGISTERED

## 2018-02-07 NOTE — PROGRESS NOTES
Bariatric Medicine Follow Up  Chief Complaint   Patient presents with   • Weight Gain       History of Present Illness:   Mahesh Bradshaw is a 52 y.o. male who presents for weight management follow-up and to help address co-morbidities related to overweight, including uncontrolled type 2 diabetes mellitus, hypertension.    During the patient's last visit, the following were discussed and recommended:  Weight Goal: 5% wt loss at one month after start (pt goal weight is 160-190 lb)  Diet: 2 eggs in the morning, protein shake each evening, reduce intake to 1600 kcal per day, with carbohydrate intake closer to 50 g per day  64+ ounces water intake per day  Physical Activity: 30 minutes per day minimum, 60 minutes per day goal for the next month  Risk level for moderate/vigorous exercise program: Moderate  New Rx: None  Side Effects: Will review consent if applicable.  Behavior change: Continue to plan meals and track intake  Get a desk where he can stand to work at the computer, which he is planning on in 1-2 months time  Follow-up: One month (per patient request, maybe 6 weeks due to hernia repair)  Labs when okay with PCP.    Mahesh has been doing well. He is exercising sometimes twice a day, using protein shakes, and his blood sugars are under much better control.    Breakfast includes a protein shake, that he makes, or 2 eggs. For lunch he has a salad and protein. In the afternoon before returning to the gym, he has another protein shake. He had been getting hungry after the gym, so he added an additional protein shake. For dinner he has about a 500-calorie salad with protein. He is keeping his total calorie intake under 1600 for the last 15 days, except Superbowl Sunday. He is tracking on [employer]. His total carb intake ranges from 50-80 g per day, total protein intake around 150 g per day    The patient decreased his insulin to 26 units of Lantus and his blood sugars are running 120s to 140s. He is having  "much less constipation. The patient recently had elevated hemoglobin A1c, due to an infection and he was hospitalized briefly.    The patient is awaiting hernia repair, knows he will not be able to exercise, will focus on keeping food changes intact during his period of inactivity.      Exercise:   30 minutes elliptical training every morning, burning 300 ne  30 minutes weights at the gym in the afternoon daily     Review of Systems   Denies constipation, hypoglycemia. His energy level is much improved. He is not having abdominal pain but he is hernia is often a problem. Blood pressures have been well controlled, reflux not a problem at the present time.  All other ROS were reviewed and are otherwise unchanged from my previous visit with patient.    Physical Exam:    /72   Pulse 68   Temp 36.6 °C (97.8 °F)   Ht 1.727 m (5' 8\")   Wt 91.7 kg (202 lb 3.2 oz)   SpO2 98%   BMI 30.74 kg/m²   Waist: 46 in  Body fat % 31.7  REE 1925 kcal/day    Weight change since last visit: -6.2 lb (-10.5 lb total)  Waist Circum change since last visit: -1 in (0 in total)    Constitutional: Oriented to person, place, and time and well-developed, well-nourished, and in no distress.    Head: Normocephalic.   Cardiovascular: Normal rate and regular rhythm.  No murmur heard.  Pulmonary/Chest: Effort normal and breath sounds normal. No wheezes.   Abdominal: Soft. Bowel sounds are normal.   Musculoskeletal: Normal range of motion. No edema.   Neurological: Alert and oriented to person, place, and time. No muscle weakness.  Gait normal.   Skin: Warm and dry. Not diaphoretic.   Psychiatric: Mood, memory, affect and judgment normal.     Laboratory:   Recent labs reviewed. 1/19/18 glycohemoglobin 10.4      Dietitian Assessment: I have reviewed the Dietitian's assessment related to this encounter.       ASSESSMENT/PLAN:  Body mass index is 30.74 kg/m².    Obesity Stage ([place]):  2; Class 1    1. Type 2 diabetes mellitus without " complication, with long-term current use of insulin (CMS-HCC)     2. Obesity (BMI 30.0-34.9)     3. Gastroesophageal reflux disease without esophagitis     4. Essential hypertension     5. Umbilical hernia without obstruction and without gangrene       The patient's blood sugars are under much better control. He is reducing his insulin dosing. His GERD and hypertension are also controlled. During his period of inactivity with the upcoming hernia repair, he will have to watch his food choices carefully and ensure his protein shakes are not too high in calories.      The patient and I have discussed at length and agree to the following recommendations, which are all addressing the above diagnoses:    Weight Goal: 3-5% wt loss each month (pt goal weight is 160-190 lb)  Diet: Protein shake or 2 eggs each morning, protein shake before and after the gym if hungry, high protein/low-carb lunch and dinner.  If blood sugars remain in the 140s, will need to decrease protein intake slightly and carbs closer to 50 g per day  Physical Activity: 30 minutes a.m., 30 minutes p.m. gym when able daily  Risk level for moderate/vigorous exercise program: Moderate  New Rx: None. Patient adjusting insulin downward as appropriate based on blood sugars  Side Effects: Will review consent if applicable.  Behavior change: Continue mindful eating, stimulus narrowing  Follow-up: One month

## 2018-02-07 NOTE — PROGRESS NOTES
"Nutrition Reassess: Medical Weight Management   Today's date: 2/7/2018  Referring Provider: Shira Campos A.P.*      Time: in/out 8:13-8:31am    Subjective:  - working out at the gym 2x/day (am before breakfast and afternoon) doing 30-45 minutes cardio and strength machines   - wants to lift heavier weights, but limited by umbilical hernia, surgery is pending   - pt added a shake after workout to help with overeating at dinner and was feeling \"run down\"  - shakes vary; either protein synthesis brand [2gC, 34gP] or [employer] cookies and cream [5gC, 25gP]  - protein shakes are ~140-150kcals and he is mixing with cashew or almond milk, no other additions  - blood sugars 120-140 range    Diet history:   Breakfast - 2 eggs, 1 piece of dong, coffee   Snack - 0  Lunch - [employer] healthy choice salads w/o dressing  Snack - protein shake with amino acids  Snack - protein shake  Dinner - 6oz steak top round, spinach and mushrooms and green beans    Anthropometrics/Objective  Today's weight: 202.2 lb  Height: 5'8\"  BMI: 30.74  Starting weight/date 212.7 lb     Total weight change : 10.5 lb        Stated Goal Weight: 160-190 lb    ReAssesment/Notes:  Pt continues to track daily averaging 50-80gC, 130-150gP and <1600kcals a day. He is trying to cut down on his CHO intake to less than 60g/day although occasionally gets thrown off if he doesn't have time to make a shake after the gym and grabs a protein bar instead. He bought some Atkins bars to help avoid this issue and knows what other adjustments he needs to make in order to meet the <60g goal. He is choosing lean protein, healthy fats and getting in vegetables at lunch and dinner most days. He was feeling \"run down\" after his workouts, but added a protein shake which has significantly helped. Will likely need to make some adjustments once he is unable to workout after the surgery by possibly adding in a meal replacement.     Follow-up: 1 month   "

## 2018-03-12 ENCOUNTER — NON-PROVIDER VISIT (OUTPATIENT)
Dept: HEALTH INFORMATION MANAGEMENT | Facility: MEDICAL CENTER | Age: 53
End: 2018-03-12
Payer: COMMERCIAL

## 2018-03-12 VITALS
TEMPERATURE: 97.9 F | BODY MASS INDEX: 31.74 KG/M2 | HEART RATE: 76 BPM | SYSTOLIC BLOOD PRESSURE: 126 MMHG | OXYGEN SATURATION: 97 % | DIASTOLIC BLOOD PRESSURE: 74 MMHG | HEIGHT: 68 IN | WEIGHT: 209.4 LBS

## 2018-03-12 DIAGNOSIS — I10 ESSENTIAL HYPERTENSION: ICD-10-CM

## 2018-03-12 DIAGNOSIS — E66.9 OBESITY (BMI 30.0-34.9): ICD-10-CM

## 2018-03-12 DIAGNOSIS — E11.9 TYPE 2 DIABETES MELLITUS WITHOUT COMPLICATION, UNSPECIFIED LONG TERM INSULIN USE STATUS: ICD-10-CM

## 2018-03-12 DIAGNOSIS — E66.9 OBESITY, UNSPECIFIED OBESITY SEVERITY, UNSPECIFIED OBESITY TYPE: ICD-10-CM

## 2018-03-12 DIAGNOSIS — Z79.4 TYPE 2 DIABETES MELLITUS WITH HYPERGLYCEMIA, WITH LONG-TERM CURRENT USE OF INSULIN (HCC): ICD-10-CM

## 2018-03-12 DIAGNOSIS — E11.65 TYPE 2 DIABETES MELLITUS WITH HYPERGLYCEMIA, WITH LONG-TERM CURRENT USE OF INSULIN (HCC): ICD-10-CM

## 2018-03-12 PROCEDURE — 97803 MED NUTRITION INDIV SUBSEQ: CPT | Performed by: DIETITIAN, REGISTERED

## 2018-03-12 PROCEDURE — 99213 OFFICE O/P EST LOW 20 MIN: CPT | Performed by: INTERNAL MEDICINE

## 2018-03-12 RX ORDER — TESTOSTERONE CYPIONATE 200 MG/ML
80 INJECTION, SOLUTION INTRAMUSCULAR
COMMUNITY
End: 2019-05-29

## 2018-03-12 NOTE — PROGRESS NOTES
"Nutrition Reassess: Medical Weight Management   Today's date: 3/12/2018  Referring Provider: Shira Campos A.P.*      Time: in/out 737 - 616    Subjective:  -Was in [place] for wedding planning and a conference for work  -Exercising BID - cardio in the morning and weights in the afternoon  -BG's going low after working out in the afternoon (~90 minutes after)   -Overeats at dinner d/t low BG's    Diet history:   Breakfast - Eggs with dong  Snack - Protein shake  Lunch - salad with protein ([employer] salad in cafeteria)  Snack - None  Dinner - Large dinner    Anthropometrics/Objective  Today's weight: 209.4#  Height: 68\"  BMI: 31.8  Starting weight/date 212.7#     Total weight change : - 3.3#         Stated Goal Weight: 190#     Meal Plan:   LCD with 0 meal replacements per day     ReAssesment/Notes:    Mahesh and I discussed stocking up on some easy proteins that he can eat after his surgery, which he expects to have done before his next appointment with us.  I want him to have things on hand that he can turn to so he does not have to go to the store when he may not feel well.      I also want him to be more proactive with avoiding low BG's after working out in the afternoon since it is leading him to overeat.  He is to consume up to 15 grams CHO with protein within 30 minutes of exercise to help avoid those low BG's.  He will start with an Atkins bar or a homemade protein shake (both are ~5-8 grams CHO) and if he needs more CHO he knows he can add a little bit more.    He will not be exercising for ~6 weeks after surgery, per patient's discussion with his surgeon, so he likely will not need this extra snack at that time, which he understands.  He will contact me with any questions.    Follow-up: 1 month    "

## 2018-03-12 NOTE — PROGRESS NOTES
Bariatric Medicine Follow Up  Chief Complaint   Patient presents with   • Weight Gain       History of Present Illness:   Mahesh Bradshaw is a 52 y.o. male who presents for weight management follow-up and to help address co-morbidities related to overweight, including type 2 diabetes mellitus, GERD, hypertension.    During the patient's last visit, the following were discussed and recommended:  Weight Goal: 3-5% wt loss each month (pt goal weight is 160-190 lb)  Diet: Protein shake or 2 eggs each morning, protein shake before and after the gym if hungry, high protein/low-carb lunch and dinner.  If blood sugars remain in the 140s, will need to decrease protein intake slightly and carbs closer to 50 g per day  Physical Activity: 30 minutes a.m., 30 minutes p.m. gym when able daily  Risk level for moderate/vigorous exercise program: Moderate  New Rx: None. Patient adjusting insulin downward as appropriate based on blood sugars  Side Effects: Will review consent if applicable.  Behavior change: Continue mindful eating, stimulus narrowing  Follow-up: One month    The patient has struggled with food choices since his last visit. He was in [place] for a conference and was planning his wedding for this fall. He did try some wedding cake samples, 8 in restaurants often but tried to watch carbohydrates when he could. He has been having a high protein shake in the morning and another before exercise in the afternoon. His blood sugars dropped to 70s after exercise (when skipping the afternoon protein shake and (, has a very large dinner thereafter and then blood sugars go up significantly and are in the 150s before bedtime. His blood sugars do not drop if he has a protein shake before exercise and the afternoon.    Trying to keep carbs between 50 and 70 g per day most days. Still having his protein shake with 35 g of protein and 4-6 g of carbs. Drinking a lot of water.    Due for hernia repair soon. His blood pressure  "has been controlled, as has his GERD.    Exercise:   Half an hour elliptical before work in the morning, half an hour to 45 minutes in the afternoon weights, isolated so as not to aggravate his umbilical hernia.     Review of Systems   Some hypoglycemia, sleeping well.  All other ROS were reviewed and are otherwise unchanged from my previous visit with patient.    Physical Exam:    /74   Pulse 76   Temp 36.6 °C (97.9 °F)   Ht 1.727 m (5' 8\")   Wt 95 kg (209 lb 6.4 oz)   SpO2 97%   BMI 31.84 kg/m²   Waist: 46 in  Body fat % 29.9  REE 1963 kcal/day    Weight change since last visit: +7.2 lb (-3.3 lb total)  Waist Circum change since last visit:  0 in (0 in total)    Constitutional: Oriented to person, place, and time and well-developed, well-nourished, and in no distress.    Head: Normocephalic.   Musculoskeletal: Normal range of motion. No edema.   Neurological: Alert and oriented to person, place, and time. No muscle weakness.  Gait normal.   Skin: Warm and dry. Not diaphoretic.   Psychiatric: Mood, memory, affect and judgment normal.     Laboratory:   None new.      Dietitian Assessment: I have reviewed the Dietitian's assessment related to this encounter.       ASSESSMENT/PLAN:  Body mass index is 31.84 kg/m².    Obesity Stage ([place]):  2; Class 1    1. Obesity (BMI 30.0-34.9)     2. Essential hypertension     3. Type 2 diabetes mellitus with hyperglycemia, with long-term current use of insulin (CMS-[facility])       The patient continues to benefit from significant exercise. Fat mass percent down significantly, fat-free mass up. He needs to keep his carbohydrate intake more consistent daily, as this will help his glycemic control. Continue to monitor, along with his blood pressure currently controlled.    The patient and I have discussed at length and agree to the following recommendations, which are all addressing the above diagnoses:    Weight Goal: 3-5% wt loss each month (pt goal weight is 160-190 " lb)  Diet: Protein shake that he buys or 2 eggs each morning  Protein shake before and after the gym  Smaller dinner  Watch intake carefully, as per discussion with RD perioperatively given low exercise levels  Physical Activity: 30 minutes a.m./p.m. gym  Risk level for moderate/vigorous exercise program: Low  New Rx: None. Continue adjusting insulin to keep blood glucose is between   Side Effects: Will review consent if applicable.  Behavior change: Continue mindful eating, stimulus narrowing  Follow-up: One month

## 2018-03-12 NOTE — ED NOTES
Medicated at this time.  Already had 2L LR by night shift nurse.  Will administer full 3200 sepsis bolus.  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 new onset nephrotic syndrome  Plan:    1  Edema and elevated blood pressure:  Blood pressures have trended down and responded well to diuresis with 25 percent albumin and Lasix overnight  Continue on low-sodium diet and fluid restriction  Weight is also decreased today to 24 7 kg  Repeat electrolytes this morning status post Lasix are stable  2  Nephrotic syndrome:  Recommend starting lisinopril 2 5 mg p o  daily  Will continue to trend proteinuria and monitor Umu's response  Discussed the side effects associated with treatment through use of Ace inhibitor with Umu's parents again today at bedside  Recommend checking blood pressures at school should Theone Eusebio complain of headaches or dizziness  Should follow up in Nephrology Clinic in one week  HPI:    Did well overnight per parents  No issues with albumin and Lasix infusion  Blood pressures trended down over night  Good urine output with response to the Lasix per parents  Less swelling noted in her face today  Review of Systems  All review of systems were reviewed today and negative except for as noted in the HPI  ?? Allergies: Allergies as of 03/11/2018 - Reviewed 03/11/2018   Allergen Reaction Noted    Penicillins  08/20/2015    Amoxicillin Rash and Edema 05/27/2015     Current Facility-Administered Medications:     acetaminophen (TYLENOL) oral suspension 294 4 mg, 12 mg/kg, Oral, Q4H PRN, Ari Foley MD    lisinopril (ZESTRIL) tablet 2 5 mg, 2 5 mg, Oral, Daily, Ari Foley MD, 2 5 mg at 03/12/18 1315     Objective   Vitals:    03/12/18 0300   BP: (!) 142/93   Pulse: 93   Resp:    Temp: 98 °F (36 7 °C)   SpO2: 100%     Body mass index is 16 79 kg/m²      Physical Exam:  General: Short stature    Awake, alert and in no acute distress  HEENT:  Normocephalic, atraumatic, no periorbital edema noted  Extraocular movements intact, conjunctiva clear with no discharge  Normal dentition  Neck: supple, symmetric with no masses, no cervical lymphadenopathy  Respiratory: clear to auscultation bilaterally with no wheezes, rales or rhonchi  Cardiovascular:   Normal S1 and S2  No murmurs, rubs or gallops  Regular rate and rhythm  Abdomen:  Soft, nontender, and nondistended  Normoactive bowel sounds  No hepatosplenomegaly present  Genitourinary:  Deferred  Back:  Straight without deformity  Skin: warm and well perfused  +dry skin along the eyes and forehead  Abrasions on lower extremities  Extremities:  No cyanosis, clubbing or edema  Pulses 2+ bilaterally  Musculoskeletal:  Limited range of motion of upper extremities with increased tone  No joint swelling or tenderness noted  Neurologic: grossly normal neurologic exam with no deficits noted    Psychiatric: normal mood and affect    Lab Results:   Lab Results   Component Value Date    WBC 10 15 03/11/2018    HGB 14 6 03/11/2018    HCT 40 7 03/11/2018    MCV 85 03/11/2018     03/11/2018     Lab Results   Component Value Date    GLUCOSE 84 03/12/2018    CALCIUM 8 1 (L) 03/12/2018     03/12/2018    K 4 0 03/12/2018    CO2 25 03/12/2018     (H) 03/12/2018    BUN 6 03/12/2018    CREATININE 0 33 (L) 03/12/2018     Imaging: none  Other Studies:  Hepatitis B and C negative, HIV negative and FRANNIE negative    All laboratory results and imaging was reviewed by me and summarized above

## 2018-03-12 NOTE — PATIENT INSTRUCTIONS
Add another snack after exercise and decrease dinner portion  Try to keep carbs consistent over days  Keep glu , adjust insulin accordingly

## 2018-03-16 ENCOUNTER — PATIENT OUTREACH (OUTPATIENT)
Dept: HEALTH INFORMATION MANAGEMENT | Facility: OTHER | Age: 53
End: 2018-03-16

## 2018-03-16 DIAGNOSIS — Z71.89 COMPLEX CARE COORDINATION: ICD-10-CM

## 2018-03-21 ENCOUNTER — PATIENT OUTREACH (OUTPATIENT)
Dept: HEALTH INFORMATION MANAGEMENT | Facility: OTHER | Age: 53
End: 2018-03-21

## 2018-03-21 NOTE — LETTER
March 21, 2018        Mahesh Bradshaw  [address]  [place] [address]        Dear Mahesh:      Thank you for taking the time to speak with me today about your [facility] Care Coordination services.  In the event you are in need of our services in the future, please don’t hesitate to call me. And remember, this program is at no cost to you as this is a part of [facility]’s ongoing commitment to serve the people of our community.  Benefits of working with your Care Coordination Team includes:  - Comprehensive assessment by a Registered Nurse on the telephone to identify your medical and health needs.   - Telephonic review of your medications by a Care Coordination Pharmacist to answer any questions you may have about your medications.  - Evaluation of social needs, such as, transportation; financial; food; housing; etc. by a Care Coordination  to connect you with eligible resources.  - For those eligible, we will connect you with the [facility], offering access to services to assist you to manage your Congestive Heart Failure or Chronic Obstructive Pulmonary Disease in your own home.    Please contact me at [phone]* to start on the road to your Care Coordination services.       I am available 5 days a week, Monday through Friday from 8:00 a.m. - 5:00 p.m.  I look forward to speaking with you soon.    If you have any questions or concerns, please don't hesitate to call.        Sincerely,        Sandra Valencia R.N.    Electronically Signed

## 2018-03-21 NOTE — PROGRESS NOTES
Received telephone call from patient.  He states that he knows the resources that [facility] offers and sees his Obesity physician and PCP on a regular basis.  Patient states he uses the [facility] program at [facility].  He states last year he was very brittle and had recurrent infections.  He has declined enrollment in the Care Coordination program at this time.      Encouraged patient to call Care Coordination RN if he has any further questions or concerns.

## 2018-04-05 DIAGNOSIS — Z01.810 PRE-OPERATIVE CARDIOVASCULAR EXAMINATION: ICD-10-CM

## 2018-04-05 DIAGNOSIS — Z01.812 PRE-OPERATIVE LABORATORY EXAMINATION: ICD-10-CM

## 2018-04-05 LAB
ANION GAP SERPL CALC-SCNC: 11 MMOL/L (ref 0–11.9)
BUN SERPL-MCNC: 21 MG/DL (ref 8–22)
CALCIUM SERPL-MCNC: 9.7 MG/DL (ref 8.5–10.5)
CHLORIDE SERPL-SCNC: 104 MMOL/L (ref 96–112)
CO2 SERPL-SCNC: 24 MMOL/L (ref 20–33)
CREAT SERPL-MCNC: 0.92 MG/DL (ref 0.5–1.4)
EKG IMPRESSION: NORMAL
EST. AVERAGE GLUCOSE BLD GHB EST-MCNC: 134 MG/DL
GLUCOSE SERPL-MCNC: 164 MG/DL (ref 65–99)
HBA1C MFR BLD: 6.3 % (ref 0–5.6)
POTASSIUM SERPL-SCNC: 3.9 MMOL/L (ref 3.6–5.5)
SODIUM SERPL-SCNC: 139 MMOL/L (ref 135–145)

## 2018-04-05 PROCEDURE — 93005 ELECTROCARDIOGRAM TRACING: CPT

## 2018-04-05 PROCEDURE — 83036 HEMOGLOBIN GLYCOSYLATED A1C: CPT

## 2018-04-05 PROCEDURE — 80048 BASIC METABOLIC PNL TOTAL CA: CPT

## 2018-04-05 PROCEDURE — 93010 ELECTROCARDIOGRAM REPORT: CPT | Performed by: INTERNAL MEDICINE

## 2018-04-05 PROCEDURE — 36415 COLL VENOUS BLD VENIPUNCTURE: CPT

## 2018-04-11 ENCOUNTER — APPOINTMENT (OUTPATIENT)
Dept: HEALTH INFORMATION MANAGEMENT | Facility: MEDICAL CENTER | Age: 53
End: 2018-04-11
Payer: COMMERCIAL

## 2018-04-12 ENCOUNTER — APPOINTMENT (OUTPATIENT)
Dept: RADIOLOGY | Facility: MEDICAL CENTER | Age: 53
End: 2018-04-12
Attending: EMERGENCY MEDICINE
Payer: COMMERCIAL

## 2018-04-12 ENCOUNTER — HOSPITAL ENCOUNTER (EMERGENCY)
Facility: MEDICAL CENTER | Age: 53
End: 2018-04-12
Attending: EMERGENCY MEDICINE
Payer: COMMERCIAL

## 2018-04-12 VITALS
SYSTOLIC BLOOD PRESSURE: 127 MMHG | HEIGHT: 68 IN | HEART RATE: 92 BPM | TEMPERATURE: 98.5 F | BODY MASS INDEX: 31.52 KG/M2 | RESPIRATION RATE: 18 BRPM | OXYGEN SATURATION: 96 % | WEIGHT: 208 LBS | DIASTOLIC BLOOD PRESSURE: 73 MMHG

## 2018-04-12 DIAGNOSIS — R10.33 PERIUMBILICAL ABDOMINAL PAIN: ICD-10-CM

## 2018-04-12 DIAGNOSIS — K42.9 UMBILICAL HERNIA WITHOUT OBSTRUCTION AND WITHOUT GANGRENE: ICD-10-CM

## 2018-04-12 LAB
ALBUMIN SERPL BCP-MCNC: 4.8 G/DL (ref 3.2–4.9)
ALBUMIN/GLOB SERPL: 1.7 G/DL
ALP SERPL-CCNC: 58 U/L (ref 30–99)
ALT SERPL-CCNC: 21 U/L (ref 2–50)
ANION GAP SERPL CALC-SCNC: 8 MMOL/L (ref 0–11.9)
APPEARANCE UR: CLEAR
AST SERPL-CCNC: 15 U/L (ref 12–45)
BASOPHILS # BLD AUTO: 0.4 % (ref 0–1.8)
BASOPHILS # BLD: 0.03 K/UL (ref 0–0.12)
BILIRUB SERPL-MCNC: 0.4 MG/DL (ref 0.1–1.5)
BUN SERPL-MCNC: 17 MG/DL (ref 8–22)
CALCIUM SERPL-MCNC: 9.5 MG/DL (ref 8.5–10.5)
CHLORIDE SERPL-SCNC: 105 MMOL/L (ref 96–112)
CO2 SERPL-SCNC: 26 MMOL/L (ref 20–33)
COLOR UR AUTO: YELLOW
CREAT SERPL-MCNC: 0.81 MG/DL (ref 0.5–1.4)
EOSINOPHIL # BLD AUTO: 0.06 K/UL (ref 0–0.51)
EOSINOPHIL NFR BLD: 0.8 % (ref 0–6.9)
ERYTHROCYTE [DISTWIDTH] IN BLOOD BY AUTOMATED COUNT: 40.4 FL (ref 35.9–50)
GLOBULIN SER CALC-MCNC: 2.8 G/DL (ref 1.9–3.5)
GLUCOSE SERPL-MCNC: 91 MG/DL (ref 65–99)
GLUCOSE UR QL STRIP.AUTO: NEGATIVE MG/DL
HCT VFR BLD AUTO: 46.3 % (ref 42–52)
HGB BLD-MCNC: 16.9 G/DL (ref 14–18)
IMM GRANULOCYTES # BLD AUTO: 0.07 K/UL (ref 0–0.11)
IMM GRANULOCYTES NFR BLD AUTO: 1 % (ref 0–0.9)
KETONES UR QL STRIP.AUTO: NEGATIVE MG/DL
LEUKOCYTE ESTERASE UR QL STRIP.AUTO: NEGATIVE
LIPASE SERPL-CCNC: 3 U/L (ref 11–82)
LYMPHOCYTES # BLD AUTO: 1.61 K/UL (ref 1–4.8)
LYMPHOCYTES NFR BLD: 22.4 % (ref 22–41)
MCH RBC QN AUTO: 32.7 PG (ref 27–33)
MCHC RBC AUTO-ENTMCNC: 36.5 G/DL (ref 33.7–35.3)
MCV RBC AUTO: 89.6 FL (ref 81.4–97.8)
MONOCYTES # BLD AUTO: 0.41 K/UL (ref 0–0.85)
MONOCYTES NFR BLD AUTO: 5.7 % (ref 0–13.4)
NEUTROPHILS # BLD AUTO: 5 K/UL (ref 1.82–7.42)
NEUTROPHILS NFR BLD: 69.7 % (ref 44–72)
NITRITE UR QL STRIP.AUTO: NEGATIVE
NRBC # BLD AUTO: 0 K/UL
NRBC BLD-RTO: 0 /100 WBC
PH UR STRIP.AUTO: 5 [PH]
PLATELET # BLD AUTO: 224 K/UL (ref 164–446)
PMV BLD AUTO: 9.2 FL (ref 9–12.9)
POTASSIUM SERPL-SCNC: 3.8 MMOL/L (ref 3.6–5.5)
PROT SERPL-MCNC: 7.6 G/DL (ref 6–8.2)
PROT UR QL STRIP: NEGATIVE MG/DL
RBC # BLD AUTO: 5.17 M/UL (ref 4.7–6.1)
RBC UR QL AUTO: NEGATIVE
SODIUM SERPL-SCNC: 139 MMOL/L (ref 135–145)
SP GR UR: 1.01
WBC # BLD AUTO: 7.2 K/UL (ref 4.8–10.8)

## 2018-04-12 PROCEDURE — 94760 N-INVAS EAR/PLS OXIMETRY 1: CPT

## 2018-04-12 PROCEDURE — 700111 HCHG RX REV CODE 636 W/ 250 OVERRIDE (IP): Performed by: EMERGENCY MEDICINE

## 2018-04-12 PROCEDURE — 700105 HCHG RX REV CODE 258: Performed by: EMERGENCY MEDICINE

## 2018-04-12 PROCEDURE — 74177 CT ABD & PELVIS W/CONTRAST: CPT

## 2018-04-12 PROCEDURE — 36415 COLL VENOUS BLD VENIPUNCTURE: CPT

## 2018-04-12 PROCEDURE — 99285 EMERGENCY DEPT VISIT HI MDM: CPT

## 2018-04-12 PROCEDURE — 80053 COMPREHEN METABOLIC PANEL: CPT

## 2018-04-12 PROCEDURE — 83690 ASSAY OF LIPASE: CPT

## 2018-04-12 PROCEDURE — 700117 HCHG RX CONTRAST REV CODE 255: Performed by: EMERGENCY MEDICINE

## 2018-04-12 PROCEDURE — 96375 TX/PRO/DX INJ NEW DRUG ADDON: CPT

## 2018-04-12 PROCEDURE — 96374 THER/PROPH/DIAG INJ IV PUSH: CPT

## 2018-04-12 PROCEDURE — 96376 TX/PRO/DX INJ SAME DRUG ADON: CPT

## 2018-04-12 PROCEDURE — 81002 URINALYSIS NONAUTO W/O SCOPE: CPT

## 2018-04-12 PROCEDURE — 85025 COMPLETE CBC W/AUTO DIFF WBC: CPT

## 2018-04-12 RX ORDER — MORPHINE SULFATE 4 MG/ML
4 INJECTION, SOLUTION INTRAMUSCULAR; INTRAVENOUS
Status: COMPLETED | OUTPATIENT
Start: 2018-04-12 | End: 2018-04-12

## 2018-04-12 RX ORDER — SODIUM CHLORIDE 9 MG/ML
1000 INJECTION, SOLUTION INTRAVENOUS ONCE
Status: COMPLETED | OUTPATIENT
Start: 2018-04-12 | End: 2018-04-12

## 2018-04-12 RX ORDER — ONDANSETRON 4 MG/1
4 TABLET, FILM COATED ORAL EVERY 8 HOURS PRN
Qty: 10 EACH | Refills: 1 | Status: ON HOLD | OUTPATIENT
Start: 2018-04-12 | End: 2019-01-03

## 2018-04-12 RX ORDER — KETOROLAC TROMETHAMINE 10 MG/1
10 TABLET, FILM COATED ORAL EVERY 8 HOURS PRN
Qty: 20 TAB | Refills: 0 | Status: SHIPPED | OUTPATIENT
Start: 2018-04-12 | End: 2018-04-19

## 2018-04-12 RX ORDER — KETOROLAC TROMETHAMINE 30 MG/ML
30 INJECTION, SOLUTION INTRAMUSCULAR; INTRAVENOUS ONCE
Status: COMPLETED | OUTPATIENT
Start: 2018-04-12 | End: 2018-04-12

## 2018-04-12 RX ORDER — ONDANSETRON 2 MG/ML
4 INJECTION INTRAMUSCULAR; INTRAVENOUS ONCE
Status: COMPLETED | OUTPATIENT
Start: 2018-04-12 | End: 2018-04-12

## 2018-04-12 RX ADMIN — IOHEXOL 100 ML: 350 INJECTION, SOLUTION INTRAVENOUS at 10:46

## 2018-04-12 RX ADMIN — FENTANYL CITRATE 100 MCG: 50 INJECTION, SOLUTION INTRAMUSCULAR; INTRAVENOUS at 13:49

## 2018-04-12 RX ADMIN — MORPHINE SULFATE 4 MG: 4 INJECTION INTRAVENOUS at 10:15

## 2018-04-12 RX ADMIN — ONDANSETRON 4 MG: 2 INJECTION, SOLUTION INTRAMUSCULAR; INTRAVENOUS at 10:15

## 2018-04-12 RX ADMIN — KETOROLAC TROMETHAMINE 30 MG: 30 INJECTION, SOLUTION INTRAMUSCULAR at 12:57

## 2018-04-12 RX ADMIN — MORPHINE SULFATE 4 MG: 4 INJECTION INTRAVENOUS at 12:00

## 2018-04-12 RX ADMIN — SODIUM CHLORIDE 1000 ML: 9 INJECTION, SOLUTION INTRAVENOUS at 10:15

## 2018-04-12 ASSESSMENT — PAIN SCALES - GENERAL
PAINLEVEL_OUTOF10: 5
PAINLEVEL_OUTOF10: 2
PAINLEVEL_OUTOF10: 6
PAINLEVEL_OUTOF10: 6
PAINLEVEL_OUTOF10: 2
PAINLEVEL_OUTOF10: 6

## 2018-04-12 ASSESSMENT — LIFESTYLE VARIABLES: DO YOU DRINK ALCOHOL: NO

## 2018-04-12 NOTE — ED NOTES
Discharge instructions given, pt verbalized understanding.  Understands to call Dr. Beasley's office tomorrow.   Prescription instructions given, pt verbalized understanding.  Understands where to  his prescriptions.  A&ox4.  VSS.  Ambulates out of ER.  Pt to take Uber home.

## 2018-04-12 NOTE — ED NOTES
Pt arrived to room via wc.  Changed into gown and placed on monitor.  Pt has had multiple umbilical hernia repairs and has another scheduled with Dr. Beasley on Thursday. States he got off the exercise bike last night and felt like his hernia ripped further and he is in excrutiating pain. States he couldn't sleep last night due to the pain and couldn't tolerate working today due to the pain.  States he wants to make sure it's not strangulated.

## 2018-04-12 NOTE — DISCHARGE INSTRUCTIONS
Hernia, Adult  A hernia is the bulging of an organ or tissue through a weak spot in the muscles of the abdomen (abdominal wall). Hernias develop most often near the navel or groin.  There are many kinds of hernias. Common kinds include:  · Femoral hernia. This kind of hernia develops under the groin in the upper thigh area.  · Inguinal hernia. This kind of hernia develops in the groin or scrotum.  · Umbilical hernia. This kind of hernia develops near the navel.  · Hiatal hernia. This kind of hernia causes part of the stomach to be pushed up into the chest.  · Incisional hernia. This kind of hernia bulges through a scar from an abdominal surgery.  What are the causes?  This condition may be caused by:  · Heavy lifting.  · Coughing over a long period of time.  · Straining to have a bowel movement.  · An incision made during an abdominal surgery.  · A birth defect (congenital defect).  · Excess weight or obesity.  · Smoking.  · Poor nutrition.  · Cystic fibrosis.  · Excess fluid in the abdomen.  · Undescended testicles.  What are the signs or symptoms?  Symptoms of a hernia include:  · A lump on the abdomen. This is the first sign of a hernia. The lump may become more obvious with standing, straining, or coughing. It may get bigger over time if it is not treated or if the condition causing it is not treated.  · Pain. A hernia is usually painless, but it may become painful over time if treatment is delayed. The pain is usually dull and may get worse with standing or lifting heavy objects.  Sometimes a hernia gets tightly squeezed in the weak spot (strangulated) or stuck there (incarcerated) and causes additional symptoms. These symptoms may include:  · Vomiting.  · Nausea.  · Constipation.  · Irritability.  How is this diagnosed?  A hernia may be diagnosed with:  · A physical exam. During the exam your health care provider may ask you to cough or to make a specific movement, because a hernia is usually more visible when  you move.  · Imaging tests. These can include:  ¨ X-rays.  ¨ Ultrasound.  ¨ CT scan.  How is this treated?  A hernia that is small and painless may not need to be treated. A hernia that is large or painful may be treated with surgery. Inguinal hernias may be treated with surgery to prevent incarceration or strangulation. Strangulated hernias are always treated with surgery, because lack of blood to the trapped organ or tissue can cause it to die.  Surgery to treat a hernia involves pushing the bulge back into place and repairing the weak part of the abdomen.  Follow these instructions at home:  · Avoid straining.  · Do not lift anything heavier than 10 lb (4.5 kg).  · Lift with your leg muscles, not your back muscles. This helps avoid strain.  · When coughing, try to cough gently.  · Prevent constipation. Constipation leads to straining with bowel movements, which can make a hernia worse or cause a hernia repair to break down. You can prevent constipation by:  ¨ Eating a high-fiber diet that includes plenty of fruits and vegetables.  ¨ Drinking enough fluids to keep your urine clear or pale yellow. Aim to drink 6-8 glasses of water per day.  ¨ Using a stool softener as directed by your health care provider.  · Lose weight, if you are overweight.  · Do not use any tobacco products, including cigarettes, chewing tobacco, or electronic cigarettes. If you need help quitting, ask your health care provider.  · Keep all follow-up visits as directed by your health care provider. This is important. Your health care provider may need to monitor your condition.  Contact a health care provider if:  · You have swelling, redness, and pain in the affected area.  · Your bowel habits change.  Get help right away if:  · You have a fever.  · You have abdominal pain that is getting worse.  · You feel nauseous or you vomit.  · You cannot push the hernia back in place by gently pressing on it while you are lying down.  · The  hernia:  ¨ Changes in shape or size.  ¨ Is stuck outside the abdomen.  ¨ Becomes discolored.  ¨ Feels hard or tender.  This information is not intended to replace advice given to you by your health care provider. Make sure you discuss any questions you have with your health care provider.  Document Released: 12/18/2006 Document Revised: 05/17/2017 Document Reviewed: 10/28/2015  Elsevier Interactive Patient Education © 2017 [employer]

## 2018-04-12 NOTE — ED NOTES
IVF continue to infuse.  Pt resting comfortably at this time.  States pain 5/10 in severity.  Awaiting ERP re-eval.

## 2018-04-12 NOTE — ED PROVIDER NOTES
"ED Provider Note    CHIEF COMPLAINT  Chief Complaint   Patient presents with   • Abdominal Pain     Pt has had multiple umbilical hernia repairs and has another scheduled with Dr. Beasley on Thursday.  States he got off the exercise bike and felt like his hernia ripped further and he is in excrutiating pain.  States he wants to make sure it's not strangulated.       HPI  Mahesh Bradshaw is a 52 y.o. male with history of insulin-dependent diabetes mellitus, asthma, aspiration pneumonia, congestive heart failure, previous umbilical hernia repair who presents with complaints of severe pain to his mid abdomen along with nausea since last night. The patient went to the gym and was on an exercise bicycle, when shortly before finishing he felt a tearing sensation around his umbilicus. He has a hernia present there and was to have surgery with Dr. Beasley for hernia repair next week.  The patient says he was up all night with severe pain to his abdomen. He has had nausea but no vomiting. He has passed some gas, but has had no bowel movement. He denies fever, sore throat, cough, congestion, any difficulty breathing. He says his blood sugar was in the 120s earlier this morning.    REVIEW OF SYSTEMS  See HPI for further details. All other systems are negative.     PAST MEDICAL HISTORY  Past Medical History:   Diagnosis Date   • ADRIANE (acute kidney injury) (CMS-HCC) 2/24/2016   • Anesthesia     \"Was difficult to intubate 2-2016\"   • Arthritis 3-3-17    \"Spine\"   • Aspiration pneumonia (CMS-HCC) 3-2016   • ASTHMA 3-3-17    \"Hasn't had to use inhaler in 2 years\"   • Breath shortness 3-3-17    \"With Exercise\"   • Congestive heart failure (CMS-HCC) 2-2016     3-3-17 \"Not a current issue\"   • Dental disorder    • Depression 11/26/2014   • Diabetes (CMS-HCC) 3-3-17    Takes Insulin   • Difficult intubation 2-2016   • DKA (diabetic ketoacidoses) (CMS-HCC) 2-2016    \"10 days on vent with DKA, Renal failure, CHF, elevated liver enzymes " "and electrolyte imbalance\"   • Electrolyte imbalance 2-2016   • Elevated LFTs    • Elevated liver enzymes 2-2016   • Essential hypertension 1/22/2016   • Gout    • Heart burn    • High cholesterol 3-3-17    Does not currently take medication for   • Hypothyroid 3-3-17    Takes Alden Thyroid   • Indigestion    • Kidney stones    • Klebsiella infect    • Migraine    • MRSA cellulitis    • Necrotizing myositis (CMS-HCC)    • On mechanically assisted ventilation (CMS-HCC) 2-2016    HX \"On Vent for 10 days at [facility]\".  \"DX Pancreatitis, DKA, CHF, Elevated Liver Enzymes & Electrolyte Imbalance\".   • Pain 3-3-17    \"Left Flank\"   • Pancreatitis 2-2016    \"D/T Tradjenta was hospitialized for 15 days\"   • RF (renal failure) 2-2016   • Sepsis (CMS-HCC) 1/21/2016   • Snoring 3-3-17    Has not had a sleep study   • Streptococcus infection    • UC (ulcerative colitis) (CMS-HCC) 3-3-17    \"Five BM's per day, takes Lialda\"   • Urinary incontinence    • Vitamin D deficiency        FAMILY HISTORY  No family history on file.    SOCIAL HISTORY  Social History     Social History   • Marital status: Single     Spouse name: N/A   • Number of children: N/A   • Years of education: N/A     Social History Main Topics   • Smoking status: Never Smoker   • Smokeless tobacco: Never Used   • Alcohol use No   • Drug use: No   • Sexual activity: Not on file     Other Topics Concern   • Not on file     Social History Narrative    ** Merged History Encounter **         ** Merged History Encounter **            SURGICAL HISTORY  Past Surgical History:   Procedure Laterality Date   • IRRIGATION & DEBRIDEMENT ORTHO Left 12/10/2017    Procedure: IRRIGATION & DEBRIDEMENT ORTHO LEFT HAND;  Surgeon: Chicho Ramos M.D.;  Location: SURGERY [facility];  Service: Orthopedics   • IRRIGATION & DEBRIDEMENT GENERAL Right 5/1/2017    Procedure: IRRIGATION & DEBRIDEMENT GENERAL-Left HAND AND right  ANKLE;  Surgeon: Esau Dooley M.D.;  Location: SURGERY " [facility];  Service:    • IRRIGATION & DEBRIDEMENT GENERAL Right 4/29/2017    Procedure: IRRIGATION & DEBRIDEMENT GENERAL;  Surgeon: Esau Dooley M.D.;  Location: SURGERY [facility];  Service:    • INCISION AND DRAINAGE GENERAL  4/7/2017    Procedure: INCISION AND DRAINAGE GENERAL;  Surgeon: Esau Dooley M.D.;  Location: SURGERY SAME DAY [facility];  Service:    • UMBILICAL HERNIA REPAIR  3/10/2017    Procedure: UMBILICAL HERNIA REPAIR- INCISION AND DRAINAGE OF UMBILICAL WOUND AND MESH REMOVAL;  Surgeon: Esau Dooley M.D.;  Location: SURGERY SAME DAY [facility];  Service:    • UMBILICAL HERNIA REPAIR N/A 11/6/2016    Procedure: UMBILICAL HERNIA REPAIR;  Surgeon: Esau Dooley M.D.;  Location: SURGERY [facility];  Service:    • COLONOSCOPY WITH BIOPSY  11/26/2014    Performed by Solo Higginbotham M.D. at ENDOSCOPY [facility]   • LIVE BY LAPAROSCOPY  2005   • OTHER ORTHOPEDIC SURGERY  1997 or 1998    Left Wrist ORIF       CURRENT MEDICATIONS  Home Medications     Reviewed by Kylah Patel R.N. (Registered Nurse) on 04/12/18 at 0937  Med List Status: Partial   Medication Last Dose Status   acetaminophen (TYLENOL) 325 MG Tab  Active   buPROPion (WELLBUTRIN XL) 300 MG XL tablet  Active   Cholecalciferol (VITAMIN D3) 5000 UNITS Tab  Active   ibuprofen (MOTRIN) 200 MG Tab  Active   insulin glargine (LANTUS) 100 UNIT/ML Solution  Active   insulin lispro (HUMALOG) 100 UNIT/ML Solution PRN Active   magnesium oxide (MAG-OX) 400 MG Tab  Active   testosterone cypionate (DEPO-TESTOSTERONE) 200 MG/ML Solution injection  Active   therapeutic multivitamin-minerals (THERAGRAN-M) Tab  Active   thyroid (ARMOUR THYROID) 60 MG Tab  Active   trazodone (DESYREL) 100 MG Tab  Active   zinc sulfate (ZINCATE) 220 (50 Zn) MG Cap  Active                ALLERGIES  Allergies   Allergen Reactions   • Peanut-Derived Anaphylaxis     Peanuts   RXN=age 36   • Tradjenta [Linagliptin] Unspecified     Pt  "developed pancreatitis   • Hydrocodone Hives     Tolerates Morphine and oxycodone  Rxn Age - 29       PHYSICAL EXAM  VITAL SIGNS: /73   Pulse 84   Temp 36.9 °C (98.5 °F)   Resp 19   Ht 1.727 m (5' 8\")   Wt 94.3 kg (208 lb)   SpO2 98%   BMI 31.63 kg/m²   Constitutional: Awake, alert, appears mildly uncomfortable, nontoxic in appearance.  HENT: Atraumatic. Bilateral external ears normal, mucous membranes moist, throat nonerythematous without exudates, nose is normal.  Eyes: PERRL, EOMI, conjunctiva moist, noninjected.  Neck: Nontender, Normal range of motion, No nuchal rigidity, No stridor.   Lymphatic: No lymphadenopathy noted.   Cardiovascular: Regular rate and rhythm, no murmurs, rubs, gallops.  Thorax & Lungs:  Good breath sounds bilaterally, no wheezes, rales, or retractions.  No chest tenderness.  Abdomen: There is a well-healed incision over the umbilicus, there is diffuse tenderness over the umbilicus and surrounding periumbilical area, no tenderness in the upper abdomen or to the right lower quadrant McBurney's point, bowel sounds normal, soft, no rebound, guarding, masses.  Back: No CVA or spinal tenderness.  Extremities: Intact distal pulses, No edema, No tenderness.   Skin: Warm, Dry, No rashes.   Musculoskeletal: No joint swelling or tenderness.  Neurologic: Alert & oriented x 3, sensory and motor function normal. No focal deficits.   Psychiatric: Affect normal, Judgment normal, Mood normal.         RADIOLOGY/PROCEDURES  CT-ABDOMEN-PELVIS WITH   Final Result      1.  Umbilical hernia containing mesenteric fat and small portion of small bowel      2.  No evidence for inflammation or strangulation      3.  Probable dehiscence of anterior abdominal wall mesh as it is anterior and wavy in configuration      4.  Fatty filtration of the liver and hepatomegaly      5.  Prior cholecystectomy. No biliary dilation.            COURSE & MEDICAL DECISION MAKING  Pertinent Labs & Imaging studies reviewed. " (See chart for details)  The patient presents with the above complaints. He is made nothing by mouth. IV was placed, he was given a bolus of normal saline is he has dry mucous membranes.  He is given a dose of morphine and Zofran. CBC shows a white count of 7200, normal differential, chemistry profile normal, lipase normal.  CT scan of abdomen/ pelvis with IV contrast was obtained which shows no evidence of incarceration or strangulation. Patient was given additional Toradol and fentanyl for pain control. I discussed the findings with Dr. Beasley who will see the patient in her office tomorrow. Patient is agreeable with the plan, and will be discharged on Zofran for nausea, and Toradol. He is to return to the ER for any worsening pain, fever, vomited, any other problems.    FINAL IMPRESSION  1. Acute abdominal pain  2. Umbilical hernia  3.         Electronically signed by: Sam Heck, 4/12/2018 10:21 AM

## 2018-04-15 ENCOUNTER — HOSPITAL ENCOUNTER (OUTPATIENT)
Facility: MEDICAL CENTER | Age: 53
End: 2018-04-15
Attending: SURGERY | Admitting: SURGERY
Payer: COMMERCIAL

## 2018-04-15 VITALS
HEART RATE: 103 BPM | HEIGHT: 68 IN | WEIGHT: 227.96 LBS | OXYGEN SATURATION: 91 % | RESPIRATION RATE: 16 BRPM | TEMPERATURE: 98 F | SYSTOLIC BLOOD PRESSURE: 108 MMHG | DIASTOLIC BLOOD PRESSURE: 60 MMHG | BODY MASS INDEX: 34.55 KG/M2

## 2018-04-15 LAB — GLUCOSE BLD-MCNC: 164 MG/DL (ref 65–99)

## 2018-04-15 PROCEDURE — 160038 HCHG SURGERY MINUTES - EA ADDL 1 MIN LEVEL 2: Performed by: SURGERY

## 2018-04-15 PROCEDURE — 160048 HCHG OR STATISTICAL LEVEL 1-5: Performed by: SURGERY

## 2018-04-15 PROCEDURE — A9270 NON-COVERED ITEM OR SERVICE: HCPCS

## 2018-04-15 PROCEDURE — C1781 MESH (IMPLANTABLE): HCPCS | Performed by: SURGERY

## 2018-04-15 PROCEDURE — 700111 HCHG RX REV CODE 636 W/ 250 OVERRIDE (IP)

## 2018-04-15 PROCEDURE — A6402 STERILE GAUZE <= 16 SQ IN: HCPCS | Performed by: SURGERY

## 2018-04-15 PROCEDURE — 700101 HCHG RX REV CODE 250

## 2018-04-15 PROCEDURE — 160027 HCHG SURGERY MINUTES - 1ST 30 MINS LEVEL 2: Performed by: SURGERY

## 2018-04-15 PROCEDURE — 160002 HCHG RECOVERY MINUTES (STAT): Performed by: SURGERY

## 2018-04-15 PROCEDURE — 160046 HCHG PACU - 1ST 60 MINS PHASE II: Performed by: SURGERY

## 2018-04-15 PROCEDURE — 700102 HCHG RX REV CODE 250 W/ 637 OVERRIDE(OP)

## 2018-04-15 PROCEDURE — 160047 HCHG PACU  - EA ADDL 30 MINS PHASE II: Performed by: SURGERY

## 2018-04-15 PROCEDURE — 160025 RECOVERY II MINUTES (STATS): Performed by: SURGERY

## 2018-04-15 PROCEDURE — 160035 HCHG PACU - 1ST 60 MINS PHASE I: Performed by: SURGERY

## 2018-04-15 PROCEDURE — 501838 HCHG SUTURE GENERAL: Performed by: SURGERY

## 2018-04-15 PROCEDURE — 82962 GLUCOSE BLOOD TEST: CPT

## 2018-04-15 PROCEDURE — 160009 HCHG ANES TIME/MIN: Performed by: SURGERY

## 2018-04-15 PROCEDURE — 160036 HCHG PACU - EA ADDL 30 MINS PHASE I: Performed by: SURGERY

## 2018-04-15 DEVICE — MESH VENTRALEX ST W/STRAP - 8.0CM LARGE (1EA/CA): Type: IMPLANTABLE DEVICE | Site: UMBILICAL | Status: FUNCTIONAL

## 2018-04-15 RX ORDER — OXYCODONE HYDROCHLORIDE 10 MG/1
5 TABLET ORAL EVERY 4 HOURS PRN
Status: DISCONTINUED | OUTPATIENT
Start: 2018-04-15 | End: 2018-04-15 | Stop reason: HOSPADM

## 2018-04-15 RX ORDER — OXYCODONE HCL 5 MG/5 ML
SOLUTION, ORAL ORAL
Status: COMPLETED
Start: 2018-04-15 | End: 2018-04-15

## 2018-04-15 RX ORDER — SODIUM CHLORIDE AND POTASSIUM CHLORIDE 150; 900 MG/100ML; MG/100ML
INJECTION, SOLUTION INTRAVENOUS CONTINUOUS
Status: DISCONTINUED | OUTPATIENT
Start: 2018-04-15 | End: 2018-04-15 | Stop reason: HOSPADM

## 2018-04-15 RX ORDER — LIDOCAINE HYDROCHLORIDE 10 MG/ML
0.5 INJECTION, SOLUTION INFILTRATION; PERINEURAL
Status: DISCONTINUED | OUTPATIENT
Start: 2018-04-15 | End: 2018-04-15 | Stop reason: HOSPADM

## 2018-04-15 RX ORDER — BUPIVACAINE HYDROCHLORIDE 2.5 MG/ML
INJECTION, SOLUTION EPIDURAL; INFILTRATION; INTRACAUDAL
Status: DISCONTINUED | OUTPATIENT
Start: 2018-04-15 | End: 2018-04-15 | Stop reason: HOSPADM

## 2018-04-15 RX ORDER — HYDROMORPHONE HYDROCHLORIDE 2 MG/ML
INJECTION, SOLUTION INTRAMUSCULAR; INTRAVENOUS; SUBCUTANEOUS
Status: COMPLETED
Start: 2018-04-15 | End: 2018-04-15

## 2018-04-15 RX ADMIN — FENTANYL CITRATE 50 MCG: 50 INJECTION, SOLUTION INTRAMUSCULAR; INTRAVENOUS at 08:05

## 2018-04-15 RX ADMIN — OXYCODONE HYDROCHLORIDE 10 MG: 5 SOLUTION ORAL at 08:54

## 2018-04-15 RX ADMIN — FENTANYL CITRATE 50 MCG: 50 INJECTION, SOLUTION INTRAMUSCULAR; INTRAVENOUS at 08:16

## 2018-04-15 RX ADMIN — HYDROMORPHONE HYDROCHLORIDE 0.5 MG: 2 INJECTION INTRAMUSCULAR; INTRAVENOUS; SUBCUTANEOUS at 10:03

## 2018-04-15 RX ADMIN — HYDROMORPHONE HYDROCHLORIDE 0.5 MG: 10 INJECTION, SOLUTION INTRAMUSCULAR; INTRAVENOUS; SUBCUTANEOUS at 08:50

## 2018-04-15 RX ADMIN — HYDROMORPHONE HYDROCHLORIDE 0.5 MG: 2 INJECTION INTRAMUSCULAR; INTRAVENOUS; SUBCUTANEOUS at 09:33

## 2018-04-15 RX ADMIN — HYDROMORPHONE HYDROCHLORIDE 0.5 MG: 10 INJECTION, SOLUTION INTRAMUSCULAR; INTRAVENOUS; SUBCUTANEOUS at 08:21

## 2018-04-15 ASSESSMENT — PAIN SCALES - GENERAL
PAINLEVEL_OUTOF10: 7
PAINLEVEL_OUTOF10: 6
PAINLEVEL_OUTOF10: 7
PAINLEVEL_OUTOF10: 10
PAINLEVEL_OUTOF10: 5
PAINLEVEL_OUTOF10: 7
PAINLEVEL_OUTOF10: 7
PAINLEVEL_OUTOF10: 9
PAINLEVEL_OUTOF10: 10
PAINLEVEL_OUTOF10: 5

## 2018-04-15 NOTE — DISCHARGE INSTRUCTIONS
ACTIVITY: Rest and take it easy for the first 24 hours.  A responsible adult is recommended to remain with you during that time.  It is normal to feel sleepy.  We encourage you to not do anything that requires balance, judgment or coordination.    MILD FLU-LIKE SYMPTOMS ARE NORMAL. YOU MAY EXPERIENCE GENERALIZED MUSCLE ACHES, THROAT IRRITATION, HEADACHE AND/OR SOME NAUSEA.    FOR 24 HOURS DO NOT:  Drive, operate machinery or run household appliances.  Drink beer or alcoholic beverages.   Make important decisions or sign legal documents.    SPECIAL INSTRUCTIONS & SURGICAL DRESSING/BATHING: Hernia Repair Discharge Instructions:     ACTIVITIES: Upon discharge from the hospital, the day of surgery it is requested that you do no significant physical activity and limit mental activities, as you have had sedation. The day after surgery, you may resume normal activities, but no strenuous activities or rough play for 2 weeks.     WOUND: It is not unusual for patients to experience swelling and even bruising at the hernia repair site.     BATHING: The dressing can be removed two days after surgery and the wound can then be wetted in a shower as normal, but avoid submersion in water (tub bath) for at least 2 weeks.     PAIN MEDICATION: You will be given a prescription for pain medication at discharge. Please take these as directed. It is important to remember not to take medications on an empty stomach as this may cause nausea.     BOWEL FUNCTION: After hernia repair, it is not uncommon for patients to experience constipation. This is due to decreasing activity levels as well as pain medications. You may wish to use a stool softener beginning immediately after surgery, and you may or may not need to use a laxative (Milk of Magnesia, Ex-lax; Senokot, etc.) as well.      CALL IF YOU HAVE: (1) Fevers to more than 101.0 F, (2) Unusual chest or leg pain, (3) Drainage or fluid from incision that may be foul smelling, increased  tenderness or soreness at the wound or the wound edges are no longer together,redness or swelling at the incision site. Please do not hesitate to call with any other questions.     APPOINTMENT: Contact our office at [phone] for a follow-up appointment in 2 weeks following your procedure.     If you have any additional questions, please do not hesitate to call the office.       Office address:    [facility], [address] [place] [address]    DIET: To avoid nausea, slowly advance diet as tolerated, avoiding spicy or greasy foods for the first day.  Add more substantial food to your diet according to your physician's instructions.  Babies can be fed formula or breast milk as soon as they are hungry.  INCREASE FLUIDS AND FIBER TO AVOID CONSTIPATION.    FOLLOW-UP APPOINTMENT:  A follow-up appointment should be arranged with your doctor in *2 weeks*; call to schedule.    You should CALL YOUR PHYSICIAN if you develop:  Fever greater than 101 degrees F.  Pain not relieved by medication, or persistent nausea or vomiting.  Excessive bleeding (blood soaking through dressing) or unexpected drainage from the wound.  Extreme redness or swelling around the incision site, drainage of pus or foul smelling drainage.  Inability to urinate or empty your bladder within 8 hours.  Problems with breathing or chest pain.    You should call 911 if you develop problems with breathing or chest pain.  If you are unable to contact your doctor or surgical center, you should go to the nearest emergency room or urgent care center.  Physician's telephone #: *Dr. Beasley [phone]*    If any questions arise, call your doctor.  If your doctor is not available, please feel free to call the Surgical Center at [phone].  The Center is open Monday through Friday from 7AM to 7PM.  You can also call the [employer] HOTLINE open 24 hours/day, 7 days/week and speak to a nurse at [phone], or toll free at [phone].    A registered nurse may  call you a few days after your surgery to see how you are doing after your procedure.    MEDICATIONS: Resume taking daily medication.  Take prescribed pain medication with food.  If no medication is prescribed, you may take non-aspirin pain medication if needed.  PAIN MEDICATION CAN BE VERY CONSTIPATING.  Take a stool softener or laxative such as senokot, pericolace, or milk of magnesia if needed.    Last pain medication given at *8:54am*.    If your physician has prescribed pain medication that includes Acetaminophen (Tylenol), do not take additional Acetaminophen (Tylenol) while taking the prescribed medication.    Depression / Suicide Risk    As you are discharged from this [facility] facility, it is important to learn how to keep safe from harming yourself.    Recognize the warning signs:  · Abrupt changes in personality, positive or negative- including increase in energy   · Giving away possessions  · Change in eating patterns- significant weight changes-  positive or negative  · Change in sleeping patterns- unable to sleep or sleeping all the time   · Unwillingness or inability to communicate  · Depression  · Unusual sadness, discouragement and loneliness  · Talk of wanting to die  · Neglect of personal appearance   · Rebelliousness- reckless behavior  · Withdrawal from people/activities they love  · Confusion- inability to concentrate     If you or a loved one observes any of these behaviors or has concerns about self-harm, here's what you can do:  · Talk about it- your feelings and reasons for harming yourself  · Remove any means that you might use to hurt yourself (examples: pills, rope, extension cords, firearm)  · Get professional help from the community (Mental Health, Substance Abuse, psychological counseling)  · Do not be alone:Call your Safe Contact- someone whom you trust who will be there for you.  · Call your local CRISIS HOTLINE 173-9900 or 531-010-3446  · Call your local Children's Mobile  Crisis Response Team [place] (671) 452-1039 or [email or web address].ru  · Call the toll free National Suicide Prevention Hotlines   · National Suicide Prevention Lifeline 006-013-ZKTO (2565)  · National Hope Line Network 800-SUICIDE (538-1177)

## 2018-04-15 NOTE — OR NURSING
1040 - pt dressed, up and in room taking a few steps around. Pt verbalizes readiness for discharge. Instructions reviewed with pt and pt's SO.   1045 - pt denies further needs at this time and is ready for discharge.

## 2018-04-15 NOTE — OP REPORT
DATE OF SERVICE:  04/15/2018    PREOPERATIVE DIAGNOSIS:  Recurrent umbilical hernia.    POSTOPERATIVE DIAGNOSIS:  Recurrent umbilical hernia.    PROCEDURE:  Umbilical herniorrhaphy with Ventralex mesh.    SURGEON:  Karen Campbell MD    ASSISTANT:  YAIR Solomon    ANESTHESIA:  General endotracheal.    ANESTHESIOLOGIST:  Dr. Santos.    INDICATIONS:  The patient is a 52-year-old gentleman who has had an umbilical   hernia that was recurrent, he had an acute and was having electively repair   later next week; however, he had an acute onset of severe pain after getting   up a bicycle 2 days ago.  He is being brought at this time now for repair.    FINDINGS:  Approximately an 8 cm defect that was repaired with Ventralex mesh   patch.    DESCRIPTION OF PROCEDURE:  After the patient was identified and consented, he   was brought to the operating room and placed in supine position.  Patient   underwent general endotracheal anesthetic clearance.  Patient's abdomen was   prepped and draped in sterile fashion.  Periumbilical incision was carried out   using electrocautery.  The subcutaneous tissue was dissected down.  The sac   was opened and then the fascia was fully defined.  Once that was completed, a   piece of Ventralex mesh was brought in the field and sewn in circumferentially   with interrupted 0 Ethibond.  The fascia was closed with 0 Ethibonds.    Subcutaneous tissues were approximated with 3-0 Vicryl, skin was closed with   running 4-0 Vicryl in subcuticular fashion.  Op-Site dressing placed on the   wound.  It was anesthetized with 0.25% Marcaine.  Patient was extubated and   taken to recovery in stable condition.  All sponge and needle counts were   correct.       ____________________________________     KAREN CAMPBELL MD    [facility] / NTS    DD:  04/15/2018 07:53:13  DT:  04/15/2018 09:54:34    D#:  [id number]  Job#:  [id number]    cc: JUSTO Iqbal

## 2018-04-16 ENCOUNTER — PATIENT MESSAGE (OUTPATIENT)
Dept: HEALTH INFORMATION MANAGEMENT | Facility: OTHER | Age: 53
End: 2018-04-16

## 2018-04-17 ENCOUNTER — HOSPITAL ENCOUNTER (EMERGENCY)
Facility: MEDICAL CENTER | Age: 53
End: 2018-04-17
Attending: EMERGENCY MEDICINE
Payer: COMMERCIAL

## 2018-04-17 ENCOUNTER — APPOINTMENT (OUTPATIENT)
Dept: RADIOLOGY | Facility: MEDICAL CENTER | Age: 53
End: 2018-04-17
Attending: EMERGENCY MEDICINE
Payer: COMMERCIAL

## 2018-04-17 VITALS
RESPIRATION RATE: 18 BRPM | DIASTOLIC BLOOD PRESSURE: 70 MMHG | OXYGEN SATURATION: 93 % | TEMPERATURE: 97.9 F | HEART RATE: 89 BPM | SYSTOLIC BLOOD PRESSURE: 128 MMHG

## 2018-04-17 DIAGNOSIS — K91.89 POSTOPERATIVE ILEUS (HCC): ICD-10-CM

## 2018-04-17 DIAGNOSIS — Z87.19 H/O UMBILICAL HERNIA REPAIR: ICD-10-CM

## 2018-04-17 DIAGNOSIS — K59.00 CONSTIPATION, UNSPECIFIED CONSTIPATION TYPE: ICD-10-CM

## 2018-04-17 DIAGNOSIS — S30.1XXA ABDOMINAL WALL SEROMA, INITIAL ENCOUNTER: ICD-10-CM

## 2018-04-17 DIAGNOSIS — Z98.890 H/O UMBILICAL HERNIA REPAIR: ICD-10-CM

## 2018-04-17 DIAGNOSIS — K56.7 POSTOPERATIVE ILEUS (HCC): ICD-10-CM

## 2018-04-17 LAB
ALBUMIN SERPL BCP-MCNC: 3.6 G/DL (ref 3.2–4.9)
ALBUMIN/GLOB SERPL: 1.5 G/DL
ALP SERPL-CCNC: 45 U/L (ref 30–99)
ALT SERPL-CCNC: 23 U/L (ref 2–50)
ANION GAP SERPL CALC-SCNC: 10 MMOL/L (ref 0–11.9)
AST SERPL-CCNC: 15 U/L (ref 12–45)
BASOPHILS # BLD AUTO: 0.3 % (ref 0–1.8)
BASOPHILS # BLD: 0.03 K/UL (ref 0–0.12)
BILIRUB SERPL-MCNC: 0.2 MG/DL (ref 0.1–1.5)
BUN SERPL-MCNC: 33 MG/DL (ref 8–22)
CALCIUM SERPL-MCNC: 8.8 MG/DL (ref 8.5–10.5)
CHLORIDE SERPL-SCNC: 106 MMOL/L (ref 96–112)
CO2 SERPL-SCNC: 24 MMOL/L (ref 20–33)
CREAT SERPL-MCNC: 1.1 MG/DL (ref 0.5–1.4)
EOSINOPHIL # BLD AUTO: 0.1 K/UL (ref 0–0.51)
EOSINOPHIL NFR BLD: 1.1 % (ref 0–6.9)
ERYTHROCYTE [DISTWIDTH] IN BLOOD BY AUTOMATED COUNT: 44.7 FL (ref 35.9–50)
GLOBULIN SER CALC-MCNC: 2.4 G/DL (ref 1.9–3.5)
GLUCOSE SERPL-MCNC: 216 MG/DL (ref 65–99)
HCT VFR BLD AUTO: 37.1 % (ref 42–52)
HGB BLD-MCNC: 12.6 G/DL (ref 14–18)
IMM GRANULOCYTES # BLD AUTO: 0.08 K/UL (ref 0–0.11)
IMM GRANULOCYTES NFR BLD AUTO: 0.9 % (ref 0–0.9)
LACTATE BLD-SCNC: 2.7 MMOL/L (ref 0.5–2)
LIPASE SERPL-CCNC: 7 U/L (ref 11–82)
LYMPHOCYTES # BLD AUTO: 2.15 K/UL (ref 1–4.8)
LYMPHOCYTES NFR BLD: 23 % (ref 22–41)
MCH RBC QN AUTO: 31.9 PG (ref 27–33)
MCHC RBC AUTO-ENTMCNC: 33.4 G/DL (ref 33.7–35.3)
MCV RBC AUTO: 95.4 FL (ref 81.4–97.8)
MONOCYTES # BLD AUTO: 0.46 K/UL (ref 0–0.85)
MONOCYTES NFR BLD AUTO: 4.9 % (ref 0–13.4)
NEUTROPHILS # BLD AUTO: 6.52 K/UL (ref 1.82–7.42)
NEUTROPHILS NFR BLD: 69.8 % (ref 44–72)
NRBC # BLD AUTO: 0 K/UL
NRBC BLD-RTO: 0 /100 WBC
PLATELET # BLD AUTO: 187 K/UL (ref 164–446)
PMV BLD AUTO: 10.1 FL (ref 9–12.9)
POTASSIUM SERPL-SCNC: 4 MMOL/L (ref 3.6–5.5)
PROT SERPL-MCNC: 6 G/DL (ref 6–8.2)
RBC # BLD AUTO: 3.89 M/UL (ref 4.7–6.1)
SODIUM SERPL-SCNC: 140 MMOL/L (ref 135–145)
WBC # BLD AUTO: 9.3 K/UL (ref 4.8–10.8)

## 2018-04-17 PROCEDURE — 700105 HCHG RX REV CODE 258: Performed by: EMERGENCY MEDICINE

## 2018-04-17 PROCEDURE — A9270 NON-COVERED ITEM OR SERVICE: HCPCS | Performed by: EMERGENCY MEDICINE

## 2018-04-17 PROCEDURE — 96376 TX/PRO/DX INJ SAME DRUG ADON: CPT

## 2018-04-17 PROCEDURE — 96375 TX/PRO/DX INJ NEW DRUG ADDON: CPT

## 2018-04-17 PROCEDURE — 99285 EMERGENCY DEPT VISIT HI MDM: CPT

## 2018-04-17 PROCEDURE — 85025 COMPLETE CBC W/AUTO DIFF WBC: CPT

## 2018-04-17 PROCEDURE — 74177 CT ABD & PELVIS W/CONTRAST: CPT

## 2018-04-17 PROCEDURE — 80053 COMPREHEN METABOLIC PANEL: CPT

## 2018-04-17 PROCEDURE — 83690 ASSAY OF LIPASE: CPT

## 2018-04-17 PROCEDURE — 96361 HYDRATE IV INFUSION ADD-ON: CPT

## 2018-04-17 PROCEDURE — 700102 HCHG RX REV CODE 250 W/ 637 OVERRIDE(OP): Performed by: EMERGENCY MEDICINE

## 2018-04-17 PROCEDURE — 700111 HCHG RX REV CODE 636 W/ 250 OVERRIDE (IP): Performed by: EMERGENCY MEDICINE

## 2018-04-17 PROCEDURE — 96374 THER/PROPH/DIAG INJ IV PUSH: CPT

## 2018-04-17 PROCEDURE — 83605 ASSAY OF LACTIC ACID: CPT

## 2018-04-17 PROCEDURE — 700117 HCHG RX CONTRAST REV CODE 255: Performed by: EMERGENCY MEDICINE

## 2018-04-17 RX ORDER — MORPHINE SULFATE 4 MG/ML
4 INJECTION, SOLUTION INTRAMUSCULAR; INTRAVENOUS ONCE
Status: COMPLETED | OUTPATIENT
Start: 2018-04-17 | End: 2018-04-17

## 2018-04-17 RX ORDER — BISACODYL 10 MG
10 SUPPOSITORY, RECTAL RECTAL ONCE
Status: COMPLETED | OUTPATIENT
Start: 2018-04-17 | End: 2018-04-17

## 2018-04-17 RX ORDER — ONDANSETRON 2 MG/ML
4 INJECTION INTRAMUSCULAR; INTRAVENOUS ONCE
Status: COMPLETED | OUTPATIENT
Start: 2018-04-17 | End: 2018-04-17

## 2018-04-17 RX ORDER — SODIUM CHLORIDE 9 MG/ML
1000 INJECTION, SOLUTION INTRAVENOUS ONCE
Status: COMPLETED | OUTPATIENT
Start: 2018-04-17 | End: 2018-04-17

## 2018-04-17 RX ADMIN — BISACODYL 10 MG: 10 SUPPOSITORY RECTAL at 07:22

## 2018-04-17 RX ADMIN — MORPHINE SULFATE 4 MG: 4 INJECTION INTRAVENOUS at 04:04

## 2018-04-17 RX ADMIN — ONDANSETRON 4 MG: 2 INJECTION, SOLUTION INTRAMUSCULAR; INTRAVENOUS at 02:49

## 2018-04-17 RX ADMIN — IOHEXOL 100 ML: 350 INJECTION, SOLUTION INTRAVENOUS at 03:57

## 2018-04-17 RX ADMIN — SODIUM CHLORIDE 1000 ML: 9 INJECTION, SOLUTION INTRAVENOUS at 02:50

## 2018-04-17 RX ADMIN — MAGESIUM CITRATE 296 ML: 1.75 LIQUID ORAL at 05:51

## 2018-04-17 RX ADMIN — IOHEXOL 50 ML: 240 INJECTION, SOLUTION INTRATHECAL; INTRAVASCULAR; INTRAVENOUS; ORAL at 03:57

## 2018-04-17 RX ADMIN — MORPHINE SULFATE 4 MG: 4 INJECTION INTRAVENOUS at 05:52

## 2018-04-17 RX ADMIN — MORPHINE SULFATE 4 MG: 4 INJECTION INTRAVENOUS at 02:49

## 2018-04-17 NOTE — ED PROVIDER NOTES
"ED Provider Note    Scribed for Orlando Solano M.D. by Abhijit Baez. 4/17/2018, 1:54 AM.    Primary care provider: KRISSY Brunner  Means of arrival: Walk-in  History obtained from: Patient  History limited by: None    CHIEF COMPLAINT  Chief Complaint   Patient presents with   • Abdominal Pain     pt had surgery yesterday by Dr Beasley for hernia repair with mesh       [facility]  Mahesh Bradshaw is a 52 y.o. male who presents to the Emergency Department with complaints of abdominal pain onset 2 days ago. Per patient, he had a hernia repair with mesh on his umbilicus which was performed by Dr. Beasley on Sunday. Since then he has developed worsening, 7/10 abdominal pain. The patient explains that this pain is worse when he bends or stretches out, especially when laying down. This abdominal pain has been constant and diffuse since the surgery. He has also developed associated nausea and \"severe\" cramping over the last 48 hours. He was given Milk of Magnesium and a suppository following his surgery but this has been with only slight relief. Patient was also given Toradol and Percocet for the pain and this too has only been with slight relief. He has a history of stress-induced asthma and diabetes. Patient is allergic to Vicodin.  He denies diarrhea, vomiting, chest pain, or shortness of breath.     REVIEW OF SYSTEMS  See HPI for further details. All other systems are negative.    C.     PAST MEDICAL HISTORY   has a past medical history of ADRIANE (acute kidney injury) (CMS-HCC) (2/24/2016); Anesthesia; Arthritis (3-3-17); Aspiration pneumonia (CMS-HCC) (3-2016); ASTHMA (3-3-17); Breath shortness (3-3-17); Congestive heart failure (CMS-HCC) (2-2016 ); Dental disorder; Depression (11/26/2014); Diabetes (CMS-HCC) (3-3-17); Difficult intubation (2-2016); DKA (diabetic ketoacidoses) (CMS-HCC) (2-2016); Electrolyte imbalance (2-2016); Elevated LFTs; Elevated liver enzymes (2-2016); Essential hypertension (1/22/2016); Gout; " Heart burn; High cholesterol (3-3-17); Hypothyroid (3-3-17); Indigestion; Kidney stones; Klebsiella infect; Migraine; MRSA cellulitis; Necrotizing myositis (CMS-HCC); On mechanically assisted ventilation (CMS-HCC) (2-2016); Pain (3-3-17); Pancreatitis (2-2016); RF (renal failure) (2-2016); Sepsis (CMS-HCC) (1/21/2016); Snoring (3-3-17); Streptococcus infection; UC (ulcerative colitis) (CMS-HCC) (3-3-17); Urinary incontinence; and Vitamin D deficiency.    SURGICAL HISTORY   has a past surgical history that includes vaishnavi by laparoscopy (2005); colonoscopy with biopsy (11/26/2014); incision and drainage general (4/7/2017); irrigation & debridement general (Right, 4/29/2017); irrigation & debridement general (Right, 5/1/2017); other orthopedic surgery (1997 or 1998); umbilical hernia repair (N/A, 11/6/2016); umbilical hernia repair (3/10/2017); irrigation & debridement ortho (Left, 12/10/2017); and umbilical hernia repair (N/A, 4/15/2018).    SOCIAL HISTORY  Social History   Substance Use Topics   • Smoking status: Never Smoker   • Smokeless tobacco: Never Used   • Alcohol use No      History   Drug Use No       FAMILY HISTORY  No family history noted.     CURRENT MEDICATIONS  Reviewed.  See Encounter Summary.     ALLERGIES  Allergies   Allergen Reactions   • Peanut-Derived Anaphylaxis     Peanuts   RXN=age 36   • Tradjenta [Linagliptin] Unspecified     Pt developed pancreatitis   • Hydrocodone Hives     Tolerates Morphine and oxycodone  Rxn Age - 29     PHYSICAL EXAM  VITAL SIGNS: /70   Pulse 98   Temp 36.6 °C (97.9 °F)   Resp 18   SpO2 95%   Constitutional: Alert in no apparent distress.  HENT: No signs of trauma, Bilateral external ears normal, Nose normal. Dry mucous membranes.   Eyes: Pupils are equal and reactive, Conjunctiva normal, Non-icteric.   Neck: Normal range of motion, No tenderness, Supple, No stridor.   Lymphatic: No lymphadenopathy noted.   Cardiovascular: Regular rate and rhythm, no  murmurs.   Thorax & Lungs: Normal breath sounds, No respiratory distress, No wheezing, No chest tenderness.   Abdomen: Bowel sounds normal, Soft, No masses, No pulsatile masses. No peritoneal signs. Distended abdomen, slightly firm. Bandage over umbilicus status post hernia repair. No cellulitic changes. Diffusely tender. No rebound or guarding.   Skin: Warm, Dry, No erythema, No rash.   Back: No bony tenderness, No CVA tenderness.   Extremities: Intact distal pulses, No edema, No tenderness, No cyanosis  Musculoskeletal: Good range of motion in all major joints. No tenderness to palpation or major deformities noted.   Neurologic: Alert , Normal motor function, Normal sensory function, No focal deficits noted.   Psychiatric: Affect normal, Judgment normal, Mood normal.     DIAGNOSTIC STUDIES / PROCEDURES   LABS  Results for orders placed or performed during the hospital encounter of 04/17/18   CBC WITH DIFFERENTIAL   Result Value Ref Range    WBC 9.3 4.8 - 10.8 K/uL    RBC 3.89 (L) 4.70 - 6.10 M/uL    Hemoglobin 12.6 (L) 14.0 - 18.0 g/dL    Hematocrit 37.1 (L) 42.0 - 52.0 %    MCV 95.4 81.4 - 97.8 fL    MCH 31.9 27.0 - 33.0 pg    MCHC 33.4 (L) 33.7 - 35.3 g/dL    RDW 44.7 35.9 - 50.0 fL    Platelet Count 187 164 - 446 K/uL    MPV 10.1 9.0 - 12.9 fL    Neutrophils-Polys 69.80 44.00 - 72.00 %    Lymphocytes 23.00 22.00 - 41.00 %    Monocytes 4.90 0.00 - 13.40 %    Eosinophils 1.10 0.00 - 6.90 %    Basophils 0.30 0.00 - 1.80 %    Immature Granulocytes 0.90 0.00 - 0.90 %    Nucleated RBC 0.00 /100 WBC    Neutrophils (Absolute) 6.52 1.82 - 7.42 K/uL    Lymphs (Absolute) 2.15 1.00 - 4.80 K/uL    Monos (Absolute) 0.46 0.00 - 0.85 K/uL    Eos (Absolute) 0.10 0.00 - 0.51 K/uL    Baso (Absolute) 0.03 0.00 - 0.12 K/uL    Immature Granulocytes (abs) 0.08 0.00 - 0.11 K/uL    NRBC (Absolute) 0.00 K/uL   COMP METABOLIC PANEL   Result Value Ref Range    Sodium 140 135 - 145 mmol/L    Potassium 4.0 3.6 - 5.5 mmol/L    Chloride 106  96 - 112 mmol/L    Co2 24 20 - 33 mmol/L    Anion Gap 10.0 0.0 - 11.9    Glucose 216 (H) 65 - 99 mg/dL    Bun 33 (H) 8 - 22 mg/dL    Creatinine 1.10 0.50 - 1.40 mg/dL    Calcium 8.8 8.5 - 10.5 mg/dL    AST(SGOT) 15 12 - 45 U/L    ALT(SGPT) 23 2 - 50 U/L    Alkaline Phosphatase 45 30 - 99 U/L    Total Bilirubin 0.2 0.1 - 1.5 mg/dL    Albumin 3.6 3.2 - 4.9 g/dL    Total Protein 6.0 6.0 - 8.2 g/dL    Globulin 2.4 1.9 - 3.5 g/dL    A-G Ratio 1.5 g/dL   LIPASE   Result Value Ref Range    Lipase 7 (L) 11 - 82 U/L   LACTIC ACID   Result Value Ref Range    Lactic Acid 2.7 (H) 0.5 - 2.0 mmol/L   ESTIMATED GFR   Result Value Ref Range    GFR If African American >60 >60 mL/min/1.73 m 2    GFR If Non African American >60 >60 mL/min/1.73 m 2       RADIOLOGY  CT-ABDOMEN-PELVIS WITH   Final Result      1.  Postoperative changes in the abdominal wall from recent hernia repair. Complex fluid collection at the surgical site is likely related to a postoperative seroma.      2.  Large amount of colonic stool.      3.  Atherosclerosis.      4.  Hepatomegaly        The radiologist's interpretation of all radiological studies have been reviewed by me.    COURSE & MEDICAL DECISION MAKING  Pertinent Labs & Imaging studies reviewed. (See chart for details)      1:54 AM - Patient seen and examined at bedside. Patient will be treated with 1000 mL NS Infusion secondary to dehydration, 4 mg morphine x 2, 4 mg Zofran, 350 mg/ml Omnipaque, 240 mg/ml Omnipaque. Ordered CT-abdomen-pelvis with, estimated GFR, CBC with differential, CMP, lipase, lactic acid to evaluate his symptoms.     4:21 AM - Patient was reevaluated at bedside. Patient is feeling improving following treatment with IV fluids. Discussed lab and radiology results with the patient and informed them that there was no obstruction noted. Did explain that there is a seroma build-up as he has had in the past as well as a large amount of colonic stool. Informed the patient that I will  consult with Dr. Beasley for a second opinion on further plans of care. Patient understands.     4:24 AM Paged Dr. Beasley (general surgeon).    4:30 AM I discussed the patient's case and the above findings with Dr. Beasley (General Surgery) who recommends enema and see how he responds to this.     4:38 AM - Informed the patient of the plans of care which were discussed with Dr. Beasley including treatment with an enema. Explained to the patient that Dr. Beasley did not believe the seroma to be of concern. Patient understands and verbalizes agreement.     5:38 AM - Patient was reevaluated at bedside. He explains that he was able to pass a small amount of loose stool but is not feeling all that improved.     Decision Making:  This is a 52 y.o. year old male who presents with acute abdominal pain and constipation postoperatively. Patient concerned about possible ileus versus SBO. 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 Prior pain management issues. Patient was requiring multiple medications by her questionnaire today. She came he does not show any evidence of structural patient clinically likely does have an ileus and some constipation. No significant relief after enema. I discussed the case with Dr. Mack: Just the patient have the enema and be discharged to home with outpatient follow-up as planned. Patient is understanding of ongoing outpatient bowel regimen.      FINAL IMPRESSION  1. H/O umbilical hernia repair    2. Abdominal wall seroma, initial encounter (CMS-[facility])    3. Postoperative ileus (CMS-[facility])    4. Constipation, unspecified constipation type          I, Abhijit Baez (Gareth), am scribing for, and in the presence of, Orlando Solano M.D..    Electronically signed by: Abhijit Baez (Gareth), 4/17/2018    I, Orlando Solano M.D. personally performed the services described in this documentation, as scribed by Abhijit Baez in my presence, and it is both accurate and complete.    The note accurately reflects work  and decisions made by me.  Orlando Solano  4/19/2018  8:04 AM

## 2018-04-17 NOTE — DISCHARGE INSTRUCTIONS
Constipation, Adult  Constipation is when a person has fewer bowel movements in a week than normal, has difficulty having a bowel movement, or has stools that are dry, hard, or larger than normal. Constipation may be caused by an underlying condition. It may become worse with age if a person takes certain medicines and does not take in enough fluids.  Follow these instructions at home:  Eating and drinking  · Eat foods that have a lot of fiber, such as fresh fruits and vegetables, whole grains, and beans.  · Limit foods that are high in fat, low in fiber, or overly processed, such as french fries, hamburgers, cookies, candies, and soda.  · Drink enough fluid to keep your urine clear or pale yellow.  General instructions  · Exercise regularly or as told by your health care provider.  · Go to the restroom when you have the urge to go. Do not hold it in.  · Take over-the-counter and prescription medicines only as told by your health care provider. These include any fiber supplements.  · Practice pelvic floor retraining exercises, such as deep breathing while relaxing the lower abdomen and pelvic floor relaxation during bowel movements.  · Watch your condition for any changes.  · Keep all follow-up visits as told by your health care provider. This is important.  Contact a health care provider if:  · You have pain that gets worse.  · You have a fever.  · You do not have a bowel movement after 4 days.  · You vomit.  · You are not hungry.  · You lose weight.  · You are bleeding from the anus.  · You have thin, pencil-like stools.  Get help right away if:  · You have a fever and your symptoms suddenly get worse.  · You leak stool or have blood in your stool.  · Your abdomen is bloated.  · You have severe pain in your abdomen.  · You feel dizzy or you faint.  This information is not intended to replace advice given to you by your health care provider. Make sure you discuss any questions you have with your health care  provider.  Document Released: 09/15/2005 Document Revised: 07/07/2017 Document Reviewed: 06/07/2017  [employer] Interactive Patient Education © 2017 [employer]

## 2018-04-17 NOTE — ED NOTES
D/C home with written and verbal instructions re: Rx, activity, f/u.  Verbalizes understanding.  Ambulated out with steady gait. IV dc'd.

## 2018-04-18 ENCOUNTER — PATIENT OUTREACH (OUTPATIENT)
Dept: HEALTH INFORMATION MANAGEMENT | Facility: OTHER | Age: 53
End: 2018-04-18

## 2018-04-18 NOTE — LETTER
Mahesh Bradshaw  [address] [place] [address] [address]  [place] [address]    April 18, 2018      Dear Mahesh Bradshaw,    [facility] wants to ensure your discharge home is safe and you or your loved ones have had all of your questions answered regarding your care after you leave the hospital.    Our discharge team was unsuccessful in our attempts to contact you telephonically and we wanted to be sure that you had a list of resources and contact information should you have any questions regarding your hospital stay, follow-up instructions, or active medical symptoms.    Questions or Concerns Regarding… Contact   Medical Questions Related to Your Discharge  (7 days a week, 8am-5pm) Contact a Nurse Care Coordinator   [phone]   Medical Questions Not Related to Your Discharge  (24 hours a day / 7 days a week)  Contact the Nurse Health Line   [phone]    Medications or Discharge Instructions Refer to your discharge packet   or contact your -[phone]   Follow-up Appointment(s) Schedule your appointment via [employer]   or contact Scheduling [phone]   Billing Review your statement via [employer]  or contact Billing [phone]   Medical Records Review your records via [employer]   or contact Medical Records [phone]     You can also easily access your medical information, test results and upcoming appointments via the [employer] free online health management tool. You can learn more and sign up at [email or web address]. For assistance setting up your [employer] account, please call [phone].    Once again, we want to ensure your discharge home is safe and that you have a clear understanding of any next steps in your care. If you have any questions or concerns, please do not hesitate to contact us, we are here for you. Thank you for choosing [facility] for your healthcare needs.    Sincerely,      Your [facility] Healthcare Team

## 2018-04-18 NOTE — PROGRESS NOTES
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 No call made. Letter sent.

## 2018-04-19 ENCOUNTER — APPOINTMENT (OUTPATIENT)
Dept: RADIOLOGY | Facility: MEDICAL CENTER | Age: 53
End: 2018-04-19
Attending: EMERGENCY MEDICINE
Payer: COMMERCIAL

## 2018-04-19 ENCOUNTER — HOSPITAL ENCOUNTER (EMERGENCY)
Facility: MEDICAL CENTER | Age: 53
End: 2018-04-19
Attending: EMERGENCY MEDICINE
Payer: COMMERCIAL

## 2018-04-19 VITALS
HEART RATE: 86 BPM | HEIGHT: 68 IN | SYSTOLIC BLOOD PRESSURE: 145 MMHG | RESPIRATION RATE: 18 BRPM | WEIGHT: 228 LBS | OXYGEN SATURATION: 91 % | DIASTOLIC BLOOD PRESSURE: 83 MMHG | BODY MASS INDEX: 34.56 KG/M2 | TEMPERATURE: 97.8 F

## 2018-04-19 DIAGNOSIS — R10.84 GENERALIZED ABDOMINAL PAIN: ICD-10-CM

## 2018-04-19 LAB
ALBUMIN SERPL BCP-MCNC: 4.6 G/DL (ref 3.2–4.9)
ALBUMIN/GLOB SERPL: 1.6 G/DL
ALP SERPL-CCNC: 101 U/L (ref 30–99)
ALT SERPL-CCNC: 255 U/L (ref 2–50)
ANION GAP SERPL CALC-SCNC: 13 MMOL/L (ref 0–11.9)
AST SERPL-CCNC: 24 U/L (ref 12–45)
BASOPHILS # BLD AUTO: 0.2 % (ref 0–1.8)
BASOPHILS # BLD: 0.02 K/UL (ref 0–0.12)
BILIRUB SERPL-MCNC: 0.4 MG/DL (ref 0.1–1.5)
BUN SERPL-MCNC: 24 MG/DL (ref 8–22)
CALCIUM SERPL-MCNC: 10 MG/DL (ref 8.5–10.5)
CHLORIDE SERPL-SCNC: 99 MMOL/L (ref 96–112)
CO2 SERPL-SCNC: 25 MMOL/L (ref 20–33)
CREAT SERPL-MCNC: 0.98 MG/DL (ref 0.5–1.4)
EOSINOPHIL # BLD AUTO: 0.09 K/UL (ref 0–0.51)
EOSINOPHIL NFR BLD: 1 % (ref 0–6.9)
ERYTHROCYTE [DISTWIDTH] IN BLOOD BY AUTOMATED COUNT: 40.7 FL (ref 35.9–50)
GLOBULIN SER CALC-MCNC: 2.9 G/DL (ref 1.9–3.5)
GLUCOSE SERPL-MCNC: 257 MG/DL (ref 65–99)
HCT VFR BLD AUTO: 44.8 % (ref 42–52)
HGB BLD-MCNC: 15.6 G/DL (ref 14–18)
IMM GRANULOCYTES # BLD AUTO: 0.11 K/UL (ref 0–0.11)
IMM GRANULOCYTES NFR BLD AUTO: 1.3 % (ref 0–0.9)
LIPASE SERPL-CCNC: 4 U/L (ref 11–82)
LYMPHOCYTES # BLD AUTO: 1.44 K/UL (ref 1–4.8)
LYMPHOCYTES NFR BLD: 16.7 % (ref 22–41)
MCH RBC QN AUTO: 31.6 PG (ref 27–33)
MCHC RBC AUTO-ENTMCNC: 34.8 G/DL (ref 33.7–35.3)
MCV RBC AUTO: 90.9 FL (ref 81.4–97.8)
MONOCYTES # BLD AUTO: 0.4 K/UL (ref 0–0.85)
MONOCYTES NFR BLD AUTO: 4.6 % (ref 0–13.4)
NEUTROPHILS # BLD AUTO: 6.55 K/UL (ref 1.82–7.42)
NEUTROPHILS NFR BLD: 76.2 % (ref 44–72)
NRBC # BLD AUTO: 0 K/UL
NRBC BLD-RTO: 0 /100 WBC
PLATELET # BLD AUTO: 232 K/UL (ref 164–446)
PMV BLD AUTO: 9.3 FL (ref 9–12.9)
POTASSIUM SERPL-SCNC: 4.1 MMOL/L (ref 3.6–5.5)
PROT SERPL-MCNC: 7.5 G/DL (ref 6–8.2)
RBC # BLD AUTO: 4.93 M/UL (ref 4.7–6.1)
SODIUM SERPL-SCNC: 137 MMOL/L (ref 135–145)
WBC # BLD AUTO: 8.6 K/UL (ref 4.8–10.8)

## 2018-04-19 PROCEDURE — 74022 RADEX COMPL AQT ABD SERIES: CPT

## 2018-04-19 PROCEDURE — 80053 COMPREHEN METABOLIC PANEL: CPT

## 2018-04-19 PROCEDURE — 96375 TX/PRO/DX INJ NEW DRUG ADDON: CPT

## 2018-04-19 PROCEDURE — 96361 HYDRATE IV INFUSION ADD-ON: CPT

## 2018-04-19 PROCEDURE — 700105 HCHG RX REV CODE 258: Performed by: EMERGENCY MEDICINE

## 2018-04-19 PROCEDURE — 96376 TX/PRO/DX INJ SAME DRUG ADON: CPT

## 2018-04-19 PROCEDURE — 99285 EMERGENCY DEPT VISIT HI MDM: CPT

## 2018-04-19 PROCEDURE — 700111 HCHG RX REV CODE 636 W/ 250 OVERRIDE (IP): Performed by: EMERGENCY MEDICINE

## 2018-04-19 PROCEDURE — 83690 ASSAY OF LIPASE: CPT

## 2018-04-19 PROCEDURE — 85025 COMPLETE CBC W/AUTO DIFF WBC: CPT

## 2018-04-19 PROCEDURE — 96374 THER/PROPH/DIAG INJ IV PUSH: CPT

## 2018-04-19 RX ORDER — ONDANSETRON 2 MG/ML
4 INJECTION INTRAMUSCULAR; INTRAVENOUS ONCE
Status: COMPLETED | OUTPATIENT
Start: 2018-04-19 | End: 2018-04-19

## 2018-04-19 RX ORDER — SODIUM CHLORIDE 9 MG/ML
1000 INJECTION, SOLUTION INTRAVENOUS ONCE
Status: COMPLETED | OUTPATIENT
Start: 2018-04-19 | End: 2018-04-19

## 2018-04-19 RX ORDER — MORPHINE SULFATE 4 MG/ML
4 INJECTION, SOLUTION INTRAMUSCULAR; INTRAVENOUS ONCE
Status: COMPLETED | OUTPATIENT
Start: 2018-04-19 | End: 2018-04-19

## 2018-04-19 RX ORDER — OXYCODONE AND ACETAMINOPHEN 7.5; 325 MG/1; MG/1
1 TABLET ORAL
COMMUNITY
End: 2018-11-14

## 2018-04-19 RX ADMIN — ONDANSETRON 4 MG: 2 INJECTION, SOLUTION INTRAMUSCULAR; INTRAVENOUS at 17:35

## 2018-04-19 RX ADMIN — MORPHINE SULFATE 4 MG: 4 INJECTION INTRAVENOUS at 18:11

## 2018-04-19 RX ADMIN — MORPHINE SULFATE 4 MG: 4 INJECTION INTRAVENOUS at 17:35

## 2018-04-19 RX ADMIN — SODIUM CHLORIDE 1000 ML: 9 INJECTION, SOLUTION INTRAVENOUS at 17:35

## 2018-04-19 ASSESSMENT — PAIN SCALES - GENERAL: PAINLEVEL_OUTOF10: 6

## 2018-04-19 ASSESSMENT — LIFESTYLE VARIABLES: DO YOU DRINK ALCOHOL: NO

## 2018-04-19 ASSESSMENT — ENCOUNTER SYMPTOMS
FEVER: 0
ABDOMINAL PAIN: 1

## 2018-04-19 NOTE — ED PROVIDER NOTES
ED Provider Note    Scribed for Jorge Jenkins M.D. by Jose Martin Kiran. 4/19/2018, 4:53 PM.    Primary care provider: KRISSY Brunner  Means of arrival: Walk in  History obtained from: Patient  History limited by: None    CHIEF COMPLAINT  Chief Complaint   Patient presents with   • Abdominal Pain     PT HAS HAD UMBILICAL HERNIA REPAIR ON SUNDAY, NOW NO BOWEL SOUNDS AND ABD PAIN, LAST BM YESTERDAY       HPI  Mahesh Bradshaw is a 52 y.o. male who presents to the Emergency Department for evaluation of a tight quality abdominal pain onset earlier today. Patient had an umbilical hernia repair 4 days ago. He was evaluated a few days ago for concern of ileus. Patient was sent home. He has been taking laxatives and doing enema treatments last night. He had a big bowel movement this morning and felt better initially. However, the pain returned. He has been taking only clear liquids and magnesium citrate with no improvement to the pain. The pain is exacerbated with deep breaths and body movements. Patient was evaluated by his MD prior to arrival and was found to have no bowel sounds, so he was advised to visit the ED. Patient otherwise does not report any other associated symptoms at this time. He does not report any recent fevers.       REVIEW OF SYSTEMS  Review of Systems   Constitutional: Negative for fever.   Gastrointestinal: Positive for abdominal pain.   All other systems reviewed and are negative.  C    PAST MEDICAL HISTORY   has a past medical history of ADRIANE (acute kidney injury) (CMS-HCC) (2/24/2016); Anesthesia; Arthritis (3-3-17); Aspiration pneumonia (CMS-HCC) (3-2016); ASTHMA (3-3-17); Breath shortness (3-3-17); Congestive heart failure (CMS-HCC) (2-2016 ); Dental disorder; Depression (11/26/2014); Diabetes (CMS-HCC) (3-3-17); Difficult intubation (2-2016); DKA (diabetic ketoacidoses) (CMS-HCC) (2-2016); Electrolyte imbalance (2-2016); Elevated LFTs; Elevated liver enzymes (2-2016);  Essential hypertension (1/22/2016); Gout; Heart burn; High cholesterol (3-3-17); Hypothyroid (3-3-17); Indigestion; Kidney stones; Klebsiella infect; Migraine; MRSA cellulitis; Necrotizing myositis (CMS-HCC); On mechanically assisted ventilation (CMS-HCC) (2-2016); Pain (3-3-17); Pancreatitis (2-2016); RF (renal failure) (2-2016); Sepsis (CMS-HCC) (1/21/2016); Snoring (3-3-17); Streptococcus infection; UC (ulcerative colitis) (CMS-HCC) (3-3-17); Urinary incontinence; and Vitamin D deficiency.    SURGICAL HISTORY   has a past surgical history that includes vaishnavi by laparoscopy (2005); colonoscopy with biopsy (11/26/2014); incision and drainage general (4/7/2017); irrigation & debridement general (Right, 4/29/2017); irrigation & debridement general (Right, 5/1/2017); other orthopedic surgery (1997 or 1998); umbilical hernia repair (N/A, 11/6/2016); umbilical hernia repair (3/10/2017); irrigation & debridement ortho (Left, 12/10/2017); and umbilical hernia repair (N/A, 4/15/2018).    SOCIAL HISTORY  Social History   Substance Use Topics   • Smoking status: Never Smoker   • Smokeless tobacco: Never Used   • Alcohol use No      History   Drug Use No       FAMILY HISTORY  No pertinent family history reported.     CURRENT MEDICATIONS  No current facility-administered medications on file prior to encounter.      Current Outpatient Prescriptions on File Prior to Encounter   Medication Sig Dispense Refill   • ketorolac (TORADOL) 10 MG Tab Take 1 Tab by mouth every 8 hours as needed for Mild Pain. 20 Tab 0   • ondansetron (ZOFRAN) 4 MG Tab tablet Take 1 Tab by mouth every 8 hours as needed for Nausea/Vomiting. 10 Each 1   • testosterone cypionate (DEPO-TESTOSTERONE) 200 MG/ML Solution injection 80 mg by Intramuscular route Once.     • acetaminophen (TYLENOL) 325 MG Tab Take 2 Tabs by mouth every 6 hours. 30 Tab 0   • insulin glargine (LANTUS) 100 UNIT/ML Solution Inject 12 Units as instructed 2 times a day.     • therapeutic  "multivitamin-minerals (THERAGRAN-M) Tab Take 1 Tab by mouth every day.     • zinc sulfate (ZINCATE) 220 (50 Zn) MG Cap Take 220 mg by mouth every day.     • magnesium oxide (MAG-OX) 400 MG Tab Take 400 mg by mouth every day.     • trazodone (DESYREL) 100 MG Tab Take 100 mg by mouth every evening.     • ibuprofen (MOTRIN) 200 MG Tab Take 600-800 mg by mouth every 8 hours as needed for Mild Pain.     • thyroid (ARMOUR THYROID) 60 MG Tab Take 60 mg by mouth every day.     • Cholecalciferol (VITAMIN D3) 5000 UNITS Tab Take 5,000 Units by mouth every evening.     • insulin lispro (HUMALOG) 100 UNIT/ML Solution Inject 2-10 Units as instructed 3 times a day before meals. Sliding Scale - 2 units every 50 > 150     • buPROPion (WELLBUTRIN XL) 300 MG XL tablet Take 300 mg by mouth every morning.        ALLERGIES  Allergies   Allergen Reactions   • Peanut-Derived Anaphylaxis     Peanuts   RXN=age 36   • Tradjenta [Linagliptin] Unspecified     Pt developed pancreatitis   • Hydrocodone Hives     Tolerates Morphine and oxycodone  Rxn Age - 29       PHYSICAL EXAM  VITAL SIGNS: /83   Pulse 94   Temp 36.6 °C (97.8 °F)   Resp 16   Ht 1.727 m (5' 8\")   Wt 103.4 kg (228 lb)   SpO2 95%   BMI 34.67 kg/m²   Constitutional:  Mild acute distress  HENT: Slightly dry mucous membranes  Eyes:  No conjunctivitis or icterus  Neck:  trachea is midline, no palpable thyroid  Lymphatic:  No cervical lymphadenopathy  Cardiovascular:  Regular rate and rhythm, no murmurs  Thorax & Lungs:  Normal breath sounds, no rhonchi  Abdomen: Soft, Diffuse abdominal tenderness.   Skin:.  no rash skin tenting  Back:  Non-tender, no CVA tenderness  Extremities:   no edema  Vascular:  symmetric radial pulse  Neurologic:  Normal gross motor      LABS  Labs Reviewed   CBC WITH DIFFERENTIAL - Abnormal; Notable for the following:        Result Value    Neutrophils-Polys 76.20 (*)     Lymphocytes 16.70 (*)     Immature Granulocytes 1.30 (*)     All other " components within normal limits   COMP METABOLIC PANEL - Abnormal; Notable for the following:     Anion Gap 13.0 (*)     Glucose 257 (*)     Bun 24 (*)     ALT(SGPT) 255 (*)     Alkaline Phosphatase 101 (*)     All other components within normal limits   LIPASE - Abnormal; Notable for the following:     Lipase 4 (*)     All other components within normal limits   ESTIMATED GFR     All labs reviewed by me.      RADIOLOGY  DX-ABDOMEN COMPLETE WITH AP OR PA CXR   Final Result      1.  Hypoinflation with minimal bibasilar atelectasis.   2.  No pneumonia or pneumothorax.   3.  No evidence for bowel obstruction or perforation.        The radiologist's interpretation of all radiological studies have been reviewed by me.    COURSE & MEDICAL DECISION MAKING  Pertinent Labs & Imaging studies reviewed. (See chart for details)    4:53 PM - Patient seen and examined at bedside. Patient will be treated with zofran 4 mg, morphine 4 mg. Ordered DX abdomen, estimated GFR, CBC, CMP, lipase to evaluate his symptoms. The differential diagnoses include but are not limited to: Will rule out obstruction. The patient will be resuscitated with IV fluids due to dehydration he has tenting of the skin dry mucous membranes.      6:38 PM Paged Dr. Beasley (general surgery).    6:49 PM - I discussed the patient's case and the above findings with Dr. Beasley (general surgery). Will recheck patient.     7:15 PM Patient reevaluated at bedside. Removed bandage. Patient feels improved. There is still diffuse abdominal tenderness but no peritoneal findings.      7:34 PM Patient reevaluated at bedside. Updated patient on lab and radiology results.     7:57 PM Paged Dr. Beasley    8:04 PM - I discussed the patient's case and the above findings with Dr. Beasley (surgery) who will see patient tomorrow.      8:05 PM Patient reevaluated at bedside. The patient will be discharged with instructions regarding supportive care and medications. Instructions were given for  follow-up with Dr. Beasley tomorrow. Discussed indications for seeking immediate medical attention. Patient was given the opportunity for questions. The patient understands and agrees.      Response to IV fluids: Patient became well hydrated with no tenting and normal moist mucous membranes..     Medical Decision Making:  After 2 doses of pain medication patient did have significant pain relief he still has tenderness to palpation but no percussion tenderness. He has normal white count. He is seeing Dr. Beasley of surgery tomorrow morning wants to go home. He is well known to the department he is very responsible and discharging the patient to return if worsening of pain and follow up with his surgeon     The patient will return for new or worsening symptoms and is stable at the time of discharge.    The patient is referred to a primary physician for blood pressure management, diabetic screening, and for all other preventative health concerns.      DISPOSITION:  Patient will be discharged home in stable condition.    FOLLOW UP:  Karen Beasley M.D.  [address] [place] [address] [id number]  [id number]  [facility] [id number]  [phone]            OUTPATIENT MEDICATIONS:  Discharge Medication List as of 4/19/2018  8:18 PM           FINAL IMPRESSION  1. Generalized abdominal pain          Jose Martin EPSTEIN (Scribe), am scribing for, and in the presence of, Jorge Jenkins M.D..    Electronically signed by: Jose Martin Kiran (Scribe), 4/19/2018    Jorge EPSTEIN M.D. personally performed the services described in this documentation, as scribed by Jose Martin Kiran in my presence, and it is both accurate and complete.    The note accurately reflects work and decisions made by me.  Jorge Jenkins  4/19/2018  10:36 PM

## 2018-04-20 NOTE — DISCHARGE INSTRUCTIONS
Abdominal Pain (Nonspecific)    Return to the ER if any worsening of pain. See Dr Beasley tomorrow.  Your exam might not show the exact reason you have abdominal pain. Since there are many different causes of abdominal pain, another checkup and more tests may be needed. It is very important to follow up for lasting (persistent) or worsening symptoms. A possible cause of abdominal pain in any person who still has his or her appendix is acute appendicitis. Appendicitis is often hard to diagnose. Normal blood tests, urine tests, ultrasound, and CT scans do not completely rule out early appendicitis or other causes of abdominal pain. Sometimes, only the changes that happen over time will allow appendicitis and other causes of abdominal pain to be determined. Other potential problems that may require surgery may also take time to become more apparent. Because of this, it is important that you follow all of the instructions below.  HOME CARE INSTRUCTIONS   · Rest as much as possible.   · Do not eat solid food until your pain is gone.   · While adults or children have pain: A diet of water, weak decaffeinated tea, broth or bouillon, gelatin, oral rehydration solutions (ORS), frozen ice pops, or ice chips may be helpful.   · When pain is gone in adults or children: Start a light diet (dry toast, crackers, applesauce, or white rice). Increase the diet slowly as long as it does not bother you. Eat no dairy products (including cheese and eggs) and no spicy, fatty, fried, or high-fiber foods.   · Use no alcohol, caffeine, or cigarettes.   · Take your regular medicines unless your caregiver told you not to.   · Take any prescribed medicine as directed.   · Only take over-the-counter or prescription medicines for pain, discomfort, or fever as directed by your caregiver. Do not give aspirin to children.   If your caregiver has given you a follow-up appointment, it is very important to keep that appointment. Not keeping the  appointment could result in a permanent injury and/or lasting (chronic) pain and/or disability. If there is any problem keeping the appointment, you must call to reschedule.   SEEK IMMEDIATE MEDICAL CARE IF:   · Your pain is not gone in 24 hours.   · Your pain becomes worse, changes location, or feels different.   · You or your child has an oral temperature above 102° F (38.9° C), not controlled by medicine.   · Your baby is older than 3 months with a rectal temperature of 102° F (38.9° C) or higher.   · Your baby is 3 months old or younger with a rectal temperature of 100.4° F (38° C) or higher.   · You have shaking chills.   · You keep throwing up (vomiting) or cannot drink liquids.   · There is blood in your vomit or you see blood in your bowel movements.   · Your bowel movements become dark or black.   · You have frequent bowel movements.   · Your bowel movements stop (become blocked) or you cannot pass gas.   · You have bloody, frequent, or painful urination.   · You have yellow discoloration in the skin or whites of the eyes.   · Your stomach becomes bloated or bigger.   · You have dizziness or fainting.   · You have chest or back pain.   MAKE SURE YOU:   · Understand these instructions.   · Will watch your condition.   · Will get help right away if you are not doing well or get worse.   Document Released: 12/18/2006 Document Revised: 03/11/2013 Document Reviewed: 11/15/2010  [employer]® Patient Information ©2013 [employer].

## 2018-04-20 NOTE — ED NOTES
Pt given d/c instructions/follow up/home care instructions, pt verbalized understanding of POC, pt ambulated to ER lobby, steady gait, with wife.

## 2018-04-20 NOTE — ED NOTES
Med rec completed by interview with patient at bedside and Satiety Link  Pt last filled Percocet 7.5/325 mg and pt reported taking about 5 times within the past week  No abx in past 30 days  Allergies reviewed

## 2018-05-08 ENCOUNTER — NON-PROVIDER VISIT (OUTPATIENT)
Dept: URGENT CARE | Facility: CLINIC | Age: 53
End: 2018-05-08

## 2018-05-08 DIAGNOSIS — Z02.89 ENCOUNTER FOR OTHER ADMINISTRATIVE EXAMINATIONS: ICD-10-CM

## 2018-05-08 PROCEDURE — 8907 PR URINE COLLECT ONLY: Performed by: NURSE PRACTITIONER

## 2018-05-16 ENCOUNTER — NON-PROVIDER VISIT (OUTPATIENT)
Dept: URGENT CARE | Facility: CLINIC | Age: 53
End: 2018-05-16

## 2018-05-16 DIAGNOSIS — Z02.83 ENCOUNTER FOR DRUG SCREENING: ICD-10-CM

## 2018-05-16 PROCEDURE — 8907 PR URINE COLLECT ONLY: Performed by: NURSE PRACTITIONER

## 2018-05-19 ENCOUNTER — APPOINTMENT (OUTPATIENT)
Dept: RADIOLOGY | Facility: MEDICAL CENTER | Age: 53
DRG: 155 | End: 2018-05-19
Attending: NURSE PRACTITIONER
Payer: COMMERCIAL

## 2018-05-19 ENCOUNTER — HOSPITAL ENCOUNTER (INPATIENT)
Facility: MEDICAL CENTER | Age: 53
LOS: 1 days | DRG: 155 | End: 2018-05-20
Attending: EMERGENCY MEDICINE | Admitting: SURGERY
Payer: COMMERCIAL

## 2018-05-19 ENCOUNTER — APPOINTMENT (OUTPATIENT)
Dept: RADIOLOGY | Facility: MEDICAL CENTER | Age: 53
DRG: 155 | End: 2018-05-19
Attending: EMERGENCY MEDICINE
Payer: COMMERCIAL

## 2018-05-19 DIAGNOSIS — S22.21XA CLOSED FRACTURE OF MANUBRIUM, INITIAL ENCOUNTER: ICD-10-CM

## 2018-05-19 DIAGNOSIS — S01.311A EAR LOBE LACERATION, RIGHT, INITIAL ENCOUNTER: ICD-10-CM

## 2018-05-19 PROBLEM — M79.641 HAND PAIN, RIGHT: Status: ACTIVE | Noted: 2018-05-19

## 2018-05-19 PROBLEM — T14.90XA TRAUMA: Status: ACTIVE | Noted: 2018-05-19

## 2018-05-19 PROBLEM — E03.9 HYPOTHYROIDISM: Status: ACTIVE | Noted: 2018-05-19

## 2018-05-19 PROBLEM — S22.20XA CLOSED FRACTURE OF STERNUM: Status: ACTIVE | Noted: 2018-05-19

## 2018-05-19 PROBLEM — E11.9 DM (DIABETES MELLITUS) (HCC): Status: ACTIVE | Noted: 2018-05-19

## 2018-05-19 PROBLEM — S29.9XXA: Status: ACTIVE | Noted: 2018-05-19

## 2018-05-19 PROBLEM — F32.A DEPRESSION: Status: ACTIVE | Noted: 2018-05-19

## 2018-05-19 PROBLEM — Z78.9 NO CONTRAINDICATION TO DEEP VEIN THROMBOSIS (DVT) PROPHYLAXIS: Status: ACTIVE | Noted: 2018-05-19

## 2018-05-19 PROBLEM — M25.561 KNEE PAIN, RIGHT: Status: ACTIVE | Noted: 2018-05-19

## 2018-05-19 LAB
ABO GROUP BLD: NORMAL
ABO GROUP BLD: NORMAL
ALBUMIN SERPL BCP-MCNC: 4.3 G/DL (ref 3.2–4.9)
ALBUMIN/GLOB SERPL: 1.7 G/DL
ALP SERPL-CCNC: 88 U/L (ref 30–99)
ALT SERPL-CCNC: 86 U/L (ref 2–50)
ANION GAP SERPL CALC-SCNC: 12 MMOL/L (ref 0–11.9)
APTT PPP: 23.7 SEC (ref 24.7–36)
AST SERPL-CCNC: 48 U/L (ref 12–45)
BILIRUB SERPL-MCNC: 0.7 MG/DL (ref 0.1–1.5)
BLD GP AB SCN SERPL QL: NORMAL
BUN SERPL-MCNC: 18 MG/DL (ref 8–22)
CALCIUM SERPL-MCNC: 9.4 MG/DL (ref 8.5–10.5)
CHLORIDE SERPL-SCNC: 101 MMOL/L (ref 96–112)
CO2 SERPL-SCNC: 22 MMOL/L (ref 20–33)
CREAT SERPL-MCNC: 0.94 MG/DL (ref 0.5–1.4)
ERYTHROCYTE [DISTWIDTH] IN BLOOD BY AUTOMATED COUNT: 39.8 FL (ref 35.9–50)
EST. AVERAGE GLUCOSE BLD GHB EST-MCNC: 237 MG/DL
ETHANOL BLD-MCNC: 0 G/DL
GLOBULIN SER CALC-MCNC: 2.5 G/DL (ref 1.9–3.5)
GLUCOSE BLD-MCNC: 198 MG/DL (ref 65–99)
GLUCOSE SERPL-MCNC: 313 MG/DL (ref 65–99)
HBA1C MFR BLD: 9.9 % (ref 0–5.6)
HCT VFR BLD AUTO: 37.8 % (ref 42–52)
HGB BLD-MCNC: 13.3 G/DL (ref 14–18)
INR PPP: 0.97 (ref 0.87–1.13)
MCH RBC QN AUTO: 31.5 PG (ref 27–33)
MCHC RBC AUTO-ENTMCNC: 35.2 G/DL (ref 33.7–35.3)
MCV RBC AUTO: 89.6 FL (ref 81.4–97.8)
PLATELET # BLD AUTO: 177 K/UL (ref 164–446)
PMV BLD AUTO: 9.5 FL (ref 9–12.9)
POTASSIUM SERPL-SCNC: 3.9 MMOL/L (ref 3.6–5.5)
PROT SERPL-MCNC: 6.8 G/DL (ref 6–8.2)
PROTHROMBIN TIME: 12.6 SEC (ref 12–14.6)
RBC # BLD AUTO: 4.22 M/UL (ref 4.7–6.1)
RH BLD: NORMAL
RH BLD: NORMAL
SODIUM SERPL-SCNC: 135 MMOL/L (ref 135–145)
WBC # BLD AUTO: 9.7 K/UL (ref 4.8–10.8)

## 2018-05-19 PROCEDURE — C1751 CATH, INF, PER/CENT/MIDLINE: HCPCS

## 2018-05-19 PROCEDURE — 85027 COMPLETE CBC AUTOMATED: CPT

## 2018-05-19 PROCEDURE — 83036 HEMOGLOBIN GLYCOSYLATED A1C: CPT

## 2018-05-19 PROCEDURE — 73562 X-RAY EXAM OF KNEE 3: CPT | Mod: RT

## 2018-05-19 PROCEDURE — 72128 CT CHEST SPINE W/O DYE: CPT

## 2018-05-19 PROCEDURE — 36556 INSERT NON-TUNNEL CV CATH: CPT

## 2018-05-19 PROCEDURE — 71045 X-RAY EXAM CHEST 1 VIEW: CPT

## 2018-05-19 PROCEDURE — 09Q0XZZ REPAIR RIGHT EXTERNAL EAR, EXTERNAL APPROACH: ICD-10-PCS | Performed by: EMERGENCY MEDICINE

## 2018-05-19 PROCEDURE — 82962 GLUCOSE BLOOD TEST: CPT

## 2018-05-19 PROCEDURE — 96372 THER/PROPH/DIAG INJ SC/IM: CPT

## 2018-05-19 PROCEDURE — 94760 N-INVAS EAR/PLS OXIMETRY 1: CPT

## 2018-05-19 PROCEDURE — 304999 HCHG REPAIR-SIMPLE/INTERMED LEVEL 1

## 2018-05-19 PROCEDURE — 72125 CT NECK SPINE W/O DYE: CPT

## 2018-05-19 PROCEDURE — 02HV33Z INSERTION OF INFUSION DEVICE INTO SUPERIOR VENA CAVA, PERCUTANEOUS APPROACH: ICD-10-PCS | Performed by: EMERGENCY MEDICINE

## 2018-05-19 PROCEDURE — 70450 CT HEAD/BRAIN W/O DYE: CPT

## 2018-05-19 PROCEDURE — 86850 RBC ANTIBODY SCREEN: CPT

## 2018-05-19 PROCEDURE — 700101 HCHG RX REV CODE 250: Performed by: SURGERY

## 2018-05-19 PROCEDURE — 700102 HCHG RX REV CODE 250 W/ 637 OVERRIDE(OP): Performed by: SURGERY

## 2018-05-19 PROCEDURE — 700102 HCHG RX REV CODE 250 W/ 637 OVERRIDE(OP): Performed by: NURSE PRACTITIONER

## 2018-05-19 PROCEDURE — 304217 HCHG IRRIGATION SYSTEM

## 2018-05-19 PROCEDURE — 700101 HCHG RX REV CODE 250

## 2018-05-19 PROCEDURE — 305948 HCHG GREEN TRAUMA ACT PRE-NOTIFY NO CC

## 2018-05-19 PROCEDURE — 80053 COMPREHEN METABOLIC PANEL: CPT

## 2018-05-19 PROCEDURE — 74176 CT ABD & PELVIS W/O CONTRAST: CPT

## 2018-05-19 PROCEDURE — 96374 THER/PROPH/DIAG INJ IV PUSH: CPT

## 2018-05-19 PROCEDURE — 93005 ELECTROCARDIOGRAM TRACING: CPT | Performed by: SURGERY

## 2018-05-19 PROCEDURE — 86901 BLOOD TYPING SEROLOGIC RH(D): CPT

## 2018-05-19 PROCEDURE — 72131 CT LUMBAR SPINE W/O DYE: CPT

## 2018-05-19 PROCEDURE — 99285 EMERGENCY DEPT VISIT HI MDM: CPT

## 2018-05-19 PROCEDURE — A9270 NON-COVERED ITEM OR SERVICE: HCPCS | Performed by: SURGERY

## 2018-05-19 PROCEDURE — 770006 HCHG ROOM/CARE - MED/SURG/GYN SEMI*

## 2018-05-19 PROCEDURE — 700105 HCHG RX REV CODE 258: Performed by: SURGERY

## 2018-05-19 PROCEDURE — 85610 PROTHROMBIN TIME: CPT

## 2018-05-19 PROCEDURE — 303747 HCHG EXTRA SUTURE

## 2018-05-19 PROCEDURE — 86900 BLOOD TYPING SEROLOGIC ABO: CPT

## 2018-05-19 PROCEDURE — 73000 X-RAY EXAM OF COLLAR BONE: CPT | Mod: LT

## 2018-05-19 PROCEDURE — A9270 NON-COVERED ITEM OR SERVICE: HCPCS | Performed by: EMERGENCY MEDICINE

## 2018-05-19 PROCEDURE — 36415 COLL VENOUS BLD VENIPUNCTURE: CPT

## 2018-05-19 PROCEDURE — 80307 DRUG TEST PRSMV CHEM ANLYZR: CPT

## 2018-05-19 PROCEDURE — 96375 TX/PRO/DX INJ NEW DRUG ADDON: CPT

## 2018-05-19 PROCEDURE — 700111 HCHG RX REV CODE 636 W/ 250 OVERRIDE (IP): Performed by: EMERGENCY MEDICINE

## 2018-05-19 PROCEDURE — 85730 THROMBOPLASTIN TIME PARTIAL: CPT

## 2018-05-19 PROCEDURE — 73130 X-RAY EXAM OF HAND: CPT | Mod: RT

## 2018-05-19 PROCEDURE — 700102 HCHG RX REV CODE 250 W/ 637 OVERRIDE(OP): Performed by: EMERGENCY MEDICINE

## 2018-05-19 RX ORDER — MORPHINE SULFATE 4 MG/ML
4 INJECTION, SOLUTION INTRAMUSCULAR; INTRAVENOUS
Status: DISCONTINUED | OUTPATIENT
Start: 2018-05-19 | End: 2018-05-20 | Stop reason: HOSPADM

## 2018-05-19 RX ORDER — DEXTROSE MONOHYDRATE 25 G/50ML
25 INJECTION, SOLUTION INTRAVENOUS
Status: DISCONTINUED | OUTPATIENT
Start: 2018-05-19 | End: 2018-05-19

## 2018-05-19 RX ORDER — ONDANSETRON 2 MG/ML
4 INJECTION INTRAMUSCULAR; INTRAVENOUS EVERY 4 HOURS PRN
Status: DISCONTINUED | OUTPATIENT
Start: 2018-05-19 | End: 2018-05-20 | Stop reason: HOSPADM

## 2018-05-19 RX ORDER — LIDOCAINE 50 MG/G
1 PATCH TOPICAL EVERY 24 HOURS
Status: DISCONTINUED | OUTPATIENT
Start: 2018-05-19 | End: 2018-05-20 | Stop reason: HOSPADM

## 2018-05-19 RX ORDER — ACETAMINOPHEN 500 MG
1000 TABLET ORAL EVERY 6 HOURS
Status: DISCONTINUED | OUTPATIENT
Start: 2018-05-19 | End: 2018-05-20 | Stop reason: HOSPADM

## 2018-05-19 RX ORDER — TESTOSTERONE CYPIONATE 200 MG/ML
100 INJECTION, SOLUTION INTRAMUSCULAR
COMMUNITY
End: 2018-11-14

## 2018-05-19 RX ORDER — DEXTROSE MONOHYDRATE 25 G/50ML
25 INJECTION, SOLUTION INTRAVENOUS
Status: DISCONTINUED | OUTPATIENT
Start: 2018-05-19 | End: 2018-05-20 | Stop reason: HOSPADM

## 2018-05-19 RX ORDER — KETOROLAC TROMETHAMINE 30 MG/ML
30 INJECTION, SOLUTION INTRAMUSCULAR; INTRAVENOUS EVERY 6 HOURS
Status: DISCONTINUED | OUTPATIENT
Start: 2018-05-20 | End: 2018-05-20

## 2018-05-19 RX ORDER — CHLORHEXIDINE GLUCONATE ORAL RINSE 1.2 MG/ML
15 SOLUTION DENTAL EVERY 12 HOURS
Status: DISCONTINUED | OUTPATIENT
Start: 2018-05-19 | End: 2018-05-19

## 2018-05-19 RX ORDER — HYDROMORPHONE HYDROCHLORIDE 2 MG/1
4 TABLET ORAL ONCE
Status: COMPLETED | OUTPATIENT
Start: 2018-05-19 | End: 2018-05-19

## 2018-05-19 RX ORDER — BISACODYL 10 MG
10 SUPPOSITORY, RECTAL RECTAL
Status: DISCONTINUED | OUTPATIENT
Start: 2018-05-19 | End: 2018-05-20 | Stop reason: HOSPADM

## 2018-05-19 RX ORDER — CEFAZOLIN SODIUM 1 G/3ML
1 INJECTION, POWDER, FOR SOLUTION INTRAMUSCULAR; INTRAVENOUS ONCE
Status: DISCONTINUED | OUTPATIENT
Start: 2018-05-19 | End: 2018-05-19

## 2018-05-19 RX ORDER — POLYETHYLENE GLYCOL 3350 17 G/17G
1 POWDER, FOR SOLUTION ORAL 2 TIMES DAILY
Status: DISCONTINUED | OUTPATIENT
Start: 2018-05-19 | End: 2018-05-20 | Stop reason: HOSPADM

## 2018-05-19 RX ORDER — OXYCODONE HYDROCHLORIDE 5 MG/1
5 TABLET ORAL
Status: DISCONTINUED | OUTPATIENT
Start: 2018-05-19 | End: 2018-05-20 | Stop reason: HOSPADM

## 2018-05-19 RX ORDER — AMOXICILLIN 250 MG
1 CAPSULE ORAL NIGHTLY
Status: DISCONTINUED | OUTPATIENT
Start: 2018-05-19 | End: 2018-05-20 | Stop reason: HOSPADM

## 2018-05-19 RX ORDER — THYROID 60 MG/1
60 TABLET ORAL EVERY MORNING
COMMUNITY
End: 2020-04-15 | Stop reason: SDUPTHER

## 2018-05-19 RX ORDER — LIDOCAINE HYDROCHLORIDE AND EPINEPHRINE BITARTRATE 20; .01 MG/ML; MG/ML
INJECTION, SOLUTION SUBCUTANEOUS
Status: COMPLETED
Start: 2018-05-19 | End: 2018-05-19

## 2018-05-19 RX ORDER — DIPHENHYDRAMINE HCL 25 MG
25 TABLET ORAL NIGHTLY PRN
Status: DISCONTINUED | OUTPATIENT
Start: 2018-05-19 | End: 2018-05-20 | Stop reason: HOSPADM

## 2018-05-19 RX ORDER — INSULIN GLARGINE 100 [IU]/ML
18 INJECTION, SOLUTION SUBCUTANEOUS
Status: DISCONTINUED | OUTPATIENT
Start: 2018-05-20 | End: 2018-05-20 | Stop reason: HOSPADM

## 2018-05-19 RX ORDER — BUPROPION HYDROCHLORIDE 150 MG/1
150 TABLET, EXTENDED RELEASE ORAL 2 TIMES DAILY
Status: DISCONTINUED | OUTPATIENT
Start: 2018-05-20 | End: 2018-05-20 | Stop reason: HOSPADM

## 2018-05-19 RX ORDER — SODIUM CHLORIDE, SODIUM LACTATE, POTASSIUM CHLORIDE, CALCIUM CHLORIDE 600; 310; 30; 20 MG/100ML; MG/100ML; MG/100ML; MG/100ML
INJECTION, SOLUTION INTRAVENOUS CONTINUOUS
Status: DISCONTINUED | OUTPATIENT
Start: 2018-05-19 | End: 2018-05-20

## 2018-05-19 RX ORDER — FAMOTIDINE 20 MG/1
20 TABLET, FILM COATED ORAL 2 TIMES DAILY
Status: DISCONTINUED | OUTPATIENT
Start: 2018-05-19 | End: 2018-05-20 | Stop reason: HOSPADM

## 2018-05-19 RX ORDER — INSULIN GLARGINE 100 [IU]/ML
38-42 INJECTION, SOLUTION SUBCUTANEOUS 2 TIMES DAILY
COMMUNITY
End: 2019-01-29

## 2018-05-19 RX ORDER — ONDANSETRON 4 MG/1
4 TABLET, FILM COATED ORAL EVERY 8 HOURS PRN
COMMUNITY
End: 2018-11-14

## 2018-05-19 RX ORDER — DOCUSATE SODIUM 100 MG/1
100 CAPSULE, LIQUID FILLED ORAL 2 TIMES DAILY
Status: DISCONTINUED | OUTPATIENT
Start: 2018-05-19 | End: 2018-05-20 | Stop reason: HOSPADM

## 2018-05-19 RX ORDER — OXYCODONE HYDROCHLORIDE 10 MG/1
10 TABLET ORAL
Status: DISCONTINUED | OUTPATIENT
Start: 2018-05-19 | End: 2018-05-20 | Stop reason: HOSPADM

## 2018-05-19 RX ORDER — KETOROLAC TROMETHAMINE 30 MG/ML
60 INJECTION, SOLUTION INTRAMUSCULAR; INTRAVENOUS ONCE
Status: COMPLETED | OUTPATIENT
Start: 2018-05-19 | End: 2018-05-19

## 2018-05-19 RX ORDER — INSULIN GLARGINE 100 [IU]/ML
16 INJECTION, SOLUTION SUBCUTANEOUS EVERY EVENING
Status: DISCONTINUED | OUTPATIENT
Start: 2018-05-19 | End: 2018-05-20 | Stop reason: HOSPADM

## 2018-05-19 RX ORDER — THYROID 30 MG/1
60 TABLET ORAL
Status: DISCONTINUED | OUTPATIENT
Start: 2018-05-20 | End: 2018-05-20 | Stop reason: HOSPADM

## 2018-05-19 RX ORDER — ENEMA 19; 7 G/133ML; G/133ML
1 ENEMA RECTAL
Status: DISCONTINUED | OUTPATIENT
Start: 2018-05-19 | End: 2018-05-20 | Stop reason: HOSPADM

## 2018-05-19 RX ORDER — BUPROPION HYDROCHLORIDE 300 MG/1
300 TABLET ORAL EVERY MORNING
COMMUNITY
End: 2018-11-14

## 2018-05-19 RX ORDER — AMOXICILLIN 250 MG
1 CAPSULE ORAL
Status: DISCONTINUED | OUTPATIENT
Start: 2018-05-19 | End: 2018-05-20 | Stop reason: HOSPADM

## 2018-05-19 RX ORDER — GABAPENTIN 100 MG/1
100 CAPSULE ORAL 3 TIMES DAILY
Status: DISCONTINUED | OUTPATIENT
Start: 2018-05-19 | End: 2018-05-20 | Stop reason: HOSPADM

## 2018-05-19 RX ADMIN — ACETAMINOPHEN 1000 MG: 500 TABLET ORAL at 19:40

## 2018-05-19 RX ADMIN — HYDROMORPHONE HYDROCHLORIDE 2 MG: 10 INJECTION, SOLUTION INTRAMUSCULAR; INTRAVENOUS; SUBCUTANEOUS at 14:24

## 2018-05-19 RX ADMIN — CEFAZOLIN SODIUM 1 G: 1 INJECTION, SOLUTION INTRAVENOUS at 19:52

## 2018-05-19 RX ADMIN — OXYCODONE HYDROCHLORIDE 10 MG: 10 TABLET ORAL at 22:37

## 2018-05-19 RX ADMIN — FAMOTIDINE 20 MG: 20 TABLET ORAL at 22:38

## 2018-05-19 RX ADMIN — INSULIN GLARGINE 16 UNITS: 100 INJECTION, SOLUTION SUBCUTANEOUS at 22:41

## 2018-05-19 RX ADMIN — OXYCODONE HYDROCHLORIDE 10 MG: 10 TABLET ORAL at 19:41

## 2018-05-19 RX ADMIN — LIDOCAINE HYDROCHLORIDE AND EPINEPHRINE: 20; 10 INJECTION, SOLUTION INFILTRATION; PERINEURAL at 18:30

## 2018-05-19 RX ADMIN — GABAPENTIN 100 MG: 100 CAPSULE ORAL at 22:37

## 2018-05-19 RX ADMIN — INSULIN HUMAN 1 UNITS: 100 INJECTION, SOLUTION PARENTERAL at 22:42

## 2018-05-19 RX ADMIN — HYDROMORPHONE HYDROCHLORIDE 4 MG: 2 TABLET ORAL at 15:51

## 2018-05-19 RX ADMIN — SODIUM CHLORIDE, POTASSIUM CHLORIDE, SODIUM LACTATE AND CALCIUM CHLORIDE 1000 ML: 600; 310; 30; 20 INJECTION, SOLUTION INTRAVENOUS at 20:02

## 2018-05-19 RX ADMIN — KETOROLAC TROMETHAMINE 60 MG: 30 INJECTION, SOLUTION INTRAMUSCULAR at 17:25

## 2018-05-19 RX ADMIN — LIDOCAINE 1 PATCH: 50 PATCH TOPICAL at 20:04

## 2018-05-19 ASSESSMENT — PATIENT HEALTH QUESTIONNAIRE - PHQ9
1. LITTLE INTEREST OR PLEASURE IN DOING THINGS: NOT AT ALL
2. FEELING DOWN, DEPRESSED, IRRITABLE, OR HOPELESS: NOT AT ALL
SUM OF ALL RESPONSES TO PHQ9 QUESTIONS 1 AND 2: 0

## 2018-05-19 ASSESSMENT — COPD QUESTIONNAIRES
HAVE YOU SMOKED AT LEAST 100 CIGARETTES IN YOUR ENTIRE LIFE: NO/DON'T KNOW
DURING THE PAST 4 WEEKS HOW MUCH DID YOU FEEL SHORT OF BREATH: SOME OF THE TIME
DO YOU EVER COUGH UP ANY MUCUS OR PHLEGM?: NO/ONLY WITH OCCASIONAL COLDS OR INFECTIONS
COPD SCREENING SCORE: 2

## 2018-05-19 ASSESSMENT — LIFESTYLE VARIABLES
ALCOHOL_USE: NO
EVER_SMOKED: NEVER
EVER_SMOKED: NEVER

## 2018-05-19 ASSESSMENT — PAIN SCALES - GENERAL
PAINLEVEL_OUTOF10: 7
PAINLEVEL_OUTOF10: 6
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
## 2018-05-20 ENCOUNTER — APPOINTMENT (OUTPATIENT)
Dept: RADIOLOGY | Facility: MEDICAL CENTER | Age: 53
DRG: 155 | End: 2018-05-20
Attending: SURGERY
Payer: COMMERCIAL

## 2018-05-20 VITALS
WEIGHT: 220 LBS | HEIGHT: 68 IN | RESPIRATION RATE: 16 BRPM | SYSTOLIC BLOOD PRESSURE: 133 MMHG | BODY MASS INDEX: 33.34 KG/M2 | OXYGEN SATURATION: 90 % | HEART RATE: 85 BPM | TEMPERATURE: 98.2 F | DIASTOLIC BLOOD PRESSURE: 80 MMHG

## 2018-05-20 PROBLEM — M79.641 HAND PAIN, RIGHT: Status: RESOLVED | Noted: 2018-05-19 | Resolved: 2018-05-20

## 2018-05-20 PROBLEM — Z78.9 NO CONTRAINDICATION TO DEEP VEIN THROMBOSIS (DVT) PROPHYLAXIS: Status: RESOLVED | Noted: 2018-05-19 | Resolved: 2018-05-20

## 2018-05-20 LAB
ALBUMIN SERPL BCP-MCNC: 3.5 G/DL (ref 3.2–4.9)
ALBUMIN/GLOB SERPL: 1.5 G/DL
ALP SERPL-CCNC: 78 U/L (ref 30–99)
ALT SERPL-CCNC: 59 U/L (ref 2–50)
ANION GAP SERPL CALC-SCNC: 7 MMOL/L (ref 0–11.9)
AST SERPL-CCNC: 25 U/L (ref 12–45)
BASOPHILS # BLD AUTO: 0.2 % (ref 0–1.8)
BASOPHILS # BLD: 0.01 K/UL (ref 0–0.12)
BILIRUB SERPL-MCNC: 0.6 MG/DL (ref 0.1–1.5)
BUN SERPL-MCNC: 21 MG/DL (ref 8–22)
CALCIUM SERPL-MCNC: 8.6 MG/DL (ref 8.5–10.5)
CHLORIDE SERPL-SCNC: 105 MMOL/L (ref 96–112)
CO2 SERPL-SCNC: 27 MMOL/L (ref 20–33)
CREAT SERPL-MCNC: 0.68 MG/DL (ref 0.5–1.4)
EOSINOPHIL # BLD AUTO: 0.1 K/UL (ref 0–0.51)
EOSINOPHIL NFR BLD: 1.7 % (ref 0–6.9)
ERYTHROCYTE [DISTWIDTH] IN BLOOD BY AUTOMATED COUNT: 39.8 FL (ref 35.9–50)
GLOBULIN SER CALC-MCNC: 2.4 G/DL (ref 1.9–3.5)
GLUCOSE BLD-MCNC: 161 MG/DL (ref 65–99)
GLUCOSE BLD-MCNC: 229 MG/DL (ref 65–99)
GLUCOSE SERPL-MCNC: 183 MG/DL (ref 65–99)
HCT VFR BLD AUTO: 34.8 % (ref 42–52)
HGB BLD-MCNC: 12.1 G/DL (ref 14–18)
IMM GRANULOCYTES # BLD AUTO: 0.03 K/UL (ref 0–0.11)
IMM GRANULOCYTES NFR BLD AUTO: 0.5 % (ref 0–0.9)
LYMPHOCYTES # BLD AUTO: 1.27 K/UL (ref 1–4.8)
LYMPHOCYTES NFR BLD: 22 % (ref 22–41)
MCH RBC QN AUTO: 31.1 PG (ref 27–33)
MCHC RBC AUTO-ENTMCNC: 34.8 G/DL (ref 33.7–35.3)
MCV RBC AUTO: 89.5 FL (ref 81.4–97.8)
MONOCYTES # BLD AUTO: 0.4 K/UL (ref 0–0.85)
MONOCYTES NFR BLD AUTO: 6.9 % (ref 0–13.4)
NEUTROPHILS # BLD AUTO: 3.95 K/UL (ref 1.82–7.42)
NEUTROPHILS NFR BLD: 68.7 % (ref 44–72)
NRBC # BLD AUTO: 0 K/UL
NRBC BLD-RTO: 0 /100 WBC
PLATELET # BLD AUTO: 166 K/UL (ref 164–446)
PMV BLD AUTO: 9.5 FL (ref 9–12.9)
POTASSIUM SERPL-SCNC: 3.5 MMOL/L (ref 3.6–5.5)
PROT SERPL-MCNC: 5.9 G/DL (ref 6–8.2)
RBC # BLD AUTO: 3.89 M/UL (ref 4.7–6.1)
SODIUM SERPL-SCNC: 139 MMOL/L (ref 135–145)
WBC # BLD AUTO: 5.8 K/UL (ref 4.8–10.8)

## 2018-05-20 PROCEDURE — A9270 NON-COVERED ITEM OR SERVICE: HCPCS | Performed by: SURGERY

## 2018-05-20 PROCEDURE — 700111 HCHG RX REV CODE 636 W/ 250 OVERRIDE (IP): Performed by: SURGERY

## 2018-05-20 PROCEDURE — 71045 X-RAY EXAM CHEST 1 VIEW: CPT

## 2018-05-20 PROCEDURE — 700105 HCHG RX REV CODE 258: Performed by: SURGERY

## 2018-05-20 PROCEDURE — 94760 N-INVAS EAR/PLS OXIMETRY 1: CPT

## 2018-05-20 PROCEDURE — 700112 HCHG RX REV CODE 229: Performed by: SURGERY

## 2018-05-20 PROCEDURE — A9270 NON-COVERED ITEM OR SERVICE: HCPCS | Performed by: NURSE PRACTITIONER

## 2018-05-20 PROCEDURE — 700102 HCHG RX REV CODE 250 W/ 637 OVERRIDE(OP): Performed by: NURSE PRACTITIONER

## 2018-05-20 PROCEDURE — 94668 MNPJ CHEST WALL SBSQ: CPT

## 2018-05-20 PROCEDURE — 82962 GLUCOSE BLOOD TEST: CPT | Mod: 91

## 2018-05-20 PROCEDURE — 80053 COMPREHEN METABOLIC PANEL: CPT

## 2018-05-20 PROCEDURE — 700102 HCHG RX REV CODE 250 W/ 637 OVERRIDE(OP): Performed by: SURGERY

## 2018-05-20 PROCEDURE — 85025 COMPLETE CBC W/AUTO DIFF WBC: CPT

## 2018-05-20 PROCEDURE — 94667 MNPJ CHEST WALL 1ST: CPT

## 2018-05-20 RX ORDER — IBUPROFEN 800 MG/1
800 TABLET ORAL
Status: DISCONTINUED | OUTPATIENT
Start: 2018-05-20 | End: 2018-05-20 | Stop reason: HOSPADM

## 2018-05-20 RX ORDER — LIDOCAINE 50 MG/G
1 PATCH TOPICAL EVERY 24 HOURS
Qty: 10 PATCH | Refills: 0 | Status: SHIPPED | OUTPATIENT
Start: 2018-05-20 | End: 2018-11-14

## 2018-05-20 RX ORDER — GABAPENTIN 100 MG/1
100 CAPSULE ORAL 3 TIMES DAILY
Qty: 30 CAP | Refills: 0 | Status: SHIPPED | OUTPATIENT
Start: 2018-05-20 | End: 2018-11-14

## 2018-05-20 RX ORDER — ACETAMINOPHEN 500 MG
1000 TABLET ORAL EVERY 6 HOURS PRN
Qty: 30 TAB | Refills: 0 | Status: ON HOLD | COMMUNITY
Start: 2018-05-20 | End: 2018-11-15

## 2018-05-20 RX ORDER — IBUPROFEN 800 MG/1
800 TABLET ORAL
Qty: 30 TAB | Refills: 0 | Status: ON HOLD | OUTPATIENT
Start: 2018-05-20 | End: 2018-11-15

## 2018-05-20 RX ADMIN — IBUPROFEN 800 MG: 800 TABLET, FILM COATED ORAL at 11:54

## 2018-05-20 RX ADMIN — DOCUSATE SODIUM 100 MG: 100 CAPSULE ORAL at 09:45

## 2018-05-20 RX ADMIN — SODIUM CHLORIDE, POTASSIUM CHLORIDE, SODIUM LACTATE AND CALCIUM CHLORIDE: 600; 310; 30; 20 INJECTION, SOLUTION INTRAVENOUS at 05:38

## 2018-05-20 RX ADMIN — MORPHINE SULFATE 4 MG: 4 INJECTION INTRAVENOUS at 08:29

## 2018-05-20 RX ADMIN — ACETAMINOPHEN 1000 MG: 500 TABLET ORAL at 01:02

## 2018-05-20 RX ADMIN — KETOROLAC TROMETHAMINE 30 MG: 30 INJECTION, SOLUTION INTRAMUSCULAR; INTRAVENOUS at 01:02

## 2018-05-20 RX ADMIN — OXYCODONE HYDROCHLORIDE 10 MG: 10 TABLET ORAL at 09:45

## 2018-05-20 RX ADMIN — BUPROPION HYDROCHLORIDE 150 MG: 150 TABLET, EXTENDED RELEASE ORAL at 09:45

## 2018-05-20 RX ADMIN — OXYCODONE HYDROCHLORIDE 10 MG: 10 TABLET ORAL at 15:24

## 2018-05-20 RX ADMIN — LEVOTHYROXINE, LIOTHYRONINE 60 MG: 19; 4.5 TABLET ORAL at 09:45

## 2018-05-20 RX ADMIN — GABAPENTIN 100 MG: 100 CAPSULE ORAL at 09:45

## 2018-05-20 RX ADMIN — GABAPENTIN 100 MG: 100 CAPSULE ORAL at 15:24

## 2018-05-20 RX ADMIN — ACETAMINOPHEN 1000 MG: 500 TABLET ORAL at 11:54

## 2018-05-20 RX ADMIN — MORPHINE SULFATE 4 MG: 4 INJECTION INTRAVENOUS at 11:54

## 2018-05-20 RX ADMIN — INSULIN GLARGINE 18 UNITS: 100 INJECTION, SOLUTION SUBCUTANEOUS at 05:36

## 2018-05-20 RX ADMIN — INSULIN HUMAN 2 UNITS: 100 INJECTION, SOLUTION PARENTERAL at 12:01

## 2018-05-20 RX ADMIN — OXYCODONE HYDROCHLORIDE 10 MG: 10 TABLET ORAL at 05:37

## 2018-05-20 RX ADMIN — ENOXAPARIN SODIUM 30 MG: 100 INJECTION SUBCUTANEOUS at 09:45

## 2018-05-20 RX ADMIN — OXYCODONE HYDROCHLORIDE 10 MG: 10 TABLET ORAL at 01:55

## 2018-05-20 RX ADMIN — FAMOTIDINE 20 MG: 20 TABLET ORAL at 09:45

## 2018-05-20 RX ADMIN — ACETAMINOPHEN 1000 MG: 500 TABLET ORAL at 05:37

## 2018-05-20 RX ADMIN — KETOROLAC TROMETHAMINE 30 MG: 30 INJECTION, SOLUTION INTRAMUSCULAR; INTRAVENOUS at 05:36

## 2018-05-20 ASSESSMENT — PAIN SCALES - GENERAL
PAINLEVEL_OUTOF10: 7
PAINLEVEL_OUTOF10: 8
PAINLEVEL_OUTOF10: 8
PAINLEVEL_OUTOF10: 7
PAINLEVEL_OUTOF10: 7

## 2018-05-20 ASSESSMENT — ENCOUNTER SYMPTOMS
SHORTNESS OF BREATH: 1
VOMITING: 0
NECK PAIN: 0
PALPITATIONS: 0
NAUSEA: 0
HEADACHES: 0
ABDOMINAL PAIN: 1
DOUBLE VISION: 0
CHILLS: 0
FOCAL WEAKNESS: 0
MYALGIAS: 1
BACK PAIN: 0
FEVER: 0

## 2018-05-20 NOTE — ED NOTES
Bedside report from Cj LUCERO for continued pt care. Pt requesting more for pain. Provider notified.

## 2018-05-20 NOTE — PROGRESS NOTES
2 RN skin check preformed. Scrapes noted on right scapula; left ALANA with no drainage. Right ear swollen, red, and has sutures behind. Left ALANA; no further drainage. 4 week old surgical incision on midline abd noted. Healing, ALANA with no drainage. No other skin breakdown over bony prominences.
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
## 2018-05-20 NOTE — PROGRESS NOTES
Pt ambulating [place] with staff. Pain 5/10 at this time. Pt states he feels much better. Pt back to bed. Call light within reach.

## 2018-05-20 NOTE — CARE PLAN
Problem: Hyperinflation:  Goal: Prevent or improve atelectasis  Outcome: PROGRESSING AS EXPECTED    Intervention: Instruct incentive spirometry usage  60% of predicted IS value 1800, pt's best value today was 1250.  Intervention: Perform hyperinflation therapy as indicated by assessment  PEP Q4

## 2018-05-20 NOTE — CARE PLAN
Problem: Safety  Goal: Will remain free from injury  Outcome: PROGRESSING AS EXPECTED  Proper fall precautions in place. Call light within reach and encouraged to use. Hourly rounding in practice.    Problem: Pain Management  Goal: Pain level will decrease to patient's comfort goal  Outcome: PROGRESSING AS EXPECTED  Pt receiving PRN Oxycodone 10mg and scheduled Tylenol/Toradol; pt tolerating well.

## 2018-05-20 NOTE — ASSESSMENT & PLAN NOTE
Chronic condition treated with Humalog and Lantus.  Lantus resumed. Sliding scale insulin.   HbA1C - 9.9 (237)
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
## 2018-05-20 NOTE — PROGRESS NOTES
Pt report received. Assumed care of pt. Pt sleeping in bed with no signs of distress, call light within reach, personal items available. Pt laying flat in supine position per pt request and has been all night, pt was educated on HOB orders. Will continue to monitor.

## 2018-05-20 NOTE — CARE PLAN
Problem: Safety  Goal: Will remain free from injury    Intervention: Provide assistance with mobility  Pt up with staff. Pt unable to tolerate walking. Pt back to bed and PRN medication given. Non-slip socks on pt, bed in lowest locked position, and call light within reach.       Problem: Knowledge Deficit  Goal: Knowledge of disease process/condition, treatment plan, diagnostic tests, and medications will improve  Pt update don POC

## 2018-05-20 NOTE — PROGRESS NOTES
[place], report given.  A+O x 4, VSS, and RA.   Pt amble to ambulate with a 1 assist from [facility] to bed. Tolerated well with left arm sling in place.   Pt has right EJ triple lumen central line in place. All ports flushed and have + blood return.   Pt has closed with sutures ALANA with no drainage behind right hear. Edematous and bruised. Hearing intact.   Pt tolerating clear liquid diet; denies N/V.  +void.  Pt medicated per MAR with PO Oxy 10mg. Tolerating well.   Pt updated on POC and all questions answered at this time.  Bed in lowest position, call light within reach, and no current needs.

## 2018-05-20 NOTE — ED NOTES
Med rec complete per pt at bedside  Allergies reviewed  No ABX in last month  Pt reports he has both Dilaudid 2mg and Percocet 7.5/325 at home that he can take PRN but has not used either recently  Pt also reports that he is also supposed to restart taking baby aspirin on Monday. Not added to med rec since pt has not restarted yet

## 2018-05-21 ENCOUNTER — APPOINTMENT (OUTPATIENT)
Dept: RADIOLOGY | Facility: MEDICAL CENTER | Age: 53
End: 2018-05-21
Payer: COMMERCIAL

## 2018-05-21 ENCOUNTER — APPOINTMENT (OUTPATIENT)
Dept: RADIOLOGY | Facility: MEDICAL CENTER | Age: 53
End: 2018-05-21
Attending: EMERGENCY MEDICINE
Payer: COMMERCIAL

## 2018-05-21 ENCOUNTER — HOSPITAL ENCOUNTER (EMERGENCY)
Facility: MEDICAL CENTER | Age: 53
End: 2018-05-22
Attending: EMERGENCY MEDICINE
Payer: COMMERCIAL

## 2018-05-21 VITALS
SYSTOLIC BLOOD PRESSURE: 155 MMHG | BODY MASS INDEX: 33.45 KG/M2 | OXYGEN SATURATION: 92 % | DIASTOLIC BLOOD PRESSURE: 87 MMHG | TEMPERATURE: 98.4 F | RESPIRATION RATE: 20 BRPM | HEART RATE: 96 BPM | WEIGHT: 220 LBS

## 2018-05-21 DIAGNOSIS — K59.00 CONSTIPATION, UNSPECIFIED CONSTIPATION TYPE: ICD-10-CM

## 2018-05-21 DIAGNOSIS — R55 SYNCOPE, UNSPECIFIED SYNCOPE TYPE: ICD-10-CM

## 2018-05-21 LAB
ALBUMIN SERPL BCP-MCNC: 4.1 G/DL (ref 3.2–4.9)
ALBUMIN/GLOB SERPL: 1.4 G/DL
ALP SERPL-CCNC: 114 U/L (ref 30–99)
ALT SERPL-CCNC: 83 U/L (ref 2–50)
ANION GAP SERPL CALC-SCNC: 13 MMOL/L (ref 0–11.9)
AST SERPL-CCNC: 17 U/L (ref 12–45)
BASOPHILS # BLD AUTO: 0.3 % (ref 0–1.8)
BASOPHILS # BLD: 0.02 K/UL (ref 0–0.12)
BILIRUB SERPL-MCNC: 0.4 MG/DL (ref 0.1–1.5)
BUN SERPL-MCNC: 19 MG/DL (ref 8–22)
CALCIUM SERPL-MCNC: 9.1 MG/DL (ref 8.5–10.5)
CHLORIDE SERPL-SCNC: 105 MMOL/L (ref 96–112)
CO2 SERPL-SCNC: 22 MMOL/L (ref 20–33)
CREAT SERPL-MCNC: 0.62 MG/DL (ref 0.5–1.4)
EKG IMPRESSION: NORMAL
EOSINOPHIL # BLD AUTO: 0.09 K/UL (ref 0–0.51)
EOSINOPHIL NFR BLD: 1.2 % (ref 0–6.9)
ERYTHROCYTE [DISTWIDTH] IN BLOOD BY AUTOMATED COUNT: 39.3 FL (ref 35.9–50)
GLOBULIN SER CALC-MCNC: 3 G/DL (ref 1.9–3.5)
GLUCOSE BLD-MCNC: 273 MG/DL (ref 65–99)
GLUCOSE SERPL-MCNC: 256 MG/DL (ref 65–99)
HCT VFR BLD AUTO: 40.5 % (ref 42–52)
HGB BLD-MCNC: 14.2 G/DL (ref 14–18)
IMM GRANULOCYTES # BLD AUTO: 0.03 K/UL (ref 0–0.11)
IMM GRANULOCYTES NFR BLD AUTO: 0.4 % (ref 0–0.9)
LYMPHOCYTES # BLD AUTO: 1.43 K/UL (ref 1–4.8)
LYMPHOCYTES NFR BLD: 19.2 % (ref 22–41)
MCH RBC QN AUTO: 30.9 PG (ref 27–33)
MCHC RBC AUTO-ENTMCNC: 35.1 G/DL (ref 33.7–35.3)
MCV RBC AUTO: 88.2 FL (ref 81.4–97.8)
MONOCYTES # BLD AUTO: 0.46 K/UL (ref 0–0.85)
MONOCYTES NFR BLD AUTO: 6.2 % (ref 0–13.4)
NEUTROPHILS # BLD AUTO: 5.43 K/UL (ref 1.82–7.42)
NEUTROPHILS NFR BLD: 72.7 % (ref 44–72)
NRBC # BLD AUTO: 0 K/UL
NRBC BLD-RTO: 0 /100 WBC
PLATELET # BLD AUTO: 222 K/UL (ref 164–446)
PMV BLD AUTO: 9.4 FL (ref 9–12.9)
POTASSIUM SERPL-SCNC: 3.6 MMOL/L (ref 3.6–5.5)
PROT SERPL-MCNC: 7.1 G/DL (ref 6–8.2)
RBC # BLD AUTO: 4.59 M/UL (ref 4.7–6.1)
SODIUM SERPL-SCNC: 140 MMOL/L (ref 135–145)
WBC # BLD AUTO: 7.5 K/UL (ref 4.8–10.8)

## 2018-05-21 PROCEDURE — 700105 HCHG RX REV CODE 258: Performed by: EMERGENCY MEDICINE

## 2018-05-21 PROCEDURE — 700101 HCHG RX REV CODE 250: Performed by: EMERGENCY MEDICINE

## 2018-05-21 PROCEDURE — 85025 COMPLETE CBC W/AUTO DIFF WBC: CPT

## 2018-05-21 PROCEDURE — 80053 COMPREHEN METABOLIC PANEL: CPT

## 2018-05-21 PROCEDURE — 93005 ELECTROCARDIOGRAM TRACING: CPT | Performed by: EMERGENCY MEDICINE

## 2018-05-21 PROCEDURE — 73030 X-RAY EXAM OF SHOULDER: CPT | Mod: LT

## 2018-05-21 PROCEDURE — 96374 THER/PROPH/DIAG INJ IV PUSH: CPT

## 2018-05-21 PROCEDURE — 96376 TX/PRO/DX INJ SAME DRUG ADON: CPT

## 2018-05-21 PROCEDURE — 99285 EMERGENCY DEPT VISIT HI MDM: CPT

## 2018-05-21 PROCEDURE — 82962 GLUCOSE BLOOD TEST: CPT

## 2018-05-21 PROCEDURE — 71046 X-RAY EXAM CHEST 2 VIEWS: CPT

## 2018-05-21 PROCEDURE — 36415 COLL VENOUS BLD VENIPUNCTURE: CPT

## 2018-05-21 PROCEDURE — 700111 HCHG RX REV CODE 636 W/ 250 OVERRIDE (IP): Performed by: EMERGENCY MEDICINE

## 2018-05-21 PROCEDURE — 93005 ELECTROCARDIOGRAM TRACING: CPT

## 2018-05-21 RX ORDER — ENEMA 19; 7 G/133ML; G/133ML
1 ENEMA RECTAL ONCE
Status: COMPLETED | OUTPATIENT
Start: 2018-05-21 | End: 2018-05-21

## 2018-05-21 RX ORDER — SODIUM CHLORIDE, SODIUM LACTATE, POTASSIUM CHLORIDE, CALCIUM CHLORIDE 600; 310; 30; 20 MG/100ML; MG/100ML; MG/100ML; MG/100ML
1000 INJECTION, SOLUTION INTRAVENOUS ONCE
Status: COMPLETED | OUTPATIENT
Start: 2018-05-21 | End: 2018-05-21

## 2018-05-21 RX ADMIN — HYDROMORPHONE HYDROCHLORIDE 1 MG: 10 INJECTION, SOLUTION INTRAMUSCULAR; INTRAVENOUS; SUBCUTANEOUS at 21:26

## 2018-05-21 RX ADMIN — SODIUM PHOSPHATE, DIBASIC AND SODIUM PHOSPHATE, MONOBASIC 133 ML: 7; 19 ENEMA RECTAL at 21:45

## 2018-05-21 RX ADMIN — HYDROMORPHONE HYDROCHLORIDE 1 MG: 10 INJECTION, SOLUTION INTRAMUSCULAR; INTRAVENOUS; SUBCUTANEOUS at 23:00

## 2018-05-21 RX ADMIN — SODIUM CHLORIDE, POTASSIUM CHLORIDE, SODIUM LACTATE AND CALCIUM CHLORIDE 1000 ML: 600; 310; 30; 20 INJECTION, SOLUTION INTRAVENOUS at 21:00

## 2018-05-21 ASSESSMENT — PAIN SCALES - GENERAL
PAINLEVEL_OUTOF10: 4
PAINLEVEL_OUTOF10: 4

## 2018-05-21 NOTE — DISCHARGE INSTRUCTIONS
Discharge Instructions    Discharged to home by car with relative. Discharged via wheelchair, hospital escort: Refused.  Special equipment needed: Not Applicable    Be sure to schedule a follow-up appointment with your primary care doctor or any specialists as instructed.     Discharge Plan:   Pneumococcal Vaccine Administered/Refused: Not given - Patient refused pneumococcal vaccine  Influenza Vaccine Indication: Not indicated: Previously immunized this influenza season and > 8 years of age    I understand that a diet low in cholesterol, fat, and sodium is recommended for good health. Unless I have been given specific instructions below for another diet, I accept this instruction as my diet prescription.   Other diet: as tolerated      Special Instructions: None    · Is patient discharged on Warfarin / Coumadin?   No     Depression / Suicide Risk    As you are discharged from this [facility], it is important to learn how to keep safe from harming yourself.    Recognize the warning signs:  · Abrupt changes in personality, positive or negative- including increase in energy   · Giving away possessions  · Change in eating patterns- significant weight changes-  positive or negative  · Change in sleeping patterns- unable to sleep or sleeping all the time   · Unwillingness or inability to communicate  · Depression  · Unusual sadness, discouragement and loneliness  · Talk of wanting to die  · Neglect of personal appearance   · Rebelliousness- reckless behavior  · Withdrawal from people/activities they love  · Confusion- inability to concentrate     If you or a loved one observes any of these behaviors or has concerns about self-harm, here's what you can do:  · Talk about it- your feelings and reasons for harming yourself  · Remove any means that you might use to hurt yourself (examples: pills, rope, extension cords, firearm)  · Get professional help from the community (Mental Health, Substance Abuse,  psychological counseling)  · Do not be alone:Call your Safe Contact- someone whom you trust who will be there for you.  · Call your local CRISIS HOTLINE 041-9773 or 394-177-8502  · Call your local Children's Mobile Crisis Response Team [place] (108) 997-4878 or www.[employer]  · Call the toll free National Suicide Prevention Hotlines   · National Suicide Prevention Lifeline 461-142-JNAA (7120)  · [employer] Line Network 800-SUICIDE (291-4085)      Sternal Fracture  A sternal fracture is a break in the bone in the center of your chest (sternum or breastbone). This fracture is not dangerous unless there is also an injury to your heart or lungs, which are protected by the sternum and ribs.  What are the causes?  This condition is usually caused by a forceful injury from:  · Motor vehicle collisions. This is the most common cause.  · Contact sports.  · Physical assaults.  You can also have a sternal fracture without having a forceful injury if the bone becomes weakened over time (stress fracture or insufficiency fracture).  What increases the risk?  You may be at greater risk for a sternal fracture if you:  · Participate in direct contact sports, such as football or wrestling.  · Work at elevated heights, such as in construction.  Other risk factors for a stress or insufficiency sternal fracture include:  · Being female.  · Being a postmenopausal woman.  · Being age 50 or older.  · Having osteoporosis.  · Having severe curvature of the spine.  · Being on long-term steroid treatment.  What are the signs or symptoms?  Symptoms of this condition include:  · Pain over the sternum.  · Pain when pressing on the sternum.  · Pain that gets worse with deep breathing or coughing.  · Shortness of breath.  · Bruising.  · Swelling.  · A crackling sound when taking a deep breath or pressing on the sternum.  How is this diagnosed?  This condition is diagnosed with a medical history and physical exam. You may also have  imaging tests, including:  · CT scan.  · Ultrasound.  · Chest X-rays that are taken from a side view.  Your health care provider may check your blood oxygen level with a pulse oximetry test. You may also have repeated electrocardiograms (ECGs) to make sure that your heart has not been injured. You may also have a blood test to check for damage to your heart muscle.  How is this treated?  Treatment depends on the severity of your injury. A sternal fracture without any other injury (isolated sternal fracture) usually heals without treatment. You may need to limit (restrict) some activities at home and take medicine for pain relief.  In rare cases, you may need surgery to repair a sternal fracture that continues to cause severe pain or a sternal fracture that involves bones that have been moved out of position considerably (displaced fracture).  Follow these instructions at home:  · Take over-the-counter and prescription medicines only as told by your health care provider.  · Rest at home. Return to your normal activities as told by your health care provider. Ask your health care provider what activities are safe for you.  · If directed, apply ice to the injured area:  ¨ Put ice in a plastic bag.  ¨ Place a towel between your skin and the bag.  ¨ Leave the ice on for 20 minutes, 2-3 times a day.  · Do not lift anything that is heavier than 10 lb (4.5 kg) until your health care provider says it is safe.  · Do not drive or operate heavy machinery while taking prescription pain medicine.  · Do not use any tobacco products, such as cigarettes, chewing tobacco, and e-cigarettes. If you need help quitting, ask your health care provider.  · Keep all follow-up visits as told by your health care provider. This is important.  Contact a health care provider if:  · Your pain medicine is not helping.  · You continue to have pain after several weeks.  · You develop a fever.  · You develop a cough and you have thick or bloody mucus  (sputum).  Get help right away if:  · You have difficulty breathing.  · You have chest pain.  · You have an abnormal heartbeat (palpitations).  · You feel nauseous or you have pain in your abdomen.  This information is not intended to replace advice given to you by your health care provider. Make sure you discuss any questions you have with your health care provider.  Document Released: 08/01/2005 Document Revised: 08/15/2017 Document Reviewed: 07/13/2016  [employer] Interactive Patient Education © 2017 [employer]      Depression / Suicide Risk    As you are discharged from this [facility] facility, it is important to learn how to keep safe from harming yourself.    Recognize the warning signs:  · Abrupt changes in personality, positive or negative- including increase in energy   · Giving away possessions  · Change in eating patterns- significant weight changes-  positive or negative  · Change in sleeping patterns- unable to sleep or sleeping all the time   · Unwillingness or inability to communicate  · Depression  · Unusual sadness, discouragement and loneliness  · Talk of wanting to die  · Neglect of personal appearance   · Rebelliousness- reckless behavior  · Withdrawal from people/activities they love  · Confusion- inability to concentrate     If you or a loved one observes any of these behaviors or has concerns about self-harm, here's what you can do:  · Talk about it- your feelings and reasons for harming yourself  · Remove any means that you might use to hurt yourself (examples: pills, rope, extension cords, firearm)  · Get professional help from the community (Mental Health, Substance Abuse, psychological counseling)  · Do not be alone:Call your Safe Contact- someone whom you trust who will be there for you.  · Call your local CRISIS HOTLINE 835-8871 or 453-970-3041  · Call your local Children's Mobile Crisis Response Team [place] (380) 009-2866 or www.[employer]  · Call the toll free  National Suicide Prevention Hotlines   · National Suicide Prevention Lifeline 782-250-BSUZ (0268)  · National Hope Line Network 800-SUICIDE (826-1568)

## 2018-05-22 NOTE — DISCHARGE INSTRUCTIONS
Vasovagal Syncope, Adult  Syncope, which is commonly known as fainting or passing out, is a temporary loss of consciousness. It occurs when the blood flow to the brain is reduced. Vasovagal syncope, also called neurocardiogenic syncope, is a fainting spell that happens when blood flow to the brain is reduced because of a sudden drop in heart rate and blood pressure.  Vasovagal syncope is usually harmless. However, you can get injured if you fall during a fainting spell.  What are the causes?  This condition is caused by a drop in heart rate and blood pressure, usually in response to a trigger. Many things and situations can trigger an episode, including:  · Pain.  · Fear.  · The sight of blood. This may occur during medical procedures, such as when blood is being drawn from a vein.  · Common activities, such as coughing, swallowing, stretching, or going to the bathroom.  · Emotional stress.  · Being in a confined space.  · Prolonged standing, especially in a warm environment.  · Lack of sleep or rest.  · Not eating for a long time.  · Not drinking enough liquids.  · Recent illness.  · Drinking alcohol.  · Taking drugs that affect blood pressure, such as marijuana, cocaine, opiates, or inhalants.  What are the signs or symptoms?  Before a fainting episode, you may:  · Feel dizzy or light-headed.  · Become pale.  · Sense that you are going to faint.  · Feel like the room is spinning.  · Only see directly ahead (tunnel vision).  · Feel sick to your stomach (nauseous).  · See spots.  · Slowly lose vision.  · Hear ringing in your ears.  · Have a headache.  · Feel warm and sweaty.  · Feel a sensation of pins and needles.  During the fainting spell, you may twitch or make jerky movements. Fainting spells usually last no longer than a few minutes before you wake up. If you get up too quickly before your body can recover, you may faint again.  How is this diagnosed?  This condition is diagnosed based on your symptoms, your  medical history, and a physical exam. Tests may be done to rule out other causes of fainting. Tests may include:  · Blood tests.  · Heart tests, such as an electrocardiogram (ECG), echocardiogram, or electrophysiology study.  · A test to check your response to changes in position (tilt table test).  How is this treated?  Usually, treatment is not needed for this condition. Your health care provider may suggest ways to help prevent fainting episodes. These may include:  · Drinking additional fluids if you are exposed to a trigger.  · Sitting or lying down if you notice signs that an episode is coming.  If your fainting spells continue, your health care provider may recommend that you:  · Take medicines to prevent fainting or to help reduce further episodes of fainting.  · Do certain exercises.  · Wear compression stockings.  · Have surgery to place a pacemaker in your body (rare).  Follow these instructions at home:  · Learn to identify the signs that an episode is coming.  · Sit or lie down at the first sign of a fainting spell. If you sit down, put your head down between your legs. If you lie down, swing your legs up in the air to increase blood flow to the brain.  · Avoid hot tubs and saunas.  · Avoid standing for a long time. If you have to stand for a long time, try:  ¨ Crossing your legs.  ¨ Flexing and stretching your leg muscles.  ¨ Squatting.  ¨ Moving your legs.  ¨ Bending over.  · Drink enough fluid to keep your urine clear or pale yellow.  · Make changes to your diet that your health care provider recommends. You may be told to:  ¨ Avoid caffeine.  ¨ Eat more salt.  · Take over-the-counter and prescription medicines only as told by your health care provider.  Contact a health care provider if:  · You continue to have fainting spells despite treatment.  · You faint more often despite treatment.  · You lose consciousness for more than a few minutes.  · You faint during or after exercising or after being  startled.  · You have twitching or jerky movements for longer than a few seconds during a fainting spell.  · You have an episode of twitching or jerky movements without fainting.  Get help right away if:  · A fainting spell leads to an injury or bleeding.  · You have new symptoms that occur with the fainting spells, such as:  ¨ Shortness of breath.  ¨ Chest pain.  ¨ Irregular heartbeat.  · You twitch or make jerky movements for more than 5 minutes.  · You twitch or make jerky movements during more than one fainting spell.  This information is not intended to replace advice given to you by your health care provider. Make sure you discuss any questions you have with your health care provider.  Document Released: 12/04/2013 Document Revised: 05/31/2017 Document Reviewed: 10/15/2016  [employer] Interactive Patient Education © 2017 [employer]      Constipation, Adult  Constipation is when a person:  · Poops (has a bowel movement) fewer times in a week than normal.  · Has a hard time pooping.  · Has poop that is dry, hard, or bigger than normal.  Follow these instructions at home:  Eating and drinking  · Eat foods that have a lot of fiber, such as:  ¨ Fresh fruits and vegetables.  ¨ Whole grains.  ¨ Beans.  · Eat less of foods that are high in fat, low in fiber, or overly processed, such as:  ¨ French fries.  ¨ Hamburgers.  ¨ Cookies.  ¨ Candy.  ¨ Soda.  · Drink enough fluid to keep your pee (urine) clear or pale yellow.  General instructions  · Exercise regularly or as told by your doctor.  · Go to the restroom when you feel like you need to poop. Do not hold it in.  · Take over-the-counter and prescription medicines only as told by your doctor. These include any fiber supplements.  · Do pelvic floor retraining exercises, such as:  ¨ Doing deep breathing while relaxing your lower belly (abdomen).  ¨ Relaxing your pelvic floor while pooping.  · Watch your condition for any changes.  · Keep all follow-up visits as told by  your doctor. This is important.  Contact a doctor if:  · You have pain that gets worse.  · You have a fever.  · You have not pooped for 4 days.  · You throw up (vomit).  · You are not hungry.  · You lose weight.  · You are bleeding from the anus.  · You have thin, pencil-like poop (stool).  Get help right away if:  · You have a fever, and your symptoms suddenly get worse.  · You leak poop or have blood in your poop.  · Your belly feels hard or bigger than normal (is bloated).  · You have very bad belly pain.  · You feel dizzy or you faint.  This information is not intended to replace advice given to you by your health care provider. Make sure you discuss any questions you have with your health care provider.  Document Released: 06/05/2009 Document Revised: 07/07/2017 Document Reviewed: 06/07/2017  [employer] Interactive Patient Education © 2017 [employer]
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
## 2018-05-22 NOTE — ED NOTES
PIV discontinued. Catheter tip intact. Nurse applied bandage and pressure. Pt tolerated procedure well, verbalized understanding to discontinue bandage in 5-15 minutes, or when bleeding has ceased.
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
## 2018-05-22 NOTE — ED NOTES
"Pt stated \"I feel like I lost 5 lbs! The pain in my abdomen has gone down and I feel much more comfortable.\" Pt stated large bowel movement at this time. Pt medicated for pain after returning to bed.   "
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
## 2018-05-23 ENCOUNTER — NON-PROVIDER VISIT (OUTPATIENT)
Dept: URGENT CARE | Facility: CLINIC | Age: 53
End: 2018-05-23

## 2018-05-23 ENCOUNTER — PATIENT OUTREACH (OUTPATIENT)
Dept: HEALTH INFORMATION MANAGEMENT | Facility: OTHER | Age: 53
End: 2018-05-23

## 2018-05-23 DIAGNOSIS — Z02.89 ENCOUNTER FOR OTHER ADMINISTRATIVE EXAMINATIONS: ICD-10-CM

## 2018-05-23 PROCEDURE — 8907 PR URINE COLLECT ONLY: Performed by: PHYSICIAN ASSISTANT

## 2018-05-23 NOTE — PROGRESS NOTES
05/23/2018 1545 - Discharge Outreach attempt -   05/23/2018 1602 - Patient returned call and call completed.

## 2018-06-25 ENCOUNTER — APPOINTMENT (OUTPATIENT)
Dept: RADIOLOGY | Facility: MEDICAL CENTER | Age: 53
End: 2018-06-25
Attending: NURSE PRACTITIONER
Payer: COMMERCIAL

## 2018-06-28 ENCOUNTER — HOSPITAL ENCOUNTER (OUTPATIENT)
Dept: RADIOLOGY | Facility: MEDICAL CENTER | Age: 53
End: 2018-06-28
Attending: NURSE PRACTITIONER
Payer: COMMERCIAL

## 2018-06-28 DIAGNOSIS — M25.561 RIGHT KNEE PAIN, UNSPECIFIED CHRONICITY: ICD-10-CM

## 2018-06-28 DIAGNOSIS — M25.512 LEFT SHOULDER PAIN, UNSPECIFIED CHRONICITY: ICD-10-CM

## 2018-06-28 DIAGNOSIS — M54.2 CERVICAL PAIN: ICD-10-CM

## 2018-06-28 PROCEDURE — 73221 MRI JOINT UPR EXTREM W/O DYE: CPT | Mod: LT

## 2018-06-28 PROCEDURE — 72141 MRI NECK SPINE W/O DYE: CPT

## 2018-06-28 PROCEDURE — 73721 MRI JNT OF LWR EXTRE W/O DYE: CPT | Mod: RT

## 2018-07-16 ENCOUNTER — HOSPITAL ENCOUNTER (EMERGENCY)
Facility: MEDICAL CENTER | Age: 53
End: 2018-07-16
Attending: EMERGENCY MEDICINE
Payer: COMMERCIAL

## 2018-07-16 VITALS
HEART RATE: 97 BPM | DIASTOLIC BLOOD PRESSURE: 67 MMHG | WEIGHT: 223.55 LBS | BODY MASS INDEX: 33.88 KG/M2 | RESPIRATION RATE: 18 BRPM | TEMPERATURE: 98.6 F | OXYGEN SATURATION: 93 % | SYSTOLIC BLOOD PRESSURE: 121 MMHG | HEIGHT: 68 IN

## 2018-07-16 DIAGNOSIS — J02.9 VIRAL PHARYNGITIS: ICD-10-CM

## 2018-07-16 DIAGNOSIS — R73.9 HYPERGLYCEMIA: ICD-10-CM

## 2018-07-16 LAB
ALBUMIN SERPL BCP-MCNC: 3.8 G/DL (ref 3.2–4.9)
ALBUMIN/GLOB SERPL: 1.7 G/DL
ALP SERPL-CCNC: 76 U/L (ref 30–99)
ALT SERPL-CCNC: 10 U/L (ref 2–50)
ANION GAP SERPL CALC-SCNC: 12 MMOL/L (ref 0–11.9)
APPEARANCE UR: CLEAR
AST SERPL-CCNC: 12 U/L (ref 12–45)
BASOPHILS # BLD AUTO: 0.3 % (ref 0–1.8)
BASOPHILS # BLD: 0.02 K/UL (ref 0–0.12)
BILIRUB SERPL-MCNC: 0.3 MG/DL (ref 0.1–1.5)
BILIRUB UR QL STRIP.AUTO: NEGATIVE
BUN SERPL-MCNC: 20 MG/DL (ref 8–22)
CALCIUM SERPL-MCNC: 8.5 MG/DL (ref 8.5–10.5)
CHLORIDE SERPL-SCNC: 99 MMOL/L (ref 96–112)
CO2 SERPL-SCNC: 20 MMOL/L (ref 20–33)
COLOR UR: YELLOW
CREAT SERPL-MCNC: 0.84 MG/DL (ref 0.5–1.4)
EOSINOPHIL # BLD AUTO: 0.11 K/UL (ref 0–0.51)
EOSINOPHIL NFR BLD: 1.9 % (ref 0–6.9)
ERYTHROCYTE [DISTWIDTH] IN BLOOD BY AUTOMATED COUNT: 42.7 FL (ref 35.9–50)
GLOBULIN SER CALC-MCNC: 2.3 G/DL (ref 1.9–3.5)
GLUCOSE BLD-MCNC: 300 MG/DL (ref 65–99)
GLUCOSE BLD-MCNC: 320 MG/DL (ref 65–99)
GLUCOSE BLD-MCNC: 396 MG/DL (ref 65–99)
GLUCOSE SERPL-MCNC: 396 MG/DL (ref 65–99)
GLUCOSE UR STRIP.AUTO-MCNC: >=1000 MG/DL
HCT VFR BLD AUTO: 38.2 % (ref 42–52)
HGB BLD-MCNC: 14.1 G/DL (ref 14–18)
IMM GRANULOCYTES # BLD AUTO: 0.03 K/UL (ref 0–0.11)
IMM GRANULOCYTES NFR BLD AUTO: 0.5 % (ref 0–0.9)
KETONES UR STRIP.AUTO-MCNC: 15 MG/DL
LEUKOCYTE ESTERASE UR QL STRIP.AUTO: NEGATIVE
LYMPHOCYTES # BLD AUTO: 1.63 K/UL (ref 1–4.8)
LYMPHOCYTES NFR BLD: 28.3 % (ref 22–41)
MCH RBC QN AUTO: 32.7 PG (ref 27–33)
MCHC RBC AUTO-ENTMCNC: 36.9 G/DL (ref 33.7–35.3)
MCV RBC AUTO: 88.6 FL (ref 81.4–97.8)
MICRO URNS: ABNORMAL
MONOCYTES # BLD AUTO: 0.39 K/UL (ref 0–0.85)
MONOCYTES NFR BLD AUTO: 6.8 % (ref 0–13.4)
NEUTROPHILS # BLD AUTO: 3.58 K/UL (ref 1.82–7.42)
NEUTROPHILS NFR BLD: 62.2 % (ref 44–72)
NITRITE UR QL STRIP.AUTO: NEGATIVE
NRBC # BLD AUTO: 0 K/UL
NRBC BLD-RTO: 0 /100 WBC
PH UR STRIP.AUTO: 5 [PH]
PLATELET # BLD AUTO: 205 K/UL (ref 164–446)
PMV BLD AUTO: 9.6 FL (ref 9–12.9)
POTASSIUM SERPL-SCNC: 3.7 MMOL/L (ref 3.6–5.5)
PROT SERPL-MCNC: 6.1 G/DL (ref 6–8.2)
PROT UR QL STRIP: NEGATIVE MG/DL
RBC # BLD AUTO: 4.31 M/UL (ref 4.7–6.1)
RBC UR QL AUTO: NEGATIVE
SODIUM SERPL-SCNC: 131 MMOL/L (ref 135–145)
SP GR UR STRIP.AUTO: 1.04
UROBILINOGEN UR STRIP.AUTO-MCNC: 0.2 MG/DL
WBC # BLD AUTO: 5.8 K/UL (ref 4.8–10.8)

## 2018-07-16 PROCEDURE — 700105 HCHG RX REV CODE 258: Performed by: EMERGENCY MEDICINE

## 2018-07-16 PROCEDURE — 85025 COMPLETE CBC W/AUTO DIFF WBC: CPT

## 2018-07-16 PROCEDURE — 81003 URINALYSIS AUTO W/O SCOPE: CPT

## 2018-07-16 PROCEDURE — 96376 TX/PRO/DX INJ SAME DRUG ADON: CPT

## 2018-07-16 PROCEDURE — 82962 GLUCOSE BLOOD TEST: CPT | Mod: 91

## 2018-07-16 PROCEDURE — 96374 THER/PROPH/DIAG INJ IV PUSH: CPT

## 2018-07-16 PROCEDURE — 80053 COMPREHEN METABOLIC PANEL: CPT

## 2018-07-16 PROCEDURE — 700102 HCHG RX REV CODE 250 W/ 637 OVERRIDE(OP): Performed by: EMERGENCY MEDICINE

## 2018-07-16 PROCEDURE — 700111 HCHG RX REV CODE 636 W/ 250 OVERRIDE (IP): Performed by: EMERGENCY MEDICINE

## 2018-07-16 PROCEDURE — 96375 TX/PRO/DX INJ NEW DRUG ADDON: CPT

## 2018-07-16 PROCEDURE — 99284 EMERGENCY DEPT VISIT MOD MDM: CPT

## 2018-07-16 RX ORDER — SODIUM CHLORIDE 9 MG/ML
1000 INJECTION, SOLUTION INTRAVENOUS ONCE
Status: COMPLETED | OUTPATIENT
Start: 2018-07-16 | End: 2018-07-16

## 2018-07-16 RX ORDER — KETOROLAC TROMETHAMINE 30 MG/ML
15 INJECTION, SOLUTION INTRAMUSCULAR; INTRAVENOUS ONCE
Status: COMPLETED | OUTPATIENT
Start: 2018-07-16 | End: 2018-07-16

## 2018-07-16 RX ORDER — METOCLOPRAMIDE HYDROCHLORIDE 5 MG/ML
10 INJECTION INTRAMUSCULAR; INTRAVENOUS ONCE
Status: COMPLETED | OUTPATIENT
Start: 2018-07-16 | End: 2018-07-16

## 2018-07-16 RX ADMIN — SODIUM CHLORIDE 1000 ML: 9 INJECTION, SOLUTION INTRAVENOUS at 01:26

## 2018-07-16 RX ADMIN — SODIUM CHLORIDE 1000 ML: 9 INJECTION, SOLUTION INTRAVENOUS at 01:27

## 2018-07-16 RX ADMIN — KETOROLAC TROMETHAMINE 15 MG: 30 INJECTION, SOLUTION INTRAMUSCULAR at 02:13

## 2018-07-16 RX ADMIN — METOCLOPRAMIDE 10 MG: 5 INJECTION, SOLUTION INTRAMUSCULAR; INTRAVENOUS at 02:11

## 2018-07-16 RX ADMIN — INSULIN HUMAN 10 UNITS: 100 INJECTION, SOLUTION PARENTERAL at 01:41

## 2018-07-16 ASSESSMENT — PAIN SCALES - GENERAL
PAINLEVEL_OUTOF10: 0
PAINLEVEL_OUTOF10: 2
PAINLEVEL_OUTOF10: 0
PAINLEVEL_OUTOF10: 5
PAINLEVEL_OUTOF10: 4

## 2018-07-16 ASSESSMENT — PAIN SCALES - WONG BAKER
WONGBAKER_NUMERICALRESPONSE: HURTS JUST A LITTLE BIT
WONGBAKER_NUMERICALRESPONSE: DOESN'T HURT AT ALL
WONGBAKER_NUMERICALRESPONSE: DOESN'T HURT AT ALL

## 2018-07-16 NOTE — ED TRIAGE NOTES
Mahesh Bradshaw  53 y.o.  Chief Complaint   Patient presents with   • Hyperglycemia     despite taking extra insulin, fingerstick at home > 400, fingerstick in triage 396     Ambulatory to triage with steady gait for above.    States that he took Lantus 24 units and Humalog 20 units at home without improvement - normal home dose Humalog 10 units.    Patient complains of concurrent pain to hands/feet and cramping to BLE/lower abdomen.    Charge RN Kayleigh notified of patient.    Patient roomed immediately to [facility].

## 2018-07-16 NOTE — ED NOTES
PT IS AAOX4, PT IS IN NAD. PT IS BEING D/C. PT WAS GIVEN D/C INSTRUCTIONS AND HE STATED UNDERSTANDING. PT LEFT WITH FAMILY

## 2018-07-16 NOTE — DISCHARGE INSTRUCTIONS
Hyperglycemia  Hyperglycemia occurs when the level of sugar (glucose) in the blood is too high. Glucose is a type of sugar that provides the body's main source of energy. Certain hormones (insulin and glucagon) control the level of glucose in the blood. Insulin lowers blood glucose, and glucagon increases blood glucose. Hyperglycemia can result from having too little insulin in the bloodstream, or from the body not responding normally to insulin.  Hyperglycemia occurs most often in people who have diabetes (diabetes mellitus), but it can happen in people who do not have diabetes. It can develop quickly, and it can be life-threatening if it causes you to become severely dehydrated (diabetic ketoacidosis or hyperglycemic hyperosmolar state). Severe hyperglycemia is a medical emergency.  What are the causes?  If you have diabetes, hyperglycemia may be caused by:  · Diabetes medicine.  · Medicines that increase blood glucose or affect your diabetes control.  · Not eating enough, or not eating often enough.  · Changes in physical activity level.  · Being sick or having an infection.  If you have prediabetes or undiagnosed diabetes:  · Hyperglycemia may be caused by those conditions.  If you do not have diabetes, hyperglycemia may be caused by:  · Certain medicines, including steroid medicines, beta-blockers, epinephrine, and thiazide diuretics.  · Stress.  · Serious illness.  · Surgery.  · Diseases of the pancreas.  · Infection.  What increases the risk?  Hyperglycemia is more likely to develop in people who have risk factors for diabetes, such as:  · Having a family member with diabetes.  · Having a gene for type 1 diabetes that is passed from parent to child (inherited).  · Living in an area with cold weather conditions.  · Exposure to certain viruses.  · Certain conditions in which the body's disease-fighting (immune) system attacks itself (autoimmune disorders).  · Being overweight or obese.  · Having an inactive  (sedentary) lifestyle.  · Having been diagnosed with insulin resistance.  · Having a history of prediabetes, gestational diabetes, or polycystic ovarian syndrome (PCOS).  · Being of American-, -American, /, or / descent.  What are the signs or symptoms?  Hyperglycemia may not cause any symptoms. If you do have symptoms, they may include early warning signs, such as:  · Increased thirst.  · Hunger.  · Feeling very tired.  · Needing to urinate more often than usual.  · Blurry vision.  Other symptoms may develop if hyperglycemia gets worse, such as:  · Dry mouth.  · Loss of appetite.  · Fruity-smelling breath.  · Weakness.  · Unexpected or rapid weight gain or weight loss.  · Tingling or numbness in the hands or feet.  · Headache.  · Skin that does not quickly return to normal after being lightly pinched and released (poor skin turgor).  · Abdominal pain.  · Cuts or bruises that are slow to heal.  How is this diagnosed?  Hyperglycemia is diagnosed with a blood test to measure your blood glucose level. This blood test is usually done while you are having symptoms. Your health care provider may also do a physical exam and review your medical history.  You may have more tests to determine the cause of your hyperglycemia, such as:  · A fasting blood glucose (FBG) test. You will not be allowed to eat (you will fast) for at least 8 hours before a blood sample is taken.  · An A1c (hemoglobin A1c) blood test. This provides information about blood glucose control over the previous 2-3 months.  · An oral glucose tolerance test (OGTT). This measures your blood glucose at two times:  ¨ After fasting. This is your baseline blood glucose level.  ¨ Two hours after drinking a beverage that contains glucose.  How is this treated?  Treatment depends on the cause of your hyperglycemia. Treatment may include:  · Taking medicine to regulate your blood glucose levels. If you take insulin or  other diabetes medicines, your medicine or dosage may be adjusted.  · Lifestyle changes, such as exercising more, eating healthier foods, or losing weight.  · Treating an illness or infection, if this caused your hyperglycemia.  · Checking your blood glucose more often.  · Stopping or reducing steroid medicines, if these caused your hyperglycemia.  If your hyperglycemia becomes severe and it results in hyperglycemic hyperosmolar state, you must be hospitalized and given IV fluids.  Follow these instructions at home:  General instructions  · Take over-the-counter and prescription medicines only as told by your health care provider.  · Do not use any products that contain nicotine or tobacco, such as cigarettes and e-cigarettes. If you need help quitting, ask your health care provider.  · Limit alcohol intake to no more than 1 drink per day for nonpregnant women and 2 drinks per day for men. One drink equals 12 oz of beer, 5 oz of wine, or 1½ oz of hard liquor.  · Learn to manage stress. If you need help with this, ask your health care provider.  · Keep all follow-up visits as told by your health care provider. This is important.  Eating and drinking  · Maintain a healthy weight.  · Exercise regularly, as directed by your health care provider.  · Stay hydrated, especially when you exercise, get sick, or spend time in hot temperatures.  · Eat healthy foods, such as:  ¨ Lean proteins.  ¨ Complex carbohydrates.  ¨ Fresh fruits and vegetables.  ¨ Low-fat dairy products.  ¨ Healthy fats.  · Drink enough fluid to keep your urine clear or pale yellow.  If you have diabetes:   · Make sure you know the symptoms of hyperglycemia.  · Follow your diabetes management plan, as told by your health care provider. Make sure you:  ¨ Take your insulin and medicines as directed.  ¨ Follow your exercise plan.  ¨ Follow your meal plan. Eat on time, and do not skip meals.  ¨ Check your blood glucose as often as directed. Make sure to check  your blood glucose before and after exercise. If you exercise longer or in a different way than usual, check your blood glucose more often.  ¨ Follow your sick day plan whenever you cannot eat or drink normally. Make this plan in advance with your health care provider.  · Share your diabetes management plan with people in your workplace, school, and household.  · Check your urine for ketones when you are ill and as told by your health care provider.  · Carry a medical alert card or wear medical alert jewelry.  Contact a health care provider if:  · Your blood glucose is at or above 240 mg/dL (13.3 mmol/L) for 2 days in a row.  · You have problems keeping your blood glucose in your target range.  · You have frequent episodes of hyperglycemia.  Get help right away if:  · You have difficulty breathing.  · You have a change in how you think, feel, or act (mental status).  · You have nausea or vomiting that does not go away.  These symptoms may represent a serious problem that is an emergency. Do not wait to see if the symptoms will go away. Get medical help right away. Call your local emergency services (911 in the [place] Do not drive yourself to the hospital.   Summary  · Hyperglycemia occurs when the level of sugar (glucose) in the blood is too high.  · Hyperglycemia is diagnosed with a blood test to measure your blood glucose level. This blood test is usually done while you are having symptoms. Your health care provider may also do a physical exam and review your medical history.  · If you have diabetes, follow your diabetes management plan as told by your health care provider.  · Contact your health care provider if you have problems keeping your blood glucose in your target range.  This information is not intended to replace advice given to you by your health care provider. Make sure you discuss any questions you have with your health care provider.  Document Released: 06/13/2002 Document Revised: 09/04/2017  "Document Reviewed: 09/04/2017  [employer] Patient Education © 2017 [employer]  Viral Syndrome  You or your child has Viral Syndrome. It is the most common infection causing \"colds\" and infections in the nose, throat, sinuses, and breathing tubes. Sometimes the infection causes nausea, vomiting, or diarrhea. The germ that causes the infection is a virus. No antibiotic or other medicine will kill it. There are medicines that you can take to make you or your child more comfortable.   HOME CARE INSTRUCTIONS   · Rest in bed until you start to feel better.   · If you have diarrhea or vomiting, eat small amounts of crackers and toast. Soup is helpful.   · Do not give aspirin or medicine that contains aspirin to children.   · Only take over-the-counter or prescription medicines for pain, discomfort, or fever as directed by your caregiver.   SEEK IMMEDIATE MEDICAL CARE IF:   · You or your child has not improved within one week.   · You or your child has pain that is not at least partially relieved by over-the-counter medicine.   · Thick, colored mucus or blood is coughed up.   · Discharge from the nose becomes thick yellow or green.   · Diarrhea or vomiting gets worse.   · There is any major change in your or your child's condition.   · You or your child develops a skin rash, stiff neck, severe headache, or are unable to hold down food or fluid.   · You or your child has an oral temperature above 102° F (38.9° C), not controlled by medicine.   · Your baby is older than 3 months with a rectal temperature of 102° F (38.9° C) or higher.   · Your baby is 3 months old or younger with a rectal temperature of 100.4° F (38° C) or higher.   Document Released: 12/03/2007 Document Revised: 03/11/2013 Document Reviewed: 12/03/2008  [employer]® Patient Information ©2013 [employer].  "

## 2018-07-16 NOTE — ED PROVIDER NOTES
ED Provider Note    Scribed for Shira Perez M.D. by Marcelo Álvarez. 7/16/2018, 1:18 AM.    Primary care provider: KRISSY Brunner  Means of arrival: Walk in  History obtained from: Patient  History limited by: None    CHIEF COMPLAINT  Chief Complaint   Patient presents with   • Hyperglycemia     despite taking extra insulin, fingerstick at home > 400, fingerstick in triage 396       HPI  Mahesh Bradshaw is a 53 y.o. male with type 2 diabetes who presents to the Emergency Department for evaluation of hyperglycemia onset a few days ago that has continued to persist despite taking insulin. The patient reports a fingerstick at home indicating a blood glucose greater that 400 tonight. He last took 10 units of Novolog at 9 PM tonight. The patient additionally takes long lasting Lantuss.     He additionally complains of a sore throat and fever. He also endorses nausea onset 1 hour ago, that has been mild alleviated after taking Zofran at home. The patient states he has had sick contact from his grandchildren at home who were recently diagnosed with hand, foot, and mouth. He is negative for vomiting. No alleviating or exacerbating factors are identified at this time.     REVIEW OF SYSTEMS  Positives as above. Pertinent negatives include vomiting.   All other review of systems are negative.  C.    PAST MEDICAL HISTORY   has a past medical history of ADRIANE (acute kidney injury) ([facility]) (2/24/2016); Anesthesia; Arthritis (3-3-17); Aspiration pneumonia ([facility]) (3-2016); ASTHMA (3-3-17); Breath shortness (3-3-17); Congestive heart failure ([facility]) (2-2016 ); Dental disorder; Depression (11/26/2014); Diabetes ([facility]) (3-3-17); Diabetes ([facility]); Difficult intubation (2-2016); DKA (diabetic ketoacidoses) ([facility]) (2-2016); Electrolyte imbalance (2-2016); Elevated LFTs; Elevated liver enzymes (2-2016); Essential hypertension (1/22/2016); Gout; Heart burn; High cholesterol (3-3-17); Hypothyroid (3-3-17); Indigestion; Kidney stones;  "Klebsiella infect; Migraine; MRSA cellulitis; Necrotizing myositis (HCC); On mechanically assisted ventilation ([facility]) (2-2016); Pain (3-3-17); Pancreatitis (2-2016); RF (renal failure) (2-2016); Sepsis ([facility]) (1/21/2016); Snoring (3-3-17); Streptococcus infection; UC (ulcerative colitis) ([facility]) (3-3-17); Urinary incontinence; and Vitamin D deficiency.    SURGICAL HISTORY   has a past surgical history that includes vaishnavi by laparoscopy (2005); colonoscopy with biopsy (11/26/2014); incision and drainage general (4/7/2017); irrigation & debridement general (Right, 4/29/2017); irrigation & debridement general (Right, 5/1/2017); other orthopedic surgery (1997 or 1998); umbilical hernia repair (N/A, 11/6/2016); umbilical hernia repair (3/10/2017); irrigation & debridement ortho (Left, 12/10/2017); and umbilical hernia repair (N/A, 4/15/2018).    SOCIAL HISTORY  Social History   Substance Use Topics   • Smoking status: Never Smoker   • Smokeless tobacco: Never Used   • Alcohol use No      History   Drug Use No       FAMILY HISTORY  History reviewed. No pertinent family history.    CURRENT MEDICATIONS  Reviewed. See Encounter Summary.     ALLERGIES  Allergies   Allergen Reactions   • Peanut (Diagnostic) Anaphylaxis   • Peanut-Derived Anaphylaxis     Peanuts   RXN=age 36   • Tradjenta [Linagliptin] Unspecified     Pt developed pancreatitis   • Tradjenta [Linagliptin] Unspecified     Caused pancreatitis    • Hydrocodone Hives     Tolerates Morphine and oxycodone  Rxn Age - 29   • Vicodin [Hydrocodone-Acetaminophen]      Tolerates oxycodone and morphine       PHYSICAL EXAM  VITAL SIGNS: /83   Pulse (!) 111   Temp 37.2 °C (98.9 °F)   Resp 16   Ht 1.727 m (5' 8\")   Wt 101.4 kg (223 lb 8.7 oz)   SpO2 93%   BMI 33.99 kg/m²    Pulse ox interpretation: I interpret this pulse ox as normal.  Constitutional: Alert in no apparent distress.  HENT: Normocephalic atraumatic, Mildly dry mucous membranes. Erythematous oropharynx " without exudates. Uvula midline. No trismus.   Eyes: PERR, Conjunctiva normal, Non-icteric.   Neck: Normal range of motion, No tenderness, Supple, No stridor.   Lymphatic: No lymphadenopathy noted.   Cardiovascular: Regular rate and rhythm, no murmurs.   Thorax & Lungs: Normal breath sounds, No respiratory distress, No wheezing, No chest tenderness.   Abdomen: Bowel sounds normal, Soft, No tenderness, No pulsatile masses. No peritoneal signs.  Skin: Warm, Dry, No erythema, No rash.   Back: No bony tenderness, No CVA tenderness.   Extremities: Intact distal pulses, No edema, No tenderness, No cyanosis  Musculoskeletal: Good range of motion in all major joints. No tenderness to palpation or major deformities noted.   Neurologic: Alert and oriented, No focal deficits noted.       DIFFERENTIAL DIAGNOSIS AND WORK UP PLAN    1:18 AM Patient seen and examined at bedside. The patient presents with hyperglycemia and the differential diagnosis includes but is not limited to hyperglycemia, HHS, DKA, acute renal injury, electrolyte abnormality, dehydration. Ordered for Urinalysis, CMP, CBC, Estimated GFR, and Accu-chek glucose to evaluate. Patient will be treated with 2 L NS for tachycardia. He will additionally receive Humulin 10 units, Reglan 10 mg and Toradol 15 mg.     DIAGNOSTIC STUDIES / PROCEDURES     LABS  CBC within normal limits, CMP with a glucose of 396 without evidence of acidosis or DKA, urinalysis with some only mild ketones and glucose without signs of infection, repeat Accu-Cheks all the way down to 300  All labs were reviewed by me.      COURSE & MEDICAL DECISION MAKING  Pertinent Labs & Imaging studies reviewed. (See chart for details)    3:02 AM - Patient reevluated at bedside. He reports to be feelin better but is actively sweating. Patient is finishing the last of his 2 L of NS, but has had positive reaction thus far. Oral temperature will be obtained.     4:04 AM - Patient's blood glucose remains above  "400. He will receive additional 10 units of Humulin.     4:05 AM - Patient reevaluated at bedside. He is resting comfortably. Patient updated with lab results, and made aware he will be discharged one his blood glucose is under 300. He is understanding and agreeable.     This is a 53 y.o. year old male who presents with likely viral syndrome and viral pharyngitis with a whole family full of hand-foot-and-mouth disease, no signs of rash at this time no evidence of DKA or HHS.  The patient does not require antibiotics.  He is feeling much better after fluids and insulin his sugars now 300 he feels comfortable going home and managing his sugars at home will return to the ED with any new or worsening difficulty controlling his sugars fevers and vomiting    /67   Pulse 97   Temp 37 °C (98.6 °F)   Resp 18   Ht 1.727 m (5' 8\")   Wt 101.4 kg (223 lb 8.7 oz)   SpO2 93%   BMI 33.99 kg/m²       The patient will return for new or worsening symptoms and is stable at the time of discharge.    DISPOSITION:  Patient will be discharged home in stable condition.    FOLLOW UP:  KRISSY Brunner  [address]  [facility] [address]  [phone]    Schedule an appointment as soon as possible for a visit      [facility], Emergency Dept  [address] [facility] [address] [phone]    If symptoms worsen      OUTPATIENT MEDICATIONS:  Discharge Medication List as of 7/16/2018  5:12 AM          FINAL IMPRESSION  1. Viral pharyngitis    2. Hyperglycemia          Marcelo EPSTEIN (Gareth), am scribing for, and in the presence of, Shira Perez M.D..    Electronically signed by: Marcelo Álvarez (Gareth), 7/16/2018    Shira EPSTEIN M.D. personally performed the services described in this documentation, as scribed by Marcelo Álvarez in my presence, and it is both accurate and complete.    The note accurately reflects work and decisions made by me.  Shira Perez  7/16/2018  6:12 " AM    This dictation has been created using voice recognition software and/or scribes. The accuracy of the dictation is limited by the abilities of the software and the expertise of the scribes. I expect there may be some errors of grammar and possibly content. I made every attempt to manually correct the errors within my dictation. However, errors related to voice recognition software and/or scribes may still exist and should be interpreted within the appropriate context.

## 2018-09-26 ENCOUNTER — IMMUNIZATION (OUTPATIENT)
Dept: OCCUPATIONAL MEDICINE | Facility: CLINIC | Age: 53
End: 2018-09-26

## 2018-09-26 DIAGNOSIS — Z23 NEED FOR VACCINATION: ICD-10-CM

## 2018-09-26 PROCEDURE — 90686 IIV4 VACC NO PRSV 0.5 ML IM: CPT | Performed by: PREVENTIVE MEDICINE

## 2018-10-04 ENCOUNTER — HOSPITAL ENCOUNTER (EMERGENCY)
Facility: MEDICAL CENTER | Age: 53
End: 2018-10-04
Attending: EMERGENCY MEDICINE
Payer: COMMERCIAL

## 2018-10-04 VITALS
TEMPERATURE: 98.1 F | BODY MASS INDEX: 32.74 KG/M2 | SYSTOLIC BLOOD PRESSURE: 131 MMHG | RESPIRATION RATE: 18 BRPM | OXYGEN SATURATION: 97 % | HEART RATE: 93 BPM | DIASTOLIC BLOOD PRESSURE: 89 MMHG | WEIGHT: 216 LBS | HEIGHT: 68 IN

## 2018-10-04 DIAGNOSIS — R73.9 HYPERGLYCEMIA: ICD-10-CM

## 2018-10-04 LAB
ALBUMIN SERPL BCP-MCNC: 4.1 G/DL (ref 3.2–4.9)
ALBUMIN/GLOB SERPL: 1.6 G/DL
ALP SERPL-CCNC: 87 U/L (ref 30–99)
ALT SERPL-CCNC: 19 U/L (ref 2–50)
ANION GAP SERPL CALC-SCNC: 16 MMOL/L (ref 0–11.9)
APPEARANCE UR: CLEAR
AST SERPL-CCNC: 23 U/L (ref 12–45)
BASOPHILS # BLD AUTO: 0.4 % (ref 0–1.8)
BASOPHILS # BLD: 0.02 K/UL (ref 0–0.12)
BILIRUB SERPL-MCNC: 0.6 MG/DL (ref 0.1–1.5)
BILIRUB UR QL STRIP.AUTO: NEGATIVE
BUN SERPL-MCNC: 21 MG/DL (ref 8–22)
CALCIUM SERPL-MCNC: 9.5 MG/DL (ref 8.5–10.5)
CHLORIDE SERPL-SCNC: 90 MMOL/L (ref 96–112)
CO2 SERPL-SCNC: 18 MMOL/L (ref 20–33)
COLOR UR: YELLOW
CREAT SERPL-MCNC: 1.34 MG/DL (ref 0.5–1.4)
EOSINOPHIL # BLD AUTO: 0.04 K/UL (ref 0–0.51)
EOSINOPHIL NFR BLD: 0.7 % (ref 0–6.9)
ERYTHROCYTE [DISTWIDTH] IN BLOOD BY AUTOMATED COUNT: 43 FL (ref 35.9–50)
GLOBULIN SER CALC-MCNC: 2.5 G/DL (ref 1.9–3.5)
GLUCOSE BLD-MCNC: 390 MG/DL (ref 65–99)
GLUCOSE BLD-MCNC: >600 MG/DL (ref 65–99)
GLUCOSE SERPL-MCNC: 745 MG/DL (ref 65–99)
GLUCOSE UR STRIP.AUTO-MCNC: >=1000 MG/DL
HCT VFR BLD AUTO: 41.7 % (ref 42–52)
HGB BLD-MCNC: 15.5 G/DL (ref 14–18)
IMM GRANULOCYTES # BLD AUTO: 0.03 K/UL (ref 0–0.11)
IMM GRANULOCYTES NFR BLD AUTO: 0.5 % (ref 0–0.9)
KETONES UR STRIP.AUTO-MCNC: 15 MG/DL
LEUKOCYTE ESTERASE UR QL STRIP.AUTO: NEGATIVE
LYMPHOCYTES # BLD AUTO: 1.29 K/UL (ref 1–4.8)
LYMPHOCYTES NFR BLD: 23.3 % (ref 22–41)
MCH RBC QN AUTO: 33.7 PG (ref 27–33)
MCHC RBC AUTO-ENTMCNC: 37.2 G/DL (ref 33.7–35.3)
MCV RBC AUTO: 90.7 FL (ref 81.4–97.8)
MICRO URNS: ABNORMAL
MONOCYTES # BLD AUTO: 0.34 K/UL (ref 0–0.85)
MONOCYTES NFR BLD AUTO: 6.1 % (ref 0–13.4)
NEUTROPHILS # BLD AUTO: 3.81 K/UL (ref 1.82–7.42)
NEUTROPHILS NFR BLD: 69 % (ref 44–72)
NITRITE UR QL STRIP.AUTO: NEGATIVE
NRBC # BLD AUTO: 0 K/UL
NRBC BLD-RTO: 0 /100 WBC
PH UR STRIP.AUTO: 5.5 [PH]
PLATELET # BLD AUTO: 251 K/UL (ref 164–446)
PMV BLD AUTO: 10.3 FL (ref 9–12.9)
POTASSIUM SERPL-SCNC: 4.4 MMOL/L (ref 3.6–5.5)
PROT SERPL-MCNC: 6.6 G/DL (ref 6–8.2)
PROT UR QL STRIP: NEGATIVE MG/DL
RBC # BLD AUTO: 4.6 M/UL (ref 4.7–6.1)
RBC UR QL AUTO: NEGATIVE
SODIUM SERPL-SCNC: 124 MMOL/L (ref 135–145)
SP GR UR STRIP.AUTO: 1.03
UROBILINOGEN UR STRIP.AUTO-MCNC: 0.2 MG/DL
WBC # BLD AUTO: 5.5 K/UL (ref 4.8–10.8)

## 2018-10-04 PROCEDURE — 81003 URINALYSIS AUTO W/O SCOPE: CPT

## 2018-10-04 PROCEDURE — 700111 HCHG RX REV CODE 636 W/ 250 OVERRIDE (IP)

## 2018-10-04 PROCEDURE — 85025 COMPLETE CBC W/AUTO DIFF WBC: CPT

## 2018-10-04 PROCEDURE — 700105 HCHG RX REV CODE 258: Performed by: EMERGENCY MEDICINE

## 2018-10-04 PROCEDURE — 96374 THER/PROPH/DIAG INJ IV PUSH: CPT

## 2018-10-04 PROCEDURE — 96375 TX/PRO/DX INJ NEW DRUG ADDON: CPT

## 2018-10-04 PROCEDURE — 99284 EMERGENCY DEPT VISIT MOD MDM: CPT

## 2018-10-04 PROCEDURE — 700111 HCHG RX REV CODE 636 W/ 250 OVERRIDE (IP): Performed by: EMERGENCY MEDICINE

## 2018-10-04 PROCEDURE — 80053 COMPREHEN METABOLIC PANEL: CPT

## 2018-10-04 PROCEDURE — 700102 HCHG RX REV CODE 250 W/ 637 OVERRIDE(OP): Performed by: EMERGENCY MEDICINE

## 2018-10-04 PROCEDURE — 96372 THER/PROPH/DIAG INJ SC/IM: CPT

## 2018-10-04 PROCEDURE — 82962 GLUCOSE BLOOD TEST: CPT

## 2018-10-04 RX ORDER — ONDANSETRON 2 MG/ML
4 INJECTION INTRAMUSCULAR; INTRAVENOUS ONCE
Status: COMPLETED | OUTPATIENT
Start: 2018-10-04 | End: 2018-10-04

## 2018-10-04 RX ORDER — SODIUM CHLORIDE 9 MG/ML
INJECTION, SOLUTION INTRAVENOUS CONTINUOUS
Status: DISCONTINUED | OUTPATIENT
Start: 2018-10-04 | End: 2018-10-04 | Stop reason: HOSPADM

## 2018-10-04 RX ORDER — DICYCLOMINE HYDROCHLORIDE 10 MG/ML
20 INJECTION INTRAMUSCULAR ONCE
Status: COMPLETED | OUTPATIENT
Start: 2018-10-04 | End: 2018-10-04

## 2018-10-04 RX ORDER — ONDANSETRON 2 MG/ML
INJECTION INTRAMUSCULAR; INTRAVENOUS
Status: COMPLETED
Start: 2018-10-04 | End: 2018-10-04

## 2018-10-04 RX ORDER — SODIUM CHLORIDE 9 MG/ML
1000 INJECTION, SOLUTION INTRAVENOUS ONCE
Status: COMPLETED | OUTPATIENT
Start: 2018-10-04 | End: 2018-10-04

## 2018-10-04 RX ORDER — MORPHINE SULFATE 4 MG/ML
4 INJECTION, SOLUTION INTRAMUSCULAR; INTRAVENOUS
Status: DISCONTINUED | OUTPATIENT
Start: 2018-10-04 | End: 2018-10-04 | Stop reason: HOSPADM

## 2018-10-04 RX ADMIN — ONDANSETRON 4 MG: 2 INJECTION INTRAMUSCULAR; INTRAVENOUS at 14:19

## 2018-10-04 RX ADMIN — DICYCLOMINE HYDROCHLORIDE 20 MG: 10 INJECTION INTRAMUSCULAR at 15:15

## 2018-10-04 RX ADMIN — SODIUM CHLORIDE 1000 ML: 9 INJECTION, SOLUTION INTRAVENOUS at 14:15

## 2018-10-04 RX ADMIN — SODIUM CHLORIDE: 9 INJECTION, SOLUTION INTRAVENOUS at 16:12

## 2018-10-04 RX ADMIN — INSULIN HUMAN 10 UNITS: 100 INJECTION, SOLUTION PARENTERAL at 16:12

## 2018-10-04 RX ADMIN — MORPHINE SULFATE 4 MG: 4 INJECTION INTRAVENOUS at 16:12

## 2018-10-04 NOTE — ED PROVIDER NOTES
ED Provider Note    Scribed for Royce Rosa M.D. by Azra Osorio. 10/4/2018  2:02 PM    Primary care provider: KRISSY Brunner  Means of arrival: Walk-in  History obtained from: Patient   History limited by: none    CHIEF COMPLAINT  Chief Complaint   Patient presents with   • High Blood Sugar     Pt reports difficulty in controlling blood sugar since flu shot last Wed HPI  Mahesh Bradshaw is a 53 y.o. male who presents to the Emergency Department for high blood sugar. He got a flu shot on Wednesday of last week and his blood sugar has been high ever since. The patient has been running 300-400 but has not checked it today. He has vomited three times today. He also notes abdominal cramping, muscle aches and nausea associated. He rates the abdominal cramping as a 5 out of 10. Patient denies any modifying factors of his symptoms. Patient denies history of DKA but has had severe hyperglycemia requiring admission multiple times in the past. He denies any chest pain, shortness of breath, fever, cough, or diarrhea associated.      REVIEW OF SYSTEMS  Pertinent positives include high blood sugar, vomiting, abdominal cramping, muscle aches, nausea. Pertinent negatives include no chest pain, shortness of breath, fever, cough, or diarrhea.  All other systems reviewed and negative.  C.    PAST MEDICAL HISTORY   has a past medical history of ADRIANE (acute kidney injury) ([facility]) (2/24/2016); Anesthesia; Arthritis (3-3-17); Aspiration pneumonia ([facility]) (3-2016); ASTHMA (3-3-17); Breath shortness (3-3-17); Congestive heart failure ([facility]) (2-2016 ); Dental disorder; Depression (11/26/2014); Diabetes (HCC) (3-3-17); Diabetes ([facility]); Difficult intubation (2-2016); DKA (diabetic ketoacidoses) ([facility]) (2-2016); Electrolyte imbalance (2-2016); Elevated LFTs; Elevated liver enzymes (2-2016); Essential hypertension (1/22/2016); Gout; Heart burn; High cholesterol (3-3-17); Hypothyroid (3-3-17); Indigestion; Kidney stones;  Klebsiella infect; Migraine; MRSA cellulitis; Necrotizing myositis ([facility]); On mechanically assisted ventilation ([facility]) (2-2016); Pain (3-3-17); Pancreatitis (2-2016); RF (renal failure) (2-2016); Sepsis ([facility]) (1/21/2016); Snoring (3-3-17); Streptococcus infection; UC (ulcerative colitis) ([facility]) (3-3-17); Urinary incontinence; and Vitamin D deficiency.    SURGICAL HISTORY   has a past surgical history that includes vaishnavi by laparoscopy (2005); colonoscopy with biopsy (11/26/2014); incision and drainage general (4/7/2017); irrigation & debridement general (Right, 4/29/2017); irrigation & debridement general (Right, 5/1/2017); other orthopedic surgery (1997 or 1998); umbilical hernia repair (N/A, 11/6/2016); umbilical hernia repair (3/10/2017); irrigation & debridement ortho (Left, 12/10/2017); and umbilical hernia repair (N/A, 4/15/2018).    SOCIAL HISTORY  Social History   Substance Use Topics   • Smoking status: Never Smoker   • Smokeless tobacco: Never Used   • Alcohol use No      History   Drug Use No       FAMILY HISTORY  History reviewed. No pertinent family history.    CURRENT MEDICATIONS  No current facility-administered medications on file prior to encounter.      Current Outpatient Prescriptions on File Prior to Encounter   Medication Sig Dispense Refill   • acetaminophen (TYLENOL) 500 MG Tab Take 2 Tabs by mouth every 6 hours. 30 Tab 0   • gabapentin (NEURONTIN) 100 MG Cap Take 1 Cap by mouth 3 times a day. 30 Cap 0   • ibuprofen (MOTRIN) 800 MG Tab Take 1 Tab by mouth 3 times a day, with meals. 30 Tab 0   • lidocaine (LIDODERM) 5 % Patch Apply 1 Patch to skin as directed every 24 hours. 10 Patch 0   • insulin glargine (LANTUS) 100 UNIT/ML Solution Inject  as instructed 2 times a day. 32 units in AM, 27 units in PM     • buPROPion (WELLBUTRIN XL) 300 MG XL tablet Take 300 mg by mouth every morning.     • insulin lispro (HUMALOG) 100 UNIT/ML Solution Inject 2-10 Units as instructed 3 times a day before meals.  "Sliding Scale - 2 units for every 50 > 150     • ondansetron (ZOFRAN) 4 MG Tab tablet Take 4 mg by mouth every 8 hours as needed for Nausea/Vomiting.     • testosterone cypionate (DEPO-TESTOSTERONE) 200 MG/ML Solution injection 100 mg by Intramuscular route every thursday.     • thyroid (ARMOUR THYROID) 60 MG Tab Take 60 mg by mouth every morning.     • oxyCODONE-acetaminophen (PERCOCET) 7.5-325 MG per tablet Take 1 Tab by mouth 1 time daily as needed for Severe Pain.     • ondansetron (ZOFRAN) 4 MG Tab tablet Take 1 Tab by mouth every 8 hours as needed for Nausea/Vomiting. 10 Each 1   • testosterone cypionate (DEPO-TESTOSTERONE) 200 MG/ML Solution injection 80 mg by Intramuscular route every 7 days.     • traZODone (DESYREL) 50 MG Tab Take 50 mg by mouth at bedtime as needed for Sleep.     • insulin lispro (HUMALOG) 100 UNIT/ML Solution Inject 2-10 Units as instructed 3 times a day before meals. Sliding Scale - 2 units every 50 > 150     • buPROPion (WELLBUTRIN XL) 300 MG XL tablet Take 300 mg by mouth every morning.         ALLERGIES  Allergies   Allergen Reactions   • Peanut (Diagnostic) Anaphylaxis   • Peanut-Derived Anaphylaxis     Peanuts   RXN=age 36   • Tradjenta [Linagliptin] Unspecified     Pt developed pancreatitis   • Tradjenta [Linagliptin] Unspecified     Caused pancreatitis    • Hydrocodone Hives     Tolerates Morphine and oxycodone  Rxn Age - 29   • Vicodin [Hydrocodone-Acetaminophen]      Tolerates oxycodone and morphine       PHYSICAL EXAM  VITAL SIGNS: /89   Pulse 100   Temp 36.7 °C (98.1 °F)   Resp 18   Ht 1.727 m (5' 8\")   Wt 98 kg (216 lb)   SpO2 91%   BMI 32.84 kg/m²     Vital signs reviewed.  Constitutional:  Well developed, well nourished. No acute distress.   Head: Normocephalic, atraumatic   Eyes: PERRLA  Mouth/Throat: Oropharynx is dry.   Neck: No JVD. Supple.   Cardiovascular: Regular rate and rhythm.   Pulmonary/Chest: Lungs are clear to auscultation  Abdominal: Tenderness " to epigastrium and left lower quadrant.   Musculoskeletal: Normal ROM without deformities  Lymphadenopathy: No lymphadenopathy.   Neurological: Normal strength, reflexes and sensation intact.   Skin: Warm, dry.   Psychiatric: Normal affect.     LABS  Results for orders placed or performed during the hospital encounter of 10/04/18   CBC w/ Differential   Result Value Ref Range    WBC 5.5 4.8 - 10.8 K/uL    RBC 4.60 (L) 4.70 - 6.10 M/uL    Hemoglobin 15.5 14.0 - 18.0 g/dL    Hematocrit 41.7 (L) 42.0 - 52.0 %    MCV 90.7 81.4 - 97.8 fL    MCH 33.7 (H) 27.0 - 33.0 pg    MCHC 37.2 (H) 33.7 - 35.3 g/dL    RDW 43.0 35.9 - 50.0 fL    Platelet Count 251 164 - 446 K/uL    MPV 10.3 9.0 - 12.9 fL    Neutrophils-Polys 69.00 44.00 - 72.00 %    Lymphocytes 23.30 22.00 - 41.00 %    Monocytes 6.10 0.00 - 13.40 %    Eosinophils 0.70 0.00 - 6.90 %    Basophils 0.40 0.00 - 1.80 %    Immature Granulocytes 0.50 0.00 - 0.90 %    Nucleated RBC 0.00 /100 WBC    Neutrophils (Absolute) 3.81 1.82 - 7.42 K/uL    Lymphs (Absolute) 1.29 1.00 - 4.80 K/uL    Monos (Absolute) 0.34 0.00 - 0.85 K/uL    Eos (Absolute) 0.04 0.00 - 0.51 K/uL    Baso (Absolute) 0.02 0.00 - 0.12 K/uL    Immature Granulocytes (abs) 0.03 0.00 - 0.11 K/uL    NRBC (Absolute) 0.00 K/uL   Complete Metabolic Panel (CMP)   Result Value Ref Range    Sodium 124 (L) 135 - 145 mmol/L    Potassium 4.4 3.6 - 5.5 mmol/L    Chloride 90 (L) 96 - 112 mmol/L    Co2 18 (L) 20 - 33 mmol/L    Anion Gap 16.0 (H) 0.0 - 11.9    Glucose 745 (HH) 65 - 99 mg/dL    Bun 21 8 - 22 mg/dL    Creatinine 1.34 0.50 - 1.40 mg/dL    Calcium 9.5 8.5 - 10.5 mg/dL    AST(SGOT) 23 12 - 45 U/L    ALT(SGPT) 19 2 - 50 U/L    Alkaline Phosphatase 87 30 - 99 U/L    Total Bilirubin 0.6 0.1 - 1.5 mg/dL    Albumin 4.1 3.2 - 4.9 g/dL    Total Protein 6.6 6.0 - 8.2 g/dL    Globulin 2.5 1.9 - 3.5 g/dL    A-G Ratio 1.6 g/dL   Urinalysis, culture if indicated   Result Value Ref Range    Color Yellow     Character Clear      Specific Gravity 1.035 <1.035    Ph 5.5 5.0 - 8.0    Glucose >=1000 (A) Negative mg/dL    Ketones 15 (A) Negative mg/dL    Protein Negative Negative mg/dL    Bilirubin Negative Negative    Urobilinogen, Urine 0.2 Negative    Nitrite Negative Negative    Leukocyte Esterase Negative Negative    Occult Blood Negative Negative    Micro Urine Req see below    ESTIMATED GFR   Result Value Ref Range    GFR If African American >60 >60 mL/min/1.73 m 2    GFR If Non  56 (A) >60 mL/min/1.73 m 2   ACCU-CHEK GLUCOSE   Result Value Ref Range    Glucose - Accu-Ck >600 (HH) 65 - 99 mg/dL   ACCU-CHEK GLUCOSE   Result Value Ref Range    Glucose - Accu-Ck 390 (H) 65 - 99 mg/dL     All labs reviewed by me.      COURSE & MEDICAL DECISION MAKING  Pertinent Labs & Imaging studies reviewed. (See chart for details) The patient's [facility] and past medical  records were reviewed    2:02 PM - Patient seen and examined at bedside. Discussed with patient that I will order laboratory and radiology tests to further evaluate. Patient will be treated with IV fluids for dry mucus membranes, 4mg zofran. Ordered estimated GFR, CBC, CMP, Urinalysis to evaluate his symptoms. The differential diagnoses include but are not limited to: hyperglycemia, dehydration, metabolic abnormality.     3:15 PM Patient treated with 20mg Bentyl.     4:03 PM - I discussed the patient's case and the above findings with pharmacy who agrees to give IV insulin.     4:05 PM Recheck: Patient re-evaluated at [facility]. Patient appears to be clinically improved after IV fluids. He is still experiencing pain so he will be treated with 4mg morphine and another bolus of IV fluids.  Discussed patient's condition and treatment plan. Patient's lab results discussed which reveal blood sugar in the 700's which is not as high as previous. The patient understood and is in agreement.     4:12 PM Patient treated with 10 units humulin injection.     5:58 PM Recheck: Patient  re-evaluated at beside. Patient reports feeling improved after treatments. He is in agreement with my plan for his discharge. Patient was counseled to return to ED for any new or worsening symptoms. Patient understood and is in agreement for discharge.         The patient received IV fluids for dry mucus membranes.    There was a positive response to IV fluids, patient is feeling better and his blood sugar is now improved..       DISPOSITION:  Patient will be discharged home in stable condition.    FOLLOW UP:  No follow-up provider specified.    OUTPATIENT MEDICATIONS:  New Prescriptions    No medications on file       FINAL IMPRESSION  1. Hyperglycemia          Azra EPSTEIN (Scribe), am scribing for, and in the presence of, Royce Rosa M.D..    Electronically signed by: Azra Osorio (Scribe), 10/4/2018    Royce EPSTEIN M.D. personally performed the services described in this documentation, as scribed by Azra Osorio in my presence, and it is both accurate and complete.    The note accurately reflects work and decisions made by me.  Royce Rosa  10/4/2018  10:15 PM

## 2018-10-04 NOTE — ED TRIAGE NOTES
Pt ambulates to ed with help of a co worker. C/o difficulty controlling blood sugar since flu shot last Wed

## 2018-10-05 ENCOUNTER — TELEPHONE (OUTPATIENT)
Dept: HEALTH INFORMATION MANAGEMENT | Facility: OTHER | Age: 53
End: 2018-10-05

## 2018-10-05 NOTE — LETTER
2018        RE: Mahesh Bradshaw ( 1965)   [address]  [place] [address]        Dear Shira ATKINSON,     [facility] is participating in an initiative to improve blood sugar control in diabetic patients. The above patient has been identified as having an A1c> 9% within the last 12 months. If you feel your patient would benefit from the pharmacotherapy for diabetes management service at [facility], please consider signing this communication and faxing back to [phone].     If you have any questions or concerns, please don't hesitate to call.    Thank you for your time,         Jasmin Mercado, PharmD  Clinical Pharmacist Population Health Management   [address] [place] [address] [place] [address]  P: [phone]  [email or web address]       Provider signature:______________________________________

## 2018-10-05 NOTE — TELEPHONE ENCOUNTER
Sent fax to PCP office at [phone] for consideration of referral to pharmacotherapy for diabetes management service.     Jasmin Mercado, PharmD

## 2018-11-14 ENCOUNTER — APPOINTMENT (OUTPATIENT)
Dept: RADIOLOGY | Facility: MEDICAL CENTER | Age: 53
DRG: 638 | End: 2018-11-14
Attending: INTERNAL MEDICINE
Payer: COMMERCIAL

## 2018-11-14 ENCOUNTER — APPOINTMENT (OUTPATIENT)
Dept: RADIOLOGY | Facility: MEDICAL CENTER | Age: 53
DRG: 638 | End: 2018-11-14
Attending: EMERGENCY MEDICINE
Payer: COMMERCIAL

## 2018-11-14 ENCOUNTER — HOSPITAL ENCOUNTER (INPATIENT)
Facility: MEDICAL CENTER | Age: 53
LOS: 1 days | DRG: 638 | End: 2018-11-15
Attending: EMERGENCY MEDICINE | Admitting: HOSPITALIST
Payer: COMMERCIAL

## 2018-11-14 DIAGNOSIS — R73.9 HYPERGLYCEMIA: ICD-10-CM

## 2018-11-14 PROBLEM — E11.00 DIABETIC HYPEROSMOLAR NON-KETOTIC STATE (HCC): Status: ACTIVE | Noted: 2018-11-14

## 2018-11-14 PROBLEM — J20.8 ACUTE BRONCHITIS DUE TO OTHER SPECIFIED ORGANISMS: Status: ACTIVE | Noted: 2018-11-14

## 2018-11-14 PROBLEM — E78.5 DYSLIPIDEMIA: Status: ACTIVE | Noted: 2018-11-14

## 2018-11-14 PROBLEM — Z79.4 TYPE 2 DIABETES MELLITUS, WITH LONG-TERM CURRENT USE OF INSULIN (HCC): Status: ACTIVE | Noted: 2018-05-19

## 2018-11-14 LAB
ALBUMIN SERPL BCP-MCNC: 4.1 G/DL (ref 3.2–4.9)
ALBUMIN SERPL BCP-MCNC: 4.4 G/DL (ref 3.2–4.9)
ALBUMIN/GLOB SERPL: 1.6 G/DL
ALBUMIN/GLOB SERPL: 1.6 G/DL
ALP SERPL-CCNC: 80 U/L (ref 30–99)
ALP SERPL-CCNC: 91 U/L (ref 30–99)
ALT SERPL-CCNC: 19 U/L (ref 2–50)
ALT SERPL-CCNC: 24 U/L (ref 2–50)
ANION GAP SERPL CALC-SCNC: 10 MMOL/L (ref 0–11.9)
ANION GAP SERPL CALC-SCNC: 15 MMOL/L (ref 0–11.9)
ANION GAP SERPL CALC-SCNC: 7 MMOL/L (ref 0–11.9)
ANION GAP SERPL CALC-SCNC: 7 MMOL/L (ref 0–11.9)
APPEARANCE UR: CLEAR
AST SERPL-CCNC: 13 U/L (ref 12–45)
AST SERPL-CCNC: 32 U/L (ref 12–45)
BASOPHILS # BLD AUTO: 0.7 % (ref 0–1.8)
BASOPHILS # BLD: 0.03 K/UL (ref 0–0.12)
BILIRUB SERPL-MCNC: 0.6 MG/DL (ref 0.1–1.5)
BILIRUB SERPL-MCNC: 0.7 MG/DL (ref 0.1–1.5)
BILIRUB UR QL STRIP.AUTO: NEGATIVE
BUN SERPL-MCNC: 21 MG/DL (ref 8–22)
BUN SERPL-MCNC: 24 MG/DL (ref 8–22)
CALCIUM SERPL-MCNC: 8.7 MG/DL (ref 8.5–10.5)
CALCIUM SERPL-MCNC: 9.2 MG/DL (ref 8.5–10.5)
CALCIUM SERPL-MCNC: 9.2 MG/DL (ref 8.5–10.5)
CALCIUM SERPL-MCNC: 9.5 MG/DL (ref 8.5–10.5)
CHLORIDE SERPL-SCNC: 100 MMOL/L (ref 96–112)
CHLORIDE SERPL-SCNC: 106 MMOL/L (ref 96–112)
CHLORIDE SERPL-SCNC: 106 MMOL/L (ref 96–112)
CHLORIDE SERPL-SCNC: 86 MMOL/L (ref 96–112)
CO2 SERPL-SCNC: 19 MMOL/L (ref 20–33)
CO2 SERPL-SCNC: 21 MMOL/L (ref 20–33)
CO2 SERPL-SCNC: 24 MMOL/L (ref 20–33)
CO2 SERPL-SCNC: 25 MMOL/L (ref 20–33)
COLOR UR: YELLOW
CREAT SERPL-MCNC: 0.83 MG/DL (ref 0.5–1.4)
CREAT SERPL-MCNC: 0.9 MG/DL (ref 0.5–1.4)
CREAT SERPL-MCNC: 1.03 MG/DL (ref 0.5–1.4)
CREAT SERPL-MCNC: 1.28 MG/DL (ref 0.5–1.4)
EOSINOPHIL # BLD AUTO: 0.03 K/UL (ref 0–0.51)
EOSINOPHIL NFR BLD: 0.7 % (ref 0–6.9)
ERYTHROCYTE [DISTWIDTH] IN BLOOD BY AUTOMATED COUNT: 49.1 FL (ref 35.9–50)
GLOBULIN SER CALC-MCNC: 2.6 G/DL (ref 1.9–3.5)
GLOBULIN SER CALC-MCNC: 2.7 G/DL (ref 1.9–3.5)
GLUCOSE BLD-MCNC: 135 MG/DL (ref 65–99)
GLUCOSE BLD-MCNC: 138 MG/DL (ref 65–99)
GLUCOSE BLD-MCNC: 146 MG/DL (ref 65–99)
GLUCOSE BLD-MCNC: 149 MG/DL (ref 65–99)
GLUCOSE BLD-MCNC: 172 MG/DL (ref 65–99)
GLUCOSE BLD-MCNC: 192 MG/DL (ref 65–99)
GLUCOSE BLD-MCNC: 301 MG/DL (ref 65–99)
GLUCOSE BLD-MCNC: 368 MG/DL (ref 65–99)
GLUCOSE BLD-MCNC: 447 MG/DL (ref 65–99)
GLUCOSE BLD-MCNC: 567 MG/DL (ref 65–99)
GLUCOSE BLD-MCNC: >600 MG/DL (ref 65–99)
GLUCOSE BLD-MCNC: >600 MG/DL (ref 65–99)
GLUCOSE SERPL-MCNC: 1127 MG/DL (ref 65–99)
GLUCOSE SERPL-MCNC: 145 MG/DL (ref 65–99)
GLUCOSE SERPL-MCNC: 208 MG/DL (ref 65–99)
GLUCOSE SERPL-MCNC: 586 MG/DL (ref 65–99)
GLUCOSE UR STRIP.AUTO-MCNC: >=1000 MG/DL
HCT VFR BLD AUTO: 44.3 % (ref 42–52)
HGB BLD-MCNC: 14.3 G/DL (ref 14–18)
IMM GRANULOCYTES # BLD AUTO: 0.07 K/UL (ref 0–0.11)
IMM GRANULOCYTES NFR BLD AUTO: 1.6 % (ref 0–0.9)
KETONES UR STRIP.AUTO-MCNC: ABNORMAL MG/DL
LEUKOCYTE ESTERASE UR QL STRIP.AUTO: NEGATIVE
LYMPHOCYTES # BLD AUTO: 0.76 K/UL (ref 1–4.8)
LYMPHOCYTES NFR BLD: 17.9 % (ref 22–41)
MAGNESIUM SERPL-MCNC: 2.2 MG/DL (ref 1.5–2.5)
MAGNESIUM SERPL-MCNC: 2.3 MG/DL (ref 1.5–2.5)
MAGNESIUM SERPL-MCNC: 2.3 MG/DL (ref 1.5–2.5)
MCH RBC QN AUTO: 31.9 PG (ref 27–33)
MCHC RBC AUTO-ENTMCNC: 32.3 G/DL (ref 33.7–35.3)
MCV RBC AUTO: 98.9 FL (ref 81.4–97.8)
MICRO URNS: ABNORMAL
MONOCYTES # BLD AUTO: 0.23 K/UL (ref 0–0.85)
MONOCYTES NFR BLD AUTO: 5.4 % (ref 0–13.4)
NEUTROPHILS # BLD AUTO: 3.13 K/UL (ref 1.82–7.42)
NEUTROPHILS NFR BLD: 73.7 % (ref 44–72)
NITRITE UR QL STRIP.AUTO: NEGATIVE
NRBC # BLD AUTO: 0 K/UL
NRBC BLD-RTO: 0 /100 WBC
PH UR STRIP.AUTO: 5 [PH]
PHOSPHATE SERPL-MCNC: 3.4 MG/DL (ref 2.5–4.5)
PHOSPHATE SERPL-MCNC: 4.4 MG/DL (ref 2.5–4.5)
PHOSPHATE SERPL-MCNC: 4.4 MG/DL (ref 2.5–4.5)
PLATELET # BLD AUTO: 219 K/UL (ref 164–446)
PMV BLD AUTO: 9.7 FL (ref 9–12.9)
POTASSIUM SERPL-SCNC: 3.5 MMOL/L (ref 3.6–5.5)
POTASSIUM SERPL-SCNC: 3.9 MMOL/L (ref 3.6–5.5)
POTASSIUM SERPL-SCNC: 4.5 MMOL/L (ref 3.6–5.5)
POTASSIUM SERPL-SCNC: 5.4 MMOL/L (ref 3.6–5.5)
PROT SERPL-MCNC: 6.7 G/DL (ref 6–8.2)
PROT SERPL-MCNC: 7.1 G/DL (ref 6–8.2)
PROT UR QL STRIP: NEGATIVE MG/DL
RBC # BLD AUTO: 4.48 M/UL (ref 4.7–6.1)
RBC UR QL AUTO: NEGATIVE
SODIUM SERPL-SCNC: 120 MMOL/L (ref 135–145)
SODIUM SERPL-SCNC: 131 MMOL/L (ref 135–145)
SODIUM SERPL-SCNC: 137 MMOL/L (ref 135–145)
SODIUM SERPL-SCNC: 138 MMOL/L (ref 135–145)
SP GR UR STRIP.AUTO: 1.03
UROBILINOGEN UR STRIP.AUTO-MCNC: 0.2 MG/DL
WBC # BLD AUTO: 4.3 K/UL (ref 4.8–10.8)

## 2018-11-14 PROCEDURE — 700111 HCHG RX REV CODE 636 W/ 250 OVERRIDE (IP): Performed by: HOSPITALIST

## 2018-11-14 PROCEDURE — 99223 1ST HOSP IP/OBS HIGH 75: CPT | Performed by: HOSPITALIST

## 2018-11-14 PROCEDURE — 83735 ASSAY OF MAGNESIUM: CPT | Mod: 91

## 2018-11-14 PROCEDURE — 700111 HCHG RX REV CODE 636 W/ 250 OVERRIDE (IP): Performed by: STUDENT IN AN ORGANIZED HEALTH CARE EDUCATION/TRAINING PROGRAM

## 2018-11-14 PROCEDURE — 05HY33Z INSERTION OF INFUSION DEVICE INTO UPPER VEIN, PERCUTANEOUS APPROACH: ICD-10-PCS | Performed by: HOSPITALIST

## 2018-11-14 PROCEDURE — 700105 HCHG RX REV CODE 258: Performed by: INTERNAL MEDICINE

## 2018-11-14 PROCEDURE — 700105 HCHG RX REV CODE 258: Performed by: EMERGENCY MEDICINE

## 2018-11-14 PROCEDURE — 700111 HCHG RX REV CODE 636 W/ 250 OVERRIDE (IP): Performed by: INTERNAL MEDICINE

## 2018-11-14 PROCEDURE — B54MZZA ULTRASONOGRAPHY OF RIGHT UPPER EXTREMITY VEINS, GUIDANCE: ICD-10-PCS | Performed by: HOSPITALIST

## 2018-11-14 PROCEDURE — 80053 COMPREHEN METABOLIC PANEL: CPT

## 2018-11-14 PROCEDURE — 85025 COMPLETE CBC W/AUTO DIFF WBC: CPT

## 2018-11-14 PROCEDURE — 71045 X-RAY EXAM CHEST 1 VIEW: CPT

## 2018-11-14 PROCEDURE — 700102 HCHG RX REV CODE 250 W/ 637 OVERRIDE(OP): Performed by: INTERNAL MEDICINE

## 2018-11-14 PROCEDURE — 99291 CRITICAL CARE FIRST HOUR: CPT | Performed by: INTERNAL MEDICINE

## 2018-11-14 PROCEDURE — 700102 HCHG RX REV CODE 250 W/ 637 OVERRIDE(OP): Performed by: EMERGENCY MEDICINE

## 2018-11-14 PROCEDURE — 99291 CRITICAL CARE FIRST HOUR: CPT

## 2018-11-14 PROCEDURE — 36569 INSJ PICC 5 YR+ W/O IMAGING: CPT

## 2018-11-14 PROCEDURE — 700102 HCHG RX REV CODE 250 W/ 637 OVERRIDE(OP): Performed by: HOSPITALIST

## 2018-11-14 PROCEDURE — 96372 THER/PROPH/DIAG INJ SC/IM: CPT

## 2018-11-14 PROCEDURE — 84100 ASSAY OF PHOSPHORUS: CPT

## 2018-11-14 PROCEDURE — 80048 BASIC METABOLIC PNL TOTAL CA: CPT | Mod: 91

## 2018-11-14 PROCEDURE — 81003 URINALYSIS AUTO W/O SCOPE: CPT

## 2018-11-14 PROCEDURE — 82962 GLUCOSE BLOOD TEST: CPT | Mod: 91

## 2018-11-14 PROCEDURE — 700105 HCHG RX REV CODE 258: Performed by: HOSPITALIST

## 2018-11-14 PROCEDURE — 770022 HCHG ROOM/CARE - ICU (200)

## 2018-11-14 PROCEDURE — 96374 THER/PROPH/DIAG INJ IV PUSH: CPT

## 2018-11-14 PROCEDURE — 700111 HCHG RX REV CODE 636 W/ 250 OVERRIDE (IP)

## 2018-11-14 RX ORDER — INSULIN GLARGINE 100 [IU]/ML
38 INJECTION, SOLUTION SUBCUTANEOUS
Status: DISCONTINUED | OUTPATIENT
Start: 2018-11-15 | End: 2018-11-15 | Stop reason: HOSPADM

## 2018-11-14 RX ORDER — POLYETHYLENE GLYCOL 3350 17 G/17G
1 POWDER, FOR SOLUTION ORAL
Status: DISCONTINUED | OUTPATIENT
Start: 2018-11-14 | End: 2018-11-15 | Stop reason: HOSPADM

## 2018-11-14 RX ORDER — BUPROPION HYDROCHLORIDE 300 MG/1
300 TABLET ORAL EVERY MORNING
Status: DISCONTINUED | OUTPATIENT
Start: 2018-11-14 | End: 2018-11-14

## 2018-11-14 RX ORDER — SODIUM CHLORIDE 9 MG/ML
2000 INJECTION, SOLUTION INTRAVENOUS ONCE
Status: COMPLETED | OUTPATIENT
Start: 2018-11-14 | End: 2018-11-14

## 2018-11-14 RX ORDER — DEXTROSE AND SODIUM CHLORIDE 5; .45 G/100ML; G/100ML
INJECTION, SOLUTION INTRAVENOUS CONTINUOUS
Status: DISCONTINUED | OUTPATIENT
Start: 2018-11-14 | End: 2018-11-14

## 2018-11-14 RX ORDER — POTASSIUM CHLORIDE 7.45 MG/ML
10 INJECTION INTRAVENOUS
Status: COMPLETED | OUTPATIENT
Start: 2018-11-14 | End: 2018-11-14

## 2018-11-14 RX ORDER — ONDANSETRON 4 MG/1
4 TABLET, ORALLY DISINTEGRATING ORAL EVERY 4 HOURS PRN
Status: DISCONTINUED | OUTPATIENT
Start: 2018-11-14 | End: 2018-11-15 | Stop reason: HOSPADM

## 2018-11-14 RX ORDER — DEXTROSE AND SODIUM CHLORIDE 10; .45 G/100ML; G/100ML
INJECTION, SOLUTION INTRAVENOUS CONTINUOUS
Status: ACTIVE | OUTPATIENT
Start: 2018-11-14 | End: 2018-11-15

## 2018-11-14 RX ORDER — BUPROPION HYDROCHLORIDE 150 MG/1
150 TABLET, EXTENDED RELEASE ORAL 2 TIMES DAILY
Status: DISCONTINUED | OUTPATIENT
Start: 2018-11-14 | End: 2018-11-15 | Stop reason: HOSPADM

## 2018-11-14 RX ORDER — THYROID 30 MG/1
60 TABLET ORAL EVERY MORNING
Status: DISCONTINUED | OUTPATIENT
Start: 2018-11-14 | End: 2018-11-15 | Stop reason: HOSPADM

## 2018-11-14 RX ORDER — AZITHROMYCIN 250 MG/1
250-500 TABLET, FILM COATED ORAL DAILY
Status: ON HOLD | COMMUNITY
Start: 2018-11-09 | End: 2018-11-15

## 2018-11-14 RX ORDER — AMOXICILLIN 250 MG
2 CAPSULE ORAL 2 TIMES DAILY
Status: DISCONTINUED | OUTPATIENT
Start: 2018-11-14 | End: 2018-11-15 | Stop reason: HOSPADM

## 2018-11-14 RX ORDER — INSULIN GLARGINE 100 [IU]/ML
30 INJECTION, SOLUTION SUBCUTANEOUS EVERY EVENING
Status: DISCONTINUED | OUTPATIENT
Start: 2018-11-14 | End: 2018-11-15 | Stop reason: HOSPADM

## 2018-11-14 RX ORDER — KETOROLAC TROMETHAMINE 30 MG/ML
INJECTION, SOLUTION INTRAMUSCULAR; INTRAVENOUS
Status: COMPLETED
Start: 2018-11-14 | End: 2018-11-14

## 2018-11-14 RX ORDER — ONDANSETRON 2 MG/ML
4 INJECTION INTRAMUSCULAR; INTRAVENOUS EVERY 4 HOURS PRN
Status: DISCONTINUED | OUTPATIENT
Start: 2018-11-14 | End: 2018-11-15 | Stop reason: HOSPADM

## 2018-11-14 RX ORDER — HYDROMORPHONE HYDROCHLORIDE 2 MG/ML
.5-2 INJECTION, SOLUTION INTRAMUSCULAR; INTRAVENOUS; SUBCUTANEOUS
Status: DISCONTINUED | OUTPATIENT
Start: 2018-11-14 | End: 2018-11-15 | Stop reason: HOSPADM

## 2018-11-14 RX ORDER — SODIUM CHLORIDE 9 MG/ML
INJECTION, SOLUTION INTRAVENOUS CONTINUOUS
Status: DISCONTINUED | OUTPATIENT
Start: 2018-11-14 | End: 2018-11-14

## 2018-11-14 RX ORDER — LABETALOL HYDROCHLORIDE 5 MG/ML
10 INJECTION, SOLUTION INTRAVENOUS EVERY 4 HOURS PRN
Status: DISCONTINUED | OUTPATIENT
Start: 2018-11-14 | End: 2018-11-15 | Stop reason: HOSPADM

## 2018-11-14 RX ORDER — MAGNESIUM SULFATE HEPTAHYDRATE 40 MG/ML
2 INJECTION, SOLUTION INTRAVENOUS
Status: DISCONTINUED | OUTPATIENT
Start: 2018-11-14 | End: 2018-11-14

## 2018-11-14 RX ORDER — SODIUM CHLORIDE 9 MG/ML
1000 INJECTION, SOLUTION INTRAVENOUS ONCE
Status: COMPLETED | OUTPATIENT
Start: 2018-11-14 | End: 2018-11-14

## 2018-11-14 RX ORDER — MORPHINE SULFATE 4 MG/ML
4 INJECTION, SOLUTION INTRAMUSCULAR; INTRAVENOUS ONCE
Status: COMPLETED | OUTPATIENT
Start: 2018-11-14 | End: 2018-11-14

## 2018-11-14 RX ORDER — SODIUM CHLORIDE 9 MG/ML
2000 INJECTION, SOLUTION INTRAVENOUS ONCE
Status: DISCONTINUED | OUTPATIENT
Start: 2018-11-14 | End: 2018-11-14

## 2018-11-14 RX ORDER — ONDANSETRON 2 MG/ML
INJECTION INTRAMUSCULAR; INTRAVENOUS
Status: COMPLETED
Start: 2018-11-14 | End: 2018-11-14

## 2018-11-14 RX ORDER — PROMETHAZINE HYDROCHLORIDE 25 MG/1
12.5-25 SUPPOSITORY RECTAL EVERY 4 HOURS PRN
Status: DISCONTINUED | OUTPATIENT
Start: 2018-11-14 | End: 2018-11-15 | Stop reason: HOSPADM

## 2018-11-14 RX ORDER — SODIUM CHLORIDE 9 MG/ML
INJECTION, SOLUTION INTRAVENOUS CONTINUOUS
Status: DISCONTINUED | OUTPATIENT
Start: 2018-11-14 | End: 2018-11-15 | Stop reason: HOSPADM

## 2018-11-14 RX ORDER — MAGNESIUM SULFATE HEPTAHYDRATE 40 MG/ML
4 INJECTION, SOLUTION INTRAVENOUS
Status: DISCONTINUED | OUTPATIENT
Start: 2018-11-14 | End: 2018-11-14

## 2018-11-14 RX ORDER — ANASTROZOLE 1 MG/1
1 TABLET ORAL
COMMUNITY
End: 2019-05-29

## 2018-11-14 RX ORDER — BISACODYL 10 MG
10 SUPPOSITORY, RECTAL RECTAL
Status: DISCONTINUED | OUTPATIENT
Start: 2018-11-14 | End: 2018-11-15 | Stop reason: HOSPADM

## 2018-11-14 RX ORDER — PROMETHAZINE HYDROCHLORIDE 25 MG/1
12.5-25 TABLET ORAL EVERY 4 HOURS PRN
Status: DISCONTINUED | OUTPATIENT
Start: 2018-11-14 | End: 2018-11-15 | Stop reason: HOSPADM

## 2018-11-14 RX ORDER — SODIUM CHLORIDE, SODIUM LACTATE, POTASSIUM CHLORIDE, AND CALCIUM CHLORIDE .6; .31; .03; .02 G/100ML; G/100ML; G/100ML; G/100ML
2000 INJECTION, SOLUTION INTRAVENOUS ONCE
Status: COMPLETED | OUTPATIENT
Start: 2018-11-14 | End: 2018-11-14

## 2018-11-14 RX ADMIN — ONDANSETRON HYDROCHLORIDE 4 MG: 2 INJECTION, SOLUTION INTRAMUSCULAR; INTRAVENOUS at 10:00

## 2018-11-14 RX ADMIN — POTASSIUM CHLORIDE 10 MEQ: 7.46 INJECTION, SOLUTION INTRAVENOUS at 16:01

## 2018-11-14 RX ADMIN — KETOROLAC TROMETHAMINE 15 MG: 30 INJECTION, SOLUTION INTRAMUSCULAR at 10:00

## 2018-11-14 RX ADMIN — POTASSIUM CHLORIDE 10 MEQ: 7.46 INJECTION, SOLUTION INTRAVENOUS at 19:48

## 2018-11-14 RX ADMIN — POTASSIUM CHLORIDE 10 MEQ: 7.46 INJECTION, SOLUTION INTRAVENOUS at 18:35

## 2018-11-14 RX ADMIN — HYDROMORPHONE HYDROCHLORIDE 1 MG: 2 INJECTION INTRAMUSCULAR; INTRAVENOUS; SUBCUTANEOUS at 16:00

## 2018-11-14 RX ADMIN — DEXTROSE AND SODIUM CHLORIDE: 10; .45 INJECTION, SOLUTION INTRAVENOUS at 19:04

## 2018-11-14 RX ADMIN — SODIUM CHLORIDE: 9 INJECTION, SOLUTION INTRAVENOUS at 13:31

## 2018-11-14 RX ADMIN — DEXTROSE AND SODIUM CHLORIDE: 5; 450 INJECTION, SOLUTION INTRAVENOUS at 18:22

## 2018-11-14 RX ADMIN — MORPHINE SULFATE 4 MG: 4 INJECTION INTRAVENOUS at 11:20

## 2018-11-14 RX ADMIN — INSULIN HUMAN 10 UNITS: 100 INJECTION, SOLUTION PARENTERAL at 12:07

## 2018-11-14 RX ADMIN — SODIUM CHLORIDE 5 UNITS/HR: 9 INJECTION, SOLUTION INTRAVENOUS at 13:30

## 2018-11-14 RX ADMIN — SODIUM CHLORIDE 2000 ML: 9 INJECTION, SOLUTION INTRAVENOUS at 09:50

## 2018-11-14 RX ADMIN — ENOXAPARIN SODIUM 40 MG: 100 INJECTION SUBCUTANEOUS at 13:48

## 2018-11-14 RX ADMIN — HYDROMORPHONE HYDROCHLORIDE 1 MG: 2 INJECTION INTRAMUSCULAR; INTRAVENOUS; SUBCUTANEOUS at 22:04

## 2018-11-14 RX ADMIN — SODIUM CHLORIDE 2000 ML: 9 INJECTION, SOLUTION INTRAVENOUS at 12:30

## 2018-11-14 RX ADMIN — SODIUM CHLORIDE: 9 INJECTION, SOLUTION INTRAVENOUS at 23:45

## 2018-11-14 RX ADMIN — HYDROMORPHONE HYDROCHLORIDE 1 MG: 2 INJECTION INTRAMUSCULAR; INTRAVENOUS; SUBCUTANEOUS at 14:49

## 2018-11-14 RX ADMIN — POTASSIUM CHLORIDE 10 MEQ: 7.46 INJECTION, SOLUTION INTRAVENOUS at 14:56

## 2018-11-14 RX ADMIN — SODIUM CHLORIDE 1000 ML: 9 INJECTION, SOLUTION INTRAVENOUS at 12:00

## 2018-11-14 RX ADMIN — INSULIN GLARGINE 30 UNITS: 100 INJECTION, SOLUTION SUBCUTANEOUS at 21:48

## 2018-11-14 ASSESSMENT — ENCOUNTER SYMPTOMS
FLANK PAIN: 0
SPUTUM PRODUCTION: 0
DOUBLE VISION: 0
HEMOPTYSIS: 0
BLOOD IN STOOL: 0
BLURRED VISION: 0
DEPRESSION: 0
DIAPHORESIS: 0
ABDOMINAL PAIN: 1
CONSTIPATION: 0
SEIZURES: 0
FOCAL WEAKNESS: 0
DIZZINESS: 0
BACK PAIN: 0
NECK PAIN: 0
CLAUDICATION: 0
CHILLS: 0
NAUSEA: 1
EYE DISCHARGE: 0
SENSORY CHANGE: 0
SPEECH CHANGE: 0
NERVOUS/ANXIOUS: 0
PHOTOPHOBIA: 0
BRUISES/BLEEDS EASILY: 0
STRIDOR: 0
SINUS PAIN: 0
FEVER: 0
HALLUCINATIONS: 0
ORTHOPNEA: 0
COUGH: 1

## 2018-11-14 ASSESSMENT — PAIN SCALES - GENERAL
PAINLEVEL_OUTOF10: 3
PAINLEVEL_OUTOF10: 6
PAINLEVEL_OUTOF10: 6
PAINLEVEL_OUTOF10: 2
PAINLEVEL_OUTOF10: 7
PAINLEVEL_OUTOF10: 8
PAINLEVEL_OUTOF10: 1
PAINLEVEL_OUTOF10: 2
PAINLEVEL_OUTOF10: 6
PAINLEVEL_OUTOF10: 8
PAINLEVEL_OUTOF10: 4

## 2018-11-14 ASSESSMENT — LIFESTYLE VARIABLES: SUBSTANCE_ABUSE: 0

## 2018-11-14 NOTE — ED NOTES
ERP aware at that pt is on 3rd liter of NS- per DKA protocols 2L then insulin gtt. Will TKO NS and start insulin gtt asap.

## 2018-11-14 NOTE — PROCEDURES
Vascular Access Team    Date of Insertion: 11/14/18  Arm Circumference: n/a  Line Length: 10cm  Line Size: 18g  Vein Occupancy %: 35  Reason for Midline: Lack of access, critical care  Labs: WBC 5.5, , INR 1.46, BUN 5, Cr 0.64, GFR >60    Orders confirmed, vessel patency confirmed with ultrasound. Risks and benefits of procedure explained to patient and education regarding line associated bloodstream infections provided. Questions answered.     PowerGlide Midline placed in RUE per MD order with ultrasound guidance. 18g, 10 cm line placed in basilic vein after 1 attempt(s).  Catheter inserted with good blood return. Secured with 0cm external from insertion site.  Flushed without resistance with 10 mL 0.9% normal saline. Midline secured with Biopatch and Tegaderm.     Midline placement is confirmed by nurse using ultrasound and ability to flush and draw blood. Midline is appropriate for use at this time.  No X-ray is needed for placement confirmation. Pt tolerated procedure well.  Patient condition relayed to unit RN or ordering physician via this post procedure note in the EMR.    Ultrasound images uploaded to PACS and viewable in the EMR - yes  Ultrasound imaged printed and placed in paper chart - no      BARD PowerGlide Midline ref # [id number], Lot # [id number]

## 2018-11-14 NOTE — ASSESSMENT & PLAN NOTE
Continue Lantus, 38 units in the morning and 30 units in the evening  Continue sliding scale insulin

## 2018-11-14 NOTE — ED TRIAGE NOTES
Late entry due to downtime-    Pt arrives to triage with complaints of high blood sugar at home- history of diabetes type II. Has taken all scheduled insulin at home. Denies chest pain or SOB. Increased urinary frequency. Hx of being admitted to hospital for DKA

## 2018-11-14 NOTE — H&P
[facility] Medicine History & Physical Note    Date of Service  11/14/2018    Primary Care Physician  NASIR Brunner.    Consultants  None    Code Status  Full    Chief Complaint  High blood sugar    History of Presenting Illness  53 y.o. male who presented 11/14/2018 with history of type 2 diabetes and has been maintained on Lantus and Humalog presented to the emergency room for elevated blood sugars.  He reports that he recently got  and over the past 6 weeks he has not been as compliant with his diet and regular exercise.  2 weeks ago he had an upper respiratory tract infection that progressed to sinusitis and bronchitis and completed a 5-day course of azithromycin on Monday.  Last night his blood sugars were elevated he took 30 units of Lantus when he woke up early this morning his blood sugars were also registering high on his monitor so he took 38 units of Lantus which is his usual morning dose 3 hours early in addition to 10 units of Humalog he rechecked his blood sugars at 8 AM and they were still registering high on his monitor so he took 12 units of Humalog and presented to the emergency room.  He has been having cough productive of clear sputum.  He denies any fever or chills.  He had nausea but no vomiting.  His last hemoglobin A1c available for my review is from May 19, 2018 and was 9.9.    Review of Systems  Review of Systems   All other systems reviewed and are negative.      Past Medical History   has a past medical history of ADRIANE (acute kidney injury) ([facility]) (2/24/2016); Anesthesia; Arthritis (3-3-17); Aspiration pneumonia ([facility]) (3-2016); ASTHMA (3-3-17); Breath shortness (3-3-17); Congestive heart failure ([facility]) (2-2016 ); Dental disorder; Depression (11/26/2014); Diabetes ([facility]) (3-3-17); Diabetes ([facility]); Difficult intubation (2-2016); DKA (diabetic ketoacidoses) ([facility]) (2-2016); Electrolyte imbalance (2-2016); Elevated LFTs; Elevated liver enzymes (2-2016); Essential hypertension  (1/22/2016); Gout; Heart burn; High cholesterol (3-3-17); Hypothyroid (3-3-17); Indigestion; Kidney stones; Klebsiella infect; Migraine; MRSA cellulitis; 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    Surgical History   has a past surgical history that includes vaishnavi by laparoscopy (2005); colonoscopy with biopsy (11/26/2014); incision and drainage general (4/7/2017); irrigation & debridement general (Right, 4/29/2017); irrigation & debridement general (Right, 5/1/2017); other orthopedic surgery (1997 or 1998); umbilical hernia repair (N/A, 11/6/2016); umbilical hernia repair (3/10/2017); irrigation & debridement ortho (Left, 12/10/2017); and umbilical hernia repair (N/A, 4/15/2018).     Family History  Reviewed and not pertinent to the presenting problem    Social History   reports that he has never smoked. He has never used smokeless tobacco. He reports that he does not drink alcohol or use drugs.    Allergies  Allergies   Allergen Reactions   • Peanut (Diagnostic) Anaphylaxis   • Tradjenta [Linagliptin] Unspecified     Pt developed pancreatitis   • Hydrocodone Hives     Tolerates Morphine and oxycodone         Medications  Prior to Admission Medications   Prescriptions Last Dose Informant Patient Reported? Taking?   acetaminophen (TYLENOL) 500 MG Tab 11/9/2018 at Unknown Patient Yes No   Sig: Take 1,000 mg by mouth every 6 hours as needed for Moderate Pain.   anastrozole (ARIMIDEX) 1 MG Tab 11/7/2018 at PM Patient Yes Yes   Sig: Take 1 mg by mouth every 7 days. In the evening   azithromycin (ZITHROMAX) 250 MG Tab 11/13/2018 at FINISHED Patient Yes Yes   Sig: Take 250-500 mg by mouth every day. 5 day course   buPROPion (WELLBUTRIN XL) 300 MG XL tablet  2018 at 0700 Patient Yes No   Sig: Take 300 mg by mouth every morning.   ibuprofen (MOTRIN) 800 MG Tab 11/10/2018 at 0700 Patient No No   Sig: Take 1 Tab by mouth 3 times a day, with meals.   insulin glargine (LANTUS) 100 UNIT/ML Solution 2018 at 0430 Patient Yes No   Sig: Inject 38 Units as instructed 2 times a day. 32 units in AM, 27 units in PM   insulin lispro (HUMALOG) 100 UNIT/ML Solution 2018 at 0815 Patient Yes No   Sig: Inject 2-12 Units as instructed 3 times a day before meals. Sliding Scale -written down  Scale starts at 160   ondansetron (ZOFRAN) 4 MG Tab tablet 2018 at 0800 Patient No No   Sig: Take 1 Tab by mouth every 8 hours as needed for Nausea/Vomiting.   testosterone cypionate (DEPO-TESTOSTERONE) 200 MG/ML Solution injection 2018 at AM Patient Yes No   Si mg by Intramuscular route every 7 days.   thyroid (ARMOUR THYROID) 60 MG Tab 2018 at 0700 Patient Yes No   Sig: Take 60 mg by mouth every morning.      Facility-Administered Medications: None       Physical Exam  Temp:  [35.9 °C (96.7 °F)] 35.9 °C (96.7 °F)  Pulse:  [] 100  Resp:  [14-18] 18  BP: (140)/(92) 140/92    Physical Exam   Constitutional: He is oriented to person, place, and time. He appears well-developed and well-nourished.   Obese   HENT:   Head: Normocephalic and atraumatic.   Right Ear: External ear normal.   Left Ear: External ear normal.   Mouth/Throat: No oropharyngeal exudate.   Eyes: Conjunctivae are normal. Right eye exhibits no discharge. Left eye exhibits no discharge. No scleral icterus.   Neck: Neck supple. No JVD present. No tracheal deviation present.   Cardiovascular: Normal rate and regular rhythm.  Exam reveals no gallop and no friction rub.    No murmur heard.  Pulmonary/Chest: Effort normal and breath sounds normal. No stridor. No respiratory distress. He has no wheezes. He has no rales. He exhibits no tenderness.   Abdominal: Soft. Bowel sounds are normal. He exhibits  no distension and no mass. There is tenderness (Mild mid abdominal). There is no rebound and no guarding.   Musculoskeletal: He exhibits no edema or tenderness.   Neurological: He is alert and oriented to person, place, and time. No cranial nerve deficit. He exhibits normal muscle tone.   Skin: Skin is warm and dry. He is not diaphoretic. No cyanosis. Nails show no clubbing.   Psychiatric: He has a normal mood and affect. His behavior is normal. Thought content normal.   Nursing note and vitals reviewed.      Laboratory:        Sodium 120 potassium 5.4 chloride 86 CO2 19 anion gap 15 glucose 1127 BUN 24 creatinine 1.28 AST 13 ALT 19 alkaline phosphatase 91 bilirubin 0.6 albumin 4.4  WBC 4.3 hemoglobin 14.3 hematocrit 44.3 platelet count 219    Urinalysis:    Recent Labs      11/14/18   0943   SPECGRAVITY  1.031   GLUCOSEUR  >=1000*   KETONES  Trace*   NITRITE  Negative   LEUKESTERAS  Negative        Imaging:  DX-CHEST-PORTABLE (1 VIEW)   Final Result      No acute cardiac or pulmonary abnormality is noted.            Assessment/Plan:  I anticipate this patient will require at least two midnights for appropriate medical management, necessitating inpatient admission.    * Diabetic hyperosmolar non-ketotic state (HCC)   Assessment & Plan    Patient will be admitted and started on IV fluids and insulin drip  Would closely monitor his blood sugars electrolytes and anion gap and adjust fluids and insulin drip accordingly     DM (diabetes mellitus) (HCC)- (present on admission)   Assessment & Plan    Uncontrolled with hyperglycemia  Patient will be started on insulin drip     Hyponatremia- (present on admission)   Assessment & Plan    Likely pseudohyponatremia secondary to his severe hyperglycemia     Hypothyroidism- (present on admission)   Assessment & Plan    Continue [place] Thyroid         VTE prophylaxis: Lovenox    Plan of care reviewed with patient and wife at bedside and questions answered

## 2018-11-14 NOTE — PROGRESS NOTES
Patient educated on importance of CHG wipes and SCDs. Patient understands importance but continues to refuse.

## 2018-11-14 NOTE — ED NOTES
Med rec complete per pt at bedside  Allergies have been verified and updated  No ABX within the last 30 days    Pt reports he finished a 5 day course of AZITHROMYCIN yesterday 11-    Pt reports that he can wait until he tomorrow when he gets home to take his ANASTROZOLE due to the fact the he only takes in the day before his testosterone injection.

## 2018-11-14 NOTE — PROGRESS NOTES
Called RN in ER regarding  of patient. RN is still doing some things and wants patient to be picked up in 15 mins.

## 2018-11-14 NOTE — PROGRESS NOTES
Patient transported to S[facility] via gurney with 2 ACLS RNs. Received bedside report from Rachele RN. Belongings transported up with patient.   2 RN skin check completed. Right upper thigh scar, r foot scar, and abdominal scar noted upon assessment.   Dr. Naranjo updated on patients transfer to ICU.

## 2018-11-14 NOTE — ED NOTES
from Lab called with critical result of glucose 1127 at 1120. Critical lab result read back to .   Dr. Antony notified of critical lab result at 1120.  Critical lab result read back by Dr. Antony.

## 2018-11-14 NOTE — CONSULTS
PULMONARY AND CRITICAL CARE MEDICINE CONSULTATION    Date of Consultation:  11/14/2018    Requesting Physician:  Hilario Monte MD    Consulting Physician:  Pradip Aponte MD    Reason for Consultation: Critical care management in gentleman with hyperosmolar, nonketotic state    Chief Complaint: Hyperglycemia    History of Present Illness:    I was kindly asked to see and evaluate Mahesh Bradshaw, a 53 y.o. male for evaluation and management of the above problem.    This gentleman has a history of type 2 diabetes mellitus requiring insulin, hypertension, dyslipidemia, hypothyroidism and ulcerative colitis.  Approximately 2 weeks ago he developed an upper respiratory infection.  He did not improve and last week he saw a provider who gave him a 5-day course of azithromycin.  His cough and sputum production have improved.  He has had worsening hyperglycemia for the last couple of weeks.  His hyperglycemia has been getting increasingly difficult to control.  Today he developed nausea with abdominal pain.  He presented to the emergency room.  He denies any hematemesis or coffee-ground emesis.  His abdominal pain is a cramping sensation in his lower abdomen.  He has a dry cough.  He denies any dyspnea or wheezing.  He has no angina, palpitations or syncope.    Medications Prior to Admission:    No current facility-administered medications on file prior to encounter.      Current Outpatient Prescriptions on File Prior to Encounter   Medication Sig Dispense Refill   • acetaminophen (TYLENOL) 500 MG Tab Take 1,000 mg by mouth every 6 hours as needed for Moderate Pain. 30 Tab 0   • ibuprofen (MOTRIN) 800 MG Tab Take 1 Tab by mouth 3 times a day, with meals. 30 Tab 0   • insulin glargine (LANTUS) 100 UNIT/ML Solution Inject 38 Units as instructed 2 times a day. 32 units in AM, 27 units in PM     • insulin lispro (HUMALOG) 100 UNIT/ML Solution Inject 2-12 Units as instructed 3 times a day before meals. Sliding  Scale -written down  Scale starts at 160     • thyroid (ARMOUR THYROID) 60 MG Tab Take 60 mg by mouth every morning.     • ondansetron (ZOFRAN) 4 MG Tab tablet Take 1 Tab by mouth every 8 hours as needed for Nausea/Vomiting. 10 Each 1   • testosterone cypionate (DEPO-TESTOSTERONE) 200 MG/ML Solution injection 80 mg by Intramuscular route every 7 days.     • buPROPion (WELLBUTRIN XL) 300 MG XL tablet Take 300 mg by mouth every morning.         Current Medications:      Current Facility-Administered Medications:   •  dextrose 10% and 0.45% NaCl infusion, , Intravenous, Continuous, Hilario Monte M.D., Stopped at 11/14/18 1230  •  MD ALERT-PHARMACY TO CONSULT FOR DKA MONITORING 1 Each, 1 Each, Other, PRN, Hilario Monte M.D.  •  Adult DKA potassium(K+) replacement scale, 1 Each, Intravenous, Q4HRS, Hilario Monte M.D.  •  senna-docusate (PERICOLACE or SENOKOT S) 8.6-50 MG per tablet 2 Tab, 2 Tab, Oral, BID **AND** polyethylene glycol/lytes (MIRALAX) PACKET 1 Packet, 1 Packet, Oral, QDAY PRN **AND** magnesium hydroxide (MILK OF MAGNESIA) suspension 30 mL, 30 mL, Oral, QDAY PRN **AND** bisacodyl (DULCOLAX) suppository 10 mg, 10 mg, Rectal, QDAY PRN, Hilario Monte M.D.  •  magnesium sulfate IVPB premix 2 g, 2 g, Intravenous, Once PRN **OR** magnesium sulfate IVPB premix 4 g, 4 g, Intravenous, Once PRN, Hilario Monte M.D.  •  NS infusion, , Intravenous, Continuous, Hilario Monte M.D., Last Rate: 100 mL/hr at 11/14/18 1331  •  D5 1/2 NS infusion, , Intravenous, Continuous, Hilario Monte M.D., Stopped at 11/14/18 1230  •  enoxaparin (LOVENOX) inj 40 mg, 40 mg, Subcutaneous, DAILY, Hilario Monte M.D., 40 mg at 11/14/18 1348  •  labetalol (NORMODYNE,TRANDATE) injection 10 mg, 10 mg, Intravenous, Q4HRS PRN, Hilario Monte M.D.  •  MD GALEANO-INSULIN DRIP INITIAL RATE BY PHARMACY AT 0.05 UNITS/KG/HR 1 Each, 1 Each, Other, PRN, Hilario Monte M.D.  •  ondansetron  (ZOFRAN) syringe/vial injection 4 mg, 4 mg, Intravenous, Q4HRS PRN, Hilario Monte M.D.  •  ondansetron (ZOFRAN ODT) dispertab 4 mg, 4 mg, Oral, Q4HRS PRN, Hilario Monte M.D.  •  promethazine (PHENERGAN) tablet 12.5-25 mg, 12.5-25 mg, Oral, Q4HRS PRN, Hilario Monte M.D.  •  promethazine (PHENERGAN) suppository 12.5-25 mg, 12.5-25 mg, Rectal, Q4HRS PRN, Hliario Monte M.D.  •  prochlorperazine (COMPAZINE) injection 5-10 mg, 5-10 mg, Intravenous, Q4HRS PRN, Hilario Monte M.D.  •  thyroid (ARMOUR THYROID) tablet 60 mg, 60 mg, Oral, QAM, Hilario Monte M.D.  •  insulin regular human (HUMULIN/NOVOLIN R) 62.5 Units in  mL Infusion for DKA, 5 Units/hr, Intravenous, Continuous, Hilario Monte M.D., Last Rate: 20 mL/hr at 11/14/18 1330, 5 Units/hr at 11/14/18 1330  •  buPROPion SR (WELLBUTRIN-SR) tablet 150 mg, 150 mg, Oral, BID, Hilario Monte M.D.  •  HYDROmorphone (DILAUDID) injection 0.5-2 mg, 0.5-2 mg, Intravenous, Q3HRS PRN, Pradip Aponte M.D.    Allergies:    Peanut (diagnostic); Tradjenta [linagliptin]; and Hydrocodone    Past Surgical History:    Past Surgical History:   Procedure Laterality Date   • UMBILICAL HERNIA REPAIR N/A 4/15/2018    Procedure: UMBILICAL HERNIA REPAIR;  Surgeon: Karen Beasley M.D.;  Location: SURGERY [facility];  Service: General   • IRRIGATION & DEBRIDEMENT ORTHO Left 12/10/2017    Procedure: IRRIGATION & DEBRIDEMENT ORTHO LEFT HAND;  Surgeon: Chicho Ramos M.D.;  Location: SURGERY [facility];  Service: Orthopedics   • IRRIGATION & DEBRIDEMENT GENERAL Right 5/1/2017    Procedure: IRRIGATION & DEBRIDEMENT GENERAL-Left HAND AND right  ANKLE;  Surgeon: Esau Dooley M.D.;  Location: [facility];  Service:    • IRRIGATION & DEBRIDEMENT GENERAL Right 4/29/2017    Procedure: IRRIGATION & DEBRIDEMENT GENERAL;  Surgeon: Esau Dooley M.D.;  Location: [facility];  Service:    • INCISION AND  "DRAINAGE GENERAL  4/7/2017    Procedure: INCISION AND DRAINAGE GENERAL;  Surgeon: Esau Dooley M.D.;  Location: SURGERY SAME DAY [facility];  Service:    • UMBILICAL HERNIA REPAIR  3/10/2017    Procedure: UMBILICAL HERNIA REPAIR- INCISION AND DRAINAGE OF UMBILICAL WOUND AND MESH REMOVAL;  Surgeon: Esau Dooley M.D.;  Location: SURGERY SAME DAY [facility];  Service:    • UMBILICAL HERNIA REPAIR N/A 11/6/2016    Procedure: UMBILICAL HERNIA REPAIR;  Surgeon: Esau Dooley M.D.;  Location: SURGERY [facility];  Service:    • COLONOSCOPY WITH BIOPSY  11/26/2014    Performed by Solo Higginbotham M.D. at ENDOSCOPY [facility]   • LIVE BY LAPAROSCOPY  2005   • OTHER ORTHOPEDIC SURGERY  1997 or 1998    Left Wrist ORIF       Past Medical History:    Past Medical History:   Diagnosis Date   • ADRIANE (acute kidney injury) ([facility]) 2/24/2016   • Anesthesia     \"Was difficult to intubate 2-2016\"   • Arthritis 3-3-17    \"Spine\"   • Aspiration pneumonia ([facility]) 3-2016   • ASTHMA 3-3-17    \"Hasn't had to use inhaler in 2 years\"   • Breath shortness 3-3-17    \"With Exercise\"   • Congestive heart failure ([facility]) 2-2016     3-3-17 \"Not a current issue\"   • Dental disorder    • Depression 11/26/2014   • Diabetes ([facility]) 3-3-17    Takes Insulin   • Diabetes ([facility])    • Difficult intubation 2-2016   • DKA (diabetic ketoacidoses) ([facility]) 2-2016    \"10 days on vent with DKA, Renal failure, CHF, elevated liver enzymes and electrolyte imbalance\"   • Electrolyte imbalance 2-2016   • Elevated LFTs    • Elevated liver enzymes 2-2016   • Essential hypertension 1/22/2016   • Gout    • Heart burn    • High cholesterol 3-3-17    Does not currently take medication for   • Hypothyroid 3-3-17    Takes [place] Thyroid   • Indigestion    • Kidney stones    • Klebsiella infect    • Migraine    • MRSA cellulitis    • 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  \"DX " "Pancreatitis, DKA, CHF, Elevated Liver Enzymes & Electrolyte Imbalance\".   • Pain 3-3-17    \"Left Flank\"   • Pancreatitis 2-2016    \"D/T Tradjenta was hospitialized for 15 days\"   • RF (renal failure) 2-2016   • Sepsis (HCC) 1/21/2016   • Snoring 3-3-17    Has not had a sleep study   • Streptococcus infection    • UC (ulcerative colitis) ([facility]) 3-3-17    \"Five BM's per day, takes Lialda\"   • Urinary incontinence    • Vitamin D deficiency        Social History:    Social History     Social History   • Marital status: Single     Spouse name: N/A   • Number of children: N/A   • Years of education: N/A     Occupational History   • Not on file.     Social History Main Topics   • Smoking status: Never Smoker   • Smokeless tobacco: Never Used   • Alcohol use No   • Drug use: No   • Sexual activity: Not on file     Other Topics Concern   • Not on file     Social History Narrative    ** Merged History Encounter **         ** Merged History Encounter **     ** Merged History Encounter **          Family History:    No family history on file.    Review of System:    Review of Systems   Constitutional: Positive for malaise/fatigue. Negative for chills, diaphoresis and fever.   HENT: Negative for ear pain, nosebleeds and sinus pain.    Eyes: Negative for blurred vision, double vision, photophobia and discharge.   Respiratory: Positive for cough. Negative for hemoptysis, sputum production and stridor.    Cardiovascular: Negative for chest pain, orthopnea, claudication and leg swelling.   Gastrointestinal: Positive for abdominal pain and nausea. Negative for blood in stool and constipation.   Genitourinary: Negative for dysuria, flank pain, hematuria and urgency.   Musculoskeletal: Negative for back pain, joint pain and neck pain.   Skin: Negative for rash.   Neurological: Negative for dizziness, sensory change, speech change, focal weakness and seizures.   Endo/Heme/Allergies: Does not bruise/bleed easily. " "  Psychiatric/Behavioral: Negative for depression, hallucinations and substance abuse. The patient is not nervous/anxious.        Physical Examination:    /92   Pulse 98   Temp 36.7 °C (98 °F) (Temporal)   Resp 12   Ht 1.727 m (5' 8\")   Wt 96 kg (211 lb 10.3 oz)   SpO2 96%   BMI 32.18 kg/m²   Physical Exam   Constitutional: He is oriented to person, place, and time.   HENT:   Head: Normocephalic and atraumatic.   Right Ear: External ear normal.   Left Ear: External ear normal.   Nose: Nose normal.   Mouth/Throat: No oropharyngeal exudate.   Dry mucous membranes   Eyes: Pupils are equal, round, and reactive to light. Right eye exhibits no discharge. Left eye exhibits no discharge. No scleral icterus.   Neck: Neck supple. No JVD present. No tracheal deviation present.   Cardiovascular: Intact distal pulses.  Exam reveals no gallop and no friction rub.    No murmur heard.  Sinus tachycardia   Pulmonary/Chest: No stridor. He has no wheezes. He has no rales.   Abdominal: Soft. Bowel sounds are normal. He exhibits no distension. There is tenderness (Mild in the lower abdomen). There is no rebound and no guarding.   Musculoskeletal: He exhibits no edema, tenderness or deformity.   No clubbing or cyanosis   Neurological: He is alert and oriented to person, place, and time. No cranial nerve deficit. Coordination normal. GCS score is 15.   No focal weakness   Skin: Skin is warm and dry. No rash noted. He is not diaphoretic. No erythema.   Psychiatric: Memory and judgment normal.       Laboratory Data:                              Imaging:    I personally viewed the CXR and CT scan images as well as reviewed the radiology interpretation reports.    DX-CHEST-PORTABLE (1 VIEW)   Final Result      No acute cardiac or pulmonary abnormality is noted.      IR-PICC LINE PLACEMENT    (Results Pending)       Assessment and Plan:    * Diabetic hyperosmolar non-ketotic state (HCC)   Assessment & Plan    Admit to " ICU  Titrating insulin drip with hourly glucose determinations  IV fluid resuscitation with crystalloid solution  Monitor serum electrolytes and acid-base status every 4 hours  Replete potassium, phosphorus and magnesium based upon electrolyte determination     Type 2 diabetes mellitus, with long-term current use of insulin (HCC)- (present on admission)   Assessment & Plan    Uncontrolled with hyperosmolar, nonketotic state     S/P acute bronchitis due to other specified organisms   Assessment & Plan    He completed a 5-day course of azithromycin on Monday  Hold on any antibiotics at this time     Essential hypertension- (present on admission)   Assessment & Plan    Monitor blood pressure     Pseudohyponatremia- (present on admission)   Assessment & Plan    Due to hyperglycemia     Ulcerative colitis (HCC)- (present on admission)   Assessment & Plan    History of         This gentleman is critically ill in the ICU.  He has severe hyperosmolar, nonketotic state due to uncontrolled type 2 diabetes mellitus.  He is being volume resuscitated with intravenous fluids.  I am titrating an insulin drip and monitoring his blood glucose hourly.  I have assessed and reassessed his blood glucose with titration of an insulin drip, serum electrolytes, acid-base status, response to IV fluid resuscitation, urine output, blood pressure, hemodynamics and cardiovascular status.  He is at increased risk for worsening metabolic/endocrine and cardiovascular system dysfunction.    High risk of deterioration and worsening vital organ dysfunction and death without the above critical care interventions.    Thank you for allowing me to participate in the care of this gentleman.  I will continue to follow him with great interest.    Critical Care Time: 35 minutes  [id number]  No time overlap  Time excludes procedures  Discussed with RN, clinical pharmacist, hospitalist    Pradip Aponte MD  Pulmonary and Critical Care Medicine

## 2018-11-14 NOTE — ED NOTES
SICU nurses at bedside preparing pt for transport.     Lab was called to inquire the add on lab work- they stated that they no longer had the tubes down there from earlier and requested new tubes.     RN from floor made aware and states that they will draw a new set of labs upstairs. RN from SICU also agreed to begin insulin gtt on floor.

## 2018-11-14 NOTE — CARE PLAN
Problem: Pain Management  Goal: Pain level will decrease to patient's comfort goal  Outcome: PROGRESSING AS EXPECTED  Pain assessed q2 hrs or more PRN. 0-10 scale used. Non-pharm and pharm interventions in place.     Problem: Safety  Goal: Will remain free from injury  Outcome: PROGRESSING AS EXPECTED  Bed in  Low and locked position. Bed alarm in use. Call light and personal belongings within reach.

## 2018-11-14 NOTE — ASSESSMENT & PLAN NOTE
Patient will be admitted and started on IV fluids and insulin drip  Would closely monitor his blood sugars electrolytes and anion gap and adjust fluids and insulin drip accordingly

## 2018-11-14 NOTE — ED PROVIDER NOTES
"CHIEF COMPLAINT  High blood sugar    HPI (1,4)  Mahesh Bradshaw is a 53 y.o. male with a significant past medical history of insulin-dependent type 2 diabetes as well as ulcerative colitis, with multiple admissions for extreme hyperglycemia with a previous admission for hyperglycemia with hyperosmolar coma.  Patient reports that he has had an, \"upper respiratory infection.\"  Treated with a course of azithromycin which he recently completed.  Patient reports that he has had hyperglycemia as a result of his recent infection.  Patient reports that he woke up early this morning and felt \"off\", checked his sugar and it was above the upper limit, he gave himself a sliding scale and when he rechecked his sugar it was still above the upper limit which led him to present to the emergency room today.  The patient reports that otherwise he has been feeling better from his respiratory infection apart from having a significant headache which he does not associate with previous episodes of hyperglycemia.  Patient also reports diffuse cramping in his legs.      REVIEW OF SYSTEMS(2/10)  Pertinent positives include: Headache, muscle aches, abdominal pain, nausea.  Pertinent negatives include: Chest pain, shortness of breath, changes in bowel movements.   All other systems are negative.     PAST MEDICAL HISTORY(PFS1,2)  Past Medical History:   Diagnosis Date   • ADRIANE (acute kidney injury) ([facility]) 2/24/2016   • Anesthesia     \"Was difficult to intubate 2-2016\"   • Arthritis 3-3-17    \"Spine\"   • Aspiration pneumonia ([facility]) 3-2016   • ASTHMA 3-3-17    \"Hasn't had to use inhaler in 2 years\"   • Breath shortness 3-3-17    \"With Exercise\"   • Congestive heart failure ([facility]) 2-2016     3-3-17 \"Not a current issue\"   • Dental disorder    • Depression 11/26/2014   • Diabetes ([facility]) 3-3-17    Takes Insulin   • Diabetes ([facility])    • Difficult intubation 2-2016   • DKA (diabetic ketoacidoses) ([facility]) 2-2016    \"10 days on vent with DKA, Renal " "failure, CHF, elevated liver enzymes and electrolyte imbalance\"   • Electrolyte imbalance 2-2016   • Elevated LFTs    • Elevated liver enzymes 2-2016   • Essential hypertension 1/22/2016   • Gout    • Heart burn    • High cholesterol 3-3-17    Does not currently take medication for   • Hypothyroid 3-3-17    Takes [place] Thyroid   • Indigestion    • Kidney stones    • Klebsiella infect    • Migraine    • MRSA cellulitis    • Necrotizing myositis (HCC)    • On mechanically assisted ventilation ([facility]) 2-2016    HX \"On Vent for 10 days at [facility]\".  \"DX Pancreatitis, DKA, CHF, Elevated Liver Enzymes & Electrolyte Imbalance\".   • Pain 3-3-17    \"Left Flank\"   • Pancreatitis 2-2016    \"D/T Tradjenta was hospitialized for 15 days\"   • RF (renal failure) 2-2016   • Sepsis ([facility]) 1/21/2016   • Snoring 3-3-17    Has not had a sleep study   • Streptococcus infection    • UC (ulcerative colitis) ([facility]) 3-3-17    \"Five BM's per day, takes Lialda\"   • Urinary incontinence    • Vitamin D deficiency        FAMILY HISTORY  No family history on file.    SOCIAL HISTORY  Social History   Substance Use Topics   • Smoking status: Never Smoker   • Smokeless tobacco: Never Used   • Alcohol use No     History   Drug Use No       SURGICAL HISTORY  Past Surgical History:   Procedure Laterality Date   • UMBILICAL HERNIA REPAIR N/A 4/15/2018    Procedure: UMBILICAL HERNIA REPAIR;  Surgeon: Karen Beasley M.D.;  Location: [facility];  Service: General   • IRRIGATION & DEBRIDEMENT ORTHO Left 12/10/2017    Procedure: IRRIGATION & DEBRIDEMENT ORTHO LEFT HAND;  Surgeon: Chicho Ramos M.D.;  Location: [facility];  Service: Orthopedics   • IRRIGATION & DEBRIDEMENT GENERAL Right 5/1/2017    Procedure: IRRIGATION & DEBRIDEMENT GENERAL-Left HAND AND right  ANKLE;  Surgeon: Esau Dooley M.D.;  Location: [facility];  Service:    • IRRIGATION & DEBRIDEMENT GENERAL Right 4/29/2017    Procedure: IRRIGATION & " DEBRIDEMENT GENERAL;  Surgeon: Esau Dooley M.D.;  Location: SURGERY [facility];  Service:    • INCISION AND DRAINAGE GENERAL  4/7/2017    Procedure: INCISION AND DRAINAGE GENERAL;  Surgeon: Esau Dooley M.D.;  Location: SURGERY SAME DAY [facility];  Service:    • UMBILICAL HERNIA REPAIR  3/10/2017    Procedure: UMBILICAL HERNIA REPAIR- INCISION AND DRAINAGE OF UMBILICAL WOUND AND MESH REMOVAL;  Surgeon: Esau Dooley M.D.;  Location: SURGERY SAME DAY [facility];  Service:    • UMBILICAL HERNIA REPAIR N/A 11/6/2016    Procedure: UMBILICAL HERNIA REPAIR;  Surgeon: Esau Dooley M.D.;  Location: SURGERY [facility];  Service:    • COLONOSCOPY WITH BIOPSY  11/26/2014    Performed by Solo Higginbotham M.D. at ENDOSCOPY [facility]   • LIVE BY LAPAROSCOPY  2005   • OTHER ORTHOPEDIC SURGERY  1997 or 1998    Left Wrist ORIF       CURRENT MEDICATIONS  Home Medications    **Home medications have not yet been reviewed for this encounter**         ALLERGIES  Allergies   Allergen Reactions   • Peanut (Diagnostic) Anaphylaxis   • Peanut-Derived Anaphylaxis     Peanuts   RXN=age 36   • Tradjenta [Linagliptin] Unspecified     Pt developed pancreatitis   • Tradjenta [Linagliptin] Unspecified     Caused pancreatitis    • Hydrocodone Hives     Tolerates Morphine and oxycodone  Rxn Age - 29   • Vicodin [Hydrocodone-Acetaminophen]      Tolerates oxycodone and morphine       PHYSICAL EXAM (2,8)  VITAL SIGNS: Wt 96 kg (211 lb 10.3 oz)   BMI 32.18 kg/m²  Reviewed and patient was mildly hypertensive  Constitutional: Well-developed well-nourished in no acute distress.  HENT: Normocephalic atraumatic.  Eyes: Pupils equal round reactive to light, extraocular muscles intact bilaterally.  Cardiovascular: Normal S1-S2 no murmurs rubs or gallops.  Respiratory: Clear to auscultation bilaterally no increased work of breathing.  Gastrointestinal: Abdomen soft, diffusely tender to palpation, no guarding rigidity  or peritoneal signs including rebound.  Skin: No rashes or lesions  Genitourinary: Deferred.  Neurologic: Cranial nerves II through XII grossly intact.  Psychiatric: Normal mood and affect.    DIFFERENTIAL DIAGNOSIS:  Hyperglycemia, hyperosmolar hyperglycemic state, diabetic ketoacidosis, pneumonia  Low clinical suspicion for sepsis, pulmonary embolism, ACS at this time..    RADIOLOGY/PROCEDURES  No orders to display       LABORATORY: Reviewed as below.  Results for orders placed or performed during the hospital encounter of 11/14/18   URINALYSIS   Result Value Ref Range    Color Yellow     Character Clear     Specific Gravity 1.031 <1.035    Ph 5.0 5.0 - 8.0    Glucose >=1000 (A) Negative mg/dL    Ketones Trace (A) Negative mg/dL    Protein Negative Negative mg/dL    Bilirubin Negative Negative    Urobilinogen, Urine 0.2 Negative    Nitrite Negative Negative    Leukocyte Esterase Negative Negative    Occult Blood Negative Negative    Micro Urine Req see below        INTERVENTIONS:  Medications   KETOROLAC TROMETHAMINE 30 MG/ML INJ SOLN (not administered)   ONDANSETRON HCL 4 MG/2ML INJ SOLN (not administered)   insulin regular (HUMULIN R) injection 10 Units (not administered)   NS infusion 1,000 mL (not administered)   morphine (pf) 4 mg/ml injection 4 mg (4 mg Intravenous Given 11/14/18 1120)   .  Response: Improvement in headache however patient has persistent abdominal pain and leg cramping.    COURSE & MEDICAL DECISION MAKING  0945: Patient has significant history of diabetes with history of extreme hyperglycemia and previous admissions for hyper osmolar patient will be fluid resuscitated, evaluated for DKA, hyperosmolar state, or underlying infection possibly pneumonia given patient's recent completion of azithromycin for upper respiratory infection.  Will be given Toradol 15 mg at his request for headache.  Currently low clinical suspicion for systemic infection/sepsis or for acute coronary syndrome or  pulmonary embolism  1130:  called regarding critical lab results glucose approximately 1,100.  Patient still complaining of abdominal pain and leg while his headache is improved.  Patient will be given 4 mg of morphine for persistent leg cramping.  Still awaiting results of CMP to evaluate for electrolyte abnormalities. spoke with hospitalist who agrees to admit patient for hyperglycemia.  Patient has not been given insulin because we are still awaiting results of CMP was ordered approximately 1.5 hours ago.  The patient's persistent leg cramps are likely a result of electrolyte abnormality versus dehydration however given that labs have still not resulted it is difficult to further assess patient at this time.     PLAN:  Admit to hospitalist service    CONDITION: Fair.    FINAL IMPRESSION  Critical hyperglycemia      Electronically signed by: Ruiz Antony MD.  Patient seen with family practice resident Pradip Jimenez, 11/14/2018 11:01 AM

## 2018-11-15 VITALS
TEMPERATURE: 97.1 F | WEIGHT: 211.2 LBS | SYSTOLIC BLOOD PRESSURE: 140 MMHG | BODY MASS INDEX: 32.01 KG/M2 | RESPIRATION RATE: 16 BRPM | DIASTOLIC BLOOD PRESSURE: 92 MMHG | HEART RATE: 85 BPM | OXYGEN SATURATION: 96 % | HEIGHT: 68 IN

## 2018-11-15 LAB
ALBUMIN SERPL BCP-MCNC: 3.6 G/DL (ref 3.2–4.9)
ALBUMIN/GLOB SERPL: 1.6 G/DL
ALP SERPL-CCNC: 92 U/L (ref 30–99)
ALT SERPL-CCNC: 209 U/L (ref 2–50)
ANION GAP SERPL CALC-SCNC: 8 MMOL/L (ref 0–11.9)
ANION GAP SERPL CALC-SCNC: 9 MMOL/L (ref 0–11.9)
AST SERPL-CCNC: 401 U/L (ref 12–45)
BASOPHILS # BLD AUTO: 0.5 % (ref 0–1.8)
BASOPHILS # BLD: 0.02 K/UL (ref 0–0.12)
BILIRUB SERPL-MCNC: 0.4 MG/DL (ref 0.1–1.5)
BUN SERPL-MCNC: 22 MG/DL (ref 8–22)
BUN SERPL-MCNC: 23 MG/DL (ref 8–22)
CALCIUM SERPL-MCNC: 8.5 MG/DL (ref 8.5–10.5)
CALCIUM SERPL-MCNC: 8.6 MG/DL (ref 8.5–10.5)
CHLORIDE SERPL-SCNC: 106 MMOL/L (ref 96–112)
CHLORIDE SERPL-SCNC: 107 MMOL/L (ref 96–112)
CO2 SERPL-SCNC: 24 MMOL/L (ref 20–33)
CO2 SERPL-SCNC: 25 MMOL/L (ref 20–33)
CREAT SERPL-MCNC: 0.84 MG/DL (ref 0.5–1.4)
CREAT SERPL-MCNC: 0.92 MG/DL (ref 0.5–1.4)
EOSINOPHIL # BLD AUTO: 0.06 K/UL (ref 0–0.51)
EOSINOPHIL NFR BLD: 1.6 % (ref 0–6.9)
ERYTHROCYTE [DISTWIDTH] IN BLOOD BY AUTOMATED COUNT: 47.8 FL (ref 35.9–50)
GLOBULIN SER CALC-MCNC: 2.3 G/DL (ref 1.9–3.5)
GLUCOSE BLD-MCNC: 182 MG/DL (ref 65–99)
GLUCOSE BLD-MCNC: 186 MG/DL (ref 65–99)
GLUCOSE BLD-MCNC: 207 MG/DL (ref 65–99)
GLUCOSE BLD-MCNC: 208 MG/DL (ref 65–99)
GLUCOSE BLD-MCNC: 215 MG/DL (ref 65–99)
GLUCOSE BLD-MCNC: 255 MG/DL (ref 65–99)
GLUCOSE SERPL-MCNC: 196 MG/DL (ref 65–99)
GLUCOSE SERPL-MCNC: 261 MG/DL (ref 65–99)
HCT VFR BLD AUTO: 40 % (ref 42–52)
HGB BLD-MCNC: 13.6 G/DL (ref 14–18)
IMM GRANULOCYTES # BLD AUTO: 0.03 K/UL (ref 0–0.11)
IMM GRANULOCYTES NFR BLD AUTO: 0.8 % (ref 0–0.9)
LYMPHOCYTES # BLD AUTO: 1.13 K/UL (ref 1–4.8)
LYMPHOCYTES NFR BLD: 30.1 % (ref 22–41)
MAGNESIUM SERPL-MCNC: 1.9 MG/DL (ref 1.5–2.5)
MCH RBC QN AUTO: 32 PG (ref 27–33)
MCHC RBC AUTO-ENTMCNC: 34 G/DL (ref 33.7–35.3)
MCV RBC AUTO: 94.1 FL (ref 81.4–97.8)
MONOCYTES # BLD AUTO: 0.26 K/UL (ref 0–0.85)
MONOCYTES NFR BLD AUTO: 6.9 % (ref 0–13.4)
NEUTROPHILS # BLD AUTO: 2.26 K/UL (ref 1.82–7.42)
NEUTROPHILS NFR BLD: 60.1 % (ref 44–72)
NRBC # BLD AUTO: 0 K/UL
NRBC BLD-RTO: 0 /100 WBC
PHOSPHATE SERPL-MCNC: 3.9 MG/DL (ref 2.5–4.5)
PHOSPHATE SERPL-MCNC: 4.4 MG/DL (ref 2.5–4.5)
PLATELET # BLD AUTO: 196 K/UL (ref 164–446)
PMV BLD AUTO: 9.7 FL (ref 9–12.9)
POTASSIUM SERPL-SCNC: 3.7 MMOL/L (ref 3.6–5.5)
POTASSIUM SERPL-SCNC: 4.1 MMOL/L (ref 3.6–5.5)
PROT SERPL-MCNC: 5.9 G/DL (ref 6–8.2)
RBC # BLD AUTO: 4.25 M/UL (ref 4.7–6.1)
SODIUM SERPL-SCNC: 139 MMOL/L (ref 135–145)
SODIUM SERPL-SCNC: 140 MMOL/L (ref 135–145)
WBC # BLD AUTO: 3.8 K/UL (ref 4.8–10.8)

## 2018-11-15 PROCEDURE — 83735 ASSAY OF MAGNESIUM: CPT

## 2018-11-15 PROCEDURE — 700102 HCHG RX REV CODE 250 W/ 637 OVERRIDE(OP): Performed by: HOSPITALIST

## 2018-11-15 PROCEDURE — A9270 NON-COVERED ITEM OR SERVICE: HCPCS | Performed by: HOSPITALIST

## 2018-11-15 PROCEDURE — 99239 HOSP IP/OBS DSCHRG MGMT >30: CPT | Performed by: HOSPITALIST

## 2018-11-15 PROCEDURE — 84100 ASSAY OF PHOSPHORUS: CPT

## 2018-11-15 PROCEDURE — 82962 GLUCOSE BLOOD TEST: CPT

## 2018-11-15 PROCEDURE — 85025 COMPLETE CBC W/AUTO DIFF WBC: CPT

## 2018-11-15 PROCEDURE — 99232 SBSQ HOSP IP/OBS MODERATE 35: CPT | Performed by: INTERNAL MEDICINE

## 2018-11-15 PROCEDURE — 700111 HCHG RX REV CODE 636 W/ 250 OVERRIDE (IP): Performed by: HOSPITALIST

## 2018-11-15 PROCEDURE — 80053 COMPREHEN METABOLIC PANEL: CPT

## 2018-11-15 PROCEDURE — 700102 HCHG RX REV CODE 250 W/ 637 OVERRIDE(OP): Performed by: INTERNAL MEDICINE

## 2018-11-15 PROCEDURE — 700111 HCHG RX REV CODE 636 W/ 250 OVERRIDE (IP): Performed by: INTERNAL MEDICINE

## 2018-11-15 PROCEDURE — 80048 BASIC METABOLIC PNL TOTAL CA: CPT

## 2018-11-15 RX ORDER — KETOROLAC TROMETHAMINE 30 MG/ML
15 INJECTION, SOLUTION INTRAMUSCULAR; INTRAVENOUS EVERY 6 HOURS PRN
Status: DISCONTINUED | OUTPATIENT
Start: 2018-11-15 | End: 2018-11-15 | Stop reason: HOSPADM

## 2018-11-15 RX ADMIN — BUPROPION HYDROCHLORIDE 150 MG: 150 TABLET, EXTENDED RELEASE ORAL at 05:33

## 2018-11-15 RX ADMIN — ONDANSETRON 4 MG: 2 INJECTION INTRAMUSCULAR; INTRAVENOUS at 09:42

## 2018-11-15 RX ADMIN — LEVOTHYROXINE, LIOTHYRONINE 60 MG: 19; 4.5 TABLET ORAL at 05:33

## 2018-11-15 RX ADMIN — INSULIN LISPRO 3 UNITS: 100 INJECTION, SOLUTION INTRAVENOUS; SUBCUTANEOUS at 11:07

## 2018-11-15 RX ADMIN — ENOXAPARIN SODIUM 40 MG: 100 INJECTION SUBCUTANEOUS at 05:34

## 2018-11-15 RX ADMIN — INSULIN GLARGINE 38 UNITS: 100 INJECTION, SOLUTION SUBCUTANEOUS at 05:33

## 2018-11-15 RX ADMIN — HYDROMORPHONE HYDROCHLORIDE 2 MG: 2 INJECTION INTRAMUSCULAR; INTRAVENOUS; SUBCUTANEOUS at 04:47

## 2018-11-15 RX ADMIN — HYDROMORPHONE HYDROCHLORIDE 1 MG: 2 INJECTION INTRAMUSCULAR; INTRAVENOUS; SUBCUTANEOUS at 09:41

## 2018-11-15 RX ADMIN — HYDROMORPHONE HYDROCHLORIDE 2 MG: 2 INJECTION INTRAMUSCULAR; INTRAVENOUS; SUBCUTANEOUS at 01:05

## 2018-11-15 RX ADMIN — INSULIN LISPRO 5 UNITS: 100 INJECTION, SOLUTION INTRAVENOUS; SUBCUTANEOUS at 05:34

## 2018-11-15 ASSESSMENT — PAIN SCALES - GENERAL
PAINLEVEL_OUTOF10: 3
PAINLEVEL_OUTOF10: 3
PAINLEVEL_OUTOF10: 2
PAINLEVEL_OUTOF10: 6
PAINLEVEL_OUTOF10: 2
PAINLEVEL_OUTOF10: 0
PAINLEVEL_OUTOF10: 5
PAINLEVEL_OUTOF10: 6
PAINLEVEL_OUTOF10: 6
PAINLEVEL_OUTOF10: 2
PAINLEVEL_OUTOF10: 3
PAINLEVEL_OUTOF10: 3

## 2018-11-15 ASSESSMENT — ENCOUNTER SYMPTOMS
CONSTIPATION: 0
CLAUDICATION: 0
STRIDOR: 0
HALLUCINATIONS: 0
PHOTOPHOBIA: 0
SEIZURES: 0
CHILLS: 0
SPUTUM PRODUCTION: 0
SHORTNESS OF BREATH: 0
BACK PAIN: 0
VOMITING: 0
HEADACHES: 0
COUGH: 0
NERVOUS/ANXIOUS: 0
DOUBLE VISION: 0
FEVER: 0
DIAPHORESIS: 0
MYALGIAS: 0
BRUISES/BLEEDS EASILY: 0
SPEECH CHANGE: 0
ABDOMINAL PAIN: 0
SENSORY CHANGE: 0
NAUSEA: 0
BLURRED VISION: 0
HEMOPTYSIS: 0

## 2018-11-15 ASSESSMENT — LIFESTYLE VARIABLES
ALCOHOL_USE: NO
EVER_SMOKED: NEVER
SUBSTANCE_ABUSE: 0

## 2018-11-15 NOTE — CARE PLAN
Problem: Pain Management  Goal: Pain level will decrease to patient's comfort goal    Intervention: Follow pain managment plan developed in collaboration with patient and Interdisciplinary Team  Pain assessed and patient medicated per the MAR       Problem: Communication  Goal: The ability to communicate needs accurately and effectively will improve    Intervention: Educate patient and significant other/support system about the plan of care, procedures, treatments, medications and allow for questions  Plan of care for the day discussed with patient at bedside. All questions answered and care discussed with interdisciplinary team

## 2018-11-15 NOTE — PROGRESS NOTES
Insulin gtt and D10/0.45NaCl stopped per Dr. Aponte order to stop two hours post Lantus administration. NS started at 83mL/hr per order. See MAR.

## 2018-11-15 NOTE — CARE PLAN
"Problem: Pain Management  Goal: Pain level will decrease to patient's comfort goal    Intervention: Follow pain managment plan developed in collaboration with patient and Interdisciplinary Team  Implementation and following of pain management plan created in collaboration with patient and team. Pt vocalized understanding to non-pharmacologic modes of comfort such as rest, repositioning and environmental changes (dimming lights, drawing curtain, change in temperature). Pt accepts non-pharmacologic interventions. Pt requests utilization of pain medication when pain is severe. Will continue to provide education and monitor for learning.      Problem: Venous Thromboembolism (VTW)/Deep Vein Thrombosis (DVT) Prevention:  Goal: Patient will participate in Venous Thrombosis (VTE)/Deep Vein Thrombosis (DVT)Prevention Measures    Intervention: Ensure patient wears graduated elastic stockings (CUONG hose) and/or SCDs, if ordered, when in bed or chair (Remove at least once per shift for skin check)  Pt refusing to wear SCDs due to causing \"cramping\" and \"sweating\". Education provided to pt on use of SCDs for mechanical DVT prophylaxis. Pt vocalized understanding and continues to refuse. Will continue to provide education and continue to monitor for learning.         "

## 2018-11-15 NOTE — PROGRESS NOTES
2 RN skin check:    - prior scars on abdomen, right lateral thigh and right ankle.  - no areas of concern.

## 2018-11-15 NOTE — PROGRESS NOTES
Nurse updated Dr. Aponte on pt having 4 consecutive FSBS within 100-250 and having a AG<12. Dr. Aponte called down to pharmacy for patient to be re-started on Insulin Lantus and Insulin Humolog sliding scale.     Dr. Aponte instructed to give Lantus dose when up from pharmacy and allow patient to eat a snack. Turn off insulin gtt two hours after giving Lantus dose. Order read back to Dr. Aponte.    Dr. Aponte requested to check CMP at 2200 and 0200 along with morning labs. Order entered into epic for 0200 CMP. Order read back to Dr. Aponte.

## 2018-11-15 NOTE — PROGRESS NOTES
Critical Care Progress Note    Date of admission  11/14/2018    Chief Complaint  53 y.o. male admitted 11/14/2018 with hyperglycemia.    Hospital Course    This gentleman was admitted to the ICU with hyperosmolar, nonketotic state.    Interval Problem Update  Reviewed last 24 hour events:      Off insulin gtt  Oriented x 4  SR-ST      He is feeling quite a bit better today.  He actually felt very good earlier this morning, but he had dong and eggs for breakfast and his stomach is a little upset.  He has not had any nausea or vomiting.  He denies abdominal pain.  He has no cough, sputum production, wheezing or hemoptysis.  He has no dysuria, urgency, dysuria or frequency.  Eyes angina, palpitations or syncope.      Review of Systems  Review of Systems   Constitutional: Negative for chills, diaphoresis and fever.   HENT: Negative for ear pain, nosebleeds and tinnitus.    Eyes: Negative for blurred vision, double vision and photophobia.   Respiratory: Negative for cough, hemoptysis, sputum production, shortness of breath and stridor.    Cardiovascular: Negative for chest pain, claudication and leg swelling.   Gastrointestinal: Negative for abdominal pain, constipation, nausea and vomiting.   Genitourinary: Negative for dysuria, frequency and hematuria.   Musculoskeletal: Negative for back pain, joint pain and myalgias.   Skin: Negative for rash.   Neurological: Negative for sensory change, speech change, seizures and headaches.   Endo/Heme/Allergies: Does not bruise/bleed easily.   Psychiatric/Behavioral: Negative for hallucinations, substance abuse and suicidal ideas. The patient is not nervous/anxious.         Vital Signs for last 24 hours   Temp:  [35.9 °C (96.7 °F)-36.7 °C (98.1 °F)] 36.7 °C (98 °F)  Pulse:  [] 89  Resp:  [9-59] 11  BP: (140)/(92) 140/92    Hemodynamic parameters for last 24 hours       Respiratory       Physical Exam   Physical Exam   Constitutional: He is oriented to person, place, and  time. He appears well-nourished. No distress.   HENT:   Head: Normocephalic and atraumatic.   Right Ear: External ear normal.   Left Ear: External ear normal.   Nose: Nose normal.   Mouth/Throat: Oropharynx is clear and moist.   Eyes: Pupils are equal, round, and reactive to light. EOM are normal. Right eye exhibits no discharge. Left eye exhibits no discharge. No scleral icterus.   Neck: Normal range of motion. Neck supple. No JVD present. No tracheal deviation present.   Cardiovascular: Intact distal pulses.  Exam reveals no gallop and no friction rub.    No murmur heard.  Sinus rhythm   Pulmonary/Chest: Effort normal and breath sounds normal. No stridor. No respiratory distress. He has no wheezes. He has no rales.   Abdominal: Soft. Bowel sounds are normal. He exhibits no distension. There is no tenderness. There is no rebound and no guarding.   Musculoskeletal: He exhibits no edema or tenderness.   No clubbing or cyanosis   Neurological: He is alert and oriented to person, place, and time. No cranial nerve deficit. Coordination normal.   No focal weakness   Skin: Skin is warm and dry. No rash noted. He is not diaphoretic. No erythema.   Psychiatric: He has a normal mood and affect. His behavior is normal. Judgment and thought content normal.       Medications  Current Facility-Administered Medications   Medication Dose Route Frequency Provider Last Rate Last Dose   • senna-docusate (PERICOLACE or SENOKOT S) 8.6-50 MG per tablet 2 Tab  2 Tab Oral BID Hilario Monte M.D.        And   • polyethylene glycol/lytes (MIRALAX) PACKET 1 Packet  1 Packet Oral QDAY PRN Hilario Monte M.D.        And   • magnesium hydroxide (MILK OF MAGNESIA) suspension 30 mL  30 mL Oral QDAY PRN Hilario Monte M.D.        And   • bisacodyl (DULCOLAX) suppository 10 mg  10 mg Rectal QDAY PRN Hilario Monte M.D.       • enoxaparin (LOVENOX) inj 40 mg  40 mg Subcutaneous DAILY Hilario Monte M.D.   40 mg at  11/14/18 1348   • labetalol (NORMODYNE,TRANDATE) injection 10 mg  10 mg Intravenous Q4HRS PRN Hilario Monte M.D.       • ondansetron (ZOFRAN) syringe/vial injection 4 mg  4 mg Intravenous Q4HRS PRN Hilario Monte M.D.       • ondansetron (ZOFRAN ODT) dispertab 4 mg  4 mg Oral Q4HRS PRN Hilario Monte M.D.       • promethazine (PHENERGAN) tablet 12.5-25 mg  12.5-25 mg Oral Q4HRS PRN Hilario Monte M.D.       • promethazine (PHENERGAN) suppository 12.5-25 mg  12.5-25 mg Rectal Q4HRS PRN Hilario Monte M.D.       • prochlorperazine (COMPAZINE) injection 5-10 mg  5-10 mg Intravenous Q4HRS PRN Hilario Monte M.D.       • thyroid (ARMOUR THYROID) tablet 60 mg  60 mg Oral QAM Hilario Monte M.D.       • buPROPion SR (WELLBUTRIN-SR) tablet 150 mg  150 mg Oral BID Hilario Monte M.D.       • HYDROmorphone (DILAUDID) injection 0.5-2 mg  0.5-2 mg Intravenous Q3HRS PRN Pradip Aponte M.D.   2 mg at 11/15/18 0105   • insulin glargine (LANTUS) injection 30 Units  30 Units Subcutaneous Q EVENING Pradip Aponte M.D.   30 Units at 11/14/18 2148   • insulin glargine (LANTUS) injection 38 Units  38 Units Subcutaneous QAM INSULIN Pradip Apnote M.D.       • insulin lispro (HUMALOG) injection 2-9 Units  2-9 Units Subcutaneous 4X/DAY ACHS Pradip Aponte M.D.       • NS infusion   Intravenous Continuous Pradip Aponte M.D. 83 mL/hr at 11/14/18 2345         Fluids    Intake/Output Summary (Last 24 hours) at 11/15/18 0438  Last data filed at 11/15/18 0400   Gross per 24 hour   Intake          6340.54 ml   Output             4550 ml   Net          1790.54 ml       Laboratory          Recent Labs      11/14/18   1725  11/14/18   2140  11/15/18   0145   SODIUM  138  137  140   POTASSIUM  3.5*  4.5  3.7   CHLORIDE  106  106  106   CO2  25  24  25   BUN  21  21  22   CREATININE  0.90  0.83  0.92   MAGNESIUM  2.3  2.2  1.9   PHOSPHORUS  4.4  4.4  4.4    CALCIUM  9.2  8.7  8.5     Recent Labs      11/14/18   1334  11/14/18   1725  11/14/18   2140  11/15/18   0145   ALTSGPT  24   --    --   209*   ASTSGOT  32   --    --   401*   ALKPHOSPHAT  80   --    --   92   TBILIRUBIN  0.7   --    --   0.4   GLUCOSE  586*  208*  145*  196*     Recent Labs      11/14/18   1334  11/15/18   0145   ASTSGOT  32  401*   ALTSGPT  24  209*   ALKPHOSPHAT  80  92   TBILIRUBIN  0.7  0.4     No results for input(s): RBC, HEMOGLOBIN, HEMATOCRIT, PLATELETCT, PROTHROMBTM, APTT, INR, IRON, FERRITIN, TOTIRONBC in the last 72 hours.    Imaging  X-Ray:  I have personally reviewed the images and compared with prior images. and My impression is: Clear lungs    Assessment/Plan  * Diabetic hyperosmolar non-ketotic state (HCC)   Assessment & Plan    Resolved  He is now off the insulin drip and on Lantus and sliding scale insulin     Type 2 diabetes mellitus, with long-term current use of insulin (HCC)- (present on admission)   Assessment & Plan    Continue Lantus, 38 units in the morning and 30 units in the evening  Continue sliding scale insulin     S/P acute bronchitis due to other specified organisms   Assessment & Plan    He completed a 5-day course of azithromycin on Monday  He does not require antibiotics     Essential hypertension- (present on admission)   Assessment & Plan    Monitor blood pressure     Hypothyroidism- (present on admission)   Assessment & Plan    Continue [place] Thyroid, 60 mg daily     Pseudohyponatremia- (present on admission)   Assessment & Plan    Resolved     Ulcerative colitis (HCC)- (present on admission)   Assessment & Plan    History of  No acute exacerbation          VTE:  Lovenox  Ulcer: Not Indicated  Lines: None    I have performed a physical exam and reviewed and updated ROS and Plan today (11/15/2018). In review of yesterday's note (11/14/2018), there are no changes except as documented above.     He is doing very well today.  He may be transferred out of the  ICU or discharged home.    Discussed patient condition and risk of morbidity and/or mortality with Hospitalist, RN, RT, Pharmacy, Charge nurse / hot rounds and QA team     Pradip Aponte MD  Pulmonary and Critical Care Medicine

## 2018-11-15 NOTE — DISCHARGE INSTRUCTIONS
Discharge Instructions    Discharged to home by car with relative. Discharged via wheelchair, hospital escort: Yes.  Special equipment needed: Not Applicable    Be sure to schedule a follow-up appointment with your primary care doctor or any specialists as instructed.     Discharge Plan:   Diet Plan: Discussed  Activity Level: Discussed  Confirmed Follow up Appointment: Patient to Call and Schedule Appointment  Confirmed Symptoms Management: Discussed  Medication Reconciliation Updated: Yes  Pneumococcal Vaccine Administered/Refused: Not given - Patient refused pneumococcal vaccine  Influenza Vaccine Indication: Not indicated: Previously immunized this influenza season and > 8 years of age    I understand that a diet low in cholesterol, fat, and sodium is recommended for good health. Unless I have been given specific instructions below for another diet, I accept this instruction as my diet prescription.   Other diet: Resume previous diet and meal planning (low carbohydrates)    Special Instructions:   - Proceed to discharge/transfer to Home   - Take Rx as prescribed and reconciled.   - For OTC Medication consult with your Pharmacist first. No Tylenol for now   - F/u with PCP within 3-10 days at home location, F/u LFT testing within 2 weeks   - For new, severe symptoms refer to the next Emergency Care or call your Physician.       · Is patient discharged on Warfarin / Coumadin?   No     Depression / Suicide Risk    As [facility] are [facility], it is important to learn how to keep safe from harming yourself.    Recognize the warning signs:  · Abrupt changes in personality, positive or negative- including increase in energy   · Giving away possessions  · Change in eating patterns- significant weight changes-  positive or negative  · Change in sleeping patterns- unable to sleep or sleeping all the time   · Unwillingness or inability to communicate  · Depression  · Unusual sadness,  discouragement and loneliness  · Talk of wanting to die  · Neglect of personal appearance   · Rebelliousness- reckless behavior  · Withdrawal from people/activities they love  · Confusion- inability to concentrate     If you or a loved one observes any of these behaviors or has concerns about self-harm, here's what you can do:  · Talk about it- your feelings and reasons for harming yourself  · Remove any means that you might use to hurt yourself (examples: pills, rope, extension cords, firearm)  · Get professional help from the community (Mental Health, Substance Abuse, psychological counseling)  · Do not be alone:Call your Safe Contact- someone whom you trust who will be there for you.  · Call your local CRISIS HOTLINE 804-9535 or 844-402-1525  · Call your local Children's Mobile Crisis Response Team [place] (774) 548-4057 or www.[employer]  · Call the toll free National Suicide Prevention Hotlines   · National Suicide Prevention Lifeline 685-502-EYUB (7513)  · National Hope Line Network 800-SUICIDE (375-1814)

## 2018-11-15 NOTE — PROGRESS NOTES
2 RN skin check completed. Sacrum intact; mepilex dressing in place. Scar noted to right lateral thigh. Scar noted to right foot from previous wound per pt report. Scar to left hand from previous wound per pt report. All appropriate LDAs open in flowsheets.

## 2018-11-16 NOTE — PROGRESS NOTES
Patient transported via wheelchair from [address] to Wife's car outside of [facility]. Discharge paperwork sent with patient after review of paperwork. All questions answered and patient instructed on medications and to follow humalog and lantus doses for tonight when patient is home. Patient escort out

## 2018-11-16 NOTE — DISCHARGE SUMMARY
DATE OF ADMISSION:  11/14/2018    DATE OF DISCHARGE:  11/15/2018    DISCHARGE DISPOSITION:  To home.    DISCHARGE FOLLOWUP:  Closely with his primary care provider, YAIR Reilly.    DISCHARGE DIAGNOSES:  1.  Hyperosmolar nonketotic state secondary to poorly controlled diabetes with   the patient admitting to some dietary noncompliance.  The patient overnight   admitted to ICU with IV fluids and IV insulin administration, overall   improved.  2.  Diabetes type 2, insulin-requiring, which is uncontrolled.  The patient   for outpatient close followup and optimization  3.  Transient hyponatremia.  4.  Hypothyroidism.  5.  History of aspiration pneumonia.  6.  History of asthma.  7.  History of depression.  8.  History of diabetic ketoacidosis episode.  9.  History of abnormal liver function tests secondary to hepatic steatosis.  10.  History of gout.  11.  Gastroesophageal reflux disease.  12.  Dyslipidemia.  13.  Hypothyroidism.  14.  History of renal stones.  15.  History of migraines.  16.  History of necrotizing myositis.  17.  History of pancreatitis.  18.  History of Streptococcus infection.  19.  History of ulcerative colitis.    DISCHARGE MEDICATIONS:  1.  Arimidex 1 mg every 7 days.  2.  Wellbutrin- mg daily.  3.  Lantus 38 units in a.m. and 30 units in p.m.  4.  Lispro Humalog per sliding scale.  5.  Zofran p.r.n. nausea, vomiting.  6.  Depo-Testosterone 80 mg injected every 7 days.  7.  Thyroid [place] 60 mg p.o. daily.    For presenting symptoms, HPI, and physical findings, please refer to the   dictated H and P.    HOSPITAL COURSE:  Patient was admitted with severe electrolyte derangement and   blood sugars that were uncontrolled at home.  The patient presents to the   emergency room with these complaints, found with a blood sugar of 56.  He was   admitted to ICU secondary to nonketotic hyperosmolar state with hyponatremia.    The patient was hydrated.  He was controlled with aggressive  insulin regimen.    He was found with some LFT abnormalities, but had no symptoms possibly   secondary to his current illness and recent upper respiratory illness   exacerbated with a prior history of hepatic steatosis.  The patient overall   feels better today.  At this time, will be able to be discharged with close   outpatient followup and CMP to follow up on his liver enzyme data and if no   resolution is reached on a conservative basis, the patient for repeat right   upper quadrant ultrasound for evaluation.  The patient at this time admitted   to being noncompliant with diet.  He will be following a strict diabetic   frequent meal diet and improved blood sugar control.  He at this time is   otherwise medically stable for discharge for outpatient close followup.  For   full further details, please refer to the computer system and paper chart.    DIET:  Strictly diabetic, low fat, low sodium.  Follow up closely with his   primary care physician in the next 10 days to arrange for followup CMP.  For   full further details, again please refer to the computer system and paper   chart.    Time spent on discharge 55 minutes.  The patient improved in a more rapid   fashion than expected and was able to be discharged within the 48-hour range,   again this was unexpected.       ____________________________________     MD JEANINE MORE / STORM    DD:  11/15/2018 18:38:40  DT:  11/15/2018 22:12:08    D#:  [id number]  Job#:  [id number]

## 2018-11-18 ENCOUNTER — HOSPITAL ENCOUNTER (EMERGENCY)
Facility: MEDICAL CENTER | Age: 53
End: 2018-11-19
Attending: EMERGENCY MEDICINE
Payer: COMMERCIAL

## 2018-11-18 DIAGNOSIS — R25.2 LEG CRAMPS: ICD-10-CM

## 2018-11-18 DIAGNOSIS — R73.9 HYPERGLYCEMIA: ICD-10-CM

## 2018-11-18 LAB
ALBUMIN SERPL BCP-MCNC: 4.1 G/DL (ref 3.2–4.9)
ALBUMIN/GLOB SERPL: 1.6 G/DL
ALP SERPL-CCNC: 97 U/L (ref 30–99)
ALT SERPL-CCNC: 62 U/L (ref 2–50)
ANION GAP SERPL CALC-SCNC: 15 MMOL/L (ref 0–11.9)
AST SERPL-CCNC: 16 U/L (ref 12–45)
BASOPHILS # BLD AUTO: 0.6 % (ref 0–1.8)
BASOPHILS # BLD: 0.03 K/UL (ref 0–0.12)
BILIRUB SERPL-MCNC: 0.4 MG/DL (ref 0.1–1.5)
BUN SERPL-MCNC: 24 MG/DL (ref 8–22)
CALCIUM SERPL-MCNC: 9.1 MG/DL (ref 8.5–10.5)
CHLORIDE SERPL-SCNC: 94 MMOL/L (ref 96–112)
CO2 SERPL-SCNC: 18 MMOL/L (ref 20–33)
CREAT SERPL-MCNC: 1.33 MG/DL (ref 0.5–1.4)
EOSINOPHIL # BLD AUTO: 0.06 K/UL (ref 0–0.51)
EOSINOPHIL NFR BLD: 1.1 % (ref 0–6.9)
ERYTHROCYTE [DISTWIDTH] IN BLOOD BY AUTOMATED COUNT: 44.1 FL (ref 35.9–50)
GLOBULIN SER CALC-MCNC: 2.6 G/DL (ref 1.9–3.5)
GLUCOSE BLD-MCNC: 550 MG/DL (ref 65–99)
GLUCOSE SERPL-MCNC: 589 MG/DL (ref 65–99)
HCT VFR BLD AUTO: 39.4 % (ref 42–52)
HGB BLD-MCNC: 14.6 G/DL (ref 14–18)
IMM GRANULOCYTES # BLD AUTO: 0.05 K/UL (ref 0–0.11)
IMM GRANULOCYTES NFR BLD AUTO: 0.9 % (ref 0–0.9)
LIPASE SERPL-CCNC: 47 U/L (ref 11–82)
LYMPHOCYTES # BLD AUTO: 1.82 K/UL (ref 1–4.8)
LYMPHOCYTES NFR BLD: 34.3 % (ref 22–41)
MCH RBC QN AUTO: 33.8 PG (ref 27–33)
MCHC RBC AUTO-ENTMCNC: 37.1 G/DL (ref 33.7–35.3)
MCV RBC AUTO: 91.2 FL (ref 81.4–97.8)
MONOCYTES # BLD AUTO: 0.32 K/UL (ref 0–0.85)
MONOCYTES NFR BLD AUTO: 6 % (ref 0–13.4)
NEUTROPHILS # BLD AUTO: 3.02 K/UL (ref 1.82–7.42)
NEUTROPHILS NFR BLD: 57.1 % (ref 44–72)
NRBC # BLD AUTO: 0 K/UL
NRBC BLD-RTO: 0 /100 WBC
PLATELET # BLD AUTO: 245 K/UL (ref 164–446)
PMV BLD AUTO: 9.5 FL (ref 9–12.9)
POTASSIUM SERPL-SCNC: 4.2 MMOL/L (ref 3.6–5.5)
PROT SERPL-MCNC: 6.7 G/DL (ref 6–8.2)
RBC # BLD AUTO: 4.32 M/UL (ref 4.7–6.1)
SODIUM SERPL-SCNC: 127 MMOL/L (ref 135–145)
WBC # BLD AUTO: 5.3 K/UL (ref 4.8–10.8)

## 2018-11-18 PROCEDURE — 96375 TX/PRO/DX INJ NEW DRUG ADDON: CPT

## 2018-11-18 PROCEDURE — 82803 BLOOD GASES ANY COMBINATION: CPT

## 2018-11-18 PROCEDURE — 83690 ASSAY OF LIPASE: CPT

## 2018-11-18 PROCEDURE — 85025 COMPLETE CBC W/AUTO DIFF WBC: CPT

## 2018-11-18 PROCEDURE — 700111 HCHG RX REV CODE 636 W/ 250 OVERRIDE (IP): Performed by: EMERGENCY MEDICINE

## 2018-11-18 PROCEDURE — 99285 EMERGENCY DEPT VISIT HI MDM: CPT

## 2018-11-18 PROCEDURE — 96361 HYDRATE IV INFUSION ADD-ON: CPT

## 2018-11-18 PROCEDURE — 96374 THER/PROPH/DIAG INJ IV PUSH: CPT

## 2018-11-18 PROCEDURE — 81003 URINALYSIS AUTO W/O SCOPE: CPT

## 2018-11-18 PROCEDURE — 83735 ASSAY OF MAGNESIUM: CPT

## 2018-11-18 PROCEDURE — 700105 HCHG RX REV CODE 258: Performed by: EMERGENCY MEDICINE

## 2018-11-18 PROCEDURE — 93005 ELECTROCARDIOGRAM TRACING: CPT | Performed by: EMERGENCY MEDICINE

## 2018-11-18 PROCEDURE — 82962 GLUCOSE BLOOD TEST: CPT

## 2018-11-18 PROCEDURE — 80053 COMPREHEN METABOLIC PANEL: CPT

## 2018-11-18 RX ORDER — ONDANSETRON 2 MG/ML
4 INJECTION INTRAMUSCULAR; INTRAVENOUS ONCE
Status: COMPLETED | OUTPATIENT
Start: 2018-11-19 | End: 2018-11-18

## 2018-11-18 RX ORDER — SODIUM CHLORIDE 9 MG/ML
1000 INJECTION, SOLUTION INTRAVENOUS ONCE
Status: DISCONTINUED | OUTPATIENT
Start: 2018-11-18 | End: 2018-11-18

## 2018-11-18 RX ORDER — INSULIN GLARGINE 100 [IU]/ML
30 INJECTION, SOLUTION SUBCUTANEOUS ONCE
Status: DISCONTINUED | OUTPATIENT
Start: 2018-11-18 | End: 2018-11-18

## 2018-11-18 RX ORDER — SODIUM CHLORIDE, SODIUM LACTATE, POTASSIUM CHLORIDE, CALCIUM CHLORIDE 600; 310; 30; 20 MG/100ML; MG/100ML; MG/100ML; MG/100ML
2000 INJECTION, SOLUTION INTRAVENOUS ONCE
Status: COMPLETED | OUTPATIENT
Start: 2018-11-18 | End: 2018-11-19

## 2018-11-18 RX ADMIN — ONDANSETRON 4 MG: 2 INJECTION INTRAMUSCULAR; INTRAVENOUS at 23:41

## 2018-11-18 RX ADMIN — SODIUM CHLORIDE, POTASSIUM CHLORIDE, SODIUM LACTATE AND CALCIUM CHLORIDE 2000 ML: 600; 310; 30; 20 INJECTION, SOLUTION INTRAVENOUS at 23:40

## 2018-11-18 ASSESSMENT — PAIN SCALES - GENERAL
PAINLEVEL_OUTOF10: 7
PAINLEVEL_OUTOF10: 7

## 2018-11-19 VITALS
SYSTOLIC BLOOD PRESSURE: 152 MMHG | TEMPERATURE: 97 F | BODY MASS INDEX: 32.88 KG/M2 | HEART RATE: 75 BPM | DIASTOLIC BLOOD PRESSURE: 92 MMHG | WEIGHT: 216.93 LBS | RESPIRATION RATE: 20 BRPM | OXYGEN SATURATION: 95 % | HEIGHT: 68 IN

## 2018-11-19 LAB
ANION GAP SERPL CALC-SCNC: 11 MMOL/L (ref 0–11.9)
APPEARANCE UR: CLEAR
BASE EXCESS BLDV CALC-SCNC: -5 MMOL/L
BILIRUB UR QL STRIP.AUTO: NEGATIVE
BODY TEMPERATURE: ABNORMAL CENTIGRADE
BUN SERPL-MCNC: 20 MG/DL (ref 8–22)
CALCIUM SERPL-MCNC: 9.2 MG/DL (ref 8.5–10.5)
CHLORIDE SERPL-SCNC: 99 MMOL/L (ref 96–112)
CO2 SERPL-SCNC: 23 MMOL/L (ref 20–33)
COLOR UR: YELLOW
CREAT SERPL-MCNC: 0.92 MG/DL (ref 0.5–1.4)
GLUCOSE BLD-MCNC: 323 MG/DL (ref 65–99)
GLUCOSE BLD-MCNC: 466 MG/DL (ref 65–99)
GLUCOSE SERPL-MCNC: 353 MG/DL (ref 65–99)
GLUCOSE UR STRIP.AUTO-MCNC: >=1000 MG/DL
HCO3 BLDV-SCNC: 19 MMOL/L (ref 24–28)
KETONES UR STRIP.AUTO-MCNC: 15 MG/DL
LEUKOCYTE ESTERASE UR QL STRIP.AUTO: NEGATIVE
MAGNESIUM SERPL-MCNC: 1.9 MG/DL (ref 1.5–2.5)
MICRO URNS: ABNORMAL
NITRITE UR QL STRIP.AUTO: NEGATIVE
PCO2 BLDV: 30.7 MMHG (ref 41–51)
PH BLDV: 7.4 [PH] (ref 7.31–7.45)
PH UR STRIP.AUTO: 5.5 [PH]
PO2 BLDV: 69 MMHG (ref 25–40)
POTASSIUM SERPL-SCNC: 3.8 MMOL/L (ref 3.6–5.5)
PROT UR QL STRIP: NEGATIVE MG/DL
RBC UR QL AUTO: NEGATIVE
SAO2 % BLDV: 93.1 %
SODIUM SERPL-SCNC: 133 MMOL/L (ref 135–145)
SP GR UR STRIP.AUTO: 1.03
UROBILINOGEN UR STRIP.AUTO-MCNC: 0.2 MG/DL

## 2018-11-19 PROCEDURE — 96361 HYDRATE IV INFUSION ADD-ON: CPT

## 2018-11-19 PROCEDURE — 700102 HCHG RX REV CODE 250 W/ 637 OVERRIDE(OP): Performed by: EMERGENCY MEDICINE

## 2018-11-19 PROCEDURE — 700111 HCHG RX REV CODE 636 W/ 250 OVERRIDE (IP): Performed by: EMERGENCY MEDICINE

## 2018-11-19 PROCEDURE — 80048 BASIC METABOLIC PNL TOTAL CA: CPT

## 2018-11-19 PROCEDURE — 82962 GLUCOSE BLOOD TEST: CPT | Mod: 91

## 2018-11-19 RX ORDER — MORPHINE SULFATE 10 MG/ML
4 INJECTION, SOLUTION INTRAMUSCULAR; INTRAVENOUS ONCE
Status: COMPLETED | OUTPATIENT
Start: 2018-11-19 | End: 2018-11-19

## 2018-11-19 RX ADMIN — INSULIN HUMAN 10 UNITS: 100 INJECTION, SOLUTION PARENTERAL at 01:43

## 2018-11-19 RX ADMIN — MORPHINE SULFATE 4 MG: 10 INJECTION INTRAVENOUS at 00:35

## 2018-11-19 ASSESSMENT — PAIN SCALES - GENERAL
PAINLEVEL_OUTOF10: 5
PAINLEVEL_OUTOF10: 7

## 2018-11-19 ASSESSMENT — PAIN SCALES - WONG BAKER: WONGBAKER_NUMERICALRESPONSE: HURTS JUST A LITTLE BIT

## 2018-11-19 NOTE — ED NOTES
2341 ekg completed with old ekg found  Gave patient vomit bag and turn down lights per his request

## 2018-11-19 NOTE — DISCHARGE INSTRUCTIONS
You were seen in the Emergency Department for high blood sugar.    Labs were completed and otherwise without significant acute abnormalities.    Please use 1,000mg of tylenol or 600mg of ibuprofen every 6 hours as needed for pain.  Continue taking insulin as directed and be strict with diet recommendations.    Please follow up with your primary care physician.    Return to the Emergency Department with fevers, chest pain, worsening abdominal pain, vomiting, or other concerns.

## 2018-11-19 NOTE — ED NOTES
Pt educated to follow up with PCP r/t glucose control, states he is finding new endocrinologist. Educated to continue insulin regimen, return for any worsening symptoms or concerns. States understanding, steady gait.

## 2018-11-19 NOTE — ED NOTES
"Pt presents from home recently d/c'd from ICU [facility] for hyperglycemia (FS 1150), states sugars were doing OK until about 11/18 morning notices it was 280s when waking up, took morning dose and 8u regular sliding then lunch time sugar 300s (attempted to correct) and dinner 424 (attempted to correct) then night time FS was \"too high to read\" and when checking in . Pt took total 40u regular insulin today. Also c/o b/l leg cramping, abd pain LLQ tenderness, n/v x2, abd distention  "

## 2018-11-19 NOTE — ED NOTES
MD at bedside, repeat BMP sent a/o, pt resting in stretcher, hot pack applied, endorses slightly more comfortable

## 2018-11-19 NOTE — ED PROVIDER NOTES
"Dn  ED Provider Note    Scribed for Estefanía Brown M.D. by Marcelo Álvarez. 11/18/2018  11:23 PM    Means of arrival: Walk in  History obtained from: Patient  History limited by: None      CHIEF COMPLAINT  Chief Complaint   Patient presents with   • Hyperglycemia     pt reports blood suger is over 600       HPI  Mahesh Bradshaw is a 53 y.o. male who presents to the Emergency Department for evaluating of hyperglycemia onset tonight. He states his blood sugars have been running a little over 300 the past 2 days, but became concerned tonight when his blood glucose monitor read as \"HIGH.\" Upon EMS arrival, the patient's blood glucose was found to be 550. The patient reports he has been taking his insulin as prescribed, last dose of sliding scale at 8:30 PM tonight, and has been compliant. He additionally takes 38 units of Lantus in the morning, and 30 units of Lantus at night. The patient endorses associated abdominal cramps, urinary frequency, nausea, and vomiting. He states his symptoms feel similar to when he was admitted to the hospital for [facility] on 11/14/2018, but was discharged the next day. The patient recently finished a course of azithromycin secondary to URI symptoms, but claims the symptoms have since resolved. He denies cough or dysuria. Denies fevers or other infectious symptoms. The patient states he has been maintaining a relatively low carb and low sugar diet. No alleviating or exacerbating factors are identified at this time.     REVIEW OF SYSTEMS  Pertinent positive include hyperglycemia, abdominal cramps, urinary frequency, nausea, and vomiting. Pertinent negative include cough or dysuria. All other systems reviewed and are negative.      PAST MEDICAL HISTORY   has a past medical history of ADRIANE (acute kidney injury) ([facility]) (2/24/2016); Anesthesia; Arthritis (3-3-17); Aspiration pneumonia ([facility]) (3-2016); ASTHMA (3-3-17); Breath shortness (3-3-17); Congestive heart failure ([facility]) (2-2016 ); Dental " disorder; Depression (11/26/2014); Diabetes ([facility]) (3-3-17); Diabetes ([facility]); Difficult intubation (2-2016); DKA (diabetic ketoacidoses) ([facility]) (2-2016); Electrolyte imbalance (2-2016); Elevated LFTs; Elevated liver enzymes (2-2016); Essential hypertension (1/22/2016); Gout; Heart burn; High cholesterol (3-3-17); Hypothyroid (3-3-17); Indigestion; Kidney stones; Klebsiella infect; Migraine; MRSA cellulitis; Necrotizing myositis ([facility]); On mechanically assisted ventilation ([facility]) (2-2016); Pain (3-3-17); Pancreatitis (2-2016); RF (renal failure) (2-2016); Sepsis ([facility]) (1/21/2016); Snoring (3-3-17); Streptococcus infection; UC (ulcerative colitis) ([facility]) (3-3-17); Urinary incontinence; and Vitamin D deficiency.    SOCIAL HISTORY  Social History     Social History Main Topics   • Smoking status: Never Smoker   • Smokeless tobacco: Never Used   • Alcohol use No   • Drug use: No       SURGICAL HISTORY   has a past surgical history that includes vaishnavi by laparoscopy (2005); colonoscopy with biopsy (11/26/2014); incision and drainage general (4/7/2017); irrigation & debridement general (Right, 4/29/2017); irrigation & debridement general (Right, 5/1/2017); other orthopedic surgery (1997 or 1998); umbilical hernia repair (N/A, 11/6/2016); umbilical hernia repair (3/10/2017); irrigation & debridement ortho (Left, 12/10/2017); and umbilical hernia repair (N/A, 4/15/2018).    CURRENT MEDICATIONS  Home Medications     Reviewed by Dorothy Rojas R.N. (Registered Nurse) on 11/18/18 at 2254  Med List Status: Partial   Medication Last Dose Status   anastrozole (ARIMIDEX) 1 MG Tab 11/7/2018 Active   buPROPion (WELLBUTRIN XL) 300 MG XL tablet 11/13/2018 Active   insulin glargine (LANTUS) 100 UNIT/ML Solution 11/14/2018 Active   insulin lispro (HUMALOG) 100 UNIT/ML Solution 11/14/2018 Active   ondansetron (ZOFRAN) 4 MG Tab tablet 11/14/2018 Active   testosterone cypionate (DEPO-TESTOSTERONE) 200 MG/ML Solution injection 11/8/2018  "Active   thyroid (ARMOUR THYROID) 60 MG Tab 11/13/2018 Active                ALLERGIES  Allergies   Allergen Reactions   • Peanut (Diagnostic) Anaphylaxis   • Tradjenta [Linagliptin] Unspecified     Pt developed pancreatitis   • Hydrocodone Hives     Tolerates Morphine and oxycodone         PHYSICAL EXAM   VITAL SIGNS: /92   Pulse 85   Temp 36.1 °C (97 °F) (Temporal)   Resp 20   Ht 1.727 m (5' 8\")   Wt 98.4 kg (216 lb 14.9 oz)   SpO2 97%   BMI 32.98 kg/m²    Constitutional: Non toxic appearing male.  Alert in no apparent distress.  HENT: Normocephalic, Atraumatic. Bilateral external ears normal. Nose normal.  Moist mucous membranes.  Oropharynx clear.  Eyes: Pupils are equal and reactive. Conjunctiva normal.   Neck: Supple, full range of motion  Heart: Regular rate and rhythm.  No murmurs.    Lungs: No respiratory distress, normal work of breathing. Lungs clear to auscultation bilaterally.  Abdomen Soft, no distention. Mild diffuse abdominal tenderness.  Musculoskeletal: Atraumatic. No obvious deformities noted.  No lower extremity edema.  Skin: Warm, Dry.  No erythema, No rash.   Neurologic: Alert and oriented x3. Moving all extremities spontaneously without focal deficits.  Psychiatric: Affect normal, Mood normal, Appears appropriate and not intoxicated.    DIAGNOSTIC STUDIES    EKG  EKG personally reviewed by myself in the absence of a cardiologist showed:  NSR with rate of 85  Normal axis and intervals  No ectopy or hypertrophy  No ST or T wave change  No significant change from prior EKG on 05/21/2018  Impression: Unchanged EKG    LABS  Personally reviewed by me  Labs Reviewed   CBC WITH DIFFERENTIAL - Abnormal; Notable for the following:        Result Value    RBC 4.32 (*)     Hematocrit 39.4 (*)     MCH 33.8 (*)     MCHC 37.1 (*)     All other components within normal limits   COMP METABOLIC PANEL - Abnormal; Notable for the following:     Sodium 127 (*)     Chloride 94 (*)     Co2 18 (*)     " Anion Gap 15.0 (*)     Glucose 589 (*)     Bun 24 (*)     ALT(SGPT) 62 (*)     All other components within normal limits   VENOUS BLOOD GAS - Abnormal; Notable for the following:     Venous Bg Pco2 30.7 (*)     Venous Bg Po2 69.0 (*)     Venous Bg Hco3 19 (*)     All other components within normal limits   URINALYSIS,CULTURE IF INDICATED - Abnormal; Notable for the following:     Glucose >=1000 (*)     Ketones 15 (*)     All other components within normal limits   ESTIMATED GFR - Abnormal; Notable for the following:     GFR If Non  56 (*)     All other components within normal limits   BASIC METABOLIC PANEL - Abnormal; Notable for the following:     Sodium 133 (*)     Glucose 353 (*)     All other components within normal limits   ACCU-CHEK GLUCOSE - Abnormal; Notable for the following:     Glucose - Accu-Ck 550 (*)     All other components within normal limits   ACCU-CHEK GLUCOSE - Abnormal; Notable for the following:     Glucose - Accu-Ck 466 (*)     All other components within normal limits   ACCU-CHEK GLUCOSE - Abnormal; Notable for the following:     Glucose - Accu-Ck 323 (*)     All other components within normal limits   LIPASE   MAGNESIUM   ESTIMATED GFR       ED COURSE  Vitals:    11/19/18 0100 11/19/18 0200 11/19/18 0300 11/19/18 0400   BP:       Pulse: 77 87 93 75   Resp: 20 (!) 27 18 20   Temp:       TempSrc:       SpO2: 94% 94% 91% 95%   Weight:       Height:             Medications administered:  Medications   LR infusion (bolus) (0 mL Intravenous Stopped 11/19/18 0148)   ondansetron (ZOFRAN) syringe/vial injection 4 mg (4 mg Intravenous Given 11/18/18 4745)   morphine (pf) 10 mg/ml 10 MG/ML injection 4 mg (4 mg Intravenous Given 11/19/18 0035)   insulin regular (HUMULIN R) injection 10 Units (10 Units Intravenous Given 11/19/18 0143)         Old records personally reviewed:  Obtained and reviewed past medical records that indicate patient was seen in the ED on 11/14/2018 for  hyperglycemia. He was admitted at that time but discharged the next day.     11:23 PM Patient seen and examined at bedside. The patient presents with hyperglycemia. Ordered for Magnesium, Venous Blood Gas, Urinalysis, CBC with differential, CMP, Lipase, Accu-chek Glucose, Estimated GFR, and EKG to evaluate. Patient will be treated with Zofran 4 mg and Morphine 4 mg for his symptoms. Patient was given LR IV fluids for hyperglycemia.  IV hydration was used because oral hydration was not adequate alone.       MEDICAL DECISION MAKING  Patient with history of uncontrolled type 2 diabetes, recently discharged from the hospital after treatment of HHS, now presenting with hyperglycemia.  He is afebrile with reassuring vitals on arrival and nontoxic-appearing.  EKG does not demonstrate evidence of ischemia or arrhythmia, doubt ACS.  Patient has no underlying infectious symptoms to explain his elevated blood sugar.  He endorses abdominal cramping which is consistent with prior episodes of hyperglycemia.  Exam is not concerning for bowel obstruction or perforation, appendicitis, or diverticulitis.  He reports to be compliant with his insulin and diet recommendations.  Initial blood sugar is elevated with associated mild anion gap.  VBG however demonstrates normal pH.  He has no significant associated electrolyte abnormalities.  Plan for treatment with IV fluids as well as IV insulin followed by reassessment.    Repeat BMP following IV fluids and insulin demonstrate a decrease in blood sugar in addition to improvement of anion gap.  There is no signs of DKA at this time.  On reassessment of the patient, he endorses improvement of his symptoms however is still reporting cramping in bilateral legs.  Skin exam was completed without evidence of cellulitis or infection.  He has no clinical signs concerning for DVT.  Plan for discharge home with instructions on close follow-up with his primary care physician as his insulin regimen  may need to be adjusted.  Patient understands plan of care for discharge home as well as strict return precautions for changing or worsening symptoms.      IMPRESSION  (R73.9) Hyperglycemia  (R25.2) Leg cramps     Disposition: Discharge home, stable condition    Results, diagnoses, and treatment options were discussed with the patient and/or family. Patient verbalized understanding of plan of care.    Discharge Medication List as of 11/19/2018  4:44 AM               Marcelo EPSTEIN (Gareth), am scribing for, and in the presence of, Estefanía Brown M.D..    Electronically signed by: Marcelo Álvarez (Gareth), 11/18/2018    Estefanía EPSTEIN M.D. personally performed the services described in this documentation, as scribed by Marcelo Álvarez in my presence, and it is both accurate and complete.    C.    The note accurately reflects work and decisions made by me.  Estefanía Brown  11/19/2018  5:35 AM

## 2018-11-19 NOTE — ED TRIAGE NOTES
"\"My blood sugar is high again, it was HIGH on my machine at home\"   Pt reports elevated blood glucose, he was admitted Wednesday for hyperglycemia and discharged on Thursday.  Pt took 50 units regular sliding scale insulin during the day today  Pt reports 2 episodes of vomiting with continued nausea and abdominal cramping   "

## 2018-11-21 ENCOUNTER — NON-PROVIDER VISIT (OUTPATIENT)
Dept: URGENT CARE | Facility: CLINIC | Age: 53
End: 2018-11-21

## 2018-11-21 DIAGNOSIS — Z02.89 ENCOUNTER FOR OTHER ADMINISTRATIVE EXAMINATIONS: ICD-10-CM

## 2018-11-21 PROCEDURE — 8907 PR URINE COLLECT ONLY: Performed by: PHYSICIAN ASSISTANT

## 2018-11-27 ENCOUNTER — NON-PROVIDER VISIT (OUTPATIENT)
Dept: URGENT CARE | Facility: CLINIC | Age: 53
End: 2018-11-27

## 2018-11-27 DIAGNOSIS — Z02.89 ENCOUNTER FOR OTHER ADMINISTRATIVE EXAMINATIONS: ICD-10-CM

## 2018-11-27 PROCEDURE — 8907 PR URINE COLLECT ONLY: Performed by: FAMILY MEDICINE

## 2018-12-01 ENCOUNTER — NON-PROVIDER VISIT (OUTPATIENT)
Dept: URGENT CARE | Facility: CLINIC | Age: 53
End: 2018-12-01

## 2018-12-01 DIAGNOSIS — Z02.1 PRE-EMPLOYMENT DRUG SCREENING: ICD-10-CM

## 2018-12-05 ENCOUNTER — HOSPITAL ENCOUNTER (INPATIENT)
Facility: MEDICAL CENTER | Age: 53
LOS: 1 days | DRG: 638 | End: 2018-12-06
Attending: EMERGENCY MEDICINE | Admitting: HOSPITALIST
Payer: COMMERCIAL

## 2018-12-05 DIAGNOSIS — R10.9 ABDOMINAL PAIN, UNSPECIFIED ABDOMINAL LOCATION: ICD-10-CM

## 2018-12-05 DIAGNOSIS — R11.2 NON-INTRACTABLE VOMITING WITH NAUSEA, UNSPECIFIED VOMITING TYPE: ICD-10-CM

## 2018-12-05 DIAGNOSIS — R73.9 HYPERGLYCEMIA: ICD-10-CM

## 2018-12-05 LAB
ALBUMIN SERPL BCP-MCNC: 4.1 G/DL (ref 3.2–4.9)
ALBUMIN/GLOB SERPL: 1.8 G/DL
ALP SERPL-CCNC: 76 U/L (ref 30–99)
ALT SERPL-CCNC: 13 U/L (ref 2–50)
ANION GAP SERPL CALC-SCNC: 14 MMOL/L (ref 0–11.9)
ANION GAP SERPL CALC-SCNC: 16 MMOL/L (ref 0–11.9)
APPEARANCE UR: CLEAR
AST SERPL-CCNC: 20 U/L (ref 12–45)
B-OH-BUTYR SERPL-MCNC: 2.85 MMOL/L (ref 0.02–0.27)
BASE EXCESS BLDV CALC-SCNC: -11 MMOL/L
BASOPHILS # BLD AUTO: 0.4 % (ref 0–1.8)
BASOPHILS # BLD: 0.02 K/UL (ref 0–0.12)
BILIRUB SERPL-MCNC: 0.6 MG/DL (ref 0.1–1.5)
BILIRUB UR QL STRIP.AUTO: NEGATIVE
BODY TEMPERATURE: ABNORMAL CENTIGRADE
BUN SERPL-MCNC: 18 MG/DL (ref 8–22)
BUN SERPL-MCNC: 18 MG/DL (ref 8–22)
CALCIUM SERPL-MCNC: 8.7 MG/DL (ref 8.5–10.5)
CALCIUM SERPL-MCNC: 9.1 MG/DL (ref 8.5–10.5)
CHLORIDE SERPL-SCNC: 102 MMOL/L (ref 96–112)
CHLORIDE SERPL-SCNC: 111 MMOL/L (ref 96–112)
CO2 SERPL-SCNC: 12 MMOL/L (ref 20–33)
CO2 SERPL-SCNC: 13 MMOL/L (ref 20–33)
COLOR UR: YELLOW
CREAT SERPL-MCNC: 0.88 MG/DL (ref 0.5–1.4)
CREAT SERPL-MCNC: 1.09 MG/DL (ref 0.5–1.4)
EOSINOPHIL # BLD AUTO: 0.06 K/UL (ref 0–0.51)
EOSINOPHIL NFR BLD: 1.1 % (ref 0–6.9)
ERYTHROCYTE [DISTWIDTH] IN BLOOD BY AUTOMATED COUNT: 43.5 FL (ref 35.9–50)
GLOBULIN SER CALC-MCNC: 2.3 G/DL (ref 1.9–3.5)
GLUCOSE SERPL-MCNC: 331 MG/DL (ref 65–99)
GLUCOSE SERPL-MCNC: 409 MG/DL (ref 65–99)
GLUCOSE UR STRIP.AUTO-MCNC: >=1000 MG/DL
HCO3 BLDV-SCNC: 14 MMOL/L (ref 24–28)
HCT VFR BLD AUTO: 42.3 % (ref 42–52)
HGB BLD-MCNC: 15.7 G/DL (ref 14–18)
IMM GRANULOCYTES # BLD AUTO: 0.09 K/UL (ref 0–0.11)
IMM GRANULOCYTES NFR BLD AUTO: 1.6 % (ref 0–0.9)
INHALED O2 FLOW RATE: 1 L/MIN (ref 2–10)
KETONES UR STRIP.AUTO-MCNC: 40 MG/DL
LACTATE BLD-SCNC: 1.7 MMOL/L (ref 0.5–2)
LEUKOCYTE ESTERASE UR QL STRIP.AUTO: NEGATIVE
LIPASE SERPL-CCNC: 199 U/L (ref 11–82)
LYMPHOCYTES # BLD AUTO: 1.34 K/UL (ref 1–4.8)
LYMPHOCYTES NFR BLD: 24.2 % (ref 22–41)
MCH RBC QN AUTO: 34.4 PG (ref 27–33)
MCHC RBC AUTO-ENTMCNC: 37.1 G/DL (ref 33.7–35.3)
MCV RBC AUTO: 92.8 FL (ref 81.4–97.8)
MICRO URNS: ABNORMAL
MONOCYTES # BLD AUTO: 0.23 K/UL (ref 0–0.85)
MONOCYTES NFR BLD AUTO: 4.2 % (ref 0–13.4)
NEUTROPHILS # BLD AUTO: 3.8 K/UL (ref 1.82–7.42)
NEUTROPHILS NFR BLD: 68.5 % (ref 44–72)
NITRITE UR QL STRIP.AUTO: NEGATIVE
NRBC # BLD AUTO: 0 K/UL
NRBC BLD-RTO: 0 /100 WBC
PCO2 BLDV: 31 MMHG (ref 41–51)
PH BLDV: 7.28 [PH] (ref 7.31–7.45)
PH UR STRIP.AUTO: 5 [PH]
PLATELET # BLD AUTO: 210 K/UL (ref 164–446)
PMV BLD AUTO: 10 FL (ref 9–12.9)
PO2 BLDV: 47.3 MMHG (ref 25–40)
POTASSIUM SERPL-SCNC: 3.9 MMOL/L (ref 3.6–5.5)
POTASSIUM SERPL-SCNC: 4 MMOL/L (ref 3.6–5.5)
PROT SERPL-MCNC: 6.4 G/DL (ref 6–8.2)
PROT UR QL STRIP: NEGATIVE MG/DL
RBC # BLD AUTO: 4.56 M/UL (ref 4.7–6.1)
RBC UR QL AUTO: NEGATIVE
SAO2 % BLDV: 81.5 %
SODIUM SERPL-SCNC: 128 MMOL/L (ref 135–145)
SODIUM SERPL-SCNC: 140 MMOL/L (ref 135–145)
SP GR UR STRIP.AUTO: 1.03
UROBILINOGEN UR STRIP.AUTO-MCNC: 0.2 MG/DL
WBC # BLD AUTO: 5.5 K/UL (ref 4.8–10.8)

## 2018-12-05 PROCEDURE — 700102 HCHG RX REV CODE 250 W/ 637 OVERRIDE(OP): Performed by: EMERGENCY MEDICINE

## 2018-12-05 PROCEDURE — 96375 TX/PRO/DX INJ NEW DRUG ADDON: CPT

## 2018-12-05 PROCEDURE — 700111 HCHG RX REV CODE 636 W/ 250 OVERRIDE (IP): Performed by: EMERGENCY MEDICINE

## 2018-12-05 PROCEDURE — 96372 THER/PROPH/DIAG INJ SC/IM: CPT

## 2018-12-05 PROCEDURE — 700111 HCHG RX REV CODE 636 W/ 250 OVERRIDE (IP)

## 2018-12-05 PROCEDURE — 700105 HCHG RX REV CODE 258: Performed by: EMERGENCY MEDICINE

## 2018-12-05 PROCEDURE — 99223 1ST HOSP IP/OBS HIGH 75: CPT | Performed by: HOSPITALIST

## 2018-12-05 PROCEDURE — 83605 ASSAY OF LACTIC ACID: CPT

## 2018-12-05 PROCEDURE — 83690 ASSAY OF LIPASE: CPT

## 2018-12-05 PROCEDURE — 80048 BASIC METABOLIC PNL TOTAL CA: CPT

## 2018-12-05 PROCEDURE — 82010 KETONE BODYS QUAN: CPT

## 2018-12-05 PROCEDURE — 96376 TX/PRO/DX INJ SAME DRUG ADON: CPT

## 2018-12-05 PROCEDURE — 80053 COMPREHEN METABOLIC PANEL: CPT

## 2018-12-05 PROCEDURE — 99285 EMERGENCY DEPT VISIT HI MDM: CPT

## 2018-12-05 PROCEDURE — 96374 THER/PROPH/DIAG INJ IV PUSH: CPT

## 2018-12-05 PROCEDURE — 85025 COMPLETE CBC W/AUTO DIFF WBC: CPT

## 2018-12-05 PROCEDURE — 770004 HCHG ROOM/CARE - ONCOLOGY PRIVATE *

## 2018-12-05 PROCEDURE — 82803 BLOOD GASES ANY COMBINATION: CPT

## 2018-12-05 PROCEDURE — 81003 URINALYSIS AUTO W/O SCOPE: CPT

## 2018-12-05 RX ORDER — METOCLOPRAMIDE HYDROCHLORIDE 5 MG/ML
10 INJECTION INTRAMUSCULAR; INTRAVENOUS ONCE
Status: COMPLETED | OUTPATIENT
Start: 2018-12-05 | End: 2018-12-05

## 2018-12-05 RX ORDER — SODIUM CHLORIDE 9 MG/ML
2000 INJECTION, SOLUTION INTRAVENOUS ONCE
Status: COMPLETED | OUTPATIENT
Start: 2018-12-06 | End: 2018-12-06

## 2018-12-05 RX ORDER — BISACODYL 10 MG
10 SUPPOSITORY, RECTAL RECTAL
Status: DISCONTINUED | OUTPATIENT
Start: 2018-12-05 | End: 2018-12-06 | Stop reason: HOSPADM

## 2018-12-05 RX ORDER — MORPHINE SULFATE 10 MG/ML
4 INJECTION, SOLUTION INTRAMUSCULAR; INTRAVENOUS ONCE
Status: COMPLETED | OUTPATIENT
Start: 2018-12-05 | End: 2018-12-05

## 2018-12-05 RX ORDER — HEPARIN SODIUM 5000 [USP'U]/ML
5000 INJECTION, SOLUTION INTRAVENOUS; SUBCUTANEOUS EVERY 8 HOURS
Status: DISCONTINUED | OUTPATIENT
Start: 2018-12-06 | End: 2018-12-06 | Stop reason: HOSPADM

## 2018-12-05 RX ORDER — ONDANSETRON 2 MG/ML
4 INJECTION INTRAMUSCULAR; INTRAVENOUS EVERY 4 HOURS PRN
Status: DISCONTINUED | OUTPATIENT
Start: 2018-12-05 | End: 2018-12-06 | Stop reason: HOSPADM

## 2018-12-05 RX ORDER — DEXTROSE AND SODIUM CHLORIDE 5; .9 G/100ML; G/100ML
INJECTION, SOLUTION INTRAVENOUS CONTINUOUS
Status: DISCONTINUED | OUTPATIENT
Start: 2018-12-06 | End: 2018-12-05

## 2018-12-05 RX ORDER — SODIUM CHLORIDE 9 MG/ML
1000 INJECTION, SOLUTION INTRAVENOUS ONCE
Status: COMPLETED | OUTPATIENT
Start: 2018-12-05 | End: 2018-12-05

## 2018-12-05 RX ORDER — BUPROPION HYDROCHLORIDE 300 MG/1
300 TABLET ORAL EVERY MORNING
Status: DISCONTINUED | OUTPATIENT
Start: 2018-12-06 | End: 2018-12-06

## 2018-12-05 RX ORDER — DEXTROSE MONOHYDRATE 25 G/50ML
25 INJECTION, SOLUTION INTRAVENOUS
Status: DISCONTINUED | OUTPATIENT
Start: 2018-12-05 | End: 2018-12-06 | Stop reason: HOSPADM

## 2018-12-05 RX ORDER — PROMETHAZINE HYDROCHLORIDE 25 MG/1
12.5-25 SUPPOSITORY RECTAL EVERY 4 HOURS PRN
Status: DISCONTINUED | OUTPATIENT
Start: 2018-12-05 | End: 2018-12-06 | Stop reason: HOSPADM

## 2018-12-05 RX ORDER — POLYETHYLENE GLYCOL 3350 17 G/17G
1 POWDER, FOR SOLUTION ORAL
Status: DISCONTINUED | OUTPATIENT
Start: 2018-12-05 | End: 2018-12-06 | Stop reason: HOSPADM

## 2018-12-05 RX ORDER — THYROID 30 MG/1
60 TABLET ORAL EVERY MORNING
Status: DISCONTINUED | OUTPATIENT
Start: 2018-12-06 | End: 2018-12-06 | Stop reason: HOSPADM

## 2018-12-05 RX ORDER — AMOXICILLIN 250 MG
2 CAPSULE ORAL 2 TIMES DAILY
Status: DISCONTINUED | OUTPATIENT
Start: 2018-12-06 | End: 2018-12-06 | Stop reason: HOSPADM

## 2018-12-05 RX ORDER — PROMETHAZINE HYDROCHLORIDE 25 MG/1
12.5-25 TABLET ORAL EVERY 4 HOURS PRN
Status: DISCONTINUED | OUTPATIENT
Start: 2018-12-05 | End: 2018-12-06 | Stop reason: HOSPADM

## 2018-12-05 RX ORDER — MORPHINE SULFATE 4 MG/ML
INJECTION, SOLUTION INTRAMUSCULAR; INTRAVENOUS
Status: COMPLETED
Start: 2018-12-05 | End: 2018-12-05

## 2018-12-05 RX ORDER — SODIUM CHLORIDE, SODIUM LACTATE, POTASSIUM CHLORIDE, AND CALCIUM CHLORIDE .6; .31; .03; .02 G/100ML; G/100ML; G/100ML; G/100ML
2000 INJECTION, SOLUTION INTRAVENOUS ONCE
Status: COMPLETED | OUTPATIENT
Start: 2018-12-06 | End: 2018-12-06

## 2018-12-05 RX ORDER — SODIUM CHLORIDE, SODIUM LACTATE, POTASSIUM CHLORIDE, CALCIUM CHLORIDE 600; 310; 30; 20 MG/100ML; MG/100ML; MG/100ML; MG/100ML
1000 INJECTION, SOLUTION INTRAVENOUS ONCE
Status: COMPLETED | OUTPATIENT
Start: 2018-12-05 | End: 2018-12-06

## 2018-12-05 RX ORDER — ONDANSETRON 4 MG/1
4 TABLET, ORALLY DISINTEGRATING ORAL EVERY 4 HOURS PRN
Status: DISCONTINUED | OUTPATIENT
Start: 2018-12-05 | End: 2018-12-06 | Stop reason: HOSPADM

## 2018-12-05 RX ORDER — DEXTROSE AND SODIUM CHLORIDE 10; .45 G/100ML; G/100ML
INJECTION, SOLUTION INTRAVENOUS CONTINUOUS
Status: DISCONTINUED | OUTPATIENT
Start: 2018-12-06 | End: 2018-12-05

## 2018-12-05 RX ORDER — SODIUM CHLORIDE 9 MG/ML
INJECTION, SOLUTION INTRAVENOUS CONTINUOUS
Status: DISCONTINUED | OUTPATIENT
Start: 2018-12-06 | End: 2018-12-06 | Stop reason: HOSPADM

## 2018-12-05 RX ADMIN — SODIUM CHLORIDE, POTASSIUM CHLORIDE, SODIUM LACTATE AND CALCIUM CHLORIDE 1000 ML: 600; 310; 30; 20 INJECTION, SOLUTION INTRAVENOUS at 23:22

## 2018-12-05 RX ADMIN — MORPHINE SULFATE 4 MG: 4 INJECTION INTRAVENOUS at 17:54

## 2018-12-05 RX ADMIN — MORPHINE SULFATE 4 MG: 10 INJECTION INTRAVENOUS at 21:32

## 2018-12-05 RX ADMIN — INSULIN HUMAN 10 UNITS: 100 INJECTION, SOLUTION PARENTERAL at 23:22

## 2018-12-05 RX ADMIN — MORPHINE SULFATE 4 MG: 10 INJECTION INTRAVENOUS at 17:16

## 2018-12-05 RX ADMIN — SODIUM CHLORIDE 1000 ML: 9 INJECTION, SOLUTION INTRAVENOUS at 17:16

## 2018-12-05 RX ADMIN — METOCLOPRAMIDE 10 MG: 5 INJECTION, SOLUTION INTRAMUSCULAR; INTRAVENOUS at 17:16

## 2018-12-05 ASSESSMENT — ENCOUNTER SYMPTOMS
FOCAL WEAKNESS: 0
BLURRED VISION: 0
DIZZINESS: 0
NAUSEA: 1
ABDOMINAL PAIN: 1
DOUBLE VISION: 0
CHILLS: 0
VOMITING: 1
SENSORY CHANGE: 0
SHORTNESS OF BREATH: 0
HEADACHES: 0
FEVER: 0

## 2018-12-05 ASSESSMENT — PAIN SCALES - GENERAL: PAINLEVEL_OUTOF10: 2

## 2018-12-06 ENCOUNTER — PATIENT OUTREACH (OUTPATIENT)
Dept: HEALTH INFORMATION MANAGEMENT | Facility: OTHER | Age: 53
End: 2018-12-06

## 2018-12-06 VITALS
BODY MASS INDEX: 31.34 KG/M2 | TEMPERATURE: 99.1 F | HEIGHT: 68 IN | OXYGEN SATURATION: 98 % | RESPIRATION RATE: 16 BRPM | HEART RATE: 103 BPM | WEIGHT: 206.79 LBS | DIASTOLIC BLOOD PRESSURE: 77 MMHG | SYSTOLIC BLOOD PRESSURE: 132 MMHG

## 2018-12-06 LAB
ANION GAP SERPL CALC-SCNC: 10 MMOL/L (ref 0–11.9)
ANION GAP SERPL CALC-SCNC: 13 MMOL/L (ref 0–11.9)
ANION GAP SERPL CALC-SCNC: 9 MMOL/L (ref 0–11.9)
BUN SERPL-MCNC: 11 MG/DL (ref 8–22)
BUN SERPL-MCNC: 13 MG/DL (ref 8–22)
BUN SERPL-MCNC: 15 MG/DL (ref 8–22)
CALCIUM SERPL-MCNC: 8.1 MG/DL (ref 8.5–10.5)
CALCIUM SERPL-MCNC: 8.1 MG/DL (ref 8.5–10.5)
CALCIUM SERPL-MCNC: 8.5 MG/DL (ref 8.5–10.5)
CHLORIDE SERPL-SCNC: 106 MMOL/L (ref 96–112)
CHLORIDE SERPL-SCNC: 109 MMOL/L (ref 96–112)
CHLORIDE SERPL-SCNC: 109 MMOL/L (ref 96–112)
CO2 SERPL-SCNC: 13 MMOL/L (ref 20–33)
CO2 SERPL-SCNC: 18 MMOL/L (ref 20–33)
CO2 SERPL-SCNC: 18 MMOL/L (ref 20–33)
CREAT SERPL-MCNC: 0.57 MG/DL (ref 0.5–1.4)
CREAT SERPL-MCNC: 0.59 MG/DL (ref 0.5–1.4)
CREAT SERPL-MCNC: 0.62 MG/DL (ref 0.5–1.4)
GLUCOSE BLD-MCNC: 208 MG/DL (ref 65–99)
GLUCOSE BLD-MCNC: 220 MG/DL (ref 65–99)
GLUCOSE SERPL-MCNC: 209 MG/DL (ref 65–99)
GLUCOSE SERPL-MCNC: 231 MG/DL (ref 65–99)
GLUCOSE SERPL-MCNC: 287 MG/DL (ref 65–99)
POTASSIUM SERPL-SCNC: 3.4 MMOL/L (ref 3.6–5.5)
POTASSIUM SERPL-SCNC: 3.4 MMOL/L (ref 3.6–5.5)
POTASSIUM SERPL-SCNC: 3.5 MMOL/L (ref 3.6–5.5)
SODIUM SERPL-SCNC: 132 MMOL/L (ref 135–145)
SODIUM SERPL-SCNC: 136 MMOL/L (ref 135–145)
SODIUM SERPL-SCNC: 137 MMOL/L (ref 135–145)

## 2018-12-06 PROCEDURE — 96376 TX/PRO/DX INJ SAME DRUG ADON: CPT

## 2018-12-06 PROCEDURE — A9270 NON-COVERED ITEM OR SERVICE: HCPCS | Performed by: INTERNAL MEDICINE

## 2018-12-06 PROCEDURE — A9270 NON-COVERED ITEM OR SERVICE: HCPCS | Performed by: HOSPITALIST

## 2018-12-06 PROCEDURE — 700105 HCHG RX REV CODE 258: Performed by: HOSPITALIST

## 2018-12-06 PROCEDURE — 700102 HCHG RX REV CODE 250 W/ 637 OVERRIDE(OP): Performed by: HOSPITALIST

## 2018-12-06 PROCEDURE — 82962 GLUCOSE BLOOD TEST: CPT | Mod: 91

## 2018-12-06 PROCEDURE — 36415 COLL VENOUS BLD VENIPUNCTURE: CPT

## 2018-12-06 PROCEDURE — 80048 BASIC METABOLIC PNL TOTAL CA: CPT

## 2018-12-06 PROCEDURE — 99239 HOSP IP/OBS DSCHRG MGMT >30: CPT | Performed by: INTERNAL MEDICINE

## 2018-12-06 PROCEDURE — 700102 HCHG RX REV CODE 250 W/ 637 OVERRIDE(OP): Performed by: INTERNAL MEDICINE

## 2018-12-06 PROCEDURE — 700111 HCHG RX REV CODE 636 W/ 250 OVERRIDE (IP): Performed by: HOSPITALIST

## 2018-12-06 RX ORDER — MORPHINE SULFATE 10 MG/ML
2-4 INJECTION, SOLUTION INTRAMUSCULAR; INTRAVENOUS EVERY 4 HOURS PRN
Status: DISCONTINUED | OUTPATIENT
Start: 2018-12-06 | End: 2018-12-06 | Stop reason: HOSPADM

## 2018-12-06 RX ORDER — INSULIN GLARGINE 100 [IU]/ML
42 INJECTION, SOLUTION SUBCUTANEOUS ONCE
Status: COMPLETED | OUTPATIENT
Start: 2018-12-06 | End: 2018-12-06

## 2018-12-06 RX ORDER — ACETAMINOPHEN 325 MG/1
650 TABLET ORAL EVERY 4 HOURS PRN
Status: DISCONTINUED | OUTPATIENT
Start: 2018-12-06 | End: 2018-12-06 | Stop reason: HOSPADM

## 2018-12-06 RX ORDER — BUPROPION HYDROCHLORIDE 150 MG/1
150 TABLET, EXTENDED RELEASE ORAL 2 TIMES DAILY
Status: DISCONTINUED | OUTPATIENT
Start: 2018-12-06 | End: 2018-12-06 | Stop reason: HOSPADM

## 2018-12-06 RX ADMIN — MORPHINE SULFATE 4 MG: 10 INJECTION INTRAVENOUS at 00:41

## 2018-12-06 RX ADMIN — MORPHINE SULFATE 4 MG: 10 INJECTION INTRAVENOUS at 08:24

## 2018-12-06 RX ADMIN — SODIUM CHLORIDE, POTASSIUM CHLORIDE, SODIUM LACTATE AND CALCIUM CHLORIDE 2000 ML: 600; 310; 30; 20 INJECTION, SOLUTION INTRAVENOUS at 01:15

## 2018-12-06 RX ADMIN — HEPARIN SODIUM 5000 UNITS: 5000 INJECTION, SOLUTION INTRAVENOUS; SUBCUTANEOUS at 05:01

## 2018-12-06 RX ADMIN — BUPROPION HYDROCHLORIDE 150 MG: 150 TABLET, EXTENDED RELEASE ORAL at 05:00

## 2018-12-06 RX ADMIN — INSULIN HUMAN 3 UNITS: 100 INJECTION, SOLUTION PARENTERAL at 05:01

## 2018-12-06 RX ADMIN — MORPHINE SULFATE 2 MG: 10 INJECTION INTRAVENOUS at 02:40

## 2018-12-06 RX ADMIN — LEVOTHYROXINE, LIOTHYRONINE 60 MG: 19; 4.5 TABLET ORAL at 05:00

## 2018-12-06 RX ADMIN — ACETAMINOPHEN 650 MG: 325 TABLET, FILM COATED ORAL at 12:44

## 2018-12-06 RX ADMIN — INSULIN HUMAN 3 UNITS: 100 INJECTION, SOLUTION PARENTERAL at 12:21

## 2018-12-06 RX ADMIN — INSULIN GLARGINE 42 UNITS: 100 INJECTION, SOLUTION SUBCUTANEOUS at 14:18

## 2018-12-06 RX ADMIN — HEPARIN SODIUM 5000 UNITS: 5000 INJECTION, SOLUTION INTRAVENOUS; SUBCUTANEOUS at 14:19

## 2018-12-06 RX ADMIN — SODIUM CHLORIDE: 9 INJECTION, SOLUTION INTRAVENOUS at 03:42

## 2018-12-06 ASSESSMENT — COGNITIVE AND FUNCTIONAL STATUS - GENERAL
DAILY ACTIVITIY SCORE: 24
SUGGESTED CMS G CODE MODIFIER DAILY ACTIVITY: CH
SUGGESTED CMS G CODE MODIFIER MOBILITY: CH
MOBILITY SCORE: 24

## 2018-12-06 ASSESSMENT — PATIENT HEALTH QUESTIONNAIRE - PHQ9
SUM OF ALL RESPONSES TO PHQ9 QUESTIONS 1 AND 2: 0
1. LITTLE INTEREST OR PLEASURE IN DOING THINGS: NOT AT ALL
2. FEELING DOWN, DEPRESSED, IRRITABLE, OR HOPELESS: NOT AT ALL

## 2018-12-06 ASSESSMENT — ENCOUNTER SYMPTOMS
FEVER: 0
PALPITATIONS: 0
DIARRHEA: 0
VOMITING: 1
WHEEZING: 0
TINGLING: 0
SORE THROAT: 0
MYALGIAS: 0
SHORTNESS OF BREATH: 0
DEPRESSION: 0
FOCAL WEAKNESS: 0
PHOTOPHOBIA: 0
ABDOMINAL PAIN: 1
CHILLS: 0
COUGH: 0
DIZZINESS: 0
NAUSEA: 1
HEADACHES: 0

## 2018-12-06 ASSESSMENT — PAIN SCALES - GENERAL
PAINLEVEL_OUTOF10: 0
PAINLEVEL_OUTOF10: 6

## 2018-12-06 NOTE — ED PROVIDER NOTES
Patient received in signout from Dr Loo at 7:43 PM    Briefly, pt is 54 y/o M presenting with abdominal pain as well as vomiting    Treatment so far has included IV fluids, antiemetics and pain medication    Workup so far has an included labs    Patient has improved but does have a gap acidosis, plan is for continued fluids and recheck CMP    9:37 PM  Patient reevaluated, pain is coming back, additional medication ordered    Labs Reviewed   CBC WITH DIFFERENTIAL - Abnormal; Notable for the following:        Result Value    RBC 4.56 (*)     MCH 34.4 (*)     MCHC 37.1 (*)     Immature Granulocytes 1.60 (*)     All other components within normal limits   URINALYSIS,CULTURE IF INDICATED - Abnormal; Notable for the following:     Glucose >=1000 (*)     Ketones 40 (*)     All other components within normal limits   COMP METABOLIC PANEL - Abnormal; Notable for the following:     Sodium 128 (*)     Co2 12 (*)     Anion Gap 14.0 (*)     Glucose 409 (*)     All other components within normal limits   LIPASE - Abnormal; Notable for the following:     Lipase 199 (*)     All other components within normal limits   BETA-HYDROXYBUTYRIC ACID - Abnormal; Notable for the following:     beta-Hydroxybutyric Acid 2.85 (*)     All other components within normal limits   VENOUS BLOOD GAS - Abnormal; Notable for the following:     Venous Bg Ph 7.28 (*)     Venous Bg Pco2 31.0 (*)     Venous Bg Po2 47.3 (*)     Venous Bg Hco3 14 (*)     O2 Therapy 1.0 (*)     All other components within normal limits   BASIC METABOLIC PANEL - Abnormal; Notable for the following:     Co2 13 (*)     Glucose 331 (*)     Anion Gap 16.0 (*)     All other components within normal limits   LACTIC ACID   ESTIMATED GFR   ESTIMATED GFR         11:02 PM  Patient reevaluated again, he is still tachycardic, still symptomatic, will plan for admission    This is a 53-year-old male, very pleasant, presenting with nausea vomiting abdominal pain.  He does appear to be in  potentially early DKA with a pH of 7.28, a anion gap of 16, bicarb of 13 and elevated blood sugar.  He was initially seen by 1 of my partners and as he was improving could potentially go home after treatment here.  However his repeat labs have actually returned with a higher gap and is still symptomatic and tachycardic and will be admitted for further care.  He has been given IV fluids for his dehydration and early DKA, which was needed to be given rapidly as opposed to oral fluids and while he has improved still needs further treatment.  Additionally given subcu insulin in the emergency department      Patient is admitted in guarded condition    Royce Baker

## 2018-12-06 NOTE — ED TRIAGE NOTES
Pt ambulates to triage  Chief Complaint   Patient presents with   • Abdominal Pain   • High Blood Sugar   Pt reports N/V starting 1 hr after eating dinner last night  Pt reports he vomited at 0500 and blood sugar have been running high  Pt reports vomiting x 4 today  Pt reports he took 38 units regular, 42 units long acting today   in triage

## 2018-12-06 NOTE — H&P
Hospital Medicine History & Physical Note    Date of Service  12/5/2018    Primary Care Physician  NASIR Brunner.    Consultants  None    Code Status  Full    Chief Complaint  Chief Complaint   Patient presents with   • Abdominal Pain   • High Blood Sugar       History of Presenting Illness  53 y.o. male who presented on 12/5/2018 with nausea, vomiting, abdominal pain, and elevated blood sugars.  The patient states that historically he has had good control of his diabetes and typically has blood sugars which run in the 160s.  In the last 2 months however, he has had issues with labile blood sugars and difficulty controlling his disease at home.  He was just admitted to our hospital last month for diabetic hyperosmolar nonketotic state which he suspected was due to a recent URI and required an insulin drip with monitoring in the ICU.  He had a quick turnaround with medical management and had been home.  He states that he has not had any changes in his medications other than an increase in his home insulin dose.  He denies any diarrhea or sick contacts and does not drink any alcohol.  He states that for the last several days, he has had worsening abdominal pain with nausea and vomiting.  He has had poor p.o. intake of fluids and noted that his blood sugars have been gradually trending up and was in the 400s earlier today.  He presents himself to the hospital where he is noted to have evidence of early diabetic ketoacidosis.  He otherwise has had no fevers, chills, recent URIs, diarrhea, dysuria or shortness of breath.      Review of Systems  Review of Systems   Constitutional: Negative for chills and fever.   HENT: Negative for congestion and sore throat.    Eyes: Negative for photophobia.   Respiratory: Negative for cough, shortness of breath and wheezing.    Cardiovascular: Negative for chest pain and palpitations.   Gastrointestinal: Positive for abdominal pain, nausea and vomiting. Negative for diarrhea.  "  Genitourinary: Negative for dysuria.   Musculoskeletal: Negative for myalgias.   Skin: Negative.    Neurological: Negative for dizziness, tingling, focal weakness and headaches.   Psychiatric/Behavioral: Negative for depression and suicidal ideas.       Past Medical History  Past Medical History:   Diagnosis Date   • UC (ulcerative colitis) ([facility]) 3-3-17    \"Five BM's per day, takes Lialda\"   • Arthritis 3-3-17    \"Spine\"   • Snoring 3-3-17    Has not had a sleep study   • High cholesterol 3-3-17    Does not currently take medication for   • ASTHMA 3-3-17    \"Hasn't had to use inhaler in 2 years\"   • Breath shortness 3-3-17    \"With Exercise\"   • Hypothyroid 3-3-17    Takes [place] Thyroid   • Pain 3-3-17    \"Left Flank\"   • Diabetes ([facility]) 3-3-17    Takes Insulin   • Aspiration pneumonia ([facility]) 3-2016   • ADRIANE (acute kidney injury) ([facility]) 2/24/2016   • Difficult intubation 2-2016   • Pancreatitis 2-2016    \"D/T Tradjenta was hospitialized for 15 days\"   • Elevated liver enzymes 2-2016   • Electrolyte imbalance 2-2016   • DKA (diabetic ketoacidoses) ([facility]) 2-2016    \"10 days on vent with DKA, Renal failure, CHF, elevated liver enzymes and electrolyte imbalance\"   • On mechanically assisted ventilation ([facility]) 2-2016    HX \"On Vent for 10 days at [facility]\".  \"DX Pancreatitis, DKA, CHF, Elevated Liver Enzymes & Electrolyte Imbalance\".   • Congestive heart failure ([facility]) 2-2016     3-3-17 \"Not a current issue\"   • RF (renal failure) 2-2016   • Essential hypertension 1/22/2016   • Sepsis ([facility]) 1/21/2016   • Depression 11/26/2014   • Anesthesia     \"Was difficult to intubate 2-2016\"   • Dental disorder    • Diabetes ([facility])    • Elevated LFTs    • Gout    • Heart burn    • Indigestion    • Kidney stones    • Klebsiella infect    • Migraine    • MRSA cellulitis    • Necrotizing myositis ([facility])    • Streptococcus infection    • Urinary incontinence    • Vitamin D deficiency        Surgical History  Past Surgical History:   Procedure " Laterality Date   • UMBILICAL HERNIA REPAIR N/A 4/15/2018    Procedure: UMBILICAL HERNIA REPAIR;  Surgeon: Karen Beasley M.D.;  Location: SURGERY [facility];  Service: General   • IRRIGATION & DEBRIDEMENT ORTHO Left 12/10/2017    Procedure: IRRIGATION & DEBRIDEMENT ORTHO LEFT HAND;  Surgeon: Chicho Ramos M.D.;  Location: SURGERY [facility];  Service: Orthopedics   • IRRIGATION & DEBRIDEMENT GENERAL Right 5/1/2017    Procedure: IRRIGATION & DEBRIDEMENT GENERAL-Left HAND AND right  ANKLE;  Surgeon: Esau Dooley M.D.;  Location: SURGERY [facility];  Service:    • IRRIGATION & DEBRIDEMENT GENERAL Right 4/29/2017    Procedure: IRRIGATION & DEBRIDEMENT GENERAL;  Surgeon: Esau Dooley M.D.;  Location: SURGERY [facility];  Service:    • INCISION AND DRAINAGE GENERAL  4/7/2017    Procedure: INCISION AND DRAINAGE GENERAL;  Surgeon: Esau Dooley M.D.;  Location: SURGERY SAME DAY [facility];  Service:    • UMBILICAL HERNIA REPAIR  3/10/2017    Procedure: UMBILICAL HERNIA REPAIR- INCISION AND DRAINAGE OF UMBILICAL WOUND AND MESH REMOVAL;  Surgeon: Esau Dooley M.D.;  Location: SURGERY SAME DAY [facility];  Service:    • UMBILICAL HERNIA REPAIR N/A 11/6/2016    Procedure: UMBILICAL HERNIA REPAIR;  Surgeon: Esau Dooley M.D.;  Location: SURGERY [facility];  Service:    • COLONOSCOPY WITH BIOPSY  11/26/2014    Performed by Solo Higginbotham M.D. at ENDOSCOPY [facility]   • LIVE BY LAPAROSCOPY  2005   • OTHER ORTHOPEDIC SURGERY  1997 or 1998    Left Wrist ORIF       Family History  No history of MIs or CVAs    Social History  Social History   Substance Use Topics   • Smoking status: Never Smoker   • Smokeless tobacco: Never Used   • Alcohol use No       Allergies  Allergies   Allergen Reactions   • Peanut (Diagnostic) Anaphylaxis   • Tradjenta [Linagliptin] Unspecified     Pt developed pancreatitis   • Hydrocodone Hives     Tolerates Morphine and oxycodone          Medications  No current facility-administered medications on file prior to encounter.      Current Outpatient Prescriptions on File Prior to Encounter   Medication Sig Dispense Refill   • anastrozole (ARIMIDEX) 1 MG Tab Take 1 mg by mouth every 7 days. In the evening     • insulin glargine (LANTUS) 100 UNIT/ML Solution Inject 38 Units as instructed 2 times a day. 38 units in AM, 30 units in PM     • insulin lispro (HUMALOG) 100 UNIT/ML Solution Inject 2-12 Units as instructed 3 times a day before meals. Sliding Scale -written down  Scale starts at 160     • thyroid (ARMOUR THYROID) 60 MG Tab Take 60 mg by mouth every morning.     • ondansetron (ZOFRAN) 4 MG Tab tablet Take 1 Tab by mouth every 8 hours as needed for Nausea/Vomiting. 10 Each 1   • testosterone cypionate (DEPO-TESTOSTERONE) 200 MG/ML Solution injection 80 mg by Intramuscular route every 7 days.     • buPROPion (WELLBUTRIN XL) 300 MG XL tablet Take 300 mg by mouth every morning.         Physical Exam  Hemodynamics  Temp (24hrs), Av.2 °C (97.2 °F), Min:36.2 °C (97.2 °F), Max:36.2 °C (97.2 °F)   Temperature: 36.2 °C (97.2 °F)  Pulse  Av.2  Min: 75  Max: 120 Heart Rate (Monitored): 97  Blood Pressure: 141/95, NIBP: 122/80      Respiratory      Respiration: 19, Pulse Oximetry: 94 %             Physical Exam   Constitutional: He is oriented to person, place, and time. No distress.   HENT:   Head: Normocephalic and atraumatic.   Right Ear: External ear normal.   Left Ear: External ear normal.   Eyes: EOM are normal. Right eye exhibits no discharge. Left eye exhibits no discharge.   Neck: Neck supple. No JVD present.   Cardiovascular: Normal rate, regular rhythm and normal heart sounds.    Pulmonary/Chest: Effort normal and breath sounds normal. No respiratory distress. He exhibits no tenderness.   Abdominal: Soft. Bowel sounds are normal. He exhibits no distension. There is no tenderness.   Musculoskeletal: He exhibits no edema.   Neurological:  He is alert and oriented to person, place, and time. No cranial nerve deficit.   Skin: Skin is dry. He is not diaphoretic. No erythema.   Psychiatric: He has a normal mood and affect. His behavior is normal.   Nursing note and vitals reviewed.    Capillary refill less than 3 seconds, distal pulses intact    Laboratory:  Recent Labs      12/05/18   1653   WBC  5.5   RBC  4.56*   HEMOGLOBIN  15.7   HEMATOCRIT  42.3   MCV  92.8   MCH  34.4*   MCHC  37.1*   RDW  43.5   PLATELETCT  210   MPV  10.0     Recent Labs      12/05/18   1837  12/05/18   2153   SODIUM  128*  140   POTASSIUM  3.9  4.0   CHLORIDE  102  111   CO2  12*  13*   GLUCOSE  409*  331*   BUN  18 18   CREATININE  1.09  0.88   CALCIUM  9.1  8.7     Recent Labs      12/05/18   1837 12/05/18   2153   ALTSGPT  13   --    ASTSGOT  20   --    ALKPHOSPHAT  76   --    TBILIRUBIN  0.6   --    LIPASE  199*   --    GLUCOSE  409*  331*                 Lab Results   Component Value Date    TROPONINI <0.01 04/18/2017       Imaging  Dx-chest-portable (1 View)    Result Date: 11/14/2018 11/14/2018 11:18 AM HISTORY/REASON FOR EXAM:  Chest pain. Hyperglycemia. TECHNIQUE/EXAM DESCRIPTION AND NUMBER OF VIEWS: Single portable view of the chest. COMPARISON:  5/21/2018 FINDINGS: The cardiac silhouette is within normal limits. No infiltrates or consolidations are noted. No pleural effusion is noted.     No acute cardiac or pulmonary abnormality is noted.    Ir-picc Line Placement    Result Date: 11/14/2018  HISTORY/REASON FOR EXAM:   PICC placement. TECHNIQUE/EXAM DESCRIPTION AND NUMBER OF VIEWS:   PICC line insertion with ultrasound guidance.  The procedure was performed using maximal sterile barrier technique including sterile gown, mask, cap, and donning of sterile gloves following appropriate hand hygiene and/or sterile scrub. Patient skin site was prepped with 2% Chlorhexidine solution. FINDINGS:  PICC line insertion with Ultrasound Guidance was performed by qualified  nursing staff without the assistance of a Radiologist. PICC positioning appropriateness confirmed by 3CG technology; chest xray only needed in the instance 3CG unable to confirm placement.              Ultrasound-guided PICC placement performed by qualified nursing staff as above.         Assessment/Plan:  Anticipate that patient will need greater than 2 midnights for management of the discussed medical issues.    * DKA (diabetic ketoacidoses) (HCC)- (present on admission)   Assessment & Plan    This is mild DKA.  Patient has elevated anion gap, elevated beta hydroxybutyric acid, hypocapnia, and hyponatremia.  Hyponatremia has already corrected with fluids.  I expect the same with his anion gap and hypocapnia.  I will continue fluid boluses for 2 more liters after which point he will have received 4 L total.  I will then transition to maintenance fluids and continue to trend every 4-hour BMPs to ensure correction of his hypocapnia and closure of his anion gap.  Given his elevated lipase and possible underlying pancreatitis, I am keeping him n.p.o. therefore I am holding long-acting insulin for now but will continue to monitor with Accu-Cheks and cover with insulin sliding scale.  Once the patient is able to tolerate a diet, we will transition him to long-acting subcutaneous insulin.     Pseudohyponatremia- (present on admission)   Assessment & Plan    Corrected, continue treatment as noted above.     Elevated lipase- (present on admission)   Assessment & Plan    Patient with history of drug-related pancreatitis, current levels are elevated but nonspecific.  Keep n.p.o. for now for bowel rest, continue symptomatic management for pain.  Correct hyperglycemia and acidosis.  Check lipid panel.     Type 2 diabetes mellitus, with long-term current use of insulin (HCC)- (present on admission)   Assessment & Plan    Patient's HbA1c has been labile in the last several months, repeat today.  Treatment as noted above.      Hypothyroidism- (present on admission)   Assessment & Plan    Okay to continue home [place] Thyroid.         Prophylaxis: Heparin for DVT prophylaxis, no PPI indicated, bowel protocol as needed

## 2018-12-06 NOTE — CARE PLAN
Problem: Fluid Volume:  Goal: Will maintain balanced intake and output    Intervention: Monitor, educate, and encourage compliance with therapeutic intake of liquids  Patient educated on PO fluids and use of IV fluids for hydration, will continue with POC.      Problem: Pain Management  Goal: Ability to identify factors that increase the pain will improve    Intervention: Identify factors that precipitate, worsen or relieve pain or discomfort  Patient encouraged to be involved with POC regarding pain management. Self repositioning, ambulation, and PRN medications in place. Will continue with POC.

## 2018-12-06 NOTE — PROGRESS NOTES
Rounded with Dr. Mendieta. Per Dr. Mendieta to start diet on pt and order 42 units of lantus. Give lantus after pt eats.

## 2018-12-06 NOTE — PROGRESS NOTES
· 2 RN skin check complete.   · Devices in place: Hospital clothing and undergarments.  · Skin assessed under clothing.  · Skin is intact. No open areas noted.   · The following interventions in place: Encouraged to self reposition while in bed, ambulation encouraged during waking hours. Hydration is encouraged.

## 2018-12-06 NOTE — ED NOTES
Per erp second liter of fluid and then draw labs. Pt aware of plan of care and is resting in NAD at this time.

## 2018-12-06 NOTE — ED PROVIDER NOTES
"ED Provider Note    Scribed for Jorge Jenkins M.D. by Panda Mckeon. 12/5/2018, 4:36 PM.    Primary care provider: KRISSY Brunner  Means of arrival: Walk In  History obtained from: Patient  History limited by: None     CHIEF COMPLAINT  Chief Complaint   Patient presents with   • Abdominal Pain   • High Blood Sugar     HPI  Mahesh Bradshaw is a 53 y.o. male who presents to the Emergency Department complaining of abdominal pain.   The patient has a past medical history of ulcerative colitis, and complains of an onset of abdominal pain last night. His abdominal pain is localized to his suprapubic region, which has been constant since last night. He describes his pain as feeling like \"I need to have a bowel movement\", and rates his current pain as 6/10 in severity. He states his abdominal pain is slightly worse after eating. He denies any alleviating factors.   The patient experienced 4 episodes of vomiting this morning. He states he had an onset of nausea last night after eating dinner. The patient has not tolerated any food or fluid PO since 9 AM this morning secondary to his vomiting.   The patient has a history of Diabetes, and states his sugars have been high today. The patient last dose of Insulin was 10 units at 3 PM. He reports he has taken Total of 38 units Insulin regular, and 42 units long acting Insulin today. Patient's FSBS in triage was 565.        REVIEW OF SYSTEMS  Review of Systems   Constitutional: Negative for chills and fever.   Eyes: Negative for blurred vision and double vision.   Respiratory: Negative for shortness of breath.    Cardiovascular: Negative for chest pain.   Gastrointestinal: Positive for abdominal pain, nausea and vomiting.   Neurological: Negative for dizziness, sensory change, focal weakness and headaches.   All other systems reviewed and are negative.      PAST MEDICAL HISTORY   has a past medical history of ADRIANE (acute kidney injury) (HCC) (2/24/2016); Anesthesia; " Arthritis (3-3-17); Aspiration pneumonia ([facility]) (3-2016); ASTHMA (3-3-17); Breath shortness (3-3-17); Congestive heart failure ([facility]) (2-2016 ); Dental disorder; Depression (11/26/2014); Diabetes ([facility]) (3-3-17); Diabetes ([facility]); Difficult intubation (2-2016); DKA (diabetic ketoacidoses) ([facility]) (2-2016); Electrolyte imbalance (2-2016); Elevated LFTs; Elevated liver enzymes (2-2016); Essential hypertension (1/22/2016); Gout; Heart burn; High cholesterol (3-3-17); Hypothyroid (3-3-17); Indigestion; Kidney stones; Klebsiella infect; Migraine; MRSA cellulitis; Necrotizing myositis ([facility]); On mechanically assisted ventilation ([facility]) (2-2016); Pain (3-3-17); Pancreatitis (2-2016); RF (renal failure) (2-2016); Sepsis ([facility]) (1/21/2016); Snoring (3-3-17); Streptococcus infection; UC (ulcerative colitis) ([facility]) (3-3-17); Urinary incontinence; and Vitamin D deficiency.    SURGICAL HISTORY   has a past surgical history that includes vaishnavi by laparoscopy (2005); colonoscopy with biopsy (11/26/2014); incision and drainage general (4/7/2017); irrigation & debridement general (Right, 4/29/2017); irrigation & debridement general (Right, 5/1/2017); other orthopedic surgery (1997 or 1998); umbilical hernia repair (N/A, 11/6/2016); umbilical hernia repair (3/10/2017); irrigation & debridement ortho (Left, 12/10/2017); and umbilical hernia repair (N/A, 4/15/2018).    SOCIAL HISTORY  Social History   Substance Use Topics   • Smoking status: Never Smoker   • Smokeless tobacco: Never Used   • Alcohol use No      History   Drug Use No     FAMILY HISTORY  No pertinent history.     CURRENT MEDICATIONS  Home Medications    **Home medications have not yet been reviewed for this encounter**       ALLERGIES  Allergies   Allergen Reactions   • Peanut (Diagnostic) Anaphylaxis   • Tradjenta [Linagliptin] Unspecified     Pt developed pancreatitis   • Hydrocodone Hives     Tolerates Morphine and oxycodone       PHYSICAL EXAM  VITAL SIGNS: /95   Pulse  "(!) 120   Temp 36.2 °C (97.2 °F) (Temporal)   Resp 18   Ht 1.727 m (5' 8\")   Wt 94 kg (207 lb 3.7 oz)   SpO2 95%   BMI 31.51 kg/m²     Constitutional: Mild distress  HENT: Dry mucous membranes  Eyes: No conjunctivitis or icterus  Neck: trachea is midline, no palpable thyroid  Lymphatic:  No cervical lymphadenopathy  Cardiovascular: Tachycardic rate, no murmurs  Thorax & Lungs:  Normal breath sounds, no rhonchi  Abdomen:  Soft, Suprapubic abdominal discomfort. No rebound or guarding.   Skin:.  no rash  Back:  Non-tender, no CVA tenderness  Extremities:   no edema  Vascular:  symmetric radial pulse  Neurologic:  Normal gross motor    LABS  Labs Reviewed   CBC WITH DIFFERENTIAL - Abnormal; Notable for the following:        Result Value    RBC 4.56 (*)     MCH 34.4 (*)     MCHC 37.1 (*)     Immature Granulocytes 1.60 (*)     All other components within normal limits   LACTIC ACID   COMP METABOLIC PANEL   LIPASE   URINALYSIS,CULTURE IF INDICATED     All labs reviewed by me.    COURSE & MEDICAL DECISION MAKING  Pertinent Labs & Imaging studies reviewed. (See chart for details)    4:36 PM - Patient seen and examined at bedside. Patient will be treated with Morphine 4 mg IV, Reglan 10 mg IV. Ordered Lipase, urinalysis, lactic acid, CBC, CMP to evaluate his symptoms. The differential diagnoses include but are not limited to: hyperglycemia, rule out DKA.    The patient was resuscitated with IV fluids for dehydration, hyperglycemia, tachycardia. Hydration with PO fluids is contraindicated secondary to vomiting.     6:40 PM Recheck: Patient re-evaluated at beside. The patient describes his pain is moderately improved. Patient with improved tachycardia with positive fluid response.       Medical Decision Making:   Patient was dehydrated was given IV fluid with improvement of his heart rate in his hydration status.  He was given morphine and antinausea no further vomiting pain is controlled well.        FINAL IMPRESSION  1. " Non-intractable vomiting with nausea, unspecified vomiting type    2. Abdominal pain, unspecified abdominal location    3. Hyperglycemia          IPanda (Scribe), am scribing for, and in the presence of, Jorge Jenkins M.D..    Electronically signed by: Panda Mckeon (Scribe), 12/5/2018    Jorge EPSTEIN M.D. personally performed the services described in this documentation, as scribed by Panda Mckeon in my presence, and it is both accurate and complete.    The note accurately reflects work and decisions made by me.  Jorge Jenkins  12/6/2018  5:23 PM

## 2018-12-06 NOTE — PROGRESS NOTES
"Pt A&Ox4. VS: /79   Pulse 99   Temp 36.4 °C (97.6 °F) (Temporal)   Resp 18   Ht 1.727 m (5' 8\")   Wt 93.8 kg (206 lb 12.7 oz)   SpO2 97%   BMI 31.44 kg/m² . Pt denies n/v, SOB and chest pain. Pt states pain 6/10, prn pain medication administered per MAR. Pt states baseline numbness and tingling in his left hand. Pt needs met at this time, call light within reach, hourly rounding in effect, and will continue to monitor.       "

## 2018-12-06 NOTE — PROGRESS NOTES
Patient is a new admission to the floor on shift. Alert and oriented, cooperative with all care thus far. No pain at this time, patient has expressed self repositioning and PRN medications given in ED are managing pain.  this AM, insulin given as appropriate. Continues on 2L NC. NPO with meals but is able to have water and ice. IV fluids continue as ordered. Call light within reach, will continue to monitor and prepare report for next shift.

## 2018-12-06 NOTE — RESPIRATORY CARE
COPD EDUCATION by COPD CLINICAL EDUCATOR  12/6/2018 at 6:08 AM by Jacey Blackwell     Patient reviewed by COPD education team. Patient does not qualify for COPD program.

## 2018-12-06 NOTE — ASSESSMENT & PLAN NOTE
Corrected, continue treatment as noted above.  
Okay to continue home [place] Thyroid.  
Patient with history of drug-related pancreatitis, current levels are elevated but nonspecific.  Keep n.p.o. for now for bowel rest, continue symptomatic management for pain.  Correct hyperglycemia and acidosis.  
Patient's HbA1c has been labile in the last several months, repeat today.  Treatment as noted above.  
This is mild DKA.  Patient has elevated anion gap, elevated beta hydroxybutyric acid, hypocapnia, and hyponatremia.  Hyponatremia has already corrected with fluids.  I expect the same with his anion gap and hypocapnia.  I will continue fluid boluses for 2 more liters after which point he will have received 4 L total.  I will then transition to maintenance fluids and continue to trend every 4-hour BMPs to ensure correction of his hypocapnia and closure of his anion gap.  Given his elevated lipase and possible underlying pancreatitis, I am keeping him n.p.o. therefore I am holding long-acting insulin for now but will continue to monitor with Accu-Cheks and cover with insulin sliding scale.  Once the patient is able to tolerate a diet, we will transition him to long-acting subcutaneous insulin.  
897[address] Detailed

## 2018-12-06 NOTE — DISCHARGE INSTRUCTIONS
Discharge Instructions    Discharged to home by car with relative. Discharged via wheelchair, hospital escort: Yes.  Special equipment needed: Not Applicable    Be sure to schedule a follow-up appointment with your primary care doctor or any specialists as instructed.     Discharge Plan:   Diet Plan: Discussed  Activity Level: Discussed  Confirmed Follow up Appointment: Appointment Scheduled  Confirmed Symptoms Management: Discussed  Medication Reconciliation Updated: Yes  Influenza Vaccine Indication: Not indicated: Previously immunized this influenza season and > 8 years of age    I understand that a diet low in cholesterol, fat, and sodium is recommended for good health. Unless I have been given specific instructions below for another diet, I accept this instruction as my diet prescription.   Other diet: Diabetic diet    Special Instructions: None    · Is patient discharged on Warfarin / Coumadin?   No     Depression / Suicide Risk    As you are discharged from this [facility] facility, it is important to learn how to keep safe from harming yourself.    Recognize the warning signs:  · Abrupt changes in personality, positive or negative- including increase in energy   · Giving away possessions  · Change in eating patterns- significant weight changes-  positive or negative  · Change in sleeping patterns- unable to sleep or sleeping all the time   · Unwillingness or inability to communicate  · Depression  · Unusual sadness, discouragement and loneliness  · Talk of wanting to die  · Neglect of personal appearance   · Rebelliousness- reckless behavior  · Withdrawal from people/activities they love  · Confusion- inability to concentrate     If you or a loved one observes any of these behaviors or has concerns about self-harm, here's what you can do:  · Talk about it- your feelings and reasons for harming yourself  · Remove any means that you might use to hurt yourself (examples: pills, rope, extension cords,  firearm)  · Get professional help from the community (Mental Health, Substance Abuse, psychological counseling)  · Do not be alone:Call your Safe Contact- someone whom you trust who will be there for you.  · Call your local CRISIS HOTLINE 322-4931 or 071-898-0961  · Call your local Children's Mobile Crisis Response Team [place] (182) 396-9342 or www.Skillset  · Call the toll free National Suicide Prevention Hotlines   · National Suicide Prevention Lifeline 479-403-RDTR (2407)  · National Hope Line Network 800-SUICIDE (268-3467)

## 2018-12-07 NOTE — DISCHARGE SUMMARY
Discharge Summary    CHIEF COMPLAINT ON ADMISSION  Chief Complaint   Patient presents with   • Abdominal Pain   • High Blood Sugar       Reason for Admission  Abd Pain     Admission Date  12/5/2018    CODE STATUS  Prior    HPI & HOSPITAL COURSE  This is a 53 y.o. male here with abdominal pain, nausea and vomiting with dehydration and elevated blood sugars.  He was recently hospitalized with DKA related to URI and presented again earlier this time as he felt he was on his way to that issue again.  His chronic colitis has a flare per patient and he believes this is the reason his sugars were out of control again.  He was admitted and started on IV hydration and q six hour accuchecks with SSI coverage.  His gap closed quickly and nausea resolved.  He was given a meal which he tolerated well and resumed on his Lantus dose that he normally takes.  He felt well enough to be discharged home and requested to do so.  Work release note given to patient and he is in the process of establishing with endocrinology.        Therefore, he is discharged in good and stable condition to home with close outpatient follow-up.    The patient recovered much more quickly than anticipated on admission.    Discharge Date  12/6/2018    FOLLOW UP ITEMS POST DISCHARGE  PCP - Beth 1-2 weeks  Endocrinology - Dr Hassan - 1-2 weeks    DISCHARGE DIAGNOSES  Principal Problem:    DKA (diabetic ketoacidoses) (HCC) POA: Yes  Active Problems:    Elevated lipase POA: Yes    Pseudohyponatremia POA: Yes    Hypothyroidism POA: Yes      Overview: Chronic condition treated with [place] [facility].      Resumed maintenance medication.    Type 2 diabetes mellitus, with long-term current use of insulin (HCC) POA: Yes      Overview: Chronic condition treated with Humalog and Lantus.      Lantus resumed. Sliding scale insulin.       HbA1C - 9.9 (237)  Resolved Problems:    * No resolved hospital problems. *      FOLLOW UP  No future appointments.  Shira GARCIA  KRISSY Campos  [address]  [place] [address]  [phone]    Go on 12/10/2018  PLEASE ARRIVE AT 4:00PM FOR YOUR 4:15PM APPOINTMENT. THANK YOU      MEDICATIONS ON DISCHARGE     Medication List      CONTINUE taking these medications      Instructions   anastrozole 1 MG Tabs  Commonly known as:  ARIMIDEX   Take 1 mg by mouth every 7 days.  Dose:  1 mg     buPROPion 300 MG XL tablet  Commonly known as:  WELLBUTRIN XL   Take 300 mg by mouth every morning.  Dose:  300 mg     insulin glargine 100 UNIT/ML Soln  Commonly known as:  LANTUS   Inject 42 Units as instructed 2 times a day. 42 units in AM, 34 units in PM  Dose:  42 Units     insulin lispro 100 UNIT/ML Soln  Commonly known as:  HUMALOG   Inject 2-12 Units as instructed 3 times a day before meals. Sliding Scale -written down Scale starts at 160  Dose:  2-12 Units     ondansetron 4 MG Tabs tablet  Commonly known as:  ZOFRAN   Take 1 Tab by mouth every 8 hours as needed for Nausea/Vomiting.  Dose:  4 mg     testosterone cypionate 200 MG/ML Soln injection  Commonly known as:  DEPO-TESTOSTERONE   80 mg by Intramuscular route every 7 days.  Dose:  80 mg     thyroid 60 MG Tabs  Commonly known as:  ARMOUR THYROID   Take 60 mg by mouth every morning.  Dose:  60 mg            Allergies  Allergies   Allergen Reactions   • Peanut (Diagnostic) Anaphylaxis   • Tradjenta [Linagliptin] Unspecified     Pt developed pancreatitis   • Hydrocodone Hives     Tolerates Morphine and oxycodone         DIET  No orders of the defined types were placed in this encounter.      ACTIVITY  As tolerated.  Weight bearing as tolerated    CONSULTATIONS  none    PROCEDURES  none    LABORATORY  Lab Results   Component Value Date    SODIUM 137 12/06/2018    POTASSIUM 3.5 (L) 12/06/2018    CHLORIDE 109 12/06/2018    CO2 18 (L) 12/06/2018    GLUCOSE 209 (H) 12/06/2018    BUN 11 12/06/2018    CREATININE 0.59 12/06/2018    CREATININE 1.1 02/18/2008        Lab Results   Component Value Date    WBC  5.5 12/05/2018    HEMOGLOBIN 15.7 12/05/2018    HEMATOCRIT 42.3 12/05/2018    PLATELETCT 210 12/05/2018        Total time of the discharge process exceeds 34 minutes.

## 2018-12-07 NOTE — PROGRESS NOTES
Discharge paperwork discussed with pt. All questions answered. PIV d/c-ed. Pt opted to walk off unit to ride. Pt does ambulate with a steady gait.

## 2018-12-20 ENCOUNTER — APPOINTMENT (OUTPATIENT)
Dept: RADIOLOGY | Facility: MEDICAL CENTER | Age: 53
DRG: 639 | End: 2018-12-20
Attending: EMERGENCY MEDICINE
Payer: COMMERCIAL

## 2018-12-20 ENCOUNTER — HOSPITAL ENCOUNTER (INPATIENT)
Facility: MEDICAL CENTER | Age: 53
LOS: 1 days | DRG: 639 | End: 2018-12-21
Attending: EMERGENCY MEDICINE | Admitting: INTERNAL MEDICINE
Payer: COMMERCIAL

## 2018-12-20 DIAGNOSIS — R11.2 NAUSEA AND VOMITING, INTRACTABILITY OF VOMITING NOT SPECIFIED, UNSPECIFIED VOMITING TYPE: ICD-10-CM

## 2018-12-20 DIAGNOSIS — E13.10 DIABETIC KETOACIDOSIS WITHOUT COMA ASSOCIATED WITH OTHER SPECIFIED DIABETES MELLITUS (HCC): ICD-10-CM

## 2018-12-20 DIAGNOSIS — R10.13 EPIGASTRIC PAIN: ICD-10-CM

## 2018-12-20 PROBLEM — R10.12 LUQ PAIN: Status: ACTIVE | Noted: 2018-12-20

## 2018-12-20 PROBLEM — R10.9 ABDOMINAL CRAMPS: Status: ACTIVE | Noted: 2018-12-20

## 2018-12-20 LAB
ALBUMIN SERPL BCP-MCNC: 3.8 G/DL (ref 3.2–4.9)
ALBUMIN SERPL BCP-MCNC: 4 G/DL (ref 3.2–4.9)
ALBUMIN/GLOB SERPL: 1.5 G/DL
ALBUMIN/GLOB SERPL: 1.6 G/DL
ALP SERPL-CCNC: 72 U/L (ref 30–99)
ALP SERPL-CCNC: 77 U/L (ref 30–99)
ALT SERPL-CCNC: 14 U/L (ref 2–50)
ALT SERPL-CCNC: 16 U/L (ref 2–50)
ANION GAP SERPL CALC-SCNC: 10 MMOL/L (ref 0–11.9)
ANION GAP SERPL CALC-SCNC: 13 MMOL/L (ref 0–11.9)
APPEARANCE UR: CLEAR
AST SERPL-CCNC: 15 U/L (ref 12–45)
AST SERPL-CCNC: 20 U/L (ref 12–45)
B-OH-BUTYR SERPL-MCNC: 1.36 MMOL/L (ref 0.02–0.27)
BASE EXCESS BLDV CALC-SCNC: -4 MMOL/L
BASOPHILS # BLD AUTO: 0.4 % (ref 0–1.8)
BASOPHILS # BLD: 0.02 K/UL (ref 0–0.12)
BILIRUB SERPL-MCNC: 0.4 MG/DL (ref 0.1–1.5)
BILIRUB SERPL-MCNC: 0.6 MG/DL (ref 0.1–1.5)
BILIRUB UR QL STRIP.AUTO: NEGATIVE
BODY TEMPERATURE: ABNORMAL CENTIGRADE
BUN SERPL-MCNC: 17 MG/DL (ref 8–22)
BUN SERPL-MCNC: 18 MG/DL (ref 8–22)
CALCIUM SERPL-MCNC: 8.4 MG/DL (ref 8.5–10.5)
CALCIUM SERPL-MCNC: 9.3 MG/DL (ref 8.5–10.5)
CHLORIDE SERPL-SCNC: 102 MMOL/L (ref 96–112)
CHLORIDE SERPL-SCNC: 94 MMOL/L (ref 96–112)
CO2 SERPL-SCNC: 17 MMOL/L (ref 20–33)
CO2 SERPL-SCNC: 20 MMOL/L (ref 20–33)
COLOR UR: YELLOW
CREAT SERPL-MCNC: 0.83 MG/DL (ref 0.5–1.4)
CREAT SERPL-MCNC: 1.08 MG/DL (ref 0.5–1.4)
EOSINOPHIL # BLD AUTO: 0.02 K/UL (ref 0–0.51)
EOSINOPHIL NFR BLD: 0.4 % (ref 0–6.9)
ERYTHROCYTE [DISTWIDTH] IN BLOOD BY AUTOMATED COUNT: 42.8 FL (ref 35.9–50)
GLOBULIN SER CALC-MCNC: 2.4 G/DL (ref 1.9–3.5)
GLOBULIN SER CALC-MCNC: 2.7 G/DL (ref 1.9–3.5)
GLUCOSE BLD-MCNC: 357 MG/DL (ref 65–99)
GLUCOSE BLD-MCNC: 443 MG/DL (ref 65–99)
GLUCOSE BLD-MCNC: 600 MG/DL (ref 65–99)
GLUCOSE SERPL-MCNC: 450 MG/DL (ref 65–99)
GLUCOSE SERPL-MCNC: 706 MG/DL (ref 65–99)
GLUCOSE UR STRIP.AUTO-MCNC: >=1000 MG/DL
HCO3 BLDV-SCNC: 19 MMOL/L (ref 24–28)
HCT VFR BLD AUTO: 38.5 % (ref 42–52)
HGB BLD-MCNC: 14.1 G/DL (ref 14–18)
IMM GRANULOCYTES # BLD AUTO: 0.06 K/UL (ref 0–0.11)
IMM GRANULOCYTES NFR BLD AUTO: 1.1 % (ref 0–0.9)
KETONES UR STRIP.AUTO-MCNC: 15 MG/DL
LEUKOCYTE ESTERASE UR QL STRIP.AUTO: NEGATIVE
LIPASE SERPL-CCNC: 37 U/L (ref 11–82)
LYMPHOCYTES # BLD AUTO: 0.97 K/UL (ref 1–4.8)
LYMPHOCYTES NFR BLD: 17.2 % (ref 22–41)
MAGNESIUM SERPL-MCNC: 1.8 MG/DL (ref 1.5–2.5)
MCH RBC QN AUTO: 32.9 PG (ref 27–33)
MCHC RBC AUTO-ENTMCNC: 36.6 G/DL (ref 33.7–35.3)
MCV RBC AUTO: 90 FL (ref 81.4–97.8)
MICRO URNS: ABNORMAL
MONOCYTES # BLD AUTO: 0.26 K/UL (ref 0–0.85)
MONOCYTES NFR BLD AUTO: 4.6 % (ref 0–13.4)
NEUTROPHILS # BLD AUTO: 4.3 K/UL (ref 1.82–7.42)
NEUTROPHILS NFR BLD: 76.3 % (ref 44–72)
NITRITE UR QL STRIP.AUTO: NEGATIVE
NRBC # BLD AUTO: 0 K/UL
NRBC BLD-RTO: 0 /100 WBC
PCO2 BLDV: 31.2 MMHG (ref 41–51)
PH BLDV: 7.41 [PH] (ref 7.31–7.45)
PH UR STRIP.AUTO: 5 [PH]
PHOSPHATE SERPL-MCNC: 3.1 MG/DL (ref 2.5–4.5)
PLATELET # BLD AUTO: 181 K/UL (ref 164–446)
PMV BLD AUTO: 9.7 FL (ref 9–12.9)
PO2 BLDV: 56.2 MMHG (ref 25–40)
POTASSIUM SERPL-SCNC: 4 MMOL/L (ref 3.6–5.5)
POTASSIUM SERPL-SCNC: 4.9 MMOL/L (ref 3.6–5.5)
PROT SERPL-MCNC: 6.2 G/DL (ref 6–8.2)
PROT SERPL-MCNC: 6.7 G/DL (ref 6–8.2)
PROT UR QL STRIP: NEGATIVE MG/DL
RBC # BLD AUTO: 4.28 M/UL (ref 4.7–6.1)
RBC UR QL AUTO: NEGATIVE
SAO2 % BLDV: 89.7 %
SODIUM SERPL-SCNC: 124 MMOL/L (ref 135–145)
SODIUM SERPL-SCNC: 132 MMOL/L (ref 135–145)
SP GR UR STRIP.AUTO: 1.03
UROBILINOGEN UR STRIP.AUTO-MCNC: 0.2 MG/DL
WBC # BLD AUTO: 5.6 K/UL (ref 4.8–10.8)

## 2018-12-20 PROCEDURE — 84100 ASSAY OF PHOSPHORUS: CPT

## 2018-12-20 PROCEDURE — 99285 EMERGENCY DEPT VISIT HI MDM: CPT

## 2018-12-20 PROCEDURE — 85025 COMPLETE CBC W/AUTO DIFF WBC: CPT

## 2018-12-20 PROCEDURE — 82803 BLOOD GASES ANY COMBINATION: CPT

## 2018-12-20 PROCEDURE — 700105 HCHG RX REV CODE 258: Performed by: EMERGENCY MEDICINE

## 2018-12-20 PROCEDURE — 82962 GLUCOSE BLOOD TEST: CPT | Mod: 91

## 2018-12-20 PROCEDURE — 83690 ASSAY OF LIPASE: CPT

## 2018-12-20 PROCEDURE — 700111 HCHG RX REV CODE 636 W/ 250 OVERRIDE (IP): Performed by: EMERGENCY MEDICINE

## 2018-12-20 PROCEDURE — 700117 HCHG RX CONTRAST REV CODE 255: Performed by: EMERGENCY MEDICINE

## 2018-12-20 PROCEDURE — 81003 URINALYSIS AUTO W/O SCOPE: CPT

## 2018-12-20 PROCEDURE — 99223 1ST HOSP IP/OBS HIGH 75: CPT | Performed by: INTERNAL MEDICINE

## 2018-12-20 PROCEDURE — 700102 HCHG RX REV CODE 250 W/ 637 OVERRIDE(OP): Performed by: INTERNAL MEDICINE

## 2018-12-20 PROCEDURE — 700105 HCHG RX REV CODE 258: Performed by: INTERNAL MEDICINE

## 2018-12-20 PROCEDURE — 96374 THER/PROPH/DIAG INJ IV PUSH: CPT

## 2018-12-20 PROCEDURE — 71045 X-RAY EXAM CHEST 1 VIEW: CPT

## 2018-12-20 PROCEDURE — 80053 COMPREHEN METABOLIC PANEL: CPT

## 2018-12-20 PROCEDURE — 96375 TX/PRO/DX INJ NEW DRUG ADDON: CPT

## 2018-12-20 PROCEDURE — 36415 COLL VENOUS BLD VENIPUNCTURE: CPT

## 2018-12-20 PROCEDURE — 96376 TX/PRO/DX INJ SAME DRUG ADON: CPT

## 2018-12-20 PROCEDURE — 74177 CT ABD & PELVIS W/CONTRAST: CPT

## 2018-12-20 PROCEDURE — 83735 ASSAY OF MAGNESIUM: CPT

## 2018-12-20 PROCEDURE — 770020 HCHG ROOM/CARE - TELE (206)

## 2018-12-20 PROCEDURE — 82010 KETONE BODYS QUAN: CPT

## 2018-12-20 PROCEDURE — 96372 THER/PROPH/DIAG INJ SC/IM: CPT

## 2018-12-20 RX ORDER — INSULIN GLARGINE 100 [IU]/ML
42 INJECTION, SOLUTION SUBCUTANEOUS
Status: DISCONTINUED | OUTPATIENT
Start: 2018-12-21 | End: 2018-12-21 | Stop reason: HOSPADM

## 2018-12-20 RX ORDER — ALUMINA, MAGNESIA, AND SIMETHICONE 2400; 2400; 240 MG/30ML; MG/30ML; MG/30ML
10 SUSPENSION ORAL 4 TIMES DAILY PRN
Status: DISCONTINUED | OUTPATIENT
Start: 2018-12-20 | End: 2018-12-21 | Stop reason: HOSPADM

## 2018-12-20 RX ORDER — OXYCODONE HYDROCHLORIDE 5 MG/1
5 TABLET ORAL EVERY 4 HOURS PRN
Status: DISCONTINUED | OUTPATIENT
Start: 2018-12-20 | End: 2018-12-21 | Stop reason: HOSPADM

## 2018-12-20 RX ORDER — DEXTROSE AND SODIUM CHLORIDE 10; .45 G/100ML; G/100ML
INJECTION, SOLUTION INTRAVENOUS CONTINUOUS
Status: DISCONTINUED | OUTPATIENT
Start: 2018-12-20 | End: 2018-12-20

## 2018-12-20 RX ORDER — MORPHINE SULFATE 10 MG/ML
4 INJECTION, SOLUTION INTRAMUSCULAR; INTRAVENOUS ONCE
Status: COMPLETED | OUTPATIENT
Start: 2018-12-20 | End: 2018-12-20

## 2018-12-20 RX ORDER — DEXTROSE MONOHYDRATE 25 G/50ML
25 INJECTION, SOLUTION INTRAVENOUS
Status: DISCONTINUED | OUTPATIENT
Start: 2018-12-20 | End: 2018-12-21 | Stop reason: HOSPADM

## 2018-12-20 RX ORDER — PROMETHAZINE HYDROCHLORIDE 25 MG/1
12.5-25 TABLET ORAL EVERY 4 HOURS PRN
Status: DISCONTINUED | OUTPATIENT
Start: 2018-12-20 | End: 2018-12-21 | Stop reason: HOSPADM

## 2018-12-20 RX ORDER — ONDANSETRON 4 MG/1
4 TABLET, ORALLY DISINTEGRATING ORAL EVERY 4 HOURS PRN
Status: DISCONTINUED | OUTPATIENT
Start: 2018-12-20 | End: 2018-12-21 | Stop reason: HOSPADM

## 2018-12-20 RX ORDER — DEXTROSE AND SODIUM CHLORIDE 5; .9 G/100ML; G/100ML
INJECTION, SOLUTION INTRAVENOUS CONTINUOUS
Status: DISCONTINUED | OUTPATIENT
Start: 2018-12-20 | End: 2018-12-20

## 2018-12-20 RX ORDER — PROMETHAZINE HYDROCHLORIDE 25 MG/1
12.5-25 SUPPOSITORY RECTAL EVERY 4 HOURS PRN
Status: DISCONTINUED | OUTPATIENT
Start: 2018-12-20 | End: 2018-12-21 | Stop reason: HOSPADM

## 2018-12-20 RX ORDER — INSULIN GLARGINE 100 [IU]/ML
34-42 INJECTION, SOLUTION SUBCUTANEOUS 2 TIMES DAILY
Status: DISCONTINUED | OUTPATIENT
Start: 2018-12-20 | End: 2018-12-20

## 2018-12-20 RX ORDER — ONDANSETRON 2 MG/ML
4 INJECTION INTRAMUSCULAR; INTRAVENOUS EVERY 4 HOURS PRN
Status: DISCONTINUED | OUTPATIENT
Start: 2018-12-20 | End: 2018-12-21 | Stop reason: HOSPADM

## 2018-12-20 RX ORDER — BISACODYL 10 MG
10 SUPPOSITORY, RECTAL RECTAL
Status: DISCONTINUED | OUTPATIENT
Start: 2018-12-20 | End: 2018-12-21 | Stop reason: HOSPADM

## 2018-12-20 RX ORDER — SODIUM CHLORIDE 9 MG/ML
INJECTION, SOLUTION INTRAVENOUS CONTINUOUS
Status: DISCONTINUED | OUTPATIENT
Start: 2018-12-20 | End: 2018-12-20

## 2018-12-20 RX ORDER — SODIUM CHLORIDE 9 MG/ML
1000 INJECTION, SOLUTION INTRAVENOUS CONTINUOUS
Status: ACTIVE | OUTPATIENT
Start: 2018-12-20 | End: 2018-12-20

## 2018-12-20 RX ORDER — THYROID 30 MG/1
60 TABLET ORAL EVERY MORNING
Status: DISCONTINUED | OUTPATIENT
Start: 2018-12-21 | End: 2018-12-21 | Stop reason: HOSPADM

## 2018-12-20 RX ORDER — SODIUM CHLORIDE 9 MG/ML
1000 INJECTION, SOLUTION INTRAVENOUS ONCE
Status: DISCONTINUED | OUTPATIENT
Start: 2018-12-20 | End: 2018-12-20

## 2018-12-20 RX ORDER — BUPROPION HYDROCHLORIDE 150 MG/1
150 TABLET, EXTENDED RELEASE ORAL 2 TIMES DAILY
Status: DISCONTINUED | OUTPATIENT
Start: 2018-12-21 | End: 2018-12-21 | Stop reason: HOSPADM

## 2018-12-20 RX ORDER — ANASTROZOLE 1 MG/1
1 TABLET ORAL
Status: DISCONTINUED | OUTPATIENT
Start: 2018-12-26 | End: 2018-12-21 | Stop reason: HOSPADM

## 2018-12-20 RX ORDER — SODIUM CHLORIDE 9 MG/ML
INJECTION, SOLUTION INTRAVENOUS CONTINUOUS
Status: DISCONTINUED | OUTPATIENT
Start: 2018-12-20 | End: 2018-12-21 | Stop reason: HOSPADM

## 2018-12-20 RX ORDER — SODIUM CHLORIDE 9 MG/ML
2000 INJECTION, SOLUTION INTRAVENOUS ONCE
Status: DISCONTINUED | OUTPATIENT
Start: 2018-12-20 | End: 2018-12-20

## 2018-12-20 RX ORDER — POLYETHYLENE GLYCOL 3350 17 G/17G
1 POWDER, FOR SOLUTION ORAL
Status: DISCONTINUED | OUTPATIENT
Start: 2018-12-20 | End: 2018-12-21 | Stop reason: HOSPADM

## 2018-12-20 RX ORDER — AMOXICILLIN 250 MG
2 CAPSULE ORAL 2 TIMES DAILY
Status: DISCONTINUED | OUTPATIENT
Start: 2018-12-20 | End: 2018-12-21 | Stop reason: HOSPADM

## 2018-12-20 RX ORDER — INSULIN GLARGINE 100 [IU]/ML
34 INJECTION, SOLUTION SUBCUTANEOUS ONCE
Status: COMPLETED | OUTPATIENT
Start: 2018-12-20 | End: 2018-12-20

## 2018-12-20 RX ORDER — MAGNESIUM SULFATE HEPTAHYDRATE 40 MG/ML
2 INJECTION, SOLUTION INTRAVENOUS
Status: DISCONTINUED | OUTPATIENT
Start: 2018-12-20 | End: 2018-12-20

## 2018-12-20 RX ORDER — ONDANSETRON 2 MG/ML
4 INJECTION INTRAMUSCULAR; INTRAVENOUS ONCE
Status: COMPLETED | OUTPATIENT
Start: 2018-12-20 | End: 2018-12-20

## 2018-12-20 RX ORDER — INSULIN GLARGINE 100 [IU]/ML
34 INJECTION, SOLUTION SUBCUTANEOUS EVERY EVENING
Status: DISCONTINUED | OUTPATIENT
Start: 2018-12-21 | End: 2018-12-21 | Stop reason: HOSPADM

## 2018-12-20 RX ORDER — MAGNESIUM SULFATE HEPTAHYDRATE 40 MG/ML
4 INJECTION, SOLUTION INTRAVENOUS
Status: DISCONTINUED | OUTPATIENT
Start: 2018-12-20 | End: 2018-12-20

## 2018-12-20 RX ORDER — SODIUM CHLORIDE, SODIUM LACTATE, POTASSIUM CHLORIDE, AND CALCIUM CHLORIDE .6; .31; .03; .02 G/100ML; G/100ML; G/100ML; G/100ML
2000 INJECTION, SOLUTION INTRAVENOUS ONCE
Status: DISCONTINUED | OUTPATIENT
Start: 2018-12-20 | End: 2018-12-20

## 2018-12-20 RX ORDER — SODIUM CHLORIDE 9 MG/ML
1000 INJECTION, SOLUTION INTRAVENOUS ONCE
Status: COMPLETED | OUTPATIENT
Start: 2018-12-20 | End: 2018-12-20

## 2018-12-20 RX ADMIN — INSULIN HUMAN 12 UNITS: 100 INJECTION, SOLUTION PARENTERAL at 22:11

## 2018-12-20 RX ADMIN — SODIUM CHLORIDE 1000 ML: 9 INJECTION, SOLUTION INTRAVENOUS at 15:05

## 2018-12-20 RX ADMIN — IOHEXOL 100 ML: 350 INJECTION, SOLUTION INTRAVENOUS at 16:39

## 2018-12-20 RX ADMIN — MORPHINE SULFATE 4 MG: 10 INJECTION INTRAVENOUS at 16:54

## 2018-12-20 RX ADMIN — INSULIN GLARGINE 34 UNITS: 100 INJECTION, SOLUTION SUBCUTANEOUS at 19:55

## 2018-12-20 RX ADMIN — SODIUM CHLORIDE 1000 ML: 9 INJECTION, SOLUTION INTRAVENOUS at 16:04

## 2018-12-20 RX ADMIN — ONDANSETRON 4 MG: 2 INJECTION INTRAMUSCULAR; INTRAVENOUS at 15:08

## 2018-12-20 RX ADMIN — MORPHINE SULFATE 4 MG: 10 INJECTION INTRAVENOUS at 15:08

## 2018-12-20 RX ADMIN — SODIUM CHLORIDE: 9 INJECTION, SOLUTION INTRAVENOUS at 19:15

## 2018-12-20 ASSESSMENT — COGNITIVE AND FUNCTIONAL STATUS - GENERAL
MOBILITY SCORE: 24
DAILY ACTIVITIY SCORE: 24
SUGGESTED CMS G CODE MODIFIER MOBILITY: CH
SUGGESTED CMS G CODE MODIFIER DAILY ACTIVITY: CH

## 2018-12-20 ASSESSMENT — PAIN SCALES - GENERAL
PAINLEVEL_OUTOF10: 8
PAINLEVEL_OUTOF10: 3

## 2018-12-20 ASSESSMENT — LIFESTYLE VARIABLES: EVER_SMOKED: NEVER

## 2018-12-20 NOTE — ED TRIAGE NOTES
Chief Complaint   Patient presents with   • High Blood Sugar   • N/V     Patient c/o abdominal cramping, N/V starting a few hours ago. Patient states this is an ongoing issues with his colitis and high blood sugars. Glucose checked in triage FSBS 600.

## 2018-12-20 NOTE — ED NOTES
from Lab called with critical result of glucose 706 at 1546. Critical lab result read back to lab.   Dr. Koehler notified of critical lab result at 1546.  Critical lab result read back by Dr. Koehler.

## 2018-12-20 NOTE — ED PROVIDER NOTES
ED Provider Note    Scribed for Brenda Koehler M.D. by Bassem Kendall. 12/20/2018  2:49 PM    Primary care provider: KRISSY Brunner  Means of arrival: walk in  History obtained from: patient  History limited by: none    CHIEF COMPLAINT  Chief Complaint   Patient presents with   • High Blood Sugar   • N/V       HPI  Mahesh Bradshaw is a 53 y.o. male who presents to the Emergency Department for evaluation of hyperglycemia and complaining of gradually worsening nausea and vomiting starting yesterday. Patient reports associated abdominal pain. He describes his abdominal pain as cramping localized in his epigastric abdomen. He states that he administrated a total of 14 units of lantis yesterday, but he could feel his blood glucose level fluctuating abnormally. Today he states that his symptoms worsened, prompting him to come to the ED for evaluation. In triage he was found to have blood glucose level of 600. He also reports a history of ulcerative colitis. Patient denies fever, diarrhea.     REVIEW OF SYSTEMS  HEENT:  No ear pain, congestion, or sore throat   EYES: no discharge, redness, or vision changes  CARDIAC: no chest pain, no palpitations    PULMONARY: no dyspnea, cough, or congestion   GI: Vomiting, nausea, abdominal pain. No diarrhea  : no dysuria, back pain, or hematuria   Neuro: no weakness, numbness, aphasia, or headache  Musculoskeletal: no swelling, deformity, pain, or joint swelling  Endocrine: Hyperglycemia. no fevers, sweating, or weight loss   SKIN: no rash, erythema, or contusions     See history of present illness. All other systems are negative. C.    PAST MEDICAL HISTORY   has a past medical history of ADRIANE (acute kidney injury) ([facility]) (2/24/2016); Anesthesia; Arthritis (3-3-17); Aspiration pneumonia ([facility]) (3-2016); ASTHMA (3-3-17); Breath shortness (3-3-17); Congestive heart failure ([facility]) (2-2016 ); Dental disorder; Depression (11/26/2014); Diabetes ([facility]) (3-3-17); Diabetes ([facility]);  Difficult intubation (2-2016); DKA (diabetic ketoacidoses) ([facility]) (2-2016); Electrolyte imbalance (2-2016); Elevated LFTs; Elevated liver enzymes (2-2016); Essential hypertension (1/22/2016); Gout; Heart burn; High cholesterol (3-3-17); Hypothyroid (3-3-17); Indigestion; Kidney stones; Klebsiella infect; Migraine; MRSA cellulitis; 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    SURGICAL HISTORY   has a past surgical history that includes vaishnavi by laparoscopy (2005); colonoscopy with biopsy (11/26/2014); incision and drainage general (4/7/2017); irrigation & debridement general (Right, 4/29/2017); irrigation & debridement general (Right, 5/1/2017); other orthopedic surgery (1997 or 1998); umbilical hernia repair (N/A, 11/6/2016); umbilical hernia repair (3/10/2017); irrigation & debridement ortho (Left, 12/10/2017); and umbilical hernia repair (N/A, 4/15/2018).    SOCIAL HISTORY  Social History   Substance Use Topics   • Smoking status: Never Smoker   • Smokeless tobacco: Never Used   • Alcohol use No      History   Drug Use No       FAMILY HISTORY  None noted    CURRENT MEDICATIONS  Current Outpatient Prescriptions:   •  anastrozole (ARIMIDEX) 1 MG Tab, Take 1 mg by mouth every 7 days., Disp: , Rfl:   •  insulin glargine (LANTUS) 100 UNIT/ML Solution, Inject 42 Units as instructed 2 times a day. 42 units in AM, 34 units in PM, Disp: , Rfl:   •  insulin lispro (HUMALOG) 100 UNIT/ML Solution, Inject 2-12 Units as instructed 3 times a day before meals. Sliding Scale -written down Scale starts at 160, Disp: , Rfl:   •  thyroid (ARMOUR THYROID) 60 MG Tab, Take 60 mg by mouth every morning., Disp: , Rfl:   •  ondansetron (ZOFRAN) 4 MG Tab tablet, Take 1 Tab by mouth  "every 8 hours as needed for Nausea/Vomiting., Disp: 10 Each, Rfl: 1  •  testosterone cypionate (DEPO-TESTOSTERONE) 200 MG/ML Solution injection, 80 mg by Intramuscular route every 7 days., Disp: , Rfl:   •  buPROPion (WELLBUTRIN XL) 300 MG XL tablet, Take 300 mg by mouth every morning., Disp: , Rfl:     ALLERGIES  Allergies   Allergen Reactions   • Peanut (Diagnostic) Anaphylaxis   • Tradjenta [Linagliptin] Unspecified     Pt developed pancreatitis   • Hydrocodone Hives     Tolerates Morphine and oxycodone         PHYSICAL EXAM  VITAL SIGNS: /83   Pulse (!) 129   Temp 36.3 °C (97.4 °F) (Temporal)   Resp 18   Ht 1.727 m (5' 8\")   Wt 96.5 kg (212 lb 11.9 oz)   SpO2 95%   BMI 32.35 kg/m²     Constitutional: Well developed, Well nourished, Mild distress, Non-toxic appearance. Ill appearing   HEENT: Normocephalic, Atraumatic, external ears normal, pharynx pink,  Mucous membranes dry, No rhinorrhea or mucosal edema  Eyes: PERRL, EOMI, Conjunctiva normal, No discharge.   Neck: Normal range of motion, No tenderness, Supple, No stridor.   Lymphatic: No lymphadenopathy    Cardiovascular: Tachycardic. Regular Rhythm, No murmurs,  rubs, or gallops.   Thorax & Lungs: Lungs clear to auscultation bilaterally, No respiratory distress, No wheezes, rhales or rhonchi, No chest wall tenderness.   Abdomen: Bowel sounds normal, Soft, Mild epigastric tenderness. non distended,  No pulsatile masses., no rebound guarding or peritoneal signs.   Skin: Warm, Dry, No erythema, No rash,   Back:  No CVA tenderness,  No spinal tenderness, bony crepitance, step offs, or instability.   Neurologic: Alert & oriented x 3, Normal motor function, Normal sensory function, No focal deficits noted. Normal reflexes. Normal Cranial Nerves.  Extremities: Equal, intact distal pulses, No cyanosis, clubbing or edema,  No tenderness.   Musculoskeletal: Good range of motion in all major joints. No tenderness to palpation or major deformities noted. "     DIAGNOSTIC STUDIES / PROCEDURES    LABS  Results for orders placed or performed during the hospital encounter of 12/20/18   CBC w/ Differential   Result Value Ref Range    WBC 5.6 4.8 - 10.8 K/uL    RBC 4.28 (L) 4.70 - 6.10 M/uL    Hemoglobin 14.1 14.0 - 18.0 g/dL    Hematocrit 38.5 (L) 42.0 - 52.0 %    MCV 90.0 81.4 - 97.8 fL    MCH 32.9 27.0 - 33.0 pg    MCHC 36.6 (H) 33.7 - 35.3 g/dL    RDW 42.8 35.9 - 50.0 fL    Platelet Count 181 164 - 446 K/uL    MPV 9.7 9.0 - 12.9 fL    Neutrophils-Polys 76.30 (H) 44.00 - 72.00 %    Lymphocytes 17.20 (L) 22.00 - 41.00 %    Monocytes 4.60 0.00 - 13.40 %    Eosinophils 0.40 0.00 - 6.90 %    Basophils 0.40 0.00 - 1.80 %    Immature Granulocytes 1.10 (H) 0.00 - 0.90 %    Nucleated RBC 0.00 /100 WBC    Neutrophils (Absolute) 4.30 1.82 - 7.42 K/uL    Lymphs (Absolute) 0.97 (L) 1.00 - 4.80 K/uL    Monos (Absolute) 0.26 0.00 - 0.85 K/uL    Eos (Absolute) 0.02 0.00 - 0.51 K/uL    Baso (Absolute) 0.02 0.00 - 0.12 K/uL    Immature Granulocytes (abs) 0.06 0.00 - 0.11 K/uL    NRBC (Absolute) 0.00 K/uL   Complete Metabolic Panel (CMP)   Result Value Ref Range    Sodium 124 (L) 135 - 145 mmol/L    Potassium 4.9 3.6 - 5.5 mmol/L    Chloride 94 (L) 96 - 112 mmol/L    Co2 17 (L) 20 - 33 mmol/L    Anion Gap 13.0 (H) 0.0 - 11.9    Glucose 706 (HH) 65 - 99 mg/dL    Bun 18 8 - 22 mg/dL    Creatinine 1.08 0.50 - 1.40 mg/dL    Calcium 9.3 8.5 - 10.5 mg/dL    AST(SGOT) 15 12 - 45 U/L    ALT(SGPT) 14 2 - 50 U/L    Alkaline Phosphatase 77 30 - 99 U/L    Total Bilirubin 0.6 0.1 - 1.5 mg/dL    Albumin 4.0 3.2 - 4.9 g/dL    Total Protein 6.7 6.0 - 8.2 g/dL    Globulin 2.7 1.9 - 3.5 g/dL    A-G Ratio 1.5 g/dL   Urinalysis, culture if indicated   Result Value Ref Range    Color Yellow     Character Clear     Specific Gravity 1.030 <1.035    Ph 5.0 5.0 - 8.0    Glucose >=1000 (A) Negative mg/dL    Ketones 15 (A) Negative mg/dL    Protein Negative Negative mg/dL    Bilirubin Negative Negative     Urobilinogen, Urine 0.2 Negative    Nitrite Negative Negative    Leukocyte Esterase Negative Negative    Occult Blood Negative Negative    Micro Urine Req see below    BETA-HYDROXYBUTYRIC ACID   Result Value Ref Range    beta-Hydroxybutyric Acid 1.36 (H) 0.02 - 0.27 mmol/L   LIPASE   Result Value Ref Range    Lipase 37 11 - 82 U/L   VENOUS BLOOD GAS   Result Value Ref Range    Venous Bg Ph 7.41 7.31 - 7.45    Venous Bg Pco2 31.2 (L) 41.0 - 51.0 mmHg    Venous Bg Po2 56.2 (H) 25.0 - 40.0 mmHg    Venous Bg O2 Saturation 89.7 %    Venous Bg Hco3 19 (L) 24 - 28 mmol/L    Venous Bg Base Excess -4 mmol/L    Body Temp see below Centigrade   ESTIMATED GFR   Result Value Ref Range    GFR If African American >60 >60 mL/min/1.73 m 2    GFR If Non African American >60 >60 mL/min/1.73 m 2   ACCU-CHEK GLUCOSE   Result Value Ref Range    Glucose - Accu-Ck 600 (HH) 65 - 99 mg/dL   ACCU-CHEK GLUCOSE   Result Value Ref Range    Glucose - Accu-Ck 443 (HH) 65 - 99 mg/dL   All labs reviewed by me.    RADIOLOGY  CT-ABDOMEN-PELVIS WITH   Final Result      No acute abnormality in the abdomen or pelvis. No explanation for patient's pain.      DX-CHEST-PORTABLE (1 VIEW)   Final Result      No acute cardiopulmonary abnormality.      IR-PICC LINE PLACEMENT    (Results Pending)   The radiologist's interpretation of all radiological studies have been reviewed by me.    COURSE & MEDICAL DECISION MAKING  Nursing notes, VS, PMSFHx reviewed in chart.    2:49 PM Patient seen and examined at bedside. I discussed his probable admission to the hospital. Patient verbalizes understanding and agreement to this plan of care. Patient will be treated with NS 1000 ml, Porphine 4 mg, Zofran 4 mg. Intravenous fluids administered for hyperglycemia. Ordered CT abdomen pelvis, DX chest, CBC with differential, CMP, Urinalysis, Beta hydroxybutyric acid, Lipase, Venous blood gas, Accu check glucose to evaluate his symptoms. The differential diagnoses include but are  not limited to: pancreatitis, DKA, gastritis     3:50 PM - Patient's BGL is 702. He will be admitted.     3:53 PM - Patient reevaluated at bedside. Discussed his results and admission. I discussed central line procedure. Patient verbalizes consent.     3:55 PM - Consulted with pharmacy to obtain additional pain medications.     3:59 PM - Called PICC nurse to attempt PICC line placement.     4:13 PM - I discussed the patient's case and the above findings with Dr. Ortiz (hospitalist) who agrees to admit the patient.     4:20 PM - Updated the patient that central line is not necessary at this time.     DISPOSITION:  Patient will be admitted to hospital in critical condition.    CRITICAL CARE  The very real possibilty of a deterioration of this patient's condition required the highest level of my preparedness for sudden, emergent intervention.  I provided critical care services, which included medication orders, frequent reevaluations of the patient's condition and response to treatment, ordering and reviewing test results, and discussing the case with various consultants.  The critical care time associated with the care of the patient was 40 minutes. Review chart for interventions. This time is exclusive of any other billable procedures.     FINAL IMPRESSION  1. Nausea and vomiting, intractability of vomiting not specified, unspecified vomiting type    2. Diabetic ketoacidosis without coma associated with other specified diabetes mellitus (HCC)    3. Epigastric pain     Critical care time 40 minutes as above.      Bassem EPSTEIN (Gareth), am scribing for, and in the presence of, Brenda Koehler M.D..    Electronically signed by: Bassem Kendall (Gareth), 12/20/2018    Brenda EPSTEIN M.D. personally performed the services described in this documentation, as scribed by Bassem Kendall in my presence, and it is both accurate and complete.    The note accurately reflects work and decisions made by me.  Brenda LINK  [place]  12/20/2018  5:43 PM

## 2018-12-21 ENCOUNTER — PATIENT OUTREACH (OUTPATIENT)
Dept: HEALTH INFORMATION MANAGEMENT | Facility: OTHER | Age: 53
End: 2018-12-21

## 2018-12-21 VITALS
SYSTOLIC BLOOD PRESSURE: 116 MMHG | DIASTOLIC BLOOD PRESSURE: 87 MMHG | HEIGHT: 68 IN | HEART RATE: 97 BPM | TEMPERATURE: 97.6 F | OXYGEN SATURATION: 96 % | WEIGHT: 205.69 LBS | BODY MASS INDEX: 31.17 KG/M2 | RESPIRATION RATE: 18 BRPM

## 2018-12-21 LAB
ANION GAP SERPL CALC-SCNC: 10 MMOL/L (ref 0–11.9)
ANION GAP SERPL CALC-SCNC: 10 MMOL/L (ref 0–11.9)
BUN SERPL-MCNC: 14 MG/DL (ref 8–22)
BUN SERPL-MCNC: 14 MG/DL (ref 8–22)
CALCIUM SERPL-MCNC: 8.5 MG/DL (ref 8.5–10.5)
CALCIUM SERPL-MCNC: 9.1 MG/DL (ref 8.5–10.5)
CHLORIDE SERPL-SCNC: 104 MMOL/L (ref 96–112)
CHLORIDE SERPL-SCNC: 105 MMOL/L (ref 96–112)
CHOLEST SERPL-MCNC: 270 MG/DL (ref 100–199)
CO2 SERPL-SCNC: 21 MMOL/L (ref 20–33)
CO2 SERPL-SCNC: 22 MMOL/L (ref 20–33)
CREAT SERPL-MCNC: 0.65 MG/DL (ref 0.5–1.4)
CREAT SERPL-MCNC: 0.65 MG/DL (ref 0.5–1.4)
ERYTHROCYTE [DISTWIDTH] IN BLOOD BY AUTOMATED COUNT: 43.6 FL (ref 35.9–50)
EST. AVERAGE GLUCOSE BLD GHB EST-MCNC: 352 MG/DL
GLUCOSE BLD-MCNC: 224 MG/DL (ref 65–99)
GLUCOSE SERPL-MCNC: 218 MG/DL (ref 65–99)
GLUCOSE SERPL-MCNC: 243 MG/DL (ref 65–99)
HBA1C MFR BLD: 13.9 % (ref 0–5.6)
HCT VFR BLD AUTO: 36.2 % (ref 42–52)
HDLC SERPL-MCNC: 29 MG/DL
HGB BLD-MCNC: 12.9 G/DL (ref 14–18)
LDLC SERPL CALC-MCNC: ABNORMAL MG/DL
MAGNESIUM SERPL-MCNC: 1.7 MG/DL (ref 1.5–2.5)
MAGNESIUM SERPL-MCNC: 1.9 MG/DL (ref 1.5–2.5)
MCH RBC QN AUTO: 31.9 PG (ref 27–33)
MCHC RBC AUTO-ENTMCNC: 35.6 G/DL (ref 33.7–35.3)
MCV RBC AUTO: 89.6 FL (ref 81.4–97.8)
PHOSPHATE SERPL-MCNC: 2.9 MG/DL (ref 2.5–4.5)
PHOSPHATE SERPL-MCNC: 3.4 MG/DL (ref 2.5–4.5)
PLATELET # BLD AUTO: 169 K/UL (ref 164–446)
PMV BLD AUTO: 9.2 FL (ref 9–12.9)
POTASSIUM SERPL-SCNC: 3.6 MMOL/L (ref 3.6–5.5)
POTASSIUM SERPL-SCNC: 3.6 MMOL/L (ref 3.6–5.5)
RBC # BLD AUTO: 4.04 M/UL (ref 4.7–6.1)
SODIUM SERPL-SCNC: 135 MMOL/L (ref 135–145)
SODIUM SERPL-SCNC: 137 MMOL/L (ref 135–145)
TRIGL SERPL-MCNC: 910 MG/DL (ref 0–149)
WBC # BLD AUTO: 4.7 K/UL (ref 4.8–10.8)

## 2018-12-21 PROCEDURE — 80048 BASIC METABOLIC PNL TOTAL CA: CPT | Mod: 91

## 2018-12-21 PROCEDURE — 700102 HCHG RX REV CODE 250 W/ 637 OVERRIDE(OP): Performed by: INTERNAL MEDICINE

## 2018-12-21 PROCEDURE — 83735 ASSAY OF MAGNESIUM: CPT

## 2018-12-21 PROCEDURE — 82962 GLUCOSE BLOOD TEST: CPT

## 2018-12-21 PROCEDURE — 84100 ASSAY OF PHOSPHORUS: CPT | Mod: 91

## 2018-12-21 PROCEDURE — 85027 COMPLETE CBC AUTOMATED: CPT

## 2018-12-21 PROCEDURE — 700105 HCHG RX REV CODE 258: Performed by: INTERNAL MEDICINE

## 2018-12-21 PROCEDURE — 80061 LIPID PANEL: CPT

## 2018-12-21 PROCEDURE — 83036 HEMOGLOBIN GLYCOSYLATED A1C: CPT

## 2018-12-21 PROCEDURE — A9270 NON-COVERED ITEM OR SERVICE: HCPCS | Performed by: INTERNAL MEDICINE

## 2018-12-21 PROCEDURE — 99239 HOSP IP/OBS DSCHRG MGMT >30: CPT | Performed by: FAMILY MEDICINE

## 2018-12-21 PROCEDURE — 36415 COLL VENOUS BLD VENIPUNCTURE: CPT

## 2018-12-21 RX ORDER — TRAMADOL HYDROCHLORIDE 50 MG/1
50 TABLET ORAL
Status: DISCONTINUED | OUTPATIENT
Start: 2018-12-21 | End: 2018-12-21 | Stop reason: HOSPADM

## 2018-12-21 RX ORDER — LISINOPRIL 5 MG/1
2.5 TABLET ORAL
Status: DISCONTINUED | OUTPATIENT
Start: 2018-12-21 | End: 2018-12-21 | Stop reason: HOSPADM

## 2018-12-21 RX ADMIN — INSULIN GLARGINE 42 UNITS: 100 INJECTION, SOLUTION SUBCUTANEOUS at 06:16

## 2018-12-21 RX ADMIN — BUPROPION HYDROCHLORIDE 150 MG: 150 TABLET, EXTENDED RELEASE ORAL at 06:09

## 2018-12-21 RX ADMIN — LEVOTHYROXINE, LIOTHYRONINE 60 MG: 19; 4.5 TABLET ORAL at 06:09

## 2018-12-21 RX ADMIN — SODIUM CHLORIDE: 9 INJECTION, SOLUTION INTRAVENOUS at 06:09

## 2018-12-21 RX ADMIN — INSULIN HUMAN 4 UNITS: 100 INJECTION, SOLUTION PARENTERAL at 06:15

## 2018-12-21 ASSESSMENT — ENCOUNTER SYMPTOMS
VOMITING: 1
SEIZURES: 0
NAUSEA: 1
PALPITATIONS: 0
ABDOMINAL PAIN: 1
CHILLS: 0
FALLS: 0
HEARTBURN: 1
HEADACHES: 0
FOCAL WEAKNESS: 0
INSOMNIA: 0
WHEEZING: 0
DIARRHEA: 0
EYE PAIN: 0
BLOOD IN STOOL: 0
SHORTNESS OF BREATH: 0
WEAKNESS: 1
DIZZINESS: 0
NERVOUS/ANXIOUS: 0
TREMORS: 0
CONSTIPATION: 0
HEMOPTYSIS: 0
MYALGIAS: 0
FEVER: 0
COUGH: 0
EYE REDNESS: 0
LOSS OF CONSCIOUSNESS: 0

## 2018-12-21 ASSESSMENT — PAIN SCALES - GENERAL: PAINLEVEL_OUTOF10: 0

## 2018-12-21 NOTE — H&P
"Hospital Medicine History & Physical Note    Date of Service  2018    Primary Care Physician  KRISSY Brunner    Consultants      Code Status  full    Chief Complaint  High Blood Sugar and N/V      History of Presenting Illness  53 y.o. male who presented 2018 with High Blood Sugar and N/V  Has history of severe DKA with acute pancreatitis related to insulin drug reaction and had to be vented.L    UQ pain last night, crampy pain. Blood sugar in the morning he said 146. Ate breakfast felt he did not eat too much  After breakfast his blood sugar was 321. Gave himself sliding scale. Before lunch 390s. He ate lunch and gave himself sliding scale  He had meatloaf green beans, salad and potatoes. Denied getting any sugary drink  He felt like \"crap\" after lunch. Blood sugar too high to be read. He gave himself insulin and it was still too high to be read.  He also was nauseous and had one episode of nonbloody emesis  He said he is compliant with insulin. He thinks his insulin is not  however his humalog pen is a week old, not refrigerated for the day  No fever, chills, coughing, denied any infection  His stools have been normal despite history of colitis  No heart problems. History of stress induced asthma. Possible VERA but not tested.  Not on steroids, not on blood thinners    At ED, afebrile, hemodynamically stable. Hyperglycemic with acidosis.  When I sawe him at the ED, no acute distress. Mild abdominal cramps. He is hoping to go home tomorrow.    Review of Systems  Review of Systems   Constitutional: Positive for malaise/fatigue. Negative for chills and fever.   HENT: Negative for congestion, hearing loss and nosebleeds.    Eyes: Negative for pain and redness.   Respiratory: Negative for cough, hemoptysis, shortness of breath and wheezing.    Cardiovascular: Negative for chest pain and palpitations.   Gastrointestinal: Positive for abdominal pain, heartburn, nausea and vomiting. Negative " for blood in stool, constipation and diarrhea.   Genitourinary: Negative for dysuria, frequency and hematuria.   Musculoskeletal: Negative for falls, joint pain and myalgias.   Skin: Negative for rash.   Neurological: Positive for weakness. Negative for dizziness, tremors, focal weakness, seizures, loss of consciousness and headaches.   Psychiatric/Behavioral: The patient is not nervous/anxious and does not have insomnia.    All other systems reviewed and are negative.      Past Medical History   has a past medical history of ADRIANE (acute kidney injury) ([facility]) (2/24/2016); Anesthesia; Arthritis (3-3-17); Aspiration pneumonia ([facility]) (3-2016); ASTHMA (3-3-17); Breath shortness (3-3-17); Congestive heart failure ([facility]) (2-2016 ); Dental disorder; Depression (11/26/2014); Diabetes ([facility]) (3-3-17); Diabetes ([facility]); Difficult intubation (2-2016); DKA (diabetic ketoacidoses) ([facility]) (2-2016); Electrolyte imbalance (2-2016); Elevated LFTs; Elevated liver enzymes (2-2016); Essential hypertension (1/22/2016); Gout; Heart burn; High cholesterol (3-3-17); Hypothyroid (3-3-17); Indigestion; Kidney stones; Klebsiella infect; Migraine; MRSA cellulitis; Necrotizing myositis ([facility]); On mechanically assisted ventilation ([facility]) (2-2016); Pain (3-3-17); Pancreatitis (2-2016); RF (renal failure) (2-2016); Sepsis ([facility]) (1/21/2016); Snoring (3-3-17); Streptococcus infection; UC (ulcerative colitis) ([facility]) (3-3-17); Urinary incontinence; and Vitamin D deficiency.    Surgical History   has a past surgical history that includes vaishnavi by laparoscopy (2005); colonoscopy with biopsy (11/26/2014); incision and drainage general (4/7/2017); irrigation & debridement general (Right, 4/29/2017); irrigation & debridement general (Right, 5/1/2017); other orthopedic surgery (1997 or 1998); umbilical hernia repair (N/A, 11/6/2016); umbilical hernia repair (3/10/2017); irrigation & debridement ortho (Left, 12/10/2017); and umbilical hernia repair (N/A, 4/15/2018).      Family History  family history is not on file.   No family history of ketoacidosis  Social History   reports that he has never smoked. He has never used smokeless tobacco. He reports that he does not drink alcohol or use drugs.    Allergies  Allergies   Allergen Reactions   • Peanut (Diagnostic) Anaphylaxis   • Tradjenta [Linagliptin] Unspecified     Pt developed pancreatitis   • Hydrocodone Hives     Tolerates Morphine and oxycodone         Medications  Prior to Admission Medications   Prescriptions Last Dose Informant Patient Reported? Taking?   anastrozole (ARIMIDEX) 1 MG Tab  Patient Yes No   Sig: Take 1 mg by mouth every 7 days.   buPROPion (WELLBUTRIN XL) 300 MG XL tablet  Patient Yes No   Sig: Take 300 mg by mouth every morning.   insulin glargine (LANTUS) 100 UNIT/ML Solution  Patient Yes No   Sig: Inject 42 Units as instructed 2 times a day. 42 units in AM, 34 units in PM   insulin lispro (HUMALOG) 100 UNIT/ML Solution  Patient Yes No   Sig: Inject 2-12 Units as instructed 3 times a day before meals. Sliding Scale -written down  Scale starts at 160   ondansetron (ZOFRAN) 4 MG Tab tablet  Patient No No   Sig: Take 1 Tab by mouth every 8 hours as needed for Nausea/Vomiting.   testosterone cypionate (DEPO-TESTOSTERONE) 200 MG/ML Solution injection  Patient Yes No   Si mg by Intramuscular route every 7 days.   thyroid (ARMOUR THYROID) 60 MG Tab  Patient Yes No   Sig: Take 60 mg by mouth every morning.      Facility-Administered Medications: None       Physical Exam  Temp:  [36.3 °C (97.4 °F)] 36.3 °C (97.4 °F)  Pulse:  [129] 129  Resp:  [18] 18  BP: (144)/(83) 144/83    Physical Exam   Constitutional: He appears well-developed and well-nourished.   HENT:   Head: Normocephalic and atraumatic.   Eyes: Conjunctivae and EOM are normal. No scleral icterus.   Neck: Normal range of motion. Neck supple.   Cardiovascular: Normal rate and regular rhythm.  Exam reveals no gallop and no friction rub.    No murmur  heard.  Pulmonary/Chest: Effort normal and breath sounds normal. No respiratory distress. He has no wheezes. He has no rales.   Abdominal: Soft. Bowel sounds are normal. He exhibits no distension. There is no tenderness (Mild abdominal cramps). There is no rebound and no guarding.   Musculoskeletal: He exhibits no edema or tenderness.   Neurological: He is alert.   Skin: Skin is warm.   Psychiatric: He has a normal mood and affect. His behavior is normal.       Laboratory:  Recent Labs      12/20/18   1457   WBC  5.6   RBC  4.28*   HEMOGLOBIN  14.1   HEMATOCRIT  38.5*   MCV  90.0   MCH  32.9   MCHC  36.6*   RDW  42.8   PLATELETCT  181   MPV  9.7     Recent Labs      12/20/18   1457   SODIUM  124*   POTASSIUM  4.9   CHLORIDE  94*   CO2  17*   GLUCOSE  706*   BUN  18   CREATININE  1.08   CALCIUM  9.3     Recent Labs      12/20/18   1457   ALTSGPT  14   ASTSGOT  15   ALKPHOSPHAT  77   TBILIRUBIN  0.6   LIPASE  37   GLUCOSE  706*                 No results for input(s): TROPONINI in the last 72 hours.    Urinalysis:    Recent Labs      12/20/18   1511   SPECGRAVITY  1.030   GLUCOSEUR  >=1000*   KETONES  15*   NITRITE  Negative   LEUKESTERAS  Negative        Imaging:  CT-ABDOMEN-PELVIS WITH   Final Result      No acute abnormality in the abdomen or pelvis. No explanation for patient's pain.      DX-CHEST-PORTABLE (1 VIEW)   Final Result      No acute cardiopulmonary abnormality.      IR-PICC LINE PLACEMENT    (Results Pending)         Assessment/Plan:  I anticipate this patient will require at least two midnights for appropriate medical management, necessitating inpatient admission.    * Diabetic ketoacidosis (HCC)- (present on admission)   Assessment & Plan    Treated with IVF and insulin bolus  At the ED, I repeated renal function profile and this resolved  Change to insulin sliding scale, basal insulin to start tomorrow  Ordered low dose ACEI  Discussed with Pharmacy  Can have diabetic teaching consult in the AM  although he can follow up with his Endocrinologist and he is hoping for an insulin pump in th efuture,     Pseudohyponatremia- (present on admission)   Assessment & Plan    Improved with insulin and IVF     Abdominal cramps   Assessment & Plan    Explained it may be because of uncontrolled diabetes  Conservative measures     History of pancreatitis- (present on admission)   Assessment & Plan    Noted. Lipase normal today     Dyslipidemia- (present on admission)   Assessment & Plan    Check lipid panel     Hypothyroidism- (present on admission)   Assessment & Plan    Continue Armorthyroid         VTE prophylaxis: Lovenox SQ    Reviewed vitals, labs, imaging, staff notes.  Discussed assessment and plan with Mahesh Bradshaw  Discussed with ED physician.

## 2018-12-21 NOTE — ASSESSMENT & PLAN NOTE
Treated with IVF and insulin bolus  At the ED, I repeated renal function profile and this resolved  Change to insulin sliding scale, basal insulin to start tomorrow  Ordered low dose ACEI  Discussed with Pharmacy  Can have diabetic teaching consult in the AM although he can follow up with his Endocrinologist and he is hoping for an insulin pump in th efuture,

## 2018-12-21 NOTE — PROGRESS NOTES
[facility] bed without any assistance and steady gait. Tele monitor in place. Monitor room notified.     Assumed care report received. Assessment completed. AOx4. Pt resting in bed complains of pain 3/10, denies intervention at this time. No other complaints at this time. Plan of care discussed, verbalized understanding. Fall precautions in place. Call light within reach. White board update.

## 2018-12-21 NOTE — RESPIRATORY CARE
COPD EDUCATION by COPD CLINICAL EDUCATOR  12/21/2018 at 7:13 AM by Jacey Blackwell     Patient reviewed by COPD education team. Patient does not qualify for COPD program.

## 2018-12-21 NOTE — DISCHARGE INSTRUCTIONS
Discharge Instructions    Discharged to home by car with relative. Discharged via wheelchair, hospital escort: Refused.  Special equipment needed: Not Applicable    Be sure to schedule a follow-up appointment with your primary care doctor or any specialists as instructed.     Discharge Plan:   Diet Plan: Discussed  Activity Level: Discussed  Confirmed Follow up Appointment: Appointment Scheduled  Confirmed Symptoms Management: Discussed  Medication Reconciliation Updated: Yes  Influenza Vaccine Indication: Not indicated: Previously immunized this influenza season and > 8 years of age    I understand that a diet low in cholesterol, fat, and sodium is recommended for good health. Unless I have been given specific instructions below for another diet, I accept this instruction as my diet prescription.   Other diet: Diabetic    Special Instructions: None    · Is patient discharged on Warfarin / Coumadin?   No         Diabetic Ketoacidosis  Diabetic ketoacidosis is a life-threatening complication of diabetes. If it is not treated, it can cause severe dehydration and organ damage and can lead to a coma or death.  What are the causes?  This condition develops when there is not enough of the hormone insulin in the body. Insulin helps the body to break down sugar for energy. Without insulin, the body cannot break down sugar, so it breaks down fats instead. This leads to the production of acids that are called ketones. Ketones are poisonous at high levels.  This condition can be triggered by:  · Stress on the body that is brought on by an illness.  · Medicines that raise blood glucose levels.  · Not taking diabetes medicine.  What are the signs or symptoms?  Symptoms of this condition include:  · Fatigue.  · Weight loss.  · Excessive thirst.  · Light-headedness.  · Fruity or sweet-smelling breath.  · Excessive urination.  · Vision changes.  · Confusion or irritability.  · Nausea.  · Vomiting.  · Rapid breathing.  · Abdominal  pain.  · Feeling flushed.  How is this diagnosed?  This condition is diagnosed based on a medical history, a physical exam, and blood tests. You may also have a urine test that checks for ketones.  How is this treated?  This condition may be treated with:  · Fluid replacement. This may be done to correct dehydration.  · Insulin injections. These may be given through the skin or through an IV tube.  · Electrolyte replacement. Electrolytes, such as potassium and sodium, may be given in pill form or through an IV tube.  · Antibiotic medicines. These may be prescribed if your condition was caused by an infection.  Follow these instructions at home:  Eating and drinking  · Drink enough fluids to keep your urine clear or pale yellow.  · If you cannot eat, alternate between drinking fluids with sugar (such as juice) and salty fluids (such as broth or bouillon).  · If you can eat, follow your usual diet and drink sugar-free liquids, such as water.  Other Instructions   · Take insulin as directed by your health care provider. Do not skip insulin injections. Do not use  insulin.  · If your blood sugar is over 240 mg/dL, monitor your urine ketones every 4-6 hours.  · If you were prescribed an antibiotic medicine, finish all of it even if you start to feel better.  · Rest and exercise only as directed by your health care provider.  · If you get sick, call your health care provider and begin treatment quickly. Your body often needs extra insulin to fight an illness.  · Check your blood glucose levels regularly. If your blood glucose is high, drink plenty of fluids. This helps to flush out ketones.  Contact a health care provider if:  · Your blood glucose level is too high or too low.  · You have ketones in your urine.  · You have a fever.  · You cannot eat.  · You cannot tolerate fluids.  · You have been vomiting for more than 2 hours.  · You continue to have symptoms of this condition.  · You develop new symptoms.  Get  help right away if:  · Your blood glucose levels continue to be high (elevated).  · Your monitor reads “high” even when you are taking insulin.  · You faint.  · You have chest pain.  · You have trouble breathing.  · You have a sudden, severe headache.  · You have sudden weakness in one arm or one leg.  · You have sudden trouble speaking or swallowing.  · You have vomiting or diarrhea that gets worse after 3 hours.  · You feel severely fatigued.  · You have trouble thinking.  · You have abdominal pain.  · You are severely dehydrated. Symptoms of severe dehydration include:  ¨ Extreme thirst.  ¨ Dry mouth.  ¨ Blue lips.  ¨ Cold hands and feet.  ¨ Rapid breathing.  This information is not intended to replace advice given to you by your health care provider. Make sure you discuss any questions you have with your health care provider.  Document Released: 12/15/2001 Document Revised: 05/25/2017 Document Reviewed: 11/25/2015  [employer] Interactive Patient Education © 2017 [employer]    Depression / Suicide Risk    As you are discharged from this [facility] facility, it is important to learn how to keep safe from harming yourself.    Recognize the warning signs:  · Abrupt changes in personality, positive or negative- including increase in energy   · Giving away possessions  · Change in eating patterns- significant weight changes-  positive or negative  · Change in sleeping patterns- unable to sleep or sleeping all the time   · Unwillingness or inability to communicate  · Depression  · Unusual sadness, discouragement and loneliness  · Talk of wanting to die  · Neglect of personal appearance   · Rebelliousness- reckless behavior  · Withdrawal from people/activities they love  · Confusion- inability to concentrate     If you or a loved one observes any of these behaviors or has concerns about self-harm, here's what you can do:  · Talk about it- your feelings and reasons for harming yourself  · Remove any means that you  might use to hurt yourself (examples: pills, rope, extension cords, firearm)  · Get professional help from the community (Mental Health, Substance Abuse, psychological counseling)  · Do not be alone:Call your Safe Contact- someone whom you trust who will be there for you.  · Call your local CRISIS HOTLINE 602-8607 or 417-772-5063  · Call your local Children's Mobile Crisis Response Team [place] (083) 061-2556 or www.[employer]  · Call the toll free National Suicide Prevention Hotlines   · National Suicide Prevention Lifeline 158-571-BQHU (0144)  · National Hope Line Network 800-SUICIDE (986-1834)

## 2018-12-21 NOTE — PROGRESS NOTES
Discharge instructions provided to pt. Discussed diet, activity, follow up, prescriptions and symptom management. Pt states understanding. Pt states all questions have been answered. Copy of discharge provided to pt. Signed copy in chart. Pt states that all personal belongings are in possession. Pt refused escort and walked off floor without incident.

## 2018-12-21 NOTE — PROGRESS NOTES
2 RN skin check done with Shane LUCERO.    Bilateral ears red and blanching. Pt educated to remove glasses while sleeping. Pt verbalized understanding. Pt is on RA.  Healed scar to abdomen. Pt states from previous hernia repairs.  Healed scar to L thigh. Pt states from necrotizing fasciitis.

## 2018-12-21 NOTE — CARE PLAN
Problem: Safety  Goal: Will remain free from injury  Outcome: PROGRESSING AS EXPECTED  Pt educated about use of call light and bed alarm when getting out of bed. Call light within reach. Bed alarm in use. Pt verbalized understanding and calls appropriately. Personal slipper socks in place. Bed in low and locked position. Appropriate sign outside of room.       Problem: Knowledge Deficit  Goal: Knowledge of disease process/condition, treatment plan, diagnostic tests, and medications will improve  Outcome: PROGRESSING AS EXPECTED  Discussed POC with pt. Answered all questions appropriately. White board updated. All medications and interventions explained before performed. Pt verbalized understanding.

## 2018-12-22 NOTE — DISCHARGE SUMMARY
"Discharge Summary    CHIEF COMPLAINT ON ADMISSION  Chief Complaint   Patient presents with   • High Blood Sugar   • N/V       Reason for Admission  Abdominal Pain      Admission Date  2018    CODE STATUS  Prior    HPI & HOSPITAL COURSE  This is a 53 y.o. male here with  High Blood Sugar and N/V  Has history of severe DKA with acute pancreatitis related to insulin drug reaction and had to be vented.L     UQ pain last night, crampy pain. Blood sugar in the morning he said 146. Ate breakfast felt he did not eat too much  After breakfast his blood sugar was 321. Gave himself sliding scale. Before lunch 390s. He ate lunch and gave himself sliding scale  He had meatloaf green beans, salad and potatoes. Denied getting any sugary drink  He felt like \"crap\" after lunch. Blood sugar too high to be read. He gave himself insulin and it was still too high to be read.  He also was nauseous and had one episode of nonbloody emesis  He said he is compliant with insulin. He thinks his insulin is not  however his humalog pen is a week old, not refrigerated for the day  No fever, chills, coughing, denied any infection  His stools have been normal despite history of colitis  No heart problems. History of stress induced asthma. Possible VERA but not tested.  Not on steroids, not on blood thinners.  He was admitted and was given iv fluid and insulin.his DKA resolved and he felt well and wanted to go home.he stated  That he has been taking his insulin.however his  hema1c 13.3.i advised the pt to take his meds.  His pseudohyponatermia with correction of the sugars resolved.       Therefore, he is discharged in fair and stable condition to home with close outpatient follow-up.    The patient recovered much more quickly than anticipated on admission.    Discharge Date  2018    FOLLOW UP ITEMS POST DISCHARGE  pcp next week    DISCHARGE DIAGNOSES  Principal Problem:    Diabetic ketoacidosis (HCC) POA: Yes  Active Problems:    " Essential hypertension POA: Yes    Pseudohyponatremia POA: Yes    Hypothyroidism POA: Yes      Overview: Chronic condition treated with Tarzana Thyroid.      Resumed maintenance medication.    Dyslipidemia POA: Yes    History of pancreatitis POA: Yes    Abdominal cramps POA: Unknown  Resolved Problems:    * No resolved hospital problems. *      FOLLOW UP  Future Appointments  Date Time Provider Department Center   1/29/2019 7:45 AM JENNIFFER Lindsey A.P.N.  [address]  [facility] [address]  [phone]      As needed      MEDICATIONS ON DISCHARGE     Medication List      CONTINUE taking these medications      Instructions   anastrozole 1 MG Tabs  Commonly known as:  ARIMIDEX   Take 1 mg by mouth every 7 days.  Dose:  1 mg     buPROPion 300 MG XL tablet  Commonly known as:  WELLBUTRIN XL   Take 300 mg by mouth every morning.  Dose:  300 mg     insulin glargine 100 UNIT/ML Soln  Commonly known as:  LANTUS   Inject 34-42 Units as instructed 2 times a day. 42 units in AM, 34 units in PM  Dose:  34-42 Units     insulin lispro 100 UNIT/ML Soln  Commonly known as:  HUMALOG   Inject 2-12 Units as instructed 3 times a day before meals. Sliding Scale -written down Scale starts at 160  Dose:  2-12 Units     ondansetron 4 MG Tabs tablet  Commonly known as:  ZOFRAN   Take 1 Tab by mouth every 8 hours as needed for Nausea/Vomiting.  Dose:  4 mg     testosterone cypionate 200 MG/ML Soln injection  Commonly known as:  DEPO-TESTOSTERONE   80 mg by Intramuscular route every 7 days.  Dose:  80 mg     thyroid 60 MG Tabs  Commonly known as:  ARMOUR THYROID   Take 60 mg by mouth every morning.  Dose:  60 mg            Allergies  Allergies   Allergen Reactions   • Peanut (Diagnostic) Anaphylaxis   • Tradjenta [Linagliptin] Unspecified     Pt developed pancreatitis   • Hydrocodone Hives     Tolerates Morphine and oxycodone         DIET  No orders of the defined types were placed in this  encounter.      ACTIVITY  As tolerated.  Weight bearing as tolerated    CONSULTATIONS  none    PROCEDURES  none    LABORATORY  Lab Results   Component Value Date    SODIUM 135 12/21/2018    POTASSIUM 3.6 12/21/2018    CHLORIDE 104 12/21/2018    CO2 21 12/21/2018    GLUCOSE 218 (H) 12/21/2018    BUN 14 12/21/2018    CREATININE 0.65 12/21/2018    CREATININE 1.1 02/18/2008        Lab Results   Component Value Date    WBC 4.7 (L) 12/21/2018    HEMOGLOBIN 12.9 (L) 12/21/2018    HEMATOCRIT 36.2 (L) 12/21/2018    PLATELETCT 169 12/21/2018        Total time of the discharge process exceeds 35 minutes.

## 2018-12-24 ENCOUNTER — OFFICE VISIT (OUTPATIENT)
Dept: URGENT CARE | Facility: MEDICAL CENTER | Age: 53
End: 2018-12-24
Payer: COMMERCIAL

## 2018-12-24 VITALS
HEIGHT: 68 IN | SYSTOLIC BLOOD PRESSURE: 116 MMHG | HEART RATE: 80 BPM | BODY MASS INDEX: 31.22 KG/M2 | OXYGEN SATURATION: 95 % | WEIGHT: 206 LBS | DIASTOLIC BLOOD PRESSURE: 68 MMHG | TEMPERATURE: 97.4 F

## 2018-12-24 DIAGNOSIS — J06.9 ACUTE URI: ICD-10-CM

## 2018-12-24 DIAGNOSIS — J01.00 ACUTE MAXILLARY SINUSITIS, RECURRENCE NOT SPECIFIED: ICD-10-CM

## 2018-12-24 DIAGNOSIS — R05.9 COUGH: ICD-10-CM

## 2018-12-24 PROCEDURE — 99214 OFFICE O/P EST MOD 30 MIN: CPT | Performed by: NURSE PRACTITIONER

## 2018-12-24 RX ORDER — DOXYCYCLINE HYCLATE 100 MG
100 TABLET ORAL 2 TIMES DAILY
Qty: 20 TAB | Refills: 0 | Status: ON HOLD | OUTPATIENT
Start: 2018-12-24 | End: 2019-01-03

## 2018-12-24 ASSESSMENT — ENCOUNTER SYMPTOMS
CHILLS: 1
SORE THROAT: 1
COUGH: 1
SWOLLEN GLANDS: 1
SPUTUM PRODUCTION: 1
SINUS PAIN: 1
RHINORRHEA: 1

## 2018-12-24 NOTE — PROGRESS NOTES
"Subjective:      Mahesh Bradshaw is a 53 y.o. male who presents with URI (runny nose, cough, chest congestion, horse voice, plugged ears, started saturday)    Past Medical History:   Diagnosis Date   • ADRIANE (acute kidney injury) (Formerly [facility]) 2/24/2016   • Anesthesia     \"Was difficult to intubate 2-2016\"   • Arthritis 3-3-17    \"Spine\"   • Aspiration pneumonia ([facility]) 3-2016   • ASTHMA 3-3-17    \"Hasn't had to use inhaler in 2 years\"   • Breath shortness 3-3-17    \"With Exercise\"   • Congestive heart failure ([facility]ly [facility]) 2-2016     3-3-17 \"Not a current issue\"   • Dental disorder    • Depression 11/26/2014   • Diabetes ([facility]) 3-3-17    Takes Insulin   • Diabetes ([facility])    • Difficult intubation 2-2016   • DKA (diabetic ketoacidoses) (Formerly [facility]) 2-2016    \"10 days on vent with DKA, Renal failure, CHF, elevated liver enzymes and electrolyte imbalance\"   • Electrolyte imbalance 2-2016   • Elevated LFTs    • Elevated liver enzymes 2-2016   • Essential hypertension 1/22/2016   • Gout    • Heart burn    • High cholesterol 3-3-17    Does not currently take medication for   • Hypothyroid 3-3-17    Takes [employer] Thyroid   • Indigestion    • Kidney stones    • Klebsiella infect    • Migraine    • MRSA cellulitis    • Necrotizing myositis ([facility])    • On mechanically assisted ventilation ([facility]ly [facility]) 2-2016    HX \"On Vent for 10 days at [facility]\".  \"DX Pancreatitis, DKA, CHF, Elevated Liver Enzymes & Electrolyte Imbalance\".   • Pain 3-3-17    \"Left Flank\"   • Pancreatitis 2-2016    \"D/T Tradjenta was hospitialized for 15 days\"   • RF (renal failure) 2-2016   • Sepsis ([facility]) 1/21/2016   • Snoring 3-3-17    Has not had a sleep study   • Streptococcus infection    • UC (ulcerative colitis) ([facility]) 3-3-17    \"Five BM's per day, takes Lialda\"   • Urinary incontinence    • Vitamin D deficiency      Social History     Social History   • Marital status: Single     Spouse name: N/A   • Number of children: N/A   • Years of education: N/A     Occupational History   • Not " "on file.     Social History Main Topics   • Smoking status: Never Smoker   • Smokeless tobacco: Never Used   • Alcohol use No   • Drug use: No   • Sexual activity: Not on file     Other Topics Concern   • Not on file     Social History Narrative    ** Merged History Encounter **         ** Merged History Encounter **     ** Merged History Encounter **        History reviewed. No pertinent family history.    Allergies: Peanut (diagnostic); Tradjenta [linagliptin]; and Hydrocodone      Patient is a 53-year-old male who presents today with complaint of sinus pain and pressure with thick yellow drainage.  He has had a dry cough with this.  He denies any shortness of breath or wheezing.  Positive history of type 2 diabetes, he is currently insulin-dependent.  Was hospitalized last week for elevated blood sugar.  He is currently under the care of his primary care physician, who has referred him to an endocrinologist for further management.  He is currently awaiting that appointment.  States blood sugar this morning was 316 mg/dL.         URI    This is a new problem. The current episode started in the past 7 days. The problem has been unchanged. There has been no fever. Associated symptoms include congestion, coughing, rhinorrhea, sinus pain, a sore throat and swollen glands. He has tried nothing for the symptoms. The treatment provided no relief.       Review of Systems   Constitutional: Positive for chills and malaise/fatigue.   HENT: Positive for congestion, rhinorrhea, sinus pain and sore throat.    Respiratory: Positive for cough and sputum production.    Skin: Negative.           Objective:     /68 (BP Location: Left arm, Patient Position: Sitting, BP Cuff Size: Large adult)   Pulse 80   Temp 36.3 °C (97.4 °F) (Temporal)   Ht 1.727 m (5' 8\")   Wt 93.4 kg (206 lb)   SpO2 95%   BMI 31.32 kg/m²      Physical Exam   Constitutional: He is oriented to person, place, and time. He appears well-developed and " well-nourished.   HENT:   Head: Normocephalic.   Left Ear: External ear normal.   Nose: Nose normal.   Mouth/Throat: No oropharyngeal exudate.   Right TM slightly red  Yellow PND  Tender over the maxillary sinuses, R>L   Eyes: Pupils are equal, round, and reactive to light. Conjunctivae and EOM are normal.   Neck: Normal range of motion. Neck supple.   Cardiovascular: Normal rate and regular rhythm.    Pulmonary/Chest: Effort normal and breath sounds normal.   Musculoskeletal: Normal range of motion.   Neurological: He is alert and oriented to person, place, and time.   Skin: Skin is warm and dry. Capillary refill takes less than 2 seconds.   Psychiatric: He has a normal mood and affect. His behavior is normal.   Vitals reviewed.              Assessment/Plan:   URI  Cough   Sinusitis    -doxycycline  -OTC sugar free cough syrup as needed  -humidifier  -flonase  -ER precautions for glucose >400 mg/dL.     There are no diagnoses linked to this encounter.

## 2018-12-31 ENCOUNTER — HOSPITAL ENCOUNTER (EMERGENCY)
Facility: MEDICAL CENTER | Age: 53
End: 2018-12-31
Attending: EMERGENCY MEDICINE
Payer: COMMERCIAL

## 2018-12-31 VITALS
SYSTOLIC BLOOD PRESSURE: 118 MMHG | RESPIRATION RATE: 14 BRPM | BODY MASS INDEX: 31.84 KG/M2 | TEMPERATURE: 97.3 F | OXYGEN SATURATION: 98 % | WEIGHT: 209.44 LBS | HEART RATE: 91 BPM | DIASTOLIC BLOOD PRESSURE: 67 MMHG

## 2018-12-31 DIAGNOSIS — K04.7 DENTAL INFECTION: ICD-10-CM

## 2018-12-31 DIAGNOSIS — R73.9 HYPERGLYCEMIA: ICD-10-CM

## 2018-12-31 LAB
ANION GAP SERPL CALC-SCNC: 12 MMOL/L (ref 0–11.9)
BASOPHILS # BLD AUTO: 0.3 % (ref 0–1.8)
BASOPHILS # BLD: 0.02 K/UL (ref 0–0.12)
BUN SERPL-MCNC: 23 MG/DL (ref 8–22)
CALCIUM SERPL-MCNC: 10 MG/DL (ref 8.5–10.5)
CHLORIDE SERPL-SCNC: 91 MMOL/L (ref 96–112)
CO2 SERPL-SCNC: 22 MMOL/L (ref 20–33)
CREAT SERPL-MCNC: 1.09 MG/DL (ref 0.5–1.4)
EOSINOPHIL # BLD AUTO: 0.03 K/UL (ref 0–0.51)
EOSINOPHIL NFR BLD: 0.5 % (ref 0–6.9)
ERYTHROCYTE [DISTWIDTH] IN BLOOD BY AUTOMATED COUNT: 41.7 FL (ref 35.9–50)
GLUCOSE BLD-MCNC: 315 MG/DL (ref 65–99)
GLUCOSE BLD-MCNC: 480 MG/DL (ref 65–99)
GLUCOSE BLD-MCNC: >600 MG/DL (ref 65–99)
GLUCOSE SERPL-MCNC: 693 MG/DL (ref 65–99)
HCT VFR BLD AUTO: 41.4 % (ref 42–52)
HGB BLD-MCNC: 15.1 G/DL (ref 14–18)
IMM GRANULOCYTES # BLD AUTO: 0.04 K/UL (ref 0–0.11)
IMM GRANULOCYTES NFR BLD AUTO: 0.6 % (ref 0–0.9)
LYMPHOCYTES # BLD AUTO: 1.38 K/UL (ref 1–4.8)
LYMPHOCYTES NFR BLD: 21.1 % (ref 22–41)
MCH RBC QN AUTO: 32.8 PG (ref 27–33)
MCHC RBC AUTO-ENTMCNC: 36.5 G/DL (ref 33.7–35.3)
MCV RBC AUTO: 90 FL (ref 81.4–97.8)
MONOCYTES # BLD AUTO: 0.27 K/UL (ref 0–0.85)
MONOCYTES NFR BLD AUTO: 4.1 % (ref 0–13.4)
NEUTROPHILS # BLD AUTO: 4.8 K/UL (ref 1.82–7.42)
NEUTROPHILS NFR BLD: 73.4 % (ref 44–72)
NRBC # BLD AUTO: 0 K/UL
NRBC BLD-RTO: 0 /100 WBC
PLATELET # BLD AUTO: 224 K/UL (ref 164–446)
PMV BLD AUTO: 9.4 FL (ref 9–12.9)
POTASSIUM SERPL-SCNC: 4.4 MMOL/L (ref 3.6–5.5)
RBC # BLD AUTO: 4.6 M/UL (ref 4.7–6.1)
SODIUM SERPL-SCNC: 125 MMOL/L (ref 135–145)
WBC # BLD AUTO: 6.5 K/UL (ref 4.8–10.8)

## 2018-12-31 PROCEDURE — 96374 THER/PROPH/DIAG INJ IV PUSH: CPT

## 2018-12-31 PROCEDURE — 700105 HCHG RX REV CODE 258: Performed by: EMERGENCY MEDICINE

## 2018-12-31 PROCEDURE — 700111 HCHG RX REV CODE 636 W/ 250 OVERRIDE (IP): Performed by: EMERGENCY MEDICINE

## 2018-12-31 PROCEDURE — 700102 HCHG RX REV CODE 250 W/ 637 OVERRIDE(OP): Performed by: EMERGENCY MEDICINE

## 2018-12-31 PROCEDURE — 99284 EMERGENCY DEPT VISIT MOD MDM: CPT

## 2018-12-31 PROCEDURE — 80048 BASIC METABOLIC PNL TOTAL CA: CPT

## 2018-12-31 PROCEDURE — 36415 COLL VENOUS BLD VENIPUNCTURE: CPT

## 2018-12-31 PROCEDURE — 96361 HYDRATE IV INFUSION ADD-ON: CPT

## 2018-12-31 PROCEDURE — 96375 TX/PRO/DX INJ NEW DRUG ADDON: CPT

## 2018-12-31 PROCEDURE — 85025 COMPLETE CBC W/AUTO DIFF WBC: CPT

## 2018-12-31 PROCEDURE — 82962 GLUCOSE BLOOD TEST: CPT | Mod: 91

## 2018-12-31 RX ORDER — ONDANSETRON 2 MG/ML
4 INJECTION INTRAMUSCULAR; INTRAVENOUS ONCE
Status: COMPLETED | OUTPATIENT
Start: 2018-12-31 | End: 2018-12-31

## 2018-12-31 RX ORDER — SODIUM CHLORIDE 9 MG/ML
1000 INJECTION, SOLUTION INTRAVENOUS ONCE
Status: COMPLETED | OUTPATIENT
Start: 2018-12-31 | End: 2018-12-31

## 2018-12-31 RX ORDER — MORPHINE SULFATE 4 MG/ML
4 INJECTION, SOLUTION INTRAMUSCULAR; INTRAVENOUS ONCE
Status: COMPLETED | OUTPATIENT
Start: 2018-12-31 | End: 2018-12-31

## 2018-12-31 RX ORDER — SODIUM CHLORIDE 9 MG/ML
2000 INJECTION, SOLUTION INTRAVENOUS ONCE
Status: COMPLETED | OUTPATIENT
Start: 2018-12-31 | End: 2018-12-31

## 2018-12-31 RX ADMIN — INSULIN HUMAN 10 UNITS: 100 INJECTION, SOLUTION PARENTERAL at 16:30

## 2018-12-31 RX ADMIN — ONDANSETRON HYDROCHLORIDE 4 MG: 2 INJECTION, SOLUTION INTRAMUSCULAR; INTRAVENOUS at 16:00

## 2018-12-31 RX ADMIN — SODIUM CHLORIDE 1000 ML: 9 INJECTION, SOLUTION INTRAVENOUS at 16:30

## 2018-12-31 RX ADMIN — MORPHINE SULFATE 4 MG: 4 INJECTION INTRAVENOUS at 16:00

## 2018-12-31 RX ADMIN — SODIUM CHLORIDE 2000 ML: 9 INJECTION, SOLUTION INTRAVENOUS at 14:38

## 2018-12-31 ASSESSMENT — PAIN SCALES - GENERAL
PAINLEVEL_OUTOF10: 4
PAINLEVEL_OUTOF10: 4

## 2018-12-31 NOTE — ED TRIAGE NOTES
"Chief Complaint   Patient presents with   • Hyperglycemia     pt reports symptoms for 3-4 days/ recent abx therapy for sinus infection. BG in triage \"HI\"   • Headache   • Recurrent Otitis     Explained to pt triage process, made pt aware to tell this RN/staff of any changes/concerns, pt verbalized understanding of process and instructions given. Pt to ER lobby.    "

## 2018-12-31 NOTE — ED PROVIDER NOTES
"ED Provider Note    Scribed for Lavon Luke M.D. by Jose Martin Kiran. 12/31/2018  1:40 PM    Primary care provider: KRISSY Brunner  Means of arrival: Walk in  History obtained from: Patient  History limited by: None    CHIEF COMPLAINT  Chief Complaint   Patient presents with   • Hyperglycemia     pt reports symptoms for 3-4 days/ recent abx therapy for sinus infection. BG in triage \"HI\"   • Headache   • Recurrent Otitis       HPI  Mahesh Bradshaw is a 53 y.o. male with a history of diabetes who presents to the Emergency Department for evaluation of hyperglycemia-like symptoms which started ~ 3-4 days ago. Patient's blood sugar has been reading high over the last 3-4 days. He has been compliant with taking his standard dose of Lantus and correcting with sliding scale with no significant improvement. He states his blood sugar was 438 last night and continues to be high today. Patient states he recently had two cavities filled. He reports having sharp shooting pain and swelling to the right maxilla onset 4 days ago. He has been taking pain medication for this which has provided some improvement. He was evaluated by his dentist earlier today and had a dental planing done. His blood sugar still read high after visiting the dentist. Patient currently has a sinus infection for which he is on a 10-day regimen of doxycycline. He has recurrent otitis. He mentions having associated abdominal cramping to his bilateral upper quadrants which typically occur when his sugars are elevated. Patient does not report any recent recorded fevers or chills.      REVIEW OF SYSTEMS  Pertinent positives include right maxilla pain, otitis pain, abdominal cramping (b/l upper quadrants).   Pertinent negatives include no recorded fevers or chills.    All other systems reviewed and negative. See HPI for further details.       PAST MEDICAL HISTORY   has a past medical history of ADRIANE (acute kidney injury) ([facility]) (2/24/2016); " Anesthesia; Arthritis (3-3-17); Aspiration pneumonia ([facility]) (3-2016); ASTHMA (3-3-17); Breath shortness (3-3-17); Congestive heart failure ([facility]) (2-2016 ); Dental disorder; Depression (11/26/2014); Diabetes ([facility]) (3-3-17); Diabetes ([facility]); Difficult intubation (2-2016); DKA (diabetic ketoacidoses) ([facility]) (2-2016); Electrolyte imbalance (2-2016); Elevated LFTs; Elevated liver enzymes (2-2016); Essential hypertension (1/22/2016); Gout; Heart burn; High cholesterol (3-3-17); Hypothyroid (3-3-17); Indigestion; Kidney stones; Klebsiella infect; Migraine; MRSA cellulitis; Necrotizing myositis ([facility]); On mechanically assisted ventilation ([facility]) (2-2016); Pain (3-3-17); Pancreatitis (2-2016); RF (renal failure) (2-2016); Sepsis ([facility]) (1/21/2016); Snoring (3-3-17); Streptococcus infection; UC (ulcerative colitis) ([facility]) (3-3-17); Urinary incontinence; and Vitamin D deficiency.    SURGICAL HISTORY   has a past surgical history that includes vaishnavi by laparoscopy (2005); colonoscopy with biopsy (11/26/2014); incision and drainage general (4/7/2017); irrigation & debridement general (Right, 4/29/2017); irrigation & debridement general (Right, 5/1/2017); other orthopedic surgery (1997 or 1998); umbilical hernia repair (N/A, 11/6/2016); umbilical hernia repair (3/10/2017); irrigation & debridement ortho (Left, 12/10/2017); and umbilical hernia repair (N/A, 4/15/2018).    SOCIAL HISTORY  Social History   Substance Use Topics   • Smoking status: Never Smoker   • Smokeless tobacco: Never Used   • Alcohol use No      History   Drug Use No       FAMILY HISTORY  No pertinent family history reported.     CURRENT MEDICATIONS  No current facility-administered medications on file prior to encounter.      Current Outpatient Prescriptions on File Prior to Encounter   Medication Sig Dispense Refill   • doxycycline (VIBRAMYCIN) 100 MG Tab Take 1 Tab by mouth 2 times a day for 10 days. 20 Tab 0   • anastrozole (ARIMIDEX) 1 MG Tab Take 1 mg by mouth  every 7 days.     • insulin glargine (LANTUS) 100 UNIT/ML Solution Inject 34-42 Units as instructed 2 times a day. 42 units in AM, 34 units in PM     • insulin lispro (HUMALOG) 100 UNIT/ML Solution Inject 2-12 Units as instructed 3 times a day before meals. Sliding Scale -written down  Scale starts at 160     • thyroid (ARMOUR THYROID) 60 MG Tab Take 60 mg by mouth every morning.     • ondansetron (ZOFRAN) 4 MG Tab tablet Take 1 Tab by mouth every 8 hours as needed for Nausea/Vomiting. 10 Each 1   • testosterone cypionate (DEPO-TESTOSTERONE) 200 MG/ML Solution injection 80 mg by Intramuscular route every 7 days.     • buPROPion (WELLBUTRIN XL) 300 MG XL tablet Take 300 mg by mouth every morning.        ALLERGIES  Allergies   Allergen Reactions   • Peanut (Diagnostic) Anaphylaxis   • Tradjenta [Linagliptin] Unspecified     Pt developed pancreatitis   • Hydrocodone Hives     Tolerates Morphine and oxycodone         PHYSICAL EXAM  VITAL SIGNS: /99   Pulse (!) 112   Temp 36.3 °C (97.3 °F) (Temporal)   Resp 18   Wt 95 kg (209 lb 7 oz)   SpO2 98%   BMI 31.84 kg/m²   Nursing note and vitals reviewed.  Constitutional: Well-developed and well-nourished. No distress.   HENT: Head is normocephalic and atraumatic. Oropharynx is clear without exudate or erythema. Dry mucus membranes. Mild tenderness over right maxilla but no apparent swelling. TMs clear bilaterally.  Eyes: Pupils are equal, round, and reactive to light. Conjunctiva are normal.   Cardiovascular: Tachycardic. Regular rhythm. No murmur heard. Normal radial pulses.  Pulmonary/Chest: Breath sounds normal. No wheezes or rales.   Abdominal: Soft and non-tender. No distention    Musculoskeletal: Extremities exhibit normal range of motion without edema or tenderness.   Neurological: Awake, alert and oriented to person, place, and time. No focal deficits noted.  Skin: Skin is warm and dry. No rash.   Psychiatric: Normal mood and affect. Appropriate for  clinical situation.      DIAGNOSTIC STUDIES / PROCEDURES    LABS  Results for orders placed or performed during the hospital encounter of 12/31/18   CBC w/ Differential   Result Value Ref Range    WBC 6.5 4.8 - 10.8 K/uL    RBC 4.60 (L) 4.70 - 6.10 M/uL    Hemoglobin 15.1 14.0 - 18.0 g/dL    Hematocrit 41.4 (L) 42.0 - 52.0 %    MCV 90.0 81.4 - 97.8 fL    MCH 32.8 27.0 - 33.0 pg    MCHC 36.5 (H) 33.7 - 35.3 g/dL    RDW 41.7 35.9 - 50.0 fL    Platelet Count 224 164 - 446 K/uL    MPV 9.4 9.0 - 12.9 fL    Neutrophils-Polys 73.40 (H) 44.00 - 72.00 %    Lymphocytes 21.10 (L) 22.00 - 41.00 %    Monocytes 4.10 0.00 - 13.40 %    Eosinophils 0.50 0.00 - 6.90 %    Basophils 0.30 0.00 - 1.80 %    Immature Granulocytes 0.60 0.00 - 0.90 %    Nucleated RBC 0.00 /100 WBC    Neutrophils (Absolute) 4.80 1.82 - 7.42 K/uL    Lymphs (Absolute) 1.38 1.00 - 4.80 K/uL    Monos (Absolute) 0.27 0.00 - 0.85 K/uL    Eos (Absolute) 0.03 0.00 - 0.51 K/uL    Baso (Absolute) 0.02 0.00 - 0.12 K/uL    Immature Granulocytes (abs) 0.04 0.00 - 0.11 K/uL    NRBC (Absolute) 0.00 K/uL   Basic Metabolic Panel (BMP)   Result Value Ref Range    Sodium 125 (L) 135 - 145 mmol/L    Potassium 4.4 3.6 - 5.5 mmol/L    Chloride 91 (L) 96 - 112 mmol/L    Co2 22 20 - 33 mmol/L    Glucose 693 (HH) 65 - 99 mg/dL    Bun 23 (H) 8 - 22 mg/dL    Creatinine 1.09 0.50 - 1.40 mg/dL    Calcium 10.0 8.5 - 10.5 mg/dL    Anion Gap 12.0 (H) 0.0 - 11.9   ESTIMATED GFR   Result Value Ref Range    GFR If African American >60 >60 mL/min/1.73 m 2    GFR If Non African American >60 >60 mL/min/1.73 m 2   ACCU-CHEK GLUCOSE   Result Value Ref Range    Glucose - Accu-Ck >600 (HH) 65 - 99 mg/dL   ACCU-CHEK GLUCOSE   Result Value Ref Range    Glucose - Accu-Ck 480 (HH) 65 - 99 mg/dL       All labs reviewed by me.      COURSE & MEDICAL DECISION MAKING  Nursing notes, VS, PMSFHx reviewed in chart.     Review of past medical records shows the patient was last seen here 11 days ago for vomiting  and high blood sugar.     1:40 PM - Patient seen and examined at bedside.  Ordered CBC w/ differential, BMP, accu-chek glucose to evaluate his symptoms. The differential diagnoses include but are not limited to: electrolyte abnormality, viral syndrome, dehydration.     5:27 PM on recheck the patient states that he is feeling much better.  He has received 3 L of normal saline in the emergency department.  He received 10 units of IV insulin.  Recheck of his blood sugar at this time demonstrates a blood sugar of 315.  The patient has good insight into his care.  He is a reliable patient.  I discussed this with the patient.  He will monitor his blood sugars on a regular basis.  He will take his antibiotics and use the new antibiotic oral rinse as prescribed by his dentist to treat the infection that I feel is likely driving his hyperglycemia.  Return precautions are discussed.  Patient is discharged home in improved condition.    HYDRATION: Based on the patient's presentation of Dehydration, Hyperglycemia and Tachycardia the patient was given IV fluids. IV Hydration was used because oral hydration was not adequate alone. Upon recheck following hydration, the patient was significantly improved.    The patient will return for new or worsening symptoms and is stable at the time of discharge.    The patient is referred to a primary physician for blood pressure management, diabetic screening, and for all other preventative health concerns.      DISPOSITION:  Patient will be discharged home in stable condition.    FOLLOW UP:  [facility], Emergency Dept  [address] [id number] [phone]    If symptoms worsen    your dentist    Schedule an appointment as soon as possible for a visit         OUTPATIENT MEDICATIONS:  New Prescriptions    No medications on file         FINAL IMPRESSION  1. Hyperglycemia    2. Dental infection          IJose Martin (Gareth), diana scribing for, and  in the presence of, Lavon Luke M.D..    Electronically signed by: Jose Martin Kiran (Scribe), 12/31/2018    I, Lavon Luke M.D. personally performed the services described in this documentation, as scribed by Jose Martin Kiran in my presence, and it is both accurate and complete. C    The note accurately reflects work and decisions made by me.  Lavon Luke  12/31/2018  5:29 PM

## 2019-01-02 ENCOUNTER — APPOINTMENT (OUTPATIENT)
Dept: RADIOLOGY | Facility: MEDICAL CENTER | Age: 54
DRG: 638 | End: 2019-01-02
Attending: STUDENT IN AN ORGANIZED HEALTH CARE EDUCATION/TRAINING PROGRAM
Payer: COMMERCIAL

## 2019-01-02 ENCOUNTER — HOSPITAL ENCOUNTER (INPATIENT)
Facility: MEDICAL CENTER | Age: 54
LOS: 1 days | DRG: 638 | End: 2019-01-03
Attending: EMERGENCY MEDICINE | Admitting: HOSPITALIST
Payer: COMMERCIAL

## 2019-01-02 DIAGNOSIS — E87.5 HYPERKALEMIA: ICD-10-CM

## 2019-01-02 DIAGNOSIS — R11.2 NON-INTRACTABLE VOMITING WITH NAUSEA, UNSPECIFIED VOMITING TYPE: ICD-10-CM

## 2019-01-02 DIAGNOSIS — E08.10 DIABETIC KETOACIDOSIS WITHOUT COMA ASSOCIATED WITH DIABETES MELLITUS DUE TO UNDERLYING CONDITION (HCC): ICD-10-CM

## 2019-01-02 DIAGNOSIS — J34.1 MAXILLARY SINUS CYST: ICD-10-CM

## 2019-01-02 DIAGNOSIS — R73.9 HYPERGLYCEMIA: ICD-10-CM

## 2019-01-02 PROBLEM — J01.90 SINUSITIS, ACUTE: Status: ACTIVE | Noted: 2019-01-02

## 2019-01-02 LAB
ALBUMIN SERPL BCP-MCNC: 4.2 G/DL (ref 3.2–4.9)
ALBUMIN/GLOB SERPL: 1.6 G/DL
ALP SERPL-CCNC: 97 U/L (ref 30–99)
ALT SERPL-CCNC: 18 U/L (ref 2–50)
ANION GAP SERPL CALC-SCNC: 10 MMOL/L (ref 0–11.9)
ANION GAP SERPL CALC-SCNC: 10 MMOL/L (ref 0–11.9)
ANION GAP SERPL CALC-SCNC: 14 MMOL/L (ref 0–11.9)
ANION GAP SERPL CALC-SCNC: 8 MMOL/L (ref 0–11.9)
APPEARANCE UR: CLEAR
AST SERPL-CCNC: 16 U/L (ref 12–45)
BASE EXCESS BLDV CALC-SCNC: -4 MMOL/L
BASOPHILS # BLD AUTO: 0.5 % (ref 0–1.8)
BASOPHILS # BLD: 0.02 K/UL (ref 0–0.12)
BILIRUB SERPL-MCNC: 0.6 MG/DL (ref 0.1–1.5)
BILIRUB UR QL STRIP.AUTO: NEGATIVE
BODY TEMPERATURE: ABNORMAL CENTIGRADE
BUN SERPL-MCNC: 18 MG/DL (ref 8–22)
BUN SERPL-MCNC: 18 MG/DL (ref 8–22)
BUN SERPL-MCNC: 21 MG/DL (ref 8–22)
BUN SERPL-MCNC: 27 MG/DL (ref 8–22)
CALCIUM SERPL-MCNC: 8.6 MG/DL (ref 8.5–10.5)
CALCIUM SERPL-MCNC: 8.8 MG/DL (ref 8.5–10.5)
CALCIUM SERPL-MCNC: 9.3 MG/DL (ref 8.5–10.5)
CALCIUM SERPL-MCNC: 9.4 MG/DL (ref 8.5–10.5)
CHLORIDE SERPL-SCNC: 103 MMOL/L (ref 96–112)
CHLORIDE SERPL-SCNC: 108 MMOL/L (ref 96–112)
CHLORIDE SERPL-SCNC: 109 MMOL/L (ref 96–112)
CHLORIDE SERPL-SCNC: 90 MMOL/L (ref 96–112)
CO2 SERPL-SCNC: 19 MMOL/L (ref 20–33)
CO2 SERPL-SCNC: 20 MMOL/L (ref 20–33)
CO2 SERPL-SCNC: 20 MMOL/L (ref 20–33)
CO2 SERPL-SCNC: 22 MMOL/L (ref 20–33)
COLOR UR: YELLOW
CREAT SERPL-MCNC: 0.65 MG/DL (ref 0.5–1.4)
CREAT SERPL-MCNC: 0.66 MG/DL (ref 0.5–1.4)
CREAT SERPL-MCNC: 0.79 MG/DL (ref 0.5–1.4)
CREAT SERPL-MCNC: 1.26 MG/DL (ref 0.5–1.4)
EOSINOPHIL # BLD AUTO: 0.05 K/UL (ref 0–0.51)
EOSINOPHIL NFR BLD: 1.2 % (ref 0–6.9)
ERYTHROCYTE [DISTWIDTH] IN BLOOD BY AUTOMATED COUNT: 46.3 FL (ref 35.9–50)
GLOBULIN SER CALC-MCNC: 2.6 G/DL (ref 1.9–3.5)
GLUCOSE BLD-MCNC: 282 MG/DL (ref 65–99)
GLUCOSE BLD-MCNC: 424 MG/DL (ref 65–99)
GLUCOSE BLD-MCNC: >600 MG/DL (ref 65–99)
GLUCOSE BLD-MCNC: >600 MG/DL (ref 65–99)
GLUCOSE SERPL-MCNC: 202 MG/DL (ref 65–99)
GLUCOSE SERPL-MCNC: 276 MG/DL (ref 65–99)
GLUCOSE SERPL-MCNC: 570 MG/DL (ref 65–99)
GLUCOSE SERPL-MCNC: 876 MG/DL (ref 65–99)
GLUCOSE UR STRIP.AUTO-MCNC: >=1000 MG/DL
HCO3 BLDV-SCNC: 22 MMOL/L (ref 24–28)
HCT VFR BLD AUTO: 41.2 % (ref 42–52)
HGB BLD-MCNC: 14.7 G/DL (ref 14–18)
IMM GRANULOCYTES # BLD AUTO: 0.07 K/UL (ref 0–0.11)
IMM GRANULOCYTES NFR BLD AUTO: 1.7 % (ref 0–0.9)
KETONES UR STRIP.AUTO-MCNC: 15 MG/DL
LEUKOCYTE ESTERASE UR QL STRIP.AUTO: NEGATIVE
LIPASE SERPL-CCNC: 54 U/L (ref 11–82)
LYMPHOCYTES # BLD AUTO: 0.81 K/UL (ref 1–4.8)
LYMPHOCYTES NFR BLD: 19.2 % (ref 22–41)
MAGNESIUM SERPL-MCNC: 1.9 MG/DL (ref 1.5–2.5)
MCH RBC QN AUTO: 34.1 PG (ref 27–33)
MCHC RBC AUTO-ENTMCNC: 35.7 G/DL (ref 33.7–35.3)
MCV RBC AUTO: 95.6 FL (ref 81.4–97.8)
MICRO URNS: ABNORMAL
MONOCYTES # BLD AUTO: 0.19 K/UL (ref 0–0.85)
MONOCYTES NFR BLD AUTO: 4.5 % (ref 0–13.4)
NEUTROPHILS # BLD AUTO: 3.08 K/UL (ref 1.82–7.42)
NEUTROPHILS NFR BLD: 72.9 % (ref 44–72)
NITRITE UR QL STRIP.AUTO: NEGATIVE
NRBC # BLD AUTO: 0 K/UL
NRBC BLD-RTO: 0 /100 WBC
PCO2 BLDV: 43.5 MMHG (ref 41–51)
PH BLDV: 7.32 [PH] (ref 7.31–7.45)
PH UR STRIP.AUTO: 5 [PH]
PHOSPHATE SERPL-MCNC: 3.8 MG/DL (ref 2.5–4.5)
PLATELET # BLD AUTO: 200 K/UL (ref 164–446)
PMV BLD AUTO: 9.9 FL (ref 9–12.9)
PO2 BLDV: 22.6 MMHG (ref 25–40)
POTASSIUM SERPL-SCNC: 3.3 MMOL/L (ref 3.6–5.5)
POTASSIUM SERPL-SCNC: 3.5 MMOL/L (ref 3.6–5.5)
POTASSIUM SERPL-SCNC: 4.2 MMOL/L (ref 3.6–5.5)
POTASSIUM SERPL-SCNC: 5.7 MMOL/L (ref 3.6–5.5)
PROT SERPL-MCNC: 6.8 G/DL (ref 6–8.2)
PROT UR QL STRIP: NEGATIVE MG/DL
RBC # BLD AUTO: 4.31 M/UL (ref 4.7–6.1)
RBC UR QL AUTO: NEGATIVE
SAO2 % BLDV: 37.4 %
SODIUM SERPL-SCNC: 123 MMOL/L (ref 135–145)
SODIUM SERPL-SCNC: 133 MMOL/L (ref 135–145)
SODIUM SERPL-SCNC: 138 MMOL/L (ref 135–145)
SODIUM SERPL-SCNC: 139 MMOL/L (ref 135–145)
SP GR UR STRIP.AUTO: 1.03
UROBILINOGEN UR STRIP.AUTO-MCNC: 0.2 MG/DL
WBC # BLD AUTO: 4.2 K/UL (ref 4.8–10.8)

## 2019-01-02 PROCEDURE — 82803 BLOOD GASES ANY COMBINATION: CPT

## 2019-01-02 PROCEDURE — 85025 COMPLETE CBC W/AUTO DIFF WBC: CPT

## 2019-01-02 PROCEDURE — 96374 THER/PROPH/DIAG INJ IV PUSH: CPT

## 2019-01-02 PROCEDURE — 99291 CRITICAL CARE FIRST HOUR: CPT | Mod: GC | Performed by: HOSPITALIST

## 2019-01-02 PROCEDURE — 70355 PANORAMIC X-RAY OF JAWS: CPT

## 2019-01-02 PROCEDURE — 96361 HYDRATE IV INFUSION ADD-ON: CPT

## 2019-01-02 PROCEDURE — 83690 ASSAY OF LIPASE: CPT

## 2019-01-02 PROCEDURE — 770020 HCHG ROOM/CARE - TELE (206)

## 2019-01-02 PROCEDURE — 700111 HCHG RX REV CODE 636 W/ 250 OVERRIDE (IP): Performed by: EMERGENCY MEDICINE

## 2019-01-02 PROCEDURE — 96372 THER/PROPH/DIAG INJ SC/IM: CPT

## 2019-01-02 PROCEDURE — 84100 ASSAY OF PHOSPHORUS: CPT

## 2019-01-02 PROCEDURE — A9270 NON-COVERED ITEM OR SERVICE: HCPCS | Performed by: HOSPITALIST

## 2019-01-02 PROCEDURE — 83036 HEMOGLOBIN GLYCOSYLATED A1C: CPT

## 2019-01-02 PROCEDURE — 36415 COLL VENOUS BLD VENIPUNCTURE: CPT

## 2019-01-02 PROCEDURE — 82962 GLUCOSE BLOOD TEST: CPT

## 2019-01-02 PROCEDURE — 700105 HCHG RX REV CODE 258: Performed by: EMERGENCY MEDICINE

## 2019-01-02 PROCEDURE — 700101 HCHG RX REV CODE 250: Performed by: STUDENT IN AN ORGANIZED HEALTH CARE EDUCATION/TRAINING PROGRAM

## 2019-01-02 PROCEDURE — 80048 BASIC METABOLIC PNL TOTAL CA: CPT

## 2019-01-02 PROCEDURE — 700111 HCHG RX REV CODE 636 W/ 250 OVERRIDE (IP): Performed by: STUDENT IN AN ORGANIZED HEALTH CARE EDUCATION/TRAINING PROGRAM

## 2019-01-02 PROCEDURE — 700102 HCHG RX REV CODE 250 W/ 637 OVERRIDE(OP): Performed by: STUDENT IN AN ORGANIZED HEALTH CARE EDUCATION/TRAINING PROGRAM

## 2019-01-02 PROCEDURE — 700102 HCHG RX REV CODE 250 W/ 637 OVERRIDE(OP): Performed by: HOSPITALIST

## 2019-01-02 PROCEDURE — 83735 ASSAY OF MAGNESIUM: CPT

## 2019-01-02 PROCEDURE — 70486 CT MAXILLOFACIAL W/O DYE: CPT

## 2019-01-02 PROCEDURE — 99285 EMERGENCY DEPT VISIT HI MDM: CPT

## 2019-01-02 PROCEDURE — 81003 URINALYSIS AUTO W/O SCOPE: CPT

## 2019-01-02 PROCEDURE — A9270 NON-COVERED ITEM OR SERVICE: HCPCS | Performed by: STUDENT IN AN ORGANIZED HEALTH CARE EDUCATION/TRAINING PROGRAM

## 2019-01-02 PROCEDURE — 96375 TX/PRO/DX INJ NEW DRUG ADDON: CPT

## 2019-01-02 PROCEDURE — 80053 COMPREHEN METABOLIC PANEL: CPT

## 2019-01-02 PROCEDURE — 700102 HCHG RX REV CODE 250 W/ 637 OVERRIDE(OP): Performed by: EMERGENCY MEDICINE

## 2019-01-02 RX ORDER — BISACODYL 10 MG
10 SUPPOSITORY, RECTAL RECTAL
Status: DISCONTINUED | OUTPATIENT
Start: 2019-01-02 | End: 2019-01-03 | Stop reason: HOSPADM

## 2019-01-02 RX ORDER — SODIUM CHLORIDE 9 MG/ML
1000 INJECTION, SOLUTION INTRAVENOUS ONCE
Status: COMPLETED | OUTPATIENT
Start: 2019-01-02 | End: 2019-01-02

## 2019-01-02 RX ORDER — CHLORHEXIDINE GLUCONATE ORAL RINSE 1.2 MG/ML
15 SOLUTION DENTAL 2 TIMES DAILY
Status: ON HOLD | COMMUNITY
End: 2019-01-24 | Stop reason: CLARIF

## 2019-01-02 RX ORDER — HYDRALAZINE HYDROCHLORIDE 20 MG/ML
10 INJECTION INTRAMUSCULAR; INTRAVENOUS EVERY 4 HOURS PRN
Status: DISCONTINUED | OUTPATIENT
Start: 2019-01-02 | End: 2019-01-03 | Stop reason: HOSPADM

## 2019-01-02 RX ORDER — BUPROPION HYDROCHLORIDE 300 MG/1
300 TABLET ORAL EVERY MORNING
Status: DISCONTINUED | OUTPATIENT
Start: 2019-01-02 | End: 2019-01-02

## 2019-01-02 RX ORDER — THYROID 30 MG/1
60 TABLET ORAL EVERY MORNING
Status: DISCONTINUED | OUTPATIENT
Start: 2019-01-02 | End: 2019-01-03 | Stop reason: HOSPADM

## 2019-01-02 RX ORDER — AMOXICILLIN 250 MG
2 CAPSULE ORAL 2 TIMES DAILY
Status: DISCONTINUED | OUTPATIENT
Start: 2019-01-02 | End: 2019-01-03 | Stop reason: HOSPADM

## 2019-01-02 RX ORDER — INSULIN GLARGINE 100 [IU]/ML
20 INJECTION, SOLUTION SUBCUTANEOUS EVERY EVENING
Status: DISCONTINUED | OUTPATIENT
Start: 2019-01-02 | End: 2019-01-03

## 2019-01-02 RX ORDER — SODIUM CHLORIDE AND POTASSIUM CHLORIDE 150; 900 MG/100ML; MG/100ML
INJECTION, SOLUTION INTRAVENOUS CONTINUOUS
Status: DISCONTINUED | OUTPATIENT
Start: 2019-01-02 | End: 2019-01-03

## 2019-01-02 RX ORDER — DEXTROSE MONOHYDRATE 25 G/50ML
25 INJECTION, SOLUTION INTRAVENOUS
Status: DISCONTINUED | OUTPATIENT
Start: 2019-01-02 | End: 2019-01-03 | Stop reason: HOSPADM

## 2019-01-02 RX ORDER — ONDANSETRON 2 MG/ML
4 INJECTION INTRAMUSCULAR; INTRAVENOUS ONCE
Status: COMPLETED | OUTPATIENT
Start: 2019-01-02 | End: 2019-01-02

## 2019-01-02 RX ORDER — AMOXICILLIN AND CLAVULANATE POTASSIUM 875; 125 MG/1; MG/1
1 TABLET, FILM COATED ORAL EVERY 12 HOURS
Status: DISCONTINUED | OUTPATIENT
Start: 2019-01-02 | End: 2019-01-03 | Stop reason: HOSPADM

## 2019-01-02 RX ORDER — DEXTROSE MONOHYDRATE 25 G/50ML
25 INJECTION, SOLUTION INTRAVENOUS
Status: DISCONTINUED | OUTPATIENT
Start: 2019-01-02 | End: 2019-01-02

## 2019-01-02 RX ORDER — TESTOSTERONE CYPIONATE 200 MG/ML
80 INJECTION, SOLUTION INTRAMUSCULAR
Status: DISCONTINUED | OUTPATIENT
Start: 2019-01-03 | End: 2019-01-03 | Stop reason: HOSPADM

## 2019-01-02 RX ORDER — POLYETHYLENE GLYCOL 3350 17 G/17G
1 POWDER, FOR SOLUTION ORAL
Status: DISCONTINUED | OUTPATIENT
Start: 2019-01-02 | End: 2019-01-03 | Stop reason: HOSPADM

## 2019-01-02 RX ORDER — ACETAMINOPHEN 325 MG/1
650 TABLET ORAL EVERY 6 HOURS PRN
Status: DISCONTINUED | OUTPATIENT
Start: 2019-01-02 | End: 2019-01-03 | Stop reason: HOSPADM

## 2019-01-02 RX ORDER — BUPROPION HYDROCHLORIDE 150 MG/1
150 TABLET, EXTENDED RELEASE ORAL 2 TIMES DAILY
Status: DISCONTINUED | OUTPATIENT
Start: 2019-01-02 | End: 2019-01-03 | Stop reason: HOSPADM

## 2019-01-02 RX ADMIN — POTASSIUM CHLORIDE AND SODIUM CHLORIDE: 900; 150 INJECTION, SOLUTION INTRAVENOUS at 15:05

## 2019-01-02 RX ADMIN — BUPROPION HYDROCHLORIDE 150 MG: 150 TABLET, EXTENDED RELEASE ORAL at 17:55

## 2019-01-02 RX ADMIN — ENOXAPARIN SODIUM 40 MG: 100 INJECTION SUBCUTANEOUS at 15:16

## 2019-01-02 RX ADMIN — ONDANSETRON 4 MG: 2 INJECTION INTRAMUSCULAR; INTRAVENOUS at 09:39

## 2019-01-02 RX ADMIN — ACETAMINOPHEN 650 MG: 325 TABLET, FILM COATED ORAL at 22:51

## 2019-01-02 RX ADMIN — ACETAMINOPHEN 650 MG: 325 TABLET, FILM COATED ORAL at 15:16

## 2019-01-02 RX ADMIN — POTASSIUM CHLORIDE AND SODIUM CHLORIDE: 900; 150 INJECTION, SOLUTION INTRAVENOUS at 21:27

## 2019-01-02 RX ADMIN — AMOXICILLIN AND CLAVULANATE POTASSIUM 1 TABLET: 875; 125 TABLET, FILM COATED ORAL at 17:55

## 2019-01-02 RX ADMIN — SODIUM CHLORIDE 1000 ML: 9 INJECTION, SOLUTION INTRAVENOUS at 09:42

## 2019-01-02 RX ADMIN — INSULIN GLARGINE 20 UNITS: 100 INJECTION, SOLUTION SUBCUTANEOUS at 17:53

## 2019-01-02 RX ADMIN — SODIUM CHLORIDE 1000 ML: 9 INJECTION, SOLUTION INTRAVENOUS at 10:37

## 2019-01-02 RX ADMIN — LEVOTHYROXINE, LIOTHYRONINE 60 MG: 19; 4.5 TABLET ORAL at 17:55

## 2019-01-02 RX ADMIN — SODIUM CHLORIDE 1000 ML: 9 INJECTION, SOLUTION INTRAVENOUS at 12:15

## 2019-01-02 RX ADMIN — INSULIN HUMAN 10 UNITS: 100 INJECTION, SOLUTION PARENTERAL at 10:56

## 2019-01-02 ASSESSMENT — COGNITIVE AND FUNCTIONAL STATUS - GENERAL
DAILY ACTIVITIY SCORE: 24
MOBILITY SCORE: 24
SUGGESTED CMS G CODE MODIFIER MOBILITY: CH
SUGGESTED CMS G CODE MODIFIER DAILY ACTIVITY: CH

## 2019-01-02 ASSESSMENT — PATIENT HEALTH QUESTIONNAIRE - PHQ9
1. LITTLE INTEREST OR PLEASURE IN DOING THINGS: MORE THAN HALF THE DAYS
9. THOUGHTS THAT YOU WOULD BE BETTER OFF DEAD, OR OF HURTING YOURSELF: NOT AT ALL
SUM OF ALL RESPONSES TO PHQ QUESTIONS 1-9: 11
7. TROUBLE CONCENTRATING ON THINGS, SUCH AS READING THE NEWSPAPER OR WATCHING TELEVISION: SEVERAL DAYS
SUM OF ALL RESPONSES TO PHQ9 QUESTIONS 1 AND 2: 2
8. MOVING OR SPEAKING SO SLOWLY THAT OTHER PEOPLE COULD HAVE NOTICED. OR THE OPPOSITE, BEING SO FIGETY OR RESTLESS THAT YOU HAVE BEEN MOVING AROUND A LOT MORE THAN USUAL: MORE THAN HALF THE DAYS
4. FEELING TIRED OR HAVING LITTLE ENERGY: MORE THAN HALF THE DAYS
6. FEELING BAD ABOUT YOURSELF - OR THAT YOU ARE A FAILURE OR HAVE LET YOURSELF OR YOUR FAMILY DOWN: NOT AL ALL
5. POOR APPETITE OR OVEREATING: MORE THAN HALF THE DAYS
2. FEELING DOWN, DEPRESSED, IRRITABLE, OR HOPELESS: NOT AT ALL
3. TROUBLE FALLING OR STAYING ASLEEP OR SLEEPING TOO MUCH: MORE THAN HALF THE DAYS

## 2019-01-02 ASSESSMENT — PAIN SCALES - GENERAL
PAINLEVEL_OUTOF10: 5
PAINLEVEL_OUTOF10: 6

## 2019-01-02 ASSESSMENT — ENCOUNTER SYMPTOMS
CHILLS: 0
ABDOMINAL PAIN: 1
COUGH: 0
HALLUCINATIONS: 0
DIAPHORESIS: 0
BRUISES/BLEEDS EASILY: 0
HEADACHES: 0
ORTHOPNEA: 0
FALLS: 0
VOMITING: 1
NECK PAIN: 0
POLYDIPSIA: 1
EYE PAIN: 0
DIARRHEA: 0
SORE THROAT: 0
NAUSEA: 1
LOSS OF CONSCIOUSNESS: 0
DIZZINESS: 0
SHORTNESS OF BREATH: 0
DOUBLE VISION: 0

## 2019-01-02 ASSESSMENT — COPD QUESTIONNAIRES
HAVE YOU SMOKED AT LEAST 100 CIGARETTES IN YOUR ENTIRE LIFE: NO/DON'T KNOW
DURING THE PAST 4 WEEKS HOW MUCH DID YOU FEEL SHORT OF BREATH: NONE/LITTLE OF THE TIME
DO YOU EVER COUGH UP ANY MUCUS OR PHLEGM?: NO/ONLY WITH OCCASIONAL COLDS OR INFECTIONS
IN THE PAST 12 MONTHS DO YOU DO LESS THAN YOU USED TO BECAUSE OF YOUR BREATHING PROBLEMS: DISAGREE/UNSURE
COPD SCREENING SCORE: 1

## 2019-01-02 ASSESSMENT — LIFESTYLE VARIABLES
EVER_SMOKED: NEVER
SUBSTANCE_ABUSE: 0
ALCOHOL_USE: NO

## 2019-01-02 NOTE — ED TRIAGE NOTES
Pt ambulates to triage  Chief Complaint   Patient presents with   • High Blood Sugar     in 400's   took insulin this am  Vomited x 2  Pt asked to wait in lobby, pt updated on triage process and pt asked to inform RN of any changes.

## 2019-01-02 NOTE — ED NOTES
Pt escorted back to room and provided warm blankets  Pt provided call light, instructed to call if needing any assistance, instructed not to get up by self, herb in lowest position. Krystal LUCERO aware of pt. Chart up for ERP.

## 2019-01-02 NOTE — ED NOTES
Lab called with critical result of Glucose 876 at 1016. Critical lab result read back.   Dr. Olguin notified of critical lab result at 1017.

## 2019-01-02 NOTE — ED PROVIDER NOTES
ED Provider Note    Chief Complaint:   High blood sugar, nausea, vomiting    HPI:  Mahesh Bradshaw is a 53 y.o. male who presents with chief complaint of uncontrolled blood sugar.  He has a long-standing history of diabetes, this is become uncontrolled over the last 2 months.  He has had multiple visits to this emergency department as well as admissions for hyperglycemia and hyperosmolar nonketotic state.  He was seen in this emergency department 2 days ago, blood glucose was elevated, he was treated with fluid resuscitation and insulin and discharged home.  After discharge, his blood glucose was well controlled in the evening, however began to steadily increase yesterday initially in the 400s, now too high for his home glucometer to read.  Very early this morning he began vomiting.  Around 4:00 in the morning he took 42 units of his long-acting insulin as well as 10 units of his sliding scale insulin.  Blood glucose was still elevated, he took another 10 units of sliding scale insulin at 7 AM without improvement in his condition prompting him to come to the emergency department.    He has had some dental work over the past 1-2 weeks, otherwise denies any illness or infection.  He has not had any significant dental abscess, is not currently on antibiotics.  He denies any fevers, denies any nausea or vomiting prior to his hyperglycemia.  He has no abnormal rashes or lesions, no dysuria, no hematuria.  Due to his insurance, he has had to change his endocrinologist.  He has an appointment on January 27 to establish care with Dr. Valente's clinic.    Review of Systems:  See HPI for pertinent positives and negatives. All other systems negative.    Past Medical History:   has a past medical history of ADRIANE (acute kidney injury) ([facility]) (2/24/2016); Anesthesia; Arthritis (3-3-17); Aspiration pneumonia ([facility]) (3-2016); ASTHMA (3-3-17); Breath shortness (3-3-17); Congestive heart failure ([facility]) (2-2016 ); Dental disorder;  Depression (11/26/2014); Diabetes ([facility]) (3-3-17); Diabetes ([facility]); Difficult intubation (2-2016); DKA (diabetic ketoacidoses) ([facility]) (2-2016); Electrolyte imbalance (2-2016); Elevated LFTs; Elevated liver enzymes (2-2016); Essential hypertension (1/22/2016); Gout; Heart burn; High cholesterol (3-3-17); Hypothyroid (3-3-17); Indigestion; Kidney stones; Klebsiella infect; Migraine; MRSA cellulitis; 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    Social History:  Social History     Social History Main Topics   • Smoking status: Never Smoker   • Smokeless tobacco: Never Used   • Alcohol use No   • Drug use: No   • Sexual activity: Not on file       Surgical History:   has a past surgical history that includes vaishnavi by laparoscopy (2005); colonoscopy with biopsy (11/26/2014); incision and drainage general (4/7/2017); irrigation & debridement general (Right, 4/29/2017); irrigation & debridement general (Right, 5/1/2017); other orthopedic surgery (1997 or 1998); umbilical hernia repair (N/A, 11/6/2016); umbilical hernia repair (3/10/2017); irrigation & debridement ortho (Left, 12/10/2017); and umbilical hernia repair (N/A, 4/15/2018).    Current Medications:  Home Medications     Reviewed by Darleen High (Pharmacy Tech) on 01/02/19 at 1119  Med List Status: Complete   Medication Last Dose Status   anastrozole (ARIMIDEX) 1 MG Tab 12/26/2018 Active   buPROPion (WELLBUTRIN XL) 300 MG XL tablet 1/1/2019 Active   chlorhexidine (PERIDEX) 0.12 % Solution 1/2/2019 Active   doxycycline (VIBRAMYCIN) 100 MG Tab 1/1/2019 Active   insulin glargine (LANTUS) 100 UNIT/ML Solution 1/2/2019 Active   insulin lispro (HUMALOG) 100 UNIT/ML Solution 1/2/2019 Active   ondansetron  "(ZOFRAN) 4 MG Tab tablet 1/2/2019 Active   testosterone cypionate (DEPO-TESTOSTERONE) 200 MG/ML Solution injection 12/27/2018 Active   thyroid (ARMOUR THYROID) 60 MG Tab 1/1/2019 Active                Allergies:  Allergies   Allergen Reactions   • Peanut (Diagnostic) Anaphylaxis   • Tradjenta [Linagliptin] Unspecified     Pt developed pancreatitis   • Hydrocodone Hives     Tolerates Morphine and oxycodone         Physical Exam:  Vital Signs: /93   Pulse 94   Temp 36.6 °C (97.8 °F) (Temporal)   Resp (!) 21   Ht 1.727 m (5' 8\")   Wt 99.1 kg (218 lb 7.6 oz)   SpO2 94%   BMI 33.22 kg/m²   Constitutional: Alert, no acute distress  HENT: Moist mucus membranes, normal posterior pharynx, no intraoral lesions, no facial swelling, no odontogenic abscesses noted  Eyes: Pupils equal and reactive, normal conjunctiva  Neck: Supple, normal range of motion, no stridor  Cardiovascular: Extremities are warm and well perfused, no murmur appreciated, normal cardiac auscultation, tachycardic rate  Pulmonary: No respiratory distress, normal work of breathing, no accessory muscule usage, breath sounds clear and equal bilaterally  Abdomen: Soft, non-distended, non-tender to palpation, no peritoneal signs  Skin: Warm, dry, no rashes or lesions  Musculoskeletal: Normal range of motion in all extremities, no swelling or deformity noted  Neurologic: Alert, oriented, normal speech, normal motor function  Psychiatric: Normal and appropriate mood and affect    Medical records reviewed for continuity of care.  Emergency department record reviewed from 12/20/18.  Patient was seen for evaluation of hyperglycemia, nausea, and vomiting.  He has had multiple visits to this facility over the last 1-2 months for uncontrolled blood sugars, was admitted previously for hyperosmolar nonketotic state.  He was discharged home 12/21/18.  He then re-presented to this emergency department 12/31/19 for hyperglycemia and sinus pain.  He was fluid " resuscitated in the emergency department, treated with IV insulin, blood glucose was controlled.  He was discharged home in stable condition.    Labs:  Labs Reviewed   CBC WITH DIFFERENTIAL - Abnormal; Notable for the following:        Result Value    WBC 4.2 (*)     RBC 4.31 (*)     Hematocrit 41.2 (*)     MCH 34.1 (*)     MCHC 35.7 (*)     Neutrophils-Polys 72.90 (*)     Lymphocytes 19.20 (*)     Immature Granulocytes 1.70 (*)     Lymphs (Absolute) 0.81 (*)     All other components within normal limits   COMP METABOLIC PANEL - Abnormal; Notable for the following:     Sodium 123 (*)     Potassium 5.7 (*)     Chloride 90 (*)     Co2 19 (*)     Anion Gap 14.0 (*)     Glucose 876 (*)     Bun 27 (*)     All other components within normal limits   VENOUS BLOOD GAS - Abnormal; Notable for the following:     Venous Bg Po2 22.6 (*)     Venous Bg Hco3 22 (*)     All other components within normal limits   URINALYSIS,CULTURE IF INDICATED - Abnormal; Notable for the following:     Glucose >=1000 (*)     Ketones 15 (*)     All other components within normal limits   ACCU-CHEK GLUCOSE - Abnormal; Notable for the following:     Glucose - Accu-Ck >600 (*)     All other components within normal limits   ACCU-CHEK GLUCOSE - Abnormal; Notable for the following:     Glucose - Accu-Ck >600 (*)     All other components within normal limits   LIPASE   ESTIMATED GFR       Radiology:  No orders to display        ED Medications Administered:  Medications   NS infusion 1,000 mL (0 mL Intravenous Stopped 1/2/19 1037)   ondansetron (ZOFRAN) syringe/vial injection 4 mg (4 mg Intravenous Given 1/2/19 0939)   NS infusion 1,000 mL (0 mL Intravenous Stopped 1/2/19 1132)   insulin regular (HUMULIN R) injection 10 Units (10 Units Subcutaneous Given 1/2/19 1056)   NS infusion 1,000 mL (1,000 mL Intravenous New Bag 1/2/19 1215)       Differential diagnosis:  DKA, HONK, electrolyte disturbance, urinary tract infection, occult infection    MDM:  History  and physical exam as documented above.  Patient presents tachycardic with elevated blood glucose.  Fluid resuscitation initiated on arrival due to vital sign abnormalities and inability to tolerate oral fluids due to vomiting.  As the patient has current vital sign abnormalities, rapid rehydration is necessary.      He has no fever on arrival to the emergency department, no hypotension, no evidence of Sirs or sepsis.  On laboratory evaluation White blood count is 4.2, lower than white blood count of 6.5 on 12/31/18, but not significantly changed from 12/21/18 (4.7).  Urinalysis positive for ketones and glucose as expected.  There is no evidence of urinary tract infection.  CMP demonstrates sodium of 123, corrected to 142 when hyperglycemia accounted for.  Potassium is elevated to 5.7, creatinine is within normal limits at 1.26.  CO2 is 19, anion gap is 14.  Lipase is within normal limits without evidence of pancreatitis.  Venous pH is within normal limits, no acidosis appreciated.    I did recommend admission for this patient, however he states he would prefer to go home if at all possible.  On reassessment after receiving 2 L of IV fluids and 10 units of subcu insulin, patient's Accu-Chek is still above 600.  As we are not seeing any improvement with continued resuscitation, plan at this time is for admission for insulin and fluid resuscitation.    I discussed the case with [facility] internal medicine, they kindly agreed to evaluate the patient.  At this time I do think he is stable for the medical floor as he has corrected his glucose previously with fluid resuscitation and subcutaneous insulin.  [facility] internal medicine physician will evaluate the patient for disposition to telemetry versus the critical care unit.    9:57 AM Awaiting CMP, lipase, VBG    Disposition:  Admit to [facility] internal medicine in guarded condition    Final Impression:  1. Hyperglycemia    2. Hyperkalemia    3. Non-intractable vomiting with nausea,  unspecified vomiting type          Electronically signed by: Arcelia Olguin, 1/2/2019 4:40 PM

## 2019-01-02 NOTE — NON-PROVIDER
Internal Medicine Admitting History and Physical    Note Author: Fidelia Tolbert, Student       Name Mahesh Bradshaw     1965   Age/Sex 53 y.o. male   MRN [id number]   Code Status Full     After 5PM or if no immediate response to page, please call for cross-coverage  Attending/Team: Sophie See Patient List for primary contact information  Call [phone] to page    1st Call - Day Intern (R1):   Yobani 2nd Call - Day Sr. Resident (R2/R3):   Maria L       Chief Complaint:   High blood sugar    HPI:  Mahesh Bradshaw is a 53 y.o.  male who presented to the emergency department with hyperglycemia after having unreadable blood sugar levels on home BG monitor today. He reports that his blood glucose was in the 400s yesterday. He also reports nausea, LUQ abdominal cramping, sinus congestion, right sided earache, dry mouth, and polyuria. He attributes his abdominal cramping and nausea to high BG levels, as these symptoms are typical for him when his BG is high. He denies vomiting, pain or difficulty with urination, confusion, or recent vision changes. He denies recent diet changes, although reports that his appetite has been decreased for the last week until yesterday when he ate slightly more than usual.     Patient was diagnosed with diabetes in , which was well controlled until he developed pancreatitis thought to be secondary to Trajenta (linagliptin) in 2016. Since then, he has had multiple admissions for hyperglycemia. He was in the ED as recently as 18 for hyperglycemia, where he was discharged after fluid resuscitation and insulin. He also reports recent outpatient treatment for an ear infection with 10 days of doxycycline. Tonight is his last dose. Additionally, he reports a variety of dental procedures over the past two weeks, with the most recent being 2 days ago in which he was told he had an infection in his gums. He is not currently followed by an endocrinologist  but has an appointment with one (Dr. Valente) at the end of this month.       ROS  General: Denies weight loss, fatigue, weakness, fever, or chills  Skin: + Recent lesion from ingrown toenail removal on right big toe  HEENT: As per HPI, +hearing loss in right ear, -discharge from ears  CV: Denies chest pain, palpitations, CRANE  GI: Per HPI  Urinary: Per HPI, denies hematuria        Past Medical History (Chronic medical problem, known complications and current treatment)    # DMII, uncontrolled, treated at home with Lantus and sliding scale insulin  # Pancreatitis, see HPI  # Depression, treated with Wellbutrin  # Hypothyroidism, treated with Depo-testosterone  # Essential hypertension, treated with Hydralazine    Past Surgical History:  Past Surgical History:   Procedure Laterality Date   • UMBILICAL HERNIA REPAIR N/A 4/15/2018    Procedure: UMBILICAL HERNIA REPAIR;  Surgeon: Karen Beasley M.D.;  Location: [facility];  Service: General   • IRRIGATION & DEBRIDEMENT ORTHO Left 12/10/2017    Procedure: IRRIGATION & DEBRIDEMENT ORTHO LEFT HAND;  Surgeon: Chicho Ramos M.D.;  Location: [facility];  Service: Orthopedics   • IRRIGATION & DEBRIDEMENT GENERAL Right 5/1/2017    Procedure: IRRIGATION & DEBRIDEMENT GENERAL-Left HAND AND right  ANKLE;  Surgeon: Esau Dooley M.D.;  Location: [facility];  Service:    • IRRIGATION & DEBRIDEMENT GENERAL Right 4/29/2017    Procedure: IRRIGATION & DEBRIDEMENT GENERAL;  Surgeon: Esau Dooley M.D.;  Location: [facility];  Service:    • INCISION AND DRAINAGE GENERAL  4/7/2017    Procedure: INCISION AND DRAINAGE GENERAL;  Surgeon: Esau Dooley M.D.;  Location: SURGERY SAME DAY [facility];  Service:    • UMBILICAL HERNIA REPAIR  3/10/2017    Procedure: UMBILICAL HERNIA REPAIR- INCISION AND DRAINAGE OF UMBILICAL WOUND AND MESH REMOVAL;  Surgeon: Esau Dooley M.D.;  Location: SURGERY SAME DAY [facility];  Service:     • UMBILICAL HERNIA REPAIR N/A 11/6/2016    Procedure: UMBILICAL HERNIA REPAIR;  Surgeon: Esau Dooley M.D.;  Location: SURGERY [facility];  Service:    • COLONOSCOPY WITH BIOPSY  11/26/2014    Performed by Solo Higginbotham M.D. at ENDOSCOPY [facility]   • LIVE BY LAPAROSCOPY  2005   • OTHER ORTHOPEDIC SURGERY  1997 or 1998    Left Wrist ORIF       Current Outpatient Medications:  Home Medications     Reviewed by Gabi Bui PhT (Pharmacy Tech) on 01/02/19 at 1119  Med List Status: Complete   Medication Last Dose Status   anastrozole (ARIMIDEX) 1 MG Tab 12/26/2018 Active   buPROPion (WELLBUTRIN XL) 300 MG XL tablet 1/1/2019 Active   chlorhexidine (PERIDEX) 0.12 % Solution 1/2/2019 Active   doxycycline (VIBRAMYCIN) 100 MG Tab 1/1/2019 Active   insulin glargine (LANTUS) 100 UNIT/ML Solution 1/2/2019 Active   insulin lispro (HUMALOG) 100 UNIT/ML Solution 1/2/2019 Active   ondansetron (ZOFRAN) 4 MG Tab tablet 1/2/2019 Active   testosterone cypionate (DEPO-TESTOSTERONE) 200 MG/ML Solution injection 12/27/2018 Active   thyroid (ARMOUR THYROID) 60 MG Tab 1/1/2019 Active                Medication Allergy/Sensitivities:  Allergies   Allergen Reactions   • Peanut (Diagnostic) Anaphylaxis   • Tradjenta [Linagliptin] Unspecified     Pt developed pancreatitis   • Hydrocodone Hives     Tolerates Morphine and oxycodone           Family History (mandatory)   No family history on file.    Social History (mandatory)   Social History     Social History   • Marital status: Single     Spouse name: N/A   • Number of children: N/A   • Years of education: N/A     Occupational History   • Not on file.     Social History Main Topics   • Smoking status: Never Smoker   • Smokeless tobacco: Never Used   • Alcohol use No   • Drug use: No   • Sexual activity: Not on file     Other Topics Concern   • Not on file     Social History Narrative    ** Merged History Encounter **         ** Merged History Encounter **     **  "Merged History Encounter **        PCP : KRISSY Brunner    Physical Exam     Vitals:    01/02/19 0900 01/02/19 0930 01/02/19 1000 01/02/19 1030   BP:       Pulse: (!) 105 (!) 101 93 94   Resp: 20 (!) 11 19 (!) 21   Temp:       TempSrc:       SpO2: 96% 96% 94% 94%   Weight:       Height:         Body mass index is 33.22 kg/m².  /93   Pulse 94   Temp 36.6 °C (97.8 °F) (Temporal)   Resp (!) 21   Ht 1.727 m (5' 8\")   Wt 99.1 kg (218 lb 7.6 oz)   SpO2 94%   BMI 33.22 kg/m²   O2 therapy: Pulse Oximetry: 94 %, O2 Delivery: None (Room Air)    Physical Exam  Constitutional: Alert male, no acute distress  HEENT: + Right tympanic membrane erythema,+ left lower molar decay, no discharge from ears, moist mucus membranes, normal posterior pharynx, no intraoral lesions, no facial swelling, , pupils equal and reactive, normal conjunctiva  Cardiovascular: Normal S1/S2, normal RRR, extremities are warm and well perfused, no murmur appreciated  Pulmonary: CTAB, no increased respiratory effort  Abdomen: +Generalized tenderness to palpation focused more on LUQ tenderness, no peritoneal signs  Skin: +Lesion on right big toe from ingrown toenail removal, warm, dry, no rashes   Neurologic: Alert, oriented, normal speech, normal motor function  Psychiatric: Normal and appropriate mood and affect    Data Review       Lab Data Review:  Recent Results (from the past 24 hour(s))   ACCU-CHEK GLUCOSE    Collection Time: 01/02/19  8:45 AM   Result Value Ref Range    Glucose - Accu-Ck >600 (HH) 65 - 99 mg/dL   URINALYSIS,CULTURE IF INDICATED    Collection Time: 01/02/19  9:15 AM   Result Value Ref Range    Color Yellow     Character Clear     Specific Gravity 1.028 <1.035    Ph 5.0 5.0 - 8.0    Glucose >=1000 (A) Negative mg/dL    Ketones 15 (A) Negative mg/dL    Protein Negative Negative mg/dL    Bilirubin Negative Negative    Urobilinogen, Urine 0.2 Negative    Nitrite Negative Negative    Leukocyte Esterase Negative " Negative    Occult Blood Negative Negative    Micro Urine Req see below    CBC WITH DIFFERENTIAL    Collection Time: 01/02/19  9:22 AM   Result Value Ref Range    WBC 4.2 (L) 4.8 - 10.8 K/uL    RBC 4.31 (L) 4.70 - 6.10 M/uL    Hemoglobin 14.7 14.0 - 18.0 g/dL    Hematocrit 41.2 (L) 42.0 - 52.0 %    MCV 95.6 81.4 - 97.8 fL    MCH 34.1 (H) 27.0 - 33.0 pg    MCHC 35.7 (H) 33.7 - 35.3 g/dL    RDW 46.3 35.9 - 50.0 fL    Platelet Count 200 164 - 446 K/uL    MPV 9.9 9.0 - 12.9 fL    Neutrophils-Polys 72.90 (H) 44.00 - 72.00 %    Lymphocytes 19.20 (L) 22.00 - 41.00 %    Monocytes 4.50 0.00 - 13.40 %    Eosinophils 1.20 0.00 - 6.90 %    Basophils 0.50 0.00 - 1.80 %    Immature Granulocytes 1.70 (H) 0.00 - 0.90 %    Nucleated RBC 0.00 /100 WBC    Neutrophils (Absolute) 3.08 1.82 - 7.42 K/uL    Lymphs (Absolute) 0.81 (L) 1.00 - 4.80 K/uL    Monos (Absolute) 0.19 0.00 - 0.85 K/uL    Eos (Absolute) 0.05 0.00 - 0.51 K/uL    Baso (Absolute) 0.02 0.00 - 0.12 K/uL    Immature Granulocytes (abs) 0.07 0.00 - 0.11 K/uL    NRBC (Absolute) 0.00 K/uL   CMP    Collection Time: 01/02/19  9:22 AM   Result Value Ref Range    Sodium 123 (L) 135 - 145 mmol/L    Potassium 5.7 (H) 3.6 - 5.5 mmol/L    Chloride 90 (L) 96 - 112 mmol/L    Co2 19 (L) 20 - 33 mmol/L    Anion Gap 14.0 (H) 0.0 - 11.9    Glucose 876 (HH) 65 - 99 mg/dL    Bun 27 (H) 8 - 22 mg/dL    Creatinine 1.26 0.50 - 1.40 mg/dL    Calcium 9.3 8.5 - 10.5 mg/dL    AST(SGOT) 16 12 - 45 U/L    ALT(SGPT) 18 2 - 50 U/L    Alkaline Phosphatase 97 30 - 99 U/L    Total Bilirubin 0.6 0.1 - 1.5 mg/dL    Albumin 4.2 3.2 - 4.9 g/dL    Total Protein 6.8 6.0 - 8.2 g/dL    Globulin 2.6 1.9 - 3.5 g/dL    A-G Ratio 1.6 g/dL   LIPASE    Collection Time: 01/02/19  9:22 AM   Result Value Ref Range    Lipase 54 11 - 82 U/L   ESTIMATED GFR    Collection Time: 01/02/19  9:22 AM   Result Value Ref Range    GFR If African American >60 >60 mL/min/1.73 m 2    GFR If Non African American 60 >60 mL/min/1.73 m 2    VENOUS BLOOD GAS    Collection Time: 01/02/19  9:43 AM   Result Value Ref Range    Venous Bg Ph 7.32 7.31 - 7.45    Venous Bg Pco2 43.5 41.0 - 51.0 mmHg    Venous Bg Po2 22.6 (L) 25.0 - 40.0 mmHg    Venous Bg O2 Saturation 37.4 %    Venous Bg Hco3 22 (L) 24 - 28 mmol/L    Venous Bg Base Excess -4 mmol/L    Body Temp see below Centigrade   ACCU-CHEK GLUCOSE    Collection Time: 01/02/19 11:28 AM   Result Value Ref Range    Glucose - Accu-Ck >600 (HH) 65 - 99 mg/dL   BASIC METABOLIC PANEL    Collection Time: 01/02/19 12:52 PM   Result Value Ref Range    Sodium 133 (L) 135 - 145 mmol/L    Potassium 4.2 3.6 - 5.5 mmol/L    Chloride 103 96 - 112 mmol/L    Co2 20 20 - 33 mmol/L    Glucose 570 (HH) 65 - 99 mg/dL    Bun 21 8 - 22 mg/dL    Creatinine 0.79 0.50 - 1.40 mg/dL    Calcium 8.8 8.5 - 10.5 mg/dL    Anion Gap 10.0 0.0 - 11.9   ESTIMATED GFR    Collection Time: 01/02/19 12:52 PM   Result Value Ref Range    GFR If African American >60 >60 mL/min/1.73 m 2    GFR If Non African American >60 >60 mL/min/1.73 m 2   ACCU-CHEK GLUCOSE    Collection Time: 01/02/19  2:02 PM   Result Value Ref Range    Glucose - Accu-Ck 424 (HH) 65 - 99 mg/dL       Imaging/Procedures Review:    Independant Imaging Review:   No orders to display               Assessment/Plan     Patient is a 54 y/o male present to the ED with hyperglycemia with a PMH of severe, uncontrolled DMII.     #Hyperosmolar hyperglycemic state  Elevated BG of 876 on admission, K of 5.7 on admission, and hyponatremia without acidosis  Admit to telemetry to monitor for cardiac arrhythmias secondary to electrolyte disturbances  Begin continuous 0.9% NaCl with KCl 20 mEq infusion    #DMII  Begin sliding scale insulin 3-14 units q6  Begin long-acting insulin Lantus 20 units every evening    #Ear infection  Ongoing right ear pain, sinus congestion, and inflamed right tympanic membrane.   Last dose of 10 day outpatient doxycycline tonight  Begin treatment with  amoxicillin-clavulanate 125 mg PO q8    #Nausea  Ondansetron 4 mg q8 PRN    #Hypothyrodism, essential hypertension, depression  Ongoing outpatient problems. Safe to resume home medications of testosteron cypionate, hydralazine, and well butrin      Anticipated Hospital stay: 1 midnight      Quality Measures  Quality-Core Measures  PCP: NASIR Brunner.

## 2019-01-02 NOTE — ED NOTES
Christopher from Lab called with critical result of glucose 570 at 1331. Critical lab result read back to Christopher.   Dr. Noriega notified of critical lab result at 1333.  Critical lab result read back by Dr. Noriega.

## 2019-01-02 NOTE — DIETARY
Nutrition Services: Consult  Wt loss noted on Nutrition Admit Screen. Per chart review, pt weighed 101.4 kg 7/16/18 (~6 months ago), 98 kg on 10/4/18 (3 months ago), and wt upon current admit is 99.1 kg via stand up scale.  Wt appears stable.    Ht: 172.7 cm, Wt: 99.1 kg, BMI 33.22.      Consult received for diabetes diet education - RD to follow-up regarding diet education upon diet advancement.  Pt is currently NPO and up to T8 from ED.

## 2019-01-02 NOTE — ED NOTES
Med Rec complete per Pt at bedside  Allergies Reviewed    Pt started a 10 day course of DOXYCYCLINE on 12/24    Pt reports taking HUMALOG twice this morning in attempt to lower blood sugar. @ 0400 and 0630

## 2019-01-02 NOTE — H&P
I have seen and examined the patient and reviewed the case by the resident physicians Dr. Hilton and Dr. Lisa. I have also reviewed the patient's history, ROS, physical exam, laboratory and radiology findings. Please see the assessments, plans, and discussions as outlined by the resident notes below and in the orders except as noted in the attending note. I was physically present/availble for the critical portions of the exam/evaluation as necessary and appropriate. I am actively involved in the patient's care. Please see Resident Physician History and Physical for additional details.    Active Hospital Problems    Diagnosis   • Diabetic ketoacidosis without coma associated with type 2 diabetes mellitus (HCC) [E11.10]     Priority: High   • Dental caries [K02.9]     Priority: Medium   • Sinusitis, acute [J01.90]     Priority: Medium   • Pseudohyponatremia [E87.1]     Priority: Medium   • Hypothyroidism [E03.9]     Priority: Low     Chronic condition treated with [place] Thyroid.  Resumed maintenance medication.     • Normocytic anemia [D64.9]     Priority: Low   • AP (abdominal pain) [R10.9]       Severe hyperglycemia with and anion gap metabolic acidosis with ketones noted ------> c/w DKA likely caused by a right maxillary sinus and dental infection. Glucose is dropping rapidly with IV insulin and the anion gap is mildly elevated and closing fast with aggressive IVF resuscitation ------> he is improving rapidly and will be placed on telemetry. CC 1 hour during initial management. 60 minutes critical care time in total (start 11am end 3pm)    SHAYNA Barrios MD, FACP, [facility]  Attending Physician  Professor of Medicine & Academic Hospitalist          Internal Medicine Admitting History and Physical    Note Author: Chet Hilton M.D.       Name Mahesh Bradshaw     1965   Age/Sex 53 y.o. male   MRN [id number]   Code Status FULL     After 5PM or if no immediate response to page, please call for  cross-coverage  Attending/Team: Dr. Barrios/[place] See Patient List for primary contact information  Call [phone] to page    1st Call - Day Intern (R1):   Dr. Hilton 2nd Call - Day Sr. Resident (R2/R3):   Dr. Lisa       Chief Complaint:   Blood sugar being high    HPI:    Mr Bradshaw is a 53 yoa M with PMHx of diabetes for which he has been seen on multiple occasions in the emergency department over these last 2 months for uncontrolled hyperglycemia, and hyperosmolar hyperglycemic state.  In fact patient was mostly recently seen in the emergency department just several days ago for which she was treated with IV fluid resuscitation, and insulin for his hyperglycemia.  The patient states that since that time his blood sugar has been steadily rising and he has been unable to control it with insulin at home.  Patient states that he has developed abdominal pain/cramping that is described as a bandlike sensation from the left lower abdomen going across towards the right lower abdomen, and is accompanied by nausea and vomiting.  States that the symptoms are very common for him when he has high blood sugar.    Additionally, the patient reports that over the last 4 weeks he has had multiple dental issues/procedures. Reports that 2 days ago at the dentist he was found to have food stuck under a 2.  This food was removed at the dentist office.  He was given chlorhexidine mouthwash for this problem.    Also, the patient reports that he recently went to an urgent care and was diagnosed with sinusitis for which she was prescribed doxycycline.  Reports that his last day of doxycycline is today.  States that he also has accompanying right sided ear fullness/loss of hearing, nasal congestion, yellow nasal drainage, and some flushing.      Review of Systems   Constitutional: Negative for chills and diaphoresis.   HENT: Positive for congestion. Negative for hearing loss and sore throat.         R ear w/decreased hearing  "for one week (sinus infx).   Eyes: Negative for double vision and pain.   Respiratory: Negative for cough and shortness of breath.    Cardiovascular: Negative for chest pain, orthopnea and leg swelling.   Gastrointestinal: Positive for abdominal pain, nausea and vomiting. Negative for diarrhea.   Genitourinary: Positive for frequency. Negative for dysuria.   Musculoskeletal: Negative for falls and neck pain.   Skin: Negative for itching and rash.   Neurological: Negative for dizziness, loss of consciousness and headaches.   Endo/Heme/Allergies: Positive for polydipsia. Does not bruise/bleed easily.   Psychiatric/Behavioral: Negative for hallucinations and substance abuse.             Past Medical History (Chronic medical problem, known complications and current treatment)    Past Medical History:   Diagnosis Date   • ADRIANE (acute kidney injury) ([facility]) 2/24/2016   • Anesthesia     \"Was difficult to intubate 2-2016\"   • Arthritis 3-3-17    \"Spine\"   • Aspiration pneumonia ([facility]) 3-2016   • ASTHMA 3-3-17    \"Hasn't had to use inhaler in 2 years\"   • Breath shortness 3-3-17    \"With Exercise\"   • Congestive heart failure ([facility]) 2-2016     3-3-17 \"Not a current issue\"   • Dental disorder    • Depression 11/26/2014   • Diabetes ([facility]) 3-3-17    Takes Insulin   • Diabetes ([facility])    • Difficult intubation 2-2016   • DKA (diabetic ketoacidoses) ([facility]) 2-2016    \"10 days on vent with DKA, Renal failure, CHF, elevated liver enzymes and electrolyte imbalance\"   • Electrolyte imbalance 2-2016   • Elevated LFTs    • Elevated liver enzymes 2-2016   • Essential hypertension 1/22/2016   • Gout    • Heart burn    • High cholesterol 3-3-17    Does not currently take medication for   • Hypothyroid 3-3-17    Takes [place] Thyroid   • Indigestion    • Kidney stones    • Klebsiella infect    • Migraine    • MRSA cellulitis    • Necrotizing myositis ([facility])    • On mechanically assisted ventilation ([facility]) 2-2016    HX \"On Vent for 10 days at [facility]\".  " "\"DX Pancreatitis, DKA, CHF, Elevated Liver Enzymes & Electrolyte Imbalance\".   • Pain 3-3-17    \"Left Flank\"   • Pancreatitis 2-2016    \"D/T Tradjenta was hospitialized for 15 days\"   • RF (renal failure) 2-2016   • Sepsis (HCC) 1/21/2016   • Snoring 3-3-17    Has not had a sleep study   • Streptococcus infection    • UC (ulcerative colitis) (HCC) 3-3-17    \"Five BM's per day, takes Lialda\"   • Urinary incontinence    • Vitamin D deficiency           Past Surgical History:  Past Surgical History:   Procedure Laterality Date   • UMBILICAL HERNIA REPAIR N/A 4/15/2018    Procedure: UMBILICAL HERNIA REPAIR;  Surgeon: Karen Beasley M.D.;  Location: SURGERY [facility];  Service: General   • IRRIGATION & DEBRIDEMENT ORTHO Left 12/10/2017    Procedure: IRRIGATION & DEBRIDEMENT ORTHO LEFT HAND;  Surgeon: Chicho Ramos M.D.;  Location: SURGERY [facility];  Service: Orthopedics   • IRRIGATION & DEBRIDEMENT GENERAL Right 5/1/2017    Procedure: IRRIGATION & DEBRIDEMENT GENERAL-Left HAND AND right  ANKLE;  Surgeon: Esau Dooley M.D.;  Location: SURGERY [facility];  Service:    • IRRIGATION & DEBRIDEMENT GENERAL Right 4/29/2017    Procedure: IRRIGATION & DEBRIDEMENT GENERAL;  Surgeon: Esau Dooley M.D.;  Location: SURGERY [facility];  Service:    • INCISION AND DRAINAGE GENERAL  4/7/2017    Procedure: INCISION AND DRAINAGE GENERAL;  Surgeon: Esau Dooley M.D.;  Location: SURGERY SAME DAY [facility];  Service:    • UMBILICAL HERNIA REPAIR  3/10/2017    Procedure: UMBILICAL HERNIA REPAIR- INCISION AND DRAINAGE OF UMBILICAL WOUND AND MESH REMOVAL;  Surgeon: Esau Dooley M.D.;  Location: SURGERY SAME DAY [facility];  Service:    • UMBILICAL HERNIA REPAIR N/A 11/6/2016    Procedure: UMBILICAL HERNIA REPAIR;  Surgeon: Esau Dooley M.D.;  Location: SURGERY [facility];  Service:    • COLONOSCOPY WITH BIOPSY  11/26/2014    Performed by Solo Higginbotham M.D. at [facility]" [facility]O ORS   • LIVE BY LAPAROSCOPY  2005   • OTHER ORTHOPEDIC SURGERY  1997 or 1998    Left Wrist ORIF       Current Outpatient Medications:  Home Medications     Reviewed by Darleen High (Pharmacy Tech) on 01/02/19 at 1119  Med List Status: Complete   Medication Last Dose Status   anastrozole (ARIMIDEX) 1 MG Tab 12/26/2018 Active   buPROPion (WELLBUTRIN XL) 300 MG XL tablet 1/1/2019 Active   chlorhexidine (PERIDEX) 0.12 % Solution 1/2/2019 Active   doxycycline (VIBRAMYCIN) 100 MG Tab 1/1/2019 Active   insulin glargine (LANTUS) 100 UNIT/ML Solution 1/2/2019 Active   insulin lispro (HUMALOG) 100 UNIT/ML Solution 1/2/2019 Active   ondansetron (ZOFRAN) 4 MG Tab tablet 1/2/2019 Active   testosterone cypionate (DEPO-TESTOSTERONE) 200 MG/ML Solution injection 12/27/2018 Active   thyroid (ARMOUR THYROID) 60 MG Tab 1/1/2019 Active              glargine 42 in AM 36 in PM      Medication Allergy/Sensitivities:  Allergies   Allergen Reactions   • Peanut (Diagnostic) Anaphylaxis   • Tradjenta [Linagliptin] Unspecified     Pt developed pancreatitis   • Hydrocodone Hives     Tolerates Morphine and oxycodone           Family History (mandatory)   Family History   Problem Relation Age of Onset   • Cancer Mother      Maybe maternal side has diabetes. Mom passed away at 36 from lymphoma.    Social History (mandatory)   Social History     Social History   • Marital status: Single     Spouse name: N/A   • Number of children: N/A   • Years of education: N/A     Occupational History   • Not on file.     Social History Main Topics   • Smoking status: Never Smoker   • Smokeless tobacco: Never Used   • Alcohol use No   • Drug use: No   • Sexual activity: Not on file     Other Topics Concern   • Not on file     Social History Narrative    ** Merged History Encounter **         ** Merged History Encounter **     ** Merged History Encounter **        Rarely EtOH.  No recreational/illicit drugs.    Living situation: [place] with wife. Has  "friends around [facility].  PCP : NASIR Brunner.    Physical Exam     Vitals:    01/02/19 1330 01/02/19 1400 01/02/19 1430 01/02/19 1510   BP:    128/81   Pulse: 96 (!) 104 (!) 101 (!) 104   Resp: 14 16 17 17   Temp:   36.5 °C (97.7 °F) 36.9 °C (98.5 °F)   TempSrc:   Temporal Temporal   SpO2: 94% 94% 93% 92%   Weight:       Height:         Body mass index is 33.22 kg/m².  /81   Pulse (!) 104   Temp 36.9 °C (98.5 °F) (Temporal)   Resp 17   Ht 1.727 m (5' 8\")   Wt 99.1 kg (218 lb 7.6 oz)   SpO2 92%   BMI 33.22 kg/m²   O2 therapy: Pulse Oximetry: 92 %, O2 (LPM): 0, O2 Delivery: None (Room Air)    Physical Exam   Constitutional: He is oriented to person, place, and time and well-developed, well-nourished, and in no distress. No distress.   HENT:   Head: Atraumatic.   Mouth/Throat: No oropharyngeal exudate.   Hyperemic intact right tympanic membrane, diminished light reflex.  No purulent drainage/air-fluid level.  Left ear with cerumen impaction impeding visualization of tympanic membrane.   Eyes: Pupils are equal, round, and reactive to light. EOM are normal. No scleral icterus.   Neck: Normal range of motion.   Cardiovascular: Normal rate, regular rhythm and intact distal pulses.  Exam reveals no gallop and no friction rub.    No murmur heard.  Pulmonary/Chest: Effort normal. No stridor. No respiratory distress. He has no wheezes. He has no rales. He exhibits no tenderness.   Abdominal: Soft. Bowel sounds are normal. He exhibits no distension and no mass. There is tenderness. There is no rebound and no guarding.   Left lower quadrant tenderness to palpation.   Musculoskeletal: Normal range of motion. He exhibits no edema.   Lymphadenopathy:     He has no cervical adenopathy.   Neurological: He is alert and oriented to person, place, and time. No cranial nerve deficit. GCS score is 15.   Skin: Skin is warm and dry. He is not diaphoretic. No erythema.   Psychiatric: Mood, memory, affect and judgment " normal.         Data Review       Old Records Request:   Deferred  Current Records review/summary: Completed    Lab Data Review:  Recent Results (from the past 24 hour(s))   ACCU-CHEK GLUCOSE    Collection Time: 01/02/19  8:45 AM   Result Value Ref Range    Glucose - Accu-Ck >600 (HH) 65 - 99 mg/dL   URINALYSIS,CULTURE IF INDICATED    Collection Time: 01/02/19  9:15 AM   Result Value Ref Range    Color Yellow     Character Clear     Specific Gravity 1.028 <1.035    Ph 5.0 5.0 - 8.0    Glucose >=1000 (A) Negative mg/dL    Ketones 15 (A) Negative mg/dL    Protein Negative Negative mg/dL    Bilirubin Negative Negative    Urobilinogen, Urine 0.2 Negative    Nitrite Negative Negative    Leukocyte Esterase Negative Negative    Occult Blood Negative Negative    Micro Urine Req see below    CBC WITH DIFFERENTIAL    Collection Time: 01/02/19  9:22 AM   Result Value Ref Range    WBC 4.2 (L) 4.8 - 10.8 K/uL    RBC 4.31 (L) 4.70 - 6.10 M/uL    Hemoglobin 14.7 14.0 - 18.0 g/dL    Hematocrit 41.2 (L) 42.0 - 52.0 %    MCV 95.6 81.4 - 97.8 fL    MCH 34.1 (H) 27.0 - 33.0 pg    MCHC 35.7 (H) 33.7 - 35.3 g/dL    RDW 46.3 35.9 - 50.0 fL    Platelet Count 200 164 - 446 K/uL    MPV 9.9 9.0 - 12.9 fL    Neutrophils-Polys 72.90 (H) 44.00 - 72.00 %    Lymphocytes 19.20 (L) 22.00 - 41.00 %    Monocytes 4.50 0.00 - 13.40 %    Eosinophils 1.20 0.00 - 6.90 %    Basophils 0.50 0.00 - 1.80 %    Immature Granulocytes 1.70 (H) 0.00 - 0.90 %    Nucleated RBC 0.00 /100 WBC    Neutrophils (Absolute) 3.08 1.82 - 7.42 K/uL    Lymphs (Absolute) 0.81 (L) 1.00 - 4.80 K/uL    Monos (Absolute) 0.19 0.00 - 0.85 K/uL    Eos (Absolute) 0.05 0.00 - 0.51 K/uL    Baso (Absolute) 0.02 0.00 - 0.12 K/uL    Immature Granulocytes (abs) 0.07 0.00 - 0.11 K/uL    NRBC (Absolute) 0.00 K/uL   CMP    Collection Time: 01/02/19  9:22 AM   Result Value Ref Range    Sodium 123 (L) 135 - 145 mmol/L    Potassium 5.7 (H) 3.6 - 5.5 mmol/L    Chloride 90 (L) 96 - 112 mmol/L    Co2 19  (L) 20 - 33 mmol/L    Anion Gap 14.0 (H) 0.0 - 11.9    Glucose 876 (HH) 65 - 99 mg/dL    Bun 27 (H) 8 - 22 mg/dL    Creatinine 1.26 0.50 - 1.40 mg/dL    Calcium 9.3 8.5 - 10.5 mg/dL    AST(SGOT) 16 12 - 45 U/L    ALT(SGPT) 18 2 - 50 U/L    Alkaline Phosphatase 97 30 - 99 U/L    Total Bilirubin 0.6 0.1 - 1.5 mg/dL    Albumin 4.2 3.2 - 4.9 g/dL    Total Protein 6.8 6.0 - 8.2 g/dL    Globulin 2.6 1.9 - 3.5 g/dL    A-G Ratio 1.6 g/dL   LIPASE    Collection Time: 01/02/19  9:22 AM   Result Value Ref Range    Lipase 54 11 - 82 U/L   ESTIMATED GFR    Collection Time: 01/02/19  9:22 AM   Result Value Ref Range    GFR If African American >60 >60 mL/min/1.73 m 2    GFR If Non African American 60 >60 mL/min/1.73 m 2   VENOUS BLOOD GAS    Collection Time: 01/02/19  9:43 AM   Result Value Ref Range    Venous Bg Ph 7.32 7.31 - 7.45    Venous Bg Pco2 43.5 41.0 - 51.0 mmHg    Venous Bg Po2 22.6 (L) 25.0 - 40.0 mmHg    Venous Bg O2 Saturation 37.4 %    Venous Bg Hco3 22 (L) 24 - 28 mmol/L    Venous Bg Base Excess -4 mmol/L    Body Temp see below Centigrade   ACCU-CHEK GLUCOSE    Collection Time: 01/02/19 11:28 AM   Result Value Ref Range    Glucose - Accu-Ck >600 (HH) 65 - 99 mg/dL   BASIC METABOLIC PANEL    Collection Time: 01/02/19 12:52 PM   Result Value Ref Range    Sodium 133 (L) 135 - 145 mmol/L    Potassium 4.2 3.6 - 5.5 mmol/L    Chloride 103 96 - 112 mmol/L    Co2 20 20 - 33 mmol/L    Glucose 570 (HH) 65 - 99 mg/dL    Bun 21 8 - 22 mg/dL    Creatinine 0.79 0.50 - 1.40 mg/dL    Calcium 8.8 8.5 - 10.5 mg/dL    Anion Gap 10.0 0.0 - 11.9   ESTIMATED GFR    Collection Time: 01/02/19 12:52 PM   Result Value Ref Range    GFR If African American >60 >60 mL/min/1.73 m 2    GFR If Non African American >60 >60 mL/min/1.73 m 2   ACCU-CHEK GLUCOSE    Collection Time: 01/02/19  2:02 PM   Result Value Ref Range    Glucose - Accu-Ck 424 (HH) 65 - 99 mg/dL       Imaging/Procedures Review:    Independant Imaging Review:  Completed  [id number]    (Results Pending)   CT-MAXILLOFACIAL W/O PLUS RECONS    (Results Pending)        EKG:   EKG Independant Review: Completed   NOV 2018  QTc:434, HR: 85, Normal Sinus Rhythm, no ST/T changes    Records reviewed and summarized in current documentation :  No  [employer] teaching service handout given to patient:  No         Assessment/Plan     Diabetic hyperosmolar non-ketotic state (HCC)- (present on admission)   Assessment & Plan    Patient's hx, symptoms, lab studies are all indicative of this diagnosis.    -BMP q4 hrs.  -NS IV fluids 200 mL/hr w/KCl 20 mEq.  -Subcutaneous insulin correctional scale.     Sinusitis, acute   Assessment & Plan    Unknown etiology. CT-scan is indicative of this diagnosis. Completed course of bacteriostatic doxycycline.  As patient CT scan is still indicative of ongoing sinusitis we will treat with further antibiotics.    -Amoxicillin/clavulanic acid 875-125 mg p.o. twice daily.  -Panorex ordered.  -CT sinuses ordered.    -If patient spikes fever   -Ampicillin/sulbactam 3 g IV every 6 hours.     Pseudohyponatremia- (present on admission)   Assessment & Plan    Corrected sodium on admission via Maya correction factor is 142.     AP (abdominal pain)- (present on admission)   Assessment & Plan    Likely secondary to HHS.  Patient does have a history of pancreatitis, but this was secondary to oral diabetic medication side effects.  Lipase normal.    -Continue to monitor as blood glucose is controlled.     Hypothyroidism- (present on admission)   Assessment & Plan    Chronic.     -[facility] thyroid 60 mg po q day.     Normocytic anemia- (present on admission)   Assessment & Plan    Labile RBC/Hgb counts. Hct has been chronically low going back to 2006. Unknown etiology. Ferritin 80s in early 2018.    -Continue to monitor.         Anticipated Hospital stay: Observation admit        Quality Measures  Quality-Core Measures   Reviewed items::  Labs reviewed, EKG  reviewed, Medications reviewed and Radiology images reviewed  Sousa catheter::  No Sousa  DVT prophylaxis pharmacological::  Enoxaparin (Lovenox)  Ulcer Prophylaxis::  Not indicated    PCP: KRISSY Brunner

## 2019-01-03 ENCOUNTER — PATIENT OUTREACH (OUTPATIENT)
Dept: HEALTH INFORMATION MANAGEMENT | Facility: OTHER | Age: 54
End: 2019-01-03

## 2019-01-03 VITALS
HEIGHT: 68 IN | OXYGEN SATURATION: 96 % | BODY MASS INDEX: 31.47 KG/M2 | TEMPERATURE: 97.4 F | WEIGHT: 207.67 LBS | SYSTOLIC BLOOD PRESSURE: 123 MMHG | HEART RATE: 87 BPM | DIASTOLIC BLOOD PRESSURE: 81 MMHG | RESPIRATION RATE: 16 BRPM

## 2019-01-03 PROBLEM — E11.10 DIABETIC KETOACIDOSIS WITHOUT COMA ASSOCIATED WITH TYPE 2 DIABETES MELLITUS (HCC): Status: ACTIVE | Noted: 2018-11-14

## 2019-01-03 PROBLEM — E03.9 HYPOTHYROIDISM: Chronic | Status: ACTIVE | Noted: 2018-05-19

## 2019-01-03 PROBLEM — E78.1 HYPERTRIGLYCERIDEMIA: Status: ACTIVE | Noted: 2019-01-03

## 2019-01-03 PROBLEM — E78.2 HYPERCHOLESTEROLEMIA WITH HYPERTRIGLYCERIDEMIA: Status: ACTIVE | Noted: 2019-01-03

## 2019-01-03 PROBLEM — K02.9 DENTAL CARIES: Status: ACTIVE | Noted: 2019-01-03

## 2019-01-03 LAB
ANION GAP SERPL CALC-SCNC: 8 MMOL/L (ref 0–11.9)
ANION GAP SERPL CALC-SCNC: 8 MMOL/L (ref 0–11.9)
BASE EXCESS BLDV CALC-SCNC: -4 MMOL/L
BASOPHILS # BLD AUTO: 0.4 % (ref 0–1.8)
BASOPHILS # BLD: 0.02 K/UL (ref 0–0.12)
BODY TEMPERATURE: ABNORMAL CENTIGRADE
BUN SERPL-MCNC: 17 MG/DL (ref 8–22)
BUN SERPL-MCNC: 18 MG/DL (ref 8–22)
CALCIUM SERPL-MCNC: 7.9 MG/DL (ref 8.5–10.5)
CALCIUM SERPL-MCNC: 8.5 MG/DL (ref 8.5–10.5)
CHLORIDE SERPL-SCNC: 111 MMOL/L (ref 96–112)
CHLORIDE SERPL-SCNC: 113 MMOL/L (ref 96–112)
CHOLEST SERPL-MCNC: 301 MG/DL (ref 100–199)
CO2 SERPL-SCNC: 17 MMOL/L (ref 20–33)
CO2 SERPL-SCNC: 21 MMOL/L (ref 20–33)
CREAT SERPL-MCNC: 0.61 MG/DL (ref 0.5–1.4)
CREAT SERPL-MCNC: 0.63 MG/DL (ref 0.5–1.4)
EOSINOPHIL # BLD AUTO: 0.1 K/UL (ref 0–0.51)
EOSINOPHIL NFR BLD: 2.2 % (ref 0–6.9)
ERYTHROCYTE [DISTWIDTH] IN BLOOD BY AUTOMATED COUNT: 44.6 FL (ref 35.9–50)
EST. AVERAGE GLUCOSE BLD GHB EST-MCNC: 375 MG/DL
GLUCOSE BLD-MCNC: 177 MG/DL (ref 65–99)
GLUCOSE BLD-MCNC: 219 MG/DL (ref 65–99)
GLUCOSE BLD-MCNC: 228 MG/DL (ref 65–99)
GLUCOSE SERPL-MCNC: 202 MG/DL (ref 65–99)
GLUCOSE SERPL-MCNC: 229 MG/DL (ref 65–99)
HBA1C MFR BLD: 14.7 % (ref 0–5.6)
HCO3 BLDV-SCNC: 20 MMOL/L (ref 24–28)
HCT VFR BLD AUTO: 36 % (ref 42–52)
HDLC SERPL-MCNC: ABNORMAL MG/DL
HGB BLD-MCNC: 12.6 G/DL (ref 14–18)
IMM GRANULOCYTES # BLD AUTO: 0.03 K/UL (ref 0–0.11)
IMM GRANULOCYTES NFR BLD AUTO: 0.7 % (ref 0–0.9)
LDLC SERPL CALC-MCNC: ABNORMAL MG/DL
LYMPHOCYTES # BLD AUTO: 1.53 K/UL (ref 1–4.8)
LYMPHOCYTES NFR BLD: 33.6 % (ref 22–41)
MCH RBC QN AUTO: 32.2 PG (ref 27–33)
MCHC RBC AUTO-ENTMCNC: 35 G/DL (ref 33.7–35.3)
MCV RBC AUTO: 92.1 FL (ref 81.4–97.8)
MONOCYTES # BLD AUTO: 0.24 K/UL (ref 0–0.85)
MONOCYTES NFR BLD AUTO: 5.3 % (ref 0–13.4)
NEUTROPHILS # BLD AUTO: 2.64 K/UL (ref 1.82–7.42)
NEUTROPHILS NFR BLD: 57.8 % (ref 44–72)
NRBC # BLD AUTO: 0 K/UL
NRBC BLD-RTO: 0 /100 WBC
PCO2 BLDV: 33.2 MMHG (ref 41–51)
PH BLDV: 7.4 [PH] (ref 7.31–7.45)
PHOSPHATE SERPL-MCNC: 3.7 MG/DL (ref 2.5–4.5)
PLATELET # BLD AUTO: 165 K/UL (ref 164–446)
PMV BLD AUTO: 9.5 FL (ref 9–12.9)
PO2 BLDV: 55.4 MMHG (ref 25–40)
POTASSIUM SERPL-SCNC: 3.7 MMOL/L (ref 3.6–5.5)
POTASSIUM SERPL-SCNC: 4.5 MMOL/L (ref 3.6–5.5)
RBC # BLD AUTO: 3.91 M/UL (ref 4.7–6.1)
SAO2 % BLDV: 89 %
SODIUM SERPL-SCNC: 138 MMOL/L (ref 135–145)
SODIUM SERPL-SCNC: 140 MMOL/L (ref 135–145)
TRIGL SERPL-MCNC: 1172 MG/DL (ref 0–149)
WBC # BLD AUTO: 4.6 K/UL (ref 4.8–10.8)

## 2019-01-03 PROCEDURE — 700101 HCHG RX REV CODE 250: Performed by: STUDENT IN AN ORGANIZED HEALTH CARE EDUCATION/TRAINING PROGRAM

## 2019-01-03 PROCEDURE — A9270 NON-COVERED ITEM OR SERVICE: HCPCS | Performed by: STUDENT IN AN ORGANIZED HEALTH CARE EDUCATION/TRAINING PROGRAM

## 2019-01-03 PROCEDURE — 700102 HCHG RX REV CODE 250 W/ 637 OVERRIDE(OP): Performed by: HOSPITALIST

## 2019-01-03 PROCEDURE — 85025 COMPLETE CBC W/AUTO DIFF WBC: CPT

## 2019-01-03 PROCEDURE — 80048 BASIC METABOLIC PNL TOTAL CA: CPT

## 2019-01-03 PROCEDURE — 84100 ASSAY OF PHOSPHORUS: CPT

## 2019-01-03 PROCEDURE — 36415 COLL VENOUS BLD VENIPUNCTURE: CPT

## 2019-01-03 PROCEDURE — 82803 BLOOD GASES ANY COMBINATION: CPT

## 2019-01-03 PROCEDURE — 82962 GLUCOSE BLOOD TEST: CPT | Mod: 91

## 2019-01-03 PROCEDURE — 700102 HCHG RX REV CODE 250 W/ 637 OVERRIDE(OP): Performed by: STUDENT IN AN ORGANIZED HEALTH CARE EDUCATION/TRAINING PROGRAM

## 2019-01-03 PROCEDURE — A9270 NON-COVERED ITEM OR SERVICE: HCPCS | Performed by: HOSPITALIST

## 2019-01-03 PROCEDURE — 80061 LIPID PANEL: CPT

## 2019-01-03 PROCEDURE — 99239 HOSP IP/OBS DSCHRG MGMT >30: CPT | Mod: GC | Performed by: HOSPITALIST

## 2019-01-03 PROCEDURE — 700111 HCHG RX REV CODE 636 W/ 250 OVERRIDE (IP): Performed by: STUDENT IN AN ORGANIZED HEALTH CARE EDUCATION/TRAINING PROGRAM

## 2019-01-03 RX ORDER — AMOXICILLIN AND CLAVULANATE POTASSIUM 875; 125 MG/1; MG/1
1 TABLET, FILM COATED ORAL EVERY 12 HOURS
Status: CANCELLED | OUTPATIENT
Start: 2019-01-03 | End: 2019-01-12

## 2019-01-03 RX ORDER — POTASSIUM CHLORIDE 20 MEQ/1
20 TABLET, EXTENDED RELEASE ORAL ONCE
Status: COMPLETED | OUTPATIENT
Start: 2019-01-03 | End: 2019-01-03

## 2019-01-03 RX ORDER — INSULIN GLARGINE 100 [IU]/ML
20 INJECTION, SOLUTION SUBCUTANEOUS
Status: DISCONTINUED | OUTPATIENT
Start: 2019-01-03 | End: 2019-01-03 | Stop reason: HOSPADM

## 2019-01-03 RX ORDER — GEMFIBROZIL 600 MG/1
600 TABLET, FILM COATED ORAL 2 TIMES DAILY
Qty: 60 TAB | Refills: 0 | Status: SHIPPED | OUTPATIENT
Start: 2019-01-03 | End: 2019-01-29

## 2019-01-03 RX ORDER — AMOXICILLIN AND CLAVULANATE POTASSIUM 875; 125 MG/1; MG/1
1 TABLET, FILM COATED ORAL EVERY 12 HOURS
Qty: 28 TAB | Refills: 0 | Status: SHIPPED | OUTPATIENT
Start: 2019-01-03 | End: 2019-01-17

## 2019-01-03 RX ORDER — ONDANSETRON 4 MG/1
4 TABLET, FILM COATED ORAL
Qty: 15 TAB | Refills: 0 | Status: SHIPPED | OUTPATIENT
Start: 2019-01-03 | End: 2019-02-03

## 2019-01-03 RX ORDER — IBUPROFEN 800 MG/1
400 TABLET ORAL ONCE
Status: COMPLETED | OUTPATIENT
Start: 2019-01-03 | End: 2019-01-03

## 2019-01-03 RX ADMIN — IBUPROFEN 400 MG: 800 TABLET, FILM COATED ORAL at 09:19

## 2019-01-03 RX ADMIN — Medication 5 DROP: at 09:19

## 2019-01-03 RX ADMIN — LEVOTHYROXINE, LIOTHYRONINE 60 MG: 19; 4.5 TABLET ORAL at 07:01

## 2019-01-03 RX ADMIN — POTASSIUM CHLORIDE AND SODIUM CHLORIDE: 900; 150 INJECTION, SOLUTION INTRAVENOUS at 02:32

## 2019-01-03 RX ADMIN — BUPROPION HYDROCHLORIDE 150 MG: 150 TABLET, EXTENDED RELEASE ORAL at 07:01

## 2019-01-03 RX ADMIN — INSULIN GLARGINE 20 UNITS: 100 INJECTION, SOLUTION SUBCUTANEOUS at 09:22

## 2019-01-03 RX ADMIN — POTASSIUM CHLORIDE 20 MEQ: 1500 TABLET, EXTENDED RELEASE ORAL at 07:02

## 2019-01-03 RX ADMIN — POTASSIUM CHLORIDE AND SODIUM CHLORIDE: 900; 150 INJECTION, SOLUTION INTRAVENOUS at 07:01

## 2019-01-03 RX ADMIN — ENOXAPARIN SODIUM 40 MG: 100 INJECTION SUBCUTANEOUS at 07:08

## 2019-01-03 RX ADMIN — TESTOSTERONE CYPIONATE 80 MG: 200 INJECTION, SOLUTION INTRAMUSCULAR at 11:35

## 2019-01-03 RX ADMIN — AMOXICILLIN AND CLAVULANATE POTASSIUM 1 TABLET: 875; 125 TABLET, FILM COATED ORAL at 07:01

## 2019-01-03 ASSESSMENT — PAIN SCALES - GENERAL
PAINLEVEL_OUTOF10: 5
PAINLEVEL_OUTOF10: 6

## 2019-01-03 NOTE — ASSESSMENT & PLAN NOTE
-History of dental caries and multiple dental procedures done in the past  -Mandible x-ray shows no evidence of abscess.

## 2019-01-03 NOTE — ASSESSMENT & PLAN NOTE
Labile RBC/Hgb counts. Hct has been chronically low going back to 2006. Unknown etiology. Ferritin 80s in early 2018.    -Continue to monitor.

## 2019-01-03 NOTE — ASSESSMENT & PLAN NOTE
Likely secondary to HHS.  Patient does have a history of pancreatitis, but this was secondary to oral diabetic medication side effects.  Lipase normal.    -Continue to monitor as blood glucose is controlled.

## 2019-01-03 NOTE — DISCHARGE INSTRUCTIONS
Discharge Instructions    Discharged to home by car with relative. Discharged via wheelchair, hospital escort: Yes.  Special equipment needed: Not Applicable    Be sure to schedule a follow-up appointment with your primary care doctor or any specialists as instructed.     Discharge Plan:   Influenza Vaccine Indication: Not indicated: Previously immunized this influenza season and > 8 years of age    I understand that a diet low in cholesterol, fat, and sodium is recommended for good health. Unless I have been given specific instructions below for another diet, I accept this instruction as my diet prescription.   Other diet: Diabetic    Special Instructions: Gemfibrozil tablets  What is this medicine?  GEMFIBROZIL (nargis FI broe zil) can help lower blood fats and cholesterol for people who are at risk of getting inflammation of the pancreas (pancreatitis) from having very high amounts of fats in their blood. This medicine is only for patients whose blood fats are not controlled by diet.  This medicine may be used for other purposes; ask your health care provider or pharmacist if you have questions.  COMMON BRAND NAME(S): Lopid  What should I tell my health care provider before I take this medicine?  They need to know if you have any of these conditions:  -cataracts  -gallbladder disease  -heart disease  -kidney or liver disease  -an unusual or allergic reaction to gemfibrozil, other medicines, foods, dyes, or preservatives  -pregnant or trying to get pregnant  -breast-feeding  How should I use this medicine?  Take this medicine by mouth with a glass of water. Follow the directions on the prescription label. Take this medicine 30 minutes before a meal. Take your doses at regular intervals. Do not take your medicine more often than directed. Do not stop taking this medicine except on the advice of your doctor or health care professional.  Talk to your pediatrician regarding the use of this medicine in children. Special  care may be needed.  Overdosage: If you think you have taken too much of this medicine contact a poison control center or emergency room at once.  NOTE: This medicine is only for you. Do not share this medicine with others.  What if I miss a dose?  If you miss a dose, take it as soon as you can. If it is almost time for your next dose, take only that dose. Do not take double or extra doses.  What may interact with this medicine?  Do not take this medicine with any of the following medications:  -bexarotene  -ezetimibe  -other cholesterol medicines like clofibrate and fenofibrate  -repaglinide  -statin-type cholesterol lowering drugs like atorvastatin, cerivastatin, fluvastatin, lovastatin, pravastatin, or simvastatin  This medicine may also interact with the following medications:  -warfarin  -red yeast rice  This list may not describe all possible interactions. Give your health care provider a list of all the medicines, herbs, non-prescription drugs, or dietary supplements you use. Also tell them if you smoke, drink alcohol, or use illegal drugs. Some items may interact with your medicine.  What should I watch for while using this medicine?  Visit your doctor or health care professional for regular checks on your progress. Your blood fats and other tests will be measured from time to time.  This medicine is only part of a total cholesterol-lowering program. Your health care professional or dietician can suggest a low-cholesterol and low-fat diet that will reduce your risk of getting heart and blood vessel disease. Avoid alcohol and smoking, and keep a proper exercise schedule.  If you are diabetic, close regulation and monitoring of your blood sugars can help your blood fat levels. This medicine may change the way your diabetic medicine works, and sometimes will require that your dosages be adjusted. Check with your doctor or health care professional.  You may get drowsy or dizzy. Do not drive, use machinery, or do  anything that needs mental alertness until you know how this drug affects you. Do not stand or sit up quickly, especially if you are an older patient. This reduces the risk of dizzy or fainting spells.  What side effects may I notice from receiving this medicine?  Side effects that you should report to your doctor or health care professional as soon as possible:  -cold or flu-like symptoms  -dark yellow or brown urine  -lower back or side pain  -muscle pain  -pain or difficulty passing urine  -stomach pain with nausea and vomiting  -unusual tiredness  -yellowing of the eyes or skin  Side effects that usually do not require medical attention (report to your doctor or health care professional if they continue or are bothersome):  -blurred vision  -decreased sexual function or desire  -headache  -stomach gas, heartburn  This list may not describe all possible side effects. Call your doctor for medical advice about side effects. You may report side effects to FDA at 9-825-FDA-0016.  Where should I keep my medicine?  Keep out of the reach of children.  Store at room temperature between 20 and 25 degrees C (68 and 77 degrees F). Protect from light and humidity. Throw away any unused medicine after the expiration date.  NOTE: This sheet is a summary. It may not cover all possible information. If you have questions about this medicine, talk to your doctor, pharmacist, or health care provider.  © 2018 [employer] (2009-04-29 10:37:30)  Amoxicillin; Clavulanic Acid chewable tablets  What is this medicine?  AMOXICILLIN; CLAVULANIC ACID (a mox i AMINA in; CAMILLE tipton AS id) is a penicillin antibiotic. It is used to treat certain kinds of bacterial infections. It It will not work for colds, flu, or other viral infections.  This medicine may be used for other purposes; ask your health care provider or pharmacist if you have questions.  COMMON BRAND NAME(S): Augmentin  What should I tell my health care provider before  I take this medicine?  They need to know if you have any of these conditions:  -bowel disease, like colitis  -kidney disease  -liver disease  -mononucleosis  -phenylketonuria  -an unusual or allergic reaction to amoxicillin, penicillin, cephalosporin, other antibiotics, clavulanic acid, other medicines, foods, dyes, or preservatives  -pregnant or trying to get pregnant  -breast-feeding  How should I use this medicine?  Take this medicine by mouth. Chew it completely before swallowing. Follow the directions on the prescription label. Take this medicine at the start of a meal or snack. Take your medicine at regular intervals. Do not take your medicine more often than directed. Take all of your medicine as directed even if you think you are better. Do not skip doses or stop your medicine early.  Talk to your pediatrician regarding the use of this medicine in children. While this drug may be prescribed for selected conditions, precautions do apply.  Overdosage: If you think you have taken too much of this medicine contact a poison control center or emergency room at once.  NOTE: This medicine is only for you. Do not share this medicine with others.  What if I miss a dose?  If you miss a dose, take it as soon as you can. If it is almost time for your next dose, take only that dose. Do not take double or extra doses.  What may interact with this medicine?  -allopurinol  -anticoagulants  -birth control pills  -methotrexate  -probenecid  This list may not describe all possible interactions. Give your health care provider a list of all the medicines, herbs, non-prescription drugs, or dietary supplements you use. Also tell them if you smoke, drink alcohol, or use illegal drugs. Some items may interact with your medicine.  What should I watch for while using this medicine?  Tell your doctor or health care professional if your symptoms do not improve.  Do not treat diarrhea with over the counter products. Contact your doctor if  you have diarrhea that lasts more than 2 days or if it is severe and watery.  If you have diabetes, you may get a false-positive result for sugar in your urine. Check with your doctor or health care professional.  Birth control pills may not work properly while you are taking this medicine. Talk to your doctor about using an extra method of birth control.  What side effects may I notice from receiving this medicine?  Side effects that you should report to your doctor or health care professional as soon as possible:  -allergic reactions like skin rash, itching or hives, swelling of the face, lips, or tongue  -breathing problems  -dark urine  -fever or chills, sore throat  -redness, blistering, peeling or loosening of the skin, including inside the mouth  -seizures  -trouble passing urine or change in the amount of urine  -unusual bleeding, bruising  -unusually weak or tired  -white patches or sores in the mouth or throat  Side effects that usually do not require medical attention (report to your doctor or health care professional if they continue or are bothersome):  -diarrhea  -dizziness  -headache  -nausea, vomiting  -stomach upset  -vaginal or anal irritation  This list may not describe all possible side effects. Call your doctor for medical advice about side effects. You may report side effects to FDA at 0-534-FDA-1895.  Where should I keep my medicine?  Keep out of the reach of children.  Store at room temperature below 25 degrees C (77 degrees F). Keep container tightly closed. Throw away any unused medicine after the expiration date.  NOTE: This sheet is a summary. It may not cover all possible information. If you have questions about this medicine, talk to your doctor, pharmacist, or health care provider.  © 2018 [employer] (2009-03-12 11:38:22)    Sinusitis, Adult  Sinusitis is soreness and inflammation of your sinuses. Sinuses are hollow spaces in the bones around your face. They are  located:  · Around your eyes.  · In the middle of your forehead.  · Behind your nose.  · In your cheekbones.  Your sinuses and nasal passages are lined with a stringy fluid (mucus). Mucus normally drains out of your sinuses. When your nasal tissues get inflamed or swollen, the mucus can get trapped or blocked so air cannot flow through your sinuses. This lets bacteria, viruses, and funguses grow, and that leads to infection.  Follow these instructions at home:  Medicines  · Take, use, or apply over-the-counter and prescription medicines only as told by your doctor. These may include nasal sprays.  · If you were prescribed an antibiotic medicine, take it as told by your doctor. Do not stop taking the antibiotic even if you start to feel better.  Hydrate and Humidify  · Drink enough water to keep your pee (urine) clear or pale yellow.  · Use a cool mist humidifier to keep the humidity level in your home above 50%.  · Breathe in steam for 10-15 minutes, 3-4 times a day or as told by your doctor. You can do this in the bathroom while a hot shower is running.  · Try not to spend time in cool or dry air.  Rest  · Rest as much as possible.  · Sleep with your head raised (elevated).  · Make sure to get enough sleep each night.  General instructions  · Put a warm, moist washcloth on your face 3-4 times a day or as told by your doctor. This will help with discomfort.  · Wash your hands often with soap and water. If there is no soap and water, use hand .  · Do not smoke. Avoid being around people who are smoking (secondhand smoke).  · Keep all follow-up visits as told by your doctor. This is important.  Contact a doctor if:  · You have a fever.  · Your symptoms get worse.  · Your symptoms do not get better within 10 days.  Get help right away if:  · You have a very bad headache.  · You cannot stop throwing up (vomiting).  · You have pain or swelling around your face or eyes.  · You have trouble seeing.  · You feel  confused.  · Your neck is stiff.  · You have trouble breathing.  This information is not intended to replace advice given to you by your health care provider. Make sure you discuss any questions you have with your health care provider.  Document Released: 06/05/2009 Document Revised: 08/13/2017 Document Reviewed: 10/12/2016  [employer] Interactive Patient Education © 2017 [employer]    Diabetic Ketoacidosis  Diabetic ketoacidosis is a life-threatening complication of diabetes. If it is not treated, it can cause severe dehydration and organ damage and can lead to a coma or death.  What are the causes?  This condition develops when there is not enough of the hormone insulin in the body. Insulin helps the body to break down sugar for energy. Without insulin, the body cannot break down sugar, so it breaks down fats instead. This leads to the production of acids that are called ketones. Ketones are poisonous at high levels.  This condition can be triggered by:  · Stress on the body that is brought on by an illness.  · Medicines that raise blood glucose levels.  · Not taking diabetes medicine.  What are the signs or symptoms?  Symptoms of this condition include:  · Fatigue.  · Weight loss.  · Excessive thirst.  · Light-headedness.  · Fruity or sweet-smelling breath.  · Excessive urination.  · Vision changes.  · Confusion or irritability.  · Nausea.  · Vomiting.  · Rapid breathing.  · Abdominal pain.  · Feeling flushed.  How is this diagnosed?  This condition is diagnosed based on a medical history, a physical exam, and blood tests. You may also have a urine test that checks for ketones.  How is this treated?  This condition may be treated with:  · Fluid replacement. This may be done to correct dehydration.  · Insulin injections. These may be given through the skin or through an IV tube.  · Electrolyte replacement. Electrolytes, such as potassium and sodium, may be given in pill form or through an IV tube.  · Antibiotic  medicines. These may be prescribed if your condition was caused by an infection.  Follow these instructions at home:  Eating and drinking  · Drink enough fluids to keep your urine clear or pale yellow.  · If you cannot eat, alternate between drinking fluids with sugar (such as juice) and salty fluids (such as broth or bouillon).  · If you can eat, follow your usual diet and drink sugar-free liquids, such as water.  Other Instructions   · Take insulin as directed by your health care provider. Do not skip insulin injections. Do not use  insulin.  · If your blood sugar is over 240 mg/dL, monitor your urine ketones every 4-6 hours.  · If you were prescribed an antibiotic medicine, finish all of it even if you start to feel better.  · Rest and exercise only as directed by your health care provider.  · If you get sick, call your health care provider and begin treatment quickly. Your body often needs extra insulin to fight an illness.  · Check your blood glucose levels regularly. If your blood glucose is high, drink plenty of fluids. This helps to flush out ketones.  Contact a health care provider if:  · Your blood glucose level is too high or too low.  · You have ketones in your urine.  · You have a fever.  · You cannot eat.  · You cannot tolerate fluids.  · You have been vomiting for more than 2 hours.  · You continue to have symptoms of this condition.  · You develop new symptoms.  Get help right away if:  · Your blood glucose levels continue to be high (elevated).  · Your monitor reads “high” even when you are taking insulin.  · You faint.  · You have chest pain.  · You have trouble breathing.  · You have a sudden, severe headache.  · You have sudden weakness in one arm or one leg.  · You have sudden trouble speaking or swallowing.  · You have vomiting or diarrhea that gets worse after 3 hours.  · You feel severely fatigued.  · You have trouble thinking.  · You have abdominal pain.  · You are severely  dehydrated. Symptoms of severe dehydration include:  ¨ Extreme thirst.  ¨ Dry mouth.  ¨ Blue lips.  ¨ Cold hands and feet.  ¨ Rapid breathing.  This information is not intended to replace advice given to you by your health care provider. Make sure you discuss any questions you have with your health care provider.  Document Released: 12/15/2001 Document Revised: 05/25/2017 Document Reviewed: 11/25/2015  [employer] Interactive Patient Education © 2017 [employer]      · Is patient discharged on Warfarin / Coumadin?   No     Depression / Suicide Risk    As you are discharged from this [facility] facility, it is important to learn how to keep safe from harming yourself.    Recognize the warning signs:  · Abrupt changes in personality, positive or negative- including increase in energy   · Giving away possessions  · Change in eating patterns- significant weight changes-  positive or negative  · Change in sleeping patterns- unable to sleep or sleeping all the time   · Unwillingness or inability to communicate  · Depression  · Unusual sadness, discouragement and loneliness  · Talk of wanting to die  · Neglect of personal appearance   · Rebelliousness- reckless behavior  · Withdrawal from people/activities they love  · Confusion- inability to concentrate     If you or a loved one observes any of these behaviors or has concerns about self-harm, here's what you can do:  · Talk about it- your feelings and reasons for harming yourself  · Remove any means that you might use to hurt yourself (examples: pills, rope, extension cords, firearm)  · Get professional help from the community (Mental Health, Substance Abuse, psychological counseling)  · Do not be alone:Call your Safe Contact- someone whom you trust who will be there for you.  · Call your local CRISIS HOTLINE 682-6383 or 229-023-7603  · Call your local Children's Mobile Crisis Response Team [place] (243) 544-8512 or www.[employer]  · Call the toll free  National Suicide Prevention Hotlines   · National Suicide Prevention Lifeline 203-585-SXGJ (8567)  · National Hope Line Network 800-SUICIDE (395-2697)

## 2019-01-03 NOTE — ASSESSMENT & PLAN NOTE
Patient's hx, symptoms, lab studies are all indicative of this diagnosis.    -HbA1c 13.9 in Dec 2018.  -BMP q4 hrs.  -NS IV fluids 200 mL/hr w/KCl 20 mEq.  -Subcutaneous insulin correctional scale.

## 2019-01-03 NOTE — DISCHARGE SUMMARY
Internal Medicine Discharge Summary  Note Author: Donna Lisa M.D.     Name Mahesh Bradshaw     1965   Age/Sex 53 y.o. male   MRN [id number]     Admit Date:  2019       Discharge Date:   1/3/19    Service:   [facility] Internal Medicine white Team  Attending Physician(s):          Senior Resident(s):     Magdi Resident(s):     PCP: KRISSY Brunner      Primary Diagnosis:   Diabetic ketoacidosis without coma associated with type 2 diabetes mellitus (HCC)    Secondary Diagnoses:                Principal Problem:    Diabetic ketoacidosis without coma associated with type 2 diabetes mellitus (HCC) POA: Yes  Active Problems:    Pseudohyponatremia POA: Yes    Sinusitis, acute POA: Yes    Dental caries POA: Yes    Hypertriglyceridemia - severe POA: Yes    Normocytic anemia POA: Yes    AP (abdominal pain) POA: Yes    Hypothyroidism POA: Yes      Overview: Chronic condition treated with [place] Thyroid.      Resumed maintenance medication.    Hx of acute pancreatitis POA: Yes    Hospital Summary (Brief Narrative):       The patient is a 53-year-old male with past medical history of uncontrolled type 2 diabetes, pancreatitis, DKA presented to the ER on 2019 for high blood sugars.  On presentation blood sugars were 867, anion gap14, bicarb 19, sodium 123 potassium 5.7. Urine analysis positive for ketones.  Patient is admitted for DKA and started on IV fluids, subcu insulin. Patient also reported symptoms of chronic sinusitis, CT sinuses showed right-sided maxillary mucous retention cyst, started on Augmentin 875 mg to be taken for 14 days.  Patient has a history of poor dentition, recurrent gingivitis, dental abscesses and x-ray mandible showed no evidence of abscess.  Patient is also referred to ENT for mucous retention cyst needs to follow-up as outpatient.  Lipid panel showed elevated triglycerides 1172, total cholesterol is 301. Patient is started on  Gemfibrozil 600 mg BID.  On the day of discharge Potassium is normal, Gap is closed and Ph is normal.  Blood sugars ranged between 177-228 .  Patient improved rapidly than anticipated and is discharged in stable condition with close outpatient follow-up with endocrinology, PCP.    Patient /Hospital Summary (Details -- Problem Oriented) :          * Diabetic ketoacidosis without coma associated with type 2 diabetes mellitus (HCC)   Assessment & Plan    Patient's hx, symptoms, lab studies are all indicative of this diagnosis.    -On presentation labs consistent with DKA: Positive ketones in the urine, anion gap 14, bicarb 17, blood sugars 876 potassium 5.7.  -On the day of discharge Is closed with normal pH and normal potassium.  -HbA1c 13.9 in Dec 2018.  -Patient received IV fluids with potassium, subcutaneous insulin sliding scale with insulin Lantus 20 units.  -Patient is discharged with insulin Lantus twice daily and sliding scale during the day.       Hypertriglyceridemia - severe   Assessment & Plan    -Lipid panel this admission showed total cholesterol 301, triglycerides at 1172  -Patient is discharged with gemfibrozil 600 mg twice daily.     Dental caries   Assessment & Plan    -History of dental caries and multiple dental procedures done in the past  -Mandible x-ray shows no evidence of abscess.     Sinusitis, acute   Assessment & Plan    Unknown etiology. CT-scan is indicative of this diagnosis. Completed course of bacteriostatic doxycycline.  -CT sinuses ordered showed right maxillary mucous retention cyst.  -Patient is referred to ENT and discharged with amoxicillin clavulanic acid 875 125 mg p.o. twice daily for 14 days course     Pseudohyponatremia   Assessment & Plan    Corrected sodium on admission via Maya correction factor is 142.  Secondary to hypoglycemia  Resolved     Hypothyroidism   Assessment & Plan    Chronic.     -[place]ia thyroid 60 mg po q day continue on discharge     AP (abdominal pain)    Assessment & Plan    Likely secondary to HHS.  Patient does have a history of pancreatitis, but this was secondary to oral diabetic medication side effects.  Lipase normal.    -Continue to monitor as blood glucose is controlled.     Normocytic anemia   Assessment & Plan    Labile RBC/Hgb counts. Hct has been chronically low going back to 2006. Unknown etiology. Ferritin 80s in early 2018.       Hx of acute pancreatitis   Assessment & Plan    -History of pancreatitis ~ 6 months ago.         Consultants:     none    Procedures:        none    Imaging/ Testing:      [id number]   Final Result      No evidence of fracture or destructive bone lesion.      CT-MAXILLOFACIAL W/O PLUS RECONS   Final Result         1.  No evidence of facial fracture.      2.  Nasal septal deviation and mucosal thickening in the nasal cavity causing narrowing of the nasal airway again noted.      3.  Some interval enlargement of right-sided maxillary probable mucus retention cyst.      4.  No bone erosion or soft tissue fluid collection or abscess is appreciated.            Discharge Medications:         Medication Reconciliation: Completed       Medication List      START taking these medications      Instructions   amoxicillin-clavulanate 875-125 MG Tabs  Commonly known as:  AUGMENTIN   Take 1 Tab by mouth every 12 hours for 14 days.  Dose:  1 Tab     gemfibrozil 600 MG Tabs  Commonly known as:  LOPID   Take 1 Tab by mouth 2 times a day.  Dose:  600 mg        CHANGE how you take these medications      Instructions   ondansetron 4 MG Tabs tablet  What changed:  when to take this  Commonly known as:  ZOFRAN   Take 1 Tab by mouth 1 time daily as needed for Nausea/Vomiting for up to 15 doses.  Dose:  4 mg        CONTINUE taking these medications      Instructions   anastrozole 1 MG Tabs  Commonly known as:  ARIMIDEX   Take 1 mg by mouth every 7 days.  Dose:  1 mg     buPROPion 300 MG XL tablet  Commonly known as:  WELLBUTRIN XL    Take 300 mg by mouth every morning.  Dose:  300 mg     chlorhexidine 0.12 % Soln  Commonly known as:  PERIDEX   Take 15 mL by mouth 2 times a day.  Dose:  15 mL     insulin glargine 100 UNIT/ML Soln  Commonly known as:  LANTUS   Inject 34-42 Units as instructed 2 times a day. 42 units every morning 34 units every night  Dose:  34-42 Units     insulin lispro 100 UNIT/ML Soln  Commonly known as:  HUMALOG   Inject 2-12 Units as instructed 3 times a day before meals. Sliding Scale  160 - 200 = 2 units 201 - 250 = 4 units 251 - 300 = 6 units 301 - 350 = 8 units 351 - 400 = 10 units >401 Call MD and 12 units  Dose:  2-12 Units     testosterone cypionate 200 MG/ML Soln injection  Commonly known as:  DEPO-TESTOSTERONE   80 mg by Intramuscular route every 7 days.  Dose:  80 mg     thyroid 60 MG Tabs  Commonly known as:  ARMOUR THYROID   Take 60 mg by mouth every morning.  Dose:  60 mg        STOP taking these medications    doxycycline 100 MG Tabs  Commonly known as:  VIBRAMYCIN            Can use .DISCHARGEMEDSLIST if going to another facility         Disposition:   Home    Diet:   Diabetic     Activity:   As tolerated     Instructions:         The patient was instructed to return to the ER in the event of worsening symptoms. I have counseled the patient on the importance of compliance and the patient has agreed to proceed with all medical recommendations and follow up plan indicated above.   The patient understands that all medications come with benefits and risks. Risks may include permanent injury or death and these risks can be minimized with close reassessment and monitoring.        Primary Care Provider:    KRISSY Brunner  Discharge summary faxed to primary care provider:  Deferred  Copy of discharge summary given to the patient: Deferred      Follow up appointment details :      1. KRISSY Brunner. Go on 1/10/2019.      Specialty:  Family Medicine   Why:  PLEASE ARRIVE AT 10:30AM FOR YOUR  APPOINTMENT. THANK YOU   Contact information   [phone]       2. Grey Field M.D.. Call in 2 days.      Specialty:  Otolaryngology     Jan 29, 2019  7:45 AM PST  New Patient with Edgardo Paulson P.A.-C.  [facility]      Time spent on discharge day patient visit, preparing discharge paperwork and arranging for patient follow up.      Discharge Time (Minutes) :    60 minutes   Hospital Course Type: Inpatient Stay < 2 midnights, patient recovered more rapidly than anticipated      Condition on Discharge    ______________________________________________________________________    Interval history/exam for day of discharge:     - Overnight no telemetry events noted  - Hemodynamically stable  - Labs: Gap is closed, Ph 7.4, Bicarb 17 Potassium is 4.5  Blood sugars last one was 177 this am.   - Patient has an appointment with Endocrinology on 1/29/19, patient counseled on the need to follow up as outpatient with endocrinology    PHYSICAL EXAMINATION:  VITAL SIGNS:    Vitals:    01/03/19 1223   BP: 115/75   Pulse: 75   Resp: 16   Temp: 36.3 °C (97.3 °F)   SpO2: 95%     CONSTITUTIONAL:  Appears comfortable.  HEENT:  Normocephalic, atraumatic.  RESPIRATORY:  Clear to auscultation bilaterally.  CARDIOVASCULAR:  Normal rate and rhythm.  No murmurs, rubs or gallops.  ABDOMEN:  Soft, nontender.  Normal bowel sounds.  EXTREMITIES:  No pedal edema.  NEUROLOGIC:  Cranial nerves II-XII grossly intact.  Sensation 5/5 and equal   bilaterally.  Reflexes are 2+.    Most Recent Labs:    Lab Results   Component Value Date/Time    WBC 4.6 (L) 01/03/2019 02:01 AM    RBC 3.91 (L) 01/03/2019 02:01 AM    HEMOGLOBIN 12.6 (L) 01/03/2019 02:01 AM    HEMATOCRIT 36.0 (L) 01/03/2019 02:01 AM    MCV 92.1 01/03/2019 02:01 AM    MCH 32.2 01/03/2019 02:01 AM    MCHC 35.0 01/03/2019 02:01 AM    MPV 9.5 01/03/2019 02:01 AM    NEUTSPOLYS 57.80 01/03/2019 02:01 AM    LYMPHOCYTES 33.60 01/03/2019 02:01 AM     MONOCYTES 5.30 01/03/2019 02:01 AM    EOSINOPHILS 2.20 01/03/2019 02:01 AM    BASOPHILS 0.40 01/03/2019 02:01 AM    ANISOCYTOSIS 1+ 05/06/2017 12:10 AM      Lab Results   Component Value Date/Time    SODIUM 138 01/03/2019 05:59 AM    POTASSIUM 4.5 01/03/2019 05:59 AM    CHLORIDE 113 (H) 01/03/2019 05:59 AM    CO2 17 (L) 01/03/2019 05:59 AM    GLUCOSE 229 (H) 01/03/2019 05:59 AM    BUN 17 01/03/2019 05:59 AM    CREATININE 0.63 01/03/2019 05:59 AM    CREATININE 1.1 02/18/2008 10:00 AM      Lab Results   Component Value Date/Time    ALTSGPT 18 01/02/2019 09:22 AM    ASTSGOT 16 01/02/2019 09:22 AM    ALKPHOSPHAT 97 01/02/2019 09:22 AM    TBILIRUBIN 0.6 01/02/2019 09:22 AM    DBILIRUBIN <0.1 04/17/2017 05:20 PM    LIPASE 54 01/02/2019 09:22 AM    ALBUMIN 4.2 01/02/2019 09:22 AM    GLOBULIN 2.6 01/02/2019 09:22 AM    PREALBUMIN 16.0 (L) 03/07/2016 01:07 AM    INR 0.97 05/19/2018 03:14 PM    MACROCYTOSIS 1+ 05/06/2017 12:10 AM     Lab Results   Component Value Date/Time    PROTHROMBTM 12.6 05/19/2018 03:14 PM    INR 0.97 05/19/2018 03:14 PM

## 2019-01-03 NOTE — ASSESSMENT & PLAN NOTE
-Lipid panel this admission showed total cholesterol 301, triglycerides at 1172  -Patient is discharged with gemfibrozil 600 mg twice daily.

## 2019-01-03 NOTE — NON-PROVIDER
Internal Medicine Admitting History and Physical    Note Author: Fidelia Tolbert, Student       Name Mahesh Bradshaw     1965   Age/Sex 53 y.o. male   MRN [id number]   Code Status FULL     After 5PM or if no immediate response to page, please call for cross-coverage  Attending/Team: Ernaister See Patient List for primary contact information  Call [phone] to page    1st Call - Day Intern (R1):   Yobani 2nd Call - Day Sr. Resident (R2/R3):   Maria L       Chief Complaint:   High blood sugar    HPI:  Mahesh Bradshaw is a 53 y.o.  male who presented to the emergency department with hyperglycemia after having unreadable blood sugar levels on home BG monitor yesterday. He reports that his blood glucose was in the 400s two days ago. On admission, he reported nausea, LUQ abdominal cramping, sinus congestion, right sided earache, dry mouth, and polyuria. He attributed his abdominal cramping and nausea to high BG levels, as these symptoms are typical for him when his BG is high. He denied vomiting, pain or difficulty with urination, confusion, or recent vision changes. He denied recent diet changes, although reports that his appetite has been decreased for the last week until yesterday when he ate slightly more than usual.      Patient was diagnosed with diabetes in , which was well controlled until he developed pancreatitis thought to be secondary to Trajenta (linagliptin) in 2016. Since then, he has had multiple admissions for hyperglycemia. He was in the ED as recently as 18 for hyperglycemia, where he was discharged after fluid resuscitation and insulin. He also reports recent outpatient treatment for an ear infection with 10 days of doxycycline. Yesterday was his last dose. Additionally, he reports a variety of dental procedures over the past two weeks, with the most recent being 2 days ago in which he was told he had an infection in his gums. He is not currently followed  "by an endocrinologist but has an appointment with one (Dr. Valente) at the end of this month.     As per night team, patient did well overnight. Potassium was low (down to 3.3) and was repleted with 20meq K and has since normalized to 4.5 this morning    Today, he is feeling generally better. He has abdominal cramping has decreased, although not completely resolved. He still reports headache that his ear feels \"plugged up.\" He reports improvement of headache with heating pads. His thirst has decreased and he denies polyuria. He has expressed wishes to go home today.       ROS  General: Denies weight loss, fatigue, weakness, fever, or chills  Skin: + Recent lesion from ingrown toenail removal on right big toe  HEENT: As per HPI, +hearing loss in right ear, -discharge from ears  CV: Denies chest pain, palpitations, CRANE  GI: Per HPI  Urinary: Per HPI, denies hematuria    Past Medical History (Chronic medical problem, known complications and current treatment)    # DMII, uncontrolled, treated at home with Lantus and sliding scale insulin  # Pancreatitis, see HPI  # Depression, treated with Wellbutrin  # Hypothyroidism, treated with Depo-testosterone  # Essential hypertension, treated with Hydralazine        Past Surgical History:  Past Surgical History:   Procedure Laterality Date   • UMBILICAL HERNIA REPAIR N/A 4/15/2018    Procedure: UMBILICAL HERNIA REPAIR;  Surgeon: Karen Beasley M.D.;  Location: [facility];  Service: General   • IRRIGATION & DEBRIDEMENT ORTHO Left 12/10/2017    Procedure: IRRIGATION & DEBRIDEMENT ORTHO LEFT HAND;  Surgeon: Chicho Ramos M.D.;  Location: [facility];  Service: Orthopedics   • IRRIGATION & DEBRIDEMENT GENERAL Right 5/1/2017    Procedure: IRRIGATION & DEBRIDEMENT GENERAL-Left HAND AND right  ANKLE;  Surgeon: Esau Dooley M.D.;  Location: [facility];  Service:    • IRRIGATION & DEBRIDEMENT GENERAL Right 4/29/2017    Procedure: IRRIGATION " & DEBRIDEMENT GENERAL;  Surgeon: Esau Dooley M.D.;  Location: SURGERY [facility];  Service:    • INCISION AND DRAINAGE GENERAL  4/7/2017    Procedure: INCISION AND DRAINAGE GENERAL;  Surgeon: Esau Dooley M.D.;  Location: SURGERY SAME DAY [facility];  Service:    • UMBILICAL HERNIA REPAIR  3/10/2017    Procedure: UMBILICAL HERNIA REPAIR- INCISION AND DRAINAGE OF UMBILICAL WOUND AND MESH REMOVAL;  Surgeon: Esau Dooley M.D.;  Location: SURGERY SAME DAY [facility];  Service:    • UMBILICAL HERNIA REPAIR N/A 11/6/2016    Procedure: UMBILICAL HERNIA REPAIR;  Surgeon: Esau Dooley M.D.;  Location: SURGERY [facility];  Service:    • COLONOSCOPY WITH BIOPSY  11/26/2014    Performed by Solo Higginbotham M.D. at ENDOSCOPY [facility]   • LIVE BY LAPAROSCOPY  2005   • OTHER ORTHOPEDIC SURGERY  1997 or 1998    Left Wrist ORIF       Current Outpatient Medications:  Home Medications     Reviewed by Gabi Bui PhT (Pharmacy Tech) on 01/02/19 at 1119  Med List Status: Complete   Medication Last Dose Status   anastrozole (ARIMIDEX) 1 MG Tab 12/26/2018 Active   buPROPion (WELLBUTRIN XL) 300 MG XL tablet 1/1/2019 Active   chlorhexidine (PERIDEX) 0.12 % Solution 1/2/2019 Active   doxycycline (VIBRAMYCIN) 100 MG Tab 1/1/2019 Active   insulin glargine (LANTUS) 100 UNIT/ML Solution 1/2/2019 Active   insulin lispro (HUMALOG) 100 UNIT/ML Solution 1/2/2019 Active   ondansetron (ZOFRAN) 4 MG Tab tablet 1/2/2019 Active   testosterone cypionate (DEPO-TESTOSTERONE) 200 MG/ML Solution injection 12/27/2018 Active   thyroid (ARMOUR THYROID) 60 MG Tab 1/1/2019 Active                Medication Allergy/Sensitivities:  Allergies   Allergen Reactions   • Peanut (Diagnostic) Anaphylaxis   • Tradjenta [Linagliptin] Unspecified     Pt developed pancreatitis   • Hydrocodone Hives     Tolerates Morphine and oxycodone           Family History (mandatory)   Family History   Problem Relation Age of Onset   •  "Cancer Mother        Social History (mandatory)   Social History     Social History   • Marital status: Single     Spouse name: N/A   • Number of children: N/A   • Years of education: N/A     Occupational History   • Not on file.     Social History Main Topics   • Smoking status: Never Smoker   • Smokeless tobacco: Never Used   • Alcohol use No   • Drug use: No   • Sexual activity: Not on file     Other Topics Concern   • Not on file     Social History Narrative    ** Merged History Encounter **         ** Merged History Encounter **     ** Merged History Encounter **        PCP : KRISSY Brunner    Physical Exam     Vitals:    01/02/19 1510 01/02/19 1957 01/03/19 0010 01/03/19 0400   BP: 128/81 114/73 119/73 124/71   Pulse: (!) 104 91 80 84   Resp: 17 18 16 16   Temp: 36.9 °C (98.5 °F) 36.4 °C (97.5 °F) 36.4 °C (97.6 °F) 36.4 °C (97.5 °F)   TempSrc: Temporal Temporal Temporal Temporal   SpO2: 92%      Weight:  94.2 kg (207 lb 10.8 oz)     Height:         Body mass index is 31.58 kg/m².  /71   Pulse 84   Temp 36.4 °C (97.5 °F) (Temporal)   Resp 16   Ht 1.727 m (5' 8\")   Wt 94.2 kg (207 lb 10.8 oz)   SpO2 92%   BMI 31.58 kg/m²   O2 therapy: Pulse Oximetry: 92 %, O2 (LPM): 0, O2 Delivery: None (Room Air)    Physical Exam   Constitutional: He is well-developed, well-nourished, and in no distress. No distress.   HENT:   Mouth/Throat: No oropharyngeal exudate.   Cardiovascular: Normal rate, regular rhythm and normal heart sounds.  Exam reveals no gallop and no friction rub.    No murmur heard.  Pulmonary/Chest: No respiratory distress. He has no wheezes. He has no rales. He exhibits no tenderness.   Abdominal: He exhibits no distension. There is tenderness. There is no rebound and no guarding.   Skin: He is not diaphoretic.   Mouth: Lower left molar decay, healing bilateral upper molar excisions      Data Review    Lab Data Review:  Recent Results (from the past 24 hour(s))   ACCU-CHEK GLUCOSE    " Collection Time: 01/02/19  8:45 AM   Result Value Ref Range    Glucose - Accu-Ck >600 (HH) 65 - 99 mg/dL   URINALYSIS,CULTURE IF INDICATED    Collection Time: 01/02/19  9:15 AM   Result Value Ref Range    Color Yellow     Character Clear     Specific Gravity 1.028 <1.035    Ph 5.0 5.0 - 8.0    Glucose >=1000 (A) Negative mg/dL    Ketones 15 (A) Negative mg/dL    Protein Negative Negative mg/dL    Bilirubin Negative Negative    Urobilinogen, Urine 0.2 Negative    Nitrite Negative Negative    Leukocyte Esterase Negative Negative    Occult Blood Negative Negative    Micro Urine Req see below    CBC WITH DIFFERENTIAL    Collection Time: 01/02/19  9:22 AM   Result Value Ref Range    WBC 4.2 (L) 4.8 - 10.8 K/uL    RBC 4.31 (L) 4.70 - 6.10 M/uL    Hemoglobin 14.7 14.0 - 18.0 g/dL    Hematocrit 41.2 (L) 42.0 - 52.0 %    MCV 95.6 81.4 - 97.8 fL    MCH 34.1 (H) 27.0 - 33.0 pg    MCHC 35.7 (H) 33.7 - 35.3 g/dL    RDW 46.3 35.9 - 50.0 fL    Platelet Count 200 164 - 446 K/uL    MPV 9.9 9.0 - 12.9 fL    Neutrophils-Polys 72.90 (H) 44.00 - 72.00 %    Lymphocytes 19.20 (L) 22.00 - 41.00 %    Monocytes 4.50 0.00 - 13.40 %    Eosinophils 1.20 0.00 - 6.90 %    Basophils 0.50 0.00 - 1.80 %    Immature Granulocytes 1.70 (H) 0.00 - 0.90 %    Nucleated RBC 0.00 /100 WBC    Neutrophils (Absolute) 3.08 1.82 - 7.42 K/uL    Lymphs (Absolute) 0.81 (L) 1.00 - 4.80 K/uL    Monos (Absolute) 0.19 0.00 - 0.85 K/uL    Eos (Absolute) 0.05 0.00 - 0.51 K/uL    Baso (Absolute) 0.02 0.00 - 0.12 K/uL    Immature Granulocytes (abs) 0.07 0.00 - 0.11 K/uL    NRBC (Absolute) 0.00 K/uL   CMP    Collection Time: 01/02/19  9:22 AM   Result Value Ref Range    Sodium 123 (L) 135 - 145 mmol/L    Potassium 5.7 (H) 3.6 - 5.5 mmol/L    Chloride 90 (L) 96 - 112 mmol/L    Co2 19 (L) 20 - 33 mmol/L    Anion Gap 14.0 (H) 0.0 - 11.9    Glucose 876 (HH) 65 - 99 mg/dL    Bun 27 (H) 8 - 22 mg/dL    Creatinine 1.26 0.50 - 1.40 mg/dL    Calcium 9.3 8.5 - 10.5 mg/dL    AST(SGOT)  16 12 - 45 U/L    ALT(SGPT) 18 2 - 50 U/L    Alkaline Phosphatase 97 30 - 99 U/L    Total Bilirubin 0.6 0.1 - 1.5 mg/dL    Albumin 4.2 3.2 - 4.9 g/dL    Total Protein 6.8 6.0 - 8.2 g/dL    Globulin 2.6 1.9 - 3.5 g/dL    A-G Ratio 1.6 g/dL   LIPASE    Collection Time: 01/02/19  9:22 AM   Result Value Ref Range    Lipase 54 11 - 82 U/L   ESTIMATED GFR    Collection Time: 01/02/19  9:22 AM   Result Value Ref Range    GFR If African American >60 >60 mL/min/1.73 m 2    GFR If Non African American 60 >60 mL/min/1.73 m 2   VENOUS BLOOD GAS    Collection Time: 01/02/19  9:43 AM   Result Value Ref Range    Venous Bg Ph 7.32 7.31 - 7.45    Venous Bg Pco2 43.5 41.0 - 51.0 mmHg    Venous Bg Po2 22.6 (L) 25.0 - 40.0 mmHg    Venous Bg O2 Saturation 37.4 %    Venous Bg Hco3 22 (L) 24 - 28 mmol/L    Venous Bg Base Excess -4 mmol/L    Body Temp see below Centigrade   ACCU-CHEK GLUCOSE    Collection Time: 01/02/19 11:28 AM   Result Value Ref Range    Glucose - Accu-Ck >600 (HH) 65 - 99 mg/dL   BASIC METABOLIC PANEL    Collection Time: 01/02/19 12:52 PM   Result Value Ref Range    Sodium 133 (L) 135 - 145 mmol/L    Potassium 4.2 3.6 - 5.5 mmol/L    Chloride 103 96 - 112 mmol/L    Co2 20 20 - 33 mmol/L    Glucose 570 (HH) 65 - 99 mg/dL    Bun 21 8 - 22 mg/dL    Creatinine 0.79 0.50 - 1.40 mg/dL    Calcium 8.8 8.5 - 10.5 mg/dL    Anion Gap 10.0 0.0 - 11.9   ESTIMATED GFR    Collection Time: 01/02/19 12:52 PM   Result Value Ref Range    GFR If African American >60 >60 mL/min/1.73 m 2    GFR If Non African American >60 >60 mL/min/1.73 m 2   Hemoglobin A1c    Collection Time: 01/02/19 12:52 PM   Result Value Ref Range    Glycohemoglobin 14.7 (H) 0.0 - 5.6 %    Est Avg Glucose 375 mg/dL   ACCU-CHEK GLUCOSE    Collection Time: 01/02/19  2:02 PM   Result Value Ref Range    Glucose - Accu-Ck 424 (HH) 65 - 99 mg/dL   ACCU-CHEK GLUCOSE    Collection Time: 01/02/19  5:50 PM   Result Value Ref Range    Glucose - Accu-Ck 282 (H) 65 - 99 mg/dL    BASIC METABOLIC PANEL    Collection Time: 01/02/19  6:13 PM   Result Value Ref Range    Sodium 138 135 - 145 mmol/L    Potassium 3.3 (L) 3.6 - 5.5 mmol/L    Chloride 108 96 - 112 mmol/L    Co2 20 20 - 33 mmol/L    Glucose 276 (H) 65 - 99 mg/dL    Bun 18 8 - 22 mg/dL    Creatinine 0.65 0.50 - 1.40 mg/dL    Calcium 9.4 8.5 - 10.5 mg/dL    Anion Gap 10.0 0.0 - 11.9   MAGNESIUM    Collection Time: 01/02/19  6:13 PM   Result Value Ref Range    Magnesium 1.9 1.5 - 2.5 mg/dL   PHOSPHORUS    Collection Time: 01/02/19  6:13 PM   Result Value Ref Range    Phosphorus 3.8 2.5 - 4.5 mg/dL   ESTIMATED GFR    Collection Time: 01/02/19  6:13 PM   Result Value Ref Range    GFR If African American >60 >60 mL/min/1.73 m 2    GFR If Non African American >60 >60 mL/min/1.73 m 2   BASIC METABOLIC PANEL    Collection Time: 01/02/19  9:37 PM   Result Value Ref Range    Sodium 139 135 - 145 mmol/L    Potassium 3.5 (L) 3.6 - 5.5 mmol/L    Chloride 109 96 - 112 mmol/L    Co2 22 20 - 33 mmol/L    Glucose 202 (H) 65 - 99 mg/dL    Bun 18 8 - 22 mg/dL    Creatinine 0.66 0.50 - 1.40 mg/dL    Calcium 8.6 8.5 - 10.5 mg/dL    Anion Gap 8.0 0.0 - 11.9   ESTIMATED GFR    Collection Time: 01/02/19  9:37 PM   Result Value Ref Range    GFR If African American >60 >60 mL/min/1.73 m 2    GFR If Non African American >60 >60 mL/min/1.73 m 2   ACCU-CHEK GLUCOSE    Collection Time: 01/03/19 12:43 AM   Result Value Ref Range    Glucose - Accu-Ck 177 (H) 65 - 99 mg/dL   CBC with Differential    Collection Time: 01/03/19  2:01 AM   Result Value Ref Range    WBC 4.6 (L) 4.8 - 10.8 K/uL    RBC 3.91 (L) 4.70 - 6.10 M/uL    Hemoglobin 12.6 (L) 14.0 - 18.0 g/dL    Hematocrit 36.0 (L) 42.0 - 52.0 %    MCV 92.1 81.4 - 97.8 fL    MCH 32.2 27.0 - 33.0 pg    MCHC 35.0 33.7 - 35.3 g/dL    RDW 44.6 35.9 - 50.0 fL    Platelet Count 165 164 - 446 K/uL    MPV 9.5 9.0 - 12.9 fL    Neutrophils-Polys 57.80 44.00 - 72.00 %    Lymphocytes 33.60 22.00 - 41.00 %    Monocytes 5.30  0.00 - 13.40 %    Eosinophils 2.20 0.00 - 6.90 %    Basophils 0.40 0.00 - 1.80 %    Immature Granulocytes 0.70 0.00 - 0.90 %    Nucleated RBC 0.00 /100 WBC    Neutrophils (Absolute) 2.64 1.82 - 7.42 K/uL    Lymphs (Absolute) 1.53 1.00 - 4.80 K/uL    Monos (Absolute) 0.24 0.00 - 0.85 K/uL    Eos (Absolute) 0.10 0.00 - 0.51 K/uL    Baso (Absolute) 0.02 0.00 - 0.12 K/uL    Immature Granulocytes (abs) 0.03 0.00 - 0.11 K/uL    NRBC (Absolute) 0.00 K/uL   VENOUS BLOOD GAS    Collection Time: 01/03/19  2:01 AM   Result Value Ref Range    Venous Bg Ph 7.40 7.31 - 7.45    Venous Bg Pco2 33.2 (L) 41.0 - 51.0 mmHg    Venous Bg Po2 55.4 (H) 25.0 - 40.0 mmHg    Venous Bg O2 Saturation 89.0 %    Venous Bg Hco3 20 (L) 24 - 28 mmol/L    Venous Bg Base Excess -4 mmol/L    Body Temp see below Centigrade   BASIC METABOLIC PANEL    Collection Time: 01/03/19  2:02 AM   Result Value Ref Range    Sodium 140 135 - 145 mmol/L    Potassium 3.7 3.6 - 5.5 mmol/L    Chloride 111 96 - 112 mmol/L    Co2 21 20 - 33 mmol/L    Glucose 202 (H) 65 - 99 mg/dL    Bun 18 8 - 22 mg/dL    Creatinine 0.61 0.50 - 1.40 mg/dL    Calcium 8.5 8.5 - 10.5 mg/dL    Anion Gap 8.0 0.0 - 11.9   ESTIMATED GFR    Collection Time: 01/03/19  2:02 AM   Result Value Ref Range    GFR If African American >60 >60 mL/min/1.73 m 2    GFR If Non African American >60 >60 mL/min/1.73 m 2   BASIC METABOLIC PANEL    Collection Time: 01/03/19  5:59 AM   Result Value Ref Range    Sodium 138 135 - 145 mmol/L    Potassium 4.5 3.6 - 5.5 mmol/L    Chloride 113 (H) 96 - 112 mmol/L    Co2 17 (L) 20 - 33 mmol/L    Glucose 229 (H) 65 - 99 mg/dL    Bun 17 8 - 22 mg/dL    Creatinine 0.63 0.50 - 1.40 mg/dL    Calcium 7.9 (L) 8.5 - 10.5 mg/dL    Anion Gap 8.0 0.0 - 11.9   ESTIMATED GFR    Collection Time: 01/03/19  5:59 AM   Result Value Ref Range    GFR If African American >60 >60 mL/min/1.73 m 2    GFR If Non African American >60 >60 mL/min/1.73 m 2       Imaging/Procedures Review:       [id number]   Final Result      No evidence of fracture or destructive bone lesion.      CT-MAXILLOFACIAL W/O PLUS RECONS   Final Result         1.  No evidence of facial fracture.      2.  Nasal septal deviation and mucosal thickening in the nasal cavity causing narrowing of the nasal airway again noted.      3.  Some interval enlargement of right-sided maxillary probable mucus retention cyst.      4.  No bone erosion or soft tissue fluid collection or abscess is appreciated.               Assessment/Plan     Diabetic Ketoacidosis- (present on admission)   Assessment & Plan    Patient was acidotic with a pH of 7.32 on admission with an anion gap of 14, ketones of 15 in urine, glucose of 876, and HbA1C of 14.7 today. PMH of DMII and DKA at previous hospital admissions. Recent sinusitis and dental infection could have triggered increased BG.     -Patient's acidosis has corrected today with a pH of 7.4, so it is safe to d/c fluids. This should also help to correct patients hypochloremia and likely dilutional anemia.   -Patient's BG has recovered to 176 today.   -With correction of BG and acidosis, patient is safe for discharge  -Continue subcutaneous insulin sliding scale and long acting insulin at home until patient's appointment with endocrinologist at the end of the month.      Sinusitis, acute   Assessment & Plan    CT sinus scan revealed right-sided maxillary mucus retention cyst consist with the location of the patients sinus pain. This cyst is likely blocking outflow sinus drainage and increasing the likelihood of sinus infection. Panorex revealed no sign of dental abscess.     -Continue outpatient amoxicillin/clavulanic acid 875-125 mg p.o. twice daily for a total of 21 days  -Referral of patient to outpatient follow-up with ENT to discuss removal of cyst     Pseudohyponatremia- (present on admission)   Assessment & Plan    Initial sodium was low due to the osmotic effect of increased glucose in  the bloodstream. Corrected sodium on admission via [place] correction factor is 142.     AP (abdominal pain)- (present on admission)   Assessment & Plan    Likely secondary to DKA, as patient reports similar symptoms when BG was elevated in the past.  Patient does have a history of pancreatitis, but lipase levels were normal on this admission. Symptoms have improved with BG control.      Hypothyroidism- (present on admission)   Assessment & Plan    Chronic issue. Patient denies symptoms of hypothyroidism.    -Continue home medication of armour thyroid 60 mg po daily.     Hypertriglyceridemia   Assessment & Plan    Triglycerides were measured 1172 today. Given the patients PMH, he is at high risk for another episode of pancreatitis.     -Begin gemfibrozil 600 mg bid      Quality Measures  Quality-Core Measures  PCP: KRISSY Brunner

## 2019-01-03 NOTE — RESPIRATORY CARE
COPD EDUCATION by COPD CLINICAL EDUCATOR  1/3/2019 at 8:05 AM by Carleen Ohara     Patient reviewed by COPD education team. Patient does not qualify for COPD program.

## 2019-01-03 NOTE — CARE PLAN
Problem: Safety  Goal: Will remain free from falls  Outcome: PROGRESSING AS EXPECTED  Bed in lowest position with call light within reach. Instructed patient to use call light for assistance, verbalized understanding.

## 2019-01-03 NOTE — PROGRESS NOTES
Bedside report received. Pt is resting in bed.  Patient A&O x 4 on RA.  Complains of 6/10 headache and abdominal pain, will medicate per MAR.  POC discussed with patient.  Patient verbalized understanding.  Call light and belongings with in reach.  Bed locked and in lowest position, alarm and fall precautions in place.

## 2019-01-03 NOTE — ASSESSMENT & PLAN NOTE
Unknown etiology. CT-scan is indicative of this diagnosis. Completed course of bacteriostatic doxycycline.  As patient CT scan is still indicative of ongoing sinusitis we will treat with further antibiotics.    -Amoxicillin/clavulanic acid 875-125 mg p.o. twice daily.  -Panorex ordered.  -CT sinuses ordered.    -If patient spikes fever   -Ampicillin/sulbactam 3 g IV every 6 hours.

## 2019-01-04 NOTE — PROGRESS NOTES
Discharge Summary  Educated patient on new medications, up coming appointments, and s/s to look for when returning to the emergency room. Patient verbalized understanding. PIV removed with no s/s of bleeding or infection at site. Vitals WNL. Walked patient to first floor for discharge home.

## 2019-01-05 LAB — EKG IMPRESSION: NORMAL

## 2019-01-23 ENCOUNTER — HOSPITAL ENCOUNTER (INPATIENT)
Facility: MEDICAL CENTER | Age: 54
LOS: 1 days | DRG: 637 | End: 2019-01-24
Attending: EMERGENCY MEDICINE | Admitting: HOSPITALIST
Payer: COMMERCIAL

## 2019-01-23 DIAGNOSIS — E11.10 DIABETIC KETOACIDOSIS WITHOUT COMA ASSOCIATED WITH TYPE 2 DIABETES MELLITUS (HCC): ICD-10-CM

## 2019-01-23 DIAGNOSIS — K51.90 CHRONIC ULCERATIVE COLITIS WITHOUT COMPLICATION, UNSPECIFIED LOCATION (HCC): ICD-10-CM

## 2019-01-23 PROBLEM — A41.9 SEPTIC SHOCK (HCC): Status: RESOLVED | Noted: 2017-05-01 | Resolved: 2019-01-23

## 2019-01-23 PROBLEM — R74.01 TRANSAMINITIS: Status: RESOLVED | Noted: 2017-04-07 | Resolved: 2019-01-23

## 2019-01-23 PROBLEM — L02.91 SUBCUTANEOUS ABSCESS: Status: RESOLVED | Noted: 2017-03-10 | Resolved: 2019-01-23

## 2019-01-23 PROBLEM — R65.21 SEPTIC SHOCK (HCC): Status: RESOLVED | Noted: 2017-05-01 | Resolved: 2019-01-23

## 2019-01-23 PROBLEM — J01.90 SINUSITIS, ACUTE: Status: RESOLVED | Noted: 2019-01-02 | Resolved: 2019-01-23

## 2019-01-23 PROBLEM — Z79.4 TYPE 2 DIABETES MELLITUS, WITH LONG-TERM CURRENT USE OF INSULIN (HCC): Status: RESOLVED | Noted: 2018-05-19 | Resolved: 2019-01-23

## 2019-01-23 PROBLEM — T14.90XA TRAUMA: Status: RESOLVED | Noted: 2018-05-19 | Resolved: 2019-01-23

## 2019-01-23 PROBLEM — E11.9 TYPE 2 DIABETES MELLITUS, WITH LONG-TERM CURRENT USE OF INSULIN (HCC): Status: RESOLVED | Noted: 2018-05-19 | Resolved: 2019-01-23

## 2019-01-23 PROBLEM — E11.9 TYPE 2 DIABETES MELLITUS WITHOUT COMPLICATION, WITH LONG-TERM CURRENT USE OF INSULIN (HCC): Status: RESOLVED | Noted: 2017-05-01 | Resolved: 2019-01-23

## 2019-01-23 PROBLEM — Z79.4 TYPE 2 DIABETES MELLITUS WITHOUT COMPLICATION, WITH LONG-TERM CURRENT USE OF INSULIN (HCC): Status: RESOLVED | Noted: 2017-05-01 | Resolved: 2019-01-23

## 2019-01-23 LAB
ALBUMIN SERPL BCP-MCNC: 3.9 G/DL (ref 3.2–4.9)
ALBUMIN/GLOB SERPL: 1.3 G/DL
ALP SERPL-CCNC: 99 U/L (ref 30–99)
ALT SERPL-CCNC: 53 U/L (ref 2–50)
ANION GAP SERPL CALC-SCNC: 16 MMOL/L (ref 0–11.9)
ANION GAP SERPL CALC-SCNC: 17 MMOL/L (ref 0–11.9)
APPEARANCE UR: CLEAR
AST SERPL-CCNC: 37 U/L (ref 12–45)
B-OH-BUTYR SERPL-MCNC: 0.53 MMOL/L (ref 0.02–0.27)
BASOPHILS # BLD AUTO: 0.2 % (ref 0–1.8)
BASOPHILS # BLD: 0.01 K/UL (ref 0–0.12)
BILIRUB SERPL-MCNC: 0.8 MG/DL (ref 0.1–1.5)
BILIRUB UR QL STRIP.AUTO: NEGATIVE
BUN SERPL-MCNC: 19 MG/DL (ref 8–22)
BUN SERPL-MCNC: 20 MG/DL (ref 8–22)
CALCIUM SERPL-MCNC: 8.8 MG/DL (ref 8.4–10.2)
CALCIUM SERPL-MCNC: 9.1 MG/DL (ref 8.4–10.2)
CHLORIDE SERPL-SCNC: 88 MMOL/L (ref 96–112)
CHLORIDE SERPL-SCNC: 89 MMOL/L (ref 96–112)
CO2 SERPL-SCNC: 17 MMOL/L (ref 20–33)
CO2 SERPL-SCNC: 19 MMOL/L (ref 20–33)
COLOR UR: YELLOW
CREAT SERPL-MCNC: 0.73 MG/DL (ref 0.5–1.4)
CREAT SERPL-MCNC: 0.92 MG/DL (ref 0.5–1.4)
EOSINOPHIL # BLD AUTO: 0.04 K/UL (ref 0–0.51)
EOSINOPHIL NFR BLD: 0.7 % (ref 0–6.9)
ERYTHROCYTE [DISTWIDTH] IN BLOOD BY AUTOMATED COUNT: 40.9 FL (ref 35.9–50)
GLOBULIN SER CALC-MCNC: 2.9 G/DL (ref 1.9–3.5)
GLUCOSE BLD-MCNC: 297 MG/DL (ref 65–99)
GLUCOSE BLD-MCNC: 337 MG/DL (ref 65–99)
GLUCOSE BLD-MCNC: 358 MG/DL (ref 65–99)
GLUCOSE BLD-MCNC: 446 MG/DL (ref 65–99)
GLUCOSE BLD-MCNC: >600 MG/DL (ref 65–99)
GLUCOSE SERPL-MCNC: 698 MG/DL (ref 65–99)
GLUCOSE SERPL-MCNC: 764 MG/DL (ref 65–99)
GLUCOSE UR STRIP.AUTO-MCNC: >=1000 MG/DL
HCT VFR BLD AUTO: 40.5 % (ref 42–52)
HGB BLD-MCNC: 15 G/DL (ref 14–18)
IMM GRANULOCYTES # BLD AUTO: 0.03 K/UL (ref 0–0.11)
IMM GRANULOCYTES NFR BLD AUTO: 0.5 % (ref 0–0.9)
KETONES UR STRIP.AUTO-MCNC: 15 MG/DL
LACTATE BLD-SCNC: 6.5 MMOL/L (ref 0.5–2)
LEUKOCYTE ESTERASE UR QL STRIP.AUTO: NEGATIVE
LIPASE SERPL-CCNC: 26 U/L (ref 7–58)
LYMPHOCYTES # BLD AUTO: 1.38 K/UL (ref 1–4.8)
LYMPHOCYTES NFR BLD: 23.3 % (ref 22–41)
MAGNESIUM SERPL-MCNC: 2 MG/DL (ref 1.5–2.5)
MAGNESIUM SERPL-MCNC: 2.3 MG/DL (ref 1.5–2.5)
MCH RBC QN AUTO: 32.6 PG (ref 27–33)
MCHC RBC AUTO-ENTMCNC: 37 G/DL (ref 33.7–35.3)
MCV RBC AUTO: 88.2 FL (ref 81.4–97.8)
MICRO URNS: ABNORMAL
MONOCYTES # BLD AUTO: 0.3 K/UL (ref 0–0.85)
MONOCYTES NFR BLD AUTO: 5.1 % (ref 0–13.4)
NEUTROPHILS # BLD AUTO: 4.16 K/UL (ref 1.82–7.42)
NEUTROPHILS NFR BLD: 70.2 % (ref 44–72)
NITRITE UR QL STRIP.AUTO: NEGATIVE
NRBC # BLD AUTO: 0 K/UL
NRBC BLD-RTO: 0 /100 WBC
PH UR STRIP.AUTO: 6 [PH]
PHOSPHATE SERPL-MCNC: 3.4 MG/DL (ref 2.5–4.5)
PHOSPHATE SERPL-MCNC: 3.5 MG/DL (ref 2.5–4.5)
PLATELET # BLD AUTO: 194 K/UL (ref 164–446)
PMV BLD AUTO: 9.8 FL (ref 9–12.9)
POTASSIUM SERPL-SCNC: 3.1 MMOL/L (ref 3.6–5.5)
POTASSIUM SERPL-SCNC: 3.9 MMOL/L (ref 3.6–5.5)
POTASSIUM SERPL-SCNC: 4.4 MMOL/L (ref 3.6–5.5)
PROT SERPL-MCNC: 6.8 G/DL (ref 6–8.2)
PROT UR QL STRIP: NEGATIVE MG/DL
RBC # BLD AUTO: 4.56 M/UL (ref 4.7–6.1)
RBC UR QL AUTO: NEGATIVE
SODIUM SERPL-SCNC: 122 MMOL/L (ref 135–145)
SODIUM SERPL-SCNC: 124 MMOL/L (ref 135–145)
SP GR UR STRIP.AUTO: <=1.005
WBC # BLD AUTO: 5.9 K/UL (ref 4.8–10.8)

## 2019-01-23 PROCEDURE — 80053 COMPREHEN METABOLIC PANEL: CPT

## 2019-01-23 PROCEDURE — 83605 ASSAY OF LACTIC ACID: CPT

## 2019-01-23 PROCEDURE — 83690 ASSAY OF LIPASE: CPT

## 2019-01-23 PROCEDURE — 700111 HCHG RX REV CODE 636 W/ 250 OVERRIDE (IP): Performed by: EMERGENCY MEDICINE

## 2019-01-23 PROCEDURE — 770022 HCHG ROOM/CARE - ICU (200)

## 2019-01-23 PROCEDURE — 700105 HCHG RX REV CODE 258: Performed by: EMERGENCY MEDICINE

## 2019-01-23 PROCEDURE — 304561 HCHG STAT O2

## 2019-01-23 PROCEDURE — 84100 ASSAY OF PHOSPHORUS: CPT | Mod: 91

## 2019-01-23 PROCEDURE — 700102 HCHG RX REV CODE 250 W/ 637 OVERRIDE(OP): Performed by: EMERGENCY MEDICINE

## 2019-01-23 PROCEDURE — 96365 THER/PROPH/DIAG IV INF INIT: CPT

## 2019-01-23 PROCEDURE — 99291 CRITICAL CARE FIRST HOUR: CPT

## 2019-01-23 PROCEDURE — 82962 GLUCOSE BLOOD TEST: CPT | Mod: 91

## 2019-01-23 PROCEDURE — 81003 URINALYSIS AUTO W/O SCOPE: CPT

## 2019-01-23 PROCEDURE — 700105 HCHG RX REV CODE 258

## 2019-01-23 PROCEDURE — 99233 SBSQ HOSP IP/OBS HIGH 50: CPT | Performed by: HOSPITALIST

## 2019-01-23 PROCEDURE — 85025 COMPLETE CBC W/AUTO DIFF WBC: CPT

## 2019-01-23 PROCEDURE — 80048 BASIC METABOLIC PNL TOTAL CA: CPT

## 2019-01-23 PROCEDURE — 83735 ASSAY OF MAGNESIUM: CPT | Mod: 91

## 2019-01-23 PROCEDURE — 82010 KETONE BODYS QUAN: CPT

## 2019-01-23 PROCEDURE — 700102 HCHG RX REV CODE 250 W/ 637 OVERRIDE(OP)

## 2019-01-23 PROCEDURE — 96375 TX/PRO/DX INJ NEW DRUG ADDON: CPT

## 2019-01-23 PROCEDURE — 36415 COLL VENOUS BLD VENIPUNCTURE: CPT

## 2019-01-23 PROCEDURE — 700105 HCHG RX REV CODE 258: Performed by: HOSPITALIST

## 2019-01-23 PROCEDURE — 84132 ASSAY OF SERUM POTASSIUM: CPT

## 2019-01-23 PROCEDURE — 96366 THER/PROPH/DIAG IV INF ADDON: CPT

## 2019-01-23 RX ORDER — TESTOSTERONE CYPIONATE 200 MG/ML
80 INJECTION, SOLUTION INTRAMUSCULAR
Status: DISCONTINUED | OUTPATIENT
Start: 2019-01-24 | End: 2019-01-24 | Stop reason: HOSPADM

## 2019-01-23 RX ORDER — ACETAMINOPHEN 325 MG/1
650 TABLET ORAL EVERY 6 HOURS PRN
Status: DISCONTINUED | OUTPATIENT
Start: 2019-01-23 | End: 2019-01-24 | Stop reason: HOSPADM

## 2019-01-23 RX ORDER — ANASTROZOLE 1 MG/1
1 TABLET ORAL
Status: DISCONTINUED | OUTPATIENT
Start: 2019-01-27 | End: 2019-01-24 | Stop reason: HOSPADM

## 2019-01-23 RX ORDER — PROMETHAZINE HYDROCHLORIDE 25 MG/1
12.5-25 TABLET ORAL EVERY 4 HOURS PRN
Status: DISCONTINUED | OUTPATIENT
Start: 2019-01-23 | End: 2019-01-24 | Stop reason: HOSPADM

## 2019-01-23 RX ORDER — ONDANSETRON 2 MG/ML
4 INJECTION INTRAMUSCULAR; INTRAVENOUS ONCE
Status: DISCONTINUED | OUTPATIENT
Start: 2019-01-23 | End: 2019-01-23

## 2019-01-23 RX ORDER — MAGNESIUM SULFATE HEPTAHYDRATE 40 MG/ML
2 INJECTION, SOLUTION INTRAVENOUS
Status: COMPLETED | OUTPATIENT
Start: 2019-01-23 | End: 2019-01-24

## 2019-01-23 RX ORDER — POTASSIUM CHLORIDE 7.45 MG/ML
10 INJECTION INTRAVENOUS
Status: COMPLETED | OUTPATIENT
Start: 2019-01-23 | End: 2019-01-24

## 2019-01-23 RX ORDER — DEXTROSE AND SODIUM CHLORIDE 10; .45 G/100ML; G/100ML
INJECTION, SOLUTION INTRAVENOUS CONTINUOUS
Status: DISCONTINUED | OUTPATIENT
Start: 2019-01-23 | End: 2019-01-24

## 2019-01-23 RX ORDER — KETOROLAC TROMETHAMINE 30 MG/ML
30 INJECTION, SOLUTION INTRAMUSCULAR; INTRAVENOUS ONCE
Status: COMPLETED | OUTPATIENT
Start: 2019-01-23 | End: 2019-01-23

## 2019-01-23 RX ORDER — SODIUM CHLORIDE 9 MG/ML
INJECTION, SOLUTION INTRAVENOUS CONTINUOUS
Status: DISCONTINUED | OUTPATIENT
Start: 2019-01-23 | End: 2019-01-24 | Stop reason: HOSPADM

## 2019-01-23 RX ORDER — PROMETHAZINE HYDROCHLORIDE 25 MG/1
12.5-25 SUPPOSITORY RECTAL EVERY 4 HOURS PRN
Status: DISCONTINUED | OUTPATIENT
Start: 2019-01-23 | End: 2019-01-24 | Stop reason: HOSPADM

## 2019-01-23 RX ORDER — DEXTROSE AND SODIUM CHLORIDE 5; .45 G/100ML; G/100ML
INJECTION, SOLUTION INTRAVENOUS CONTINUOUS
Status: DISCONTINUED | OUTPATIENT
Start: 2019-01-23 | End: 2019-01-24

## 2019-01-23 RX ORDER — BISACODYL 10 MG
10 SUPPOSITORY, RECTAL RECTAL
Status: DISCONTINUED | OUTPATIENT
Start: 2019-01-23 | End: 2019-01-24 | Stop reason: HOSPADM

## 2019-01-23 RX ORDER — METOCLOPRAMIDE HYDROCHLORIDE 5 MG/ML
10 INJECTION INTRAMUSCULAR; INTRAVENOUS ONCE
Status: COMPLETED | OUTPATIENT
Start: 2019-01-23 | End: 2019-01-23

## 2019-01-23 RX ORDER — SODIUM CHLORIDE 9 MG/ML
1000 INJECTION, SOLUTION INTRAVENOUS ONCE
Status: COMPLETED | OUTPATIENT
Start: 2019-01-23 | End: 2019-01-23

## 2019-01-23 RX ORDER — AMOXICILLIN 250 MG
2 CAPSULE ORAL 2 TIMES DAILY
Status: DISCONTINUED | OUTPATIENT
Start: 2019-01-23 | End: 2019-01-24 | Stop reason: HOSPADM

## 2019-01-23 RX ORDER — ONDANSETRON 4 MG/1
4 TABLET, ORALLY DISINTEGRATING ORAL EVERY 4 HOURS PRN
Status: DISCONTINUED | OUTPATIENT
Start: 2019-01-23 | End: 2019-01-24 | Stop reason: HOSPADM

## 2019-01-23 RX ORDER — MAGNESIUM SULFATE HEPTAHYDRATE 40 MG/ML
4 INJECTION, SOLUTION INTRAVENOUS
Status: COMPLETED | OUTPATIENT
Start: 2019-01-23 | End: 2019-01-24

## 2019-01-23 RX ORDER — MORPHINE SULFATE 4 MG/ML
4 INJECTION, SOLUTION INTRAMUSCULAR; INTRAVENOUS ONCE
Status: DISCONTINUED | OUTPATIENT
Start: 2019-01-23 | End: 2019-01-24 | Stop reason: HOSPADM

## 2019-01-23 RX ORDER — CHLORHEXIDINE GLUCONATE ORAL RINSE 1.2 MG/ML
15 SOLUTION DENTAL 2 TIMES DAILY
Status: DISCONTINUED | OUTPATIENT
Start: 2019-01-24 | End: 2019-01-24 | Stop reason: HOSPADM

## 2019-01-23 RX ORDER — BUPROPION HYDROCHLORIDE 300 MG/1
300 TABLET ORAL EVERY MORNING
Status: DISCONTINUED | OUTPATIENT
Start: 2019-01-24 | End: 2019-01-24

## 2019-01-23 RX ORDER — GEMFIBROZIL 600 MG/1
600 TABLET, FILM COATED ORAL 2 TIMES DAILY
Status: DISCONTINUED | OUTPATIENT
Start: 2019-01-24 | End: 2019-01-24 | Stop reason: HOSPADM

## 2019-01-23 RX ORDER — THYROID 60 MG/1
60 TABLET ORAL EVERY MORNING
Status: DISCONTINUED | OUTPATIENT
Start: 2019-01-24 | End: 2019-01-24 | Stop reason: HOSPADM

## 2019-01-23 RX ORDER — HYDRALAZINE HYDROCHLORIDE 20 MG/ML
10 INJECTION INTRAMUSCULAR; INTRAVENOUS EVERY 4 HOURS PRN
Status: DISCONTINUED | OUTPATIENT
Start: 2019-01-23 | End: 2019-01-24 | Stop reason: HOSPADM

## 2019-01-23 RX ORDER — MORPHINE SULFATE 4 MG/ML
4 INJECTION, SOLUTION INTRAMUSCULAR; INTRAVENOUS ONCE
Status: COMPLETED | OUTPATIENT
Start: 2019-01-23 | End: 2019-01-23

## 2019-01-23 RX ORDER — POLYETHYLENE GLYCOL 3350 17 G/17G
1 POWDER, FOR SOLUTION ORAL
Status: DISCONTINUED | OUTPATIENT
Start: 2019-01-23 | End: 2019-01-24 | Stop reason: HOSPADM

## 2019-01-23 RX ORDER — ONDANSETRON 2 MG/ML
4 INJECTION INTRAMUSCULAR; INTRAVENOUS EVERY 4 HOURS PRN
Status: DISCONTINUED | OUTPATIENT
Start: 2019-01-23 | End: 2019-01-24 | Stop reason: HOSPADM

## 2019-01-23 RX ORDER — SODIUM CHLORIDE, SODIUM LACTATE, POTASSIUM CHLORIDE, AND CALCIUM CHLORIDE .6; .31; .03; .02 G/100ML; G/100ML; G/100ML; G/100ML
2000 INJECTION, SOLUTION INTRAVENOUS ONCE
Status: DISCONTINUED | OUTPATIENT
Start: 2019-01-23 | End: 2019-01-24 | Stop reason: HOSPADM

## 2019-01-23 RX ORDER — SODIUM CHLORIDE 9 MG/ML
2000 INJECTION, SOLUTION INTRAVENOUS ONCE
Status: DISCONTINUED | OUTPATIENT
Start: 2019-01-23 | End: 2019-01-24 | Stop reason: HOSPADM

## 2019-01-23 RX ADMIN — SODIUM CHLORIDE 9 UNITS/HR: 9 INJECTION, SOLUTION INTRAVENOUS at 21:55

## 2019-01-23 RX ADMIN — KETOROLAC TROMETHAMINE 30 MG: 30 INJECTION, SOLUTION INTRAMUSCULAR; INTRAVENOUS at 16:37

## 2019-01-23 RX ADMIN — SODIUM CHLORIDE: 9 INJECTION, SOLUTION INTRAVENOUS at 21:58

## 2019-01-23 RX ADMIN — SODIUM CHLORIDE 1000 ML: 9 INJECTION, SOLUTION INTRAVENOUS at 16:27

## 2019-01-23 RX ADMIN — INSULIN HUMAN 10 UNITS: 100 INJECTION, SOLUTION PARENTERAL at 16:28

## 2019-01-23 RX ADMIN — SODIUM CHLORIDE 1000 ML: 9 INJECTION, SOLUTION INTRAVENOUS at 17:47

## 2019-01-23 RX ADMIN — MORPHINE SULFATE 4 MG: 4 INJECTION INTRAVENOUS at 18:19

## 2019-01-23 RX ADMIN — METOCLOPRAMIDE 10 MG: 5 INJECTION, SOLUTION INTRAMUSCULAR; INTRAVENOUS at 16:36

## 2019-01-23 RX ADMIN — SODIUM CHLORIDE 1000 ML: 9 INJECTION, SOLUTION INTRAVENOUS at 18:53

## 2019-01-23 ASSESSMENT — PAIN DESCRIPTION - DESCRIPTORS: DESCRIPTORS: OTHER (COMMENT);CRAMPING

## 2019-01-23 NOTE — ED TRIAGE NOTES
"Chief Complaint   Patient presents with   • LUQ Pain     \"Muscle spasm\"   • Diabetes     out of control at home in the 600's despite Insulin dosing at home.   Since November this pt states he has been seen here 5 times. Main advised him to dome to [facility] today.  "

## 2019-01-24 ENCOUNTER — PATIENT OUTREACH (OUTPATIENT)
Dept: HEALTH INFORMATION MANAGEMENT | Facility: OTHER | Age: 54
End: 2019-01-24

## 2019-01-24 VITALS
HEIGHT: 68 IN | RESPIRATION RATE: 16 BRPM | BODY MASS INDEX: 29.97 KG/M2 | SYSTOLIC BLOOD PRESSURE: 149 MMHG | DIASTOLIC BLOOD PRESSURE: 85 MMHG | TEMPERATURE: 96.5 F | OXYGEN SATURATION: 90 % | HEART RATE: 71 BPM | WEIGHT: 197.75 LBS

## 2019-01-24 PROBLEM — E11.10 DIABETIC KETOACIDOSIS WITHOUT COMA ASSOCIATED WITH TYPE 2 DIABETES MELLITUS (HCC): Status: RESOLVED | Noted: 2018-11-14 | Resolved: 2019-01-24

## 2019-01-24 PROBLEM — E87.1 HYPONATREMIA: Status: RESOLVED | Noted: 2017-07-09 | Resolved: 2019-01-24

## 2019-01-24 LAB
ANION GAP SERPL CALC-SCNC: 4 MMOL/L (ref 0–11.9)
ANION GAP SERPL CALC-SCNC: 7 MMOL/L (ref 0–11.9)
BASOPHILS # BLD AUTO: 0.2 % (ref 0–1.8)
BASOPHILS # BLD: 0.01 K/UL (ref 0–0.12)
BUN SERPL-MCNC: 13 MG/DL (ref 8–22)
BUN SERPL-MCNC: 17 MG/DL (ref 8–22)
CALCIUM SERPL-MCNC: 7.3 MG/DL (ref 8.4–10.2)
CALCIUM SERPL-MCNC: 7.5 MG/DL (ref 8.4–10.2)
CHLORIDE SERPL-SCNC: 104 MMOL/L (ref 96–112)
CHLORIDE SERPL-SCNC: 107 MMOL/L (ref 96–112)
CO2 SERPL-SCNC: 21 MMOL/L (ref 20–33)
CO2 SERPL-SCNC: 24 MMOL/L (ref 20–33)
CREAT SERPL-MCNC: 0.66 MG/DL (ref 0.5–1.4)
CREAT SERPL-MCNC: 0.68 MG/DL (ref 0.5–1.4)
EOSINOPHIL # BLD AUTO: 0.08 K/UL (ref 0–0.51)
EOSINOPHIL NFR BLD: 2 % (ref 0–6.9)
ERYTHROCYTE [DISTWIDTH] IN BLOOD BY AUTOMATED COUNT: 41.8 FL (ref 35.9–50)
GLUCOSE BLD-MCNC: 103 MG/DL (ref 65–99)
GLUCOSE BLD-MCNC: 149 MG/DL (ref 65–99)
GLUCOSE BLD-MCNC: 151 MG/DL (ref 65–99)
GLUCOSE BLD-MCNC: 187 MG/DL (ref 65–99)
GLUCOSE BLD-MCNC: 188 MG/DL (ref 65–99)
GLUCOSE BLD-MCNC: 222 MG/DL (ref 65–99)
GLUCOSE BLD-MCNC: 229 MG/DL (ref 65–99)
GLUCOSE BLD-MCNC: 95 MG/DL (ref 65–99)
GLUCOSE BLD-MCNC: 98 MG/DL (ref 65–99)
GLUCOSE SERPL-MCNC: 197 MG/DL (ref 65–99)
GLUCOSE SERPL-MCNC: 329 MG/DL (ref 65–99)
HCT VFR BLD AUTO: 32.8 % (ref 42–52)
HGB BLD-MCNC: 12.1 G/DL (ref 14–18)
IMM GRANULOCYTES # BLD AUTO: 0.03 K/UL (ref 0–0.11)
IMM GRANULOCYTES NFR BLD AUTO: 0.7 % (ref 0–0.9)
LACTATE BLD-SCNC: 1.6 MMOL/L (ref 0.5–2)
LYMPHOCYTES # BLD AUTO: 1.46 K/UL (ref 1–4.8)
LYMPHOCYTES NFR BLD: 36 % (ref 22–41)
MAGNESIUM SERPL-MCNC: 1.8 MG/DL (ref 1.5–2.5)
MCH RBC QN AUTO: 32.8 PG (ref 27–33)
MCHC RBC AUTO-ENTMCNC: 36.9 G/DL (ref 33.7–35.3)
MCV RBC AUTO: 88.9 FL (ref 81.4–97.8)
MONOCYTES # BLD AUTO: 0.26 K/UL (ref 0–0.85)
MONOCYTES NFR BLD AUTO: 6.4 % (ref 0–13.4)
NEUTROPHILS # BLD AUTO: 2.21 K/UL (ref 1.82–7.42)
NEUTROPHILS NFR BLD: 54.7 % (ref 44–72)
NRBC # BLD AUTO: 0 K/UL
NRBC BLD-RTO: 0 /100 WBC
PHOSPHATE SERPL-MCNC: 3.5 MG/DL (ref 2.5–4.5)
PLATELET # BLD AUTO: 131 K/UL (ref 164–446)
PMV BLD AUTO: 9.2 FL (ref 9–12.9)
POTASSIUM SERPL-SCNC: 2.8 MMOL/L (ref 3.6–5.5)
POTASSIUM SERPL-SCNC: 2.8 MMOL/L (ref 3.6–5.5)
POTASSIUM SERPL-SCNC: 3.9 MMOL/L (ref 3.6–5.5)
RBC # BLD AUTO: 3.69 M/UL (ref 4.7–6.1)
SODIUM SERPL-SCNC: 132 MMOL/L (ref 135–145)
SODIUM SERPL-SCNC: 135 MMOL/L (ref 135–145)
WBC # BLD AUTO: 4.1 K/UL (ref 4.8–10.8)

## 2019-01-24 PROCEDURE — 80048 BASIC METABOLIC PNL TOTAL CA: CPT | Mod: 91

## 2019-01-24 PROCEDURE — 700111 HCHG RX REV CODE 636 W/ 250 OVERRIDE (IP): Performed by: HOSPITALIST

## 2019-01-24 PROCEDURE — 99239 HOSP IP/OBS DSCHRG MGMT >30: CPT | Performed by: INTERNAL MEDICINE

## 2019-01-24 PROCEDURE — 700102 HCHG RX REV CODE 250 W/ 637 OVERRIDE(OP): Performed by: INTERNAL MEDICINE

## 2019-01-24 PROCEDURE — 82962 GLUCOSE BLOOD TEST: CPT

## 2019-01-24 PROCEDURE — 700102 HCHG RX REV CODE 250 W/ 637 OVERRIDE(OP): Performed by: HOSPITALIST

## 2019-01-24 PROCEDURE — 83735 ASSAY OF MAGNESIUM: CPT

## 2019-01-24 PROCEDURE — 85025 COMPLETE CBC W/AUTO DIFF WBC: CPT

## 2019-01-24 PROCEDURE — 94760 N-INVAS EAR/PLS OXIMETRY 1: CPT

## 2019-01-24 PROCEDURE — A9270 NON-COVERED ITEM OR SERVICE: HCPCS | Performed by: HOSPITALIST

## 2019-01-24 PROCEDURE — 700105 HCHG RX REV CODE 258: Performed by: HOSPITALIST

## 2019-01-24 PROCEDURE — 84100 ASSAY OF PHOSPHORUS: CPT

## 2019-01-24 PROCEDURE — 83605 ASSAY OF LACTIC ACID: CPT

## 2019-01-24 RX ORDER — INSULIN GLARGINE 100 [IU]/ML
22 INJECTION, SOLUTION SUBCUTANEOUS ONCE
Status: COMPLETED | OUTPATIENT
Start: 2019-01-24 | End: 2019-01-24

## 2019-01-24 RX ORDER — INSULIN GLARGINE 100 [IU]/ML
0.2 INJECTION, SOLUTION SUBCUTANEOUS EVERY EVENING
Status: DISCONTINUED | OUTPATIENT
Start: 2019-01-24 | End: 2019-01-24 | Stop reason: HOSPADM

## 2019-01-24 RX ORDER — POTASSIUM CHLORIDE 7.45 MG/ML
10 INJECTION INTRAVENOUS
Status: COMPLETED | OUTPATIENT
Start: 2019-01-24 | End: 2019-01-24

## 2019-01-24 RX ORDER — INSULIN GLARGINE 100 [IU]/ML
20 INJECTION, SOLUTION SUBCUTANEOUS ONCE
Status: COMPLETED | OUTPATIENT
Start: 2019-01-24 | End: 2019-01-24

## 2019-01-24 RX ORDER — BUPROPION HYDROCHLORIDE 150 MG/1
150 TABLET, EXTENDED RELEASE ORAL 2 TIMES DAILY
Status: DISCONTINUED | OUTPATIENT
Start: 2019-01-24 | End: 2019-01-24 | Stop reason: HOSPADM

## 2019-01-24 RX ORDER — DEXTROSE MONOHYDRATE 25 G/50ML
25 INJECTION, SOLUTION INTRAVENOUS
Status: DISCONTINUED | OUTPATIENT
Start: 2019-01-24 | End: 2019-01-24 | Stop reason: HOSPADM

## 2019-01-24 RX ADMIN — POTASSIUM CHLORIDE 10 MEQ: 10 INJECTION, SOLUTION INTRAVENOUS at 00:35

## 2019-01-24 RX ADMIN — POTASSIUM CHLORIDE 10 MEQ: 10 INJECTION, SOLUTION INTRAVENOUS at 03:50

## 2019-01-24 RX ADMIN — POTASSIUM CHLORIDE 10 MEQ: 10 INJECTION, SOLUTION INTRAVENOUS at 08:57

## 2019-01-24 RX ADMIN — POTASSIUM CHLORIDE 10 MEQ: 10 INJECTION, SOLUTION INTRAVENOUS at 10:25

## 2019-01-24 RX ADMIN — MAGNESIUM SULFATE HEPTAHYDRATE 2 G: 40 INJECTION, SOLUTION INTRAVENOUS at 06:22

## 2019-01-24 RX ADMIN — DEXTROSE AND SODIUM CHLORIDE: 5; 450 INJECTION, SOLUTION INTRAVENOUS at 00:34

## 2019-01-24 RX ADMIN — POTASSIUM CHLORIDE 10 MEQ: 10 INJECTION, SOLUTION INTRAVENOUS at 02:49

## 2019-01-24 RX ADMIN — ACETAMINOPHEN 650 MG: 325 TABLET, FILM COATED ORAL at 10:28

## 2019-01-24 RX ADMIN — GEMFIBROZIL 600 MG: 600 TABLET ORAL at 05:10

## 2019-01-24 RX ADMIN — ENOXAPARIN SODIUM 40 MG: 100 INJECTION SUBCUTANEOUS at 05:10

## 2019-01-24 RX ADMIN — DEXTROSE AND SODIUM CHLORIDE: 10; .45 INJECTION, SOLUTION INTRAVENOUS at 02:34

## 2019-01-24 RX ADMIN — BUPROPION HYDROCHLORIDE 150 MG: 150 TABLET, EXTENDED RELEASE ORAL at 05:10

## 2019-01-24 RX ADMIN — LEVOTHYROXINE, LIOTHYRONINE 60 MG: 38; 9 TABLET ORAL at 05:10

## 2019-01-24 RX ADMIN — INSULIN GLARGINE 20 UNITS: 100 INJECTION, SOLUTION SUBCUTANEOUS at 10:29

## 2019-01-24 RX ADMIN — INSULIN LISPRO 2 UNITS: 100 INJECTION, SOLUTION INTRAVENOUS; SUBCUTANEOUS at 11:38

## 2019-01-24 RX ADMIN — SODIUM CHLORIDE 9 UNITS/HR: 9 INJECTION, SOLUTION INTRAVENOUS at 05:00

## 2019-01-24 RX ADMIN — POTASSIUM CHLORIDE 10 MEQ: 10 INJECTION, SOLUTION INTRAVENOUS at 07:59

## 2019-01-24 RX ADMIN — POTASSIUM CHLORIDE 10 MEQ: 10 INJECTION, SOLUTION INTRAVENOUS at 01:46

## 2019-01-24 RX ADMIN — POTASSIUM CHLORIDE 10 MEQ: 10 INJECTION, SOLUTION INTRAVENOUS at 06:22

## 2019-01-24 RX ADMIN — INSULIN GLARGINE 22 UNITS: 100 INJECTION, SOLUTION SUBCUTANEOUS at 11:37

## 2019-01-24 ASSESSMENT — PATIENT HEALTH QUESTIONNAIRE - PHQ9
2. FEELING DOWN, DEPRESSED, IRRITABLE, OR HOPELESS: NOT AT ALL
1. LITTLE INTEREST OR PLEASURE IN DOING THINGS: NOT AT ALL
SUM OF ALL RESPONSES TO PHQ9 QUESTIONS 1 AND 2: 0

## 2019-01-24 ASSESSMENT — ENCOUNTER SYMPTOMS
DEPRESSION: 0
SHORTNESS OF BREATH: 0
CARDIOVASCULAR NEGATIVE: 1
LOSS OF CONSCIOUSNESS: 0
CHILLS: 0
DIZZINESS: 0
MYALGIAS: 0
ABDOMINAL PAIN: 0
VOMITING: 1
BACK PAIN: 0
COUGH: 0
NAUSEA: 0
WEIGHT LOSS: 0
FEVER: 0
FLANK PAIN: 0
BRUISES/BLEEDS EASILY: 0
MUSCULOSKELETAL NEGATIVE: 1
WEAKNESS: 1
RESPIRATORY NEGATIVE: 1
PSYCHIATRIC NEGATIVE: 1
NERVOUS/ANXIOUS: 0

## 2019-01-24 ASSESSMENT — COGNITIVE AND FUNCTIONAL STATUS - GENERAL
SUGGESTED CMS G CODE MODIFIER MOBILITY: CH
MOBILITY SCORE: 24
SUGGESTED CMS G CODE MODIFIER DAILY ACTIVITY: CH
DAILY ACTIVITIY SCORE: 24

## 2019-01-24 ASSESSMENT — LIFESTYLE VARIABLES
ALCOHOL_USE: YES
TOTAL SCORE: 0
ON A TYPICAL DAY WHEN YOU DRINK ALCOHOL HOW MANY DRINKS DO YOU HAVE: 0
TOTAL SCORE: 0
EVER_SMOKED: NEVER
EVER HAD A DRINK FIRST THING IN THE MORNING TO STEADY YOUR NERVES TO GET RID OF A HANGOVER: NO
HAVE YOU EVER FELT YOU SHOULD CUT DOWN ON YOUR DRINKING: NO
HAVE PEOPLE ANNOYED YOU BY CRITICIZING YOUR DRINKING: NO
HOW MANY TIMES IN THE PAST YEAR HAVE YOU HAD 5 OR MORE DRINKS IN A DAY: 0
EVER FELT BAD OR GUILTY ABOUT YOUR DRINKING: NO
AVERAGE NUMBER OF DAYS PER WEEK YOU HAVE A DRINK CONTAINING ALCOHOL: 1
TOTAL SCORE: 0
CONSUMPTION TOTAL: NEGATIVE

## 2019-01-24 NOTE — PROGRESS NOTES
Called MD to notify BG 91. Dr. Forte states to decrease insulin gtts to 7 units/hr and give lantus 20 units. MD states to have intensivist transition pt.

## 2019-01-24 NOTE — ED NOTES
rigo from Lab called with critical result of glucose at 764. Critical lab result read back to rigo.   Dr. clay notified of critical lab result at 1631.  Critical lab result read back by Dr. sullivan.

## 2019-01-24 NOTE — H&P
[facility] Medicine History & Physical Note    Date of Service  1/23/2019    Primary Care Physician  NASIR Brunner.    Consultants  none    Code Status  full    Chief Complaint  High blood sugars    History of Presenting Illness  Mahesh Bradshaw is a 53 y.o. male who presents with elevated blood sugars, frequent urination, dizziness and abdominal cramping with pain.  Patient states he has been seen 4 times in the last 6 weeks for difficulty with his blood sugars.  He states he may have had some dietary indiscretion during a [place]-hour trip to the [place] area but has been taking his medications as prescribed.  Patient initially type II diabetic, secondary to problems with oral medication has been switched to insulin.  He is currently without endocrinologist, has pending appointment with a new one in 1-1/2 weeks.  No fever.  He has vomited 4 times today.  Abdominal discomfort described as cramping in the upper abdomen.    Review of Systems  Review of Systems   Constitutional: Positive for malaise/fatigue. Negative for chills, fever and weight loss.   HENT: Negative.    Respiratory: Negative.  Negative for cough and shortness of breath.    Cardiovascular: Negative.  Negative for chest pain and leg swelling.   Gastrointestinal: Positive for vomiting. Negative for abdominal pain and nausea.   Genitourinary: Negative.  Negative for dysuria and flank pain.   Musculoskeletal: Negative.  Negative for back pain and myalgias.   Neurological: Positive for weakness. Negative for dizziness and loss of consciousness.   Endo/Heme/Allergies: Negative.  Does not bruise/bleed easily.   Psychiatric/Behavioral: Negative.  Negative for depression. The patient is not nervous/anxious.    All other systems reviewed and are negative.      Past Medical History   has a past medical history of ADRIANE (acute kidney injury) ([facility]) (2/24/2016); Anesthesia; Arthritis (3-3-17); Aspiration pneumonia ([facility]) (3-2016); ASTHMA (3-3-17); Breath  shortness (3-3-17); Congestive heart failure ([facility]) (2-2016 ); Dental disorder; Depression (11/26/2014); Diabetes ([facility]) (3-3-17); Diabetes ([facility]); Difficult intubation (2-2016); DKA (diabetic ketoacidoses) ([facility]) (2-2016); Electrolyte imbalance (2-2016); Elevated LFTs; Elevated liver enzymes (2-2016); Essential hypertension (1/22/2016); Gout; Heart burn; High cholesterol (3-3-17); Hypothyroid (3-3-17); Indigestion; Kidney stones; Klebsiella infect; Migraine; MRSA cellulitis; 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    Surgical History   has a past surgical history that includes vaishnavi by laparoscopy (2005); colonoscopy with biopsy (11/26/2014); incision and drainage general (4/7/2017); irrigation & debridement general (Right, 4/29/2017); irrigation & debridement general (Right, 5/1/2017); other orthopedic surgery (1997 or 1998); umbilical hernia repair (N/A, 11/6/2016); umbilical hernia repair (3/10/2017); irrigation & debridement ortho (Left, 12/10/2017); and umbilical hernia repair (N/A, 4/15/2018).     Family History  family history includes Cancer in his mother.     Social History   reports that he has never smoked. He has never used smokeless tobacco. He reports that he does not drink alcohol or use drugs.    Allergies  Allergies   Allergen Reactions   • Peanut (Diagnostic) Anaphylaxis   • Tradjenta [Linagliptin] Unspecified     Pt developed pancreatitis   • Hydrocodone Hives     Tolerates Morphine and oxycodone         Medications  Prior to Admission Medications   Prescriptions Last Dose Informant Patient Reported? Taking?   anastrozole (ARIMIDEX) 1 MG Tab 1/23/2019 at 0600 Patient Yes No   Sig: Take 1 mg by mouth every 7 days.   buPROPion (WELLBUTRIN XL)  300 MG XL tablet 2019 at 0600 Patient Yes No   Sig: Take 300 mg by mouth every morning.   gemfibrozil (LOPID) 600 MG Tab 2019 at 0600  No No   Sig: Take 1 Tab by mouth 2 times a day.   insulin glargine (LANTUS) 100 UNIT/ML Solution 2019 at 0600 Patient Yes No   Sig: Inject 38-42 Units as instructed 2 times a day. 42 units every morning  38 units every night   insulin lispro (HUMALOG) 100 UNIT/ML Solution 2019 at 1400 Patient Yes No   Sig: Inject 2-12 Units as instructed 3 times a day before meals. Sliding Scale   160 - 200 = 2 units  201 - 250 = 4 units  251 - 300 = 6 units  301 - 350 = 8 units  351 - 400 = 10 units  >401 Call MD and 12 units   ondansetron (ZOFRAN) 4 MG Tab tablet 2019 at 1000  No No   Sig: Take 1 Tab by mouth 1 time daily as needed for Nausea/Vomiting for up to 15 doses.   testosterone cypionate (DEPO-TESTOSTERONE) 200 MG/ML Solution injection 2019 at 1800 Patient Yes No   Si mg by Intramuscular route every 7 days.   thyroid (ARMOUR THYROID) 60 MG Tab 2019 at 0600 Patient Yes No   Sig: Take 60 mg by mouth every morning.      Facility-Administered Medications: None       Physical Exam  Temp:  [36.8 °C (98.3 °F)] 36.8 °C (98.3 °F)  Pulse:  [] 75  Resp:  [13-18] 13  BP: (149)/(85) 149/85  SpO2:  [89 %-97 %] 89 %    Physical Exam   Constitutional: He appears well-developed and well-nourished. No distress.   Plan of care discussed with bedside RN.   HENT:   Nose: Nose normal.   Mouth/Throat: Oropharynx is clear and moist. No oropharyngeal exudate.   Eyes: Conjunctivae are normal. Right eye exhibits no discharge. Left eye exhibits no discharge. No scleral icterus.   Neck: No JVD present. No tracheal deviation present.   Cardiovascular: Normal rate, regular rhythm and normal heart sounds.    Pulmonary/Chest: Effort normal and breath sounds normal. No stridor. No respiratory distress. He has no wheezes. He has no rales. He exhibits no tenderness.   Abdominal:  Soft. Bowel sounds are normal. He exhibits no distension. There is no tenderness.   Musculoskeletal: He exhibits no edema or tenderness.   Neurological: He is alert. No cranial nerve deficit. He exhibits normal muscle tone.   Skin: Skin is warm and dry. He is not diaphoretic. No pallor.   Psychiatric: He has a normal mood and affect. His behavior is normal.   Nursing note and vitals reviewed.      Laboratory:  Recent Labs      01/23/19   1526   WBC  5.9   RBC  4.56*   HEMOGLOBIN  15.0   HEMATOCRIT  40.5*   MCV  88.2   MCH  32.6   MCHC  37.0*   RDW  40.9   PLATELETCT  194   MPV  9.8     Recent Labs      01/23/19   1526  01/23/19   1620  01/23/19   2240   SODIUM  122*  124*   --    POTASSIUM  4.4  3.9  3.1*   CHLORIDE  89*  88*   --    CO2  17*  19*   --    GLUCOSE  764*  698*   --    BUN  20  19   --    CREATININE  0.92  0.73   --    CALCIUM  8.8  9.1   --      Recent Labs      01/23/19   1526  01/23/19   1620   ALTSGPT  53*   --    ASTSGOT  37   --    ALKPHOSPHAT  99   --    TBILIRUBIN  0.8   --    LIPASE  26   --    GLUCOSE  764*  698*                 No results for input(s): TROPONINI in the last 72 hours.    Urinalysis:    Recent Labs      01/23/19   1515   SPECGRAVITY  <=1.005   GLUCOSEUR  >=1000*   KETONES  15*   NITRITE  Negative   LEUKESTERAS  Negative        Imaging:  No orders to display         Assessment/Plan:  I anticipate this patient will require at least two midnights for appropriate medical management, necessitating inpatient admission.    Diabetic ketoacidosis without coma associated with type 2 diabetes mellitus (HCC)- (present on admission)   Assessment & Plan    Recurrent.  Most likely patient has pancreatic failure due to prior pancreatiits related to use of januvia.  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  Admit to icu for insulin drip, q 1 hour accu checks  Fluid on sliding scale  Endocrine appt next week, will need much higher doses of long acting insulin at  dc, f/u with endocrine for pump evaluation     Hyponatremia- (present on admission)   Assessment & Plan    Due to voluime depleted with dka; normal saline  Follow bmp     Hypothyroidism- (present on admission)   Assessment & Plan    [place] 60 mg daily     Hx of acute pancreatitis- (present on admission)   Assessment & Plan    Suspect pancreatic damage     Chronic ulcerative colitis (CMS-HCC)- (present on admission)   Assessment & Plan    With chronic diarrhea     This patient is critically ill.  A total of 30 minutes of critical care time spent with patient and nursing staff without overlap and independent of any procedures.  Please see the chart for a full listing of today's orders.    VTE prophylaxis: heparin

## 2019-01-24 NOTE — PROGRESS NOTES
Patient alert and oriented.  Talked with patient regarding transition to liquid diet and if tolerates stopping DKA protocol.  Awaiting breakfast tray.  Patient updated on plan of care and is in agreement with plan.

## 2019-01-24 NOTE — ASSESSMENT & PLAN NOTE
Recurrent.  Most likely patient has pancreatic failure due to prior pancreatiits related to use of januvia.  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  Admit to icu for insulin drip, q 1 hour accu checks  Fluid on sliding scale  Endocrine appt next week, will need much higher doses of long acting insulin at dc, f/u with endocrine for pump evaluation

## 2019-01-24 NOTE — ED PROVIDER NOTES
"ED Provider Note    CHIEF COMPLAINT  Chief Complaint   Patient presents with   • LUQ Pain     \"Muscle spasm\"   • Diabetes     out of control at home in the 600's despite Insulin dosing at home.       HPI  Mahesh Bradshaw is a 53 y.o. male who presents with elevated blood sugars, frequent urination, dizziness and abdominal cramping with pain.  Patient states he has been seen 4 times in the last 6 weeks for difficulty with his blood sugars.  He states he may have had some dietary indiscretion during a 20-hour trip to the [place] area but has been taking his medications as prescribed.  Patient initially type II diabetic, secondary to problems with oral medication has been switched to insulin.  He is currently without endocrinologist, has pending appointment with a new one in 1-1/2 weeks.  No fever.  He has vomited 4 times today.  Abdominal discomfort described as cramping in the upper abdomen.    REVIEW OF SYSTEMS  Constitutional: General malaise, no fever  Respiratory: No shortness of breath  Cardiac: No chest pain or syncope  Gastrointestinal: Abdominal cramping, vomiting  Musculoskeletal: Denies acute neck or back pain  Neurologic: No headache  Genitourinary: Polyuria  Endocrine: Diabetes       All other systems are negative.     PAST MEDICAL HISTORY  Past Medical History:   Diagnosis Date   • ADRIANE (acute kidney injury) ([facility]) 2/24/2016   • Anesthesia     \"Was difficult to intubate 2-2016\"   • Arthritis 3-3-17    \"Spine\"   • Aspiration pneumonia ([facility]) 3-2016   • ASTHMA 3-3-17    \"Hasn't had to use inhaler in 2 years\"   • Breath shortness 3-3-17    \"With Exercise\"   • Congestive heart failure ([facility]) 2-2016     3-3-17 \"Not a current issue\"   • Dental disorder    • Depression 11/26/2014   • Diabetes ([facility]) 3-3-17    Takes Insulin   • Diabetes ([facility])    • Difficult intubation 2-2016   • DKA (diabetic ketoacidoses) ([facility]) 2-2016    \"10 days on vent with DKA, Renal failure, CHF, elevated liver enzymes and electrolyte " "imbalance\"   • Electrolyte imbalance 2-2016   • Elevated LFTs    • Elevated liver enzymes 2-2016   • Essential hypertension 1/22/2016   • Gout    • Heart burn    • High cholesterol 3-3-17    Does not currently take medication for   • Hypothyroid 3-3-17    Takes [place] Thyroid   • Indigestion    • Kidney stones    • Klebsiella infect    • Migraine    • MRSA cellulitis    • Necrotizing myositis (HCC)    • On mechanically assisted ventilation ([facility]) 2-2016    HX \"On Vent for 10 days at [facility]\".  \"DX Pancreatitis, DKA, CHF, Elevated Liver Enzymes & Electrolyte Imbalance\".   • Pain 3-3-17    \"Left Flank\"   • Pancreatitis 2-2016    \"D/T Tradjenta was hospitialized for 15 days\"   • RF (renal failure) 2-2016   • Sepsis ([facility]) 1/21/2016   • Snoring 3-3-17    Has not had a sleep study   • Streptococcus infection    • UC (ulcerative colitis) ([facility]) 3-3-17    \"Five BM's per day, takes Lialda\"   • Urinary incontinence    • Vitamin D deficiency        FAMILY HISTORY  Family History   Problem Relation Age of Onset   • Cancer Mother        SOCIAL HISTORY  Social History     Social History   • Marital status: Single     Spouse name: N/A   • Number of children: N/A   • Years of education: N/A     Social History Main Topics   • Smoking status: Never Smoker   • Smokeless tobacco: Never Used   • Alcohol use No   • Drug use: No   • Sexual activity: Not on file     Other Topics Concern   • Not on file     Social History Narrative    ** Merged History Encounter **         ** Merged History Encounter **     ** Merged History Encounter **          SURGICAL HISTORY  Past Surgical History:   Procedure Laterality Date   • UMBILICAL HERNIA REPAIR N/A 4/15/2018    Procedure: UMBILICAL HERNIA REPAIR;  Surgeon: Karen Beasley M.D.;  Location: [facility];  Service: General   • IRRIGATION & DEBRIDEMENT ORTHO Left 12/10/2017    Procedure: IRRIGATION & DEBRIDEMENT ORTHO LEFT HAND;  Surgeon: Chicho Ramos M.D.;  Location: [facility]" "[facility];  Service: Orthopedics   • IRRIGATION & DEBRIDEMENT GENERAL Right 5/1/2017    Procedure: IRRIGATION & DEBRIDEMENT GENERAL-Left HAND AND right  ANKLE;  Surgeon: Esau Dooley M.D.;  Location: SURGERY [facility];  Service:    • IRRIGATION & DEBRIDEMENT GENERAL Right 4/29/2017    Procedure: IRRIGATION & DEBRIDEMENT GENERAL;  Surgeon: Esau Dooley M.D.;  Location: SURGERY [facility];  Service:    • INCISION AND DRAINAGE GENERAL  4/7/2017    Procedure: INCISION AND DRAINAGE GENERAL;  Surgeon: Esau Dooley M.D.;  Location: SURGERY SAME DAY [facility];  Service:    • UMBILICAL HERNIA REPAIR  3/10/2017    Procedure: UMBILICAL HERNIA REPAIR- INCISION AND DRAINAGE OF UMBILICAL WOUND AND MESH REMOVAL;  Surgeon: Esau Dooley M.D.;  Location: SURGERY SAME DAY [facility];  Service:    • UMBILICAL HERNIA REPAIR N/A 11/6/2016    Procedure: UMBILICAL HERNIA REPAIR;  Surgeon: Esau Dooley M.D.;  Location: SURGERY [facility];  Service:    • COLONOSCOPY WITH BIOPSY  11/26/2014    Performed by Solo Higginbotham M.D. at ENDOSCOPY [facility]   • LIVE BY LAPAROSCOPY  2005   • OTHER ORTHOPEDIC SURGERY  1997 or 1998    Left Wrist ORIF       CURRENT MEDICATIONS  Home Medications    **Home medications have not yet been reviewed for this encounter**         ALLERGIES  Allergies   Allergen Reactions   • Peanut (Diagnostic) Anaphylaxis   • Tradjenta [Linagliptin] Unspecified     Pt developed pancreatitis   • Hydrocodone Hives     Tolerates Morphine and oxycodone         PHYSICAL EXAM  VITAL SIGNS: /85   Pulse 87   Temp 36.8 °C (98.3 °F) (Temporal)   Resp 16   Ht 1.727 m (5' 8\")   Wt 94.4 kg (208 lb 1.8 oz)   SpO2 90%   BMI 31.64 kg/m²   Constitutional: No distress.  Well-nourished  ENT: Nares clear, mucous membranes moderately dry.  Eyes:  Conjunctiva normal, No discharge.    Lymphatic: No adenopathy.   Cardiovascular: Normal heart rate, Normal rhythm.   Pulmonary: No " wheezing, no rales  Gastrointestinal: Abdomen is soft, tenderness left upper quadrant without guarding.  No distention  Skin: Warm, Dry, No jaundice.   Musculoskeletal:  No CVA tenderness.   Neurologic:  Normal motor  function, No focal deficits noted.   Psychiatric:Normal affect, Normal mood.  Patient is calm and cooperative    RADIOLOGY/PROCEDURES/Labs  Results for orders placed or performed during the hospital encounter of 01/23/19   CBC WITH DIFFERENTIAL   Result Value Ref Range    WBC 5.9 4.8 - 10.8 K/uL    RBC 4.56 (L) 4.70 - 6.10 M/uL    Hemoglobin 15.0 14.0 - 18.0 g/dL    Hematocrit 40.5 (L) 42.0 - 52.0 %    MCV 88.2 81.4 - 97.8 fL    MCH 32.6 27.0 - 33.0 pg    MCHC 37.0 (H) 33.7 - 35.3 g/dL    RDW 40.9 35.9 - 50.0 fL    Platelet Count 194 164 - 446 K/uL    MPV 9.8 9.0 - 12.9 fL    Neutrophils-Polys 70.20 44.00 - 72.00 %    Lymphocytes 23.30 22.00 - 41.00 %    Monocytes 5.10 0.00 - 13.40 %    Eosinophils 0.70 0.00 - 6.90 %    Basophils 0.20 0.00 - 1.80 %    Immature Granulocytes 0.50 0.00 - 0.90 %    Nucleated RBC 0.00 /100 WBC    Neutrophils (Absolute) 4.16 1.82 - 7.42 K/uL    Lymphs (Absolute) 1.38 1.00 - 4.80 K/uL    Monos (Absolute) 0.30 0.00 - 0.85 K/uL    Eos (Absolute) 0.04 0.00 - 0.51 K/uL    Baso (Absolute) 0.01 0.00 - 0.12 K/uL    Immature Granulocytes (abs) 0.03 0.00 - 0.11 K/uL    NRBC (Absolute) 0.00 K/uL   COMP METABOLIC PANEL   Result Value Ref Range    Total Protein 6.8 6.0 - 8.2 g/dL    Sodium 122 (L) 135 - 145 mmol/L    Potassium 4.4 3.6 - 5.5 mmol/L    Chloride 89 (L) 96 - 112 mmol/L    Co2 17 (L) 20 - 33 mmol/L    Anion Gap 16.0 (H) 0.0 - 11.9    Glucose 764 (HH) 65 - 99 mg/dL    Bun 20 8 - 22 mg/dL    Creatinine 0.92 0.50 - 1.40 mg/dL    Calcium 8.8 8.4 - 10.2 mg/dL    AST(SGOT) 37 12 - 45 U/L    ALT(SGPT) 53 (H) 2 - 50 U/L    Alkaline Phosphatase 99 30 - 99 U/L    Total Bilirubin 0.8 0.1 - 1.5 mg/dL    Albumin 3.9 3.2 - 4.9 g/dL    Globulin 2.9 1.9 - 3.5 g/dL    A-G Ratio 1.3 g/dL    LIPASE   Result Value Ref Range    Lipase 26 7 - 58 U/L   Urinalysis, culture if indicated   Result Value Ref Range    Color Yellow     Character Clear     Specific Gravity <=1.005 <1.035    Ph 6.0 5.0 - 8.0    Glucose >=1000 (A) Negative mg/dL    Ketones 15 (A) Negative mg/dL    Protein Negative Negative mg/dL    Bilirubin Negative Negative    Nitrite Negative Negative    Leukocyte Esterase Negative Negative    Occult Blood Negative Negative    Micro Urine Req see below    MAGNESIUM   Result Value Ref Range    Magnesium 2.3 1.5 - 2.5 mg/dL   PHOSPHORUS   Result Value Ref Range    Phosphorus 3.4 2.5 - 4.5 mg/dL   ESTIMATED GFR   Result Value Ref Range    GFR If African American >60 >60 mL/min/1.73 m 2    GFR If Non African American >60 >60 mL/min/1.73 m 2   BASIC METABOLIC PANEL   Result Value Ref Range    Sodium 124 (L) 135 - 145 mmol/L    Potassium 3.9 3.6 - 5.5 mmol/L    Chloride 88 (L) 96 - 112 mmol/L    Co2 19 (L) 20 - 33 mmol/L    Glucose 698 (HH) 65 - 99 mg/dL    Bun 19 8 - 22 mg/dL    Creatinine 0.73 0.50 - 1.40 mg/dL    Calcium 9.1 8.4 - 10.2 mg/dL    Anion Gap 17.0 (H) 0.0 - 11.9   ESTIMATED GFR   Result Value Ref Range    GFR If African American >60 >60 mL/min/1.73 m 2    GFR If Non African American >60 >60 mL/min/1.73 m 2   ACCU-CHEK GLUCOSE   Result Value Ref Range    Glucose - Accu-Ck >600 (HH) 65 - 99 mg/dL   ISTAT LACTATE   Result Value Ref Range    iStat Lactate 6.5 (HH) 0.5 - 2.0 mmol/L   ACCU-CHEK GLUCOSE   Result Value Ref Range    Glucose - Accu-Ck 446 (HH) 65 - 99 mg/dL         COURSE & MEDICAL DECISION MAKING  Pertinent Labs & Imaging studies reviewed. (See chart for details)  Patient had 10 units IV insulin, 2 L of normal saline hydration.  Initial blood sugar went from 760 down to 690.  Most recent fingerstick 446.  Patient has elevated lactic acid, elevated anion gap suggestive of diabetic ketoacidosis.  Patient is admitted for ongoing treatment.    FINAL IMPRESSION  1. Diabetic  ketoacidosis without coma associated with type 2 diabetes mellitus (HCC)            Electronically signed by: Foster Blankenship, 1/23/2019 7:07 PM

## 2019-01-24 NOTE — CARE PLAN
Problem: Communication  Goal: The ability to communicate needs accurately and effectively will improve  Outcome: PROGRESSING AS EXPECTED  POC discussed with pt and questions answered. Board updated and call bell in reach    Problem: Safety  Goal: Will remain free from injury  Outcome: PROGRESSING AS EXPECTED  Pt call for assistance. All safety measures in place.    Problem: Infection  Goal: Will remain free from infection  Outcome: PROGRESSING AS EXPECTED  WBC 5.9. Pt afebrile. Handwashing done prior to pt care. Pt aware of infection prevention protocols.    Problem: Venous Thromboembolism (VTW)/Deep Vein Thrombosis (DVT) Prevention:  Goal: Patient will participate in Venous Thrombosis (VTE)/Deep Vein Thrombosis (DVT)Prevention Measures  Outcome: PROGRESSING AS EXPECTED  Pt educated on lovenox. Pt ok with use    Problem: Bowel/Gastric:  Goal: Normal bowel function is maintained or improved  Outcome: PROGRESSING SLOWER THAN EXPECTED      Problem: Knowledge Deficit  Goal: Knowledge of disease process/condition, treatment plan, diagnostic tests, and medications will improve  Outcome: PROGRESSING AS EXPECTED  Pt educated on DKA protocol. BG decreasing. Pt on insulin gtts.    Problem: Pain Management  Goal: Pain level will decrease to patient's comfort goal  Outcome: PROGRESSING SLOWER THAN EXPECTED  Pt c/o pain to LUQ. Pt states it is tender. Pt also c/o cramping in legs and abdomen. Pt potassium being replaced will continue to monitor.

## 2019-01-24 NOTE — ED NOTES
Emerson from Lab called with critical result of Glucose 698 at 1751. Critical lab result read back to Emerson.   Dr. Blankenship notified of critical lab result at 1751.  Critical lab result read back by Dr. Blankenship .

## 2019-01-24 NOTE — PROGRESS NOTES
Patients potassium is WDL at this time vital signs are stable. Patient has discharge orders.  Patient given information regarding admitting diagnosis of DKA. Patient seen be MD prior to discharge.  Patient discharged with written instructions regarding DKA, follow up appointment, and to return to ED if symptoms return.

## 2019-01-24 NOTE — PROGRESS NOTES
Pharmacy called to verify lantus order. Will hold off on administration and defer to day shift as MD will place transition orders. Will continue to monitor BG.

## 2019-01-24 NOTE — PROGRESS NOTES
Pt admitted to room from ER at this time. Pt ambulated to bed from [facility]. Pt on NS at 150ml/hr and insulin gtts at 9 units/hr. BG on arrival 222. Will continue insulin gtts at current rate. ER should have increased gtts at 2300. Dr. Forte aware and ok with continuing current rate. Pt a&ox4. Pt on 2L NC. Will complete admission. Safety measures in place. Will continue to monitor.

## 2019-01-24 NOTE — ED NOTES
perfecto from Lab called with critical result of lactic at 6.51. Critical lab result read back to perfecto.   Dr. gallegos notified of critical lab result at 1604.  Critical lab result read back by Dr. sullivan.

## 2019-01-24 NOTE — DISCHARGE INSTRUCTIONS
Discharge Instructions    Discharged to home by car with relative. Discharged via wheelchair, hospital escort: Yes.  Special equipment needed: Not Applicable    Be sure to schedule a follow-up appointment with your primary care doctor or any specialists as instructed.     Discharge Plan:   Pneumococcal Vaccine Administered/Refused: Not given - Patient refused pneumococcal vaccine (pt recieved)  Influenza Vaccine Indication: Not indicated: Previously immunized this influenza season and > 8 years of age    I understand that a diet low in cholesterol, fat, and sodium is recommended for good health. Unless I have been given specific instructions below for another diet, I accept this instruction as my diet prescription.   Other diet: Diabetic diet     Special Instructions: None    · Is patient discharged on Warfarin / Coumadin?   No     Depression / Suicide Risk    As you are discharged from this [facility] facility, it is important to learn how to keep safe from harming yourself.    Recognize the warning signs:  · Abrupt changes in personality, positive or negative- including increase in energy   · Giving away possessions  · Change in eating patterns- significant weight changes-  positive or negative  · Change in sleeping patterns- unable to sleep or sleeping all the time   · Unwillingness or inability to communicate  · Depression  · Unusual sadness, discouragement and loneliness  · Talk of wanting to die  · Neglect of personal appearance   · Rebelliousness- reckless behavior  · Withdrawal from people/activities they love  · Confusion- inability to concentrate     If you or a loved one observes any of these behaviors or has concerns about self-harm, here's what you can do:  · Talk about it- your feelings and reasons for harming yourself  · Remove any means that you might use to hurt yourself (examples: pills, rope, extension cords, firearm)  · Get professional help from the community (Mental Health, Substance Abuse,  psychological counseling)  · Do not be alone:Call your Safe Contact- someone whom you trust who will be there for you.  · Call your local CRISIS HOTLINE 920-5557 or 629-794-7890  · Call your local Children's Mobile Crisis Response Team [place] (091) 502-0773 or www.Yumber  · Call the toll free National Suicide Prevention Hotlines   · National Suicide Prevention Lifeline 145-498-JQCE (4433)  · [employer] Hope Line Network 800-SUICIDE (219-0800)      Diabetic Ketoacidosis  Diabetic ketoacidosis is a life-threatening complication of diabetes. If it is not treated, it can cause severe dehydration and organ damage and can lead to a coma or death.  What are the causes?  This condition develops when there is not enough of the hormone insulin in the body. Insulin helps the body to break down sugar for energy. Without insulin, the body cannot break down sugar, so it breaks down fats instead. This leads to the production of acids that are called ketones. Ketones are poisonous at high levels.  This condition can be triggered by:  · Stress on the body that is brought on by an illness.  · Medicines that raise blood glucose levels.  · Not taking diabetes medicine.  What are the signs or symptoms?  Symptoms of this condition include:  · Fatigue.  · Weight loss.  · Excessive thirst.  · Light-headedness.  · Fruity or sweet-smelling breath.  · Excessive urination.  · Vision changes.  · Confusion or irritability.  · Nausea.  · Vomiting.  · Rapid breathing.  · Abdominal pain.  · Feeling flushed.  How is this diagnosed?  This condition is diagnosed based on a medical history, a physical exam, and blood tests. You may also have a urine test that checks for ketones.  How is this treated?  This condition may be treated with:  · Fluid replacement. This may be done to correct dehydration.  · Insulin injections. These may be given through the skin or through an IV tube.  · Electrolyte replacement. Electrolytes, such as potassium  and sodium, may be given in pill form or through an IV tube.  · Antibiotic medicines. These may be prescribed if your condition was caused by an infection.  Follow these instructions at home:  Eating and drinking  · Drink enough fluids to keep your urine clear or pale yellow.  · If you cannot eat, alternate between drinking fluids with sugar (such as juice) and salty fluids (such as broth or bouillon).  · If you can eat, follow your usual diet and drink sugar-free liquids, such as water.  Other Instructions   · Take insulin as directed by your health care provider. Do not skip insulin injections. Do not use  insulin.  · If your blood sugar is over 240 mg/dL, monitor your urine ketones every 4-6 hours.  · If you were prescribed an antibiotic medicine, finish all of it even if you start to feel better.  · Rest and exercise only as directed by your health care provider.  · If you get sick, call your health care provider and begin treatment quickly. Your body often needs extra insulin to fight an illness.  · Check your blood glucose levels regularly. If your blood glucose is high, drink plenty of fluids. This helps to flush out ketones.  Contact a health care provider if:  · Your blood glucose level is too high or too low.  · You have ketones in your urine.  · You have a fever.  · You cannot eat.  · You cannot tolerate fluids.  · You have been vomiting for more than 2 hours.  · You continue to have symptoms of this condition.  · You develop new symptoms.  Get help right away if:  · Your blood glucose levels continue to be high (elevated).  · Your monitor reads “high” even when you are taking insulin.  · You faint.  · You have chest pain.  · You have trouble breathing.  · You have a sudden, severe headache.  · You have sudden weakness in one arm or one leg.  · You have sudden trouble speaking or swallowing.  · You have vomiting or diarrhea that gets worse after 3 hours.  · You feel severely fatigued.  · You have  trouble thinking.  · You have abdominal pain.  · You are severely dehydrated. Symptoms of severe dehydration include:  ¨ Extreme thirst.  ¨ Dry mouth.  ¨ Blue lips.  ¨ Cold hands and feet.  ¨ Rapid breathing.  This information is not intended to replace advice given to you by your health care provider. Make sure you discuss any questions you have with your health care provider.  Document Released: 12/15/2001 Document Revised: 05/25/2017 Document Reviewed: 11/25/2015  [employer] Interactive Patient Education © 2017 [employer]

## 2019-01-25 LAB
GLUCOSE BLD-MCNC: 114 MG/DL (ref 65–99)
GLUCOSE BLD-MCNC: 121 MG/DL (ref 65–99)
GLUCOSE BLD-MCNC: 91 MG/DL (ref 65–99)

## 2019-01-25 NOTE — DISCHARGE SUMMARY
"Discharge Summary    CHIEF COMPLAINT ON ADMISSION  Chief Complaint   Patient presents with   • LUQ Pain     \"Muscle spasm\"   • Diabetes     out of control at home in the 600's despite Insulin dosing at home.       Reason for Admission  Abd pain, Head pain BP problems     Admission Date  1/23/2019    CODE STATUS  Prior    HPI & HOSPITAL COURSE  This is a 53 y.o. male here with with history of insulin dependent diabetes mellitus was admitted yesterday from emergency room with  diabetic ketoacidosis  The patient was placed on DKA protocol.  His anion gap normalized.  His bicarbonate level went back to normal and his glucose level was controlled prior to discharge.  Patient was able to tolerate p.o. food without difficulties.  The patient requested to be discharged home after that and he was discharged in stable condition.    The patient had similar episodes of DKA in the past with no clear reason he stated that he takes his insulin regularly.  The patient is a scheduled to be seen by endocrinology early next week.  He will resume his home dose of insulin.    Therefore, he is discharged in good and stable condition to home with close outpatient follow-up.    The patient recovered much more quickly than anticipated on admission.    Discharge Date  1/24/2019    FOLLOW UP ITEMS POST DISCHARGE  Endocrinology next week already scheduled  Primary care physician in 1-2 weeks    DISCHARGE DIAGNOSES  Active Problems:    Chronic ulcerative colitis (CMS-HCC) POA: Yes    Hx of acute pancreatitis (Chronic) POA: Yes    Hypothyroidism (Chronic) POA: Yes      Overview: Chronic condition treated with [place] Thy[facility].      Resumed maintenance medication.  Resolved Problems:    Diabetic ketoacidosis without coma associated with type 2 diabetes mellitus (HCC) POA: Yes    Hyponatremia POA: Yes      FOLLOW UP  Future Appointments  Date Time Provider Department Center   1/29/2019 7:45 AM JENNIFFER Lindsey, " P.A.-C.  [address]  [address]  [place] [address]  [phone]    In 1 week  Return to ED , If symptoms worsen      MEDICATIONS ON DISCHARGE     Medication List      CONTINUE taking these medications      Instructions   anastrozole 1 MG Tabs  Commonly known as:  ARIMIDEX   Take 1 mg by mouth every 7 days.  Dose:  1 mg     buPROPion 300 MG XL tablet  Commonly known as:  WELLBUTRIN XL   Take 300 mg by mouth every morning.  Dose:  300 mg     gemfibrozil 600 MG Tabs  Commonly known as:  LOPID   Take 1 Tab by mouth 2 times a day.  Dose:  600 mg     insulin glargine 100 UNIT/ML Soln  Commonly known as:  LANTUS   Inject 38-42 Units as instructed 2 times a day. 42 units every morning 38 units every night  Dose:  38-42 Units     insulin lispro 100 UNIT/ML Soln  Commonly known as:  HUMALOG   Inject 2-12 Units as instructed 3 times a day before meals. Sliding Scale  160 - 200 = 2 units 201 - 250 = 4 units 251 - 300 = 6 units 301 - 350 = 8 units 351 - 400 = 10 units >401 Call MD and 12 units  Dose:  2-12 Units     ondansetron 4 MG Tabs tablet  Commonly known as:  ZOFRAN   Take 1 Tab by mouth 1 time daily as needed for Nausea/Vomiting for up to 15 doses.  Dose:  4 mg     testosterone cypionate 200 MG/ML Soln injection  Commonly known as:  DEPO-TESTOSTERONE   80 mg by Intramuscular route every 7 days.  Dose:  80 mg     thyroid 60 MG Tabs  Commonly known as:  ARMOUR THYROID   Take 60 mg by mouth every morning.  Dose:  60 mg            Allergies  Allergies   Allergen Reactions   • Peanut (Diagnostic) Anaphylaxis   • Tradjenta [Linagliptin] Unspecified     Pt developed pancreatitis   • Hydrocodone Hives     Tolerates Morphine and oxycodone         DIET  No orders of the defined types were placed in this encounter.      ACTIVITY  As tolerated.  Weight bearing as tolerated            LABORATORY  Lab Results   Component Value Date    SODIUM 132 (L) 01/24/2019    POTASSIUM 3.9 01/24/2019    CHLORIDE 104 01/24/2019    CO2 21  01/24/2019    GLUCOSE 197 (H) 01/24/2019    BUN 13 01/24/2019    CREATININE 0.66 01/24/2019    CREATININE 1.1 02/18/2008        Lab Results   Component Value Date    WBC 4.1 (L) 01/24/2019    HEMOGLOBIN 12.1 (L) 01/24/2019    HEMATOCRIT 32.8 (L) 01/24/2019    PLATELETCT 131 (L) 01/24/2019        Total time of the discharge process exceeds 30 minutes.

## 2019-01-29 ENCOUNTER — OFFICE VISIT (OUTPATIENT)
Dept: ENDOCRINOLOGY | Facility: MEDICAL CENTER | Age: 54
End: 2019-01-29
Payer: COMMERCIAL

## 2019-01-29 ENCOUNTER — HOSPITAL ENCOUNTER (OUTPATIENT)
Dept: LAB | Facility: MEDICAL CENTER | Age: 54
End: 2019-01-29
Attending: PHYSICIAN ASSISTANT
Payer: COMMERCIAL

## 2019-01-29 VITALS
HEART RATE: 101 BPM | DIASTOLIC BLOOD PRESSURE: 82 MMHG | BODY MASS INDEX: 31.55 KG/M2 | HEIGHT: 68 IN | WEIGHT: 208.2 LBS | OXYGEN SATURATION: 94 % | SYSTOLIC BLOOD PRESSURE: 136 MMHG

## 2019-01-29 DIAGNOSIS — E11.65 UNCONTROLLED TYPE 2 DIABETES MELLITUS WITH HYPERGLYCEMIA (HCC): ICD-10-CM

## 2019-01-29 DIAGNOSIS — I10 ESSENTIAL HYPERTENSION: ICD-10-CM

## 2019-01-29 DIAGNOSIS — E11.65 TYPE 2 DIABETES MELLITUS WITH HYPERGLYCEMIA, UNSPECIFIED WHETHER LONG TERM INSULIN USE (HCC): ICD-10-CM

## 2019-01-29 LAB
CREAT UR-MCNC: 43.4 MG/DL
MICROALBUMIN UR-MCNC: 2.4 MG/DL
MICROALBUMIN/CREAT UR: 55 MG/G (ref 0–30)
T3 SERPL-MCNC: 77 NG/DL (ref 60–181)
THYROPEROXIDASE AB SERPL-ACNC: 2.8 IU/ML (ref 0–9)
TSH SERPL DL<=0.005 MIU/L-ACNC: 1.98 UIU/ML (ref 0.38–5.33)

## 2019-01-29 PROCEDURE — 84480 ASSAY TRIIODOTHYRONINE (T3): CPT

## 2019-01-29 PROCEDURE — 84681 ASSAY OF C-PEPTIDE: CPT

## 2019-01-29 PROCEDURE — 84443 ASSAY THYROID STIM HORMONE: CPT

## 2019-01-29 PROCEDURE — 36415 COLL VENOUS BLD VENIPUNCTURE: CPT

## 2019-01-29 PROCEDURE — 82043 UR ALBUMIN QUANTITATIVE: CPT

## 2019-01-29 PROCEDURE — 99204 OFFICE O/P NEW MOD 45 MIN: CPT | Performed by: PHYSICIAN ASSISTANT

## 2019-01-29 PROCEDURE — 86376 MICROSOMAL ANTIBODY EACH: CPT

## 2019-01-29 PROCEDURE — 86341 ISLET CELL ANTIBODY: CPT

## 2019-01-29 PROCEDURE — 82570 ASSAY OF URINE CREATININE: CPT

## 2019-01-29 RX ORDER — CYCLOBENZAPRINE HCL 10 MG
TABLET ORAL
COMMUNITY
Start: 2019-01-21 | End: 2019-01-29

## 2019-01-29 NOTE — LETTER
[employer]  KRISSY Brunner  [address]  [place] [address]  Fax: [phone]   Authorization for Release/Disclosure of   Protected Health Information   Name: DEVIN MO : 1965 SSN: [id number]   Address:  [address]  [place] [address] Phone:    [phone] (home) [phone] (work)   I authorize the entity listed below to release/disclose the PHI below to:   [employer]/KRISSY Brunner and Edgardo Paulson P.A.-C.   Provider or Entity Name:  [facility]   City, [facility], [facility]   Phone:  [phone]    Fax:     Reason for request: continuity of care   Information to be released:    [  ] LAST COLONOSCOPY,  including any PATH REPORT and follow-up  [  ] LAST FIT/COLOGUARD RESULT [  ] LAST DEXA  [  ] LAST MAMMOGRAM  [  ] LAST PAP  [  ] LAST LABS [ X] RETINA EXAM REPORT  [  ] IMMUNIZATION RECORDS  [  ] Release all info      [  ] Check here and initial the line next to each item to release ALL health information INCLUDING  _____ Care and treatment for drug and / or alcohol abuse  _____ HIV testing, infection status, or AIDS  _____ Genetic Testing    DATES OF SERVICE OR TIME PERIOD TO BE DISCLOSED: _____________  I understand and acknowledge that:  * This Authorization may be revoked at any time by you in writing, except if your health information has already been used or disclosed.  * Your health information that will be used or disclosed as a result of you signing this authorization could be re-disclosed by the recipient. If this occurs, your re-disclosed health information may no longer be protected by State or Federal laws.  * You may refuse to sign this Authorization. Your refusal will not affect your ability to obtain treatment.  * This Authorization becomes effective upon signing and will  on (date) __________.      If no date is indicated, this Authorization will  one (1) year from the signature date.    Name: Devin Mo    Signature:   Date:     1/29/2019       PLEASE FAX REQUESTED RECORDS BACK TO: [phone]

## 2019-01-29 NOTE — PATIENT INSTRUCTIONS
STOP:  Lantus    START:  1.  Tresiba 70 units at night. BUT take 36 units on 1/29/19  2.  Ozempic 0.25 once a week  3.  Humalog   1:20 above 100  100-120 1 units  121-140 2 units  141-160 3 units  161-180 4 units  181-200 5 units  201-220 6 units  221-240 7 unit  241-260 8 units  261-280 9units  281-300 10units  301-320 11 units  321-340 12 units  341-360 13units  361-380 14unit  381-400 15units  Etc…

## 2019-01-29 NOTE — PROGRESS NOTES
New Patient Consult Note  Referred by: KRISSY Brunner    Reason for consult: Diabetes Management Type 2    HPI:  Mahesh Bradshaw is a 53 y.o. old patient who is seeing us today for diabetes care.  This is a pleasant patient with diabetes and I appreciate the opportunity to participate in the care of this patient.  This is a new patient with me today.    Labs of 1/24/19 GFR >60, HbA1c is 14.7  Labs of 12/10/17 HbA1c is 12.8  Labs of 2/25/16 HbA1c is 17.0    BG Diary:1/29/2019  In the AM: no log    Has been Diabetic since T2 for 6 years  Has a Glucagon pen at home: no    He has been hospitalized several times over the last years for continued out of control diabetes.     1. Uncontrolled type 2 diabetes mellitus with hyperglycemia (HCC)  This is a new patient with me on   They are on:  1.  Lantus 44 in the AM and 38 at night  2.  Humalog sliding scale   150-180 2 units  350- 380 8 units  381-410 10 units  411-440 12 units    States got very sick being on Tradjenta    2. Essential hypertension  This is stable today and no new changes are needed or required in today's visit      3. Type 2 diabetes mellitus with hyperglycemia, unspecified whether long term insulin use (HCC)  This patient is glucose toxic and has been for several years.         ROS:   Constitutional: No change in weight , No fatigue, No night sweats.  HEENT: No Headache.  Eyes:  No blurred vision, No visual changes.  Cardiac: No chest pain, No palpitations.  Resp: No shortness of breath, No cough,   Gastro: No nausea or vomiting, No diarrhea.  Neuro: Denies numbness or tinging in bilateral feet or hands, and no loss of sensation.  Endo: No heat or cold intolerance.  : No polyuria, No polydipsia, No chronic UTI's.  Lower extremities: No lower leg edema bilateral.  All other systems were reviewed and were negative.    Past Medical History:  Patient Active Problem List    Diagnosis Date Noted   • Closed fracture of sternum 05/19/2018      Priority: High   • Injury of costal cartilage 05/19/2018     Priority: High   • Abscess of left thumb 12/09/2017     Priority: High   • Cellulitis of left hand 12/09/2017     Priority: High   • Abscess of right thigh 05/01/2017     Priority: High   • Severe sepsis ([facility]) 04/29/2017     Priority: High   • Diabetic ketoacidosis ([facility]) 04/06/2017     Priority: High   • Encephalopathy 02/25/2016     Priority: High   • HHNC (hyperglycemic hyperosmolar nonketotic coma) ([facility]) 02/24/2016     Priority: High   • Dental caries 01/03/2019     Priority: Medium   • Hypertriglyceridemia - severe 01/03/2019     Priority: Medium   • S/P acute bronchitis due to other specified organisms 11/14/2018     Priority: Medium   • Laceration of right ear 05/19/2018     Priority: Medium   • Elevated lipase 02/25/2016     Priority: Medium   • ADRIANE (acute kidney injury) ([facility]) 02/24/2016     Priority: Medium   • Essential hypertension 01/22/2016     Priority: Medium   • Dyslipidemia 11/14/2018     Priority: Low   • Hypothyroidism 05/19/2018     Priority: Low   • Depression 05/19/2018     Priority: Low   • AP (abdominal pain) 10/04/2017     Priority: Low   • Normocytic anemia 04/07/2017     Priority: Low   • Hx of acute pancreatitis 03/07/2016     Priority: Low   • Hypernatremia 02/25/2016     Priority: Low   • Chronic ulcerative colitis (CMS-[facility]) 02/25/2016     Priority: Low   • Leukocytosis 02/24/2016     Priority: Low   • GERD (gastroesophageal reflux disease) 06/15/2015     Priority: Low   • Ulcerative colitis ([facility]) 06/15/2015     Priority: Low   • LUQ pain 12/20/2018   • Abdominal cramps 12/20/2018   • Knee pain, right 05/19/2018   • Obesity (BMI 30.0-34.9) 12/10/2017   • Uncontrolled type 2 diabetes mellitus with hyperglycemia ([facility]) 12/10/2017   • History of nephrolithiasis 12/10/2017   • Insomnia disorder 12/10/2017   • Sepsis(995.91) 07/09/2017   • Hyperglycemia 07/09/2017   • Bacteremia 05/01/2017   • Bacteremia due to Gram-negative bacteria  04/30/2017   • Complicated wound infection 04/19/2017   • Back pain 01/16/2017   • Hyperglycemia due to type 2 diabetes mellitus (HCC) 01/16/2017   • Incarcerated umbilical hernia 11/06/2016   • Serum phosphate elevated 03/07/2016   • Altered mental status 03/07/2016   • Chest pain 06/15/2015   • Gout 06/15/2015   • Elevated liver enzymes 11/26/2014   • Depression 11/26/2014       Past Surgical History:  Past Surgical History:   Procedure Laterality Date   • UMBILICAL HERNIA REPAIR N/A 4/15/2018    Procedure: UMBILICAL HERNIA REPAIR;  Surgeon: Karen Beasley M.D.;  Location: SURGERY [facility];  Service: General   • IRRIGATION & DEBRIDEMENT ORTHO Left 12/10/2017    Procedure: IRRIGATION & DEBRIDEMENT ORTHO LEFT HAND;  Surgeon: Chicho Ramos M.D.;  Location: [facility];  Service: Orthopedics   • IRRIGATION & DEBRIDEMENT GENERAL Right 5/1/2017    Procedure: IRRIGATION & DEBRIDEMENT GENERAL-Left HAND AND right  ANKLE;  Surgeon: Esau Dooley M.D.;  Location: SURGERY [facility];  Service:    • IRRIGATION & DEBRIDEMENT GENERAL Right 4/29/2017    Procedure: IRRIGATION & DEBRIDEMENT GENERAL;  Surgeon: Esau Dooley M.D.;  Location: [facility];  Service:    • INCISION AND DRAINAGE GENERAL  4/7/2017    Procedure: INCISION AND DRAINAGE GENERAL;  Surgeon: Esau Dooley M.D.;  Location: SURGERY SAME DAY [facility];  Service:    • UMBILICAL HERNIA REPAIR  3/10/2017    Procedure: UMBILICAL HERNIA REPAIR- INCISION AND DRAINAGE OF UMBILICAL WOUND AND MESH REMOVAL;  Surgeon: Esau Dooley M.D.;  Location: SURGERY SAME DAY [facility];  Service:    • UMBILICAL HERNIA REPAIR N/A 11/6/2016    Procedure: UMBILICAL HERNIA REPAIR;  Surgeon: Esau Dooley M.D.;  Location: SURGERY [facility];  Service:    • COLONOSCOPY WITH BIOPSY  11/26/2014    Performed by Solo Higginbotham M.D. at ENDOSCOPY [facility]   • LIVE BY LAPAROSCOPY  2005   • OTHER ORTHOPEDIC SURGERY  1997  "or 1998    Left Wrist ORIF       Allergies:  Peanut (diagnostic); Tradjenta [linagliptin]; and Hydrocodone    Social History:  Social History     Social History   • Marital status: Single     Spouse name: N/A   • Number of children: N/A   • Years of education: N/A     Occupational History   • Not on file.     Social History Main Topics   • Smoking status: Never Smoker   • Smokeless tobacco: Never Used   • Alcohol use No   • Drug use: No   • Sexual activity: Not on file     Other Topics Concern   • Not on file     Social History Narrative    ** Merged History Encounter **         ** Merged History Encounter **     ** Merged History Encounter **          Family History:  Family History   Problem Relation Age of Onset   • Cancer Mother        Medications:    Current Outpatient Prescriptions:   •  ondansetron (ZOFRAN) 4 MG Tab tablet, Take 1 Tab by mouth 1 time daily as needed for Nausea/Vomiting for up to 15 doses., Disp: 15 Tab, Rfl: 0  •  anastrozole (ARIMIDEX) 1 MG Tab, Take 1 mg by mouth every 7 days., Disp: , Rfl:   •  insulin lispro (HUMALOG) 100 UNIT/ML Solution, Inject 2-12 Units as instructed 3 times a day before meals. Sliding Scale  160 - 200 = 2 units 201 - 250 = 4 units 251 - 300 = 6 units 301 - 350 = 8 units 351 - 400 = 10 units >401 Call MD and 12 units, Disp: , Rfl:   •  thyroid (ARMOUR THYROID) 60 MG Tab, Take 60 mg by mouth every morning., Disp: , Rfl:   •  buPROPion (WELLBUTRIN XL) 300 MG XL tablet, Take 300 mg by mouth every morning., Disp: , Rfl:   •  testosterone cypionate (DEPO-TESTOSTERONE) 200 MG/ML Solution injection, 80 mg by Intramuscular route every 7 days., Disp: , Rfl:       Physical Examination:   Vital signs: /82 (BP Location: Right arm, Patient Position: Sitting, BP Cuff Size: Adult)   Pulse (!) 101   Ht 1.727 m (5' 7.99\")   Wt 94.4 kg (208 lb 3.2 oz)   SpO2 94%   BMI 31.66 kg/m²   General: No distress, cooperative, well dressed and well nourished.   Eyes: No scleral icterus " or discharge, No hyposphagma  ENMT: Normal on external inspection of nose, lips, No nasal drainage   Neck: No abnormal masses on inspection  Resp: Normal effort, Bilateral clear to auscultation, No wheezing, No rales  CVS: Regular rate and rhythm, S1 S2 normal, No murmur. No gallop  Extremities: No edema bilateral extremities  Neuro: Alert and oriented  Skin: No rash, No Ulcers  Psych: Normal mood and affect      Assessment and Plan:    1. Uncontrolled type 2 diabetes mellitus with hyperglycemia (HCC)  STOP:  Lantus    START:  1.  Tresiba 70 units at night. BUT take 36 units on 1/29/19  2.  Ozempic 0.25 once a week  3.  Humalog   1:20 above 100  100-120 1 units  121-140 2 units  141-160 3 units  161-180 4 units  181-200 5 units  201-220 6 units  221-240 7 unit  241-260 8 units  261-280 9units  281-300 10units  301-320 11 units  321-340 12 units  341-360 13units  361-380 14unit  381-400 15units  Etc…      2. Essential hypertension  This is stable today and no new changes are needed or required in today's visit      3. Type 2 diabetes mellitus with hyperglycemia, unspecified whether long term insulin use (HCC)  This patient is Glucose-toxic and has been for some time now, they are starting to have blurred vision which puts them at risk for blindness or other visual problems related to diabetes.   They also have polyuria which precludes them to move toward renal failure and possible dialysis.  I discussed today with this patient that the leading cause of adult blindness and renal failure and the need for dialysis is uncontrolled diabetes and a glucose-toxic state.  I explained to them the need to get a better handle of their diabetes, because we want to avoid complications if at all possible.       Return in about 2 weeks (around 2/12/2019).    Blood glucose log: Check BG in the morning when wake up, before lunch or dinner and before bed.  So three times a day.  Always bring BG diary to the next office visit.     This  patient during there office visit was started on new medication Tresiba and Ozempic.  Side effects of new medications were discussed with the patient today in the office. The patient was supplied paperwork on this new medication.    Thank you kindly for allowing me to participate in the diabetes care plan for this patient.    Edgardo Paulson PA-C, [facility]  Board Certified - Advanced Diabetes Management  01/29/19    CC:   KRISSY Brunner

## 2019-01-30 LAB — C PEPTIDE SERPL-MCNC: 1.4 NG/ML (ref 0.8–3.5)

## 2019-01-31 LAB — GAD65 AB SER IA-ACNC: <5 IU/ML (ref 0–5)

## 2019-02-12 ENCOUNTER — OFFICE VISIT (OUTPATIENT)
Dept: ENDOCRINOLOGY | Facility: MEDICAL CENTER | Age: 54
End: 2019-02-12
Payer: COMMERCIAL

## 2019-02-12 VITALS
HEART RATE: 125 BPM | BODY MASS INDEX: 33.12 KG/M2 | OXYGEN SATURATION: 95 % | DIASTOLIC BLOOD PRESSURE: 80 MMHG | WEIGHT: 211 LBS | HEIGHT: 67 IN | SYSTOLIC BLOOD PRESSURE: 125 MMHG

## 2019-02-12 DIAGNOSIS — E11.65 UNCONTROLLED TYPE 2 DIABETES MELLITUS WITH HYPERGLYCEMIA (HCC): ICD-10-CM

## 2019-02-12 DIAGNOSIS — E11.65 TYPE 2 DIABETES MELLITUS WITH HYPERGLYCEMIA, WITHOUT LONG-TERM CURRENT USE OF INSULIN (HCC): ICD-10-CM

## 2019-02-12 DIAGNOSIS — E03.9 ACQUIRED HYPOTHYROIDISM: Chronic | ICD-10-CM

## 2019-02-12 PROCEDURE — 99214 OFFICE O/P EST MOD 30 MIN: CPT | Performed by: PHYSICIAN ASSISTANT

## 2019-02-12 RX ORDER — PIOGLITAZONEHYDROCHLORIDE 15 MG/1
15 TABLET ORAL DAILY
Qty: 30 TAB | Refills: 11 | Status: SHIPPED | OUTPATIENT
Start: 2019-02-12 | End: 2019-03-14

## 2019-02-12 RX ORDER — AMOXICILLIN AND CLAVULANATE POTASSIUM 875; 125 MG/1; MG/1
TABLET, FILM COATED ORAL
COMMUNITY
Start: 2019-02-11 | End: 2019-03-26

## 2019-02-12 NOTE — PROGRESS NOTES
Return to office Patient Consult Note  Referred by: KRISSY Brunner    Reason for consult: Diabetes Management Type 2    HPI:  Mahesh Bradshaw is a 53 y.o. old patient who is seeing us today for diabetes care.  This is a pleasant patient with diabetes and I appreciate the opportunity to participate in the care of this patient.    Labs of 1/29/19 c-peptide 1.4, TSH 1.98, T3 77, micro alb ratio 55  Labs of 1/24/19 GFR >60, HbA1c is 14.7  Labs of 12/10/17 HbA1c is 12.8  Labs of 2/25/16 HbA1c is 17.0     BG Diary:2/12/2019  In the AM:  No log    1. Uncontrolled type 2 diabetes mellitus with hyperglycemia (HCC)  This is a new patient with me on 1/29/19  They were on:  1.  Lantus 44 in the AM and 38 at night  2.  Humalog sliding scale   150-180 2 units  350- 380 8 units  381-410 10 units  411-440 12 units     States got very sick being on Tradjenta    2. Type 2 diabetes mellitus with hyperglycemia, without long-term current use of insulin (HCC)  I had him  STOP:  Lantus     START:  1.  Tresiba 70 units at night.   2.  Ozempic 0.25 once a week  3.  Humalog   1:20 above 100  100-120 1 units  121-140 2 units  141-160 3 units  161-180 4 units  181-200 5 units  201-220 6 units  221-240 7 unit  241-260 8 units  261-280 9units  281-300 10units  301-320 11 units  321-340 12 units  341-360 13units  361-380 14unit  381-400 15units  Etc…    3. Acquired hypothyroidism  He is on [place] 60mg    ROS:   Constitutional: No night sweats.  Eyes:  No visual changes.  Cardiac: No chest pain, No palpitations or racing heart rate.  Resp: No shortness of breath, No cough,   Gi: No Diarrhea    All other systems were reviewed and were/are negative.  The ROS was revised/revisited during this office visit from the patients first office visit with me on 1/29/19  Please review the full ROS during the first office visit.    Past Medical History:  Patient Active Problem List    Diagnosis Date Noted   • Closed fracture of sternum 05/19/2018      Priority: High   • Injury of costal cartilage 05/19/2018     Priority: High   • Abscess of left thumb 12/09/2017     Priority: High   • Cellulitis of left hand 12/09/2017     Priority: High   • Abscess of right thigh 05/01/2017     Priority: High   • Severe sepsis ([facility]) 04/29/2017     Priority: High   • Diabetic ketoacidosis ([facility]) 04/06/2017     Priority: High   • Encephalopathy 02/25/2016     Priority: High   • HHNC (hyperglycemic hyperosmolar nonketotic coma) ([facility]) 02/24/2016     Priority: High   • Dental caries 01/03/2019     Priority: Medium   • Hypertriglyceridemia - severe 01/03/2019     Priority: Medium   • S/P acute bronchitis due to other specified organisms 11/14/2018     Priority: Medium   • Laceration of right ear 05/19/2018     Priority: Medium   • Elevated lipase 02/25/2016     Priority: Medium   • ADRIANE (acute kidney injury) ([facility]) 02/24/2016     Priority: Medium   • Essential hypertension 01/22/2016     Priority: Medium   • Dyslipidemia 11/14/2018     Priority: Low   • Hypothyroidism 05/19/2018     Priority: Low   • Depression 05/19/2018     Priority: Low   • AP (abdominal pain) 10/04/2017     Priority: Low   • Normocytic anemia 04/07/2017     Priority: Low   • Hx of acute pancreatitis 03/07/2016     Priority: Low   • Hypernatremia 02/25/2016     Priority: Low   • Chronic ulcerative colitis (CMS-[facility]) 02/25/2016     Priority: Low   • Leukocytosis 02/24/2016     Priority: Low   • GERD (gastroesophageal reflux disease) 06/15/2015     Priority: Low   • Ulcerative colitis ([facility]) 06/15/2015     Priority: Low   • LUQ pain 12/20/2018   • Abdominal cramps 12/20/2018   • Knee pain, right 05/19/2018   • Obesity (BMI 30.0-34.9) 12/10/2017   • Uncontrolled type 2 diabetes mellitus with hyperglycemia ([facility]) 12/10/2017   • History of nephrolithiasis 12/10/2017   • Insomnia disorder 12/10/2017   • Sepsis(995.91) 07/09/2017   • Hyperglycemia 07/09/2017   • Bacteremia 05/01/2017   • Bacteremia due to Gram-negative  bacteria 04/30/2017   • Complicated wound infection 04/19/2017   • Back pain 01/16/2017   • Hyperglycemia due to type 2 diabetes mellitus (HCC) 01/16/2017   • Incarcerated umbilical hernia 11/06/2016   • Serum phosphate elevated 03/07/2016   • Altered mental status 03/07/2016   • Chest pain 06/15/2015   • Gout 06/15/2015   • Elevated liver enzymes 11/26/2014   • Depression 11/26/2014       Past Surgical History:  Past Surgical History:   Procedure Laterality Date   • UMBILICAL HERNIA REPAIR N/A 4/15/2018    Procedure: UMBILICAL HERNIA REPAIR;  Surgeon: Karen Beasley M.D.;  Location: [facility];  Service: General   • IRRIGATION & DEBRIDEMENT ORTHO Left 12/10/2017    Procedure: IRRIGATION & DEBRIDEMENT ORTHO LEFT HAND;  Surgeon: Chicho Ramos M.D.;  Location: [facility];  Service: Orthopedics   • IRRIGATION & DEBRIDEMENT GENERAL Right 5/1/2017    Procedure: IRRIGATION & DEBRIDEMENT GENERAL-Left HAND AND right  ANKLE;  Surgeon: Esau Dooley M.D.;  Location: [facility];  Service:    • IRRIGATION & DEBRIDEMENT GENERAL Right 4/29/2017    Procedure: IRRIGATION & DEBRIDEMENT GENERAL;  Surgeon: Esau Dooley M.D.;  Location: [facility];  Service:    • INCISION AND DRAINAGE GENERAL  4/7/2017    Procedure: INCISION AND DRAINAGE GENERAL;  Surgeon: Esau Dooley M.D.;  Location: SURGERY SAME DAY [facility];  Service:    • UMBILICAL HERNIA REPAIR  3/10/2017    Procedure: UMBILICAL HERNIA REPAIR- INCISION AND DRAINAGE OF UMBILICAL WOUND AND MESH REMOVAL;  Surgeon: Esau Dooley M.D.;  Location: SURGERY SAME DAY [facility];  Service:    • UMBILICAL HERNIA REPAIR N/A 11/6/2016    Procedure: UMBILICAL HERNIA REPAIR;  Surgeon: Esau Dooley M.D.;  Location: SURGERY [facility];  Service:    • COLONOSCOPY WITH BIOPSY  11/26/2014    Performed by Solo Higginbotham M.D. at ENDOSCOPY [facility]   • LIVE BY LAPAROSCOPY  2005   • OTHER ORTHOPEDIC  "SURGERY  1997 or 1998    Left Wrist ORIF       Allergies:  Peanut (diagnostic); Tradjenta [linagliptin]; and Hydrocodone    Social History:  Social History     Social History   • Marital status: Single     Spouse name: N/A   • Number of children: N/A   • Years of education: N/A     Occupational History   • Not on file.     Social History Main Topics   • Smoking status: Never Smoker   • Smokeless tobacco: Never Used   • Alcohol use No   • Drug use: No   • Sexual activity: Not on file     Other Topics Concern   • Not on file     Social History Narrative    ** Merged History Encounter **         ** Merged History Encounter **     ** Merged History Encounter **          Family History:  Family History   Problem Relation Age of Onset   • Cancer Mother        Medications:    Current Outpatient Prescriptions:   •  amoxicillin-clavulanate (AUGMENTIN) 875-125 MG Tab, , Disp: , Rfl:   •  Semaglutide (OZEMPIC) 1 MG/DOSE Solution Pen-injector, Inject 1 mg as instructed every 7 days., Disp: 2 PEN, Rfl: 11  •  pioglitazone (ACTOS) 15 MG Tab, Take 1 Tab by mouth every day for 30 days., Disp: 30 Tab, Rfl: 11  •  Empagliflozin-Metformin HCl ER  MG TABLET SR 24 HR, Take 1 tablet by mouth 1 time daily as needed., Disp: 30 Tab, Rfl: 11  •  anastrozole (ARIMIDEX) 1 MG Tab, Take 1 mg by mouth every 7 days., Disp: , Rfl:   •  thyroid (ARMOUR THYROID) 60 MG Tab, Take 60 mg by mouth every morning., Disp: , Rfl:   •  testosterone cypionate (DEPO-TESTOSTERONE) 200 MG/ML Solution injection, 80 mg by Intramuscular route every 7 days., Disp: , Rfl:   •  buPROPion (WELLBUTRIN XL) 300 MG XL tablet, Take 300 mg by mouth every morning., Disp: , Rfl:       Physical Examination:  Vital signs: /80 (BP Location: Left arm, Patient Position: Sitting)   Pulse (!) 125   Ht 1.702 m (5' 7\")   Wt 95.7 kg (211 lb)   SpO2 95%   BMI 33.05 kg/m²   General: No distress, cooperative, well dressed and well nourished.   Eyes: No scleral icterus or " discharge, No hyposphagma  ENMT: Normal on external inspection of nose, lips, No nasal drainage   Neck: No abnormal masses on inspection  Resp: Normal effort, Bilateral clear to auscultation, No wheezing, No rales  CVS: Regular rate and rhythm, S1 S2 normal, No murmur. No gallop  Extremities: No edema bilateral extremities  Neuro: Alert and oriented  Skin: No rash, No Ulcers  Psych: Normal mood and affect      Assessment and Plan:    1. Uncontrolled type 2 diabetes mellitus with hyperglycemia (HCC)    START:  1.  Tresiba 70 units at night.   2.  Ozempic 0.25 once a week (INCREASE to 0.5) on 3/5/19 INCREASE to 1.0  3.  Humalog  (STOP) can use if glucose is over 300  4.  Synjardy 25/1000 one in the AM  5.  Actos 15mg one a day      2. Type 2 diabetes mellitus with hyperglycemia, without long-term current use of insulin (HCC)  Is on a high risk medication Insulin and we will continue to follow       3. Acquired hypothyroidism  Doing very well under great control    Return in about 6 weeks (around 3/26/2019).    Blood glucose log: Check BG in the morning when wake up, before lunch or dinner and before bed.  So three times a day.  Always bring BG diary to the next office visit.     Thank you kindly for allowing me to participate in the diabetes care plan for this patient.    Edgardo Paulson PA-C, BC-ADM  Board Certified - Advanced Diabetes Management  02/12/19    CC:   KRISSY Brunner

## 2019-03-26 ENCOUNTER — OFFICE VISIT (OUTPATIENT)
Dept: ENDOCRINOLOGY | Facility: MEDICAL CENTER | Age: 54
End: 2019-03-26

## 2019-03-26 VITALS
DIASTOLIC BLOOD PRESSURE: 74 MMHG | HEART RATE: 107 BPM | OXYGEN SATURATION: 95 % | SYSTOLIC BLOOD PRESSURE: 104 MMHG | WEIGHT: 212.6 LBS | BODY MASS INDEX: 33.37 KG/M2 | HEIGHT: 67 IN

## 2019-03-26 DIAGNOSIS — E11.65 UNCONTROLLED TYPE 2 DIABETES MELLITUS WITH HYPERGLYCEMIA (HCC): ICD-10-CM

## 2019-03-26 DIAGNOSIS — I10 ESSENTIAL HYPERTENSION: ICD-10-CM

## 2019-03-26 DIAGNOSIS — E11.65 TYPE 2 DIABETES MELLITUS WITH HYPERGLYCEMIA, WITHOUT LONG-TERM CURRENT USE OF INSULIN (HCC): ICD-10-CM

## 2019-03-26 LAB
HBA1C MFR BLD: 6.9 % (ref 0–5.6)
INT CON NEG: ABNORMAL
INT CON POS: ABNORMAL

## 2019-03-26 PROCEDURE — 99214 OFFICE O/P EST MOD 30 MIN: CPT | Performed by: PHYSICIAN ASSISTANT

## 2019-03-26 PROCEDURE — 83036 HEMOGLOBIN GLYCOSYLATED A1C: CPT | Performed by: PHYSICIAN ASSISTANT

## 2019-03-26 RX ORDER — PIOGLITAZONEHYDROCHLORIDE 30 MG/1
30 TABLET ORAL DAILY
Qty: 30 TAB | Refills: 11 | Status: ON HOLD | OUTPATIENT
Start: 2019-03-26 | End: 2020-01-20 | Stop reason: SDUPTHER

## 2019-03-26 NOTE — PATIENT INSTRUCTIONS
START:  1.  Tresiba 30 units at night.  (Stopped 1 week ago)  2.  Ozempic 1.0 once a week  3.  Humalog   1:20 above 100  100-120 1 units  121-140 2 units  141-160 3 units  161-180 4 units  181-200 5 units  201-220 6 units  221-240 7 unit  241-260 8 units  261-280 9units  281-300 10units  301-320 11 units  321-340 12 units  341-360 13units  361-380 14unit  381-400 15units  Etc…  4.  Synjardy 25/1000 one in the AM  5.  Actos 15mg one a day (INCREASE to 30)

## 2019-03-26 NOTE — PROGRESS NOTES
Return to office Patient Consult Note  Referred by: KRISSY Brunner    Reason for consult: Diabetes Management Type 2    HPI:  Mahesh Bradshaw is a 53 y.o. old patient who is seeing us today for diabetes care.  This is a pleasant patient with diabetes and I appreciate the opportunity to participate in the care of this patient.    Labs of 3/26/19 HbA1c is 6.9  Labs of 1/29/19 c-peptide 1.4, TSH 1.98, T3 77, micro alb ratio 55  Labs of 1/24/19 GFR >60, HbA1c is 14.7  Labs of 12/10/17 HbA1c is 12.8  Labs of 2/25/16 HbA1c is 17.0    BG Diary:3/26/2019  In the AM:  No log        1. Uncontrolled type 2 diabetes mellitus with hyperglycemia (HCC)  This is a new patient with me on 1/29/19  They were on:  1.  Lantus 44 in the AM and 38 at night  2.  Humalog sliding scale   150-180 2 units  350- 380 8 units  381-410 10 units  411-440 12 units     States got very sick being on Tradjenta    2. Type 2 diabetes mellitus with hyperglycemia, without long-term current use of insulin (HCC)  I had him  STOP:  Lantus     START:  1.  Tresiba 30 units at night.  (Stopped 1 week ago)  2.  Ozempic 1.0 once a week  3.  Humalog   1:20 above 100  100-120 1 units  121-140 2 units  141-160 3 units  161-180 4 units  181-200 5 units  201-220 6 units  221-240 7 unit  241-260 8 units  261-280 9units  281-300 10units  301-320 11 units  321-340 12 units  341-360 13units  361-380 14unit  381-400 15units  Etc…  4.  Synjardy 25/1000 one in the AM  5.  Actos 15mg one a day     3. Acquired hypothyroidism  He is on [place] 60mg     3. Essential hypertension  This is stable today and no new changes are needed or required in today's visit    ROS:   Constitutional: No night sweats.  Eyes:  No visual changes.  Cardiac: No chest pain, No palpitations or racing heart rate.  Resp: No shortness of breath, No cough,   Gi: No Diarrhea      All other systems were reviewed and were/are negative.    Past Medical History:    Patient Active Problem List     Diagnosis Date Noted   • Closed fracture of sternum 05/19/2018     Priority: High   • Injury of costal cartilage 05/19/2018     Priority: High   • Abscess of left thumb 12/09/2017     Priority: High   • Cellulitis of left hand 12/09/2017     Priority: High   • Abscess of right thigh 05/01/2017     Priority: High   • Severe sepsis ([facility]) 04/29/2017     Priority: High   • Diabetic ketoacidosis ([facility]) 04/06/2017     Priority: High   • Encephalopathy 02/25/2016     Priority: High   • HHNC (hyperglycemic hyperosmolar nonketotic coma) ([facility]) 02/24/2016     Priority: High   • Dental caries 01/03/2019     Priority: Medium   • Hypertriglyceridemia - severe 01/03/2019     Priority: Medium   • S/P acute bronchitis due to other specified organisms 11/14/2018     Priority: Medium   • Laceration of right ear 05/19/2018     Priority: Medium   • Elevated lipase 02/25/2016     Priority: Medium   • ADRIANE (acute kidney injury) ([facility]) 02/24/2016     Priority: Medium   • Essential hypertension 01/22/2016     Priority: Medium   • Dyslipidemia 11/14/2018     Priority: Low   • Hypothyroidism 05/19/2018     Priority: Low   • Depression 05/19/2018     Priority: Low   • AP (abdominal pain) 10/04/2017     Priority: Low   • Normocytic anemia 04/07/2017     Priority: Low   • Hx of acute pancreatitis 03/07/2016     Priority: Low   • Hypernatremia 02/25/2016     Priority: Low   • Chronic ulcerative colitis (CMS-HCC) 02/25/2016     Priority: Low   • Leukocytosis 02/24/2016     Priority: Low   • GERD (gastroesophageal reflux disease) 06/15/2015     Priority: Low   • Ulcerative colitis ([facility]) 06/15/2015     Priority: Low   • LUQ pain 12/20/2018   • Abdominal cramps 12/20/2018   • Knee pain, right 05/19/2018   • Obesity (BMI 30.0-34.9) 12/10/2017   • Uncontrolled type 2 diabetes mellitus with hyperglycemia ([facility]) 12/10/2017   • History of nephrolithiasis 12/10/2017   • Insomnia disorder 12/10/2017   • Sepsis(995.91) 07/09/2017   • Hyperglycemia 07/09/2017   •  Bacteremia 05/01/2017   • Bacteremia due to Gram-negative bacteria 04/30/2017   • Complicated wound infection 04/19/2017   • Back pain 01/16/2017   • Hyperglycemia due to type 2 diabetes mellitus (HCC) 01/16/2017   • Incarcerated umbilical hernia 11/06/2016   • Serum phosphate elevated 03/07/2016   • Altered mental status 03/07/2016   • Chest pain 06/15/2015   • Gout 06/15/2015   • Elevated liver enzymes 11/26/2014   • Depression 11/26/2014       Past Surgical History:  Past Surgical History:   Procedure Laterality Date   • UMBILICAL HERNIA REPAIR N/A 4/15/2018    Procedure: UMBILICAL HERNIA REPAIR;  Surgeon: Karen Beasley M.D.;  Location: [facility];  Service: General   • IRRIGATION & DEBRIDEMENT ORTHO Left 12/10/2017    Procedure: IRRIGATION & DEBRIDEMENT ORTHO LEFT HAND;  Surgeon: Chicho Ramos M.D.;  Location: [facility];  Service: Orthopedics   • IRRIGATION & DEBRIDEMENT GENERAL Right 5/1/2017    Procedure: IRRIGATION & DEBRIDEMENT GENERAL-Left HAND AND right  ANKLE;  Surgeon: Esau Dooley M.D.;  Location: [facility];  Service:    • IRRIGATION & DEBRIDEMENT GENERAL Right 4/29/2017    Procedure: IRRIGATION & DEBRIDEMENT GENERAL;  Surgeon: Esau Dooley M.D.;  Location: [facility];  Service:    • INCISION AND DRAINAGE GENERAL  4/7/2017    Procedure: INCISION AND DRAINAGE GENERAL;  Surgeon: Esau Dooley M.D.;  Location: SURGERY SAME DAY [facility];  Service:    • UMBILICAL HERNIA REPAIR  3/10/2017    Procedure: UMBILICAL HERNIA REPAIR- INCISION AND DRAINAGE OF UMBILICAL WOUND AND MESH REMOVAL;  Surgeon: Esau Dooley M.D.;  Location: SURGERY SAME DAY [facility];  Service:    • UMBILICAL HERNIA REPAIR N/A 11/6/2016    Procedure: UMBILICAL HERNIA REPAIR;  Surgeon: Esau Dooley M.D.;  Location: [facility];  Service:    • COLONOSCOPY WITH BIOPSY  11/26/2014    Performed by Solo Higginbotham M.D. at [facility]  "ORS   • LIVE BY LAPAROSCOPY  2005   • OTHER ORTHOPEDIC SURGERY  1997 or 1998    Left Wrist ORIF       Allergies:  Peanut (diagnostic); Tradjenta [linagliptin]; and Hydrocodone    Social History:  Social History     Social History   • Marital status: Single     Spouse name: N/A   • Number of children: N/A   • Years of education: N/A     Occupational History   • Not on file.     Social History Main Topics   • Smoking status: Never Smoker   • Smokeless tobacco: Never Used   • Alcohol use No   • Drug use: No   • Sexual activity: Not on file     Other Topics Concern   • Not on file     Social History Narrative    ** Merged History Encounter **         ** Merged History Encounter **     ** Merged History Encounter **          Family History:  Family History   Problem Relation Age of Onset   • Cancer Mother        Medications:    Current Outpatient Prescriptions:   •  pioglitazone (ACTOS) 30 MG Tab, Take 1 Tab by mouth every day., Disp: 30 Tab, Rfl: 11  •  Semaglutide (OZEMPIC) 1 MG/DOSE Solution Pen-injector, Inject 1 mg as instructed every 7 days., Disp: 2 PEN, Rfl: 11  •  Empagliflozin-Metformin HCl ER  MG TABLET SR 24 HR, Take 1 tablet by mouth 1 time daily as needed., Disp: 30 Tab, Rfl: 11  •  anastrozole (ARIMIDEX) 1 MG Tab, Take 1 mg by mouth every 7 days., Disp: , Rfl:   •  thyroid (ARMOUR THYROID) 60 MG Tab, Take 60 mg by mouth every morning., Disp: , Rfl:   •  testosterone cypionate (DEPO-TESTOSTERONE) 200 MG/ML Solution injection, 80 mg by Intramuscular route every 7 days., Disp: , Rfl:   •  buPROPion (WELLBUTRIN XL) 300 MG XL tablet, Take 300 mg by mouth every morning., Disp: , Rfl:         Physical Examination:   Vital signs: /74 (BP Location: Right arm, Patient Position: Sitting, BP Cuff Size: Adult)   Pulse (!) 107   Ht 1.702 m (5' 7.01\")   Wt 96.4 kg (212 lb 9.6 oz)   SpO2 95%   BMI 33.29 kg/m²   General: No distress, cooperative, well dressed and well nourished.   Eyes: No scleral " icterus or discharge, No hyposphagma  ENMT: Normal on external inspection of nose, lips, No nasal drainage   Neck: No abnormal masses on inspection  Resp: Normal effort, Bilateral clear to auscultation, No wheezing, No rales  CVS: Regular rate and rhythm, S1 S2 normal, No murmur. No gallop  Extremities: No edema bilateral extremities  Neuro: Alert and oriented  Skin: No rash, No Ulcers  Psych: Normal mood and affect      Assessment and Plan:    1. Uncontrolled type 2 diabetes mellitus with hyperglycemia (HCC)  START:  1.  Tresiba 30 units at night.  (Stopped 1 week ago)  2.  Ozempic 1.0 once a week  3.  Humalog   1:20 above 100  100-120 1 units  121-140 2 units  141-160 3 units  161-180 4 units  181-200 5 units  201-220 6 units  221-240 7 unit  241-260 8 units  261-280 9units  281-300 10units  301-320 11 units  321-340 12 units  341-360 13units  361-380 14unit  381-400 15units  Etc…  4.  Synjardy 25/1000 one in the AM  5.  Actos 15mg one a day (INCREASE to 30)    2. Type 2 diabetes mellitus with hyperglycemia, without long-term current use of insulin (HCC)  Is on a high risk medication Insulin and we will continue to follow   Not on insulin any longer and he states he has a lot more energy    States his eyes are better       3. Essential hypertension  This is stable today and no new changes are needed or required in today's visit    Return in about 3 months (around 6/26/2019).      Thank you kindly for allowing me to participate in the diabetes care plan for this patient.    Edgardo Paulson PA-C, BC-ADM  Board Certified - Advanced Diabetes Management  03/26/19    CC:   KRISSY Brunner

## 2019-05-29 ENCOUNTER — HOSPITAL ENCOUNTER (OUTPATIENT)
Facility: MEDICAL CENTER | Age: 54
End: 2019-05-30
Attending: EMERGENCY MEDICINE | Admitting: HOSPITALIST

## 2019-05-29 ENCOUNTER — APPOINTMENT (OUTPATIENT)
Dept: RADIOLOGY | Facility: MEDICAL CENTER | Age: 54
End: 2019-05-29
Attending: EMERGENCY MEDICINE

## 2019-05-29 PROBLEM — E87.20 METABOLIC ACIDOSIS: Status: ACTIVE | Noted: 2019-05-29

## 2019-05-29 PROBLEM — J06.9 URI (UPPER RESPIRATORY INFECTION): Status: ACTIVE | Noted: 2019-05-29

## 2019-05-29 LAB
ALBUMIN SERPL BCP-MCNC: 4.3 G/DL (ref 3.2–4.9)
ALBUMIN/GLOB SERPL: 1.5 G/DL
ALP SERPL-CCNC: 60 U/L (ref 30–99)
ALT SERPL-CCNC: 30 U/L (ref 2–50)
ANION GAP SERPL CALC-SCNC: 13 MMOL/L (ref 0–11.9)
AST SERPL-CCNC: 25 U/L (ref 12–45)
BASOPHILS # BLD AUTO: 0.3 % (ref 0–1.8)
BASOPHILS # BLD: 0.02 K/UL (ref 0–0.12)
BILIRUB SERPL-MCNC: 0.8 MG/DL (ref 0.1–1.5)
BNP SERPL-MCNC: 47 PG/ML (ref 0–100)
BUN SERPL-MCNC: 22 MG/DL (ref 8–22)
CALCIUM SERPL-MCNC: 9.7 MG/DL (ref 8.4–10.2)
CHLORIDE SERPL-SCNC: 99 MMOL/L (ref 96–112)
CO2 SERPL-SCNC: 20 MMOL/L (ref 20–33)
CREAT SERPL-MCNC: 0.92 MG/DL (ref 0.5–1.4)
D DIMER PPP IA.FEU-MCNC: <0.4 UG/ML (FEU) (ref 0–0.5)
EKG IMPRESSION: NORMAL
EOSINOPHIL # BLD AUTO: 0.03 K/UL (ref 0–0.51)
EOSINOPHIL NFR BLD: 0.4 % (ref 0–6.9)
ERYTHROCYTE [DISTWIDTH] IN BLOOD BY AUTOMATED COUNT: 43.2 FL (ref 35.9–50)
FLUAV RNA SPEC QL NAA+PROBE: NEGATIVE
FLUBV RNA SPEC QL NAA+PROBE: NEGATIVE
GLOBULIN SER CALC-MCNC: 2.9 G/DL (ref 1.9–3.5)
GLUCOSE BLD-MCNC: 223 MG/DL (ref 65–99)
GLUCOSE BLD-MCNC: 393 MG/DL (ref 65–99)
GLUCOSE SERPL-MCNC: 375 MG/DL (ref 65–99)
HCT VFR BLD AUTO: 46.7 % (ref 42–52)
HGB BLD-MCNC: 16.5 G/DL (ref 14–18)
IMM GRANULOCYTES # BLD AUTO: 0.03 K/UL (ref 0–0.11)
IMM GRANULOCYTES NFR BLD AUTO: 0.4 % (ref 0–0.9)
LIPASE SERPL-CCNC: 31 U/L (ref 7–58)
LYMPHOCYTES # BLD AUTO: 1.1 K/UL (ref 1–4.8)
LYMPHOCYTES NFR BLD: 14.4 % (ref 22–41)
MCH RBC QN AUTO: 31.3 PG (ref 27–33)
MCHC RBC AUTO-ENTMCNC: 35.3 G/DL (ref 33.7–35.3)
MCV RBC AUTO: 88.6 FL (ref 81.4–97.8)
MONOCYTES # BLD AUTO: 0.54 K/UL (ref 0–0.85)
MONOCYTES NFR BLD AUTO: 7.1 % (ref 0–13.4)
NEUTROPHILS # BLD AUTO: 5.92 K/UL (ref 1.82–7.42)
NEUTROPHILS NFR BLD: 77.4 % (ref 44–72)
NRBC # BLD AUTO: 0 K/UL
NRBC BLD-RTO: 0 /100 WBC
OSMOLALITY SERPL: 306 MOSM/KG H2O (ref 278–298)
PLATELET # BLD AUTO: 202 K/UL (ref 164–446)
PMV BLD AUTO: 9.4 FL (ref 9–12.9)
POTASSIUM SERPL-SCNC: 4 MMOL/L (ref 3.6–5.5)
PROT SERPL-MCNC: 7.2 G/DL (ref 6–8.2)
RBC # BLD AUTO: 5.27 M/UL (ref 4.7–6.1)
SODIUM SERPL-SCNC: 132 MMOL/L (ref 135–145)
TROPONIN I SERPL-MCNC: <0.02 NG/ML (ref 0–0.04)
WBC # BLD AUTO: 7.6 K/UL (ref 4.8–10.8)

## 2019-05-29 PROCEDURE — 83690 ASSAY OF LIPASE: CPT

## 2019-05-29 PROCEDURE — 93005 ELECTROCARDIOGRAM TRACING: CPT | Performed by: EMERGENCY MEDICINE

## 2019-05-29 PROCEDURE — A9270 NON-COVERED ITEM OR SERVICE: HCPCS | Performed by: HOSPITALIST

## 2019-05-29 PROCEDURE — 85379 FIBRIN DEGRADATION QUANT: CPT

## 2019-05-29 PROCEDURE — 700105 HCHG RX REV CODE 258: Performed by: HOSPITALIST

## 2019-05-29 PROCEDURE — 80053 COMPREHEN METABOLIC PANEL: CPT

## 2019-05-29 PROCEDURE — 94760 N-INVAS EAR/PLS OXIMETRY 1: CPT

## 2019-05-29 PROCEDURE — 700105 HCHG RX REV CODE 258: Performed by: EMERGENCY MEDICINE

## 2019-05-29 PROCEDURE — 700102 HCHG RX REV CODE 250 W/ 637 OVERRIDE(OP): Performed by: HOSPITALIST

## 2019-05-29 PROCEDURE — 700101 HCHG RX REV CODE 250: Performed by: EMERGENCY MEDICINE

## 2019-05-29 PROCEDURE — 84145 PROCALCITONIN (PCT): CPT

## 2019-05-29 PROCEDURE — G0378 HOSPITAL OBSERVATION PER HR: HCPCS

## 2019-05-29 PROCEDURE — 99220 PR INITIAL OBSERVATION CARE,LEVL III: CPT | Performed by: HOSPITALIST

## 2019-05-29 PROCEDURE — 82962 GLUCOSE BLOOD TEST: CPT | Mod: 91

## 2019-05-29 PROCEDURE — 96372 THER/PROPH/DIAG INJ SC/IM: CPT

## 2019-05-29 PROCEDURE — 71045 X-RAY EXAM CHEST 1 VIEW: CPT

## 2019-05-29 PROCEDURE — 83880 ASSAY OF NATRIURETIC PEPTIDE: CPT

## 2019-05-29 PROCEDURE — 99285 EMERGENCY DEPT VISIT HI MDM: CPT

## 2019-05-29 PROCEDURE — 87040 BLOOD CULTURE FOR BACTERIA: CPT

## 2019-05-29 PROCEDURE — 84484 ASSAY OF TROPONIN QUANT: CPT

## 2019-05-29 PROCEDURE — 83930 ASSAY OF BLOOD OSMOLALITY: CPT

## 2019-05-29 PROCEDURE — 36415 COLL VENOUS BLD VENIPUNCTURE: CPT

## 2019-05-29 PROCEDURE — 94640 AIRWAY INHALATION TREATMENT: CPT

## 2019-05-29 PROCEDURE — 700111 HCHG RX REV CODE 636 W/ 250 OVERRIDE (IP): Performed by: EMERGENCY MEDICINE

## 2019-05-29 PROCEDURE — 87502 INFLUENZA DNA AMP PROBE: CPT

## 2019-05-29 PROCEDURE — 85025 COMPLETE CBC W/AUTO DIFF WBC: CPT

## 2019-05-29 PROCEDURE — 96374 THER/PROPH/DIAG INJ IV PUSH: CPT

## 2019-05-29 RX ORDER — SODIUM CHLORIDE, SODIUM LACTATE, POTASSIUM CHLORIDE, CALCIUM CHLORIDE 600; 310; 30; 20 MG/100ML; MG/100ML; MG/100ML; MG/100ML
1000 INJECTION, SOLUTION INTRAVENOUS ONCE
Status: COMPLETED | OUTPATIENT
Start: 2019-05-29 | End: 2019-05-29

## 2019-05-29 RX ORDER — ENALAPRILAT 1.25 MG/ML
1.25 INJECTION INTRAVENOUS EVERY 6 HOURS PRN
Status: DISCONTINUED | OUTPATIENT
Start: 2019-05-29 | End: 2019-05-30 | Stop reason: HOSPADM

## 2019-05-29 RX ORDER — AMOXICILLIN 250 MG
2 CAPSULE ORAL 2 TIMES DAILY
Status: DISCONTINUED | OUTPATIENT
Start: 2019-05-29 | End: 2019-05-30 | Stop reason: HOSPADM

## 2019-05-29 RX ORDER — SODIUM CHLORIDE 9 MG/ML
1000 INJECTION, SOLUTION INTRAVENOUS ONCE
Status: COMPLETED | OUTPATIENT
Start: 2019-05-29 | End: 2019-05-29

## 2019-05-29 RX ORDER — BUPROPION HYDROCHLORIDE 150 MG/1
150 TABLET, EXTENDED RELEASE ORAL 2 TIMES DAILY
Status: DISCONTINUED | OUTPATIENT
Start: 2019-05-29 | End: 2019-05-30 | Stop reason: HOSPADM

## 2019-05-29 RX ORDER — SODIUM CHLORIDE 9 MG/ML
INJECTION, SOLUTION INTRAVENOUS CONTINUOUS
Status: DISCONTINUED | OUTPATIENT
Start: 2019-05-29 | End: 2019-05-30 | Stop reason: HOSPADM

## 2019-05-29 RX ORDER — PIOGLITAZONEHYDROCHLORIDE 15 MG/1
30 TABLET ORAL DAILY
Status: DISCONTINUED | OUTPATIENT
Start: 2019-05-30 | End: 2019-05-30 | Stop reason: HOSPADM

## 2019-05-29 RX ORDER — LORATADINE 10 MG/1
10 TABLET ORAL 2 TIMES DAILY
COMMUNITY
End: 2019-12-02

## 2019-05-29 RX ORDER — FLUTICASONE PROPIONATE 50 MCG
2 SPRAY, SUSPENSION (ML) NASAL 2 TIMES DAILY
COMMUNITY
End: 2019-12-02

## 2019-05-29 RX ORDER — PROMETHAZINE HYDROCHLORIDE 25 MG/1
12.5-25 TABLET ORAL EVERY 4 HOURS PRN
Status: DISCONTINUED | OUTPATIENT
Start: 2019-05-29 | End: 2019-05-30 | Stop reason: HOSPADM

## 2019-05-29 RX ORDER — AZITHROMYCIN 500 MG/1
500 INJECTION, POWDER, LYOPHILIZED, FOR SOLUTION INTRAVENOUS ONCE
Status: COMPLETED | OUTPATIENT
Start: 2019-05-29 | End: 2019-05-29

## 2019-05-29 RX ORDER — ONDANSETRON 2 MG/ML
4 INJECTION INTRAMUSCULAR; INTRAVENOUS EVERY 4 HOURS PRN
Status: DISCONTINUED | OUTPATIENT
Start: 2019-05-29 | End: 2019-05-30 | Stop reason: HOSPADM

## 2019-05-29 RX ORDER — POLYETHYLENE GLYCOL 3350 17 G/17G
1 POWDER, FOR SOLUTION ORAL
Status: DISCONTINUED | OUTPATIENT
Start: 2019-05-29 | End: 2019-05-30 | Stop reason: HOSPADM

## 2019-05-29 RX ORDER — ACETAMINOPHEN 325 MG/1
650 TABLET ORAL EVERY 6 HOURS PRN
Status: DISCONTINUED | OUTPATIENT
Start: 2019-05-29 | End: 2019-05-30 | Stop reason: HOSPADM

## 2019-05-29 RX ORDER — GUAIFENESIN/DEXTROMETHORPHAN 100-10MG/5
10 SYRUP ORAL EVERY 6 HOURS PRN
Status: DISCONTINUED | OUTPATIENT
Start: 2019-05-29 | End: 2019-05-30 | Stop reason: HOSPADM

## 2019-05-29 RX ORDER — IPRATROPIUM BROMIDE AND ALBUTEROL SULFATE 2.5; .5 MG/3ML; MG/3ML
3 SOLUTION RESPIRATORY (INHALATION)
Status: DISCONTINUED | OUTPATIENT
Start: 2019-05-29 | End: 2019-05-30 | Stop reason: HOSPADM

## 2019-05-29 RX ORDER — AZELASTINE 1 MG/ML
1 SPRAY, METERED NASAL EVERY MORNING
COMMUNITY
End: 2019-12-02

## 2019-05-29 RX ORDER — ANASTROZOLE 1 MG/1
1 TABLET ORAL
Status: DISCONTINUED | OUTPATIENT
Start: 2019-05-29 | End: 2019-05-29

## 2019-05-29 RX ORDER — ONDANSETRON 4 MG/1
4 TABLET, ORALLY DISINTEGRATING ORAL EVERY 4 HOURS PRN
Status: DISCONTINUED | OUTPATIENT
Start: 2019-05-29 | End: 2019-05-30 | Stop reason: HOSPADM

## 2019-05-29 RX ORDER — THYROID 60 MG/1
60 TABLET ORAL EVERY MORNING
Status: DISCONTINUED | OUTPATIENT
Start: 2019-05-30 | End: 2019-05-30 | Stop reason: HOSPADM

## 2019-05-29 RX ORDER — PROMETHAZINE HYDROCHLORIDE 25 MG/1
12.5-25 SUPPOSITORY RECTAL EVERY 4 HOURS PRN
Status: DISCONTINUED | OUTPATIENT
Start: 2019-05-29 | End: 2019-05-30 | Stop reason: HOSPADM

## 2019-05-29 RX ORDER — BISACODYL 10 MG
10 SUPPOSITORY, RECTAL RECTAL
Status: DISCONTINUED | OUTPATIENT
Start: 2019-05-29 | End: 2019-05-30 | Stop reason: HOSPADM

## 2019-05-29 RX ADMIN — INSULIN HUMAN 10 UNITS: 100 INJECTION, SUSPENSION SUBCUTANEOUS at 21:55

## 2019-05-29 RX ADMIN — SODIUM CHLORIDE, POTASSIUM CHLORIDE, SODIUM LACTATE AND CALCIUM CHLORIDE 1000 ML: 600; 310; 30; 20 INJECTION, SOLUTION INTRAVENOUS at 18:16

## 2019-05-29 RX ADMIN — SODIUM CHLORIDE 1000 ML: 9 INJECTION, SOLUTION INTRAVENOUS at 15:57

## 2019-05-29 RX ADMIN — INSULIN HUMAN 4 UNITS: 100 INJECTION, SOLUTION PARENTERAL at 21:54

## 2019-05-29 RX ADMIN — INSULIN HUMAN 4 UNITS: 100 INJECTION, SOLUTION PARENTERAL at 18:11

## 2019-05-29 RX ADMIN — SODIUM CHLORIDE: 9 INJECTION, SOLUTION INTRAVENOUS at 21:52

## 2019-05-29 RX ADMIN — AZITHROMYCIN 500 MG: 500 INJECTION, POWDER, LYOPHILIZED, FOR SOLUTION INTRAVENOUS at 15:57

## 2019-05-29 RX ADMIN — IPRATROPIUM BROMIDE AND ALBUTEROL SULFATE 3 ML: .5; 3 SOLUTION RESPIRATORY (INHALATION) at 15:59

## 2019-05-29 RX ADMIN — ACETAMINOPHEN 650 MG: 325 TABLET, FILM COATED ORAL at 21:54

## 2019-05-29 RX ADMIN — GUAIFENESIN AND DEXTROMETHORPHAN 10 ML: 100; 10 SYRUP ORAL at 18:14

## 2019-05-29 ASSESSMENT — COGNITIVE AND FUNCTIONAL STATUS - GENERAL
SUGGESTED CMS G CODE MODIFIER MOBILITY: CH
DAILY ACTIVITIY SCORE: 24
SUGGESTED CMS G CODE MODIFIER DAILY ACTIVITY: CH
MOBILITY SCORE: 24

## 2019-05-29 ASSESSMENT — ENCOUNTER SYMPTOMS
NAUSEA: 0
DEPRESSION: 0
HEADACHES: 0
BACK PAIN: 0
NECK PAIN: 0
DIZZINESS: 0
WHEEZING: 1
VOMITING: 0
FEVER: 0
ABDOMINAL PAIN: 0
SPUTUM PRODUCTION: 1
CHILLS: 1
BLURRED VISION: 0
SHORTNESS OF BREATH: 1
EYE PAIN: 0
INSOMNIA: 0
PALPITATIONS: 0
COUGH: 1
MYALGIAS: 1
TINGLING: 0
SORE THROAT: 0

## 2019-05-29 ASSESSMENT — PATIENT HEALTH QUESTIONNAIRE - PHQ9
SUM OF ALL RESPONSES TO PHQ9 QUESTIONS 1 AND 2: 0
1. LITTLE INTEREST OR PLEASURE IN DOING THINGS: NOT AT ALL
2. FEELING DOWN, DEPRESSED, IRRITABLE, OR HOPELESS: NOT AT ALL
1. LITTLE INTEREST OR PLEASURE IN DOING THINGS: NOT AT ALL
2. FEELING DOWN, DEPRESSED, IRRITABLE, OR HOPELESS: NOT AT ALL
SUM OF ALL RESPONSES TO PHQ9 QUESTIONS 1 AND 2: 0

## 2019-05-29 ASSESSMENT — LIFESTYLE VARIABLES
EVER_SMOKED: NEVER
EVER_SMOKED: NEVER
ALCOHOL_USE: NO

## 2019-05-29 NOTE — ED NOTES
Pt AAOx4 with no complaints of pain. Pt reports mild dyspnea with exertion and SOB, but resolves at rest. No signs of distress or discomfort. Ambulatory with no assistance. [place] in lowest position, locked, and call light within reach.

## 2019-05-29 NOTE — H&P
Hospital Medicine History & Physical Note    Date of Service  5/29/2019    Primary Care Physician  NASIR Brunner.    Consultants  none    Code Status  full    Chief Complaint  Coughing and high blood sugars.     History of Presenting Illness  54 y.o. male who presented 5/29/2019 with a cough, myalgias, malaise and fevers for the last several days. He has nasal congestion and a raw throat. He has a history of brittle diabetes and recurrent DKA when gets sick and has noted his blood sugars are markedly up as well for the last 2 days. His wife had a URI recently. He has no other complaints.     Review of Systems  Review of Systems   Constitutional: Positive for chills and malaise/fatigue. Negative for fever.   HENT: Negative for sore throat.    Eyes: Negative for blurred vision and pain.   Respiratory: Positive for cough, sputum production, shortness of breath and wheezing.    Cardiovascular: Negative for chest pain and palpitations.   Gastrointestinal: Negative for abdominal pain, nausea and vomiting.   Genitourinary: Negative for dysuria and urgency.   Musculoskeletal: Positive for myalgias. Negative for back pain and neck pain.   Skin: Negative for itching and rash.   Neurological: Negative for dizziness, tingling and headaches.   Psychiatric/Behavioral: Negative for depression. The patient does not have insomnia.    All other systems reviewed and are negative.      Past Medical History   has a past medical history of ADRIANE (acute kidney injury) ([facility]) (2/24/2016); Anesthesia; Arthritis (3-3-17); Aspiration pneumonia ([facility]) (3-2016); ASTHMA (3-3-17); Breath shortness (3-3-17); Congestive heart failure ([facility]) (2-2016 ); Dental disorder; Depression (11/26/2014); Diabetes ([facility]) (3-3-17); Diabetes ([facility]); Difficult intubation (2-2016); DKA (diabetic ketoacidoses) ([facility]) (2-2016); Electrolyte imbalance (2-2016); Elevated LFTs; Elevated liver enzymes (2-2016); Essential hypertension (1/22/2016); Gout; Heart burn; High  cholesterol (3-3-17); Hypothyroid (3-3-17); Indigestion; Kidney stones; Klebsiella infect; Migraine; MRSA cellulitis; Necrotizing myositis (HCC); On mechanically assisted ventilation ([facility]) (2-2016); Pain (3-3-17); Pancreatitis (2-2016); RF (renal failure) (2-2016); Sepsis ([facility]) (1/21/2016); Snoring (3-3-17); Streptococcus infection; UC (ulcerative colitis) ([facility]) (3-3-17); Urinary incontinence; and Vitamin D deficiency.    Surgical History   has a past surgical history that includes vaishnavi by laparoscopy (2005); colonoscopy with biopsy (11/26/2014); incision and drainage general (4/7/2017); irrigation & debridement general (Right, 4/29/2017); irrigation & debridement general (Right, 5/1/2017); other orthopedic surgery (1997 or 1998); umbilical hernia repair (N/A, 11/6/2016); umbilical hernia repair (3/10/2017); irrigation & debridement ortho (Left, 12/10/2017); and umbilical hernia repair (N/A, 4/15/2018).     Family History  family history includes Cancer in his mother.     Social History   reports that he has never smoked. He has never used smokeless tobacco. He reports that he does not drink alcohol or use drugs.    Allergies  Allergies   Allergen Reactions   • Peanut (Diagnostic) Anaphylaxis   • Tradjenta [Linagliptin] Unspecified     Pt developed pancreatitis   • Hydrocodone Hives     Tolerates Morphine and oxycodone         Medications  Prior to Admission Medications   Prescriptions Last Dose Informant Patient Reported? Taking?   Empagliflozin-Metformin HCl ER  MG TABLET SR 24 HR   No No   Sig: Take 1 tablet by mouth 1 time daily as needed.   Semaglutide (OZEMPIC) 1 MG/DOSE Solution Pen-injector   No No   Sig: Inject 1 mg as instructed every 7 days.   anastrozole (ARIMIDEX) 1 MG Tab  Patient Yes No   Sig: Take 1 mg by mouth every 7 days.   buPROPion (WELLBUTRIN XL) 300 MG XL tablet  Patient Yes No   Sig: Take 300 mg by mouth every morning.   pioglitazone (ACTOS) 30 MG Tab   No No   Sig: Take 1 Tab by  mouth every day.   testosterone cypionate (DEPO-TESTOSTERONE) 200 MG/ML Solution injection  Patient Yes No   Si mg by Intramuscular route every 7 days.   thyroid (ARMOUR THYROID) 60 MG Tab  Patient Yes No   Sig: Take 60 mg by mouth every morning.      Facility-Administered Medications: None       Physical Exam  Temp:  [37 °C (98.6 °F)] 37 °C (98.6 °F)  Pulse:  [121] 121  Resp:  [17] 17  BP: (171)/(82) 171/82  SpO2:  [91 %-93 %] 93 %    Physical Exam   Constitutional: He is oriented to person, place, and time. He appears well-developed and well-nourished. No distress.   Patient seen and examined  Plan discussed with RN   TEET:   Right Ear: External ear normal.   Left Ear: External ear normal.   Nose: Nose normal.   Eyes: Right eye exhibits no discharge. Left eye exhibits no discharge. No scleral icterus.   Neck: No JVD present. No tracheal deviation present.   Cardiovascular: Normal rate, normal heart sounds and intact distal pulses.    No murmur heard.  Cap refill 2sec  Pulses 2+ throughout     Pulmonary/Chest: Effort normal and breath sounds normal. No respiratory distress. He has no wheezes. He has no rales.   Abdominal: Soft. Bowel sounds are normal. He exhibits no distension. There is no tenderness. There is no guarding.   Musculoskeletal: He exhibits no edema or tenderness.   Neurological: He is alert and oriented to person, place, and time.   Skin: Skin is warm and dry. He is not diaphoretic. No erythema.   Normal skin color   Psychiatric: He has a normal mood and affect. His behavior is normal.   Nursing note and vitals reviewed.      Laboratory:  Recent Labs      19   1310   WBC  7.6   RBC  5.27   HEMOGLOBIN  16.5   HEMATOCRIT  46.7   MCV  88.6   MCH  31.3   MCHC  35.3   RDW  43.2   PLATELETCT  202   MPV  9.4     Recent Labs      19   1310   SODIUM  132*   POTASSIUM  4.0   CHLORIDE  99   CO2  20   GLUCOSE  375*   BUN  22   CREATININE  0.92   CALCIUM  9.7     Recent Labs      19   1310    ALTSGPT  30   ASTSGOT  25   ALKPHOSPHAT  60   TBILIRUBIN  0.8   LIPASE  31   GLUCOSE  375*         Recent Labs      05/29/19   1310   BNPBTYPENAT  47         Recent Labs      05/29/19   1310   TROPONINI  <0.02       Urinalysis:    No results found     Imaging:  DX-CHEST-PORTABLE (1 VIEW)   Final Result      No acute cardiopulmonary findings.            Assessment/Plan:  I anticipate this patient is appropriate for observation status at this time.    * URI (upper respiratory infection)   Assessment & Plan    Appears viral. Check for flu. Check a procalcitonin. No antibiotics for now unless procalcitonin is up. Rt protocol. Breathing treatments. Robitussin.      Hyponatremia- (present on admission)   Assessment & Plan    Iv fluids started. Continue and trend.      Essential hypertension- (present on admission)   Assessment & Plan    Continue meds     Metabolic acidosis   Assessment & Plan    Mild but high risk for worsening. Bolus fluids. Treat uri and keep blood sugars under control.      Uncontrolled type 2 diabetes mellitus with hyperglycemia (HCC)- (present on admission)   Assessment & Plan    Very brittle in the past with recurrent DKA. He is high risk for this and we will transition him to ssi and add nph as well. IV fluids started and will continue.      Hypothyroidism- (present on admission)   Assessment & Plan    Continue meds         VTE prophylaxis: scd

## 2019-05-29 NOTE — ED TRIAGE NOTES
Chief Complaint   Patient presents with   • Shortness of Breath     x 1 week, productive cough with yellow green phlegm.  Pt's wife dx with bronchitis and pneumonia.    • High Blood Sugar     Last BS cheked at home 347 mg/dl.    • Tachycardia

## 2019-05-29 NOTE — ED PROVIDER NOTES
"ED Provider Note    CHIEF COMPLAINT  Chief Complaint   Patient presents with   • Shortness of Breath     x 1 week, productive cough with yellow green phlegm.  Pt's wife dx with bronchitis and pneumonia.    • High Blood Sugar     Last BS cheked at home 347 mg/dl.    • Tachycardia            HPI  Mahesh Bradshaw is a 54 y.o. male who presents for evaluation of cough congestion high blood sugar not feeling well.  The patient has an extensive and complicated medical history as listed below.  This includes refractory and brittle diabetes, ulcerative colitis, asthma.  The patient is a nurse within our facility.  His wife was recently diagnosed with pneumonia and bronchitis and started on antibiotics.  He reports several days of low-grade fever cough shortness of breath and green sputum production.  He specifically denies leg swelling or crushing chest pain.  His blood sugars have been trending upwards.  He denies night sweats or weight loss no nausea vomiting or diarrhea    REVIEW OF SYSTEMS  See HPI for further details.  No night sweats weight loss numbness tingling weakness rash all other systems are negative.     PAST MEDICAL HISTORY  Past Medical History:   Diagnosis Date   • UC (ulcerative colitis) ([facility]) 3-3-17    \"Five BM's per day, takes Lialda\"   • Arthritis 3-3-17    \"Spine\"   • Snoring 3-3-17    Has not had a sleep study   • High cholesterol 3-3-17    Does not currently take medication for   • ASTHMA 3-3-17    \"Hasn't had to use inhaler in 2 years\"   • Breath shortness 3-3-17    \"With Exercise\"   • Hypothyroid 3-3-17    Takes [place] Thyroid   • Pain 3-3-17    \"Left Flank\"   • Diabetes ([facility]) 3-3-17    Takes Insulin   • Aspiration pneumonia ([facility]) 3-2016   • ADRIANE (acute kidney injury) ([facility]) 2/24/2016   • Difficult intubation 2-2016   • Pancreatitis 2-2016    \"D/T Tradjenta was hospitialized for 15 days\"   • Elevated liver enzymes 2-2016   • Electrolyte imbalance 2-2016   • DKA (diabetic ketoacidoses) " "(HCC) 2-2016    \"10 days on vent with DKA, Renal failure, CHF, elevated liver enzymes and electrolyte imbalance\"   • On mechanically assisted ventilation (HCC) 2-2016    HX \"On Vent for 10 days at [facility]\".  \"DX Pancreatitis, DKA, CHF, Elevated Liver Enzymes & Electrolyte Imbalance\".   • Congestive heart failure (HCC) 2-2016     3-3-17 \"Not a current issue\"   • RF (renal failure) 2-2016   • Essential hypertension 1/22/2016   • Sepsis (HCC) 1/21/2016   • Depression 11/26/2014   • Anesthesia     \"Was difficult to intubate 2-2016\"   • Dental disorder    • Diabetes (HCC)    • Elevated LFTs    • Gout    • Heart burn    • Indigestion    • Kidney stones    • Klebsiella infect    • Migraine    • MRSA cellulitis    • Necrotizing myositis (HCC)    • Streptococcus infection    • Urinary incontinence    • Vitamin D deficiency        FAMILY HISTORY  Noncontributory    SOCIAL HISTORY  Social History     Social History   • Marital status: Single     Spouse name: N/A   • Number of children: N/A   • Years of education: N/A     Social History Main Topics   • Smoking status: Never Smoker   • Smokeless tobacco: Never Used   • Alcohol use No   • Drug use: No   • Sexual activity: Not on file     Other Topics Concern   • Not on file     Social History Narrative    ** Merged History Encounter **         ** Merged History Encounter **     ** Merged History Encounter **          SURGICAL HISTORY  Past Surgical History:   Procedure Laterality Date   • UMBILICAL HERNIA REPAIR N/A 4/15/2018    Procedure: UMBILICAL HERNIA REPAIR;  Surgeon: Karen Beasley M.D.;  Location: SURGERY [facility];  Service: General   • IRRIGATION & DEBRIDEMENT ORTHO Left 12/10/2017    Procedure: IRRIGATION & DEBRIDEMENT ORTHO LEFT HAND;  Surgeon: Chicho Ramos M.D.;  Location: SURGERY [facility];  Service: Orthopedics   • IRRIGATION & DEBRIDEMENT GENERAL Right 5/1/2017    Procedure: IRRIGATION & DEBRIDEMENT GENERAL-Left HAND AND right  ANKLE;  " Surgeon: Esau Dooley M.D.;  Location: SURGERY [facility];  Service:    • IRRIGATION & DEBRIDEMENT GENERAL Right 4/29/2017    Procedure: IRRIGATION & DEBRIDEMENT GENERAL;  Surgeon: Esau Dooley M.D.;  Location: SURGERY [facility];  Service:    • INCISION AND DRAINAGE GENERAL  4/7/2017    Procedure: INCISION AND DRAINAGE GENERAL;  Surgeon: Esau Dooley M.D.;  Location: SURGERY SAME DAY [facility];  Service:    • UMBILICAL HERNIA REPAIR  3/10/2017    Procedure: UMBILICAL HERNIA REPAIR- INCISION AND DRAINAGE OF UMBILICAL WOUND AND MESH REMOVAL;  Surgeon: Esau Dooley M.D.;  Location: SURGERY SAME DAY [facility];  Service:    • UMBILICAL HERNIA REPAIR N/A 11/6/2016    Procedure: UMBILICAL HERNIA REPAIR;  Surgeon: Esau Dooley M.D.;  Location: SURGERY [facility];  Service:    • COLONOSCOPY WITH BIOPSY  11/26/2014    Performed by Solo Higginbotham M.D. at ENDOSCOPY [facility]   • LIVE BY LAPAROSCOPY  2005   • OTHER ORTHOPEDIC SURGERY  1997 or 1998    Left Wrist ORIF       CURRENT MEDICATIONS  Home Medications     Reviewed by Katharine Arora R.N. (Registered Nurse) on 05/29/19 at 1236  Med List Status: Not Addressed   Medication Last Dose Status   anastrozole (ARIMIDEX) 1 MG Tab  Active   buPROPion (WELLBUTRIN XL) 300 MG XL tablet  Active   Empagliflozin-Metformin HCl ER  MG TABLET SR 24 HR  Active   pioglitazone (ACTOS) 30 MG Tab  Active   Semaglutide (OZEMPIC) 1 MG/DOSE Solution Pen-injector  Active   testosterone cypionate (DEPO-TESTOSTERONE) 200 MG/ML Solution injection  Active   thyroid (ARMOUR THYROID) 60 MG Tab  Active                ALLERGIES  Allergies   Allergen Reactions   • Peanut (Diagnostic) Anaphylaxis   • Tradjenta [Linagliptin] Unspecified     Pt developed pancreatitis   • Hydrocodone Hives     Tolerates Morphine and oxycodone         PHYSICAL EXAM  VITAL SIGNS: BP (!) 171/82   Pulse (!) 121   Temp 37 °C (98.6 °F) (Temporal)   Resp 17   Ht  "1.727 m (5' 8\")   Wt 105.3 kg (232 lb 2.3 oz)   SpO2 93%   BMI 35.30 kg/m²  Room air O2: 91    Constitutional: Patient appears chronically ill.   HENT: Normocephalic, Atraumatic, Bilateral external ears normal, Oropharynx moist, No oral exudates, Nose normal.   Eyes: PERRLA, EOMI, Conjunctiva normal, No discharge.   Neck: Normal range of motion, No tenderness, Supple, No stridor.   Lymphatic: No lymphadenopathy noted.   Cardiovascular: Tachycardic, Normal rhythm, No murmurs, No rubs, No gallops.   Thorax & Lungs: Bilateral rhonchi no significant wheezing, No chest tenderness.   Abdomen: Bowel sounds normal, Soft, No tenderness, No masses, No pulsatile masses.   Skin: Warm, Dry, No erythema, No rash.   Back: No tenderness, No CVA tenderness.   Extremities: Intact distal pulses, No edema, No tenderness, No cyanosis, No clubbing.   Neurologic: Alert & oriented x 3, Normal motor function, Normal sensory function, No focal deficits noted.   Psychiatric: Anxious      RADIOLOGY/PROCEDURES  DX-CHEST-PORTABLE (1 VIEW)   Final Result      No acute cardiopulmonary findings.        Results for orders placed or performed during the hospital encounter of 05/29/19   CBC WITH DIFFERENTIAL   Result Value Ref Range    WBC 7.6 4.8 - 10.8 K/uL    RBC 5.27 4.70 - 6.10 M/uL    Hemoglobin 16.5 14.0 - 18.0 g/dL    Hematocrit 46.7 42.0 - 52.0 %    MCV 88.6 81.4 - 97.8 fL    MCH 31.3 27.0 - 33.0 pg    MCHC 35.3 33.7 - 35.3 g/dL    RDW 43.2 35.9 - 50.0 fL    Platelet Count 202 164 - 446 K/uL    MPV 9.4 9.0 - 12.9 fL    Neutrophils-Polys 77.40 (H) 44.00 - 72.00 %    Lymphocytes 14.40 (L) 22.00 - 41.00 %    Monocytes 7.10 0.00 - 13.40 %    Eosinophils 0.40 0.00 - 6.90 %    Basophils 0.30 0.00 - 1.80 %    Immature Granulocytes 0.40 0.00 - 0.90 %    Nucleated RBC 0.00 /100 WBC    Neutrophils (Absolute) 5.92 1.82 - 7.42 K/uL    Lymphs (Absolute) 1.10 1.00 - 4.80 K/uL    Monos (Absolute) 0.54 0.00 - 0.85 K/uL    Eos (Absolute) 0.03 0.00 - 0.51 " K/uL    Baso (Absolute) 0.02 0.00 - 0.12 K/uL    Immature Granulocytes (abs) 0.03 0.00 - 0.11 K/uL    NRBC (Absolute) 0.00 K/uL   Comp Metabolic Panel   Result Value Ref Range    Sodium 132 (L) 135 - 145 mmol/L    Potassium 4.0 3.6 - 5.5 mmol/L    Chloride 99 96 - 112 mmol/L    Co2 20 20 - 33 mmol/L    Anion Gap 13.0 (H) 0.0 - 11.9    Glucose 375 (H) 65 - 99 mg/dL    Bun 22 8 - 22 mg/dL    Creatinine 0.92 0.50 - 1.40 mg/dL    Calcium 9.7 8.4 - 10.2 mg/dL    AST(SGOT) 25 12 - 45 U/L    ALT(SGPT) 30 2 - 50 U/L    Alkaline Phosphatase 60 30 - 99 U/L    Total Bilirubin 0.8 0.1 - 1.5 mg/dL    Albumin 4.3 3.2 - 4.9 g/dL    Total Protein 7.2 6.0 - 8.2 g/dL    Globulin 2.9 1.9 - 3.5 g/dL    A-G Ratio 1.5 g/dL   LIPASE   Result Value Ref Range    Lipase 31 7 - 58 U/L   TROPONIN   Result Value Ref Range    Troponin I <0.02 0.00 - 0.04 ng/mL   BTYPE NATRIURETIC PEPTIDE   Result Value Ref Range    B Natriuretic Peptide 47 0 - 100 pg/mL   ESTIMATED GFR   Result Value Ref Range    GFR If African American >60 >60 mL/min/1.73 m 2    GFR If Non African American >60 >60 mL/min/1.73 m 2   D-DIMER   Result Value Ref Range    D-Dimer Screen <0.40 0.00 - 0.50 ug/mL (FEU)   ACCU-CHEK GLUCOSE   Result Value Ref Range    Glucose - Accu-Ck 393 (H) 65 - 99 mg/dL   EKG   Result Value Ref Range    Report       [facility] Emergency Dept.    Test Date:  2019  Pt Name:    DEVIN MO              Department: [facility]  MRN:        [id number]                      [facility]  Gender:     Male                         Technician: [id number]  :        1965                   Requested By:NOELLE CRISOSTOMO  Order #:    [id number]                    Tenisha MD:    Measurements  Intervals                                Axis  Rate:       116                          P:          64  LA:         132                          QRS:        1  QRSD:       82                           T:          20  QT:         316  QTc:         439    Interpretive Statements  SINUS TACHYCARDIA  RSR' IN V1 OR V2, RIGHT VCD OR RVH  BASELINE WANDER IN LEAD(S) V1,V6  Compared to ECG 11/18/2018 23:41:49  Sinus rhythm no longer present        EKG interpretation by me sinus tachycardia rate 116 RSR prime noted no acute ST segment elevation or depression no pathological T wave inversions.  Mild motion artifact no suggestion of ischemia or arrhythmia   cOURSE & MEDICAL DECISION MAKING  Pertinent Labs & Imaging studies reviewed. (See chart for details)  Patient presents here with cough tachycardia borderline hypoxia with some scant mild wheezing.  I know the patient quite well he is a brittle diabetic with some underlying mild lung disease.  There is no focal infiltrate on chest x-rays to suggest pneumonia or sepsis his white blood cell count is normal blood sugars moderately high.  I suspect he likely has bronchitis with reactive airway disease.  Given his age comorbidities and high risk for clinical deterioration I do feel that he should be admitted to the hospital.  I will hold off on steroids due to his severe refractory and brittle diabetes.  He was given IV fluids azithromycin and a breathing treatment and will be admitted to telemetry    FINAL IMPRESSION  1.  Acute bronchitis    Admission      Electronically signed by: Orlando Harper, 5/29/2019 1:00 PM

## 2019-05-29 NOTE — ED NOTES
Med Rec updated and complete per pt at bedside  Allergies have been verified and updated  No oral ABX within the last 30 days  Pt Home [facility]

## 2019-05-30 ENCOUNTER — PATIENT OUTREACH (OUTPATIENT)
Dept: HEALTH INFORMATION MANAGEMENT | Facility: OTHER | Age: 54
End: 2019-05-30

## 2019-05-30 VITALS
WEIGHT: 232.14 LBS | RESPIRATION RATE: 18 BRPM | TEMPERATURE: 98.3 F | BODY MASS INDEX: 35.18 KG/M2 | OXYGEN SATURATION: 95 % | HEIGHT: 68 IN | HEART RATE: 81 BPM | DIASTOLIC BLOOD PRESSURE: 66 MMHG | SYSTOLIC BLOOD PRESSURE: 118 MMHG

## 2019-05-30 LAB
ALBUMIN SERPL BCP-MCNC: 3.4 G/DL (ref 3.2–4.9)
ALBUMIN/GLOB SERPL: 1.4 G/DL
ALP SERPL-CCNC: 53 U/L (ref 30–99)
ALT SERPL-CCNC: 26 U/L (ref 2–50)
ANION GAP SERPL CALC-SCNC: 10 MMOL/L (ref 0–11.9)
AST SERPL-CCNC: 21 U/L (ref 12–45)
BASOPHILS # BLD AUTO: 0.5 % (ref 0–1.8)
BASOPHILS # BLD: 0.02 K/UL (ref 0–0.12)
BILIRUB SERPL-MCNC: 0.8 MG/DL (ref 0.1–1.5)
BUN SERPL-MCNC: 22 MG/DL (ref 8–22)
CALCIUM SERPL-MCNC: 8.5 MG/DL (ref 8.4–10.2)
CHLORIDE SERPL-SCNC: 101 MMOL/L (ref 96–112)
CO2 SERPL-SCNC: 23 MMOL/L (ref 20–33)
CREAT SERPL-MCNC: 0.8 MG/DL (ref 0.5–1.4)
EOSINOPHIL # BLD AUTO: 0.1 K/UL (ref 0–0.51)
EOSINOPHIL NFR BLD: 2.3 % (ref 0–6.9)
ERYTHROCYTE [DISTWIDTH] IN BLOOD BY AUTOMATED COUNT: 44.1 FL (ref 35.9–50)
GLOBULIN SER CALC-MCNC: 2.5 G/DL (ref 1.9–3.5)
GLUCOSE BLD-MCNC: 204 MG/DL (ref 65–99)
GLUCOSE BLD-MCNC: 210 MG/DL (ref 65–99)
GLUCOSE SERPL-MCNC: 212 MG/DL (ref 65–99)
HCT VFR BLD AUTO: 42.3 % (ref 42–52)
HGB BLD-MCNC: 15.1 G/DL (ref 14–18)
IMM GRANULOCYTES # BLD AUTO: 0.04 K/UL (ref 0–0.11)
IMM GRANULOCYTES NFR BLD AUTO: 0.9 % (ref 0–0.9)
LYMPHOCYTES # BLD AUTO: 0.98 K/UL (ref 1–4.8)
LYMPHOCYTES NFR BLD: 22.6 % (ref 22–41)
MCH RBC QN AUTO: 31.9 PG (ref 27–33)
MCHC RBC AUTO-ENTMCNC: 35.7 G/DL (ref 33.7–35.3)
MCV RBC AUTO: 89.2 FL (ref 81.4–97.8)
MONOCYTES # BLD AUTO: 0.53 K/UL (ref 0–0.85)
MONOCYTES NFR BLD AUTO: 12.2 % (ref 0–13.4)
NEUTROPHILS # BLD AUTO: 2.67 K/UL (ref 1.82–7.42)
NEUTROPHILS NFR BLD: 61.5 % (ref 44–72)
NRBC # BLD AUTO: 0 K/UL
NRBC BLD-RTO: 0 /100 WBC
PLATELET # BLD AUTO: 164 K/UL (ref 164–446)
PMV BLD AUTO: 9.4 FL (ref 9–12.9)
POTASSIUM SERPL-SCNC: 3.5 MMOL/L (ref 3.6–5.5)
PROCALCITONIN SERPL-MCNC: 0.09 NG/ML
PROCALCITONIN SERPL-MCNC: <0.05 NG/ML
PROT SERPL-MCNC: 5.9 G/DL (ref 6–8.2)
RBC # BLD AUTO: 4.74 M/UL (ref 4.7–6.1)
SODIUM SERPL-SCNC: 134 MMOL/L (ref 135–145)
WBC # BLD AUTO: 4.3 K/UL (ref 4.8–10.8)

## 2019-05-30 PROCEDURE — 80053 COMPREHEN METABOLIC PANEL: CPT

## 2019-05-30 PROCEDURE — 99217 PR OBSERVATION CARE DISCHARGE: CPT | Performed by: INTERNAL MEDICINE

## 2019-05-30 PROCEDURE — 36415 COLL VENOUS BLD VENIPUNCTURE: CPT

## 2019-05-30 PROCEDURE — 700102 HCHG RX REV CODE 250 W/ 637 OVERRIDE(OP): Performed by: INTERNAL MEDICINE

## 2019-05-30 PROCEDURE — 84145 PROCALCITONIN (PCT): CPT

## 2019-05-30 PROCEDURE — 82962 GLUCOSE BLOOD TEST: CPT

## 2019-05-30 PROCEDURE — 96372 THER/PROPH/DIAG INJ SC/IM: CPT

## 2019-05-30 PROCEDURE — A9270 NON-COVERED ITEM OR SERVICE: HCPCS | Performed by: INTERNAL MEDICINE

## 2019-05-30 PROCEDURE — A9270 NON-COVERED ITEM OR SERVICE: HCPCS | Performed by: HOSPITALIST

## 2019-05-30 PROCEDURE — G0378 HOSPITAL OBSERVATION PER HR: HCPCS

## 2019-05-30 PROCEDURE — 700102 HCHG RX REV CODE 250 W/ 637 OVERRIDE(OP): Performed by: HOSPITALIST

## 2019-05-30 PROCEDURE — 85025 COMPLETE CBC W/AUTO DIFF WBC: CPT

## 2019-05-30 RX ORDER — BENZONATATE 100 MG/1
100 CAPSULE ORAL 3 TIMES DAILY PRN
Qty: 15 CAP | Refills: 0 | Status: SHIPPED | OUTPATIENT
Start: 2019-05-30 | End: 2019-12-02

## 2019-05-30 RX ORDER — BENZONATATE 100 MG/1
100 CAPSULE ORAL 3 TIMES DAILY PRN
Status: DISCONTINUED | OUTPATIENT
Start: 2019-05-30 | End: 2019-05-30 | Stop reason: HOSPADM

## 2019-05-30 RX ADMIN — ACETAMINOPHEN 650 MG: 325 TABLET, FILM COATED ORAL at 05:58

## 2019-05-30 RX ADMIN — GUAIFENESIN AND DEXTROMETHORPHAN 10 ML: 100; 10 SYRUP ORAL at 03:26

## 2019-05-30 RX ADMIN — PIOGLITAZONE 30 MG: 15 TABLET ORAL at 05:59

## 2019-05-30 RX ADMIN — INSULIN HUMAN 4 UNITS: 100 INJECTION, SOLUTION PARENTERAL at 06:37

## 2019-05-30 RX ADMIN — BENZONATATE 100 MG: 100 CAPSULE ORAL at 03:26

## 2019-05-30 RX ADMIN — BENZONATATE 100 MG: 100 CAPSULE ORAL at 08:55

## 2019-05-30 RX ADMIN — INSULIN HUMAN 10 UNITS: 100 INJECTION, SUSPENSION SUBCUTANEOUS at 06:37

## 2019-05-30 RX ADMIN — GUAIFENESIN AND DEXTROMETHORPHAN 10 ML: 100; 10 SYRUP ORAL at 09:41

## 2019-05-30 RX ADMIN — LEVOTHYROXINE, LIOTHYRONINE 60 MG: 38; 9 TABLET ORAL at 05:59

## 2019-05-30 ASSESSMENT — PATIENT HEALTH QUESTIONNAIRE - PHQ9
SUM OF ALL RESPONSES TO PHQ9 QUESTIONS 1 AND 2: 0
2. FEELING DOWN, DEPRESSED, IRRITABLE, OR HOPELESS: NOT AT ALL
1. LITTLE INTEREST OR PLEASURE IN DOING THINGS: NOT AT ALL

## 2019-05-30 NOTE — DISCHARGE SUMMARY
Discharge Summary    CHIEF COMPLAINT ON ADMISSION  Chief Complaint   Patient presents with   • Shortness of Breath     x 1 week, productive cough with yellow green phlegm.  Pt's wife dx with bronchitis and pneumonia.    • High Blood Sugar     Last BS cheked at home 347 mg/dl.    • Tachycardia            Reason for Admission  Shortness of breath     Admission Date  5/29/2019    CODE STATUS  Full Code    HPI & HOSPITAL COURSE  This is a 54 y.o. male here with shortness of breath, hyperglycemia and tachycardia.    Patient was found to have an acute viral upper respiratory tract infection/bronchitis leading to hyperglycemia secondary to stress-induced hypercortisolism likely.  He was admitted to the hospital, received IV fluid hydration with complete resolution of his shortness of breath, tachycardia at this time.  In addition, he did not have any evidence of leukocytosis, he overall remained afebrile and his d-dimer was negative which was consistent with an underlying viral etiology of his presentation.  Procalcitonin was also found to be negative.  He was admitted to the hospital, received IV fluid hydration with improvement in hyperglycemia.  EKG on presentation without any concerns for any ischemic changes or infarction.  Chest x-ray negative for any other acute concerns.    Upon evaluation on May 30, 2019, patient feels at his baseline and presenting symptoms have completely resolved but he is eager to be discharged home at this time.    At this time patient is advised ongoing aggressive hydration at home following discharge.  He is advised close outpatient follow-up with his PCP.  Advised to present to the emergency department for any recurrent symptoms, fevers at home or worsening condition.    Schedulers informed to arrange outpatient follow-up with PCP.    Presentation not consistent with venous thromboembolic disease, aortic pathologies or acute coronary syndrome, patient is asymptomatic and no further  evaluation for this is indicated at this time.    Therefore, he is discharged in good and stable condition to home with close outpatient follow-up.    Discharge Date  05/30/19    FOLLOW UP ITEMS POST DISCHARGE  F/U PCP     DISCHARGE DIAGNOSES  Principal Problem:    URI (upper respiratory infection) POA: Unknown  Active Problems:    Essential hypertension POA: Yes    Hyponatremia POA: Yes    Hypothyroidism (Chronic) POA: Yes      Overview: Chronic condition treated with [place] Thyroid.      Resumed maintenance medication.    Uncontrolled type 2 diabetes mellitus with hyperglycemia (HCC) POA: Yes    Metabolic acidosis POA: Unknown  Resolved Problems:    * No resolved hospital problems. *      FOLLOW UP  Future Appointments  Date Time Provider Department Center   6/27/2019 8:00 AM JENNIFFER Lindsey     No follow-up provider specified.    MEDICATIONS ON DISCHARGE     Medication List      START taking these medications      Instructions   benzonatate 100 MG Caps  Commonly known as:  TESSALON   Take 1 Cap by mouth 3 times a day as needed for Cough.  Dose:  100 mg        CONTINUE taking these medications      Instructions   azelastine 137 MCG/SPRAY nasal spray  Commonly known as:  ASTELIN   Spray 1 Spray in nose every morning.  Dose:  1 Spray     CINNAMON PO   Take 1 Tab by mouth 2 Times a Day.  Dose:  1 Tab     Empagliflozin-Metformin HCl ER  MG Tb24   Take 1 tablet by mouth 1 time daily as needed.  Dose:  1 tablet     fluticasone 50 MCG/ACT nasal spray  Commonly known as:  FLONASE   Spray 2 Sprays in nose 2 Times a Day.  Dose:  2 Spray     loratadine 10 MG Tabs  Commonly known as:  CLARITIN   Take 10 mg by mouth 2 Times a Day.  Dose:  10 mg     NON SPECIFIED   Take 1 Tab by mouth every morning. [employer] SHERRI MAN VITAMIN  Dose:  1 Tab     pioglitazone 30 MG Tabs  Commonly known as:  ACTOS   Take 1 Tab by mouth every day.  Dose:  30 mg     Semaglutide 1 MG/DOSE Sopn  Commonly known as:  OZEMPIC    Inject 1 mg as instructed every 7 days.  Dose:  1 mg     thyroid 60 MG Tabs  Commonly known as:  ARMOUR THYROID   Take 60 mg by mouth every morning.  Dose:  60 mg     TURMERIC PO   Take 1 Tab by mouth 2 Times a Day.  Dose:  1 Tab            Allergies  Allergies   Allergen Reactions   • Hydrocodone Hives     Tolerates Morphine and oxycodone     • Peanut (Diagnostic) Anaphylaxis   • Tradjenta [Linagliptin] Unspecified     Pt developed pancreatitis       DIET  Orders Placed This Encounter   Procedures   • Diet Order Consistent Carbohydrate     Standing Status:   Standing     Number of Occurrences:   1     Order Specific Question:   Diet:     Answer:   Consistent Carbohydrate [4]       ACTIVITY  As tolerated.  Weight bearing as tolerated    CONSULTATIONS  None    PROCEDURES  None    LABORATORY  Lab Results   Component Value Date    SODIUM 134 (L) 05/30/2019    POTASSIUM 3.5 (L) 05/30/2019    CHLORIDE 101 05/30/2019    CO2 23 05/30/2019    GLUCOSE 212 (H) 05/30/2019    BUN 22 05/30/2019    CREATININE 0.80 05/30/2019    CREATININE 1.1 02/18/2008        Lab Results   Component Value Date    WBC 4.3 (L) 05/30/2019    HEMOGLOBIN 15.1 05/30/2019    HEMATOCRIT 42.3 05/30/2019    PLATELETCT 164 05/30/2019

## 2019-05-30 NOTE — PROGRESS NOTES
Pt arrived to unit via gurney. Ambulated from gurney to bed, SBA assist. Tele monitor applied, vitals taken. Pt assessed. A&O x4. Admit profile and med rec complete. Discussed POC with pt, including medications. Welcome folder provided and discussed. Communication board filled out. Questions and concerns addressed, verbalized understanding. Fall precautions in place. Pt demonstrates ability to use call light appropriately. Pt left in lowest position.

## 2019-05-30 NOTE — PROGRESS NOTES
"RE: PATIENT REQUESTS \"SOMETHING STRONGER FOR COUGH\"  HOSPITALIST ELROY PAGED: AWAITING CALL BACK  "

## 2019-05-30 NOTE — PROGRESS NOTES
Report received from NOC RN, assumed care of pt. POC and medications reviewed with pt. Pt verbalized understanding. Pt stating still pain while coughing but feels much better than yesterday.

## 2019-05-30 NOTE — RESPIRATORY CARE
COPD EDUCATION by COPD CLINICAL EDUCATOR  5/30/2019 at 7:52 AM by Hoda Negron     Patient reviewed by COPD education team. Patient does not have a history or diagnosis of COPD and is a non-smoker, therefore does not qualify for the COPD program.

## 2019-05-30 NOTE — ASSESSMENT & PLAN NOTE
Appears viral. Check for flu. Check a procalcitonin. No antibiotics for now unless procalcitonin is up. Rt protocol. Breathing treatments. Robitussin.

## 2019-05-30 NOTE — DISCHARGE INSTRUCTIONS
Discharge Instructions    Discharged to home by car with relative. Discharged via wheelchair, hospital escort: Yes.  Special equipment needed: Not Applicable    Be sure to schedule a follow-up appointment with your primary care doctor or any specialists as instructed.     Discharge Plan:   Diet Plan: Discussed  Activity Level: Discussed  Confirmed Follow up Appointment: Patient to Call and Schedule Appointment  Confirmed Symptoms Management: Discussed  Medication Reconciliation Updated: Yes  Pneumococcal Vaccine Administered/Refused: Not given - Patient refused pneumococcal vaccine  Influenza Vaccine Indication: Not indicated: Previously immunized this influenza season and > 8 years of age    I understand that a diet low in cholesterol, fat, and sodium is recommended for good health. Unless I have been given specific instructions below for another diet, I accept this instruction as my diet prescription.   Other diet: regular    Special Instructions: None    · Is patient discharged on Warfarin / Coumadin?   No     Depression / Suicide Risk    As you are discharged from this [facility] facility, it is important to learn how to keep safe from harming yourself.    Recognize the warning signs:  · Abrupt changes in personality, positive or negative- including increase in energy   · Giving away possessions  · Change in eating patterns- significant weight changes-  positive or negative  · Change in sleeping patterns- unable to sleep or sleeping all the time   · Unwillingness or inability to communicate  · Depression  · Unusual sadness, discouragement and loneliness  · Talk of wanting to die  · Neglect of personal appearance   · Rebelliousness- reckless behavior  · Withdrawal from people/activities they love  · Confusion- inability to concentrate     If you or a loved one observes any of these behaviors or has concerns about self-harm, here's what you can do:  · Talk about it- your feelings and reasons for harming  yourself  · Remove any means that you might use to hurt yourself (examples: pills, rope, extension cords, firearm)  · Get professional help from the community (Mental Health, Substance Abuse, psychological counseling)  · Do not be alone:Call your Safe Contact- someone whom you trust who will be there for you.  · Call your local CRISIS HOTLINE 989-6108 or 352-182-6812  · Call your local Children's Mobile Crisis Response Team [place] (230) 864-2691 or www.[employer]  · Call the toll free National Suicide Prevention Hotlines   · National Suicide Prevention Lifeline 535-785-HCMU (8548)  · National Hope Line Network 800-SUICIDE (766-3068)

## 2019-05-30 NOTE — PROGRESS NOTES
Pt discharged per MD orders. Pt meeting discharge criteria. VSS. IV removed. Discharge instructions reviewed with pt. Pt verbalized understanding.

## 2019-05-30 NOTE — ASSESSMENT & PLAN NOTE
Very brittle in the past with recurrent DKA. He is high risk for this and we will transition him to ssi and add nph as well. IV fluids started and will continue.

## 2019-06-03 LAB
BACTERIA BLD CULT: NORMAL
BACTERIA BLD CULT: NORMAL
SIGNIFICANT IND 70042: NORMAL
SIGNIFICANT IND 70042: NORMAL
SITE SITE: NORMAL
SITE SITE: NORMAL
SOURCE SOURCE: NORMAL
SOURCE SOURCE: NORMAL

## 2019-07-16 NOTE — DISCHARGE PLANNING
Spoke with Elizabeth Solis who suggests verifying with medical records that BRYON is on file then printing encounter and sending directly to HIM to release or to have pt contact PCP for them to provide letter of visits.    Generic message left for pt to return my call.

## 2019-07-16 NOTE — ADDENDUM NOTE
Encounter addended by: Sandrine Dias R.N. on: 7/16/2019  3:30 PM<BR>    Actions taken: Sign clinical note

## 2019-07-16 NOTE — DISCHARGE PLANNING
Received call from pt stating he is at medical records getting a copy of his records but they are unable to print a copy of his 'Encounters' and he is asking if I am able to assist. Pt phone number obtained ([phone]) and will call him back once I have spoken with leadership.

## 2019-07-17 NOTE — ADDENDUM NOTE
Encounter addended by: Sandrine Dias R.N. on: 7/16/2019  5:40 PM<BR>    Actions taken: Sign clinical note

## 2019-10-15 NOTE — ED NOTES
Break RN: Urine sent to lab.     Writer spoke to Julissa to review the results and recommendations from PCP. Julissa confirmed she already has an appointment with Dr. Monroy for 10/30/2019 for the continued care and evaluation from nephrologist.     Julissa was appreciative of the phone call and denied any questions or concerns at this time.

## 2019-12-02 ENCOUNTER — HOSPITAL ENCOUNTER (EMERGENCY)
Facility: MEDICAL CENTER | Age: 54
End: 2019-12-02
Attending: EMERGENCY MEDICINE
Payer: COMMERCIAL

## 2019-12-02 VITALS
WEIGHT: 209.44 LBS | RESPIRATION RATE: 18 BRPM | DIASTOLIC BLOOD PRESSURE: 63 MMHG | OXYGEN SATURATION: 95 % | SYSTOLIC BLOOD PRESSURE: 109 MMHG | TEMPERATURE: 97.6 F | HEIGHT: 68 IN | BODY MASS INDEX: 31.74 KG/M2 | HEART RATE: 82 BPM

## 2019-12-02 DIAGNOSIS — H60.502 ACUTE OTITIS EXTERNA OF LEFT EAR, UNSPECIFIED TYPE: ICD-10-CM

## 2019-12-02 DIAGNOSIS — H65.93 MIDDLE EAR EFFUSION, BILATERAL: ICD-10-CM

## 2019-12-02 DIAGNOSIS — R73.9 HYPERGLYCEMIA: ICD-10-CM

## 2019-12-02 LAB
ALBUMIN SERPL BCP-MCNC: 4.1 G/DL (ref 3.2–4.9)
ALBUMIN/GLOB SERPL: 1.3 G/DL
ALP SERPL-CCNC: 105 U/L (ref 30–99)
ALT SERPL-CCNC: 24 U/L (ref 2–50)
ANION GAP SERPL CALC-SCNC: 17 MMOL/L (ref 0–11.9)
ANION GAP SERPL CALC-SCNC: 21 MMOL/L (ref 0–11.9)
AST SERPL-CCNC: 15 U/L (ref 12–45)
BASOPHILS # BLD AUTO: 0.4 % (ref 0–1.8)
BASOPHILS # BLD: 0.03 K/UL (ref 0–0.12)
BILIRUB SERPL-MCNC: 0.2 MG/DL (ref 0.1–1.5)
BUN SERPL-MCNC: 16 MG/DL (ref 8–22)
BUN SERPL-MCNC: 17 MG/DL (ref 8–22)
CALCIUM SERPL-MCNC: 8.7 MG/DL (ref 8.4–10.2)
CALCIUM SERPL-MCNC: 9.4 MG/DL (ref 8.4–10.2)
CHLORIDE SERPL-SCNC: 89 MMOL/L (ref 96–112)
CHLORIDE SERPL-SCNC: 98 MMOL/L (ref 96–112)
CO2 SERPL-SCNC: 18 MMOL/L (ref 20–33)
CO2 SERPL-SCNC: 21 MMOL/L (ref 20–33)
CREAT SERPL-MCNC: 0.83 MG/DL (ref 0.5–1.4)
CREAT SERPL-MCNC: 0.94 MG/DL (ref 0.5–1.4)
EOSINOPHIL # BLD AUTO: 0.03 K/UL (ref 0–0.51)
EOSINOPHIL NFR BLD: 0.4 % (ref 0–6.9)
ERYTHROCYTE [DISTWIDTH] IN BLOOD BY AUTOMATED COUNT: 37.3 FL (ref 35.9–50)
FLUAV RNA SPEC QL NAA+PROBE: NEGATIVE
FLUBV RNA SPEC QL NAA+PROBE: NEGATIVE
GLOBULIN SER CALC-MCNC: 3.1 G/DL (ref 1.9–3.5)
GLUCOSE BLD-MCNC: >600 MG/DL (ref 65–99)
GLUCOSE SERPL-MCNC: 506 MG/DL (ref 65–99)
GLUCOSE SERPL-MCNC: 781 MG/DL (ref 65–99)
HCT VFR BLD AUTO: 42.2 % (ref 42–52)
HGB BLD-MCNC: 15.6 G/DL (ref 14–18)
IMM GRANULOCYTES # BLD AUTO: 0.04 K/UL (ref 0–0.11)
IMM GRANULOCYTES NFR BLD AUTO: 0.6 % (ref 0–0.9)
LIPASE SERPL-CCNC: 27 U/L (ref 7–58)
LYMPHOCYTES # BLD AUTO: 1.39 K/UL (ref 1–4.8)
LYMPHOCYTES NFR BLD: 19.8 % (ref 22–41)
MCH RBC QN AUTO: 32.4 PG (ref 27–33)
MCHC RBC AUTO-ENTMCNC: 37 G/DL (ref 33.7–35.3)
MCV RBC AUTO: 87.6 FL (ref 81.4–97.8)
MONOCYTES # BLD AUTO: 0.22 K/UL (ref 0–0.85)
MONOCYTES NFR BLD AUTO: 3.1 % (ref 0–13.4)
NEUTROPHILS # BLD AUTO: 5.32 K/UL (ref 1.82–7.42)
NEUTROPHILS NFR BLD: 75.7 % (ref 44–72)
NRBC # BLD AUTO: 0 K/UL
NRBC BLD-RTO: 0 /100 WBC
PLATELET # BLD AUTO: 214 K/UL (ref 164–446)
PMV BLD AUTO: 10 FL (ref 9–12.9)
POTASSIUM SERPL-SCNC: 3.9 MMOL/L (ref 3.6–5.5)
POTASSIUM SERPL-SCNC: 4.4 MMOL/L (ref 3.6–5.5)
PROT SERPL-MCNC: 7.2 G/DL (ref 6–8.2)
RBC # BLD AUTO: 4.82 M/UL (ref 4.7–6.1)
SODIUM SERPL-SCNC: 128 MMOL/L (ref 135–145)
SODIUM SERPL-SCNC: 136 MMOL/L (ref 135–145)
WBC # BLD AUTO: 7 K/UL (ref 4.8–10.8)

## 2019-12-02 PROCEDURE — A9270 NON-COVERED ITEM OR SERVICE: HCPCS | Performed by: EMERGENCY MEDICINE

## 2019-12-02 PROCEDURE — 96375 TX/PRO/DX INJ NEW DRUG ADDON: CPT

## 2019-12-02 PROCEDURE — 700111 HCHG RX REV CODE 636 W/ 250 OVERRIDE (IP): Performed by: EMERGENCY MEDICINE

## 2019-12-02 PROCEDURE — 99284 EMERGENCY DEPT VISIT MOD MDM: CPT

## 2019-12-02 PROCEDURE — 96374 THER/PROPH/DIAG INJ IV PUSH: CPT

## 2019-12-02 PROCEDURE — 700102 HCHG RX REV CODE 250 W/ 637 OVERRIDE(OP): Performed by: EMERGENCY MEDICINE

## 2019-12-02 PROCEDURE — 700101 HCHG RX REV CODE 250: Performed by: EMERGENCY MEDICINE

## 2019-12-02 PROCEDURE — 87502 INFLUENZA DNA AMP PROBE: CPT

## 2019-12-02 PROCEDURE — 80048 BASIC METABOLIC PNL TOTAL CA: CPT

## 2019-12-02 PROCEDURE — 700105 HCHG RX REV CODE 258: Performed by: EMERGENCY MEDICINE

## 2019-12-02 PROCEDURE — 83690 ASSAY OF LIPASE: CPT

## 2019-12-02 PROCEDURE — 85025 COMPLETE CBC W/AUTO DIFF WBC: CPT

## 2019-12-02 PROCEDURE — 82962 GLUCOSE BLOOD TEST: CPT

## 2019-12-02 PROCEDURE — 700112 HCHG RX REV CODE 229: Performed by: EMERGENCY MEDICINE

## 2019-12-02 PROCEDURE — 69209 REMOVE IMPACTED EAR WAX UNI: CPT

## 2019-12-02 PROCEDURE — 80053 COMPREHEN METABOLIC PANEL: CPT

## 2019-12-02 RX ORDER — DOCUSATE SODIUM 50 MG/5ML
100 LIQUID ORAL ONCE
Status: COMPLETED | OUTPATIENT
Start: 2019-12-02 | End: 2019-12-02

## 2019-12-02 RX ORDER — SODIUM CHLORIDE 9 MG/ML
2000 INJECTION, SOLUTION INTRAVENOUS CONTINUOUS
Status: ACTIVE | OUTPATIENT
Start: 2019-12-02 | End: 2019-12-02

## 2019-12-02 RX ORDER — HYDROMORPHONE HYDROCHLORIDE 1 MG/ML
1 INJECTION, SOLUTION INTRAMUSCULAR; INTRAVENOUS; SUBCUTANEOUS ONCE
Status: COMPLETED | OUTPATIENT
Start: 2019-12-02 | End: 2019-12-02

## 2019-12-02 RX ORDER — CIPROFLOXACIN AND DEXAMETHASONE 3; 1 MG/ML; MG/ML
4 SUSPENSION/ DROPS AURICULAR (OTIC) ONCE
Status: COMPLETED | OUTPATIENT
Start: 2019-12-02 | End: 2019-12-02

## 2019-12-02 RX ORDER — CIPROFLOXACIN AND DEXAMETHASONE 3; 1 MG/ML; MG/ML
4 SUSPENSION/ DROPS AURICULAR (OTIC) 2 TIMES DAILY
Qty: 1 BOTTLE | Refills: 0 | Status: SHIPPED | OUTPATIENT
Start: 2019-12-02 | End: 2019-12-09

## 2019-12-02 RX ORDER — ONDANSETRON 2 MG/ML
4 INJECTION INTRAMUSCULAR; INTRAVENOUS ONCE
Status: COMPLETED | OUTPATIENT
Start: 2019-12-02 | End: 2019-12-02

## 2019-12-02 RX ORDER — CIPROFLOXACIN AND DEXAMETHASONE 3; 1 MG/ML; MG/ML
SUSPENSION/ DROPS AURICULAR (OTIC)
Status: DISCONTINUED
Start: 2019-12-02 | End: 2019-12-02 | Stop reason: HOSPADM

## 2019-12-02 RX ORDER — KETOROLAC TROMETHAMINE 30 MG/ML
15 INJECTION, SOLUTION INTRAMUSCULAR; INTRAVENOUS ONCE
Status: COMPLETED | OUTPATIENT
Start: 2019-12-02 | End: 2019-12-02

## 2019-12-02 RX ADMIN — KETOROLAC TROMETHAMINE 15 MG: 30 INJECTION, SOLUTION INTRAMUSCULAR at 18:42

## 2019-12-02 RX ADMIN — HYDROMORPHONE HYDROCHLORIDE 1 MG: 1 INJECTION, SOLUTION INTRAMUSCULAR; INTRAVENOUS; SUBCUTANEOUS at 17:01

## 2019-12-02 RX ADMIN — CIPROFLOXACIN AND DEXAMETHASONE 4 DROP: 3; 1 SUSPENSION/ DROPS AURICULAR (OTIC) at 21:16

## 2019-12-02 RX ADMIN — ONDANSETRON 4 MG: 2 INJECTION INTRAMUSCULAR; INTRAVENOUS at 17:01

## 2019-12-02 RX ADMIN — DOCUSATE SODIUM 100 MG: 50 LIQUID ORAL at 18:43

## 2019-12-02 RX ADMIN — SODIUM CHLORIDE 2000 ML: 9 INJECTION, SOLUTION INTRAVENOUS at 17:00

## 2019-12-03 NOTE — ED NOTES
from Lab called with critical result of glucose 506 at 2008. Critical lab result read back to .   Dr. king notified of critical lab result at 2009.  Critical lab result read back by Dr. king.

## 2019-12-03 NOTE — ED NOTES
Pt wanting to wait on receiving insulin sub q. Awaiting results of CMP prior to giving insulin IV per ERP.

## 2019-12-03 NOTE — ED NOTES
IVF open to gravity and placed on IV pole to facilitate flow. Pt worried about IV infiltrating as he is a very difficult stick and refusing fluids placed on pump. ERP aware. IV fluids infusing well without difficulty.

## 2019-12-03 NOTE — DISCHARGE INSTRUCTIONS
You were seen in the emergency department for drainage from your ear.  This most likely represents an infection.  You are being sent home with antibiotic drops to treat this.  Please take these as prescribed.  It is important to follow-up with your doctor within the next few days to ensure that you are healing well     You were also noted to have elevated blood glucose.  Please continue to monitor sugars closely and follow-up with your regular doctor.    You were found to have middle ear effusions on both sides.  Please take Flonase for 1 week to help reduce inflammation of your eustachian tubes.    Please return to the emergency department to seek medical attention if you develop fevers, worsening pain, pain spreading around your ear, redness spreading around your ear, or other concerning findings.

## 2019-12-03 NOTE — ED NOTES
Pt medicated as ordered. Pt was retirement through ear irrigation when he requested break d/t pain. About to begin ear irrigation again and pt asking for more pain medication first. Will notify ERP.

## 2019-12-03 NOTE — ED NOTES
Med rec updated and complete  Allergies reviewed  Pt reports that he finished his AUGMENTIN 875MG about 8 days ago.

## 2019-12-03 NOTE — ED PROVIDER NOTES
"ED Provider Note      Means of Arrival: Private vehicle  History obtained from: Patient      CHIEF COMPLAINT  Chief Complaint   Patient presents with   • Earache     started 2 weeks ago. placed on antibiotics for L ear ache. pt completed antibiotics. pt continues to have earache. pt had left dental extration a week before the earche. pt continues to have pain to his L ear.   • High Blood Sugar     pt c/o high blood sugar which read 'HI\" at lunch time. pt gave himself insulin but remained high.        HPI  Mahesh Bradshaw is a 54 y.o. male who presents with earaches for the past 2 weeks and left ear.  He was treated with antibiotics, however this has not improved his symptoms.  He also has been noticing elevated blood sugar.  The patient was initially seen by my colleague, Dr. Sanchez, who signed the patient out to me prior to the results of the blood work.    On my history, the patient reports 1 week of left-sided earache.  He has taken antibiotics for this with no relief.  He reports it is worse with movement of the auricle and pinna.  He also reports that his blood sugars have been slowly increasing over the past few days.  He denies any fevers, trismus, vomiting.  He reports some abdominal discomfort, which is common when his blood sugars get elevated.    REVIEW OF SYSTEMS  CONSTITUTIONAL:  No fever.  CARDIOVASCULAR:  No chest discomfort.  RESPIRATORY:  No pleuritic chest pain.  GASTROINTESTINAL:   See HPI  GENITOURINARY:   No dysuria.  MUSCULOSKELETAL:  No arthralgia.  SKIN:  No rash or suspicious lesions.  NEUROLOGIC:   No headache.  ENDOCRINE:  No facial edema.  HEMATOLOGIC:  No abnormal bleeding.       See HPI for further details.   All other systems are negative.     PAST MEDICAL HISTORY  Past Medical History:   Diagnosis Date   • ADRIANE (acute kidney injury) (HCC) 2/24/2016   • Anesthesia     \"Was difficult to intubate 2-2016\"   • Arthritis 3-3-17    \"Spine\"   • Aspiration pneumonia (HCC) 3-2016   • " "ASTHMA 3-3-17    \"Hasn't had to use inhaler in 2 years\"   • Breath shortness 3-3-17    \"With Exercise\"   • Congestive heart failure ([facility]) 2-2016     3-3-17 \"Not a current issue\"   • Dental disorder    • Depression 11/26/2014   • Diabetes ([facility]) 3-3-17    Takes Insulin   • Diabetes ([facility])    • Difficult intubation 2-2016   • DKA (diabetic ketoacidoses) ([facility]) 2-2016    \"10 days on vent with DKA, Renal failure, CHF, elevated liver enzymes and electrolyte imbalance\"   • Electrolyte imbalance 2-2016   • Elevated LFTs    • Elevated liver enzymes 2-2016   • Essential hypertension 1/22/2016   • Gout    • Heart burn    • High cholesterol 3-3-17    Does not currently take medication for   • Hypothyroid 3-3-17    Takes [place] Thyroid   • Indigestion    • Kidney stones    • Klebsiella infect    • Migraine    • MRSA cellulitis    • Necrotizing myositis ([facility])    • On mechanically assisted ventilation ([facility]) 2-2016    HX \"On Vent for 10 days at [facility]\".  \"DX Pancreatitis, DKA, CHF, Elevated Liver Enzymes & Electrolyte Imbalance\".   • Pain 3-3-17    \"Left Flank\"   • Pancreatitis 2-2016    \"D/T Tradjenta was hospitialized for 15 days\"   • RF (renal failure) 2-2016   • Sepsis ([facility]) 1/21/2016   • Snoring 3-3-17    Has not had a sleep study   • Streptococcus infection    • UC (ulcerative colitis) ([facility]) 3-3-17    \"Five BM's per day, takes Lialda\"   • Urinary incontinence    • Vitamin D deficiency        FAMILY HISTORY  Family History   Problem Relation Age of Onset   • Cancer Mother        SOCIAL HISTORY   reports that he has never smoked. He has never used smokeless tobacco. He reports that he does not drink alcohol or use drugs.    SURGICAL HISTORY  Past Surgical History:   Procedure Laterality Date   • UMBILICAL HERNIA REPAIR N/A 4/15/2018    Procedure: UMBILICAL HERNIA REPAIR;  Surgeon: Karen Beasley M.D.;  Location: SURGERY [facility];  Service: General   • IRRIGATION & DEBRIDEMENT ORTHO Left 12/10/2017    Procedure: IRRIGATION " & DEBRIDEMENT ORTHO LEFT HAND;  Surgeon: Chicho Ramos M.D.;  Location: SURGERY [facility];  Service: Orthopedics   • IRRIGATION & DEBRIDEMENT GENERAL Right 5/1/2017    Procedure: IRRIGATION & DEBRIDEMENT GENERAL-Left HAND AND right  ANKLE;  Surgeon: Esau Dooley M.D.;  Location: SURGERY [facility];  Service:    • IRRIGATION & DEBRIDEMENT GENERAL Right 4/29/2017    Procedure: IRRIGATION & DEBRIDEMENT GENERAL;  Surgeon: Esau Dooley M.D.;  Location: SURGERY [facility];  Service:    • INCISION AND DRAINAGE GENERAL  4/7/2017    Procedure: INCISION AND DRAINAGE GENERAL;  Surgeon: Esau Dooley M.D.;  Location: SURGERY SAME DAY [facility];  Service:    • UMBILICAL HERNIA REPAIR  3/10/2017    Procedure: UMBILICAL HERNIA REPAIR- INCISION AND DRAINAGE OF UMBILICAL WOUND AND MESH REMOVAL;  Surgeon: Esau Dooley M.D.;  Location: SURGERY SAME DAY [facility];  Service:    • UMBILICAL HERNIA REPAIR N/A 11/6/2016    Procedure: UMBILICAL HERNIA REPAIR;  Surgeon: Esau Dooley M.D.;  Location: SURGERY [facility];  Service:    • COLONOSCOPY WITH BIOPSY  11/26/2014    Performed by Solo Higginbotham M.D. at ENDOSCOPY [facility]   • LIVE BY LAPAROSCOPY  2005   • OTHER ORTHOPEDIC SURGERY  1997 or 1998    Left Wrist ORIF       CURRENT MEDICATIONS  Home Medications     Reviewed by Darleen Sherman (Pharmacy Tech) on 12/02/19 at 1646  Med List Status: Complete   Medication Last Dose Status   CINNAMON PO 12/1/2019 Active   Empagliflozin-metFORMIN HCl ER  MG TABLET SR 24 HR 12/1/2019 Active   insulin lispro (HUMALOG) 100 UNIT/ML 12/2/2019 Active   NON SPECIFIED 12/1/2019 Active   pioglitazone (ACTOS) 30 MG Tab 12/1/2019 Active   Semaglutide (OZEMPIC, 1 MG/DOSE, SC) 11/28/2019 Active   thyroid (ARMOUR THYROID) 60 MG Tab 12/1/2019 Active   TURMERIC PO 12/1/2019 Active                ALLERGIES  Allergies   Allergen Reactions   • Hydrocodone Hives     Tolerates Morphine and  "oxycodone     • Peanut (Diagnostic) Anaphylaxis   • Tradjenta [Linagliptin] Unspecified     Pt developed pancreatitis       PHYSICAL EXAM  VITAL SIGNS: /63   Pulse 82   Temp 36.4 °C (97.6 °F) (Temporal)   Resp 18   Ht 1.727 m (5' 8\")   Wt 95 kg (209 lb 7 oz)   SpO2 95%   BMI 31.84 kg/m²    Gen: Alert  HENT: ATNC, bilateral middle ear effusions without erythema.  Left cerumen impaction.  After clearing of cerumen impaction, tenderness to the ear canal with erythema.  Ear pain with movement of the tragus or pinna.  No mastoid tenderness, no proptosis of the ear  Eyes: Normal conjunctiva  Neck: trachea midline  Resp: no respiratory distress  CV: No JVD, regular rate and rhythm  Abd: non-distended, no tenderness  Ext: No deformities  Psych: normal mood  Neuro: speech fluent       RADIOLOGY/PROCEDURES  Cerumen impaction irrigation  Indication: Cerumen impaction  Nursing attempted to clear the cerumen impaction using Colace and irrigation.  Used warm water and hydrogen peroxide and irrigated the cerumen impaction with good effect after approximately 100 mL of solution.  Patient tolerated the procedure with no immediate complications.    LABS  Labs Reviewed   COMP METABOLIC PANEL - Abnormal; Notable for the following components:       Result Value    Sodium 128 (*)     Chloride 89 (*)     Co2 18 (*)     Anion Gap 21.0 (*)     Glucose 781 (*)     Alkaline Phosphatase 105 (*)     All other components within normal limits   CBC WITH DIFFERENTIAL - Abnormal; Notable for the following components:    MCHC 37.0 (*)     Neutrophils-Polys 75.70 (*)     Lymphocytes 19.80 (*)     All other components within normal limits   BASIC METABOLIC PANEL - Abnormal; Notable for the following components:    Glucose 506 (*)     Anion Gap 17.0 (*)     All other components within normal limits    Narrative:     Can draw when IV fluids are complete   ACCU-CHEK GLUCOSE - Abnormal; Notable for the following components:    Glucose - " Accu-Ck >600 (*)     All other components within normal limits   INFLUENZA A/B BY PCR   LIPASE   ESTIMATED GFR   ESTIMATED GFR    Narrative:     Can draw when IV fluids are complete          COURSE & MEDICAL DECISION MAKING  Pertinent Labs & Imaging studies reviewed. (See chart for details)    Patient presents with hyperglycemia, with abnormal labs, however low suspicion for diabetic ketoacidosis.  I received the patient after he was initially seen by my colleague with the initial labs in order for regular insulin as well as fluids.  Patient did appear clinically dehydrated and did not believe that oral hydration will be sufficient.  After hydration, the patient felt improved.  Next    Patient does have factitious hyponatremia as well as an elevated anion gap.  He did report abdominal pain in the setting of hyperglycemia, however on my palpation, he is nontender.  Patient does have bilateral middle ear effusions, which I believe is secondary to eustachian tube dysfunction and I do not see an obvious otitis media, as well the patient has been treated with antibiotics with no improvement in his symptoms.  Patient does clinically appear to have otitis externa, no evidence of malignant otitis externa or mastoiditis.  Will start Ciprodex.  Repeat labs after fluids and insulin demonstrates significantly improved metabolic panel.  Patient is tolerating oral intake well, will plan for discharge with him to follow-up with his regular doctor and continue his sliding scale insulin.    FINAL IMPRESSION  1. Acute otitis externa of left ear, unspecified type    2. Hyperglycemia    3. Middle ear effusion, bilateral

## 2019-12-03 NOTE — ED NOTES
Charles from Lab called with critical result of glucose 781 at 1756. Critical lab result read back to Charles.   Dr. Vincent notified of critical lab result at 1800.  Critical lab result read back by Dr. Vincent.

## 2019-12-03 NOTE — ED TRIAGE NOTES
"Chief Complaint   Patient presents with   • Earache     started 2 weeks ago. placed on antibiotics for L ear ache. pt completed antibiotics. pt continues to have earache. pt had left dental extration a week before the earche. pt continues to have pain to his L ear.   • High Blood Sugar     pt c/o high blood sugar which read 'HI\" at lunch time. pt gave himself insulin but remained high.      /90   Pulse (!) 109   Temp 36.4 °C (97.6 °F) (Temporal)   Resp 16   Ht 1.727 m (5' 8\")   Wt 95 kg (209 lb 7 oz)   SpO2 96%   BMI 31.84 kg/m²     "

## 2019-12-03 NOTE — ED NOTES
"Pt c/o increasing pressing to left ear. Pt states \"I think that is a good sign, that the ear wax is moving\". Colace applied to left ear as ordered. Pt laying on side. Per ERP leave colace in ear for at least 15 min.   "

## 2020-01-19 ENCOUNTER — HOSPITAL ENCOUNTER (OUTPATIENT)
Facility: MEDICAL CENTER | Age: 55
End: 2020-01-20
Attending: EMERGENCY MEDICINE | Admitting: HOSPITALIST
Payer: COMMERCIAL

## 2020-01-19 DIAGNOSIS — E11.43 DIABETES MELLITUS WITH GASTROPARESIS (HCC): ICD-10-CM

## 2020-01-19 DIAGNOSIS — E11.10 DIABETIC KETOACIDOSIS WITHOUT COMA ASSOCIATED WITH TYPE 2 DIABETES MELLITUS (HCC): ICD-10-CM

## 2020-01-19 LAB
ALBUMIN SERPL BCP-MCNC: 4 G/DL (ref 3.2–4.9)
ALBUMIN/GLOB SERPL: 1.4 G/DL
ALP SERPL-CCNC: 85 U/L (ref 30–99)
ALT SERPL-CCNC: 29 U/L (ref 2–50)
ANION GAP SERPL CALC-SCNC: 18 MMOL/L (ref 0–11.9)
APPEARANCE UR: CLEAR
AST SERPL-CCNC: 31 U/L (ref 12–45)
B-OH-BUTYR SERPL-MCNC: 1.25 MMOL/L (ref 0.02–0.27)
BASE EXCESS BLDV CALC-SCNC: -4 MMOL/L
BASOPHILS # BLD AUTO: 0.4 % (ref 0–1.8)
BASOPHILS # BLD: 0.02 K/UL (ref 0–0.12)
BILIRUB SERPL-MCNC: 0.3 MG/DL (ref 0.1–1.5)
BILIRUB UR QL STRIP.AUTO: NEGATIVE
BODY TEMPERATURE: ABNORMAL CENTIGRADE
BUN SERPL-MCNC: 25 MG/DL (ref 8–22)
CALCIUM SERPL-MCNC: 9.2 MG/DL (ref 8.4–10.2)
CHLORIDE SERPL-SCNC: 87 MMOL/L (ref 96–112)
CO2 SERPL-SCNC: 18 MMOL/L (ref 20–33)
COLOR UR: YELLOW
CREAT SERPL-MCNC: 0.89 MG/DL (ref 0.5–1.4)
EOSINOPHIL # BLD AUTO: 0.02 K/UL (ref 0–0.51)
EOSINOPHIL NFR BLD: 0.4 % (ref 0–6.9)
ERYTHROCYTE [DISTWIDTH] IN BLOOD BY AUTOMATED COUNT: 41 FL (ref 35.9–50)
GLOBULIN SER CALC-MCNC: 2.9 G/DL (ref 1.9–3.5)
GLUCOSE BLD-MCNC: 377 MG/DL (ref 65–99)
GLUCOSE BLD-MCNC: 486 MG/DL (ref 65–99)
GLUCOSE BLD-MCNC: >600 MG/DL (ref 65–99)
GLUCOSE BLD-MCNC: >600 MG/DL (ref 65–99)
GLUCOSE SERPL-MCNC: 896 MG/DL (ref 65–99)
GLUCOSE UR STRIP.AUTO-MCNC: >=1000 MG/DL
HCO3 BLDV-SCNC: 19 MMOL/L (ref 24–28)
HCT VFR BLD AUTO: 40.1 % (ref 42–52)
HGB BLD-MCNC: 14.3 G/DL (ref 14–18)
IMM GRANULOCYTES # BLD AUTO: 0.03 K/UL (ref 0–0.11)
IMM GRANULOCYTES NFR BLD AUTO: 0.6 % (ref 0–0.9)
KETONES UR STRIP.AUTO-MCNC: ABNORMAL MG/DL
LEUKOCYTE ESTERASE UR QL STRIP.AUTO: NEGATIVE
LIPASE SERPL-CCNC: 41 U/L (ref 7–58)
LYMPHOCYTES # BLD AUTO: 1.14 K/UL (ref 1–4.8)
LYMPHOCYTES NFR BLD: 21.5 % (ref 22–41)
MCH RBC QN AUTO: 32.2 PG (ref 27–33)
MCHC RBC AUTO-ENTMCNC: 35.7 G/DL (ref 33.7–35.3)
MCV RBC AUTO: 90.3 FL (ref 81.4–97.8)
MICRO URNS: ABNORMAL
MONOCYTES # BLD AUTO: 0.29 K/UL (ref 0–0.85)
MONOCYTES NFR BLD AUTO: 5.5 % (ref 0–13.4)
NEUTROPHILS # BLD AUTO: 3.8 K/UL (ref 1.82–7.42)
NEUTROPHILS NFR BLD: 71.6 % (ref 44–72)
NITRITE UR QL STRIP.AUTO: NEGATIVE
NRBC # BLD AUTO: 0 K/UL
NRBC BLD-RTO: 0 /100 WBC
PCO2 BLDV: 28.2 MMHG (ref 41–51)
PH BLDV: 7.44 [PH] (ref 7.31–7.45)
PH UR STRIP.AUTO: 5 [PH] (ref 5–8)
PLATELET # BLD AUTO: 174 K/UL (ref 164–446)
PMV BLD AUTO: 9.7 FL (ref 9–12.9)
PO2 BLDV: 119.7 MMHG (ref 25–40)
POTASSIUM SERPL-SCNC: 4.3 MMOL/L (ref 3.6–5.5)
PROT SERPL-MCNC: 6.9 G/DL (ref 6–8.2)
PROT UR QL STRIP: NEGATIVE MG/DL
RBC # BLD AUTO: 4.44 M/UL (ref 4.7–6.1)
RBC UR QL AUTO: NEGATIVE
SAO2 % BLDV: 98 %
SODIUM SERPL-SCNC: 123 MMOL/L (ref 135–145)
SP GR UR STRIP.AUTO: <=1.005
WBC # BLD AUTO: 5.3 K/UL (ref 4.8–10.8)

## 2020-01-19 PROCEDURE — 700105 HCHG RX REV CODE 258: Performed by: EMERGENCY MEDICINE

## 2020-01-19 PROCEDURE — 700105 HCHG RX REV CODE 258: Performed by: HOSPITALIST

## 2020-01-19 PROCEDURE — 700111 HCHG RX REV CODE 636 W/ 250 OVERRIDE (IP): Performed by: EMERGENCY MEDICINE

## 2020-01-19 PROCEDURE — 700102 HCHG RX REV CODE 250 W/ 637 OVERRIDE(OP): Performed by: EMERGENCY MEDICINE

## 2020-01-19 PROCEDURE — 700102 HCHG RX REV CODE 250 W/ 637 OVERRIDE(OP): Performed by: HOSPITALIST

## 2020-01-19 PROCEDURE — 96372 THER/PROPH/DIAG INJ SC/IM: CPT

## 2020-01-19 PROCEDURE — 82803 BLOOD GASES ANY COMBINATION: CPT

## 2020-01-19 PROCEDURE — 700102 HCHG RX REV CODE 250 W/ 637 OVERRIDE(OP)

## 2020-01-19 PROCEDURE — 83690 ASSAY OF LIPASE: CPT

## 2020-01-19 PROCEDURE — 99220 PR INITIAL OBSERVATION CARE,LEVL III: CPT | Performed by: HOSPITALIST

## 2020-01-19 PROCEDURE — 81003 URINALYSIS AUTO W/O SCOPE: CPT

## 2020-01-19 PROCEDURE — G0378 HOSPITAL OBSERVATION PER HR: HCPCS

## 2020-01-19 PROCEDURE — 82962 GLUCOSE BLOOD TEST: CPT

## 2020-01-19 PROCEDURE — 85025 COMPLETE CBC W/AUTO DIFF WBC: CPT

## 2020-01-19 PROCEDURE — 36415 COLL VENOUS BLD VENIPUNCTURE: CPT

## 2020-01-19 PROCEDURE — 99285 EMERGENCY DEPT VISIT HI MDM: CPT

## 2020-01-19 PROCEDURE — 700111 HCHG RX REV CODE 636 W/ 250 OVERRIDE (IP): Performed by: HOSPITALIST

## 2020-01-19 PROCEDURE — 96376 TX/PRO/DX INJ SAME DRUG ADON: CPT

## 2020-01-19 PROCEDURE — 82010 KETONE BODYS QUAN: CPT

## 2020-01-19 PROCEDURE — 80053 COMPREHEN METABOLIC PANEL: CPT

## 2020-01-19 PROCEDURE — 96375 TX/PRO/DX INJ NEW DRUG ADDON: CPT

## 2020-01-19 PROCEDURE — 96374 THER/PROPH/DIAG INJ IV PUSH: CPT

## 2020-01-19 PROCEDURE — 83036 HEMOGLOBIN GLYCOSYLATED A1C: CPT

## 2020-01-19 RX ORDER — AMOXICILLIN 250 MG
2 CAPSULE ORAL 2 TIMES DAILY
Status: DISCONTINUED | OUTPATIENT
Start: 2020-01-19 | End: 2020-01-20 | Stop reason: HOSPADM

## 2020-01-19 RX ORDER — HYDROMORPHONE HYDROCHLORIDE 1 MG/ML
0.25 INJECTION, SOLUTION INTRAMUSCULAR; INTRAVENOUS; SUBCUTANEOUS ONCE
Status: COMPLETED | OUTPATIENT
Start: 2020-01-19 | End: 2020-01-19

## 2020-01-19 RX ORDER — THYROID 60 MG/1
60 TABLET ORAL EVERY MORNING
Status: DISCONTINUED | OUTPATIENT
Start: 2020-01-19 | End: 2020-01-19

## 2020-01-19 RX ORDER — ONDANSETRON 2 MG/ML
4 INJECTION INTRAMUSCULAR; INTRAVENOUS EVERY 4 HOURS PRN
Status: DISCONTINUED | OUTPATIENT
Start: 2020-01-19 | End: 2020-01-20 | Stop reason: HOSPADM

## 2020-01-19 RX ORDER — BISACODYL 10 MG
10 SUPPOSITORY, RECTAL RECTAL
Status: DISCONTINUED | OUTPATIENT
Start: 2020-01-19 | End: 2020-01-20 | Stop reason: HOSPADM

## 2020-01-19 RX ORDER — PROCHLORPERAZINE EDISYLATE 5 MG/ML
5-10 INJECTION INTRAMUSCULAR; INTRAVENOUS EVERY 4 HOURS PRN
Status: DISCONTINUED | OUTPATIENT
Start: 2020-01-19 | End: 2020-01-20 | Stop reason: HOSPADM

## 2020-01-19 RX ORDER — ONDANSETRON 2 MG/ML
4 INJECTION INTRAMUSCULAR; INTRAVENOUS ONCE
Status: COMPLETED | OUTPATIENT
Start: 2020-01-19 | End: 2020-01-19

## 2020-01-19 RX ORDER — SODIUM CHLORIDE, SODIUM LACTATE, POTASSIUM CHLORIDE, CALCIUM CHLORIDE 600; 310; 30; 20 MG/100ML; MG/100ML; MG/100ML; MG/100ML
INJECTION, SOLUTION INTRAVENOUS CONTINUOUS
Status: DISCONTINUED | OUTPATIENT
Start: 2020-01-19 | End: 2020-01-20 | Stop reason: HOSPADM

## 2020-01-19 RX ORDER — INSULIN GLARGINE 100 [IU]/ML
20 INJECTION, SOLUTION SUBCUTANEOUS ONCE
Status: COMPLETED | OUTPATIENT
Start: 2020-01-19 | End: 2020-01-19

## 2020-01-19 RX ORDER — ONDANSETRON 4 MG/1
4 TABLET, ORALLY DISINTEGRATING ORAL EVERY 4 HOURS PRN
Status: DISCONTINUED | OUTPATIENT
Start: 2020-01-19 | End: 2020-01-20 | Stop reason: HOSPADM

## 2020-01-19 RX ORDER — OMEGA-3 FATTY ACIDS/FISH OIL 300-1000MG
600 CAPSULE ORAL EVERY 12 HOURS PRN
Status: ON HOLD | COMMUNITY
End: 2020-11-19

## 2020-01-19 RX ORDER — PROMETHAZINE HYDROCHLORIDE 25 MG/1
12.5-25 TABLET ORAL EVERY 4 HOURS PRN
Status: DISCONTINUED | OUTPATIENT
Start: 2020-01-19 | End: 2020-01-20 | Stop reason: HOSPADM

## 2020-01-19 RX ORDER — PROMETHAZINE HYDROCHLORIDE 25 MG/1
12.5-25 SUPPOSITORY RECTAL EVERY 4 HOURS PRN
Status: DISCONTINUED | OUTPATIENT
Start: 2020-01-19 | End: 2020-01-20 | Stop reason: HOSPADM

## 2020-01-19 RX ORDER — SODIUM CHLORIDE 9 MG/ML
1000 INJECTION, SOLUTION INTRAVENOUS CONTINUOUS
Status: ACTIVE | OUTPATIENT
Start: 2020-01-19 | End: 2020-01-19

## 2020-01-19 RX ORDER — SODIUM CHLORIDE 9 MG/ML
1000 INJECTION, SOLUTION INTRAVENOUS ONCE
Status: COMPLETED | OUTPATIENT
Start: 2020-01-19 | End: 2020-01-19

## 2020-01-19 RX ORDER — POLYETHYLENE GLYCOL 3350 17 G/17G
1 POWDER, FOR SOLUTION ORAL
Status: DISCONTINUED | OUTPATIENT
Start: 2020-01-19 | End: 2020-01-20 | Stop reason: HOSPADM

## 2020-01-19 RX ORDER — HYDROMORPHONE HYDROCHLORIDE 1 MG/ML
0.5 INJECTION, SOLUTION INTRAMUSCULAR; INTRAVENOUS; SUBCUTANEOUS ONCE
Status: COMPLETED | OUTPATIENT
Start: 2020-01-19 | End: 2020-01-19

## 2020-01-19 RX ORDER — PIOGLITAZONEHYDROCHLORIDE 15 MG/1
30 TABLET ORAL DAILY
Status: DISCONTINUED | OUTPATIENT
Start: 2020-01-20 | End: 2020-01-20 | Stop reason: HOSPADM

## 2020-01-19 RX ADMIN — HYDROMORPHONE HYDROCHLORIDE 0.5 MG: 1 INJECTION, SOLUTION INTRAMUSCULAR; INTRAVENOUS; SUBCUTANEOUS at 17:16

## 2020-01-19 RX ADMIN — INSULIN HUMAN 12 UNITS: 100 INJECTION, SOLUTION PARENTERAL at 18:43

## 2020-01-19 RX ADMIN — SODIUM CHLORIDE 1000 ML: 9 INJECTION, SOLUTION INTRAVENOUS at 18:02

## 2020-01-19 RX ADMIN — SODIUM CHLORIDE, POTASSIUM CHLORIDE, SODIUM LACTATE AND CALCIUM CHLORIDE: 600; 310; 30; 20 INJECTION, SOLUTION INTRAVENOUS at 21:45

## 2020-01-19 RX ADMIN — HYDROMORPHONE HYDROCHLORIDE 0.25 MG: 1 INJECTION, SOLUTION INTRAMUSCULAR; INTRAVENOUS; SUBCUTANEOUS at 21:11

## 2020-01-19 RX ADMIN — ONDANSETRON 4 MG: 2 INJECTION INTRAMUSCULAR; INTRAVENOUS at 17:16

## 2020-01-19 RX ADMIN — SODIUM CHLORIDE 1000 ML: 9 INJECTION, SOLUTION INTRAVENOUS at 17:16

## 2020-01-19 RX ADMIN — INSULIN GLARGINE 20 UNITS: 100 INJECTION, SOLUTION SUBCUTANEOUS at 20:49

## 2020-01-19 ASSESSMENT — ENCOUNTER SYMPTOMS
HEMOPTYSIS: 0
SPUTUM PRODUCTION: 0
TINGLING: 0
MEMORY LOSS: 0
ORTHOPNEA: 0
COUGH: 0
EYE PAIN: 0
NECK PAIN: 0
SPEECH CHANGE: 0
BLURRED VISION: 0
HEADACHES: 0
NERVOUS/ANXIOUS: 0
BACK PAIN: 0
BLOOD IN STOOL: 0
PND: 0
PALPITATIONS: 0
CONSTIPATION: 0
NAUSEA: 0
MYALGIAS: 0
WEAKNESS: 0
SORE THROAT: 0
VOMITING: 0
CHILLS: 0
DIZZINESS: 0
DEPRESSION: 0
DOUBLE VISION: 0
CLAUDICATION: 0
HEARTBURN: 0
SENSORY CHANGE: 0
PHOTOPHOBIA: 0
SHORTNESS OF BREATH: 0
TREMORS: 0
STRIDOR: 0
FEVER: 0

## 2020-01-19 ASSESSMENT — PATIENT HEALTH QUESTIONNAIRE - PHQ9
1. LITTLE INTEREST OR PLEASURE IN DOING THINGS: NOT AT ALL
SUM OF ALL RESPONSES TO PHQ9 QUESTIONS 1 AND 2: 0
2. FEELING DOWN, DEPRESSED, IRRITABLE, OR HOPELESS: NOT AT ALL

## 2020-01-19 ASSESSMENT — LIFESTYLE VARIABLES: EVER_SMOKED: NEVER

## 2020-01-20 ENCOUNTER — PATIENT OUTREACH (OUTPATIENT)
Dept: HEALTH INFORMATION MANAGEMENT | Facility: OTHER | Age: 55
End: 2020-01-20

## 2020-01-20 VITALS
SYSTOLIC BLOOD PRESSURE: 128 MMHG | TEMPERATURE: 98.1 F | RESPIRATION RATE: 18 BRPM | BODY MASS INDEX: 30 KG/M2 | HEART RATE: 90 BPM | DIASTOLIC BLOOD PRESSURE: 82 MMHG | OXYGEN SATURATION: 96 % | HEIGHT: 68 IN | WEIGHT: 197.97 LBS

## 2020-01-20 PROBLEM — R10.9 ABDOMINAL CRAMPS: Status: RESOLVED | Noted: 2018-12-20 | Resolved: 2020-01-20

## 2020-01-20 LAB
ALBUMIN SERPL BCP-MCNC: 3.5 G/DL (ref 3.2–4.9)
ALBUMIN/GLOB SERPL: 1.5 G/DL
ALP SERPL-CCNC: 76 U/L (ref 30–99)
ALT SERPL-CCNC: 48 U/L (ref 2–50)
ANION GAP SERPL CALC-SCNC: 14 MMOL/L (ref 0–11.9)
AST SERPL-CCNC: 67 U/L (ref 12–45)
BASOPHILS # BLD AUTO: 0.4 % (ref 0–1.8)
BASOPHILS # BLD: 0.02 K/UL (ref 0–0.12)
BILIRUB SERPL-MCNC: 0.3 MG/DL (ref 0.1–1.5)
BUN SERPL-MCNC: 18 MG/DL (ref 8–22)
CALCIUM SERPL-MCNC: 8.6 MG/DL (ref 8.4–10.2)
CHLORIDE SERPL-SCNC: 103 MMOL/L (ref 96–112)
CO2 SERPL-SCNC: 22 MMOL/L (ref 20–33)
CREAT SERPL-MCNC: 0.57 MG/DL (ref 0.5–1.4)
EOSINOPHIL # BLD AUTO: 0.05 K/UL (ref 0–0.51)
EOSINOPHIL NFR BLD: 1 % (ref 0–6.9)
ERYTHROCYTE [DISTWIDTH] IN BLOOD BY AUTOMATED COUNT: 42.3 FL (ref 35.9–50)
EST. AVERAGE GLUCOSE BLD GHB EST-MCNC: 418 MG/DL
GLOBULIN SER CALC-MCNC: 2.4 G/DL (ref 1.9–3.5)
GLUCOSE BLD-MCNC: 266 MG/DL (ref 65–99)
GLUCOSE BLD-MCNC: 267 MG/DL (ref 65–99)
GLUCOSE SERPL-MCNC: 267 MG/DL (ref 65–99)
HBA1C MFR BLD: 16.2 % (ref 0–5.6)
HCT VFR BLD AUTO: 36.9 % (ref 42–52)
HGB BLD-MCNC: 13.2 G/DL (ref 14–18)
IMM GRANULOCYTES # BLD AUTO: 0.03 K/UL (ref 0–0.11)
IMM GRANULOCYTES NFR BLD AUTO: 0.6 % (ref 0–0.9)
LYMPHOCYTES # BLD AUTO: 1.21 K/UL (ref 1–4.8)
LYMPHOCYTES NFR BLD: 24.6 % (ref 22–41)
MCH RBC QN AUTO: 32 PG (ref 27–33)
MCHC RBC AUTO-ENTMCNC: 35.8 G/DL (ref 33.7–35.3)
MCV RBC AUTO: 89.6 FL (ref 81.4–97.8)
MONOCYTES # BLD AUTO: 0.28 K/UL (ref 0–0.85)
MONOCYTES NFR BLD AUTO: 5.7 % (ref 0–13.4)
NEUTROPHILS # BLD AUTO: 3.32 K/UL (ref 1.82–7.42)
NEUTROPHILS NFR BLD: 67.7 % (ref 44–72)
NRBC # BLD AUTO: 0 K/UL
NRBC BLD-RTO: 0 /100 WBC
PLATELET # BLD AUTO: 167 K/UL (ref 164–446)
PMV BLD AUTO: 9.5 FL (ref 9–12.9)
POTASSIUM SERPL-SCNC: 3.5 MMOL/L (ref 3.6–5.5)
PROT SERPL-MCNC: 5.9 G/DL (ref 6–8.2)
RBC # BLD AUTO: 4.12 M/UL (ref 4.7–6.1)
SODIUM SERPL-SCNC: 139 MMOL/L (ref 135–145)
WBC # BLD AUTO: 4.9 K/UL (ref 4.8–10.8)

## 2020-01-20 PROCEDURE — 700102 HCHG RX REV CODE 250 W/ 637 OVERRIDE(OP): Performed by: INTERNAL MEDICINE

## 2020-01-20 PROCEDURE — 82962 GLUCOSE BLOOD TEST: CPT

## 2020-01-20 PROCEDURE — 80053 COMPREHEN METABOLIC PANEL: CPT

## 2020-01-20 PROCEDURE — G0378 HOSPITAL OBSERVATION PER HR: HCPCS

## 2020-01-20 PROCEDURE — 85025 COMPLETE CBC W/AUTO DIFF WBC: CPT

## 2020-01-20 PROCEDURE — 99217 PR OBSERVATION CARE DISCHARGE: CPT | Performed by: INTERNAL MEDICINE

## 2020-01-20 PROCEDURE — 36415 COLL VENOUS BLD VENIPUNCTURE: CPT

## 2020-01-20 PROCEDURE — A9270 NON-COVERED ITEM OR SERVICE: HCPCS | Performed by: INTERNAL MEDICINE

## 2020-01-20 PROCEDURE — 700102 HCHG RX REV CODE 250 W/ 637 OVERRIDE(OP): Performed by: HOSPITALIST

## 2020-01-20 PROCEDURE — 700105 HCHG RX REV CODE 258: Performed by: HOSPITALIST

## 2020-01-20 PROCEDURE — A9270 NON-COVERED ITEM OR SERVICE: HCPCS | Performed by: HOSPITALIST

## 2020-01-20 RX ORDER — ACETAMINOPHEN 500 MG
500 TABLET ORAL EVERY 6 HOURS PRN
Status: DISCONTINUED | OUTPATIENT
Start: 2020-01-20 | End: 2020-01-20 | Stop reason: HOSPADM

## 2020-01-20 RX ORDER — PIOGLITAZONEHYDROCHLORIDE 30 MG/1
45 TABLET ORAL DAILY
Qty: 30 TAB | Refills: 11 | Status: SHIPPED | OUTPATIENT
Start: 2020-01-20 | End: 2020-01-20 | Stop reason: SDUPTHER

## 2020-01-20 RX ORDER — PIOGLITAZONEHYDROCHLORIDE 30 MG/1
45 TABLET ORAL DAILY
Qty: 30 TAB | Refills: 11 | Status: SHIPPED | OUTPATIENT
Start: 2020-01-20 | End: 2020-04-15

## 2020-01-20 RX ADMIN — PIOGLITAZONE 30 MG: 15 TABLET ORAL at 06:09

## 2020-01-20 RX ADMIN — SODIUM CHLORIDE, POTASSIUM CHLORIDE, SODIUM LACTATE AND CALCIUM CHLORIDE: 600; 310; 30; 20 INJECTION, SOLUTION INTRAVENOUS at 05:00

## 2020-01-20 RX ADMIN — ACETAMINOPHEN 500 MG: 500 TABLET, FILM COATED ORAL at 09:19

## 2020-01-20 ASSESSMENT — COGNITIVE AND FUNCTIONAL STATUS - GENERAL
SUGGESTED CMS G CODE MODIFIER DAILY ACTIVITY: CH
DAILY ACTIVITIY SCORE: 24
MOBILITY SCORE: 24
SUGGESTED CMS G CODE MODIFIER MOBILITY: CH

## 2020-01-20 ASSESSMENT — LIFESTYLE VARIABLES
HAVE YOU EVER FELT YOU SHOULD CUT DOWN ON YOUR DRINKING: NO
TOTAL SCORE: 0
HAVE PEOPLE ANNOYED YOU BY CRITICIZING YOUR DRINKING: NO
CONSUMPTION TOTAL: NEGATIVE
AVERAGE NUMBER OF DAYS PER WEEK YOU HAVE A DRINK CONTAINING ALCOHOL: 1
TOTAL SCORE: 0
EVER HAD A DRINK FIRST THING IN THE MORNING TO STEADY YOUR NERVES TO GET RID OF A HANGOVER: NO
TOTAL SCORE: 0
HOW MANY TIMES IN THE PAST YEAR HAVE YOU HAD 5 OR MORE DRINKS IN A DAY: 0
ALCOHOL_USE: YES
EVER FELT BAD OR GUILTY ABOUT YOUR DRINKING: NO
ON A TYPICAL DAY WHEN YOU DRINK ALCOHOL HOW MANY DRINKS DO YOU HAVE: 1

## 2020-01-20 NOTE — ED NOTES
Lili from Lab called with critical result of glucose at 896. Critical lab result read back to Lili.    notified of critical lab result at 1739.  Critical lab result read back by Dr. Koehler.

## 2020-01-20 NOTE — DISCHARGE SUMMARY
"Discharge Summary    CHIEF COMPLAINT ON ADMISSION  Chief Complaint   Patient presents with   • Abdominal Pain      increasing abd pain/n/v past few days. and hyperglycemia. pt states \"too high to read\" on home machine.       Reason for Admission  abdominal pain N/V     Admission Date  1/19/2020    CODE STATUS  Full Code    HPI & HOSPITAL COURSE  This is a 54 y.o. male here with intractable nausea and vomiting and elevated blood sugars at 896. He was admitted and treated with iv fluids and insulin. He had not been able to obtain his new diabetes medication due to cost and insurance coverage issues. His blood sugars improved to the 200's and he was set up with lantus insulin prior to discharge for blood glucose control until he can follow up with his endocrinologist.       Therefore, he is discharged in good and stable condition to home with close outpatient follow-up.        Discharge Date  1/20/2020    FOLLOW UP ITEMS POST DISCHARGE  Primary care provider  Endocrinology as soon as possible    DISCHARGE DIAGNOSES  Principal Problem:    Hyperglycemia due to type 2 diabetes mellitus (HCC) POA: Yes  Active Problems:    Hypothyroidism (Chronic) POA: Yes      Overview: Chronic condition treated with [place] Thyroid.      Resumed maintenance medication.  Resolved Problems:    Abdominal cramps POA: Yes      FOLLOW UP  No future appointments.  Rodolfo Stein M.D.  [address]  [address]  [facility] [address]  [phone]    Schedule an appointment as soon as possible for a visit        MEDICATIONS ON DISCHARGE     Medication List      START taking these medications      Instructions   insulin glargine 100 UNIT/ML Sopn injection  Commonly known as:  LANTUS SOLOSTAR   Inject 40 Units as instructed every evening.  Dose:  40 Units        CHANGE how you take these medications      Instructions   pioglitazone 30 MG Tabs  What changed:  how much to take  Commonly known as:  ACTOS   Take 1.5 Tabs by mouth every day.  Dose:  45 mg   "      CONTINUE taking these medications      Instructions   CINNAMON PO   Take 2 Tabs by mouth 2 Times a Day.  Dose:  2 Tab     Empagliflozin-metFORMIN HCl ER  MG Tb24   Take 1 Tab by mouth every day.  Dose:  1 Tab     HUMALOG 100 UNIT/ML  Generic drug:  insulin lispro   Inject 2-16 Units as instructed as needed for High Blood Sugar (If blood sugar gets > 200). Sliding Scale  Dose:  2-16 Units     ibuprofen 800 MG Tabs  Commonly known as:  MOTRIN   Take 800 mg by mouth every 8 hours as needed for Moderate Pain.  Dose:  800 mg     NON SPECIFIED   Take 1 Tab by mouth every morning. GNC SHERRI MAN VITAMIN  Dose:  1 Tab     thyroid 60 MG Tabs  Commonly known as:  ARMOUR THYROID   Take 60 mg by mouth every morning.  Dose:  60 mg     TURMERIC PO   Take 1 Tab by mouth 2 Times a Day.  Dose:  1 Tab        STOP taking these medications    OZEMPIC (1 MG/DOSE) SC            Allergies  Allergies   Allergen Reactions   • Hydrocodone Hives     Tolerates Morphine and oxycodone     • Peanut (Diagnostic) Anaphylaxis   • Tradjenta [Linagliptin] Unspecified     Pt developed pancreatitis       DIET  Orders Placed This Encounter   Procedures   • Diet Order Clear Liquids - No Red Foods     Standing Status:   Standing     Number of Occurrences:   1     Order Specific Question:   Diet:     Answer:   Clear Liquids - No Red Foods [12]       ACTIVITY  As tolerated.  Weight bearing as tolerated    CONSULTATIONS  none    PROCEDURES  none    LABORATORY  Lab Results   Component Value Date    SODIUM 139 01/20/2020    POTASSIUM 3.5 (L) 01/20/2020    CHLORIDE 103 01/20/2020    CO2 22 01/20/2020    GLUCOSE 267 (H) 01/20/2020    BUN 18 01/20/2020    CREATININE 0.57 01/20/2020    CREATININE 1.1 02/18/2008        Lab Results   Component Value Date    WBC 4.9 01/20/2020    HEMOGLOBIN 13.2 (L) 01/20/2020    HEMATOCRIT 36.9 (L) 01/20/2020    PLATELETCT 167 01/20/2020        Total time of the discharge process exceeds 23 minutes.

## 2020-01-20 NOTE — RESPIRATORY CARE
COPD EDUCATION by COPD CLINICAL EDUCATOR  1/20/2020 at 8:00 AM by Hoda Negron RRT     Patient reviewed by COPD education team. Patient does not have a history or diagnosis of COPD and is a non-smoker, therefore does not qualify for the COPD program.

## 2020-01-20 NOTE — ED PROVIDER NOTES
"ED Provider Note    CHIEF COMPLAINT  Chief Complaint   Patient presents with   • Abdominal Pain      increasing abd pain/n/v past few days. and hyperglycemia. pt states \"too high to read\" on home machine.       HPI  Mahesh Bradshaw is a 54 y.o. male who presents complaining of increasing abdominal pain nausea and vomiting for the last few days.  He is an insulin-dependent diabetic and his sugars have been slowly climbing up despite him using his sliding scale insulin.  Patient states that his insurance card changed and he was unable to fill his Seglutide.  Since he has not been taking this medication his blood sugars have gone up.  He states that when he goes into DKA he usually feels like this with crampy abdominal pain and nausea.  He denies any fevers or chills cough or cold symptoms chest pains or shortness of breath.    REVIEW OF SYSTEMS  HEENT:  No ear pain, congestion or sore throat   EYES: no discharge redness or vision changes  CARDIAC: no chest pain, palpitations    PULMONARY: no dyspnea, cough or congestion   GI: no vomiting diarrhea positive abdominal pain   : no dysuria, back pain or hematuria   Neuro: no weakness, numbness aphasia or headache  Musculoskeletal: no swelling deformity or pain no joint swelling  Endocrine: no fevers, sweating, weight loss   SKIN: no rash, erythema or contusions     See history of present illness all other systems are negative    PAST MEDICAL HISTORY  Past Medical History:   Diagnosis Date   • ADRIANE (acute kidney injury) ([facility]) 2/24/2016   • Anesthesia     \"Was difficult to intubate 2-2016\"   • Arthritis 3-3-17    \"Spine\"   • Aspiration pneumonia ([facility]) 3-2016   • ASTHMA 3-3-17    \"Hasn't had to use inhaler in 2 years\"   • Breath shortness 3-3-17    \"With Exercise\"   • Congestive heart failure ([facility]) 2-2016     3-3-17 \"Not a current issue\"   • Dental disorder    • Depression 11/26/2014   • Diabetes ([facility]) 3-3-17    Takes Insulin   • Diabetes ([facility])    • Difficult intubation " "2-2016   • DKA (diabetic ketoacidoses) ([facility]) 2-2016    \"10 days on vent with DKA, Renal failure, CHF, elevated liver enzymes and electrolyte imbalance\"   • Electrolyte imbalance 2-2016   • Elevated LFTs    • Elevated liver enzymes 2-2016   • Essential hypertension 1/22/2016   • Gout    • Heart burn    • High cholesterol 3-3-17    Does not currently take medication for   • Hypothyroid 3-3-17    Takes [place] Thyroid   • Indigestion    • Kidney stones    • Klebsiella infect    • Migraine    • MRSA cellulitis    • Necrotizing myositis ([facility])    • On mechanically assisted ventilation ([facility]) 2-2016    HX \"On Vent for 10 days at [facility]\".  \"DX Pancreatitis, DKA, CHF, Elevated Liver Enzymes & Electrolyte Imbalance\".   • Pain 3-3-17    \"Left Flank\"   • Pancreatitis 2-2016    \"D/T Tradjenta was hospitialized for 15 days\"   • RF (renal failure) 2-2016   • Sepsis ([facility]) 1/21/2016   • Snoring 3-3-17    Has not had a sleep study   • Streptococcus infection    • UC (ulcerative colitis) ([facility]) 3-3-17    \"Five BM's per day, takes Lialda\"   • Urinary incontinence    • Vitamin D deficiency        FAMILY HISTORY  Family History   Problem Relation Age of Onset   • Cancer Mother        SOCIAL HISTORY  Social History     Socioeconomic History   • Marital status:      Spouse name: Not on file   • Number of children: Not on file   • Years of education: Not on file   • Highest education level: Not on file   Occupational History   • Not on file   Social Needs   • Financial resource strain: Not on file   • Food insecurity:     Worry: Not on file     Inability: Not on file   • Transportation needs:     Medical: Not on file     Non-medical: Not on file   Tobacco Use   • Smoking status: Never Smoker   • Smokeless tobacco: Never Used   Substance and Sexual Activity   • Alcohol use: Yes     Comment: rarely   • Drug use: No   • Sexual activity: Not on file   Lifestyle   • Physical activity:     Days per week: Not on file     Minutes per session: " Not on file   • Stress: Not on file   Relationships   • Social connections:     Talks on phone: Not on file     Gets together: Not on file     Attends [employer] service: Not on file     Active member of club or organization: Not on file     Attends meetings of clubs or organizations: Not on file     Relationship status: Not on file   • Intimate partner violence:     Fear of current or ex partner: Not on file     Emotionally abused: Not on file     Physically abused: Not on file     Forced sexual activity: Not on file   Other Topics Concern   • Not on file   Social History Narrative    ** Merged History Encounter **         ** Merged History Encounter **     ** Merged History Encounter **          SURGICAL HISTORY  Past Surgical History:   Procedure Laterality Date   • UMBILICAL HERNIA REPAIR N/A 4/15/2018    Procedure: UMBILICAL HERNIA REPAIR;  Surgeon: Karen Beasley M.D.;  Location: SURGERY [facility];  Service: General   • IRRIGATION & DEBRIDEMENT ORTHO Left 12/10/2017    Procedure: IRRIGATION & DEBRIDEMENT ORTHO LEFT HAND;  Surgeon: Chicho Ramos M.D.;  Location: SURGERY [facility];  Service: Orthopedics   • IRRIGATION & DEBRIDEMENT GENERAL Right 5/1/2017    Procedure: IRRIGATION & DEBRIDEMENT GENERAL-Left HAND AND right  ANKLE;  Surgeon: Esau Dooley M.D.;  Location: SURGERY [facility];  Service:    • IRRIGATION & DEBRIDEMENT GENERAL Right 4/29/2017    Procedure: IRRIGATION & DEBRIDEMENT GENERAL;  Surgeon: Esau Dooley M.D.;  Location: SURGERY [facility];  Service:    • INCISION AND DRAINAGE GENERAL  4/7/2017    Procedure: INCISION AND DRAINAGE GENERAL;  Surgeon: Esau Dooley M.D.;  Location: SURGERY SAME DAY [facility];  Service:    • UMBILICAL HERNIA REPAIR  3/10/2017    Procedure: UMBILICAL HERNIA REPAIR- INCISION AND DRAINAGE OF UMBILICAL WOUND AND MESH REMOVAL;  Surgeon: Esau Dooley M.D.;  Location: SURGERY SAME DAY [facility];  Service:    • UMBILICAL HERNIA  "REPAIR N/A 11/6/2016    Procedure: UMBILICAL HERNIA REPAIR;  Surgeon: Esau Dooley M.D.;  Location: SURGERY [facility];  Service:    • COLONOSCOPY WITH BIOPSY  11/26/2014    Performed by Solo Higginbotham M.D. at ENDOSCOPY [facility]   • LIVE BY LAPAROSCOPY  2005   • OTHER ORTHOPEDIC SURGERY  1997 or 1998    Left Wrist ORIF       CURRENT MEDICATIONS  Home Medications     Reviewed by Annie Vaca PhT (Pharmacy Tech) on 01/19/20 at 1644  Med List Status: Complete   Medication Last Dose Status   CINNAMON PO 1/18/2020 Active   Empagliflozin-metFORMIN HCl ER  MG TABLET SR 24 HR 1/18/2020 Active   ibuprofen (MOTRIN) 800 MG Tab 1/16/2020 Active   insulin lispro (HUMALOG) 100 UNIT/ML 1/19/2020 Active   NON SPECIFIED 1/18/2020 Active   pioglitazone (ACTOS) 30 MG Tab 1/18/2020 Active   Semaglutide (OZEMPIC, 1 MG/DOSE, SC) 1/9/2020 Active   thyroid (ARMOUR THYROID) 60 MG Tab 1/18/2020 Active   TURMERIC PO 1/18/2020 Active                ALLERGIES  Allergies   Allergen Reactions   • Hydrocodone Hives     Tolerates Morphine and oxycodone     • Peanut (Diagnostic) Anaphylaxis   • Tradjenta [Linagliptin] Unspecified     Pt developed pancreatitis       PHYSICAL EXAM  VITAL SIGNS: /81   Pulse (!) 101   Temp (!) 35.6 °C (96 °F)   Resp 14   Ht 1.727 m (5' 8\")   Wt 89.8 kg (197 lb 15.6 oz)   SpO2 94%   BMI 30.10 kg/m²   Constitutional: Well developed, Well nourished, No acute distress, Non-toxic appearance.   HEENT: Normocephalic, Atraumatic,  external ears normal, pharynx pink,  Mucous  Membranes dry, No rhinorrhea or mucosal edema  Eyes: PERRL, EOMI, Conjunctiva normal, No discharge.   Neck: Normal range of motion, No tenderness, Supple, No stridor.   Lymphatic: No lymphadenopathy    Cardiovascular: Regular Rate and Rhythm, No murmurs,  rubs, or gallops.   Thorax & Lungs: Lungs clear to auscultation bilaterally, No respiratory distress, No wheezes, rhales or rhonchi, No chest wall " tenderness.   Abdomen: Bowel sounds decreased, Soft, diffuse tenderness to palpation, non distended,  No pulsatile masses., no rebound guarding or peritoneal signs.   Skin: Warm, Dry, No erythema, No rash,   Extremities: Equal, intact distal pulses, No cyanosis, clubbing or edema,  No tenderness.   Musculoskeletal: Good range of motion in all major joints. No tenderness to palpation or major deformities noted.   Neurologic: Alert & oriented x 3,  No focal deficits noted.         COURSE & MEDICAL DECISION MAKING  Pertinent Labs & Imaging studies reviewed. (See chart for details)  Differential diagnosis: DKA versus hyperglycemia versus infection    Results for orders placed or performed during the hospital encounter of 01/19/20   COMP METABOLIC PANEL   Result Value Ref Range    Sodium 123 (L) 135 - 145 mmol/L    Potassium 4.3 3.6 - 5.5 mmol/L    Chloride 87 (L) 96 - 112 mmol/L    Co2 18 (L) 20 - 33 mmol/L    Anion Gap 18.0 (H) 0.0 - 11.9    Glucose 896 (HH) 65 - 99 mg/dL    Bun 25 (H) 8 - 22 mg/dL    Creatinine 0.89 0.50 - 1.40 mg/dL    Calcium 9.2 8.4 - 10.2 mg/dL    AST(SGOT) 31 12 - 45 U/L    ALT(SGPT) 29 2 - 50 U/L    Alkaline Phosphatase 85 30 - 99 U/L    Total Bilirubin 0.3 0.1 - 1.5 mg/dL    Albumin 4.0 3.2 - 4.9 g/dL    Total Protein 6.9 6.0 - 8.2 g/dL    Globulin 2.9 1.9 - 3.5 g/dL    A-G Ratio 1.4 g/dL   LIPASE   Result Value Ref Range    Lipase 41 7 - 58 U/L   URINALYSIS,CULTURE IF INDICATED   Result Value Ref Range    Color Yellow     Character Clear     Specific Gravity <=1.005 <1.035    Ph 5.0 5.0 - 8.0    Glucose >=1000 (A) Negative mg/dL    Ketones Trace (A) Negative mg/dL    Protein Negative Negative mg/dL    Bilirubin Negative Negative    Nitrite Negative Negative    Leukocyte Esterase Negative Negative    Occult Blood Negative Negative    Micro Urine Req see below    VENOUS BLOOD GAS   Result Value Ref Range    Venous Bg Ph 7.44 7.31 - 7.45    Venous Bg Pco2 28.2 (L) 41.0 - 51.0 mmHg    Venous Bg  Po2 119.7 (H) 25.0 - 40.0 mmHg    Venous Bg O2 Saturation 98.0 %    Venous Bg Hco3 19 (L) 24 - 28 mmol/L    Venous Bg Base Excess -4 mmol/L    Body Temp see below Centigrade   CBC WITH DIFFERENTIAL   Result Value Ref Range    WBC 5.3 4.8 - 10.8 K/uL    RBC 4.44 (L) 4.70 - 6.10 M/uL    Hemoglobin 14.3 14.0 - 18.0 g/dL    Hematocrit 40.1 (L) 42.0 - 52.0 %    MCV 90.3 81.4 - 97.8 fL    MCH 32.2 27.0 - 33.0 pg    MCHC 35.7 (H) 33.7 - 35.3 g/dL    RDW 41.0 35.9 - 50.0 fL    Platelet Count 174 164 - 446 K/uL    MPV 9.7 9.0 - 12.9 fL    Neutrophils-Polys 71.60 44.00 - 72.00 %    Lymphocytes 21.50 (L) 22.00 - 41.00 %    Monocytes 5.50 0.00 - 13.40 %    Eosinophils 0.40 0.00 - 6.90 %    Basophils 0.40 0.00 - 1.80 %    Immature Granulocytes 0.60 0.00 - 0.90 %    Nucleated RBC 0.00 /100 WBC    Neutrophils (Absolute) 3.80 1.82 - 7.42 K/uL    Lymphs (Absolute) 1.14 1.00 - 4.80 K/uL    Monos (Absolute) 0.29 0.00 - 0.85 K/uL    Eos (Absolute) 0.02 0.00 - 0.51 K/uL    Baso (Absolute) 0.02 0.00 - 0.12 K/uL    Immature Granulocytes (abs) 0.03 0.00 - 0.11 K/uL    NRBC (Absolute) 0.00 K/uL   ESTIMATED GFR   Result Value Ref Range    GFR If African American >60 >60 mL/min/1.73 m 2    GFR If Non African American >60 >60 mL/min/1.73 m 2   ACCU-CHEK GLUCOSE   Result Value Ref Range    Glucose - Accu-Ck >600 (HH) 65 - 99 mg/dL        HYDRATION: Based on the patient's presentation of Hyperglycemia the patient was given IV fluids. IV Hydration was used because oral hydration was not adequate alone. Upon recheck following hydration, the patient was improved.      Patient's blood sugar came back elevated at 896 with a gap of 18 and a CO2 of 18.  He is in mild DKA.  I gave the patient another liter of normal saline along with IV insulin.  For his abdominal pain I gave him some Dilaudid.  He will be admitted to the hospitalist service for aggressive IV fluids and insulin therapy.      6:31 PM  I spoke with the hospitalist Dr. Cardozo who has  accepted the patient for admission.      Critical Care  Due to the real possibility of a deterioration of this patient's condition required the highest level of my preparedness for sudden emergent intervention. I provided critical care services which included medication orders, frequent reevaluations of the patient's condition and response to treatment, ordering and reviewing test results and discussing the case with various consultants. The critical care time associated with the care of the patient was 40 minutes. Review chart for interventions. This time is exclusive of any other billable procedures.      FINAL IMPRESSION  1. Diabetic ketoacidosis without coma associated with type 2 diabetes mellitus (HCC)    2. Diabetes mellitus with gastroparesis (HCC)           PLAN/DISPOSITION  admitted          Electronically signed by: Brenda Koehler M.D., 1/19/2020 4:09 PM

## 2020-01-20 NOTE — DISCHARGE INSTRUCTIONS
Discharge Instructions per Morena Machado M.D.    Follow up with endocrinologist as soon as possible    DIET: diabetic    ACTIVITY: as tolerated    DIAGNOSIS: hyperglycemia with uncontrolled type 2 diabetes    Return to ER if symptoms worsen    Discharge Instructions    Discharged to home by car with relative. Discharged via walking, hospital escort: Yes.  Special equipment needed: Not Applicable    Be sure to schedule a follow-up appointment with your primary care doctor or any specialists as instructed.     Discharge Plan:   Influenza Vaccine Indication: Not indicated: Previously immunized this influenza season and > 8 years of age    I understand that a diet low in cholesterol, fat, and sodium is recommended for good health. Unless I have been given specific instructions below for another diet, I accept this instruction as my diet prescription.   Other diet: Diabetic diet    Special Instructions:       Hyperglycemia  Hyperglycemia is when the sugar (glucose) level in your blood is too high. It may not cause symptoms. If you do have symptoms, they may include warning signs, such as:  · Feeling more thirsty than normal.  · Hunger.  · Feeling tired.  · Needing to pee (urinate) more than normal.  · Blurry eyesight (vision).  You may get other symptoms as it gets worse, such as:  · Dry mouth.  · Not being hungry (loss of appetite).  · Fruity-smelling breath.  · Weakness.  · Weight gain or loss that is not planned. Weight loss may be fast.  · A tingling or numb feeling in your hands or feet.  · Headache.  · Skin that does not bounce back quickly when it is lightly pinched and released (poor skin turgor).  · Pain in your belly (abdomen).  · Cuts or bruises that heal slowly.  High blood sugar can happen to people who do or do not have diabetes. High blood sugar can happen slowly or quickly, and it can be an emergency.  Follow these instructions at home:  General instructions  · Take over-the-counter and prescription  medicines only as told by your doctor.  · Do not use products that contain nicotine or tobacco, such as cigarettes and e-cigarettes. If you need help quitting, ask your doctor.  · Limit alcohol intake to no more than 1 drink per day for nonpregnant women and 2 drinks per day for men. One drink equals 12 oz of beer, 5 oz of wine, or 1½ oz of hard liquor.  · Manage stress. If you need help with this, ask your doctor.  · Keep all follow-up visits as told by your doctor. This is important.  Eating and drinking  · Stay at a healthy weight.  · Exercise regularly, as told by your doctor.  · Drink enough fluid, especially when you:  ¨ Exercise.  ¨ Get sick.  ¨ Are in hot temperatures.  · Eat healthy foods, such as:  ¨ Low-fat (lean) proteins.  ¨ Complex carbs (complex carbohydrates), such as whole wheat bread or brown rice.  ¨ Fresh fruits and vegetables.  ¨ Low-fat dairy products.  ¨ Healthy fats.  · Drink enough fluid to keep your pee (urine) clear or pale yellow.  If you have diabetes:  · Make sure you know the symptoms of hyperglycemia.  · Follow your diabetes management plan, as told by your doctor. Make sure you:  ¨ Take insulin and medicines as told.  ¨ Follow your exercise plan.  ¨ Follow your meal plan. Eat on time. Do not skip meals.  ¨ Check your blood sugar as often as told. Make sure to check before and after exercise. If you exercise longer or in a different way than you normally do, check your blood sugar more often.  ¨ Follow your sick day plan whenever you cannot eat or drink normally. Make this plan ahead of time with your doctor.  · Share your diabetes management plan with people in your workplace, school, and household.  · Check your urine for ketones when you are ill and as told by your doctor.  · Carry a card or wear jewelry that says that you have diabetes.  Contact a doctor if:  · Your blood sugar level is higher than 240 mg/dL (13.3 mmol/L) for 2 days in a row.  · You have problems keeping your  blood sugar in your target range.  · High blood sugar happens often for you.  Get help right away if:  · You have trouble breathing.  · You have a change in how you think, feel, or act (mental status).  · You feel sick to your stomach (nauseous), and that feeling does not go away.  · You cannot stop throwing up (vomiting).  These symptoms may be an emergency. Do not wait to see if the symptoms will go away. Get medical help right away. Call your local emergency services (911 in the [place] Do not drive yourself to the hospital.   Summary  · Hyperglycemia is when the sugar (glucose) level in your blood is too high.  · High blood sugar can happen to people who do or do not have diabetes.  · Make sure you drink enough fluids, eat healthy foods, and exercise regularly.  · Contact your doctor if you have problems keeping your blood sugar in your target range.  This information is not intended to replace advice given to you by your health care provider. Make sure you discuss any questions you have with your health care provider.  Document Released: 10/15/2010 Document Revised: 09/04/2017 Document Reviewed: 09/04/2017  [employer] Patient Education © 2017 [employer]    Pioglitazone tablets  What is this medicine?  PIOGLITAZONE (patricia oh GLI ta zone) helps to treat type 2 diabetes. It helps to control blood sugar. Treatment is combined with diet and exercise.  This medicine may be used for other purposes; ask your health care provider or pharmacist if you have questions.  COMMON BRAND NAME(S): Actos  What should I tell my health care provider before I take this medicine?  They need to know if you have any of these conditions:  -bladder cancer  -diabetic ketoacidosis  -eye disease called macular edema  -heart disease  -heart failure  -kidney disease  -liver disease  -polycystic ovary syndrome  -premenopausal  -swelling of the arms, legs, or feet  -type 1 diabetes  -an unusual or allergic reaction to pioglitazone,  other medicines, foods, dyes, or preservatives  -pregnant or trying to get pregnant  -breast-feeding  How should I use this medicine?  Take this medicine by mouth with a glass of water. Follow the directions on the prescription label. Take your medicine at the same time each day. Do not take more often than directed.  A special MedGuide will be given to you by the pharmacist with each prescription and refill. Be sure to read this information carefully each time.  Talk to your pediatrician regarding the use of this medicine in children. Special care may be needed.  Overdosage: If you think you have taken too much of this medicine contact a poison control center or emergency room at once.  NOTE: This medicine is only for you. Do not share this medicine with others.  What if I miss a dose?  If you miss a dose, take it as soon as you can. If it is almost time for your next dose, take only that dose. Do not take double or extra doses.  What may interact with this medicine?  -atorvastatin  -birth control pills or other hormonal methods of birth control  -bosentan  -itraconazole  -ketoconazole  -midazolam  -nifedipine  -other medicines for diabetes, including insulin  -topiramate  Many medications may cause an increase or decrease in blood sugar, these include:  -alcohol containing beverages  -aspirin and aspirin-like drugs  -chloramphenicol  -chromium  -diuretics  -female hormones, like estrogens or progestins and birth control pills  -heart medicines  -isoniazid  -male hormones or anabolic steroids  -medicines for weight loss  -medicines for allergies, asthma, cold, or cough  -medicines for mental problems  -medicines called MAO Inhibitors like Nardil, Parnate, Marplan, Eldepryl  -niacin  -NSAIDs, medicines for pain and inflammation, like ibuprofen or naproxen  -pentamidine  -phenytoin  -probenecid  -quinolone antibiotics like ciprofloxacin, levofloxacin, ofloxacin  -some herbal dietary supplements  -steroid medicines  like prednisone or cortisone  -thyroid medicine  This list may not describe all possible interactions. Give your health care provider a list of all the medicines, herbs, non-prescription drugs, or dietary supplements you use. Also tell them if you smoke, drink alcohol, or use illegal drugs. Some items may interact with your medicine.  What should I watch for while using this medicine?  Visit your doctor or health care professional for regular checks on your progress. You may need regular tests to make sure your liver is working properly.  A test called the HbA1C (A1C) will be monitored. This is a simple blood test. It measures your blood sugar control over the last 2 to 3 months. You will receive this test every 3 to 6 months.  Learn how to check your blood sugar. Learn the symptoms of low and high blood sugar and how to manage them.  Always carry a quick-source of sugar with you in case you have symptoms of low blood sugar. Examples include hard sugar candy or glucose tablets. Make sure others know that you can choke if you eat or drink when you develop serious symptoms of low blood sugar, such as seizures or unconsciousness. They must get medical help at once.  Tell your doctor or health care professional if you have high blood sugar. You might need to change the dose of your medicine. If you are sick or exercising more than usual, you might need to change the dose of your medicine.  Do not skip meals. Ask your doctor or health care professional if you should avoid alcohol. Many nonprescription cough and cold products contain sugar or alcohol. These can affect blood sugar.  This medicine may increase your risk of having certain heart problems. Get medical help right away if you have any chest pain or tightness, or pain that radiates to the jaw or down the arm, and shortness of breath. These may be signs of a serious medical condition.  This medicine may cause ovulation in premenopausal women who do not have  regular monthly periods. This may increase your chances of becoming pregnant. You should not take this medicine if you become pregnant or think you may be pregnant. Talk with your doctor or health care professional about your birth control options while taking this medicine. Contact your doctor or health care professional right away if think you are pregnant.  Wear a medical ID bracelet or chain, and carry a card that describes your disease and details of your medicine and dosage times.  What side effects may I notice from receiving this medicine?  Side effects that you should report to your doctor or health care professional as soon as possible:  -allergic reactions like skin rash, itching or hives, swelling of the face, lips, or tongue  -blood in the urine  -bone fractures  -breathing problems  -changes in vision  -dark urine  -fever, chills  -increased urination  -pain when urinating  -signs and symptoms of low blood sugar such as feeling anxious, confusion, dizziness, increased hunger, unusually weak or tired, sweating, shakiness, cold, irritable, headache, blurred vision, fast heartbeat, loss of consciousness  -sudden weight gain  -swelling of the ankles, feet, hands  -yellowing of the eyes or skin  Side effects that usually do not require medical attention (report to your doctor or health care professional if they continue or are bothersome):  -headache  -mild joint or muscle pain  -stuffy or runny nose  -sore throat  This list may not describe all possible side effects. Call your doctor for medical advice about side effects. You may report side effects to FDA at 7-525-FDA-8980.  Where should I keep my medicine?  Keep out of the reach of children.  Store at room temperature between 15 and 30 degrees C (59 and 86 degrees F). Keep container tightly closed and protect from moisture and humidity. Throw away any unused medicine after the expiration date.  NOTE: This sheet is a summary. It may not cover all possible  information. If you have questions about this medicine, talk to your doctor, pharmacist, or health care provider.  © 2018 Elsevier/Gold Standard (2014-04-01 14:51:48)        · Is patient discharged on Warfarin / Coumadin?   No     Depression / Suicide Risk    As you are discharged from this [facility] facility, it is important to learn how to keep safe from harming yourself.    Recognize the warning signs:  · Abrupt changes in personality, positive or negative- including increase in energy   · Giving away possessions  · Change in eating patterns- significant weight changes-  positive or negative  · Change in sleeping patterns- unable to sleep or sleeping all the time   · Unwillingness or inability to communicate  · Depression  · Unusual sadness, discouragement and loneliness  · Talk of wanting to die  · Neglect of personal appearance   · Rebelliousness- reckless behavior  · Withdrawal from people/activities they love  · Confusion- inability to concentrate     If you or a loved one observes any of these behaviors or has concerns about self-harm, here's what you can do:  · Talk about it- your feelings and reasons for harming yourself  · Remove any means that you might use to hurt yourself (examples: pills, rope, extension cords, firearm)  · Get professional help from the community (Mental Health, Substance Abuse, psychological counseling)  · Do not be alone:Call your Safe Contact- someone whom you trust who will be there for you.  · Call your local CRISIS HOTLINE 713-1408 or 957-897-7352  · Call your local Children's Mobile Crisis Response Team [place] (693) 284-3374 or www.[employer]  · Call the toll free National Suicide Prevention Hotlines   · National Suicide Prevention Lifeline 149-489-AVVB (1016)  · National Hope Line Network 800-SUICIDE (076-1762)

## 2020-01-20 NOTE — ASSESSMENT & PLAN NOTE
Uncontrolled hyperglycemia with blood sugars >800.  Patient could not get Ozempic which has been doing a great job due to insurance, he is getting a insurance card this Monday.  We will resume his actos, Hold his metformin.  Stated lantus 20U now,   Continue Insulin-sliding scale, accu-checks and hypoglycemia protocol.  Continue with Consistent Carbohydrate diet     Of note: patient used to be on 40 units of Lantus.  I suggest we call his primary care or endocrinologist tomorrow to see if he can go directly and get samples.

## 2020-01-20 NOTE — H&P
"  Hospital Medicine History & Physical Note    Date of Service  1/19/2020    Primary Care Physician  Rodolfo Stein M.D.    Consultants  None    Code Status  Full Code    Chief Complaint  Chief Complaint   Patient presents with   • Abdominal Pain      increasing abd pain/n/v past few days. and hyperglycemia. pt states \"too high to read\" on home machine.       History of Presenting Illness  Leeann is a very pleasant 54 y.o. male with a past medical history of difficult to control insulin-dependent diabetes mellitus, which patient said that finally he is gotten good control after being started on Ozempic, follows with his endocrinologist at home, unfortunately because of the high cost of these medications and recent change in of insurance he is not on able to acquire medication, as a result today presented to the emergency room on 1/19/2020 for evaluation of abdominal pain with nausea vomiting and uncontrolled hyperglycemia over the past several days.  Patient says his blood sugar was so high that his meter would not read it and would say it is too high.  In addition, complains of mild abdominal pain, although right now he says this discomfort has resolved, at home it was more cramping sensation, 5 out of 10 in severity associated with nausea and nonbilious nonbloody vomiting.  He denies any recent sick contacts denies any recent illnesses such as URIs, eyes any dysuria or diarrhea..    Review of Systems  Review of Systems   Constitutional: Positive for malaise/fatigue. Negative for chills and fever.   HENT: Negative for congestion, hearing loss, sore throat and tinnitus.    Eyes: Negative for blurred vision, double vision, photophobia and pain.   Respiratory: Negative for cough, hemoptysis, sputum production, shortness of breath and stridor.    Cardiovascular: Negative for chest pain, palpitations, orthopnea, claudication and PND.   Gastrointestinal: Negative for blood in stool, constipation, heartburn, melena, " "nausea and vomiting.   Genitourinary: Negative for dysuria, frequency and urgency.   Musculoskeletal: Negative for back pain, myalgias and neck pain.   Neurological: Negative for dizziness, tingling, tremors, sensory change, speech change, weakness and headaches.   Psychiatric/Behavioral: Negative for depression, memory loss and suicidal ideas. The patient is not nervous/anxious.    All other systems reviewed and are negative.      Past Medical History  Past Medical History:   Diagnosis Date   • UC (ulcerative colitis) (Formerly [facility]) 3-3-17    \"Five BM's per day, takes Lialda\"   • Arthritis 3-3-17    \"Spine\"   • Snoring 3-3-17    Has not had a sleep study   • High cholesterol 3-3-17    Does not currently take medication for   • ASTHMA 3-3-17    \"Hasn't had to use inhaler in 2 years\"   • Breath shortness 3-3-17    \"With Exercise\"   • Hypothyroid 3-3-17    Takes [place] Thyroid   • Pain 3-3-17    \"Left Flank\"   • Diabetes (Formerly [facility]) 3-3-17    Takes Insulin   • Aspiration pneumonia ([facility]ly [facility]) 3-2016   • ADRIANE (acute kidney injury) (Formerly [facility]) 2/24/2016   • Difficult intubation 2-2016   • Pancreatitis 2-2016    \"D/T Tradjenta was hospitialized for 15 days\"   • Elevated liver enzymes 2-2016   • Electrolyte imbalance 2-2016   • DKA (diabetic ketoacidoses) (Formerly [facility]) 2-2016    \"10 days on vent with DKA, Renal failure, CHF, elevated liver enzymes and electrolyte imbalance\"   • On mechanically assisted ventilation ([facility]ly [facility]) 2-2016    HX \"On Vent for 10 days at [facility]\".  \"DX Pancreatitis, DKA, CHF, Elevated Liver Enzymes & Electrolyte Imbalance\".   • Congestive heart failure ([facility]ly [facility]) 2-2016     3-3-17 \"Not a current issue\"   • RF (renal failure) 2-2016   • Essential hypertension 1/22/2016   • Sepsis ([facility]) 1/21/2016   • Depression 11/26/2014   • Anesthesia     \"Was difficult to intubate 2-2016\"   • Dental disorder    • Diabetes ([facility])    • Elevated LFTs    • Gout    • Heart burn    • Indigestion    • Kidney stones    • Klebsiella infect    • Migraine    • MRSA " cellulitis    • Necrotizing myositis (HCC)    • Streptococcus infection    • Urinary incontinence    • Vitamin D deficiency        Surgical History   has a past surgical history that includes vaishnavi by laparoscopy (2005); colonoscopy with biopsy (11/26/2014); incision and drainage general (4/7/2017); irrigation & debridement general (Right, 4/29/2017); irrigation & debridement general (Right, 5/1/2017); other orthopedic surgery (1997 or 1998); umbilical hernia repair (N/A, 11/6/2016); umbilical hernia repair (3/10/2017); irrigation & debridement ortho (Left, 12/10/2017); and umbilical hernia repair (N/A, 4/15/2018).    Family History  family history includes Cancer in his mother.      Social History   reports that he has never smoked. He has never used smokeless tobacco. He reports current alcohol use. He reports that he does not use drugs.    Allergies  Allergies   Allergen Reactions   • Hydrocodone Hives     Tolerates Morphine and oxycodone     • Peanut (Diagnostic) Anaphylaxis   • Tradjenta [Linagliptin] Unspecified     Pt developed pancreatitis       Medications  Prior to Admission medications    Medication Sig Start Date End Date Taking? Authorizing Provider   ibuprofen (MOTRIN) 800 MG Tab Take 800 mg by mouth every 8 hours as needed for Moderate Pain.   Yes Physician Outpatient   Empagliflozin-metFORMIN HCl ER  MG TABLET SR 24 HR Take 1 Tab by mouth every day.    Physician Outpatient   Semaglutide (OZEMPIC, 1 MG/DOSE, SC) Inject 1 mg as instructed every 7 days. On Thurs    Physician Outpatient   insulin lispro (HUMALOG) 100 UNIT/ML Inject 2-16 Units as instructed as needed for High Blood Sugar (If blood sugar gets > 200). Sliding Scale    Physician Outpatient   NON SPECIFIED Take 1 Tab by mouth every morning. [facility] SHERRI MAN VITAMIN    Physician Outpatient   CINNAMON PO Take 2 Tabs by mouth 2 Times a Day.    Physician Outpatient   TURMERIC PO Take 1 Tab by mouth 2 Times a Day.    Physician Outpatient    pioglitazone (ACTOS) 30 MG Tab Take 1 Tab by mouth every day. 3/26/19   Edgardo Paulson P.A.-C.   thyroid (ARMOUR THYROID) 60 MG Tab Take 60 mg by mouth every morning.    Physician Outpatient       Physical Exam  Temp:  [35.6 °C (96 °F)] 35.6 °C (96 °F)  Pulse:  [] 97  Resp:  [18-23] 23  BP: (127-143)/(73-96) 132/81  SpO2:  [91 %-99 %] 91 %  Physical Exam   Constitutional: He is oriented to person, place, and time. He appears well-developed and well-nourished. No distress.   HENT:   Head: Normocephalic and atraumatic.   Mouth/Throat: No oropharyngeal exudate.   Mucous membranes dry   Eyes: Pupils are equal, round, and reactive to light. Conjunctivae are normal. Right eye exhibits no discharge. No scleral icterus.   Neck: Neck supple. No JVD present. No thyromegaly present.   Cardiovascular: Normal rate and intact distal pulses.   No murmur heard.  Pulses:       Dorsalis pedis pulses are 2+ on the right side and 2+ on the left side.   Cap refill < 3 s   Pulmonary/Chest: Effort normal and breath sounds normal. No stridor. No respiratory distress. He has no wheezes. He has no rales.   Abdominal: Soft. Bowel sounds are normal. He exhibits no distension. There is no tenderness. There is no rebound.   Musculoskeletal: Normal range of motion.         General: No edema.   Neurological: He is alert and oriented to person, place, and time. No cranial nerve deficit.   Skin: Skin is warm and dry. He is not diaphoretic. No erythema.   Psychiatric: He has a normal mood and affect. His behavior is normal. Thought content normal.   Nursing note and vitals reviewed.      Laboratory:  Recent Labs     01/19/20  1700   WBC 5.3   RBC 4.44*   HEMOGLOBIN 14.3   HEMATOCRIT 40.1*   MCV 90.3   MCH 32.2   MCHC 35.7*   RDW 41.0   PLATELETCT 174   MPV 9.7     Recent Labs     01/19/20  1632   SODIUM 123*   POTASSIUM 4.3   CHLORIDE 87*   CO2 18*   GLUCOSE 896*   BUN 25*   CREATININE 0.89   CALCIUM 9.2     Recent Labs     01/19/20  1632    ALTSGPT 29   ASTSGOT 31   ALKPHOSPHAT 85   TBILIRUBIN 0.3   LIPASE 41   GLUCOSE 896*               Urinalysis:          Imaging:  No orders to display       Assessment/Plan:  I anticipate this patient is appropriate for observation status at this time.    * Hyperglycemia due to type 2 diabetes mellitus (HCC)- (present on admission)  Assessment & Plan  Uncontrolled hyperglycemia with blood sugars >800.  Patient could not get Ozempic which has been doing a great job due to insurance, he is getting a insurance card this Monday.  We will resume his actos, Hold his metformin.  Stated lantus 20U now,   Continue Insulin-sliding scale, accu-checks and hypoglycemia protocol.  Continue with Consistent Carbohydrate diet     Of note: patient used to be on 40 units of Lantus.  I suggest we call his primary care or endocrinologist tomorrow to see if he can go directly and get samples.    Abdominal cramps- (present on admission)  Assessment & Plan  Resolved probably 2/2 to nausea/vommiting wrenching.  IV fluids      Hypothyroidism- (present on admission)  Assessment & Plan  Resume home dose of synthroid      VTE prophylaxis: Prophylaxis: SCDs

## 2020-01-20 NOTE — PROGRESS NOTES
Patient up from ER last night. Patient alert and oriented, vss, complains of abd pain which is controlled from PRN pain meds given in ER. Patient declines further pain management at this time. Patient BS checked again when on unit however this did not translate into a result. BS in 200's and much improved. Patient stand by assist. Will monitor.

## 2020-01-21 LAB — GLUCOSE BLD-MCNC: 289 MG/DL (ref 65–99)

## 2020-03-11 NOTE — PROGRESS NOTES
Discharging Patient home per physician order.  Discharged with niece.  Demonstrated understanding of discharge instructions, follow up appointments, home medications, prescriptions, home care for surgical wound, and nursing care instructions given.  Ambulating with no assistance, voiding without difficulty, pain well controlled, tolerating oral medications, oxygen saturation greater than 96% , tolerating diet.   Educational handouts given and discussed.  Verbalized understanding of discharge instructions and educational handouts.  All questions answered. RIJ removed.   Belongings with patient at time of discharge.   No indicators present

## 2020-04-15 ENCOUNTER — TELEMEDICINE (OUTPATIENT)
Dept: ENDOCRINOLOGY | Facility: MEDICAL CENTER | Age: 55
End: 2020-04-15
Payer: COMMERCIAL

## 2020-04-15 DIAGNOSIS — E11.65 UNCONTROLLED TYPE 2 DIABETES MELLITUS WITH HYPERGLYCEMIA (HCC): ICD-10-CM

## 2020-04-15 DIAGNOSIS — Z79.84 LONG TERM (CURRENT) USE OF ORAL HYPOGLYCEMIC DRUGS: ICD-10-CM

## 2020-04-15 DIAGNOSIS — E03.9 ACQUIRED HYPOTHYROIDISM: Chronic | ICD-10-CM

## 2020-04-15 DIAGNOSIS — E78.2 MIXED HYPERLIPIDEMIA DUE TO TYPE 2 DIABETES MELLITUS (HCC): ICD-10-CM

## 2020-04-15 DIAGNOSIS — E11.69 MIXED HYPERLIPIDEMIA DUE TO TYPE 2 DIABETES MELLITUS (HCC): ICD-10-CM

## 2020-04-15 PROBLEM — R78.81 BACTEREMIA DUE TO GRAM-NEGATIVE BACTERIA: Status: RESOLVED | Noted: 2017-04-30 | Resolved: 2020-04-15

## 2020-04-15 PROBLEM — R78.81 BACTEREMIA: Status: RESOLVED | Noted: 2017-05-01 | Resolved: 2020-04-15

## 2020-04-15 PROCEDURE — 99214 OFFICE O/P EST MOD 30 MIN: CPT | Mod: 95,CR | Performed by: INTERNAL MEDICINE

## 2020-04-15 RX ORDER — SEMAGLUTIDE 1.34 MG/ML
0.5 INJECTION, SOLUTION SUBCUTANEOUS
Qty: 1 PEN | Refills: 3 | Status: SHIPPED | OUTPATIENT
Start: 2020-04-15 | End: 2020-08-17 | Stop reason: SDUPTHER

## 2020-04-15 RX ORDER — EMPAGLIFLOZIN AND METFORMIN HYDROCHLORIDE 12.5; 1 MG/1; MG/1
1 TABLET ORAL 2 TIMES DAILY
Qty: 60 TAB | Refills: 3 | Status: SHIPPED | OUTPATIENT
Start: 2020-04-15 | End: 2020-08-17 | Stop reason: SDUPTHER

## 2020-04-15 RX ORDER — PIOGLITAZONEHYDROCHLORIDE 30 MG/1
30 TABLET ORAL DAILY
Qty: 30 TAB | Refills: 3 | Status: SHIPPED | OUTPATIENT
Start: 2020-04-15 | End: 2020-08-17 | Stop reason: SDUPTHER

## 2020-04-15 RX ORDER — THYROID 60 MG/1
60 TABLET ORAL EVERY MORNING
Qty: 30 TAB | Refills: 3 | Status: SHIPPED | OUTPATIENT
Start: 2020-04-15 | End: 2022-05-25 | Stop reason: SDUPTHER

## 2020-04-15 NOTE — PROGRESS NOTES
CHIEF COMPLAINT: Patient is here for follow up of Type 2 Diabetes Mellitus. Patient was presented for a telehealth consultation via secure and encrypted videoconferencing technology. This encounter was conducted via Zoom . Verbal consent was obtained. Patient's identity was verified.     HPI:     Mahesh Bradshaw is a 54 y.o. male with Type 2 Diabetes Mellitus here for follow up.    Labs from 1/19/2020 showed HbA1c sejal from 6.9% to 16%     On follow up he admits that he could no longer afford his Ozempic and Synjardy  Due to job loss and this led to deterioration of his glucose control    He has been rationing his insulin and has had no medications since March 2020  In summary he is currently not on any diabetes medications.    He admits to hyperglycemia.  He denies hypoglycemia.  He admits to polyuria, polydipsia and blurry vision.  He also admits to noncompliance with blood sugar monitoring    He has a history of mixed hyperlipidemia but is currently not on a statin.  He is overdue for lipid panel.    He also has a history of primary hypothyroidism and was on [place] Thyroid 60 mg daily.  He needs a refill of that this medication.  His TSH was normal 1.9 back on January 29, 2019.  TPO antibodies were negative.  He currently denies symptoms of uncontrolled hypothyroidism      BG Diary:04/15/20  Breakfast: 300, 250    Weight has decreased 10 pounds over last 3 months    Diabetes Complications   Retinopathy: No known retinopathy.  Last eye exam: He is overdue for an eye exam  Neuropathy: Denies paresthesias or numbness in hands or feet. Denies any foot wounds.  Exercise: Minimal.  Diet: Fair.  Patient's medications, allergies, and social histories were reviewed and updated as appropriate.    ROS:     CONS:     No fever, no chills   EYES:     No diplopia, reports blurry vision   CV:           No chest pain, no palpitations   PULM:     No SOB, no cough, no hemoptysis.   GI:            No nausea, no vomiting, no  diarrhea, no constipation   ENDO:      Reports polyuria, reports polydipsia, no heat intolerance, no cold intolerance       Past Medical History:  Problem List:  2019-05: URI (upper respiratory infection)  2019-05: Metabolic acidosis  2019-01: Dental caries  2019-01: Hypertriglyceridemia - severe  2019-01: Sinusitis, acute  2018-12: LUQ pain  2018-12: Abdominal cramps  2018-11: Diabetic ketoacidosis without coma associated with type 2   diabetes mellitus ([facility])  2018-11: Dyslipidemia  2018-11: S/P acute bronchitis due to other specified organisms  2018-05: Closed fracture of sternum  2018-05: Type 2 diabetes mellitus, with long-term current use of   insulin ([facility])  2018-05: Hypothyroidism  2018-05: Depression  2018-05: Laceration of right ear  2018-05: Hand pain, right  2018-05: Knee pain, right  2018-05: Trauma  2018-05: No contraindication to deep vein thrombosis (DVT) prophylaxis  2018-05: Injury of costal cartilage  2017-12: Obesity (BMI 30.0-34.9)  2017-12: Uncontrolled type 2 diabetes mellitus with hyperglycemia   ([facility])  2017-12: History of nephrolithiasis  2017-12: Insomnia disorder  2017-12: Abscess of left thumb  2017-12: Cellulitis of left hand  2017-10: AP (abdominal pain)  2017-07: Sepsis(995.91)  2017-07: Hyperglycemia  2017-07: Hyponatremia  2017-05: Septicemia due to bacteroides ([facility])  2017-05: Abscess of right thigh  2017-05: Septic shock ([facility])  2017-05: Type 2 diabetes mellitus without complication, with long-  term current use of insulin ([facility])  2017-05: Bacteremia  2017-05: Hyponatremia  2017-04: Bacteremia due to Gram-negative bacteria  2017-04: Severe sepsis ([facility])  2017-04: Complicated wound infection  2017-04: Normocytic anemia  2017-04: Transaminitis  2017-04: Diabetic ketoacidosis ([facility])  2017-03: Subcutaneous abscess  2017-01: Back pain  2017-01: Hyperglycemia due to type 2 diabetes mellitus ([facility])  2017-01: Hyponatremia  2017-01: Lactic acidosis  2017-01: DKA (diabetic ketoacidoses)  ([facility])  2016-11: Abdominal wall cellulitis  2016-11: Incarcerated umbilical hernia  2016-03: Hx of acute pancreatitis  2016-03: Serum phosphate elevated  2016-03: Altered mental status  2016-02: Hypernatremia  2016-02: Chronic ulcerative colitis (CMS-HCC)  2016-02: Encephalopathy  2016-02: Elevated lipase  2016-02: Tachycardia  2016-02: Leukocytosis  2016-02: HHNC (hyperglycemic hyperosmolar nonketotic coma) ([facility])  2016-02: ADRIANE (acute kidney injury) ([facility])  2016-01: Essential hypertension  2016-01: Sepsis (CMS-HCC)  2015-06: Chest pain  2015-06: Gout  2015-06: GERD (gastroesophageal reflux disease)  2015-06: Ulcerative colitis ([facility])  2014-11: Hypokalemia  2014-11: Hyponatremia  2014-11: Elevated liver enzymes  2014-11: Thrombocytopenia (CMS-HCC)  2014-11: Depression  2014-11: Colitis  2014-11: GIB (gastrointestinal bleeding)      Past Surgical History:  Past Surgical History:   Procedure Laterality Date   • UMBILICAL HERNIA REPAIR N/A 4/15/2018    Procedure: UMBILICAL HERNIA REPAIR;  Surgeon: Karen Beasley M.D.;  Location: [facility];  Service: General   • IRRIGATION & DEBRIDEMENT ORTHO Left 12/10/2017    Procedure: IRRIGATION & DEBRIDEMENT ORTHO LEFT HAND;  Surgeon: Chicho Ramos M.D.;  Location: [facility];  Service: Orthopedics   • IRRIGATION & DEBRIDEMENT GENERAL Right 5/1/2017    Procedure: IRRIGATION & DEBRIDEMENT GENERAL-Left HAND AND right  ANKLE;  Surgeon: Esau Dooley M.D.;  Location: [facility];  Service:    • IRRIGATION & DEBRIDEMENT GENERAL Right 4/29/2017    Procedure: IRRIGATION & DEBRIDEMENT GENERAL;  Surgeon: Esau Dooley M.D.;  Location: [facility];  Service:    • INCISION AND DRAINAGE GENERAL  4/7/2017    Procedure: INCISION AND DRAINAGE GENERAL;  Surgeon: Esau Dooley M.D.;  Location: SURGERY SAME DAY [facility];  Service:    • UMBILICAL HERNIA REPAIR  3/10/2017    Procedure: UMBILICAL HERNIA REPAIR- INCISION AND DRAINAGE OF  UMBILICAL WOUND AND MESH REMOVAL;  Surgeon: Esau Dooley M.D.;  Location: SURGERY SAME DAY [facility];  Service:    • UMBILICAL HERNIA REPAIR N/A 11/6/2016    Procedure: UMBILICAL HERNIA REPAIR;  Surgeon: Esau Dooley M.D.;  Location: SURGERY [facility];  Service:    • COLONOSCOPY WITH BIOPSY  11/26/2014    Performed by Solo Higginbotham M.D. at ENDOSCOPY [facility]   • LIVE BY LAPAROSCOPY  2005   • OTHER ORTHOPEDIC SURGERY  1997 or 1998    Left Wrist ORIF        Allergies:  Hydrocodone; Peanut (diagnostic); and Tradjenta [linagliptin]     Social History:  Social History     Tobacco Use   • Smoking status: Never Smoker   • Smokeless tobacco: Never Used   Substance Use Topics   • Alcohol use: Yes     Comment: rarely   • Drug use: No        Family History:   family history includes Cancer in his mother.      PHYSICAL EXAM:   OBJECTIVE:  Vital signs: There were no vitals taken for this visit.  GENERAL: Well-developed, well-nourished in no apparent distress.   EYE:  No ocular asymmetry, PERRLA  HENT: Pink, moist mucous membranes.    NECK: No thyromegaly.   CARDIOVASCULAR: Normal precordial impulse seen with normal carotid pulsation  LUNGS: Symmetrical chest expansion with normal phonation of voice   ABDOMEN: non Obese abdomen with no visible organomegaly  EXTREMITIES: No clubbing, cyanosis, or edema.   NEUROLOGICAL: No gross focal motor abnormalities   LYMPH: No cervical adenopathy seen.   SKIN: No rashes, lesions.       Labs:  Lab Results   Component Value Date/Time    HBA1C 16.2 (H) 01/19/2020 05:00 PM        Lab Results   Component Value Date/Time    WBC 4.9 01/20/2020 04:54 AM    RBC 4.12 (L) 01/20/2020 04:54 AM    HEMOGLOBIN 13.2 (L) 01/20/2020 04:54 AM    MCV 89.6 01/20/2020 04:54 AM    MCH 32.0 01/20/2020 04:54 AM    MCHC 35.8 (H) 01/20/2020 04:54 AM    RDW 42.3 01/20/2020 04:54 AM    MPV 9.5 01/20/2020 04:54 AM       Lab Results   Component Value Date/Time    SODIUM 139 01/20/2020 04:54  AM    POTASSIUM 3.5 (L) 01/20/2020 04:54 AM    CHLORIDE 103 01/20/2020 04:54 AM    CO2 22 01/20/2020 04:54 AM    ANION 14.0 (H) 01/20/2020 04:54 AM    GLUCOSE 267 (H) 01/20/2020 04:54 AM    BUN 18 01/20/2020 04:54 AM    CREATININE 0.57 01/20/2020 04:54 AM    CREATININE 1.1 02/18/2008 10:00 AM    CALCIUM 8.6 01/20/2020 04:54 AM    ASTSGOT 67 (H) 01/20/2020 04:54 AM    ALTSGPT 48 01/20/2020 04:54 AM    TBILIRUBIN 0.3 01/20/2020 04:54 AM    ALBUMIN 3.5 01/20/2020 04:54 AM    TOTPROTEIN 5.9 (L) 01/20/2020 04:54 AM    GLOBULIN 2.4 01/20/2020 04:54 AM    AGRATIO 1.5 01/20/2020 04:54 AM       Lab Results   Component Value Date/Time    CHOLSTRLTOT 301 (H) 01/03/2019 0202    TRIGLYCERIDE 1172 (H) 01/03/2019 0202    HDL see below 01/03/2019 0202    LDL see below 01/03/2019 0202       Lab Results   Component Value Date/Time    MALBCRT 55 (H) 01/29/2019 09:33 AM    MICROALBUR 2.4 01/29/2019 09:33 AM        Lab Results   Component Value Date/Time    TSHULTRASEN 1.980 01/29/2019 0934     No results found for: FREEDIR  No results found for: FREET3  No results found for: THYSTIMIG        ASSESSMENT/PLAN:     1. Uncontrolled type 2 diabetes mellitus with hyperglycemia (HCC)  Uncontrolled secondary to hyperglycemia, there is an element of noncompliance in the situation  I am restarting him on Synjardy 12.5/1000 mg twice a day  Ozempic 0.25 mg weekly  Actos 30 mg daily  Patient needs to notify me in 1 to 2 weeks about his blood sugars so we can determine if we need to restart basal insulin therapy as well  I recommend that he take his medications regularly  I recommend that he watch his carb intake  We will plan for follow-up in 6 weeks to review blood sugars    2. Acquired hypothyroidism  Unstable  At this time he does not want to get labs done because of the pandemic  I am refilling his [place] Thyroid 60 mg daily  Discussed importance of adherence  We will plan to repeat his TSH level again after 3 months    3. Mixed hyperlipidemia  due to type 2 diabetes mellitus (HCC)  Unstable  He has a history of uncontrolled hyperlipidemia.  He is refusing to go for labs at this time because of the pandemic  We will try to get a fasting lipid profile next time in 3 months    4. Long term (current) use of oral hypoglycemic drugs  Patient is on multiple oral agents for type 2 diabetes management      Return in about 6 weeks (around 5/27/2020).      This patient during there office visit today was started on a new medication.  Side effects of the new medication were discussed with the patient today in the office.     Thank you kindly for allowing me to participate in the diabetes care plan for this patient.    Keenan Magaña MD, FACE, ECNU  04/15/20    CC:   Rodolfo Stein M.D.

## 2020-04-16 ENCOUNTER — HOSPITAL ENCOUNTER (EMERGENCY)
Facility: MEDICAL CENTER | Age: 55
End: 2020-04-16
Attending: EMERGENCY MEDICINE
Payer: COMMERCIAL

## 2020-04-16 VITALS
OXYGEN SATURATION: 97 % | TEMPERATURE: 97.9 F | RESPIRATION RATE: 18 BRPM | HEIGHT: 68 IN | SYSTOLIC BLOOD PRESSURE: 96 MMHG | BODY MASS INDEX: 28.37 KG/M2 | DIASTOLIC BLOOD PRESSURE: 71 MMHG | WEIGHT: 187.17 LBS | HEART RATE: 95 BPM

## 2020-04-16 DIAGNOSIS — R74.8 ELEVATED LIPASE: ICD-10-CM

## 2020-04-16 DIAGNOSIS — R73.9 HYPERGLYCEMIA: ICD-10-CM

## 2020-04-16 LAB
ALBUMIN SERPL BCP-MCNC: 3.9 G/DL (ref 3.2–4.9)
ALBUMIN/GLOB SERPL: 1.6 G/DL
ALP SERPL-CCNC: 91 U/L (ref 30–99)
ALT SERPL-CCNC: 34 U/L (ref 2–50)
ANION GAP SERPL CALC-SCNC: 19 MMOL/L (ref 7–16)
AST SERPL-CCNC: 19 U/L (ref 12–45)
B-OH-BUTYR SERPL-MCNC: 0.4 MMOL/L (ref 0.02–0.27)
BASOPHILS # BLD AUTO: 0.4 % (ref 0–1.8)
BASOPHILS # BLD: 0.02 K/UL (ref 0–0.12)
BILIRUB SERPL-MCNC: 0.3 MG/DL (ref 0.1–1.5)
BUN SERPL-MCNC: 19 MG/DL (ref 8–22)
CALCIUM SERPL-MCNC: 9.4 MG/DL (ref 8.4–10.2)
CHLORIDE SERPL-SCNC: 92 MMOL/L (ref 96–112)
CO2 SERPL-SCNC: 20 MMOL/L (ref 20–33)
CREAT SERPL-MCNC: 0.98 MG/DL (ref 0.5–1.4)
EOSINOPHIL # BLD AUTO: 0.05 K/UL (ref 0–0.51)
EOSINOPHIL NFR BLD: 1 % (ref 0–6.9)
ERYTHROCYTE [DISTWIDTH] IN BLOOD BY AUTOMATED COUNT: 38.9 FL (ref 35.9–50)
GLOBULIN SER CALC-MCNC: 2.5 G/DL (ref 1.9–3.5)
GLUCOSE BLD-MCNC: 322 MG/DL (ref 65–99)
GLUCOSE SERPL-MCNC: 613 MG/DL (ref 65–99)
HCT VFR BLD AUTO: 39.3 % (ref 42–52)
HGB BLD-MCNC: 14.5 G/DL (ref 14–18)
IMM GRANULOCYTES # BLD AUTO: 0.02 K/UL (ref 0–0.11)
IMM GRANULOCYTES NFR BLD AUTO: 0.4 % (ref 0–0.9)
LIPASE SERPL-CCNC: 170 U/L (ref 7–58)
LYMPHOCYTES # BLD AUTO: 1.58 K/UL (ref 1–4.8)
LYMPHOCYTES NFR BLD: 30.8 % (ref 22–41)
MCH RBC QN AUTO: 32.6 PG (ref 27–33)
MCHC RBC AUTO-ENTMCNC: 36.9 G/DL (ref 33.7–35.3)
MCV RBC AUTO: 88.3 FL (ref 81.4–97.8)
MONOCYTES # BLD AUTO: 0.31 K/UL (ref 0–0.85)
MONOCYTES NFR BLD AUTO: 6 % (ref 0–13.4)
NEUTROPHILS # BLD AUTO: 3.15 K/UL (ref 1.82–7.42)
NEUTROPHILS NFR BLD: 61.4 % (ref 44–72)
NRBC # BLD AUTO: 0 K/UL
NRBC BLD-RTO: 0 /100 WBC
PLATELET # BLD AUTO: 165 K/UL (ref 164–446)
PMV BLD AUTO: 9.5 FL (ref 9–12.9)
POTASSIUM SERPL-SCNC: 3.8 MMOL/L (ref 3.6–5.5)
PROT SERPL-MCNC: 6.4 G/DL (ref 6–8.2)
RBC # BLD AUTO: 4.45 M/UL (ref 4.7–6.1)
SODIUM SERPL-SCNC: 131 MMOL/L (ref 135–145)
TRIGL SERPL-MCNC: 883 MG/DL (ref 0–149)
WBC # BLD AUTO: 5.1 K/UL (ref 4.8–10.8)

## 2020-04-16 PROCEDURE — 82962 GLUCOSE BLOOD TEST: CPT

## 2020-04-16 PROCEDURE — 36415 COLL VENOUS BLD VENIPUNCTURE: CPT

## 2020-04-16 PROCEDURE — 83690 ASSAY OF LIPASE: CPT

## 2020-04-16 PROCEDURE — 99285 EMERGENCY DEPT VISIT HI MDM: CPT

## 2020-04-16 PROCEDURE — 700102 HCHG RX REV CODE 250 W/ 637 OVERRIDE(OP): Performed by: EMERGENCY MEDICINE

## 2020-04-16 PROCEDURE — 96374 THER/PROPH/DIAG INJ IV PUSH: CPT

## 2020-04-16 PROCEDURE — 84478 ASSAY OF TRIGLYCERIDES: CPT

## 2020-04-16 PROCEDURE — 82010 KETONE BODYS QUAN: CPT

## 2020-04-16 PROCEDURE — 80053 COMPREHEN METABOLIC PANEL: CPT

## 2020-04-16 PROCEDURE — 700111 HCHG RX REV CODE 636 W/ 250 OVERRIDE (IP): Performed by: EMERGENCY MEDICINE

## 2020-04-16 PROCEDURE — 96375 TX/PRO/DX INJ NEW DRUG ADDON: CPT

## 2020-04-16 PROCEDURE — 700105 HCHG RX REV CODE 258: Performed by: EMERGENCY MEDICINE

## 2020-04-16 PROCEDURE — 85025 COMPLETE CBC W/AUTO DIFF WBC: CPT

## 2020-04-16 RX ORDER — ONDANSETRON 2 MG/ML
4 INJECTION INTRAMUSCULAR; INTRAVENOUS ONCE
Status: COMPLETED | OUTPATIENT
Start: 2020-04-16 | End: 2020-04-16

## 2020-04-16 RX ORDER — SODIUM CHLORIDE 9 MG/ML
1000 INJECTION, SOLUTION INTRAVENOUS ONCE
Status: COMPLETED | OUTPATIENT
Start: 2020-04-16 | End: 2020-04-16

## 2020-04-16 RX ORDER — SODIUM CHLORIDE, SODIUM LACTATE, POTASSIUM CHLORIDE, CALCIUM CHLORIDE 600; 310; 30; 20 MG/100ML; MG/100ML; MG/100ML; MG/100ML
1000 INJECTION, SOLUTION INTRAVENOUS ONCE
Status: COMPLETED | OUTPATIENT
Start: 2020-04-16 | End: 2020-04-16

## 2020-04-16 RX ORDER — MORPHINE SULFATE 10 MG/ML
8 INJECTION, SOLUTION INTRAMUSCULAR; INTRAVENOUS ONCE
Status: COMPLETED | OUTPATIENT
Start: 2020-04-16 | End: 2020-04-16

## 2020-04-16 RX ORDER — ONDANSETRON 8 MG/1
8 TABLET, ORALLY DISINTEGRATING ORAL EVERY 8 HOURS PRN
Qty: 15 TAB | Refills: 0 | Status: SHIPPED | OUTPATIENT
Start: 2020-04-16 | End: 2022-01-03

## 2020-04-16 RX ADMIN — ONDANSETRON HYDROCHLORIDE 4 MG: 2 SOLUTION INTRAMUSCULAR; INTRAVENOUS at 17:07

## 2020-04-16 RX ADMIN — INSULIN HUMAN 8 UNITS: 100 INJECTION, SOLUTION PARENTERAL at 18:16

## 2020-04-16 RX ADMIN — SODIUM CHLORIDE, POTASSIUM CHLORIDE, SODIUM LACTATE AND CALCIUM CHLORIDE 1000 ML: 600; 310; 30; 20 INJECTION, SOLUTION INTRAVENOUS at 18:14

## 2020-04-16 RX ADMIN — MORPHINE SULFATE 8 MG: 10 INJECTION INTRAVENOUS at 18:18

## 2020-04-16 RX ADMIN — SODIUM CHLORIDE 1000 ML: 9 INJECTION, SOLUTION INTRAVENOUS at 17:07

## 2020-04-16 ASSESSMENT — FIBROSIS 4 INDEX: FIB4 SCORE: 3.13

## 2020-04-16 NOTE — ED TRIAGE NOTES
"Chief Complaint   Patient presents with   • High Blood Sugar     Pt c/o high blood sugar for last week; blood sugar reading >600 on pt home glucometer; pt working with endocrinologist to manage meds without relief   • Nausea   • Headache     /88   Pulse (!) 110   Temp 36.6 °C (97.9 °F) (Temporal)   Resp 18   Ht 1.727 m (5' 8\")   Wt 84.9 kg (187 lb 2.7 oz)   SpO2 96%   BMI 28.46 kg/m²     Pt with hx of DKA. Reports same symptoms he usually gets when he goes into DKA including abdominal pain, headache, nausea. Denies any cough, fever, no travel or exposure to anyone with COVID.  "

## 2020-04-17 NOTE — ED NOTES
Med Rec completed per patient interview  Allergies reviewed  No ORAL antibiotics in last 14 days  Preferred pharmacy [facility] on [address] [facility]    Patient last used Humalog sliding scale on 4/6 but picked up a new prescription for it on 4/15   Patient unable to recall sliding scale parameters    Patient has been off diabetes medications since March 2020 but recently got his insurance fixed and was able to  his medications on 4/15 and start taking them again      Skylar Morales, PharmMynorD., BCPS

## 2020-04-17 NOTE — ED NOTES
Assumed care while primary RN is at lunch, pt is on his phone, refused a blanket and states his nausea is better.Call light in reach.

## 2020-04-17 NOTE — ED NOTES
Results noted by erp,  further orders recvd. Continues w/o apparent distress, med for 6/10 h/a and 7/10 left sided abd pain

## 2020-04-17 NOTE — ED NOTES
"Oxygen applied via n/v at 2.5 L per pt request, as he sates \"I always get hypoxic with morphine\". Remain on cardiac moniter, call light in reach  "

## 2020-04-17 NOTE — ED NOTES
Saline lock with blood draw-requesting pain med for h/a and left sided abd pain . Will notify erp of same

## 2020-04-17 NOTE — ED NOTES
Dc instructions and plan reviewed with pt. To f/u with pcp and endo, return for worsening s/s. Aware of no driving due to meds recvd in er as well as need to  prescription of antiemetic at [facility] across the street

## 2020-05-29 ENCOUNTER — TELEMEDICINE (OUTPATIENT)
Dept: ENDOCRINOLOGY | Facility: MEDICAL CENTER | Age: 55
End: 2020-05-29
Payer: COMMERCIAL

## 2020-05-29 DIAGNOSIS — E11.65 UNCONTROLLED TYPE 2 DIABETES MELLITUS WITH HYPERGLYCEMIA (HCC): ICD-10-CM

## 2020-05-29 DIAGNOSIS — E78.2 MIXED HYPERLIPIDEMIA DUE TO TYPE 2 DIABETES MELLITUS (HCC): ICD-10-CM

## 2020-05-29 DIAGNOSIS — E11.69 MIXED HYPERLIPIDEMIA DUE TO TYPE 2 DIABETES MELLITUS (HCC): ICD-10-CM

## 2020-05-29 DIAGNOSIS — E03.9 ACQUIRED HYPOTHYROIDISM: ICD-10-CM

## 2020-05-29 DIAGNOSIS — Z79.84 LONG TERM (CURRENT) USE OF ORAL HYPOGLYCEMIC DRUGS: ICD-10-CM

## 2020-05-29 PROCEDURE — 99214 OFFICE O/P EST MOD 30 MIN: CPT | Mod: 95,CR | Performed by: INTERNAL MEDICINE

## 2020-05-29 NOTE — PROGRESS NOTES
CHIEF COMPLAINT: Patient is here for follow up of Type 2 Diabetes Mellitus. Patient was presented for a telehealth consultation via secure and encrypted videoconferencing technology. This encounter was conducted via Zoom . Verbal consent was obtained. Patient's identity was verified.     HPI:     Mahesh Bradshaw is a 54 y.o. male with Type 2 Diabetes Mellitus here for follow up.    Labs from 1/19/2020 showed HbA1c sejal from 6.9% to 16%  Due to medication noncompliance  In summary he was not on any diabetes medications for months prior to seeing me again in April   He had been rationing his insulin and has had no medications since March 2020      On follow up he just restarted Synjardy 12.5/1000 mg twice a day, Ozempic 0.25 mg weekly, Actos 30 mg daily since last month    He admits to less hyperglycemia.  He denies hypoglycemia.  He admits to resolution of  polyuria, polydipsia and blurry vision.  He was testing Bg twice a day    He has a history of mixed hyperlipidemia but is currently not on a statin.    He is overdue for lipid panel.    He also has a history of primary hypothyroidism and was on [place] Thyroid 60 mg daily.  He needs a refill of that this medication.  His TSH was normal 1.9 back on January 29, 2019.  TPO antibodies were negative.  He currently denies symptoms of uncontrolled hypothyroidism      BG Diary:04/15/20  Breakfast: 300, 250    Weight has decreased 10 pounds over last 3 months    Diabetes Complications   Retinopathy: No known retinopathy.  Last eye exam: He is overdue for an eye exam  Neuropathy: Denies paresthesias or numbness in hands or feet. Denies any foot wounds.  Exercise: Minimal.  Diet: Fair.  Patient's medications, allergies, and social histories were reviewed and updated as appropriate.    ROS:     CONS:     No fever, no chills   EYES:     No diplopia, reports blurry vision   CV:           No chest pain, no palpitations   PULM:     No SOB, no cough, no hemoptysis.   GI:             No nausea, no vomiting, no diarrhea, no constipation   ENDO:      Reports polyuria, reports polydipsia, no heat intolerance, no cold intolerance       Past Medical History:  Problem List:  2020-04: Mixed hyperlipidemia due to type 2 diabetes mellitus ([facility])  2020-04: Long term (current) use of oral hypoglycemic drugs  2019-05: URI (upper respiratory infection)  2019-05: Metabolic acidosis  2019-01: Dental caries  2019-01: Hypertriglyceridemia - severe  2019-01: Sinusitis, acute  2018-12: LUQ pain  2018-12: Abdominal cramps  2018-11: Diabetic ketoacidosis without coma associated with type 2   diabetes mellitus ([facility])  2018-11: Dyslipidemia  2018-11: S/P acute bronchitis due to other specified organisms  2018-05: Closed fracture of sternum  2018-05: Type 2 diabetes mellitus, with long-term current use of   insulin ([facility])  2018-05: Hypothyroidism  2018-05: Depression  2018-05: Laceration of right ear  2018-05: Hand pain, right  2018-05: Knee pain, right  2018-05: Trauma  2018-05: No contraindication to deep vein thrombosis (DVT) prophylaxis  2018-05: Injury of costal cartilage  2017-12: Obesity (BMI 30.0-34.9)  2017-12: Uncontrolled type 2 diabetes mellitus with hyperglycemia   ([facility])  2017-12: History of nephrolithiasis  2017-12: Insomnia disorder  2017-12: Abscess of left thumb  2017-12: Cellulitis of left hand  2017-10: AP (abdominal pain)  2017-07: Sepsis(995.91)  2017-07: Hyperglycemia  2017-07: Hyponatremia  2017-05: Septicemia due to bacteroides ([facility])  2017-05: Abscess of right thigh  2017-05: Septic shock ([facility])  2017-05: Type 2 diabetes mellitus without complication, with long-  term current use of insulin ([facility])  2017-05: Bacteremia  2017-05: Hyponatremia  2017-04: Bacteremia due to Gram-negative bacteria  2017-04: Severe sepsis ([facility])  2017-04: Complicated wound infection  2017-04: Normocytic anemia  2017-04: Transaminitis  2017-04: Diabetic ketoacidosis ([facility])  2017-03: Subcutaneous abscess  2017-01: Back  pain  2017-01: Hyperglycemia due to type 2 diabetes mellitus ([facility])  2017-01: Hyponatremia  2017-01: Lactic acidosis  2017-01: DKA (diabetic ketoacidoses) ([facility])  2016-11: Abdominal wall cellulitis  2016-11: Incarcerated umbilical hernia  2016-03: Hx of acute pancreatitis  2016-03: Serum phosphate elevated  2016-03: Altered mental status  2016-02: Hypernatremia  2016-02: Chronic ulcerative colitis (CMS-HCC)  2016-02: Encephalopathy  2016-02: Elevated lipase  2016-02: Tachycardia  2016-02: Leukocytosis  2016-02: HHNC (hyperglycemic hyperosmolar nonketotic coma) ([facility])  2016-02: ADRIANE (acute kidney injury) ([facility])  2016-01: Essential hypertension  2016-01: Sepsis (CMS-HCC)  2015-06: Chest pain  2015-06: Gout  2015-06: GERD (gastroesophageal reflux disease)  2015-06: Ulcerative colitis ([facility])  2014-11: Hypokalemia  2014-11: Hyponatremia  2014-11: Elevated liver enzymes  2014-11: Thrombocytopenia (CMS-HCC)  2014-11: Depression  2014-11: Colitis  2014-11: GIB (gastrointestinal bleeding)      Past Surgical History:  Past Surgical History:   Procedure Laterality Date   • UMBILICAL HERNIA REPAIR N/A 4/15/2018    Procedure: UMBILICAL HERNIA REPAIR;  Surgeon: Karen Beasley M.D.;  [facility];  Service: General   • IRRIGATION & DEBRIDEMENT ORTHO Left 12/10/2017    Procedure: IRRIGATION & DEBRIDEMENT ORTHO LEFT HAND;  Surgeon: Chicho Ramos M.D.;  Location: [facility];  Service: Orthopedics   • IRRIGATION & DEBRIDEMENT GENERAL Right 5/1/2017    Procedure: IRRIGATION & DEBRIDEMENT GENERAL-Left HAND AND right  ANKLE;  Surgeon: Esau Dooley M.D.;  Location: [facility];  Service:    • IRRIGATION & DEBRIDEMENT GENERAL Right 4/29/2017    Procedure: IRRIGATION & DEBRIDEMENT GENERAL;  Surgeon: Esau Dooley M.D.;  Location: [facility];  Service:    • INCISION AND DRAINAGE GENERAL  4/7/2017    Procedure: INCISION AND DRAINAGE GENERAL;  Surgeon: Esau Dooley M.D.;   Location: SURGERY SAME DAY [facility];  Service:    • UMBILICAL HERNIA REPAIR  3/10/2017    Procedure: UMBILICAL HERNIA REPAIR- INCISION AND DRAINAGE OF UMBILICAL WOUND AND MESH REMOVAL;  Surgeon: Esau Dooley M.D.;  Location: SURGERY SAME DAY [facility];  Service:    • UMBILICAL HERNIA REPAIR N/A 11/6/2016    Procedure: UMBILICAL HERNIA REPAIR;  Surgeon: Esau Dooley M.D.;  Location: SURGERY [facility];  Service:    • COLONOSCOPY WITH BIOPSY  11/26/2014    Performed by Solo Higginbotham M.D. at ENDOSCOPY [facility]   • LIVE BY LAPAROSCOPY  2005   • OTHER ORTHOPEDIC SURGERY  1997 or 1998    Left Wrist ORIF        Allergies:  Hydrocodone; Peanut (diagnostic); and Tradjenta [linagliptin]     Social History:  Social History     Tobacco Use   • Smoking status: Never Smoker   • Smokeless tobacco: Never Used   Substance Use Topics   • Alcohol use: Yes     Comment: rarely   • Drug use: No        Family History:   family history includes Cancer in his mother.      PHYSICAL EXAM:   OBJECTIVE:  Vital signs: There were no vitals taken for this visit.  GENERAL: Well-developed, well-nourished in no apparent distress.   EYE:  No ocular asymmetry, PERRLA  HENT: Pink, moist mucous membranes.    NECK: No thyromegaly.   CARDIOVASCULAR: Normal precordial impulse seen with normal carotid pulsation  LUNGS: Symmetrical chest expansion with normal phonation of voice   ABDOMEN: non Obese abdomen with no visible organomegaly  EXTREMITIES: No clubbing, cyanosis, or edema.   NEUROLOGICAL: No gross focal motor abnormalities   LYMPH: No cervical adenopathy seen.   SKIN: No rashes, lesions.       Labs:  Lab Results   Component Value Date/Time    HBA1C 16.2 (H) 01/19/2020 05:00 PM        Lab Results   Component Value Date/Time    WBC 5.1 04/16/2020 05:05 PM    RBC 4.45 (L) 04/16/2020 05:05 PM    HEMOGLOBIN 14.5 04/16/2020 05:05 PM    MCV 88.3 04/16/2020 05:05 PM    MCH 32.6 04/16/2020 05:05 PM    MCHC 36.9 (H)  04/16/2020 05:05 PM    RDW 38.9 04/16/2020 05:05 PM    MPV 9.5 04/16/2020 05:05 PM       Lab Results   Component Value Date/Time    SODIUM 131 (L) 04/16/2020 05:05 PM    POTASSIUM 3.8 04/16/2020 05:05 PM    CHLORIDE 92 (L) 04/16/2020 05:05 PM    CO2 20 04/16/2020 05:05 PM    ANION 19.0 (H) 04/16/2020 05:05 PM    GLUCOSE 613 (HH) 04/16/2020 05:05 PM    BUN 19 04/16/2020 05:05 PM    CREATININE 0.98 04/16/2020 05:05 PM    CREATININE 1.1 02/18/2008 10:00 AM    CALCIUM 9.4 04/16/2020 05:05 PM    ASTSGOT 19 04/16/2020 05:05 PM    ALTSGPT 34 04/16/2020 05:05 PM    TBILIRUBIN 0.3 04/16/2020 05:05 PM    ALBUMIN 3.9 04/16/2020 05:05 PM    TOTPROTEIN 6.4 04/16/2020 05:05 PM    GLOBULIN 2.5 04/16/2020 05:05 PM    AGRATIO 1.6 04/16/2020 05:05 PM       Lab Results   Component Value Date/Time    CHOLSTRLTOT 301 (H) 01/03/2019 0202    TRIGLYCERIDE 1172 (H) 01/03/2019 0202    HDL see below 01/03/2019 0202    LDL see below 01/03/2019 0202       Lab Results   Component Value Date/Time    MALBCRT 55 (H) 01/29/2019 09:33 AM    MICROALBUR 2.4 01/29/2019 09:33 AM        Lab Results   Component Value Date/Time    TSHULTRASEN 1.980 01/29/2019 0934     No results found for: FREEDIR  No results found for: FREET3  No results found for: THYSTIMIG        ASSESSMENT/PLAN:     1. Uncontrolled type 2 diabetes mellitus with hyperglycemia (HCC)  Uncontrolled secondary to hyperglycemia, there is an element of noncompliance in the situation  Continue  Synjardy 12.5/1000 mg twice a day  Increase Ozempic to 0.5 mg weekly  Continue Actos 30 mg daily  We will plan for follow-up in 6 or 7 weeks with labs    2. Acquired hypothyroidism  Unstable  Continue [place] Thyroid 60 mg daily  Discussed importance of adherence  We will plan to repeat his TSH level again after 6 weeks    3. Mixed hyperlipidemia due to type 2 diabetes mellitus (HCC)  Unstable  He has a history of uncontrolled hyperlipidemia.   We will try to get a fasting lipid profile next time in 7  weeks    4. Long term (current) use of oral hypoglycemic drugs  Patient is on multiple oral agents for type 2 diabetes management      No follow-ups on file.      This patient during there office visit today was started on a new medication.  Side effects of the new medication were discussed with the patient today in the office.     Thank you kindly for allowing me to participate in the diabetes care plan for this patient.    Keenan Magaña MD, [facility], [facility]  04/15/20    CC:   Rodolfo Stein M.D.

## 2020-08-17 ENCOUNTER — OFFICE VISIT (OUTPATIENT)
Dept: ENDOCRINOLOGY | Facility: MEDICAL CENTER | Age: 55
End: 2020-08-17
Attending: INTERNAL MEDICINE
Payer: COMMERCIAL

## 2020-08-17 VITALS
DIASTOLIC BLOOD PRESSURE: 60 MMHG | OXYGEN SATURATION: 99 % | SYSTOLIC BLOOD PRESSURE: 120 MMHG | HEIGHT: 68 IN | WEIGHT: 213.9 LBS | BODY MASS INDEX: 32.42 KG/M2 | HEART RATE: 95 BPM

## 2020-08-17 DIAGNOSIS — Z79.84 LONG TERM (CURRENT) USE OF ORAL HYPOGLYCEMIC DRUGS: ICD-10-CM

## 2020-08-17 DIAGNOSIS — E78.2 MIXED HYPERLIPIDEMIA DUE TO TYPE 2 DIABETES MELLITUS (HCC): ICD-10-CM

## 2020-08-17 DIAGNOSIS — E03.9 ACQUIRED HYPOTHYROIDISM: ICD-10-CM

## 2020-08-17 DIAGNOSIS — E11.69 MIXED HYPERLIPIDEMIA DUE TO TYPE 2 DIABETES MELLITUS (HCC): ICD-10-CM

## 2020-08-17 DIAGNOSIS — E11.649 TYPE 2 DIABETES MELLITUS WITH HYPOGLYCEMIA WITHOUT COMA, WITHOUT LONG-TERM CURRENT USE OF INSULIN (HCC): ICD-10-CM

## 2020-08-17 LAB
HBA1C MFR BLD: 7.4 % (ref 0–5.6)
INT CON NEG: NEGATIVE
INT CON POS: POSITIVE

## 2020-08-17 PROCEDURE — 83036 HEMOGLOBIN GLYCOSYLATED A1C: CPT | Performed by: INTERNAL MEDICINE

## 2020-08-17 PROCEDURE — 99214 OFFICE O/P EST MOD 30 MIN: CPT | Performed by: INTERNAL MEDICINE

## 2020-08-17 PROCEDURE — 99212 OFFICE O/P EST SF 10 MIN: CPT | Performed by: INTERNAL MEDICINE

## 2020-08-17 RX ORDER — SEMAGLUTIDE 1.34 MG/ML
0.5 INJECTION, SOLUTION SUBCUTANEOUS
Qty: 1 EACH | Refills: 11 | Status: ON HOLD
Start: 2020-08-17 | End: 2021-03-12

## 2020-08-17 RX ORDER — PIOGLITAZONEHYDROCHLORIDE 30 MG/1
30 TABLET ORAL DAILY
Qty: 90 TAB | Refills: 2 | Status: SHIPPED | OUTPATIENT
Start: 2020-08-17 | End: 2020-11-15

## 2020-08-17 RX ORDER — EMPAGLIFLOZIN AND METFORMIN HYDROCHLORIDE 12.5; 1 MG/1; MG/1
1 TABLET ORAL 2 TIMES DAILY
Qty: 60 TAB | Refills: 11 | Status: ON HOLD
Start: 2020-08-17 | End: 2021-03-12

## 2020-08-17 ASSESSMENT — FIBROSIS 4 INDEX: FIB4 SCORE: 1.09

## 2020-08-17 NOTE — PROGRESS NOTES
CHIEF COMPLAINT: Patient is here for follow up of Type 2 Diabetes Mellitus.     HPI:     Mahesh Bradshaw is a 54 y.o. male with Type 2 Diabetes Mellitus here for follow up.    Labs from 8/17/2020 show a1c has improved to 7.4%  Labs from 1/19/2020 showed HbA1c sejal from 6.9% to 16%  Due to medication noncompliance  In summary he was not on any diabetes medications for months prior to seeing me again in April       On follow up he just restarted Synjardy 12.5/1000 mg twice a day, Ozempic 0.5 mg weekly, Actos 30 mg daily     He reports an episode of hypoglycemia which would not be explained by his current diabetes medications.    He admits that he just recently started a supplement to help with bulking up or increase in muscle mass  He admits to resolution of  polyuria, polydipsia and blurry vision.      He has a history of mixed hyperlipidemia but is currently not on a statin.    He is overdue for lipid panel.  We do not have a lipid panel on hand    He also has a history of primary hypothyroidism and was on [place] Thyroid 60 mg daily.  He needs a refill of that this medication.  His TSH was normal 1.9 back on January 29, 2019.  TPO antibodies were negative.  He currently denies symptoms of uncontrolled hypothyroidism  We do not have a recent TSH level on hand      BG Diary:  Patient did not bring glucose meter despite repeated request to do so    Weight has been stable    Diabetes Complications   Retinopathy: No known retinopathy.  Last eye exam: He is overdue for an eye exam  Neuropathy: Denies paresthesias or numbness in hands or feet. Denies any foot wounds.  Exercise: Minimal.  Diet: Fair.  Patient's medications, allergies, and social histories were reviewed and updated as appropriate.    ROS:     CONS:     No fever, no chills   EYES:     No diplopia, reports blurry vision   CV:           No chest pain, no palpitations   PULM:     No SOB, no cough, no hemoptysis.   GI:            No nausea, no vomiting, no  diarrhea, no constipation   ENDO:      Reports polyuria, reports polydipsia, no heat intolerance, no cold intolerance       Past Medical History:  Problem List:  2020-04: Mixed hyperlipidemia due to type 2 diabetes mellitus ([facility])  2020-04: Long term (current) use of oral hypoglycemic drugs  2019-05: URI (upper respiratory infection)  2019-05: Metabolic acidosis  2019-01: Dental caries  2019-01: Hypertriglyceridemia - severe  2019-01: Sinusitis, acute  2018-12: LUQ pain  2018-12: Abdominal cramps  2018-11: Diabetic ketoacidosis without coma associated with type 2   diabetes mellitus ([facility])  2018-11: Dyslipidemia  2018-11: S/P acute bronchitis due to other specified organisms  2018-05: Closed fracture of sternum  2018-05: Type 2 diabetes mellitus, with long-term current use of   insulin ([facility])  2018-05: Hypothyroidism  2018-05: Depression  2018-05: Laceration of right ear  2018-05: Hand pain, right  2018-05: Knee pain, right  2018-05: Trauma  2018-05: No contraindication to deep vein thrombosis (DVT) prophylaxis  2018-05: Injury of costal cartilage  2017-12: Obesity (BMI 30.0-34.9)  2017-12: Uncontrolled type 2 diabetes mellitus with hyperglycemia   ([facility])  2017-12: History of nephrolithiasis  2017-12: Insomnia disorder  2017-12: Abscess of left thumb  2017-12: Cellulitis of left hand  2017-10: AP (abdominal pain)  2017-07: Sepsis(995.91)  2017-07: Hyperglycemia  2017-07: Hyponatremia  2017-05: Septicemia due to bacteroides ([facility])  2017-05: Abscess of right thigh  2017-05: Septic shock ([facility])  2017-05: Type 2 diabetes mellitus without complication, with long-  term current use of insulin ([facility])  2017-05: Bacteremia  2017-05: Hyponatremia  2017-04: Bacteremia due to Gram-negative bacteria  2017-04: Severe sepsis ([facility])  2017-04: Complicated wound infection  2017-04: Normocytic anemia  2017-04: Transaminitis  2017-04: Diabetic ketoacidosis ([facility])  2017-03: Subcutaneous abscess  2017-01: Back pain  2017-01: Hyperglycemia due to  type 2 diabetes mellitus (HCC)  2017-01: Hyponatremia  2017-01: Lactic acidosis  2017-01: DKA (diabetic ketoacidoses) ([facility])  2016-11: Abdominal wall cellulitis  2016-11: Incarcerated umbilical hernia  2016-03: Hx of acute pancreatitis  2016-03: Serum phosphate elevated  2016-03: Altered mental status  2016-02: Hypernatremia  2016-02: Chronic ulcerative colitis (CMS-HCC)  2016-02: Encephalopathy  2016-02: Elevated lipase  2016-02: Tachycardia  2016-02: Leukocytosis  2016-02: HHNC (hyperglycemic hyperosmolar nonketotic coma) ([facility])  2016-02: ADRIANE (acute kidney injury) ([facility])  2016-01: Essential hypertension  2016-01: Sepsis (CMS-HCC)  2015-06: Chest pain  2015-06: Gout  2015-06: GERD (gastroesophageal reflux disease)  2015-06: Ulcerative colitis ([facility])  2014-11: Hypokalemia  2014-11: Hyponatremia  2014-11: Elevated liver enzymes  2014-11: Thrombocytopenia (CMS-HCC)  2014-11: Depression  2014-11: Colitis  2014-11: GIB (gastrointestinal bleeding)      Past Surgical History:  Past Surgical History:   Procedure Laterality Date   • UMBILICAL HERNIA REPAIR N/A 4/15/2018    Procedure: UMBILICAL HERNIA REPAIR;  Surgeon: Karen Beasley M.D.;  Location: SURGERY [facility];  Service: General   • IRRIGATION & DEBRIDEMENT ORTHO Left 12/10/2017    Procedure: IRRIGATION & DEBRIDEMENT ORTHO LEFT HAND;  Surgeon: Chicho Ramos M.D.;  Location: [facility];  Service: Orthopedics   • IRRIGATION & DEBRIDEMENT GENERAL Right 5/1/2017    Procedure: IRRIGATION & DEBRIDEMENT GENERAL-Left HAND AND right  ANKLE;  Surgeon: Esau Dooley M.D.;  Location: SURGERY [facility];  Service:    • IRRIGATION & DEBRIDEMENT GENERAL Right 4/29/2017    Procedure: IRRIGATION & DEBRIDEMENT GENERAL;  Surgeon: Esau Dooley M.D.;  Location: SURGERY [facility];  Service:    • INCISION AND DRAINAGE GENERAL  4/7/2017    Procedure: INCISION AND DRAINAGE GENERAL;  Surgeon: Esau Dooley M.D.;  Location: SURGERY SAME DAY [facility];  " Service:    • UMBILICAL HERNIA REPAIR  3/10/2017    Procedure: UMBILICAL HERNIA REPAIR- INCISION AND DRAINAGE OF UMBILICAL WOUND AND MESH REMOVAL;  Surgeon: Esau Dooley M.D.;  Location: SURGERY SAME DAY [facility];  Service:    • UMBILICAL HERNIA REPAIR N/A 11/6/2016    Procedure: UMBILICAL HERNIA REPAIR;  Surgeon: Esau Dooley M.D.;  Location: SURGERY [facility];  Service:    • COLONOSCOPY WITH BIOPSY  11/26/2014    Performed by Solo Higginbotham M.D. at ENDOSCOPY [facility]   • LIVE BY LAPAROSCOPY  2005   • OTHER ORTHOPEDIC SURGERY  1997 or 1998    Left Wrist ORIF        Allergies:  Hydrocodone; Peanut (diagnostic); and Tradjenta [linagliptin]     Social History:  Social History     Tobacco Use   • Smoking status: Never Smoker   • Smokeless tobacco: Never Used   Substance Use Topics   • Alcohol use: Yes     Comment: rarely   • Drug use: No        Family History:   family history includes Cancer in his mother.      PHYSICAL EXAM:   OBJECTIVE:  Vital signs: /60 (BP Location: Left arm, Patient Position: Sitting, BP Cuff Size: Adult)   Pulse 95   Ht 1.727 m (5' 8\")   Wt 97 kg (213 lb 14.4 oz)   SpO2 99%   BMI 32.52 kg/m²   GENERAL: Well-developed, well-nourished in no apparent distress.   EYE:  No ocular asymmetry, PERRLA  HENT: Pink, moist mucous membranes.    NECK: No thyromegaly.   CARDIOVASCULAR: Normal precordial impulse seen with normal carotid pulsation  LUNGS: Symmetrical chest expansion with normal phonation of voice   ABDOMEN: non Obese abdomen with no visible organomegaly  EXTREMITIES: No clubbing, cyanosis, or edema.   NEUROLOGICAL: No gross focal motor abnormalities   LYMPH: No cervical adenopathy seen.   SKIN: No rashes, lesions.   Monofilament testing with a 10 gram force: sensation: intact bilaterally  Visual Inspection: Feet without maceration, ulcers, or fissures.  Pedal pulses: intact bilaterally      Labs:  Lab Results   Component Value Date/Time    HBA1C 7.4 " (A) 08/17/2020 03:49 PM        Lab Results   Component Value Date/Time    WBC 5.1 04/16/2020 05:05 PM    RBC 4.45 (L) 04/16/2020 05:05 PM    HEMOGLOBIN 14.5 04/16/2020 05:05 PM    MCV 88.3 04/16/2020 05:05 PM    MCH 32.6 04/16/2020 05:05 PM    MCHC 36.9 (H) 04/16/2020 05:05 PM    RDW 38.9 04/16/2020 05:05 PM    MPV 9.5 04/16/2020 05:05 PM       Lab Results   Component Value Date/Time    SODIUM 131 (L) 04/16/2020 05:05 PM    POTASSIUM 3.8 04/16/2020 05:05 PM    CHLORIDE 92 (L) 04/16/2020 05:05 PM    CO2 20 04/16/2020 05:05 PM    ANION 19.0 (H) 04/16/2020 05:05 PM    GLUCOSE 613 (HH) 04/16/2020 05:05 PM    BUN 19 04/16/2020 05:05 PM    CREATININE 0.98 04/16/2020 05:05 PM    CREATININE 1.1 02/18/2008 10:00 AM    CALCIUM 9.4 04/16/2020 05:05 PM    ASTSGOT 19 04/16/2020 05:05 PM    ALTSGPT 34 04/16/2020 05:05 PM    TBILIRUBIN 0.3 04/16/2020 05:05 PM    ALBUMIN 3.9 04/16/2020 05:05 PM    TOTPROTEIN 6.4 04/16/2020 05:05 PM    GLOBULIN 2.5 04/16/2020 05:05 PM    AGRATIO 1.6 04/16/2020 05:05 PM       Lab Results   Component Value Date/Time    CHOLSTRLTOT 301 (H) 01/03/2019 0202    TRIGLYCERIDE 1172 (H) 01/03/2019 0202    HDL see below 01/03/2019 0202    LDL see below 01/03/2019 0202       Lab Results   Component Value Date/Time    MALBCRT 55 (H) 01/29/2019 09:33 AM    MICROALBUR 2.4 01/29/2019 09:33 AM        Lab Results   Component Value Date/Time    TSHULTRASEN 1.980 01/29/2019 0934     No results found for: FREEDIR  No results found for: FREET3  No results found for: THYSTIMIG        ASSESSMENT/PLAN:     1. Type 2 diabetes mellitus with hypoglycemia without coma, without long-term current use of insulin (HCC)  Uncontrolled secondary to hyperglycemia and hypoglycemia  Blood sugars are better  A1c is down to 7.4%  I suspect his episode of hypoglycemia is not from Ozempic, Synjardy and Actos.  He admits to taking a supplement which is not FDA approved.  I explained to the patient there are case reports of V8 supplements that  have been found to contain Viagra and sulfonylureas like glyburide  I am checking sulfonylurea screen with his labs I suspect that he is taking an    Unregulated supplement which may contain a sulfonylurea and this could explain his hypoglycemia  I recommend he watch his carb intake  I recommend regular exercise  We will plan for follow-up in 3 months with a repeat of his A1c      2. Acquired hypothyroidism  Uncontrolled  Continue current dose of thyroid hormone  He is getting a TSH tomorrow and I will update him    3. Mixed hyperlipidemia due to type 2 diabetes mellitus (HCC)  Uncontrolled  He forgot to do his labs  He is getting a lipid panel tomorrow and I will update him  Continue current statin    4. Long term (current) use of oral hypoglycemic drugs  Patient is on multiple oral agents for type 2 diabetes management      Return in about 3 months (around 11/17/2020).      This patient during there office visit today was started on a new medication.  Side effects of the new medication were discussed with the patient today in the office.     Thank you kindly for allowing me to participate in the diabetes care plan for this patient.    Keenan Magaña MD, [place], [facility]  04/15/20    CC:   Rodolfo Stein M.D.

## 2020-08-17 NOTE — LETTER
[facility] - ENDOCRINOLOGY  [address] [place] [id number]  [phone]     August 17, 2020    Patient: Mahesh Bradshaw   YOB: 1965   Date of Visit: 8/17/2020       To Whom It May Concern:    Mahesh Bradshaw was seen and treated in our department on 8/14/2020 and 8/17/2020      Sincerely,     Keenan Magaña M.D.

## 2020-08-21 ENCOUNTER — TELEPHONE (OUTPATIENT)
Dept: ENDOCRINOLOGY | Facility: MEDICAL CENTER | Age: 55
End: 2020-08-21

## 2020-08-21 NOTE — TELEPHONE ENCOUNTER
Patient needs a letter from Dr. Magaña stating the patient is able to go back to work starting 8/19/20.

## 2020-08-31 ENCOUNTER — TELEPHONE (OUTPATIENT)
Dept: HEALTH INFORMATION MANAGEMENT | Facility: MEDICAL CENTER | Age: 55
End: 2020-08-31

## 2020-09-09 ENCOUNTER — APPOINTMENT (OUTPATIENT)
Dept: RADIOLOGY | Facility: MEDICAL CENTER | Age: 55
End: 2020-09-09
Attending: EMERGENCY MEDICINE
Payer: COMMERCIAL

## 2020-09-09 ENCOUNTER — HOSPITAL ENCOUNTER (EMERGENCY)
Facility: MEDICAL CENTER | Age: 55
End: 2020-09-09
Attending: EMERGENCY MEDICINE
Payer: COMMERCIAL

## 2020-09-09 VITALS
WEIGHT: 207.89 LBS | SYSTOLIC BLOOD PRESSURE: 123 MMHG | RESPIRATION RATE: 16 BRPM | DIASTOLIC BLOOD PRESSURE: 79 MMHG | BODY MASS INDEX: 31.51 KG/M2 | TEMPERATURE: 97.4 F | HEIGHT: 68 IN | OXYGEN SATURATION: 96 % | HEART RATE: 77 BPM

## 2020-09-09 DIAGNOSIS — S00.93XA CONTUSION OF HEAD, UNSPECIFIED PART OF HEAD, INITIAL ENCOUNTER: ICD-10-CM

## 2020-09-09 PROCEDURE — 96374 THER/PROPH/DIAG INJ IV PUSH: CPT

## 2020-09-09 PROCEDURE — 72125 CT NECK SPINE W/O DYE: CPT

## 2020-09-09 PROCEDURE — 99284 EMERGENCY DEPT VISIT MOD MDM: CPT

## 2020-09-09 PROCEDURE — 70450 CT HEAD/BRAIN W/O DYE: CPT

## 2020-09-09 PROCEDURE — 96375 TX/PRO/DX INJ NEW DRUG ADDON: CPT

## 2020-09-09 PROCEDURE — 700111 HCHG RX REV CODE 636 W/ 250 OVERRIDE (IP): Performed by: EMERGENCY MEDICINE

## 2020-09-09 RX ORDER — DIPHENHYDRAMINE HYDROCHLORIDE 50 MG/ML
12.5 INJECTION INTRAMUSCULAR; INTRAVENOUS ONCE
Status: COMPLETED | OUTPATIENT
Start: 2020-09-09 | End: 2020-09-09

## 2020-09-09 RX ORDER — PROCHLORPERAZINE EDISYLATE 5 MG/ML
10 INJECTION INTRAMUSCULAR; INTRAVENOUS ONCE
Status: COMPLETED | OUTPATIENT
Start: 2020-09-09 | End: 2020-09-09

## 2020-09-09 RX ADMIN — DIPHENHYDRAMINE HYDROCHLORIDE 12.5 MG: 50 INJECTION INTRAMUSCULAR; INTRAVENOUS at 19:07

## 2020-09-09 RX ADMIN — PROCHLORPERAZINE EDISYLATE 10 MG: 5 INJECTION INTRAMUSCULAR; INTRAVENOUS at 19:08

## 2020-09-09 ASSESSMENT — FIBROSIS 4 INDEX: FIB4 SCORE: 1.09

## 2020-09-10 NOTE — ED NOTES
"This pt reports that he stepped out of the shower while he was wet and slipped, found in the middle of the bathroom floor where his wife states he did not hit anything except the floor with the occiput of his head and has left eye pain. He states he did blow his nose clear fluid. He is diabetic and his immediate blood sugar was taken and found to be 270. Wife also said that \"if they did not need a work note, they would have probably stayed home.\"   "

## 2020-09-10 NOTE — ED NOTES
"Denies any unusual numbness or tingling in arms or legs, reports \"I always have it I'm diabetic.\" hard collar in place, wife at the bedside.  "

## 2020-09-10 NOTE — ED PROVIDER NOTES
"ED Provider Note    CHIEF COMPLAINT  Chief Complaint   Patient presents with   • Fall     Reports \"I fell getting out of the bathtub, my wife says I wasn't conscious when she found me\". Reports bloody nose and \"some fluid came out\". Reports head pain, \"neck stiffness\". Denies blood thinner use or back pain.        HPI  Mahesh Bradshaw is a 55 y.o. male who presents to the emergency department with head and neck discomfort after a fall.  The patient slipped in the bathtub on the wet floor around 4:30 PM hit the back of his head likely suffered a brief loss of consciousness.  The patient's wife says he seemed a little bit glazed and confused afterwards.  Now he has a headache mostly on the left side of the head and some left-sided neck discomfort and associated nausea.    REVIEW OF SYSTEMS no other injury or mechanism of injury    PAST MEDICAL HISTORY  Past Medical History:   Diagnosis Date   • ADRIANE (acute kidney injury) ([facility]) 2/24/2016   • Anesthesia     \"Was difficult to intubate 2-2016\"   • Arthritis 3-3-17    \"Spine\"   • Aspiration pneumonia ([facility]) 3-2016   • ASTHMA 3-3-17    \"Hasn't had to use inhaler in 2 years\"   • Breath shortness 3-3-17    \"With Exercise\"   • Congestive heart failure ([facility]) 2-2016     3-3-17 \"Not a current issue\"   • Dental disorder    • Depression 11/26/2014   • Diabetes ([facility]) 3-3-17    Takes Insulin   • Diabetes ([facility])    • Difficult intubation 2-2016   • DKA (diabetic ketoacidoses) ([facility]) 2-2016    \"10 days on vent with DKA, Renal failure, CHF, elevated liver enzymes and electrolyte imbalance\"   • Electrolyte imbalance 2-2016   • Elevated LFTs    • Elevated liver enzymes 2-2016   • Essential hypertension 1/22/2016   • Gout    • Heart burn    • High cholesterol 3-3-17    Does not currently take medication for   • Hypothyroid 3-3-17    Takes [place] Thyroid   • Indigestion    • Kidney stones    • Klebsiella infect    • Migraine    • MRSA cellulitis    • Necrotizing myositis ([facility])    • On " "mechanically assisted ventilation ([facility]) 2-2016    HX \"On Vent for 10 days at [facility]\".  \"DX Pancreatitis, DKA, CHF, Elevated Liver Enzymes & Electrolyte Imbalance\".   • Pain 3-3-17    \"Left Flank\"   • Pancreatitis 2-2016    \"D/T Tradjenta was hospitialized for 15 days\"   • RF (renal failure) 2-2016   • Sepsis ([facility]) 1/21/2016   • Snoring 3-3-17    Has not had a sleep study   • Streptococcus infection    • UC (ulcerative colitis) ([facility]) 3-3-17    \"Five BM's per day, takes Lialda\"   • Urinary incontinence    • Vitamin D deficiency        FAMILY HISTORY  Family History   Problem Relation Age of Onset   • Cancer Mother        SOCIAL HISTORY  Social History     Socioeconomic History   • Marital status:      Spouse name: Not on file   • Number of children: Not on file   • Years of education: Not on file   • Highest education level: Not on file   Occupational History   • Not on file   Social Needs   • Financial resource strain: Not on file   • Food insecurity     Worry: Not on file     Inability: Not on file   • Transportation needs     Medical: Not on file     Non-medical: Not on file   Tobacco Use   • Smoking status: Never Smoker   • Smokeless tobacco: Never Used   Substance and Sexual Activity   • Alcohol use: Yes     Comment: rarely   • Drug use: No   • Sexual activity: Not on file   Lifestyle   • Physical activity     Days per week: Not on file     Minutes per session: Not on file   • Stress: Not on file   Relationships   • Social connections     Talks on phone: Not on file     Gets together: Not on file     Attends Islam service: Not on file     Active member of club or organization: Not on file     Attends meetings of clubs or organizations: Not on file     Relationship status: Not on file   • Intimate partner violence     Fear of current or ex partner: Not on file     Emotionally abused: Not on file     Physically abused: Not on file     Forced sexual activity: Not on file   Other Topics Concern   • Not on " file   Social History Narrative    ** Merged History Encounter **         ** Merged History Encounter **     ** Merged History Encounter **          SURGICAL HISTORY  Past Surgical History:   Procedure Laterality Date   • UMBILICAL HERNIA REPAIR N/A 4/15/2018    Procedure: UMBILICAL HERNIA REPAIR;  Surgeon: Karen Beasley M.D.;  Location: SURGERY [facility];  Service: General   • IRRIGATION & DEBRIDEMENT ORTHO Left 12/10/2017    Procedure: IRRIGATION & DEBRIDEMENT ORTHO LEFT HAND;  Surgeon: Chicho Ramos M.D.;  Location: SURGERY [facility];  Service: Orthopedics   • IRRIGATION & DEBRIDEMENT GENERAL Right 5/1/2017    Procedure: IRRIGATION & DEBRIDEMENT GENERAL-Left HAND AND right  ANKLE;  Surgeon: Esau Dooley M.D.;  Location: SURGERY [facility];  Service:    • IRRIGATION & DEBRIDEMENT GENERAL Right 4/29/2017    Procedure: IRRIGATION & DEBRIDEMENT GENERAL;  Surgeon: Esau Dooley M.D.;  Location: SURGERY [facility];  Service:    • INCISION AND DRAINAGE GENERAL  4/7/2017    Procedure: INCISION AND DRAINAGE GENERAL;  Surgeon: Esau Dooley M.D.;  Location: SURGERY SAME DAY [facility];  Service:    • UMBILICAL HERNIA REPAIR  3/10/2017    Procedure: UMBILICAL HERNIA REPAIR- INCISION AND DRAINAGE OF UMBILICAL WOUND AND MESH REMOVAL;  Surgeon: Esau Dooley M.D.;  Location: SURGERY SAME DAY [facility];  Service:    • UMBILICAL HERNIA REPAIR N/A 11/6/2016    Procedure: UMBILICAL HERNIA REPAIR;  Surgeon: Esau Dooley M.D.;  Location: SURGERY [facility];  Service:    • COLONOSCOPY WITH BIOPSY  11/26/2014    Performed by Solo Higginbotham M.D. at ENDOSCOPY [facility]   • LIVE BY LAPAROSCOPY  2005   • OTHER ORTHOPEDIC SURGERY  1997 or 1998    Left Wrist ORIF       CURRENT MEDICATIONS  Home Medications    **Home medications have not yet been reviewed for this encounter**         ALLERGIES  Allergies   Allergen Reactions   • Hydrocodone Hives     Tolerates Morphine and  "oxycodone     • Peanut (Diagnostic) Anaphylaxis   • Tradjenta [Linagliptin] Unspecified     Pt developed pancreatitis       PHYSICAL EXAM  VITAL SIGNS: /79   Pulse 77   Temp 36.3 °C (97.4 °F) (Temporal)   Resp 16   Ht 1.727 m (5' 8\")   Wt 94.3 kg (207 lb 14.3 oz)   SpO2 96%   BMI 31.61 kg/m²    Oxygen saturation is interpreted as adequate  Constitutional: Awake verbal generally nontoxic-appearing  HENT: I do not see any evidence of hematoma or scalp laceration  Eyes: Pupils round extraocular motion present  Neck: Achy midline no JVD some left paraspinous muscular discomfort and mild midline tenderness to firm palpation of the lower lumbar spine.  Cardiovascular: Regular rate and rhythm  Lungs: Clear and equal bilaterally  Musculoskeletal: No acute bony deformity  Neurologic: Awake verbal conversant moving all extremities without difficulty    Radiology  CT-CSPINE WITHOUT PLUS RECONS   Final Result      No acute fracture or subluxation of the cervical spine.      CT-HEAD W/O   Final Result      1.  No acute intracranial abnormality.         MEDICAL DECISION MAKING and DISPOSITION  In the emergency department an IV was established the patient was given intravenous Compazine and Benadryl for headache and nausea and this was helpful particularly for the nausea.  I reviewed all the findings with the patient and his wife and at this point in time I think it is safe for him to go home he is to return here at once if he has new or worsening symptoms    IMPRESSION  1.  Contusion to the head  2.  Neck discomfort after a fall         Electronically signed by: Salvatore Bonilla M.D., 9/9/2020 8:07 PM      "

## 2020-11-16 ENCOUNTER — APPOINTMENT (OUTPATIENT)
Dept: RADIOLOGY | Facility: MEDICAL CENTER | Age: 55
End: 2020-11-16
Attending: EMERGENCY MEDICINE
Payer: COMMERCIAL

## 2020-11-16 ENCOUNTER — HOSPITAL ENCOUNTER (OUTPATIENT)
Facility: MEDICAL CENTER | Age: 55
End: 2020-11-17
Attending: EMERGENCY MEDICINE | Admitting: INTERNAL MEDICINE
Payer: COMMERCIAL

## 2020-11-16 DIAGNOSIS — E86.0 DEHYDRATION: ICD-10-CM

## 2020-11-16 DIAGNOSIS — U07.1 COVID-19: ICD-10-CM

## 2020-11-16 DIAGNOSIS — R73.9 HYPERGLYCEMIA: ICD-10-CM

## 2020-11-16 DIAGNOSIS — K56.7 ILEUS (HCC): ICD-10-CM

## 2020-11-16 LAB
ALBUMIN SERPL BCP-MCNC: 4.4 G/DL (ref 3.2–4.9)
ALBUMIN/GLOB SERPL: 1.3 G/DL
ALP SERPL-CCNC: 79 U/L (ref 30–99)
ALT SERPL-CCNC: 23 U/L (ref 2–50)
ANION GAP SERPL CALC-SCNC: 19 MMOL/L (ref 7–16)
AST SERPL-CCNC: 39 U/L (ref 12–45)
B-OH-BUTYR SERPL-MCNC: 1.38 MMOL/L (ref 0.02–0.27)
BASE EXCESS BLDV CALC-SCNC: 0 MMOL/L
BASOPHILS # BLD AUTO: 0.2 % (ref 0–1.8)
BASOPHILS # BLD: 0.01 K/UL (ref 0–0.12)
BILIRUB SERPL-MCNC: 0.5 MG/DL (ref 0.1–1.5)
BODY TEMPERATURE: ABNORMAL CENTIGRADE
BUN SERPL-MCNC: 21 MG/DL (ref 8–22)
CALCIUM SERPL-MCNC: 9.2 MG/DL (ref 8.4–10.2)
CHLORIDE SERPL-SCNC: 92 MMOL/L (ref 96–112)
CO2 SERPL-SCNC: 23 MMOL/L (ref 20–33)
COVID ORDER STATUS COVID19: NORMAL
CREAT SERPL-MCNC: 0.88 MG/DL (ref 0.5–1.4)
EKG IMPRESSION: NORMAL
EOSINOPHIL # BLD AUTO: 0.01 K/UL (ref 0–0.51)
EOSINOPHIL NFR BLD: 0.2 % (ref 0–6.9)
ERYTHROCYTE [DISTWIDTH] IN BLOOD BY AUTOMATED COUNT: 39 FL (ref 35.9–50)
FLUAV RNA SPEC QL NAA+PROBE: NEGATIVE
FLUBV RNA SPEC QL NAA+PROBE: NEGATIVE
GLOBULIN SER CALC-MCNC: 3.5 G/DL (ref 1.9–3.5)
GLUCOSE BLD-MCNC: 343 MG/DL (ref 65–99)
GLUCOSE SERPL-MCNC: 290 MG/DL (ref 65–99)
HCO3 BLDV-SCNC: 24 MMOL/L (ref 24–28)
HCT VFR BLD AUTO: 44.1 % (ref 42–52)
HGB BLD-MCNC: 15.8 G/DL (ref 14–18)
IMM GRANULOCYTES # BLD AUTO: 0.03 K/UL (ref 0–0.11)
IMM GRANULOCYTES NFR BLD AUTO: 0.7 % (ref 0–0.9)
LYMPHOCYTES # BLD AUTO: 0.76 K/UL (ref 1–4.8)
LYMPHOCYTES NFR BLD: 17.8 % (ref 22–41)
MAGNESIUM SERPL-MCNC: 2.1 MG/DL (ref 1.5–2.5)
MCH RBC QN AUTO: 31.3 PG (ref 27–33)
MCHC RBC AUTO-ENTMCNC: 35.8 G/DL (ref 33.7–35.3)
MCV RBC AUTO: 87.5 FL (ref 81.4–97.8)
MONOCYTES # BLD AUTO: 0.24 K/UL (ref 0–0.85)
MONOCYTES NFR BLD AUTO: 5.6 % (ref 0–13.4)
NEUTROPHILS # BLD AUTO: 3.22 K/UL (ref 1.82–7.42)
NEUTROPHILS NFR BLD: 75.5 % (ref 44–72)
NRBC # BLD AUTO: 0 K/UL
NRBC BLD-RTO: 0 /100 WBC
PCO2 BLDV: 37.4 MMHG (ref 41–51)
PH BLDV: 7.43 [PH] (ref 7.31–7.45)
PHOSPHATE SERPL-MCNC: 3.5 MG/DL (ref 2.5–4.5)
PLATELET # BLD AUTO: 111 K/UL (ref 164–446)
PMV BLD AUTO: 9.8 FL (ref 9–12.9)
PO2 BLDV: 25.9 MMHG (ref 25–40)
POTASSIUM SERPL-SCNC: 4.5 MMOL/L (ref 3.6–5.5)
PROT SERPL-MCNC: 7.9 G/DL (ref 6–8.2)
RBC # BLD AUTO: 5.04 M/UL (ref 4.7–6.1)
RSV RNA SPEC QL NAA+PROBE: NEGATIVE
SAO2 % BLDV: 47.6 %
SARS-COV-2 RNA RESP QL NAA+PROBE: DETECTED
SODIUM SERPL-SCNC: 134 MMOL/L (ref 135–145)
SPECIMEN SOURCE: ABNORMAL
TROPONIN T SERPL-MCNC: 6 NG/L (ref 6–19)
WBC # BLD AUTO: 4.3 K/UL (ref 4.8–10.8)

## 2020-11-16 PROCEDURE — 82803 BLOOD GASES ANY COMBINATION: CPT

## 2020-11-16 PROCEDURE — 84100 ASSAY OF PHOSPHORUS: CPT

## 2020-11-16 PROCEDURE — 85025 COMPLETE CBC W/AUTO DIFF WBC: CPT

## 2020-11-16 PROCEDURE — 96374 THER/PROPH/DIAG INJ IV PUSH: CPT

## 2020-11-16 PROCEDURE — 700117 HCHG RX CONTRAST REV CODE 255: Performed by: EMERGENCY MEDICINE

## 2020-11-16 PROCEDURE — 99285 EMERGENCY DEPT VISIT HI MDM: CPT

## 2020-11-16 PROCEDURE — 82010 KETONE BODYS QUAN: CPT

## 2020-11-16 PROCEDURE — 93005 ELECTROCARDIOGRAM TRACING: CPT | Performed by: EMERGENCY MEDICINE

## 2020-11-16 PROCEDURE — 36415 COLL VENOUS BLD VENIPUNCTURE: CPT

## 2020-11-16 PROCEDURE — 0241U HCHG SARS-COV-2 COVID-19 NFCT DS RESP RNA 4 TRGT MIC: CPT

## 2020-11-16 PROCEDURE — 74177 CT ABD & PELVIS W/CONTRAST: CPT

## 2020-11-16 PROCEDURE — 700102 HCHG RX REV CODE 250 W/ 637 OVERRIDE(OP): Performed by: EMERGENCY MEDICINE

## 2020-11-16 PROCEDURE — 96376 TX/PRO/DX INJ SAME DRUG ADON: CPT

## 2020-11-16 PROCEDURE — 83690 ASSAY OF LIPASE: CPT

## 2020-11-16 PROCEDURE — 80053 COMPREHEN METABOLIC PANEL: CPT

## 2020-11-16 PROCEDURE — 700111 HCHG RX REV CODE 636 W/ 250 OVERRIDE (IP): Performed by: EMERGENCY MEDICINE

## 2020-11-16 PROCEDURE — 700105 HCHG RX REV CODE 258: Performed by: EMERGENCY MEDICINE

## 2020-11-16 PROCEDURE — 84484 ASSAY OF TROPONIN QUANT: CPT

## 2020-11-16 PROCEDURE — 71045 X-RAY EXAM CHEST 1 VIEW: CPT

## 2020-11-16 PROCEDURE — A9270 NON-COVERED ITEM OR SERVICE: HCPCS | Performed by: EMERGENCY MEDICINE

## 2020-11-16 PROCEDURE — 83735 ASSAY OF MAGNESIUM: CPT

## 2020-11-16 PROCEDURE — 93005 ELECTROCARDIOGRAM TRACING: CPT

## 2020-11-16 PROCEDURE — 82962 GLUCOSE BLOOD TEST: CPT

## 2020-11-16 RX ORDER — SODIUM CHLORIDE 9 MG/ML
1000 INJECTION, SOLUTION INTRAVENOUS ONCE
Status: COMPLETED | OUTPATIENT
Start: 2020-11-16 | End: 2020-11-16

## 2020-11-16 RX ORDER — ACETAMINOPHEN 325 MG/1
650 TABLET ORAL ONCE
Status: COMPLETED | OUTPATIENT
Start: 2020-11-16 | End: 2020-11-16

## 2020-11-16 RX ORDER — MORPHINE SULFATE 4 MG/ML
4 INJECTION, SOLUTION INTRAMUSCULAR; INTRAVENOUS
Status: DISPENSED | OUTPATIENT
Start: 2020-11-16 | End: 2020-11-17

## 2020-11-16 RX ADMIN — ACETAMINOPHEN 650 MG: 325 TABLET, FILM COATED ORAL at 20:18

## 2020-11-16 RX ADMIN — SODIUM CHLORIDE 1000 ML: 9 INJECTION, SOLUTION INTRAVENOUS at 20:03

## 2020-11-16 RX ADMIN — MORPHINE SULFATE 4 MG: 4 INJECTION INTRAVENOUS at 22:36

## 2020-11-16 RX ADMIN — IOHEXOL 100 ML: 350 INJECTION, SOLUTION INTRAVENOUS at 14:30

## 2020-11-16 ASSESSMENT — FIBROSIS 4 INDEX: FIB4 SCORE: 1.09

## 2020-11-17 ENCOUNTER — HOSPITAL ENCOUNTER (INPATIENT)
Facility: MEDICAL CENTER | Age: 55
LOS: 2 days | DRG: 871 | End: 2020-11-19
Attending: HOSPITALIST | Admitting: INTERNAL MEDICINE
Payer: COMMERCIAL

## 2020-11-17 VITALS
HEART RATE: 92 BPM | WEIGHT: 199.08 LBS | OXYGEN SATURATION: 95 % | BODY MASS INDEX: 30.17 KG/M2 | DIASTOLIC BLOOD PRESSURE: 69 MMHG | SYSTOLIC BLOOD PRESSURE: 112 MMHG | HEIGHT: 68 IN | TEMPERATURE: 98.5 F | RESPIRATION RATE: 20 BRPM

## 2020-11-17 PROBLEM — J96.00 ACUTE RESPIRATORY FAILURE (HCC): Status: ACTIVE | Noted: 2020-11-17

## 2020-11-17 PROBLEM — U07.1 COVID-19 VIRUS INFECTION: Status: ACTIVE | Noted: 2020-11-17

## 2020-11-17 LAB
ANION GAP SERPL CALC-SCNC: 11 MMOL/L (ref 7–16)
BASOPHILS # BLD AUTO: 0 % (ref 0–1.8)
BASOPHILS # BLD: 0 K/UL (ref 0–0.12)
BUN SERPL-MCNC: 22 MG/DL (ref 8–22)
CALCIUM SERPL-MCNC: 8.4 MG/DL (ref 8.5–10.5)
CHLORIDE SERPL-SCNC: 97 MMOL/L (ref 96–112)
CO2 SERPL-SCNC: 24 MMOL/L (ref 20–33)
CREAT SERPL-MCNC: 0.78 MG/DL (ref 0.5–1.4)
CRP SERPL HS-MCNC: 4.93 MG/DL (ref 0–0.75)
D DIMER PPP IA.FEU-MCNC: <0.27 UG/ML (FEU) (ref 0–0.5)
EOSINOPHIL # BLD AUTO: 0 K/UL (ref 0–0.51)
EOSINOPHIL NFR BLD: 0 % (ref 0–6.9)
ERYTHROCYTE [DISTWIDTH] IN BLOOD BY AUTOMATED COUNT: 42.8 FL (ref 35.9–50)
GLUCOSE BLD-MCNC: 240 MG/DL (ref 65–99)
GLUCOSE BLD-MCNC: 255 MG/DL (ref 65–99)
GLUCOSE BLD-MCNC: 331 MG/DL (ref 65–99)
GLUCOSE BLD-MCNC: 335 MG/DL (ref 65–99)
GLUCOSE SERPL-MCNC: 231 MG/DL (ref 65–99)
HCT VFR BLD AUTO: 40.8 % (ref 42–52)
HGB BLD-MCNC: 14.3 G/DL (ref 14–18)
IMM GRANULOCYTES # BLD AUTO: 0.05 K/UL (ref 0–0.11)
IMM GRANULOCYTES NFR BLD AUTO: 1.5 % (ref 0–0.9)
LACTATE BLD-SCNC: 1.4 MMOL/L (ref 0.5–2)
LIPASE SERPL-CCNC: 46 U/L (ref 7–58)
LYMPHOCYTES # BLD AUTO: 0.66 K/UL (ref 1–4.8)
LYMPHOCYTES NFR BLD: 19.6 % (ref 22–41)
MCH RBC QN AUTO: 32.5 PG (ref 27–33)
MCHC RBC AUTO-ENTMCNC: 35 G/DL (ref 33.7–35.3)
MCV RBC AUTO: 92.7 FL (ref 81.4–97.8)
MONOCYTES # BLD AUTO: 0.29 K/UL (ref 0–0.85)
MONOCYTES NFR BLD AUTO: 8.6 % (ref 0–13.4)
NEUTROPHILS # BLD AUTO: 2.36 K/UL (ref 1.82–7.42)
NEUTROPHILS NFR BLD: 70.3 % (ref 44–72)
NRBC # BLD AUTO: 0 K/UL
NRBC BLD-RTO: 0 /100 WBC
PLATELET # BLD AUTO: 104 K/UL (ref 164–446)
PMV BLD AUTO: 9.5 FL (ref 9–12.9)
POTASSIUM SERPL-SCNC: 3.6 MMOL/L (ref 3.6–5.5)
PROCALCITONIN SERPL-MCNC: <0.05 NG/ML
RBC # BLD AUTO: 4.4 M/UL (ref 4.7–6.1)
SODIUM SERPL-SCNC: 132 MMOL/L (ref 135–145)
TSH SERPL DL<=0.005 MIU/L-ACNC: 3.44 UIU/ML (ref 0.38–5.33)
WBC # BLD AUTO: 3.4 K/UL (ref 4.8–10.8)

## 2020-11-17 PROCEDURE — 700105 HCHG RX REV CODE 258: Performed by: INTERNAL MEDICINE

## 2020-11-17 PROCEDURE — 82962 GLUCOSE BLOOD TEST: CPT | Mod: 91

## 2020-11-17 PROCEDURE — 94760 N-INVAS EAR/PLS OXIMETRY 1: CPT

## 2020-11-17 PROCEDURE — A9270 NON-COVERED ITEM OR SERVICE: HCPCS | Performed by: STUDENT IN AN ORGANIZED HEALTH CARE EDUCATION/TRAINING PROGRAM

## 2020-11-17 PROCEDURE — 80048 BASIC METABOLIC PNL TOTAL CA: CPT

## 2020-11-17 PROCEDURE — 700102 HCHG RX REV CODE 250 W/ 637 OVERRIDE(OP): Performed by: STUDENT IN AN ORGANIZED HEALTH CARE EDUCATION/TRAINING PROGRAM

## 2020-11-17 PROCEDURE — G0378 HOSPITAL OBSERVATION PER HR: HCPCS

## 2020-11-17 PROCEDURE — 99223 1ST HOSP IP/OBS HIGH 75: CPT | Performed by: INTERNAL MEDICINE

## 2020-11-17 PROCEDURE — 86140 C-REACTIVE PROTEIN: CPT

## 2020-11-17 PROCEDURE — 700111 HCHG RX REV CODE 636 W/ 250 OVERRIDE (IP): Performed by: EMERGENCY MEDICINE

## 2020-11-17 PROCEDURE — 36415 COLL VENOUS BLD VENIPUNCTURE: CPT

## 2020-11-17 PROCEDURE — 700102 HCHG RX REV CODE 250 W/ 637 OVERRIDE(OP): Performed by: INTERNAL MEDICINE

## 2020-11-17 PROCEDURE — A9270 NON-COVERED ITEM OR SERVICE: HCPCS | Performed by: INTERNAL MEDICINE

## 2020-11-17 PROCEDURE — 84145 PROCALCITONIN (PCT): CPT

## 2020-11-17 PROCEDURE — 83605 ASSAY OF LACTIC ACID: CPT

## 2020-11-17 PROCEDURE — 85379 FIBRIN DEGRADATION QUANT: CPT

## 2020-11-17 PROCEDURE — 84443 ASSAY THYROID STIM HORMONE: CPT

## 2020-11-17 PROCEDURE — 770014 HCHG ROOM/CARE - WARD (150)

## 2020-11-17 PROCEDURE — 700111 HCHG RX REV CODE 636 W/ 250 OVERRIDE (IP): Performed by: INTERNAL MEDICINE

## 2020-11-17 PROCEDURE — 85025 COMPLETE CBC W/AUTO DIFF WBC: CPT

## 2020-11-17 RX ORDER — SODIUM CHLORIDE 9 MG/ML
INJECTION, SOLUTION INTRAVENOUS CONTINUOUS
Status: DISCONTINUED | OUTPATIENT
Start: 2020-11-17 | End: 2020-11-19 | Stop reason: HOSPADM

## 2020-11-17 RX ORDER — POTASSIUM CHLORIDE 20 MEQ/1
40 TABLET, EXTENDED RELEASE ORAL ONCE
Status: COMPLETED | OUTPATIENT
Start: 2020-11-17 | End: 2020-11-17

## 2020-11-17 RX ORDER — ACETAMINOPHEN 325 MG/1
650 TABLET ORAL EVERY 6 HOURS
Status: DISCONTINUED | OUTPATIENT
Start: 2020-11-17 | End: 2020-11-19 | Stop reason: HOSPADM

## 2020-11-17 RX ORDER — THYROID 30 MG/1
60 TABLET ORAL
Status: DISCONTINUED | OUTPATIENT
Start: 2020-11-17 | End: 2020-11-19 | Stop reason: HOSPADM

## 2020-11-17 RX ORDER — LABETALOL HYDROCHLORIDE 5 MG/ML
10 INJECTION, SOLUTION INTRAVENOUS EVERY 4 HOURS PRN
Status: CANCELLED | OUTPATIENT
Start: 2020-11-17 | End: 2020-11-17

## 2020-11-17 RX ORDER — ONDANSETRON 2 MG/ML
4 INJECTION INTRAMUSCULAR; INTRAVENOUS EVERY 4 HOURS PRN
Status: ACTIVE | OUTPATIENT
Start: 2020-11-17 | End: 2020-11-17

## 2020-11-17 RX ORDER — INSULIN GLARGINE 100 [IU]/ML
10 INJECTION, SOLUTION SUBCUTANEOUS
Status: DISCONTINUED | OUTPATIENT
Start: 2020-11-18 | End: 2020-11-18

## 2020-11-17 RX ORDER — OXYCODONE HYDROCHLORIDE 5 MG/1
5 TABLET ORAL EVERY 4 HOURS PRN
Status: DISCONTINUED | OUTPATIENT
Start: 2020-11-17 | End: 2020-11-19 | Stop reason: HOSPADM

## 2020-11-17 RX ORDER — INSULIN GLARGINE 100 [IU]/ML
8 INJECTION, SOLUTION SUBCUTANEOUS
Status: DISCONTINUED | OUTPATIENT
Start: 2020-11-17 | End: 2020-11-17

## 2020-11-17 RX ORDER — ONDANSETRON 2 MG/ML
4 INJECTION INTRAMUSCULAR; INTRAVENOUS EVERY 4 HOURS PRN
Status: CANCELLED | OUTPATIENT
Start: 2020-11-17 | End: 2020-11-17

## 2020-11-17 RX ORDER — INSULIN GLARGINE 100 [IU]/ML
5 INJECTION, SOLUTION SUBCUTANEOUS
Status: DISCONTINUED | OUTPATIENT
Start: 2020-11-17 | End: 2020-11-17

## 2020-11-17 RX ORDER — DEXTROSE MONOHYDRATE 25 G/50ML
50 INJECTION, SOLUTION INTRAVENOUS
Status: DISCONTINUED | OUTPATIENT
Start: 2020-11-17 | End: 2020-11-19 | Stop reason: HOSPADM

## 2020-11-17 RX ORDER — LABETALOL HYDROCHLORIDE 5 MG/ML
10 INJECTION, SOLUTION INTRAVENOUS EVERY 4 HOURS PRN
Status: ACTIVE | OUTPATIENT
Start: 2020-11-17 | End: 2020-11-17

## 2020-11-17 RX ORDER — DEXAMETHASONE 4 MG/1
6 TABLET ORAL DAILY
Status: DISCONTINUED | OUTPATIENT
Start: 2020-11-17 | End: 2020-11-19 | Stop reason: HOSPADM

## 2020-11-17 RX ORDER — ACETAMINOPHEN 325 MG/1
650 TABLET ORAL EVERY 6 HOURS PRN
Status: CANCELLED | OUTPATIENT
Start: 2020-11-17 | End: 2020-11-17

## 2020-11-17 RX ORDER — ACETAMINOPHEN 325 MG/1
650 TABLET ORAL EVERY 6 HOURS PRN
Status: ACTIVE | OUTPATIENT
Start: 2020-11-17 | End: 2020-11-17

## 2020-11-17 RX ADMIN — ENOXAPARIN SODIUM 40 MG: 100 INJECTION SUBCUTANEOUS at 04:35

## 2020-11-17 RX ADMIN — THYROID, PORCINE 60 MG: 30 TABLET ORAL at 08:37

## 2020-11-17 RX ADMIN — OXYCODONE 5 MG: 5 TABLET ORAL at 22:10

## 2020-11-17 RX ADMIN — POTASSIUM CHLORIDE 40 MEQ: 1500 TABLET, EXTENDED RELEASE ORAL at 11:21

## 2020-11-17 RX ADMIN — DEXAMETHASONE 6 MG: 4 TABLET ORAL at 04:35

## 2020-11-17 RX ADMIN — INSULIN GLARGINE 8 UNITS: 100 INJECTION, SOLUTION SUBCUTANEOUS at 04:25

## 2020-11-17 RX ADMIN — SODIUM CHLORIDE: 9 INJECTION, SOLUTION INTRAVENOUS at 04:25

## 2020-11-17 RX ADMIN — ACETAMINOPHEN 650 MG: 325 TABLET, FILM COATED ORAL at 11:21

## 2020-11-17 RX ADMIN — OXYCODONE 5 MG: 5 TABLET ORAL at 04:40

## 2020-11-17 RX ADMIN — ACETAMINOPHEN 650 MG: 325 TABLET, FILM COATED ORAL at 17:09

## 2020-11-17 RX ADMIN — MORPHINE SULFATE 4 MG: 4 INJECTION INTRAVENOUS at 00:05

## 2020-11-17 ASSESSMENT — ENCOUNTER SYMPTOMS
CHILLS: 1
WHEEZING: 0
BLOOD IN STOOL: 0
COUGH: 1
FEVER: 1
PSYCHIATRIC NEGATIVE: 1
NEUROLOGICAL NEGATIVE: 1
ORTHOPNEA: 0
SHORTNESS OF BREATH: 1
CONSTIPATION: 0
DIARRHEA: 1
PALPITATIONS: 0
ABDOMINAL PAIN: 1
HEMOPTYSIS: 0
EYES NEGATIVE: 1
CLAUDICATION: 0
NAUSEA: 1
MUSCULOSKELETAL NEGATIVE: 1
WEIGHT LOSS: 0
DIAPHORESIS: 0
VOMITING: 0
SPUTUM PRODUCTION: 0

## 2020-11-17 ASSESSMENT — FIBROSIS 4 INDEX: FIB4 SCORE: 4.03

## 2020-11-17 ASSESSMENT — PAIN DESCRIPTION - PAIN TYPE
TYPE: ACUTE PAIN
TYPE: ACUTE PAIN

## 2020-11-17 NOTE — ASSESSMENT & PLAN NOTE
A1c 7.4 in 08/2020.   He was taking empagliflozone, Metformin, semaglutide at home.   We will hold oral medications.  Continue basal Lantus; will adjust units as appropriate.    Correctional insulin.  Hypoglycemia protocol.  Diabetic diet.

## 2020-11-17 NOTE — ED PROVIDER NOTES
ED Provider Note    CHIEF COMPLAINT  Chief Complaint   Patient presents with   • Coronavirus Screening     Pt c/o cough, fever and generalized body aches, started Friday   • High Blood Sugar     Pt states since becoming sick having difficulty controlling BS       HPI  Mahesh Bradshaw is a 55 y.o. male who presents for evaluation of cough, shortness of breath with exertion, fevers, chills, and high blood sugar.  Patient states his respiratory symptoms have been present since Friday however today he noted his blood sugar to be 600.  Patient notes that he took insulin twice today although does not remember the exact dosage.  Normally he does not use insulin but felt it was necessary today.  He additionally complains of intermittent colicky right upper quadrant and mid abdominal pain which is been present for at least several hours and feels he has had a fever and chills in addition to a cough and exertional shortness of breath.  He notes he was recently exposed to his son who was exposed to somebody who had Covid.    REVIEW OF SYSTEMS  Constitutional: Fevers and chills noted  Skin: No rashes  HEENT: No ear pain, ringing in ears, or decreased hearing.  Mild sore throat with runny nose.    Neck: No neck pain, stiffness, or masses.  Chest: No pain or rashes  Pulm: No wheezing, stridor, or pain with inspiration/expiration  Gastrointestinal: No nausea, vomiting, diarrhea.  Sharp cramping pain noted in right upper quadrant and mid abdomen  Genitourinary: No dysuria or hematuria  Musculoskeletal: No recent trauma,swelling, weakness  Neurologic: No sensory or motor changes. No confusion or disorientation.  Heme: No bleeding or bruising problems.   Immuno: No hx of recurrent infections      PAST MEDICAL HISTORY   has a past medical history of ADRIANE (acute kidney injury) ([facility]) (2/24/2016), Anesthesia, Arthritis (3-3-17), Aspiration pneumonia ([facility]) (3-2016), ASTHMA (3-3-17), Breath shortness (3-3-17), Congestive heart failure  "([facility]) (2-2016 ), Dental disorder, Depression (11/26/2014), Diabetes ([facility]) (3-3-17), Diabetes ([facility]), Difficult intubation (2-2016), DKA (diabetic ketoacidoses) ([facility]) (2-2016), Electrolyte imbalance (2-2016), Elevated LFTs, Elevated liver enzymes (2-2016), Essential hypertension (1/22/2016), Gout, Heart burn, High cholesterol (3-3-17), Hypothyroid (3-3-17), Indigestion, Kidney stones, Klebsiella infect, Migraine, MRSA cellulitis, Necrotizing myositis ([facility]), On mechanically assisted ventilation ([facility]) (2-2016), Pain (3-3-17), Pancreatitis (2-2016), RF (renal failure) (2-2016), Sepsis ([facility]) (1/21/2016), Snoring (3-3-17), Streptococcus infection, UC (ulcerative colitis) ([facility]) (3-3-17), Urinary incontinence, and Vitamin D deficiency.    SOCIAL HISTORY  Social History     Tobacco Use   • Smoking status: Never Smoker   • Smokeless tobacco: Never Used   Substance and Sexual Activity   • Alcohol use: Yes   • Drug use: No   • Sexual activity: Not on file       SURGICAL HISTORY   has a past surgical history that includes vaishnavi by laparoscopy (2005); colonoscopy with biopsy (11/26/2014); incision and drainage general (4/7/2017); irrigation & debridement general (Right, 4/29/2017); irrigation & debridement general (Right, 5/1/2017); other orthopedic surgery (1997 or 1998); umbilical hernia repair (N/A, 11/6/2016); umbilical hernia repair (3/10/2017); irrigation & debridement ortho (Left, 12/10/2017); and umbilical hernia repair (N/A, 4/15/2018).    CURRENT MEDICATIONS  Home Medications    **Home medications have not yet been reviewed for this encounter**         ALLERGIES  Allergies   Allergen Reactions   • Hydrocodone Hives     Tolerates Morphine and oxycodone     • Peanut (Diagnostic) Anaphylaxis   • Tradjenta [Linagliptin] Unspecified     Pt developed pancreatitis       PHYSICAL EXAM  VITAL SIGNS: /69   Pulse 95   Temp 36.9 °C (98.5 °F) (Temporal)   Resp 20   Ht 1.727 m (5' 8\")   Wt 90.3 kg (199 lb 1.2 oz)   SpO2 " 92%   BMI 30.27 kg/m²    Gen: Alert, occasionally grimacing with palpation of the abdomen  HEENT: No signs of trauma, Bilateral external ears normal, Nose normal. Conjunctiva normal, Non-icteric.   Neck:  No tenderness, Supple, No masses  Lymphatic: No cervical lymphadenopathy noted.   Cardiovascular: Tachycardia regular rhythm, no murmurs.  Capillary refill less than 3 seconds to all extremities, 2+ distal pulses.  Thorax & Lungs: Normal breath sounds, No respiratory distress, No wheezing bilateral chest rise  Abdomen: Bowel sounds normal, Soft, diffuse upper abdominal tenderness in the epigastrium and right upper quadrant, No masses, No pulsatile masses. No Guarding or rebound  Skin: Warm, Dry, No erythema, No rash noted to exposed areas.   Extremities: Intact distal pulses, No edema  Neurologic: Alert , no facial droop, grossly normal coordination and strength  Psychiatric: Affect normal, Judgment normal, Mood normal.     LABS  Results for orders placed or performed during the hospital encounter of 11/16/20   CBC with Differential   Result Value Ref Range    WBC 4.3 (L) 4.8 - 10.8 K/uL    RBC 5.04 4.70 - 6.10 M/uL    Hemoglobin 15.8 14.0 - 18.0 g/dL    Hematocrit 44.1 42.0 - 52.0 %    MCV 87.5 81.4 - 97.8 fL    MCH 31.3 27.0 - 33.0 pg    MCHC 35.8 (H) 33.7 - 35.3 g/dL    RDW 39.0 35.9 - 50.0 fL    Platelet Count 111 (L) 164 - 446 K/uL    MPV 9.8 9.0 - 12.9 fL    Neutrophils-Polys 75.50 (H) 44.00 - 72.00 %    Lymphocytes 17.80 (L) 22.00 - 41.00 %    Monocytes 5.60 0.00 - 13.40 %    Eosinophils 0.20 0.00 - 6.90 %    Basophils 0.20 0.00 - 1.80 %    Immature Granulocytes 0.70 0.00 - 0.90 %    Nucleated RBC 0.00 /100 WBC    Neutrophils (Absolute) 3.22 1.82 - 7.42 K/uL    Lymphs (Absolute) 0.76 (L) 1.00 - 4.80 K/uL    Monos (Absolute) 0.24 0.00 - 0.85 K/uL    Eos (Absolute) 0.01 0.00 - 0.51 K/uL    Baso (Absolute) 0.01 0.00 - 0.12 K/uL    Immature Granulocytes (abs) 0.03 0.00 - 0.11 K/uL    NRBC (Absolute) 0.00 K/uL    Complete Metabolic Panel (CMP)   Result Value Ref Range    Sodium 134 (L) 135 - 145 mmol/L    Potassium 4.5 3.6 - 5.5 mmol/L    Chloride 92 (L) 96 - 112 mmol/L    Co2 23 20 - 33 mmol/L    Anion Gap 19.0 (H) 7.0 - 16.0    Glucose 290 (H) 65 - 99 mg/dL    Bun 21 8 - 22 mg/dL    Creatinine 0.88 0.50 - 1.40 mg/dL    Calcium 9.2 8.4 - 10.2 mg/dL    AST(SGOT) 39 12 - 45 U/L    ALT(SGPT) 23 2 - 50 U/L    Alkaline Phosphatase 79 30 - 99 U/L    Total Bilirubin 0.5 0.1 - 1.5 mg/dL    Albumin 4.4 3.2 - 4.9 g/dL    Total Protein 7.9 6.0 - 8.2 g/dL    Globulin 3.5 1.9 - 3.5 g/dL    A-G Ratio 1.3 g/dL   Troponin   Result Value Ref Range    Troponin T 6 6 - 19 ng/L   COVID/SARS CoV-2 PCR    Specimen: Nasopharyngeal; Respirate   Result Value Ref Range    COVID Order Status Received    MAGNESIUM   Result Value Ref Range    Magnesium 2.1 1.5 - 2.5 mg/dL   PHOSPHORUS   Result Value Ref Range    Phosphorus 3.5 2.5 - 4.5 mg/dL   BETA-HYDROXYBUTYRIC ACID   Result Value Ref Range    beta-Hydroxybutyric Acid 1.38 (H) 0.02 - 0.27 mmol/L   VENOUS BLOOD GAS   Result Value Ref Range    Venous Bg Ph 7.43 7.31 - 7.45    Venous Bg Pco2 37.4 (L) 41.0 - 51.0 mmHg    Venous Bg Po2 25.9 25.0 - 40.0 mmHg    Venous Bg O2 Saturation 47.6 %    Venous Bg Hco3 24 24 - 28 mmol/L    Venous Bg Base Excess 0 mmol/L    Body Temp see below Centigrade   ESTIMATED GFR   Result Value Ref Range    GFR If African American >60 >60 mL/min/1.73 m 2    GFR If Non African American >60 >60 mL/min/1.73 m 2   CoV-2, Flu A/B, And RSV by PCR   Result Value Ref Range    Influenza virus A RNA Negative Negative    Influenza virus B, PCR Negative Negative    RSV, PCR Negative Negative    SARS-CoV-2 by PCR DETECTED (AA)     SARS-CoV-2 Source NP Swab    LIPASE   Result Value Ref Range    Lipase 46 7 - 58 U/L   ACCU-CHEK GLUCOSE   Result Value Ref Range    Glucose - Accu-Ck 343 (H) 65 - 99 mg/dL   EKG   Result Value Ref Range    Report       [facility]  Emergency Dept.    Test Date:  2020  Pt Name:    DEVIN MO              Department: [facility]  MRN:        [id number]                      Room:  Gender:     Male                         Technician: GT  :        1965                   Requested By:ER TRIAGE PROTOCOL  Order #:    [id number]                    Reading MD:    Measurements  Intervals                                Axis  Rate:       113                          P:          57  NE:         128                          QRS:        -13  QRSD:       91                           T:          30  QT:         338  QTc:        464    Interpretive Statements  Sinus tachycardia  Compared to ECG 2019 12:39:01  Right ventricular hypertrophy no longer present         RADIOLOGY  CT-ABDOMEN-PELVIS WITH   Final Result         1.  Peripheral reticular bilateral lung base opacities, appearance raises concern for Covid infiltrates. Otherwise nonspecific.   2.  Mild fluid-filled prominence of small bowel throughout the mucosal pattern, appearance does component of ileus and/or enteritis.   3.  Low density along the gallbladder fossa, could represent focal fatty infiltration, small cysts, hemangioma, otherwise indeterminate   4.  Fat-containing bilateral inguinal hernias   5.  Atherosclerosis and atherosclerotic coronary artery disease.      DX-CHEST-PORTABLE (1 VIEW)   Final Result         No acute cardiac or pulmonary abnormality is identified.          HYDRATION: Based on the patient's presentation of Hyperglycemia the patient was given IV fluids. IV Hydration was used because oral hydration was not adequate alone. Upon recheck following hydration, the patient was Stable.    COURSE & MEDICAL DECISION MAKING  Patient arrives for evaluation of several different issues including possible Covid, elevated blood sugar, possibly DKA.  He has some colicky abdominal pain which may require further evaluation by imaging however will perform labs first including a  lipase as he does have a history of pancreatitis from a medication remotely.  He is mildly tachycardic and likely dehydrated because of his elevated blood sugar and I will empirically start IV fluids as he does not feel he can rehydrate adequately orally.    9:12 PM  Patient notes his abdominal pain is getting worse and I will therefore order CT imaging to rule out an emergent or surgical problem.    11:45 PM  Patient still mildly tachycardic and also mildly hypoxic at 87% on room air after administration of morphine for his abdominal pain.  After few deep breaths this did come up to 91 and 92 however there are findings on CT that would suggest Covid is causing an increasingly troublesome problem with oxygenation.  I am concerned that there may be deterioration overnight and will seek hospitalization for observation.  Patient is also noted to have findings that could suggest mild ileus although there is no evidence for small bowel obstruction.  He does not appear to have pancreatitis and is unlikely to be septic.  He is still mildly tachycardic but he is not febrile.  His white blood cell count appears to be within the normal range although it is on the lower side.  I do not feel empiric antibiotics are necessary but, again, I feel close observation overnight for pain control is reasonable.  Patient states understanding this.  He was discussed with the hospitalist who will admit him for observation.  FINAL IMPRESSION  1. COVID-19    2. Hyperglycemia    3. Dehydration    4. Ileus (HCC)        Electronically signed by: Emerson Becerra M.D., 11/16/2020 7:35 PM

## 2020-11-17 NOTE — ED TRIAGE NOTES
Chief Complaint   Patient presents with   • Coronavirus Screening     Pt c/o cough, fever and generalized body aches, started Friday   • High Blood Sugar     Pt states since becoming sick having difficulty controlling BS     Pt reports last .

## 2020-11-17 NOTE — H&P
[facility] Medicine History & Physical Note    Date of Service  11/17/2020    Primary Care Physician  Rodolfo Stein M.D.    Consultants  None    Code Status  Full Code    Chief Complaint  Shortness of breath and elevated blood sugar    History of Presenting Illness    55-year-old male with history of uncontrolled diabetes and hypothyroidism presented 11/7 for hyperglycemia, the patient came to emergency because his blood sugar has been high and not controlled at home, his doctor prescribed him insulin.  Also the patient states he has had dry coughing with worsening shortness of breath 3 days ago, with chills and low-grade fever, he had some tightness chest pain, no palpitation or syncope, the patient also had some abdominal pain with mild watery diarrhea, no significant vomiting, on admission his labs showed normal white blood cell with normal liver enzymes, chest x-ray did not show significant infiltration however Covid 19 test was positive, the patient needed oxygen around 2 L nasal cannula.    Review of Systems  Review of Systems   Constitutional: Positive for chills, fever and malaise/fatigue. Negative for diaphoresis and weight loss.   HENT: Negative.    Eyes: Negative.    Respiratory: Positive for cough and shortness of breath. Negative for hemoptysis, sputum production and wheezing.    Cardiovascular: Positive for chest pain. Negative for palpitations, orthopnea, claudication and leg swelling.   Gastrointestinal: Positive for abdominal pain, diarrhea and nausea. Negative for blood in stool, constipation and vomiting.   Genitourinary: Negative.    Musculoskeletal: Negative.    Skin: Negative.    Neurological: Negative.    Psychiatric/Behavioral: Negative.        Past Medical History   has a past medical history of ADRIANE (acute kidney injury) ([facility]) (2/24/2016), Anesthesia, Arthritis (3-3-17), Aspiration pneumonia ([facility]) (3-2016), ASTHMA (3-3-17), Breath shortness (3-3-17), Congestive heart failure ([facility]) (2-2016 ),  Dental disorder, Depression (11/26/2014), Diabetes ([facility]) (3-3-17), Diabetes (HCC), Difficult intubation (2-2016), DKA (diabetic ketoacidoses) (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), On mechanically assisted ventilation ([facility]) (2-2016), Pain (3-3-17), Pancreatitis (2-2016), RF (renal failure) (2-2016), Sepsis ([facility]) (1/21/2016), Snoring (3-3-17), Streptococcus infection, UC (ulcerative colitis) ([facility]) (3-3-17), Urinary incontinence, and Vitamin D deficiency.    Surgical History   has a past surgical history that includes vaishnavi by laparoscopy (2005); colonoscopy with biopsy (11/26/2014); incision and drainage general (4/7/2017); irrigation & debridement general (Right, 4/29/2017); irrigation & debridement general (Right, 5/1/2017); other orthopedic surgery (1997 or 1998); umbilical hernia repair (N/A, 11/6/2016); umbilical hernia repair (3/10/2017); irrigation & debridement ortho (Left, 12/10/2017); and umbilical hernia repair (N/A, 4/15/2018).     Family History  family history includes Cancer in his mother.     Social History   reports that he has never smoked. He has never used smokeless tobacco. He reports current alcohol use. He reports that he does not use drugs.    Allergies  Allergies   Allergen Reactions   • Hydrocodone Hives     Tolerates Morphine and oxycodone     • Peanut (Diagnostic) Anaphylaxis   • Tradjenta [Linagliptin] Unspecified     Pt developed pancreatitis       Medications  Cannot display prior to admission medications because the patient has not been admitted in this contact.       Physical Exam  Temp:  [36.9 °C (98.5 °F)] 36.9 °C (98.5 °F)  Pulse:  [100] 100  Resp:  [20] 20  BP: (131)/(86) 131/86  SpO2:  [94 %] 94 %    Physical  Exam  Constitutional:       General: He is not in acute distress.     Appearance: He is not ill-appearing or diaphoretic.   HENT:      Head: Normocephalic and atraumatic.   Eyes:      General: No scleral icterus.  Neck:      Musculoskeletal: No neck rigidity.   Cardiovascular:      Rate and Rhythm: Normal rate.      Heart sounds: No murmur.   Pulmonary:      Effort: Pulmonary effort is normal. No respiratory distress.      Breath sounds: No wheezing or rales.   Abdominal:      General: Abdomen is flat. Bowel sounds are normal. There is no distension.      Palpations: Abdomen is soft.      Tenderness: There is abdominal tenderness. There is no right CVA tenderness or guarding.   Musculoskeletal:         General: No swelling or deformity.      Right lower leg: No edema.      Left lower leg: No edema.   Skin:     General: Skin is warm.      Coloration: Skin is not jaundiced.      Findings: No bruising or lesion.   Neurological:      General: No focal deficit present.      Mental Status: He is alert and oriented to person, place, and time. Mental status is at baseline.      Cranial Nerves: No cranial nerve deficit.      Motor: No weakness.      Gait: Gait normal.   Psychiatric:         Mood and Affect: Mood normal.         Laboratory:  Recent Labs     11/16/20 1928   WBC 4.3*   RBC 5.04   HEMOGLOBIN 15.8   HEMATOCRIT 44.1   MCV 87.5   MCH 31.3   MCHC 35.8*   RDW 39.0   PLATELETCT 111*   MPV 9.8     Recent Labs     11/16/20 1928   SODIUM 134*   POTASSIUM 4.5   CHLORIDE 92*   CO2 23   GLUCOSE 290*   BUN 21   CREATININE 0.88   CALCIUM 9.2     Recent Labs     11/16/20 1928 11/16/20 2005   ALTSGPT 23  --    ASTSGOT 39  --    ALKPHOSPHAT 79  --    TBILIRUBIN 0.5  --    LIPASE  --  46   GLUCOSE 290*  --          No results for input(s): NTPROBNP in the last 72 hours.      Recent Labs     11/16/20 1928   TROPONINT 6       Imaging:  No orders to display         Assessment/Plan:  I anticipate this patient will require at  least two midnights for appropriate medical management, necessitating inpatient admission.    * COVID-19 virus infection  Assessment & Plan  With shortness of breath and dry coughing  Chest x-ray did not show infiltration however CT scan showed bilateral infiltration.  Normal white blood cell  COVID-19 test is positive  Since the patient is on oxygen, he required dexamethasone 6 mg daily.  Oxygen supplementation  prone treatment and moving  At this time we will not start with antibiotics, will monitor the inflammatory markers, D-dimer and labs daily.    Acute respiratory failure (HCC)  Assessment & Plan  Due to COVID-19 and pneumonia  Dexamethasone 6 mg daily for 10 days  Oxygen supplementation    Uncontrolled type 2 diabetes mellitus with hyperglycemia (HCC)- (present on admission)  Assessment & Plan  Uncontrolled with A1c between 7 and 16  His blood pressure has been not controlled at home  He was taken empagliflozone and Metformin also he was taking semaglutide.  We will hold oral medications and start with a small dose Lantus 8 units daily  SSI  Follow-up with his endocrinologist as outpatient  IV fluid to prevent DKA    Metabolic acidosis- (present on admission)  Assessment & Plan  With anion gap  Related to elevated blood sugar  IV fluid maintenance  Labs daily    Hypothyroidism- (present on admission)  Assessment & Plan  Last TSH 1.9 which was 2 years ago  Continue home medication thyroid 60 mg daily  Check TSH tomorrow morning    Ulcerative colitis (HCC)- (present on admission)  Assessment & Plan  Not sure about the diagnosis, the patient states he has IBS   denied any medications and no flare  However he has some abdominal pain, no nausea or vomiting, he has some diarrhea and no blood  We will check inflammatory markers  His GI symptoms likely related to the viral infection  Continue monitoring closely    Chest pain- (present on admission)  Assessment & Plan  Pleuritic chest pain   related to pneumonia  EKG  did not show any ischemic changes.  Monitoring closely

## 2020-11-17 NOTE — PROGRESS NOTES
AOX4. Oxycodone given for abdominal pain with relief noted. On 2L NC. SOB on exertion. Glucose at 0430 was 240 and MD order 8 units of Lantus and SS to be given at the time. Able to make needs known.

## 2020-11-17 NOTE — ED NOTES
Lab called with critical result of Positive COVID. Critical lab result read back.   Dr. Becerra notified of critical lab result.

## 2020-11-17 NOTE — ED TRIAGE NOTES
"Chief Complaint   Patient presents with   • Coronavirus Screening     Pt c/o cough, fever and generalized body aches, started Friday   • High Blood Sugar     Pt states since becoming sick having difficulty controlling BS     /72   Pulse (!) 117   Temp 36.9 °C (98.5 °F) (Temporal)   Resp 20   Ht 1.727 m (5' 8\")   Wt 90.3 kg (199 lb 1.2 oz)   SpO2 93%   BMI 30.27 kg/m²     Pt reports last   "

## 2020-11-17 NOTE — ASSESSMENT & PLAN NOTE
With shortness of breath and dry coughing  Chest x-ray did not show infiltration however CT scan showed bilateral infiltration.  Normal white blood cell  COVID-19 test is positive  Since the patient is on oxygen, he required dexamethasone 6 mg daily.  Oxygen supplementation  prone treatment and moving  At this time we will not start with antibiotics, will monitor the inflammatory markers, D-dimer and labs daily.

## 2020-11-17 NOTE — ASSESSMENT & PLAN NOTE
Per patient's report.  Unclear diagnosis.    Denied any medications and flares.   However he has some abdominal pain, no nausea or vomiting, he has some diarrhea and no blood.  His GI symptoms are likely related to Covid-19 viral infection.   Continue monitoring closely.

## 2020-11-17 NOTE — ASSESSMENT & PLAN NOTE
With anion gap, improving.  Likely secondary to elevated blood sugar.  Judicious IV fluid.   Monitor BMP.

## 2020-11-17 NOTE — PROGRESS NOTES
Patient was seen and examined by myself today.  He was admitted early in the morning this date; please see H&P for full details.  Patient is saturating 90% on 3 L NC.  Mildly diaphoretic on exam, but patient states he is usually like this.  No chest pain, increased work of breathing/SOB.  Vitals stable.  Lactate 1.4.  Procal normal.  Continue to monitor closely.     Obdulia Vasquez M.D.

## 2020-11-17 NOTE — PROGRESS NOTES
[facility] TRIAGE OFFICER DIRECT ADMISSION REPORT  Transferring facility: [facility]  Transferring physician: Dr Summers  Transferring facility/physician contact number: [phone]  Chief complaint: Cough and body aches  Pertinent history & patient course: Patient began having symptoms on Friday with cough, fever and general body aches.  He had also noted elevated blood sugar.  Patient also had mild abdominal pain.  Because of the symptoms, he presented to the emergency department where he was tested positive for Covid.  Initially there was a CT scan that caught the base of the lungs that noted changes suggesting Covid hence the Covid test.  I do feel it is likely an enteritis, possible inflammatory change from COVID-19 infection  Pertinent imaging & lab results: CT abdomen/pelvis-peripheral reticular bilateral lung base opacities, appearance raises concern for Covid infiltrates, mild fluid-filled prominence of the small bowel throughout the mucosal pattern, appearance does suggest enteritis and/or ileus  Code Status: Full per transferring provider, I personally verified with the transferring provider patient's code status and the transferring provider has confirmed this with the patient.  Further work up or recommendations per triage officer prior to transfer: Care for Covid patient, closely monitoring his oxygen saturation, I do not feel he needs additional work-up for his likely enteritis, may need antibiotics for possible bacterial pneumonia, he will also need insulin sliding scale  Consultants called prior to transfer and pertinent input from consultants: None  Patient accepted for transfer: Yes  Consultants to be called upon arrival: None  Admission status: Inpatient.   Floor requested: Alternative care  If ICU transfer, name of intensivist case discussed with and pertinent input from critical care: Not applicable    Please inform the triage officer upon arrival of the patient to [facility]  [facility] for assignment of a hospitalist to perform admission.     For any question or concerns regarding the care of this patient, please reach out to the assigned hospitalist.

## 2020-11-18 LAB
ANION GAP SERPL CALC-SCNC: 15 MMOL/L (ref 7–16)
BASOPHILS # BLD AUTO: 0 % (ref 0–1.8)
BASOPHILS # BLD: 0 K/UL (ref 0–0.12)
BUN SERPL-MCNC: 22 MG/DL (ref 8–22)
CALCIUM SERPL-MCNC: 7.8 MG/DL (ref 8.5–10.5)
CHLORIDE SERPL-SCNC: 101 MMOL/L (ref 96–112)
CO2 SERPL-SCNC: 19 MMOL/L (ref 20–33)
CREAT SERPL-MCNC: 0.57 MG/DL (ref 0.5–1.4)
EOSINOPHIL # BLD AUTO: 0 K/UL (ref 0–0.51)
EOSINOPHIL NFR BLD: 0 % (ref 0–6.9)
ERYTHROCYTE [DISTWIDTH] IN BLOOD BY AUTOMATED COUNT: 40.6 FL (ref 35.9–50)
GLUCOSE BLD-MCNC: 228 MG/DL (ref 65–99)
GLUCOSE BLD-MCNC: 271 MG/DL (ref 65–99)
GLUCOSE BLD-MCNC: 316 MG/DL (ref 65–99)
GLUCOSE BLD-MCNC: 334 MG/DL (ref 65–99)
GLUCOSE SERPL-MCNC: 275 MG/DL (ref 65–99)
HCT VFR BLD AUTO: 39 % (ref 42–52)
HGB BLD-MCNC: 13.7 G/DL (ref 14–18)
IMM GRANULOCYTES # BLD AUTO: 0.05 K/UL (ref 0–0.11)
IMM GRANULOCYTES NFR BLD AUTO: 0.8 % (ref 0–0.9)
LYMPHOCYTES # BLD AUTO: 0.36 K/UL (ref 1–4.8)
LYMPHOCYTES NFR BLD: 6 % (ref 22–41)
MAGNESIUM SERPL-MCNC: 2.3 MG/DL (ref 1.5–2.5)
MCH RBC QN AUTO: 31.8 PG (ref 27–33)
MCHC RBC AUTO-ENTMCNC: 35.1 G/DL (ref 33.7–35.3)
MCV RBC AUTO: 90.5 FL (ref 81.4–97.8)
MONOCYTES # BLD AUTO: 0.18 K/UL (ref 0–0.85)
MONOCYTES NFR BLD AUTO: 3 % (ref 0–13.4)
NEUTROPHILS # BLD AUTO: 5.39 K/UL (ref 1.82–7.42)
NEUTROPHILS NFR BLD: 90.2 % (ref 44–72)
NRBC # BLD AUTO: 0 K/UL
NRBC BLD-RTO: 0 /100 WBC
PLATELET # BLD AUTO: 138 K/UL (ref 164–446)
PMV BLD AUTO: 9.8 FL (ref 9–12.9)
POTASSIUM SERPL-SCNC: 4 MMOL/L (ref 3.6–5.5)
RBC # BLD AUTO: 4.31 M/UL (ref 4.7–6.1)
SODIUM SERPL-SCNC: 135 MMOL/L (ref 135–145)
WBC # BLD AUTO: 6 K/UL (ref 4.8–10.8)

## 2020-11-18 PROCEDURE — 85025 COMPLETE CBC W/AUTO DIFF WBC: CPT

## 2020-11-18 PROCEDURE — 99233 SBSQ HOSP IP/OBS HIGH 50: CPT | Performed by: STUDENT IN AN ORGANIZED HEALTH CARE EDUCATION/TRAINING PROGRAM

## 2020-11-18 PROCEDURE — 700111 HCHG RX REV CODE 636 W/ 250 OVERRIDE (IP): Performed by: INTERNAL MEDICINE

## 2020-11-18 PROCEDURE — A9270 NON-COVERED ITEM OR SERVICE: HCPCS | Performed by: STUDENT IN AN ORGANIZED HEALTH CARE EDUCATION/TRAINING PROGRAM

## 2020-11-18 PROCEDURE — 94760 N-INVAS EAR/PLS OXIMETRY 1: CPT

## 2020-11-18 PROCEDURE — 82962 GLUCOSE BLOOD TEST: CPT | Mod: 91

## 2020-11-18 PROCEDURE — 80048 BASIC METABOLIC PNL TOTAL CA: CPT

## 2020-11-18 PROCEDURE — 770014 HCHG ROOM/CARE - WARD (150)

## 2020-11-18 PROCEDURE — 36415 COLL VENOUS BLD VENIPUNCTURE: CPT

## 2020-11-18 PROCEDURE — 83735 ASSAY OF MAGNESIUM: CPT

## 2020-11-18 PROCEDURE — 700105 HCHG RX REV CODE 258: Performed by: INTERNAL MEDICINE

## 2020-11-18 PROCEDURE — A9270 NON-COVERED ITEM OR SERVICE: HCPCS | Performed by: INTERNAL MEDICINE

## 2020-11-18 PROCEDURE — 700102 HCHG RX REV CODE 250 W/ 637 OVERRIDE(OP): Performed by: INTERNAL MEDICINE

## 2020-11-18 PROCEDURE — 700102 HCHG RX REV CODE 250 W/ 637 OVERRIDE(OP): Performed by: STUDENT IN AN ORGANIZED HEALTH CARE EDUCATION/TRAINING PROGRAM

## 2020-11-18 RX ORDER — INSULIN GLARGINE 100 [IU]/ML
13 INJECTION, SOLUTION SUBCUTANEOUS
Status: DISCONTINUED | OUTPATIENT
Start: 2020-11-19 | End: 2020-11-19 | Stop reason: HOSPADM

## 2020-11-18 RX ADMIN — ENOXAPARIN SODIUM 40 MG: 100 INJECTION SUBCUTANEOUS at 05:45

## 2020-11-18 RX ADMIN — INSULIN GLARGINE 10 UNITS: 100 INJECTION, SOLUTION SUBCUTANEOUS at 09:01

## 2020-11-18 RX ADMIN — ACETAMINOPHEN 650 MG: 325 TABLET, FILM COATED ORAL at 04:36

## 2020-11-18 RX ADMIN — SODIUM CHLORIDE: 9 INJECTION, SOLUTION INTRAVENOUS at 17:04

## 2020-11-18 RX ADMIN — THYROID, PORCINE 60 MG: 30 TABLET ORAL at 05:43

## 2020-11-18 RX ADMIN — ACETAMINOPHEN 650 MG: 325 TABLET, FILM COATED ORAL at 11:41

## 2020-11-18 RX ADMIN — SODIUM CHLORIDE: 9 INJECTION, SOLUTION INTRAVENOUS at 03:31

## 2020-11-18 RX ADMIN — ACETAMINOPHEN 650 MG: 325 TABLET, FILM COATED ORAL at 17:04

## 2020-11-18 RX ADMIN — OXYCODONE 5 MG: 5 TABLET ORAL at 22:00

## 2020-11-18 RX ADMIN — DEXAMETHASONE 6 MG: 4 TABLET ORAL at 05:43

## 2020-11-18 RX ADMIN — OXYCODONE 5 MG: 5 TABLET ORAL at 11:11

## 2020-11-18 ASSESSMENT — ENCOUNTER SYMPTOMS
EYE DISCHARGE: 0
WHEEZING: 0
HEADACHES: 0
DEPRESSION: 0
PALPITATIONS: 0
WEAKNESS: 0
MYALGIAS: 0
VOMITING: 0
COUGH: 0
SORE THROAT: 0
NERVOUS/ANXIOUS: 0
FEVER: 1
NAUSEA: 1
SINUS PAIN: 0
SPUTUM PRODUCTION: 0
ABDOMINAL PAIN: 1
DIAPHORESIS: 0
CHILLS: 0
DIZZINESS: 0
SHORTNESS OF BREATH: 0
NECK PAIN: 0
EYE REDNESS: 0

## 2020-11-18 ASSESSMENT — PAIN DESCRIPTION - PAIN TYPE
TYPE: ACUTE PAIN

## 2020-11-18 NOTE — PROGRESS NOTES
[facility] Medicine Daily Progress Note    Date of Service  11/18/2020    Chief Complaint  55 y.o. male admitted 11/17/2020 with SARS-CoV-2/COVID-19 infection.    Interval Problem Update  Patient concerned about his increasing blood glucose levels.  Reports mild abdominal pain, which she states is intermittently there at home.  Reports mild shortness of breath that is stable.  Requiring 1.5 L nasal cannula to saturate 91% on exam at bedside this morning.      Consultants/Specialty  None.    Code Status  Full Code    Disposition  Alternate care site.    Review of Systems  Review of Systems   Constitutional: Positive for fever and malaise/fatigue. Negative for chills and diaphoresis.   HENT: Negative for congestion, hearing loss, sinus pain and sore throat.    Eyes: Negative for discharge and redness.   Respiratory: Negative for cough, sputum production, shortness of breath and wheezing.    Cardiovascular: Negative for chest pain and palpitations.   Gastrointestinal: Positive for abdominal pain and nausea. Negative for vomiting.   Genitourinary: Negative for dysuria and urgency.   Musculoskeletal: Negative for myalgias and neck pain.   Skin: Negative for itching.   Neurological: Negative for dizziness, weakness and headaches.   Endo/Heme/Allergies: Negative for environmental allergies.   Psychiatric/Behavioral: Negative for depression. The patient is not nervous/anxious.         Physical Exam  Temp:  [36.1 °C (97 °F)-38 °C (100.4 °F)] 37.2 °C (99 °F)  Pulse:  [] 98  Resp:  [16-20] 18  BP: (103-109)/(64-70) 108/64  SpO2:  [91 %-96 %] 92 %    Physical Exam  Vitals signs and nursing note reviewed.   Constitutional:       General: He is not in acute distress.     Appearance: Normal appearance. He is not diaphoretic.   HENT:      Head: Normocephalic and atraumatic.      Nose: No congestion.      Mouth/Throat:      Mouth: Mucous membranes are moist.      Pharynx: Oropharynx is clear.   Eyes:      General: No scleral  icterus.     Conjunctiva/sclera: Conjunctivae normal.   Cardiovascular:      Rate and Rhythm: Regular rhythm. Tachycardia present.      Pulses: Normal pulses.      Heart sounds: Normal heart sounds. No murmur.      Comments: Tachycardic to 100s.  Pulmonary:      Effort: Pulmonary effort is normal. No respiratory distress.      Breath sounds: Normal breath sounds. No stridor. No wheezing.   Abdominal:      General: Bowel sounds are normal. There is no distension.      Palpations: Abdomen is soft.      Tenderness: There is abdominal tenderness (Mild abdominal tenderness to deep palpation.).   Musculoskeletal:         General: No swelling or tenderness.   Skin:     Capillary Refill: Capillary refill takes less than 2 seconds.      Coloration: Skin is not jaundiced.   Neurological:      Mental Status: He is alert and oriented to person, place, and time. Mental status is at baseline.   Psychiatric:         Mood and Affect: Mood normal.         Behavior: Behavior normal.           Fluids    Intake/Output Summary (Last 24 hours) at 11/18/2020 1149  Last data filed at 11/18/2020 0510  Gross per 24 hour   Intake 160 ml   Output 1100 ml   Net -940 ml       Laboratory  Recent Labs     11/16/20 1928 11/17/20  0434 11/18/20  0903   WBC 4.3* 3.4* 6.0   RBC 5.04 4.40* 4.31*   HEMOGLOBIN 15.8 14.3 13.7*   HEMATOCRIT 44.1 40.8* 39.0*   MCV 87.5 92.7 90.5   MCH 31.3 32.5 31.8   MCHC 35.8* 35.0 35.1   RDW 39.0 42.8 40.6   PLATELETCT 111* 104* 138*   MPV 9.8 9.5 9.8     Recent Labs     11/16/20 1928 11/17/20  0434 11/18/20  0903   SODIUM 134* 132* 135   POTASSIUM 4.5 3.6 4.0   CHLORIDE 92* 97 101   CO2 23 24 19*   GLUCOSE 290* 231* 275*   BUN 21 22 22   CREATININE 0.88 0.78 0.57   CALCIUM 9.2 8.4* 7.8*                   Imaging  No orders to display        Assessment/Plan  * COVID-19 virus infection  Assessment & Plan  With shortness of breath and dry coughing  Chest x-ray did not show infiltration however CT scan showed bilateral  infiltration.  Normal white blood cell  COVID-19 test is positive  Since the patient is on oxygen, he required dexamethasone 6 mg daily.  Oxygen supplementation  prone treatment and moving  At this time we will not start with antibiotics, will monitor the inflammatory markers, D-dimer and labs daily.    Acute respiratory failure (HCC)- (present on admission)  Assessment & Plan  Due to COVID-19 and pneumonia  Dexamethasone 6 mg daily for 10 days  Oxygen supplementation    Uncontrolled type 2 diabetes mellitus with hyperglycemia (HCC)- (present on admission)  Assessment & Plan  A1c 7.4 in 08/2020.   He was taking empagliflozone, Metformin, semaglutide at home.   We will hold oral medications.  Continue basal Lantus; will adjust units as appropriate.    Correctional insulin.  Hypoglycemia protocol.  Diabetic diet.    Metabolic acidosis- (present on admission)  Assessment & Plan  With anion gap, improving.  Likely secondary to elevated blood sugar.  Judicious IV fluid.   Monitor BMP.     Hypothyroidism- (present on admission)  Assessment & Plan  TSH 3.44 on admission.    Continue home medication thyroid 60 mg daily.    Ulcerative colitis (HCC)- (present on admission)  Assessment & Plan  Per patient's report.  Unclear diagnosis.    Denied any medications and flares.   However he has some abdominal pain, no nausea or vomiting, he has some diarrhea and no blood.  His GI symptoms are likely related to Covid-19 viral infection.   Continue monitoring closely.     Chest pain- (present on admission)  Assessment & Plan  Pleuritic chest pain   related to pneumonia  EKG did not show any ischemic changes.  - Resolved.        VTE prophylaxis: subcutaneous enoxaparin.

## 2020-11-18 NOTE — PROGRESS NOTES
Awake, frustrated that he's not on insulin drip, claims that covid is raising his blood sugar. Reminded the patient that his blood sugar is not that high that requires insulin drip but assured the patient that will endorse so it could be addressed in the morning.   Consent: The patient's consent was obtained including but not limited to risks of crusting, scabbing, blistering, scarring, darker or lighter pigmentary change, recurrence, incomplete removal and infection. Render Note In Bullet Format When Appropriate: No Post-Care Instructions: I reviewed with the patient in detail post-care instructions. Patient is to wear sunprotection, and avoid picking at any of the treated lesions. Pt may apply Vaseline to crusted or scabbing areas. Duration Of Freeze Thaw-Cycle (Seconds): 2 Number Of Freeze-Thaw Cycles: 1 freeze-thaw cycle Detail Level: Detailed

## 2020-11-19 VITALS
SYSTOLIC BLOOD PRESSURE: 104 MMHG | OXYGEN SATURATION: 89 % | WEIGHT: 199.96 LBS | HEART RATE: 82 BPM | DIASTOLIC BLOOD PRESSURE: 62 MMHG | TEMPERATURE: 97 F | HEIGHT: 68 IN | RESPIRATION RATE: 18 BRPM | BODY MASS INDEX: 30.31 KG/M2

## 2020-11-19 LAB — GLUCOSE BLD-MCNC: 280 MG/DL (ref 65–99)

## 2020-11-19 PROCEDURE — 82962 GLUCOSE BLOOD TEST: CPT

## 2020-11-19 PROCEDURE — 99239 HOSP IP/OBS DSCHRG MGMT >30: CPT | Performed by: STUDENT IN AN ORGANIZED HEALTH CARE EDUCATION/TRAINING PROGRAM

## 2020-11-19 PROCEDURE — 700111 HCHG RX REV CODE 636 W/ 250 OVERRIDE (IP): Performed by: INTERNAL MEDICINE

## 2020-11-19 PROCEDURE — 700102 HCHG RX REV CODE 250 W/ 637 OVERRIDE(OP): Performed by: INTERNAL MEDICINE

## 2020-11-19 PROCEDURE — A9270 NON-COVERED ITEM OR SERVICE: HCPCS | Performed by: INTERNAL MEDICINE

## 2020-11-19 PROCEDURE — 700102 HCHG RX REV CODE 250 W/ 637 OVERRIDE(OP): Performed by: STUDENT IN AN ORGANIZED HEALTH CARE EDUCATION/TRAINING PROGRAM

## 2020-11-19 PROCEDURE — A9270 NON-COVERED ITEM OR SERVICE: HCPCS | Performed by: STUDENT IN AN ORGANIZED HEALTH CARE EDUCATION/TRAINING PROGRAM

## 2020-11-19 RX ORDER — DEXAMETHASONE 6 MG/1
6 TABLET ORAL DAILY
Qty: 7 TAB | Refills: 0 | Status: SHIPPED | OUTPATIENT
Start: 2020-11-20 | End: 2020-11-27

## 2020-11-19 RX ORDER — DEXAMETHASONE 6 MG/1
6 TABLET ORAL DAILY
Qty: 10 TAB | Refills: 0 | Status: SHIPPED | OUTPATIENT
Start: 2020-11-20 | End: 2020-11-19 | Stop reason: SDUPTHER

## 2020-11-19 RX ADMIN — DEXAMETHASONE 6 MG: 4 TABLET ORAL at 05:45

## 2020-11-19 RX ADMIN — ACETAMINOPHEN 650 MG: 325 TABLET, FILM COATED ORAL at 05:46

## 2020-11-19 RX ADMIN — INSULIN GLARGINE 13 UNITS: 100 INJECTION, SOLUTION SUBCUTANEOUS at 05:46

## 2020-11-19 RX ADMIN — THYROID, PORCINE 60 MG: 30 TABLET ORAL at 05:46

## 2020-11-19 RX ADMIN — OXYCODONE 5 MG: 5 TABLET ORAL at 04:49

## 2020-11-19 RX ADMIN — ENOXAPARIN SODIUM 40 MG: 100 INJECTION SUBCUTANEOUS at 05:45

## 2020-11-19 NOTE — PROGRESS NOTES
Patient's IV removed and discharge instructions given. Patient discharged home with wife via wheelchair.

## 2020-11-19 NOTE — DISCHARGE SUMMARY
Discharge Summary    CHIEF COMPLAINT ON ADMISSION  No chief complaint on file.      Reason for Admission  COVID-19     Admission Date  11/17/2020    CODE STATUS  Full Code    HPI & HOSPITAL COURSE  This is a 55 y.o. male who presented with shortness of breath and elevated blood glucose levels and was admitted for acute respiratory failure with hypoxia secondary to pneumonia secondary to SARS-CoV-2/COVID-19 infection.  Patient was treated with oxygen as needed.  He was also started on dexamethasone 6 mg daily.  He was ultimately weaned off oxygen and was discharged with dexamethasone to complete a course of 10 days.  Patient's blood glucose was also additionally elevated from starting the dexamethasone.  He was treated with basal Lantus, correctional insulin, mealtime Lantus, hypoglycemia protocol while he was admitted.  By day of discharge patient shortness of breath had resolved and he was no longer requiring oxygen.  He was discharged to self-isolation with close follow-up with his PCP and via telecommunication.    Therefore, he is discharged in good and stable condition to home with close outpatient follow-up.    The patient met 2-midnight criteria for an inpatient stay at the time of discharge.    Discharge Date  11/19/2020    FOLLOW UP ITEMS POST DISCHARGE  Follow-up with PCP via telecommunication within 1 week of discharge.    DISCHARGE DIAGNOSES  Principal Problem:    COVID-19 virus infection POA: Unknown  Active Problems:    Uncontrolled type 2 diabetes mellitus with hyperglycemia (HCC) POA: Yes    Acute respiratory failure (HCC) POA: Yes    Metabolic acidosis POA: Yes    Chest pain POA: Yes    Ulcerative colitis (HCC) POA: Yes    Hypothyroidism (Chronic) POA: Yes      Overview: Chronic condition treated with [place] [facility].      Resumed maintenance medication.  Resolved Problems:    * No resolved hospital problems. *      FOLLOW UP  Rodolfo Stein M.D.  [address]  [address]  [facility]  [id number]  [phone]    In 1 week        MEDICATIONS ON DISCHARGE     Medication List      START taking these medications      Instructions   dexamethasone 6 MG Tabs  Start taking on: November 20, 2020  Commonly known as: DECADRON   Take 1 Tab by mouth every day for 7 doses.  Dose: 6 mg        CONTINUE taking these medications      Instructions   NON SPECIFIED   Take 1 Tab by mouth every morning. [employer] SHERRI MAN VITAMIN  Dose: 1 Tab     ondansetron 8 MG Tbdp  Commonly known as: Zofran ODT   Take 1 Tab by mouth every 8 hours as needed for Nausea.  Dose: 8 mg     Ozempic (0.25 or 0.5 MG/DOSE) 2 MG/1.5ML Sopn  Generic drug: Semaglutide(0.25 or 0.5MG/DOS)   Inject 0.5 mg as instructed every 7 days.  Dose: 0.5 mg     Synjardy 12.5-1000 MG Tabs  Generic drug: Empagliflozin-metFORMIN HCl   Doctor's comments: Patient has savings card. Please use if applicable.  Take 1 tablet by mouth 2 Times a Day.  Dose: 1 tablet     thyroid 60 MG Tabs  Commonly known as: ARMOUR THYROID   Take 1 Tab by mouth every morning.  Dose: 60 mg        STOP taking these medications    CINNAMON PO     Ibuprofen 200 MG Caps            Allergies  Allergies   Allergen Reactions   • Hydrocodone Hives     Tolerates Morphine and oxycodone     • Peanut (Diagnostic) Anaphylaxis   • Tradjenta [Linagliptin] Unspecified     Pt developed pancreatitis       DIET  Orders Placed This Encounter   Procedures   • Diet Order Diet: Consistent CHO (Diabetic)     Standing Status:   Standing     Number of Occurrences:   1     Order Specific Question:   Diet:     Answer:   Consistent CHO (Diabetic) [4]       ACTIVITY  As tolerated.  Weight bearing as tolerated    CONSULTATIONS  None.     PROCEDURES  None.     LABORATORY  Lab Results   Component Value Date    SODIUM 135 11/18/2020    POTASSIUM 4.0 11/18/2020    CHLORIDE 101 11/18/2020    CO2 19 (L) 11/18/2020    GLUCOSE 275 (H) 11/18/2020    BUN 22 11/18/2020    CREATININE 0.57 11/18/2020    CREATININE 1.1 02/18/2008        Lab  Results   Component Value Date    WBC 6.0 11/18/2020    HEMOGLOBIN 13.7 (L) 11/18/2020    HEMATOCRIT 39.0 (L) 11/18/2020    PLATELETCT 138 (L) 11/18/2020        Total time of the discharge process: 42 minutes.

## 2020-11-19 NOTE — DISCHARGE INSTRUCTIONS
Discharge Instructions    Discharged to home by car with relative. Discharged via wheelchair, hospital escort: Yes.  Special equipment needed: Not Applicable    Be sure to schedule a follow-up appointment with your primary care doctor or any specialists as instructed.     Discharge Plan:   Diet Plan: Discussed  Activity Level: Discussed  Confirmed Follow up Appointment: Patient to Call and Schedule Appointment  Confirmed Symptoms Management: Discussed  Medication Reconciliation Updated: Yes  Influenza Vaccine Indication: Patient Refuses    I understand that a diet low in cholesterol, fat, and sodium is recommended for good health. Unless I have been given specific instructions below for another diet, I accept this instruction as my diet prescription.   Other diet: diabetic    Special Instructions: None    · Is patient discharged on Warfarin / Coumadin?   No     Depression / Suicide Risk    As you are discharged from this [facility] facility, it is important to learn how to keep safe from harming yourself.    Recognize the warning signs:  · Abrupt changes in personality, positive or negative- including increase in energy   · Giving away possessions  · Change in eating patterns- significant weight changes-  positive or negative  · Change in sleeping patterns- unable to sleep or sleeping all the time   · Unwillingness or inability to communicate  · Depression  · Unusual sadness, discouragement and loneliness  · Talk of wanting to die  · Neglect of personal appearance   · Rebelliousness- reckless behavior  · Withdrawal from people/activities they love  · Confusion- inability to concentrate     If you or a loved one observes any of these behaviors or has concerns about self-harm, here's what you can do:  · Talk about it- your feelings and reasons for harming yourself  · Remove any means that you might use to hurt yourself (examples: pills, rope, extension cords, firearm)  · Get professional help from the community  (Mental Health, Substance Abuse, psychological counseling)  · Do not be alone:Call your Safe Contact- someone whom you trust who will be there for you.  · Call your local CRISIS HOTLINE 550-1350 or 487-771-7257  · Call your local Children's Mobile Crisis Response Team [place] (893) 704-9626 or www.[employer]  · Call the toll free National Suicide Prevention Hotlines   · National Suicide Prevention Lifeline 021-420-RKLM (5320)  · National [employer] Line Network 800-SUICIDE (345-2706)      Discharge Instructions per Obdulia Vasquez M.D.      Return to ER if new or worsening symptoms.

## 2020-11-21 NOTE — DOCUMENTATION QUERY
[facility]                                                                       Query Response Note      PATIENT:               DEVIN MO  ACCT #:                  [id number]  MRN:                     [id number]  :                      1965  ADMIT DATE:       2020 3:08 AM  DISCH DATE:        2020 2:01 PM  RESPONDING  PROVIDER #:        [id number]           QUERY TEXT:    COVID-19 is documented in the H&P.  The following SIRS criteria are noted in the admit labs & vitals: wbc 3.4 & pulse 100.  Please clarify whether:    NOTE:  If an appropriate response is not listed below, please respond with a new note.    The patient's Clinical Indicators include:   wbc 3.4  Admit vitals: 131/86, 100, 20, 98.5F, 94%   Risk Factors: COVID-19  Treatment: IVF, decadron  Options provided:   -- Sepsis present on admission   -- Sepsis has been ruled out   -- SIRS that is unrelated to any infection   -- SIRS of non-infectious origin with acute organ dysfunction   -- Unable to determine      Query created by: Dwayne Ge on 2020 10:12 AM    RESPONSE TEXT:    Sepsis present on admission          Electronically signed by:  KEEGAN MENDIOLA MD 2020 10:10 PM

## 2020-11-30 NOTE — DOCUMENTATION QUERY
[facility]                                                                       Query Response Note      PATIENT:               DEVIN MO  ACCT #:                  [id number]  MRN:                     [id number]  :                      1965  ADMIT DATE:       2020 3:08 AM  DISCH DATE:        2020 2:01 PM  RESPONDING  PROVIDER #:        [id number]           QUERY TEXT:    Please clarify the relationship, if any, between sepsis and COVID 19.     NOTE:  If an appropriate response is not listed below, please respond with a new note.        The patient's Clinical Indicators include:  Patient has COVID Pneumonia. Also has acute respiratory failure with hypoxia due to COVID 19.    Per Query response from Dr. Jose Sepsis is present on admission.     wbc 3.4  Admit vitals: 131/86, 100, 20, 98.5F, 94%   Risk Factors: COVID-19  Treatment: IVF, decadron  Options provided:   -- Sepsis is due to or associated with COVID 19   -- Sepsis is not due to or associated with COVID 19   -- Unable to determine      Query created by: Jeanette He on 2020 7:22 AM    RESPONSE TEXT:    Sepsis is due to or associated with COVID 19          Electronically signed by:  JOHNNIE AMBROSIO MD 2020 9:10 AM

## 2021-03-09 ENCOUNTER — HOSPITAL ENCOUNTER (EMERGENCY)
Facility: MEDICAL CENTER | Age: 56
End: 2021-03-10
Attending: EMERGENCY MEDICINE
Payer: OTHER MISCELLANEOUS

## 2021-03-09 LAB
ALBUMIN SERPL BCP-MCNC: 4.1 G/DL (ref 3.2–4.9)
ALBUMIN/GLOB SERPL: 1.4 G/DL
ALP SERPL-CCNC: 64 U/L (ref 30–99)
ALT SERPL-CCNC: 19 U/L (ref 2–50)
ANION GAP SERPL CALC-SCNC: 17 MMOL/L (ref 7–16)
APPEARANCE UR: CLEAR
AST SERPL-CCNC: 14 U/L (ref 12–45)
B-OH-BUTYR SERPL-MCNC: 0.21 MMOL/L (ref 0.02–0.27)
BASE EXCESS BLDV CALC-SCNC: -1 MMOL/L
BASOPHILS # BLD AUTO: 0.2 % (ref 0–1.8)
BASOPHILS # BLD: 0.01 K/UL (ref 0–0.12)
BILIRUB SERPL-MCNC: 0.3 MG/DL (ref 0.1–1.5)
BILIRUB UR QL STRIP.AUTO: NEGATIVE
BODY TEMPERATURE: ABNORMAL CENTIGRADE
BUN SERPL-MCNC: 19 MG/DL (ref 8–22)
CALCIUM SERPL-MCNC: 9.5 MG/DL (ref 8.5–10.5)
CHLORIDE SERPL-SCNC: 96 MMOL/L (ref 96–112)
CO2 SERPL-SCNC: 19 MMOL/L (ref 20–33)
COLOR UR: YELLOW
CREAT SERPL-MCNC: 0.96 MG/DL (ref 0.5–1.4)
EOSINOPHIL # BLD AUTO: 0.06 K/UL (ref 0–0.51)
EOSINOPHIL NFR BLD: 1.2 % (ref 0–6.9)
ERYTHROCYTE [DISTWIDTH] IN BLOOD BY AUTOMATED COUNT: 41.5 FL (ref 35.9–50)
GLOBULIN SER CALC-MCNC: 2.9 G/DL (ref 1.9–3.5)
GLUCOSE BLD-MCNC: 550 MG/DL (ref 65–99)
GLUCOSE SERPL-MCNC: 589 MG/DL (ref 65–99)
GLUCOSE UR STRIP.AUTO-MCNC: >=1000 MG/DL
HCO3 BLDV-SCNC: 24 MMOL/L (ref 24–28)
HCT VFR BLD AUTO: 42.9 % (ref 42–52)
HGB BLD-MCNC: 15.1 G/DL (ref 14–18)
IMM GRANULOCYTES # BLD AUTO: 0.03 K/UL (ref 0–0.11)
IMM GRANULOCYTES NFR BLD AUTO: 0.6 % (ref 0–0.9)
KETONES UR STRIP.AUTO-MCNC: ABNORMAL MG/DL
LEUKOCYTE ESTERASE UR QL STRIP.AUTO: NEGATIVE
LIPASE SERPL-CCNC: 25 U/L (ref 11–82)
LYMPHOCYTES # BLD AUTO: 1.73 K/UL (ref 1–4.8)
LYMPHOCYTES NFR BLD: 34.4 % (ref 22–41)
MCH RBC QN AUTO: 31.2 PG (ref 27–33)
MCHC RBC AUTO-ENTMCNC: 35.2 G/DL (ref 33.7–35.3)
MCV RBC AUTO: 88.6 FL (ref 81.4–97.8)
MICRO URNS: ABNORMAL
MONOCYTES # BLD AUTO: 0.33 K/UL (ref 0–0.85)
MONOCYTES NFR BLD AUTO: 6.6 % (ref 0–13.4)
NEUTROPHILS # BLD AUTO: 2.87 K/UL (ref 1.82–7.42)
NEUTROPHILS NFR BLD: 57 % (ref 44–72)
NITRITE UR QL STRIP.AUTO: NEGATIVE
NRBC # BLD AUTO: 0 K/UL
NRBC BLD-RTO: 0 /100 WBC
OSMOLALITY SERPL: 319 MOSM/KG H2O (ref 278–298)
PCO2 BLDV: 40.2 MMHG (ref 41–51)
PH BLDV: 7.39 [PH] (ref 7.31–7.45)
PH UR STRIP.AUTO: 6.5 [PH] (ref 5–8)
PLATELET # BLD AUTO: 206 K/UL (ref 164–446)
PMV BLD AUTO: 9.9 FL (ref 9–12.9)
PO2 BLDV: 41.9 MMHG (ref 25–40)
POTASSIUM SERPL-SCNC: 4 MMOL/L (ref 3.6–5.5)
PROT SERPL-MCNC: 7 G/DL (ref 6–8.2)
PROT UR QL STRIP: NEGATIVE MG/DL
RBC # BLD AUTO: 4.84 M/UL (ref 4.7–6.1)
RBC UR QL AUTO: NEGATIVE
SAO2 % BLDV: 76.3 %
SODIUM SERPL-SCNC: 132 MMOL/L (ref 135–145)
SP GR UR STRIP.AUTO: 1.04
UROBILINOGEN UR STRIP.AUTO-MCNC: 0.2 MG/DL
WBC # BLD AUTO: 5 K/UL (ref 4.8–10.8)

## 2021-03-09 PROCEDURE — 80053 COMPREHEN METABOLIC PANEL: CPT

## 2021-03-09 PROCEDURE — 700105 HCHG RX REV CODE 258: Performed by: EMERGENCY MEDICINE

## 2021-03-09 PROCEDURE — 96375 TX/PRO/DX INJ NEW DRUG ADDON: CPT

## 2021-03-09 PROCEDURE — 82010 KETONE BODYS QUAN: CPT

## 2021-03-09 PROCEDURE — 99284 EMERGENCY DEPT VISIT MOD MDM: CPT

## 2021-03-09 PROCEDURE — 82962 GLUCOSE BLOOD TEST: CPT

## 2021-03-09 PROCEDURE — 85025 COMPLETE CBC W/AUTO DIFF WBC: CPT

## 2021-03-09 PROCEDURE — 82803 BLOOD GASES ANY COMBINATION: CPT

## 2021-03-09 PROCEDURE — 81003 URINALYSIS AUTO W/O SCOPE: CPT

## 2021-03-09 PROCEDURE — 96374 THER/PROPH/DIAG INJ IV PUSH: CPT

## 2021-03-09 PROCEDURE — 83735 ASSAY OF MAGNESIUM: CPT

## 2021-03-09 PROCEDURE — 83690 ASSAY OF LIPASE: CPT

## 2021-03-09 PROCEDURE — 84100 ASSAY OF PHOSPHORUS: CPT

## 2021-03-09 PROCEDURE — 83930 ASSAY OF BLOOD OSMOLALITY: CPT

## 2021-03-09 PROCEDURE — 700111 HCHG RX REV CODE 636 W/ 250 OVERRIDE (IP): Performed by: EMERGENCY MEDICINE

## 2021-03-09 RX ORDER — SODIUM CHLORIDE 9 MG/ML
1000 INJECTION, SOLUTION INTRAVENOUS ONCE
Status: COMPLETED | OUTPATIENT
Start: 2021-03-10 | End: 2021-03-10

## 2021-03-09 RX ORDER — MORPHINE SULFATE 4 MG/ML
2 INJECTION, SOLUTION INTRAMUSCULAR; INTRAVENOUS ONCE
Status: COMPLETED | OUTPATIENT
Start: 2021-03-09 | End: 2021-03-09

## 2021-03-09 RX ORDER — ONDANSETRON 2 MG/ML
4 INJECTION INTRAMUSCULAR; INTRAVENOUS ONCE
Status: COMPLETED | OUTPATIENT
Start: 2021-03-09 | End: 2021-03-09

## 2021-03-09 RX ORDER — SODIUM CHLORIDE 9 MG/ML
2000 INJECTION, SOLUTION INTRAVENOUS ONCE
Status: COMPLETED | OUTPATIENT
Start: 2021-03-09 | End: 2021-03-10

## 2021-03-09 RX ADMIN — MORPHINE SULFATE 2 MG: 4 INJECTION INTRAVENOUS at 22:30

## 2021-03-09 RX ADMIN — ONDANSETRON 4 MG: 2 INJECTION INTRAMUSCULAR; INTRAVENOUS at 22:28

## 2021-03-09 RX ADMIN — SODIUM CHLORIDE 2000 ML: 9 INJECTION, SOLUTION INTRAVENOUS at 22:28

## 2021-03-09 ASSESSMENT — FIBROSIS 4 INDEX: FIB4 SCORE: 3.24

## 2021-03-10 VITALS
WEIGHT: 203.26 LBS | RESPIRATION RATE: 18 BRPM | SYSTOLIC BLOOD PRESSURE: 132 MMHG | OXYGEN SATURATION: 92 % | BODY MASS INDEX: 30.81 KG/M2 | TEMPERATURE: 97.4 F | HEIGHT: 68 IN | HEART RATE: 75 BPM | DIASTOLIC BLOOD PRESSURE: 64 MMHG

## 2021-03-10 LAB
GLUCOSE BLD-MCNC: 265 MG/DL (ref 65–99)
GLUCOSE BLD-MCNC: 441 MG/DL (ref 65–99)
MAGNESIUM SERPL-MCNC: 1.9 MG/DL (ref 1.5–2.5)
PHOSPHATE SERPL-MCNC: 3.3 MG/DL (ref 2.5–4.5)

## 2021-03-10 PROCEDURE — 700111 HCHG RX REV CODE 636 W/ 250 OVERRIDE (IP): Performed by: EMERGENCY MEDICINE

## 2021-03-10 PROCEDURE — 700105 HCHG RX REV CODE 258: Performed by: EMERGENCY MEDICINE

## 2021-03-10 PROCEDURE — 82962 GLUCOSE BLOOD TEST: CPT

## 2021-03-10 PROCEDURE — 700102 HCHG RX REV CODE 250 W/ 637 OVERRIDE(OP): Performed by: EMERGENCY MEDICINE

## 2021-03-10 RX ADMIN — FENTANYL CITRATE 50 MCG: 50 INJECTION, SOLUTION INTRAMUSCULAR; INTRAVENOUS at 00:15

## 2021-03-10 RX ADMIN — SODIUM CHLORIDE 1000 ML: 9 INJECTION, SOLUTION INTRAVENOUS at 00:15

## 2021-03-10 NOTE — ED TRIAGE NOTES
Vitals:    03/09/21 2028   BP: 138/79   Pulse: 100   Resp: 12   Temp: 36.3 °C (97.4 °F)   SpO2: 99%     Chief Complaint   Patient presents with   • High Blood Sugar     FSBG 550 in triage, c/o abdominal cramping and a metal taste in mouth     Pt states he normally has decent control with PO diabetic medications, he uses regular insulin on a sliding scale which he often does not need. However he has been under extreme stress in the past few days. He has taken a total of 30 units of regular insulin today. FSBG was 550 in triage. He also c/o a headache. Pt does not appear to be in any acute distress other than the above complaints. Pt placed in waiting room and educated on triage process.

## 2021-03-10 NOTE — ED NOTES
Pt rounded on and is resting comfortably in gurney. Monitor in place. Pt indicates that cramping is still present in the abdominal region. Urinal emptied and placed back at bedside for pt use.

## 2021-03-10 NOTE — ED NOTES
Pt discharged home per provider in stable condition. Paperwork provided to pt via RN and discharge instructions went over with by RN - pt verbalized understanding of teaching with no questions or concerns and is A/Ox4, VSS. Paperwork in hand on discharge.    Pt discharged home during downtime. Paperwork signed. Unable to print AVS.    Please see Epic downtime paperwork for nursing progress notes.
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
## 2021-03-11 ENCOUNTER — HOSPITAL ENCOUNTER (OUTPATIENT)
Facility: MEDICAL CENTER | Age: 56
End: 2021-03-12
Attending: EMERGENCY MEDICINE | Admitting: STUDENT IN AN ORGANIZED HEALTH CARE EDUCATION/TRAINING PROGRAM
Payer: OTHER MISCELLANEOUS

## 2021-03-11 ENCOUNTER — APPOINTMENT (OUTPATIENT)
Dept: RADIOLOGY | Facility: MEDICAL CENTER | Age: 56
End: 2021-03-11
Attending: STUDENT IN AN ORGANIZED HEALTH CARE EDUCATION/TRAINING PROGRAM
Payer: OTHER MISCELLANEOUS

## 2021-03-11 DIAGNOSIS — R73.9 HYPERGLYCEMIA: ICD-10-CM

## 2021-03-11 DIAGNOSIS — R11.2 INTRACTABLE NAUSEA AND VOMITING: ICD-10-CM

## 2021-03-11 LAB
ALBUMIN SERPL BCP-MCNC: 4.3 G/DL (ref 3.2–4.9)
ALBUMIN/GLOB SERPL: 1.4 G/DL
ALP SERPL-CCNC: 77 U/L (ref 30–99)
ALT SERPL-CCNC: 25 U/L (ref 2–50)
ANION GAP SERPL CALC-SCNC: 15 MMOL/L (ref 7–16)
APPEARANCE UR: CLEAR
AST SERPL-CCNC: 15 U/L (ref 12–45)
B-OH-BUTYR SERPL-MCNC: 0.38 MMOL/L (ref 0.02–0.27)
BACTERIA #/AREA URNS HPF: ABNORMAL /HPF
BASOPHILS # BLD AUTO: 0.2 % (ref 0–1.8)
BASOPHILS # BLD: 0.01 K/UL (ref 0–0.12)
BILIRUB SERPL-MCNC: 0.3 MG/DL (ref 0.1–1.5)
BILIRUB UR QL STRIP.AUTO: NEGATIVE
BUN SERPL-MCNC: 16 MG/DL (ref 8–22)
CALCIUM SERPL-MCNC: 9.7 MG/DL (ref 8.5–10.5)
CHLORIDE SERPL-SCNC: 98 MMOL/L (ref 96–112)
CO2 SERPL-SCNC: 21 MMOL/L (ref 20–33)
COLOR UR: YELLOW
CREAT SERPL-MCNC: 0.81 MG/DL (ref 0.5–1.4)
EKG IMPRESSION: NORMAL
EOSINOPHIL # BLD AUTO: 0.03 K/UL (ref 0–0.51)
EOSINOPHIL NFR BLD: 0.5 % (ref 0–6.9)
EPI CELLS #/AREA URNS HPF: NEGATIVE /HPF
ERYTHROCYTE [DISTWIDTH] IN BLOOD BY AUTOMATED COUNT: 41.5 FL (ref 35.9–50)
EST. AVERAGE GLUCOSE BLD GHB EST-MCNC: 301 MG/DL
FLUAV RNA SPEC QL NAA+PROBE: NEGATIVE
FLUBV RNA SPEC QL NAA+PROBE: NEGATIVE
GLOBULIN SER CALC-MCNC: 3 G/DL (ref 1.9–3.5)
GLUCOSE BLD-MCNC: 336 MG/DL (ref 65–99)
GLUCOSE SERPL-MCNC: 566 MG/DL (ref 65–99)
GLUCOSE UR STRIP.AUTO-MCNC: 500 MG/DL
HBA1C MFR BLD: 12.1 % (ref 4–5.6)
HCT VFR BLD AUTO: 44.7 % (ref 42–52)
HGB BLD-MCNC: 16.1 G/DL (ref 14–18)
IMM GRANULOCYTES # BLD AUTO: 0.02 K/UL (ref 0–0.11)
IMM GRANULOCYTES NFR BLD AUTO: 0.4 % (ref 0–0.9)
KETONES UR STRIP.AUTO-MCNC: ABNORMAL MG/DL
LEUKOCYTE ESTERASE UR QL STRIP.AUTO: NEGATIVE
LIPASE SERPL-CCNC: 26 U/L (ref 11–82)
LYMPHOCYTES # BLD AUTO: 1.12 K/UL (ref 1–4.8)
LYMPHOCYTES NFR BLD: 19.9 % (ref 22–41)
MAGNESIUM SERPL-MCNC: 1.8 MG/DL (ref 1.5–2.5)
MCH RBC QN AUTO: 31.8 PG (ref 27–33)
MCHC RBC AUTO-ENTMCNC: 36 G/DL (ref 33.7–35.3)
MCV RBC AUTO: 88.3 FL (ref 81.4–97.8)
MICRO URNS: ABNORMAL
MONOCYTES # BLD AUTO: 0.23 K/UL (ref 0–0.85)
MONOCYTES NFR BLD AUTO: 4.1 % (ref 0–13.4)
NEUTROPHILS # BLD AUTO: 4.23 K/UL (ref 1.82–7.42)
NEUTROPHILS NFR BLD: 74.9 % (ref 44–72)
NITRITE UR QL STRIP.AUTO: NEGATIVE
NRBC # BLD AUTO: 0 K/UL
NRBC BLD-RTO: 0 /100 WBC
PH UR STRIP.AUTO: 6.5 [PH] (ref 5–8)
PHOSPHATE SERPL-MCNC: 4.1 MG/DL (ref 2.5–4.5)
PLATELET # BLD AUTO: 194 K/UL (ref 164–446)
PMV BLD AUTO: 9.9 FL (ref 9–12.9)
POTASSIUM SERPL-SCNC: 4.2 MMOL/L (ref 3.6–5.5)
PROT SERPL-MCNC: 7.3 G/DL (ref 6–8.2)
PROT UR QL STRIP: 100 MG/DL
RBC # BLD AUTO: 5.06 M/UL (ref 4.7–6.1)
RBC # URNS HPF: ABNORMAL /HPF
RBC UR QL AUTO: ABNORMAL
RSV RNA SPEC QL NAA+PROBE: NEGATIVE
SARS-COV-2 RNA RESP QL NAA+PROBE: NOTDETECTED
SODIUM SERPL-SCNC: 134 MMOL/L (ref 135–145)
SP GR UR STRIP.AUTO: 1.02
SPECIMEN SOURCE: NORMAL
TROPONIN T SERPL-MCNC: 8 NG/L (ref 6–19)
UROBILINOGEN UR STRIP.AUTO-MCNC: 0.2 MG/DL
WBC # BLD AUTO: 5.6 K/UL (ref 4.8–10.8)
WBC #/AREA URNS HPF: ABNORMAL /HPF

## 2021-03-11 PROCEDURE — 96372 THER/PROPH/DIAG INJ SC/IM: CPT

## 2021-03-11 PROCEDURE — G0378 HOSPITAL OBSERVATION PER HR: HCPCS

## 2021-03-11 PROCEDURE — 700102 HCHG RX REV CODE 250 W/ 637 OVERRIDE(OP): Performed by: STUDENT IN AN ORGANIZED HEALTH CARE EDUCATION/TRAINING PROGRAM

## 2021-03-11 PROCEDURE — 93005 ELECTROCARDIOGRAM TRACING: CPT | Performed by: STUDENT IN AN ORGANIZED HEALTH CARE EDUCATION/TRAINING PROGRAM

## 2021-03-11 PROCEDURE — C9803 HOPD COVID-19 SPEC COLLECT: HCPCS | Performed by: STUDENT IN AN ORGANIZED HEALTH CARE EDUCATION/TRAINING PROGRAM

## 2021-03-11 PROCEDURE — 83036 HEMOGLOBIN GLYCOSYLATED A1C: CPT

## 2021-03-11 PROCEDURE — 83735 ASSAY OF MAGNESIUM: CPT

## 2021-03-11 PROCEDURE — 96376 TX/PRO/DX INJ SAME DRUG ADON: CPT

## 2021-03-11 PROCEDURE — 82962 GLUCOSE BLOOD TEST: CPT

## 2021-03-11 PROCEDURE — 82010 KETONE BODYS QUAN: CPT

## 2021-03-11 PROCEDURE — 700111 HCHG RX REV CODE 636 W/ 250 OVERRIDE (IP): Performed by: EMERGENCY MEDICINE

## 2021-03-11 PROCEDURE — 99285 EMERGENCY DEPT VISIT HI MDM: CPT

## 2021-03-11 PROCEDURE — A9270 NON-COVERED ITEM OR SERVICE: HCPCS | Performed by: STUDENT IN AN ORGANIZED HEALTH CARE EDUCATION/TRAINING PROGRAM

## 2021-03-11 PROCEDURE — 85025 COMPLETE CBC W/AUTO DIFF WBC: CPT

## 2021-03-11 PROCEDURE — 71045 X-RAY EXAM CHEST 1 VIEW: CPT

## 2021-03-11 PROCEDURE — 80053 COMPREHEN METABOLIC PANEL: CPT

## 2021-03-11 PROCEDURE — 99220 PR INITIAL OBSERVATION CARE,LEVL III: CPT | Performed by: STUDENT IN AN ORGANIZED HEALTH CARE EDUCATION/TRAINING PROGRAM

## 2021-03-11 PROCEDURE — 96375 TX/PRO/DX INJ NEW DRUG ADDON: CPT

## 2021-03-11 PROCEDURE — 96374 THER/PROPH/DIAG INJ IV PUSH: CPT

## 2021-03-11 PROCEDURE — 84100 ASSAY OF PHOSPHORUS: CPT

## 2021-03-11 PROCEDURE — 83690 ASSAY OF LIPASE: CPT

## 2021-03-11 PROCEDURE — 84484 ASSAY OF TROPONIN QUANT: CPT

## 2021-03-11 PROCEDURE — 81001 URINALYSIS AUTO W/SCOPE: CPT

## 2021-03-11 PROCEDURE — 700105 HCHG RX REV CODE 258: Performed by: STUDENT IN AN ORGANIZED HEALTH CARE EDUCATION/TRAINING PROGRAM

## 2021-03-11 PROCEDURE — 700105 HCHG RX REV CODE 258: Performed by: EMERGENCY MEDICINE

## 2021-03-11 PROCEDURE — 0241U HCHG SARS-COV-2 COVID-19 NFCT DS RESP RNA 4 TRGT MIC: CPT

## 2021-03-11 RX ORDER — TESTOSTERONE CYPIONATE 200 MG/ML
INJECTION, SOLUTION INTRAMUSCULAR
COMMUNITY
End: 2021-03-11

## 2021-03-11 RX ORDER — AMOXICILLIN 250 MG
2 CAPSULE ORAL 2 TIMES DAILY
Status: DISCONTINUED | OUTPATIENT
Start: 2021-03-12 | End: 2021-03-12 | Stop reason: HOSPADM

## 2021-03-11 RX ORDER — THYROID 30 MG/1
60 TABLET ORAL
Status: DISCONTINUED | OUTPATIENT
Start: 2021-03-12 | End: 2021-03-12 | Stop reason: HOSPADM

## 2021-03-11 RX ORDER — BISACODYL 10 MG
10 SUPPOSITORY, RECTAL RECTAL
Status: DISCONTINUED | OUTPATIENT
Start: 2021-03-11 | End: 2021-03-12 | Stop reason: HOSPADM

## 2021-03-11 RX ORDER — MORPHINE SULFATE 4 MG/ML
2 INJECTION, SOLUTION INTRAMUSCULAR; INTRAVENOUS ONCE
Status: COMPLETED | OUTPATIENT
Start: 2021-03-11 | End: 2021-03-11

## 2021-03-11 RX ORDER — LABETALOL HYDROCHLORIDE 5 MG/ML
10 INJECTION, SOLUTION INTRAVENOUS EVERY 4 HOURS PRN
Status: DISCONTINUED | OUTPATIENT
Start: 2021-03-11 | End: 2021-03-12 | Stop reason: HOSPADM

## 2021-03-11 RX ORDER — ONDANSETRON 2 MG/ML
2 INJECTION INTRAMUSCULAR; INTRAVENOUS ONCE
Status: COMPLETED | OUTPATIENT
Start: 2021-03-11 | End: 2021-03-11

## 2021-03-11 RX ORDER — DEXTROSE MONOHYDRATE 25 G/50ML
50 INJECTION, SOLUTION INTRAVENOUS
Status: DISCONTINUED | OUTPATIENT
Start: 2021-03-11 | End: 2021-03-12 | Stop reason: HOSPADM

## 2021-03-11 RX ORDER — CHOLECALCIFEROL (VITAMIN D3) 125 MCG
5-10 CAPSULE ORAL
Status: ON HOLD | COMMUNITY
End: 2022-05-12 | Stop reason: SDUPTHER

## 2021-03-11 RX ORDER — MORPHINE SULFATE 4 MG/ML
4 INJECTION, SOLUTION INTRAMUSCULAR; INTRAVENOUS ONCE
Status: COMPLETED | OUTPATIENT
Start: 2021-03-11 | End: 2021-03-11

## 2021-03-11 RX ORDER — SODIUM CHLORIDE 9 MG/ML
1000 INJECTION, SOLUTION INTRAVENOUS ONCE
Status: COMPLETED | OUTPATIENT
Start: 2021-03-11 | End: 2021-03-11

## 2021-03-11 RX ORDER — DEXTROSE MONOHYDRATE 25 G/50ML
50 INJECTION, SOLUTION INTRAVENOUS
Status: DISCONTINUED | OUTPATIENT
Start: 2021-03-11 | End: 2021-03-11

## 2021-03-11 RX ORDER — ACETAMINOPHEN 325 MG/1
650 TABLET ORAL EVERY 6 HOURS PRN
Status: DISCONTINUED | OUTPATIENT
Start: 2021-03-11 | End: 2021-03-12 | Stop reason: HOSPADM

## 2021-03-11 RX ORDER — INSULIN GLARGINE 100 [IU]/ML
0.2 INJECTION, SOLUTION SUBCUTANEOUS EVERY EVENING
Status: DISCONTINUED | OUTPATIENT
Start: 2021-03-11 | End: 2021-03-12 | Stop reason: HOSPADM

## 2021-03-11 RX ORDER — PIOGLITAZONEHYDROCHLORIDE 30 MG/1
30 TABLET ORAL DAILY
Status: ON HOLD | COMMUNITY
End: 2021-03-12

## 2021-03-11 RX ORDER — KETOROLAC TROMETHAMINE 10 MG/1
10 TABLET, FILM COATED ORAL EVERY 6 HOURS PRN
Status: DISCONTINUED | OUTPATIENT
Start: 2021-03-11 | End: 2021-03-12 | Stop reason: HOSPADM

## 2021-03-11 RX ORDER — SODIUM CHLORIDE 9 MG/ML
INJECTION, SOLUTION INTRAVENOUS CONTINUOUS
Status: DISCONTINUED | OUTPATIENT
Start: 2021-03-11 | End: 2021-03-12 | Stop reason: HOSPADM

## 2021-03-11 RX ORDER — PIOGLITAZONEHYDROCHLORIDE 30 MG/1
30 TABLET ORAL DAILY
Status: DISCONTINUED | OUTPATIENT
Start: 2021-03-11 | End: 2021-03-11

## 2021-03-11 RX ORDER — SODIUM CHLORIDE 9 MG/ML
INJECTION, SOLUTION INTRAVENOUS CONTINUOUS
Status: DISCONTINUED | OUTPATIENT
Start: 2021-03-11 | End: 2021-03-11

## 2021-03-11 RX ORDER — INSULIN GLARGINE 100 [IU]/ML
0.2 INJECTION, SOLUTION SUBCUTANEOUS EVERY EVENING
Status: DISCONTINUED | OUTPATIENT
Start: 2021-03-11 | End: 2021-03-11

## 2021-03-11 RX ORDER — POLYETHYLENE GLYCOL 3350 17 G/17G
1 POWDER, FOR SOLUTION ORAL
Status: DISCONTINUED | OUTPATIENT
Start: 2021-03-11 | End: 2021-03-12 | Stop reason: HOSPADM

## 2021-03-11 RX ADMIN — MORPHINE SULFATE 4 MG: 4 INJECTION INTRAVENOUS at 15:24

## 2021-03-11 RX ADMIN — SODIUM CHLORIDE: 9 INJECTION, SOLUTION INTRAVENOUS at 18:21

## 2021-03-11 RX ADMIN — ACETAMINOPHEN 650 MG: 325 TABLET, FILM COATED ORAL at 18:56

## 2021-03-11 RX ADMIN — INSULIN GLARGINE 19 UNITS: 100 INJECTION, SOLUTION SUBCUTANEOUS at 21:16

## 2021-03-11 RX ADMIN — ONDANSETRON 2 MG: 2 INJECTION INTRAMUSCULAR; INTRAVENOUS at 15:24

## 2021-03-11 RX ADMIN — INSULIN LISPRO 10 UNITS: 100 INJECTION, SOLUTION INTRAVENOUS; SUBCUTANEOUS at 21:17

## 2021-03-11 RX ADMIN — SODIUM CHLORIDE 1000 ML: 9 INJECTION, SOLUTION INTRAVENOUS at 15:24

## 2021-03-11 RX ADMIN — MORPHINE SULFATE 2 MG: 4 INJECTION INTRAVENOUS at 17:18

## 2021-03-11 RX ADMIN — KETOROLAC TROMETHAMINE 10 MG: 10 TABLET, FILM COATED ORAL at 21:24

## 2021-03-11 ASSESSMENT — ENCOUNTER SYMPTOMS
CHILLS: 0
HEARTBURN: 0
SHORTNESS OF BREATH: 0
ABDOMINAL PAIN: 1
DIARRHEA: 0
MYALGIAS: 0
NAUSEA: 1
DIZZINESS: 0
SENSORY CHANGE: 0
DEPRESSION: 0
FEVER: 0
ORTHOPNEA: 0
VOMITING: 1
PALPITATIONS: 0
WHEEZING: 0
SPEECH CHANGE: 0
SPUTUM PRODUCTION: 0
HEADACHES: 0

## 2021-03-11 ASSESSMENT — PATIENT HEALTH QUESTIONNAIRE - PHQ9
1. LITTLE INTEREST OR PLEASURE IN DOING THINGS: NOT AT ALL
2. FEELING DOWN, DEPRESSED, IRRITABLE, OR HOPELESS: NOT AT ALL
1. LITTLE INTEREST OR PLEASURE IN DOING THINGS: NOT AT ALL
SUM OF ALL RESPONSES TO PHQ9 QUESTIONS 1 AND 2: 0
SUM OF ALL RESPONSES TO PHQ9 QUESTIONS 1 AND 2: 0
2. FEELING DOWN, DEPRESSED, IRRITABLE, OR HOPELESS: NOT AT ALL

## 2021-03-11 ASSESSMENT — LIFESTYLE VARIABLES
HAVE YOU EVER FELT YOU SHOULD CUT DOWN ON YOUR DRINKING: NO
TOTAL SCORE: 0
HAVE PEOPLE ANNOYED YOU BY CRITICIZING YOUR DRINKING: NO
DOES PATIENT WANT TO STOP DRINKING: NO
EVER FELT BAD OR GUILTY ABOUT YOUR DRINKING: NO
ON A TYPICAL DAY WHEN YOU DRINK ALCOHOL HOW MANY DRINKS DO YOU HAVE: 1
TOTAL SCORE: 0
EVER HAD A DRINK FIRST THING IN THE MORNING TO STEADY YOUR NERVES TO GET RID OF A HANGOVER: NO
ALCOHOL_USE: YES
TOTAL SCORE: 0
AVERAGE NUMBER OF DAYS PER WEEK YOU HAVE A DRINK CONTAINING ALCOHOL: 0
HOW MANY TIMES IN THE PAST YEAR HAVE YOU HAD 5 OR MORE DRINKS IN A DAY: 0
CONSUMPTION TOTAL: NEGATIVE

## 2021-03-11 ASSESSMENT — FIBROSIS 4 INDEX
FIB4 SCORE: 0.85
FIB4 SCORE: 0.86
FIB4 SCORE: 0.85

## 2021-03-11 ASSESSMENT — PAIN DESCRIPTION - PAIN TYPE
TYPE: ACUTE PAIN
TYPE: CHRONIC PAIN

## 2021-03-11 NOTE — ED TRIAGE NOTES
Mahesh Bradshaw  Chief Complaint   Patient presents with   • High Blood Sugar     glucometer read HI at home.    • Abdominal Cramping   • Vomiting     x 2 episodes     Pt ambulatory to triage with above complaint.  VSS,  No acute distress.   Pt was seen here 2 nights ago for same given fluid and insulin and d/c'd home.    Today recurrent sx and pt is concerned for DKA,  Glucometer read HI at home and took 10 units of insulin at home PTA.   Pt/staff masked and in appropriate PPE during encounter.   Pt returned to [facility], educated on triage process, and to inform staff of any changes or concerns.

## 2021-03-11 NOTE — ED PROVIDER NOTES
"ED Provider Note    CHIEF COMPLAINT  Chief Complaint   Patient presents with   • High Blood Sugar     glucometer read HI at home.    • Abdominal Cramping   • Vomiting     x 2 episodes       HPI  Mahesh Bradshaw is a 55 y.o. male who presents to the emergency department for high blood sugar, abdominal cramping and vomiting.  The patient has been having the symptoms for last several days.  Is here in the ED 2 days ago for the same symptoms he was hydrated, labs were reassuring and he was discharged home after fluids, and insulin.  Since that time he had the same symptoms of diffuse abdominal cramping nausea, vomiting and elevated blood sugar.  He is feeling somewhat worse but is having a hard time tolerating fluids and is now tachypneic.  He states he still has abdominal pain.  This is chronic and longstanding states he always has very is elevated blood sugar.  Is a hematemesis melena hematochezia.  No fevers or chills.  He denies any chest pain.  Denies any shortness of breath.  Denies any other aggravating or alleviating factors or associated complaints.  He is moving his bowels and passing gas.    REVIEW OF SYSTEMS  See HPI for further details. All other systems are negative.    PAST MEDICAL HISTORY  Past Medical History:   Diagnosis Date   • ADRIANE (acute kidney injury) ([facility]) 2/24/2016   • Anesthesia     \"Was difficult to intubate 2-2016\"   • Arthritis 3-3-17    \"Spine\"   • Aspiration pneumonia ([facility]) 3-2016   • ASTHMA 3-3-17    \"Hasn't had to use inhaler in 2 years\"   • Breath shortness 3-3-17    \"With Exercise\"   • Congestive heart failure ([facility]) 2-2016     3-3-17 \"Not a current issue\"   • Dental disorder    • Depression 11/26/2014   • Diabetes ([facility]) 3-3-17    Takes Insulin   • Diabetes ([facility])    • Difficult intubation 2-2016   • DKA (diabetic ketoacidoses) ([facility]) 2-2016    \"10 days on vent with DKA, Renal failure, CHF, elevated liver enzymes and electrolyte imbalance\"   • Electrolyte imbalance 2-2016   • Elevated " "LFTs    • Elevated liver enzymes 2-2016   • Essential hypertension 1/22/2016   • Gout    • Heart burn    • High cholesterol 3-3-17    Does not currently take medication for   • Hypothyroid 3-3-17    Takes [place] Thyroid   • Indigestion    • Kidney stones    • Klebsiella infect    • Migraine    • MRSA cellulitis    • Necrotizing myositis (HCC)    • On mechanically assisted ventilation ([facility]) 2-2016    HX \"On Vent for 10 days at [facility]\".  \"DX Pancreatitis, DKA, CHF, Elevated Liver Enzymes & Electrolyte Imbalance\".   • Pain 3-3-17    \"Left Flank\"   • Pancreatitis 2-2016    \"D/T Tradjenta was hospitialized for 15 days\"   • RF (renal failure) 2-2016   • Sepsis ([facility]) 1/21/2016   • Snoring 3-3-17    Has not had a sleep study   • Streptococcus infection    • UC (ulcerative colitis) ([facility]) 3-3-17    \"Five BM's per day, takes Lialda\"   • Urinary incontinence    • Vitamin D deficiency        FAMILY HISTORY  Family History   Problem Relation Age of Onset   • Cancer Mother        SOCIAL HISTORY  Social History     Socioeconomic History   • Marital status:      Spouse name: Not on file   • Number of children: Not on file   • Years of education: Not on file   • Highest education level: Not on file   Occupational History   • Not on file   Tobacco Use   • Smoking status: Never Smoker   • Smokeless tobacco: Never Used   Substance and Sexual Activity   • Alcohol use: Yes   • Drug use: No   • Sexual activity: Not on file   Other Topics Concern   • Not on file   Social History Narrative    ** Merged History Encounter **         ** Merged History Encounter **     ** Merged History Encounter **        Social Determinants of Health     Financial Resource Strain:    • Difficulty of Paying Living Expenses:    Food Insecurity:    • Worried About Running Out of Food in the Last Year:    • Ran Out of Food in the Last Year:    Transportation Needs:    • Lack of Transportation (Medical):    • Lack of Transportation (Non-Medical):    Physical " Activity:    • Days of Exercise per Week:    • Minutes of Exercise per Session:    Stress:    • Feeling of Stress :    Social Connections:    • Frequency of Communication with Friends and Family:    • Frequency of Social Gatherings with Friends and Family:    • Attends Worship Services:    • Active Member of Clubs or Organizations:    • Attends Club or Organization Meetings:    • Marital Status:    Intimate Partner Violence:    • Fear of Current or Ex-Partner:    • Emotionally Abused:    • Physically Abused:    • Sexually Abused:        SURGICAL HISTORY  Past Surgical History:   Procedure Laterality Date   • UMBILICAL HERNIA REPAIR N/A 4/15/2018    Procedure: UMBILICAL HERNIA REPAIR;  Surgeon: Karen Beasley M.D.;  Location: [facility];  Service: General   • IRRIGATION & DEBRIDEMENT ORTHO Left 12/10/2017    Procedure: IRRIGATION & DEBRIDEMENT ORTHO LEFT HAND;  Surgeon: Chicho Ramos M.D.;  Location: [facility];  Service: Orthopedics   • IRRIGATION & DEBRIDEMENT GENERAL Right 5/1/2017    Procedure: IRRIGATION & DEBRIDEMENT GENERAL-Left HAND AND right  ANKLE;  Surgeon: Esau Dooley M.D.;  Location: [facility];  Service:    • IRRIGATION & DEBRIDEMENT GENERAL Right 4/29/2017    Procedure: IRRIGATION & DEBRIDEMENT GENERAL;  Surgeon: Esau Dooley M.D.;  Location: [facility];  Service:    • INCISION AND DRAINAGE GENERAL  4/7/2017    Procedure: INCISION AND DRAINAGE GENERAL;  Surgeon: Esau Dooley M.D.;  Location: SURGERY SAME DAY [facility];  Service:    • UMBILICAL HERNIA REPAIR  3/10/2017    Procedure: UMBILICAL HERNIA REPAIR- INCISION AND DRAINAGE OF UMBILICAL WOUND AND MESH REMOVAL;  Surgeon: Esau Dooley M.D.;  Location: SURGERY SAME DAY [facility];  Service:    • UMBILICAL HERNIA REPAIR N/A 11/6/2016    Procedure: UMBILICAL HERNIA REPAIR;  Surgeon: Esau Dooley M.D.;  Location: [facility];  Service:    • COLONOSCOPY WITH  "BIOPSY  11/26/2014    Performed by Solo Higginbotham M.D. at ENDOSCOPY [facility] ORS   • LIVE BY LAPAROSCOPY  2005   • OTHER ORTHOPEDIC SURGERY  1997 or 1998    Left Wrist ORIF       CURRENT MEDICATIONS  Home Medications     Reviewed by Prema Lockett R.N. (Registered Nurse) on 03/11/21 at 1346  Med List Status: Partial   Medication Last Dose Status   Empagliflozin-metFORMIN HCl (SYNJARDY) 12.5-1000 MG Tab  Active   NON SPECIFIED  Active   ondansetron (ZOFRAN ODT) 8 MG TABLET DISPERSIBLE  Active   Semaglutide,0.25 or 0.5MG/DOS, (OZEMPIC, 0.25 OR 0.5 MG/DOSE,) 2 MG/1.5ML Solution Pen-injector  Active   thyroid (ARMOUR THYROID) 60 MG Tab  Active                ALLERGIES  Allergies   Allergen Reactions   • Hydrocodone Hives     Tolerates Morphine and oxycodone     • Peanut (Diagnostic) Anaphylaxis   • Tradjenta [Linagliptin] Unspecified     Pt developed pancreatitis       PHYSICAL EXAM  VITAL SIGNS: /79   Pulse (!) 106   Temp 36.3 °C (97.4 °F) (Temporal)   Resp 16   Ht 1.727 m (5' 8\")   Wt 92.7 kg (204 lb 5.9 oz)   SpO2 95%   BMI 31.07 kg/m²    Constitutional: Awake alert nontoxic, tachypneic, moderate distress  HENT: Normocephalic, Atraumatic, Bilateral external ears normal,  Eyes: PERRL, EOMI, Conjunctiva normal, No discharge.   Neck: Normal range of motion  Cardiovascular: Tachycardic murmurs rubs or gallops  Thorax & Lungs: Normal breath sounds, No respiratory distress,  Abdomen: Bowel sounds normal, Soft, No tenderness  Skin: Warm, Dry, No erythema, No rash.   Musculoskeletal: Good range of motion in all major joints.  No asymmetric edema.  Neurologic: Alert, No focal deficits noted.   Psychiatric: Affect normal      Results for orders placed or performed during the hospital encounter of 03/11/21   CBC w/ Differential   Result Value Ref Range    WBC 5.6 4.8 - 10.8 K/uL    RBC 5.06 4.70 - 6.10 M/uL    Hemoglobin 16.1 14.0 - 18.0 g/dL    Hematocrit 44.7 42.0 - 52.0 %    MCV 88.3 81.4 - " 97.8 fL    MCH 31.8 27.0 - 33.0 pg    MCHC 36.0 (H) 33.7 - 35.3 g/dL    RDW 41.5 35.9 - 50.0 fL    Platelet Count 194 164 - 446 K/uL    MPV 9.9 9.0 - 12.9 fL    Neutrophils-Polys 74.90 (H) 44.00 - 72.00 %    Lymphocytes 19.90 (L) 22.00 - 41.00 %    Monocytes 4.10 0.00 - 13.40 %    Eosinophils 0.50 0.00 - 6.90 %    Basophils 0.20 0.00 - 1.80 %    Immature Granulocytes 0.40 0.00 - 0.90 %    Nucleated RBC 0.00 /100 WBC    Neutrophils (Absolute) 4.23 1.82 - 7.42 K/uL    Lymphs (Absolute) 1.12 1.00 - 4.80 K/uL    Monos (Absolute) 0.23 0.00 - 0.85 K/uL    Eos (Absolute) 0.03 0.00 - 0.51 K/uL    Baso (Absolute) 0.01 0.00 - 0.12 K/uL    Immature Granulocytes (abs) 0.02 0.00 - 0.11 K/uL    NRBC (Absolute) 0.00 K/uL   Complete Metabolic Panel (CMP)   Result Value Ref Range    Sodium 134 (L) 135 - 145 mmol/L    Potassium 4.2 3.6 - 5.5 mmol/L    Chloride 98 96 - 112 mmol/L    Co2 21 20 - 33 mmol/L    Anion Gap 15.0 7.0 - 16.0    Glucose 566 (HH) 65 - 99 mg/dL    Bun 16 8 - 22 mg/dL    Creatinine 0.81 0.50 - 1.40 mg/dL    Calcium 9.7 8.5 - 10.5 mg/dL    AST(SGOT) 15 12 - 45 U/L    ALT(SGPT) 25 2 - 50 U/L    Alkaline Phosphatase 77 30 - 99 U/L    Total Bilirubin 0.3 0.1 - 1.5 mg/dL    Albumin 4.3 3.2 - 4.9 g/dL    Total Protein 7.3 6.0 - 8.2 g/dL    Globulin 3.0 1.9 - 3.5 g/dL    A-G Ratio 1.4 g/dL   Troponin   Result Value Ref Range    Troponin T 8 6 - 19 ng/L   BETA-HYDROXYBUTYRIC ACID   Result Value Ref Range    beta-Hydroxybutyric Acid 0.38 (H) 0.02 - 0.27 mmol/L   Urinalysis, culture if indicated    Specimen: Urine   Result Value Ref Range    Color Yellow     Character Clear     Specific Gravity 1.020 <1.035    Ph 6.5 5.0 - 8.0    Glucose 500 (A) Negative mg/dL    Ketones Trace (A) Negative mg/dL    Protein 100 (A) Negative mg/dL    Bilirubin Negative Negative    Urobilinogen, Urine 0.2 Negative    Nitrite Negative Negative    Leukocyte Esterase Negative Negative    Occult Blood Moderate (A) Negative    Micro Urine Req  Microscopic    LIPASE   Result Value Ref Range    Lipase 26 11 - 82 U/L   ESTIMATED GFR   Result Value Ref Range    GFR If African American >60 >60 mL/min/1.73 m 2    GFR If Non African American >60 >60 mL/min/1.73 m 2   HEMOGLOBIN A1C   Result Value Ref Range    Glycohemoglobin 12.1 (H) 4.0 - 5.6 %    Est Avg Glucose 301 mg/dL   MAGNESIUM   Result Value Ref Range    Magnesium 1.8 1.5 - 2.5 mg/dL   PHOSPHORUS   Result Value Ref Range    Phosphorus 4.1 2.5 - 4.5 mg/dL   URINE MICROSCOPIC (W/UA)   Result Value Ref Range    WBC 0-2 (A) /hpf    RBC 5-10 (A) /hpf    Bacteria Rare (A) None /hpf    Epithelial Cells Negative /hpf   COV-2, FLU A/B, AND RSV BY PCR (2-4 HOURS CEPHEID): Collect NP swab in VTM    Specimen: Nasopharyngeal; Respirate   Result Value Ref Range    Influenza virus A RNA Negative Negative    Influenza virus B, PCR Negative Negative    RSV, PCR Negative Negative    SARS-CoV-2 by PCR NotDetected     SARS-CoV-2 Source NP Swab    EKG   Result Value Ref Range    Report       [facility]    Test Date:  2021  Pt Name:    DEVIN MO              Department: ER  MRN:        [id number]                      Room:       [facility]  Gender:     Male                         Technician: [id number]  :        1965                   Requested By:DANNY HOUSE  Order #:    [id number]                    Reading MD:    Measurements  Intervals                                Axis  Rate:       78                           P:          59  NV:         140                          QRS:        -2  QRSD:       96                           T:          5  QT:         392  QTc:        447    Interpretive Statements  SINUS RHYTHM  RSR' IN V1 OR V2, RIGHT VCD OR RVH  Compared to ECG 2020 16:30:26  Right ventricular hypertrophy now present  RSR' in V1 or V2 now present  Sinus tachycardia no longer present          COURSE & MEDICAL DECISION MAKING  Pertinent Labs & Imaging studies reviewed.  (See chart for details)  The patient presents with persistent hyperglycemia, nausea, vomiting and diffuse abdominal cramping.  He is tachypneic and dehydrated but does not appear septic or toxic.  Differential diagnosis includes but is not limited to hyperglycemia, diabetic ketoacidosis, dehydration, gastritis, pancreatitis, or bowel obstruction.      The patient is worked up with labs and started on IV fluids.  Indication for IV fluids hyperglycemia and fluid losses from vomiting.    Patient's chart for his previous visit was reviewed including labs and physicians notes for comparison.  At this point his abdominal pain is about the same.  He has no peritonitis or significant tenderness.  I this related his hyperglycemia and vomiting.    Abs are pending from hyperglycemia but without evidence of diabetic ketoacidosis.  Patient was seen here about 48 hours ago for the same presentation labs were obtained and reviewed.  Is given a liter fluid and some Zofran and reassessed and still has some discomfort.  This point I feel he requires hospitalization.  Because he does not have a significant leukocytosis, and because of the fairly benign exam without any peritonitis and he states he always gets this discomfort associate with vomiting hyperglycemia I do not feel he needs a CAT scan.    I am concerned up that his blood sugar is out of control he requires hospitalization for fluid management and fluid replacement and blood sugar control.  If his pain does not improve he may need abdominal imaging.  I discussed this plan with the hospitalist and the patient requires hospitalist for further work-up and treatment and care is transferred at that time.          The patient was noted to have elevated blood pressure while in the ER and was counseled to see their doctor within one wee to have this rechecked.    FINAL IMPRESSION  1. Hyperglycemia     2. Intractable nausea and vomiting         3.         Electronically signed by:  Orlando Vázquez M.D., 3/11/2021 2:43 PM

## 2021-03-11 NOTE — ED NOTES
Med rec complete via interview with pt at bedside. Allergies reviewed. Pt denies antibiotic use in past 14 days.  Pt states that he will give himself humulin r when his blood sugar gets above 300 (last dose was at 1200 today).

## 2021-03-12 ENCOUNTER — PATIENT OUTREACH (OUTPATIENT)
Dept: HEALTH INFORMATION MANAGEMENT | Facility: OTHER | Age: 56
End: 2021-03-12

## 2021-03-12 VITALS
HEIGHT: 68 IN | RESPIRATION RATE: 16 BRPM | TEMPERATURE: 97.8 F | WEIGHT: 204.81 LBS | DIASTOLIC BLOOD PRESSURE: 68 MMHG | SYSTOLIC BLOOD PRESSURE: 118 MMHG | HEART RATE: 73 BPM | OXYGEN SATURATION: 95 % | BODY MASS INDEX: 31.04 KG/M2

## 2021-03-12 PROBLEM — R11.2 INTRACTABLE VOMITING WITH NAUSEA: Status: RESOLVED | Noted: 2021-03-11 | Resolved: 2021-03-12

## 2021-03-12 LAB
ALBUMIN SERPL BCP-MCNC: 3.5 G/DL (ref 3.2–4.9)
ALBUMIN/GLOB SERPL: 1.4 G/DL
ALP SERPL-CCNC: 54 U/L (ref 30–99)
ALT SERPL-CCNC: 23 U/L (ref 2–50)
ANION GAP SERPL CALC-SCNC: 11 MMOL/L (ref 7–16)
AST SERPL-CCNC: 13 U/L (ref 12–45)
BILIRUB SERPL-MCNC: 0.3 MG/DL (ref 0.1–1.5)
BUN SERPL-MCNC: 16 MG/DL (ref 8–22)
CALCIUM SERPL-MCNC: 8.3 MG/DL (ref 8.5–10.5)
CHLORIDE SERPL-SCNC: 104 MMOL/L (ref 96–112)
CO2 SERPL-SCNC: 24 MMOL/L (ref 20–33)
CREAT SERPL-MCNC: 0.63 MG/DL (ref 0.5–1.4)
ERYTHROCYTE [DISTWIDTH] IN BLOOD BY AUTOMATED COUNT: 43.2 FL (ref 35.9–50)
GLOBULIN SER CALC-MCNC: 2.5 G/DL (ref 1.9–3.5)
GLUCOSE BLD-MCNC: 202 MG/DL (ref 65–99)
GLUCOSE SERPL-MCNC: 219 MG/DL (ref 65–99)
HCT VFR BLD AUTO: 39.7 % (ref 42–52)
HGB BLD-MCNC: 14 G/DL (ref 14–18)
MCH RBC QN AUTO: 32.1 PG (ref 27–33)
MCHC RBC AUTO-ENTMCNC: 35.3 G/DL (ref 33.7–35.3)
MCV RBC AUTO: 91.1 FL (ref 81.4–97.8)
PLATELET # BLD AUTO: 161 K/UL (ref 164–446)
PMV BLD AUTO: 9.4 FL (ref 9–12.9)
POTASSIUM SERPL-SCNC: 3.3 MMOL/L (ref 3.6–5.5)
PROT SERPL-MCNC: 6 G/DL (ref 6–8.2)
RBC # BLD AUTO: 4.36 M/UL (ref 4.7–6.1)
SODIUM SERPL-SCNC: 139 MMOL/L (ref 135–145)
WBC # BLD AUTO: 4.2 K/UL (ref 4.8–10.8)

## 2021-03-12 PROCEDURE — 96372 THER/PROPH/DIAG INJ SC/IM: CPT

## 2021-03-12 PROCEDURE — 700102 HCHG RX REV CODE 250 W/ 637 OVERRIDE(OP): Performed by: NURSE PRACTITIONER

## 2021-03-12 PROCEDURE — A9270 NON-COVERED ITEM OR SERVICE: HCPCS | Performed by: NURSE PRACTITIONER

## 2021-03-12 PROCEDURE — G0378 HOSPITAL OBSERVATION PER HR: HCPCS

## 2021-03-12 PROCEDURE — 96376 TX/PRO/DX INJ SAME DRUG ADON: CPT

## 2021-03-12 PROCEDURE — 700102 HCHG RX REV CODE 250 W/ 637 OVERRIDE(OP): Performed by: STUDENT IN AN ORGANIZED HEALTH CARE EDUCATION/TRAINING PROGRAM

## 2021-03-12 PROCEDURE — 700111 HCHG RX REV CODE 636 W/ 250 OVERRIDE (IP): Performed by: STUDENT IN AN ORGANIZED HEALTH CARE EDUCATION/TRAINING PROGRAM

## 2021-03-12 PROCEDURE — 700105 HCHG RX REV CODE 258: Performed by: STUDENT IN AN ORGANIZED HEALTH CARE EDUCATION/TRAINING PROGRAM

## 2021-03-12 PROCEDURE — 85027 COMPLETE CBC AUTOMATED: CPT

## 2021-03-12 PROCEDURE — A9270 NON-COVERED ITEM OR SERVICE: HCPCS | Performed by: STUDENT IN AN ORGANIZED HEALTH CARE EDUCATION/TRAINING PROGRAM

## 2021-03-12 PROCEDURE — 99217 PR OBSERVATION CARE DISCHARGE: CPT | Performed by: INTERNAL MEDICINE

## 2021-03-12 PROCEDURE — 80053 COMPREHEN METABOLIC PANEL: CPT

## 2021-03-12 PROCEDURE — 82962 GLUCOSE BLOOD TEST: CPT

## 2021-03-12 RX ORDER — MORPHINE SULFATE 4 MG/ML
1 INJECTION, SOLUTION INTRAMUSCULAR; INTRAVENOUS ONCE
Status: COMPLETED | OUTPATIENT
Start: 2021-03-12 | End: 2021-03-12

## 2021-03-12 RX ORDER — INSULIN LISPRO 100 [IU]/ML
8 INJECTION, SOLUTION INTRAVENOUS; SUBCUTANEOUS
Qty: 3 ML | Refills: 3 | Status: ON HOLD
Start: 2021-03-12 | End: 2021-05-03

## 2021-03-12 RX ORDER — INSULIN GLARGINE 100 [IU]/ML
20 INJECTION, SOLUTION SUBCUTANEOUS 2 TIMES DAILY
Qty: 3 ML | Refills: 3 | Status: SHIPPED
Start: 2021-03-12 | End: 2021-05-02

## 2021-03-12 RX ORDER — INSULIN LISPRO 100 [IU]/ML
INJECTION, SOLUTION INTRAVENOUS; SUBCUTANEOUS
Qty: 3 ML | Refills: 3 | Status: ON HOLD | OUTPATIENT
Start: 2021-03-12 | End: 2021-05-03

## 2021-03-12 RX ORDER — POTASSIUM CHLORIDE 20 MEQ/1
20 TABLET, EXTENDED RELEASE ORAL DAILY
Status: DISCONTINUED | OUTPATIENT
Start: 2021-03-12 | End: 2021-03-12 | Stop reason: HOSPADM

## 2021-03-12 RX ADMIN — ENOXAPARIN SODIUM 40 MG: 40 INJECTION SUBCUTANEOUS at 05:47

## 2021-03-12 RX ADMIN — THYROID, PORCINE 60 MG: 30 TABLET ORAL at 05:49

## 2021-03-12 RX ADMIN — INSULIN LISPRO 4 UNITS: 100 INJECTION, SOLUTION INTRAVENOUS; SUBCUTANEOUS at 08:41

## 2021-03-12 RX ADMIN — SODIUM CHLORIDE: 9 INJECTION, SOLUTION INTRAVENOUS at 04:34

## 2021-03-12 RX ADMIN — MORPHINE SULFATE 1 MG: 4 INJECTION INTRAVENOUS at 05:56

## 2021-03-12 RX ADMIN — POTASSIUM CHLORIDE 20 MEQ: 1500 TABLET, EXTENDED RELEASE ORAL at 08:39

## 2021-03-12 SDOH — ECONOMIC STABILITY: FOOD INSECURITY: WITHIN THE PAST 12 MONTHS, YOU WORRIED THAT YOUR FOOD WOULD RUN OUT BEFORE YOU GOT MONEY TO BUY MORE.: NEVER TRUE

## 2021-03-12 SDOH — ECONOMIC STABILITY: FOOD INSECURITY: WITHIN THE PAST 12 MONTHS, THE FOOD YOU BOUGHT JUST DIDN'T LAST AND YOU DIDN'T HAVE MONEY TO GET MORE.: NEVER TRUE

## 2021-03-12 SDOH — ECONOMIC STABILITY: INCOME INSECURITY: HOW HARD IS IT FOR YOU TO PAY FOR THE VERY BASICS LIKE FOOD, HOUSING, MEDICAL CARE, AND HEATING?: NOT HARD AT ALL

## 2021-03-12 SDOH — ECONOMIC STABILITY: TRANSPORTATION INSECURITY
IN THE PAST 12 MONTHS, HAS LACK OF TRANSPORTATION KEPT YOU FROM MEETINGS, WORK, OR FROM GETTING THINGS NEEDED FOR DAILY LIVING?: NO

## 2021-03-12 SDOH — ECONOMIC STABILITY: TRANSPORTATION INSECURITY
IN THE PAST 12 MONTHS, HAS THE LACK OF TRANSPORTATION KEPT YOU FROM MEDICAL APPOINTMENTS OR FROM GETTING MEDICATIONS?: NO

## 2021-03-12 ASSESSMENT — PAIN DESCRIPTION - PAIN TYPE
TYPE: CHRONIC PAIN

## 2021-03-12 NOTE — PROGRESS NOTES
Report received from JAZMINE Edwards.  Pt brought up to the CDU from the Yellow Pod of the ED via wheelchair.  POC gone over with pt and all questions answered.  Patient A & O  X 4.  No apparent signs of distress.  Pt states pain is 4/10 in right epigastric.  Safety precautions in place.  Patient educated to call for assistance.  Will continue to monitor.

## 2021-03-12 NOTE — DISCHARGE INSTRUCTIONS
Discharge Instructions    Discharged to home by car with self. Discharged via walking, hospital escort: Yes.  Special equipment needed: Not Applicable    Be sure to schedule a follow-up appointment with your primary care doctor or any specialists as instructed.     Discharge Plan:   Diet Plan: Discussed  Activity Level: Discussed  Confirmed Follow up Appointment: Patient to Call and Schedule Appointment  Confirmed Symptoms Management: Discussed  Medication Reconciliation Updated: Yes  Influenza Vaccine Indication: Not indicated: Previously immunized this influenza season and > 8 years of age    I understand that a diet low in cholesterol, fat, and sodium is recommended for good health. Unless I have been given specific instructions below for another diet, I accept this instruction as my diet prescription.   Other diet:     Special Instructions: None    · Is patient discharged on Warfarin / Coumadin?   No   Diabetes Basics    Diabetes (diabetes mellitus) is a long-term (chronic) disease. It occurs when the body does not properly use sugar (glucose) that is released from food after you eat.  Diabetes may be caused by one or both of these problems:  · Your pancreas does not make enough of a hormone called insulin.  · Your body does not react in a normal way to insulin that it makes.  Insulin lets sugars (glucose) go into cells in your body. This gives you energy. If you have diabetes, sugars cannot get into cells. This causes high blood sugar (hyperglycemia).  Follow these instructions at home:  How is diabetes treated?  You may need to take insulin or other diabetes medicines daily to keep your blood sugar in balance. Take your diabetes medicines every day as told by your doctor. List your diabetes medicines here:  Diabetes medicines  · Name of medicine: ______________________________  ? Amount (dose): _______________ Time (a.m./p.m.): _______________ Notes: ___________________________________  · Name of medicine:  ______________________________  ? Amount (dose): _______________ Time (a.m./p.m.): _______________ Notes: ___________________________________  · Name of medicine: ______________________________  ? Amount (dose): _______________ Time (a.m./p.m.): _______________ Notes: ___________________________________  If you use insulin, you will learn how to give yourself insulin by injection. You may need to adjust the amount based on the food that you eat. List the types of insulin you use here:  Insulin  · Insulin type: ______________________________  ? Amount (dose): _______________ Time (a.m./p.m.): _______________ Notes: ___________________________________  · Insulin type: ______________________________  ? Amount (dose): _______________ Time (a.m./p.m.): _______________ Notes: ___________________________________  · Insulin type: ______________________________  ? Amount (dose): _______________ Time (a.m./p.m.): _______________ Notes: ___________________________________  · Insulin type: ______________________________  ? Amount (dose): _______________ Time (a.m./p.m.): _______________ Notes: ___________________________________  · Insulin type: ______________________________  ? Amount (dose): _______________ Time (a.m./p.m.): _______________ Notes: ___________________________________  How do I manage my blood sugar?    Check your blood sugar levels using a blood glucose monitor as directed by your doctor.  Your doctor will set treatment goals for you. Generally, you should have these blood sugar levels:  · Before meals (preprandial):  mg/dL (4.4-7.2 mmol/L).  · After meals (postprandial): below 180 mg/dL (10 mmol/L).  · A1c level: less than 7%.  Write down the times that you will check your blood sugar levels:  Blood sugar checks  · Time: _______________ Notes: ___________________________________  · Time: _______________ Notes: ___________________________________  · Time: _______________ Notes:  ___________________________________  · Time: _______________ Notes: ___________________________________  · Time: _______________ Notes: ___________________________________  · Time: _______________ Notes: ___________________________________    What do I need to know about low blood sugar?  Low blood sugar is called hypoglycemia. This is when blood sugar is at or below 70 mg/dL (3.9 mmol/L). Symptoms may include:  · Feeling:  ? Hungry.  ? Worried or nervous (anxious).  ? Sweaty and clammy.  ? Confused.  ? Dizzy.  ? Sleepy.  ? Sick to your stomach (nauseous).  · Having:  ? A fast heartbeat.  ? A headache.  ? A change in your vision.  ? Tingling or no feeling (numbness) around the mouth, lips, or tongue.  ? Jerky movements that you cannot control (seizure).  · Having trouble with:  ? Moving (coordination).  ? Sleeping.  ? Passing out (fainting).  ? Getting upset easily (irritability).  Treating low blood sugar  To treat low blood sugar, eat or drink something sugary right away. If you can think clearly and swallow safely, follow the 15:15 rule:  · Take 15 grams of a fast-acting carb (carbohydrate). Talk with your doctor about how much you should take.  · Some fast-acting carbs are:  ? Sugar tablets (glucose pills). Take 3-4 glucose pills.  ? 6-8 pieces of hard candy.  ? 4-6 oz (120-150 mL) of fruit juice.  ? 4-6 oz (120-150 mL) of regular (not diet) soda.  ? 1 Tbsp (15 mL) honey or sugar.  · Check your blood sugar 15 minutes after you take the carb.  · If your blood sugar is still at or below 70 mg/dL (3.9 mmol/L), take 15 grams of a carb again.  · If your blood sugar does not go above 70 mg/dL (3.9 mmol/L) after 3 tries, get help right away.  · After your blood sugar goes back to normal, eat a meal or a snack within 1 hour.  Treating very low blood sugar  If your blood sugar is at or below 54 mg/dL (3 mmol/L), you have very low blood sugar (severe hypoglycemia). This is an emergency. Do not wait to see if the symptoms  will go away. Get medical help right away. Call your local emergency services (911 in the [place] Do not drive yourself to the hospital.  Questions to ask your health care provider  · Do I need to meet with a diabetes educator?  · What equipment will I need to care for myself at home?  · What diabetes medicines do I need? When should I take them?  · How often do I need to check my blood sugar?  · What number can I call if I have questions?  · When is my next doctor's visit?  · Where can I find a support group for people with diabetes?  Where to find more information  · American Diabetes Association: www.diabetes.org  · American Association of Diabetes Educators: www.diabeteseducator.org/patient-resources  Contact a doctor if:  · Your blood sugar is at or above 240 mg/dL (13.3 mmol/L) for 2 days in a row.  · You have been sick or have had a fever for 2 days or more, and you are not getting better.  · You have any of these problems for more than 6 hours:  ? You cannot eat or drink.  ? You feel sick to your stomach (nauseous).  ? You throw up (vomit).  ? You have watery poop (diarrhea).  Get help right away if:  · Your blood sugar is lower than 54 mg/dL (3 mmol/L).  · You get confused.  · You have trouble:  ? Thinking clearly.  ? Breathing.  Summary  · Diabetes (diabetes mellitus) is a long-term (chronic) disease. It occurs when the body does not properly use sugar (glucose) that is released from food after digestion.  · Take insulin and diabetes medicines as told.  · Check your blood sugar every day, as often as told.  · Keep all follow-up visits as told by your doctor. This is important.  This information is not intended to replace advice given to you by your health care provider. Make sure you discuss any questions you have with your health care provider.  Document Released: 03/22/2019 Document Revised: 02/07/2020 Document Reviewed: 03/22/2019  Elsevier Patient Education © 2020 Elsevier Inc.      Depression / Suicide  Risk    As you are discharged from this [facility], it is important to learn how to keep safe from harming yourself.    Recognize the warning signs:  · Abrupt changes in personality, positive or negative- including increase in energy   · Giving away possessions  · Change in eating patterns- significant weight changes-  positive or negative  · Change in sleeping patterns- unable to sleep or sleeping all the time   · Unwillingness or inability to communicate  · Depression  · Unusual sadness, discouragement and loneliness  · Talk of wanting to die  · Neglect of personal appearance   · Rebelliousness- reckless behavior  · Withdrawal from people/activities they love  · Confusion- inability to concentrate     If you or a loved one observes any of these behaviors or has concerns about self-harm, here's what you can do:  · Talk about it- your feelings and reasons for harming yourself  · Remove any means that you might use to hurt yourself (examples: pills, rope, extension cords, firearm)  · Get professional help from the community (Mental Health, Substance Abuse, psychological counseling)  · Do not be alone:Call your Safe Contact- someone whom you trust who will be there for you.  · Call your local CRISIS HOTLINE 413-9981 or 833-732-7618  · Call your local Children's Mobile Crisis Response Team [place] (067) 106-6430 or www.[employer]  · Call the toll free National Suicide Prevention Hotlines   · National Suicide Prevention Lifeline 499-522-GXAY (1470)  · National Hope Line Network 800-SUICIDE (790-6775)      Discharge Instructions per Gibson Patel, A.P.N.    Discharge Instructions per Gibson Patel, DEMETRIO-BC    1.  Review your discharge Medication Reconciliation form. You may be provided with new prescriptions or changes to previously prescribed medications.  Be sure to take all of your prescribed medications exactly as directed.  Further questions can be directed to your local pharmacist or primary  care provider (PCP).    2. Follow-up with your PCP.  An appointment may have already been scheduled for you.  Please review your discharge paperwork and locate this information.  Please be sure to call your PCP to cancel if you are unable to make this appointment or require schedule changes.  It is critical to to follow-up with your PCP to review hospital records and ensure any further diagnostic work-up, prescription refills, or referrals.     3. ACTIVITIES: After discharge from the hospital, you may resume routine activities including walking and going u/down stairs. However, no strenuous activity. For further clarification, call your PCP     4. DRIVING: You may drive whenever you are off pain medications and are able to perform the activities needed to drive, i.e. turning, bending, twisting, etc.     5. BOWEL FUNCTION: A few patients, after hospitalizations, will develop bowel irregularity. You could also experience constipation due to pain medication and decreased activity level. If you feel this is occurring, please take an over-the-counter laxative (Milk of Magnesia, Ex-Lax, Senokot, etc.) until the problem has resolved.     6. PAIN MEDICATION: You may be given a prescription for pain medication at discharge. Please take these as directed. It is important to remember not to take medications on an empty stomach as this may cause nausea/vomiting.  You can transition from the prescription-strength pain medication to over-the-counter medications as your pain improves, such as tylenol if you are medically cleared to do so.     7. LABS/IMAGING: You may be asked to complete labs or imaging prior to your next provider appointment. If you use [facility], a paper order is not required. If you use another lab or imaging center, be sure you have your order requisition form at discharge. Contact scheduling or use [employer] to arrange a lab appointment.    8. RETURN TO THE ER: Return to the ER if you develop recurrent chest pain,  shortness of breath, bloody sputum, fever of 101.5 or greater, intractable nausea or vomiting, blood in the vomit or urine, intractable pain, new onset inability to ambulate, focal motor weakness, acute vision disturbance, acute speech disturbance, intractable abdominal pain, diffuse watery diarrhea or any other worrisome symptoms.

## 2021-03-12 NOTE — H&P
Hospital Medicine History & Physical Note    Date of Service  3/11/2021    Primary Care Physician  Rodolfo Stein M.D.      Code Status  Full Code    Chief Complaint  Chief Complaint   Patient presents with   • High Blood Sugar     glucometer read HI at home.    • Abdominal Cramping   • Vomiting     x 2 episodes       History of Presenting Illness  55 y.o. male with past medical history of diabetes mellitus type II on insulin, depression, hypothyroid, who presented 3/11/2021 with complaint of persistent nausea and vomiting.  Reports of chronic abdominal pain due to history of colitis.    Patient states currently he is been a lot of stress and his blood sugar remained persistently above 500.  He states he only take insulin as needed.    On arrival to ED patient noted to be tachycardic.  Labs noted with glucose level more than 500 with normal bicarb level, normal anion gap , with unremarkable beta hydroxybutyrate.    Case endorsed by the ED physician for hospitalizing for observation for hyperglycemia  with intractable nausea/vomiting    Review of Systems  Review of Systems   Constitutional: Negative for chills and fever.   Respiratory: Negative for sputum production, shortness of breath and wheezing.    Cardiovascular: Negative for chest pain, palpitations and orthopnea.   Gastrointestinal: Positive for abdominal pain (chronic), nausea and vomiting. Negative for diarrhea and heartburn.   Genitourinary: Negative for dysuria.   Musculoskeletal: Negative for myalgias.   Neurological: Negative for dizziness, sensory change, speech change and headaches.   Psychiatric/Behavioral: Negative for depression.       Past Medical History   has a past medical history of ADRIANE (acute kidney injury) ([facility]) (2/24/2016), Anesthesia, Arthritis (3-3-17), Aspiration pneumonia ([facility]) (3-2016), ASTHMA (3-3-17), Breath shortness (3-3-17), Congestive heart failure ([facility]) (2-2016 ), Dental disorder, Depression (11/26/2014), Diabetes ([facility])  (3-3-17), Diabetes ([facility]), Difficult intubation (2-2016), DKA (diabetic ketoacidoses) ([facility]) (2-2016), Electrolyte imbalance (2-2016), Elevated LFTs, Elevated liver enzymes (2-2016), Essential hypertension (1/22/2016), Gout, Heart burn, High cholesterol (3-3-17), Hypothyroid (3-3-17), Indigestion, Kidney stones, Klebsiella infect, Migraine, MRSA cellulitis, 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    Surgical History   has a past surgical history that includes vaishnavi by laparoscopy (2005); colonoscopy with biopsy (11/26/2014); incision and drainage general (4/7/2017); irrigation & debridement general (Right, 4/29/2017); irrigation & debridement general (Right, 5/1/2017); other orthopedic surgery (1997 or 1998); umbilical hernia repair (N/A, 11/6/2016); umbilical hernia repair (3/10/2017); irrigation & debridement ortho (Left, 12/10/2017); and umbilical hernia repair (N/A, 4/15/2018).     Family History  family history includes Cancer in his mother.     Social History   reports that he has never smoked. He has never used smokeless tobacco. He reports current alcohol use. He reports that he does not use drugs.    Allergies  Allergies   Allergen Reactions   • Hydrocodone Hives     Tolerates Morphine and oxycodone     • Peanut (Diagnostic) Anaphylaxis   • Tradjenta [Linagliptin] Unspecified     Pt developed pancreatitis       Medications  Prior to Admission Medications   Prescriptions Last Dose Informant Patient Reported? Taking?   Empagliflozin-metFORMIN HCl (SYNJARDY) 12.5-1000 MG Tab 3/11/2021 at 0700 Patient No No   Sig: Take 1 tablet by mouth 2 Times a Day.   NON SPECIFIED >7 days ago at ran out Patient Yes No   Sig: Take 1 Tab by mouth every morning. [facility]  SHERRI MAN VITAMIN   Semaglutide,0.25 or 0.5MG/DOS, (OZEMPIC, 0.25 OR 0.5 MG/DOSE,) 2 MG/1.5ML Solution Pen-injector 3/10/2021 at 1600 Patient No No   Sig: Inject 0.5 mg as instructed every 7 days.   insulin regular (HUMULIN R) 100 Unit/mL Solution 3/11/2021 at 1200 Patient Yes Yes   Sig: Inject 10 Units as directed one time as needed for High Blood Sugar. Pt gives himself 10 units if his blood sugar > 300   melatonin 5 mg Tab >7 days ago at unknown Patient Yes Yes   Sig: Take 5 mg by mouth every bedtime.   ondansetron (ZOFRAN ODT) 8 MG TABLET DISPERSIBLE 3/11/2021 at 1200 Patient No No   Sig: Take 1 Tab by mouth every 8 hours as needed for Nausea.   pioglitazone (ACTOS) 30 MG Tab 3/11/2021 at AM Patient Yes Yes   Sig: Take 30 mg by mouth every day.   thyroid (ARMOUR THYROID) 60 MG Tab 3/11/2021 at AM Patient No No   Sig: Take 1 Tab by mouth every morning.      Facility-Administered Medications: None       Physical Exam  Temp:  [36.3 °C (97.4 °F)] 36.3 °C (97.4 °F)  Pulse:  [106] 106  Resp:  [16] 16  BP: (123)/(79) 123/79  SpO2:  [95 %] 95 %    Physical Exam  Constitutional:       Appearance: He is obese.   HENT:      Head: Normocephalic.      Right Ear: Tympanic membrane normal.      Nose: Nose normal.   Eyes:      Pupils: Pupils are equal, round, and reactive to light.   Cardiovascular:      Rate and Rhythm: Tachycardia present.      Pulses: Normal pulses.      Heart sounds: Normal heart sounds.   Pulmonary:      Effort: Pulmonary effort is normal. No respiratory distress.      Breath sounds: Normal breath sounds.   Abdominal:      General: Abdomen is flat. There is no distension.   Musculoskeletal:         General: No swelling or tenderness.   Skin:     General: Skin is warm and dry.   Neurological:      General: No focal deficit present.      Mental Status: He is alert and oriented to person, place, and time. Mental status is at baseline.         Laboratory:  Recent Labs     03/09/21  2128 03/11/21  1456   WBC 5.0  5.6   RBC 4.84 5.06   HEMOGLOBIN 15.1 16.1   HEMATOCRIT 42.9 44.7   MCV 88.6 88.3   MCH 31.2 31.8   MCHC 35.2 36.0*   RDW 41.5 41.5   PLATELETCT 206 194   MPV 9.9 9.9     Recent Labs     03/09/21 2128 03/11/21  1456   SODIUM 132* 134*   POTASSIUM 4.0 4.2   CHLORIDE 96 98   CO2 19* 21   GLUCOSE 589* 566*   BUN 19 16   CREATININE 0.96 0.81   CALCIUM 9.5 9.7     Recent Labs     03/09/21 2128 03/11/21  1456   ALTSGPT 19 25   ASTSGOT 14 15   ALKPHOSPHAT 64 77   TBILIRUBIN 0.3 0.3   LIPASE 25 26   GLUCOSE 589* 566*         No results for input(s): NTPROBNP in the last 72 hours.      Recent Labs     03/11/21  1456   TROPONINT 8       Imaging:  DX-CHEST-PORTABLE (1 VIEW)    (Results Pending)         Assessment/Plan:  I anticipate this patient is appropriate for observation status at this time.    Hyperglycemia due to type 2 diabetes mellitus (HCC)- (present on admission)  Assessment & Plan  Continue on insulin regimen  Continue pioglitazone, empagliflozin -metformin, semaglutide.  Monitor fingerstick closely  Check HbA1c  Check a.m. labs  Patient insulin regimen need to be adjusted on discharge    Hyponatremia- (present on admission)  Assessment & Plan  Corrected sodium for  hyperglycemia >137  Start patient normal saline given nausea/vomiting (clinically look dehydrated)  We will repeat BMP in a.m.    Intractable vomiting with nausea  Assessment & Plan  Continue on IV fluid  Continue antiemetic  Monitor electrolytes closely  Keep n.p.o. for now.   Advance diet as tolerated    Hypothyroidism- (present on admission)  Assessment & Plan  Continue on [place] Thyroid 60 mg tablet daily

## 2021-03-12 NOTE — ASSESSMENT & PLAN NOTE
Continue on insulin regimen  Continue pioglitazone, empagliflozin -metformin, semaglutide.  Monitor fingerstick closely  Check HbA1c  Check a.m. labs  Patient insulin regimen need to be adjusted on discharge

## 2021-03-12 NOTE — INFECTION CONTROL
This patient is considered COVID RECOVERED.    Patient initially tested positive for COVID on 11/16/2020.  Patient is greater than or equal to 21 days from symptom onset and/or positive test, with symptom improvement.  Per the CDC guidance, this patient no longer requires transmission based precautions.  Patient may be placed on any unit per the bed assignment policy (except CNU), including placement in a semi-private room with a roommate.

## 2021-03-12 NOTE — DISCHARGE PLANNING
Care Transition Team Assessment    Spoke with patient at bedside and verified all information. Lives in 2 story house with spouse and 16 yr old son. Uses no DME. PCP Rodlofo Stein. Has [facility] insurance. Uses [facility] in [place]. Will drive self home at D/C. Anticipate no needs @ D/C.    Information Source  Orientation : Oriented x 4  Information Given By: Patient    Readmission Evaluation  Is this a readmission?: No    Interdisciplinary Discharge Planning  Primary Care Physician: [facility]  Lives with - Patient's Self Care Capacity: Spouse, Child Less than 18 Years of Age  Patient or legal guardian wants to designate a caregiver: No  Support Systems: Spouse / Significant Other  Housing / Facility: [address] [facility]  Do You Take your Prescribed Medications Regularly: Yes  Able to Return to Previous ADL's: Yes  Mobility Issues: No  Prior Services: Home-Independent  Patient Prefers to be Discharged to:: Home  Assistance Needed: No  Durable Medical Equipment: Not Applicable    Discharge Preparedness  What are your discharge supports?: Spouse  Prior Functional Level: Ambulatory    Functional Assesment  Prior Functional Level: Ambulatory    Anticipated Discharge Information  Discharge Address: [address]  Discharge Contact Phone Number: [phone]

## 2021-03-12 NOTE — DISCHARGE SUMMARY
Discharge Summary    CHIEF COMPLAINT ON ADMISSION  Chief Complaint   Patient presents with   • High Blood Sugar     glucometer read HI at home.    • Abdominal Cramping   • Vomiting     x 2 episodes       Reason for Admission  High Blood Sugar     Admission Date  3/11/2021    CODE STATUS  Full Code    HPI & HOSPITAL COURSE  55 y.o. male with past medical history of diabetes mellitus type II on insulin, depression, hypothyroid, who presented 3/11/2021 with complaint of persistent nausea and vomiting.  Reports of chronic abdominal pain due to history of colitis.     Patient states currently he is been a lot of stress and his blood sugar remained persistently above 500.  He states he only take insulin as needed.    Hemoglobin A1c returned greater than 12.  With resumption of Lantus and Humalog his blood sugars are precipitously dropped to around 200.  His fluids will be stopped.  Patient is in no acute distress.  His anion gap is normal.  His bicarb is normal.    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  He is well versed on sliding scale insulin, long-acting insulin, signs and symptoms of hypoglycemia and dietary measures to control hyperglycemia.  He lost insurance for a while and was not following up with his endocrinologist ran out of his medications.  He was seen marked reductions to nearly normal glucose readings with long-acting insulin and mealtime coverage with sliding scale.  Until he can resume care with his endocrinologist and outpatient medications I will provide him with prescriptions for Lantus and Humalog.  He says he takes 50 units of Lantus per 24-hour period.  I will resume him on 40 units with instructions to titrate up by 2 units daily until his fasting glucose is at goal.  He will need 8 units of Humalog with meals plus sliding scale.    I have counseled him and discussed this with him in great detail.  He is aware that he is of little value to his family if he is not taking care of his own personal  health as he will be unable to help them.    Therefore, he is discharged in good and stable condition to home with close outpatient follow-up.    The patient recovered much more quickly than anticipated on admission.    Discharge Date  3/12/2021    FOLLOW UP ITEMS POST DISCHARGE  PCP follow-up  Endocrinology follow-up    DISCHARGE DIAGNOSES  Active Problems:    Hyperglycemia due to type 2 diabetes mellitus (HCC) POA: Yes    Hyponatremia POA: Yes    Hypothyroidism (Chronic) POA: Yes      Overview: Chronic condition treated with [place] Thyroid.      Resumed maintenance medication.  Resolved Problems:    Intractable vomiting with nausea POA: Yes      FOLLOW UP  Rodolfo Stein M.D.  [address] [facility]  [address]  [place] [address]  [phone]    Schedule an appointment as soon as possible for a visit        MEDICATIONS ON DISCHARGE     Medication List      Start taking these medications      Instructions   * insulin lispro 100 UNIT/ML Sopn injection PEN  Commonly known as: HumaLOG   Inject 8 Units under the skin 3 times a day before meals.  Dose: 8 Units     * insulin lispro 100 UNIT/ML Sopn injection PEN  Commonly known as: HumaLOG     mg/dL =0 Units; 151 - 200  mg/dL = 3 Units; 201-250  mg/dL =4 Units; 251-300  mg/dL=7 Units; 301 - 350 d mg/dL  =10 Units; 351 - 400 mg/dL= 12 Units; Over 400 mg/dL = 14 Units; Over 400 mg/dL x 2 consecutive FSBG = 14 Units and call MD Arianne Rebollar 100 UNIT/ML Sopn injection  Generic drug: insulin glargine   Inject 20 Units under the skin 2 times a day.  Dose: 20 Units      * This list has 2 medication(s) that are the same as other medications prescribed for you. Read the directions carefully, and ask your doctor or other care provider to review them with you.     Continue taking these medications      Instructions   melatonin 5 mg Tabs   Take 5 mg by mouth every bedtime.  Dose: 5 mg     NON SPECIFIED   Take 1 Tab by mouth every morning. GNC SHERRI MAN VITAMIN  Dose: 1 tablet      ondansetron 8 MG Tbdp  Commonly known as: Zofran ODT   Take 1 Tab by mouth every 8 hours as needed for Nausea.  Dose: 8 mg     thyroid 60 MG Tabs  Commonly known as: ARMOUR THYROID   Take 1 Tab by mouth every morning.  Dose: 60 mg        Stop taking these medications    HumuLIN R 100 Unit/mL Soln  Generic drug: insulin regular     Ozempic (0.25 or 0.5 MG/DOSE) 2 MG/1.5ML Sopn  Generic drug: Semaglutide(0.25 or 0.5MG/DOS)     pioglitazone 30 MG Tabs  Commonly known as: ACTOS     Synjardy 12.5-1000 MG Tabs  Generic drug: Empagliflozin-metFORMIN HCl            Allergies  Allergies   Allergen Reactions   • Hydrocodone Hives     Tolerates Morphine and oxycodone     • Peanut (Diagnostic) Anaphylaxis   • Tradjenta [Linagliptin] Unspecified     Pt developed pancreatitis       DIET  Orders Placed This Encounter   Procedures   • Diet Order Diet: Consistent CHO (Diabetic) ( as tolerated )     Standing Status:   Standing     Number of Occurrences:   1     Order Specific Question:   Diet:     Answer:   Consistent CHO (Diabetic) [4]     Comments:    as tolerated        ACTIVITY  As tolerated.  Weight bearing as tolerated    LABORATORY  Lab Results   Component Value Date    SODIUM 139 03/12/2021    POTASSIUM 3.3 (L) 03/12/2021    CHLORIDE 104 03/12/2021    CO2 24 03/12/2021    GLUCOSE 219 (H) 03/12/2021    BUN 16 03/12/2021    CREATININE 0.63 03/12/2021    CREATININE 1.1 02/18/2008        Lab Results   Component Value Date    WBC 4.2 (L) 03/12/2021    HEMOGLOBIN 14.0 03/12/2021    HEMATOCRIT 39.7 (L) 03/12/2021    PLATELETCT 161 (L) 03/12/2021        Total time of the discharge process exceeds 40 minutes.

## 2021-03-12 NOTE — PROGRESS NOTES
Pt in bed awake,a&ox4,blod sugar trending down,pt aware of hypoglycemic symptoms,educate to call ,poc explained.

## 2021-03-12 NOTE — PROGRESS NOTES
Received bedside report from day shift nurse, assessment complete, vital signs within normal limits, patient not showing signs of distress, patient resting in bed, bed locked and in lowest position, beside table and call light are within reach, safety precautions in place, patient educated to use call light for assistance, patient verbalizes understanding.

## 2021-03-12 NOTE — CARE PLAN
Problem: Safety  Goal: Will remain free from injury  Outcome: PROGRESSING AS EXPECTED     Problem: Safety  Goal: Will remain free from falls  Outcome: PROGRESSING AS EXPECTED     Problem: Pain Management  Goal: Pain level will decrease to patient's comfort goal  Outcome: PROGRESSING AS EXPECTED     Problem: Infection  Goal: Will remain free from infection  Outcome: PROGRESSING AS EXPECTED

## 2021-03-12 NOTE — ASSESSMENT & PLAN NOTE
Corrected sodium for  hyperglycemia >137  Start patient normal saline given nausea/vomiting (clinically look dehydrated)  We will repeat BMP in a.m.

## 2021-03-12 NOTE — CARE PLAN
Problem: Safety  Goal: Will remain free from injury  Outcome: PROGRESSING AS EXPECTED  Goal: Will remain free from falls  Outcome: PROGRESSING AS EXPECTED     Problem: Pain Management  Goal: Pain level will decrease to patient's comfort goal  Outcome: PROGRESSING AS EXPECTED     Problem: Infection  Goal: Will remain free from infection  Outcome: PROGRESSING AS EXPECTED  Note: Implement standard precautions and perform handwashing before and after patient contact. RN will follow protocols and necessary steps to minimize the spread of infection.  RN educated pt and any visitors on proper hand hygiene.

## 2021-03-12 NOTE — ASSESSMENT & PLAN NOTE
Continue on IV fluid  Continue antiemetic  Monitor electrolytes closely  Keep n.p.o. for now.   Advance diet as tolerated

## 2021-03-15 DIAGNOSIS — Z23 NEED FOR VACCINATION: ICD-10-CM

## 2021-05-02 ENCOUNTER — APPOINTMENT (OUTPATIENT)
Dept: RADIOLOGY | Facility: MEDICAL CENTER | Age: 56
End: 2021-05-02
Attending: EMERGENCY MEDICINE
Payer: OTHER MISCELLANEOUS

## 2021-05-02 ENCOUNTER — HOSPITAL ENCOUNTER (OUTPATIENT)
Facility: MEDICAL CENTER | Age: 56
End: 2021-05-03
Attending: EMERGENCY MEDICINE | Admitting: INTERNAL MEDICINE
Payer: OTHER MISCELLANEOUS

## 2021-05-02 DIAGNOSIS — E11.65 TYPE 2 DIABETES MELLITUS WITH HYPERGLYCEMIA, WITH LONG-TERM CURRENT USE OF INSULIN (HCC): ICD-10-CM

## 2021-05-02 DIAGNOSIS — E86.0 DEHYDRATION: ICD-10-CM

## 2021-05-02 DIAGNOSIS — Z79.4 TYPE 2 DIABETES MELLITUS WITH HYPERGLYCEMIA, WITH LONG-TERM CURRENT USE OF INSULIN (HCC): ICD-10-CM

## 2021-05-02 DIAGNOSIS — R11.2 NON-INTRACTABLE VOMITING WITH NAUSEA, UNSPECIFIED VOMITING TYPE: ICD-10-CM

## 2021-05-02 LAB
ALBUMIN SERPL BCP-MCNC: 4 G/DL (ref 3.2–4.9)
ALBUMIN/GLOB SERPL: 1.5 G/DL
ALP SERPL-CCNC: 80 U/L (ref 30–99)
ALT SERPL-CCNC: 14 U/L (ref 2–50)
ANION GAP SERPL CALC-SCNC: 18 MMOL/L (ref 7–16)
APPEARANCE UR: CLEAR
AST SERPL-CCNC: 13 U/L (ref 12–45)
BASOPHILS # BLD AUTO: 0.5 % (ref 0–1.8)
BASOPHILS # BLD: 0.02 K/UL (ref 0–0.12)
BILIRUB SERPL-MCNC: 0.3 MG/DL (ref 0.1–1.5)
BILIRUB UR QL STRIP.AUTO: NEGATIVE
BUN SERPL-MCNC: 18 MG/DL (ref 8–22)
CALCIUM SERPL-MCNC: 8.8 MG/DL (ref 8.4–10.2)
CHLORIDE SERPL-SCNC: 90 MMOL/L (ref 96–112)
CO2 SERPL-SCNC: 19 MMOL/L (ref 20–33)
COLOR UR: YELLOW
CREAT SERPL-MCNC: 0.95 MG/DL (ref 0.5–1.4)
EOSINOPHIL # BLD AUTO: 0.07 K/UL (ref 0–0.51)
EOSINOPHIL NFR BLD: 1.8 % (ref 0–6.9)
ERYTHROCYTE [DISTWIDTH] IN BLOOD BY AUTOMATED COUNT: 40.6 FL (ref 35.9–50)
GLOBULIN SER CALC-MCNC: 2.6 G/DL (ref 1.9–3.5)
GLUCOSE SERPL-MCNC: 863 MG/DL (ref 65–99)
GLUCOSE UR STRIP.AUTO-MCNC: >=1000 MG/DL
HCT VFR BLD AUTO: 36.8 % (ref 42–52)
HGB BLD-MCNC: 13.6 G/DL (ref 14–18)
IMM GRANULOCYTES # BLD AUTO: 0.01 K/UL (ref 0–0.11)
IMM GRANULOCYTES NFR BLD AUTO: 0.3 % (ref 0–0.9)
KETONES UR STRIP.AUTO-MCNC: NEGATIVE MG/DL
LACTATE BLD-SCNC: 5.7 MMOL/L (ref 0.5–2)
LEUKOCYTE ESTERASE UR QL STRIP.AUTO: NEGATIVE
LIPASE SERPL-CCNC: 37 U/L (ref 7–58)
LYMPHOCYTES # BLD AUTO: 1.22 K/UL (ref 1–4.8)
LYMPHOCYTES NFR BLD: 31 % (ref 22–41)
MCH RBC QN AUTO: 33.8 PG (ref 27–33)
MCHC RBC AUTO-ENTMCNC: 37 G/DL (ref 33.7–35.3)
MCV RBC AUTO: 91.5 FL (ref 81.4–97.8)
MICRO URNS: ABNORMAL
MONOCYTES # BLD AUTO: 0.29 K/UL (ref 0–0.85)
MONOCYTES NFR BLD AUTO: 7.4 % (ref 0–13.4)
NEUTROPHILS # BLD AUTO: 2.33 K/UL (ref 1.82–7.42)
NEUTROPHILS NFR BLD: 59 % (ref 44–72)
NITRITE UR QL STRIP.AUTO: NEGATIVE
NRBC # BLD AUTO: 0 K/UL
NRBC BLD-RTO: 0 /100 WBC
PH UR STRIP.AUTO: 5 [PH] (ref 5–8)
PLATELET # BLD AUTO: 176 K/UL (ref 164–446)
PMV BLD AUTO: 9.8 FL (ref 9–12.9)
POTASSIUM SERPL-SCNC: 4.1 MMOL/L (ref 3.6–5.5)
PROT SERPL-MCNC: 6.6 G/DL (ref 6–8.2)
PROT UR QL STRIP: NEGATIVE MG/DL
RBC # BLD AUTO: 4.02 M/UL (ref 4.7–6.1)
RBC UR QL AUTO: NEGATIVE
SODIUM SERPL-SCNC: 127 MMOL/L (ref 135–145)
SP GR UR STRIP.AUTO: <=1.005
WBC # BLD AUTO: 3.9 K/UL (ref 4.8–10.8)

## 2021-05-02 PROCEDURE — U0003 INFECTIOUS AGENT DETECTION BY NUCLEIC ACID (DNA OR RNA); SEVERE ACUTE RESPIRATORY SYNDROME CORONAVIRUS 2 (SARS-COV-2) (CORONAVIRUS DISEASE [COVID-19]), AMPLIFIED PROBE TECHNIQUE, MAKING USE OF HIGH THROUGHPUT TECHNOLOGIES AS DESCRIBED BY CMS-2020-01-R: HCPCS

## 2021-05-02 PROCEDURE — 96375 TX/PRO/DX INJ NEW DRUG ADDON: CPT

## 2021-05-02 PROCEDURE — 82962 GLUCOSE BLOOD TEST: CPT

## 2021-05-02 PROCEDURE — 83605 ASSAY OF LACTIC ACID: CPT

## 2021-05-02 PROCEDURE — 700111 HCHG RX REV CODE 636 W/ 250 OVERRIDE (IP): Performed by: EMERGENCY MEDICINE

## 2021-05-02 PROCEDURE — 85025 COMPLETE CBC W/AUTO DIFF WBC: CPT

## 2021-05-02 PROCEDURE — 80053 COMPREHEN METABOLIC PANEL: CPT

## 2021-05-02 PROCEDURE — 83690 ASSAY OF LIPASE: CPT

## 2021-05-02 PROCEDURE — 99291 CRITICAL CARE FIRST HOUR: CPT

## 2021-05-02 PROCEDURE — 81003 URINALYSIS AUTO W/O SCOPE: CPT

## 2021-05-02 PROCEDURE — 36415 COLL VENOUS BLD VENIPUNCTURE: CPT

## 2021-05-02 PROCEDURE — 82010 KETONE BODYS QUAN: CPT

## 2021-05-02 PROCEDURE — 83036 HEMOGLOBIN GLYCOSYLATED A1C: CPT

## 2021-05-02 PROCEDURE — 700102 HCHG RX REV CODE 250 W/ 637 OVERRIDE(OP): Performed by: EMERGENCY MEDICINE

## 2021-05-02 PROCEDURE — U0005 INFEC AGEN DETEC AMPLI PROBE: HCPCS

## 2021-05-02 PROCEDURE — 96374 THER/PROPH/DIAG INJ IV PUSH: CPT

## 2021-05-02 PROCEDURE — 71045 X-RAY EXAM CHEST 1 VIEW: CPT

## 2021-05-02 PROCEDURE — 700105 HCHG RX REV CODE 258: Performed by: EMERGENCY MEDICINE

## 2021-05-02 RX ORDER — SODIUM CHLORIDE 9 MG/ML
1000 INJECTION, SOLUTION INTRAVENOUS ONCE
Status: COMPLETED | OUTPATIENT
Start: 2021-05-02 | End: 2021-05-03

## 2021-05-02 RX ORDER — ONDANSETRON 2 MG/ML
4 INJECTION INTRAMUSCULAR; INTRAVENOUS ONCE
Status: COMPLETED | OUTPATIENT
Start: 2021-05-02 | End: 2021-05-02

## 2021-05-02 RX ORDER — IBUPROFEN 200 MG
600 TABLET ORAL EVERY 6 HOURS PRN
Status: ON HOLD | COMMUNITY
End: 2021-05-03

## 2021-05-02 RX ORDER — SODIUM CHLORIDE 9 MG/ML
1000 INJECTION, SOLUTION INTRAVENOUS ONCE
Status: COMPLETED | OUTPATIENT
Start: 2021-05-03 | End: 2021-05-03

## 2021-05-02 RX ORDER — ACETAMINOPHEN 325 MG/1
650 TABLET ORAL EVERY 4 HOURS PRN
COMMUNITY
End: 2021-06-02

## 2021-05-02 RX ORDER — SODIUM CHLORIDE 9 MG/ML
1000 INJECTION, SOLUTION INTRAVENOUS ONCE
Status: COMPLETED | OUTPATIENT
Start: 2021-05-02 | End: 2021-05-02

## 2021-05-02 RX ADMIN — ONDANSETRON 4 MG: 2 INJECTION INTRAMUSCULAR; INTRAVENOUS at 22:49

## 2021-05-02 RX ADMIN — SODIUM CHLORIDE 1000 ML: 9 INJECTION, SOLUTION INTRAVENOUS at 22:47

## 2021-05-02 RX ADMIN — FAMOTIDINE 20 MG: 10 INJECTION INTRAVENOUS at 23:25

## 2021-05-02 RX ADMIN — SODIUM CHLORIDE 1000 ML: 9 INJECTION, SOLUTION INTRAVENOUS at 23:14

## 2021-05-02 ASSESSMENT — PAIN DESCRIPTION - DESCRIPTORS: DESCRIPTORS: CRAMPING

## 2021-05-02 ASSESSMENT — FIBROSIS 4 INDEX: FIB4 SCORE: 0.93

## 2021-05-03 VITALS
DIASTOLIC BLOOD PRESSURE: 73 MMHG | OXYGEN SATURATION: 95 % | BODY MASS INDEX: 30.47 KG/M2 | SYSTOLIC BLOOD PRESSURE: 115 MMHG | TEMPERATURE: 97 F | RESPIRATION RATE: 18 BRPM | HEIGHT: 68 IN | HEART RATE: 85 BPM | WEIGHT: 201.06 LBS

## 2021-05-03 PROBLEM — E87.29 HIGH ANION GAP METABOLIC ACIDOSIS: Status: RESOLVED | Noted: 2019-05-29 | Resolved: 2021-05-03

## 2021-05-03 PROBLEM — E11.10 DIABETIC KETOACIDOSIS (HCC): Status: RESOLVED | Noted: 2017-04-06 | Resolved: 2021-05-03

## 2021-05-03 PROBLEM — E87.29 HIGH ANION GAP METABOLIC ACIDOSIS: Status: ACTIVE | Noted: 2019-05-29

## 2021-05-03 PROBLEM — R10.9 AP (ABDOMINAL PAIN): Status: RESOLVED | Noted: 2017-10-04 | Resolved: 2021-05-03

## 2021-05-03 LAB
ANION GAP SERPL CALC-SCNC: 9 MMOL/L (ref 7–16)
B-OH-BUTYR SERPL-MCNC: 0.23 MMOL/L (ref 0.02–0.27)
BUN SERPL-MCNC: 16 MG/DL (ref 8–22)
CALCIUM SERPL-MCNC: 8.1 MG/DL (ref 8.4–10.2)
CHLORIDE SERPL-SCNC: 105 MMOL/L (ref 96–112)
CO2 SERPL-SCNC: 20 MMOL/L (ref 20–33)
CREAT SERPL-MCNC: 0.67 MG/DL (ref 0.5–1.4)
EST. AVERAGE GLUCOSE BLD GHB EST-MCNC: 306 MG/DL
GLUCOSE BLD-MCNC: 279 MG/DL (ref 65–99)
GLUCOSE BLD-MCNC: 331 MG/DL (ref 65–99)
GLUCOSE BLD-MCNC: 442 MG/DL (ref 65–99)
GLUCOSE SERPL-MCNC: 435 MG/DL (ref 65–99)
HBA1C MFR BLD: 12.3 % (ref 4–5.6)
LACTATE BLD-SCNC: 1.6 MMOL/L (ref 0.5–2)
LACTATE BLD-SCNC: 2.9 MMOL/L (ref 0.5–2)
POTASSIUM SERPL-SCNC: 3.8 MMOL/L (ref 3.6–5.5)
SARS-COV-2 RNA RESP QL NAA+PROBE: NOTDETECTED
SODIUM SERPL-SCNC: 134 MMOL/L (ref 135–145)
SPECIMEN SOURCE: NORMAL

## 2021-05-03 PROCEDURE — 82962 GLUCOSE BLOOD TEST: CPT

## 2021-05-03 PROCEDURE — 700111 HCHG RX REV CODE 636 W/ 250 OVERRIDE (IP): Performed by: INTERNAL MEDICINE

## 2021-05-03 PROCEDURE — 700105 HCHG RX REV CODE 258: Performed by: INTERNAL MEDICINE

## 2021-05-03 PROCEDURE — 96375 TX/PRO/DX INJ NEW DRUG ADDON: CPT

## 2021-05-03 PROCEDURE — G0378 HOSPITAL OBSERVATION PER HR: HCPCS

## 2021-05-03 PROCEDURE — A9270 NON-COVERED ITEM OR SERVICE: HCPCS | Performed by: INTERNAL MEDICINE

## 2021-05-03 PROCEDURE — 99236 HOSP IP/OBS SAME DATE HI 85: CPT | Performed by: INTERNAL MEDICINE

## 2021-05-03 PROCEDURE — 96372 THER/PROPH/DIAG INJ SC/IM: CPT

## 2021-05-03 PROCEDURE — 700102 HCHG RX REV CODE 250 W/ 637 OVERRIDE(OP): Performed by: INTERNAL MEDICINE

## 2021-05-03 PROCEDURE — 80048 BASIC METABOLIC PNL TOTAL CA: CPT

## 2021-05-03 PROCEDURE — 83605 ASSAY OF LACTIC ACID: CPT

## 2021-05-03 PROCEDURE — 700102 HCHG RX REV CODE 250 W/ 637 OVERRIDE(OP)

## 2021-05-03 PROCEDURE — 700105 HCHG RX REV CODE 258: Performed by: EMERGENCY MEDICINE

## 2021-05-03 RX ORDER — CHOLECALCIFEROL (VITAMIN D3) 125 MCG
5 CAPSULE ORAL
Status: DISCONTINUED | OUTPATIENT
Start: 2021-05-03 | End: 2021-05-03 | Stop reason: HOSPADM

## 2021-05-03 RX ORDER — INSULIN LISPRO 100 [IU]/ML
8 INJECTION, SOLUTION INTRAVENOUS; SUBCUTANEOUS
Qty: 1 EACH | Refills: 1 | Status: ON HOLD | OUTPATIENT
Start: 2021-05-03 | End: 2022-05-12 | Stop reason: SDUPTHER

## 2021-05-03 RX ORDER — LABETALOL HYDROCHLORIDE 5 MG/ML
10 INJECTION, SOLUTION INTRAVENOUS EVERY 4 HOURS PRN
Status: DISCONTINUED | OUTPATIENT
Start: 2021-05-03 | End: 2021-05-03 | Stop reason: HOSPADM

## 2021-05-03 RX ORDER — INSULIN LISPRO 100 [IU]/ML
8 INJECTION, SOLUTION INTRAVENOUS; SUBCUTANEOUS
Status: DISCONTINUED | OUTPATIENT
Start: 2021-05-03 | End: 2021-05-03

## 2021-05-03 RX ORDER — DEXTROSE MONOHYDRATE 25 G/50ML
50 INJECTION, SOLUTION INTRAVENOUS
Status: DISCONTINUED | OUTPATIENT
Start: 2021-05-03 | End: 2021-05-03 | Stop reason: HOSPADM

## 2021-05-03 RX ORDER — THYROID 30 MG/1
60 TABLET ORAL EVERY MORNING
Status: DISCONTINUED | OUTPATIENT
Start: 2021-05-03 | End: 2021-05-03 | Stop reason: HOSPADM

## 2021-05-03 RX ORDER — BISACODYL 10 MG
10 SUPPOSITORY, RECTAL RECTAL
Status: DISCONTINUED | OUTPATIENT
Start: 2021-05-03 | End: 2021-05-03 | Stop reason: HOSPADM

## 2021-05-03 RX ORDER — PROCHLORPERAZINE EDISYLATE 5 MG/ML
5-10 INJECTION INTRAMUSCULAR; INTRAVENOUS EVERY 4 HOURS PRN
Status: DISCONTINUED | OUTPATIENT
Start: 2021-05-03 | End: 2021-05-03 | Stop reason: HOSPADM

## 2021-05-03 RX ORDER — POLYETHYLENE GLYCOL 3350 17 G/17G
1 POWDER, FOR SOLUTION ORAL
Status: DISCONTINUED | OUTPATIENT
Start: 2021-05-03 | End: 2021-05-03 | Stop reason: HOSPADM

## 2021-05-03 RX ORDER — MORPHINE SULFATE 4 MG/ML
4 INJECTION, SOLUTION INTRAMUSCULAR; INTRAVENOUS
Status: DISCONTINUED | OUTPATIENT
Start: 2021-05-03 | End: 2021-05-03 | Stop reason: HOSPADM

## 2021-05-03 RX ORDER — SODIUM CHLORIDE 9 MG/ML
INJECTION, SOLUTION INTRAVENOUS CONTINUOUS
Status: DISCONTINUED | OUTPATIENT
Start: 2021-05-03 | End: 2021-05-03 | Stop reason: HOSPADM

## 2021-05-03 RX ORDER — GLUCOSAMINE HCL/CHONDROITIN SU 500-400 MG
CAPSULE ORAL
Qty: 100 EACH | Refills: 0 | Status: SHIPPED | OUTPATIENT
Start: 2021-05-03 | End: 2022-05-10

## 2021-05-03 RX ORDER — AMOXICILLIN 250 MG
2 CAPSULE ORAL 2 TIMES DAILY
Status: DISCONTINUED | OUTPATIENT
Start: 2021-05-03 | End: 2021-05-03 | Stop reason: HOSPADM

## 2021-05-03 RX ORDER — LANCETS 30 GAUGE
EACH MISCELLANEOUS
Qty: 100 EACH | Refills: 0 | Status: SHIPPED | OUTPATIENT
Start: 2021-05-03 | End: 2022-05-10

## 2021-05-03 RX ORDER — ONDANSETRON 4 MG/1
4 TABLET, ORALLY DISINTEGRATING ORAL EVERY 4 HOURS PRN
Status: DISCONTINUED | OUTPATIENT
Start: 2021-05-03 | End: 2021-05-03 | Stop reason: HOSPADM

## 2021-05-03 RX ORDER — OXYCODONE HYDROCHLORIDE 10 MG/1
10 TABLET ORAL
Status: DISCONTINUED | OUTPATIENT
Start: 2021-05-03 | End: 2021-05-03 | Stop reason: HOSPADM

## 2021-05-03 RX ORDER — OXYCODONE HYDROCHLORIDE 5 MG/1
5 TABLET ORAL
Status: DISCONTINUED | OUTPATIENT
Start: 2021-05-03 | End: 2021-05-03 | Stop reason: HOSPADM

## 2021-05-03 RX ORDER — PROMETHAZINE HYDROCHLORIDE 25 MG/1
12.5-25 SUPPOSITORY RECTAL EVERY 4 HOURS PRN
Status: DISCONTINUED | OUTPATIENT
Start: 2021-05-03 | End: 2021-05-03 | Stop reason: HOSPADM

## 2021-05-03 RX ORDER — INSULIN GLARGINE 100 [IU]/ML
25 INJECTION, SOLUTION SUBCUTANEOUS EVERY EVENING
Qty: 1 EACH | Refills: 0 | Status: ON HOLD | OUTPATIENT
Start: 2021-05-03 | End: 2021-06-03

## 2021-05-03 RX ORDER — ONDANSETRON 2 MG/ML
4 INJECTION INTRAMUSCULAR; INTRAVENOUS EVERY 4 HOURS PRN
Status: DISCONTINUED | OUTPATIENT
Start: 2021-05-03 | End: 2021-05-03 | Stop reason: HOSPADM

## 2021-05-03 RX ORDER — PROMETHAZINE HYDROCHLORIDE 25 MG/1
12.5-25 TABLET ORAL EVERY 4 HOURS PRN
Status: DISCONTINUED | OUTPATIENT
Start: 2021-05-03 | End: 2021-05-03 | Stop reason: HOSPADM

## 2021-05-03 RX ORDER — ACETAMINOPHEN 325 MG/1
650 TABLET ORAL EVERY 6 HOURS PRN
Status: DISCONTINUED | OUTPATIENT
Start: 2021-05-03 | End: 2021-05-03 | Stop reason: HOSPADM

## 2021-05-03 RX ADMIN — SODIUM CHLORIDE 1000 ML: 9 INJECTION, SOLUTION INTRAVENOUS at 00:04

## 2021-05-03 RX ADMIN — Medication 5 MG: at 01:57

## 2021-05-03 RX ADMIN — ACETAMINOPHEN 650 MG: 325 TABLET ORAL at 08:06

## 2021-05-03 RX ADMIN — MORPHINE SULFATE 4 MG: 4 INJECTION INTRAVENOUS at 01:57

## 2021-05-03 RX ADMIN — THYROID, PORCINE 60 MG: 30 TABLET ORAL at 05:32

## 2021-05-03 RX ADMIN — SODIUM CHLORIDE: 9 INJECTION, SOLUTION INTRAVENOUS at 11:38

## 2021-05-03 RX ADMIN — SODIUM CHLORIDE: 9 INJECTION, SOLUTION INTRAVENOUS at 02:02

## 2021-05-03 ASSESSMENT — ENCOUNTER SYMPTOMS
NAUSEA: 1
FEVER: 0
CHILLS: 0
VOMITING: 1
LOSS OF CONSCIOUSNESS: 0
WEAKNESS: 0
SPUTUM PRODUCTION: 0
STRIDOR: 0
CONSTIPATION: 0
HEADACHES: 1
DEPRESSION: 0
DIZZINESS: 0
MYALGIAS: 0
COUGH: 0
DIARRHEA: 0
FALLS: 0
SHORTNESS OF BREATH: 0
PALPITATIONS: 0
ABDOMINAL PAIN: 1
TINGLING: 0

## 2021-05-03 ASSESSMENT — COGNITIVE AND FUNCTIONAL STATUS - GENERAL
DAILY ACTIVITIY SCORE: 24
SUGGESTED CMS G CODE MODIFIER MOBILITY: CH
MOBILITY SCORE: 24
SUGGESTED CMS G CODE MODIFIER DAILY ACTIVITY: CH

## 2021-05-03 ASSESSMENT — LIFESTYLE VARIABLES
EVER HAD A DRINK FIRST THING IN THE MORNING TO STEADY YOUR NERVES TO GET RID OF A HANGOVER: NO
ON A TYPICAL DAY WHEN YOU DRINK ALCOHOL HOW MANY DRINKS DO YOU HAVE: 1
AVERAGE NUMBER OF DAYS PER WEEK YOU HAVE A DRINK CONTAINING ALCOHOL: 1
TOTAL SCORE: 0
HAVE PEOPLE ANNOYED YOU BY CRITICIZING YOUR DRINKING: NO
ALCOHOL_USE: YES
TOTAL SCORE: 0
CONSUMPTION TOTAL: NEGATIVE
HOW MANY TIMES IN THE PAST YEAR HAVE YOU HAD 5 OR MORE DRINKS IN A DAY: 0
EVER FELT BAD OR GUILTY ABOUT YOUR DRINKING: NO
TOTAL SCORE: 0
HAVE YOU EVER FELT YOU SHOULD CUT DOWN ON YOUR DRINKING: NO

## 2021-05-03 ASSESSMENT — PAIN DESCRIPTION - PAIN TYPE
TYPE: ACUTE PAIN

## 2021-05-03 NOTE — DISCHARGE PLANNING
Anticipated Discharge Disposition:   Home with insulin     Action:   Chart review complete.     Discussed patient's plan of care and plans for discharge during rounds.   Per MD, MD will need to consult the diabetes educator for alternatives for long term insulin.    1230: Discharge order in place for patient. RN COURTNEY sent a message to the MD seeing if she needed help with anything for the patient. Per MD, the patient is set to go. RN CM will continue to follow and assist as needed.     Barriers to Discharge:  None     Plan:   Hospital care management will continue to assist with discharge planning needs.

## 2021-05-03 NOTE — H&P
"Hospital Medicine History & Physical Note    Date of Service  5/3/2021    Primary Care Physician  Rodolfo Stein M.D.    Consultants  None    Code Status  Full Code    Chief Complaint  Chief Complaint   Patient presents with   • Abdominal Pain     Pt report ABD, N/V, blurred vision starting yesterday. Pt known diabetic with \"over 600mg/dl\" reading x1 hour ago. Pt reports no relief  with insulin at home.    • N/V       History of Presenting Illness  55 y.o. male who presented 5/2/2021 with elevated glucose.  Patient has had diabetes for years, his insurance stopped covering his medications in November, he did switch insurance in January.  Patient still has had trouble getting appropriate medications as an outpatient.  Has been to the hospital multiple times with significant hyperglycemia.  He states recently he has had trouble controlling his glucose.  He was recently in the hospital and sent home on Lantus however his insurance did not cover Lantus.  He states yesterday he developed a headache, eventually had nausea and vomiting.  Patient now has left upper quadrant abdominal pain that is mild and sharp at times.  He states yesterday he also noted his glucose was worse, Worsening today so he presented to the emergency department.  Upon arrival his glucose was greater than 800.  I did discuss the case including labs and imaging with the ER physician.    Review of Systems  Review of Systems   Constitutional: Negative for chills, fever and malaise/fatigue.   HENT: Negative for congestion.    Respiratory: Negative for cough, sputum production, shortness of breath and stridor.    Cardiovascular: Negative for chest pain, palpitations and leg swelling.   Gastrointestinal: Positive for abdominal pain, nausea and vomiting. Negative for constipation and diarrhea.   Genitourinary: Negative for dysuria and urgency.   Musculoskeletal: Negative for falls and myalgias.   Neurological: Positive for headaches. Negative for " dizziness, tingling, loss of consciousness and weakness.   Psychiatric/Behavioral: Negative for depression and suicidal ideas.   All other systems reviewed and are negative.      Past Medical History   has a past medical history of ADRIANE (acute kidney injury) ([facility]) (2/24/2016), Anesthesia, Arthritis (3-3-17), Aspiration pneumonia ([facility]) (3-2016), ASTHMA (3-3-17), Breath shortness (3-3-17), Congestive heart failure ([facility]) (2-2016 ), Dental disorder, Depression (11/26/2014), Diabetes ([facility]) (3-3-17), Diabetes ([facility]), Difficult intubation (2-2016), DKA (diabetic ketoacidoses) ([facility]) (2-2016), Electrolyte imbalance (2-2016), Elevated LFTs, Elevated liver enzymes (2-2016), Essential hypertension (1/22/2016), Gout, Heart burn, High cholesterol (3-3-17), Hypothyroid (3-3-17), Indigestion, Kidney stones, Klebsiella infect, Migraine, MRSA cellulitis, 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    Surgical History   has a past surgical history that includes vaishnavi by laparoscopy (2005); colonoscopy with biopsy (11/26/2014); incision and drainage general (4/7/2017); irrigation & debridement general (Right, 4/29/2017); irrigation & debridement general (Right, 5/1/2017); other orthopedic surgery (1997 or 1998); umbilical hernia repair (N/A, 11/6/2016); umbilical hernia repair (3/10/2017); irrigation & debridement ortho (Left, 12/10/2017); and umbilical hernia repair (N/A, 4/15/2018).     Family History  family history includes Cancer in his mother.     Social History   reports that he has never smoked. He has never used smokeless tobacco. He reports current alcohol use. He reports that he does not use drugs.    Allergies  Allergies   Allergen Reactions   • Hydrocodone Hives      Tolerates Morphine and oxycodone     • Peanut (Diagnostic) Anaphylaxis   • Tradjenta [Linagliptin] Unspecified     Pt developed pancreatitis       Medications  Prior to Admission Medications   Prescriptions Last Dose Informant Patient Reported? Taking?   NON SPECIFIED 2021 at Unknown time Patient Yes No   Sig: Take 1 Tab by mouth every morning. G[employer] SHERRI MAN VITAMIN   acetaminophen (TYLENOL) 325 MG Tab 2021 at Unknown time  Yes Yes   Sig: Take 650 mg by mouth every four hours as needed.   ibuprofen (MOTRIN) 200 MG Tab 2021 at Unknown time  Yes Yes   Sig: Take 600 mg by mouth every 6 hours as needed.   insulin lispro (HUMALOG) 100 UNIT/ML Solution Pen-injector injection PEN 2021 at Unknown time  No No   Sig: Inject 8 Units under the skin 3 times a day before meals.   insulin lispro (HUMALOG) 100 UNIT/ML Solution Pen-injector injection PEN 2021 at Unknown time  No No   Si-150  mg/dL =0 Units; 151 - 200  mg/dL = 3 Units; 201-250  mg/dL =4 Units; 251-300  mg/dL=7 Units; 301 - 350 d mg/dL  =10 Units; 351 - 400 mg/dL= 12 Units; Over 400 mg/dL = 14 Units; Over 400 mg/dL x 2 consecutive FSBG = 14 Units and call MD   melatonin 5 mg Tab 2021 at Unknown time Patient Yes No   Sig: Take 5 mg by mouth every bedtime.   ondansetron (ZOFRAN ODT) 8 MG TABLET DISPERSIBLE 2021 at Unknown time Patient No No   Sig: Take 1 Tab by mouth every 8 hours as needed for Nausea.   thyroid (ARMOUR THYROID) 60 MG Tab 2021 at Unknown time Patient No No   Sig: Take 1 Tab by mouth every morning.      Facility-Administered Medications: None       Physical Exam  Temp:  [36.5 °C (97.7 °F)] 36.5 °C (97.7 °F)  Pulse:  [] 95  Resp:  [18-21] 21  BP: (139-149)/(75-94) 139/75  SpO2:  [92 %-96 %] 94 %    Physical Exam  Vitals and nursing note reviewed.   Constitutional:       General: He is not in acute distress.     Appearance: He is well-developed. He is obese. He is not toxic-appearing or diaphoretic.   HENT:       Head: Normocephalic and atraumatic.      Right Ear: External ear normal.      Left Ear: External ear normal.      Nose: Nose normal. No congestion or rhinorrhea.      Mouth/Throat:      Mouth: Mucous membranes are dry.      Pharynx: No oropharyngeal exudate.   Eyes:      General:         Right eye: No discharge.         Left eye: No discharge.      Extraocular Movements: Extraocular movements intact.   Neck:      Trachea: No tracheal deviation.   Cardiovascular:      Rate and Rhythm: Normal rate and regular rhythm.      Heart sounds: No murmur. No friction rub. No gallop.    Pulmonary:      Effort: Pulmonary effort is normal. No respiratory distress.      Breath sounds: Normal breath sounds. No stridor. No wheezing or rales.   Chest:      Chest wall: No tenderness.   Abdominal:      General: Bowel sounds are normal. There is no distension.      Palpations: Abdomen is soft.      Tenderness: There is abdominal tenderness in the left upper quadrant.   Musculoskeletal:         General: No tenderness. Normal range of motion.      Cervical back: Normal range of motion and neck supple. No edema or erythema.      Right lower leg: No edema.      Left lower leg: No edema.   Lymphadenopathy:      Cervical: No cervical adenopathy.   Skin:     General: Skin is warm and dry.      Coloration: Skin is not jaundiced.      Findings: No erythema or rash.   Neurological:      General: No focal deficit present.      Mental Status: He is alert and oriented to person, place, and time.      Cranial Nerves: No cranial nerve deficit.   Psychiatric:         Mood and Affect: Mood normal.         Behavior: Behavior normal.         Thought Content: Thought content normal.         Judgment: Judgment normal.         Laboratory:  Recent Labs     05/02/21  2230   WBC 3.9*   RBC 4.02*   HEMOGLOBIN 13.6*   HEMATOCRIT 36.8*   MCV 91.5   MCH 33.8*   MCHC 37.0*   RDW 40.6   PLATELETCT 176   MPV 9.8     Recent Labs     05/02/21  2230   SODIUM 127*    POTASSIUM 4.1   CHLORIDE 90*   CO2 19*   GLUCOSE 863*   BUN 18   CREATININE 0.95   CALCIUM 8.8     Recent Labs     05/02/21  2230   ALTSGPT 14   ASTSGOT 13   ALKPHOSPHAT 80   TBILIRUBIN 0.3   LIPASE 37   GLUCOSE 863*         No results for input(s): NTPROBNP in the last 72 hours.      No results for input(s): TROPONINT in the last 72 hours.    Imaging:  DX-CHEST-PORTABLE (1 VIEW)   Final Result         1. No acute cardiopulmonary abnormalities are identified.            Assessment/Plan:  I anticipate this patient is appropriate for observation status at this time.    Diabetic ketoacidosis (HCC)- (present on admission)  Assessment & Plan  -Very mild, CO2 is 19, anion gap is 18  -Certainly does not need insulin drip, will use subcutaneous insulin and IV fluids  -Repeat BMP in the morning  -Unfortunately, his insurance does not cover Lantus, he does not know what long-acting is covered, I have ordered diabetic education to further assist  -For now, he has had a significant decrease with short acting alone, will continue short acting alone since that is what he has been taking at home, will try to find a dose that will keep his sugars controlled if there is no long-acting option on his insurance plan    High anion gap metabolic acidosis- (present on admission)  Assessment & Plan  -Due to DKA as well as elevated lactic acid level  -There is no sign of infection, lactic acid has significantly improved with IV fluids, no need to trend at this time  -No antibiotics required  -Repeat BMP in the morning    Hypothyroidism- (present on admission)  Assessment & Plan  -Continue home [place] Thyroid  -Most recent TSH was approximately 5 months ago was normal at 3.44    AP (abdominal pain)- (present on admission)  Assessment & Plan  -Mild left upper quadrant abdominal pain  -Symptomatic care  -Normal lipase

## 2021-05-03 NOTE — PROGRESS NOTES
Med rec PARTIAL per Pt at bedside. Pt reports using a long-acting insulin but is unsure whether it is Tresiba or Toujeo, and is unsure how many units he injects. Unable to verify medication with Pt's pharmacy [facility] on [address] [facility] as Pt reports that he received this medication as a sample from his primary CLINT Stein's office. Left message at [phone] with CLINT Stein's office with request to call back in order to clarify name and dosage.  Allergies reviewed with Pt.  No oral antibiotics in last 14 days.

## 2021-05-03 NOTE — ASSESSMENT & PLAN NOTE
-Due to DKA as well as elevated lactic acid level  -There is no sign of infection, lactic acid has significantly improved with IV fluids, no need to trend at this time  -No antibiotics required  -Repeat BMP in the morning

## 2021-05-03 NOTE — ASSESSMENT & PLAN NOTE
-Continue home [place] Thyroid  -Most recent TSH was approximately 5 months ago was normal at 3.44

## 2021-05-03 NOTE — CONSULTS
Diabetes education [facility]: CDE asked to check on what long acting insulin is covered by pt's insurance. Pt has a hx of diabetes on insulin.  Per information, pt stopped taking long acting insulin as it was over $500 .  Insurance listed as miscellaneous . CDE called [employer] personal department as pt is employed there. Per HR their insurance is [facility]. CDE checked formulary and both Basaglar kwik pens ( tier 2) and Humalog kwik pens ( tier 1 ) are covered. Spoke with Dr. Rodriguez and Basaglar pens, pen needles, meter, test strips and lancets were ordered to [facility];.  CDE called [facility] and confirmed coverage ( Basaglar $35 and Humalog $15) as well as rx for supplies and meter in case he did not have. CDE did not speak with or see patient but spoke with MD.

## 2021-05-03 NOTE — ASSESSMENT & PLAN NOTE
-Very mild, CO2 is 19, anion gap is 18  -Certainly does not need insulin drip, will use subcutaneous insulin and IV fluids  -Repeat BMP in the morning  -Unfortunately, his insurance does not cover Lantus, he does not know what long-acting is covered, I have ordered diabetic education to further assist  -For now, he has had a significant decrease with short acting alone, will continue short acting alone since that is what he has been taking at home, will try to find a dose that will keep his sugars controlled if there is no long-acting option on his insurance plan

## 2021-05-03 NOTE — ED TRIAGE NOTES
"Pt arrives to ED through front entrance with C/O lower ABD pain, N/V, blurred vision since yesterday. Pt states known diabetic and has had multiple \"over 600\" readings even after sliding scale insulin administration at home tonight. BGC reading \"high\" in triage.   "

## 2021-05-03 NOTE — ED PROVIDER NOTES
"CHIEF COMPLAINT  Chief Complaint   Patient presents with   • Abdominal Pain     Pt report ABD, N/V, blurred vision starting yesterday. Pt known diabetic with \"over 600mg/dl\" reading x1 hour ago. Pt reports no relief  with insulin at home.    • N/V   • Blurred Vision       HPI  Mahesh Bradshaw is a 55 y.o. male who presents tonight with a chief complaint of epigastric and left upper quadrant abdominal pain, nausea with vomiting, slightly blurred vision.  Patient states that his sugars have been running very high.  His new insurance does not cover his Lantus insulin so he has been doing sliding scale and using his old medications.  He denies any diarrhea, fever, chills, chest pain or shortness of breath.  He states he had a similar episode over a week ago when he was at work and he was given a liter of IV fluids along with 10 units of regular insulin and seemed to improve.  He has not seen his endocrinologist in a while.    REVIEW OF SYSTEMS  See HPI for further details. All other system reviews are negative.    PAST MEDICAL HISTORY  Past Medical History:   Diagnosis Date   • ADRIANE (acute kidney injury) ([facility]) 2/24/2016   • Anesthesia     \"Was difficult to intubate 2-2016\"   • Arthritis 3-3-17    \"Spine\"   • Aspiration pneumonia ([facility]) 3-2016   • ASTHMA 3-3-17    \"Hasn't had to use inhaler in 2 years\"   • Breath shortness 3-3-17    \"With Exercise\"   • Congestive heart failure ([facility]) 2-2016     3-3-17 \"Not a current issue\"   • Dental disorder    • Depression 11/26/2014   • Diabetes ([facility]) 3-3-17    Takes Insulin   • Diabetes ([facility])    • Difficult intubation 2-2016   • DKA (diabetic ketoacidoses) ([facility]) 2-2016    \"10 days on vent with DKA, Renal failure, CHF, elevated liver enzymes and electrolyte imbalance\"   • Electrolyte imbalance 2-2016   • Elevated LFTs    • Elevated liver enzymes 2-2016   • Essential hypertension 1/22/2016   • Gout    • Heart burn    • High cholesterol 3-3-17    Does not currently take medication for " "  • Hypothyroid 3-3-17    Takes [place] Thyroid   • Indigestion    • Kidney stones    • Klebsiella infect    • Migraine    • MRSA cellulitis    • Necrotizing myositis ([facility])    • On mechanically assisted ventilation ([facility]) 2-2016    HX \"On Vent for 10 days at [facility]\".  \"DX Pancreatitis, DKA, CHF, Elevated Liver Enzymes & Electrolyte Imbalance\".   • Pain 3-3-17    \"Left Flank\"   • Pancreatitis 2-2016    \"D/T Tradjenta was hospitialized for 15 days\"   • RF (renal failure) 2-2016   • Sepsis ([facility]) 1/21/2016   • Snoring 3-3-17    Has not had a sleep study   • Streptococcus infection    • UC (ulcerative colitis) ([facility]) 3-3-17    \"Five BM's per day, takes Lialda\"   • Urinary incontinence    • Vitamin D deficiency        FAMILY HISTORY  Family History   Problem Relation Age of Onset   • Cancer Mother        SOCIAL HISTORY  Social History     Socioeconomic History   • Marital status:      Spouse name: Not on file   • Number of children: Not on file   • Years of education: Not on file   • Highest education level: Not on file   Occupational History   • Not on file   Tobacco Use   • Smoking status: Never Smoker   • Smokeless tobacco: Never Used   Substance and Sexual Activity   • Alcohol use: Yes   • Drug use: No   • Sexual activity: Not on file   Other Topics Concern   • Not on file   Social History Narrative    ** Merged History Encounter **         ** Merged History Encounter **     ** Merged History Encounter **        Social Determinants of Health     Financial Resource Strain: Low Risk    • Difficulty of Paying Living Expenses: Not hard at all   Food Insecurity: No Food Insecurity   • Worried About Running Out of Food in the Last Year: Never true   • Ran Out of Food in the Last Year: Never true   Transportation Needs: No Transportation Needs   • Lack of Transportation (Medical): No   • Lack of Transportation (Non-Medical): No   Physical Activity:    • Days of Exercise per Week:    • Minutes of Exercise per Session:  "   Stress:    • Feeling of Stress :    Social Connections:    • Frequency of Communication with Friends and Family:    • Frequency of Social Gatherings with Friends and Family:    • Attends [employer] Services:    • Active Member of Clubs or Organizations:    • Attends Club or Organization Meetings:    • Marital Status:    Intimate Partner Violence:    • Fear of Current or Ex-Partner:    • Emotionally Abused:    • Physically Abused:    • Sexually Abused:        SURGICAL HISTORY  Past Surgical History:   Procedure Laterality Date   • UMBILICAL HERNIA REPAIR N/A 4/15/2018    Procedure: UMBILICAL HERNIA REPAIR;  Surgeon: Karen Beasley M.D.;  Location: SURGERY [facility];  Service: General   • IRRIGATION & DEBRIDEMENT ORTHO Left 12/10/2017    Procedure: IRRIGATION & DEBRIDEMENT ORTHO LEFT HAND;  Surgeon: Chicho Ramos M.D.;  Location: SURGERY [facility];  Service: Orthopedics   • IRRIGATION & DEBRIDEMENT GENERAL Right 5/1/2017    Procedure: IRRIGATION & DEBRIDEMENT GENERAL-Left HAND AND right  ANKLE;  Surgeon: Esau Dooley M.D.;  Location: SURGERY [facility];  Service:    • IRRIGATION & DEBRIDEMENT GENERAL Right 4/29/2017    Procedure: IRRIGATION & DEBRIDEMENT GENERAL;  Surgeon: Esau Dooley M.D.;  Location: SURGERY [facility];  Service:    • INCISION AND DRAINAGE GENERAL  4/7/2017    Procedure: INCISION AND DRAINAGE GENERAL;  Surgeon: Esau Dooley M.D.;  Location: SURGERY SAME DAY [facility];  Service:    • UMBILICAL HERNIA REPAIR  3/10/2017    Procedure: UMBILICAL HERNIA REPAIR- INCISION AND DRAINAGE OF UMBILICAL WOUND AND MESH REMOVAL;  Surgeon: Esau Dooley M.D.;  Location: SURGERY SAME DAY [facility];  Service:    • UMBILICAL HERNIA REPAIR N/A 11/6/2016    Procedure: UMBILICAL HERNIA REPAIR;  Surgeon: Esau Dooley M.D.;  Location: SURGERY [facility];  Service:    • COLONOSCOPY WITH BIOPSY  11/26/2014    Performed by Solo Higginbotham M.D. at [facility]  "TOWER ORS   • LIVE BY LAPAROSCOPY  2005   • OTHER ORTHOPEDIC SURGERY  1997 or 1998    Left Wrist ORIF       CURRENT MEDICATIONS  See nurses notes    ALLERGIES  Allergies   Allergen Reactions   • Hydrocodone Hives     Tolerates Morphine and oxycodone     • Peanut (Diagnostic) Anaphylaxis   • Tradjenta [Linagliptin] Unspecified     Pt developed pancreatitis       PHYSICAL EXAM  VITAL SIGNS: /75   Pulse 95   Temp 36.5 °C (97.7 °F) (Oral)   Resp (!) 21   Ht 1.727 m (5' 8\")   Wt 91.2 kg (201 lb 1 oz)   SpO2 94%   BMI 30.57 kg/m²     Constitutional: Patient is well developed, well nourished in mild distress.  HENT: Normocephalic, dry oral mucosa.  Eyes: PERRL, EOMI.   Neck: Supple .  Lymphatic: No lymphadenopathy noted.   Cardiovascular: Normal heart rate and rhythm. No murmur.  Thorax & Lungs: Clear and equal breath sounds with good excursion. No respiratory distress, no rhonchi, wheezing or rales.  Abdomen: Bowel sounds normal in all four quadrants. Soft, mild epigastric pain,  no rebound , guarding, palpable masses.   Skin: Warm, Dry, pale.  Back: No cervical, thoracic, or lumbosacral tenderness.   Extremities: Peripheral pulses 4/4 No edema, No tenderness, normal range of motion.  Neurologic: Alert & oriented x 3, Normal motor function, Normal sensory function, No lateralizing or focal deficits noted. DTR's 4/4 bilaterally.  Psychiatric: Affect normal, Judgment normal, Mood normal.     EKG  Results for orders placed or performed during the hospital encounter of 05/02/21   CBC WITH DIFFERENTIAL   Result Value Ref Range    WBC 3.9 (L) 4.8 - 10.8 K/uL    RBC 4.02 (L) 4.70 - 6.10 M/uL    Hemoglobin 13.6 (L) 14.0 - 18.0 g/dL    Hematocrit 36.8 (L) 42.0 - 52.0 %    MCV 91.5 81.4 - 97.8 fL    MCH 33.8 (H) 27.0 - 33.0 pg    MCHC 37.0 (H) 33.7 - 35.3 g/dL    RDW 40.6 35.9 - 50.0 fL    Platelet Count 176 164 - 446 K/uL    MPV 9.8 9.0 - 12.9 fL    Neutrophils-Polys 59.00 44.00 - 72.00 %    Lymphocytes 31.00 22.00 - " 41.00 %    Monocytes 7.40 0.00 - 13.40 %    Eosinophils 1.80 0.00 - 6.90 %    Basophils 0.50 0.00 - 1.80 %    Immature Granulocytes 0.30 0.00 - 0.90 %    Nucleated RBC 0.00 /100 WBC    Neutrophils (Absolute) 2.33 1.82 - 7.42 K/uL    Lymphs (Absolute) 1.22 1.00 - 4.80 K/uL    Monos (Absolute) 0.29 0.00 - 0.85 K/uL    Eos (Absolute) 0.07 0.00 - 0.51 K/uL    Baso (Absolute) 0.02 0.00 - 0.12 K/uL    Immature Granulocytes (abs) 0.01 0.00 - 0.11 K/uL    NRBC (Absolute) 0.00 K/uL   COMP METABOLIC PANEL   Result Value Ref Range    Sodium 127 (L) 135 - 145 mmol/L    Potassium 4.1 3.6 - 5.5 mmol/L    Chloride 90 (L) 96 - 112 mmol/L    Co2 19 (L) 20 - 33 mmol/L    Anion Gap 18.0 (H) 7.0 - 16.0    Glucose 863 (HH) 65 - 99 mg/dL    Bun 18 8 - 22 mg/dL    Creatinine 0.95 0.50 - 1.40 mg/dL    Calcium 8.8 8.4 - 10.2 mg/dL    AST(SGOT) 13 12 - 45 U/L    ALT(SGPT) 14 2 - 50 U/L    Alkaline Phosphatase 80 30 - 99 U/L    Total Bilirubin 0.3 0.1 - 1.5 mg/dL    Albumin 4.0 3.2 - 4.9 g/dL    Total Protein 6.6 6.0 - 8.2 g/dL    Globulin 2.6 1.9 - 3.5 g/dL    A-G Ratio 1.5 g/dL   LIPASE   Result Value Ref Range    Lipase 37 7 - 58 U/L   URINALYSIS (UA)    Specimen: Urine   Result Value Ref Range    Color Yellow     Character Clear     Specific Gravity <=1.005 <1.035    Ph 5.0 5.0 - 8.0    Glucose >=1000 (A) Negative mg/dL    Ketones Negative Negative mg/dL    Protein Negative Negative mg/dL    Bilirubin Negative Negative    Nitrite Negative Negative    Leukocyte Esterase Negative Negative    Occult Blood Negative Negative    Micro Urine Req see below    BETA-HYDROXYBUTYRIC ACID   Result Value Ref Range    beta-Hydroxybutyric Acid 0.23 0.02 - 0.27 mmol/L   LACTIC ACID   Result Value Ref Range    Lactic Acid 5.7 (HH) 0.5 - 2.0 mmol/L   ESTIMATED GFR   Result Value Ref Range    GFR If African American >60 >60 mL/min/1.73 m 2    GFR If Non African American >60 >60 mL/min/1.73 m 2   SARS-CoV-2 PCR (24 hour In-House): Collect NP swab in [facility]     Specimen: Nasopharyngeal; Respirate   Result Value Ref Range    SARS-CoV-2 Source NP Swab    LACTIC ACID   Result Value Ref Range    Lactic Acid 2.9 (H) 0.5 - 2.0 mmol/L   POCT glucose device results   Result Value Ref Range    Glucose - Accu-Ck 442 (HH) 65 - 99 mg/dL         RADIOLOGY/PROCEDURES  DX-CHEST-PORTABLE (1 VIEW)   Final Result         1. No acute cardiopulmonary abnormalities are identified.            COURSE & MEDICAL DECISION MAKING  Pertinent Labs & Imaging studies reviewed. (See chart for details)  Patient received an IV of normal saline for his clinical dehydration and hyperglycemia.  He received a total of 2 L of normal saline.  He was given 15 units of regular insulin for his elevated blood sugar of 863.  Repeat glucose after 1 hour was down to 442.  Initial lactic acid was was 5.7 and repeat was down to 2.9.  Beta hydroxybutyric acid was 0.23.  Remaining labs are really unremarkable.  He is spilling a lot of glucose into his urine, liver enzymes and lipase are normal.  Patient is improving and able to tolerate p.o. fluids without vomiting.  He will need to be admitted into the hospital for his hyperglycemia along with may be some medication adjustments.  I spoke with my colleague Dr. Summers patient will be admitted transferred to the floor in stable condition    FINAL IMPRESSION  1.  Diabetes mellitus with uncontrolled hyperglycemia  2.  Nausea with vomiting  3.  Dehydration         Electronically signed by: Kaylan Brasher D.O., 5/3/2021 1:07 [facility] Provider Note

## 2021-05-03 NOTE — DISCHARGE SUMMARY
"Discharge Summary    CHIEF COMPLAINT ON ADMISSION  Chief Complaint   Patient presents with   • Abdominal Pain     Pt report ABD, N/V, blurred vision starting yesterday. Pt known diabetic with \"over 600mg/dl\" reading x1 hour ago. Pt reports no relief  with insulin at home.    • N/V   • Blurred Vision       Reason for Admission  Abdominal Pain     Admission Date  5/2/2021    CODE STATUS  Full Code    HPI & HOSPITAL COURSE  This is a 55 y.o. male here with past medical history significant for type 2 diabetes mellitus uncontrolled with hemoglobin A1c of 12.1,, hypothyroidism presented to ER on 5/3/2021 with a complaint of nausea, vomiting, abdominal pain.  Hypertension his blood glucose noted to be 800s.  He reports that because of his recent insurance change, he was not able to get long-acting insulin thus his blood sugars has been elevated.  Discussed with diabetic educator Anjelica who recommended providing insulin Basaglar 25 units at pm, humalog 8 units tid.  Patient has been counseled in regards to lifestyle modification including diet exercise and weight loss.  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  His insulin will be covered through insurance, verified through Diabetic educator.  Patient with sepsis evaluation with IV fluids.  His fingerstick is improved, POC blood glucose 279.  His anion gap has been resolved.    Renal without any problem.  At this time patient is medically stable to be discharged home.  Patient stated that he has an Job interview at 3.30 pm.    Therefore, he is discharged in good and stable condition to home with close outpatient follow-up.    The patient met 2-midnight criteria for an inpatient stay at the time of discharge.    Discharge Date  5/3/2021    FOLLOW UP ITEMS POST DISCHARGE  Rodolfo Stein M.D.      DISCHARGE DIAGNOSES  Active Problems:    Hypothyroidism (Chronic) POA: Yes      Overview: Chronic condition treated with [place] Thyroid.      Resumed maintenance " medication.  Resolved Problems:    Diabetic ketoacidosis (HCC) POA: Yes    High anion gap metabolic acidosis POA: Yes    AP (abdominal pain) POA: Yes      FOLLOW UP  No future appointments.  No follow-up provider specified.    MEDICATIONS ON DISCHARGE     Medication List      START taking these medications      Instructions   Alcohol Swabs   Doctor's comments: Per formulary preference. ICD-10 code: E11.65 Uncontrolled type 2 Diabetes Mellitus  Wipe site with prep pad prior to injection.     * Blood Glucose Meter Kit   Doctor's comments: Or per formulary preference. ICD-10 code: E11.65 Uncontrolled type 2 Diabetes Mellitus  Test blood sugar as recommended by provider. True Metrix blood glucose monitoring kit.     * Blood Glucose Test Strips   Doctor's comments: Or per formulary preference. ICD-10 code: E11.65 Uncontrolled type 2 Diabetes Mellitus  Use one True Metrix strip to test blood sugar three times daily before meals.     insulin glargine 100 UNIT/ML Sopn injection  Generic drug: insulin glargine   Inject 25 Units under the skin every evening for 30 days.  Dose: 25 Units     Insulin Pen Needle 32 G x 4 mm   Doctor's comments: Per patient/formulary preference. ICD-10 code: E11.65 Uncontrolled type 2 Diabetes Mellitus  Use one pen needle in pen device to inject insulin three times daily.     Lancets   Doctor's comments: Or per formulary preference. ICD-10 code: E11.65 Uncontrolled type 2 Diabetes Mellitus  Use one True Metrix lancet to test blood sugar three times daily before meals.         * This list has 2 medication(s) that are the same as other medications prescribed for you. Read the directions carefully, and ask your doctor or other care provider to review them with you.            CHANGE how you take these medications      Instructions   insulin lispro 100 UNIT/ML Sopn injection PEN  What changed: Another medication with the same name was removed. Continue taking this medication, and follow the directions  "you see here.  Commonly known as: HumaLOG Kwikpen   Inject 8 Units under the skin 3 times a day before meals.  Dose: 8 Units        CONTINUE taking these medications      Instructions   acetaminophen 325 MG Tabs  Commonly known as: Tylenol   Take 650 mg by mouth every four hours as needed (Headache). 2 tablets = 650 mg.  Dose: 650 mg     melatonin 5 mg Tabs   Take 5-10 mg by mouth at bedtime as needed (Sleep). 1 to 2 tablets = 5 to 10 mg.  Dose: 5-10 mg     Mens Multivitamin Tabs   Take 1 tablet by mouth every morning. \"[employer] Men\"  Dose: 1 tablet     ondansetron 8 MG Tbdp  Commonly known as: Zofran ODT   Take 1 Tab by mouth every 8 hours as needed for Nausea.  Dose: 8 mg     thyroid 60 MG Tabs  Commonly known as: ARMOUR THYROID   Take 1 Tab by mouth every morning.  Dose: 60 mg        STOP taking these medications    ibuprofen 200 MG Tabs  Commonly known as: MOTRIN            Allergies  Allergies   Allergen Reactions   • Peanut (Diagnostic) Anaphylaxis   • Tradjenta [Linagliptin] Unspecified     Pt developed pancreatitis   • Hydrocodone Hives     Tolerates Morphine and Oxycodone         DIET  Orders Placed This Encounter   Procedures   • Diet Order Diet: Consistent CHO (Diabetic)     Standing Status:   Standing     Number of Occurrences:   1     Order Specific Question:   Diet:     Answer:   Consistent CHO (Diabetic) [4]       ACTIVITY  As tolerated.  Weight bearing as tolerated    CONSULTATIONS  DM educator    PROCEDURES  None    LABORATORY  Lab Results   Component Value Date    SODIUM 134 (L) 05/03/2021    POTASSIUM 3.8 05/03/2021    CHLORIDE 105 05/03/2021    CO2 20 05/03/2021    GLUCOSE 435 (H) 05/03/2021    BUN 16 05/03/2021    CREATININE 0.67 05/03/2021    CREATININE 1.1 02/18/2008        Lab Results   Component Value Date    WBC 3.9 (L) 05/02/2021    HEMOGLOBIN 13.6 (L) 05/02/2021    HEMATOCRIT 36.8 (L) 05/02/2021    PLATELETCT 176 05/02/2021        Total time of the discharge process exceeds 35 " minutes.    And to the bedside of the patient, evaluate the patient, reviewed essential data.  Patient is alert and oriented, answering questions appropriately.  At this time patient is medically stable to be discharged home

## 2021-05-03 NOTE — ED NOTES
Assist RN: 2nd IV attempted but unsuccessful, Isra inserted an IV right upper arm via ultrasound guided.

## 2021-05-03 NOTE — ED NOTES
Karyna from Lab called with critical result of lactic acid at 5.7. Critical lab result read back to Karyna.   Dr. Brasher notified of critical lab result at [id number]. Critical lab result read back by Dr. Brasher.

## 2021-05-03 NOTE — CARE PLAN
Problem: Communication  Goal: The ability to communicate needs accurately and effectively will improve  Outcome: PROGRESSING AS EXPECTED   Pt able to make needs known.   Problem: Safety  Goal: Will remain free from injury  Outcome: PROGRESSING AS EXPECTED     Problem: Bowel/Gastric:  Goal: Normal bowel function is maintained or improved  Outcome: PROGRESSING AS EXPECTED   Pt states that he is at his baseline for BM  Problem: Pain Management  Goal: Pain level will decrease to patient's comfort goal  Outcome: PROGRESSING AS EXPECTED     Problem: Discharge Barriers/Planning  Goal: Patient's continuum of care needs will be met  Outcome: PROGRESSING SLOWER THAN EXPECTED   Pt states that he has been having issues with insurance and covering the medication he needs. Pt states that there has been a delay as he is unable to get into endocrinologist. Will place SW consult for f/u

## 2021-05-03 NOTE — PROGRESS NOTES
Received patient, alert and oriented, discussed POC, pt verbalized understanding. Reports abd pain 6/10, refuses oral medication d/t reactions. Patient medicated with morphine, safety precautions in place, call light within reach.

## 2021-05-03 NOTE — PROGRESS NOTES
Assumed pt care. AOx4. Discussed POC.  Pt verbalized understanding.  Hourly rounding in place. Fall precautions in place and call lights w/in reach.  Pt c/o headache and cramping 3/10. PRN given see MAR.

## 2021-05-03 NOTE — ED NOTES
Karyna from Lab called with critical result of glucose at 863. Critical lab result read back to Karyna.   Dr. Brasher notified of critical lab result at [id number].  Critical lab result read back by Dr. Brasher.

## 2021-05-03 NOTE — PROGRESS NOTES
Pt aox4, discharge summary given, discharge education provided.  Pt verbalized understanding.  Follow up to be scheduled. IV removed. All belongings returned. Escorted by staff to ER. Educated on new medication including long acting insulin which is at [facility] for  and discussed the co-pays from the case managment note. Pt able to pay these co pays. Pt denies any acute distress at this time and is discharged stable.

## 2021-05-03 NOTE — DISCHARGE INSTRUCTIONS
Discharge Instructions    Discharged to home by car with self. Discharged via walking, hospital escort: Yes.  Special equipment needed: Not Applicable    Be sure to schedule a follow-up appointment with your primary care doctor or any specialists as instructed.     Discharge Plan:   Diet Plan: Discussed  Activity Level: Discussed  Confirmed Follow up Appointment: Patient to Call and Schedule Appointment  Confirmed Symptoms Management: Discussed  Medication Reconciliation Updated: Yes    I understand that a diet low in cholesterol, fat, and sodium is recommended for good health. Unless I have been given specific instructions below for another diet, I accept this instruction as my diet prescription.   Other diet: Diabetic     Special Instructions: None    · Is patient discharged on Warfarin / Coumadin?   No Diabetes and Small Vessel Disease  Small vessel disease (microvascular disease) includes nephropathy, retinopathy, and neuropathy. People with diabetes are at risk for these problems, but keeping blood glucose (sugar) controlled is helpful in preventing problems.  DIABETIC KIDNEY PROBLEMS (DIABETIC NEPHROPATHY)  · Diabetic nephropathy occurs in many patients with diabetes.   · Damage to the small vessels in the kidneys is the leading cause of end-stage renal disease (ESRD).   · Protein in the urine (albuminuria) in the range of 30 to 300 mg/24 h (microalbuminuria) is a sign of the earliest stage of diabetic nephropathy.   · Good blood glucose (sugar) and blood pressure control significantly reduce the progression of nephropathy.   DIABETIC EYE PROBLEMS (DIABETIC RETINOPATHY)  · Diabetic retinopathy is the most common cause of new cases of blindness in adults. It is related to the number of years you have had diabetes.   · Common risk factors include high blood sugar (hyperglycemia), high blood pressure (hypertension), and poorly controlled blood lipids such as high blood cholesterol (hypercholesterolemia).    DIABETIC NERVE PROBLEMS (DIABETIC NEUROPATHY)  Diabetic neuropathy is the most common, long-term complication of diabetes. It is responsible for more than half of leg amputations not due to accidents. The main risk for developing diabetic neuropathy seems to be uncontrolled blood sugars. Hyperglycemia damages the nerve fibers causing sensation (feeling) problems.  The closer you can keep the following guidelines, the better chance you will have avoiding problems from small vessel disease.  · Working toward near normal blood glucose or as normal as possible. You will need to keep your blood glucose and A1c at the target range prescribed by your caregiver.   · Keep your blood pressure less than 130/80.   · Keep your low-density lipoprotein (LDL) cholesterol (one of the fats in your blood) at less than 100 mg/dL. An LDL less than 70 mg/dL may be recommended for high risk patients.   You cannot change your family history, but it is important to change the risk factors that you can. Risk factors you can control include:  · Controlling high blood pressure.   · Stopping smoking.   · Using alcohol only in moderation. Generally, this means about one drink per day for women and two drinks per day for men.   · Controlling your blood lipids (cholesterol and triglycerides).   · Treating heart problems, if these are contributing to risk.   SEEK MEDICAL CARE IF:   · You are having problems keeping your blood glucose in goal range.   · You notice a change in your vision or new problems with your vision.   · You have wound or sore that does not heal.   · Your blood pressure is above the target range.   Document Released: 12/20/2004 Document Revised: 03/11/2013 Document Reviewed: 05/28/2010  ExitYext® Patient Information ©2013 [employer].  Diabetes Mellitus and Standards of Medical Care  Managing diabetes (diabetes mellitus) can be complicated. Your diabetes treatment may be managed by a team of health care providers,  including:  A physician who specializes in diabetes (endocrinologist).  A nurse practitioner or physician assistant.  Nurses.  A diet and nutrition specialist (registered dietitian).  A certified diabetes educator (CDE).  An exercise specialist.  A pharmacist.  An eye doctor.  A foot specialist (podiatrist).  A dentist.  A primary care provider.  A mental health provider.  Your health care providers follow guidelines to help you get the best quality of care. The following schedule is a general guideline for your diabetes management plan. Your health care providers may give you more specific instructions.  Physical exams  Upon being diagnosed with diabetes mellitus, and each year after that, your health care provider will ask about your medical and family history. He or she will also do a physical exam. Your exam may include:  Measuring your height, weight, and body mass index (BMI).  Checking your blood pressure. This will be done at every routine medical visit. Your target blood pressure may vary depending on your medical conditions, your age, and other factors.  Thyroid gland exam.  Skin exam.  Screening for damage to your nerves (peripheral neuropathy). This may include checking the pulse in your legs and feet and checking the level of sensation in your hands and feet.  A complete foot exam to inspect the structure and skin of your feet, including checking for cuts, bruises, redness, blisters, sores, or other problems.  Screening for blood vessel (vascular) problems, which may include checking the pulse in your legs and feet and checking your temperature.  Blood tests  Depending on your treatment plan and your personal needs, you may have the following tests done:  HbA1c (hemoglobin A1c). This test provides information about blood sugar (glucose) control over the previous 2-3 months. It is used to adjust your treatment plan, if needed. This test will be done:  At least 2 times a year, if you are meeting your  treatment goals.  4 times a year, if you are not meeting your treatment goals or if treatment goals have changed.  Lipid testing, including total, LDL, and HDL cholesterol and triglyceride levels.  The goal for LDL is less than 100 mg/dL (5.5 mmol/L). If you are at high risk for complications, the goal is less than 70 mg/dL (3.9 mmol/L).  The goal for HDL is 40 mg/dL (2.2 mmol/L) or higher for men and 50 mg/dL (2.8 mmol/L) or higher for women. An HDL cholesterol of 60 mg/dL (3.3 mmol/L) or higher gives some protection against heart disease.  The goal for triglycerides is less than 150 mg/dL (8.3 mmol/L).  Liver function tests.  Kidney function tests.  Thyroid function tests.  Dental and eye exams  Visit your dentist two times a year.  If you have type 1 diabetes, your health care provider may recommend an eye exam 3-5 years after you are diagnosed, and then once a year after your first exam.  For children with type 1 diabetes, a health care provider may recommend an eye exam when your child is age 10 or older and has had diabetes for 3-5 years. After the first exam, your child should get an eye exam once a year.  If you have type 2 diabetes, your health care provider may recommend an eye exam as soon as you are diagnosed, and then once a year after your first exam.  Immunizations    The yearly flu (influenza) vaccine is recommended for everyone 6 months or older who has diabetes.  The pneumonia (pneumococcal) vaccine is recommended for everyone 2 years or older who has diabetes. If you are 65 or older, you may get the pneumonia vaccine as a series of two separate shots.  The hepatitis B vaccine is recommended for adults shortly after being diagnosed with diabetes.  Adults and children with diabetes should receive all other vaccines according to age-specific recommendations from the Centers for Disease Control and Prevention (CDC).  Mental and emotional health  Screening for symptoms of eating disorders, anxiety, and  depression is recommended at the time of diagnosis and afterward as needed. If your screening shows that you have symptoms (positive screening result), you may need more evaluation and you may work with a mental health care provider.  Treatment plan  Your treatment plan will be reviewed at every medical visit. You and your health care provider will discuss:  How you are taking your medicines, including insulin.  Any side effects you are experiencing.  Your blood glucose target goals.  The frequency of your blood glucose monitoring.  Lifestyle habits, such as activity level as well as tobacco, alcohol, and substance use.  Diabetes self-management education  Your health care provider will assess how well you are monitoring your blood glucose levels and whether you are taking your insulin correctly. He or she may refer you to:  A certified diabetes educator to manage your diabetes throughout your life, starting at diagnosis.  A registered dietitian who can create or review your personal nutrition plan.  An exercise specialist who can discuss your activity level and exercise plan.  Summary  Managing diabetes (diabetes mellitus) can be complicated. Your diabetes treatment may be managed by a team of health care providers.  Your health care providers follow guidelines in order to help you get the best quality of care.  Standards of care including having regular physical exams, blood tests, blood pressure monitoring, immunizations, screening tests, and education about how to manage your diabetes.  Your health care providers may also give you more specific instructions based on your individual health.  This information is not intended to replace advice given to you by your health care provider. Make sure you discuss any questions you have with your health care provider.  Document Released: 10/15/2010 Document Revised: 09/06/2019 Document Reviewed: 09/15/2017  Mi[employer] Patient Education © 2020 [employer]      Depression /  Suicide Risk    As you are discharged from this [facility] facility, it is important to learn how to keep safe from harming yourself.    Recognize the warning signs:  · Abrupt changes in personality, positive or negative- including increase in energy   · Giving away possessions  · Change in eating patterns- significant weight changes-  positive or negative  · Change in sleeping patterns- unable to sleep or sleeping all the time   · Unwillingness or inability to communicate  · Depression  · Unusual sadness, discouragement and loneliness  · Talk of wanting to die  · Neglect of personal appearance   · Rebelliousness- reckless behavior  · Withdrawal from people/activities they love  · Confusion- inability to concentrate     If you or a loved one observes any of these behaviors or has concerns about self-harm, here's what you can do:  · Talk about it- your feelings and reasons for harming yourself  · Remove any means that you might use to hurt yourself (examples: pills, rope, extension cords, firearm)  · Get professional help from the community (Mental Health, Substance Abuse, psychological counseling)  · Do not be alone:Call your Safe Contact- someone whom you trust who will be there for you.  · Call your local CRISIS HOTLINE 884-5235 or 537-467-0635  · Call your local Children's Mobile Crisis Response Team [place] (466) 629-3774 or www.[employer]ida  · Call the toll free National Suicide Prevention Hotlines   · National Suicide Prevention Lifeline 929-203-RFZW (7965)  · National Hope Line Network 800-SUICIDE (813-0210)

## 2021-05-04 ENCOUNTER — PATIENT OUTREACH (OUTPATIENT)
Dept: HEALTH INFORMATION MANAGEMENT | Facility: OTHER | Age: 56
End: 2021-05-04

## 2021-05-05 NOTE — PROGRESS NOTES
5/5- EDGAR Donahue call to pt post d/c to introduce CCM services. Was not able to meet with pt while admitted due to ICU status. Pt states he as arranged all follow up care needs at this time. Pt declined and thanked me for the call.

## 2021-06-01 NOTE — DISCHARGE PLANNING
Care Transition Team Assessment    Attempted to reach patient by phone but no answer. Chart reviewed. Lives with spouse in 2 story house. PCP Dr. Stein. Unknown needs @ present time.    Information Source  Information Given By: Other (Comments)(Chart reviewed)    Readmission Evaluation  Is this a readmission?: No    Interdisciplinary Discharge Planning  Primary Care Physician: CATHERINE STEIN  Lives with - Patient's Self Care Capacity: Spouse  Support Systems: Spouse / Significant Other  Housing / Facility: [address]  Do You Take your Prescribed Medications Regularly: Yes  Able to Return to Previous ADL's: Yes  Mobility Issues: No  Prior Services: None  Patient Prefers to be Discharged to:: Home  Assistance Needed: Unknown at this Time  Durable Medical Equipment: Unknown    Discharge Preparedness  What are your discharge supports?: Spouse    Finances  Prescription Coverage: Yes    Discharge Risks or Barriers  Patient risk factors: Complex medical needs    Anticipated Discharge Information  Discharge Address: [address]  Discharge Contact Phone Number: [phone]         Head,  normocephalic,  atraumatic,  Face,  Face within normal limits,  Ears,  External ears within normal limits,  Nose/Nasopharynx,  External nose  normal appearance,  nares patent,  no nasal discharge,  Mouth and Throat,  Oral cavity appearance normal,  Breath odor normal,  Lips,  Appearance normal

## 2021-06-02 ENCOUNTER — APPOINTMENT (OUTPATIENT)
Dept: RADIOLOGY | Facility: MEDICAL CENTER | Age: 56
DRG: 638 | End: 2021-06-02
Attending: EMERGENCY MEDICINE

## 2021-06-02 ENCOUNTER — HOSPITAL ENCOUNTER (INPATIENT)
Facility: MEDICAL CENTER | Age: 56
LOS: 1 days | DRG: 638 | End: 2021-06-03
Attending: EMERGENCY MEDICINE | Admitting: INTERNAL MEDICINE

## 2021-06-02 DIAGNOSIS — E11.00 UNCONTROLLED TYPE 2 DIABETES MELLITUS WITH HYPEROSMOLAR NONKETOTIC HYPERGLYCEMIA (HCC): ICD-10-CM

## 2021-06-02 LAB
ALBUMIN SERPL BCP-MCNC: 4.1 G/DL (ref 3.2–4.9)
ALBUMIN/GLOB SERPL: 1.6 G/DL
ALP SERPL-CCNC: 83 U/L (ref 30–99)
ALT SERPL-CCNC: 45 U/L (ref 2–50)
ANION GAP SERPL CALC-SCNC: 10 MMOL/L (ref 7–16)
ANION GAP SERPL CALC-SCNC: 15 MMOL/L (ref 7–16)
ANION GAP SERPL CALC-SCNC: 9 MMOL/L (ref 7–16)
APPEARANCE UR: CLEAR
AST SERPL-CCNC: 53 U/L (ref 12–45)
B-OH-BUTYR SERPL-MCNC: 0.18 MMOL/L (ref 0.02–0.27)
BASE EXCESS BLDV CALC-SCNC: -2 MMOL/L
BASOPHILS # BLD AUTO: 0.2 % (ref 0–1.8)
BASOPHILS # BLD: 0.01 K/UL (ref 0–0.12)
BILIRUB SERPL-MCNC: 0.3 MG/DL (ref 0.1–1.5)
BILIRUB UR QL STRIP.AUTO: NEGATIVE
BODY TEMPERATURE: ABNORMAL CENTIGRADE
BODY TEMPERATURE: ABNORMAL CENTIGRADE
BUN SERPL-MCNC: 21 MG/DL (ref 8–22)
BUN SERPL-MCNC: 22 MG/DL (ref 8–22)
BUN SERPL-MCNC: 24 MG/DL (ref 8–22)
CALCIUM SERPL-MCNC: 8.4 MG/DL (ref 8.4–10.2)
CALCIUM SERPL-MCNC: 8.8 MG/DL (ref 8.4–10.2)
CALCIUM SERPL-MCNC: 9.1 MG/DL (ref 8.4–10.2)
CHLORIDE SERPL-SCNC: 105 MMOL/L (ref 96–112)
CHLORIDE SERPL-SCNC: 106 MMOL/L (ref 96–112)
CHLORIDE SERPL-SCNC: 92 MMOL/L (ref 96–112)
CO2 SERPL-SCNC: 21 MMOL/L (ref 20–33)
CO2 SERPL-SCNC: 24 MMOL/L (ref 20–33)
CO2 SERPL-SCNC: 26 MMOL/L (ref 20–33)
COLOR UR: YELLOW
CREAT SERPL-MCNC: 0.74 MG/DL (ref 0.5–1.4)
CREAT SERPL-MCNC: 0.75 MG/DL (ref 0.5–1.4)
CREAT SERPL-MCNC: 1.27 MG/DL (ref 0.5–1.4)
EOSINOPHIL # BLD AUTO: 0.06 K/UL (ref 0–0.51)
EOSINOPHIL NFR BLD: 1.3 % (ref 0–6.9)
ERYTHROCYTE [DISTWIDTH] IN BLOOD BY AUTOMATED COUNT: 41 FL (ref 35.9–50)
GLOBULIN SER CALC-MCNC: 2.5 G/DL (ref 1.9–3.5)
GLUCOSE BLD-MCNC: 138 MG/DL (ref 65–99)
GLUCOSE BLD-MCNC: 144 MG/DL (ref 65–99)
GLUCOSE BLD-MCNC: 148 MG/DL (ref 65–99)
GLUCOSE BLD-MCNC: 161 MG/DL (ref 65–99)
GLUCOSE BLD-MCNC: 272 MG/DL (ref 65–99)
GLUCOSE BLD-MCNC: 509 MG/DL (ref 65–99)
GLUCOSE SERPL-MCNC: 144 MG/DL (ref 65–99)
GLUCOSE SERPL-MCNC: 173 MG/DL (ref 65–99)
GLUCOSE SERPL-MCNC: 790 MG/DL (ref 65–99)
GLUCOSE UR STRIP.AUTO-MCNC: >=1000 MG/DL
HCO3 BLDV-SCNC: 22 MMOL/L (ref 24–28)
HCT VFR BLD AUTO: 39 % (ref 42–52)
HGB BLD-MCNC: 13.7 G/DL (ref 14–18)
IMM GRANULOCYTES # BLD AUTO: 0.03 K/UL (ref 0–0.11)
IMM GRANULOCYTES NFR BLD AUTO: 0.7 % (ref 0–0.9)
KETONES UR STRIP.AUTO-MCNC: NEGATIVE MG/DL
LEUKOCYTE ESTERASE UR QL STRIP.AUTO: NEGATIVE
LIPASE SERPL-CCNC: 65 U/L (ref 7–58)
LYMPHOCYTES # BLD AUTO: 1.28 K/UL (ref 1–4.8)
LYMPHOCYTES NFR BLD: 28.3 % (ref 22–41)
MAGNESIUM SERPL-MCNC: 1.9 MG/DL (ref 1.5–2.5)
MAGNESIUM SERPL-MCNC: 2.2 MG/DL (ref 1.5–2.5)
MCH RBC QN AUTO: 33.1 PG (ref 27–33)
MCHC RBC AUTO-ENTMCNC: 35.1 G/DL (ref 33.7–35.3)
MCV RBC AUTO: 94.2 FL (ref 81.4–97.8)
MICRO URNS: ABNORMAL
MONOCYTES # BLD AUTO: 0.29 K/UL (ref 0–0.85)
MONOCYTES NFR BLD AUTO: 6.4 % (ref 0–13.4)
NEUTROPHILS # BLD AUTO: 2.85 K/UL (ref 1.82–7.42)
NEUTROPHILS NFR BLD: 63.1 % (ref 44–72)
NITRITE UR QL STRIP.AUTO: NEGATIVE
NRBC # BLD AUTO: 0 K/UL
NRBC BLD-RTO: 0 /100 WBC
OSMOLALITY SERPL: 323 MOSM/KG H2O (ref 278–298)
PCO2 BLDV: 37.5 MMHG (ref 41–51)
PCO2 BLDV: 38.3 MMHG (ref 41–51)
PH BLDV: 7.38 [PH] (ref 7.31–7.45)
PH BLDV: 7.42 [PH] (ref 7.31–7.45)
PH UR STRIP.AUTO: 6 [PH] (ref 5–8)
PHOSPHATE SERPL-MCNC: 3.6 MG/DL (ref 2.5–4.5)
PHOSPHATE SERPL-MCNC: 4.3 MG/DL (ref 2.5–4.5)
PLATELET # BLD AUTO: 175 K/UL (ref 164–446)
PMV BLD AUTO: 9.1 FL (ref 9–12.9)
PO2 BLDV: 45 MMHG (ref 25–40)
PO2 BLDV: 93.9 MMHG (ref 25–40)
POTASSIUM SERPL-SCNC: 3.7 MMOL/L (ref 3.6–5.5)
POTASSIUM SERPL-SCNC: 4.1 MMOL/L (ref 3.6–5.5)
POTASSIUM SERPL-SCNC: 4.6 MMOL/L (ref 3.6–5.5)
PROT SERPL-MCNC: 6.6 G/DL (ref 6–8.2)
PROT UR QL STRIP: NEGATIVE MG/DL
RBC # BLD AUTO: 4.14 M/UL (ref 4.7–6.1)
RBC UR QL AUTO: NEGATIVE
SAO2 % BLDV: 80.6 %
SAO2 % BLDV: 96.3 %
SODIUM SERPL-SCNC: 128 MMOL/L (ref 135–145)
SODIUM SERPL-SCNC: 140 MMOL/L (ref 135–145)
SODIUM SERPL-SCNC: 140 MMOL/L (ref 135–145)
SP GR UR STRIP.AUTO: <=1.005
WBC # BLD AUTO: 4.5 K/UL (ref 4.8–10.8)

## 2021-06-02 PROCEDURE — 96374 THER/PROPH/DIAG INJ IV PUSH: CPT

## 2021-06-02 PROCEDURE — 96375 TX/PRO/DX INJ NEW DRUG ADDON: CPT

## 2021-06-02 PROCEDURE — 84100 ASSAY OF PHOSPHORUS: CPT

## 2021-06-02 PROCEDURE — 85025 COMPLETE CBC W/AUTO DIFF WBC: CPT

## 2021-06-02 PROCEDURE — 82803 BLOOD GASES ANY COMBINATION: CPT | Mod: 91

## 2021-06-02 PROCEDURE — 80053 COMPREHEN METABOLIC PANEL: CPT

## 2021-06-02 PROCEDURE — 700105 HCHG RX REV CODE 258: Performed by: INTERNAL MEDICINE

## 2021-06-02 PROCEDURE — 99291 CRITICAL CARE FIRST HOUR: CPT

## 2021-06-02 PROCEDURE — 81003 URINALYSIS AUTO W/O SCOPE: CPT

## 2021-06-02 PROCEDURE — 71045 X-RAY EXAM CHEST 1 VIEW: CPT

## 2021-06-02 PROCEDURE — 82010 KETONE BODYS QUAN: CPT

## 2021-06-02 PROCEDURE — 83930 ASSAY OF BLOOD OSMOLALITY: CPT

## 2021-06-02 PROCEDURE — 83690 ASSAY OF LIPASE: CPT

## 2021-06-02 PROCEDURE — 82962 GLUCOSE BLOOD TEST: CPT | Mod: 91

## 2021-06-02 PROCEDURE — 99291 CRITICAL CARE FIRST HOUR: CPT | Performed by: INTERNAL MEDICINE

## 2021-06-02 PROCEDURE — 83735 ASSAY OF MAGNESIUM: CPT | Mod: 91

## 2021-06-02 PROCEDURE — 700105 HCHG RX REV CODE 258: Performed by: EMERGENCY MEDICINE

## 2021-06-02 PROCEDURE — U0005 INFEC AGEN DETEC AMPLI PROBE: HCPCS

## 2021-06-02 PROCEDURE — 770022 HCHG ROOM/CARE - ICU (200)

## 2021-06-02 PROCEDURE — 36415 COLL VENOUS BLD VENIPUNCTURE: CPT

## 2021-06-02 PROCEDURE — 700111 HCHG RX REV CODE 636 W/ 250 OVERRIDE (IP): Performed by: EMERGENCY MEDICINE

## 2021-06-02 PROCEDURE — A9270 NON-COVERED ITEM OR SERVICE: HCPCS | Performed by: INTERNAL MEDICINE

## 2021-06-02 PROCEDURE — 700111 HCHG RX REV CODE 636 W/ 250 OVERRIDE (IP): Performed by: INTERNAL MEDICINE

## 2021-06-02 PROCEDURE — 80048 BASIC METABOLIC PNL TOTAL CA: CPT

## 2021-06-02 PROCEDURE — U0003 INFECTIOUS AGENT DETECTION BY NUCLEIC ACID (DNA OR RNA); SEVERE ACUTE RESPIRATORY SYNDROME CORONAVIRUS 2 (SARS-COV-2) (CORONAVIRUS DISEASE [COVID-19]), AMPLIFIED PROBE TECHNIQUE, MAKING USE OF HIGH THROUGHPUT TECHNOLOGIES AS DESCRIBED BY CMS-2020-01-R: HCPCS

## 2021-06-02 PROCEDURE — 700102 HCHG RX REV CODE 250 W/ 637 OVERRIDE(OP): Performed by: INTERNAL MEDICINE

## 2021-06-02 RX ORDER — POTASSIUM CHLORIDE 7.45 MG/ML
10 INJECTION INTRAVENOUS
Status: COMPLETED | OUTPATIENT
Start: 2021-06-02 | End: 2021-06-02

## 2021-06-02 RX ORDER — DEXTROSE AND SODIUM CHLORIDE 10; .45 G/100ML; G/100ML
INJECTION, SOLUTION INTRAVENOUS CONTINUOUS
Status: ACTIVE | OUTPATIENT
Start: 2021-06-02 | End: 2021-06-02

## 2021-06-02 RX ORDER — SODIUM CHLORIDE, SODIUM LACTATE, POTASSIUM CHLORIDE, CALCIUM CHLORIDE 600; 310; 30; 20 MG/100ML; MG/100ML; MG/100ML; MG/100ML
2000 INJECTION, SOLUTION INTRAVENOUS ONCE
Status: COMPLETED | OUTPATIENT
Start: 2021-06-02 | End: 2021-06-02

## 2021-06-02 RX ORDER — INSULIN LISPRO 100 [IU]/ML
0.2 INJECTION, SOLUTION INTRAVENOUS; SUBCUTANEOUS
Status: DISCONTINUED | OUTPATIENT
Start: 2021-06-03 | End: 2021-06-03

## 2021-06-02 RX ORDER — SODIUM CHLORIDE 9 MG/ML
1000 INJECTION, SOLUTION INTRAVENOUS ONCE
Status: COMPLETED | OUTPATIENT
Start: 2021-06-02 | End: 2021-06-02

## 2021-06-02 RX ORDER — DEXTROSE AND SODIUM CHLORIDE 5; .45 G/100ML; G/100ML
INJECTION, SOLUTION INTRAVENOUS CONTINUOUS
Status: DISCONTINUED | OUTPATIENT
Start: 2021-06-02 | End: 2021-06-02

## 2021-06-02 RX ORDER — BISACODYL 10 MG
10 SUPPOSITORY, RECTAL RECTAL
Status: DISCONTINUED | OUTPATIENT
Start: 2021-06-02 | End: 2021-06-03 | Stop reason: HOSPADM

## 2021-06-02 RX ORDER — SODIUM CHLORIDE 9 MG/ML
2000 INJECTION, SOLUTION INTRAVENOUS ONCE
Status: COMPLETED | OUTPATIENT
Start: 2021-06-02 | End: 2021-06-02

## 2021-06-02 RX ORDER — POLYETHYLENE GLYCOL 3350 17 G/17G
1 POWDER, FOR SOLUTION ORAL
Status: DISCONTINUED | OUTPATIENT
Start: 2021-06-02 | End: 2021-06-03 | Stop reason: HOSPADM

## 2021-06-02 RX ORDER — THYROID 30 MG/1
60 TABLET ORAL EVERY MORNING
Status: DISCONTINUED | OUTPATIENT
Start: 2021-06-03 | End: 2021-06-03 | Stop reason: HOSPADM

## 2021-06-02 RX ORDER — DEXTROSE MONOHYDRATE 25 G/50ML
50 INJECTION, SOLUTION INTRAVENOUS
Status: DISCONTINUED | OUTPATIENT
Start: 2021-06-02 | End: 2021-06-03

## 2021-06-02 RX ORDER — MORPHINE SULFATE 4 MG/ML
4 INJECTION, SOLUTION INTRAMUSCULAR; INTRAVENOUS ONCE
Status: COMPLETED | OUTPATIENT
Start: 2021-06-02 | End: 2021-06-02

## 2021-06-02 RX ORDER — POTASSIUM CHLORIDE 7.45 MG/ML
10 INJECTION INTRAVENOUS ONCE
Status: DISCONTINUED | OUTPATIENT
Start: 2021-06-02 | End: 2021-06-02

## 2021-06-02 RX ORDER — TRAZODONE HYDROCHLORIDE 50 MG/1
50 TABLET ORAL NIGHTLY PRN
Status: DISCONTINUED | OUTPATIENT
Start: 2021-06-02 | End: 2021-06-03 | Stop reason: HOSPADM

## 2021-06-02 RX ORDER — INSULIN LISPRO 100 [IU]/ML
2-9 INJECTION, SOLUTION INTRAVENOUS; SUBCUTANEOUS
Status: DISCONTINUED | OUTPATIENT
Start: 2021-06-02 | End: 2021-06-03

## 2021-06-02 RX ORDER — ONDANSETRON 2 MG/ML
4 INJECTION INTRAMUSCULAR; INTRAVENOUS ONCE
Status: COMPLETED | OUTPATIENT
Start: 2021-06-02 | End: 2021-06-02

## 2021-06-02 RX ORDER — AMOXICILLIN 250 MG
2 CAPSULE ORAL 2 TIMES DAILY
Status: DISCONTINUED | OUTPATIENT
Start: 2021-06-03 | End: 2021-06-03 | Stop reason: HOSPADM

## 2021-06-02 RX ORDER — MAGNESIUM SULFATE HEPTAHYDRATE 40 MG/ML
4 INJECTION, SOLUTION INTRAVENOUS
Status: COMPLETED | OUTPATIENT
Start: 2021-06-02 | End: 2021-06-02

## 2021-06-02 RX ORDER — MAGNESIUM SULFATE HEPTAHYDRATE 40 MG/ML
2 INJECTION, SOLUTION INTRAVENOUS
Status: COMPLETED | OUTPATIENT
Start: 2021-06-02 | End: 2021-06-02

## 2021-06-02 RX ORDER — SODIUM CHLORIDE 450 MG/100ML
INJECTION, SOLUTION INTRAVENOUS CONTINUOUS
Status: DISCONTINUED | OUTPATIENT
Start: 2021-06-02 | End: 2021-06-02

## 2021-06-02 RX ORDER — POTASSIUM CHLORIDE 7.45 MG/ML
10 INJECTION INTRAVENOUS ONCE
Status: COMPLETED | OUTPATIENT
Start: 2021-06-02 | End: 2021-06-02

## 2021-06-02 RX ORDER — CHOLECALCIFEROL (VITAMIN D3) 125 MCG
5 CAPSULE ORAL
Status: DISCONTINUED | OUTPATIENT
Start: 2021-06-02 | End: 2021-06-03 | Stop reason: HOSPADM

## 2021-06-02 RX ORDER — SENNOSIDES 8.6 MG
650 CAPSULE ORAL EVERY 6 HOURS PRN
COMMUNITY
End: 2022-01-03

## 2021-06-02 RX ADMIN — DEXTROSE AND SODIUM CHLORIDE: 10; .45 INJECTION, SOLUTION INTRAVENOUS at 19:45

## 2021-06-02 RX ADMIN — MAGNESIUM SULFATE 2 G: 2 INJECTION INTRAVENOUS at 17:29

## 2021-06-02 RX ADMIN — INSULIN GLARGINE 15 UNITS: 100 INJECTION, SOLUTION SUBCUTANEOUS at 20:45

## 2021-06-02 RX ADMIN — SODIUM CHLORIDE: 4.5 INJECTION, SOLUTION INTRAVENOUS at 16:34

## 2021-06-02 RX ADMIN — SODIUM CHLORIDE 9 UNITS/HR: 9 INJECTION, SOLUTION INTRAVENOUS at 16:28

## 2021-06-02 RX ADMIN — SODIUM CHLORIDE 1000 ML: 9 INJECTION, SOLUTION INTRAVENOUS at 13:56

## 2021-06-02 RX ADMIN — ENOXAPARIN SODIUM 40 MG: 40 INJECTION SUBCUTANEOUS at 17:29

## 2021-06-02 RX ADMIN — DEXTROSE AND SODIUM CHLORIDE: 5; 450 INJECTION, SOLUTION INTRAVENOUS at 18:42

## 2021-06-02 RX ADMIN — MORPHINE SULFATE 4 MG: 4 INJECTION INTRAVENOUS at 13:56

## 2021-06-02 RX ADMIN — ONDANSETRON 4 MG: 2 INJECTION INTRAMUSCULAR; INTRAVENOUS at 13:56

## 2021-06-02 RX ADMIN — IBUPROFEN 600 MG: 200 TABLET, FILM COATED ORAL at 17:29

## 2021-06-02 RX ADMIN — POTASSIUM CHLORIDE 10 MEQ: 7.46 INJECTION, SOLUTION INTRAVENOUS at 16:36

## 2021-06-02 RX ADMIN — INSULIN HUMAN 10 UNITS: 100 INJECTION, SOLUTION PARENTERAL at 16:50

## 2021-06-02 RX ADMIN — SODIUM CHLORIDE, POTASSIUM CHLORIDE, SODIUM LACTATE AND CALCIUM CHLORIDE 2000 ML: 600; 310; 30; 20 INJECTION, SOLUTION INTRAVENOUS at 16:33

## 2021-06-02 RX ADMIN — POTASSIUM CHLORIDE 10 MEQ: 7.46 INJECTION, SOLUTION INTRAVENOUS at 21:37

## 2021-06-02 RX ADMIN — POTASSIUM CHLORIDE 10 MEQ: 7.46 INJECTION, SOLUTION INTRAVENOUS at 20:42

## 2021-06-02 ASSESSMENT — LIFESTYLE VARIABLES
HAVE PEOPLE ANNOYED YOU BY CRITICIZING YOUR DRINKING: NO
ON A TYPICAL DAY WHEN YOU DRINK ALCOHOL HOW MANY DRINKS DO YOU HAVE: 0
TOTAL SCORE: 0
TOTAL SCORE: 0
CONSUMPTION TOTAL: NEGATIVE
EVER HAD A DRINK FIRST THING IN THE MORNING TO STEADY YOUR NERVES TO GET RID OF A HANGOVER: NO
AVERAGE NUMBER OF DAYS PER WEEK YOU HAVE A DRINK CONTAINING ALCOHOL: 0
HAVE YOU EVER FELT YOU SHOULD CUT DOWN ON YOUR DRINKING: NO
ALCOHOL_USE: NO
HOW MANY TIMES IN THE PAST YEAR HAVE YOU HAD 5 OR MORE DRINKS IN A DAY: 0
EVER FELT BAD OR GUILTY ABOUT YOUR DRINKING: NO
TOTAL SCORE: 0

## 2021-06-02 ASSESSMENT — ENCOUNTER SYMPTOMS
COUGH: 0
WHEEZING: 0
LOSS OF CONSCIOUSNESS: 0
ABDOMINAL PAIN: 1
STRIDOR: 0
NAUSEA: 0
CONSTIPATION: 0
SHORTNESS OF BREATH: 0
SINUS PAIN: 0
HEARTBURN: 0
EYE DISCHARGE: 0
SPUTUM PRODUCTION: 0
ORTHOPNEA: 0
DIZZINESS: 0
FEVER: 0
HEADACHES: 0
DIARRHEA: 0
FOCAL WEAKNESS: 0
BLOOD IN STOOL: 0
VOMITING: 0
MYALGIAS: 0
EYE PAIN: 0
PSYCHIATRIC NEGATIVE: 1
WEIGHT LOSS: 0
SENSORY CHANGE: 0
SORE THROAT: 0
CHILLS: 0

## 2021-06-02 ASSESSMENT — COGNITIVE AND FUNCTIONAL STATUS - GENERAL
MOBILITY SCORE: 18
DRESSING REGULAR UPPER BODY CLOTHING: A LITTLE
HELP NEEDED FOR BATHING: A LITTLE
WALKING IN HOSPITAL ROOM: A LITTLE
SUGGESTED CMS G CODE MODIFIER DAILY ACTIVITY: CK
SUGGESTED CMS G CODE MODIFIER MOBILITY: CK
TURNING FROM BACK TO SIDE WHILE IN FLAT BAD: A LITTLE
DRESSING REGULAR LOWER BODY CLOTHING: A LITTLE
PERSONAL GROOMING: A LITTLE
TOILETING: A LITTLE
DAILY ACTIVITIY SCORE: 18
CLIMB 3 TO 5 STEPS WITH RAILING: A LITTLE
MOVING TO AND FROM BED TO CHAIR: A LITTLE
EATING MEALS: A LITTLE
MOVING FROM LYING ON BACK TO SITTING ON SIDE OF FLAT BED: A LITTLE
STANDING UP FROM CHAIR USING ARMS: A LITTLE

## 2021-06-02 ASSESSMENT — PATIENT HEALTH QUESTIONNAIRE - PHQ9
1. LITTLE INTEREST OR PLEASURE IN DOING THINGS: NOT AT ALL
2. FEELING DOWN, DEPRESSED, IRRITABLE, OR HOPELESS: NOT AT ALL
1. LITTLE INTEREST OR PLEASURE IN DOING THINGS: NOT AT ALL
SUM OF ALL RESPONSES TO PHQ9 QUESTIONS 1 AND 2: 0
2. FEELING DOWN, DEPRESSED, IRRITABLE, OR HOPELESS: NOT AT ALL
SUM OF ALL RESPONSES TO PHQ9 QUESTIONS 1 AND 2: 0

## 2021-06-02 ASSESSMENT — FIBROSIS 4 INDEX: FIB4 SCORE: 1.11

## 2021-06-02 ASSESSMENT — PAIN DESCRIPTION - PAIN TYPE
TYPE: ACUTE PAIN

## 2021-06-02 NOTE — ED NOTES
"Med rec updated and complete  Allergies reviewed  Pt reports no antibiotics in the last 2 weeks    No current facility-administered medications on file prior to encounter.     Current Outpatient Medications on File Prior to Encounter   Medication Sig Dispense Refill   • acetaminophen (TYLENOL) 650 MG CR tablet Take 650 mg by mouth every 6 hours as needed for Moderate Pain.     • Multiple Vitamins-Minerals (MENS MULTIVITAMIN) Tab Take 1 Package by mouth every evening. \"[employer] Men\"      • insulin glargine (INSULIN GLARGINE) 100 UNIT/ML Solution Pen-injector injection Inject 25 Units under the skin every evening for 30 days. (Patient taking differently: Inject 35 Units under the skin every evening.) 1 Each 0   • insulin lispro (HUMALOG KWIKPEN) 100 UNIT/ML Solution Pen-injector injection PEN Inject 8 Units under the skin 3 times a day before meals. (Patient taking differently: Inject 5-16 Units under the skin 4 Times a Day,Before Meals and at Bedtime. Sliding Scale) 1 Each 1   • melatonin 5 mg Tab Take 5-10 mg by mouth at bedtime as needed (Sleep). 1 to 2 tablets = 5 to 10 mg.     • ondansetron (ZOFRAN ODT) 8 MG TABLET DISPERSIBLE Take 1 Tab by mouth every 8 hours as needed for Nausea. 15 Tab 0   • thyroid (ARMOUR THYROID) 60 MG Tab Take 1 Tab by mouth every morning. 30 Tab 3        "

## 2021-06-02 NOTE — ASSESSMENT & PLAN NOTE
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

## 2021-06-02 NOTE — ED PROVIDER NOTES
"ED Provider Note    CHIEF COMPLAINT  Chief Complaint   Patient presents with   • Blood Sugar Problem     Blood sugar high on home monitor, nauseated       HPI  Mahesh Bradshaw is a 56 y.o. male who presents complaining of a high reading on his blood sugar monitor starting this morning.  He is a known insulin-dependent diabetic.  He states he has not missed any of his insulin doses.  He is complaining of just generalized weakness and blurry vision.  He denies any fevers or chills cough or cold symptoms.  He does have some cramping to his abdomen which he states he gets when his sugar is high.    REVIEW OF SYSTEMS  HEENT:  No ear pain, congestion or sore throat   EYES: no discharge redness positive blurred vision  CARDIAC: no chest pain, palpitations    PULMONARY: no dyspnea, cough or congestion   GI: no vomiting diarrhea positive abdominal cramping  : no dysuria, back pain or hematuria   Neuro: no weakness, numbness aphasia or headache   Musculoskeletal: no swelling deformity or pain no joint swelling  Endocrine: no fevers, sweating, weight loss   SKIN: no rash, erythema or contusions     See history of present illness all other systems are negative    PAST MEDICAL HISTORY  Past Medical History:   Diagnosis Date   • ADRIANE (acute kidney injury) ([facility]) 2/24/2016   • Anesthesia     \"Was difficult to intubate 2-2016\"   • Arthritis 3-3-17    \"Spine\"   • Aspiration pneumonia ([facility]) 3-2016   • ASTHMA 3-3-17    \"Hasn't had to use inhaler in 2 years\"   • Breath shortness 3-3-17    \"With Exercise\"   • Congestive heart failure ([facility]) 2-2016     3-3-17 \"Not a current issue\"   • Dental disorder    • Depression 11/26/2014   • Diabetes ([facility]) 3-3-17    Takes Insulin   • Diabetes ([facility])    • Difficult intubation 2-2016   • DKA (diabetic ketoacidoses) ([facility]) 2-2016    \"10 days on vent with DKA, Renal failure, CHF, elevated liver enzymes and electrolyte imbalance\"   • Electrolyte imbalance 2-2016   • Elevated LFTs    • Elevated liver " "enzymes 2-2016   • Essential hypertension 1/22/2016   • Gout    • Heart burn    • High cholesterol 3-3-17    Does not currently take medication for   • Hypothyroid 3-3-17    Takes [place] Thyroid   • Indigestion    • Kidney stones    • Klebsiella infect    • Migraine    • MRSA cellulitis    • Necrotizing myositis (HCC)    • On mechanically assisted ventilation ([facility]) 2-2016    HX \"On Vent for 10 days at [facility]\".  \"DX Pancreatitis, DKA, CHF, Elevated Liver Enzymes & Electrolyte Imbalance\".   • Pain 3-3-17    \"Left Flank\"   • Pancreatitis 2-2016    \"D/T Tradjenta was hospitialized for 15 days\"   • RF (renal failure) 2-2016   • Sepsis ([facility]) 1/21/2016   • Snoring 3-3-17    Has not had a sleep study   • Streptococcus infection    • UC (ulcerative colitis) ([facility]) 3-3-17    \"Five BM's per day, takes Lialda\"   • Urinary incontinence    • Vitamin D deficiency        FAMILY HISTORY  Family History   Problem Relation Age of Onset   • Cancer Mother        SOCIAL HISTORY  Social History     Socioeconomic History   • Marital status:      Spouse name: Not on file   • Number of children: Not on file   • Years of education: Not on file   • Highest education level: Not on file   Occupational History   • Not on file   Tobacco Use   • Smoking status: Never Smoker   • Smokeless tobacco: Never Used   Vaping Use   • Vaping Use: Never used   Substance and Sexual Activity   • Alcohol use: Yes     Comment: rare   • Drug use: No   • Sexual activity: Not on file   Other Topics Concern   • Not on file   Social History Narrative    ** Merged History Encounter **         ** Merged History Encounter **     ** Merged History Encounter **        Social Determinants of Health     Financial Resource Strain: Low Risk    • Difficulty of Paying Living Expenses: Not hard at all   Food Insecurity: No Food Insecurity   • Worried About Running Out of Food in the Last Year: Never true   • Ran Out of Food in the Last Year: Never true   Transportation " Needs: No Transportation Needs   • Lack of Transportation (Medical): No   • Lack of Transportation (Non-Medical): No   Physical Activity:    • Days of Exercise per Week:    • Minutes of Exercise per Session:    Stress:    • Feeling of Stress :    Social Connections:    • Frequency of Communication with Friends and Family:    • Frequency of Social Gatherings with Friends and Family:    • Attends Anab[employer] Services:    • Active Member of Clubs or Organizations:    • Attends Club or Organization Meetings:    • Marital Status:    Intimate Partner Violence:    • Fear of Current or Ex-Partner:    • Emotionally Abused:    • Physically Abused:    • Sexually Abused:        SURGICAL HISTORY  Past Surgical History:   Procedure Laterality Date   • UMBILICAL HERNIA REPAIR N/A 4/15/2018    Procedure: UMBILICAL HERNIA REPAIR;  Surgeon: Karen Beasley M.D.;  Location: SURGERY [facility];  Service: General   • IRRIGATION & DEBRIDEMENT ORTHO Left 12/10/2017    Procedure: IRRIGATION & DEBRIDEMENT ORTHO LEFT HAND;  Surgeon: Chicho Ramos M.D.;  Location: SURGERY [facility];  Service: Orthopedics   • IRRIGATION & DEBRIDEMENT GENERAL Right 5/1/2017    Procedure: IRRIGATION & DEBRIDEMENT GENERAL-Left HAND AND right  ANKLE;  Surgeon: Esau Dooley M.D.;  Location: SURGERY [facility];  Service:    • IRRIGATION & DEBRIDEMENT GENERAL Right 4/29/2017    Procedure: IRRIGATION & DEBRIDEMENT GENERAL;  Surgeon: Esau Dooley M.D.;  Location: SURGERY [facility];  Service:    • INCISION AND DRAINAGE GENERAL  4/7/2017    Procedure: INCISION AND DRAINAGE GENERAL;  Surgeon: Esau Dooley M.D.;  Location: SURGERY SAME DAY [facility];  Service:    • UMBILICAL HERNIA REPAIR  3/10/2017    Procedure: UMBILICAL HERNIA REPAIR- INCISION AND DRAINAGE OF UMBILICAL WOUND AND MESH REMOVAL;  Surgeon: Esau Dooley M.D.;  Location: SURGERY SAME DAY [facility];  Service:    • UMBILICAL HERNIA REPAIR N/A 11/6/2016    Procedure:  "UMBILICAL HERNIA REPAIR;  Surgeon: Esau Dooley M.D.;  Location: SURGERY [facility];  Service:    • COLONOSCOPY WITH BIOPSY  11/26/2014    Performed by Solo Higginbotham M.D. at ENDOSCOPY [facility]   • LIVE BY LAPAROSCOPY  2005   • OTHER ORTHOPEDIC SURGERY  1997 or 1998    Left Wrist ORIF       CURRENT MEDICATIONS  Home Medications     Reviewed by Darleen Sherman (Pharmacy Tech) on 06/02/21 at 1429  Med List Status: Complete   Medication Last Dose Status   acetaminophen (TYLENOL) 650 MG CR tablet >1 week Active   Alcohol Swabs  Active   Blood Glucose Meter Kit  Active   Blood Glucose Test Strips  Active   insulin glargine (INSULIN GLARGINE) 100 UNIT/ML Solution Pen-injector injection 6/1/2021 Active   insulin lispro (HUMALOG KWIKPEN) 100 UNIT/ML Solution Pen-injector injection PEN 6/2/2021 Active   Insulin Pen Needle 32 G x 4 mm  Active   Lancets  Active   melatonin 5 mg Tab 6/2/2021 Active   Multiple Vitamins-Minerals (MENS MULTIVITAMIN) Tab 6/1/2021 Active   ondansetron (ZOFRAN ODT) 8 MG TABLET DISPERSIBLE 6/2/2021 Active   thyroid (ARMOUR THYROID) 60 MG Tab 6/1/2021 Active                ALLERGIES  Allergies   Allergen Reactions   • Peanut (Diagnostic) Anaphylaxis   • Tradjenta [Linagliptin] Unspecified     Pt developed pancreatitis   • Hydrocodone Hives     Tolerates Morphine and Oxycodone         PHYSICAL EXAM  VITAL SIGNS: /75   Pulse (!) 101   Temp 36.3 °C (97.4 °F) (Temporal)   Resp 18   Ht 1.727 m (5' 8\")   Wt 92 kg (202 lb 13.2 oz)   SpO2 91%   BMI 30.84 kg/m²   Constitutional: Well developed, Well nourished, No acute distress, generally ill-appearing.   HEENT: Normocephalic, Atraumatic,  external ears normal, pharynx pink,  Mucous  Membranes moist, No rhinorrhea or mucosal edema  Eyes: PERRL, EOMI, Conjunctiva normal, No discharge.   Neck: Normal range of motion, No tenderness, Supple, No stridor.   Lymphatic: No lymphadenopathy    Cardiovascular: Tachycardic, " regular Rate and Rhythm, No murmurs,  rubs, or gallops.   Thorax & Lungs: Lungs clear to auscultation bilaterally, No respiratory distress, No wheezes, rhales or rhonchi, No chest wall tenderness.   Abdomen: Bowel sounds normal, Soft, mild diffuse tenderness to palpation, non distended,  No pulsatile masses., no rebound guarding or peritoneal signs.   Skin: Warm, Dry, No erythema, No rash,   Back:  No CVA tenderness,  No spinal tenderness, bony crepitance step offs or instability.   Extremities: Equal, intact distal pulses, No cyanosis, clubbing or edema,  No tenderness.   Musculoskeletal: Good range of motion in all major joints. No tenderness to palpation or major deformities noted.   Neurologic: Alert & oriented x 3,No focal deficits noted.   Psychiatric: Affect normal, Judgment normal, Mood normal.       RADIOLOGY/PROCEDURES  DX-CHEST-PORTABLE (1 VIEW)   Final Result      Mild cardiomegaly.            COURSE & MEDICAL DECISION MAKING  Pertinent Labs & Imaging studies reviewed. (See chart for details)  Differential diagnosis: DKA, hyperglycemia, hydration, infection    HYDRATION: Based on the patient's presentation of DKA the patient was given IV fluids. IV Hydration was used because oral hydration was not adequate alone. Upon recheck following hydration, the patient was improved.        Results for orders placed or performed during the hospital encounter of 06/02/21   CBC w/ Differential   Result Value Ref Range    WBC 4.5 (L) 4.8 - 10.8 K/uL    RBC 4.14 (L) 4.70 - 6.10 M/uL    Hemoglobin 13.7 (L) 14.0 - 18.0 g/dL    Hematocrit 39.0 (L) 42.0 - 52.0 %    MCV 94.2 81.4 - 97.8 fL    MCH 33.1 (H) 27.0 - 33.0 pg    MCHC 35.1 33.7 - 35.3 g/dL    RDW 41.0 35.9 - 50.0 fL    Platelet Count 175 164 - 446 K/uL    MPV 9.1 9.0 - 12.9 fL    Neutrophils-Polys 63.10 44.00 - 72.00 %    Lymphocytes 28.30 22.00 - 41.00 %    Monocytes 6.40 0.00 - 13.40 %    Eosinophils 1.30 0.00 - 6.90 %    Basophils 0.20 0.00 - 1.80 %    Immature  Granulocytes 0.70 0.00 - 0.90 %    Nucleated RBC 0.00 /100 WBC    Neutrophils (Absolute) 2.85 1.82 - 7.42 K/uL    Lymphs (Absolute) 1.28 1.00 - 4.80 K/uL    Monos (Absolute) 0.29 0.00 - 0.85 K/uL    Eos (Absolute) 0.06 0.00 - 0.51 K/uL    Baso (Absolute) 0.01 0.00 - 0.12 K/uL    Immature Granulocytes (abs) 0.03 0.00 - 0.11 K/uL    NRBC (Absolute) 0.00 K/uL   Complete Metabolic Panel (CMP)   Result Value Ref Range    Sodium 128 (L) 135 - 145 mmol/L    Potassium 4.6 3.6 - 5.5 mmol/L    Chloride 92 (L) 96 - 112 mmol/L    Co2 21 20 - 33 mmol/L    Anion Gap 15.0 7.0 - 16.0    Glucose 790 (HH) 65 - 99 mg/dL    Bun 24 (H) 8 - 22 mg/dL    Creatinine 1.27 0.50 - 1.40 mg/dL    Calcium 9.1 8.4 - 10.2 mg/dL    AST(SGOT) 53 (H) 12 - 45 U/L    ALT(SGPT) 45 2 - 50 U/L    Alkaline Phosphatase 83 30 - 99 U/L    Total Bilirubin 0.3 0.1 - 1.5 mg/dL    Albumin 4.1 3.2 - 4.9 g/dL    Total Protein 6.6 6.0 - 8.2 g/dL    Globulin 2.5 1.9 - 3.5 g/dL    A-G Ratio 1.6 g/dL   URINALYSIS,CULTURE IF INDICATED   Result Value Ref Range    Color Yellow     Character Clear     Specific Gravity <=1.005 <1.035    Ph 6.0 5.0 - 8.0    Glucose >=1000 (A) Negative mg/dL    Ketones Negative Negative mg/dL    Protein Negative Negative mg/dL    Bilirubin Negative Negative    Nitrite Negative Negative    Leukocyte Esterase Negative Negative    Occult Blood Negative Negative    Micro Urine Req see below    SARS-CoV-2 PCR (24 hour In-House): Collect NP swab in [facility]    Specimen: Nasopharyngeal; Respirate   Result Value Ref Range    SARS-CoV-2 Source NP Swab    ESTIMATED GFR   Result Value Ref Range    GFR If African American >60 >60 mL/min/1.73 m 2    GFR If Non African American 59 (A) >60 mL/min/1.73 m 2        2:47 PM  I spoke with the hospitalist Dr. Rojas who has accepted the patient for admission.      Critical Care  Due to the real possibility of a deterioration of this patient's condition required the highest level of my preparedness for sudden emergent  intervention. I provided critical care services which included medication orders, frequent reevaluations of the patient's condition and response to treatment, ordering and reviewing test results and discussing the case with various consultants. The critical care time associated with the care of the patient was 40 minutes. Review chart for interventions. This time is exclusive of any other billable procedures.     FINAL IMPRESSION  1. Uncontrolled type 2 diabetes mellitus with hyperosmolar nonketotic hyperglycemia (HCC)           PLAN/DISPOSITION  Admitted to the ICU in guarded condition          Electronically signed by: Brenda Koehler M.D., 6/2/2021 1:08 PM

## 2021-06-02 NOTE — CONSULTS
Critical Care Consultation    Date of consult: 6/2/2021    Referring Physician  Brenda Koehler MD    Reason for Consultation  Hyperglycemic hyperosmolar nonketotic state    History of Presenting Illness  Mahesh Bradshaw is a very pleasant 56 y.o. male with a past medical history of type 2 diabetes insulin-dependent A1c 12%, recent admission for hyperglycemia, who presented 6/2/2021 with abdominal pain and hyperglycemia again.  After his recent hospitalization he was discharged with subcutaneous insulin regimen.  He has yet to be followed up with his endocrinologist due to changes in his insurance.  He reports that he has been compliant with his insulin regimen however has not maintained diabetic diet.  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  He also confides in drinking significant amounts of coffee and energy drinks.  Denies illicit drug use, alcohol or THC.  Reports his blood sugars been difficult to control since his second Covid vaccination.    Currently endorses abdominal pain without any nausea.  Denies any fevers.  No chest pain shortness of breath.  No diarrhea.  Last bowel movement yesterday.    Code Status  Full Code    Review of Systems  Review of Systems   Constitutional: Positive for malaise/fatigue. Negative for chills, fever and weight loss.   HENT: Negative for congestion, sinus pain and sore throat.    Eyes: Negative for pain and discharge.   Respiratory: Negative for cough, sputum production, shortness of breath, wheezing and stridor.    Cardiovascular: Negative for chest pain, orthopnea and leg swelling.   Gastrointestinal: Positive for abdominal pain. Negative for blood in stool, constipation, diarrhea, heartburn, nausea and vomiting.   Genitourinary: Negative for dysuria, frequency and urgency.   Musculoskeletal: Negative for myalgias.   Skin: Negative for rash.   Neurological: Negative for dizziness, sensory change, focal weakness, loss of consciousness and  headaches.   Psychiatric/Behavioral: Negative.    All other systems reviewed and are negative.    Past Medical History   has a past medical history of ADRIANE (acute kidney injury) ([facility]) (2/24/2016), Anesthesia, Arthritis (3-3-17), Aspiration pneumonia ([facility]) (3-2016), ASTHMA (3-3-17), Breath shortness (3-3-17), Congestive heart failure ([facility]) (2-2016 ), Dental disorder, Depression (11/26/2014), Diabetes ([facility]) (3-3-17), Diabetes ([facility]), Difficult intubation (2-2016), DKA (diabetic ketoacidoses) ([facility]) (2-2016), Electrolyte imbalance (2-2016), Elevated LFTs, Elevated liver enzymes (2-2016), Essential hypertension (1/22/2016), Gout, Heart burn, High cholesterol (3-3-17), Hypothyroid (3-3-17), Indigestion, Kidney stones, Klebsiella infect, Migraine, MRSA cellulitis, 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    Surgical History   has a past surgical history that includes vaishnavi by laparoscopy (2005); colonoscopy with biopsy (11/26/2014); incision and drainage general (4/7/2017); irrigation & debridement general (Right, 4/29/2017); irrigation & debridement general (Right, 5/1/2017); other orthopedic surgery (1997 or 1998); umbilical hernia repair (N/A, 11/6/2016); umbilical hernia repair (3/10/2017); irrigation & debridement ortho (Left, 12/10/2017); and umbilical hernia repair (N/A, 4/15/2018).    Family History  family history includes Cancer in his mother.    Social History   reports that he has never smoked. He has never used smokeless tobacco. He reports current alcohol use. He reports that he does not use drugs.    Medications  Home Medications     Reviewed by Darleen Sherman (Pharmacy Tech) on 06/02/21 at 4941  Med List Status: Complete   Medication Last  Dose Status   acetaminophen (TYLENOL) 650 MG CR tablet >1 week Active   Alcohol Swabs  Active   Blood Glucose Meter Kit  Active   Blood Glucose Test Strips  Active   insulin glargine (INSULIN GLARGINE) 100 UNIT/ML Solution Pen-injector injection 6/1/2021 Active   insulin lispro (HUMALOG KWIKPEN) 100 UNIT/ML Solution Pen-injector injection PEN 6/2/2021 Active   Insulin Pen Needle 32 G x 4 mm  Active   Lancets  Active   melatonin 5 mg Tab 6/2/2021 Active   Multiple Vitamins-Minerals (MENS MULTIVITAMIN) Tab 6/1/2021 Active   ondansetron (ZOFRAN ODT) 8 MG TABLET DISPERSIBLE 6/2/2021 Active   thyroid (ARMOUR THYROID) 60 MG Tab 6/1/2021 Active              Current Facility-Administered Medications   Medication Dose Route Frequency Provider Last Rate Last Admin   • melatonin tablet 5 mg  5 mg Oral QHS PRN Keshawn Rojas M.D.       • [START ON 6/3/2021] thyroid (ARMOUR THYROID) tablet 60 mg  60 mg Oral QAM Keshawn Rojas M.D.       • [START ON 6/3/2021] senna-docusate (PERICOLACE or SENOKOT S) 8.6-50 MG per tablet 2 tablet  2 tablet Oral BID Keshawn Rojas M.D.        And   • polyethylene glycol/lytes (MIRALAX) PACKET 1 Packet  1 Packet Oral QDAY PRN Keshawn Rojas M.D.        And   • magnesium hydroxide (MILK OF MAGNESIA) suspension 30 mL  30 mL Oral QDAY PRN Keshawn Rojas M.D.        And   • bisacodyl (DULCOLAX) suppository 10 mg  10 mg Rectal QDAY PRN Keshawn Rojas M.D.       • enoxaparin (LOVENOX) inj 40 mg  40 mg Subcutaneous DAILY Keshawn Rojas M.D.       • lactated ringers infusion  2,000 mL Intravenous Once Keshawn Rojas M.D.        Or   • NS (BOLUS) infusion 2,000 mL  2,000 mL Intravenous Once Keshawn Rojas M.D.       • D10%-0.45% NaCl infusion   Intravenous Continuous Keshawn Rojas M.D.       • MD ALERT-PHARMACY TO CONSULT FOR DKA MONITORING 1 Each  1 Each Other PRN Keshawn Rojas M.D.       • magnesium sulfate IVPB premix 2 g  2 g Intravenous Once PRN Keshawn Rojas M.D.   "      Or   • magnesium sulfate IVPB premix 4 g  4 g Intravenous Once PRN Keshawn Rojas M.D.       • potassium phosphate 30 mmol in  mL ivpb  30 mmol Intravenous Once PRN Keshawn Rojas M.D.        Or   • sodium phosphate 30 mmol in 1/2  mL ivpb  30 mmol Intravenous Once PRN Keshawn Rojas M.D.       • Adult DKA potassium(K+) replacement scale  1 Each Intravenous Q4HRS Keshawn Rojas M.D.       • 1/2 NS infusion   Intravenous Continuous Keshawn Rojas M.D.       • D5 1/2 NS infusion   Intravenous Continuous Keshawn Rojas M.D.       • insulin regular (HumuLIN R,NovoLIN R) injection  10 Units Intravenous Once Keshawn Rojas M.D.       • MD ALERT-INSULIN DRIP INITIAL RATE BY PHARMACY AT 0.1 UNITS/KG/HR 1 Each  1 Each Other PRN Keshawn Rojas M.D.         Current Outpatient Medications   Medication Sig Dispense Refill   • acetaminophen (TYLENOL) 650 MG CR tablet Take 650 mg by mouth every 6 hours as needed for Moderate Pain.     • Multiple Vitamins-Minerals (MENS MULTIVITAMIN) Tab Take 1 Package by mouth every evening. \"[employer] Ralph Men\"      • insulin glargine (INSULIN GLARGINE) 100 UNIT/ML Solution Pen-injector injection Inject 25 Units under the skin every evening for 30 days. (Patient taking differently: Inject 35 Units under the skin every evening.) 1 Each 0   • insulin lispro (HUMALOG KWIKPEN) 100 UNIT/ML Solution Pen-injector injection PEN Inject 8 Units under the skin 3 times a day before meals. (Patient taking differently: Inject 5-16 Units under the skin 4 Times a Day,Before Meals and at Bedtime. Sliding Scale) 1 Each 1   • Blood Glucose Meter Kit Test blood sugar as recommended by provider. True Metrix blood glucose monitoring kit. 1 Kit 0   • Blood Glucose Test Strips Use one True Metrix strip to test blood sugar three times daily before meals. 100 Strip 0   • Lancets Use one True Metrix lancet to test blood sugar three times daily before meals. 100 Each 0   • Alcohol Swabs " Wipe site with prep pad prior to injection. 100 Each 0   • Insulin Pen Needle 32 G x 4 mm Use one pen needle in pen device to inject insulin three times daily. 100 Each 0   • melatonin 5 mg Tab Take 5-10 mg by mouth at bedtime as needed (Sleep). 1 to 2 tablets = 5 to 10 mg.     • ondansetron (ZOFRAN ODT) 8 MG TABLET DISPERSIBLE Take 1 Tab by mouth every 8 hours as needed for Nausea. 15 Tab 0   • thyroid (ARMOUR THYROID) 60 MG Tab Take 1 Tab by mouth every morning. 30 Tab 3       Allergies  Allergies   Allergen Reactions   • Peanut (Diagnostic) Anaphylaxis   • Tradjenta [Linagliptin] Unspecified     Pt developed pancreatitis   • Hydrocodone Hives     Tolerates Morphine and Oxycodone         Vital Signs last 24 hours  Temp:  [36.3 °C (97.4 °F)] 36.3 °C (97.4 °F)  Pulse:  [101-102] 101  Resp:  [18] 18  BP: (121-142)/(75-83) 121/75  SpO2:  [91 %-93 %] 91 %    Physical Exam  Physical Exam  Vitals and nursing note reviewed.   Constitutional:       General: He is not in acute distress.     Appearance: Normal appearance. He is well-developed. He is not ill-appearing or diaphoretic.   HENT:      Nose: Nose normal.      Mouth/Throat:      Mouth: Mucous membranes are dry.      Pharynx: No oropharyngeal exudate.   Eyes:      General: No scleral icterus.        Right eye: No discharge.         Left eye: No discharge.      Conjunctiva/sclera: Conjunctivae normal.      Pupils: Pupils are equal, round, and reactive to light.   Neck:      Thyroid: No thyromegaly.      Vascular: No JVD.      Trachea: No tracheal deviation.   Cardiovascular:      Rate and Rhythm: Normal rate and regular rhythm.      Heart sounds: Normal heart sounds. No murmur heard.     Pulmonary:      Effort: Pulmonary effort is normal. No respiratory distress.      Breath sounds: Normal breath sounds. No stridor. No wheezing or rales.   Abdominal:      General: There is distension.      Palpations: Abdomen is soft.      Tenderness: There is abdominal tenderness  (diffuse, generalized). There is no guarding.   Musculoskeletal:         General: No tenderness. Normal range of motion.      Cervical back: Neck supple.   Lymphadenopathy:      Cervical: No cervical adenopathy.   Skin:     General: Skin is warm and dry.      Capillary Refill: Capillary refill takes less than 2 seconds.      Coloration: Skin is not pale.      Findings: No erythema.   Neurological:      Mental Status: He is alert and oriented to person, place, and time.      Sensory: No sensory deficit.      Motor: No abnormal muscle tone.      Coordination: Coordination normal.      Deep Tendon Reflexes: Reflexes normal.   Psychiatric:         Behavior: Behavior normal.         Thought Content: Thought content normal.         Judgment: Judgment normal.       Fluids  No intake or output data in the 24 hours ending 06/02/21 1530    Laboratory  Recent Results (from the past 48 hour(s))   URINALYSIS,CULTURE IF INDICATED    Collection Time: 06/02/21  1:00 PM   Result Value Ref Range    Color Yellow     Character Clear     Specific Gravity <=1.005 <1.035    Ph 6.0 5.0 - 8.0    Glucose >=1000 (A) Negative mg/dL    Ketones Negative Negative mg/dL    Protein Negative Negative mg/dL    Bilirubin Negative Negative    Nitrite Negative Negative    Leukocyte Esterase Negative Negative    Occult Blood Negative Negative    Micro Urine Req see below    CBC w/ Differential    Collection Time: 06/02/21  1:38 PM   Result Value Ref Range    WBC 4.5 (L) 4.8 - 10.8 K/uL    RBC 4.14 (L) 4.70 - 6.10 M/uL    Hemoglobin 13.7 (L) 14.0 - 18.0 g/dL    Hematocrit 39.0 (L) 42.0 - 52.0 %    MCV 94.2 81.4 - 97.8 fL    MCH 33.1 (H) 27.0 - 33.0 pg    MCHC 35.1 33.7 - 35.3 g/dL    RDW 41.0 35.9 - 50.0 fL    Platelet Count 175 164 - 446 K/uL    MPV 9.1 9.0 - 12.9 fL    Neutrophils-Polys 63.10 44.00 - 72.00 %    Lymphocytes 28.30 22.00 - 41.00 %    Monocytes 6.40 0.00 - 13.40 %    Eosinophils 1.30 0.00 - 6.90 %    Basophils 0.20 0.00 - 1.80 %    Immature  Granulocytes 0.70 0.00 - 0.90 %    Nucleated RBC 0.00 /100 WBC    Neutrophils (Absolute) 2.85 1.82 - 7.42 K/uL    Lymphs (Absolute) 1.28 1.00 - 4.80 K/uL    Monos (Absolute) 0.29 0.00 - 0.85 K/uL    Eos (Absolute) 0.06 0.00 - 0.51 K/uL    Baso (Absolute) 0.01 0.00 - 0.12 K/uL    Immature Granulocytes (abs) 0.03 0.00 - 0.11 K/uL    NRBC (Absolute) 0.00 K/uL   Complete Metabolic Panel (CMP)    Collection Time: 06/02/21  1:38 PM   Result Value Ref Range    Sodium 128 (L) 135 - 145 mmol/L    Potassium 4.6 3.6 - 5.5 mmol/L    Chloride 92 (L) 96 - 112 mmol/L    Co2 21 20 - 33 mmol/L    Anion Gap 15.0 7.0 - 16.0    Glucose 790 (HH) 65 - 99 mg/dL    Bun 24 (H) 8 - 22 mg/dL    Creatinine 1.27 0.50 - 1.40 mg/dL    Calcium 9.1 8.4 - 10.2 mg/dL    AST(SGOT) 53 (H) 12 - 45 U/L    ALT(SGPT) 45 2 - 50 U/L    Alkaline Phosphatase 83 30 - 99 U/L    Total Bilirubin 0.3 0.1 - 1.5 mg/dL    Albumin 4.1 3.2 - 4.9 g/dL    Total Protein 6.6 6.0 - 8.2 g/dL    Globulin 2.5 1.9 - 3.5 g/dL    A-G Ratio 1.6 g/dL   ESTIMATED GFR    Collection Time: 06/02/21  1:38 PM   Result Value Ref Range    GFR If African American >60 >60 mL/min/1.73 m 2    GFR If Non African American 59 (A) >60 mL/min/1.73 m 2   LIPASE    Collection Time: 06/02/21  1:38 PM   Result Value Ref Range    Lipase 65 (H) 7 - 58 U/L   SARS-CoV-2 PCR (24 hour In-House): Collect NP swab in VTM    Collection Time: 06/02/21  2:33 PM    Specimen: Nasopharyngeal; Respirate   Result Value Ref Range    SARS-CoV-2 Source NP Swab      Imaging  DX-CHEST-PORTABLE (1 VIEW)   Final Result      Mild cardiomegaly.        Assessment/Plan    Hyperosmolar hyperglycemic state (HHS) (HCC)  Assessment & Plan  Incited by dietary indiscretions, energy drinks and caffeine after recently starting night shift work  Severe hyperglycemia without acidemia on admission  A1c 12.3%  Mildly elevated anion gap, f/u Sosm  Admit to ICU, start insulin drip protocol  Aggressively replete K and phos per protocol  Closely  monitor on telemetry  NPO sips with meds okay    Hypothyroidism- (present on admission)  Assessment & Plan  Chronic condition treated with [place] Thyroid  Resume maintenance medication  Check TSH/T4    Acute hyponatremia  Assessment & Plan  Pseudohyponatremia due to severe hyperglycemia  Trend BMP q4H  Neuro checks    ADRIANE (acute kidney injury) (HCC)- (present on admission)  Assessment & Plan  Due to dehydration from HHS  IVF  Plan of care as outlined above.    Discussed patient condition and risk of morbidity and/or mortality with Hospitalist, RN, Pharmacy and Patient and ER physician.      The patient remains critically ill.  Critical care time = 36 minutes in directly providing and coordinating critical care and extensive data review.  No time overlap and excludes procedures.    This note was generated using voice recognition software which has a chance of producing errors of grammar and content.  I have made every reasonable attempt to find and correct any errors, but it should be expected that some may not be found prior to finalization of this note.  __________  Keshawn Rojas MD  Pulmonary and Critical Care Medicine  [facility]

## 2021-06-03 VITALS
WEIGHT: 202.82 LBS | TEMPERATURE: 97.4 F | RESPIRATION RATE: 25 BRPM | HEIGHT: 68 IN | OXYGEN SATURATION: 93 % | BODY MASS INDEX: 30.74 KG/M2 | HEART RATE: 89 BPM | DIASTOLIC BLOOD PRESSURE: 70 MMHG | SYSTOLIC BLOOD PRESSURE: 102 MMHG

## 2021-06-03 LAB
ANION GAP SERPL CALC-SCNC: 10 MMOL/L (ref 7–16)
ANION GAP SERPL CALC-SCNC: 10 MMOL/L (ref 7–16)
BUN SERPL-MCNC: 21 MG/DL (ref 8–22)
BUN SERPL-MCNC: 21 MG/DL (ref 8–22)
CALCIUM SERPL-MCNC: 8.3 MG/DL (ref 8.4–10.2)
CALCIUM SERPL-MCNC: 8.5 MG/DL (ref 8.4–10.2)
CHLORIDE SERPL-SCNC: 103 MMOL/L (ref 96–112)
CHLORIDE SERPL-SCNC: 104 MMOL/L (ref 96–112)
CO2 SERPL-SCNC: 23 MMOL/L (ref 20–33)
CO2 SERPL-SCNC: 23 MMOL/L (ref 20–33)
CREAT SERPL-MCNC: 0.63 MG/DL (ref 0.5–1.4)
CREAT SERPL-MCNC: 0.67 MG/DL (ref 0.5–1.4)
GLUCOSE BLD-MCNC: 251 MG/DL (ref 65–99)
GLUCOSE SERPL-MCNC: 258 MG/DL (ref 65–99)
GLUCOSE SERPL-MCNC: 272 MG/DL (ref 65–99)
MAGNESIUM SERPL-MCNC: 1.8 MG/DL (ref 1.5–2.5)
PHOSPHATE SERPL-MCNC: 3.3 MG/DL (ref 2.5–4.5)
POTASSIUM SERPL-SCNC: 4.4 MMOL/L (ref 3.6–5.5)
POTASSIUM SERPL-SCNC: 4.4 MMOL/L (ref 3.6–5.5)
SARS-COV-2 RNA RESP QL NAA+PROBE: NOTDETECTED
SODIUM SERPL-SCNC: 136 MMOL/L (ref 135–145)
SODIUM SERPL-SCNC: 137 MMOL/L (ref 135–145)
SPECIMEN SOURCE: NORMAL
TSH SERPL DL<=0.005 MIU/L-ACNC: 0.94 UIU/ML (ref 0.38–5.33)

## 2021-06-03 PROCEDURE — 82962 GLUCOSE BLOOD TEST: CPT

## 2021-06-03 PROCEDURE — 700102 HCHG RX REV CODE 250 W/ 637 OVERRIDE(OP): Performed by: INTERNAL MEDICINE

## 2021-06-03 PROCEDURE — 80048 BASIC METABOLIC PNL TOTAL CA: CPT | Mod: 91

## 2021-06-03 PROCEDURE — 84443 ASSAY THYROID STIM HORMONE: CPT

## 2021-06-03 PROCEDURE — 99239 HOSP IP/OBS DSCHRG MGMT >30: CPT | Performed by: INTERNAL MEDICINE

## 2021-06-03 PROCEDURE — 700111 HCHG RX REV CODE 636 W/ 250 OVERRIDE (IP): Performed by: INTERNAL MEDICINE

## 2021-06-03 PROCEDURE — A9270 NON-COVERED ITEM OR SERVICE: HCPCS | Performed by: INTERNAL MEDICINE

## 2021-06-03 PROCEDURE — 83735 ASSAY OF MAGNESIUM: CPT

## 2021-06-03 PROCEDURE — 84100 ASSAY OF PHOSPHORUS: CPT

## 2021-06-03 RX ORDER — INSULIN GLARGINE 100 [IU]/ML
35 INJECTION, SOLUTION SUBCUTANEOUS EVERY EVENING
Qty: 1 EACH | Refills: 0 | Status: SHIPPED | OUTPATIENT
Start: 2021-06-03 | End: 2021-07-03

## 2021-06-03 RX ORDER — INSULIN LISPRO 100 [IU]/ML
0.2 INJECTION, SOLUTION INTRAVENOUS; SUBCUTANEOUS
Status: DISCONTINUED | OUTPATIENT
Start: 2021-06-03 | End: 2021-06-03 | Stop reason: HOSPADM

## 2021-06-03 RX ORDER — DEXTROSE MONOHYDRATE 25 G/50ML
50 INJECTION, SOLUTION INTRAVENOUS
Status: DISCONTINUED | OUTPATIENT
Start: 2021-06-03 | End: 2021-06-03 | Stop reason: HOSPADM

## 2021-06-03 RX ORDER — INSULIN LISPRO 100 [IU]/ML
2-9 INJECTION, SOLUTION INTRAVENOUS; SUBCUTANEOUS
Status: DISCONTINUED | OUTPATIENT
Start: 2021-06-03 | End: 2021-06-03 | Stop reason: HOSPADM

## 2021-06-03 RX ADMIN — ENOXAPARIN SODIUM 40 MG: 40 INJECTION SUBCUTANEOUS at 05:43

## 2021-06-03 RX ADMIN — IBUPROFEN 600 MG: 200 TABLET, FILM COATED ORAL at 07:34

## 2021-06-03 RX ADMIN — THYROID, PORCINE 60 MG: 30 TABLET ORAL at 05:44

## 2021-06-03 RX ADMIN — INSULIN LISPRO 5 UNITS: 100 INJECTION, SOLUTION INTRAVENOUS; SUBCUTANEOUS at 07:00

## 2021-06-03 RX ADMIN — INSULIN LISPRO 6 UNITS: 100 INJECTION, SOLUTION INTRAVENOUS; SUBCUTANEOUS at 07:00

## 2021-06-03 ASSESSMENT — PAIN DESCRIPTION - PAIN TYPE
TYPE: ACUTE PAIN
TYPE: ACUTE PAIN

## 2021-06-03 NOTE — DISCHARGE SUMMARY
Discharge Summary    CHIEF COMPLAINT ON ADMISSION  Chief Complaint   Patient presents with   • Blood Sugar Problem     Blood sugar high on home monitor, nauseated       Reason for Admission  Stomach Pains, Headache, Blood Sug*     Admission Date  6/2/2021    CODE STATUS  Prior    HPI & HOSPITAL COURSE  This is a 56 y.o. male here with elevated blood sugars.  Mahesh Bradshaw is a very pleasant 56 y.o. male with a past medical history of type 2 diabetes insulin-dependent A1c 12%, recent admission for hyperglycemia, who presented 6/2/2021 with abdominal pain and hyperglycemia again.  After his recent hospitalization he was discharged with subcutaneous insulin regimen.  He has yet to be followed up with his endocrinologist due to changes in his insurance.  He reports that he has been compliant with his insulin regimen however has not maintained diabetic diet.  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  He also confides in drinking significant amounts of coffee and energy drinks.  Denies illicit drug use, alcohol or THC.  Reports his blood sugars been difficult to control since his second Covid vaccination.  He was admitted to the ICU for IV insulin and aggressive fluid resuscitation with improvement in his sugars.  He had resolution of DKA and transitioned to PO diet and long and short acting insulins.  He understands the trigger for his elevated sugars is his diet and will continue to make changes to better this.  Home regimen of insulin resumed and patient is appropriate to discharge home.    Therefore, he is discharged in good and stable condition to home with close outpatient follow-up.    The patient met 2-midnight criteria for an inpatient stay at the time of discharge.    Discharge Date  6/3/2021    FOLLOW UP ITEMS POST DISCHARGE  PCP and endocrinology    DISCHARGE DIAGNOSES  Active Problems:    ADRIANE (acute kidney injury) (HCC) POA: Yes    Acute hyponatremia POA: Unknown     Hypothyroidism (Chronic) POA: Yes      Overview: Chronic condition treated with [place] Thyroid.      Resumed maintenance medication.    Hyperosmolar hyperglycemic state (HHS) (HCC) POA: Unknown  Resolved Problems:    * No resolved hospital problems. *      FOLLOW UP  No future appointments.  Rodolfo Stein M.D.  [address]  [address]  [facility] [address]  [phone]    In 2 weeks        MEDICATIONS ON DISCHARGE     Medication List      CHANGE how you take these medications      Instructions   insulin lispro 100 UNIT/ML Sopn injection PEN  What changed:   · how much to take  · when to take this  · additional instructions  Commonly known as: HumaLOG,AdmeLOG   Inject 8 Units under the skin 3 times a day before meals.  Dose: 8 Units        CONTINUE taking these medications      Instructions   acetaminophen 650 MG CR tablet  Commonly known as: TYLENOL   Take 650 mg by mouth every 6 hours as needed for Moderate Pain.  Dose: 650 mg     Alcohol Swabs   Doctor's comments: Per formulary preference. ICD-10 code: E11.65 Uncontrolled type 2 Diabetes Mellitus  Wipe site with prep pad prior to injection.     * Blood Glucose Meter Kit   Doctor's comments: Or per formulary preference. ICD-10 code: E11.65 Uncontrolled type 2 Diabetes Mellitus  Test blood sugar as recommended by provider. True Metrix blood glucose monitoring kit.     * Blood Glucose Test Strips   Doctor's comments: Or per formulary preference. ICD-10 code: E11.65 Uncontrolled type 2 Diabetes Mellitus  Use one True Metrix strip to test blood sugar three times daily before meals.     insulin glargine 100 UNIT/ML Sopn injection  Generic drug: insulin glargine   Inject 35 Units under the skin every evening for 30 days.  Dose: 35 Units     Insulin Pen Needle 32 G x 4 mm   Doctor's comments: Per patient/formulary preference. ICD-10 code: E11.65 Uncontrolled type 2 Diabetes Mellitus  Use one pen needle in pen device to inject insulin three times daily.     Lancets    "Doctor's comments: Or per formulary preference. ICD-10 code: E11.65 Uncontrolled type 2 Diabetes Mellitus  Use one True Metrix lancet to test blood sugar three times daily before meals.     melatonin 5 mg Tabs   Take 5-10 mg by mouth at bedtime as needed (Sleep). 1 to 2 tablets = 5 to 10 mg.  Dose: 5-10 mg     Mens Multivitamin Tabs   Take 1 Package by mouth every evening. \"[employer] Men\"  Dose: 1 Package     ondansetron 8 MG Tbdp  Commonly known as: Zofran ODT   Take 1 Tab by mouth every 8 hours as needed for Nausea.  Dose: 8 mg     thyroid 60 MG Tabs  Commonly known as: ARMOUR THYROID   Take 1 Tab by mouth every morning.  Dose: 60 mg         * This list has 2 medication(s) that are the same as other medications prescribed for you. Read the directions carefully, and ask your doctor or other care provider to review them with you.                Allergies  Allergies   Allergen Reactions   • Peanut (Diagnostic) Anaphylaxis   • Tradjenta [Linagliptin] Unspecified     Pt developed pancreatitis   • Hydrocodone Hives     Tolerates Morphine and Oxycodone         DIET  No orders of the defined types were placed in this encounter.      ACTIVITY  As tolerated.  Weight bearing as tolerated    CONSULTATIONS  Keshawn Rojas - critical care    PROCEDURES  none    LABORATORY  Lab Results   Component Value Date    SODIUM 136 06/03/2021    POTASSIUM 4.4 06/03/2021    CHLORIDE 103 06/03/2021    CO2 23 06/03/2021    GLUCOSE 258 (H) 06/03/2021    BUN 21 06/03/2021    CREATININE 0.63 06/03/2021    CREATININE 1.1 02/18/2008        Lab Results   Component Value Date    WBC 4.5 (L) 06/02/2021    HEMOGLOBIN 13.7 (L) 06/02/2021    HEMATOCRIT 39.0 (L) 06/02/2021    PLATELETCT 175 06/02/2021        Total time of the discharge process exceeds 35 minutes.  "

## 2021-06-03 NOTE — DISCHARGE INSTRUCTIONS
Discharge Instructions    Discharged to home by car with relative. Discharged via wheelchair, hospital escort: Yes.  Special equipment needed: Not Applicable    Be sure to schedule a follow-up appointment with your primary care doctor or any specialists as instructed.     Discharge Plan:   Diet Plan: Discussed  Activity Level: Discussed  Confirmed Follow up Appointment: Patient to Call and Schedule Appointment  Confirmed Symptoms Management: Discussed  Medication Reconciliation Updated: Yes    I understand that a diet low in cholesterol, fat, and sodium is recommended for good health. Unless I have been given specific instructions below for another diet, I accept this instruction as my diet prescription.   Other diet: Diabetic    Special Instructions: None    · Is patient discharged on Warfarin / Coumadin?   No     Depression / Suicide Risk    As you are discharged from this [facility] facility, it is important to learn how to keep safe from harming yourself.    Recognize the warning signs:  · Abrupt changes in personality, positive or negative- including increase in energy   · Giving away possessions  · Change in eating patterns- significant weight changes-  positive or negative  · Change in sleeping patterns- unable to sleep or sleeping all the time   · Unwillingness or inability to communicate  · Depression  · Unusual sadness, discouragement and loneliness  · Talk of wanting to die  · Neglect of personal appearance   · Rebelliousness- reckless behavior  · Withdrawal from people/activities they love  · Confusion- inability to concentrate     If you or a loved one observes any of these behaviors or has concerns about self-harm, here's what you can do:  · Talk about it- your feelings and reasons for harming yourself  · Remove any means that you might use to hurt yourself (examples: pills, rope, extension cords, firearm)  · Get professional help from the community (Mental Health, Substance Abuse, psychological  counseling)  · Do not be alone:Call your Safe Contact- someone whom you trust who will be there for you.  · Call your local CRISIS HOTLINE 784-1298 or 782-104-9770  · Call your local Children's Mobile Crisis Response Team [place] (664) 742-5058 or www.Vedantu  · Call the toll free National Suicide Prevention Hotlines   · National Suicide Prevention Lifeline 941-414-NHSN (0582)  · National Hope Line Network 800-SUICIDE (140-0503)

## 2021-06-03 NOTE — PROGRESS NOTES
Received pt Lakshmi RN, Pt in stable condition, currently on insulin drip 5 units/hr, D 5 1/2 NS at 125 mL/hr. Pt resting comfortable and made aware of plans of eventually getting him off insulin drip and onto a sliding scale and lantis for long acting control. All safety measures in place.

## 2021-06-03 NOTE — DISCHARGE PLANNING
ER CM met with pt. He is very pleasant, AO x4. Known to this ER CM and permission obtained to review chart.. Lives in a 2 story home with spouse and teen children. He works as a RN. He has very brittle IDDM. He recently changed jobs. He is following with Dr Dupont for Endocrine (insurance lapse is difficulty-) RX at [facility] PCP Dr Rodolfo Stein. Wife is very supportive also RN.Lots of stressors at home lately affecting sleep and blood sugars.May benefit from solidifying appts for PCP and endocrine prior to dc and ensuring can private pay if need for endocrine jac.  Care Transition Team Assessment    Information Source  Orientation Level: Oriented X4  Information Given By: Patient  Informant's Name: Mahesh  Who is responsible for making decisions for patient? : Patient         Elopement Risk  Legal Hold: No    Interdisciplinary Discharge Planning  Lives with - Patient's Self Care Capacity: Spouse, Child Less than 18 Years of Age  Support Systems: Spouse / Significant Other  Housing / Facility: 2 Story House  Do You Take your Prescribed Medications Regularly: Yes  Able to Return to Previous ADL's: Yes  Mobility Issues: No  Prior Services: None  Assistance Needed: No  Durable Medical Equipment: Not Applicable    Discharge Preparedness  What is your plan after discharge?: Uncertain - pending medical team collaboration  What are your discharge supports?: Spouse         Finances  Prescription Coverage: Yes ([facility])    Vision / Hearing Impairment  Vision Impairment : Yes              Domestic Abuse  Have you ever been the victim of abuse or violence?: No    Psychological Assessment  History of Substance Abuse: None  History of Psychiatric Problems: No    Discharge Risks or Barriers  Discharge risks or barriers?: Uninsured / underinsured, Complex medical needs    Anticipated Discharge Information  Discharge Disposition: Still a Patient (30)

## 2021-06-03 NOTE — PROGRESS NOTES
Assumed pt care. Pt is alert and oriented X4. Pt. Complains of a headache. See MAR for treatment.  Assessment completed. Pt denied any needs. Safety precautions in place. DKA protocol initiated. Lines and drips verified. Hourly rounding in place.

## 2021-06-03 NOTE — PROGRESS NOTES
Report received. Assumed care of patient. Patient is resting in bed with complaints of headache, medicated per MAR. Patient made medical status, removed from monitoring equipment. All needs are currently met. All safety precautions in place. Will continue to monitor.

## 2021-08-24 NOTE — CARE PLAN
Problem: Safety  Goal: Will remain free from injury  Hourly rounding in effect, pt instructed to call for assistance, bed locked and in lowest position. Bed alarm off, with Yaima as second RN. Pt calls appropriately for assistance and non-skid socks in use.       Problem: Knowledge Deficit  Goal: Knowledge of disease process/condition, treatment plan, diagnostic tests, and medications will improve  Pt updated and educated on nursing interventions, medications and POC         negative

## 2021-09-15 NOTE — ASSESSMENT & PLAN NOTE
Due to patient being non-English speaking/uses sign language, an  was used for this visit. Only for face-to-face interpretation by an external agency, date and length of interpretation can be found on the scanned worksheet.     name: Carlos Lopez  Agency: [employer]  Language: Taisha   Telephone number: [phone]  Type of interpretation: Face-to-face, spoken      Well child hearing and vision screening        HEARING FREQUENCY:    For conditioning purpose only  Right ear: 40db at 1000Hz: present    Right Ear:    20db at 1000Hz: present  20db at 2000Hz: present  20db at 4000Hz: present  20db at 6000Hz (11 years and older): Not Examined    Left Ear:    20db at 6000Hz (11 years and older): Not examined  20db at 4000Hz: present  20db at 2000Hz: present  20db at 1000Hz: present    Right Ear:    25db at 500Hz: present    Left Ear:    25db at 500Hz: present    Hearing Screen:  Pass-- [place] all tones    VISION:  Far vision: Right eye 10/30, Left eye 10/25, with no corrective lens    Pass 2.5+ lense screening    Christina Saldivar, [facility],   Resolved.

## 2021-12-01 NOTE — ED NOTES
Admitting drs at bedside, pt resting in bed, spouse at bedside, resp are even and unlabored, no needs voiced at this time  
Pt medicated as ordered for pain , awaiting IV antibx from pharmacy   
Pt medicated for pain , awaiting admit room   
Pt to rm 13, pt with multiple pre-existing wounds, wound vac x 2 , pt sent by PMD, ERP to bedside, labs drawn and sent w/ blood cx x 1   
The Medication Reconciliation process has been completed by interviewing the patient    Allergies have been reviewed  Antibiotic use in 30 days - Invanz, metronidazole and zyvox all started post IP visit on 5/9/17    Home Pharmacy:  [facility] [place]        
Detail Level: Generalized

## 2022-01-03 ENCOUNTER — HOSPITAL ENCOUNTER (INPATIENT)
Facility: MEDICAL CENTER | Age: 57
LOS: 1 days | DRG: 639 | End: 2022-01-04
Attending: EMERGENCY MEDICINE | Admitting: HOSPITALIST
Payer: COMMERCIAL

## 2022-01-03 DIAGNOSIS — E11.00 HYPEROSMOLAR HYPERGLYCEMIC STATE (HHS) (HCC): ICD-10-CM

## 2022-01-03 DIAGNOSIS — R73.9 HYPERGLYCEMIA: ICD-10-CM

## 2022-01-03 LAB
ALBUMIN SERPL BCP-MCNC: 4.1 G/DL (ref 3.2–4.9)
ALBUMIN/GLOB SERPL: 1.4 G/DL
ALP SERPL-CCNC: 113 U/L (ref 30–99)
ALT SERPL-CCNC: 30 U/L (ref 2–50)
ANION GAP SERPL CALC-SCNC: 10 MMOL/L (ref 7–16)
ANION GAP SERPL CALC-SCNC: 15 MMOL/L (ref 7–16)
ANION GAP SERPL CALC-SCNC: 9 MMOL/L (ref 7–16)
AST SERPL-CCNC: 11 U/L (ref 12–45)
B-OH-BUTYR SERPL-MCNC: 0.28 MMOL/L (ref 0.02–0.27)
BASE EXCESS BLDV CALC-SCNC: -5 MMOL/L
BASOPHILS # BLD AUTO: 0.4 % (ref 0–1.8)
BASOPHILS # BLD: 0.02 K/UL (ref 0–0.12)
BILIRUB SERPL-MCNC: 0.4 MG/DL (ref 0.1–1.5)
BODY TEMPERATURE: ABNORMAL CENTIGRADE
BUN SERPL-MCNC: 17 MG/DL (ref 8–22)
BUN SERPL-MCNC: 18 MG/DL (ref 8–22)
BUN SERPL-MCNC: 21 MG/DL (ref 8–22)
CALCIUM SERPL-MCNC: 8.4 MG/DL (ref 8.4–10.2)
CALCIUM SERPL-MCNC: 8.6 MG/DL (ref 8.4–10.2)
CALCIUM SERPL-MCNC: 9.2 MG/DL (ref 8.4–10.2)
CHLORIDE SERPL-SCNC: 101 MMOL/L (ref 96–112)
CHLORIDE SERPL-SCNC: 102 MMOL/L (ref 96–112)
CHLORIDE SERPL-SCNC: 90 MMOL/L (ref 96–112)
CO2 SERPL-SCNC: 19 MMOL/L (ref 20–33)
CO2 SERPL-SCNC: 22 MMOL/L (ref 20–33)
CO2 SERPL-SCNC: 22 MMOL/L (ref 20–33)
CREAT SERPL-MCNC: 0.62 MG/DL (ref 0.5–1.4)
CREAT SERPL-MCNC: 0.7 MG/DL (ref 0.5–1.4)
CREAT SERPL-MCNC: 0.94 MG/DL (ref 0.5–1.4)
EOSINOPHIL # BLD AUTO: 0.07 K/UL (ref 0–0.51)
EOSINOPHIL NFR BLD: 1.2 % (ref 0–6.9)
ERYTHROCYTE [DISTWIDTH] IN BLOOD BY AUTOMATED COUNT: 38 FL (ref 35.9–50)
GLOBULIN SER CALC-MCNC: 2.9 G/DL (ref 1.9–3.5)
GLUCOSE BLD-MCNC: 281 MG/DL (ref 65–99)
GLUCOSE BLD-MCNC: 365 MG/DL (ref 65–99)
GLUCOSE BLD-MCNC: 404 MG/DL (ref 65–99)
GLUCOSE SERPL-MCNC: 237 MG/DL (ref 65–99)
GLUCOSE SERPL-MCNC: 362 MG/DL (ref 65–99)
GLUCOSE SERPL-MCNC: 817 MG/DL (ref 65–99)
HCO3 BLDV-SCNC: 19 MMOL/L (ref 24–28)
HCT VFR BLD AUTO: 42.3 % (ref 42–52)
HGB BLD-MCNC: 15.4 G/DL (ref 14–18)
IMM GRANULOCYTES # BLD AUTO: 0.03 K/UL (ref 0–0.11)
IMM GRANULOCYTES NFR BLD AUTO: 0.5 % (ref 0–0.9)
LIPASE SERPL-CCNC: 32 U/L (ref 7–58)
LYMPHOCYTES # BLD AUTO: 1.48 K/UL (ref 1–4.8)
LYMPHOCYTES NFR BLD: 26.3 % (ref 22–41)
MAGNESIUM SERPL-MCNC: 1.9 MG/DL (ref 1.5–2.5)
MAGNESIUM SERPL-MCNC: 2.1 MG/DL (ref 1.5–2.5)
MCH RBC QN AUTO: 31.6 PG (ref 27–33)
MCHC RBC AUTO-ENTMCNC: 36.4 G/DL (ref 33.7–35.3)
MCV RBC AUTO: 86.9 FL (ref 81.4–97.8)
MONOCYTES # BLD AUTO: 0.26 K/UL (ref 0–0.85)
MONOCYTES NFR BLD AUTO: 4.6 % (ref 0–13.4)
NEUTROPHILS # BLD AUTO: 3.77 K/UL (ref 1.82–7.42)
NEUTROPHILS NFR BLD: 67 % (ref 44–72)
NRBC # BLD AUTO: 0 K/UL
NRBC BLD-RTO: 0 /100 WBC
PCO2 BLDV: 33.6 MMHG (ref 41–51)
PH BLDV: 7.38 [PH] (ref 7.31–7.45)
PHOSPHATE SERPL-MCNC: 3 MG/DL (ref 2.5–4.5)
PHOSPHATE SERPL-MCNC: 3.1 MG/DL (ref 2.5–4.5)
PLATELET # BLD AUTO: 202 K/UL (ref 164–446)
PMV BLD AUTO: 9.8 FL (ref 9–12.9)
PO2 BLDV: 50.5 MMHG (ref 25–40)
POTASSIUM SERPL-SCNC: 3.5 MMOL/L (ref 3.6–5.5)
POTASSIUM SERPL-SCNC: 3.7 MMOL/L (ref 3.6–5.5)
POTASSIUM SERPL-SCNC: 4.8 MMOL/L (ref 3.6–5.5)
PROT SERPL-MCNC: 7 G/DL (ref 6–8.2)
RBC # BLD AUTO: 4.87 M/UL (ref 4.7–6.1)
SAO2 % BLDV: 84.8 %
SODIUM SERPL-SCNC: 124 MMOL/L (ref 135–145)
SODIUM SERPL-SCNC: 132 MMOL/L (ref 135–145)
SODIUM SERPL-SCNC: 134 MMOL/L (ref 135–145)
TROPONIN T SERPL-MCNC: 10 NG/L (ref 6–19)
WBC # BLD AUTO: 5.6 K/UL (ref 4.8–10.8)

## 2022-01-03 PROCEDURE — 96372 THER/PROPH/DIAG INJ SC/IM: CPT

## 2022-01-03 PROCEDURE — 770006 HCHG ROOM/CARE - MED/SURG/GYN SEMI*

## 2022-01-03 PROCEDURE — 85025 COMPLETE CBC W/AUTO DIFF WBC: CPT

## 2022-01-03 PROCEDURE — 700102 HCHG RX REV CODE 250 W/ 637 OVERRIDE(OP): Performed by: HOSPITALIST

## 2022-01-03 PROCEDURE — 700111 HCHG RX REV CODE 636 W/ 250 OVERRIDE (IP): Performed by: EMERGENCY MEDICINE

## 2022-01-03 PROCEDURE — 80053 COMPREHEN METABOLIC PANEL: CPT

## 2022-01-03 PROCEDURE — 94760 N-INVAS EAR/PLS OXIMETRY 1: CPT

## 2022-01-03 PROCEDURE — 82962 GLUCOSE BLOOD TEST: CPT

## 2022-01-03 PROCEDURE — 99223 1ST HOSP IP/OBS HIGH 75: CPT | Performed by: HOSPITALIST

## 2022-01-03 PROCEDURE — 700105 HCHG RX REV CODE 258: Performed by: HOSPITALIST

## 2022-01-03 PROCEDURE — 82803 BLOOD GASES ANY COMBINATION: CPT

## 2022-01-03 PROCEDURE — 96374 THER/PROPH/DIAG INJ IV PUSH: CPT

## 2022-01-03 PROCEDURE — 700105 HCHG RX REV CODE 258: Performed by: EMERGENCY MEDICINE

## 2022-01-03 PROCEDURE — 84100 ASSAY OF PHOSPHORUS: CPT | Mod: 91

## 2022-01-03 PROCEDURE — 96361 HYDRATE IV INFUSION ADD-ON: CPT

## 2022-01-03 PROCEDURE — 36415 COLL VENOUS BLD VENIPUNCTURE: CPT

## 2022-01-03 PROCEDURE — 83735 ASSAY OF MAGNESIUM: CPT

## 2022-01-03 PROCEDURE — 84484 ASSAY OF TROPONIN QUANT: CPT

## 2022-01-03 PROCEDURE — 99285 EMERGENCY DEPT VISIT HI MDM: CPT

## 2022-01-03 PROCEDURE — A9270 NON-COVERED ITEM OR SERVICE: HCPCS | Performed by: HOSPITALIST

## 2022-01-03 PROCEDURE — 96375 TX/PRO/DX INJ NEW DRUG ADDON: CPT

## 2022-01-03 PROCEDURE — 83690 ASSAY OF LIPASE: CPT

## 2022-01-03 PROCEDURE — 80048 BASIC METABOLIC PNL TOTAL CA: CPT

## 2022-01-03 PROCEDURE — 82010 KETONE BODYS QUAN: CPT

## 2022-01-03 RX ORDER — PROCHLORPERAZINE EDISYLATE 5 MG/ML
5-10 INJECTION INTRAMUSCULAR; INTRAVENOUS EVERY 4 HOURS PRN
Status: DISCONTINUED | OUTPATIENT
Start: 2022-01-03 | End: 2022-01-04 | Stop reason: HOSPADM

## 2022-01-03 RX ORDER — ONDANSETRON 2 MG/ML
4 INJECTION INTRAMUSCULAR; INTRAVENOUS ONCE
Status: COMPLETED | OUTPATIENT
Start: 2022-01-03 | End: 2022-01-03

## 2022-01-03 RX ORDER — SODIUM CHLORIDE, SODIUM LACTATE, POTASSIUM CHLORIDE, AND CALCIUM CHLORIDE .6; .31; .03; .02 G/100ML; G/100ML; G/100ML; G/100ML
2000 INJECTION, SOLUTION INTRAVENOUS ONCE
Status: COMPLETED | OUTPATIENT
Start: 2022-01-03 | End: 2022-01-03

## 2022-01-03 RX ORDER — DEXTROSE AND SODIUM CHLORIDE 10; .45 G/100ML; G/100ML
INJECTION, SOLUTION INTRAVENOUS CONTINUOUS
Status: DISCONTINUED | OUTPATIENT
Start: 2022-01-03 | End: 2022-01-03

## 2022-01-03 RX ORDER — PROMETHAZINE HYDROCHLORIDE 25 MG/1
12.5-25 TABLET ORAL EVERY 4 HOURS PRN
Status: DISCONTINUED | OUTPATIENT
Start: 2022-01-03 | End: 2022-01-04 | Stop reason: HOSPADM

## 2022-01-03 RX ORDER — POLYETHYLENE GLYCOL 3350 17 G/17G
1 POWDER, FOR SOLUTION ORAL
Status: DISCONTINUED | OUTPATIENT
Start: 2022-01-03 | End: 2022-01-04 | Stop reason: HOSPADM

## 2022-01-03 RX ORDER — OXYCODONE HYDROCHLORIDE 5 MG/1
5 TABLET ORAL EVERY 4 HOURS PRN
Status: DISCONTINUED | OUTPATIENT
Start: 2022-01-03 | End: 2022-01-04 | Stop reason: HOSPADM

## 2022-01-03 RX ORDER — BISACODYL 10 MG
10 SUPPOSITORY, RECTAL RECTAL
Status: DISCONTINUED | OUTPATIENT
Start: 2022-01-03 | End: 2022-01-04 | Stop reason: HOSPADM

## 2022-01-03 RX ORDER — THYROID 30 MG/1
60 TABLET ORAL EVERY MORNING
Status: DISCONTINUED | OUTPATIENT
Start: 2022-01-04 | End: 2022-01-04 | Stop reason: HOSPADM

## 2022-01-03 RX ORDER — ACETAMINOPHEN 325 MG/1
650 TABLET ORAL EVERY 6 HOURS PRN
Status: DISCONTINUED | OUTPATIENT
Start: 2022-01-03 | End: 2022-01-04 | Stop reason: HOSPADM

## 2022-01-03 RX ORDER — AMOXICILLIN 250 MG
2 CAPSULE ORAL 2 TIMES DAILY
Status: DISCONTINUED | OUTPATIENT
Start: 2022-01-03 | End: 2022-01-04 | Stop reason: HOSPADM

## 2022-01-03 RX ORDER — DEXTROSE MONOHYDRATE 25 G/50ML
50 INJECTION, SOLUTION INTRAVENOUS
Status: DISCONTINUED | OUTPATIENT
Start: 2022-01-03 | End: 2022-01-04 | Stop reason: HOSPADM

## 2022-01-03 RX ORDER — ONDANSETRON 4 MG/1
4 TABLET, ORALLY DISINTEGRATING ORAL EVERY 4 HOURS PRN
Status: DISCONTINUED | OUTPATIENT
Start: 2022-01-03 | End: 2022-01-04 | Stop reason: HOSPADM

## 2022-01-03 RX ORDER — SODIUM CHLORIDE 9 MG/ML
1000 INJECTION, SOLUTION INTRAVENOUS ONCE
Status: COMPLETED | OUTPATIENT
Start: 2022-01-03 | End: 2022-01-03

## 2022-01-03 RX ORDER — HYDROMORPHONE HYDROCHLORIDE 1 MG/ML
0.5 INJECTION, SOLUTION INTRAMUSCULAR; INTRAVENOUS; SUBCUTANEOUS ONCE
Status: COMPLETED | OUTPATIENT
Start: 2022-01-03 | End: 2022-01-03

## 2022-01-03 RX ORDER — LABETALOL HYDROCHLORIDE 5 MG/ML
10 INJECTION, SOLUTION INTRAVENOUS EVERY 4 HOURS PRN
Status: DISCONTINUED | OUTPATIENT
Start: 2022-01-03 | End: 2022-01-04 | Stop reason: HOSPADM

## 2022-01-03 RX ORDER — ONDANSETRON 2 MG/ML
4 INJECTION INTRAMUSCULAR; INTRAVENOUS ONCE
Status: DISCONTINUED | OUTPATIENT
Start: 2022-01-03 | End: 2022-01-03

## 2022-01-03 RX ORDER — SODIUM CHLORIDE 450 MG/100ML
INJECTION, SOLUTION INTRAVENOUS CONTINUOUS
Status: DISCONTINUED | OUTPATIENT
Start: 2022-01-03 | End: 2022-01-04 | Stop reason: HOSPADM

## 2022-01-03 RX ORDER — PIOGLITAZONEHYDROCHLORIDE 15 MG/1
15 TABLET ORAL DAILY
Status: DISCONTINUED | OUTPATIENT
Start: 2022-01-03 | End: 2022-01-04 | Stop reason: HOSPADM

## 2022-01-03 RX ORDER — SODIUM CHLORIDE 9 MG/ML
2000 INJECTION, SOLUTION INTRAVENOUS ONCE
Status: DISCONTINUED | OUTPATIENT
Start: 2022-01-03 | End: 2022-01-03

## 2022-01-03 RX ORDER — ONDANSETRON 2 MG/ML
4 INJECTION INTRAMUSCULAR; INTRAVENOUS EVERY 4 HOURS PRN
Status: DISCONTINUED | OUTPATIENT
Start: 2022-01-03 | End: 2022-01-04 | Stop reason: HOSPADM

## 2022-01-03 RX ORDER — INSULIN LISPRO 100 [IU]/ML
5-16 INJECTION, SOLUTION INTRAVENOUS; SUBCUTANEOUS
Status: DISCONTINUED | OUTPATIENT
Start: 2022-01-03 | End: 2022-01-03

## 2022-01-03 RX ORDER — DEXTROSE AND SODIUM CHLORIDE 5; .45 G/100ML; G/100ML
INJECTION, SOLUTION INTRAVENOUS CONTINUOUS
Status: DISCONTINUED | OUTPATIENT
Start: 2022-01-03 | End: 2022-01-03

## 2022-01-03 RX ORDER — PROMETHAZINE HYDROCHLORIDE 25 MG/1
12.5-25 SUPPOSITORY RECTAL EVERY 4 HOURS PRN
Status: DISCONTINUED | OUTPATIENT
Start: 2022-01-03 | End: 2022-01-04 | Stop reason: HOSPADM

## 2022-01-03 RX ADMIN — SODIUM CHLORIDE 1000 ML: 9 INJECTION, SOLUTION INTRAVENOUS at 11:40

## 2022-01-03 RX ADMIN — OXYCODONE HYDROCHLORIDE 5 MG: 5 TABLET ORAL at 20:39

## 2022-01-03 RX ADMIN — HYDROMORPHONE HYDROCHLORIDE 0.5 MG: 1 INJECTION, SOLUTION INTRAMUSCULAR; INTRAVENOUS; SUBCUTANEOUS at 12:19

## 2022-01-03 RX ADMIN — INSULIN HUMAN 15 UNITS: 100 INJECTION, SOLUTION PARENTERAL at 17:04

## 2022-01-03 RX ADMIN — INSULIN HUMAN 9 UNITS: 100 INJECTION, SOLUTION PARENTERAL at 20:40

## 2022-01-03 RX ADMIN — OXYCODONE HYDROCHLORIDE 5 MG: 5 TABLET ORAL at 14:59

## 2022-01-03 RX ADMIN — ONDANSETRON 4 MG: 2 INJECTION INTRAMUSCULAR; INTRAVENOUS at 11:40

## 2022-01-03 RX ADMIN — SODIUM CHLORIDE, POTASSIUM CHLORIDE, SODIUM LACTATE AND CALCIUM CHLORIDE 2000 ML: 600; 310; 30; 20 INJECTION, SOLUTION INTRAVENOUS at 13:52

## 2022-01-03 RX ADMIN — SODIUM CHLORIDE: 4.5 INJECTION, SOLUTION INTRAVENOUS at 17:06

## 2022-01-03 RX ADMIN — METFORMIN HYDROCHLORIDE 1000 MG: 500 TABLET ORAL at 17:12

## 2022-01-03 RX ADMIN — SODIUM CHLORIDE: 4.5 INJECTION, SOLUTION INTRAVENOUS at 22:52

## 2022-01-03 RX ADMIN — INSULIN HUMAN 18 UNITS: 100 INJECTION, SOLUTION PARENTERAL at 16:01

## 2022-01-03 RX ADMIN — PIOGLITAZONE 15 MG: 15 TABLET ORAL at 16:46

## 2022-01-03 ASSESSMENT — ENCOUNTER SYMPTOMS
HEARTBURN: 0
WHEEZING: 0
BRUISES/BLEEDS EASILY: 0
NAUSEA: 1
PSYCHIATRIC NEGATIVE: 1
HEMOPTYSIS: 0
HEADACHES: 1
CHILLS: 0
DOUBLE VISION: 0
COUGH: 0
FEVER: 0
CONSTIPATION: 0
DIZZINESS: 0
CONSTITUTIONAL NEGATIVE: 1
PALPITATIONS: 0
SEIZURES: 0
LOSS OF CONSCIOUSNESS: 0
DIAPHORESIS: 0
DIARRHEA: 0
BLOOD IN STOOL: 0
EYES NEGATIVE: 1
ABDOMINAL PAIN: 1
FOCAL WEAKNESS: 0
NERVOUS/ANXIOUS: 0
WEAKNESS: 1
RESPIRATORY NEGATIVE: 1
MUSCULOSKELETAL NEGATIVE: 1
VOMITING: 1
CARDIOVASCULAR NEGATIVE: 1

## 2022-01-03 ASSESSMENT — LIFESTYLE VARIABLES
EVER FELT BAD OR GUILTY ABOUT YOUR DRINKING: NO
TOTAL SCORE: 0
HAVE YOU EVER FELT YOU SHOULD CUT DOWN ON YOUR DRINKING: NO
HAVE PEOPLE ANNOYED YOU BY CRITICIZING YOUR DRINKING: NO
ON A TYPICAL DAY WHEN YOU DRINK ALCOHOL HOW MANY DRINKS DO YOU HAVE: 1
EVER HAD A DRINK FIRST THING IN THE MORNING TO STEADY YOUR NERVES TO GET RID OF A HANGOVER: NO
CONSUMPTION TOTAL: NEGATIVE
ALCOHOL_USE: YES
TOTAL SCORE: 0
AVERAGE NUMBER OF DAYS PER WEEK YOU HAVE A DRINK CONTAINING ALCOHOL: 0
HOW MANY TIMES IN THE PAST YEAR HAVE YOU HAD 5 OR MORE DRINKS IN A DAY: 0
TOTAL SCORE: 0

## 2022-01-03 ASSESSMENT — FIBROSIS 4 INDEX: FIB4 SCORE: 2.53

## 2022-01-03 ASSESSMENT — PAIN DESCRIPTION - PAIN TYPE
TYPE: ACUTE PAIN
TYPE: ACUTE PAIN

## 2022-01-03 NOTE — ASSESSMENT & PLAN NOTE
-accus with sliding scale coverage, start Januvia, Actos, Metformin  -diabetic diet  -diabetic education  -follow glycohemoglobin levels long term  -continue with oral antihyperglycemics  -monitor for hypoglycemic episodes and adjust control if he should get low

## 2022-01-03 NOTE — DISCHARGE PLANNING
ER CM met with pt at bedside. AO x4. Very pleasant. He lives with spouse in 2 story home. He is independent in adls and ambulation RX [facility] at [facility]. Dr Stein is PCP. Patient changed insurance and did not fill rx. 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  Care Transition Team Assessment    Information Source  Orientation Level: Oriented X4  Information Given By: Patient  Informant's Name: Mahesh  Who is responsible for making decisions for patient? : Patient         Elopement Risk  Legal Hold: No  Ambulatory or Self Mobile in Wheelchair: No-Not an Elopement Risk    Interdisciplinary Discharge Planning  Primary Care Physician: Dr Stein  Lives with - Patient's Self Care Capacity: Spouse  Support Systems: Spouse / Significant Other  Housing / Facility: 2 Story [place]  Do You Take your Prescribed Medications Regularly: Yes (Issues with getting RX)  Mobility Issues: No  Assistance Needed: No    Discharge Preparedness  What is your plan after discharge?: Uncertain - pending medical team collaboration,Home with help  What are your discharge supports?: Spouse  Prior Functional Level: Ambulatory,Drives Self,Independent with Activities of Daily Living,Independent with Medication Management    Functional Assesment  Prior Functional Level: Ambulatory,Drives Self,Independent with Activities of Daily Living,Independent with Medication Management    Finances  Prescription Coverage: Yes                   Domestic Abuse  Have you ever been the victim of abuse or violence?: No

## 2022-01-03 NOTE — ASSESSMENT & PLAN NOTE
-supportive measures with [place] Thyroid 60 mg daily, no change  -latest TSH is 0.939 and this is with in establish normal guidlines

## 2022-01-03 NOTE — ED NOTES
Med rec updated and complete, per pt  Allergies reviewed, per pt  Per pt reports that he ran out of his ACTOS 30MG and SYNJARDY 12.5-1000MG for over a month or longer

## 2022-01-03 NOTE — ED TRIAGE NOTES
"Chief Complaint   Patient presents with   • Abdominal Pain     present last 3-4 days, c/o n/v, generalized pain    • Hyperglycemia     high readings for past week, c/o headaches, hx of DMT2; unabel to lower w/ at home medications     /86   Pulse 90   Temp 36 °C (96.8 °F) (Temporal)   Resp 18   Ht 1.727 m (5' 8\")   Wt 92 kg (202 lb 13.2 oz)   SpO2 99%   BMI 30.84 kg/m²     Pt arrived w/ above concern     Has this patient been vaccinated for COVID - YES  If not, would they like to be vaccinated while in the ER if eligible?  n/a  Would the patient like to speak with the ERP about the possibility of receiving the COVID vaccine today before making a decision? n/a    "

## 2022-01-03 NOTE — ASSESSMENT & PLAN NOTE
Currently not on PPI therapy and currently reports no heartburn.  Continue to monitor if should develop heartburn we will give him PPI therapy

## 2022-01-03 NOTE — ASSESSMENT & PLAN NOTE
Chronic depression, currently not suicidal homicidal and currently not actively depressed, not medicated.

## 2022-01-03 NOTE — H&P
Hospital Medicine History & Physical Note    Date of Service  1/3/2022    Primary Care Physician  Rodolfo Stein M.D.    Consultants  None    Specialist Names: None    Code Status  Full Code    Chief Complaint  Chief Complaint   Patient presents with   • Abdominal Pain     present last 3-4 days, c/o n/v, generalized pain    • Hyperglycemia     high readings for past week, c/o headaches, hx of DMT2; unabel to lower w/ at home medications       History of Presenting Illness  Mahesh Bradshaw is a 56 y.o. male who presented 1/3/2022 with nausea vomiting and abdominal pain.  The patient ran out of his diabetic management medications on December 1.  This was secondary to the fact that he change insurance.  The patient at this point has been vomiting and has developed hyperglycemic state.  Patient is not in DKA at this point.  The patient will need at this point fluid resuscitation and reinitiation of his medications and insulins.  Will monitor at this point for improvement and he will most likely need 24 to 48 hours in the hospital setting..    I discussed the plan of care with patient, bedside RN and Emergency room physician.    Review of Systems  Review of Systems   Constitutional: Negative.  Negative for chills, diaphoresis and fever.   HENT: Negative.    Eyes: Negative.  Negative for double vision.   Respiratory: Negative.  Negative for cough, hemoptysis and wheezing.    Cardiovascular: Negative.  Negative for chest pain, palpitations and leg swelling.   Gastrointestinal: Positive for abdominal pain, nausea and vomiting. Negative for blood in stool, constipation, diarrhea and heartburn.   Genitourinary: Negative.  Negative for frequency, hematuria and urgency.   Musculoskeletal: Negative.  Negative for joint pain.   Skin: Negative.  Negative for itching and rash.   Neurological: Positive for weakness and headaches. Negative for dizziness, focal weakness, seizures and loss of consciousness.   Endo/Heme/Allergies:  Negative.  Does not bruise/bleed easily.   Psychiatric/Behavioral: Negative.  Negative for suicidal ideas. The patient is not nervous/anxious.    All other systems reviewed and are negative.      Past Medical History   has a past medical history of ADRIANE (acute kidney injury) ([facility]) (2/24/2016), Anesthesia, Arthritis (3-3-17), Aspiration pneumonia ([facility]) (3-2016), ASTHMA (3-3-17), Breath shortness (3-3-17), Congestive heart failure ([facility]) (2-2016 ), Dental disorder, Depression (11/26/2014), Diabetes ([facility]) (3-3-17), Diabetes ([facility]), Difficult intubation (2-2016), DKA (diabetic ketoacidoses) (2-2016), Electrolyte imbalance (2-2016), Elevated LFTs, Elevated liver enzymes (2-2016), Essential hypertension (1/22/2016), Gout, Heart burn, High cholesterol (3-3-17), Hypothyroid (3-3-17), Indigestion, Kidney stones, Klebsiella infect, Migraine, MRSA cellulitis, Necrotizing myositis, On mechanically assisted ventilation ([facility]) (2-2016), Pain (3-3-17), Pancreatitis (2-2016), RF (renal failure) (2-2016), Sepsis ([facility]) (1/21/2016), Snoring (3-3-17), Streptococcus infection, UC (ulcerative colitis) ([facility]) (3-3-17), Urinary incontinence, and Vitamin D deficiency.    Surgical History   has a past surgical history that includes vaishnavi by laparoscopy (2005); colonoscopy with biopsy (11/26/2014); incision and drainage general (4/7/2017); irrigation & debridement general (Right, 4/29/2017); irrigation & debridement general (Right, 5/1/2017); other orthopedic surgery (1997 or 1998); umbilical hernia repair (N/A, 11/6/2016); umbilical hernia repair (3/10/2017); irrigation & debridement ortho (Left, 12/10/2017); and umbilical hernia repair (N/A, 4/15/2018).     Family History  family history includes Cancer in his mother.,  Grandmother on the maternal side has diabetes as well as the grandfather.  Family history reviewed with patient. There is family history that is pertinent to the chief complaint.     Social History   reports that he has never  "smoked. He has never used smokeless tobacco. He reports current alcohol use. He reports that he does not use drugs.    Allergies  Allergies   Allergen Reactions   • Peanut (Diagnostic) Anaphylaxis   • Tradjenta [Linagliptin] Unspecified     Pt developed pancreatitis   • Hydrocodone Hives     Tolerates Morphine and Oxycodone         Medications  Prior to Admission Medications   Prescriptions Last Dose Informant Patient Reported? Taking?   Alcohol Swabs 1/3/2022 at 0800 Patient No No   Sig: Wipe site with prep pad prior to injection.   Blood Glucose Meter Kit 1/3/2022 at 0800 Patient No No   Sig: Test blood sugar as recommended by provider. True Metrix blood glucose monitoring kit.   Blood Glucose Test Strips 1/3/2022 at 0800 Patient No No   Sig: Use one True Metrix strip to test blood sugar three times daily before meals.   Insulin Pen Needle 32 G x 4 mm 1/3/2022 at 0900 Patient No No   Sig: Use one pen needle in pen device to inject insulin three times daily.   Lancets 1/3/2022 at 0800 Patient No No   Sig: Use one True Metrix lancet to test blood sugar three times daily before meals.   Multiple Vitamins-Minerals (MENS MULTIVITAMIN) Tab 1/1/2022 at AM Patient Yes No   Sig: Take 1 Package by mouth every evening. \"[employer] Men\"    insulin lispro (HUMALOG KWIKPEN) 100 UNIT/ML Solution Pen-injector injection PEN 1/3/2022 at 0900 Patient No No   Sig: Inject 8 Units under the skin 3 times a day before meals.   Patient taking differently: Inject 5-16 Units under the skin 4 Times a Day,Before Meals and at Bedtime. Sliding Scale   melatonin 5 mg Tab > 2 nights at PM Patient Yes No   Sig: Take 5-10 mg by mouth at bedtime as needed (Sleep). 1 to 2 tablets = 5 to 10 mg.   thyroid (ARMOUR THYROID) 60 MG Tab 1/2/2022 at 0900 Patient No No   Sig: Take 1 Tab by mouth every morning.      Facility-Administered Medications: None       Physical Exam  Temp:  [36 °C (96.8 °F)] 36 °C (96.8 °F)  Pulse:  [90] 90  Resp:  [18-21] 21  BP: " (133-135)/(79-86) 133/79  SpO2:  [99 %] 99 %  Blood Pressure: 133/79   Temperature: 36 °C (96.8 °F)   Pulse: 90   Respiration: (!) 21   Pulse Oximetry: 99 %       Physical Exam  Vitals and nursing note reviewed. Exam conducted with a chaperone present.   Constitutional:       Appearance: He is normal weight.   HENT:      Head: Normocephalic and atraumatic.      Right Ear: External ear normal.      Left Ear: External ear normal.      Nose: Nose normal.      Mouth/Throat:      Mouth: Mucous membranes are dry.   Eyes:      Extraocular Movements: Extraocular movements intact.      Pupils: Pupils are equal, round, and reactive to light.   Neck:      Vascular: No carotid bruit.   Cardiovascular:      Rate and Rhythm: Normal rate.      Pulses: Normal pulses.      Heart sounds: Normal heart sounds.   Pulmonary:      Effort: Pulmonary effort is normal.      Breath sounds: Normal breath sounds. No wheezing or rhonchi.   Abdominal:      Palpations: Abdomen is soft.   Musculoskeletal:         General: No swelling. Normal range of motion.      Cervical back: Normal range of motion and neck supple. No rigidity.      Right lower leg: No edema.   Skin:     General: Skin is warm and dry.   Neurological:      General: No focal deficit present.      Mental Status: He is alert and oriented to person, place, and time.      Cranial Nerves: No cranial nerve deficit.      Sensory: No sensory deficit.      Gait: Gait normal.      Deep Tendon Reflexes: Reflexes normal.   Psychiatric:         Mood and Affect: Mood normal.         Judgment: Judgment normal.         Laboratory:  Recent Labs     01/03/22  1115   WBC 5.6   RBC 4.87   HEMOGLOBIN 15.4   HEMATOCRIT 42.3   MCV 86.9   MCH 31.6   MCHC 36.4*   RDW 38.0   PLATELETCT 202   MPV 9.8     Recent Labs     01/03/22  1115   SODIUM 124*   POTASSIUM 4.8   CHLORIDE 90*   CO2 19*   GLUCOSE 817*   BUN 21   CREATININE 0.94   CALCIUM 9.2     Recent Labs     01/03/22  1115   ALTSGPT 30   ASTSGOT 11*    ALKPHOSPHAT 113*   TBILIRUBIN 0.4   LIPASE 32   GLUCOSE 817*         No results for input(s): NTPROBNP in the last 72 hours.      Recent Labs     01/03/22  1115   TROPONINT 10       Imaging:  No orders to display       X-Ray:  I have personally reviewed the images and compared with prior images.  EKG:  I have personally reviewed the images and compared with prior images.    Assessment/Plan:  I anticipate this patient will require at least two midnights for appropriate medical management, necessitating inpatient admission.    * Hyperosmolar hyperglycemic state (HHS) (HCC)- (present on admission)  Assessment & Plan  Patient ran out of his medications for diabetes December 1 due to changing insurances  He was basically taking Metformin with Jardiance and Actos.  The patient was also on a sliding scale with Novolin.  As of late the sugars have been going up and he has been in the 4-500 range today comes in is over 800.  He is not in DKA  We are going to give him fluid resuscitation and optimize diabetic diet and insulin management as well as restart him on Metformin Actos and Januvia as we do not carry Jardiance.    Uncontrolled type 2 diabetes mellitus with hyperglycemia (HCC)- (present on admission)  Assessment & Plan  -accus with sliding scale coverage, start Januvia, Actos, Metformin  -diabetic diet  -diabetic education  -follow glycohemoglobin levels long term  -continue with oral antihyperglycemics  -monitor for hypoglycemic episodes and adjust control if he should get low    Essential hypertension- (present on admission)  Assessment & Plan  Optimize blood pressure management keep systolic blood pressure less than 140 diastolic under 90    Dyslipidemia- (present on admission)  Assessment & Plan  Low-fat low-cholesterol diet  Fasting lipid panel    Hypothyroidism- (present on admission)  Assessment & Plan  -supportive measures with [place] Thyroid 60 mg daily, no change  -latest TSH is 0.939 and this is with in  establish normal guidlines     Obesity (BMI 30.0-34.9)- (present on admission)  Assessment & Plan  Body mass index is 30.84 kg/m².  Outpatient weight loss management program recommended    Ulcerative colitis (HCC)- (present on admission)  Assessment & Plan  History of ulcerative colitis  Currently not on medication management  Currently no diarrhea reported    GERD (gastroesophageal reflux disease)- (present on admission)  Assessment & Plan  Currently not on PPI therapy and currently reports no heartburn.  Continue to monitor if should develop heartburn we will give him PPI therapy    Depression- (present on admission)  Assessment & Plan  Chronic depression, currently not suicidal homicidal and currently not actively depressed, not medicated.        VTE prophylaxis: SCDs/TEDs

## 2022-01-03 NOTE — ASSESSMENT & PLAN NOTE
History of ulcerative colitis  Currently not on medication management  Currently no diarrhea reported

## 2022-01-03 NOTE — ED PROVIDER NOTES
ED Provider Note    Scribed for Lavon Luke M.D. by Jewel Jacobo. 1/3/2022  10:58 AM    Primary care provider: Rodolfo Stein M.D.  Means of arrival: walk in   History obtained from: patient   History limited by: None     CHIEF COMPLAINT  Chief Complaint   Patient presents with    Abdominal Pain     present last 3-4 days, c/o n/v, generalized pain     Hyperglycemia     high readings for past week, c/o headaches, hx of DMT2; unabel to lower w/ at home medications       HPI  Mahesh Bradshaw is a 56 y.o. male who presents to the Emergency Department for evaluation of waxing and waning hyperglycemia onset 1 week ago. Patient reports he has history of diabetes and not been compliant with his diabetes medication as he states he has been stressed with work. Patient states that his reads are well into the 600s and his medications are not helping to lower his blood sugar.  He presents associated symptoms of nausea, vomiting, and generalized abdominal pain. Denies headache or syncope. No alleviating factors noted at this time. Patient is vaccinated for COVID x3.Patient has additional history of Diabetes, CHF, and Renal failure .     REVIEW OF SYSTEMS  Pertinent positives include hyperglycemia, nausea, vomiting, and generalized abdominal pain..   Pertinent negatives include no headache or syncope.    All other systems reviewed and negative. See HPI for further details.       PAST MEDICAL HISTORY   has a past medical history of ADRIANE (acute kidney injury) (HCC) (2/24/2016), Anesthesia, Arthritis (3-3-17), Aspiration pneumonia (HCC) (3-2016), ASTHMA (3-3-17), Breath shortness (3-3-17), Congestive heart failure (HCC) (2-2016 ), Dental disorder, Depression (11/26/2014), Diabetes (HCC) (3-3-17), Diabetes (HCC), Difficult intubation (2-2016), DKA (diabetic ketoacidoses) (2-2016), Electrolyte imbalance (2-2016), Elevated LFTs, Elevated liver enzymes (2-2016), Essential hypertension (1/22/2016), Gout, Heart burn, High  cholesterol (3-3-17), Hypothyroid (3-3-17), Indigestion, Kidney stones, Klebsiella infect, Migraine, MRSA cellulitis, Necrotizing myositis, On mechanically assisted ventilation ([facility]) (2-2016), Pain (3-3-17), Pancreatitis (2-2016), RF (renal failure) (2-2016), Sepsis ([facility]) (1/21/2016), Snoring (3-3-17), Streptococcus infection, UC (ulcerative colitis) ([facility]) (3-3-17), Urinary incontinence, and Vitamin D deficiency.    SURGICAL HISTORY   has a past surgical history that includes vaishnavi by laparoscopy (2005); colonoscopy with biopsy (11/26/2014); incision and drainage general (4/7/2017); irrigation & debridement general (Right, 4/29/2017); irrigation & debridement general (Right, 5/1/2017); other orthopedic surgery (1997 or 1998); umbilical hernia repair (N/A, 11/6/2016); umbilical hernia repair (3/10/2017); irrigation & debridement ortho (Left, 12/10/2017); and umbilical hernia repair (N/A, 4/15/2018).    SOCIAL HISTORY  Social History     Tobacco Use    Smoking status: Never Smoker    Smokeless tobacco: Never Used   Vaping Use    Vaping Use: Never used   Substance Use Topics    Alcohol use: Yes     Comment: rare    Drug use: No      Social History     Substance and Sexual Activity   Drug Use No       FAMILY HISTORY  Family History   Problem Relation Age of Onset    Cancer Mother        CURRENT MEDICATIONS  Current Outpatient Medications   Medication Instructions    acetaminophen (TYLENOL) 650 mg, Oral, EVERY 6 HOURS PRN    Alcohol Swabs Wipe site with prep pad prior to injection.    Blood Glucose Meter Kit Test blood sugar as recommended by provider. True Metrix blood glucose monitoring kit.    Blood Glucose Test Strips Use one True Metrix strip to test blood sugar three times daily before meals.    insulin lispro (HUMALOG,ADMELOG) 8 Units, Subcutaneous, 3 TIMES DAILY BEFORE MEALS    Insulin Pen Needle 32 G x 4 mm Use one pen needle in pen device to inject insulin three times daily.    Lancets Use one True Metrix  "lancet to test blood sugar three times daily before meals.    melatonin 5-10 mg, Oral, EVERY BEDTIME PRN, 1 to 2 tablets = 5 to 10 mg.    Multiple Vitamins-Minerals (MENS MULTIVITAMIN) Tab 1 Package, Oral, EVERY EVENING, \"[employer]\"    ondansetron (ZOFRAN ODT) 8 mg, Oral, EVERY 8 HOURS PRN    thyroid (ARMOUR THYROID) 60 mg, Oral, EVERY MORNING         ALLERGIES  Allergies   Allergen Reactions    Peanut (Diagnostic) Anaphylaxis    Tradjenta [Linagliptin] Unspecified     Pt developed pancreatitis    Hydrocodone Hives     Tolerates Morphine and Oxycodone         PHYSICAL EXAM  VITAL SIGNS: /86   Pulse 90   Temp 36 °C (96.8 °F) (Temporal)   Resp 18   Ht 1.727 m (5' 8\")   Wt 92 kg (202 lb 13.2 oz)   SpO2 99%   BMI 30.84 kg/m²     Nursing note and vitals reviewed.  Constitutional: Well-developed and well-nourished. No distress.   HENT: Head is normocephalic and atraumatic. Dry mucous membranes. Oropharynx is clear without exudate or erythema.   Eyes: Pupils are equal, round, and reactive to light. Conjunctiva are normal.   Cardiovascular: Normal rate and regular rhythm. No murmur heard. Normal radial pulses.  Pulmonary/Chest: Breath sounds normal. No wheezes or rales.   Abdominal: Soft and non-tender. No distention  . Unable to illicit abdominal pain.   Musculoskeletal: Extremities exhibit normal range of motion without edema or tenderness.   Neurological: Awake, alert and oriented to person, place, and time. No focal deficits noted.  Skin: Skin is warm and dry. No rash.   Psychiatric: Normal mood and affect. Appropriate for clinical situation.    DIAGNOSTIC STUDIES / PROCEDURES    EKG Interpretation  Interpreted by me as below    LABS  Results for orders placed or performed during the hospital encounter of 01/03/22   CBC w/ Differential   Result Value Ref Range    WBC 5.6 4.8 - 10.8 K/uL    RBC 4.87 4.70 - 6.10 M/uL    Hemoglobin 15.4 14.0 - 18.0 g/dL    Hematocrit 42.3 42.0 - 52.0 %    MCV 86.9 81.4 - " 97.8 fL    MCH 31.6 27.0 - 33.0 pg    MCHC 36.4 (H) 33.7 - 35.3 g/dL    RDW 38.0 35.9 - 50.0 fL    Platelet Count 202 164 - 446 K/uL    MPV 9.8 9.0 - 12.9 fL    Neutrophils-Polys 67.00 44.00 - 72.00 %    Lymphocytes 26.30 22.00 - 41.00 %    Monocytes 4.60 0.00 - 13.40 %    Eosinophils 1.20 0.00 - 6.90 %    Basophils 0.40 0.00 - 1.80 %    Immature Granulocytes 0.50 0.00 - 0.90 %    Nucleated RBC 0.00 /100 WBC    Neutrophils (Absolute) 3.77 1.82 - 7.42 K/uL    Lymphs (Absolute) 1.48 1.00 - 4.80 K/uL    Monos (Absolute) 0.26 0.00 - 0.85 K/uL    Eos (Absolute) 0.07 0.00 - 0.51 K/uL    Baso (Absolute) 0.02 0.00 - 0.12 K/uL    Immature Granulocytes (abs) 0.03 0.00 - 0.11 K/uL    NRBC (Absolute) 0.00 K/uL   Complete Metabolic Panel (CMP)   Result Value Ref Range    Sodium 124 (L) 135 - 145 mmol/L    Potassium 4.8 3.6 - 5.5 mmol/L    Chloride 90 (L) 96 - 112 mmol/L    Co2 19 (L) 20 - 33 mmol/L    Anion Gap 15.0 7.0 - 16.0    Glucose 817 (HH) 65 - 99 mg/dL    Bun 21 8 - 22 mg/dL    Creatinine 0.94 0.50 - 1.40 mg/dL    Calcium 9.2 8.4 - 10.2 mg/dL    AST(SGOT) 11 (L) 12 - 45 U/L    ALT(SGPT) 30 2 - 50 U/L    Alkaline Phosphatase 113 (H) 30 - 99 U/L    Total Bilirubin 0.4 0.1 - 1.5 mg/dL    Albumin 4.1 3.2 - 4.9 g/dL    Total Protein 7.0 6.0 - 8.2 g/dL    Globulin 2.9 1.9 - 3.5 g/dL    A-G Ratio 1.4 g/dL   Troponin   Result Value Ref Range    Troponin T 10 6 - 19 ng/L   MAGNESIUM   Result Value Ref Range    Magnesium 2.1 1.5 - 2.5 mg/dL   PHOSPHORUS   Result Value Ref Range    Phosphorus 3.1 2.5 - 4.5 mg/dL   LIPASE   Result Value Ref Range    Lipase 32 7 - 58 U/L   VENOUS BLOOD GAS   Result Value Ref Range    Venous Bg Ph 7.38 7.31 - 7.45    Venous Bg Pco2 33.6 (L) 41.0 - 51.0 mmHg    Venous Bg Po2 50.5 (H) 25.0 - 40.0 mmHg    Venous Bg O2 Saturation 84.8 %    Venous Bg Hco3 19 (L) 24 - 28 mmol/L    Venous Bg Base Excess -5 mmol/L    Body Temp see below Centigrade   ESTIMATED GFR   Result Value Ref Range    GFR If   American >60 >60 mL/min/1.73 m 2    GFR If Non African American >60 >60 mL/min/1.73 m 2      All labs reviewed by me.    COURSE & MEDICAL DECISION MAKING  Nursing notes, VS, PMSFHx reviewed in chart.     10:58 AM Patient evaluated at bedside. Ordered CMP, Troponin, Magnesium, Phosphorus, BHA, Lipase, VBG, and CBC without diff.,  for further evaluation. Patient will be treated with NS infusion 1,000 mL and Zofran 4 mg. Differential diagnoses include but not limited to: electrolyte abnormality, DKA, or UTI. Discussed with patient about administering basic labs and imaging for further evaluation. Informed patient about medication administration for hyperglycemia and nausea. Discussed with patient about plans of possible admit secondary to acuity of condition.  Patient verbalizes understanding and agreement to this plan of care.     11:48 Patient will be treated with Dilaudid 0.5 mg    Patient presents today with hyperglycemia in the setting of noncompliance with his diabetes medications.  Is significantly elevated blood glucose without acidosis.    12:16 PM  Paged Hospitalist     12:54 PM I discussed the patient's case and the above findings with Dr. Hopkins (Hospitalist) who agreed to hospitalize the patient. Patient's care was transferred at this time.     HYDRATION: Based on the patient's presentation of Acute Vomiting, Dehydration and Hyperglycemia the patient was given IV fluids. IV Hydration was used because oral hydration was not adequate alone. Upon recheck following hydration, the patient was improved.       DISPOSITION:  Patient will be hospitalized by Dr. Hokpins in guarded condition.     FINAL IMPRESSION  1. Hyperglycemia          Jewel EPSTEIN), am scribing for, and in the presence of, Lavon Luke M.D..    Electronically signed by: Jewel Jacobo (Gareth), 1/3/2022    Lavon EPSTEIN M.D. personally performed the services described in this documentation, as scribed by Jewel Jacobo  in my presence, and it is both accurate and complete.    C    The note accurately reflects work and decisions made by me.  Lavon Luke M.D.  1/3/2022  1:21 PM

## 2022-01-03 NOTE — ASSESSMENT & PLAN NOTE
Patient ran out of his medications for diabetes December 1 due to changing insurances  He was basically taking Metformin with Jardiance and Actos.  The patient was also on a sliding scale with Novolin.  As of late the sugars have been going up and he has been in the 4-500 range today comes in is over 800.  He is not in DKA  We are going to give him fluid resuscitation and optimize diabetic diet and insulin management as well as restart him on Metformin Actos and Januvia as we do not carry Jardiance.

## 2022-01-04 ENCOUNTER — PATIENT OUTREACH (OUTPATIENT)
Dept: HEALTH INFORMATION MANAGEMENT | Facility: OTHER | Age: 57
End: 2022-01-04

## 2022-01-04 VITALS
RESPIRATION RATE: 18 BRPM | OXYGEN SATURATION: 95 % | HEART RATE: 81 BPM | BODY MASS INDEX: 30.74 KG/M2 | HEIGHT: 68 IN | TEMPERATURE: 98.6 F | WEIGHT: 202.82 LBS | SYSTOLIC BLOOD PRESSURE: 117 MMHG | DIASTOLIC BLOOD PRESSURE: 64 MMHG

## 2022-01-04 LAB
ANION GAP SERPL CALC-SCNC: 10 MMOL/L (ref 7–16)
ANION GAP SERPL CALC-SCNC: 10 MMOL/L (ref 7–16)
ANION GAP SERPL CALC-SCNC: 13 MMOL/L (ref 7–16)
BUN SERPL-MCNC: 18 MG/DL (ref 8–22)
BUN SERPL-MCNC: 18 MG/DL (ref 8–22)
BUN SERPL-MCNC: 19 MG/DL (ref 8–22)
CALCIUM SERPL-MCNC: 8.1 MG/DL (ref 8.4–10.2)
CALCIUM SERPL-MCNC: 8.2 MG/DL (ref 8.4–10.2)
CALCIUM SERPL-MCNC: 8.6 MG/DL (ref 8.4–10.2)
CHLORIDE SERPL-SCNC: 102 MMOL/L (ref 96–112)
CHLORIDE SERPL-SCNC: 104 MMOL/L (ref 96–112)
CHLORIDE SERPL-SCNC: 104 MMOL/L (ref 96–112)
CO2 SERPL-SCNC: 20 MMOL/L (ref 20–33)
CO2 SERPL-SCNC: 21 MMOL/L (ref 20–33)
CO2 SERPL-SCNC: 22 MMOL/L (ref 20–33)
CREAT SERPL-MCNC: 0.58 MG/DL (ref 0.5–1.4)
CREAT SERPL-MCNC: 0.65 MG/DL (ref 0.5–1.4)
CREAT SERPL-MCNC: 0.65 MG/DL (ref 0.5–1.4)
EST. AVERAGE GLUCOSE BLD GHB EST-MCNC: 390 MG/DL
GLUCOSE BLD-MCNC: 220 MG/DL (ref 65–99)
GLUCOSE BLD-MCNC: 254 MG/DL (ref 65–99)
GLUCOSE SERPL-MCNC: 159 MG/DL (ref 65–99)
GLUCOSE SERPL-MCNC: 222 MG/DL (ref 65–99)
GLUCOSE SERPL-MCNC: 224 MG/DL (ref 65–99)
HBA1C MFR BLD: 15.2 % (ref 4–5.6)
MAGNESIUM SERPL-MCNC: 1.7 MG/DL (ref 1.5–2.5)
PHOSPHATE SERPL-MCNC: 3.6 MG/DL (ref 2.5–4.5)
POTASSIUM SERPL-SCNC: 3.5 MMOL/L (ref 3.6–5.5)
POTASSIUM SERPL-SCNC: 3.7 MMOL/L (ref 3.6–5.5)
POTASSIUM SERPL-SCNC: 3.8 MMOL/L (ref 3.6–5.5)
SODIUM SERPL-SCNC: 134 MMOL/L (ref 135–145)
SODIUM SERPL-SCNC: 136 MMOL/L (ref 135–145)
SODIUM SERPL-SCNC: 136 MMOL/L (ref 135–145)

## 2022-01-04 PROCEDURE — 80048 BASIC METABOLIC PNL TOTAL CA: CPT

## 2022-01-04 PROCEDURE — 82962 GLUCOSE BLOOD TEST: CPT

## 2022-01-04 PROCEDURE — 700105 HCHG RX REV CODE 258: Performed by: HOSPITALIST

## 2022-01-04 PROCEDURE — A9270 NON-COVERED ITEM OR SERVICE: HCPCS | Performed by: HOSPITALIST

## 2022-01-04 PROCEDURE — 36415 COLL VENOUS BLD VENIPUNCTURE: CPT

## 2022-01-04 PROCEDURE — 700102 HCHG RX REV CODE 250 W/ 637 OVERRIDE(OP): Performed by: HOSPITALIST

## 2022-01-04 PROCEDURE — 84100 ASSAY OF PHOSPHORUS: CPT

## 2022-01-04 PROCEDURE — 99239 HOSP IP/OBS DSCHRG MGMT >30: CPT | Performed by: FAMILY MEDICINE

## 2022-01-04 PROCEDURE — 83735 ASSAY OF MAGNESIUM: CPT

## 2022-01-04 PROCEDURE — 83036 HEMOGLOBIN GLYCOSYLATED A1C: CPT

## 2022-01-04 RX ORDER — ONDANSETRON 4 MG/1
4 TABLET, ORALLY DISINTEGRATING ORAL EVERY 4 HOURS PRN
Qty: 10 TABLET | Refills: 0 | Status: SHIPPED
Start: 2022-01-04 | End: 2022-02-18

## 2022-01-04 RX ORDER — PIOGLITAZONEHYDROCHLORIDE 15 MG/1
15 TABLET ORAL DAILY
Qty: 30 TABLET | Refills: 11
Start: 2022-01-05 | End: 2022-05-25 | Stop reason: SDUPTHER

## 2022-01-04 RX ORDER — EMPAGLIFLOZIN 25 MG/1
25 TABLET, FILM COATED ORAL DAILY
Qty: 30 TABLET | Refills: 0 | Status: SHIPPED | OUTPATIENT
Start: 2022-01-04 | End: 2023-05-29

## 2022-01-04 RX ADMIN — SODIUM CHLORIDE: 4.5 INJECTION, SOLUTION INTRAVENOUS at 07:39

## 2022-01-04 RX ADMIN — INSULIN HUMAN 6 UNITS: 100 INJECTION, SOLUTION PARENTERAL at 11:37

## 2022-01-04 RX ADMIN — ACETAMINOPHEN 650 MG: 325 TABLET, FILM COATED ORAL at 04:27

## 2022-01-04 RX ADMIN — THYROID, PORCINE 60 MG: 30 TABLET ORAL at 06:16

## 2022-01-04 RX ADMIN — PIOGLITAZONE 15 MG: 15 TABLET ORAL at 06:16

## 2022-01-04 RX ADMIN — METFORMIN HYDROCHLORIDE 1000 MG: 500 TABLET ORAL at 07:33

## 2022-01-04 RX ADMIN — OXYCODONE HYDROCHLORIDE 5 MG: 5 TABLET ORAL at 07:39

## 2022-01-04 RX ADMIN — INSULIN HUMAN 9 UNITS: 100 INJECTION, SOLUTION PARENTERAL at 06:16

## 2022-01-04 ASSESSMENT — PAIN DESCRIPTION - PAIN TYPE
TYPE: ACUTE PAIN

## 2022-01-04 NOTE — PROGRESS NOTES
Discharging patient home per MD order. Pt demonstrated understanding of discharge instructions, follow up appointments, home medications, prescriptions, and home care for diabetes oral and insulin. Pt is voiding without difficulty, pain well controlled, tolerating oral medications, O2 greater than 90%. Pt verbalized understanding of discharge instructions and educational handouts and all questions answered. PIV removed. Pt discharged off unit with hospital escort.

## 2022-01-04 NOTE — CARE PLAN
Problem: Pain - Standard  Goal: Alleviation of pain or a reduction in pain to the patient’s comfort goal  Outcome: Progressing     Problem: Diabetes Management  Goal: Patient will achieve and maintain glucose in satisfactory range  Outcome: Progressing   The patient is Stable - Low risk of patient condition declining or worsening    Shift Goals  Clinical Goals: Blood sugar control  Patient Goals: Rest and discharge    Progress made toward(s) clinical / shift goals:  Blood sugar monitored throughout shift. Pain assessed and pt medicated per MAR.     Patient is not progressing towards the following goals:

## 2022-01-04 NOTE — PROGRESS NOTES
4 Eyes Skin Assessment Completed by JAZMINE Somers and JAZMINE Martinez.    Head WDL  Ears WDL  Nose WDL  Mouth WDL  Neck WDL  Breast/Chest WDL  Shoulder Blades WDL  Spine WDL  (R) Arm/Elbow/Hand WDL  (L) Arm/Elbow/Hand WDL  Abdomen WDL  Groin WDL  Scrotum/Coccyx/Buttocks WDL  (R) Leg WDL  (L) Leg WDL  (R) Heel/Foot/Toe WDL  (L) Heel/Foot/Toe WDL          Devices In Places Blood Pressure Cuff, Pulse Ox and SCD's      Interventions In Place Pressure Redistribution Mattress    Possible Skin Injury No    Pictures Uploaded Into Epic N/A  Wound Consult Placed N/A  RN Wound Prevention Protocol Ordered No

## 2022-01-04 NOTE — CARE PLAN
The patient is Stable - Low risk of patient condition declining or worsening    Shift Goals  Clinical Goals: blood sugar control  Patient Goals: rest and comfort    Progress made toward(s) clinical / shift goals: Blood sugar is 220 mg/dl, insulin given. Educated for diabetic diet.    Patient is not progressing towards the following goals:      Problem: Pain - Standard  Goal: Alleviation of pain or a reduction in pain to the patient’s comfort goal  Outcome: Progressing     Problem: Diabetes Management  Goal: Patient will achieve and maintain glucose in satisfactory range  Outcome: Progressing     Problem: Discharge Barriers/Planning  Goal: Patient's continuum of care needs are met  1/4/2022 1116 by Deepika Cristobal R.N.  Flowsheets (Taken 1/4/2022 1116)  Continuum of Care Needs:   Assessed for discharge barriers   Involved patient/family/support system in discharge process   Collaborated with Case Management/   Communicated discharge barriers to interdisciplinary tream   Provided and explained discharge instructions  1/4/2022 1116 by Deepika Cristobal R.N.  Outcome: Progressing

## 2022-01-04 NOTE — PROGRESS NOTES
Assumed care of pt. A&O x4, pt reports headache, medicated per MAR. Pt denies nausea. Pt has baseline numbness/tingling to extremities. No signs of distress. Blood sugar being monitored. VSS. Discussed POC. Fall precautions in place.

## 2022-01-04 NOTE — DISCHARGE INSTRUCTIONS
Blood Glucose Monitoring, Adult  Monitoring your blood sugar (glucose) is an important part of managing your diabetes (diabetes mellitus). Blood glucose monitoring involves checking your blood glucose as often as directed and keeping a record (log) of your results over time.  Checking your blood glucose regularly and keeping a blood glucose log can:  · Help you and your health care provider adjust your diabetes management plan as needed, including your medicines or insulin.  · Help you understand how food, exercise, illnesses, and medicines affect your blood glucose.  · Let you know what your blood glucose is at any time. You can quickly find out if you have low blood glucose (hypoglycemia) or high blood glucose (hyperglycemia).  Your health care provider will set individualized treatment goals for you. Your goals will be based on your age, other medical conditions you have, and how you respond to diabetes treatment. Generally, the goal of treatment is to maintain the following blood glucose levels:  · Before meals (preprandial):  mg/dL (4.4-7.2 mmol/L).  · After meals (postprandial): below 180 mg/dL (10 mmol/L).  · A1c level: less than 7%.  Supplies needed:  · Blood glucose meter.  · Test strips for your meter. Each meter has its own strips. You must use the strips that came with your meter.  · A needle to prick your finger (lancet). Do not use a lancet more than one time.  · A device that holds the lancet (lancing device).  · A journal or log book to write down your results.  How to check your blood glucose    1. Wash your hands with soap and water.  2. Prick the side of your finger (not the tip) with the lancet. Use a different finger each time.  3. Gently rub the finger until a small drop of blood appears.  4. Follow instructions that come with your meter for inserting the test strip, applying blood to the strip, and using your blood glucose meter.  5. Write down your result and any notes.  Some meters  allow you to use areas of your body other than your finger (alternative sites) to test your blood. The most common alternative sites are:  · Forearm.  · Thigh.  · Palm of the hand.  If you think you may have hypoglycemia, or if you have a history of not knowing when your blood glucose is getting low (hypoglycemia unawareness), do not use alternative sites. Use your finger instead. Alternative sites may not be as accurate as the fingers, because blood flow is slower in these areas. This means that the result you get may be delayed, and it may be different from the result that you would get from your finger.  Follow these instructions at home:  Blood glucose log    · Every time you check your blood glucose, write down your result. Also write down any notes about things that may be affecting your blood glucose, such as your diet and exercise for the day. This information can help you and your health care provider:  ? Look for patterns in your blood glucose over time.  ? Adjust your diabetes management plan as needed.  · Check if your meter allows you to download your records to a computer. Most glucose meters store a record of glucose readings in the meter.  If you have type 1 diabetes:  · Check your blood glucose 2 or more times a day.  · Also check your blood glucose:  ? Before every insulin injection.  ? Before and after exercise.  ? Before meals.  ? 2 hours after a meal.  ? Occasionally between 2:00 a.m. and 3:00 a.m., as directed.  ? Before potentially dangerous tasks, like driving or using heavy machinery.  ? At bedtime.  · You may need to check your blood glucose more often, up to 6-10 times a day, if you:  ? Use an insulin pump.  ? Need multiple daily injections (MDI).  ? Have diabetes that is not well-controlled.  ? Are ill.  ? Have a history of severe hypoglycemia.  ? Have hypoglycemia unawareness.  If you have type 2 diabetes:  · If you take insulin or other diabetes medicines, check your blood glucose 2 or  "more times a day.  · If you are on intensive insulin therapy, check your blood glucose 4 or more times a day. Occasionally, you may also need to check between 2:00 a.m. and 3:00 a.m., as directed.  · Also check your blood glucose:  ? Before and after exercise.  ? Before potentially dangerous tasks, like driving or using heavy machinery.  · You may need to check your blood glucose more often if:  ? Your medicine is being adjusted.  ? Your diabetes is not well-controlled.  ? You are ill.  General tips  · Always keep your supplies with you.  · If you have questions or need help, all blood glucose meters have a 24-hour \"hotline\" phone number that you can call. You may also contact your health care provider.  · After you use a few boxes of test strips, adjust (calibrate) your blood glucose meter by following instructions that came with your meter.  Contact a health care provider if:  · Your blood glucose is at or above 240 mg/dL (13.3 mmol/L) for 2 days in a row.  · You have been sick or have had a fever for 2 days or longer, and you are not getting better.  · You have any of the following problems for more than 6 hours:  ? You cannot eat or drink.  ? You have nausea or vomiting.  ? You have diarrhea.  Get help right away if:  · Your blood glucose is lower than 54 mg/dL (3 mmol/L).  · You become confused or you have trouble thinking clearly.  · You have difficulty breathing.  · You have moderate or large ketone levels in your urine.  Summary  · Monitoring your blood sugar (glucose) is an important part of managing your diabetes (diabetes mellitus).  · Blood glucose monitoring involves checking your blood glucose as often as directed and keeping a record (log) of your results over time.  · Your health care provider will set individualized treatment goals for you. Your goals will be based on your age, other medical conditions you have, and how you respond to diabetes treatment.  · Every time you check your blood glucose, " write down your result. Also write down any notes about things that may be affecting your blood glucose, such as your diet and exercise for the day.  This information is not intended to replace advice given to you by your health care provider. Make sure you discuss any questions you have with your health care provider.  Document Released: 12/20/2004 Document Revised: 10/11/2019 Document Reviewed: 05/29/2017  [employer] Patient Education © 2020 [employer]      Hyperglycemia  Hyperglycemia is when the sugar (glucose) level in your blood is too high. It may not cause symptoms. If you do have symptoms, they may include warning signs, such as:  · Feeling more thirsty than normal.  · Hunger.  · Feeling tired.  · Needing to pee (urinate) more than normal.  · Blurry eyesight (vision).  You may get other symptoms as it gets worse, such as:  · Dry mouth.  · Not being hungry (loss of appetite).  · Fruity-smelling breath.  · Weakness.  · Weight gain or loss that is not planned. Weight loss may be fast.  · A tingling or numb feeling in your hands or feet.  · Headache.  · Skin that does not bounce back quickly when it is lightly pinched and released (poor skin turgor).  · Pain in your belly (abdomen).  · Cuts or bruises that heal slowly.  High blood sugar can happen to people who do or do not have diabetes. High blood sugar can happen slowly or quickly, and it can be an emergency.  Follow these instructions at home:  General instructions  · Take over-the-counter and prescription medicines only as told by your doctor.  · Do not use products that contain nicotine or tobacco, such as cigarettes and e-cigarettes. If you need help quitting, ask your doctor.  · Limit alcohol intake to no more than 1 drink per day for nonpregnant women and 2 drinks per day for men. One drink equals 12 oz of beer, 5 oz of wine, or 1½ oz of hard liquor.  · Manage stress. If you need help with this, ask your doctor.  · Keep all follow-up visits as told by  your doctor. This is important.  Eating and drinking    · Stay at a healthy weight.  · Exercise regularly, as told by your doctor.  · Drink enough fluid, especially when you:  ? Exercise.  ? Get sick.  ? Are in hot temperatures.  · Eat healthy foods, such as:  ? Low-fat (lean) proteins.  ? Complex carbs (complex carbohydrates), such as whole wheat bread or brown rice.  ? Fresh fruits and vegetables.  ? Low-fat dairy products.  ? Healthy fats.  · Drink enough fluid to keep your pee (urine) clear or pale yellow.  If you have diabetes:    · Make sure you know the symptoms of hyperglycemia.  · Follow your diabetes management plan, as told by your doctor. Make sure you:  ? Take insulin and medicines as told.  ? Follow your exercise plan.  ? Follow your meal plan. Eat on time. Do not skip meals.  ? Check your blood sugar as often as told. Make sure to check before and after exercise. If you exercise longer or in a different way than you normally do, check your blood sugar more often.  ? Follow your sick day plan whenever you cannot eat or drink normally. Make this plan ahead of time with your doctor.  · Share your diabetes management plan with people in your workplace, school, and household.  · Check your urine for ketones when you are ill and as told by your doctor.  · Carry a card or wear jewelry that says that you have diabetes.  Contact a doctor if:  · Your blood sugar level is higher than 240 mg/dL (13.3 mmol/L) for 2 days in a row.  · You have problems keeping your blood sugar in your target range.  · High blood sugar happens often for you.  Get help right away if:  · You have trouble breathing.  · You have a change in how you think, feel, or act (mental status).  · You feel sick to your stomach (nauseous), and that feeling does not go away.  · You cannot stop throwing up (vomiting).  These symptoms may be an emergency. Do not wait to see if the symptoms will go away. Get medical help right away. Call your local  emergency services (911 in the [place] Do not drive yourself to the hospital.  Summary  · Hyperglycemia is when the sugar (glucose) level in your blood is too high.  · High blood sugar can happen to people who do or do not have diabetes.  · Make sure you drink enough fluids, eat healthy foods, and exercise regularly.  · Contact your doctor if you have problems keeping your blood sugar in your target range.  This information is not intended to replace advice given to you by your health care provider. Make sure you discuss any questions you have with your health care provider.  Document Released: 10/15/2010 Document Revised: 09/04/2017 Document Reviewed: 09/04/2017  [employer] Patient Education © 2020 [employer]    Diabetes, Frequently Asked Questions  WHAT IS DIABETES?  Most of the food we eat is turned into glucose (sugar). Our bodies use it for energy. The pancreas makes a hormone called insulin. It helps glucose get into the cells of our bodies. When you have diabetes, your body either does not make enough insulin or cannot use its own insulin as well as it should. This causes sugars to build up in your blood.  WHAT ARE THE SYMPTOMS OF DIABETES?  · Frequent urination.   · Excessive thirst.   · Unexplained weight loss.   · Extreme hunger.   · Blurred vision.   · Tingling or numbness in hands or feet.   · Feeling very tired much of the time.   · Dry, itchy skin.   · Sores that are slow to heal.   · Yeast infections.   WHAT ARE THE TYPES OF DIABETES?  Type 1 Diabetes   · About 10% of affected people have this type.   · Usually occurs before the age of 30.   · Usually occurs in thin to normal weight people.   Type 2 Diabetes  · About 90% of affected people have this type.   · Usually occurs after the age of 40.   · Usually occurs in overweight people.   · More likely to have:   · A family history of diabetes.   · A history of diabetes during pregnancy (gestational diabetes).   · High blood pressure.   · High  cholesterol and triglycerides.   Gestational Diabetes  · Occurs in about 4% of pregnancies.   · Usually goes away after the baby is born.   · More likely to occur in women with:   · Family history of diabetes.   · Previous gestational diabetes.   · Obese.   · Over 25 years old.   WHAT IS PRE-DIABETES?  Pre-diabetes means your blood glucose is higher than normal, but lower than the diabetes range. It also means you are at risk of getting type 2 diabetes and heart disease. If you are told you have pre-diabetes, have your blood glucose checked again in 1 to 2 years.  WHAT IS THE TREATMENT FOR DIABETES?  Treatment is aimed at keeping blood glucose near normal levels at all times. Learning how to manage this yourself is important in treating diabetes. Depending on the type of diabetes you have, your treatment will include one or more of the following:  · Monitoring your blood glucose.   · Meal planning.   · Exercise.   · Oral medicine (pills) or insulin.   CAN DIABETES BE PREVENTED?  With type 1 diabetes, prevention is more difficult, because the triggers that cause it are not yet known.  With type 2 diabetes, prevention is more likely, with lifestyle changes:  · Maintain a healthy weight.   · Eat healthy.   · Exercise.   IS THERE A CURE FOR DIABETES?  No, there is no cure for diabetes. There is a lot of research going on that is looking for a cure, and progress is being made. Diabetes can be treated and controlled. People with diabetes can manage their diabetes and lead normal, active lives.  SHOULD I BE TESTED FOR DIABETES?  If you are at least 45 years old, you should be tested for diabetes. You should be tested again every 3 years. If you are 45 or older and overweight, you may want to get tested more often. If you are younger than 45, overweight, and have one or more of the following risk factors, you should be tested:  · Family history of diabetes.   · Inactive lifestyle.   · High blood pressure.   WHAT ARE SOME  OTHER SOURCES FOR INFORMATION ON DIABETES?  The following organizations may help in your search for more information on diabetes:  National Diabetes Education Program (NDEP)  Internet: http://www.ndep.nih.gov/resources  American Diabetes Association  Internet: http://www.diabetes.org   Juvenile Diabetes Foundation International  Internet: http://www.jdf.org  Document Released: 12/20/2004 Document Revised: 03/11/2013 Document Reviewed: 10/15/2010  [employer]® Patient Information ©2013 [employer].    Diet: Diet Order Diet: Consistent CHO (Diabetic)  Activity: As tolerated  Follow Up: PCP at first available, within a week.  Dr Magaña at first available  Disposition:Home  Diagnosis: Hyperosmolar hyperglycemic state (HHS) (HCC)    Follow up with your Primary Care Provider Rodolfo Stein M.D. as scheduled or sooner if your symptoms persist or worsen.  Return to Emergency Room for severe chest pain, shortness of breath, signs of a stroke, or any other emergencies.      Discharge Instructions    Discharged to home by car with relative. Discharged via wheelchair, hospital escort: yes  Special equipment needed: Not Applicable and Walker    Be sure to schedule a follow-up appointment with your primary care doctor or any specialists as instructed.     Discharge Plan:   Diet Plan: Discussed  Activity Level: Discussed  Confirmed Follow up Appointment: Patient to Call and Schedule Appointment  Confirmed Symptoms Management: Discussed  Medication Reconciliation Updated: No (Comments)  Influenza Vaccine Indication: Not indicated: Previously immunized this influenza season and > 8 years of age    I understand that a diet low in cholesterol, fat, and sodium is recommended for good health. Unless I have been given specific instructions below for another diet, I accept this instruction as my diet prescription.   Other diet: DiABETIC    Special Instructions: None    · Is patient discharged on Warfarin / Coumadin?   No     Depression /  Suicide Risk    As you are discharged from this [facility] facility, it is important to learn how to keep safe from harming yourself.    Recognize the warning signs:  · Abrupt changes in personality, positive or negative- including increase in energy   · Giving away possessions  · Change in eating patterns- significant weight changes-  positive or negative  · Change in sleeping patterns- unable to sleep or sleeping all the time   · Unwillingness or inability to communicate  · Depression  · Unusual sadness, discouragement and loneliness  · Talk of wanting to die  · Neglect of personal appearance   · Rebelliousness- reckless behavior  · Withdrawal from people/activities they love  · Confusion- inability to concentrate     If you or a loved one observes any of these behaviors or has concerns about self-harm, here's what you can do:  · Talk about it- your feelings and reasons for harming yourself  · Remove any means that you might use to hurt yourself (examples: pills, rope, extension cords, firearm)  · Get professional help from the community (Mental Health, Substance Abuse, psychological counseling)  · Do not be alone:Call your Safe Contact- someone whom you trust who will be there for you.  · Call your local CRISIS HOTLINE 489-6401 or 654-804-4602  · Call your local Children's Mobile Crisis Response Team [place] (378) 312-7822 or www.Solavei  · Call the toll free National Suicide Prevention Hotlines   · National Suicide Prevention Lifeline 300-441-QXBQ (8576)  · National Hope Line Network 800-SUICIDE (196-8544)

## 2022-01-04 NOTE — ED NOTES
Patient reports abd pain is greatly improved. Reports oxycodone worked better than the dilaudid. Preparing for admit to [facility].

## 2022-01-04 NOTE — PROGRESS NOTES
CHW attempted to contact Endocrinology to schedule patient with Dr. Dupont but wait time was too long. Patient was provided contact info via AVS. CHW attempted to contact office of Rodolfo Stein M.D. (PCP) but appointments were booked out until March. PCP office stated that patient is on wait list and will contact patient once an appointment is available. Patient informed of wait list via phone call.

## 2022-01-04 NOTE — DISCHARGE SUMMARY
Discharge Summary    CHIEF COMPLAINT ON ADMISSION  Chief Complaint   Patient presents with   • Abdominal Pain     present last 3-4 days, c/o n/v, generalized pain    • Hyperglycemia     high readings for past week, c/o headaches, hx of DMT2; unabel to lower w/ at home medications       Reason for Admission  Abdominal Pain, High Blood Sugar     Admission Date  1/3/2022    CODE STATUS  Full Code    HPI & HOSPITAL COURSE  This is a 56 y.o. male here with hyperosmolar hyperglycemic states.  He ran out of his DM med of Synjardy approximately 3.5 weeks ago, but had still been taking his Actos and using regular insulin to try to compensate.  He was admitted with BSs in the  range, with an initial BS of 817 in the ER without criteria for DKA.  He was given IVFs, metformin, Actos and sliding scale insulin with subsequent BSs in the mid 200s.  We discussed that his previous A1c was 12.3 despite being on Synjardy at that time, but he does admit to some dietary indiscretions that likely contributed to that during that time frame. He does feel confident that his BSs will be under control with Metformin, Jardiance, Actos and PRN SSI at home, as he is in healthcare and monitors himself frequently.  His lack of Synjardy was due to loss of his insurance, but he now has that reinstated and has access to both his PCP and Dr Magaña as well as medication coverage.  I have asked our  to get him in immediately with his PCP, within one week. He is aware of the s/s of DKA and to report back to hospital if needed, and to utilize SSI at home for elevated BSs.  I did order repeat A1c that has not returned that his PCP should follow up on.      Therefore, he is discharged in good and stable condition to home with close outpatient follow-up.    The patient recovered much more quickly than anticipated on admission.    Discharge Date  1/4/22    FOLLOW UP ITEMS POST DISCHARGE  None    DISCHARGE DIAGNOSES  Principal Problem:     Hyperosmolar hyperglycemic state (HHS) (HCC) POA: Yes  Active Problems:    Depression POA: Yes    GERD (gastroesophageal reflux disease) POA: Yes    Ulcerative colitis (HCC) POA: Yes    Essential hypertension POA: Yes    Obesity (BMI 30.0-34.9) POA: Yes    Uncontrolled type 2 diabetes mellitus with hyperglycemia (HCC) POA: Yes    Hypothyroidism (Chronic) POA: Yes      Overview: Chronic condition treated with [place] [facility].      Resumed maintenance medication.    Dyslipidemia POA: Yes  Resolved Problems:    * No resolved hospital problems. *      FOLLOW UP  No future appointments.  Rodolfo Setin M.D.  [address] [facility]  [address]  [place] [address]  [phone]    In 1 week      Keenan Magaña M.D.  [address]  [address]  [place] [id number] [phone]      At first available      MEDICATIONS ON DISCHARGE     Medication List      START taking these medications      Instructions   Jardiance 25 MG Tabs  Generic drug: Empagliflozin   Take 25 mg by mouth every day.  Dose: 25 mg     metformin 1000 MG tablet  Commonly known as: GLUCOPHAGE   Take 1 Tablet by mouth 2 times a day with meals.  Dose: 1,000 mg     ondansetron 4 MG Tbdp  Commonly known as: ZOFRAN ODT   Take 1 Tablet by mouth every four hours as needed for Nausea (give PO if no IV route available).  Dose: 4 mg     pioglitazone 15 MG Tabs  Start taking on: January 5, 2022  Commonly known as: ACTOS   Take 1 Tablet by mouth every day.  Dose: 15 mg        CHANGE how you take these medications      Instructions   insulin lispro 100 UNIT/ML Sopn injection PEN  What changed:   · how much to take  · when to take this  · additional instructions  Commonly known as: HumaLOG,AdmeLOG   Inject 8 Units under the skin 3 times a day before meals.  Dose: 8 Units        CONTINUE taking these medications      Instructions   Alcohol Swabs   Doctor's comments: Per formulary preference. ICD-10 code: E11.65 Uncontrolled type 2 Diabetes Mellitus  Wipe site with prep pad prior  "to injection.     * Blood Glucose Meter Kit   Doctor's comments: Or per formulary preference. ICD-10 code: E11.65 Uncontrolled type 2 Diabetes Mellitus  Test blood sugar as recommended by provider. True Metrix blood glucose monitoring kit.     * Blood Glucose Test Strips   Doctor's comments: Or per formulary preference. ICD-10 code: E11.65 Uncontrolled type 2 Diabetes Mellitus  Use one True Metrix strip to test blood sugar three times daily before meals.     Insulin Pen Needle 32 G x 4 mm   Doctor's comments: Per patient/formulary preference. ICD-10 code: E11.65 Uncontrolled type 2 Diabetes Mellitus  Use one pen needle in pen device to inject insulin three times daily.     Lancets   Doctor's comments: Or per formulary preference. ICD-10 code: E11.65 Uncontrolled type 2 Diabetes Mellitus  Use one True Metrix lancet to test blood sugar three times daily before meals.     melatonin 5 mg Tabs   Take 5-10 mg by mouth at bedtime as needed (Sleep). 1 to 2 tablets = 5 to 10 mg.  Dose: 5-10 mg     Mens Multivitamin Tabs   Take 1 Package by mouth every evening. \"[employer] Men\"  Dose: 1 Package     thyroid 60 MG Tabs  Commonly known as: ARMOUR THYROID   Take 1 Tab by mouth every morning.  Dose: 60 mg         * This list has 2 medication(s) that are the same as other medications prescribed for you. Read the directions carefully, and ask your doctor or other care provider to review them with you.                Allergies  Allergies   Allergen Reactions   • Peanut (Diagnostic) Anaphylaxis   • Tradjenta [Linagliptin] Unspecified     Pt developed pancreatitis   • Hydrocodone Hives     Tolerates Morphine and Oxycodone         DIET  Orders Placed This Encounter   Procedures   • Diet Order Diet: Consistent CHO (Diabetic)     Standing Status:   Standing     Number of Occurrences:   1     Order Specific Question:   Diet:     Answer:   Consistent CHO (Diabetic) [4]       ACTIVITY  As tolerated.  Weight bearing as " tolerated    CONSULTATIONS  none    PROCEDURES  none    LABORATORY  Lab Results   Component Value Date    SODIUM 136 01/04/2022    POTASSIUM 3.7 01/04/2022    CHLORIDE 104 01/04/2022    CO2 22 01/04/2022    GLUCOSE 222 (H) 01/04/2022    BUN 18 01/04/2022    CREATININE 0.58 01/04/2022    CREATININE 1.1 02/18/2008        Lab Results   Component Value Date    WBC 5.6 01/03/2022    HEMOGLOBIN 15.4 01/03/2022    HEMATOCRIT 42.3 01/03/2022    PLATELETCT 202 01/03/2022        Total time of the discharge process exceeds 34 minutes.

## 2022-01-04 NOTE — CARE PLAN
The patient is Stable - Low risk of patient condition declining or worsening    Shift Goals  Clinical Goals: decrease blood sugar, monitor for abdominal pain  Patient Goals: Pt will have adequate pain control    Progress made toward(s) clinical / shift goals:  Pain is well controlled. Blood sugar is 365 mg/dl, insulin given.    Patient is not progressing towards the following goals:      Problem: Pain - Standard  Goal: Alleviation of pain or a reduction in pain to the patient’s comfort goal  Outcome: Progressing     Problem: Knowledge Deficit - Standard  Goal: Patient and family/care givers will demonstrate understanding of plan of care, disease process/condition, diagnostic tests and medications  Outcome: Progressing     Problem: Diabetes Management  Goal: Patient will achieve and maintain glucose in satisfactory range  Outcome: Progressing

## 2022-01-04 NOTE — ED NOTES
Patient has increased abd pain. Medicated as ordered. Per Dr Hopkins, allow LR bolus to finish before drawing repeat metabolic panel.

## 2022-01-04 NOTE — PROGRESS NOTES
1640-Received report from ER RN. Assumed pt care.   Assessment and chart check complete.   Pt is AA0X4, no signs of distress, denies nausea/vomiting and pain at this moment.   FSBG- 365 mg/dl, insulin given.  IVF infusing.  Fall precautions in place, treaded socks on pt, bed in low position.   Call light within reach.   Educated pt to call if needing anything.

## 2022-02-17 ENCOUNTER — APPOINTMENT (OUTPATIENT)
Dept: RADIOLOGY | Facility: IMAGING CENTER | Age: 57
End: 2022-02-17
Attending: PHYSICIAN ASSISTANT
Payer: COMMERCIAL

## 2022-02-17 ENCOUNTER — OFFICE VISIT (OUTPATIENT)
Dept: URGENT CARE | Facility: CLINIC | Age: 57
End: 2022-02-17
Payer: COMMERCIAL

## 2022-02-17 VITALS
TEMPERATURE: 98.7 F | OXYGEN SATURATION: 97 % | HEIGHT: 69 IN | RESPIRATION RATE: 20 BRPM | BODY MASS INDEX: 28.14 KG/M2 | DIASTOLIC BLOOD PRESSURE: 78 MMHG | WEIGHT: 190 LBS | HEART RATE: 114 BPM | SYSTOLIC BLOOD PRESSURE: 140 MMHG

## 2022-02-17 DIAGNOSIS — M79.671 RIGHT FOOT PAIN: ICD-10-CM

## 2022-02-17 DIAGNOSIS — R20.8 SENSATION OF FOREIGN BODY IN FOOT: ICD-10-CM

## 2022-02-17 DIAGNOSIS — L08.9 RIGHT FOOT INFECTION: ICD-10-CM

## 2022-02-17 DIAGNOSIS — Z86.69 HISTORY OF NEUROPATHY: ICD-10-CM

## 2022-02-17 DIAGNOSIS — E11.65 TYPE 2 DIABETES MELLITUS WITH HYPERGLYCEMIA, UNSPECIFIED WHETHER LONG TERM INSULIN USE (HCC): ICD-10-CM

## 2022-02-17 PROCEDURE — 73620 X-RAY EXAM OF FOOT: CPT | Mod: TC,FY,RT | Performed by: RADIOLOGY

## 2022-02-17 PROCEDURE — 99214 OFFICE O/P EST MOD 30 MIN: CPT | Performed by: PHYSICIAN ASSISTANT

## 2022-02-17 RX ORDER — AMOXICILLIN 500 MG/1
500 CAPSULE ORAL 3 TIMES DAILY
Status: ON HOLD | COMMUNITY
Start: 2022-02-18 | End: 2022-02-21

## 2022-02-17 RX ORDER — SULFAMETHOXAZOLE AND TRIMETHOPRIM 800; 160 MG/1; MG/1
1 TABLET ORAL 2 TIMES DAILY
Qty: 14 TABLET | Refills: 0 | Status: ON HOLD
Start: 2022-02-17 | End: 2022-02-21

## 2022-02-17 ASSESSMENT — FIBROSIS 4 INDEX: FIB4 SCORE: 0.56

## 2022-02-18 ENCOUNTER — APPOINTMENT (OUTPATIENT)
Dept: RADIOLOGY | Facility: MEDICAL CENTER | Age: 57
DRG: 872 | End: 2022-02-18
Attending: EMERGENCY MEDICINE
Payer: COMMERCIAL

## 2022-02-18 ENCOUNTER — HOSPITAL ENCOUNTER (INPATIENT)
Facility: MEDICAL CENTER | Age: 57
LOS: 3 days | DRG: 872 | End: 2022-02-21
Attending: EMERGENCY MEDICINE | Admitting: HOSPITALIST
Payer: COMMERCIAL

## 2022-02-18 DIAGNOSIS — M79.671 RIGHT FOOT PAIN: ICD-10-CM

## 2022-02-18 DIAGNOSIS — R73.9 HYPERGLYCEMIA: ICD-10-CM

## 2022-02-18 DIAGNOSIS — L02.91 ABSCESS: ICD-10-CM

## 2022-02-18 DIAGNOSIS — A41.9 SEPSIS WITHOUT ACUTE ORGAN DYSFUNCTION, DUE TO UNSPECIFIED ORGANISM (HCC): ICD-10-CM

## 2022-02-18 LAB
ALBUMIN SERPL BCP-MCNC: 4.4 G/DL (ref 3.2–4.9)
ALBUMIN/GLOB SERPL: 1.5 G/DL
ALP SERPL-CCNC: 131 U/L (ref 30–99)
ALT SERPL-CCNC: 59 U/L (ref 2–50)
ANION GAP SERPL CALC-SCNC: 13 MMOL/L (ref 7–16)
ANION GAP SERPL CALC-SCNC: 19 MMOL/L (ref 7–16)
APPEARANCE UR: CLEAR
AST SERPL-CCNC: 38 U/L (ref 12–45)
BASE EXCESS BLDV CALC-SCNC: 1 MMOL/L
BASOPHILS # BLD AUTO: 0.7 % (ref 0–1.8)
BASOPHILS # BLD: 0.03 K/UL (ref 0–0.12)
BILIRUB SERPL-MCNC: 0.7 MG/DL (ref 0.1–1.5)
BILIRUB UR QL STRIP.AUTO: NEGATIVE
BODY TEMPERATURE: ABNORMAL CENTIGRADE
BUN SERPL-MCNC: 13 MG/DL (ref 8–22)
BUN SERPL-MCNC: 16 MG/DL (ref 8–22)
CALCIUM SERPL-MCNC: 9.2 MG/DL (ref 8.4–10.2)
CALCIUM SERPL-MCNC: 9.6 MG/DL (ref 8.4–10.2)
CHLORIDE SERPL-SCNC: 102 MMOL/L (ref 96–112)
CHLORIDE SERPL-SCNC: 90 MMOL/L (ref 96–112)
CO2 SERPL-SCNC: 21 MMOL/L (ref 20–33)
CO2 SERPL-SCNC: 22 MMOL/L (ref 20–33)
COLOR UR: YELLOW
CREAT SERPL-MCNC: 0.63 MG/DL (ref 0.5–1.4)
CREAT SERPL-MCNC: 0.92 MG/DL (ref 0.5–1.4)
EOSINOPHIL # BLD AUTO: 0.06 K/UL (ref 0–0.51)
EOSINOPHIL NFR BLD: 1.4 % (ref 0–6.9)
ERYTHROCYTE [DISTWIDTH] IN BLOOD BY AUTOMATED COUNT: 42.5 FL (ref 35.9–50)
GLOBULIN SER CALC-MCNC: 2.9 G/DL (ref 1.9–3.5)
GLUCOSE BLD-MCNC: 447 MG/DL (ref 65–99)
GLUCOSE BLD-MCNC: 593 MG/DL (ref 65–99)
GLUCOSE SERPL-MCNC: 330 MG/DL (ref 65–99)
GLUCOSE SERPL-MCNC: 774 MG/DL (ref 65–99)
GLUCOSE UR STRIP.AUTO-MCNC: >=1000 MG/DL
HCO3 BLDV-SCNC: 20 MMOL/L (ref 24–28)
HCT VFR BLD AUTO: 41.1 % (ref 42–52)
HGB BLD-MCNC: 14.7 G/DL (ref 14–18)
IMM GRANULOCYTES # BLD AUTO: 0.02 K/UL (ref 0–0.11)
IMM GRANULOCYTES NFR BLD AUTO: 0.5 % (ref 0–0.9)
KETONES UR STRIP.AUTO-MCNC: 15 MG/DL
LACTATE BLD-SCNC: 2.1 MMOL/L (ref 0.5–2)
LACTATE BLD-SCNC: 2.7 MMOL/L (ref 0.5–2)
LACTATE BLD-SCNC: 4.9 MMOL/L (ref 0.5–2)
LACTATE BLD-SCNC: 5.2 MMOL/L (ref 0.5–2)
LEUKOCYTE ESTERASE UR QL STRIP.AUTO: NEGATIVE
LYMPHOCYTES # BLD AUTO: 1.06 K/UL (ref 1–4.8)
LYMPHOCYTES NFR BLD: 24.4 % (ref 22–41)
MCH RBC QN AUTO: 32.2 PG (ref 27–33)
MCHC RBC AUTO-ENTMCNC: 35.8 G/DL (ref 33.7–35.3)
MCV RBC AUTO: 89.9 FL (ref 81.4–97.8)
MICRO URNS: ABNORMAL
MONOCYTES # BLD AUTO: 0.34 K/UL (ref 0–0.85)
MONOCYTES NFR BLD AUTO: 7.8 % (ref 0–13.4)
NEUTROPHILS # BLD AUTO: 2.83 K/UL (ref 1.82–7.42)
NEUTROPHILS NFR BLD: 65.2 % (ref 44–72)
NITRITE UR QL STRIP.AUTO: NEGATIVE
NRBC # BLD AUTO: 0 K/UL
NRBC BLD-RTO: 0 /100 WBC
PCO2 BLDV: 21 MMHG (ref 41–51)
PH BLDV: 7.6 [PH] (ref 7.31–7.45)
PH UR STRIP.AUTO: 5 [PH] (ref 5–8)
PLATELET # BLD AUTO: 191 K/UL (ref 164–446)
PMV BLD AUTO: 9.5 FL (ref 9–12.9)
PO2 BLDV: 174.1 MMHG (ref 25–40)
POTASSIUM SERPL-SCNC: 3.7 MMOL/L (ref 3.6–5.5)
POTASSIUM SERPL-SCNC: 4.5 MMOL/L (ref 3.6–5.5)
PROT SERPL-MCNC: 7.3 G/DL (ref 6–8.2)
PROT UR QL STRIP: NEGATIVE MG/DL
RBC # BLD AUTO: 4.57 M/UL (ref 4.7–6.1)
RBC UR QL AUTO: NEGATIVE
SAO2 % BLDV: 98.8 %
SODIUM SERPL-SCNC: 130 MMOL/L (ref 135–145)
SODIUM SERPL-SCNC: 137 MMOL/L (ref 135–145)
SP GR UR STRIP.AUTO: <=1.005
WBC # BLD AUTO: 4.3 K/UL (ref 4.8–10.8)

## 2022-02-18 PROCEDURE — 80053 COMPREHEN METABOLIC PANEL: CPT

## 2022-02-18 PROCEDURE — 73630 X-RAY EXAM OF FOOT: CPT | Mod: RT

## 2022-02-18 PROCEDURE — 99223 1ST HOSP IP/OBS HIGH 75: CPT | Performed by: HOSPITALIST

## 2022-02-18 PROCEDURE — 96372 THER/PROPH/DIAG INJ SC/IM: CPT

## 2022-02-18 PROCEDURE — 83605 ASSAY OF LACTIC ACID: CPT | Mod: 91

## 2022-02-18 PROCEDURE — 700105 HCHG RX REV CODE 258: Performed by: EMERGENCY MEDICINE

## 2022-02-18 PROCEDURE — 87086 URINE CULTURE/COLONY COUNT: CPT

## 2022-02-18 PROCEDURE — 82962 GLUCOSE BLOOD TEST: CPT

## 2022-02-18 PROCEDURE — 96366 THER/PROPH/DIAG IV INF ADDON: CPT

## 2022-02-18 PROCEDURE — 96367 TX/PROPH/DG ADDL SEQ IV INF: CPT

## 2022-02-18 PROCEDURE — 700102 HCHG RX REV CODE 250 W/ 637 OVERRIDE(OP): Performed by: HOSPITALIST

## 2022-02-18 PROCEDURE — 96376 TX/PRO/DX INJ SAME DRUG ADON: CPT

## 2022-02-18 PROCEDURE — 85025 COMPLETE CBC W/AUTO DIFF WBC: CPT

## 2022-02-18 PROCEDURE — 96375 TX/PRO/DX INJ NEW DRUG ADDON: CPT

## 2022-02-18 PROCEDURE — A9270 NON-COVERED ITEM OR SERVICE: HCPCS | Performed by: HOSPITALIST

## 2022-02-18 PROCEDURE — 700111 HCHG RX REV CODE 636 W/ 250 OVERRIDE (IP): Performed by: EMERGENCY MEDICINE

## 2022-02-18 PROCEDURE — 87641 MR-STAPH DNA AMP PROBE: CPT

## 2022-02-18 PROCEDURE — 770006 HCHG ROOM/CARE - MED/SURG/GYN SEMI*

## 2022-02-18 PROCEDURE — 80048 BASIC METABOLIC PNL TOTAL CA: CPT

## 2022-02-18 PROCEDURE — 96365 THER/PROPH/DIAG IV INF INIT: CPT

## 2022-02-18 PROCEDURE — 71045 X-RAY EXAM CHEST 1 VIEW: CPT

## 2022-02-18 PROCEDURE — 87040 BLOOD CULTURE FOR BACTERIA: CPT

## 2022-02-18 PROCEDURE — 82803 BLOOD GASES ANY COMBINATION: CPT

## 2022-02-18 PROCEDURE — 81003 URINALYSIS AUTO W/O SCOPE: CPT

## 2022-02-18 PROCEDURE — 99285 EMERGENCY DEPT VISIT HI MDM: CPT

## 2022-02-18 RX ORDER — THYROID 30 MG/1
60 TABLET ORAL EVERY MORNING
Status: DISCONTINUED | OUTPATIENT
Start: 2022-02-19 | End: 2022-02-21 | Stop reason: HOSPADM

## 2022-02-18 RX ORDER — INSULIN LISPRO 100 [IU]/ML
1-6 INJECTION, SOLUTION INTRAVENOUS; SUBCUTANEOUS
Status: DISCONTINUED | OUTPATIENT
Start: 2022-02-18 | End: 2022-02-19

## 2022-02-18 RX ORDER — SODIUM CHLORIDE, SODIUM LACTATE, POTASSIUM CHLORIDE, AND CALCIUM CHLORIDE .6; .31; .03; .02 G/100ML; G/100ML; G/100ML; G/100ML
30 INJECTION, SOLUTION INTRAVENOUS
Status: DISCONTINUED | OUTPATIENT
Start: 2022-02-18 | End: 2022-02-21 | Stop reason: HOSPADM

## 2022-02-18 RX ORDER — INSULIN LISPRO 100 [IU]/ML
0.2 INJECTION, SOLUTION INTRAVENOUS; SUBCUTANEOUS
Status: DISCONTINUED | OUTPATIENT
Start: 2022-02-19 | End: 2022-02-19

## 2022-02-18 RX ORDER — DEXTROSE MONOHYDRATE 25 G/50ML
50 INJECTION, SOLUTION INTRAVENOUS
Status: DISCONTINUED | OUTPATIENT
Start: 2022-02-18 | End: 2022-02-19

## 2022-02-18 RX ORDER — ACETAMINOPHEN 325 MG/1
650 TABLET ORAL EVERY 6 HOURS PRN
Status: DISCONTINUED | OUTPATIENT
Start: 2022-02-18 | End: 2022-02-21 | Stop reason: HOSPADM

## 2022-02-18 RX ORDER — HYDROMORPHONE HYDROCHLORIDE 1 MG/ML
0.5 INJECTION, SOLUTION INTRAMUSCULAR; INTRAVENOUS; SUBCUTANEOUS ONCE
Status: COMPLETED | OUTPATIENT
Start: 2022-02-18 | End: 2022-02-18

## 2022-02-18 RX ORDER — AMOXICILLIN 250 MG
2 CAPSULE ORAL 2 TIMES DAILY
Status: DISCONTINUED | OUTPATIENT
Start: 2022-02-19 | End: 2022-02-21 | Stop reason: HOSPADM

## 2022-02-18 RX ORDER — SODIUM CHLORIDE 9 MG/ML
1000 INJECTION, SOLUTION INTRAVENOUS ONCE
Status: COMPLETED | OUTPATIENT
Start: 2022-02-18 | End: 2022-02-18

## 2022-02-18 RX ORDER — SODIUM CHLORIDE, SODIUM LACTATE, POTASSIUM CHLORIDE, AND CALCIUM CHLORIDE .6; .31; .03; .02 G/100ML; G/100ML; G/100ML; G/100ML
500 INJECTION, SOLUTION INTRAVENOUS
Status: DISCONTINUED | OUTPATIENT
Start: 2022-02-18 | End: 2022-02-21 | Stop reason: HOSPADM

## 2022-02-18 RX ORDER — TADALAFIL 5 MG/1
5 TABLET ORAL EVERY EVENING
COMMUNITY

## 2022-02-18 RX ORDER — POLYETHYLENE GLYCOL 3350 17 G/17G
1 POWDER, FOR SOLUTION ORAL
Status: DISCONTINUED | OUTPATIENT
Start: 2022-02-18 | End: 2022-02-21 | Stop reason: HOSPADM

## 2022-02-18 RX ORDER — BISACODYL 10 MG
10 SUPPOSITORY, RECTAL RECTAL
Status: DISCONTINUED | OUTPATIENT
Start: 2022-02-18 | End: 2022-02-21 | Stop reason: HOSPADM

## 2022-02-18 RX ORDER — DEXTROSE MONOHYDRATE 25 G/50ML
25 INJECTION, SOLUTION INTRAVENOUS
Status: DISCONTINUED | OUTPATIENT
Start: 2022-02-18 | End: 2022-02-18

## 2022-02-18 RX ORDER — KETOROLAC TROMETHAMINE 30 MG/ML
15 INJECTION, SOLUTION INTRAMUSCULAR; INTRAVENOUS EVERY 6 HOURS PRN
Status: DISPENSED | OUTPATIENT
Start: 2022-02-18 | End: 2022-02-19

## 2022-02-18 RX ORDER — HYDROMORPHONE HYDROCHLORIDE 1 MG/ML
.5-1 INJECTION, SOLUTION INTRAMUSCULAR; INTRAVENOUS; SUBCUTANEOUS
Status: DISCONTINUED | OUTPATIENT
Start: 2022-02-18 | End: 2022-02-18

## 2022-02-18 RX ORDER — CHOLECALCIFEROL (VITAMIN D3) 125 MCG
5-10 CAPSULE ORAL
Status: DISCONTINUED | OUTPATIENT
Start: 2022-02-18 | End: 2022-02-21 | Stop reason: HOSPADM

## 2022-02-18 RX ORDER — SODIUM CHLORIDE 9 MG/ML
INJECTION, SOLUTION INTRAVENOUS CONTINUOUS
Status: DISCONTINUED | OUTPATIENT
Start: 2022-02-18 | End: 2022-02-21 | Stop reason: HOSPADM

## 2022-02-18 RX ORDER — SODIUM CHLORIDE, SODIUM LACTATE, POTASSIUM CHLORIDE, AND CALCIUM CHLORIDE .6; .31; .03; .02 G/100ML; G/100ML; G/100ML; G/100ML
30 INJECTION, SOLUTION INTRAVENOUS
Status: COMPLETED | OUTPATIENT
Start: 2022-02-18 | End: 2022-02-18

## 2022-02-18 RX ORDER — HYDROMORPHONE HYDROCHLORIDE 2 MG/1
2 TABLET ORAL
Status: DISCONTINUED | OUTPATIENT
Start: 2022-02-18 | End: 2022-02-18

## 2022-02-18 RX ADMIN — HYDROMORPHONE HYDROCHLORIDE 0.5 MG: 1 INJECTION, SOLUTION INTRAMUSCULAR; INTRAVENOUS; SUBCUTANEOUS at 19:53

## 2022-02-18 RX ADMIN — INSULIN LISPRO 6 UNITS: 100 INJECTION, SOLUTION INTRAVENOUS; SUBCUTANEOUS at 21:21

## 2022-02-18 RX ADMIN — HYDROMORPHONE HYDROCHLORIDE 0.5 MG: 1 INJECTION, SOLUTION INTRAMUSCULAR; INTRAVENOUS; SUBCUTANEOUS at 19:00

## 2022-02-18 RX ADMIN — SODIUM CHLORIDE 3 G: 900 INJECTION INTRAVENOUS at 18:51

## 2022-02-18 RX ADMIN — VANCOMYCIN HYDROCHLORIDE 2250 MG: 1 INJECTION, POWDER, LYOPHILIZED, FOR SOLUTION INTRAVENOUS at 19:29

## 2022-02-18 RX ADMIN — HYDROMORPHONE HYDROCHLORIDE 2 MG: 2 TABLET ORAL at 22:12

## 2022-02-18 RX ADMIN — SODIUM CHLORIDE 1000 ML: 9 INJECTION, SOLUTION INTRAVENOUS at 18:38

## 2022-02-18 RX ADMIN — INSULIN GLARGINE 8 UNITS: 100 INJECTION, SOLUTION SUBCUTANEOUS at 21:21

## 2022-02-18 RX ADMIN — SODIUM CHLORIDE, POTASSIUM CHLORIDE, SODIUM LACTATE AND CALCIUM CHLORIDE 2580 ML: 600; 310; 30; 20 INJECTION, SOLUTION INTRAVENOUS at 19:29

## 2022-02-18 ASSESSMENT — ENCOUNTER SYMPTOMS
STRIDOR: 0
FOCAL WEAKNESS: 0
COUGH: 0
EYE REDNESS: 0
EYE DISCHARGE: 0
FEVER: 1
CHILLS: 1
CHILLS: 1
ABDOMINAL PAIN: 0
VOMITING: 0
MYALGIAS: 0
NERVOUS/ANXIOUS: 0
FEVER: 1
SHORTNESS OF BREATH: 0
BRUISES/BLEEDS EASILY: 0
FLANK PAIN: 0

## 2022-02-18 ASSESSMENT — PAIN DESCRIPTION - PAIN TYPE: TYPE: ACUTE PAIN

## 2022-02-18 ASSESSMENT — FIBROSIS 4 INDEX: FIB4 SCORE: 0.56

## 2022-02-18 NOTE — PROGRESS NOTES
"Subjective     Mahesh Bradshaw is a 56 y.o. male who presents with Foreign Body (In left foot, possible wood splinter )            Patient is a 56-year-old male who presents to urgent care with right foot pain after stepping on a wooden splinter yesterday.  Patient reports that his wife attempted to remove the splinter and did get a portion out however continued to feel foreign body sensation.  Patient reports that he is diabetic currently uncontrolled of which over the last day his sugars \"have been wild \".  Patient was recently hospitalized for uncontrolled diabetes of which his A1c was greater than 15.  Patient does report low-grade fever last night with worsening right foot pain prompting evaluation today.  Patient does report history of neuropathy and readily admits that his foot bothers him majority of time however this is worse.  He does report slight swelling to this region as well.    Foreign Body  The incident occurred 2 days ago. The incident was reported. The incident was witnessed/reported by the patient. Associated symptoms include a fever. His past medical history is significant for a prior foreign body removal.       Review of Systems   Constitutional: Positive for chills, fever and malaise/fatigue.   Musculoskeletal:        Right foot pain   All other systems reviewed and are negative.             Objective     /78   Pulse (!) 114   Temp 37.1 °C (98.7 °F) (Temporal)   Resp 20   Ht 1.753 m (5' 9\")   Wt 86.2 kg (190 lb)   SpO2 97%   BMI 28.06 kg/m²    PMH:  has a past medical history of ADRIANE (acute kidney injury) ([facility]) (2/24/2016), Anesthesia, Arthritis (3-3-17), Aspiration pneumonia ([facility]) (3-2016), ASTHMA (3-3-17), Breath shortness (3-3-17), Congestive heart failure ([facility]) (2-2016 ), Dental disorder, Depression (11/26/2014), Diabetes ([facility]) (3-3-17), Diabetes ([facility]), Difficult intubation (2-2016), DKA (diabetic ketoacidoses) (2-2016), Electrolyte imbalance (2-2016), Elevated LFTs, " Elevated liver enzymes (2-2016), Essential hypertension (1/22/2016), Gout, Heart burn, High cholesterol (3-3-17), Hypothyroid (3-3-17), Indigestion, Kidney stones, Klebsiella infect, Migraine, MRSA cellulitis, Necrotizing myositis, On mechanically assisted ventilation (HCC) (2-2016), Pain (3-3-17), Pancreatitis (2-2016), RF (renal failure) (2-2016), Sepsis ([facility]) (1/21/2016), Snoring (3-3-17), Streptococcus infection, UC (ulcerative colitis) ([facility]) (3-3-17), Urinary incontinence, and Vitamin D deficiency.  MEDS: Reviewed .   ALLERGIES:   Allergies   Allergen Reactions   • Peanut (Diagnostic) Anaphylaxis   • Tradjenta [Linagliptin] Unspecified     Pt developed pancreatitis   • Hydrocodone Hives     Tolerates Morphine and Oxycodone       SURGHX:   Past Surgical History:   Procedure Laterality Date   • UMBILICAL HERNIA REPAIR N/A 4/15/2018    Procedure: UMBILICAL HERNIA REPAIR;  Surgeon: Karen Beasley M.D.;  Location: [facility];  Service: General   • IRRIGATION & DEBRIDEMENT ORTHO Left 12/10/2017    Procedure: IRRIGATION & DEBRIDEMENT ORTHO LEFT HAND;  Surgeon: Chicho Ramos M.D.;  Location: [facility];  Service: Orthopedics   • IRRIGATION & DEBRIDEMENT GENERAL Right 5/1/2017    Procedure: IRRIGATION & DEBRIDEMENT GENERAL-Left HAND AND right  ANKLE;  Surgeon: Esau Dooley M.D.;  Location: [facility];  Service:    • IRRIGATION & DEBRIDEMENT GENERAL Right 4/29/2017    Procedure: IRRIGATION & DEBRIDEMENT GENERAL;  Surgeon: Esau Dooley M.D.;  Location: [facility];  Service:    • INCISION AND DRAINAGE GENERAL  4/7/2017    Procedure: INCISION AND DRAINAGE GENERAL;  Surgeon: Esau Dooley M.D.;  Location: SURGERY SAME DAY [facility];  Service:    • UMBILICAL HERNIA REPAIR  3/10/2017    Procedure: UMBILICAL HERNIA REPAIR- INCISION AND DRAINAGE OF UMBILICAL WOUND AND MESH REMOVAL;  Surgeon: Esau Dooley M.D.;  Location: SURGERY SAME DAY [facility];   Service:    • UMBILICAL HERNIA REPAIR N/A 11/6/2016    Procedure: UMBILICAL HERNIA REPAIR;  Surgeon: Esau Dooley M.D.;  Location: SURGERY [facility];  Service:    • COLONOSCOPY WITH BIOPSY  11/26/2014    Performed by Solo Higginbotham M.D. at ENDOSCOPY [facility]   • LIVE BY LAPAROSCOPY  2005   • OTHER ORTHOPEDIC SURGERY  1997 or 1998    Left Wrist ORIF     SOCHX:  reports that he has never smoked. He has never used smokeless tobacco. He reports current alcohol use. He reports that he does not use drugs.  FH: Family history was reviewed, no pertinent findings to report    Physical Exam  Vitals reviewed.   Constitutional:       General: He is not in acute distress.     Appearance: He is well-developed.   HENT:      Head: Normocephalic and atraumatic.   Eyes:      Conjunctiva/sclera: Conjunctivae normal.      Pupils: Pupils are equal, round, and reactive to light.   Neck:      Trachea: No tracheal deviation.   Cardiovascular:      Rate and Rhythm: Tachycardia present.   Pulmonary:      Effort: Pulmonary effort is normal.   Musculoskeletal:      Cervical back: Normal range of motion and neck supple.      Comments: Right foot: Diffuse tenderness to the plantar aspect more toward forefoot.  Area of perceived foreign body is discretely tender without abscess formation with mild swelling.  Without visualization of foreign body.  After x-ray resulted no visualization of foreign body.  Area was cleaned with alcohol and approximately 1 cc of 2% lidocaine without was infiltrated to the region of perceived foreign body.  11 blade was utilized to puncture the site trace amount of purulent discharge was expressed without visualization of foreign body.  Puncture site was probed with sterile forceps again no foreign body identified and no further purulent pocket noted.  Compression bandage was applied.  Patient tolerated well.   Skin:     General: Skin is warm.      Findings: No rash.      Comments: No rash to  area exposed during the visit today.    Neurological:      Mental Status: He is alert and oriented to person, place, and time.      Coordination: Coordination normal.   Psychiatric:         Behavior: Behavior normal.         Thought Content: Thought content normal.         Judgment: Judgment normal.                             Assessment & Plan        1. Right foot pain  - DX-FOOT-2- RIGHT; Future  - sulfamethoxazole-trimethoprim (BACTRIM DS) 800-160 MG tablet; Take 1 Tablet by mouth 2 times a day for 7 days.  Dispense: 14 Tablet; Refill: 0    2. Sensation of foreign body in foot  - DX-FOOT-2- RIGHT; Future    3. Type 2 diabetes mellitus with hyperglycemia, unspecified whether long term insulin use (HCC)  4. History of neuropathy  5. Right foot infection  - sulfamethoxazole-trimethoprim (BACTRIM DS) 800-160 MG tablet; Take 1 Tablet by mouth 2 times a day for 7 days.  Dispense: 14 Tablet; Refill: 0                RADIOLOGY RESULTS   DX-FOOT-2- RIGHT    Result Date: 2/17/2022 2/17/2022 5:03 PM HISTORY/REASON FOR EXAM:  Right plantar foot pain in the 1st and 2nd digit region after stepping on a piece of wood. Evaluate for foreign body. TECHNIQUE/EXAM DESCRIPTION AND NUMBER OF VIEWS: 2 views of the RIGHT foot. COMPARISON:  None FINDINGS: There is minimal plantar forefoot swelling. There is no soft tissue gas or foreign body. There is no evidence of displaced fracture or dislocation. The alignment is maintained. There is a plantar calcaneal enthesophyte.     1.  There is no radiopaque foreign body or soft tissue gas.       Of note patient does have current prescription for amoxicillin through his dentist-patient will initiate that at this time along with course of Bactrim as well.  Patient was given very strict ED precautions as patient may be developing early abscess formation from previous foreign body.  No foreign body identified on exam or on x-ray today.  Patient is at high risk for complications secondary to  uncontrolled diabetes.  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  He was given strict ED precautions.  Appropriate PPE worn at all times by provider.   Pt. Had face mask on throughout entirety of the visit other than oropharyngeal examination today.     Side effects of OTC or prescribed medications discussed.     DDX, Supportive care, and indications for immediate follow-up discussed with patient.    Instructed to return to clinic or nearest emergency department if we are not available for any change in condition, further concerns, or worsening of symptoms.    The patient and/or guardian demonstrated a good understanding and agreed with the treatment plan.    Please note that this dictation was created using voice recognition software. I have made every reasonable attempt to correct obvious errors, but I expect that there are errors of grammar and possibly content that I did not discover before finalizing the note.

## 2022-02-19 ENCOUNTER — APPOINTMENT (OUTPATIENT)
Dept: RADIOLOGY | Facility: MEDICAL CENTER | Age: 57
DRG: 872 | End: 2022-02-19
Attending: HOSPITALIST
Payer: COMMERCIAL

## 2022-02-19 LAB
ALBUMIN SERPL BCP-MCNC: 3.4 G/DL (ref 3.2–4.9)
ALBUMIN/GLOB SERPL: 1.4 G/DL
ALP SERPL-CCNC: 108 U/L (ref 30–99)
ALT SERPL-CCNC: 57 U/L (ref 2–50)
ANION GAP SERPL CALC-SCNC: 11 MMOL/L (ref 7–16)
AST SERPL-CCNC: 58 U/L (ref 12–45)
BASOPHILS # BLD AUTO: 0.5 % (ref 0–1.8)
BASOPHILS # BLD: 0.02 K/UL (ref 0–0.12)
BILIRUB SERPL-MCNC: 0.5 MG/DL (ref 0.1–1.5)
BUN SERPL-MCNC: 14 MG/DL (ref 8–22)
CALCIUM SERPL-MCNC: 8.5 MG/DL (ref 8.4–10.2)
CHLORIDE SERPL-SCNC: 104 MMOL/L (ref 96–112)
CO2 SERPL-SCNC: 23 MMOL/L (ref 20–33)
CREAT SERPL-MCNC: 0.58 MG/DL (ref 0.5–1.4)
EOSINOPHIL # BLD AUTO: 0.09 K/UL (ref 0–0.51)
EOSINOPHIL NFR BLD: 2.1 % (ref 0–6.9)
ERYTHROCYTE [DISTWIDTH] IN BLOOD BY AUTOMATED COUNT: 44.7 FL (ref 35.9–50)
GLOBULIN SER CALC-MCNC: 2.5 G/DL (ref 1.9–3.5)
GLUCOSE BLD-MCNC: 142 MG/DL (ref 65–99)
GLUCOSE BLD-MCNC: 172 MG/DL (ref 65–99)
GLUCOSE BLD-MCNC: 221 MG/DL (ref 65–99)
GLUCOSE BLD-MCNC: 324 MG/DL (ref 65–99)
GLUCOSE SERPL-MCNC: 292 MG/DL (ref 65–99)
HCT VFR BLD AUTO: 38 % (ref 42–52)
HGB BLD-MCNC: 13.4 G/DL (ref 14–18)
IMM GRANULOCYTES # BLD AUTO: 0.02 K/UL (ref 0–0.11)
IMM GRANULOCYTES NFR BLD AUTO: 0.5 % (ref 0–0.9)
LACTATE BLD-SCNC: 1.4 MMOL/L (ref 0.5–2)
LACTATE BLD-SCNC: 1.7 MMOL/L (ref 0.5–2)
LYMPHOCYTES # BLD AUTO: 1.66 K/UL (ref 1–4.8)
LYMPHOCYTES NFR BLD: 38.3 % (ref 22–41)
MAGNESIUM SERPL-MCNC: 1.8 MG/DL (ref 1.5–2.5)
MCH RBC QN AUTO: 32.4 PG (ref 27–33)
MCHC RBC AUTO-ENTMCNC: 35.3 G/DL (ref 33.7–35.3)
MCV RBC AUTO: 91.8 FL (ref 81.4–97.8)
MONOCYTES # BLD AUTO: 0.29 K/UL (ref 0–0.85)
MONOCYTES NFR BLD AUTO: 6.7 % (ref 0–13.4)
NEUTROPHILS # BLD AUTO: 2.25 K/UL (ref 1.82–7.42)
NEUTROPHILS NFR BLD: 51.9 % (ref 44–72)
NRBC # BLD AUTO: 0 K/UL
NRBC BLD-RTO: 0 /100 WBC
PLATELET # BLD AUTO: 161 K/UL (ref 164–446)
PMV BLD AUTO: 9.6 FL (ref 9–12.9)
POTASSIUM SERPL-SCNC: 3.6 MMOL/L (ref 3.6–5.5)
PROT SERPL-MCNC: 5.9 G/DL (ref 6–8.2)
RBC # BLD AUTO: 4.14 M/UL (ref 4.7–6.1)
SCCMEC + MECA PNL NOSE NAA+PROBE: POSITIVE
SODIUM SERPL-SCNC: 138 MMOL/L (ref 135–145)
WBC # BLD AUTO: 4.3 K/UL (ref 4.8–10.8)

## 2022-02-19 PROCEDURE — 700111 HCHG RX REV CODE 636 W/ 250 OVERRIDE (IP): Performed by: INTERNAL MEDICINE

## 2022-02-19 PROCEDURE — 85025 COMPLETE CBC W/AUTO DIFF WBC: CPT

## 2022-02-19 PROCEDURE — 83605 ASSAY OF LACTIC ACID: CPT

## 2022-02-19 PROCEDURE — 700102 HCHG RX REV CODE 250 W/ 637 OVERRIDE(OP): Performed by: HOSPITALIST

## 2022-02-19 PROCEDURE — 36415 COLL VENOUS BLD VENIPUNCTURE: CPT

## 2022-02-19 PROCEDURE — A9270 NON-COVERED ITEM OR SERVICE: HCPCS | Performed by: INTERNAL MEDICINE

## 2022-02-19 PROCEDURE — 99232 SBSQ HOSP IP/OBS MODERATE 35: CPT | Performed by: INTERNAL MEDICINE

## 2022-02-19 PROCEDURE — A9270 NON-COVERED ITEM OR SERVICE: HCPCS | Performed by: HOSPITALIST

## 2022-02-19 PROCEDURE — 73718 MRI LOWER EXTREMITY W/O DYE: CPT | Mod: RT

## 2022-02-19 PROCEDURE — 700111 HCHG RX REV CODE 636 W/ 250 OVERRIDE (IP): Performed by: HOSPITALIST

## 2022-02-19 PROCEDURE — 94760 N-INVAS EAR/PLS OXIMETRY 1: CPT

## 2022-02-19 PROCEDURE — 80053 COMPREHEN METABOLIC PANEL: CPT

## 2022-02-19 PROCEDURE — 770006 HCHG ROOM/CARE - MED/SURG/GYN SEMI*

## 2022-02-19 PROCEDURE — 700102 HCHG RX REV CODE 250 W/ 637 OVERRIDE(OP): Performed by: INTERNAL MEDICINE

## 2022-02-19 PROCEDURE — 82962 GLUCOSE BLOOD TEST: CPT

## 2022-02-19 PROCEDURE — 700105 HCHG RX REV CODE 258: Performed by: HOSPITALIST

## 2022-02-19 PROCEDURE — 83735 ASSAY OF MAGNESIUM: CPT

## 2022-02-19 RX ORDER — KETOROLAC TROMETHAMINE 30 MG/ML
30 INJECTION, SOLUTION INTRAMUSCULAR; INTRAVENOUS EVERY 8 HOURS
Status: DISCONTINUED | OUTPATIENT
Start: 2022-02-19 | End: 2022-02-21 | Stop reason: HOSPADM

## 2022-02-19 RX ORDER — IBUPROFEN 400 MG/1
800 TABLET ORAL 3 TIMES DAILY PRN
Status: DISCONTINUED | OUTPATIENT
Start: 2022-02-22 | End: 2022-02-21 | Stop reason: HOSPADM

## 2022-02-19 RX ORDER — DEXTROSE MONOHYDRATE 25 G/50ML
50 INJECTION, SOLUTION INTRAVENOUS
Status: DISCONTINUED | OUTPATIENT
Start: 2022-02-19 | End: 2022-02-21 | Stop reason: HOSPADM

## 2022-02-19 RX ORDER — TRAMADOL HYDROCHLORIDE 50 MG/1
50 TABLET ORAL ONCE
Status: COMPLETED | OUTPATIENT
Start: 2022-02-19 | End: 2022-02-19

## 2022-02-19 RX ORDER — OXYCODONE HYDROCHLORIDE 5 MG/1
5 TABLET ORAL
Status: DISCONTINUED | OUTPATIENT
Start: 2022-02-19 | End: 2022-02-21 | Stop reason: HOSPADM

## 2022-02-19 RX ORDER — INSULIN LISPRO 100 [IU]/ML
0.2 INJECTION, SOLUTION INTRAVENOUS; SUBCUTANEOUS
Status: DISCONTINUED | OUTPATIENT
Start: 2022-02-19 | End: 2022-02-21 | Stop reason: HOSPADM

## 2022-02-19 RX ORDER — INSULIN LISPRO 100 [IU]/ML
1-6 INJECTION, SOLUTION INTRAVENOUS; SUBCUTANEOUS
Status: DISCONTINUED | OUTPATIENT
Start: 2022-02-19 | End: 2022-02-21 | Stop reason: HOSPADM

## 2022-02-19 RX ORDER — OXYCODONE HYDROCHLORIDE 10 MG/1
10 TABLET ORAL
Status: DISCONTINUED | OUTPATIENT
Start: 2022-02-19 | End: 2022-02-21 | Stop reason: HOSPADM

## 2022-02-19 RX ORDER — HYDROMORPHONE HYDROCHLORIDE 1 MG/ML
0.5 INJECTION, SOLUTION INTRAMUSCULAR; INTRAVENOUS; SUBCUTANEOUS
Status: DISCONTINUED | OUTPATIENT
Start: 2022-02-19 | End: 2022-02-21 | Stop reason: HOSPADM

## 2022-02-19 RX ADMIN — INSULIN LISPRO 6 UNITS: 100 INJECTION, SOLUTION INTRAVENOUS; SUBCUTANEOUS at 17:12

## 2022-02-19 RX ADMIN — HYDROMORPHONE HYDROCHLORIDE 0.5 MG: 1 INJECTION, SOLUTION INTRAMUSCULAR; INTRAVENOUS; SUBCUTANEOUS at 11:22

## 2022-02-19 RX ADMIN — KETOROLAC TROMETHAMINE 30 MG: 30 INJECTION, SOLUTION INTRAMUSCULAR at 09:35

## 2022-02-19 RX ADMIN — INSULIN LISPRO 2 UNITS: 100 INJECTION, SOLUTION INTRAVENOUS; SUBCUTANEOUS at 11:26

## 2022-02-19 RX ADMIN — KETOROLAC TROMETHAMINE 30 MG: 30 INJECTION, SOLUTION INTRAMUSCULAR at 20:59

## 2022-02-19 RX ADMIN — INSULIN LISPRO 1 UNITS: 100 INJECTION, SOLUTION INTRAVENOUS; SUBCUTANEOUS at 17:10

## 2022-02-19 RX ADMIN — AMPICILLIN SODIUM AND SULBACTAM SODIUM 3 G: 2; 1 INJECTION, POWDER, FOR SOLUTION INTRAMUSCULAR; INTRAVENOUS at 23:46

## 2022-02-19 RX ADMIN — INSULIN GLARGINE 8 UNITS: 100 INJECTION, SOLUTION SUBCUTANEOUS at 05:56

## 2022-02-19 RX ADMIN — SODIUM CHLORIDE: 9 INJECTION, SOLUTION INTRAVENOUS at 15:12

## 2022-02-19 RX ADMIN — OXYCODONE HYDROCHLORIDE 10 MG: 10 TABLET ORAL at 09:43

## 2022-02-19 RX ADMIN — INSULIN LISPRO 6 UNITS: 100 INJECTION, SOLUTION INTRAVENOUS; SUBCUTANEOUS at 05:57

## 2022-02-19 RX ADMIN — OXYCODONE HYDROCHLORIDE 10 MG: 10 TABLET ORAL at 16:10

## 2022-02-19 RX ADMIN — AMPICILLIN SODIUM AND SULBACTAM SODIUM 3 G: 2; 1 INJECTION, POWDER, FOR SOLUTION INTRAMUSCULAR; INTRAVENOUS at 17:04

## 2022-02-19 RX ADMIN — ACETAMINOPHEN 650 MG: 325 TABLET, FILM COATED ORAL at 07:23

## 2022-02-19 RX ADMIN — KETOROLAC TROMETHAMINE 30 MG: 30 INJECTION, SOLUTION INTRAMUSCULAR at 15:10

## 2022-02-19 RX ADMIN — SODIUM CHLORIDE: 9 INJECTION, SOLUTION INTRAVENOUS at 23:46

## 2022-02-19 RX ADMIN — TRAMADOL HYDROCHLORIDE 50 MG: 50 TABLET, COATED ORAL at 01:44

## 2022-02-19 RX ADMIN — HYDROMORPHONE HYDROCHLORIDE 0.5 MG: 1 INJECTION, SOLUTION INTRAMUSCULAR; INTRAVENOUS; SUBCUTANEOUS at 23:46

## 2022-02-19 RX ADMIN — INSULIN LISPRO 4 UNITS: 100 INJECTION, SOLUTION INTRAVENOUS; SUBCUTANEOUS at 05:56

## 2022-02-19 RX ADMIN — ENOXAPARIN SODIUM 40 MG: 40 INJECTION SUBCUTANEOUS at 05:40

## 2022-02-19 RX ADMIN — AMPICILLIN SODIUM AND SULBACTAM SODIUM 3 G: 2; 1 INJECTION, POWDER, FOR SOLUTION INTRAMUSCULAR; INTRAVENOUS at 00:34

## 2022-02-19 RX ADMIN — HYDROMORPHONE HYDROCHLORIDE 0.5 MG: 1 INJECTION, SOLUTION INTRAMUSCULAR; INTRAVENOUS; SUBCUTANEOUS at 17:13

## 2022-02-19 RX ADMIN — KETOROLAC TROMETHAMINE 15 MG: 30 INJECTION, SOLUTION INTRAMUSCULAR at 00:24

## 2022-02-19 RX ADMIN — AMPICILLIN SODIUM AND SULBACTAM SODIUM 3 G: 2; 1 INJECTION, POWDER, FOR SOLUTION INTRAMUSCULAR; INTRAVENOUS at 11:51

## 2022-02-19 RX ADMIN — VANCOMYCIN HYDROCHLORIDE 1000 MG: 1 INJECTION, POWDER, LYOPHILIZED, FOR SOLUTION INTRAVENOUS at 09:07

## 2022-02-19 RX ADMIN — VANCOMYCIN HYDROCHLORIDE 1000 MG: 1 INJECTION, POWDER, LYOPHILIZED, FOR SOLUTION INTRAVENOUS at 20:59

## 2022-02-19 RX ADMIN — SODIUM CHLORIDE 1000 ML: 9 INJECTION, SOLUTION INTRAVENOUS at 01:52

## 2022-02-19 RX ADMIN — HYDROMORPHONE HYDROCHLORIDE 0.5 MG: 1 INJECTION, SOLUTION INTRAMUSCULAR; INTRAVENOUS; SUBCUTANEOUS at 20:17

## 2022-02-19 RX ADMIN — INSULIN LISPRO 6 UNITS: 100 INJECTION, SOLUTION INTRAVENOUS; SUBCUTANEOUS at 11:26

## 2022-02-19 RX ADMIN — THYROID, PORCINE 60 MG: 30 TABLET ORAL at 05:40

## 2022-02-19 RX ADMIN — AMPICILLIN SODIUM AND SULBACTAM SODIUM 3 G: 2; 1 INJECTION, POWDER, FOR SOLUTION INTRAMUSCULAR; INTRAVENOUS at 05:40

## 2022-02-19 ASSESSMENT — LIFESTYLE VARIABLES
AVERAGE NUMBER OF DAYS PER WEEK YOU HAVE A DRINK CONTAINING ALCOHOL: 0
EVER HAD A DRINK FIRST THING IN THE MORNING TO STEADY YOUR NERVES TO GET RID OF A HANGOVER: NO
TOTAL SCORE: 0
ALCOHOL_USE: YES
HOW MANY TIMES IN THE PAST YEAR HAVE YOU HAD 5 OR MORE DRINKS IN A DAY: 0
HAVE YOU EVER FELT YOU SHOULD CUT DOWN ON YOUR DRINKING: NO
TOTAL SCORE: 0
ON A TYPICAL DAY WHEN YOU DRINK ALCOHOL HOW MANY DRINKS DO YOU HAVE: 0
HAVE PEOPLE ANNOYED YOU BY CRITICIZING YOUR DRINKING: NO
EVER FELT BAD OR GUILTY ABOUT YOUR DRINKING: NO
TOTAL SCORE: 0
CONSUMPTION TOTAL: NEGATIVE

## 2022-02-19 ASSESSMENT — COGNITIVE AND FUNCTIONAL STATUS - GENERAL
WALKING IN HOSPITAL ROOM: A LITTLE
SUGGESTED CMS G CODE MODIFIER DAILY ACTIVITY: CK
STANDING UP FROM CHAIR USING ARMS: A LITTLE
EATING MEALS: A LITTLE
TOILETING: A LITTLE
HELP NEEDED FOR BATHING: A LITTLE
MOBILITY SCORE: 21
PERSONAL GROOMING: A LITTLE
DAILY ACTIVITIY SCORE: 18
DRESSING REGULAR UPPER BODY CLOTHING: A LITTLE
SUGGESTED CMS G CODE MODIFIER MOBILITY: CJ
DRESSING REGULAR LOWER BODY CLOTHING: A LITTLE
CLIMB 3 TO 5 STEPS WITH RAILING: A LITTLE

## 2022-02-19 ASSESSMENT — PAIN DESCRIPTION - PAIN TYPE
TYPE: ACUTE PAIN

## 2022-02-19 ASSESSMENT — ENCOUNTER SYMPTOMS
CHILLS: 1
DEPRESSION: 0
ABDOMINAL PAIN: 0
DIZZINESS: 0
VOMITING: 0
NAUSEA: 0
MYALGIAS: 0
SHORTNESS OF BREATH: 0
FEVER: 1
HEADACHES: 0

## 2022-02-19 NOTE — ASSESSMENT & PLAN NOTE
Measured sodium is likely low secondary to elevated blood sugars.  Corrected sodium is within normal limits.  Treating hyperglycemia, continue to monitor, anticipate measured sodium to improve once blood sugars are higher controlled.

## 2022-02-19 NOTE — PROGRESS NOTES
"Pharmacy Vancomycin Kinetics Note for 2/18/2022     56 y.o. male on Vancomycin day # 1     Vancomycin Indication (AUC Dosing): Skin/skin structure infection    Provider specified end date: 02/23/22    Active Antibiotics (From admission, onward)    Ordered     Ordering Provider       Fri Feb 18, 2022  9:00 PM    02/18/22 2100  MD Alert...Vancomycin per Pharmacy  PHARMACY TO DOSE        Question:  Indication(s) for vancomycin?  Answer:  Skin and soft tissue infection    Collette Chi M.D.       Fri Feb 18, 2022  8:05 PM    02/18/22 2005  ampicillin/sulbactam (UNASYN) 3 g in  mL IVPB  EVERY 6 HOURS         Collette Chi M.D.       Fri Feb 18, 2022  6:43 PM    02/18/22 1843  vancomycin 2250 mg/500mL NS IVPB premix  (vancomycin (VANCOCIN) IV (LD + Maintenance))  ONCE         Arcelia Olguin M.D.          Dosing Weight: 86 kg (189 lb 9.5 oz)      Admission History: Admitted on 2/18/2022 for Sepsis (HCC) [A41.9]  Pertinent history: 55 yo M started on vancomycin and unasyn for SSTI    Allergies:     Peanut (diagnostic), Peanut oil, Tradjenta [linagliptin], Hydrocodone, and Morphine     Pertinent cultures to date:     Results     Procedure Component Value Units Date/Time    MRSA By PCR (Amp) [[id number]]     Order Status: Sent Specimen: Respirate from Nares     BLOOD CULTURE [[id number]] Collected: 02/18/22 2005    Order Status: Sent Specimen: Blood from Peripheral Updated: 02/18/22 2028    Narrative:      Per Hospital Policy: Only change Specimen Src: to \"Line\" if  specified by physician order.    BLOOD CULTURE [[id number]] Collected: 02/18/22 1823    Order Status: Sent Specimen: Blood from Peripheral Updated: 02/18/22 1838    Narrative:      Per Hospital Policy: Only change Specimen Src: to \"Line\" if  specified by physician order.    URINALYSIS [[id number]]  (Abnormal) Collected: 02/18/22 1742    Order Status: Completed Specimen: Urine Updated: 02/18/22 1830     Color Yellow     Character Clear     Specific Gravity " "<=1.005     Ph 5.0     Glucose >=1000 mg/dL      Ketones 15 mg/dL      Protein Negative mg/dL      Bilirubin Negative     Nitrite Negative     Leukocyte Esterase Negative     Occult Blood Negative     Micro Urine Req see below     Comment: Microscopic examination not performed when specimen is clear  and chemically negative for protein, blood, leukocyte esterase  and nitrite.         Narrative:      Indication for culture:->Evaluation for sepsis without a  clear source of infection    URINE CULTURE(NEW) [[id number]] Collected: 22    Order Status: Sent Specimen: Urine Updated: 22    Narrative:      Indication for culture:->Evaluation for sepsis without a  clear source of infection          Labs:     Estimated Creatinine Clearance: 97.4 mL/min (by C-G formula based on SCr of 0.92 mg/dL).  Recent Labs     22   WBC 4.3*   NEUTSPOLYS 65.20     Recent Labs     22   BUN 16   CREATININE 0.92   ALBUMIN 4.4       Intake/Output Summary (Last 24 hours) at 20221  Last data filed at 2022 1938  Gross per 24 hour   Intake 1000 ml   Output --   Net 1000 ml      /87   Pulse (!) 113   Temp 35.9 °C (96.7 °F) (Temporal)   Resp (!) 22   Ht 1.753 m (5' 9\")   Wt 86 kg (189 lb 9.5 oz)   SpO2 98%  Temp (24hrs), Av.9 °C (96.7 °F), Min:35.9 °C (96.7 °F), Max:35.9 °C (96.7 °F)      List concerns for Vancomycin clearance:     Age;BUN/Scr ratio greater than 20:1    Pharmacokinetics:     AUC kinetics:   Ke (hr ^-1): 0.0849 hr^-1  Half life: 8.16 hr  Clearance: 4.746  Estimated TDD: 2373  Estimated Dose: 1009  Estimated interval: 10.2        A/P:     -  Vancomycin dose: vancomycin 2250 mg IV loading dose followed by 1000 mg IV every 12 hours     -  Next vancomycin level(s): defer    -  Predicted vancomycin AUC from initial AUC test calculator: 421 mg·hr/L    -  Comments: MRSA nares ordered    Skylar Morales, PharmD  "

## 2022-02-19 NOTE — ASSESSMENT & PLAN NOTE
Likely secondary to cellulitis  S/p abscess drainage 2/17/2022- not sent to lab   Unasyn and vancomycin, follow on cultures and sensitivities.  MRSA +   X-ray did not show evidence for osteomyelitis  MRI- no osteomyelitis   Bcx ntd

## 2022-02-19 NOTE — ED NOTES
Med Rec completed per patient interview  Allergies reviewed  Day 1 (2/18) of amoxicillin for 10 days per dentist  Patient took one dose of bactrim on 2/18 that was prescribed for his cellulitis  Home pharmacy: [facility] on [address]  Patient uses a sliding scale with his insulin lispro pen   Patient reported using insulin lispro 10 units three times on 2/18/22 last dose was around 4 pm today.       Follow up with primary doctor, ENT, ID and endocrinology.    Dry dressing to wound daily.

## 2022-02-19 NOTE — PROGRESS NOTES
[facility] Medicine Daily Progress Note    Date of Service  2/19/2022    Chief Complaint  Mahesh Bradshaw is a 56 y.o. male admitted 2/18/2022 with hyperglycemia and abscess.    Hospital Course  56 y.o. male with a past medical history of diabetes and hypothyroidism who presented 2/18/2022 with generalized weakness worsening pain of the right foot.  Patient came to the emergency room because he has been having constantly elevated blood sugars above 600.  Of note he had an incision and drainage of a right foot abscess under urgent care the day prior to admission. On admission patient hyperglycemic, not in DKA with lactic acidosis, started on IV fluids and short and long acting insulin. Last A1c 15.2 last month. Patient started on IV abx for sepsis due to right foot cellulitis. XR showed no evidence of osteomyelitis.     Interval Problem Update  Lactic acidosis resolved, normal anion gap. Glucose improving down to 300s this morning, increase long acting dose. MRI right foot pending. Cultures pending. Patient with severe pain in the right foot, reports the edema has improved. Start pain control with toradol IV with opiates prn. Patient has hx of sx for necrotizing fascitis and reports has been on high dose pain meds in the past, may need dose adjustment for pain meds.      I have personally seen and examined the patient at bedside. I discussed the plan of care with patient, bedside RN and charge RN.    Consultants/Specialty  N/A    Code Status  Full Code    Disposition  Patient is not medically cleared for discharge.   Anticipate discharge to to home with close outpatient follow-up.  I have placed the appropriate orders for post-discharge needs.    Review of Systems  Review of Systems   Constitutional: Positive for chills, fever and malaise/fatigue.   Respiratory: Negative for shortness of breath.    Cardiovascular: Negative for chest pain.   Gastrointestinal: Negative for abdominal pain, nausea and vomiting.    Genitourinary: Negative for dysuria.   Musculoskeletal: Negative for myalgias.        Right foot pain   Skin: Negative for rash.        Right foot erythema    Neurological: Negative for dizziness and headaches.   Psychiatric/Behavioral: Negative for depression.   All other systems reviewed and are negative.       Physical Exam  Temp:  [35.9 °C (96.7 °F)-36.6 °C (97.8 °F)] 36.6 °C (97.8 °F)  Pulse:  [] 63  Resp:  [16-22] 16  BP: (110-149)/(69-87) 110/71  SpO2:  [94 %-98 %] 94 %    Physical Exam  Vitals and nursing note reviewed.   Constitutional:       General: He is not in acute distress.     Appearance: He is obese.   HENT:      Head: Normocephalic and atraumatic.   Eyes:      Conjunctiva/sclera: Conjunctivae normal.      Pupils: Pupils are equal, round, and reactive to light.   Cardiovascular:      Rate and Rhythm: Normal rate and regular rhythm.      Pulses: Normal pulses.   Pulmonary:      Effort: Pulmonary effort is normal. No respiratory distress.      Breath sounds: Normal breath sounds. No wheezing.   Abdominal:      General: Abdomen is flat. There is no distension.      Palpations: Abdomen is soft.      Tenderness: There is no abdominal tenderness.   Musculoskeletal:         General: Swelling and tenderness present. Normal range of motion.      Cervical back: Normal range of motion and neck supple.      Comments: Right foot tenderness edema and erythema    Skin:     General: Skin is warm and dry.      Findings: Erythema (mild, pt reports improving) present.   Neurological:      General: No focal deficit present.      Mental Status: He is alert and oriented to person, place, and time.      Cranial Nerves: No cranial nerve deficit.      Motor: No weakness.   Psychiatric:         Mood and Affect: Mood normal.         Behavior: Behavior normal.         Fluids    Intake/Output Summary (Last 24 hours) at 2/19/2022 0914  Last data filed at 2/19/2022 0800  Gross per 24 hour   Intake 1120 ml   Output 600 ml    Net 520 ml       Laboratory  Recent Labs     02/18/22  1743 02/19/22  0422   WBC 4.3* 4.3*   RBC 4.57* 4.14*   HEMOGLOBIN 14.7 13.4*   HEMATOCRIT 41.1* 38.0*   MCV 89.9 91.8   MCH 32.2 32.4   MCHC 35.8* 35.3   RDW 42.5 44.7   PLATELETCT 191 161*   MPV 9.5 9.6     Recent Labs     02/18/22  1743 02/18/22  2248 02/19/22  0422   SODIUM 130* 137 138   POTASSIUM 4.5 3.7 3.6   CHLORIDE 90* 102 104   CO2 21 22 23   GLUCOSE 774* 330* 292*   BUN 16 13 14   CREATININE 0.92 0.63 0.58   CALCIUM 9.6 9.2 8.5                   Imaging  DX-CHEST-PORTABLE (1 VIEW)   Final Result         1. No acute cardiopulmonary abnormalities are identified.      DX-FOOT-COMPLETE 3+ RIGHT   Final Result         No acute osseous abnormality.      MR-FOOT-W/O RIGHT    (Results Pending)        Assessment/Plan  * Sepsis(995.91)- (present on admission)  Assessment & Plan  This is Sepsis Present on admission  SIRS criteria identified on my evaluation include: Tachycardia, with heart rate greater than 90 BPM, Tachypnea, with respirations greater than 20 per minute and Leukocytosis, with WBC greater than 12,000  Source is cellulitis of the right lower extremity  Sepsis protocol initiated  Fluid resuscitation ordered per protocol  IV antibiotics as appropriate for source of sepsis  While organ dysfunction may be noted elsewhere in this problem list or in the chart, degree of organ dysfunction does not meet CMS criteria for severe sepsis     Right foot pain- (present on admission)  Assessment & Plan  Likely secondary to cellulitis  S/p abscess drainage 2/17/2022   Unasyn and vancomycin, follow on cultures and sensitivities.  X-ray did not show evidence for osteomyelitis  MRI of the foot to rule out osteomyelitis.  pending    Hypothyroidism- (present on admission)  Assessment & Plan  Resume home thyroid medications    Uncontrolled type 2 diabetes mellitus with hyperglycemia (HCC)- (present on admission)  Assessment & Plan  With marked hyperglycemia  Last  glycated hemoglobin was 15.2%  I will start premeal, Long & short acting insulin for now  Bicarb is 21, his anion gap is 19 on admission, gap closed   Hypoglycemic protocol     Hyponatremia [pseudo-]- (present on admission)  Assessment & Plan  Measured sodium is likely low secondary to elevated blood sugars.  Corrected sodium is within normal limits.  Treating hyperglycemia, continue to monitor, anticipate measured sodium to improve once blood sugars are higher controlled.         VTE prophylaxis: enoxaparin ppx    I have performed a physical exam and reviewed and updated ROS and Plan today (2/19/2022). In review of yesterday's note (2/18/2022), there are no changes except as documented above.

## 2022-02-19 NOTE — CARE PLAN
The patient is Stable - Low risk of patient condition declining or worsening    Shift Goals  Clinical Goals: pt will remain free of falls this shift.    Progress made toward(s) clinical / shift goals:        Problem: Pain - Standard  Goal: Alleviation of pain or a reduction in pain to the patient’s comfort goal  Outcome: Progressing     Problem: Knowledge Deficit - Standard  Goal: Patient and family/care givers will demonstrate understanding of plan of care, disease process/condition, diagnostic tests and medications  Outcome: Progressing     Problem: Hemodynamics  Goal: Patient's hemodynamics, fluid balance and neurologic status will be stable or improve  Outcome: Progressing       Patient is not progressing towards the following goals:

## 2022-02-19 NOTE — ED TRIAGE NOTES
"Chief Complaint   Patient presents with   • Hyperglycemia     BS over 600 at home   • Abscess     Right foot. I&D at  yesterday, reports it is more painful and swollen today.     ED Triage Vitals [02/18/22 1722]   Enc Vitals Group      Blood Pressure 149/87      Pulse (!) 113      Respiration (!) 22      Temperature 35.9 °C (96.7 °F)      Temp src Temporal      Pulse Oximetry 98 %      Weight 86 kg (189 lb 9.5 oz)      Height 1.753 m (5' 9\")     Had amoxicillin for 2 days, bactrim first dose this morning.  "

## 2022-02-19 NOTE — CARE PLAN
The patient is Stable - Low risk of patient condition declining or worsening    Shift Goals  Clinical Goals: monitor Blood sugar and pain mngt  Patient Goals: pain will be  controled    Progress made toward(s) clinical / shift goals: Blood sugar is monitored, patient is on  scheduled Insulin as per sliding scale. Patient complains of generalized pain, received pain medication as ordered. Fall precaution observed, kept bed in lowest position and call bell within reach.MRI Right  foot without contrast done, noted mild osteoarthritis , no osteomyelitis.

## 2022-02-19 NOTE — ASSESSMENT & PLAN NOTE
This is Sepsis Present on admission  SIRS criteria identified on my evaluation include: Tachycardia, with heart rate greater than 90 BPM, Tachypnea, with respirations greater than 20 per minute and Leukocytosis, with WBC greater than 12,000  Source is cellulitis of the right lower extremity  Sepsis protocol initiated  Fluid resuscitation ordered per protocol  IV antibiotics as appropriate for source of sepsis  While organ dysfunction may be noted elsewhere in this problem list or in the chart, degree of organ dysfunction does not meet CMS criteria for severe sepsis

## 2022-02-19 NOTE — ASSESSMENT & PLAN NOTE
With marked hyperglycemia  Last glycated hemoglobin was 15.2%  I will start premeal, Long & short acting insulin for now  Bicarb is 21, his anion gap is 19 on admission, gap closed   Hypoglycemic protocol

## 2022-02-19 NOTE — ED NOTES
Lab called with critical result of Latic of 5.2 at 2038. Critical lab result read back to Lab.   Dr. Chi notified of critical lab result at 2038.  Critical lab result read back by Dr. Chi.

## 2022-02-19 NOTE — PROGRESS NOTES
4 Eyes Skin Assessment Completed by Pradip RN and Margarita RN.    Head WDL  Ears WDL  Nose WDL  Mouth WDL  Neck WDL  Breast/Chest WDL  Shoulder Blades WDL  Spine WDL  (R) Arm/Elbow/Hand WDL  (L) Arm/Elbow/Hand WDL  Abdomen WDL  Groin WDL  Scrotum/Coccyx/Buttocks WDL  (R) Leg WDL  (L) Leg WDL  (R) Heel/Foot/Toe Redness and Swelling  (L) Heel/Foot/Toe WDL          Devices In Places Blood Pressure Cuff and Pulse Ox      Interventions In Place Gray Ear Foams    Possible Skin Injury No    Pictures Uploaded Into Epic N/A  Wound Consult Placed N/A  RN Wound Prevention Protocol Ordered No

## 2022-02-19 NOTE — PROGRESS NOTES
Pt arrived on unit. Pt c/o 6/10 pain in R foot. Medicated per MAR. No n/v. RN updated pt on POC for the evening. All questions answered. Fall precautions in place.    0100: pt still c/o 6/10 pain in R foot. Notified MD. Received orders for one time dose of tramadol

## 2022-02-19 NOTE — PROGRESS NOTES
Received patient from Night shift RN . Patient is awake and alert.No sign of distress. Patient denies any nausea. Fall precaution in placed, kept bed in lowest position and call light within reach.

## 2022-02-19 NOTE — H&P
Hospital Medicine History & Physical Note    Date of Service  2/18/2022    Primary Care Physician  Rodolfo Stein M.D.    Consultants  None      Code Status  Full Code    Chief Complaint  Chief Complaint   Patient presents with   • Hyperglycemia     BS over 600 at home   • Abscess     Right foot. I&D at  yesterday, reports it is more painful and swollen today.     History of Presenting Illness  Mahesh Bradshaw is a 56 y.o. male with a past medical history of diabetes and hypothyroidism who presented 2/18/2022 with generalized weakness worsening pain of the right foot.  Patient came to the emergency room because he has been having constantly elevated blood sugars above 600.  Of note he had an incision and drainage of a right foot abscess under urgent care the day prior to admission.  He has worsening pain and swelling of the right foot.  He denies having vomiting but reports having poor appetite.  He also reports having fevers and chills but no shortness of breath cough sputum production.    I discussed the plan of care with emergency department physician, the patient.    Review of Systems  Review of Systems   Constitutional: Positive for chills, fever and malaise/fatigue.   Eyes: Negative for discharge and redness.   Respiratory: Negative for cough, shortness of breath and stridor.    Cardiovascular: Negative for chest pain and leg swelling.   Gastrointestinal: Negative for abdominal pain and vomiting.   Genitourinary: Negative for flank pain.   Musculoskeletal: Negative for myalgias.        Right foot pain and swelling   Skin: Negative.    Neurological: Negative for focal weakness.   Endo/Heme/Allergies: Does not bruise/bleed easily.   Psychiatric/Behavioral: The patient is not nervous/anxious.      Past Medical History   has a past medical history of ADRIANE (acute kidney injury) ([facility]) (2/24/2016), Anesthesia, Arthritis (3-3-17), Aspiration pneumonia ([facility]) (3-2016), ASTHMA (3-3-17), Breath shortness (3-3-17),  Congestive heart failure (HCC) (2-2016 ), Dental disorder, Depression (11/26/2014), Diabetes (HCC) (3-3-17), Diabetes (HCC), Difficult intubation (2-2016), DKA (diabetic ketoacidoses) (2-2016), Electrolyte imbalance (2-2016), Elevated LFTs, Elevated liver enzymes (2-2016), Essential hypertension (1/22/2016), Gout, Heart burn, High cholesterol (3-3-17), Hypothyroid (3-3-17), Indigestion, Kidney stones, Klebsiella infect, Migraine, MRSA cellulitis, Necrotizing myositis, On mechanically assisted ventilation (HCC) (2-2016), Pain (3-3-17), Pancreatitis (2-2016), RF (renal failure) (2-2016), Sepsis ([facility]) (1/21/2016), Snoring (3-3-17), Streptococcus infection, UC (ulcerative colitis) ([facility]) (3-3-17), Urinary incontinence, and Vitamin D deficiency.    Surgical History   has a past surgical history that includes vaishnavi by laparoscopy (2005); colonoscopy with biopsy (11/26/2014); incision and drainage general (4/7/2017); irrigation & debridement general (Right, 4/29/2017); irrigation & debridement general (Right, 5/1/2017); other orthopedic surgery (1997 or 1998); umbilical hernia repair (N/A, 11/6/2016); umbilical hernia repair (3/10/2017); irrigation & debridement ortho (Left, 12/10/2017); and umbilical hernia repair (N/A, 4/15/2018).     Family History  family history includes Cancer in his mother.     Social History   reports that he has never smoked. He has never used smokeless tobacco. He reports current alcohol use. He reports that he does not use drugs.    Allergies  Allergies   Allergen Reactions   • Peanut (Diagnostic) Anaphylaxis   • Peanut Oil Anaphylaxis   • Tradjenta [Linagliptin] Unspecified     Pt developed pancreatitis   • Hydrocodone Hives     Tolerates Morphine and Oxycodone     • Morphine Itching     Medications  Prior to Admission Medications   Prescriptions Last Dose Informant Patient Reported? Taking?   Alcohol Swabs  Patient No No   Sig: Wipe site with prep pad prior to injection.   Blood Glucose Meter  "Kit  Patient No No   Sig: Test blood sugar as recommended by provider. True Metrix blood glucose monitoring kit.   Blood Glucose Test Strips  Patient No No   Sig: Use one True Metrix strip to test blood sugar three times daily before meals.   Empagliflozin (JARDIANCE) 25 MG Tab   No No   Sig: Take 25 mg by mouth every day.   Insulin Pen Needle 32 G x 4 mm  Patient No No   Sig: Use one pen needle in pen device to inject insulin three times daily.   Lancets  Patient No No   Sig: Use one True Metrix lancet to test blood sugar three times daily before meals.   Multiple Vitamins-Minerals (MENS MULTIVITAMIN) Tab  Patient Yes No   Sig: Take 1 Package by mouth every evening. \"[employer]\"   amoxicillin (AMOXIL) 500 MG Cap   Yes No   insulin lispro (HUMALOG KWIKPEN) 100 UNIT/ML Solution Pen-injector injection PEN  Patient No No   Sig: Inject 8 Units under the skin 3 times a day before meals.   Patient taking differently: Inject 5-16 Units under the skin 4 Times a Day,Before Meals and at Bedtime. Sliding Scale   melatonin 5 mg Tab  Patient Yes No   Sig: Take 5-10 mg by mouth at bedtime as needed (Sleep). 1 to 2 tablets = 5 to 10 mg.   metFORMIN (GLUCOPHAGE) 1000 MG tablet   No No   Sig: Take 1 Tablet by mouth 2 times a day with meals.   ondansetron (ZOFRAN ODT) 4 MG TABLET DISPERSIBLE   No No   Sig: Take 1 Tablet by mouth every four hours as needed for Nausea (give PO if no IV route available).   pioglitazone (ACTOS) 15 MG Tab   No No   Sig: Take 1 Tablet by mouth every day.   sulfamethoxazole-trimethoprim (BACTRIM DS) 800-160 MG tablet   No No   Sig: Take 1 Tablet by mouth 2 times a day for 7 days.   thyroid (ARMOUR THYROID) 60 MG Tab  Patient No No   Sig: Take 1 Tab by mouth every morning.      Facility-Administered Medications: None     Physical Exam  Temp:  [35.9 °C (96.7 °F)] 35.9 °C (96.7 °F)  Pulse:  [113] 113  Resp:  [22] 22  BP: (149)/(87) 149/87  SpO2:  [98 %] 98 %  Blood Pressure: 149/87   Temperature: 35.9 °C " (96.7 °F)   Pulse: (!) 113   Respiration: (!) 22   Pulse Oximetry: 98 %     Physical Exam  Constitutional:       General: He is not in acute distress.     Appearance: He is not ill-appearing or diaphoretic.   HENT:      Head: Atraumatic.      Right Ear: External ear normal.      Left Ear: External ear normal.      Nose: No congestion or rhinorrhea.      Mouth/Throat:      Mouth: Mucous membranes are dry.   Eyes:      General: No scleral icterus.        Right eye: No discharge.         Left eye: No discharge.      Pupils: Pupils are equal, round, and reactive to light.   Cardiovascular:      Rate and Rhythm: Regular rhythm. Tachycardia present.   Pulmonary:      Effort: Pulmonary effort is normal.   Abdominal:      General: There is no distension.   Musculoskeletal:         General: Swelling and tenderness present.      Cervical back: Neck supple. No rigidity. No muscular tenderness.      Right lower leg: Edema present.      Left lower leg: No edema.      Comments: Right foot tenderness edema and erythema   Skin:     General: Skin is dry.      Capillary Refill: Capillary refill takes 2 to 3 seconds.      Coloration: Skin is not jaundiced or pale.      Findings: No erythema.   Neurological:      Mental Status: He is alert and oriented to person, place, and time.      Coordination: Coordination normal.   Psychiatric:         Mood and Affect: Mood normal.         Behavior: Behavior normal.       Laboratory:  Recent Labs     02/18/22  1743   WBC 4.3*   RBC 4.57*   HEMOGLOBIN 14.7   HEMATOCRIT 41.1*   MCV 89.9   MCH 32.2   MCHC 35.8*   RDW 42.5   PLATELETCT 191   MPV 9.5     Recent Labs     02/18/22  1743   SODIUM 130*   POTASSIUM 4.5   CHLORIDE 90*   CO2 21   GLUCOSE 774*   BUN 16   CREATININE 0.92   CALCIUM 9.6     Recent Labs     02/18/22  1743   ALTSGPT 59*   ASTSGOT 38   ALKPHOSPHAT 131*   TBILIRUBIN 0.7   GLUCOSE 774*         No results for input(s): NTPROBNP in the last 72 hours.      No results for input(s):  TROPONINT in the last 72 hours.    Imaging:  DX-CHEST-PORTABLE (1 VIEW)   Final Result         1. No acute cardiopulmonary abnormalities are identified.      DX-FOOT-COMPLETE 3+ RIGHT   Final Result         No acute osseous abnormality.        Assessment/Plan:  I anticipate this patient will require at least two midnights for appropriate medical management, necessitating inpatient admission.    * Sepsis(995.91)- (present on admission)  Assessment & Plan  This is Sepsis Present on admission  SIRS criteria identified on my evaluation include: Tachycardia, with heart rate greater than 90 BPM, Tachypnea, with respirations greater than 20 per minute and Leukocytosis, with WBC greater than 12,000  Source is cellulitis of the right lower extremity  Sepsis protocol initiated  Fluid resuscitation ordered per protocol  IV antibiotics as appropriate for source of sepsis  While organ dysfunction may be noted elsewhere in this problem list or in the chart, degree of organ dysfunction does not meet CMS criteria for severe sepsis     Right foot pain- (present on admission)  Assessment & Plan  Likely secondary to cellulitis  S/p abscess drainage 2/17/2022   Unasyn and vancomycin, follow on cultures and sensitivities.  X-ray did not show evidence for osteomyelitis  We will obtain MRI of the foot to rule out osteomyelitis.      Uncontrolled type 2 diabetes mellitus with hyperglycemia (HCC)- (present on admission)  Assessment & Plan  With marked hyperglycemia  Last glycated hemoglobin was 15.2%  I will start premeal, Long & short acting insulin for now  Bicarb is 21, his anion gap is 19  Patient is at risk for developing diabetic ketoacidosis.  I will monitor closely in the intensive care unit.  Low threshold for starting insulin drip.  Accu-Checks every 1 hour initially can reduce frequency as blood sugar trend is reasonable, hypoglycemia protocol     Hyponatremia [pseudo-]- (present on admission)  Assessment & Plan  Measured sodium  is likely low secondary to elevated blood sugars.  Corrected sodium is within normal limits.  Treating hyperglycemia, continue to monitor, anticipate measured sodium to improve once blood sugars are higher controlled.      Hypothyroidism- (present on admission)  Assessment & Plan  Resume home thyroid medications    VTE prophylaxis: SCDs/TEDs and enoxaparin ppx

## 2022-02-19 NOTE — ED PROVIDER NOTES
ED Physician Note    Chief Complaint:   Right foot pain, hyperglycemia    HPI:  Mahesh Bradshaw is a very pleasant 56-year-old gentleman who presents to the emergency department for evaluation of hyperglycemia as well as right foot pain.  2 days ago he stepped on a tree branch that punctured his right foot.  Since that time he has had progressively more difficult to control blood glucose, as well as worsening right foot pain.  He was seen in urgent care yesterday and had a small amount of fluid drained from the foot out of concern for an abscess.  However his foot pain has worsened, is significantly more severe with weightbearing, and by applying pressure to the foot, and his blood glucose remains uncontrolled.  He is currently on Actos, Metformin, and Jardiance.  He is not missed any doses of these medications.  He is not had any associated fevers, though he is tachycardic on arrival.  He states he has a past medical history significant for abscess to the right lower leg, as well as a history of necrotizing fasciitis in the right thigh requiring surgical intervention.  He reports no other injuries, no other rashes or lesions, he does have polydipsia and polyuria with no pain with urination or burning with urination.  No other symptoms noted at this time.    Review of Systems:  See HPI for pertinent positives and negatives. All other systems negative.    Past Medical History:   has a past medical history of ADRIANE (acute kidney injury) ([facility]) (2/24/2016), Anesthesia, Arthritis (3-3-17), Aspiration pneumonia ([facility]) (3-2016), ASTHMA (3-3-17), Breath shortness (3-3-17), Congestive heart failure ([facility]) (2-2016 ), Dental disorder, Depression (11/26/2014), Diabetes ([facility]) (3-3-17), Diabetes ([facility]), Difficult intubation (2-2016), DKA (diabetic ketoacidoses) (2-2016), Electrolyte imbalance (2-2016), Elevated LFTs, Elevated liver enzymes (2-2016), Essential hypertension (1/22/2016), Gout, Heart burn, High cholesterol (3-3-17),  Hypothyroid (3-3-17), Indigestion, Kidney stones, Klebsiella infect, Migraine, MRSA cellulitis, Necrotizing myositis, On mechanically assisted ventilation ([facility]) (2-2016), Pain (3-3-17), Pancreatitis (2-2016), RF (renal failure) (2-2016), Sepsis ([facility]) (1/21/2016), Snoring (3-3-17), Streptococcus infection, UC (ulcerative colitis) ([facility]) (3-3-17), Urinary incontinence, and Vitamin D deficiency.    Social History:  Social History     Tobacco Use   • Smoking status: Never Smoker   • Smokeless tobacco: Never Used   Vaping Use   • Vaping Use: Never used   Substance and Sexual Activity   • Alcohol use: Yes     Comment: rare   • Drug use: No   • Sexual activity: Not on file       Surgical History:   has a past surgical history that includes vaishnavi by laparoscopy (2005); colonoscopy with biopsy (11/26/2014); incision and drainage general (4/7/2017); irrigation & debridement general (Right, 4/29/2017); irrigation & debridement general (Right, 5/1/2017); other orthopedic surgery (1997 or 1998); umbilical hernia repair (N/A, 11/6/2016); umbilical hernia repair (3/10/2017); irrigation & debridement ortho (Left, 12/10/2017); and umbilical hernia repair (N/A, 4/15/2018).    Current Medications:  Home Medications     Reviewed by Vipin Melton R.N. (Registered Nurse) on 02/18/22 at 1725  Med List Status: <None>   Medication Last Dose Status   Alcohol Swabs  Active   amoxicillin (AMOXIL) 500 MG Cap  Active   Blood Glucose Meter Kit  Active   Blood Glucose Test Strips  Active   Empagliflozin (JARDIANCE) 25 MG Tab  Active   insulin lispro (HUMALOG KWIKPEN) 100 UNIT/ML Solution Pen-injector injection PEN  Active   Insulin Pen Needle 32 G x 4 mm  Active   Lancets  Active   melatonin 5 mg Tab  Active   metFORMIN (GLUCOPHAGE) 1000 MG tablet  Active   Multiple Vitamins-Minerals (MENS MULTIVITAMIN) Tab  Active   ondansetron (ZOFRAN ODT) 4 MG TABLET DISPERSIBLE  Active   pioglitazone (ACTOS) 15 MG Tab  Active   sulfamethoxazole-trimethoprim  "(BACTRIM DS) 800-160 MG tablet  Active   thyroid (ARMOUR THYROID) 60 MG Tab  Active                Allergies:  Allergies   Allergen Reactions   • Peanut (Diagnostic) Anaphylaxis   • Tradjenta [Linagliptin] Unspecified     Pt developed pancreatitis   • Hydrocodone Hives     Tolerates Morphine and Oxycodone         Physical Exam:  Vital Signs: /87   Pulse (!) 113   Temp 35.9 °C (96.7 °F) (Temporal)   Resp (!) 22   Ht 1.753 m (5' 9\")   Wt 86 kg (189 lb 9.5 oz)   SpO2 98%   BMI 28.00 kg/m²   Constitutional: Alert, no acute distress  HENT: Normocephalic, mask in place  Eyes: Pupils equal and reactive, normal conjunctiva  Neck: Supple, normal range of motion, no stridor  Cardiovascular: Extremities are warm and well perfused, no murmur appreciated, normal cardiac auscultation  Pulmonary: No respiratory distress, normal work of breathing, no accessory muscule usage, breath sounds clear and equal bilaterally  Abdomen: Soft, non-distended, non-tender to palpation, no peritoneal signs  Skin: Plantar surface of the foot with a very small puncture wound from I&D that was performed yesterday, plantar surface of the midfoot is tender to palpation with very minimal associated redness and swelling, he does not have pain out of proportion to exam, right lower extremity is warm and well perfused  Musculoskeletal: Normal range of motion in all extremities, no swelling or deformity noted, except as documented in skin exam.  Neurologic: Alert, oriented, normal speech, normal motor function  Psychiatric: Normal and appropriate mood and affect    Medical records reviewed for continuity of care. Urgent care clinic note reviewed from 2/17/2022, yesterday. At that time, reported uncontrolled glucose, with a history of diabetes. He was noted to have a tender area without abscess on left foot, 11 blade was used to puncture the site. Plain film demonstrated no radiopaque foreign body or soft tissue gas. He is currently on " "amoxicillin, Bactrim was prescribed as well. He was discharged home in stable condition.    Labs:  Labs Reviewed   CBC WITH DIFFERENTIAL - Abnormal; Notable for the following components:       Result Value    WBC 4.3 (*)     RBC 4.57 (*)     Hematocrit 41.1 (*)     MCHC 35.8 (*)     All other components within normal limits   COMP METABOLIC PANEL - Abnormal; Notable for the following components:    Sodium 130 (*)     Chloride 90 (*)     Anion Gap 19.0 (*)     Glucose 774 (*)     ALT(SGPT) 59 (*)     Alkaline Phosphatase 131 (*)     All other components within normal limits   URINALYSIS - Abnormal; Notable for the following components:    Glucose >=1000 (*)     Ketones 15 (*)     All other components within normal limits    Narrative:     Indication for culture:->Evaluation for sepsis without a  clear source of infection   LACTIC ACID - Abnormal; Notable for the following components:    Lactic Acid 4.9 (*)     All other components within normal limits   ESTIMATED GFR   LACTIC ACID   LACTIC ACID   URINE CULTURE(NEW)    Narrative:     Indication for culture:->Evaluation for sepsis without a  clear source of infection   BLOOD CULTURE   BLOOD CULTURE    Narrative:     Per Hospital Policy: Only change Specimen Src: to \"Line\" if  specified by physician order.   VENOUS BLOOD GAS       Radiology:  DX-CHEST-PORTABLE (1 VIEW)   Final Result         1. No acute cardiopulmonary abnormalities are identified.      DX-FOOT-COMPLETE 3+ RIGHT   Final Result         No acute osseous abnormality.           ED Medications Administered:  Medications   vancomycin 2250 mg/500mL NS IVPB premix (2,250 mg Intravenous New Bag 2/18/22 1929)   lactated ringers infusion (BOLUS): BMI less than or equal to 30 (2,580 mL Intravenous New Bag 2/18/22 1929)   NS (BOLUS) infusion 1,000 mL (1,000 mL Intravenous New Bag 2/18/22 1838)   HYDROmorphone (Dilaudid) injection 0.5 mg (0.5 mg Intravenous Given 2/18/22 1900)   ampicillin/sulbactam (UNASYN) 3 g in "  mL IVPB (0 g Intravenous Stopped 2/18/22 1921)       Differential diagnosis:  Hyperglycemia, diabetic foot infection, foot abscess, electrolyte abnormality, Sirs, sepsis, osteomyelitis    MDM:  Mr. Bradshaw presents the emergency department today for evaluation of hyperglycemia and concern for a diabetic foot infection as documented above.  On arrival to the emergency department he is tachycardic, he has no hypotension, he is afebrile.  He is awake and alert, no evidence of acute distress.    On laboratory evaluation lactic acid is elevated to 4.9. White blood count is 4.3 with a normal differential. No bands resulted at this time. CMP notable for glucose of 774 with anion gap of 19, CO2 is 21.  Creatinine is within normal limits.    Chest x-ray is negative for acute process.  Plain film of the right foot is negative for acute osseous abnormality.    Plan at this time is for admission to hospitalist service for continued antibiotics, correction of hyperglycemia and continued monitoring for worsening of diabetic foot infection.  At this time I do not see any evidence of osteomyelitis, no ulcerations present.  No evidence of radiopaque foreign body.     Personal protective equipment including N95 surgical respirator, goggles, and gloves were used during this encounter.       Disposition:  Admit to hospitalist in guarded condition    Final Impression:  1. Hyperglycemia    2. Abscess    3. Sepsis without acute organ dysfunction, due to unspecified organism (HCC)        Electronically signed by: Arcelia Olguin MD, 2/19/2022 12:22 AM

## 2022-02-20 LAB
ANION GAP SERPL CALC-SCNC: 8 MMOL/L (ref 7–16)
BASOPHILS # BLD AUTO: 0.5 % (ref 0–1.8)
BASOPHILS # BLD: 0.02 K/UL (ref 0–0.12)
BUN SERPL-MCNC: 21 MG/DL (ref 8–22)
CALCIUM SERPL-MCNC: 7.7 MG/DL (ref 8.4–10.2)
CHLORIDE SERPL-SCNC: 113 MMOL/L (ref 96–112)
CO2 SERPL-SCNC: 20 MMOL/L (ref 20–33)
CREAT SERPL-MCNC: 0.54 MG/DL (ref 0.5–1.4)
EOSINOPHIL # BLD AUTO: 0.07 K/UL (ref 0–0.51)
EOSINOPHIL NFR BLD: 1.8 % (ref 0–6.9)
ERYTHROCYTE [DISTWIDTH] IN BLOOD BY AUTOMATED COUNT: 45 FL (ref 35.9–50)
GLUCOSE BLD-MCNC: 121 MG/DL (ref 65–99)
GLUCOSE BLD-MCNC: 134 MG/DL (ref 65–99)
GLUCOSE BLD-MCNC: 168 MG/DL (ref 65–99)
GLUCOSE BLD-MCNC: 174 MG/DL (ref 65–99)
GLUCOSE SERPL-MCNC: 147 MG/DL (ref 65–99)
HCT VFR BLD AUTO: 36.6 % (ref 42–52)
HGB BLD-MCNC: 12.7 G/DL (ref 14–18)
IMM GRANULOCYTES # BLD AUTO: 0.02 K/UL (ref 0–0.11)
IMM GRANULOCYTES NFR BLD AUTO: 0.5 % (ref 0–0.9)
LYMPHOCYTES # BLD AUTO: 1.3 K/UL (ref 1–4.8)
LYMPHOCYTES NFR BLD: 33.6 % (ref 22–41)
MCH RBC QN AUTO: 31.9 PG (ref 27–33)
MCHC RBC AUTO-ENTMCNC: 34.7 G/DL (ref 33.7–35.3)
MCV RBC AUTO: 92 FL (ref 81.4–97.8)
MONOCYTES # BLD AUTO: 0.22 K/UL (ref 0–0.85)
MONOCYTES NFR BLD AUTO: 5.7 % (ref 0–13.4)
NEUTROPHILS # BLD AUTO: 2.24 K/UL (ref 1.82–7.42)
NEUTROPHILS NFR BLD: 57.9 % (ref 44–72)
NRBC # BLD AUTO: 0 K/UL
NRBC BLD-RTO: 0 /100 WBC
PLATELET # BLD AUTO: 147 K/UL (ref 164–446)
PMV BLD AUTO: 9.5 FL (ref 9–12.9)
POTASSIUM SERPL-SCNC: 3.6 MMOL/L (ref 3.6–5.5)
RBC # BLD AUTO: 3.98 M/UL (ref 4.7–6.1)
SODIUM SERPL-SCNC: 141 MMOL/L (ref 135–145)
WBC # BLD AUTO: 3.9 K/UL (ref 4.8–10.8)

## 2022-02-20 PROCEDURE — 700105 HCHG RX REV CODE 258: Performed by: HOSPITALIST

## 2022-02-20 PROCEDURE — 80202 ASSAY OF VANCOMYCIN: CPT

## 2022-02-20 PROCEDURE — 85027 COMPLETE CBC AUTOMATED: CPT

## 2022-02-20 PROCEDURE — 82962 GLUCOSE BLOOD TEST: CPT | Mod: 91

## 2022-02-20 PROCEDURE — 36415 COLL VENOUS BLD VENIPUNCTURE: CPT

## 2022-02-20 PROCEDURE — A9270 NON-COVERED ITEM OR SERVICE: HCPCS | Performed by: INTERNAL MEDICINE

## 2022-02-20 PROCEDURE — 700111 HCHG RX REV CODE 636 W/ 250 OVERRIDE (IP): Performed by: HOSPITALIST

## 2022-02-20 PROCEDURE — 700102 HCHG RX REV CODE 250 W/ 637 OVERRIDE(OP): Performed by: INTERNAL MEDICINE

## 2022-02-20 PROCEDURE — 700111 HCHG RX REV CODE 636 W/ 250 OVERRIDE (IP): Performed by: INTERNAL MEDICINE

## 2022-02-20 PROCEDURE — 99232 SBSQ HOSP IP/OBS MODERATE 35: CPT | Performed by: INTERNAL MEDICINE

## 2022-02-20 PROCEDURE — 85025 COMPLETE CBC W/AUTO DIFF WBC: CPT

## 2022-02-20 PROCEDURE — 700102 HCHG RX REV CODE 250 W/ 637 OVERRIDE(OP): Performed by: HOSPITALIST

## 2022-02-20 PROCEDURE — 700101 HCHG RX REV CODE 250: Performed by: INTERNAL MEDICINE

## 2022-02-20 PROCEDURE — 700105 HCHG RX REV CODE 258: Performed by: INTERNAL MEDICINE

## 2022-02-20 PROCEDURE — 80048 BASIC METABOLIC PNL TOTAL CA: CPT

## 2022-02-20 PROCEDURE — A9270 NON-COVERED ITEM OR SERVICE: HCPCS | Performed by: HOSPITALIST

## 2022-02-20 PROCEDURE — 80069 RENAL FUNCTION PANEL: CPT

## 2022-02-20 PROCEDURE — 770006 HCHG ROOM/CARE - MED/SURG/GYN SEMI*

## 2022-02-20 RX ADMIN — INSULIN LISPRO 1 UNITS: 100 INJECTION, SOLUTION INTRAVENOUS; SUBCUTANEOUS at 06:32

## 2022-02-20 RX ADMIN — VANCOMYCIN HYDROCHLORIDE 1000 MG: 1 INJECTION, POWDER, LYOPHILIZED, FOR SOLUTION INTRAVENOUS at 21:18

## 2022-02-20 RX ADMIN — HYDROMORPHONE HYDROCHLORIDE 0.5 MG: 1 INJECTION, SOLUTION INTRAMUSCULAR; INTRAVENOUS; SUBCUTANEOUS at 05:44

## 2022-02-20 RX ADMIN — SODIUM CHLORIDE: 9 INJECTION, SOLUTION INTRAVENOUS at 17:26

## 2022-02-20 RX ADMIN — THYROID, PORCINE 60 MG: 30 TABLET ORAL at 05:43

## 2022-02-20 RX ADMIN — ENOXAPARIN SODIUM 40 MG: 40 INJECTION SUBCUTANEOUS at 05:43

## 2022-02-20 RX ADMIN — KETOROLAC TROMETHAMINE 30 MG: 30 INJECTION, SOLUTION INTRAMUSCULAR at 05:44

## 2022-02-20 RX ADMIN — SODIUM CHLORIDE: 9 INJECTION, SOLUTION INTRAVENOUS at 05:52

## 2022-02-20 RX ADMIN — HYDROMORPHONE HYDROCHLORIDE 0.5 MG: 1 INJECTION, SOLUTION INTRAMUSCULAR; INTRAVENOUS; SUBCUTANEOUS at 20:40

## 2022-02-20 RX ADMIN — VANCOMYCIN HYDROCHLORIDE 1000 MG: 1 INJECTION, POWDER, LYOPHILIZED, FOR SOLUTION INTRAVENOUS at 08:29

## 2022-02-20 RX ADMIN — AMPICILLIN SODIUM AND SULBACTAM SODIUM 3 G: 2; 1 INJECTION, POWDER, FOR SOLUTION INTRAMUSCULAR; INTRAVENOUS at 12:31

## 2022-02-20 RX ADMIN — INSULIN LISPRO 6 UNITS: 100 INJECTION, SOLUTION INTRAVENOUS; SUBCUTANEOUS at 17:18

## 2022-02-20 RX ADMIN — INSULIN LISPRO 1 UNITS: 100 INJECTION, SOLUTION INTRAVENOUS; SUBCUTANEOUS at 17:21

## 2022-02-20 RX ADMIN — KETOROLAC TROMETHAMINE 30 MG: 30 INJECTION, SOLUTION INTRAMUSCULAR at 13:48

## 2022-02-20 RX ADMIN — OXYCODONE HYDROCHLORIDE 10 MG: 10 TABLET ORAL at 13:48

## 2022-02-20 RX ADMIN — INSULIN LISPRO 6 UNITS: 100 INJECTION, SOLUTION INTRAVENOUS; SUBCUTANEOUS at 06:32

## 2022-02-20 RX ADMIN — AMPICILLIN SODIUM AND SULBACTAM SODIUM 3 G: 2; 1 INJECTION, POWDER, FOR SOLUTION INTRAMUSCULAR; INTRAVENOUS at 17:22

## 2022-02-20 RX ADMIN — AMPICILLIN SODIUM AND SULBACTAM SODIUM 3 G: 2; 1 INJECTION, POWDER, FOR SOLUTION INTRAMUSCULAR; INTRAVENOUS at 05:42

## 2022-02-20 RX ADMIN — OXYCODONE HYDROCHLORIDE 10 MG: 10 TABLET ORAL at 09:52

## 2022-02-20 RX ADMIN — INSULIN LISPRO 6 UNITS: 100 INJECTION, SOLUTION INTRAVENOUS; SUBCUTANEOUS at 12:23

## 2022-02-20 RX ADMIN — KETOROLAC TROMETHAMINE 30 MG: 30 INJECTION, SOLUTION INTRAMUSCULAR at 23:13

## 2022-02-20 RX ADMIN — OXYCODONE HYDROCHLORIDE 10 MG: 10 TABLET ORAL at 18:13

## 2022-02-20 ASSESSMENT — PAIN DESCRIPTION - PAIN TYPE
TYPE: ACUTE PAIN

## 2022-02-20 ASSESSMENT — ENCOUNTER SYMPTOMS
NAUSEA: 0
CHILLS: 0
MYALGIAS: 0
SHORTNESS OF BREATH: 0
HEADACHES: 0
DEPRESSION: 0
ABDOMINAL PAIN: 0
FEVER: 0
VOMITING: 0
DIZZINESS: 0

## 2022-02-20 NOTE — PROGRESS NOTES
[facility] Medicine Daily Progress Note    Date of Service  2/20/2022    Chief Complaint  Mahesh Bradshaw is a 56 y.o. male admitted 2/18/2022 with hyperglycemia and abscess.    Hospital Course  56 y.o. male with a past medical history of diabetes and hypothyroidism who presented 2/18/2022 with generalized weakness worsening pain of the right foot.  Patient came to the emergency room because he has been having constantly elevated blood sugars above 600.  Of note he had an incision and drainage of a right foot abscess under urgent care the day prior to admission. On admission patient hyperglycemic, not in DKA with lactic acidosis, started on IV fluids and short and long acting insulin. Last A1c 15.2 last month. Patient started on IV abx for sepsis due to right foot cellulitis. XR showed no evidence of osteomyelitis.     Interval Problem Update  2/19 Lactic acidosis resolved, normal anion gap. Glucose improving down to 300s this morning, increase long acting dose. MRI right foot pending. Cultures pending. Patient with severe pain in the right foot, reports the edema has improved. Start pain control with toradol IV with opiates prn. Patient has hx of sx for necrotizing fascitis and reports has been on high dose pain meds in the past, may need dose adjustment for pain meds.    2/20 Vitals stable on room air. MRI shows no osteomyelitis or fluid collection. MRSA positive by PCR. BCx pending. Per urgent care note does not appear that foot drainage was sent to lab. Glucose improved <180. Patient reports pain and erythema improving.    I have personally seen and examined the patient at bedside. I discussed the plan of care with patient, bedside RN and charge RN.    Consultants/Specialty  N/A    Code Status  Full Code    Disposition  Patient is not medically cleared for discharge.   Anticipate discharge to to home with close outpatient follow-up.  I have placed the appropriate orders for post-discharge needs.    Review of  Systems  Review of Systems   Constitutional: Positive for malaise/fatigue. Negative for chills and fever.   Respiratory: Negative for shortness of breath.    Cardiovascular: Negative for chest pain.   Gastrointestinal: Negative for abdominal pain, nausea and vomiting.   Genitourinary: Negative for dysuria.   Musculoskeletal: Negative for myalgias.        Right foot pain   Skin: Negative for rash.        Right foot erythema    Neurological: Negative for dizziness and headaches.   Psychiatric/Behavioral: Negative for depression.   All other systems reviewed and are negative.       Physical Exam  Temp:  [36.6 °C (97.8 °F)-36.8 °C (98.2 °F)] 36.6 °C (97.9 °F)  Pulse:  [64-88] 64  Resp:  [16-17] 17  BP: (105-123)/(55-74) 115/72  SpO2:  [92 %-95 %] 94 %    Physical Exam  Vitals and nursing note reviewed.   Constitutional:       General: He is not in acute distress.     Appearance: He is obese.   HENT:      Head: Normocephalic and atraumatic.   Eyes:      Conjunctiva/sclera: Conjunctivae normal.   Cardiovascular:      Rate and Rhythm: Normal rate and regular rhythm.      Pulses: Normal pulses.   Pulmonary:      Effort: Pulmonary effort is normal. No respiratory distress.      Breath sounds: Normal breath sounds. No wheezing.   Abdominal:      General: Abdomen is flat. There is no distension.      Palpations: Abdomen is soft.      Tenderness: There is no abdominal tenderness.   Musculoskeletal:         General: Tenderness present. No swelling. Normal range of motion.      Cervical back: Normal range of motion and neck supple.      Comments: Right foot tenderness, erythema improved   Skin:     General: Skin is warm and dry.      Findings: Erythema (improving) present.   Neurological:      General: No focal deficit present.      Mental Status: He is alert and oriented to person, place, and time.      Cranial Nerves: No cranial nerve deficit.      Motor: No weakness.   Psychiatric:         Mood and Affect: Mood normal.          Behavior: Behavior normal.         Fluids  No intake or output data in the 24 hours ending 02/20/22 1115    Laboratory  Recent Labs     02/18/22  1743 02/19/22  0422 02/20/22  0200   WBC 4.3* 4.3* 3.9*   RBC 4.57* 4.14* 3.98*   HEMOGLOBIN 14.7 13.4* 12.7*   HEMATOCRIT 41.1* 38.0* 36.6*   MCV 89.9 91.8 92.0   MCH 32.2 32.4 31.9   MCHC 35.8* 35.3 34.7   RDW 42.5 44.7 45.0   PLATELETCT 191 161* 147*   MPV 9.5 9.6 9.5     Recent Labs     02/18/22  2248 02/19/22  0422 02/20/22  0200   SODIUM 137 138 141   POTASSIUM 3.7 3.6 3.6   CHLORIDE 102 104 113*   CO2 22 23 20   GLUCOSE 330* 292* 147*   BUN 13 14 21   CREATININE 0.63 0.58 0.54   CALCIUM 9.2 8.5 7.7*                   Imaging  MR-FOOT-W/O RIGHT   Final Result      1.  Negative for osteomyelitis      2.  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 No acute cardiopulmonary abnormalities are identified.      DX-FOOT-COMPLETE 3+ RIGHT   Final Result         No acute osseous abnormality.           Assessment/Plan  * Sepsis(995.91)- (present on admission)  Assessment & Plan  This is Sepsis Present on admission  SIRS criteria identified on my evaluation include: Tachycardia, with heart rate greater than 90 BPM, Tachypnea, with respirations greater than 20 per minute and Leukocytosis, with WBC greater than 12,000  Source is cellulitis of the right lower extremity  Sepsis protocol initiated  Fluid resuscitation ordered per protocol  IV antibiotics as appropriate for source of sepsis  While organ dysfunction may be noted elsewhere in this problem list or in the chart, degree of organ dysfunction does not meet CMS criteria for severe sepsis     Right foot pain- (present on admission)  Assessment & Plan  Likely secondary to cellulitis  S/p abscess drainage 2/17/2022- not sent to lab   Unasyn and vancomycin, follow on cultures and sensitivities.  MRSA +   X-ray did not show  evidence for osteomyelitis  MRI- no osteomyelitis   Bcx ntd     Hypothyroidism- (present on admission)  Assessment & Plan  Resume home thyroid medications    Uncontrolled type 2 diabetes mellitus with hyperglycemia (HCC)- (present on admission)  Assessment & Plan  With marked hyperglycemia  Last glycated hemoglobin was 15.2%  I will start premeal, Long & short acting insulin for now  Bicarb is 21, his anion gap is 19 on admission, gap closed   Hypoglycemic protocol     Hyponatremia [pseudo-]- (present on admission)  Assessment & Plan  Measured sodium is likely low secondary to elevated blood sugars.  Corrected sodium is within normal limits.  Treating hyperglycemia, continue to monitor, anticipate measured sodium to improve once blood sugars are higher controlled.         VTE prophylaxis: enoxaparin ppx    I have performed a physical exam and reviewed and updated ROS and Plan today (2/20/2022). In review of yesterday's note (2/19/2022), there are no changes except as documented above.

## 2022-02-20 NOTE — CARE PLAN
The patient is Stable - Low risk of patient condition declining or worsening    Shift Goals  Clinical Goals: control pain  Patient Goals: pt will have pain control    Progress made toward(s) clinical / shift goals:  yes      Patient is not progressing towards the following goals:

## 2022-02-21 VITALS
BODY MASS INDEX: 28.08 KG/M2 | SYSTOLIC BLOOD PRESSURE: 104 MMHG | TEMPERATURE: 98.1 F | HEIGHT: 69 IN | DIASTOLIC BLOOD PRESSURE: 66 MMHG | OXYGEN SATURATION: 97 % | HEART RATE: 64 BPM | RESPIRATION RATE: 18 BRPM | WEIGHT: 189.6 LBS

## 2022-02-21 PROBLEM — A41.9 SEPSIS (HCC): Status: RESOLVED | Noted: 2017-07-09 | Resolved: 2022-02-21

## 2022-02-21 PROBLEM — E87.1 HYPONATREMIA: Status: RESOLVED | Noted: 2017-05-01 | Resolved: 2022-02-21

## 2022-02-21 LAB
ALBUMIN SERPL BCP-MCNC: 3 G/DL (ref 3.2–4.9)
BACTERIA UR CULT: NORMAL
BUN SERPL-MCNC: 14 MG/DL (ref 8–22)
CALCIUM SERPL-MCNC: 7.6 MG/DL (ref 8.4–10.2)
CHLORIDE SERPL-SCNC: 109 MMOL/L (ref 96–112)
CO2 SERPL-SCNC: 21 MMOL/L (ref 20–33)
CREAT SERPL-MCNC: 0.62 MG/DL (ref 0.5–1.4)
ERYTHROCYTE [DISTWIDTH] IN BLOOD BY AUTOMATED COUNT: 45.4 FL (ref 35.9–50)
GLUCOSE BLD-MCNC: 113 MG/DL (ref 65–99)
GLUCOSE SERPL-MCNC: 118 MG/DL (ref 65–99)
HCT VFR BLD AUTO: 37 % (ref 42–52)
HGB BLD-MCNC: 13 G/DL (ref 14–18)
MCH RBC QN AUTO: 32.3 PG (ref 27–33)
MCHC RBC AUTO-ENTMCNC: 35.1 G/DL (ref 33.7–35.3)
MCV RBC AUTO: 91.8 FL (ref 81.4–97.8)
PHOSPHATE SERPL-MCNC: 2.6 MG/DL (ref 2.5–4.5)
PLATELET # BLD AUTO: 153 K/UL (ref 164–446)
PMV BLD AUTO: 9.4 FL (ref 9–12.9)
POTASSIUM SERPL-SCNC: 3.2 MMOL/L (ref 3.6–5.5)
RBC # BLD AUTO: 4.03 M/UL (ref 4.7–6.1)
SIGNIFICANT IND 70042: NORMAL
SITE SITE: NORMAL
SODIUM SERPL-SCNC: 139 MMOL/L (ref 135–145)
SOURCE SOURCE: NORMAL
VANCOMYCIN PEAK 2573: <4 UG/ML (ref 20–40)
VANCOMYCIN TROUGH SERPL-MCNC: 7.1 UG/ML (ref 10–20)
WBC # BLD AUTO: 4.6 K/UL (ref 4.8–10.8)

## 2022-02-21 PROCEDURE — 99239 HOSP IP/OBS DSCHRG MGMT >30: CPT | Performed by: INTERNAL MEDICINE

## 2022-02-21 PROCEDURE — 36415 COLL VENOUS BLD VENIPUNCTURE: CPT

## 2022-02-21 PROCEDURE — 700111 HCHG RX REV CODE 636 W/ 250 OVERRIDE (IP): Performed by: HOSPITALIST

## 2022-02-21 PROCEDURE — A9270 NON-COVERED ITEM OR SERVICE: HCPCS | Performed by: INTERNAL MEDICINE

## 2022-02-21 PROCEDURE — 700102 HCHG RX REV CODE 250 W/ 637 OVERRIDE(OP): Performed by: HOSPITALIST

## 2022-02-21 PROCEDURE — 82962 GLUCOSE BLOOD TEST: CPT

## 2022-02-21 PROCEDURE — 97602 WOUND(S) CARE NON-SELECTIVE: CPT

## 2022-02-21 PROCEDURE — A9270 NON-COVERED ITEM OR SERVICE: HCPCS | Performed by: HOSPITALIST

## 2022-02-21 PROCEDURE — 700111 HCHG RX REV CODE 636 W/ 250 OVERRIDE (IP): Performed by: INTERNAL MEDICINE

## 2022-02-21 PROCEDURE — 80202 ASSAY OF VANCOMYCIN: CPT

## 2022-02-21 PROCEDURE — 700102 HCHG RX REV CODE 250 W/ 637 OVERRIDE(OP): Performed by: INTERNAL MEDICINE

## 2022-02-21 PROCEDURE — 700105 HCHG RX REV CODE 258: Performed by: HOSPITALIST

## 2022-02-21 RX ORDER — OXYCODONE HYDROCHLORIDE 10 MG/1
10 TABLET ORAL EVERY 6 HOURS PRN
Qty: 12 TABLET | Refills: 0 | Status: SHIPPED | OUTPATIENT
Start: 2022-02-21 | End: 2022-02-24

## 2022-02-21 RX ORDER — POTASSIUM CHLORIDE 20 MEQ/1
40 TABLET, EXTENDED RELEASE ORAL 3 TIMES DAILY
Status: COMPLETED | OUTPATIENT
Start: 2022-02-21 | End: 2022-02-21

## 2022-02-21 RX ORDER — DOXYCYCLINE 100 MG/1
100 CAPSULE ORAL 2 TIMES DAILY
Qty: 14 CAPSULE | Refills: 0 | Status: SHIPPED | OUTPATIENT
Start: 2022-02-21 | End: 2022-02-28

## 2022-02-21 RX ADMIN — VANCOMYCIN HYDROCHLORIDE 1000 MG: 1 INJECTION, POWDER, LYOPHILIZED, FOR SOLUTION INTRAVENOUS at 08:40

## 2022-02-21 RX ADMIN — SODIUM CHLORIDE: 9 INJECTION, SOLUTION INTRAVENOUS at 03:24

## 2022-02-21 RX ADMIN — POTASSIUM CHLORIDE 40 MEQ: 1500 TABLET, EXTENDED RELEASE ORAL at 12:07

## 2022-02-21 RX ADMIN — AMPICILLIN SODIUM AND SULBACTAM SODIUM 3 G: 2; 1 INJECTION, POWDER, FOR SOLUTION INTRAMUSCULAR; INTRAVENOUS at 12:07

## 2022-02-21 RX ADMIN — OXYCODONE HYDROCHLORIDE 10 MG: 10 TABLET ORAL at 00:26

## 2022-02-21 RX ADMIN — AMPICILLIN SODIUM AND SULBACTAM SODIUM 3 G: 2; 1 INJECTION, POWDER, FOR SOLUTION INTRAMUSCULAR; INTRAVENOUS at 00:35

## 2022-02-21 RX ADMIN — THYROID, PORCINE 60 MG: 30 TABLET ORAL at 06:16

## 2022-02-21 RX ADMIN — POTASSIUM CHLORIDE 40 MEQ: 1500 TABLET, EXTENDED RELEASE ORAL at 08:40

## 2022-02-21 RX ADMIN — AMPICILLIN SODIUM AND SULBACTAM SODIUM 3 G: 2; 1 INJECTION, POWDER, FOR SOLUTION INTRAMUSCULAR; INTRAVENOUS at 06:27

## 2022-02-21 RX ADMIN — KETOROLAC TROMETHAMINE 30 MG: 30 INJECTION, SOLUTION INTRAMUSCULAR at 06:16

## 2022-02-21 RX ADMIN — OXYCODONE HYDROCHLORIDE 10 MG: 10 TABLET ORAL at 12:07

## 2022-02-21 ASSESSMENT — PAIN DESCRIPTION - PAIN TYPE
TYPE: ACUTE PAIN

## 2022-02-21 NOTE — DISCHARGE INSTRUCTIONS
Discharge Instructions    Discharged to home by car with relative. Discharged via wheelchair, hospital escort: Yes.  Special equipment needed: Not Applicable    Be sure to schedule a follow-up appointment with your primary care doctor or any specialists as instructed.     Discharge Plan:   Influenza Vaccine Indication: Not indicated: Previously immunized this influenza season and > 8 years of age    I understand that a diet low in cholesterol, fat, and sodium is recommended for good health. Unless I have been given specific instructions below for another diet, I accept this instruction as my diet prescription.   Other diet: Regular    Special Instructions:   Sepsis, Diagnosis, Adult  Sepsis is a serious bodily reaction to an infection. The infection that triggers sepsis may be from a bacteria, virus, or fungus. Sepsis can result from an infection in any part of your body. Infections that commonly lead to sepsis include skin, lung, and urinary tract infections.  Sepsis is a medical emergency that must be treated right away in a hospital. In severe cases, it can lead to septic shock. Septic shock can weaken your heart and cause your blood pressure to drop. This can cause your central nervous system and your body's organs to stop working.  What are the causes?  This condition is caused by a severe reaction to infections from bacteria, viruses, or fungus. The germs that most often lead to sepsis include:  · Escherichia coli (E. coli) bacteria.  · Staphylococcus aureus (staph) bacteria.  · Some types of Streptococcus bacteria.  The most common infections affect these organs:  · The lung (pneumonia).  · The kidneys or bladder (urinary tract infection).  · The skin (cellulitis).  · The bowel, gallbladder, or pancreas.  What increases the risk?  You are more likely to develop this condition if:  · Your body's disease-fighting system (immune system) is weakened.  · You are age 65 or older.  · You are male.  · You had surgery  or you have been hospitalized.  · You have these devices inserted into your body:  ? A small, thin tube (catheter).  ? IV line.  ? Breathing tube.  ? Drainage tube.  · You are not getting enough nutrients from food (malnourished).  · You have a long-term (chronic) disease, such as cancer, lung disease, kidney disease, or diabetes.  · You are .  What are the signs or symptoms?  Symptoms of this condition may include:  · Fever.  · Chills or feeling very cold.  · Confusion or anxiety.  · Fatigue.  · Muscle aches.  · Shortness of breath.  · Nausea and vomiting.  · Urinating much less than usual.  · Fast heart rate (tachycardia).  · Rapid breathing (hyperventilation).  · Changes in skin color. Your skin may look blotchy, pale, or blue.  · Cool, clammy, or sweaty skin.  · Skin rash.  Other symptoms depend on the source of your infection.  How is this diagnosed?  This condition is diagnosed based on:  · Your symptoms.  · Your medical history.  · A physical exam.  Other tests may also be done to find out the cause of the infection and how severe the sepsis is. These tests may include:  · Blood tests.  · Urine tests.  · Swabs from other areas of your body that may have an infection. These samples may be tested (cultured) to find out what type of bacteria is causing the infection.  · Chest X-ray to check for pneumonia. Other imaging tests, such as a CT scan, may also be done.  · Lumbar puncture. This removes a small amount of the fluid that surrounds your brain and spinal cord. The fluid is then examined for infection.  How is this treated?  This condition must be treated in a hospital. Based on the cause of your infection, you may be given an antibiotic, antiviral, or antifungal medicine.  You may also receive:  · Fluids through an IV.  · Oxygen and breathing assistance.  · Medicines to increase your blood pressure.  · Kidney dialysis. This process cleans your blood if your kidneys have failed.  · Surgery to  remove infected tissue.  · Blood transfusion if needed.  · Medicine to prevent blood clots.  · Nutrients to correct imbalances in basic body function (metabolism). You may:  ? Receive important salts and minerals (electrolytes) through an IV.  ? Have your blood sugar level adjusted.  Follow these instructions at home:  Medicines    · Take over-the-counter and prescription medicines only as told by your health care provider.  · If you were prescribed an antibiotic, antiviral, or antifungal medicine, take it as told by your health care provider. Do not stop taking the medicine even if you start to feel better.  General instructions  · If you have a catheter or other indwelling device, ask to have it removed as soon as possible.  · Keep all follow-up visits as told by your health care provider. This is important.  Contact a health care provider if:  · You do not feel like you are getting better or regaining strength.  · You are having trouble coping with your recovery.  · You frequently feel tired.  · You feel worse or do not seem to get better after surgery.  · You think you may have an infection after surgery.  Get help right away if:  · You have any symptoms of sepsis.  · You have difficulty breathing.  · You have a rapid or skipping heartbeat.  · You become confused or disoriented.  · You have a high fever.  · Your skin becomes blotchy, pale, or blue.  · You have an infection that is getting worse or not getting better.  These symptoms may represent a serious problem that is an emergency. Do not wait to see if the symptoms will go away. Get medical help right away. Call your local emergency services (911 in the [place] Do not drive yourself to the hospital.  Summary  · Sepsis is a medical emergency that requires immediate treatment in a hospital.  · This condition is caused by a severe reaction to infections from bacteria, viruses, or fungus.  · Based on the cause of your infection, you may be given an antibiotic,  antiviral, or antifungal medicine.  · Treatment may also include IV fluids, breathing assistance, and kidney dialysis.  This information is not intended to replace advice given to you by your health care provider. Make sure you discuss any questions you have with your health care provider.  Document Released: 09/15/2004 Document Revised: 07/26/2019 Document Reviewed: 07/26/2019  [employer] Patient Education © 2020 [employer]      · Is patient discharged on Warfarin / Coumadin?   No     Depression / Suicide Risk    As you are discharged from this [facility] facility, it is important to learn how to keep safe from harming yourself.    Recognize the warning signs:  · Abrupt changes in personality, positive or negative- including increase in energy   · Giving away possessions  · Change in eating patterns- significant weight changes-  positive or negative  · Change in sleeping patterns- unable to sleep or sleeping all the time   · Unwillingness or inability to communicate  · Depression  · Unusual sadness, discouragement and loneliness  · Talk of wanting to die  · Neglect of personal appearance   · Rebelliousness- reckless behavior  · Withdrawal from people/activities they love  · Confusion- inability to concentrate     If you or a loved one observes any of these behaviors or has concerns about self-harm, here's what you can do:  · Talk about it- your feelings and reasons for harming yourself  · Remove any means that you might use to hurt yourself (examples: pills, rope, extension cords, firearm)  · Get professional help from the community (Mental Health, Substance Abuse, psychological counseling)  · Do not be alone:Call your Safe Contact- someone whom you trust who will be there for you.  · Call your local CRISIS HOTLINE 260-9591 or 029-895-9767  · Call your local Children's Mobile Crisis Response Team [place] (360) 017-9082 or www.Base79  · Call the toll free National Suicide Prevention Hotlines    · National Suicide Prevention Lifeline 016-697-ECBQ (7203)  · [facility] 800-SUICIDE (194-6275)      Cellulitis, Adult    Cellulitis is a skin infection. The infected area is often warm, red, swollen, and sore. It occurs most often in the arms and lower legs. It is very important to get treated for this condition.  What are the causes?  This condition is caused by bacteria. The bacteria enter through a break in the skin, such as a cut, burn, insect bite, open sore, or crack.  What increases the risk?  This condition is more likely to occur in people who:  · Have a weak body defense system (immune system).  · Have open cuts, burns, bites, or scrapes on the skin.  · Are older than 60 years of age.  · Have a blood sugar problem (diabetes).  · Have a long-lasting (chronic) liver disease (cirrhosis) or kidney disease.  · Are very overweight (obese).  · Have a skin problem, such as:  ? Itchy rash (eczema).  ? Slow movement of blood in the veins (venous stasis).  ? Fluid buildup below the skin (edema).  · Have been treated with high-energy rays (radiation).  · Use IV drugs.  What are the signs or symptoms?  Symptoms of this condition include:  · Skin that is:  ? Red.  ? Streaking.  ? Spotting.  ? Swollen.  ? Sore or painful when you touch it.  ? Warm.  · A fever.  · Chills.  · Blisters.  How is this diagnosed?  This condition is diagnosed based on:  · Medical history.  · Physical exam.  · Blood tests.  · Imaging tests.  How is this treated?  Treatment for this condition may include:  · Medicines to treat infections or allergies.  · Home care, such as:  ? Rest.  ? Placing cold or warm cloths (compresses) on the skin.  · Hospital care, if the condition is very bad.  Follow these instructions at home:  Medicines  · Take over-the-counter and prescription medicines only as told by your doctor.  · If you were prescribed an antibiotic medicine, take it as told by your doctor. Do not stop taking it even if you  start to feel better.  General instructions    · Drink enough fluid to keep your pee (urine) pale yellow.  · Do not touch or rub the infected area.  · Raise (elevate) the infected area above the level of your heart while you are sitting or lying down.  · Place cold or warm cloths on the area as told by your doctor.  · Keep all follow-up visits as told by your doctor. This is important.  Contact a doctor if:  · You have a fever.  · You do not start to get better after 1-2 days of treatment.  · Your bone or joint under the infected area starts to hurt after the skin has healed.  · Your infection comes back. This can happen in the same area or another area.  · You have a swollen bump in the area.  · You have new symptoms.  · You feel ill and have muscle aches and pains.  Get help right away if:  · Your symptoms get worse.  · You feel very sleepy.  · You throw up (vomit) or have watery poop (diarrhea) for a long time.  · You see red streaks coming from the area.  · Your red area gets larger.  · Your red area turns dark in color.  These symptoms may represent a serious problem that is an emergency. Do not wait to see if the symptoms will go away. Get medical help right away. Call your local emergency services (911 in the [place] Do not drive yourself to the hospital.  Summary  · Cellulitis is a skin infection. The area is often warm, red, swollen, and sore.  · This condition is treated with medicines, rest, and cold and warm cloths.  · Take all medicines only as told by your doctor.  · Tell your doctor if symptoms do not start to get better after 1-2 days of treatment.  This information is not intended to replace advice given to you by your health care provider. Make sure you discuss any questions you have with your health care provider.  Document Released: 06/05/2009 Document Revised: 05/09/2019 Document Reviewed: 05/09/2019  [employer]vier Patient Education © 2020 [employer].

## 2022-02-21 NOTE — CARE PLAN
The patient is Stable - Low risk of patient condition declining or worsening    Shift Goals  Clinical Goals: Pain level will be at a comfortable level to sleep this shift  Patient Goals: Rest, comfort    Progress made toward(s) clinical / shift goals:  Patient able to sleep from 1783-1794 during rounding. Patient verbalized understanding on pain control plan.       Problem: Pain - Standard  Goal: Alleviation of pain or a reduction in pain to the patient’s comfort goal  Outcome: Progressing  Note: Educated patient on pain scale and pain control plan. Patient verbalizes understanding. Patient medicated per MAR and non-pharmacologic interventions.      Problem: Knowledge Deficit - Standard  Goal: Patient and family/care givers will demonstrate understanding of plan of care, disease process/condition, diagnostic tests and medications  Outcome: Progressing  Note: Patient verbalized understanding on plan of care. Addressed concerns.

## 2022-02-21 NOTE — CARE PLAN
The patient is Stable - Low risk of patient condition declining or worsening    Shift Goals  Clinical Goals: control pain this shift  Patient Goals: discharge today    Progress made toward(s) clinical / shift goals:  yes    Patient is not progressing towards the following goals:

## 2022-02-21 NOTE — DISCHARGE SUMMARY
Discharge Summary    CHIEF COMPLAINT ON ADMISSION  Chief Complaint   Patient presents with   • Hyperglycemia     BS over 600 at home   • Abscess     Right foot. I&D at  yesterday, reports it is more painful and swollen today.       Reason for Admission  abcess on the right foot     Admission Date  2/18/2022    CODE STATUS  Full Code    HPI & HOSPITAL COURSE  This is a 56 y.o. male here with right foot pain.     56 y.o. male with a past medical history of diabetes and hypothyroidism who presented 2/18/2022 with generalized weakness worsening pain of the right foot.  Patient came to the emergency room because he has been having constantly elevated blood sugars above 600.  Of note he had an incision and drainage of a right foot abscess under urgent care the day prior to admission. On admission patient hyperglycemic, not in DKA with lactic acidosis, started on IV fluids and short and long acting insulin. Last A1c 15.2 last month. Patient started on IV abx for sepsis due to right foot cellulitis. XR showed no evidence of osteomyelitis. MRI shows no osteomyelitis or fluid collection. MRSA positive by PCR. Unfortunately abscess drained by urgent care was not sent for culture. Erythema and pain has improved though patient still has pain with walking. He has been sent doxycycline to his pharmacy and short course of oxycodone. He has been educated to wear shoes when walking and return to the ER if his condition worsens. Patient's vitals are stable and he is ready for discharge home.     Therefore, he is discharged in good and stable condition to home with close outpatient follow-up.    The patient met 2-midnight criteria for an inpatient stay at the time of discharge.    Discharge Date  2/21/2022    FOLLOW UP ITEMS POST DISCHARGE  Follow up with primary care     DISCHARGE DIAGNOSES  Principal Problem (Resolved):    Sepsis(995.91) POA: Yes  Active Problems:    Uncontrolled type 2 diabetes mellitus with hyperglycemia (HCC)  POA: Yes    Hypothyroidism (Chronic) POA: Yes      Overview: Chronic condition treated with Cantrall Thyroid.      Resumed maintenance medication.    Right foot pain POA: Yes  Resolved Problems:    Hyponatremia [pseudo-] POA: Yes      FOLLOW UP  No future appointments.  Rodolfo Stein M.D.  [address] [facility] [facility]  [address]  [place] [address]  [phone]      Follow up with primary care physician in 1 week for hospital follow up       MEDICATIONS ON DISCHARGE     Medication List      START taking these medications      Instructions   doxycycline 100 MG capsule  Commonly known as: MONODOX   Take 1 Capsule by mouth 2 times a day for 7 days.  Dose: 100 mg     oxyCODONE immediate release 10 MG immediate release tablet  Commonly known as: ROXICODONE   Take 1 Tablet by mouth every 6 hours as needed for Moderate Pain or Severe Pain for up to 3 days.  Dose: 10 mg        CHANGE how you take these medications      Instructions   insulin lispro 100 UNIT/ML Sopn injection PEN  What changed:   · how much to take  · when to take this  · additional instructions  Commonly known as: HumaLOG,AdmeLOG   Inject 8 Units under the skin 3 times a day before meals.  Dose: 8 Units        CONTINUE taking these medications      Instructions   Alcohol Swabs   Doctor's comments: Per formulary preference. ICD-10 code: E11.65 Uncontrolled type 2 Diabetes Mellitus  Wipe site with prep pad prior to injection.     * Blood Glucose Meter Kit   Doctor's comments: Or per formulary preference. ICD-10 code: E11.65 Uncontrolled type 2 Diabetes Mellitus  Test blood sugar as recommended by provider. True Metrix blood glucose monitoring kit.     * Blood Glucose Test Strips   Doctor's comments: Or per formulary preference. ICD-10 code: E11.65 Uncontrolled type 2 Diabetes Mellitus  Use one True Metrix strip to test blood sugar three times daily before meals.     Cialis 5 MG tablet  Generic drug: tadalafil   Take 5 mg by mouth every day.  Dose: 5 mg     Insulin Pen  "Needle 32 G x 4 mm   Doctor's comments: Per patient/formulary preference. ICD-10 code: E11.65 Uncontrolled type 2 Diabetes Mellitus  Use one pen needle in pen device to inject insulin three times daily.     Jardiance 25 MG Tabs  Generic drug: Empagliflozin   Take 25 mg by mouth every day.  Dose: 25 mg     Lancets   Doctor's comments: Or per formulary preference. ICD-10 code: E11.65 Uncontrolled type 2 Diabetes Mellitus  Use one True Metrix lancet to test blood sugar three times daily before meals.     melatonin 5 mg Tabs   Take 5-10 mg by mouth at bedtime as needed (Sleep). 1 to 2 tablets = 5 to 10 mg.  Dose: 5-10 mg     Mens Multivitamin Tabs   Take 1 Package by mouth every evening. \"[employer] Men\"  Dose: 1 Package     metformin 1000 MG tablet  Commonly known as: GLUCOPHAGE   Take 1 Tablet by mouth 2 times a day with meals.  Dose: 1,000 mg     pioglitazone 15 MG Tabs  Commonly known as: ACTOS   Take 1 Tablet by mouth every day.  Dose: 15 mg     thyroid 60 MG Tabs  Commonly known as: ARMOUR THYROID   Take 1 Tab by mouth every morning.  Dose: 60 mg         * This list has 2 medication(s) that are the same as other medications prescribed for you. Read the directions carefully, and ask your doctor or other care provider to review them with you.            STOP taking these medications    amoxicillin 500 MG Caps  Commonly known as: AMOXIL     sulfamethoxazole-trimethoprim 800-160 MG tablet  Commonly known as: BACTRIM DS            Allergies  Allergies   Allergen Reactions   • Peanut (Diagnostic) Anaphylaxis   • Peanut Oil Anaphylaxis   • Tradjenta [Linagliptin] Unspecified     Pt developed pancreatitis   • Hydrocodone Hives     Tolerates Oxycodone/hydromorphone     • Morphine Itching       DIET  Orders Placed This Encounter   Procedures   • Diet Order Diet: Consistent CHO (Diabetic)     Standing Status:   Standing     Number of Occurrences:   1     Order Specific Question:   Diet:     Answer:   Consistent CHO (Diabetic) " [4]       ACTIVITY  As tolerated.  Weight bearing as tolerated    CONSULTATIONS  N/A    PROCEDURES  N/A    LABORATORY  Lab Results   Component Value Date    SODIUM 139 02/20/2022    POTASSIUM 3.2 (L) 02/20/2022    CHLORIDE 109 02/20/2022    CO2 21 02/20/2022    GLUCOSE 118 (H) 02/20/2022    BUN 14 02/20/2022    CREATININE 0.62 02/20/2022    CREATININE 1.1 02/18/2008        Lab Results   Component Value Date    WBC 4.6 (L) 02/20/2022    HEMOGLOBIN 13.0 (L) 02/20/2022    HEMATOCRIT 37.0 (L) 02/20/2022    PLATELETCT 153 (L) 02/20/2022        Total time of the discharge process exceeds 35 minutes.

## 2022-02-21 NOTE — PROGRESS NOTES
Pt discharged home in stable condition via wheelcir. Pt stated understanding of meds and instructions.

## 2022-02-22 LAB — GLUCOSE BLD STRIP.AUTO-MCNC: 107 MG/DL (ref 65–99)

## 2022-02-22 NOTE — WOUND TEAM
In to see pt for wound to plantar R foot. There was small scab present, and it was removed with forceps revealing slightly raised area, but no open wound. Pt has c/o pain with palpation and walking. Cleaned area again and dried. Applied an adhesive foam drg. There is a callus in this are , but not open. Pt to DC home.

## 2022-02-23 LAB
BACTERIA BLD CULT: NORMAL
SIGNIFICANT IND 70042: NORMAL
SITE SITE: NORMAL
SOURCE SOURCE: NORMAL

## 2022-03-15 NOTE — DOCUMENTATION QUERY
[facility]                                                                       Query Response Note      PATIENT:               DEVIN MO  ACCT #:                  [id number]  MRN:                     [id number]  :                      1965  ADMIT DATE:       2022 5:53 PM  DISCH DATE:        2022 1:43 PM  RESPONDING  PROVIDER #:        [id number]           QUERY TEXT:    Patient with sepsis, source cellulitis.  PCR +MRSA.  Please clarify the following:    NOTE:  If an appropriate response is not listed below, please respond with a new note.      The patient's Clinical Indicators include:  Clinical Indicators  Sepsis, cellulitis  Tachycardia, with heart rate greater than 90 BPM  Tachypnea, with respirations greater than 20 per minute   Leukocytosis, with WBC greater than 12,000    PCR test shows positive MRSA,   blood culture neg  no wound culture    Treatment or Monitoring  IV unasyn and vancomycin    Risk Factors  Type 2 diabetes mellitus with hyperglycemia  Hypertensive heart disease with heart failure    [email or web address]  Options provided:   -- Sepsis/cellulitis is due to or associated with MRSA   -- Sepsis/cellulitis is not due to or associated with MRSA   -- Unable to determine      Query created by: Chastity Phillips on 3/14/2022 6:39 AM    RESPONSE TEXT:    Sepsis/cellulitis is due to or associated with MRSA          Electronically signed by:  ELENO BAIRD MD 3/15/2022 1:25 PM

## 2022-05-10 ENCOUNTER — APPOINTMENT (OUTPATIENT)
Dept: RADIOLOGY | Facility: MEDICAL CENTER | Age: 57
DRG: 638 | End: 2022-05-10
Attending: INTERNAL MEDICINE
Payer: COMMERCIAL

## 2022-05-10 ENCOUNTER — HOSPITAL ENCOUNTER (INPATIENT)
Facility: MEDICAL CENTER | Age: 57
LOS: 2 days | DRG: 638 | End: 2022-05-12
Attending: EMERGENCY MEDICINE | Admitting: INTERNAL MEDICINE
Payer: COMMERCIAL

## 2022-05-10 ENCOUNTER — APPOINTMENT (OUTPATIENT)
Dept: RADIOLOGY | Facility: MEDICAL CENTER | Age: 57
DRG: 638 | End: 2022-05-10
Attending: EMERGENCY MEDICINE
Payer: COMMERCIAL

## 2022-05-10 DIAGNOSIS — E11.00 HYPEROSMOLAR HYPERGLYCEMIC STATE (HHS) (HCC): ICD-10-CM

## 2022-05-10 DIAGNOSIS — E11.10 DKA, TYPE 2, NOT AT GOAL (HCC): ICD-10-CM

## 2022-05-10 DIAGNOSIS — E08.10 DIABETIC KETOACIDOSIS WITHOUT COMA ASSOCIATED WITH DIABETES MELLITUS DUE TO UNDERLYING CONDITION (HCC): ICD-10-CM

## 2022-05-10 DIAGNOSIS — F51.01 PRIMARY INSOMNIA: ICD-10-CM

## 2022-05-10 DIAGNOSIS — J32.9 SINUSITIS, UNSPECIFIED CHRONICITY, UNSPECIFIED LOCATION: ICD-10-CM

## 2022-05-10 LAB
ALBUMIN SERPL BCP-MCNC: 3.7 G/DL (ref 3.2–4.9)
ALBUMIN/GLOB SERPL: 1.5 G/DL
ALP SERPL-CCNC: 108 U/L (ref 30–99)
ALT SERPL-CCNC: 24 U/L (ref 2–50)
ANION GAP SERPL CALC-SCNC: 12 MMOL/L (ref 7–16)
ANION GAP SERPL CALC-SCNC: 19 MMOL/L (ref 7–16)
APPEARANCE UR: CLEAR
AST SERPL-CCNC: 11 U/L (ref 12–45)
BASE EXCESS BLDV CALC-SCNC: -8 MMOL/L
BASOPHILS # BLD AUTO: 0.2 % (ref 0–1.8)
BASOPHILS # BLD: 0.01 K/UL (ref 0–0.12)
BILIRUB SERPL-MCNC: 0.2 MG/DL (ref 0.1–1.5)
BILIRUB UR QL STRIP.AUTO: NEGATIVE
BODY TEMPERATURE: ABNORMAL CENTIGRADE
BUN SERPL-MCNC: 11 MG/DL (ref 8–22)
BUN SERPL-MCNC: 14 MG/DL (ref 8–22)
CALCIUM SERPL-MCNC: 8.5 MG/DL (ref 8.4–10.2)
CALCIUM SERPL-MCNC: 8.6 MG/DL (ref 8.4–10.2)
CHLORIDE SERPL-SCNC: 103 MMOL/L (ref 96–112)
CHLORIDE SERPL-SCNC: 89 MMOL/L (ref 96–112)
CO2 SERPL-SCNC: 15 MMOL/L (ref 20–33)
CO2 SERPL-SCNC: 22 MMOL/L (ref 20–33)
COLOR UR: YELLOW
CREAT SERPL-MCNC: 0.68 MG/DL (ref 0.5–1.4)
CREAT SERPL-MCNC: 0.92 MG/DL (ref 0.5–1.4)
EKG IMPRESSION: NORMAL
EOSINOPHIL # BLD AUTO: 0.03 K/UL (ref 0–0.51)
EOSINOPHIL NFR BLD: 0.5 % (ref 0–6.9)
ERYTHROCYTE [DISTWIDTH] IN BLOOD BY AUTOMATED COUNT: 39.7 FL (ref 35.9–50)
FLUAV RNA SPEC QL NAA+PROBE: NEGATIVE
FLUBV RNA SPEC QL NAA+PROBE: NEGATIVE
GFR SERPLBLD CREATININE-BSD FMLA CKD-EPI: 108 ML/MIN/1.73 M 2
GFR SERPLBLD CREATININE-BSD FMLA CKD-EPI: 97 ML/MIN/1.73 M 2
GLOBULIN SER CALC-MCNC: 2.5 G/DL (ref 1.9–3.5)
GLUCOSE BLD STRIP.AUTO-MCNC: 398 MG/DL (ref 65–99)
GLUCOSE BLD STRIP.AUTO-MCNC: 427 MG/DL (ref 65–99)
GLUCOSE BLD STRIP.AUTO-MCNC: >600 MG/DL (ref 65–99)
GLUCOSE SERPL-MCNC: 357 MG/DL (ref 65–99)
GLUCOSE SERPL-MCNC: 851 MG/DL (ref 65–99)
GLUCOSE UR STRIP.AUTO-MCNC: 500 MG/DL
HCO3 BLDV-SCNC: 16 MMOL/L (ref 24–28)
HCT VFR BLD AUTO: 37.7 % (ref 42–52)
HGB BLD-MCNC: 13.8 G/DL (ref 14–18)
IMM GRANULOCYTES # BLD AUTO: 0.05 K/UL (ref 0–0.11)
IMM GRANULOCYTES NFR BLD AUTO: 0.8 % (ref 0–0.9)
KETONES UR STRIP.AUTO-MCNC: 15 MG/DL
LEUKOCYTE ESTERASE UR QL STRIP.AUTO: NEGATIVE
LYMPHOCYTES # BLD AUTO: 1.16 K/UL (ref 1–4.8)
LYMPHOCYTES NFR BLD: 18.4 % (ref 22–41)
MAGNESIUM SERPL-MCNC: 1.9 MG/DL (ref 1.5–2.5)
MCH RBC QN AUTO: 32.1 PG (ref 27–33)
MCHC RBC AUTO-ENTMCNC: 36.6 G/DL (ref 33.7–35.3)
MCV RBC AUTO: 87.7 FL (ref 81.4–97.8)
MICRO URNS: ABNORMAL
MONOCYTES # BLD AUTO: 0.27 K/UL (ref 0–0.85)
MONOCYTES NFR BLD AUTO: 4.3 % (ref 0–13.4)
NEUTROPHILS # BLD AUTO: 4.79 K/UL (ref 1.82–7.42)
NEUTROPHILS NFR BLD: 75.8 % (ref 44–72)
NITRITE UR QL STRIP.AUTO: NEGATIVE
NRBC # BLD AUTO: 0 K/UL
NRBC BLD-RTO: 0 /100 WBC
PCO2 BLDV: 29.9 MMHG (ref 41–51)
PH BLDV: 7.36 [PH] (ref 7.31–7.45)
PH UR STRIP.AUTO: 5.5 [PH] (ref 5–8)
PHOSPHATE SERPL-MCNC: 3 MG/DL (ref 2.5–4.5)
PLATELET # BLD AUTO: 170 K/UL (ref 164–446)
PMV BLD AUTO: 9.6 FL (ref 9–12.9)
PO2 BLDV: 66.7 MMHG (ref 25–40)
POTASSIUM SERPL-SCNC: 3.5 MMOL/L (ref 3.6–5.5)
POTASSIUM SERPL-SCNC: 4.1 MMOL/L (ref 3.6–5.5)
PROT SERPL-MCNC: 6.2 G/DL (ref 6–8.2)
PROT UR QL STRIP: NEGATIVE MG/DL
RBC # BLD AUTO: 4.3 M/UL (ref 4.7–6.1)
RBC UR QL AUTO: NEGATIVE
RSV RNA SPEC QL NAA+PROBE: NEGATIVE
SAO2 % BLDV: 92.9 %
SARS-COV-2 RNA RESP QL NAA+PROBE: NOTDETECTED
SODIUM SERPL-SCNC: 123 MMOL/L (ref 135–145)
SODIUM SERPL-SCNC: 137 MMOL/L (ref 135–145)
SP GR UR STRIP.AUTO: 1.01
SPECIMEN SOURCE: NORMAL
TROPONIN T SERPL-MCNC: <6 NG/L (ref 6–19)
TSH SERPL DL<=0.005 MIU/L-ACNC: 2.4 UIU/ML (ref 0.38–5.33)
WBC # BLD AUTO: 6.3 K/UL (ref 4.8–10.8)

## 2022-05-10 PROCEDURE — 94760 N-INVAS EAR/PLS OXIMETRY 1: CPT

## 2022-05-10 PROCEDURE — 99291 CRITICAL CARE FIRST HOUR: CPT

## 2022-05-10 PROCEDURE — 700111 HCHG RX REV CODE 636 W/ 250 OVERRIDE (IP): Performed by: EMERGENCY MEDICINE

## 2022-05-10 PROCEDURE — 700111 HCHG RX REV CODE 636 W/ 250 OVERRIDE (IP): Performed by: INTERNAL MEDICINE

## 2022-05-10 PROCEDURE — 82803 BLOOD GASES ANY COMBINATION: CPT

## 2022-05-10 PROCEDURE — 86635 COCCIDIOIDES ANTIBODY: CPT | Mod: 91

## 2022-05-10 PROCEDURE — 80053 COMPREHEN METABOLIC PANEL: CPT

## 2022-05-10 PROCEDURE — 84443 ASSAY THYROID STIM HORMONE: CPT

## 2022-05-10 PROCEDURE — 81003 URINALYSIS AUTO W/O SCOPE: CPT

## 2022-05-10 PROCEDURE — 96375 TX/PRO/DX INJ NEW DRUG ADDON: CPT

## 2022-05-10 PROCEDURE — 93005 ELECTROCARDIOGRAM TRACING: CPT | Performed by: EMERGENCY MEDICINE

## 2022-05-10 PROCEDURE — 36415 COLL VENOUS BLD VENIPUNCTURE: CPT

## 2022-05-10 PROCEDURE — 80048 BASIC METABOLIC PNL TOTAL CA: CPT

## 2022-05-10 PROCEDURE — 96374 THER/PROPH/DIAG INJ IV PUSH: CPT

## 2022-05-10 PROCEDURE — 700105 HCHG RX REV CODE 258: Performed by: EMERGENCY MEDICINE

## 2022-05-10 PROCEDURE — 70486 CT MAXILLOFACIAL W/O DYE: CPT

## 2022-05-10 PROCEDURE — 87449 NOS EACH ORGANISM AG IA: CPT

## 2022-05-10 PROCEDURE — 700102 HCHG RX REV CODE 250 W/ 637 OVERRIDE(OP): Performed by: EMERGENCY MEDICINE

## 2022-05-10 PROCEDURE — 770022 HCHG ROOM/CARE - ICU (200)

## 2022-05-10 PROCEDURE — 71045 X-RAY EXAM CHEST 1 VIEW: CPT

## 2022-05-10 PROCEDURE — 82010 KETONE BODYS QUAN: CPT

## 2022-05-10 PROCEDURE — 82962 GLUCOSE BLOOD TEST: CPT

## 2022-05-10 PROCEDURE — 99291 CRITICAL CARE FIRST HOUR: CPT | Performed by: INTERNAL MEDICINE

## 2022-05-10 PROCEDURE — 85025 COMPLETE CBC W/AUTO DIFF WBC: CPT

## 2022-05-10 PROCEDURE — 84100 ASSAY OF PHOSPHORUS: CPT

## 2022-05-10 PROCEDURE — 87040 BLOOD CULTURE FOR BACTERIA: CPT

## 2022-05-10 PROCEDURE — 96376 TX/PRO/DX INJ SAME DRUG ADON: CPT

## 2022-05-10 PROCEDURE — 700101 HCHG RX REV CODE 250: Performed by: INTERNAL MEDICINE

## 2022-05-10 PROCEDURE — 84484 ASSAY OF TROPONIN QUANT: CPT

## 2022-05-10 PROCEDURE — 0241U HCHG SARS-COV-2 COVID-19 NFCT DS RESP RNA 4 TRGT MIC: CPT

## 2022-05-10 PROCEDURE — 83735 ASSAY OF MAGNESIUM: CPT

## 2022-05-10 PROCEDURE — 700105 HCHG RX REV CODE 258: Performed by: INTERNAL MEDICINE

## 2022-05-10 RX ORDER — MAGNESIUM SULFATE HEPTAHYDRATE 40 MG/ML
4 INJECTION, SOLUTION INTRAVENOUS
Status: COMPLETED | OUTPATIENT
Start: 2022-05-10 | End: 2022-05-11

## 2022-05-10 RX ORDER — SODIUM CHLORIDE, SODIUM LACTATE, POTASSIUM CHLORIDE, CALCIUM CHLORIDE 600; 310; 30; 20 MG/100ML; MG/100ML; MG/100ML; MG/100ML
INJECTION, SOLUTION INTRAVENOUS CONTINUOUS
Status: DISCONTINUED | OUTPATIENT
Start: 2022-05-10 | End: 2022-05-11

## 2022-05-10 RX ORDER — DEXTROSE AND SODIUM CHLORIDE 10; .45 G/100ML; G/100ML
INJECTION, SOLUTION INTRAVENOUS CONTINUOUS
Status: DISCONTINUED | OUTPATIENT
Start: 2022-05-10 | End: 2022-05-11

## 2022-05-10 RX ORDER — SODIUM CHLORIDE, SODIUM LACTATE, POTASSIUM CHLORIDE, AND CALCIUM CHLORIDE .6; .31; .03; .02 G/100ML; G/100ML; G/100ML; G/100ML
2000 INJECTION, SOLUTION INTRAVENOUS ONCE
Status: COMPLETED | OUTPATIENT
Start: 2022-05-10 | End: 2022-05-10

## 2022-05-10 RX ORDER — NAPROXEN SODIUM 220 MG
440 TABLET ORAL 2 TIMES DAILY PRN
Status: ON HOLD | COMMUNITY
End: 2022-05-12

## 2022-05-10 RX ORDER — POTASSIUM CHLORIDE 7.45 MG/ML
10 INJECTION INTRAVENOUS
Status: COMPLETED | OUTPATIENT
Start: 2022-05-10 | End: 2022-05-10

## 2022-05-10 RX ORDER — ONDANSETRON 4 MG/1
4 TABLET, ORALLY DISINTEGRATING ORAL EVERY 4 HOURS PRN
Status: DISCONTINUED | OUTPATIENT
Start: 2022-05-10 | End: 2022-05-12 | Stop reason: HOSPADM

## 2022-05-10 RX ORDER — PROCHLORPERAZINE EDISYLATE 5 MG/ML
5-10 INJECTION INTRAMUSCULAR; INTRAVENOUS EVERY 4 HOURS PRN
Status: DISCONTINUED | OUTPATIENT
Start: 2022-05-10 | End: 2022-05-12 | Stop reason: HOSPADM

## 2022-05-10 RX ORDER — HYDROMORPHONE HYDROCHLORIDE 1 MG/ML
0.5 INJECTION, SOLUTION INTRAMUSCULAR; INTRAVENOUS; SUBCUTANEOUS EVERY 4 HOURS PRN
Status: DISCONTINUED | OUTPATIENT
Start: 2022-05-10 | End: 2022-05-12 | Stop reason: HOSPADM

## 2022-05-10 RX ORDER — PROMETHAZINE HYDROCHLORIDE 25 MG/1
12.5-25 SUPPOSITORY RECTAL EVERY 4 HOURS PRN
Status: DISCONTINUED | OUTPATIENT
Start: 2022-05-10 | End: 2022-05-12 | Stop reason: HOSPADM

## 2022-05-10 RX ORDER — SODIUM CHLORIDE 9 MG/ML
2000 INJECTION, SOLUTION INTRAVENOUS ONCE
Status: DISCONTINUED | OUTPATIENT
Start: 2022-05-10 | End: 2022-05-10

## 2022-05-10 RX ORDER — PROMETHAZINE HYDROCHLORIDE 25 MG/1
12.5-25 TABLET ORAL EVERY 4 HOURS PRN
Status: DISCONTINUED | OUTPATIENT
Start: 2022-05-10 | End: 2022-05-12 | Stop reason: HOSPADM

## 2022-05-10 RX ORDER — ONDANSETRON 2 MG/ML
4 INJECTION INTRAMUSCULAR; INTRAVENOUS ONCE
Status: COMPLETED | OUTPATIENT
Start: 2022-05-10 | End: 2022-05-10

## 2022-05-10 RX ORDER — SODIUM CHLORIDE, SODIUM LACTATE, POTASSIUM CHLORIDE, CALCIUM CHLORIDE 600; 310; 30; 20 MG/100ML; MG/100ML; MG/100ML; MG/100ML
1000 INJECTION, SOLUTION INTRAVENOUS ONCE
Status: COMPLETED | OUTPATIENT
Start: 2022-05-10 | End: 2022-05-11

## 2022-05-10 RX ORDER — HYDROMORPHONE HYDROCHLORIDE 1 MG/ML
0.5 INJECTION, SOLUTION INTRAMUSCULAR; INTRAVENOUS; SUBCUTANEOUS ONCE
Status: COMPLETED | OUTPATIENT
Start: 2022-05-10 | End: 2022-05-10

## 2022-05-10 RX ORDER — THYROID 30 MG/1
60 TABLET ORAL EVERY MORNING
Status: DISCONTINUED | OUTPATIENT
Start: 2022-05-11 | End: 2022-05-12 | Stop reason: HOSPADM

## 2022-05-10 RX ORDER — ACETAMINOPHEN 325 MG/1
650 TABLET ORAL EVERY 6 HOURS PRN
Status: DISCONTINUED | OUTPATIENT
Start: 2022-05-10 | End: 2022-05-12 | Stop reason: HOSPADM

## 2022-05-10 RX ORDER — CHOLECALCIFEROL (VITAMIN D3) 125 MCG
5-10 CAPSULE ORAL
Status: DISCONTINUED | OUTPATIENT
Start: 2022-05-10 | End: 2022-05-12 | Stop reason: HOSPADM

## 2022-05-10 RX ORDER — DEXTROSE AND SODIUM CHLORIDE 5; .45 G/100ML; G/100ML
INJECTION, SOLUTION INTRAVENOUS CONTINUOUS
Status: DISCONTINUED | OUTPATIENT
Start: 2022-05-10 | End: 2022-05-11

## 2022-05-10 RX ORDER — FLUCONAZOLE 200 MG/1
400 TABLET ORAL DAILY
Status: DISCONTINUED | OUTPATIENT
Start: 2022-05-11 | End: 2022-05-11

## 2022-05-10 RX ORDER — ONDANSETRON 2 MG/ML
4 INJECTION INTRAMUSCULAR; INTRAVENOUS EVERY 4 HOURS PRN
Status: DISCONTINUED | OUTPATIENT
Start: 2022-05-10 | End: 2022-05-12 | Stop reason: HOSPADM

## 2022-05-10 RX ORDER — POTASSIUM CHLORIDE 7.45 MG/ML
10 INJECTION INTRAVENOUS
Status: DISPENSED | OUTPATIENT
Start: 2022-05-10 | End: 2022-05-10

## 2022-05-10 RX ORDER — SODIUM CHLORIDE 9 MG/ML
1000 INJECTION, SOLUTION INTRAVENOUS ONCE
Status: COMPLETED | OUTPATIENT
Start: 2022-05-10 | End: 2022-05-10

## 2022-05-10 RX ORDER — ENOXAPARIN SODIUM 100 MG/ML
40 INJECTION SUBCUTANEOUS DAILY
Status: DISCONTINUED | OUTPATIENT
Start: 2022-05-11 | End: 2022-05-12 | Stop reason: HOSPADM

## 2022-05-10 RX ORDER — AMOXICILLIN 250 MG
2 CAPSULE ORAL 2 TIMES DAILY
Status: DISCONTINUED | OUTPATIENT
Start: 2022-05-11 | End: 2022-05-12 | Stop reason: HOSPADM

## 2022-05-10 RX ORDER — BISACODYL 10 MG
10 SUPPOSITORY, RECTAL RECTAL
Status: DISCONTINUED | OUTPATIENT
Start: 2022-05-10 | End: 2022-05-12 | Stop reason: HOSPADM

## 2022-05-10 RX ORDER — MAGNESIUM SULFATE HEPTAHYDRATE 40 MG/ML
2 INJECTION, SOLUTION INTRAVENOUS
Status: COMPLETED | OUTPATIENT
Start: 2022-05-10 | End: 2022-05-11

## 2022-05-10 RX ORDER — POLYETHYLENE GLYCOL 3350 17 G/17G
1 POWDER, FOR SOLUTION ORAL
Status: DISCONTINUED | OUTPATIENT
Start: 2022-05-10 | End: 2022-05-12 | Stop reason: HOSPADM

## 2022-05-10 RX ADMIN — SODIUM CHLORIDE, POTASSIUM CHLORIDE, SODIUM LACTATE AND CALCIUM CHLORIDE 2000 ML: 600; 310; 30; 20 INJECTION, SOLUTION INTRAVENOUS at 20:10

## 2022-05-10 RX ADMIN — HYDROMORPHONE HYDROCHLORIDE 0.5 MG: 1 INJECTION, SOLUTION INTRAMUSCULAR; INTRAVENOUS; SUBCUTANEOUS at 22:36

## 2022-05-10 RX ADMIN — SODIUM CHLORIDE, POTASSIUM CHLORIDE, SODIUM LACTATE AND CALCIUM CHLORIDE 1000 ML: 600; 310; 30; 20 INJECTION, SOLUTION INTRAVENOUS at 15:08

## 2022-05-10 RX ADMIN — SODIUM CHLORIDE 1000 ML: 9 INJECTION, SOLUTION INTRAVENOUS at 18:16

## 2022-05-10 RX ADMIN — HYDROMORPHONE HYDROCHLORIDE 0.5 MG: 1 INJECTION, SOLUTION INTRAMUSCULAR; INTRAVENOUS; SUBCUTANEOUS at 16:56

## 2022-05-10 RX ADMIN — POTASSIUM CHLORIDE 10 MEQ: 7.46 INJECTION, SOLUTION INTRAVENOUS at 22:30

## 2022-05-10 RX ADMIN — ONDANSETRON 4 MG: 2 INJECTION INTRAMUSCULAR; INTRAVENOUS at 15:01

## 2022-05-10 RX ADMIN — SODIUM CHLORIDE 3 G: 900 INJECTION INTRAVENOUS at 20:37

## 2022-05-10 RX ADMIN — INSULIN HUMAN 10 UNITS: 100 INJECTION, SOLUTION PARENTERAL at 16:59

## 2022-05-10 RX ADMIN — HYDROMORPHONE HYDROCHLORIDE 0.5 MG: 1 INJECTION, SOLUTION INTRAMUSCULAR; INTRAVENOUS; SUBCUTANEOUS at 15:03

## 2022-05-10 RX ADMIN — POTASSIUM CHLORIDE 10 MEQ: 7.46 INJECTION, SOLUTION INTRAVENOUS at 21:16

## 2022-05-10 RX ADMIN — SODIUM CHLORIDE, POTASSIUM CHLORIDE, SODIUM LACTATE AND CALCIUM CHLORIDE: 600; 310; 30; 20 INJECTION, SOLUTION INTRAVENOUS at 22:25

## 2022-05-10 RX ADMIN — VANCOMYCIN HYDROCHLORIDE 2250 MG: 500 INJECTION, POWDER, LYOPHILIZED, FOR SOLUTION INTRAVENOUS at 20:11

## 2022-05-10 RX ADMIN — INSULIN HUMAN 9 UNITS/HR: 1 INJECTION, SOLUTION INTRAVENOUS at 21:15

## 2022-05-10 RX ADMIN — DEXTROSE AND SODIUM CHLORIDE: 5; 450 INJECTION, SOLUTION INTRAVENOUS at 22:36

## 2022-05-10 ASSESSMENT — LIFESTYLE VARIABLES
HOW MANY TIMES IN THE PAST YEAR HAVE YOU HAD 5 OR MORE DRINKS IN A DAY: 0
EVER HAD A DRINK FIRST THING IN THE MORNING TO STEADY YOUR NERVES TO GET RID OF A HANGOVER: NO
HAVE PEOPLE ANNOYED YOU BY CRITICIZING YOUR DRINKING: NO
TOTAL SCORE: 0
TOTAL SCORE: 0
HAVE YOU EVER FELT YOU SHOULD CUT DOWN ON YOUR DRINKING: NO
EVER FELT BAD OR GUILTY ABOUT YOUR DRINKING: NO
CONSUMPTION TOTAL: NEGATIVE
TOTAL SCORE: 0
ON A TYPICAL DAY WHEN YOU DRINK ALCOHOL HOW MANY DRINKS DO YOU HAVE: 2
AVERAGE NUMBER OF DAYS PER WEEK YOU HAVE A DRINK CONTAINING ALCOHOL: 0
ALCOHOL_USE: YES

## 2022-05-10 ASSESSMENT — PAIN DESCRIPTION - PAIN TYPE
TYPE: ACUTE PAIN

## 2022-05-10 ASSESSMENT — FIBROSIS 4 INDEX
FIB4 SCORE: 2.86
FIB4 SCORE: 0.75

## 2022-05-10 NOTE — ED NOTES
"Labs ordered by erp after conferring with same. Update to er charge as well as pt states\" you probably need ultrasound guided for iv\"  "

## 2022-05-10 NOTE — ED PROVIDER NOTES
"ED Provider Note    CHIEF COMPLAINT  Chief Complaint   Patient presents with   • Abdominal Cramping   • N/V     X 1 week   • Headache     X 1 week   • Blood Sugar Problem     States hx of type 2 diabetes and \"this is my pattern for dka\"       HPI  Mahesh Bradshaw is a 57 y.o. male here for evaluation of nausea and vomiting over the last week, and problems with blood sugar control.  Patient states that he had similar issues over the last year, resulting in his DKA.  He has no fever chills, but states that he is been having very high sugars at home.  He has no chest pain, no shortness of breath.  Nothing seem to leave exacerbate his symptoms.      ROS  See HPI for further details, o/w negative.     PAST MEDICAL HISTORY   has a past medical history of ADRIANE (acute kidney injury) ([facility]) (2/24/2016), Anesthesia, Arthritis (3-3-17), Aspiration pneumonia ([facility]) (3-2016), ASTHMA (3-3-17), Breath shortness (3-3-17), Congestive heart failure ([facility]) (2-2016 ), Dental disorder, Depression (11/26/2014), Diabetes ([facility]) (3-3-17), Diabetes ([facility]), Difficult intubation (2-2016), DKA (diabetic ketoacidoses) (2-2016), Electrolyte imbalance (2-2016), Elevated LFTs, Elevated liver enzymes (2-2016), Essential hypertension (1/22/2016), Gout, Heart burn, High cholesterol (3-3-17), Hypothyroid (3-3-17), Indigestion, Kidney stones, Klebsiella infect, Migraine, MRSA cellulitis, Necrotizing myositis, On mechanically assisted ventilation ([facility]) (2-2016), Pain (3-3-17), Pancreatitis (2-2016), RF (renal failure) (2-2016), Sepsis ([facility]) (1/21/2016), Snoring (3-3-17), Streptococcus infection, UC (ulcerative colitis) ([facility]) (3-3-17), Urinary incontinence, and Vitamin D deficiency.    SOCIAL HISTORY  Social History     Tobacco Use   • Smoking status: Never Smoker   • Smokeless tobacco: Never Used   Vaping Use   • Vaping Use: Never used   Substance and Sexual Activity   • Alcohol use: Yes     Comment: rare   • Drug use: No   • Sexual activity: Not on file "       Family History  No bleeding disorders noted    SURGICAL HISTORY   has a past surgical history that includes vaishnavi by laparoscopy (2005); colonoscopy with biopsy (11/26/2014); incision and drainage general (4/7/2017); irrigation & debridement general (Right, 4/29/2017); irrigation & debridement general (Right, 5/1/2017); other orthopedic surgery (1997 or 1998); umbilical hernia repair (N/A, 11/6/2016); umbilical hernia repair (3/10/2017); irrigation & debridement ortho (Left, 12/10/2017); and umbilical hernia repair (N/A, 4/15/2018).    CURRENT MEDICATIONS  Home Medications     Reviewed by Darleen Sherman (Pharmacy Tech) on 05/10/22 at 1428  Med List Status: Complete   Medication Last Dose Status   Blood Glucose Meter Kit 5/10/2022 Active   Empagliflozin (JARDIANCE) 25 MG Tab 5/10/2022 Active   insulin lispro (HUMALOG KWIKPEN) 100 UNIT/ML Solution Pen-injector injection PEN 5/10/2022 Active   melatonin 5 mg Tab 5/7/2022 Active   metFORMIN (GLUCOPHAGE) 1000 MG tablet 5/10/2022 Active   Multiple Vitamins-Minerals (MENS MULTIVITAMIN) Tab 5/9/2022 Active   naproxen (ALEVE) 220 MG tablet 5/10/2022 Active   pioglitazone (ACTOS) 15 MG Tab 5/10/2022 Active   tadalafil (CIALIS) 5 MG tablet 5/9/2022 Active   thyroid (ARMOUR THYROID) 60 MG Tab 5/10/2022 Active                ALLERGIES  Allergies   Allergen Reactions   • Peanut (Diagnostic) Anaphylaxis   • Peanut Oil Anaphylaxis   • Tradjenta [Linagliptin] Unspecified     Pt developed pancreatitis   • Hydrocodone Hives     Tolerates Oxycodone/hydromorphone     • Morphine Itching       REVIEW OF SYSTEMS  See HPI for further details. Review of systems as above, otherwise all other systems are negative.     PHYSICAL EXAM  Constitutional: Well developed, well nourished.  Mild acute distress.  HEENT: Normocephalic, atraumatic. Posterior pharynx clear and moist.  Eyes:  EOMI. Normal sclera.  Neck: Supple, Full range of motion, nontender.  Chest/Pulmonary: clear to  ausculation. Symmetrical expansion.   Cardio: Regular rate and rhythm with no murmur.   Abdomen: Soft, mild epigastric tenderness, no peritoneal signs. No guarding. No palpable masses.  Musculoskeletal: No deformity, no edema, neurovascular intact.   Neuro: Clear speech, appropriate, cooperative, cranial nerves II-XII grossly intact.  Psych: Normal mood and affect      Results for orders placed or performed during the hospital encounter of 05/10/22   CBC w/ Differential   Result Value Ref Range    WBC 6.3 4.8 - 10.8 K/uL    RBC 4.30 (L) 4.70 - 6.10 M/uL    Hemoglobin 13.8 (L) 14.0 - 18.0 g/dL    Hematocrit 37.7 (L) 42.0 - 52.0 %    MCV 87.7 81.4 - 97.8 fL    MCH 32.1 27.0 - 33.0 pg    MCHC 36.6 (H) 33.7 - 35.3 g/dL    RDW 39.7 35.9 - 50.0 fL    Platelet Count 170 164 - 446 K/uL    MPV 9.6 9.0 - 12.9 fL    Neutrophils-Polys 75.80 (H) 44.00 - 72.00 %    Lymphocytes 18.40 (L) 22.00 - 41.00 %    Monocytes 4.30 0.00 - 13.40 %    Eosinophils 0.50 0.00 - 6.90 %    Basophils 0.20 0.00 - 1.80 %    Immature Granulocytes 0.80 0.00 - 0.90 %    Nucleated RBC 0.00 /100 WBC    Neutrophils (Absolute) 4.79 1.82 - 7.42 K/uL    Lymphs (Absolute) 1.16 1.00 - 4.80 K/uL    Monos (Absolute) 0.27 0.00 - 0.85 K/uL    Eos (Absolute) 0.03 0.00 - 0.51 K/uL    Baso (Absolute) 0.01 0.00 - 0.12 K/uL    Immature Granulocytes (abs) 0.05 0.00 - 0.11 K/uL    NRBC (Absolute) 0.00 K/uL   Complete Metabolic Panel (CMP)   Result Value Ref Range    Sodium 123 (L) 135 - 145 mmol/L    Potassium 4.1 3.6 - 5.5 mmol/L    Chloride 89 (L) 96 - 112 mmol/L    Co2 15 (L) 20 - 33 mmol/L    Anion Gap 19.0 (H) 7.0 - 16.0    Glucose 851 (HH) 65 - 99 mg/dL    Bun 14 8 - 22 mg/dL    Creatinine 0.92 0.50 - 1.40 mg/dL    Calcium 8.5 8.4 - 10.2 mg/dL    AST(SGOT) 11 (L) 12 - 45 U/L    ALT(SGPT) 24 2 - 50 U/L    Alkaline Phosphatase 108 (H) 30 - 99 U/L    Total Bilirubin 0.2 0.1 - 1.5 mg/dL    Albumin 3.7 3.2 - 4.9 g/dL    Total Protein 6.2 6.0 - 8.2 g/dL    Globulin 2.5 1.9  - 3.5 g/dL    A-G Ratio 1.5 g/dL   MAGNESIUM   Result Value Ref Range    Magnesium 1.9 1.5 - 2.5 mg/dL   VENOUS BLOOD GAS   Result Value Ref Range    Venous Bg Ph 7.36 7.31 - 7.45    Venous Bg Pco2 29.9 (L) 41.0 - 51.0 mmHg    Venous Bg Po2 66.7 (H) 25.0 - 40.0 mmHg    Venous Bg O2 Saturation 92.9 %    Venous Bg Hco3 16 (L) 24 - 28 mmol/L    Venous Bg Base Excess -8 mmol/L    Body Temp na Centigrade   ESTIMATED GFR   Result Value Ref Range    GFR (CKD-EPI) 97 >60 mL/min/1.73 m 2   POCT glucose device results   Result Value Ref Range    POC Glucose, Blood >600 (HH) 65 - 99 mg/dL     No orders to display         PROCEDURES     MEDICAL RECORD  I have reviewed patient's medical record and pertinent results are listed.    COURSE & MEDICAL DECISION MAKING  I have reviewed any medical record information, laboratory studies and radiographic results as noted above.    HYDRATION: Based on the patient's presentation of Dehydration the patient was given IV fluids. IV Hydration was used because oral hydration was not adequate alone. Upon recheck following hydration, the patient was improved.    3 PM patient comfortable, pain improved    3:35 PM  We will continue to follow    4:22 PM  Nausea improved, no vomiting.  Fluids running    5:50 PM  Pt will be admitted to the icu.  I called lab, and the beta hydroxy will not be running since its only run at the [facility], and they are on internal disaster.     6:02 PM  Dr. Franks, on for icu. He will admit the pt for a further work up.  The pt himself, has agreed to stay.     CRITICAL CARE  The very real possibility of a deterioration of this patient's condition required the highest level of my preparedness for sudden, emergent intervention.  I provided critical care services, which included medication orders, frequent reevaluations of the patient's condition and response to treatment, ordering and reviewing test results, and discussing the case with various consultants.  The critical care  time associated with the care of the patient was 46 minutes. Review chart for interventions. This time is exclusive of any other billable procedures.     FINAL IMPRESSION  DKA  Critical care time 46 minutes.       Electronically signed by: Bebo Pardo D.O., 5/10/2022 4:20 PM

## 2022-05-10 NOTE — ED TRIAGE NOTES
"Pt with type 2 diabetes, to er with c/o abd cramping, h/a and n/v-states I thinnk im going into dka\". In no distress in triage, sates insulin injections at home along with po, and reports fsbs this am>600  "

## 2022-05-11 LAB
ANION GAP SERPL CALC-SCNC: 10 MMOL/L (ref 7–16)
ANION GAP SERPL CALC-SCNC: 11 MMOL/L (ref 7–16)
B-OH-BUTYR SERPL-MCNC: 0.35 MMOL/L (ref 0.02–0.27)
BUN SERPL-MCNC: 8 MG/DL (ref 8–22)
BUN SERPL-MCNC: 9 MG/DL (ref 8–22)
CALCIUM SERPL-MCNC: 7 MG/DL (ref 8.4–10.2)
CALCIUM SERPL-MCNC: 8.1 MG/DL (ref 8.4–10.2)
CHLORIDE SERPL-SCNC: 107 MMOL/L (ref 96–112)
CHLORIDE SERPL-SCNC: 108 MMOL/L (ref 96–112)
CO2 SERPL-SCNC: 19 MMOL/L (ref 20–33)
CO2 SERPL-SCNC: 21 MMOL/L (ref 20–33)
CREAT SERPL-MCNC: 0.48 MG/DL (ref 0.5–1.4)
CREAT SERPL-MCNC: 0.54 MG/DL (ref 0.5–1.4)
ERYTHROCYTE [DISTWIDTH] IN BLOOD BY AUTOMATED COUNT: 40.9 FL (ref 35.9–50)
GFR SERPLBLD CREATININE-BSD FMLA CKD-EPI: 116 ML/MIN/1.73 M 2
GFR SERPLBLD CREATININE-BSD FMLA CKD-EPI: 120 ML/MIN/1.73 M 2
GLUCOSE BLD STRIP.AUTO-MCNC: 151 MG/DL (ref 65–99)
GLUCOSE BLD STRIP.AUTO-MCNC: 174 MG/DL (ref 65–99)
GLUCOSE BLD STRIP.AUTO-MCNC: 175 MG/DL (ref 65–99)
GLUCOSE BLD STRIP.AUTO-MCNC: 211 MG/DL (ref 65–99)
GLUCOSE BLD STRIP.AUTO-MCNC: 232 MG/DL (ref 65–99)
GLUCOSE BLD STRIP.AUTO-MCNC: 232 MG/DL (ref 65–99)
GLUCOSE BLD STRIP.AUTO-MCNC: 235 MG/DL (ref 65–99)
GLUCOSE BLD STRIP.AUTO-MCNC: 250 MG/DL (ref 65–99)
GLUCOSE BLD STRIP.AUTO-MCNC: 271 MG/DL (ref 65–99)
GLUCOSE BLD STRIP.AUTO-MCNC: 311 MG/DL (ref 65–99)
GLUCOSE BLD STRIP.AUTO-MCNC: 330 MG/DL (ref 65–99)
GLUCOSE BLD STRIP.AUTO-MCNC: 358 MG/DL (ref 65–99)
GLUCOSE BLD STRIP.AUTO-MCNC: 73 MG/DL (ref 65–99)
GLUCOSE BLD STRIP.AUTO-MCNC: 96 MG/DL (ref 65–99)
GLUCOSE SERPL-MCNC: 118 MG/DL (ref 65–99)
GLUCOSE SERPL-MCNC: 267 MG/DL (ref 65–99)
HCT VFR BLD AUTO: 32.7 % (ref 42–52)
HGB BLD-MCNC: 11.9 G/DL (ref 14–18)
INR PPP: 1.01 (ref 0.87–1.13)
LACTATE BLD-SCNC: 1 MMOL/L (ref 0.5–2)
LACTATE BLD-SCNC: 1.3 MMOL/L (ref 0.5–2)
LACTATE BLD-SCNC: 1.7 MMOL/L (ref 0.5–2)
MAGNESIUM SERPL-MCNC: 1.7 MG/DL (ref 1.5–2.5)
MAGNESIUM SERPL-MCNC: 1.7 MG/DL (ref 1.5–2.5)
MCH RBC QN AUTO: 32.2 PG (ref 27–33)
MCHC RBC AUTO-ENTMCNC: 36.4 G/DL (ref 33.7–35.3)
MCV RBC AUTO: 88.4 FL (ref 81.4–97.8)
PHOSPHATE SERPL-MCNC: 2.1 MG/DL (ref 2.5–4.5)
PLATELET # BLD AUTO: 153 K/UL (ref 164–446)
PMV BLD AUTO: 9.5 FL (ref 9–12.9)
POTASSIUM SERPL-SCNC: 3.1 MMOL/L (ref 3.6–5.5)
POTASSIUM SERPL-SCNC: 3.7 MMOL/L (ref 3.6–5.5)
PROTHROMBIN TIME: 12.5 SEC (ref 12–14.6)
RBC # BLD AUTO: 3.7 M/UL (ref 4.7–6.1)
SCCMEC + MECA PNL NOSE NAA+PROBE: POSITIVE
SODIUM SERPL-SCNC: 137 MMOL/L (ref 135–145)
SODIUM SERPL-SCNC: 139 MMOL/L (ref 135–145)
TROPONIN T SERPL-MCNC: 7 NG/L (ref 6–19)
TROPONIN T SERPL-MCNC: <6 NG/L (ref 6–19)
WBC # BLD AUTO: 6.2 K/UL (ref 4.8–10.8)

## 2022-05-11 PROCEDURE — 87641 MR-STAPH DNA AMP PROBE: CPT

## 2022-05-11 PROCEDURE — 700111 HCHG RX REV CODE 636 W/ 250 OVERRIDE (IP): Performed by: INTERNAL MEDICINE

## 2022-05-11 PROCEDURE — 83735 ASSAY OF MAGNESIUM: CPT

## 2022-05-11 PROCEDURE — 80048 BASIC METABOLIC PNL TOTAL CA: CPT | Mod: 91

## 2022-05-11 PROCEDURE — 87040 BLOOD CULTURE FOR BACTERIA: CPT

## 2022-05-11 PROCEDURE — 700101 HCHG RX REV CODE 250: Performed by: INTERNAL MEDICINE

## 2022-05-11 PROCEDURE — 83605 ASSAY OF LACTIC ACID: CPT | Mod: 91

## 2022-05-11 PROCEDURE — 84484 ASSAY OF TROPONIN QUANT: CPT

## 2022-05-11 PROCEDURE — 700105 HCHG RX REV CODE 258: Performed by: INTERNAL MEDICINE

## 2022-05-11 PROCEDURE — 770006 HCHG ROOM/CARE - MED/SURG/GYN SEMI*

## 2022-05-11 PROCEDURE — 82962 GLUCOSE BLOOD TEST: CPT | Mod: 91

## 2022-05-11 PROCEDURE — 84100 ASSAY OF PHOSPHORUS: CPT

## 2022-05-11 PROCEDURE — 99233 SBSQ HOSP IP/OBS HIGH 50: CPT | Performed by: INTERNAL MEDICINE

## 2022-05-11 PROCEDURE — A9270 NON-COVERED ITEM OR SERVICE: HCPCS | Performed by: INTERNAL MEDICINE

## 2022-05-11 PROCEDURE — 700102 HCHG RX REV CODE 250 W/ 637 OVERRIDE(OP): Performed by: INTERNAL MEDICINE

## 2022-05-11 PROCEDURE — 94760 N-INVAS EAR/PLS OXIMETRY 1: CPT

## 2022-05-11 PROCEDURE — 85027 COMPLETE CBC AUTOMATED: CPT

## 2022-05-11 PROCEDURE — 85610 PROTHROMBIN TIME: CPT

## 2022-05-11 RX ORDER — POTASSIUM CHLORIDE 7.45 MG/ML
10 INJECTION INTRAVENOUS
Status: DISPENSED | OUTPATIENT
Start: 2022-05-11 | End: 2022-05-11

## 2022-05-11 RX ORDER — POTASSIUM CHLORIDE 7.45 MG/ML
10 INJECTION INTRAVENOUS ONCE
Status: COMPLETED | OUTPATIENT
Start: 2022-05-11 | End: 2022-05-11

## 2022-05-11 RX ADMIN — INSULIN HUMAN 7 UNITS: 100 INJECTION, SOLUTION PARENTERAL at 07:46

## 2022-05-11 RX ADMIN — HYDROMORPHONE HYDROCHLORIDE 0.5 MG: 1 INJECTION, SOLUTION INTRAMUSCULAR; INTRAVENOUS; SUBCUTANEOUS at 22:05

## 2022-05-11 RX ADMIN — SODIUM CHLORIDE 3 G: 900 INJECTION INTRAVENOUS at 18:28

## 2022-05-11 RX ADMIN — HYDROMORPHONE HYDROCHLORIDE 0.5 MG: 1 INJECTION, SOLUTION INTRAMUSCULAR; INTRAVENOUS; SUBCUTANEOUS at 15:00

## 2022-05-11 RX ADMIN — MAGNESIUM SULFATE 2 G: 2 INJECTION INTRAVENOUS at 02:29

## 2022-05-11 RX ADMIN — SODIUM CHLORIDE 3 G: 900 INJECTION INTRAVENOUS at 00:00

## 2022-05-11 RX ADMIN — ONDANSETRON 4 MG: 4 TABLET, ORALLY DISINTEGRATING ORAL at 15:00

## 2022-05-11 RX ADMIN — ACETAMINOPHEN 650 MG: 325 TABLET, FILM COATED ORAL at 18:44

## 2022-05-11 RX ADMIN — INSULIN HUMAN 4 UNITS: 100 INJECTION, SOLUTION PARENTERAL at 12:09

## 2022-05-11 RX ADMIN — INSULIN HUMAN 4 UNITS: 100 INJECTION, SOLUTION PARENTERAL at 18:28

## 2022-05-11 RX ADMIN — INSULIN HUMAN 10 UNITS: 100 INJECTION, SOLUTION PARENTERAL at 21:53

## 2022-05-11 RX ADMIN — FLUCONAZOLE 400 MG: 200 TABLET ORAL at 05:21

## 2022-05-11 RX ADMIN — POTASSIUM CHLORIDE 10 MEQ: 7.46 INJECTION, SOLUTION INTRAVENOUS at 02:30

## 2022-05-11 RX ADMIN — POTASSIUM CHLORIDE 10 MEQ: 7.46 INJECTION, SOLUTION INTRAVENOUS at 04:08

## 2022-05-11 RX ADMIN — Medication 5 MG: at 22:05

## 2022-05-11 RX ADMIN — SODIUM CHLORIDE 3 G: 900 INJECTION INTRAVENOUS at 12:02

## 2022-05-11 RX ADMIN — SODIUM CHLORIDE 3 G: 900 INJECTION INTRAVENOUS at 05:22

## 2022-05-11 RX ADMIN — ONDANSETRON 4 MG: 2 INJECTION INTRAMUSCULAR; INTRAVENOUS at 02:00

## 2022-05-11 RX ADMIN — HYDROMORPHONE HYDROCHLORIDE 0.5 MG: 1 INJECTION, SOLUTION INTRAMUSCULAR; INTRAVENOUS; SUBCUTANEOUS at 09:16

## 2022-05-11 RX ADMIN — INSULIN GLARGINE-YFGN 10 UNITS: 100 INJECTION, SOLUTION SUBCUTANEOUS at 05:00

## 2022-05-11 RX ADMIN — ENOXAPARIN SODIUM 40 MG: 40 INJECTION SUBCUTANEOUS at 05:22

## 2022-05-11 RX ADMIN — THYROID, PORCINE 60 MG: 30 TABLET ORAL at 05:22

## 2022-05-11 RX ADMIN — POTASSIUM CHLORIDE 10 MEQ: 7.46 INJECTION, SOLUTION INTRAVENOUS at 05:23

## 2022-05-11 RX ADMIN — HYDROMORPHONE HYDROCHLORIDE 0.5 MG: 1 INJECTION, SOLUTION INTRAMUSCULAR; INTRAVENOUS; SUBCUTANEOUS at 03:18

## 2022-05-11 RX ADMIN — POTASSIUM CHLORIDE 10 MEQ: 7.46 INJECTION, SOLUTION INTRAVENOUS at 07:19

## 2022-05-11 RX ADMIN — VANCOMYCIN HYDROCHLORIDE 1500 MG: 500 INJECTION, POWDER, LYOPHILIZED, FOR SOLUTION INTRAVENOUS at 09:16

## 2022-05-11 ASSESSMENT — PAIN DESCRIPTION - PAIN TYPE
TYPE: ACUTE PAIN

## 2022-05-11 ASSESSMENT — ENCOUNTER SYMPTOMS: CHILLS: 0

## 2022-05-11 ASSESSMENT — FIBROSIS 4 INDEX: FIB4 SCORE: 0.84

## 2022-05-11 NOTE — CARE PLAN
The patient is Watcher - Medium risk of patient condition declining or worsening    Shift Goals  Clinical Goals: gap<12, co2>17  Patient Goals: rest  Family Goals: uzair    Progress made toward(s) clinical / shift goals: By the end of shift pt. Anion gap was <12, co2>17.      Problem: Hemodynamics  Goal: Patient's hemodynamics, fluid balance and neurologic status will be stable or improve  Outcome: Progressing  Patient neuro status has remained unchanged, has received appropriate fluids, maintained Map>65.

## 2022-05-11 NOTE — ASSESSMENT & PLAN NOTE
DKA 2/2 possible infection. 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 Recent dental infection concerning for possible subclinical bacteremia.     Plan:  -off DKA protocol  -on long acting insulin and SSI   -empiric abx with Unasyn/Vanc  -empiric cocci coverage with Fluconazole  -sinus CT was unremarkable   -cocci serologies pending   -blood cultures pending   -fungitel pending

## 2022-05-11 NOTE — DISCHARGE PLANNING
PC to wife. She said that pt is independent with ADLs and IADLs. They really dont have any dc concerns except that pt is unable to get an appt with his PCP (Dr Stein) and the endocrinologist (Dr Magaña). Wife said that they have been waiting for appts for months.     CM called [phone] to assist with scheduling, LVM.   CM called [phone] and talked to Darius and we have found out that Dr. Stein has his own practice not at [facility]. CM LVM for practice manager Mrs Stein at [phone]. Also LVM for appt  at Dr. Stein's office.     CM called Lom at Dr. Magaña office as well since wife said pt really needs an appt with an endocrinologist and they have been waiting for months. Lom said she is unable to give pt an appt because they would need a referral from an MD.     CM talked to Dr. Franks in ICU who said that the referral would need to be placed by the hospitalist upon discharge since it would need to be an outpt referral.       Care Transition Team Assessment    Information Source  Orientation Level: Oriented X4  Information Given By: Spouse  Informant's Name: kAiko  Who is responsible for making decisions for patient? : Patient    Readmission Evaluation  Is this a readmission?: No    Elopement Risk  Legal Hold: No  Ambulatory or Self Mobile in Wheelchair: No-Not an Elopement Risk  Elopement Risk: Not at Risk for Elopement    Interdisciplinary Discharge Planning  Does Admitting Nurse Feel This Could be a Complex Discharge?: No  Primary Care Physician: Dr. Rodolfo Stein  Lives with - Patient's Self Care Capacity: Spouse  Patient or legal guardian wants to designate a caregiver: No  Support Systems: Spouse / Significant Other  Housing / Facility: 2 Story House  Do You Take your Prescribed Medications Regularly: Yes  Able to Return to Previous ADL's: Yes  Mobility Issues: Yes  Prior Services: None, Home-Independent  Patient Prefers to be Discharged to:: Home  Assistance Needed: No  Durable Medical Equipment: Not  Applicable    Discharge Preparedness  What is your plan after discharge?: Home with help  What are your discharge supports?: Spouse  Prior Functional Level: Ambulatory, Drives Self, Independent with Activities of Daily Living, Independent with Medication Management  Difficulity with ADLs: None  Difficulity with IADLs: None    Functional Assesment  Prior Functional Level: Ambulatory, Drives Self, Independent with Activities of Daily Living, Independent with Medication Management    Finances  Financial Barriers to Discharge: No  Prescription Coverage: Yes    Vision / Hearing Impairment  Vision Impairment : Yes  Right Eye Vision: Wears Glasses  Left Eye Vision: Wears Glasses  Hearing Impairment : No    Values / Beliefs / Concerns  Values / Beliefs Concerns : No    Advance Directive  Advance Directive?: None    Domestic Abuse  Have you ever been the victim of abuse or violence?: No  Physical Abuse or Sexual Abuse: No  Verbal Abuse or Emotional Abuse: No  Possible Abuse/Neglect Reported to:: Not Applicable    Discharge Risks or Barriers  Discharge risks or barriers?: Other (comment) (unable to obtain an appt with PCP)  Patient risk factors: Complex medical needs    Anticipated Discharge Information  Discharge Disposition: Discharged to home/self care (01)

## 2022-05-11 NOTE — CARE PLAN
The patient is Stable - Low risk of patient condition declining or worsening    Shift Goals  Clinical Goals: Continue c Sub Q insulin protocol  Patient Goals: rest  Family Goals: JP    Progress made toward(s) clinical / shift goals:   Pt has been off of insulin gtt since 1:30 am, started Sub Q protocol, labs have been within parameters.  V/S has been stable throughout shift.  Pt has chronic stomach pain due to having ulcerative colitis, managed with PRN IV opioid medication    Patient is not progressing towards the following goals:  N/A

## 2022-05-11 NOTE — CARE PLAN
Problem: Nutritional:  Goal: Achieve adequate nutritional intake  Description: Patient will consume >/=75% of meals.  Outcome: Not Met     See RD note.

## 2022-05-11 NOTE — PROGRESS NOTES
Pt. brought up in [facility] with LR bolus running. Ambulated to hospital bed. Assessment completed, all safety measures in place. Plan of care discussed, patient verbalizes understanding.

## 2022-05-11 NOTE — ASSESSMENT & PLAN NOTE
Per report. Not on any meds currently. Monitor.   -abdominal pain on admission likely 2/2 DKA  -denies hematochezia  -abdominal pain improving  -monitor

## 2022-05-11 NOTE — PROGRESS NOTES
Pharmacy Vancomycin Kinetics Note for 5/10/2022     57 y.o. male on Vancomycin day # 1     Vancomycin Indication (AUC Dosing): Sepsis    Provider specified end date: 05/13/22    Active Antibiotics (From admission, onward)    Ordered     Ordering Provider       Tue May 10, 2022  9:50 PM    05/10/22 2150  vancomycin 1500 mg/250mL NS IVPB premix  (vancomycin (VANCOCIN) IV (LD + Maintenance))  EVERY 12 HOURS         Richardson Posada D.O.       Tue May 10, 2022  6:59 PM    05/10/22 1859  vancomycin 2250 mg/500mL NS IVPB premix  (vancomycin (VANCOCIN) IV (LD + Maintenance))  ONCE         Richardson Posada D.O.       Tue May 10, 2022  6:55 PM    05/10/22 1855  ampicillin/sulbactam (UNASYN) 3 g in  mL IVPB  EVERY 6 HOURS         Richardson Posada D.O.    05/10/22 1855  fluconazole (DIFLUCAN) tablet 400 mg  DAILY        [facility] to Pharmacy: [facility]    Richardson Posada D.O.    05/10/22 1855  MD Alert...Vancomycin per Pharmacy  PHARMACY TO DOSE        Question:  Indication(s) for vancomycin?  Answer:  Unknown source of infection    Richardson Posada D.O.        Dosing Weight: 87 kg (191 lb 12.8 oz)    Admission History: Admitted on 5/10/2022 for DKA, type 2, not at goal (HCC) [E11.10]  Pertinent history: Patient presents with abdominal pain, polyuria, sinus congestion, and arthralgias. Found to be in DKA.    Allergies: Peanut (diagnostic), Peanut oil, Tradjenta [linagliptin], Hydrocodone, and Morphine     Pertinent cultures to date:   Results     Procedure Component Value Units Date/Time    CoV-2, FLU A/B, and RSV by PCR (2-4 Hours CEPHEID) : Collect NP swab in VTM [[id number]] Collected: 05/10/22 1910    Order Status: Completed Specimen: Respirate Updated: 05/10/22 2001     Influenza virus A RNA Negative     Influenza virus B, PCR Negative     RSV, PCR Negative     SARS-CoV-2 by PCR NotDetected     Comment: PATIENTS: Important information regarding your results and instructions can  be found at  "[email or web address]   \"After your  Covid-19 Test\"    [facility] providers: PLEASE REFER TO DE-ESCALATION AND RETESTING PROTOCOL  on [email or web address]    **The Citizen[employer] GeneXpert Xpress SARS-CoV-2 RT-PCR Test has been made  available for use under the Emergency Use Authorization (EUA) only.          SARS-CoV-2 Source Nasal Swab    MRSA By PCR (Amp) [[id number]]     Order Status: Sent Specimen: Respirate from Nares     BLOOD CULTURE [[id number]]     Order Status: Sent Specimen: Blood from Peripheral     BLOOD CULTURE [[id number]]     Order Status: Sent Specimen: Blood from Peripheral     URINALYSIS,CULTURE IF INDICATED [[id number]]     Order Status: Sent Specimen: Urine         Labs: Estimated Creatinine Clearance: 129.9 mL/min (by C-G formula based on SCr of 0.68 mg/dL).  Recent Labs     05/10/22  1436   WBC 6.3   NEUTSPOLYS 75.80*     Recent Labs     05/10/22  1436 05/10/22  1948   BUN 14 11   CREATININE 0.92 0.68   ALBUMIN 3.7  --      Intake/Output Summary (Last 24 hours) at 5/10/2022 215  Last data filed at 5/10/2022 2000  Gross per 24 hour   Intake 1000 ml   Output --   Net 1000 ml      /72   Pulse 81   Temp 36.3 °C (97.3 °F) (Temporal)   Resp 13   Ht 1.74 m (5' 8.5\")   Wt 87.2 kg (192 lb 3.9 oz)   SpO2 95%  Temp (24hrs), Av.3 °C (97.3 °F), Min:36.3 °C (97.3 °F), Max:36.3 °C (97.3 °F)    List concerns for Vancomycin clearance: None    Pharmacokinetics:  AUC kinetics:   Ke (hr ^-1): 0.1082 hr^-1  Half life: 6.41 hr  Clearance: 6.119  Estimated TDD: 3059.5  Estimated Dose: 1071  Estimated interval: 8.4    Trough kinetics:   No results for input(s): VANCOTROUGH, VANCOPEAK, VANCORANDOM in the last 72 hours.    A/P:   -  Vancomycin dose: Vanc 2250mg load followed by 1500mg q12h at 0900    -  Next vancomycin level(s): not ordered, if vanc continued recommend checking peak and trough after 4th 1500mg dose    -  Predicted vancomycin AUC from initial AUC test calculator: 490 " mg·hr/L    -  Comments: patient non-toxic appearing initially. De-escalate antibiotics as appropriate    Aquiilno Khan, PebblesD, BCPS

## 2022-05-11 NOTE — PROGRESS NOTES
Pt received from ICU. Pt alert and oriented x4, no acute distress noted. Pt pain reassessment score decreased and pt stated improved pain. Pt educated on medications, POC, and verbalized understanding of unit. Will continue to monitor.

## 2022-05-11 NOTE — ED NOTES
Report to Oliva in the ER.  Charge nurse made aware that pt needs a 2nd PIV before ICU will accept him.

## 2022-05-11 NOTE — PROGRESS NOTES
4 Eyes Skin Assessment Completed by JAZMINE Maldonado and JAZMINE maddox.    Head WDL  Ears WDL  Nose WDL  Mouth WDL  Neck WDL  Breast/Chest WDL  Shoulder Blades WDL  Spine WDL  (R) Arm/Elbow/Hand WDL  (L) Arm/Elbow/Hand WDL  Abdomen WDL  Groin WDL  Scrotum/Coccyx/Buttocks Redness and Blanching  (R) Leg Scar  (L) Leg WDL  (R) Heel/Foot/Toe WDL  (L) Heel/Foot/Toe WDL          Devices In Places ECG, Blood Pressure Cuff and Pulse Ox      Interventions In Place Pillows and Low Air Loss Mattress    Possible Skin Injury No    Pictures Uploaded Into Epic N/A  Wound Consult Placed N/A  RN Wound Prevention Protocol Ordered No

## 2022-05-11 NOTE — DISCHARGE PLANNING
note:  CM received a call back from Tenzin at scheduling, she will get pt a new PCP. Wife notified and she asked if Dr. Marie is available to take new pts. Tenzin will try and get pt an appt with Dr. Marie. Wife is agreeable for  to find pt a [facility] provider.     Explained to wife that the reason why they are unable to get an appt with Dr. Magaña is because they need a referral from an MD. So by seeing a PCP then pt can get that referral.

## 2022-05-11 NOTE — ASSESSMENT & PLAN NOTE
This is Sepsis Present on admission  SIRS criteria identified on my evaluation include: Tachycardia, with heart rate greater than 90 BPM and Tachypnea, with respirations greater than 20 per minute  Source is sinuses infection  Sepsis protocol initiated  Fluid resuscitation ordered per protocol  Crystalloid Fluid Administration: Fluid resuscitation ordered per standard protocol - 30 mL/kg per current or ideal body weight  IV antibiotics as appropriate for source of sepsis  Reassessment: I have reassessed the patient's hemodynamic status

## 2022-05-11 NOTE — H&P
"Pulmonary History & Physical Note    Date of Service  5/10/2022    Primary Care Physician  Rodolfo Stein M.D.    Consultants  pulmonary    Specialist Names: Dr. Franks, Dr. Posada    Code Status  Full Code    Chief Complaint  Chief Complaint   Patient presents with   • Abdominal Cramping   • N/V     X 1 week   • Headache     X 1 week   • Blood Sugar Problem     States hx of type 2 diabetes and \"this is my pattern for dka\"       History of Presenting Illness  Mahesh Bradshaw is a 57 y.o. male who presented 5/10/2022 with c/o abdominal pain, polyuria, sinus congestion, and arthralgias.    Patient was examined at bedside.  He is alert and oriented x4.  He is nontoxic-appearing.  He is a good historian.  He states that he has felt poorly for the last several days.  He states he works in an [employer] and is often exposed to sick patients.  He states he takes Januvia, metformin, Actos, and sliding scale insulin for his diabetes.  He stated that he normally does not need insulin but that he has needed it for the last week.  He also notes that about a month ago he had a tooth extraction with subsequent infection.  He states he took an antibiotic for 10 days.  He also notes he was recently in [place] and is experiencing sinus congestion/pain and had a bloody nose. He is also having arthralgias and abdominal pain. He stated the abdominal pain is similar to previous experiences with DKA. Denies f/c/NS. Denies chest pain. Denies respiratory symptoms, dysuria, or skin rash.     I discussed the plan of care with patient.    Review of Systems  Review of Systems   All other systems reviewed and are negative.      Past Medical History   has a past medical history of ADRIANE (acute kidney injury) ([facility]) (2/24/2016), Anesthesia, Arthritis (3-3-17), Aspiration pneumonia ([facility]) (3-2016), ASTHMA (3-3-17), Breath shortness (3-3-17), Congestive heart failure ([facility]) (2-2016 ), Dental disorder, Depression (11/26/2014), Diabetes ([facility]) (3-3-17), " Diabetes (HCC), Difficult intubation (2-2016), DKA (diabetic ketoacidoses) (2-2016), Electrolyte imbalance (2-2016), Elevated LFTs, Elevated liver enzymes (2-2016), Essential hypertension (1/22/2016), Gout, Heart burn, High cholesterol (3-3-17), Hypothyroid (3-3-17), Indigestion, Kidney stones, Klebsiella infect, Migraine, MRSA cellulitis, Necrotizing myositis, On mechanically assisted ventilation (HCC) (2-2016), Pain (3-3-17), Pancreatitis (2-2016), RF (renal failure) (2-2016), Sepsis ([facility]) (1/21/2016), Snoring (3-3-17), Streptococcus infection, UC (ulcerative colitis) ([facility]) (3-3-17), Urinary incontinence, and Vitamin D deficiency.    Surgical History   has a past surgical history that includes vaishnavi by laparoscopy (2005); colonoscopy with biopsy (11/26/2014); incision and drainage general (4/7/2017); irrigation & debridement general (Right, 4/29/2017); irrigation & debridement general (Right, 5/1/2017); other orthopedic surgery (1997 or 1998); umbilical hernia repair (N/A, 11/6/2016); umbilical hernia repair (3/10/2017); irrigation & debridement ortho (Left, 12/10/2017); and umbilical hernia repair (N/A, 4/15/2018).     Family History  family history includes Cancer in his mother.   Family history reviewed with patient. There is no family history that is pertinent to the chief complaint.     Social History   reports that he has never smoked. He has never used smokeless tobacco. He reports current alcohol use. He reports that he does not use drugs.    Allergies  Allergies   Allergen Reactions   • Peanut (Diagnostic) Anaphylaxis   • Peanut Oil Anaphylaxis   • Tradjenta [Linagliptin] Unspecified     Pt developed pancreatitis   • Hydrocodone Hives     Tolerates Oxycodone/hydromorphone     • Morphine Itching       Medications  Prior to Admission Medications   Prescriptions Last Dose Informant Patient Reported? Taking?   Blood Glucose Meter Kit 5/10/2022 at Unknown time Patient No No   Sig: Test blood sugar as  "recommended by provider. True Metrix blood glucose monitoring kit.   Empagliflozin (JARDIANCE) 25 MG Tab 5/10/2022 at 0600 Patient No No   Sig: Take 25 mg by mouth every day.   Multiple Vitamins-Minerals (MENS MULTIVITAMIN) Tab 5/9/2022 at 1800 Patient Yes No   Sig: Take 1 Package by mouth every evening. \"[employer]\"   insulin lispro (HUMALOG KWIKPEN) 100 UNIT/ML Solution Pen-injector injection PEN 5/10/2022 at 1200 Patient No No   Sig: Inject 8 Units under the skin 3 times a day before meals.   Patient taking differently: Inject 5-16 Units under the skin 4 Times a Day,Before Meals and at Bedtime. Sliding Scale   melatonin 5 mg Tab 5/7/2022 at AM Patient Yes No   Sig: Take 5-10 mg by mouth at bedtime as needed (Sleep). 1 to 2 tablets = 5 to 10 mg.  Pt works nights   metFORMIN (GLUCOPHAGE) 1000 MG tablet 5/10/2022 at 0600 Patient No No   Sig: Take 1 Tablet by mouth 2 times a day with meals.   naproxen (ALEVE) 220 MG tablet 5/10/2022 at 0700 Patient Yes Yes   Sig: Take 440 mg by mouth 2 times a day as needed (For pain).   pioglitazone (ACTOS) 15 MG Tab 5/10/2022 at 0600 Patient No No   Sig: Take 1 Tablet by mouth every day.   tadalafil (CIALIS) 5 MG tablet 5/9/2022 at 1800 Patient Yes No   Sig: Take 5 mg by mouth every evening.   thyroid (ARMOUR THYROID) 60 MG Tab 5/10/2022 at 0600 Patient No No   Sig: Take 1 Tab by mouth every morning.      Facility-Administered Medications: None       Physical Exam  Temp:  [36.3 °C (97.3 °F)] 36.3 °C (97.3 °F)  Pulse:  [79-98] 88  Resp:  [11-37] 13  BP: (107-141)/(58-80) 131/73  SpO2:  [93 %-99 %] 95 %  Blood Pressure: 131/73   Temperature: 36.3 °C (97.3 °F)   Pulse: 88   Respiration: 13   Pulse Oximetry: 95 %       Physical Exam  Vitals and nursing note reviewed.   Constitutional:       General: He is not in acute distress.     Appearance: Normal appearance. He is not ill-appearing, toxic-appearing or diaphoretic.   HENT:      Head: Normocephalic.      Nose: Nose normal.      " Mouth/Throat:      Mouth: Mucous membranes are moist.   Eyes:      General:         Right eye: No discharge.         Left eye: No discharge.      Conjunctiva/sclera: Conjunctivae normal.   Cardiovascular:      Rate and Rhythm: Normal rate and regular rhythm.      Pulses: Normal pulses.      Heart sounds: No murmur heard.  Pulmonary:      Effort: Pulmonary effort is normal. No respiratory distress.      Breath sounds: Normal breath sounds. No wheezing or rales.   Abdominal:      General: There is no distension.      Tenderness: There is abdominal tenderness. There is no guarding or rebound.   Musculoskeletal:         General: No swelling or deformity.      Right lower leg: No edema.      Left lower leg: No edema.   Skin:     General: Skin is warm and dry.      Capillary Refill: Capillary refill takes 2 to 3 seconds.   Neurological:      General: No focal deficit present.      Mental Status: He is alert and oriented to person, place, and time.   Psychiatric:         Mood and Affect: Mood normal.         Behavior: Behavior normal.         Thought Content: Thought content normal.         Judgment: Judgment normal.         Laboratory:  Recent Labs     05/10/22  1436   WBC 6.3   RBC 4.30*   HEMOGLOBIN 13.8*   HEMATOCRIT 37.7*   MCV 87.7   MCH 32.1   MCHC 36.6*   RDW 39.7   PLATELETCT 170   MPV 9.6     Recent Labs     05/10/22  1436   SODIUM 123*   POTASSIUM 4.1   CHLORIDE 89*   CO2 15*   GLUCOSE 851*   BUN 14   CREATININE 0.92   CALCIUM 8.5     Recent Labs     05/10/22  1436   ALTSGPT 24   ASTSGOT 11*   ALKPHOSPHAT 108*   TBILIRUBIN 0.2   GLUCOSE 851*         No results for input(s): NTPROBNP in the last 72 hours.      No results for input(s): TROPONINT in the last 72 hours.    Imaging:  DX-CHEST-PORTABLE (1 VIEW)    (Results Pending)   CT-MAXILLOFACIAL W/O    (Results Pending)       EKG:  My impression is: no STT abn    Assessment/Plan:  Justification for Admission Status  I anticipate this patient will require at least  two midnights for appropriate medical management, necessitating inpatient admission because DKA, possible sepsis    * DKA, type 2, not at goal (HCC)- (present on admission)  Assessment & Plan  DKA 2/2 possible infection. 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 Recent dental infection concerning for possible bacteremia. CXR pending. UA pending.     Plan:  -empiric abx with Unasyn/Vanc  -empiric cocci coverage with Fluconazole  -sinus CT d/t sinus congestion. Low concern for mucor, but if patient deteriorates low threshold to initiate Amphotericin B  -cocci serologies   -blood cultures  -fungitel   -on DKA protocol   -trend trops; intial EKG unremarkable   -diabetes education       Essential hypertension- (present on admission)  Assessment & Plan  Per report.   -no BP meds on home MAR   -monitor     Ulcerative colitis (HCC)- (present on admission)  Assessment & Plan  Per report. Not on any meds currently. Monitor.   -abdominal pain on admission likely 2/2 DKA  -if pain persists, consider UC flare     GERD (gastroesophageal reflux disease)- (present on admission)  Assessment & Plan  Monitor. No indication for GI PPX. Not on home meds.     Depression- (present on admission)  Assessment & Plan  States recent stress higher d/t job and insurance change. No SI/HI.   -monitor     Hypothyroidism- (present on admission)  Assessment & Plan  Continue home Armor thyroid. Check morning TSH.     Gout- (present on admission)  Assessment & Plan  Per report. Not on home meds.   -monitor       VTE prophylaxis: enoxaparin ppx     Richardson Posada D.O.    Patient is critically ill.   The patient continues to have: DKA  The vital organ system that is affected is the: endocrine  If untreated there is a high chance of deterioration into: severe metabolic acidosis, cardiac arrest  And eventually death.   The critical care that I am providing today is: DKA protocol   The critical that has  been undertaken is medically complex.   There has been no overlap in critical care time.   Critical Care Time not including procedures: 50 minutes

## 2022-05-11 NOTE — PROGRESS NOTES
Pulmonary Progress Note    Date of admission  5/10/2022    Chief Complaint  57 y.o. male admitted 5/10/2022 c/o abdominal pain, polyuria, sinus congestion, and arthralgias.c/o abdominal pain, polyuria, sinus congestion, and arthralgias.    Hospital Course  Admitted 5/10 for DKA. Treated with empiric abx.   Trops and EKG unremarkable  Cocci titers sent d/t sinus complaints, arthralgias, and recent [place] travel  CT sinus unremarkable  CXR and UA unremarkable  Empiric Unasyn/Vanc as reports recent tooth extraction that became infected.    Interval Problem Update  Reviewed last 24 hour events:  5/11 - DKA protocol stopped at approx 1:30 a.m. Patient bridged with SQ insulin. Patient feeling better today. Still having muscle cramps likely 2/2 hypokalemia, which is being replaced. C/o mild headache but states this is not unusual for him. Abdominal pain has improved.     Review of Systems  Review of Systems   Constitutional: Negative for chills.   All other systems reviewed and are negative.       Vital Signs for last 24 hours   Temp:  [35.7 °C (96.2 °F)-36.8 °C (98.2 °F)] 35.9 °C (96.6 °F)  Pulse:  [] 92  Resp:  [11-56] 13  BP: ()/(58-85) 122/76  SpO2:  [92 %-99 %] 96 %    Hemodynamic parameters for last 24 hours       Respiratory Information for the last 24 hours       Physical Exam   Physical Exam  Vitals and nursing note reviewed.   Constitutional:       General: He is not in acute distress.     Appearance: Normal appearance. He is not ill-appearing, toxic-appearing or diaphoretic.   HENT:      Head: Normocephalic and atraumatic.      Mouth/Throat:      Mouth: Mucous membranes are moist.   Eyes:      General:         Right eye: No discharge.         Left eye: No discharge.      Conjunctiva/sclera: Conjunctivae normal.   Cardiovascular:      Rate and Rhythm: Normal rate and regular rhythm.      Pulses: Normal pulses.      Heart sounds: No murmur heard.    No gallop.   Pulmonary:      Effort: Pulmonary effort is  normal. No respiratory distress.      Breath sounds: Normal breath sounds. No stridor. No wheezing, rhonchi or rales.   Abdominal:      General: Bowel sounds are normal. There is no distension.      Palpations: Abdomen is soft.      Tenderness: There is no abdominal tenderness. There is no guarding.   Musculoskeletal:      Right lower leg: No edema.      Left lower leg: No edema.   Skin:     General: Skin is warm and dry.   Neurological:      General: No focal deficit present.      Mental Status: He is alert and oriented to person, place, and time.   Psychiatric:         Mood and Affect: Mood normal.         Behavior: Behavior normal.         Thought Content: Thought content normal.         Judgment: Judgment normal.         Medications  Current Facility-Administered Medications   Medication Dose Route Frequency Provider Last Rate Last Admin   • insulin regular (HumuLIN R,NovoLIN R) injection  3-14 Units Subcutaneous 4X/DAY ACHS JOSE CurranOMynor   7 Units at 05/11/22 0746    And   • dextrose 10 % BOLUS 25 g  25 g Intravenous Q15 MIN PRN Emerson Summers D.O.       • insulin GLARGINE (Lantus,Semglee) injection  10 Units Subcutaneous QAM INSULIN Emerson Summers D.O.   10 Units at 05/11/22 0500   • senna-docusate (PERICOLACE or SENOKOT S) 8.6-50 MG per tablet 2 Tablet  2 Tablet Oral BID Richardson Posada D.O.        And   • polyethylene glycol/lytes (MIRALAX) PACKET 1 Packet  1 Packet Oral QDAY PRN Richardson Posada D.O.        And   • magnesium hydroxide (MILK OF MAGNESIA) suspension 30 mL  30 mL Oral QDAY PRN Richardson Posada D.O.        And   • bisacodyl (DULCOLAX) suppository 10 mg  10 mg Rectal QDAY PRN Richardson Posada D.O.       • enoxaparin (Lovenox) inj 40 mg  40 mg Subcutaneous DAILY Rcihardson Posada D.O.   40 mg at 05/11/22 0522   • acetaminophen (Tylenol) tablet 650 mg  650 mg Oral Q6HRS PRN Richardson Posada D.O.       • ondansetron (ZOFRAN) syringe/vial injection 4 mg  4 mg  Intravenous Q4HRS PRN JOSE RodriguezOMynor   4 mg at 05/11/22 0200   • ondansetron (ZOFRAN ODT) dispertab 4 mg  4 mg Oral Q4HRS PRN KHARI Rodriguez.O.       • promethazine (PHENERGAN) tablet 12.5-25 mg  12.5-25 mg Oral Q4HRS PRN JOSE RodriguezO.       • promethazine (PHENERGAN) suppository 12.5-25 mg  12.5-25 mg Rectal Q4HRS PRN KHARI Rodriguez.O.       • prochlorperazine (COMPAZINE) injection 5-10 mg  5-10 mg Intravenous Q4HRS PRN KHARI Rodriguez.O.       • fluconazole (DIFLUCAN) tablet 400 mg  400 mg Oral DAILY JOSE RodriguezOMynor   400 mg at 05/11/22 0521   • ampicillin/sulbactam (UNASYN) 3 g in  mL IVPB  3 g Intravenous Q6HRS KHARI Rodriguez.O.   Stopped at 05/11/22 0552   • MD Alert...Vancomycin per Pharmacy   Other PHARMACY TO DOSE Richardson Posada D.O.       • melatonin tablet 5-10 mg  5-10 mg Oral QHS PRN KHARI Rodriguez.O.       • thyroid (ARMOUR THYROID) tablet 60 mg  60 mg Oral QAM KHARI Rodriguez.OMynor   60 mg at 05/11/22 0522   • vancomycin 1500 mg/250mL NS IVPB premix  1,500 mg Intravenous Q12HR KHARI Rodriguez.O. 125 mL/hr at 05/11/22 0916 1,500 mg at 05/11/22 0916   • HYDROmorphone (Dilaudid) injection 0.5 mg  0.5 mg Intravenous Q4HRS PRN JOSE CurranOMynor   0.5 mg at 05/11/22 0916       Fluids    Intake/Output Summary (Last 24 hours) at 5/11/2022 1008  Last data filed at 5/11/2022 0600  Gross per 24 hour   Intake 13831.7 ml   Output 1100 ml   Net 77345.7 ml       Laboratory          Recent Labs     05/10/22  1436 05/10/22  1948 05/11/22  0030 05/11/22  0906   SODIUM 123* 137 139 137   POTASSIUM 4.1 3.5* 3.1* 3.7   CHLORIDE 89* 103 107 108   CO2 15* 22 21 19*   BUN 14 11 9 8   CREATININE 0.92 0.68 0.54 0.48*   MAGNESIUM 1.9  --  1.7 1.7   PHOSPHORUS 3.0  --  2.1*  --    CALCIUM 8.5 8.6 8.1* 7.0*     Recent Labs     05/10/22  1436 05/10/22  1948 05/11/22  0030 05/11/22  0906   ALTSGPT 24  --   --   --    ASTSGOT 11*  --    --   --    ALKPHOSPHAT 108*  --   --   --    TBILIRUBIN 0.2  --   --   --    GLUCOSE 851* 357* 118* 267*     Recent Labs     05/10/22  1436 05/11/22  0400   WBC 6.3 6.2   NEUTSPOLYS 75.80*  --    LYMPHOCYTES 18.40*  --    MONOCYTES 4.30  --    EOSINOPHILS 0.50  --    BASOPHILS 0.20  --    ASTSGOT 11*  --    ALTSGPT 24  --    ALKPHOSPHAT 108*  --    TBILIRUBIN 0.2  --      Recent Labs     05/10/22  1436 05/11/22  0030 05/11/22  0400   RBC 4.30*  --  3.70*   HEMOGLOBIN 13.8*  --  11.9*   HEMATOCRIT 37.7*  --  32.7*   PLATELETCT 170  --  153*   PROTHROMBTM  --  12.5  --    INR  --  1.01  --        Imaging  X-Ray:  I have personally reviewed the images and compared with prior images.    Assessment/Plan  * DKA, type 2, not at goal (HCC)- (present on admission)  Assessment & Plan  DKA 2/2 possible infection. 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 Recent dental infection concerning for possible subclinical bacteremia.     Plan:  -off DKA protocol  -on long acting insulin and SSI   -empiric abx with Unasyn/Vanc  -empiric cocci coverage with Fluconazole  -sinus CT was unremarkable   -cocci serologies pending   -blood cultures pending   -fungitel pending     Essential hypertension- (present on admission)  Assessment & Plan  Per report.   -no BP meds on home MAR   -monitor     Ulcerative colitis (HCC)- (present on admission)  Assessment & Plan  Per report. Not on any meds currently. Monitor.   -abdominal pain on admission likely 2/2 DKA  -denies hematochezia  -abdominal pain improving  -monitor     GERD (gastroesophageal reflux disease)- (present on admission)  Assessment & Plan  Monitor. No indication for GI PPX. Not on home meds.     Depression- (present on admission)  Assessment & Plan  States recent stress higher d/t job and insurance change. No SI/HI.   -monitor     Hypothyroidism- (present on admission)  Assessment & Plan  Continue home Armor thyroid. TSH WNL.     Gout-  (present on admission)  Assessment & Plan  Per report. Not on home meds.   -monitor        VTE:  Lovenox  Ulcer: Not Indicated  Lines: PIVs    I have performed a physical exam and reviewed and updated ROS and Plan today (5/11/2022). In review of yesterday's note (5/10/2022), there are no changes except as documented above.     Disposition: Transfer to med-surg today.   Total consult time: 38 min

## 2022-05-12 ENCOUNTER — PHARMACY VISIT (OUTPATIENT)
Dept: PHARMACY | Facility: MEDICAL CENTER | Age: 57
End: 2022-05-12
Payer: COMMERCIAL

## 2022-05-12 VITALS
WEIGHT: 195.55 LBS | TEMPERATURE: 98 F | SYSTOLIC BLOOD PRESSURE: 132 MMHG | RESPIRATION RATE: 18 BRPM | HEART RATE: 84 BPM | BODY MASS INDEX: 29.64 KG/M2 | HEIGHT: 68 IN | DIASTOLIC BLOOD PRESSURE: 72 MMHG | OXYGEN SATURATION: 97 %

## 2022-05-12 PROBLEM — E11.10 DKA, TYPE 2, NOT AT GOAL (HCC): Status: RESOLVED | Noted: 2022-05-10 | Resolved: 2022-05-12

## 2022-05-12 LAB
1 3 BETA D GLUCAN INTERP Q4483: NEGATIVE
1,3 BETA GLUCAN SER-MCNC: <31 PG/ML
ANION GAP SERPL CALC-SCNC: 10 MMOL/L (ref 7–16)
BUN SERPL-MCNC: 9 MG/DL (ref 8–22)
CALCIUM SERPL-MCNC: 8.1 MG/DL (ref 8.4–10.2)
CHLORIDE SERPL-SCNC: 104 MMOL/L (ref 96–112)
CO2 SERPL-SCNC: 22 MMOL/L (ref 20–33)
CREAT SERPL-MCNC: 0.56 MG/DL (ref 0.5–1.4)
ERYTHROCYTE [DISTWIDTH] IN BLOOD BY AUTOMATED COUNT: 40.7 FL (ref 35.9–50)
GFR SERPLBLD CREATININE-BSD FMLA CKD-EPI: 115 ML/MIN/1.73 M 2
GLUCOSE BLD STRIP.AUTO-MCNC: 222 MG/DL (ref 65–99)
GLUCOSE BLD STRIP.AUTO-MCNC: 244 MG/DL (ref 65–99)
GLUCOSE SERPL-MCNC: 242 MG/DL (ref 65–99)
HCT VFR BLD AUTO: 37.2 % (ref 42–52)
HGB BLD-MCNC: 13.3 G/DL (ref 14–18)
MAGNESIUM SERPL-MCNC: 1.9 MG/DL (ref 1.5–2.5)
MCH RBC QN AUTO: 31.6 PG (ref 27–33)
MCHC RBC AUTO-ENTMCNC: 35.8 G/DL (ref 33.7–35.3)
MCV RBC AUTO: 88.4 FL (ref 81.4–97.8)
PLATELET # BLD AUTO: 161 K/UL (ref 164–446)
PMV BLD AUTO: 9.4 FL (ref 9–12.9)
POTASSIUM SERPL-SCNC: 3.6 MMOL/L (ref 3.6–5.5)
RBC # BLD AUTO: 4.21 M/UL (ref 4.7–6.1)
SODIUM SERPL-SCNC: 136 MMOL/L (ref 135–145)
WBC # BLD AUTO: 4.3 K/UL (ref 4.8–10.8)

## 2022-05-12 PROCEDURE — 700102 HCHG RX REV CODE 250 W/ 637 OVERRIDE(OP): Performed by: INTERNAL MEDICINE

## 2022-05-12 PROCEDURE — 85027 COMPLETE CBC AUTOMATED: CPT

## 2022-05-12 PROCEDURE — A9270 NON-COVERED ITEM OR SERVICE: HCPCS | Performed by: INTERNAL MEDICINE

## 2022-05-12 PROCEDURE — 82962 GLUCOSE BLOOD TEST: CPT | Mod: 91

## 2022-05-12 PROCEDURE — 83735 ASSAY OF MAGNESIUM: CPT

## 2022-05-12 PROCEDURE — 99239 HOSP IP/OBS DSCHRG MGMT >30: CPT | Performed by: STUDENT IN AN ORGANIZED HEALTH CARE EDUCATION/TRAINING PROGRAM

## 2022-05-12 PROCEDURE — 700111 HCHG RX REV CODE 636 W/ 250 OVERRIDE (IP): Performed by: INTERNAL MEDICINE

## 2022-05-12 PROCEDURE — RXMED WILLOW AMBULATORY MEDICATION CHARGE: Performed by: STUDENT IN AN ORGANIZED HEALTH CARE EDUCATION/TRAINING PROGRAM

## 2022-05-12 PROCEDURE — 700102 HCHG RX REV CODE 250 W/ 637 OVERRIDE(OP): Performed by: STUDENT IN AN ORGANIZED HEALTH CARE EDUCATION/TRAINING PROGRAM

## 2022-05-12 PROCEDURE — 36415 COLL VENOUS BLD VENIPUNCTURE: CPT

## 2022-05-12 PROCEDURE — A9270 NON-COVERED ITEM OR SERVICE: HCPCS | Performed by: STUDENT IN AN ORGANIZED HEALTH CARE EDUCATION/TRAINING PROGRAM

## 2022-05-12 PROCEDURE — 80048 BASIC METABOLIC PNL TOTAL CA: CPT

## 2022-05-12 PROCEDURE — 700105 HCHG RX REV CODE 258: Performed by: INTERNAL MEDICINE

## 2022-05-12 RX ORDER — INSULIN LISPRO 100 [IU]/ML
5-16 INJECTION, SOLUTION INTRAVENOUS; SUBCUTANEOUS
Qty: 21 ML | Refills: 0 | Status: SHIPPED | OUTPATIENT
Start: 2022-05-12 | End: 2022-06-11

## 2022-05-12 RX ORDER — AMOXICILLIN AND CLAVULANATE POTASSIUM 875; 125 MG/1; MG/1
1 TABLET, FILM COATED ORAL 2 TIMES DAILY
Qty: 10 TABLET | Refills: 0 | Status: SHIPPED | OUTPATIENT
Start: 2022-05-12 | End: 2022-05-17

## 2022-05-12 RX ORDER — OXYCODONE HYDROCHLORIDE 5 MG/1
5 TABLET ORAL EVERY 4 HOURS PRN
Status: DISCONTINUED | OUTPATIENT
Start: 2022-05-12 | End: 2022-05-12 | Stop reason: HOSPADM

## 2022-05-12 RX ORDER — CHOLECALCIFEROL (VITAMIN D3) 125 MCG
5-10 CAPSULE ORAL DAILY
Qty: 30 TABLET | Refills: 0 | Status: SHIPPED | OUTPATIENT
Start: 2022-05-12 | End: 2022-05-27

## 2022-05-12 RX ORDER — POTASSIUM CHLORIDE 20 MEQ/1
20 TABLET, EXTENDED RELEASE ORAL ONCE
Status: COMPLETED | OUTPATIENT
Start: 2022-05-12 | End: 2022-05-12

## 2022-05-12 RX ORDER — INSULIN GLARGINE 100 [IU]/ML
10 INJECTION, SOLUTION SUBCUTANEOUS EVERY EVENING
Qty: 3 ML | Refills: 0 | Status: SHIPPED | OUTPATIENT
Start: 2022-05-12 | End: 2022-06-11

## 2022-05-12 RX ADMIN — SODIUM CHLORIDE 3 G: 900 INJECTION INTRAVENOUS at 00:00

## 2022-05-12 RX ADMIN — SODIUM CHLORIDE 3 G: 900 INJECTION INTRAVENOUS at 06:43

## 2022-05-12 RX ADMIN — INSULIN GLARGINE-YFGN 10 UNITS: 100 INJECTION, SOLUTION SUBCUTANEOUS at 06:44

## 2022-05-12 RX ADMIN — POTASSIUM CHLORIDE 20 MEQ: 1500 TABLET, EXTENDED RELEASE ORAL at 09:15

## 2022-05-12 RX ADMIN — SODIUM CHLORIDE 3 G: 900 INJECTION INTRAVENOUS at 11:44

## 2022-05-12 RX ADMIN — ENOXAPARIN SODIUM 40 MG: 40 INJECTION SUBCUTANEOUS at 06:44

## 2022-05-12 RX ADMIN — HYDROMORPHONE HYDROCHLORIDE 0.5 MG: 1 INJECTION, SOLUTION INTRAMUSCULAR; INTRAVENOUS; SUBCUTANEOUS at 10:50

## 2022-05-12 RX ADMIN — INSULIN HUMAN 4 UNITS: 100 INJECTION, SOLUTION PARENTERAL at 06:44

## 2022-05-12 RX ADMIN — THYROID, PORCINE 60 MG: 30 TABLET ORAL at 06:43

## 2022-05-12 RX ADMIN — ACETAMINOPHEN 650 MG: 325 TABLET, FILM COATED ORAL at 09:18

## 2022-05-12 RX ADMIN — INSULIN HUMAN 4 UNITS: 100 INJECTION, SOLUTION PARENTERAL at 11:51

## 2022-05-12 ASSESSMENT — PAIN DESCRIPTION - PAIN TYPE
TYPE: ACUTE PAIN
TYPE: ACUTE PAIN

## 2022-05-12 NOTE — DISCHARGE PLANNING
Case Management Discharge Planning    Admission Date: 5/10/2022  GMLOS: 2.9  ALOS: 2    6-Clicks ADL Score: 24  6-Clicks Mobility Score: 24      Anticipated Discharge Dispo: Discharge Disposition: Discharged to home/self care (01)    DME Needed: No    Action(s) Taken: Updated Provider/Nurse on Discharge Plan - Spoke to [facility], per pharmacy, total copay for augmentin, insulins, and needles is $104.82.   Spoke pt at bedside regarding copay amount. Per pt he can afford medication and would like it billed to him.   RNCM notified pharmacy via phone call. [facility]s-to-beds will deliver at approx. 1330.    Escalations Completed: None    Medically Clear: Yes    Next Steps: Pt will DC home when meds-to-bed delivers    Barriers to Discharge: meds-to-bed delivery    Is the patient up for discharge tomorrow: No, today

## 2022-05-12 NOTE — CARE PLAN
The patient is Watcher - Medium risk of patient condition declining or worsening    Shift Goals  Clinical Goals: Pt's blood sugars will be below 300 by end of shift  Patient Goals: Be able to sleep    Progress made toward(s) clinical / shift goals:  Pt's blood sugar this morning was 244. Pt states he was able to sleep last night with the help of the melatonin ordered.    Patient is not progressing towards the following goals: None

## 2022-05-12 NOTE — PROGRESS NOTES
Patient has CONSENTED to participate in the [facility] Remote Patient Monitoring proof of concept program.

## 2022-05-12 NOTE — CARE PLAN
The patient is Stable - Low risk of patient condition declining or worsening    Shift Goals  Clinical Goals: Keep blood sugars below prior to discharge today at 1200  Patient Goals: Discharge today by 1200  Family Goals: JP    Progress made toward(s) clinical / shift goals:  Discharging patient prior to 1200. Waiting on insurance approval.     Patient is not progressing towards the following goals:

## 2022-05-12 NOTE — DISCHARGE SUMMARY
"Discharge Summary    CHIEF COMPLAINT ON ADMISSION  Chief Complaint   Patient presents with   • Abdominal Cramping   • N/V     X 1 week   • Headache     X 1 week   • Blood Sugar Problem     States hx of type 2 diabetes and \"this is my pattern for dka\"       Reason for Admission  Headache, N/V     Admission Date  5/10/2022    CODE STATUS  Full Code    HPI & HOSPITAL COURSE  A 57 y.o. male who presented 5/10/2022 with c/o abdominal pain, polyuria, sinus congestion, and arthralgias.  Pt takes Januvia, metformin, Actos, and sliding scale insulin for his diabetes. And his ISS run out at home.   Patient was found to be in DKA on admit.  Patient was initiated on empiric antibiotics and DKA protocol.  And initially admitted to intensive care unit.  On 5/11 patient DKA protocol stopped at 1:30 AM and initiate bridging with subcutaneous insulin.  Discussed with ID pharmacy, patient seems to have sinusitis, not concerning for cocci infection.  Patient will be discharged home with to complete a course of Augmentin.  Prescriptions sent for short acting insulin and long-acting insulin.  So referral to endocrinology was sent so that patient can follow-up with his endocrinologist.    Therefore, he is discharged in fair and stable condition to home with close outpatient follow-up.    The patient met 2-midnight criteria for an inpatient stay at the time of discharge.    Discharge Date  5/12/2022    FOLLOW UP ITEMS POST DISCHARGE  PCP  Endocrinology    DISCHARGE DIAGNOSES  Principal Problem (Resolved):    DKA, type 2, not at goal (HCC) POA: Yes  Active Problems:    Depression POA: Yes    Gout POA: Yes    GERD (gastroesophageal reflux disease) POA: Yes    Ulcerative colitis (HCC) POA: Yes    Essential hypertension POA: Yes    Hypothyroidism (Chronic) POA: Yes      Overview: Chronic condition treated with [place] [facility].      Resumed maintenance medication.      FOLLOW UP  Future Appointments   Date Time Provider Department Center " "  5/25/2022 10:20 AM [facility]ESS[facility] Villanueva [facility] None     Rodolfo Stein M.D.  [address] [facility]  [address]  [facility] [address]  [phone]    Schedule an appointment as soon as possible for a visit  As needed      MEDICATIONS ON DISCHARGE     Medication List      START taking these medications      Instructions   amoxicillin-clavulanate 875-125 MG Tabs  Commonly known as: AUGMENTIN   Take 1 Tablet by mouth in the morning and 1 Tablet in the evening. Do all this for 5 days.  Dose: 1 Tablet     Insulin Pen Needle 32 G x 4 mm   Doctor's comments: Per patient/formulary preference. ICD-10 code: E11.65 Uncontrolled type 2 Diabetes Mellitus  Use one pen needle in pen device to inject insulin once daily.     Lantus SoloStar 100 UNIT/ML Sopn injection  Generic drug: insulin glargine   Inject 10 Units under the skin every evening for 30 days.  Dose: 10 Units        CHANGE how you take these medications      Instructions   melatonin 5 mg Tabs  What changed:   · when to take this  · reasons to take this   Take 1-2 Tablets by mouth every day for 15 days. 1 to 2 tablets = 5 to 10 mg.  Pt works nights  Dose: 5-10 mg        CONTINUE taking these medications      Instructions   Blood Glucose Meter Kit   Doctor's comments: Or per formulary preference. ICD-10 code: E11.65 Uncontrolled type 2 Diabetes Mellitus  Test blood sugar as recommended by provider. True Metrix blood glucose monitoring kit.     insulin lispro 100 UNIT/ML Sopn injection PEN  Commonly known as: HumaLOG,AdmeLOG   Inject 5-16 Units under the skin 4 Times a Day,Before Meals and at Bedtime for 30 days. Sliding Scale  Dose: 5-16 Units     Jardiance 25 MG Tabs  Generic drug: Empagliflozin   Take 25 mg by mouth every day.  Dose: 25 mg     Mens Multivitamin Tabs   Take 1 Package by mouth every evening. \"[employer] Men\"  Dose: 1 Package     metformin 1000 MG tablet  Commonly known as: GLUCOPHAGE   Take 1 Tablet by mouth 2 times a day with meals.  Dose: 1,000 mg   "   pioglitazone 15 MG Tabs  Commonly known as: ACTOS   Take 1 Tablet by mouth every day.  Dose: 15 mg     tadalafil 5 MG tablet  Commonly known as: Cialis   Take 5 mg by mouth every evening.  Dose: 5 mg     thyroid 60 MG Tabs  Commonly known as: ARMOUR THYROID   Take 1 Tab by mouth every morning.  Dose: 60 mg        STOP taking these medications    Aleve 220 MG tablet  Generic drug: naproxen            Allergies  Allergies   Allergen Reactions   • Peanut (Diagnostic) Anaphylaxis   • Peanut Oil Anaphylaxis   • Tradjenta [Linagliptin] Unspecified     Pt developed pancreatitis   • Hydrocodone Hives     Tolerates Oxycodone/hydromorphone     • Morphine Itching       DIET  Orders Placed This Encounter   Procedures   • Diet Order Diet: Consistent CHO (Diabetic)     Standing Status:   Standing     Number of Occurrences:   1     Order Specific Question:   Diet:     Answer:   Consistent CHO (Diabetic) [4]       ACTIVITY  As tolerated.  Weight bearing as tolerated    CONSULTATIONS  ICU        LABORATORY  Lab Results   Component Value Date    SODIUM 136 05/12/2022    POTASSIUM 3.6 05/12/2022    CHLORIDE 104 05/12/2022    CO2 22 05/12/2022    GLUCOSE 242 (H) 05/12/2022    BUN 9 05/12/2022    CREATININE 0.56 05/12/2022    CREATININE 1.1 02/18/2008        Lab Results   Component Value Date    WBC 4.3 (L) 05/12/2022    HEMOGLOBIN 13.3 (L) 05/12/2022    HEMATOCRIT 37.2 (L) 05/12/2022    PLATELETCT 161 (L) 05/12/2022        Total time of the discharge process exceeds 43 minutes.

## 2022-05-12 NOTE — PROGRESS NOTES
"Pt requested dilaudid once this shift for abdominal cramping, spasms. Pt states it helped. No c/o pain this morning.  Pt received melatonin, at starting dose range of 5mg. Pt states it helped. Pt states, \"I slept pretty good.\"  Pt tolerating current diet. Pt reports mid nausea but declined interventions other than some crackers. Pt states nausea has since been resolving.  Pt's BGs this shift were 330 in the evening and 244 this morning.  Pt voiding.  Pt tolerating RA.  Pt ambulating with x1 SBA.  Chart check done.  "

## 2022-05-16 LAB
BACTERIA BLD CULT: NORMAL
BACTERIA BLD CULT: NORMAL
C IMMITIS AB SER QL ID: NORMAL
COCCIDIOIDES AB TITR SER CF: NORMAL {TITER}
COCCIDIOIDES IGG SER-ACNC: 0.2 IV
COCCIDIOIDES IGM SERPL-ACNC: 0.6 IV
SIGNIFICANT IND 70042: NORMAL
SIGNIFICANT IND 70042: NORMAL
SITE SITE: NORMAL
SITE SITE: NORMAL
SOURCE SOURCE: NORMAL
SOURCE SOURCE: NORMAL

## 2022-05-25 ENCOUNTER — OFFICE VISIT (OUTPATIENT)
Dept: MEDICAL GROUP | Facility: IMAGING CENTER | Age: 57
End: 2022-05-25
Payer: COMMERCIAL

## 2022-05-25 ENCOUNTER — HOSPITAL ENCOUNTER (OUTPATIENT)
Dept: LAB | Facility: MEDICAL CENTER | Age: 57
End: 2022-05-25
Attending: CLINICAL NURSE SPECIALIST
Payer: COMMERCIAL

## 2022-05-25 VITALS
DIASTOLIC BLOOD PRESSURE: 64 MMHG | SYSTOLIC BLOOD PRESSURE: 110 MMHG | OXYGEN SATURATION: 98 % | TEMPERATURE: 98 F | BODY MASS INDEX: 29.83 KG/M2 | HEART RATE: 75 BPM | HEIGHT: 69 IN | WEIGHT: 201.4 LBS

## 2022-05-25 DIAGNOSIS — E11.65 UNCONTROLLED TYPE 2 DIABETES MELLITUS WITH HYPERGLYCEMIA (HCC): ICD-10-CM

## 2022-05-25 DIAGNOSIS — Z11.59 NEED FOR HEPATITIS C SCREENING TEST: ICD-10-CM

## 2022-05-25 DIAGNOSIS — Z00.8 ENCOUNTER FOR WORK CAPABILITY ASSESSMENT: ICD-10-CM

## 2022-05-25 DIAGNOSIS — R79.89 LOW TESTOSTERONE IN MALE: ICD-10-CM

## 2022-05-25 DIAGNOSIS — E83.39 HYPOPHOSPHATEMIA: ICD-10-CM

## 2022-05-25 DIAGNOSIS — E03.9 HYPOTHYROIDISM, UNSPECIFIED TYPE: ICD-10-CM

## 2022-05-25 DIAGNOSIS — Z23 NEED FOR VACCINATION: ICD-10-CM

## 2022-05-25 DIAGNOSIS — Z13.31 DEPRESSION SCREENING: ICD-10-CM

## 2022-05-25 DIAGNOSIS — Z76.89 ENCOUNTER TO ESTABLISH CARE WITH NEW DOCTOR: ICD-10-CM

## 2022-05-25 DIAGNOSIS — E11.00 HYPEROSMOLAR HYPERGLYCEMIC STATE (HHS) (HCC): ICD-10-CM

## 2022-05-25 PROCEDURE — 90471 IMMUNIZATION ADMIN: CPT | Performed by: CLINICAL NURSE SPECIALIST

## 2022-05-25 PROCEDURE — 83036 HEMOGLOBIN GLYCOSYLATED A1C: CPT

## 2022-05-25 PROCEDURE — 86787 VARICELLA-ZOSTER ANTIBODY: CPT

## 2022-05-25 PROCEDURE — 86480 TB TEST CELL IMMUN MEASURE: CPT

## 2022-05-25 PROCEDURE — 86337 INSULIN ANTIBODIES: CPT

## 2022-05-25 PROCEDURE — 90715 TDAP VACCINE 7 YRS/> IM: CPT | Performed by: CLINICAL NURSE SPECIALIST

## 2022-05-25 PROCEDURE — 83525 ASSAY OF INSULIN: CPT

## 2022-05-25 PROCEDURE — 80053 COMPREHEN METABOLIC PANEL: CPT

## 2022-05-25 PROCEDURE — 82306 VITAMIN D 25 HYDROXY: CPT

## 2022-05-25 PROCEDURE — 82570 ASSAY OF URINE CREATININE: CPT

## 2022-05-25 PROCEDURE — 86762 RUBELLA ANTIBODY: CPT | Performed by: CLINICAL NURSE SPECIALIST

## 2022-05-25 PROCEDURE — 84681 ASSAY OF C-PEPTIDE: CPT

## 2022-05-25 PROCEDURE — 82043 UR ALBUMIN QUANTITATIVE: CPT

## 2022-05-25 PROCEDURE — 86735 MUMPS ANTIBODY: CPT | Performed by: CLINICAL NURSE SPECIALIST

## 2022-05-25 PROCEDURE — 84402 ASSAY OF FREE TESTOSTERONE: CPT

## 2022-05-25 PROCEDURE — 99214 OFFICE O/P EST MOD 30 MIN: CPT | Mod: 25 | Performed by: CLINICAL NURSE SPECIALIST

## 2022-05-25 PROCEDURE — 85025 COMPLETE CBC W/AUTO DIFF WBC: CPT

## 2022-05-25 PROCEDURE — 80061 LIPID PANEL: CPT

## 2022-05-25 PROCEDURE — 86803 HEPATITIS C AB TEST: CPT

## 2022-05-25 PROCEDURE — 84443 ASSAY THYROID STIM HORMONE: CPT

## 2022-05-25 PROCEDURE — 84403 ASSAY OF TOTAL TESTOSTERONE: CPT

## 2022-05-25 PROCEDURE — 84270 ASSAY OF SEX HORMONE GLOBUL: CPT

## 2022-05-25 PROCEDURE — 36415 COLL VENOUS BLD VENIPUNCTURE: CPT

## 2022-05-25 PROCEDURE — 84100 ASSAY OF PHOSPHORUS: CPT

## 2022-05-25 PROCEDURE — 86765 RUBEOLA ANTIBODY: CPT | Performed by: CLINICAL NURSE SPECIALIST

## 2022-05-25 PROCEDURE — 87517 HEPATITIS B DNA QUANT: CPT

## 2022-05-25 RX ORDER — PIOGLITAZONEHYDROCHLORIDE 15 MG/1
15 TABLET ORAL DAILY
Qty: 90 TABLET | Refills: 1 | Status: SHIPPED | OUTPATIENT
Start: 2022-05-25 | End: 2022-06-03 | Stop reason: SDUPTHER

## 2022-05-25 RX ORDER — THYROID 60 MG/1
60 TABLET ORAL EVERY MORNING
Qty: 90 TABLET | Refills: 1 | Status: SHIPPED | OUTPATIENT
Start: 2022-05-25

## 2022-05-25 ASSESSMENT — PAIN SCALES - GENERAL: PAINLEVEL: 3=SLIGHT PAIN

## 2022-05-25 ASSESSMENT — PATIENT HEALTH QUESTIONNAIRE - PHQ9
SUM OF ALL RESPONSES TO PHQ QUESTIONS 1-9: 10
5. POOR APPETITE OR OVEREATING: 1 - SEVERAL DAYS
CLINICAL INTERPRETATION OF PHQ2 SCORE: 2

## 2022-05-25 ASSESSMENT — FIBROSIS 4 INDEX: FIB4 SCORE: 0.79

## 2022-05-25 NOTE — PROGRESS NOTES
"Subjective     Mahesh Bradshaw is a 57 y.o. male who presents with Establish Care            HPI  Uncontrolled type 2 diabetes mellitus with hyperglycemia (HCC)  Last A1c 15.2 1/2022, glucose 5/12 was 242, 5/10 851. DEMI antibody negative in 2019.  Recently in ED for DKA.  Referral placed at that time to endocrinology. Has since been authorized.  He was seeing Dr. Dupont but has not seen in 2 years because insurance does not cover.  ER MD wrote for metformin, ACTOS and Jardiance. He is using a sliding scale insulin.       ROS  See HPI      Allergies   Allergen Reactions   • Hydrocodone-Acetaminophen Rash     Tolerates oxycodone and morphine   • Peanut (Diagnostic) Anaphylaxis   • Peanut Oil Anaphylaxis   • Tradjenta [Linagliptin] Unspecified     Pt developed pancreatitis   • Hydrocodone Hives     Tolerates Oxycodone/hydromorphone     • Morphine Itching     Current Outpatient Medications on File Prior to Visit   Medication Sig Dispense Refill   • insulin glargine (LANTUS SOLOSTAR) 100 UNIT/ML Solution Pen-injector injection Inject 10 Units under the skin every evening for 30 days. 3 mL 0   • Insulin Pen Needle 32 G x 4 mm Use one pen needle in pen device to inject insulin once daily. 100 Each 0   • melatonin 5 mg Tab Take 1-2 Tablets by mouth every day for 15 days. 1 to 2 tablets = 5 to 10 mg.  Pt works nights 30 Tablet 0   • insulin lispro (HUMALOG,ADMELOG) 100 UNIT/ML Solution Pen-injector injection PEN Inject 5-16 Units under the skin 4 Times a Day,Before Meals and at Bedtime for 30 days. Sliding Scale 21 mL 0   • tadalafil (CIALIS) 5 MG tablet Take 5 mg by mouth every evening.     • Empagliflozin (JARDIANCE) 25 MG Tab Take 25 mg by mouth every day. 30 Tablet 0   • Multiple Vitamins-Minerals (MENS MULTIVITAMIN) Tab Take 1 Package by mouth every evening. \"[employer] Men\"     • Blood Glucose Meter Kit Test blood sugar as recommended by provider. True Metrix blood glucose monitoring kit. 1 Kit 0     No current " "facility-administered medications on file prior to visit.     Depression Screening    Little interest or pleasure in doing things?  1 - several days   Feeling down, depressed , or hopeless? 1 - several days   Trouble falling or staying asleep, or sleeping too much?  2 - more than half the days   Feeling tired or having little energy?  2 - more than half the days   Poor appetite or overeating?  1 - several days   Feeling bad about yourself - or that you are a failure or have let yourself or your family down? 2 - more than half the days   Trouble concentrating on things, such as reading the newspaper or watching television? 1 - several days   Moving or speaking so slowly that other people could have noticed.  Or the opposite - being so fidgety or restless that you have been moving around a lot more than usual?  0 - not at all   Thoughts that you would be better off dead, or of hurting yourself?  0 - not at all   Patient Health Questionnaire Score: 10       If depressive symptoms identified deferred to follow up visit unless specifically addressed in assesment and plan.    Interpretation of PHQ-9 Total Score   Score Severity   1-4 No Depression   5-9 Mild Depression   10-14 Moderate Depression   15-19 Moderately Severe Depression   20-27 Severe Depression          Objective     /64 (BP Location: Left arm, Patient Position: Sitting, BP Cuff Size: Adult)   Pulse 75   Temp 36.7 °C (98 °F) (Temporal)   Ht 1.74 m (5' 8.5\")   Wt 91.4 kg (201 lb 6.4 oz)   SpO2 98%   BMI 30.18 kg/m²      Physical Exam  Constitutional:       General: He is not in acute distress.     Appearance: He is not ill-appearing, toxic-appearing or diaphoretic.   HENT:      Head: Normocephalic and atraumatic.   Eyes:      General: No scleral icterus.     Pupils: Pupils are equal, round, and reactive to light.   Cardiovascular:      Rate and Rhythm: Normal rate.   Pulmonary:      Effort: Pulmonary effort is normal.   Skin:     General: Skin is " warm and dry.   Neurological:      Mental Status: He is alert and oriented to person, place, and time.   Psychiatric:         Mood and Affect: Mood normal.         Behavior: Behavior normal.         Thought Content: Thought content normal.         Judgment: Judgment normal.          Monafilament 7/10 bilaterally                   Assessment & Plan       1. Encounter to establish care with new doctor  Multiple comorbidities. We discussed that multiple visits will be required for this. He is changing insurance in a few days.    2. Depression screening  Positive. He reports he is having financial hardship. Will address more in future visits.    3. Uncontrolled type 2 diabetes mellitus with hyperglycemia (HCC)  Mahesh has some neuropathy in his feet as he did not score 10/10 on monafilament.  Last A1c 15.2. GAD65 negative in 2019.  He reports issues with insurance and coverage.  Continue metformin 1000mg BID, Actos 15mg daily, Jardiance 25mg.  Continue sliding scale insulin.  Labs ordered. Will modify regimen pending labs.    He was given the contact information for the endocrinology referral.     He declined statins today preferring to wait for labs and possibly new insurance.    - Referral for Retinal Screening Exam; Future  - Microalbumin Creat Ratio Urine (Lab Collect); Future  - Diabetic Monofilament LE Exam  - CBC WITH DIFFERENTIAL; Future  - Comp Metabolic Panel; Future  - HEMOGLOBIN A1C; Future  - Lipid Profile; Future  - TSH WITH REFLEX TO FT4; Future  - VITAMIN D,25 HYDROXY; Future  - INSULIN ANTIBODIES  - INSULIN AND C-PEPTIDE, SERUM    4. Hyperosmolar hyperglycemic state (HHS) (HCC)  From ER.  Unknown current osmolarity but no confusion today.    5. Hypophosphatemia  Last level 2.1 (2.5 normal). Repeat lab     - PHOSPHORUS; Future    6. Need for hepatitis C screening test    - HEP C VIRUS ANTIBODY; Future    7. Encounter for work capability assessment  Starting a new teaching position.  Position requires  titers/testing for the diseases/antibodies below.    - Quantiferon Gold TB (PPD); Future  - VARICELLA ZOSTER IGG AB; Future  - HBV PCR Quant; Future  - PA MMR PANEL    8. Need for vaccination    - Tdap Vaccine =>6YO IM    9. Low testosterone in male  Patient reported.  Discussed that testosterone replacement is not recommended due to elevated cardiovascular risk with DM.    - Testosterone, Free & Total, Adult Male (w/SHBG); Future      Return if symptoms worsen or fail to improve, for With test results.

## 2022-05-25 NOTE — ASSESSMENT & PLAN NOTE
Last A1c 15.2 1/2022, glucose 5/12 was 242, 5/10 851. DEMI antibody negative in 2019.  Recently in ED for DKA.  Referral placed at that time to endocrinology. Has since been authorized.  He was seeing Dr. Dupont but has not seen in 2 years because insurance does not cover.  ER MD wrote for metformin, ACTOS and Jardiance. He is using a sliding scale insulin.

## 2022-05-26 LAB
ALBUMIN SERPL BCP-MCNC: 4 G/DL (ref 3.2–4.9)
ALBUMIN/GLOB SERPL: 1.7 G/DL
ALP SERPL-CCNC: 76 U/L (ref 30–99)
ALT SERPL-CCNC: 38 U/L (ref 2–50)
ANION GAP SERPL CALC-SCNC: 11 MMOL/L (ref 7–16)
AST SERPL-CCNC: 21 U/L (ref 12–45)
BASOPHILS # BLD AUTO: 0.4 % (ref 0–1.8)
BASOPHILS # BLD: 0.02 K/UL (ref 0–0.12)
BILIRUB SERPL-MCNC: 0.3 MG/DL (ref 0.1–1.5)
BUN SERPL-MCNC: 15 MG/DL (ref 8–22)
CALCIUM SERPL-MCNC: 8.4 MG/DL (ref 8.5–10.5)
CHLORIDE SERPL-SCNC: 105 MMOL/L (ref 96–112)
CHOLEST SERPL-MCNC: 228 MG/DL (ref 100–199)
CO2 SERPL-SCNC: 22 MMOL/L (ref 20–33)
CREAT SERPL-MCNC: 0.53 MG/DL (ref 0.5–1.4)
CREAT UR-MCNC: 129.08 MG/DL
EOSINOPHIL # BLD AUTO: 0.07 K/UL (ref 0–0.51)
EOSINOPHIL NFR BLD: 1.6 % (ref 0–6.9)
ERYTHROCYTE [DISTWIDTH] IN BLOOD BY AUTOMATED COUNT: 46.1 FL (ref 35.9–50)
EST. AVERAGE GLUCOSE BLD GHB EST-MCNC: 298 MG/DL
GAMMA INTERFERON BACKGROUND BLD IA-ACNC: 0.01 IU/ML
GFR SERPLBLD CREATININE-BSD FMLA CKD-EPI: 117 ML/MIN/1.73 M 2
GLOBULIN SER CALC-MCNC: 2.4 G/DL (ref 1.9–3.5)
GLUCOSE SERPL-MCNC: 207 MG/DL (ref 65–99)
HBA1C MFR BLD: 12 % (ref 4–5.6)
HCT VFR BLD AUTO: 39.2 % (ref 42–52)
HCV AB SER QL: NORMAL
HDLC SERPL-MCNC: 44 MG/DL
HGB BLD-MCNC: 13.7 G/DL (ref 14–18)
IMM GRANULOCYTES # BLD AUTO: 0.03 K/UL (ref 0–0.11)
IMM GRANULOCYTES NFR BLD AUTO: 0.7 % (ref 0–0.9)
LDLC SERPL CALC-MCNC: 141 MG/DL
LYMPHOCYTES # BLD AUTO: 1.2 K/UL (ref 1–4.8)
LYMPHOCYTES NFR BLD: 26.7 % (ref 22–41)
M TB IFN-G BLD-IMP: NEGATIVE
M TB IFN-G CD4+ BCKGRND COR BLD-ACNC: 0 IU/ML
MCH RBC QN AUTO: 32.4 PG (ref 27–33)
MCHC RBC AUTO-ENTMCNC: 34.9 G/DL (ref 33.7–35.3)
MCV RBC AUTO: 92.7 FL (ref 81.4–97.8)
MICROALBUMIN UR-MCNC: <1.2 MG/DL
MICROALBUMIN/CREAT UR: NORMAL MG/G (ref 0–30)
MITOGEN IGNF BCKGRD COR BLD-ACNC: >10 IU/ML
MONOCYTES # BLD AUTO: 0.25 K/UL (ref 0–0.85)
MONOCYTES NFR BLD AUTO: 5.6 % (ref 0–13.4)
NEUTROPHILS # BLD AUTO: 2.93 K/UL (ref 1.82–7.42)
NEUTROPHILS NFR BLD: 65 % (ref 44–72)
NRBC # BLD AUTO: 0 K/UL
NRBC BLD-RTO: 0 /100 WBC
PHOSPHATE SERPL-MCNC: 2.5 MG/DL (ref 2.5–4.5)
PLATELET # BLD AUTO: 208 K/UL (ref 164–446)
PMV BLD AUTO: 10 FL (ref 9–12.9)
POTASSIUM SERPL-SCNC: 4 MMOL/L (ref 3.6–5.5)
PROT SERPL-MCNC: 6.4 G/DL (ref 6–8.2)
QFT TB2 - NIL TBQ2: 0 IU/ML
RBC # BLD AUTO: 4.23 M/UL (ref 4.7–6.1)
SODIUM SERPL-SCNC: 138 MMOL/L (ref 135–145)
TRIGL SERPL-MCNC: 213 MG/DL (ref 0–149)
TSH SERPL DL<=0.005 MIU/L-ACNC: 1.52 UIU/ML (ref 0.38–5.33)
VZV IGG SER IA-ACNC: 1.72
WBC # BLD AUTO: 4.5 K/UL (ref 4.8–10.8)

## 2022-05-27 LAB
25(OH)D3 SERPL-MCNC: 17 NG/ML (ref 30–80)
C PEPTIDE SERPL-MCNC: 0.8 NG/ML (ref 0.5–3.3)
INSULIN P FAST SERPL-ACNC: 17 UIU/ML (ref 3–25)
SHBG SERPL-SCNC: 31 NMOL/L (ref 19–76)
TESTOST FREE MFR SERPL: 1.9 % (ref 1.6–2.9)
TESTOST FREE SERPL-MCNC: 74 PG/ML (ref 47–244)
TESTOST SERPL-MCNC: 389 NG/DL (ref 300–890)

## 2022-05-28 LAB
HBV DNA SERPL NAA+PROBE-ACNC: NOT DETECTED IU/ML
HBV DNA SERPL NAA+PROBE-LOG IU: NOT DETECTED LOG IU/ML
HBV DNA SERPL QL NAA+PROBE: NOT DETECTED

## 2022-05-29 LAB — INSULIN HUMAN AB SER-ACNC: <0.4 U/ML (ref 0–0.4)

## 2022-06-03 ENCOUNTER — OFFICE VISIT (OUTPATIENT)
Dept: MEDICAL GROUP | Facility: IMAGING CENTER | Age: 57
End: 2022-06-03
Payer: COMMERCIAL

## 2022-06-03 VITALS
HEART RATE: 80 BPM | SYSTOLIC BLOOD PRESSURE: 118 MMHG | DIASTOLIC BLOOD PRESSURE: 64 MMHG | OXYGEN SATURATION: 95 % | TEMPERATURE: 98.3 F | WEIGHT: 197.6 LBS | HEIGHT: 69 IN | BODY MASS INDEX: 29.27 KG/M2

## 2022-06-03 DIAGNOSIS — E11.65 UNCONTROLLED TYPE 2 DIABETES MELLITUS WITH HYPERGLYCEMIA (HCC): ICD-10-CM

## 2022-06-03 DIAGNOSIS — E11.69 MIXED HYPERLIPIDEMIA DUE TO TYPE 2 DIABETES MELLITUS (HCC): ICD-10-CM

## 2022-06-03 DIAGNOSIS — H73.91 ABNORMAL TYMPANIC MEMBRANE OF RIGHT EAR: ICD-10-CM

## 2022-06-03 DIAGNOSIS — Z23 NEED FOR VACCINATION: ICD-10-CM

## 2022-06-03 DIAGNOSIS — E11.00 HYPEROSMOLAR HYPERGLYCEMIC STATE (HHS) (HCC): ICD-10-CM

## 2022-06-03 DIAGNOSIS — E55.9 VITAMIN D DEFICIENCY: ICD-10-CM

## 2022-06-03 DIAGNOSIS — D64.9 NORMOCYTIC ANEMIA: ICD-10-CM

## 2022-06-03 DIAGNOSIS — Z02.0 SCHOOL PHYSICAL EXAM: ICD-10-CM

## 2022-06-03 DIAGNOSIS — Z02.89 ENCOUNTER FOR COMPLETION OF FORM WITH PATIENT: ICD-10-CM

## 2022-06-03 DIAGNOSIS — E78.2 MIXED HYPERLIPIDEMIA DUE TO TYPE 2 DIABETES MELLITUS (HCC): ICD-10-CM

## 2022-06-03 DIAGNOSIS — E78.5 DYSLIPIDEMIA: ICD-10-CM

## 2022-06-03 PROBLEM — J96.00 ACUTE RESPIRATORY FAILURE (HCC): Status: RESOLVED | Noted: 2020-11-17 | Resolved: 2022-06-03

## 2022-06-03 PROBLEM — E87.1 ACUTE HYPONATREMIA: Status: RESOLVED | Noted: 2017-07-09 | Resolved: 2022-06-03

## 2022-06-03 PROBLEM — R65.20 SEVERE SEPSIS (HCC): Status: RESOLVED | Noted: 2017-04-29 | Resolved: 2022-06-03

## 2022-06-03 PROBLEM — S01.311A LACERATION OF RIGHT EAR: Status: RESOLVED | Noted: 2018-05-19 | Resolved: 2022-06-03

## 2022-06-03 PROBLEM — L08.9 COMPLICATED WOUND INFECTION: Status: RESOLVED | Noted: 2017-04-19 | Resolved: 2022-06-03

## 2022-06-03 PROBLEM — E78.1 HYPERTRIGLYCERIDEMIA: Status: RESOLVED | Noted: 2019-01-03 | Resolved: 2022-06-03

## 2022-06-03 PROBLEM — U07.1 COVID-19 VIRUS INFECTION: Status: RESOLVED | Noted: 2020-11-17 | Resolved: 2022-06-03

## 2022-06-03 PROBLEM — A41.9 SEPSIS (HCC): Status: RESOLVED | Noted: 2017-07-09 | Resolved: 2022-06-03

## 2022-06-03 PROBLEM — J20.8 ACUTE BRONCHITIS DUE TO OTHER SPECIFIED ORGANISMS: Status: RESOLVED | Noted: 2018-11-14 | Resolved: 2022-06-03

## 2022-06-03 PROBLEM — A41.9 SEVERE SEPSIS (HCC): Status: RESOLVED | Noted: 2017-04-29 | Resolved: 2022-06-03

## 2022-06-03 PROBLEM — L02.415 ABSCESS OF RIGHT THIGH: Status: RESOLVED | Noted: 2017-05-01 | Resolved: 2022-06-03

## 2022-06-03 PROBLEM — L02.512 ABSCESS OF LEFT THUMB: Status: RESOLVED | Noted: 2017-12-09 | Resolved: 2022-06-03

## 2022-06-03 PROBLEM — J06.9 URI (UPPER RESPIRATORY INFECTION): Status: RESOLVED | Noted: 2019-05-29 | Resolved: 2022-06-03

## 2022-06-03 PROBLEM — S22.20XA CLOSED FRACTURE OF STERNUM: Status: RESOLVED | Noted: 2018-05-19 | Resolved: 2022-06-03

## 2022-06-03 PROBLEM — T14.8XXA COMPLICATED WOUND INFECTION: Status: RESOLVED | Noted: 2017-04-19 | Resolved: 2022-06-03

## 2022-06-03 PROBLEM — L03.114 CELLULITIS OF LEFT HAND: Status: RESOLVED | Noted: 2017-12-09 | Resolved: 2022-06-03

## 2022-06-03 PROCEDURE — 90707 MMR VACCINE SC: CPT | Performed by: CLINICAL NURSE SPECIALIST

## 2022-06-03 PROCEDURE — 90746 HEPB VACCINE 3 DOSE ADULT IM: CPT | Performed by: CLINICAL NURSE SPECIALIST

## 2022-06-03 PROCEDURE — 99215 OFFICE O/P EST HI 40 MIN: CPT | Mod: 25 | Performed by: CLINICAL NURSE SPECIALIST

## 2022-06-03 PROCEDURE — 90471 IMMUNIZATION ADMIN: CPT | Performed by: CLINICAL NURSE SPECIALIST

## 2022-06-03 PROCEDURE — 90472 IMMUNIZATION ADMIN EACH ADD: CPT | Performed by: CLINICAL NURSE SPECIALIST

## 2022-06-03 RX ORDER — ROSUVASTATIN CALCIUM 5 MG/1
5 TABLET, COATED ORAL EVERY EVENING
Qty: 30 TABLET | Refills: 11 | Status: SHIPPED | OUTPATIENT
Start: 2022-06-03 | End: 2023-06-15 | Stop reason: SDUPTHER

## 2022-06-03 RX ORDER — PIOGLITAZONEHYDROCHLORIDE 30 MG/1
30 TABLET ORAL DAILY
Qty: 90 TABLET | Refills: 0 | Status: SHIPPED | OUTPATIENT
Start: 2022-06-03 | End: 2023-06-15 | Stop reason: SDUPTHER

## 2022-06-03 ASSESSMENT — PAIN SCALES - GENERAL: PAINLEVEL: 4=SLIGHT-MODERATE PAIN

## 2022-06-03 ASSESSMENT — FIBROSIS 4 INDEX: FIB4 SCORE: 0.93

## 2022-06-03 NOTE — ASSESSMENT & PLAN NOTE
Mahesh reports he has chronic mild anemia and is not interested in working this up any further at this time.

## 2022-06-03 NOTE — ASSESSMENT & PLAN NOTE
The 10-year ASCVD risk score (Jorje KOCH Jr., et al., 2013) is: 13.8% Cholesterol elevated at 228, triglycerides 213, HDL 44, and . He has taken statins in the past.

## 2022-06-03 NOTE — ASSESSMENT & PLAN NOTE
Taking medication as prescribed.  He had some issues with insurance authorization but will be picking up his refill prescriptions from last appointment this afternoon.

## 2022-06-03 NOTE — PROGRESS NOTES
"Subjective     Mahesh Santi Bradshaw is a 57 y.o. male who presents with Lab Results (From 05/25/22) and Paperwork            HPI  Dyslipidemia  The 10-year ASCVD risk score (Dike[place]in KO[facility] Jr., et al., 2013) is: 13.8% Cholesterol elevated at 228, triglycerides 213, HDL 44, and . He has taken statins in the past.      Mixed hyperlipidemia due to type 2 diabetes mellitus (HCC)  Taking medication as prescribed.  He had some issues with insurance authorization but will be picking up his refill prescriptions from last appointment this afternoon.    Normocytic anemia  Mahesh reports he has chronic mild anemia and is not interested in working this up any further at this time.      ROS  See HPI      Allergies   Allergen Reactions   • Hydrocodone-Acetaminophen Rash     Tolerates oxycodone and morphine   • Peanut (Diagnostic) Anaphylaxis   • Peanut Oil Anaphylaxis   • Tradjenta [Linagliptin] Unspecified     Pt developed pancreatitis   • Hydrocodone Hives     Tolerates Oxycodone/hydromorphone     • Morphine Itching       Current Outpatient Medications on File Prior to Visit   Medication Sig Dispense Refill   • thyroid (ARMOUR THYROID) 60 MG Tab Take 1 Tablet by mouth every morning. 90 Tablet 1   • insulin glargine (LANTUS SOLOSTAR) 100 UNIT/ML Solution Pen-injector injection Inject 10 Units under the skin every evening for 30 days. 3 mL 0   • Insulin Pen Needle 32 G x 4 mm Use one pen needle in pen device to inject insulin once daily. 100 Each 0   • insulin lispro (HUMALOG,ADMELOG) 100 UNIT/ML Solution Pen-injector injection PEN Inject 5-16 Units under the skin 4 Times a Day,Before Meals and at Bedtime for 30 days. Sliding Scale 21 mL 0   • tadalafil (CIALIS) 5 MG tablet Take 5 mg by mouth every evening.     • Empagliflozin (JARDIANCE) 25 MG Tab Take 25 mg by mouth every day. 30 Tablet 0   • Multiple Vitamins-Minerals (MENS MULTIVITAMIN) Tab Take 1 Package by mouth every evening. \"[employer] Men\"     • Blood Glucose Meter Kit " "Test blood sugar as recommended by provider. True Metrix blood glucose monitoring kit. 1 Kit 0   • metformin (GLUCOPHAGE) 1000 MG tablet Take 1 Tablet by mouth 2 times a day with meals. (Patient not taking: Reported on 6/3/2022) 180 Tablet 1     No current facility-administered medications on file prior to visit.           Objective     /64 (BP Location: Left arm, Patient Position: Sitting, BP Cuff Size: Adult)   Pulse 80   Temp 36.8 °C (98.3 °F) (Temporal)   Ht 1.753 m (5' 9\")   Wt 89.6 kg (197 lb 9.6 oz)   SpO2 95%   BMI 29.18 kg/m²      Physical Exam  Constitutional:       General: He is not in acute distress.     Appearance: He is not ill-appearing, toxic-appearing or diaphoretic.   HENT:      Head: Normocephalic and atraumatic.      Right Ear: External ear normal.      Left Ear: External ear normal. There is impacted cerumen.      Ears:      Comments: TM with white area and small red area posteriorly  Eyes:      General: No scleral icterus.     Extraocular Movements: Extraocular movements intact.      Pupils: Pupils are equal, round, and reactive to light.   Cardiovascular:      Rate and Rhythm: Normal rate and regular rhythm.      Heart sounds: Normal heart sounds.   Pulmonary:      Effort: Pulmonary effort is normal.      Breath sounds: Normal breath sounds.   Abdominal:      Palpations: Abdomen is soft.      Tenderness: There is no abdominal tenderness.   Musculoskeletal:      Cervical back: No rigidity.      Thoracic back: No scoliosis.   Skin:     General: Skin is warm and dry.   Neurological:      Mental Status: He is alert and oriented to person, place, and time.   Psychiatric:         Mood and Affect: Mood normal.         Behavior: Behavior normal.         Thought Content: Thought content normal.         Judgment: Judgment normal.             Hospital Outpatient Visit on 05/25/2022   Component Date Value   • Testosterone,Total 05/25/2022 389    • Sex Hormone Bind Globulin 05/25/2022 31    • " Free Testosterone 05/25/2022 74    • Testosterone % Free 05/25/2022 1.9    • HBV (IU/mL) Qnt NAAT 05/25/2022 Not Detected    • HBV (log IU/mL) Qnt NAAT 05/25/2022 Not Detected    • HBV Qnt by NAAT Interp 05/25/2022 Not Detected    • Varicella Zoster IgG Ab 05/25/2022 1.72    • QFT NIL 05/25/2022 0.01    • QFT TB1 - NIL 05/25/2022 0.00    • QFT TB2 - NIL 05/25/2022 0.00    • QFT Mitogen - NIL 05/25/2022 >10.00    • QFT Gold Plus 05/25/2022 Negative    • Phosphorus 05/25/2022 2.5    • Hepatitis C Antibody 05/25/2022 Non-Reactive    • 25-Hydroxy   Vitamin D 25 05/25/2022 17 (A)   • TSH 05/25/2022 1.520    • Cholesterol,Tot 05/25/2022 228 (A)   • Triglycerides 05/25/2022 213 (A)   • HDL 05/25/2022 44    • LDL 05/25/2022 141 (A)   • Glycohemoglobin 05/25/2022 12.0 (A)   • Est Avg Glucose 05/25/2022 298    • Sodium 05/25/2022 138    • Potassium 05/25/2022 4.0    • Chloride 05/25/2022 105    • Co2 05/25/2022 22    • Anion Gap 05/25/2022 11.0    • Glucose 05/25/2022 207 (A)   • Bun 05/25/2022 15    • Creatinine 05/25/2022 0.53    • Calcium 05/25/2022 8.4 (A)   • AST(SGOT) 05/25/2022 21    • ALT(SGPT) 05/25/2022 38    • Alkaline Phosphatase 05/25/2022 76    • Total Bilirubin 05/25/2022 0.3    • Albumin 05/25/2022 4.0    • Total Protein 05/25/2022 6.4    • Globulin 05/25/2022 2.4    • A-G Ratio 05/25/2022 1.7    • WBC 05/25/2022 4.5 (A)   • RBC 05/25/2022 4.23 (A)   • Hemoglobin 05/25/2022 13.7 (A)   • Hematocrit 05/25/2022 39.2 (A)   • MCV 05/25/2022 92.7    • MCH 05/25/2022 32.4    • MCHC 05/25/2022 34.9    • RDW 05/25/2022 46.1    • Platelet Count 05/25/2022 208    • MPV 05/25/2022 10.0    • Neutrophils-Polys 05/25/2022 65.00    • Lymphocytes 05/25/2022 26.70    • Monocytes 05/25/2022 5.60    • Eosinophils 05/25/2022 1.60    • Basophils 05/25/2022 0.40    • Immature Granulocytes 05/25/2022 0.70    • Nucleated RBC 05/25/2022 0.00    • Neutrophils (Absolute) 05/25/2022 2.93    • Lymphs (Absolute) 05/25/2022 1.20    • Monos  (Absolute) 05/25/2022 0.25    • Eos (Absolute) 05/25/2022 0.07    • Baso (Absolute) 05/25/2022 0.02    • Immature Granulocytes (a* 05/25/2022 0.03    • NRBC (Absolute) 05/25/2022 0.00    • Creatinine, Urine 05/25/2022 129.08    • Microalbumin, Urine Rand* 05/25/2022 <1.2    • Micro Alb Creat Ratio 05/25/2022 see below    • Insulin Antibody 05/25/2022 <0.4    • C-Peptide 05/25/2022 0.8    • Insulin Fasting 05/25/2022 17    • GFR (CKD-EPI) 05/25/2022 117    Admission on 05/10/2022, Discharged on 05/12/2022   Component Date Value   • WBC 05/10/2022 6.3    • RBC 05/10/2022 4.30 (A)   • Hemoglobin 05/10/2022 13.8 (A)   • Hematocrit 05/10/2022 37.7 (A)   • MCV 05/10/2022 87.7    • MCH 05/10/2022 32.1    • MCHC 05/10/2022 36.6 (A)   • RDW 05/10/2022 39.7    • Platelet Count 05/10/2022 170    • MPV 05/10/2022 9.6    • Neutrophils-Polys 05/10/2022 75.80 (A)   • Lymphocytes 05/10/2022 18.40 (A)   • Monocytes 05/10/2022 4.30    • Eosinophils 05/10/2022 0.50    • Basophils 05/10/2022 0.20    • Immature Granulocytes 05/10/2022 0.80    • Nucleated RBC 05/10/2022 0.00    • Neutrophils (Absolute) 05/10/2022 4.79    • Lymphs (Absolute) 05/10/2022 1.16    • Monos (Absolute) 05/10/2022 0.27    • Eos (Absolute) 05/10/2022 0.03    • Baso (Absolute) 05/10/2022 0.01    • Immature Granulocytes (a* 05/10/2022 0.05    • NRBC (Absolute) 05/10/2022 0.00    • Sodium 05/10/2022 123 (A)   • Potassium 05/10/2022 4.1    • Chloride 05/10/2022 89 (A)   • Co2 05/10/2022 15 (A)   • Anion Gap 05/10/2022 19.0 (A)   • Glucose 05/10/2022 851 (A)   • Bun 05/10/2022 14    • Creatinine 05/10/2022 0.92    • Calcium 05/10/2022 8.5    • AST(SGOT) 05/10/2022 11 (A)   • ALT(SGPT) 05/10/2022 24    • Alkaline Phosphatase 05/10/2022 108 (A)   • Total Bilirubin 05/10/2022 0.2    • Albumin 05/10/2022 3.7    • Total Protein 05/10/2022 6.2    • Globulin 05/10/2022 2.5    • A-G Ratio 05/10/2022 1.5    • Magnesium 05/10/2022 1.9    • beta-Hydroxybutyric Acid 05/10/2022  0.35 (A)   • POC Glucose, Blood 05/10/2022 >600 (A)   • Venous Bg Ph 05/10/2022 7.36    • Venous Bg Pco2 05/10/2022 29.9 (A)   • Venous Bg Po2 05/10/2022 66.7 (A)   • Venous Bg O2 Saturation 05/10/2022 92.9    • Venous Bg Hco3 05/10/2022 16 (A)   • Venous Bg Base Excess 05/10/2022 -8    • Body Temp 05/10/2022 na    • GFR (CKD-EPI) 05/10/2022 97    • Report 05/10/2022                      Value:[facility]    Test Date:  2022-05-10  Pt Name:    DEVIN MO              Department: [facility]  MRN:        [id number]                      [facility]  Gender:     Male                         Technician: JOSE ANGEL  :        1965                   Requested By:JOHANNA RIBEIRO  Order #:    [id number]                    Reading MD:    Measurements  Intervals                                Axis  Rate:       87                           P:          49  RI:         141                          QRS:        5  QRSD:       96                           T:          30  QT:         384  QTc:        462    Interpretive Statements  Sinus rhythm  Compared to ECG 2021 16:48:09  Right ventricular hypertrophy no longer present     • Color 05/10/2022 Yellow    • Character 05/10/2022 Clear    • Specific Gravity 05/10/2022 1.015    • Ph 05/10/2022 5.5    • Glucose 05/10/2022 500 (A)   • Ketones 05/10/2022 15 (A)   • Protein 05/10/2022 Negative    • Bilirubin 05/10/2022 Negative    • Nitrite 05/10/2022 Negative    • Leukocyte Esterase 05/10/2022 Negative    • Occult Blood 05/10/2022 Negative    • Micro Urine Req 05/10/2022 see below    • Influenza virus A RNA 05/10/2022 Negative    • Influenza virus B, PCR 05/10/2022 Negative    • RSV, PCR 05/10/2022 Negative    • SARS-CoV-2 by PCR 05/10/2022 NotDetected    • SARS-CoV-2 Source 05/10/2022 Nasal Swab    • POC Glucose, Blood 05/10/2022 427 (A)   • Phosphorus 05/10/2022 3.0    • Sodium 05/10/2022 137    • Potassium 05/10/2022 3.5 (A)   •  Chloride 05/10/2022 103    • Co2 05/10/2022 22    • Glucose 05/10/2022 357 (A)   • Bun 05/10/2022 11    • Creatinine 05/10/2022 0.68    • Calcium 05/10/2022 8.6    • Anion Gap 05/10/2022 12.0    • Cocci Ab CF 05/10/2022 <1:2    • Coccidioides Antibody, I* 05/10/2022 0.2    • Coccidioides Antibody, I* 05/10/2022 0.6    • Coccididioides by Immuno* 05/10/2022 None Detected    • 1-3 Beta D Glucan 05/10/2022 <31    • 1-3 Beta D Glucan Interp 05/10/2022 Negative    • MRSA by PCR 05/11/2022 POSITIVE    • TSH 05/10/2022 2.400    • POC Glucose, Blood 05/10/2022 398 (A)   • Troponin T 05/10/2022 <6    • GFR (CKD-EPI) 05/10/2022 108    • Troponin T 05/11/2022 <6    • Sodium 05/11/2022 139    • Potassium 05/11/2022 3.1 (A)   • Chloride 05/11/2022 107    • Co2 05/11/2022 21    • Glucose 05/11/2022 118 (A)   • Bun 05/11/2022 9    • Creatinine 05/11/2022 0.54    • Calcium 05/11/2022 8.1 (A)   • Anion Gap 05/11/2022 11.0    • Phosphorus 05/11/2022 2.1 (A)   • Magnesium 05/11/2022 1.7    • PT 05/11/2022 12.5    • INR 05/11/2022 1.01    • Significant Indicator 05/10/2022 NEG    • Source 05/10/2022 BLD    • Site 05/10/2022 PERIPHERAL    • Culture Result 05/10/2022                      Value:No growth after 5 days of incubation.  Blood culture testing and Gram stain, if indicated, are  performed at [facility], [address] [place] [address].  Positive blood cultures are  sent to [facility], [address] [place] [address], for organism identification and  susceptibility testing.     • Significant Indicator 05/11/2022 NEG    • Source 05/11/2022 BLD    • Site 05/11/2022 PERIPHERAL    • Culture Result 05/11/2022                      Value:No growth after 5 days of incubation.  Blood culture testing and Gram stain, if indicated, are  performed at [facility], [address] [place] [address].  Positive blood cultures are  sent to [facility],  [address] [place] [address], for organism identification and  susceptibility testing.     • Sodium 05/11/2022 137    • Potassium 05/11/2022 3.7    • Chloride 05/11/2022 108    • Co2 05/11/2022 19 (A)   • Glucose 05/11/2022 267 (A)   • Bun 05/11/2022 8    • Creatinine 05/11/2022 0.48 (A)   • Calcium 05/11/2022 7.0 (A)   • Anion Gap 05/11/2022 10.0    • Magnesium 05/11/2022 1.7    • Lactic Acid 05/11/2022 1.7    • POC Glucose, Blood 05/10/2022 358 (A)   • POC Glucose, Blood 05/10/2022 311 (A)   • POC Glucose, Blood 05/10/2022 211 (A)   • POC Glucose, Blood 05/10/2022 174 (A)   • POC Glucose, Blood 05/11/2022 151 (A)   • GFR (CKD-EPI) 05/11/2022 116    • Lactic Acid 05/11/2022 1.0    • Troponin T 05/11/2022 7    • WBC 05/11/2022 6.2    • RBC 05/11/2022 3.70 (A)   • Hemoglobin 05/11/2022 11.9 (A)   • Hematocrit 05/11/2022 32.7 (A)   • MCV 05/11/2022 88.4    • MCH 05/11/2022 32.2    • MCHC 05/11/2022 36.4 (A)   • RDW 05/11/2022 40.9    • Platelet Count 05/11/2022 153 (A)   • MPV 05/11/2022 9.5    • Lactic Acid 05/11/2022 1.3    • POC Glucose, Blood 05/11/2022 73    • POC Glucose, Blood 05/11/2022 96    • POC Glucose, Blood 05/11/2022 175 (A)   • POC Glucose, Blood 05/11/2022 232 (A)   • POC Glucose, Blood 05/11/2022 235 (A)   • POC Glucose, Blood 05/11/2022 271 (A)   • GFR (CKD-EPI) 05/11/2022 120    • POC Glucose, Blood 05/11/2022 232 (A)   • POC Glucose, Blood 05/11/2022 250 (A)   • POC Glucose, Blood 05/11/2022 330 (A)   • WBC 05/12/2022 4.3 (A)   • RBC 05/12/2022 4.21 (A)   • Hemoglobin 05/12/2022 13.3 (A)   • Hematocrit 05/12/2022 37.2 (A)   • MCV 05/12/2022 88.4    • MCH 05/12/2022 31.6    • MCHC 05/12/2022 35.8 (A)   • RDW 05/12/2022 40.7    • Platelet Count 05/12/2022 161 (A)   • MPV 05/12/2022 9.4    • Sodium 05/12/2022 136    • Potassium 05/12/2022 3.6    • Chloride 05/12/2022 104    • Co2 05/12/2022 22    • Glucose 05/12/2022 242 (A)   • Bun 05/12/2022 9    • Creatinine 05/12/2022 0.56    • Calcium  05/12/2022 8.1 (A)   • Anion Gap 05/12/2022 10.0    • Magnesium 05/12/2022 1.9    • POC Glucose, Blood 05/12/2022 244 (A)   • GFR (CKD-EPI) 05/12/2022 115    • POC Glucose, Blood 05/12/2022 222 (A)                          Assessment & Plan       1. Hyperosmolar hyperglycemic state (HHS) (HCC)  A1c at 12 and fasting glucose 207.  He has an upcoming appointment with endocrinology.     Increase pioglitazone to 30 mg daily.      Continue all other medication as ordered previously.    - pioglitazone (ACTOS) 30 MG Tab; Take 1 Tablet by mouth every day.  Dispense: 90 Tablet; Refill: 0    2. Dyslipidemia  START rosuvastatin 5 mg.    - rosuvastatin (CRESTOR) 5 MG Tab; Take 1 Tablet by mouth every evening.  Dispense: 30 Tablet; Refill: 11  - Lipid Profile; Future    3. Need for vaccination  MMR panel not drawn at lab.  As he is required to have this vaccine or antibodies confirmed prior to starting school next week, will restart the vaccination.  Only hep B antigen drawn at last lab.  Ordered Hep B antibody test.  We will restart this vaccination with dose 1 today in order to maintain compliance with school.  Will watch for results of test for further doses.      - Hepatitis B Vaccine Adult 20+  - MMR SQ  -HEP B surface antibody   -MMR antibody reordered    4. Uncontrolled type 2 diabetes mellitus with hyperglycemia (HCC)  See #1. Repeat labs in 3 months    - HEMOGLOBIN A1C; Future  - Comp Metabolic Panel; Future  - VITAMIN B12; Future    5. Normocytic anemia  Monitor    - CBC WITHOUT DIFFERENTIAL; Future  - VITAMIN B12; Future    6. Vitamin D deficiency  Start Vitamin D 5000IU  - VITAMIN D,25 HYDROXY; Future    7. Mixed hyperlipidemia due to type 2 diabetes mellitus (HCC)  See #2    8. Encounter for completion of form with patient  Completed school form and scanned into chart.      9. Abnormal tympanic membrane of right ear  TM with white area and small red area posteriorly. Concern for steatoma. Referral to ENT.    -  Referral to ENT    Return in about 3 months (around 9/3/2022), or if symptoms worsen or fail to improve.         My total time spent caring for the patient on the day of the encounter was 55 minutes.   This does not include time spent on separately billable procedures/tests.

## 2022-06-16 NOTE — CARE PLAN
The patient is Stable - Low risk of patient condition declining or worsening    Shift Goals  Clinical Goals: Stabilize blood glucose levels to safely discontinue insulin drip  Patient Goals: To consume a full meal when disconued insulin drip stops to sustain adequate sugar levels  Family Goals: participate in medical teachings of how pt can avoid such drastic glucose levels with proper diet and health managment at home.     Progress made toward(s) clinical / shift goals:  off insulin drip at 2130, consumed first 100% of meal, now not on any drips or fluids.      Patient is not progressing towards the following goals:      Problem: Pain - Standard  Goal: Alleviation of pain or a reduction in pain to the patient’s comfort goal  Outcome: Progressing  Note: Discussed with pt the use of pain scale to determine level of severity of pain to adequately treat pain appropriately. Pt verbalized back understanding.      Problem: Fall Risk  Goal: Patient will remain free from falls  Outcome: Progressing  Note: Pt was explained verbally to about the importance of utilizing call if wanting to ambulate to prevent possible fall. Pt verbalized back understanding.      
The patient is Watcher - Medium risk of patient condition declining or worsening         Progress made toward(s) clinical / shift goals:    Problem: Pain - Standard  Goal: Alleviation of pain or a reduction in pain to the patient’s comfort goal  Outcome: Progressing   Pt has reported a reduction in pain. See MAR for treatment.   Problem: Knowledge Deficit - Standard  Goal: Patient and family/care givers will demonstrate understanding of plan of care, disease process/condition, diagnostic tests and medications  Outcome: Progressing   Pt receptive to learning. Pt demonstrated knowledge with verbal feedback.     Patient is not progressing towards the following goals:      
no stairs/spouse

## 2022-07-26 NOTE — ED PROVIDER NOTES
ED Provider Note    Scribed for Dr. Foster Solano M.D. by Vipin Gupta. 12/9/2017  6:46 PM    Primary care provider: KRISSY Brunner  Means of arrival: Walk-in  History obtained from: Patient  History limited by: None    CHIEF COMPLAINT  Chief Complaint   Patient presents with   • Hand Swelling       HPI  Mahesh Bradshaw is a 52 y.o. male who presents to the Emergency Department for evaluation of increasing left hand swelling and redness. The patient reports that he received an incision and drainage at [facility] yesterday. He has been taking the clindamycin and Augmentin that he was prescribed during that visit. This evening he noticed increased tenderness to palaption of the wrist joint, palm, and thumb as well as increased swelling of the hand making removal of the packing very difficult. He denies fever, chills, diaphoresis, shakes, fatigue, or nausea. Patient denies an extreme increase in his blood glucose today and reportedly took extra insulin today.    REVIEW OF SYSTEMS  Pertinent positives include left hand swelling and tenderness. Pertinent negatives include no fever, chills, diaphoresis, shakes, fatigue, or nausea. As above, all other systems reviewed and are negative  See HPI for further details.   C    PAST MEDICAL HISTORY   has a past medical history of ADRIANE (acute kidney injury) (CMS-HCC) (2/24/2016); Anesthesia; Arthritis (3-3-17); Aspiration pneumonia (CMS-HCC) (3-2016); ASTHMA (3-3-17); Breath shortness (3-3-17); Congestive heart failure (CMS-HCC) (2-2016 ); Depression (11/26/2014); Diabetes (CMS-HCC) (3-3-17); Difficult intubation (2-2016); DKA (diabetic ketoacidoses) (CMS-HCC) (2-2016); Electrolyte imbalance (2-2016); Elevated LFTs; Elevated liver enzymes (2-2016); Essential hypertension (1/22/2016); Gout; Heart burn; High cholesterol (3-3-17); Hypothyroid (3-3-17); Indigestion; Kidney stones; Klebsiella infect; Migraine; MRSA cellulitis; Necrotizing myositis  (CMS-HCC); On mechanically assisted ventilation (CMS-HCC) (2-2016); Pain (3-3-17); Pancreatitis (2-2016); RF (renal failure) (2-2016); Sepsis (CMS-HCC) (1/21/2016); Snoring (3-3-17); Streptococcus infection; UC (ulcerative colitis) (CMS-HCC) (3-3-17); and Vitamin D deficiency.    SURGICAL HISTORY   has a past surgical history that includes vaishnavi by laparoscopy (2005); colonoscopy with biopsy (11/26/2014); incision and drainage general (4/7/2017); irrigation & debridement general (Right, 4/29/2017); irrigation & debridement general (Right, 5/1/2017); other orthopedic surgery (1997 or 1998); umbilical hernia repair (N/A, 11/6/2016); and umbilical hernia repair (3/10/2017).    SOCIAL HISTORY  Social History   Substance Use Topics   • Smoking status: Never Smoker   • Smokeless tobacco: Never Used   • Alcohol use Yes      Comment: occ      History   Drug Use No       FAMILY HISTORY  None noted.    CURRENT MEDICATIONS  No current facility-administered medications on file prior to encounter.      Current Outpatient Prescriptions on File Prior to Encounter   Medication Sig Dispense Refill   • clindamycin (CLEOCIN) 300 MG Cap Take 1 Cap by mouth 4 times a day for 10 days. 40 Cap 0   • amoxicillin-clavulanate (AUGMENTIN) 875-125 MG Tab Take 1 Tab by mouth 2 times a day for 10 days. 20 Tab 0   • therapeutic multivitamin-minerals (THERAGRAN-M) Tab Take 1 Tab by mouth every day.     • zinc sulfate (ZINCATE) 220 (50 Zn) MG Cap Take 220 mg by mouth every day.     • magnesium oxide (MAG-OX) 400 MG Tab Take 400 mg by mouth every day.     • trazodone (DESYREL) 100 MG Tab Take 100 mg by mouth every evening.     • ibuprofen (MOTRIN) 200 MG Tab Take 600-800 mg by mouth every 8 hours as needed for Mild Pain.     • thyroid (ARMOUR THYROID) 60 MG Tab Take 60 mg by mouth every day.     • Cholecalciferol (VITAMIN D3) 5000 UNITS Tab Take 5,000 Units by mouth every evening.     • insulin lispro (HUMALOG) 100 UNIT/ML Solution Inject 2-10 Units  "as instructed 3 times a day before meals. Sliding Scale - 2 units every 50 > 150     • buPROPion (WELLBUTRIN XL) 300 MG XL tablet Take 300 mg by mouth every morning.         ALLERGIES  Allergies   Allergen Reactions   • Peanut-Derived Anaphylaxis     Peanuts   RXN=age 36   • Tradjenta [Linagliptin] Unspecified     Pt developed pancreatitis   • Hydrocodone Hives     Tolerates Morphine and oxycodone  Rxn Age - 29       PHYSICAL EXAM  VITAL SIGNS: /79   Pulse 100   Temp 36.6 °C (97.9 °F)   Resp 18   Ht 1.727 m (5' 8\")   Wt 95.3 kg (210 lb)   BMI 31.93 kg/m²     Constitutional: Well developed, Well nourished, No acute distress, Non-toxic appearance.    .   Neck: No tenderness, Supple, No stridor.   Lymphatic: No lymphadenopathy noted.   Cardiovascular: Normal heart rate, Normal rhythm.   Thorax & Lungs: Clear to auscultation bilaterally, No respiratory distress, No wheezing, No crackles.   Abdomen: Soft, No tenderness, No masses, No pulsatile masses.   Skin: Warm, Dry, No rash. Previously incised and drainedabscess with packing in place over the extensor surface of the left first metacarpal. Line drawn around the abscess at the time of drainage yesterday that hte rendess has extended beyond.   Extremities:, No edema No cyanosis.   Musculoskeletal: No tenderness to palpation or major deformities noted.  Intact distal pulses  Neurologic: Awake, alert. Moves all extremities spontaneously.  Psychiatric: Affect normal, Judgment normal, Mood normal.     PROCEDURES  Packing Removal Procedure    Indication: Retained gauze packing    Location: Left hand       LABS  Results for orders placed or performed during the hospital encounter of 12/09/17   CBC WITH DIFFERENTIAL   Result Value Ref Range    WBC 6.6 4.8 - 10.8 K/uL    RBC 4.09 (L) 4.70 - 6.10 M/uL    Hemoglobin 12.7 (L) 14.0 - 18.0 g/dL    Hematocrit 36.2 (L) 42.0 - 52.0 %    MCV 88.5 81.4 - 97.8 fL    MCH 31.1 27.0 - 33.0 pg    MCHC 35.1 33.7 - 35.3 g/dL    RDW " 41.4 35.9 - 50.0 fL    Platelet Count 209 164 - 446 K/uL    MPV 9.0 9.0 - 12.9 fL    Neutrophils-Polys 66.40 44.00 - 72.00 %    Lymphocytes 25.90 22.00 - 41.00 %    Monocytes 5.80 0.00 - 13.40 %    Eosinophils 0.90 0.00 - 6.90 %    Basophils 0.50 0.00 - 1.80 %    Immature Granulocytes 0.50 0.00 - 0.90 %    Nucleated RBC 0.00 /100 WBC    Neutrophils (Absolute) 4.37 1.82 - 7.42 K/uL    Lymphs (Absolute) 1.70 1.00 - 4.80 K/uL    Monos (Absolute) 0.38 0.00 - 0.85 K/uL    Eos (Absolute) 0.06 0.00 - 0.51 K/uL    Baso (Absolute) 0.03 0.00 - 0.12 K/uL    Immature Granulocytes (abs) 0.03 0.00 - 0.11 K/uL    NRBC (Absolute) 0.00 K/uL       All labs reviewed by me.    RADIOLOGY  DX-HAND 3+ LEFT    (Results Pending)     The radiologist's interpretation of all radiological studies have been reviewed by me.    COURSE & MEDICAL DECISION MAKING  Pertinent Labs & Imaging studies reviewed. (See chart for details)    6:46 PM - Patient seen and examined at bedside. I informed the patient that he will need to be treated with antibiotics and I will attempt to clean the wound and remove the packing. He understands and verbalizes agreement to aggressive treatment. Patient will be treated with ampicillin/sulbactam injection 3 g. Ordered blood cultures x2, CBC with differential, and CMP to evaluate his symptoms. Differential diagnoses include but not limited to: cellulitis/abscces vs tenosynovitis    7:00 PM Paged Hospitalist.    7:14 PM I discussed the patient's case and the above findings with Dr. Dudley (Hospitalist) who agrees to transfer care of the patient at this time.    7:20 PM Paged Hand.    7:24 PM I performed a packing removal procedure at bedside. I ordered hydromorphone injection 1 mg and ondansetron injection 4 mg to treat.    7:26 PM I discussed the patient's case and the above findings with Dr. Ramos (Hand) who agrees to evaluate the patient.    Decision Making:  Cellulitis around at abscessed area previously drained  expanding despite oral antibiotics, patient is a complicated Ms. medical history advise aggressive treatment with IV antibiotics which the patient agrees to    DISPOSITION:  Patient will be admitted to Dr. Dudley, Hospitalist, in guarded condition.    FINAL IMPRESSION  Cellulitis   5.      Packing removal procedure performed by ERP, as above.     Vipin EPSTEIN (Scribe), am scribing for, and in the presence of, Foster Solano M.D..    Electronically signed by: Vipin Gupta (Scribe), 12/9/2017    IFoster M.D. personally performed the services described in this documentation, as scribed by Vipin Gupta in my presence, and it is both accurate and complete.    The note accurately reflects work and decisions made by me.  Foster Solano  12/10/2017  1:18 AM     no

## 2022-10-05 NOTE — PATIENT INSTRUCTIONS
START:  1.  Tresiba 70 units at night.   2.  Ozempic 0.25 once a week (INCREASE to 0.5) on 3/5/19 INCREASE to 1.0  3.  Humalog  (STOP) can use if glucose is over 300  4.  Synjardy 25/1000 one in the AM  5.  Actos 15mg one a day   I am ordering stool studies    Will do flagyl

## 2022-10-09 NOTE — ED PROVIDER NOTES
"ED Provider Note    ED Provider Note    Primary care provider: Rodolfo Stein M.D.  Means of arrival: Walk-in  History obtained from: Patient    CHIEF COMPLAINT  Chief Complaint   Patient presents with   • High Blood Sugar     Pt c/o high blood sugar for last week; blood sugar reading >600 on pt home glucometer; pt working with endocrinologist to manage meds without relief   • Nausea   • Headache     Seen at 4:27 PM.   HPI  Mahesh Bradshaw is a 54 y.o. male who presents to the Emergency Department out of concern for possibly developing DKA.  The patient has been out of most of his diabetes medications until the past few days when he was able to get insurance straightened out and restart his meds.  He feels it may be too little too late.  He notes his sugars usually averaging 200s and 300s but today it had a reading of \"high.\"  He notes some mild left upper abdominal cramping and some nausea, consistent with his DKA in the past.  He also notes a mild headache, again consistent with DKA.    Aside from this he denies any fevers, chills, chest pain, shortness of breath, dysuria, rash, impaired immunity.  He does have a history of ulcerative colitis and gets some colicky abdominal pain.  He has not been on any steroids in many years and has not had any severe flareups of his UC.    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    REVIEW OF SYSTEMS  See HPI,   Remainder of ROS negative.     PAST MEDICAL HISTORY   has a past medical history of ADRIANE (acute kidney injury) ([facility]) (2/24/2016), Anesthesia, Arthritis (3-3-17), Aspiration pneumonia ([facility]) (3-2016), ASTHMA (3-3-17), Breath shortness (3-3-17), Congestive heart failure ([facility]) (2-2016 ), Dental disorder, Depression (11/26/2014), Diabetes ([facility]) (3-3-17), Diabetes ([facility]), Difficult intubation (2-2016), DKA (diabetic ketoacidoses) ([facility]) (2-2016), Electrolyte imbalance (2-2016), Elevated " SBAR report was given by Kiki Zhao RN off going nurse to Rosemarie VANN RN on coming nurse. 4021: Patient was resting well at start of shift. Nurse replaces O2 properly in nostrils. 0820: Patient was reposition in bed for breakfast.    Seen this morning by resident with no changes. 0930: Dr. Viet Mayen at bedside, nurse discussed with MD that patient continues to have elevated  heart rates as high as 142. Cardizem was not d/c at this time but Metoprolol was increased to 50mg. 7010: PT worked with patient to assess mobility and safety needs at home. Patient was assisted to recliner. 80 Nurse spoke with son at bedside about mom's medications and that she had been off her meds for a few days. Son Jojo Acosta states that she is unable to afford her Xarelto at $500 a month. Nurse reviewed with patient that she has been off her diuretic as well. 1130: Nurse spoke with  who was able to assist nurse with Xarelto prescription card. Card and information was given to patient's live-in boyfriend/caregiver. Patient was up most of the day in recliner with boyfriend watching football. Returned to bed after dinner. Heart rate continues to be elevate from 100s to 140s. Cardizem gtt continues at 5ml/hr. [facility] from off going nurse Rosemarie VANN RN. "LFTs, Elevated liver enzymes (2-2016), Essential hypertension (1/22/2016), Gout, Heart burn, High cholesterol (3-3-17), Hypothyroid (3-3-17), Indigestion, Kidney stones, Klebsiella infect, Migraine, MRSA cellulitis, Necrotizing myositis (HCC), On mechanically assisted ventilation (HCC) (2-2016), Pain (3-3-17), Pancreatitis (2-2016), RF (renal failure) (2-2016), Sepsis (HCC) (1/21/2016), Snoring (3-3-17), Streptococcus infection, UC (ulcerative colitis) ([facility]) (3-3-17), Urinary incontinence, and Vitamin D deficiency.    SURGICAL HISTORY   has a past surgical history that includes vaishnavi by laparoscopy (2005); colonoscopy with biopsy (11/26/2014); incision and drainage general (4/7/2017); irrigation & debridement general (Right, 4/29/2017); irrigation & debridement general (Right, 5/1/2017); other orthopedic surgery (1997 or 1998); umbilical hernia repair (N/A, 11/6/2016); umbilical hernia repair (3/10/2017); irrigation & debridement ortho (Left, 12/10/2017); and umbilical hernia repair (N/A, 4/15/2018).    SOCIAL HISTORY  Social History     Tobacco Use   • Smoking status: Never Smoker   • Smokeless tobacco: Never Used   Substance Use Topics   • Alcohol use: Yes     Comment: rarely   • Drug use: No      Social History     Substance and Sexual Activity   Drug Use No       FAMILY HISTORY  Family History   Problem Relation Age of Onset   • Cancer Mother        CURRENT MEDICATIONS  Reviewed.  See Encounter Summary.     ALLERGIES  Allergies   Allergen Reactions   • Hydrocodone Hives     Tolerates Morphine and oxycodone     • Peanut (Diagnostic) Anaphylaxis   • Tradjenta [Linagliptin] Unspecified     Pt developed pancreatitis       PHYSICAL EXAM  VITAL SIGNS: BP (!) 97/56   Pulse 79   Temp 36.6 °C (97.9 °F) (Temporal)   Resp 18   Ht 1.727 m (5' 8\")   Wt 84.9 kg (187 lb 2.7 oz)   SpO2 98%   BMI 28.46 kg/m²   Constitutional: Awake, alert in no apparent distress.  HENT: Normocephalic, Bilateral external ears normal. Nose " normal.   Eyes: Conjunctiva normal, non-icteric, EOMI.    Thorax & Lungs: Easy unlabored respirations, Clear to ascultation bilaterally.  Cardiovascular: Tachycardic, No murmurs, rubs or gallops.  Abdomen:  Soft, nontender, nondistended, normal active bowel sounds.   :    Skin: Visualized skin is  Dry, No erythema, No rash.   Musculoskeletal:   No cyanosis, clubbing or edema.  Neurologic: Alert, Grossly non-focal.   Psychiatric: Normal affect, Normal mood  Lymphatic:  No cervical LAD        RADIOLOGY  No orders to display         COURSE & MEDICAL DECISION MAKING  Pertinent Labs & Imaging studies reviewed. (See chart for details)    Differential diagnoses include but are not limited to: DKA, pancreatitis, hyperglycemia.    4:27 PM - Medical record reviewed, patient with history of diabetes, last admitted for DKA.  He did have a telemedicine consult yesterday with endocrine.  His HbA1c increased to 16, the patient apparently lost his job and has been out of his diabetes medications.    4:47 PM  IV fluids will be administered due to tachycardia, hyperglycemia and probable DKA.  For the medical coding: This is not acceptable to do as a p.o. challenge.    5:58 PM   Following IV fluids the patient has improved heart rate and blood sugars.  The patient does note abdominal pain and requesting something for this.  Normally he uses morphine or Dilaudid when the pain is like this.  We discussed the elevated lipase, he states that is not unusual for him, sometimes it is in the thousands.  He is comfortable with discharge provided we are able to get some control of his sugars.  Plan to administer 1 more liter of fluids, give 8 units of IV insulin, recheck Accu-Chek following this.  Bicarbonate 20 so borderline DKA at this time.    7:14 PM-after the second liter of fluids and 8 units of IV insulin Accu-Chek is 332 which the patient feels comfortable with.  He is feeling improved as well with minimal abdominal cramping and his  headache has resolved.  He does not need any medications except for some Zofran for home.  We discussed discharge and follow-up instructions.      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  Bicarbonate of 20, the patient is borderline DKA.  He does have evidence of mild pancreatitis with elevated triglycerides, likely the etiology.  I did order a beta hydroxy butyrate however this is still pending several hours later and would not .  The glucose has been reduced from over 600-3 32, the patient feels comfortable with managing this at home.  We discussed frequent Accu-Cheks, p.o. hydration and return precautions.  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    Discharge Medications:  New Prescriptions    ONDANSETRON (ZOFRAN ODT) 8 MG TABLET DISPERSIBLE    Take 1 Tab by mouth every 8 hours as needed for Nausea.       The patient was discharged home (see d/c instructions) was told to return immediately for any signs or symptoms listed, or any worsening at all.  The patient verbally agreed to the discharge precautions and follow-up plan which is documented in EPIC.        FINAL IMPRESSION  1. Hyperglycemia    2. Elevated lipase

## 2022-10-21 NOTE — RESPIRATORY CARE
COPD EDUCATION by COPD CLINICAL EDUCATOR  1/24/2019 at 8:59 AM by Hoda Negron     Patient reviewed by COPD education team. Patient does not qualify for COPD program.   No. BRITNI screening performed.  STOP BANG Legend: 0-2 = LOW Risk; 3-4 = INTERMEDIATE Risk; 5-8 = HIGH Risk

## 2023-01-06 ENCOUNTER — OFFICE VISIT (OUTPATIENT)
Dept: URGENT CARE | Facility: CLINIC | Age: 58
End: 2023-01-06
Payer: COMMERCIAL

## 2023-01-06 ENCOUNTER — APPOINTMENT (OUTPATIENT)
Dept: RADIOLOGY | Facility: IMAGING CENTER | Age: 58
End: 2023-01-06
Attending: NURSE PRACTITIONER
Payer: COMMERCIAL

## 2023-01-06 VITALS
WEIGHT: 210 LBS | RESPIRATION RATE: 16 BRPM | DIASTOLIC BLOOD PRESSURE: 68 MMHG | SYSTOLIC BLOOD PRESSURE: 110 MMHG | HEART RATE: 110 BPM | BODY MASS INDEX: 31.1 KG/M2 | HEIGHT: 69 IN | OXYGEN SATURATION: 99 % | TEMPERATURE: 97.2 F

## 2023-01-06 DIAGNOSIS — S86.912A KNEE STRAIN, LEFT, INITIAL ENCOUNTER: ICD-10-CM

## 2023-01-06 DIAGNOSIS — M25.562 ACUTE PAIN OF LEFT KNEE: ICD-10-CM

## 2023-01-06 DIAGNOSIS — M79.662 PAIN OF LEFT LOWER LEG: ICD-10-CM

## 2023-01-06 DIAGNOSIS — S86.112A STRAIN OF GASTROCNEMIUS MUSCLE OF LEFT LOWER EXTREMITY, INITIAL ENCOUNTER: ICD-10-CM

## 2023-01-06 DIAGNOSIS — M79.662 PAIN OF LEFT CALF: ICD-10-CM

## 2023-01-06 PROCEDURE — 99214 OFFICE O/P EST MOD 30 MIN: CPT | Performed by: NURSE PRACTITIONER

## 2023-01-06 PROCEDURE — 73590 X-RAY EXAM OF LOWER LEG: CPT | Mod: TC,FY,LT | Performed by: NURSE PRACTITIONER

## 2023-01-06 PROCEDURE — 73564 X-RAY EXAM KNEE 4 OR MORE: CPT | Mod: TC,FY,LT | Performed by: NURSE PRACTITIONER

## 2023-01-06 RX ORDER — OXYCODONE HYDROCHLORIDE AND ACETAMINOPHEN 5; 325 MG/1; MG/1
1-2 TABLET ORAL EVERY 6 HOURS PRN
Qty: 15 TABLET | Refills: 0 | Status: SHIPPED | OUTPATIENT
Start: 2023-01-06 | End: 2023-01-09

## 2023-01-06 ASSESSMENT — ENCOUNTER SYMPTOMS: LEG PAIN: 1

## 2023-01-06 ASSESSMENT — FIBROSIS 4 INDEX: FIB4 SCORE: 0.93

## 2023-01-06 NOTE — PROGRESS NOTES
"  Subjective:     Mahesh Bradshaw is a 57 y.o. male who presents for Leg Pain (LEFT after falling from a 10' ladder 2 weeks ago.  Left knee pain and left lower leg )      States he fell from a ladder 2 weeks ago, with his left leg getting caught between the ladder rungs, twisting his leg outwards, while hanging from the ladder.  Has burning to calf. Knee \"wobble\". Has has an increase in pain, while working and not resting leg. Denies head, neck, or back injury. Had had shoulder pain. Was seen by a chiropractor. No hx of DVT or PE. No redness to calf.           Leg Pain  This is a new problem. The current episode started 1 to 4 weeks ago. The problem has been gradually worsening. Pertinent negatives include no fever. The symptoms are aggravated by walking, standing and bending. Treatments tried: Ice, ace wrap, elevation.     Past Medical History:   Diagnosis Date    Abscess of left thumb 12/9/2017    Acute hyponatremia 7/9/2017    Acute respiratory failure ([facility]) 11/17/2020    ADRIANE (acute kidney injury) ([facility]) 2/24/2016    Anesthesia     \"Was difficult to intubate 2-2016\"    Arthritis 3-3-17    \"Spine\"    Aspiration pneumonia ([facility]) 3-2016    ASTHMA 3-3-17    \"Hasn't had to use inhaler in 2 years\"    Breath shortness 3-3-17    \"With Exercise\"    Cellulitis of left hand 12/9/2017    Closed fracture of sternum 5/19/2018    Fracture in the left lateral aspect of the sternum, just adjacent to the costochondral junction at the level of the first rib. EKG ordered in ED Multimodal analgesia      Complicated wound infection 4/19/2017    Congestive heart failure ([facility]) 2-2016     3-3-17 \"Not a current issue\"    COVID-19 virus infection 11/17/2020    Dental disorder     Depression 11/26/2014    Diabetes ([facility]) 3-3-17    Takes Insulin    Diabetes ([facility])     Difficult intubation 2-2016    DKA (diabetic ketoacidoses) 2-2016    \"10 days on vent with DKA, Renal failure, CHF, elevated liver enzymes and electrolyte imbalance\"    " "Electrolyte imbalance 2-2016    Elevated LFTs     Elevated liver enzymes 2-2016    Encephalopathy 2/25/2016    Essential hypertension 1/22/2016    Gout     Heart burn     HHNC (hyperglycemic hyperosmolar nonketotic coma) ([facility]) 2/24/2016    High cholesterol 3-3-17    Does not currently take medication for    Hypertriglyceridemia - severe 1/3/2019    Hypothyroid 3-3-17    Takes [place] Thyroid    Incarcerated umbilical hernia 11/6/2016    Indigestion     Kidney stones     Klebsiella infect     Migraine     MRSA cellulitis     Necrotizing myositis     On mechanically assisted ventilation ([facility]) 2-2016    HX \"On Vent for 10 days at [facility]\".  \"DX Pancreatitis, DKA, CHF, Elevated Liver Enzymes & Electrolyte Imbalance\".    Pain 3-3-17    \"Left Flank\"    Pancreatitis 2-2016    \"D/T Tradjenta was hospitialized for 15 days\"    RF (renal failure) 2-2016    Sepsis ([facility]) 1/21/2016    Serum phosphate elevated 3/7/2016    Severe sepsis ([facility]) 4/29/2017    Snoring 3-3-17    Has not had a sleep study    Streptococcus infection     UC (ulcerative colitis) ([facility]) 3-3-17    \"Five BM's per day, takes Lialda\"    Urinary incontinence     Vitamin D deficiency        Past Surgical History:   Procedure Laterality Date    UMBILICAL HERNIA REPAIR N/A 4/15/2018    Procedure: UMBILICAL HERNIA REPAIR;  Surgeon: Karen Beasley M.D.;  Location: [facility];  Service: General    IRRIGATION & DEBRIDEMENT ORTHO Left 12/10/2017    Procedure: IRRIGATION & DEBRIDEMENT ORTHO LEFT HAND;  Surgeon: Chicho Ramos M.D.;  Location: [facility];  Service: Orthopedics    IRRIGATION & DEBRIDEMENT GENERAL Right 5/1/2017    Procedure: IRRIGATION & DEBRIDEMENT GENERAL-Left HAND AND right  ANKLE;  Surgeon: Esau Dooley M.D.;  Location: [facility];  Service:     IRRIGATION & DEBRIDEMENT GENERAL Right 4/29/2017    Procedure: IRRIGATION & DEBRIDEMENT GENERAL;  Surgeon: Esau Dooley M.D.;  Location: [facility];  " Service:     INCISION AND DRAINAGE GENERAL  4/7/2017    Procedure: INCISION AND DRAINAGE GENERAL;  Surgeon: Esau Dooley M.D.;  Location: SURGERY SAME DAY [facility];  Service:     UMBILICAL HERNIA REPAIR  3/10/2017    Procedure: UMBILICAL HERNIA REPAIR- INCISION AND DRAINAGE OF UMBILICAL WOUND AND MESH REMOVAL;  Surgeon: Esau Dooley M.D.;  Location: SURGERY SAME DAY [facility];  Service:     UMBILICAL HERNIA REPAIR N/A 11/6/2016    Procedure: UMBILICAL HERNIA REPAIR;  Surgeon: Esau Dooley M.D.;  Location: SURGERY [facility];  Service:     COLONOSCOPY WITH BIOPSY  11/26/2014    Performed by Solo Higginbotham M.D. at ENDOSCOPY [facility]    LIVE BY LAPAROSCOPY  2005    OTHER ORTHOPEDIC SURGERY  1997 or 1998    Left Wrist ORIF       Social History     Socioeconomic History    Marital status:      Spouse name: Not on file    Number of children: Not on file    Years of education: Not on file    Highest education level: Not on file   Occupational History    Not on file   Tobacco Use    Smoking status: Never    Smokeless tobacco: Never   Vaping Use    Vaping Use: Never used   Substance and Sexual Activity    Alcohol use: Yes     Comment: rare    Drug use: No    Sexual activity: Not on file   Other Topics Concern    Not on file   Social History Narrative    ** Merged History Encounter **         ** Merged History Encounter **     ** Merged History Encounter **        Social Determinants of Health     Financial Resource Strain: Not on file   Food Insecurity: Not on file   Transportation Needs: Not on file   Physical Activity: Not on file   Stress: Not on file   Social Connections: Not on file   Intimate Partner Violence: Not on file   Housing Stability: Not on file        Family History   Problem Relation Age of Onset    Cancer Mother         Allergies   Allergen Reactions    Hydrocodone-Acetaminophen Rash     Tolerates oxycodone and morphine    Peanut (Diagnostic) Anaphylaxis     "Peanut Oil Anaphylaxis    Tradjenta [Linagliptin] Unspecified     Pt developed pancreatitis    Hydrocodone Hives     Tolerates Oxycodone/hydromorphone      Morphine Itching       Review of Systems   Constitutional:  Negative for fever.   Musculoskeletal:  Positive for joint pain.   Neurological:  Positive for sensory change.        Neuropathy in feet.    All other systems reviewed and are negative.     Objective:   /68   Pulse (!) 110   Temp 36.2 °C (97.2 °F) (Temporal)   Resp 16   Ht 1.74 m (5' 8.5\")   Wt 95.3 kg (210 lb)   SpO2 99%   BMI 31.47 kg/m²     Physical Exam  Pulmonary:      Effort: Pulmonary effort is normal. No respiratory distress.   Musculoskeletal:         General: Swelling and tenderness present.      Right knee: Crepitus present. Tenderness present.      Right lower leg: Swelling and tenderness present.      Right ankle: Normal. No tenderness.      Right Achilles Tendon: Normal. No tenderness or defects. Barnhart's test negative.      Right foot: Normal capillary refill. Normal pulse.      Comments: Left leg: Guarded to palpation of medial gastrocnemius. Medial tibial head TTP. Medial knee TTP, Crepitus with ROM. Medial calf TTP, palpable cord like induration. No erythema or heat.      Skin:     General: Skin is warm and dry.      Capillary Refill: Capillary refill takes less than 2 seconds.   Neurological:      Mental Status: He is oriented to person, place, and time.       Assessment/Plan:   1. Strain of gastrocnemius muscle of left lower extremity, initial encounter  - oxyCODONE-acetaminophen (PERCOCET) 5-325 MG Tab; Take 1-2 Tablets by mouth every 6 hours as needed for Severe Pain for up to 3 days.  Dispense: 15 Tablet; Refill: 0  - Consent for Opiate Prescription  - Referral to Orthopedics    2. Knee strain, left, initial encounter  - DX-KNEE COMPLETE 4+ LEFT; Future  - oxyCODONE-acetaminophen (PERCOCET) 5-325 MG Tab; Take 1-2 Tablets by mouth every 6 hours as needed for Severe " Pain for up to 3 days.  Dispense: 15 Tablet; Refill: 0  - Consent for Opiate Prescription  - Referral to Orthopedics    3. Pain of left lower leg  - DX-TIBIA AND FIBULA LEFT; Future  - US-EXTREMITY VENOUS LOWER UNILAT LEFT; Future    4. Pain of left calf  - US-EXTREMITY VENOUS LOWER UNILAT LEFT; Future  -NSAID's (ibuprofen) and tylenol as directed for pain and inflammation.   -RICE Therapy: Rest, Ice, Compression, Elevation  -Limited weight bearing and/or weight bearing as tolerated. Use crutches until able to walk with normal gait.   -Compression with an elastic bandage for swelling.  -Tall walking boot to assist with ambulation.    Follow up emergently for severe uncontrolled pain, neurovascular compromise (decreased sensation, motion, or circulation), redness or heat to calf.     -Discussed differential to include a partial tear or the gastrocnemius. U/S imaging ordered with level of pain and swelling to r/o possible thrombus related to a traumatic injury, or hematoma. Discussed alternative advanced imaging may be needed to assess for soft tissue injury, with Othopedic f/u for additional evaluation.     Differential diagnosis, natural history, supportive care, and indications for immediate follow-up discussed.

## 2023-01-08 ASSESSMENT — ENCOUNTER SYMPTOMS
FEVER: 0
SENSORY CHANGE: 1

## 2023-01-09 ENCOUNTER — HOSPITAL ENCOUNTER (OUTPATIENT)
Dept: RADIOLOGY | Facility: MEDICAL CENTER | Age: 58
End: 2023-01-09
Attending: NURSE PRACTITIONER
Payer: COMMERCIAL

## 2023-01-09 DIAGNOSIS — M79.662 PAIN OF LEFT LOWER LEG: ICD-10-CM

## 2023-01-09 DIAGNOSIS — M79.662 PAIN OF LEFT CALF: ICD-10-CM

## 2023-01-09 PROCEDURE — 93971 EXTREMITY STUDY: CPT | Mod: 26 | Performed by: INTERNAL MEDICINE

## 2023-01-09 PROCEDURE — 93971 EXTREMITY STUDY: CPT | Mod: LT

## 2023-01-09 NOTE — PATIENT INSTRUCTIONS
-NSAID's (ibuprofen) and tylenol as directed for pain and inflammation.   -RICE Therapy: Rest, Ice, Compression, Elevation  -Limited weight bearing and/or weight bearing as tolerated. Use crutches until able to walk with normal gait.   -Compression with an elastic bandage for swelling.  -Tall Walking boot.    Follow up emergently for severe uncontrolled pain, neurovascular compromise (decreased sensation, motion, or circulation), redness or heat to calf.

## 2023-04-27 NOTE — ED NOTES
Called central supply for an enema   Bexarotene Pregnancy And Lactation Text: This medication is Pregnancy Category X and should not be given to women who are pregnant or may become pregnant. This medication should not be used if you are breast feeding.

## 2023-05-24 ENCOUNTER — OFFICE VISIT (OUTPATIENT)
Dept: URGENT CARE | Facility: CLINIC | Age: 58
End: 2023-05-24
Payer: COMMERCIAL

## 2023-05-24 VITALS
SYSTOLIC BLOOD PRESSURE: 146 MMHG | HEART RATE: 120 BPM | RESPIRATION RATE: 16 BRPM | WEIGHT: 217.4 LBS | DIASTOLIC BLOOD PRESSURE: 80 MMHG | BODY MASS INDEX: 32.95 KG/M2 | OXYGEN SATURATION: 96 % | TEMPERATURE: 98.8 F | HEIGHT: 68 IN

## 2023-05-24 DIAGNOSIS — J32.9 BACTERIAL SINUSITIS: ICD-10-CM

## 2023-05-24 DIAGNOSIS — R05.1 ACUTE COUGH: ICD-10-CM

## 2023-05-24 DIAGNOSIS — J22 LRTI (LOWER RESPIRATORY TRACT INFECTION): ICD-10-CM

## 2023-05-24 DIAGNOSIS — R03.0 ELEVATED BLOOD PRESSURE READING WITHOUT DIAGNOSIS OF HYPERTENSION: ICD-10-CM

## 2023-05-24 DIAGNOSIS — B96.89 BACTERIAL SINUSITIS: ICD-10-CM

## 2023-05-24 PROCEDURE — 99213 OFFICE O/P EST LOW 20 MIN: CPT | Performed by: PHYSICIAN ASSISTANT

## 2023-05-24 PROCEDURE — 3077F SYST BP >= 140 MM HG: CPT | Performed by: PHYSICIAN ASSISTANT

## 2023-05-24 PROCEDURE — 3079F DIAST BP 80-89 MM HG: CPT | Performed by: PHYSICIAN ASSISTANT

## 2023-05-24 RX ORDER — AMOXICILLIN AND CLAVULANATE POTASSIUM 875; 125 MG/1; MG/1
1 TABLET, FILM COATED ORAL 2 TIMES DAILY
Qty: 14 TABLET | Refills: 0 | Status: SHIPPED | OUTPATIENT
Start: 2023-05-24 | End: 2023-05-31

## 2023-05-24 RX ORDER — DEXTROMETHORPHAN HYDROBROMIDE AND PROMETHAZINE HYDROCHLORIDE 15; 6.25 MG/5ML; MG/5ML
5 SYRUP ORAL EVERY 4 HOURS PRN
Qty: 118 ML | Refills: 0 | Status: SHIPPED
Start: 2023-05-24 | End: 2023-05-29

## 2023-05-24 RX ORDER — ALBUTEROL SULFATE 90 UG/1
1-2 AEROSOL, METERED RESPIRATORY (INHALATION) EVERY 4 HOURS PRN
Qty: 1 EACH | Refills: 0 | Status: SHIPPED | OUTPATIENT
Start: 2023-05-24 | End: 2024-01-31

## 2023-05-24 ASSESSMENT — ENCOUNTER SYMPTOMS: COUGH: 1

## 2023-05-24 ASSESSMENT — FIBROSIS 4 INDEX: FIB4 SCORE: 0.95

## 2023-05-25 NOTE — PROGRESS NOTES
"Subjective:   Mahesh Bradshaw is a 58 y.o. male who presents for Cough (\"Started with a sinus infection 3 weeks ago, now it has settled into my lungs.\" )  This is a very pleasant 58-year-old male who presents with 3-week history of nasal congestion, purulent nasal discharge, sinus pressure, now with productive thick yellow-green cough.  He reports mild associated shortness of breath with exertion.  He has no underlying respiratory illnesses.  He does report a low-grade temperature this AM.  He did take cold medication with decongestant this morning.      He has no history of hypertension.  He did take a decongestant earlier today which he believes is the causation of his elevated blood pressure and heart rate.  He is diabetic and does endorse his blood sugars have been running high over the last several days with worsening of his respiratory symptoms.  He does take orals and has an insulin at home.  No nausea or vomiting.      Review of Systems   Respiratory:  Positive for cough.        Medications:  Blood Glucose Meter Kit  Insulin Pen Needle 32 G x 4 mm  Jardiance Tabs  meloxicam  Mens Multivitamin Tabs  metformin  pioglitazone Tabs  rosuvastatin Tabs  tadalafil  thyroid Tabs    Allergies:             Hydrocodone-acetaminophen, Peanut (diagnostic), Peanut oil, Tradjenta [linagliptin], Hydrocodone, and Morphine    Surgical History:         Past Surgical History:   Procedure Laterality Date    UMBILICAL HERNIA REPAIR N/A 4/15/2018    Procedure: UMBILICAL HERNIA REPAIR;  Surgeon: Karen Beasley M.D.;  Location: [facility];  Service: General    IRRIGATION & DEBRIDEMENT ORTHO Left 12/10/2017    Procedure: IRRIGATION & DEBRIDEMENT ORTHO LEFT HAND;  Surgeon: Chicho Ramos M.D.;  Location: SURGERY [facility];  Service: Orthopedics    IRRIGATION & DEBRIDEMENT GENERAL Right 5/1/2017    Procedure: IRRIGATION & DEBRIDEMENT GENERAL-Left HAND AND right  ANKLE;  Surgeon: Esau Dooley M.D.;  " "Location: SURGERY [place] [facility];  Service:     IRRIGATION & DEBRIDEMENT GENERAL Right 4/29/2017    Procedure: IRRIGATION & DEBRIDEMENT GENERAL;  Surgeon: Esau Dooley M.D.;  Location: SURGERY [place];  Service:     INCISION AND DRAINAGE GENERAL  4/7/2017    Procedure: INCISION AND DRAINAGE GENERAL;  Surgeon: Esau Dooley M.D.;  Location: SURGERY SAME DAY [facility];  Service:     UMBILICAL HERNIA REPAIR  3/10/2017    Procedure: UMBILICAL HERNIA REPAIR- INCISION AND DRAINAGE OF UMBILICAL WOUND AND MESH REMOVAL;  Surgeon: Esau Dooley M.D.;  Location: SURGERY SAME DAY [facility];  Service:     UMBILICAL HERNIA REPAIR N/A 11/6/2016    Procedure: UMBILICAL HERNIA REPAIR;  Surgeon: Esau Dooley M.D.;  Location: SURGERY [place];  Service:     COLONOSCOPY WITH BIOPSY  11/26/2014    Performed by Solo Higginbotham M.D. at ENDOSCOPY [facility]    LIVE BY LAPAROSCOPY  2005    OTHER ORTHOPEDIC SURGERY  1997 or 1998    Left Wrist ORIF       Past Social Hx:  Mahesh Bradshaw  reports that he has never smoked. He has never used smokeless tobacco. He reports current alcohol use. He reports that he does not use drugs.     Past Family Hx:   Mahesh Bradshaw family history includes Cancer in his mother.       Problem list, medications, and allergies reviewed by myself today in Epic.     Objective:     BP (!) 146/80 (BP Location: Right arm, Patient Position: Sitting, BP Cuff Size: Adult)   Pulse (!) 120   Temp 37.1 °C (98.8 °F) (Temporal)   Resp 16   Ht 1.727 m (5' 8\")   Wt 98.6 kg (217 lb 6.4 oz)   SpO2 96%   BMI 33.06 kg/m²     Physical Exam  Vitals and nursing note reviewed.   Constitutional:       General: He is not in acute distress.     Appearance: Normal appearance. He is well-developed. He is not ill-appearing or toxic-appearing.   HENT:      Head: Normocephalic.      Right Ear: External ear normal. No tenderness. A middle ear effusion is present. No mastoid " tenderness. Tympanic membrane is injected. Tympanic membrane is not perforated or bulging. Tympanic membrane has decreased mobility.      Left Ear: External ear normal. No tenderness. A middle ear effusion is present. No mastoid tenderness. Tympanic membrane is injected. Tympanic membrane is not perforated or bulging. Tympanic membrane has decreased mobility.      Nose: Mucosal edema, congestion and rhinorrhea present.      Right Nostril: No foreign body.      Left Nostril: No foreign body.      Right Turbinates: Swollen.      Left Turbinates: Swollen.      Right Sinus: Maxillary sinus tenderness and frontal sinus tenderness present.      Left Sinus: Maxillary sinus tenderness and frontal sinus tenderness present.      Mouth/Throat:      Mouth: Mucous membranes are moist.      Pharynx: Uvula midline. Posterior oropharyngeal erythema present. No pharyngeal swelling, oropharyngeal exudate or uvula swelling.      Tonsils: No tonsillar exudate or tonsillar abscesses.      Comments: Mild pharyngeal edema.  No tonsillar exudate.  Eyes:      Extraocular Movements: Extraocular movements intact.      Pupils: Pupils are equal, round, and reactive to light.   Cardiovascular:      Rate and Rhythm: Normal rate and regular rhythm.      Pulses: Normal pulses.      Heart sounds: Normal heart sounds. No murmur heard.  Pulmonary:      Effort: Pulmonary effort is normal. No tachypnea or respiratory distress.      Breath sounds: Normal breath sounds and air entry. No stridor or decreased air movement. No decreased breath sounds, wheezing, rhonchi or rales.      Comments: Lungs are clear to auscultation bilaterally, no rhonchi rales or wheezes  Chest:      Chest wall: No tenderness.   Musculoskeletal:      Cervical back: Normal range of motion. No rigidity.   Lymphadenopathy:      Cervical: No cervical adenopathy.   Neurological:      Mental Status: He is alert.   Psychiatric:         Behavior: Behavior is cooperative.          Assessment/Plan:     Diagnosis and Associated Orders:     1. LRTI (lower respiratory tract infection)  - amoxicillin-clavulanate (AUGMENTIN) 875-125 MG Tab; Take 1 Tablet by mouth 2 times a day for 7 days.  Dispense: 14 Tablet; Refill: 0  - Referral to establish with [facility] PCP    2. Bacterial sinusitis  - amoxicillin-clavulanate (AUGMENTIN) 875-125 MG Tab; Take 1 Tablet by mouth 2 times a day for 7 days.  Dispense: 14 Tablet; Refill: 0  - Referral to establish with [facility] PCP    3. Acute cough  - albuterol 108 (90 Base) MCG/ACT Aero Soln inhalation aerosol; Inhale 1-2 Puffs every four hours as needed for Shortness of Breath.  Dispense: 1 Each; Refill: 0  - promethazine-dextromethorphan (PROMETHAZINE-DM) 6.25-15 MG/5ML syrup; Take 5 mL by mouth every four hours as needed for Cough.  Dispense: 118 mL; Refill: 0  - Referral to establish with [facility] PCP    4. Elevated blood pressure reading without diagnosis of hypertension        Comments/MDM:  Patient with 3-week history of purulent nasal discharge, sinus pressure now with productive cough and chest congestion.  Will cover with antibiotic for LRTI and bacterial sinusitis.  Conservative measures as below.  Recommend holding off on steroid due to hyperglycemia and history of diabetes.  Trial of albuterol inhaler and Promethazine DM for nighttime, caution sedation, do not drive  Increase water intake  May use Ibuprofen/Tylenol prn for fever or body aches  Get rest  May use daily longer acting antihistamine prn  May use saline nasal spray prn to flush any nasal congestion  May use Flonase for persistent nasal congestion and postnasal drainage  Recommend Mucinex as expectorant  Use inhaler prn for SOB with cough if prescribed  May use OTC cough suppressant medications like Robitussin/Delsym prn  Monitor for fevers, productive cough, SOB, CP, chest tightness- need re-evaluation    Elevated blood pressure without diagnosis of hypertension.  Patient took decongestant  prior to arrival.  Monitor closely.  No chest pain or headaches.  Advise follow-up with PCP in 30 days time for reevaluation.  Referral placed for primary care physician.    I personally reviewed prior external notes and test results pertinent to today's visit. Supportive care, natural history, differential diagnoses, and indications for immediate follow-up discussed. Return to clinic or go to ED if symptoms worsen or persist.  Red flag symptoms discussed.  Patient/Parent/Guardian voices understanding. Follow-up with your primary care provider in 3-5 days.  All side effects of medication discussed including allergic response, GI upset, tendon injury, rash, sedation etc    Please note that this dictation was created using voice recognition software. I have made a reasonable attempt to correct obvious errors, but I expect that there are errors of grammar and possibly content that I did not discover before finalizing the note.    This note was electronically signed by Malena Last PA-C

## 2023-05-29 ENCOUNTER — OFFICE VISIT (OUTPATIENT)
Dept: URGENT CARE | Facility: CLINIC | Age: 58
End: 2023-05-29
Payer: COMMERCIAL

## 2023-05-29 ENCOUNTER — APPOINTMENT (OUTPATIENT)
Dept: RADIOLOGY | Facility: IMAGING CENTER | Age: 58
End: 2023-05-29
Attending: PHYSICIAN ASSISTANT
Payer: COMMERCIAL

## 2023-05-29 VITALS
SYSTOLIC BLOOD PRESSURE: 152 MMHG | RESPIRATION RATE: 20 BRPM | HEIGHT: 68 IN | OXYGEN SATURATION: 93 % | DIASTOLIC BLOOD PRESSURE: 96 MMHG | HEART RATE: 109 BPM | TEMPERATURE: 97 F | WEIGHT: 217 LBS | BODY MASS INDEX: 32.89 KG/M2

## 2023-05-29 DIAGNOSIS — R05.2 SUBACUTE COUGH: ICD-10-CM

## 2023-05-29 PROCEDURE — 3080F DIAST BP >= 90 MM HG: CPT | Performed by: PHYSICIAN ASSISTANT

## 2023-05-29 PROCEDURE — 3077F SYST BP >= 140 MM HG: CPT | Performed by: PHYSICIAN ASSISTANT

## 2023-05-29 PROCEDURE — 99213 OFFICE O/P EST LOW 20 MIN: CPT | Performed by: PHYSICIAN ASSISTANT

## 2023-05-29 PROCEDURE — 71046 X-RAY EXAM CHEST 2 VIEWS: CPT | Mod: TC,FY | Performed by: RADIOLOGY

## 2023-05-29 RX ORDER — CODEINE PHOSPHATE AND GUAIFENESIN 10; 100 MG/5ML; MG/5ML
5 SOLUTION ORAL EVERY 4 HOURS PRN
Qty: 100 ML | Refills: 0 | Status: SHIPPED | OUTPATIENT
Start: 2023-05-29 | End: 2023-06-01

## 2023-05-29 RX ORDER — DOXYCYCLINE HYCLATE 100 MG
100 TABLET ORAL 2 TIMES DAILY
Qty: 14 TABLET | Refills: 0 | Status: SHIPPED | OUTPATIENT
Start: 2023-05-29 | End: 2023-06-05

## 2023-05-29 ASSESSMENT — ENCOUNTER SYMPTOMS
CONSTIPATION: 0
SHORTNESS OF BREATH: 0
DIARRHEA: 0
DIZZINESS: 0
FEVER: 1
COUGH: 1
EYE DISCHARGE: 0
HEADACHES: 0
DIAPHORESIS: 0
EYE REDNESS: 0
WHEEZING: 0
CHILLS: 1
ABDOMINAL PAIN: 0
EYE PAIN: 0
SINUS PAIN: 0
SORE THROAT: 0
NAUSEA: 0
SPUTUM PRODUCTION: 1
VOMITING: 0

## 2023-05-29 ASSESSMENT — FIBROSIS 4 INDEX: FIB4 SCORE: 0.95

## 2023-05-29 NOTE — PROGRESS NOTES
Subjective:     Mahesh Bradshaw  is a 58 y.o. male who presents for Cough ([id number], concern for bronchitis, Body aches, fever, productive cough, sinus congestion, states he has elevated blood sugar, chest congestion, was seen here on 5/24/23, still currently on antibiotics that had been prescribed )       He presents today with cough has been ongoing for the past 3 weeks.  He has associated body aches, fever, productive mucus cough, sinus congestion.  Please recall he was seen in the urgent care on 5/24/2023 for these same symptoms, was started on albuterol, promethazine/dextromethorphan, Augmentin; has not seen any symptom improvement with these medications.  He denies overt chest pain or shortness breath, no nausea or vomiting, no abdominal pain, no diarrhea.    He does note elevated blood sugars over the last 3 weeks associated with his symptoms.  States that he typically runs in the 150s-160 region but has been averaging in the lower 200s but has gone as high as the lower 600s.    He was referred to primary care at his urgent care visit on 5/24/2023, was contacted by primary care but did miss the call and is planning to reach out and schedule an appointment tomorrow.     Review of Systems   Constitutional:  Positive for chills, fever and malaise/fatigue. Negative for diaphoresis.   HENT:  Positive for congestion. Negative for ear discharge, sinus pain and sore throat.    Eyes:  Negative for pain, discharge and redness.   Respiratory:  Positive for cough and sputum production. Negative for shortness of breath and wheezing.    Cardiovascular:  Negative for chest pain.   Gastrointestinal:  Negative for abdominal pain, constipation, diarrhea, nausea and vomiting.   Genitourinary:  Negative for dysuria, frequency and urgency.   Neurological:  Negative for dizziness and headaches.      Allergies   Allergen Reactions    Hydrocodone-Acetaminophen Rash     Tolerates oxycodone and morphine    Peanut (Diagnostic)  "Anaphylaxis    Peanut Oil Anaphylaxis    Tradjenta [Linagliptin] Unspecified     Pt developed pancreatitis    Hydrocodone Hives     Tolerates Oxycodone/hydromorphone      Morphine Itching     Past Medical History:   Diagnosis Date    Abscess of left thumb 12/9/2017    Acute hyponatremia 7/9/2017    Acute respiratory failure ([facility]) 11/17/2020    ADRIANE (acute kidney injury) ([facility]) 2/24/2016    Anesthesia     \"Was difficult to intubate 2-2016\"    Arthritis 3-3-17    \"Spine\"    Aspiration pneumonia ([facility]) 3-2016    ASTHMA 3-3-17    \"Hasn't had to use inhaler in 2 years\"    Breath shortness 3-3-17    \"With Exercise\"    Cellulitis of left hand 12/9/2017    Closed fracture of sternum 5/19/2018    Fracture in the left lateral aspect of the sternum, just adjacent to the costochondral junction at the level of the first rib. EKG ordered in ED Multimodal analgesia      Complicated wound infection 4/19/2017    Congestive heart failure ([facility]) 2-2016     3-3-17 \"Not a current issue\"    COVID-19 virus infection 11/17/2020    Dental disorder     Depression 11/26/2014    Diabetes ([facility]) 3-3-17    Takes Insulin    Diabetes ([facility])     Difficult intubation 2-2016    DKA (diabetic ketoacidoses) 2-2016    \"10 days on vent with DKA, Renal failure, CHF, elevated liver enzymes and electrolyte imbalance\"    Electrolyte imbalance 2-2016    Elevated LFTs     Elevated liver enzymes 2-2016    Encephalopathy 2/25/2016    Essential hypertension 1/22/2016    Gout     Heart burn     HHNC (hyperglycemic hyperosmolar nonketotic coma) ([facility]) 2/24/2016    High cholesterol 3-3-17    Does not currently take medication for    Hypertriglyceridemia - severe 1/3/2019    Hypothyroid 3-3-17    Takes [place] Thyroid    Incarcerated umbilical hernia 11/6/2016    Indigestion     Kidney stones     Klebsiella infect     Migraine     MRSA cellulitis     Necrotizing myositis     On mechanically assisted ventilation ([facility]) 2-2016    HX \"On Vent for 10 days at [facility]\".  \"DX " "Pancreatitis, DKA, CHF, Elevated Liver Enzymes & Electrolyte Imbalance\".    Pain 3-3-17    \"Left Flank\"    Pancreatitis 2-2016    \"D/T Tradjenta was hospitialized for 15 days\"    RF (renal failure) 2-2016    Sepsis ([facility]) 1/21/2016    Serum phosphate elevated 3/7/2016    Severe sepsis ([facility]) 4/29/2017    Snoring 3-3-17    Has not had a sleep study    Streptococcus infection     UC (ulcerative colitis) ([facility]) 3-3-17    \"Five BM's per day, takes Lialda\"    Urinary incontinence     Vitamin D deficiency         Objective:   BP (!) 152/96   Pulse (!) 109   Temp 36.1 °C (97 °F) (Temporal)   Resp 20   Ht 1.727 m (5' 8\")   Wt 98.4 kg (217 lb)   SpO2 93%   BMI 32.99 kg/m²   Physical Exam  Vitals and nursing note reviewed.   Constitutional:       General: He is not in acute distress.     Appearance: Normal appearance. He is not ill-appearing, toxic-appearing or diaphoretic.   HENT:      Head: Normocephalic.      Right Ear: Tympanic membrane, ear canal and external ear normal. There is no impacted cerumen.      Left Ear: Tympanic membrane, ear canal and external ear normal. There is no impacted cerumen.      Nose: Congestion present. No rhinorrhea.      Mouth/Throat:      Mouth: Mucous membranes are moist.      Pharynx: No oropharyngeal exudate or posterior oropharyngeal erythema.   Eyes:      General:         Right eye: No discharge.         Left eye: No discharge.      Conjunctiva/sclera: Conjunctivae normal.   Cardiovascular:      Rate and Rhythm: Normal rate and regular rhythm.   Pulmonary:      Effort: Pulmonary effort is normal. No respiratory distress.      Breath sounds: Normal breath sounds. No stridor. No wheezing or rhonchi.      Comments: Cough present throughout exam  Musculoskeletal:      Cervical back: Neck supple.   Lymphadenopathy:      Cervical: No cervical adenopathy.   Neurological:      General: No focal deficit present.      Mental Status: He is alert and oriented to person, place, and time. "   Psychiatric:         Mood and Affect: Mood normal.         Behavior: Behavior normal.         Thought Content: Thought content normal.         Judgment: Judgment normal.           Diagnostic testin view chest x-ray  Radiologist IMPRESSION:  No active disease.    Assessment/Plan:     Encounter Diagnoses   Name Primary?    Subacute cough           Plan for care for today's complaint includes Cheratussin cough medication and doxycycline.  We will have patient discontinue Augmentin at this time.  Continue use albuterol inhaler as needed.  Chest x-ray was negative for acute pulmonary pathology.  Patient is well-appearing on exam today.  Continue to monitor symptoms and return to urgent care or follow-up with primary care provider if symptoms remain ongoing.  Follow-up in the emergency department if symptoms become severe, ER precautions discussed in office today..  Prescription for Cheratussin, doxycycline provided.    See AVS Instructions below for written guidance provided to patient on after-visit management and care in addition to our verbal discussion during the visit.    Please note that this dictation was created using voice recognition software. I have attempted to correct all errors, but there may be sound-alike, spelling, grammar and possibly content errors that I did not discover before finalizing the note.    Medhat Villagran PA-C

## 2023-06-10 NOTE — DIETARY
"Nutrition services: Day 1 of admit.  Mahesh Bradshaw is a 57 y.o. male with admitting DX of DKA, type 2, not at goal.    Consult received for MST 4 for 24-33 lb wt loss >1 yr, poor PO intake, consult for DM education in setting of DKA. RD visited pt at bedside. Pt reports weighed 260 lb 2 yrs ago, intentionally lost wt to 230 lb. Stopped exercising and unintentionally lost wt from 230 lb 01/2022 to present reported wt of 188 lb. Pt attributes unintentional wt loss to poorly-managed DM. States his work has him switching between day and night shifts and this disrupts any normal eating schedule. Going forward, states he will be working exclusively day shift. Denies changes in PO intake other than in recent week PTA. Pt reports that, w/ N/V in past week, has had very little appetite and was eating ~50% of his usual intake.     RD offered pt DM education materials and review. Pt states has received DM education in the past and was able to recite concepts. Pt politely declined diabetes diet teaching, accepted handouts for CHO portions and plate method. RD encouraged pt to request RD consult if further questions arise during stay.    Assessment:  Height: 174 cm (5' 8.5\")  Weight: 87.2 kg (192 lb 3.9 oz)  Body mass index is 28.81 kg/m²., BMI classification: Overweight  Diet/Intake: Consistent CHO: diet just advanced, PO intake not yet doc; pt reports ate ~50% of lunch tray    Evaluation:   1. PMHx includes ADRIANE, aspiration PNA, CHF, depression, DM, DKA, HLD, pancreatitis, UC.   2. Labs: glu 267, crea 0.48, Ca 7.0, 24-hr POC glu 73->600 (trends improving). Most recen A1c (1/4/22): 15.2%  3. MAR: lantus, SSI, zofran, vanco, thyroid, senna (refused)    Malnutrition Risk: Reported wt unintentional wt loss of 18.2% in >1 year is notable but not significant and does not meet ASPEN criteria. Pt at risk of acute malnutrition w/ reported poor PO intake x 7 days PTA. No overt signs of fat or muscle wasted noted on " observation.    Recommendations/Plan:  1. Continue consistent CHO diet order; encourage intake of meals.  2. Document intake of all PO as % taken in ADL's to provide interdisciplinary communication across all shifts.   3. Monitor weight.  4. Nutrition rep will continue to see patient for ongoing meal and snack preferences.     RD following.     Yes

## 2023-06-15 ENCOUNTER — OFFICE VISIT (OUTPATIENT)
Dept: MEDICAL GROUP | Facility: IMAGING CENTER | Age: 58
End: 2023-06-15
Payer: COMMERCIAL

## 2023-06-15 ENCOUNTER — HOSPITAL ENCOUNTER (OUTPATIENT)
Facility: MEDICAL CENTER | Age: 58
End: 2023-06-15
Payer: COMMERCIAL

## 2023-06-15 VITALS
WEIGHT: 213 LBS | HEART RATE: 107 BPM | TEMPERATURE: 98.1 F | RESPIRATION RATE: 18 BRPM | OXYGEN SATURATION: 93 % | DIASTOLIC BLOOD PRESSURE: 80 MMHG | HEIGHT: 68 IN | SYSTOLIC BLOOD PRESSURE: 128 MMHG | BODY MASS INDEX: 32.28 KG/M2

## 2023-06-15 DIAGNOSIS — E78.5 DYSLIPIDEMIA: ICD-10-CM

## 2023-06-15 DIAGNOSIS — E11.65 UNCONTROLLED TYPE 2 DIABETES MELLITUS WITH HYPERGLYCEMIA (HCC): ICD-10-CM

## 2023-06-15 DIAGNOSIS — E11.00 HYPEROSMOLAR HYPERGLYCEMIC STATE (HHS) (HCC): ICD-10-CM

## 2023-06-15 DIAGNOSIS — E66.9 OBESITY (BMI 30.0-34.9): ICD-10-CM

## 2023-06-15 DIAGNOSIS — G47.00 INSOMNIA, UNSPECIFIED TYPE: ICD-10-CM

## 2023-06-15 LAB
AMBIGUOUS DTTM AMBI4: NORMAL
CREAT UR-MCNC: 58.73 MG/DL
HBA1C MFR BLD: 10.5 % (ref ?–5.8)
MICROALBUMIN UR-MCNC: <1.2 MG/DL
MICROALBUMIN/CREAT UR: NORMAL MG/G (ref 0–30)
POCT INT CON NEG: NEGATIVE
POCT INT CON POS: POSITIVE

## 2023-06-15 PROCEDURE — 82570 ASSAY OF URINE CREATININE: CPT

## 2023-06-15 PROCEDURE — 83036 HEMOGLOBIN GLYCOSYLATED A1C: CPT

## 2023-06-15 PROCEDURE — 99214 OFFICE O/P EST MOD 30 MIN: CPT

## 2023-06-15 PROCEDURE — 82043 UR ALBUMIN QUANTITATIVE: CPT

## 2023-06-15 PROCEDURE — 3074F SYST BP LT 130 MM HG: CPT

## 2023-06-15 PROCEDURE — 3079F DIAST BP 80-89 MM HG: CPT

## 2023-06-15 RX ORDER — PIOGLITAZONEHYDROCHLORIDE 30 MG/1
30 TABLET ORAL DAILY
Qty: 90 TABLET | Refills: 3 | Status: SHIPPED | OUTPATIENT
Start: 2023-06-15

## 2023-06-15 RX ORDER — TRAZODONE HYDROCHLORIDE 50 MG/1
50 TABLET ORAL NIGHTLY
Qty: 30 TABLET | Refills: 3 | Status: SHIPPED | OUTPATIENT
Start: 2023-06-15 | End: 2024-01-31

## 2023-06-15 RX ORDER — ROSUVASTATIN CALCIUM 5 MG/1
5 TABLET, COATED ORAL EVERY EVENING
Qty: 90 TABLET | Refills: 3 | Status: SHIPPED | OUTPATIENT
Start: 2023-06-15

## 2023-06-15 RX ORDER — TESTOSTERONE CYPIONATE 200 MG/ML
100 INJECTION, SOLUTION INTRAMUSCULAR SEE ADMIN INSTRUCTIONS
COMMUNITY

## 2023-06-15 ASSESSMENT — PATIENT HEALTH QUESTIONNAIRE - PHQ9: CLINICAL INTERPRETATION OF PHQ2 SCORE: 0

## 2023-06-15 ASSESSMENT — FIBROSIS 4 INDEX: FIB4 SCORE: 0.95

## 2023-06-15 NOTE — PROGRESS NOTES
Chief Complaint   Patient presents with    Establish Care     PCP Thuan    Medication Refill     Metformin and pioglitazone    Other     Sleep problem pt states sleep  about 4 hrs X 5 month   Pt states his under stress and lost his second job and bills pilling up.     Diabetes     Referral for DR. Magaña     HISTORY OF THE PRESENT ILLNESS: Patient is a 58 y.o. male. This pleasant patient is here today to establish care and to discuss:    To establish care  Previous PCP Thuan Guy  Recent lab work from [place]  Dr. [facility]    Uncontrolled type 2 diabetes mellitus with hyperglycemia  Chronic condition. Patient is taking actos and metformin. Tolerating medication well without side effects. Patient uses sliding scale with Humalog from Dr. Magaña, endocrinologist regimen a few years ago.  He has not been able to follow-up with endocrinology in the last 2 to 3 years.  He is requesting referral.   He reports taking his fasting blood sugars daily with average readings of ~ 120-190.  His work and school schedule interferes with meal schedule and admits that he eats late at night on occasion.  He was exercising 4-5 times a week; however, he has not been able to do this consistently 4 weeks ago due to URI.   He does have an optometrist Dr. Vela and he will be seeing this afternoon.  He is urinating more.   Patient denies polyuria, polyphagia, unexplained weight loss, fatigue, vision changes, frequent UTI, numbness or tingling in the extremities.    Insomnia  Patient reports difficulty sleeping for the past 5 months.  He would get it roughly 4 hours of sleep on average. He admits that it is stress induced.  He has difficulty shutting his mind off at night.  He is currently getting his masters degree and works full-time at [employer].  He has taken trazodone a few years ago and tolerated well without side effects.  However, it was not as effective at the time but attributes this to  "personal anxiety.       Allergies: Hydrocodone-acetaminophen, Peanut (diagnostic), Peanut oil, Tradjenta [linagliptin], Hydrocodone, and Morphine    Current Outpatient Medications Ordered in Epic   Medication Sig Dispense Refill    testosterone cypionate (DEPO-TESTOSTERONE) 200 MG/ML Solution injection Inject 200 mg into the shoulder, thigh, or buttocks one time. Twice a week 100 mg      Semaglutide,0.25 or 0.5MG/DOS, 2 MG/3ML Solution Pen-injector Inject 0.5 mg under the skin every 7 days. 3 mL 11    metformin (GLUCOPHAGE) 1000 MG tablet Take 1 Tablet by mouth 2 times a day with meals. 180 Tablet 3    pioglitazone (ACTOS) 30 MG Tab Take 1 Tablet by mouth every day. 90 Tablet 3    traZODone (DESYREL) 50 MG Tab Take 1 Tablet by mouth every evening. 30 Tablet 3    rosuvastatin (CRESTOR) 5 MG Tab Take 1 Tablet by mouth every evening. 90 Tablet 3    albuterol 108 (90 Base) MCG/ACT Aero Soln inhalation aerosol Inhale 1-2 Puffs every four hours as needed for Shortness of Breath. 1 Each 0    thyroid (ARMOUR THYROID) 60 MG Tab Take 1 Tablet by mouth every morning. 90 Tablet 1    Insulin Pen Needle 32 G x 4 mm Use one pen needle in pen device to inject insulin once daily. 100 Each 0    tadalafil (CIALIS) 5 MG tablet Take 5 mg by mouth every evening.      Multiple Vitamins-Minerals (MENS MULTIVITAMIN) Tab Take 1 Package by mouth every evening. \"[employer] Men\"      Blood Glucose Meter Kit Test blood sugar as recommended by provider. True Metrix blood glucose monitoring kit. 1 Kit 0     No current Epic-ordered facility-administered medications on file.       Past Medical History:   Diagnosis Date    Abscess of left thumb 12/9/2017    Acute hyponatremia 7/9/2017    Acute respiratory failure (HCC) 11/17/2020    ADRIANE (acute kidney injury) ([facility]) 2/24/2016    Anesthesia     \"Was difficult to intubate 2-2016\"    Arthritis 3-3-17    \"Spine\"    Aspiration pneumonia (HCC) 3-2016    ASTHMA 3-3-17    \"Hasn't had to use inhaler in 2 years\" " "   Breath shortness 3-3-17    \"With Exercise\"    Cellulitis of left hand 12/9/2017    Closed fracture of sternum 5/19/2018    Fracture in the left lateral aspect of the sternum, just adjacent to the costochondral junction at the level of the first rib. EKG ordered in ED Multimodal analgesia      Complicated wound infection 4/19/2017    Congestive heart failure (HCC) 2-2016     3-3-17 \"Not a current issue\"    COVID-19 virus infection 11/17/2020    Dental disorder     Depression 11/26/2014    Diabetes ([facility]) 3-3-17    Takes Insulin    Diabetes ([facility])     Difficult intubation 2-2016    DKA (diabetic ketoacidoses) 2-2016    \"10 days on vent with DKA, Renal failure, CHF, elevated liver enzymes and electrolyte imbalance\"    Electrolyte imbalance 2-2016    Elevated LFTs     Elevated liver enzymes 2-2016    Encephalopathy 2/25/2016    Essential hypertension 1/22/2016    Gout     Heart burn     HHNC (hyperglycemic hyperosmolar nonketotic coma) ([facility]) 2/24/2016    High cholesterol 3-3-17    Does not currently take medication for    Hypertriglyceridemia - severe 1/3/2019    Hypothyroid 3-3-17    Takes [place] Thyroid    Incarcerated umbilical hernia 11/6/2016    Indigestion     Kidney stones     Klebsiella infect     Migraine     MRSA cellulitis     Necrotizing myositis     On mechanically assisted ventilation ([facility]) 2-2016    HX \"On Vent for 10 days at [facility]\".  \"DX Pancreatitis, DKA, CHF, Elevated Liver Enzymes & Electrolyte Imbalance\".    Pain 3-3-17    \"Left Flank\"    Pancreatitis 2-2016    \"D/T Tradjenta was hospitialized for 15 days\"    RF (renal failure) 2-2016    Sepsis ([facility]) 1/21/2016    Serum phosphate elevated 3/7/2016    Severe sepsis ([facility]) 4/29/2017    Snoring 3-3-17    Has not had a sleep study    Streptococcus infection     UC (ulcerative colitis) ([facility]) 3-3-17    \"Five BM's per day, takes Lialda\"    Urinary incontinence     Vitamin D deficiency        Past Surgical History:   Procedure Laterality Date    UMBILICAL " HERNIA REPAIR N/A 4/15/2018    Procedure: UMBILICAL HERNIA REPAIR;  Surgeon: Karen Beasley M.D.;  Location: SURGERY [facility];  Service: General    IRRIGATION & DEBRIDEMENT ORTHO Left 12/10/2017    Procedure: IRRIGATION & DEBRIDEMENT ORTHO LEFT HAND;  Surgeon: Chicho Ramos M.D.;  Location: SURGERY [facility];  Service: Orthopedics    IRRIGATION & DEBRIDEMENT GENERAL Right 5/1/2017    Procedure: IRRIGATION & DEBRIDEMENT GENERAL-Left HAND AND right  ANKLE;  Surgeon: Esau Dooley M.D.;  Location: SURGERY [facility];  Service:     IRRIGATION & DEBRIDEMENT GENERAL Right 4/29/2017    Procedure: IRRIGATION & DEBRIDEMENT GENERAL;  Surgeon: Esau Dooley M.D.;  Location: SURGERY [facility];  Service:     INCISION AND DRAINAGE GENERAL  4/7/2017    Procedure: INCISION AND DRAINAGE GENERAL;  Surgeon: Esau Dooley M.D.;  Location: SURGERY SAME DAY [facility];  Service:     UMBILICAL HERNIA REPAIR  3/10/2017    Procedure: UMBILICAL HERNIA REPAIR- INCISION AND DRAINAGE OF UMBILICAL WOUND AND MESH REMOVAL;  Surgeon: Esau Dooley M.D.;  Location: SURGERY SAME DAY [facility];  Service:     UMBILICAL HERNIA REPAIR N/A 11/6/2016    Procedure: UMBILICAL HERNIA REPAIR;  Surgeon: Esau Dooley M.D.;  Location: SURGERY [facility];  Service:     COLONOSCOPY WITH BIOPSY  11/26/2014    Performed by Solo Higginbotham M.D. at ENDOSCOPY [facility] BY LAPAROSCOPY  2005    OTHER ORTHOPEDIC SURGERY  1997 or 1998    Left Wrist ORIF       Social History     Tobacco Use    Smoking status: Never    Smokeless tobacco: Never   Vaping Use    Vaping Use: Never used   Substance Use Topics    Alcohol use: Yes     Comment: rare    Drug use: No       Social History     Social History Narrative    ** Merged History Encounter **         ** Merged History Encounter **     ** Merged History Encounter **          Family History   Problem Relation Age of Onset    Cancer Mother      ROS:     -  "Constitutional: Negative for fever, chills, unexpected weight change, and fatigue/generalized weakness.     - HEENT: Negative for headaches, vision changes, hearing changes, ear pain, ear discharge, rhinorrhea, sinus congestion, sore throat, and neck pain.      - Respiratory: Negative for cough, sputum production, chest congestion, dyspnea, wheezing, and crackles.      - Cardiovascular: Negative for chest pain, palpitations, orthopnea, and bilateral lower extremity edema.     - Gastrointestinal: Negative for heartburn, nausea, vomiting, abdominal pain, hematochezia, melena, diarrhea, constipation, and greasy/foul-smelling stools.     - Genitourinary: Negative for dysuria, polyuria, hematuria, pyuria, urinary urgency, and urinary incontinence.     - Musculoskeletal: Negative for myalgias, back pain, and joint pain.     - Skin: Negative for rash, itching, cyanotic skin color change.     - Neurological: Negative for dizziness, tingling, tremors, focal sensory deficit, focal weakness and headaches.     - Endo/Heme/Allergies: Does not bruise/bleed easily.     - Psychiatric/Behavioral: Negative for depression, suicidal/homicidal ideation and memory loss.        - NOTE: All other systems reviewed and are negative, except as in HPI.      Exam: /80 (BP Location: Left arm, Patient Position: Sitting, BP Cuff Size: Adult)   Pulse (!) 107   Temp 36.7 °C (98.1 °F) (Temporal)   Resp 18   Ht 1.727 m (5' 8\")   Wt 96.6 kg (213 lb)   SpO2 93%  Body mass index is 32.39 kg/m².    General: Normal appearing. No distress.  HEENT: Normocephalic. Eyes conjunctiva clear lids without ptosis, pupils equal and reactive to light accommodation, ears normal shape and contour, canals are clear bilaterally, tympanic membranes are benign, nasal mucosa benign, oropharynx is without erythema, edema or exudates.   Neck: Supple without JVD or bruit. Thyroid is not enlarged.  Pulmonary: Clear to ausculation.  Normal effort. No rales, ronchi, or " wheezing.  Cardiovascular: Regular rate and rhythm without murmur. Carotid and radial pulses are intact and equal bilaterally.  Abdomen: Soft, nontender, nondistended. Normal bowel sounds. Liver and spleen are not palpable  Neurologic: Grossly nonfocal  Lymph: No cervical, supraclavicular or axillary lymph nodes are palpable  Skin: Warm and dry.  No obvious lesions.  Musculoskeletal: Normal gait. No extremity cyanosis, clubbing, or edema.  Psych: Normal mood and affect. Alert and oriented x3. Judgment and insight is normal.    Please note that this dictation was created using voice recognition software. I have made every reasonable attempt to correct obvious errors, but I expect that there are errors of grammar and possibly content that I did not discover before finalizing the note.      Assessment/Plan    58 y.o. male with the following -    1. Uncontrolled type 2 diabetes mellitus with hyperglycemia (HCC)  2. Hyperosmolar hyperglycemic state (HHS) (HCC)  Chronic and uncontrolled condition.  A1c 10.5 today.  Continue with metformin and Actos.  Med refill sent to pharmacy.  Continue with sliding scale Humalog with every meal.  Will start semaglutide 0.5 mg weekly.  Medication administration and side effects discussed.  Discussed starting Lantus, patient declined at this time.  Would like to jarrod this with endocrinology.  Follow up with ophthalmologist for your annual eye exam to assess for diabetic retinopathy.  Diabetes recommendations:  -Follow an 1800 calorie ADA diet and aim to get about half of your calories from carbohydrates. For an 1800 calorie diet, that would be around 150-200 grams of carbs a day. Exercise as tolerated; ideally 150 minutes of cardiovascular exercise a week, or 20 minutes a day.   -Monitor blood sugars at home and keep a log book. Check your glucose fasting every morning, before dinner, and 2 hours after dinner (or as otherwise recommended at your appointment). Call if glucose <70 or >300.  Please send weekly readings of your blood glucose through [facility].  -Please check your feet frequently for any open wounds/ulcers, signs of infection, or changes in sensation/neuropathy.  Will place referral to Endocrinology.  Will follow up in 3 months or sooner for DM check.     - POCT A1C  - MICROALBUMIN CREAT RATIO URINE; Future  - Referral to Endocrinology  - Semaglutide,0.25 or 0.5MG/DOS, 2 MG/3ML Solution Pen-injector; Inject 0.5 mg under the skin every 7 days.  Dispense: 3 mL; Refill: 11  - metformin (GLUCOPHAGE) 1000 MG tablet; Take 1 Tablet by mouth 2 times a day with meals.  Dispense: 180 Tablet; Refill: 3  - pioglitazone (ACTOS) 30 MG Tab; Take 1 Tablet by mouth every day.  Dispense: 90 Tablet; Refill: 3    3. Insomnia, unspecified type  Established and uncontrolled condition.  Will start trazodone 50 mg.  Medication administration and side effects discussed  May take melatonin and or calm magnesium.  Recommend good sleep hygiene:  1) Go to bed and get up at the same time every day  2) No TV, computer, cell phone, or iPad for at least an hour before bed  3) Try to eat a meal at least 2 hours before going to bed.  4) Avoid alcohol within 2 hours of bed.  5) Avoid caffeine after noon.  6) Sleep in a cool room.  7) Try white noise or background noise, such as a fan, air conditioning unit, or white noise machine.  8) Use black-out curtains or blankets over the windows to block light.  9) Bedroom should be used for sleep and sex only - NO TV WATCHING.  10) Consider starting a bedtime ritual like washing your face or using lavender essential oil to relax. This is a time to pamper yourself before bed.  11) Keep a journal or notebook by your bed to write down nagging thoughts that may keep you awake (do not do this if making a to-do list is going to get you more amped up).    - traZODone (DESYREL) 50 MG Tab; Take 1 Tablet by mouth every evening.  Dispense: 30 Tablet; Refill: 3    4. Dyslipidemia  Chronic  condition, unable to determine status.  No recent lipid panel available.  Will request medical records from [facility].  Med refill sent to pharmacy.  Discussed medication desired effects, potential side effects, and hot to administer. Advised patient to stop medication for symptoms of muscle pain, fatigue, lethargy, loss of appetite, abdominal pain, dark-colored urine, or yellowing of skin or eyes.  Recommendations include lifestyle modification:  -healthy diet that is low in trans and saturated fat and high in fiber and potassium emphasizing on increasing fruits, vegetables, whole grains, poultry, fish and nuts (i.e. DASH diet/Mediterranean)  -reduce salt intake to no more than 2,400 mg/day.   -increase physical activity and start an exercise regimen. Adults should engage in at least 150 minutes/week of moderate-intensity or 75 minutes/week of vigorous-intensity physical activity including resistance exercise which has proven to lower risk of atherosclerotic cardiovascular disease (ASCVD).   -maintain normal BMI and blood pressure control.  -Start omega-3 fatty acid 1 g/day, increase fiber intake, and start probiotic daily supplementation  Discussed signs and symptoms of major cardiovascular event and need to present to ED.    - rosuvastatin (CRESTOR) 5 MG Tab; Take 1 Tablet by mouth every evening.  Dispense: 90 Tablet; Refill: 3    5. Obesity (BMI 30.0-34.9)  Discussed lifestyle and dietary changes such as low-carb diet, high in vegetables and fresh fruits.  Encouraged to increase water intake.  Regular physical exercise at least 30 minutes/day 5 days a week. Weight reduction if applicable (aim for 5% to 10% body weight increments).     Medical Decision Making/Course:  In the course of preparing for this visit with review of the pertinent past medical history, recent and past clinic visits, current medications, and performing chart, immunization, medical history and medication reconciliation, and in the further  course of obtaining the current history pertinent to the clinic visit today, performing an exam and evaluation, ordering and independently evaluating labs, tests, and/or procedures, prescribing any recommended new medications as noted above, providing any pertinent counseling and education and recommending further coordination of care. This was discussed with patient in a shared-decision making conversation, and they understand and agreed with plan of care.     Return in about 3 months (around 9/15/2023), or if symptoms worsen or fail to improve.    Thank you, Shanelle ATKINSON  [facility]    Please note that this dictation was created using voice recognition software. I have made every reasonable attempt to correct obvious errors, but I expect that there are errors of grammar and possibly content that I did not discover before finalizing the note.

## 2024-01-05 ENCOUNTER — OFFICE VISIT (OUTPATIENT)
Dept: URGENT CARE | Facility: CLINIC | Age: 59
End: 2024-01-05
Payer: COMMERCIAL

## 2024-01-05 ENCOUNTER — APPOINTMENT (OUTPATIENT)
Dept: URGENT CARE | Facility: CLINIC | Age: 59
End: 2024-01-05
Payer: COMMERCIAL

## 2024-01-05 VITALS
RESPIRATION RATE: 16 BRPM | WEIGHT: 203 LBS | DIASTOLIC BLOOD PRESSURE: 84 MMHG | HEIGHT: 68 IN | TEMPERATURE: 97.9 F | BODY MASS INDEX: 30.77 KG/M2 | HEART RATE: 104 BPM | OXYGEN SATURATION: 97 % | SYSTOLIC BLOOD PRESSURE: 132 MMHG

## 2024-01-05 DIAGNOSIS — J01.00 ACUTE MAXILLARY SINUSITIS, RECURRENCE NOT SPECIFIED: ICD-10-CM

## 2024-01-05 PROCEDURE — 3079F DIAST BP 80-89 MM HG: CPT

## 2024-01-05 PROCEDURE — 3075F SYST BP GE 130 - 139MM HG: CPT

## 2024-01-05 PROCEDURE — 99213 OFFICE O/P EST LOW 20 MIN: CPT

## 2024-01-05 RX ORDER — AMOXICILLIN AND CLAVULANATE POTASSIUM 875; 125 MG/1; MG/1
1 TABLET, FILM COATED ORAL 2 TIMES DAILY
Qty: 20 TABLET | Refills: 0 | Status: SHIPPED | OUTPATIENT
Start: 2024-01-05 | End: 2024-01-15

## 2024-01-05 ASSESSMENT — ENCOUNTER SYMPTOMS
CHILLS: 0
SINUS PAIN: 1
SHORTNESS OF BREATH: 0
FEVER: 0
HEADACHES: 1
SORE THROAT: 1

## 2024-01-05 ASSESSMENT — FIBROSIS 4 INDEX: FIB4 SCORE: 0.95

## 2024-01-05 NOTE — PROGRESS NOTES
CHIEF COMPLAINT  Chief Complaint   Patient presents with    Headache     X 2 weeks    Nasal Congestion     Nasal drainage, nose bleeds x 2 weeks     Subjective:   Mahesh Bradshaw is a 58 y.o. male who presents for complaints of sinus pressure, nasal drainage, headache x 2 weeks.  Patient states that he has been coughing up thick green mucus.  He denies any chest congestion or shortness of breath.  He denies any fevers.  He does report chronic history of sinusitis.  Patient reports attempting to alleviate symptoms with decongestants as well as Flonase.  He reports use of [place] pot.  He denies any other pertinent past history.      Review of Systems   Constitutional:  Negative for chills and fever.   HENT:  Positive for congestion, sinus pain and sore throat.    Respiratory:  Negative for shortness of breath.    Cardiovascular:  Negative for chest pain.   Neurological:  Positive for headaches.       PAST MEDICAL HISTORY  Patient Active Problem List    Diagnosis Date Noted    Right foot pain 02/18/2022    Hyperosmolar hyperglycemic state (HHS) ([facility]) 06/02/2021    Mixed hyperlipidemia due to type 2 diabetes mellitus ([facility]) 04/15/2020    Long term (current) use of oral hypoglycemic drugs 04/15/2020    Dental caries 01/03/2019    LUQ pain 12/20/2018    Dyslipidemia 11/14/2018    Hypothyroidism 05/19/2018    Depression 05/19/2018    Knee pain, right 05/19/2018    Injury of costal cartilage 05/19/2018    Obesity (BMI 30.0-34.9) 12/10/2017    Uncontrolled type 2 diabetes mellitus with hyperglycemia ([facility]) 12/10/2017    History of nephrolithiasis 12/10/2017    Insomnia disorder 12/10/2017    Normocytic anemia 04/07/2017    Back pain 01/16/2017    Chronic ulcerative colitis ([facility]) 02/25/2016    Gout 06/15/2015    GERD (gastroesophageal reflux disease) 06/15/2015       SURGICAL HISTORY   has a past surgical history that includes vaishnavi by laparoscopy (2005); colonoscopy with biopsy (11/26/2014); incision and drainage  general (4/7/2017); irrigation & debridement general (Right, 4/29/2017); irrigation & debridement general (Right, 5/1/2017); other orthopedic surgery (1997 or 1998); umbilical hernia repair (N/A, 11/6/2016); umbilical hernia repair (3/10/2017); irrigation & debridement ortho (Left, 12/10/2017); and umbilical hernia repair (N/A, 4/15/2018).    ALLERGIES  Allergies   Allergen Reactions    Hydrocodone-Acetaminophen Rash     Tolerates oxycodone and morphine    Peanut (Diagnostic) Anaphylaxis    Peanut Oil Anaphylaxis    Tradjenta [Linagliptin] Unspecified     Pt developed pancreatitis    Hydrocodone Hives     Tolerates Oxycodone/hydromorphone      Morphine Itching       CURRENT MEDICATIONS  Home Medications       Reviewed by Rock Quezada Ass't (Medical Assistant) on 01/05/24 at 1316  Med List Status: <None>     Medication Last Dose Status   albuterol 108 (90 Base) MCG/ACT Aero Soln inhalation aerosol Taking Active   Blood Glucose Meter Kit Taking Active   Insulin Pen Needle 32 G x 4 mm Taking Active   metformin (GLUCOPHAGE) 1000 MG tablet Taking Active   Multiple Vitamins-Minerals (MENS MULTIVITAMIN) Tab Taking Active   pioglitazone (ACTOS) 30 MG Tab Taking Active   rosuvastatin (CRESTOR) 5 MG Tab Taking Active   Semaglutide,0.25 or 0.5MG/DOS, 2 MG/3ML Solution Pen-injector Taking Active   tadalafil (CIALIS) 5 MG tablet Taking Active   testosterone cypionate (DEPO-TESTOSTERONE) 200 MG/ML Solution injection Taking Active   thyroid (ARMOUR THYROID) 60 MG Tab Taking Active   traZODone (DESYREL) 50 MG Tab Taking Active                    SOCIAL HISTORY  Social History     Tobacco Use    Smoking status: Never    Smokeless tobacco: Never   Vaping Use    Vaping Use: Never used   Substance and Sexual Activity    Alcohol use: Yes     Comment: rare    Drug use: No    Sexual activity: Not on file       FAMILY HISTORY  Family History   Problem Relation Age of Onset    Cancer Mother          Medications, Allergies, and current  "problem list reviewed today in Epic.     Objective:     /84 (BP Location: Right arm, Patient Position: Sitting, BP Cuff Size: Large adult)   Pulse (!) 104   Temp 36.6 °C (97.9 °F)   Resp 16   Ht 1.727 m (5' 8\")   Wt 92.1 kg (203 lb)   SpO2 97%     Physical Exam  Vitals reviewed.   Constitutional:       General: He is not in acute distress.     Appearance: Normal appearance. He is normal weight. He is not ill-appearing or toxic-appearing.   HENT:      Head: Normocephalic and atraumatic.      Right Ear: Tympanic membrane normal.      Left Ear: Tympanic membrane normal.      Nose: Congestion and rhinorrhea present.      Right Sinus: Frontal sinus tenderness present.      Left Sinus: Frontal sinus tenderness present.      Mouth/Throat:      Mouth: Mucous membranes are moist.      Pharynx: Oropharynx is clear. Uvula midline. No oropharyngeal exudate or posterior oropharyngeal erythema.      Tonsils: 0 on the right. 0 on the left.   Cardiovascular:      Rate and Rhythm: Normal rate and regular rhythm.      Pulses: Normal pulses.      Heart sounds: Normal heart sounds.   Pulmonary:      Effort: Pulmonary effort is normal. No respiratory distress.      Breath sounds: Normal breath sounds. No stridor. No wheezing, rhonchi or rales.   Musculoskeletal:      Cervical back: Normal range of motion and neck supple. No rigidity.   Lymphadenopathy:      Cervical: No cervical adenopathy.   Skin:     General: Skin is warm.      Capillary Refill: Capillary refill takes less than 2 seconds.   Neurological:      General: No focal deficit present.      Mental Status: He is alert.   Psychiatric:         Mood and Affect: Mood normal.         Assessment/Plan:     Diagnosis and associated orders:     1. Acute maxillary sinusitis, recurrence not specified  amoxicillin-clavulanate (AUGMENTIN) 875-125 MG Tab         Comments/MDM:     Patient presents urgent care with complaints of 2-week history of chronic sinus pressure as well as " headaches.  He denies any fevers.  Upon physical exam patient is alert and apparent signs of distress.  Bilateral TMs are normal.  Moderate congestion and rhinorrhea appreciated.  No lymphadenopathy.  He is clear to auscultation bilaterally.  No crackles, rhonchi or wheezes appreciated.  Normal respiratory effort.  Vital signs are stable in clinic, he is afebrile.   Patient will be treated for a bacterial infection based on duration of symptoms and failure to improve on OTC meds and conservative measures.  Relaxants discussed at length.  Instructed to return to ER or urgent care if symptoms worsen or fail to improve.         Differential diagnosis, natural history, supportive care, and indications for immediate follow-up discussed.    Advised the patient to follow-up with the primary care physician for recheck, reevaluation, and consideration of further management.    Please note that this dictation was created using voice recognition software. I have made a reasonable attempt to correct obvious errors, but I expect that there are errors of grammar and possibly content that I did not discover before finalizing the note.    This note was electronically signed by JESSICA Hoffmann

## 2024-01-31 ENCOUNTER — HOSPITAL ENCOUNTER (EMERGENCY)
Facility: MEDICAL CENTER | Age: 59
End: 2024-01-31
Attending: EMERGENCY MEDICINE
Payer: COMMERCIAL

## 2024-01-31 ENCOUNTER — APPOINTMENT (OUTPATIENT)
Dept: RADIOLOGY | Facility: MEDICAL CENTER | Age: 59
End: 2024-01-31
Attending: EMERGENCY MEDICINE
Payer: COMMERCIAL

## 2024-01-31 VITALS
TEMPERATURE: 98.1 F | HEART RATE: 98 BPM | OXYGEN SATURATION: 93 % | BODY MASS INDEX: 32.84 KG/M2 | HEIGHT: 68 IN | SYSTOLIC BLOOD PRESSURE: 133 MMHG | RESPIRATION RATE: 18 BRPM | DIASTOLIC BLOOD PRESSURE: 78 MMHG | WEIGHT: 216.71 LBS

## 2024-01-31 DIAGNOSIS — R11.2 NAUSEA AND VOMITING, UNSPECIFIED VOMITING TYPE: ICD-10-CM

## 2024-01-31 DIAGNOSIS — R10.84 GENERALIZED ABDOMINAL PAIN: ICD-10-CM

## 2024-01-31 LAB
ALBUMIN SERPL BCP-MCNC: 4.7 G/DL (ref 3.2–4.9)
ALBUMIN/GLOB SERPL: 1.4 G/DL
ALP SERPL-CCNC: 56 U/L (ref 30–99)
ALT SERPL-CCNC: 20 U/L (ref 2–50)
ANION GAP SERPL CALC-SCNC: 15 MMOL/L (ref 7–16)
AST SERPL-CCNC: 16 U/L (ref 12–45)
BASOPHILS # BLD AUTO: 0.1 % (ref 0–1.8)
BASOPHILS # BLD: 0.02 K/UL (ref 0–0.12)
BILIRUB SERPL-MCNC: 0.6 MG/DL (ref 0.1–1.5)
BUN SERPL-MCNC: 26 MG/DL (ref 8–22)
CALCIUM ALBUM COR SERPL-MCNC: 9.1 MG/DL (ref 8.5–10.5)
CALCIUM SERPL-MCNC: 9.7 MG/DL (ref 8.4–10.2)
CHLORIDE SERPL-SCNC: 97 MMOL/L (ref 96–112)
CO2 SERPL-SCNC: 22 MMOL/L (ref 20–33)
CREAT SERPL-MCNC: 0.94 MG/DL (ref 0.5–1.4)
EKG IMPRESSION: NORMAL
EOSINOPHIL # BLD AUTO: 0.03 K/UL (ref 0–0.51)
EOSINOPHIL NFR BLD: 0.2 % (ref 0–6.9)
ERYTHROCYTE [DISTWIDTH] IN BLOOD BY AUTOMATED COUNT: 48.1 FL (ref 35.9–50)
GFR SERPLBLD CREATININE-BSD FMLA CKD-EPI: 94 ML/MIN/1.73 M 2
GLOBULIN SER CALC-MCNC: 3.3 G/DL (ref 1.9–3.5)
GLUCOSE SERPL-MCNC: 228 MG/DL (ref 65–99)
HCT VFR BLD AUTO: 48.3 % (ref 42–52)
HGB BLD-MCNC: 17 G/DL (ref 14–18)
IMM GRANULOCYTES # BLD AUTO: 0.06 K/UL (ref 0–0.11)
IMM GRANULOCYTES NFR BLD AUTO: 0.4 % (ref 0–0.9)
LIPASE SERPL-CCNC: 130 U/L (ref 11–82)
LYMPHOCYTES # BLD AUTO: 0.36 K/UL (ref 1–4.8)
LYMPHOCYTES NFR BLD: 2.4 % (ref 22–41)
MCH RBC QN AUTO: 32.8 PG (ref 27–33)
MCHC RBC AUTO-ENTMCNC: 35.2 G/DL (ref 32.3–36.5)
MCV RBC AUTO: 93.1 FL (ref 81.4–97.8)
MONOCYTES # BLD AUTO: 0.35 K/UL (ref 0–0.85)
MONOCYTES NFR BLD AUTO: 2.4 % (ref 0–13.4)
NEUTROPHILS # BLD AUTO: 13.88 K/UL (ref 1.82–7.42)
NEUTROPHILS NFR BLD: 94.5 % (ref 44–72)
NRBC # BLD AUTO: 0 K/UL
NRBC BLD-RTO: 0 /100 WBC (ref 0–0.2)
PLATELET # BLD AUTO: 228 K/UL (ref 164–446)
PMV BLD AUTO: 8.9 FL (ref 9–12.9)
POTASSIUM SERPL-SCNC: 4.8 MMOL/L (ref 3.6–5.5)
PROT SERPL-MCNC: 8 G/DL (ref 6–8.2)
RBC # BLD AUTO: 5.19 M/UL (ref 4.7–6.1)
SODIUM SERPL-SCNC: 134 MMOL/L (ref 135–145)
WBC # BLD AUTO: 14.7 K/UL (ref 4.8–10.8)

## 2024-01-31 PROCEDURE — 99284 EMERGENCY DEPT VISIT MOD MDM: CPT

## 2024-01-31 PROCEDURE — 74177 CT ABD & PELVIS W/CONTRAST: CPT

## 2024-01-31 PROCEDURE — 80053 COMPREHEN METABOLIC PANEL: CPT

## 2024-01-31 PROCEDURE — 96376 TX/PRO/DX INJ SAME DRUG ADON: CPT

## 2024-01-31 PROCEDURE — 700117 HCHG RX CONTRAST REV CODE 255: Performed by: EMERGENCY MEDICINE

## 2024-01-31 PROCEDURE — 93005 ELECTROCARDIOGRAM TRACING: CPT | Performed by: EMERGENCY MEDICINE

## 2024-01-31 PROCEDURE — 700111 HCHG RX REV CODE 636 W/ 250 OVERRIDE (IP): Mod: JZ | Performed by: EMERGENCY MEDICINE

## 2024-01-31 PROCEDURE — 83690 ASSAY OF LIPASE: CPT

## 2024-01-31 PROCEDURE — 96374 THER/PROPH/DIAG INJ IV PUSH: CPT

## 2024-01-31 PROCEDURE — 700105 HCHG RX REV CODE 258: Performed by: EMERGENCY MEDICINE

## 2024-01-31 PROCEDURE — 85025 COMPLETE CBC W/AUTO DIFF WBC: CPT

## 2024-01-31 PROCEDURE — 36415 COLL VENOUS BLD VENIPUNCTURE: CPT

## 2024-01-31 PROCEDURE — 96375 TX/PRO/DX INJ NEW DRUG ADDON: CPT

## 2024-01-31 RX ORDER — ONDANSETRON 4 MG/1
4 TABLET, ORALLY DISINTEGRATING ORAL EVERY 6 HOURS PRN
Qty: 10 TABLET | Refills: 0 | Status: SHIPPED | OUTPATIENT
Start: 2024-01-31

## 2024-01-31 RX ORDER — SODIUM CHLORIDE 9 MG/ML
1000 INJECTION, SOLUTION INTRAVENOUS ONCE
Status: COMPLETED | OUTPATIENT
Start: 2024-01-31 | End: 2024-01-31

## 2024-01-31 RX ORDER — ONDANSETRON 2 MG/ML
4 INJECTION INTRAMUSCULAR; INTRAVENOUS ONCE
Status: COMPLETED | OUTPATIENT
Start: 2024-01-31 | End: 2024-01-31

## 2024-01-31 RX ORDER — IBUPROFEN 200 MG
600 TABLET ORAL EVERY 6 HOURS PRN
COMMUNITY

## 2024-01-31 RX ADMIN — FENTANYL CITRATE 50 MCG: 50 INJECTION, SOLUTION INTRAMUSCULAR; INTRAVENOUS at 11:43

## 2024-01-31 RX ADMIN — FENTANYL CITRATE 50 MCG: 50 INJECTION, SOLUTION INTRAMUSCULAR; INTRAVENOUS at 13:22

## 2024-01-31 RX ADMIN — IOHEXOL 95 ML: 350 INJECTION, SOLUTION INTRAVENOUS at 12:30

## 2024-01-31 RX ADMIN — SODIUM CHLORIDE 1000 ML: 9 INJECTION, SOLUTION INTRAVENOUS at 11:43

## 2024-01-31 RX ADMIN — ONDANSETRON 4 MG: 2 INJECTION INTRAMUSCULAR; INTRAVENOUS at 11:43

## 2024-01-31 ASSESSMENT — FIBROSIS 4 INDEX: FIB4 SCORE: 0.95

## 2024-01-31 NOTE — ED NOTES
Medication history reviewed with pt. Med rec is complete.  Allergies reviewed, per pt    Pt reports that he did take his medications today at 0630, but threw them back up.  Pt reports that he is not taking ANASTROZOLE 1MG in the last 30 days or longer.    Patient has not had any outpatient antibiotics in the last 30 days.    Pt is not on any anticoagulants

## 2024-01-31 NOTE — ED PROVIDER NOTES
ED Provider Note    CHIEF COMPLAINT  Chief Complaint   Patient presents with    Vomiting     7-8 since midnight, awoke pt from sleep, body aches yesterday    Diarrhea    Abdominal Pain     LLQ pain    Near Syncopal       HPI/JESSICA Aragon Santi Bradshaw is a 58 y.o. male who presents with abdominal pain.  The patient states he has been sick over last 24 to 48 hours.  States he has severe cramping abdominal pain with associated vomiting.  He is also had myalgias as well as lethargy.  He is also had a couple loose stools.  He states he does have severe abdominal pain.  He states he stood up and almost passed out.  He states his blood sugars have been slightly elevated.  He has had sick contacts.  He denies difficulties with urination.  He does not have any upper respiratory symptoms.    PAST MEDICAL HISTORY   has a past medical history of Abscess of left thumb (12/9/2017), Acute hyponatremia (7/9/2017), Acute respiratory failure (HCC) (11/17/2020), ADRIANE (acute kidney injury) ([facility]) (2/24/2016), Anesthesia, Arthritis (3-3-17), Aspiration pneumonia ([facility]) (3-2016), ASTHMA (3-3-17), Breath shortness (3-3-17), Cellulitis of left hand (12/9/2017), Closed fracture of sternum (5/19/2018), Complicated wound infection (4/19/2017), Congestive heart failure ([facility]) (2-2016 ), COVID-19 virus infection (11/17/2020), Dental disorder, Depression (11/26/2014), Diabetes ([facility]) (3-3-17), Diabetes ([facility]), Difficult intubation (2-2016), DKA (diabetic ketoacidoses) (2-2016), Electrolyte imbalance (2-2016), Elevated LFTs, Elevated liver enzymes (2-2016), Encephalopathy (2/25/2016), Essential hypertension (1/22/2016), Gout, Heart burn, HHNC (hyperglycemic hyperosmolar nonketotic coma) ([facility]) (2/24/2016), High cholesterol (3-3-17), Hypertriglyceridemia - severe (1/3/2019), Hypothyroid (3-3-17), Incarcerated umbilical hernia (11/6/2016), Indigestion, Kidney stones, Klebsiella infect, Migraine, MRSA cellulitis, Necrotizing myositis, On mechanically  assisted ventilation (HCC) (2-2016), Pain (3-3-17), Pancreatitis (2-2016), RF (renal failure) (2-2016), Sepsis (HCC) (1/21/2016), Serum phosphate elevated (3/7/2016), Severe sepsis (HCC) (4/29/2017), Snoring (3-3-17), Streptococcus infection, UC (ulcerative colitis) (HCC) (3-3-17), Urinary incontinence, and Vitamin D deficiency.    SURGICAL HISTORY   has a past surgical history that includes vaishnavi by laparoscopy (2005); colonoscopy with biopsy (11/26/2014); incision and drainage general (4/7/2017); irrigation & debridement general (Right, 4/29/2017); irrigation & debridement general (Right, 5/1/2017); other orthopedic surgery (1997 or 1998); umbilical hernia repair (N/A, 11/6/2016); umbilical hernia repair (3/10/2017); irrigation & debridement ortho (Left, 12/10/2017); and umbilical hernia repair (N/A, 4/15/2018).    FAMILY HISTORY  Family History   Problem Relation Age of Onset    Cancer Mother        SOCIAL HISTORY  Social History     Tobacco Use    Smoking status: Never    Smokeless tobacco: Never   Vaping Use    Vaping Use: Never used   Substance and Sexual Activity    Alcohol use: Yes     Comment: rare    Drug use: No    Sexual activity: Not on file       CURRENT MEDICATIONS  Home Medications       Reviewed by Billie Bowman R.N. (Registered Nurse) on 01/31/24 at 1031  Med List Status: Partial     Medication Last Dose Status   albuterol 108 (90 Base) MCG/ACT Aero Soln inhalation aerosol  Active   Blood Glucose Meter Kit  Active   Insulin Pen Needle 32 G x 4 mm  Active   metformin (GLUCOPHAGE) 1000 MG tablet  Active   Multiple Vitamins-Minerals (MENS MULTIVITAMIN) Tab  Active   pioglitazone (ACTOS) 30 MG Tab  Active   rosuvastatin (CRESTOR) 5 MG Tab  Active   Semaglutide,0.25 or 0.5MG/DOS, 2 MG/3ML Solution Pen-injector  Active   tadalafil (CIALIS) 5 MG tablet  Active   testosterone cypionate (DEPO-TESTOSTERONE) 200 MG/ML Solution injection  Active   thyroid (ARMOUR THYROID) 60 MG Tab  Active   traZODone  "(DESYREL) 50 MG Tab  Active                    ALLERGIES  Allergies   Allergen Reactions    Hydrocodone-Acetaminophen Rash     Tolerates oxycodone and morphine    Peanut (Diagnostic) Anaphylaxis    Peanut Oil Anaphylaxis    Tradjenta [Linagliptin] Unspecified     Pt developed pancreatitis    Hydrocodone Hives     Tolerates Oxycodone/hydromorphone      Morphine Itching       PHYSICAL EXAM  VITAL SIGNS: BP (!) 135/92   Pulse (!) 120   Temp 37 °C (98.6 °F) (Temporal)   Resp 19   Ht 1.727 m (5' 8\")   Wt 98.3 kg (216 lb 11.4 oz)   SpO2 95%   BMI 32.95 kg/m²    General the patient appears ill    HEENT unremarkable    Pulmonary the patient's lungs are clear to auscultation bilaterally    Cardiovascular S1-S2 with a tachycardic rate    GI slight distention with hypoactive bowel sounds and diffuse tenderness    Skin no rashes, pallor, no jaundice    Extremities no distal edema    Neurologic examination is grossly intact    DIAGNOSTIC STUDIES   Results for orders placed or performed during the hospital encounter of 01/31/24   CBC WITH DIFFERENTIAL   Result Value Ref Range    WBC 14.7 (H) 4.8 - 10.8 K/uL    RBC 5.19 4.70 - 6.10 M/uL    Hemoglobin 17.0 14.0 - 18.0 g/dL    Hematocrit 48.3 42.0 - 52.0 %    MCV 93.1 81.4 - 97.8 fL    MCH 32.8 27.0 - 33.0 pg    MCHC 35.2 32.3 - 36.5 g/dL    RDW 48.1 35.9 - 50.0 fL    Platelet Count 228 164 - 446 K/uL    MPV 8.9 (L) 9.0 - 12.9 fL    Neutrophils-Polys 94.50 (H) 44.00 - 72.00 %    Lymphocytes 2.40 (L) 22.00 - 41.00 %    Monocytes 2.40 0.00 - 13.40 %    Eosinophils 0.20 0.00 - 6.90 %    Basophils 0.10 0.00 - 1.80 %    Immature Granulocytes 0.40 0.00 - 0.90 %    Nucleated RBC 0.00 0.00 - 0.20 /100 WBC    Neutrophils (Absolute) 13.88 (H) 1.82 - 7.42 K/uL    Lymphs (Absolute) 0.36 (L) 1.00 - 4.80 K/uL    Monos (Absolute) 0.35 0.00 - 0.85 K/uL    Eos (Absolute) 0.03 0.00 - 0.51 K/uL    Baso (Absolute) 0.02 0.00 - 0.12 K/uL    Immature Granulocytes (abs) 0.06 0.00 - 0.11 K/uL    NRBC " (Absolute) 0.00 K/uL   COMP METABOLIC PANEL   Result Value Ref Range    Sodium 134 (L) 135 - 145 mmol/L    Potassium 4.8 3.6 - 5.5 mmol/L    Chloride 97 96 - 112 mmol/L    Co2 22 20 - 33 mmol/L    Anion Gap 15.0 7.0 - 16.0    Glucose 228 (H) 65 - 99 mg/dL    Bun 26 (H) 8 - 22 mg/dL    Creatinine 0.94 0.50 - 1.40 mg/dL    Calcium 9.7 8.4 - 10.2 mg/dL    Correct Calcium 9.1 8.5 - 10.5 mg/dL    AST(SGOT) 16 12 - 45 U/L    ALT(SGPT) 20 2 - 50 U/L    Alkaline Phosphatase 56 30 - 99 U/L    Total Bilirubin 0.6 0.1 - 1.5 mg/dL    Albumin 4.7 3.2 - 4.9 g/dL    Total Protein 8.0 6.0 - 8.2 g/dL    Globulin 3.3 1.9 - 3.5 g/dL    A-G Ratio 1.4 g/dL   LIPASE   Result Value Ref Range    Lipase 130 (H) 11 - 82 U/L   ESTIMATED GFR   Result Value Ref Range    GFR (CKD-EPI) 94 >60 mL/min/1.73 m 2   EKG   Result Value Ref Range    Report       [facility]    Test Date:  2024  Pt Name:    DEVIN MO              Department: [facility]  MRN:        [id number]                      Room:  Gender:     Male                         Technician: [id number]  :        1965                   Requested By:ER TRIAGE PROTOCOL  Order #:    [id number]                    Reading MD: FARRUKH SIEGEL MD    Measurements  Intervals                                Axis  Rate:       115                          P:          62  OH:         129                          QRS:        16  QRSD:       81                           T:          28  QT:         309  QTc:        428    Interpretive Statements  Twelve-lead EKG shows a sinus tachycardia with a ventricular to 115, normal  QRS, normal intervals, no ST segment elevation or depression, normal T waves.  Overall no acute ischemic change  Electronically Signed On 2024 11:01:09 PST by FARRUKH SIEGEL MD         RADIOLOGY  I have independently interpreted the diagnostic imaging associated with this visit and am waiting the final reading from the radiologist.    My preliminary interpretation is as follows: Reviewed the CT scan and there is no surgical process  Radiologist interpretation:   CT-ABDOMEN-PELVIS WITH   Final Result      1.  No evidence of bowel obstruction or focal inflammatory change.      2.  Prior cholecystectomy and likely ventral hernia repair.      3.  Fatty liver.      4.  Small bilateral fat-containing inguinal hernias.            COURSE & MEDICAL DECISION MAKING    This is a 58-year-old gentleman who presents the emergency department with abdominal discomfort, vomiting, diarrhea, and myalgias.  Based on the multiple system involvement my suspicion is this is from a viral process.  The patient did appear acutely ill and therefore an IV was established and he received a liter of fluid as he was tachycardic for possible dehydration.  He also received Zofran and fentanyl.  On repeat examination he states on repeat examination he states he has had some improvement.  His pain is starting to return therefore rebolus the patient with another 50 mcg of fentanyl.  CT scan does not show any evidence of surgical process.  The patient does have a concerning leukocytosis as well as a slight elevation in the lipase and pancreatitis would be in the differential.  The patient has had some improvement we will attempt outpatient management with oral hydration and Zofran as needed for nausea and vomiting.  I like the patient to recheck in 24 hours if he is not better.  A viral illness would be high on the differential and I suspect this is the source.    FINAL DIAGNOSIS  1.  Abdominal pain  2.  Vomiting and diarrhea  3.  Dehydration    Disposition  Patient will be discharged in stable condition       Electronically signed by: Thaddeus Ferro M.D., 1/31/2024 10:49 AM

## 2024-01-31 NOTE — ED NOTES
1145 Pt medicated as directed by ENEDELIA hargrove, poc update given to pt. Further orders and dispo pending at this time. No further questions from pt at this time  1155 pt to ct  1210 pt  back from ct

## 2024-01-31 NOTE — ED NOTES
Pt medicated as directed by ER md, poc update given to pt.    Pt to be d/c home after iv fluids completed

## 2024-02-02 ENCOUNTER — OFFICE VISIT (OUTPATIENT)
Dept: ENDOCRINOLOGY | Facility: MEDICAL CENTER | Age: 59
End: 2024-02-02
Attending: INTERNAL MEDICINE
Payer: COMMERCIAL

## 2024-02-02 VITALS
WEIGHT: 216.5 LBS | HEART RATE: 101 BPM | SYSTOLIC BLOOD PRESSURE: 124 MMHG | OXYGEN SATURATION: 95 % | DIASTOLIC BLOOD PRESSURE: 68 MMHG | BODY MASS INDEX: 32.81 KG/M2 | HEIGHT: 68 IN

## 2024-02-02 DIAGNOSIS — E03.9 ACQUIRED HYPOTHYROIDISM: ICD-10-CM

## 2024-02-02 DIAGNOSIS — Z79.84 LONG TERM (CURRENT) USE OF ORAL HYPOGLYCEMIC DRUGS: ICD-10-CM

## 2024-02-02 DIAGNOSIS — R30.0 DYSURIA: ICD-10-CM

## 2024-02-02 DIAGNOSIS — E78.5 DYSLIPIDEMIA: ICD-10-CM

## 2024-02-02 DIAGNOSIS — E11.00 HYPEROSMOLAR HYPERGLYCEMIC STATE (HHS) (HCC): ICD-10-CM

## 2024-02-02 DIAGNOSIS — E29.1 HYPOGONADISM IN MALE: ICD-10-CM

## 2024-02-02 DIAGNOSIS — E11.65 UNCONTROLLED TYPE 2 DIABETES MELLITUS WITH HYPERGLYCEMIA (HCC): ICD-10-CM

## 2024-02-02 DIAGNOSIS — E55.9 VITAMIN D DEFICIENCY: ICD-10-CM

## 2024-02-02 LAB
HBA1C MFR BLD: 8.1 % (ref ?–5.8)
POCT INT CON NEG: NEGATIVE
POCT INT CON POS: POSITIVE

## 2024-02-02 PROCEDURE — 99214 OFFICE O/P EST MOD 30 MIN: CPT | Performed by: INTERNAL MEDICINE

## 2024-02-02 PROCEDURE — 3078F DIAST BP <80 MM HG: CPT | Performed by: INTERNAL MEDICINE

## 2024-02-02 PROCEDURE — 83036 HEMOGLOBIN GLYCOSYLATED A1C: CPT | Performed by: INTERNAL MEDICINE

## 2024-02-02 PROCEDURE — 3074F SYST BP LT 130 MM HG: CPT | Performed by: INTERNAL MEDICINE

## 2024-02-02 RX ORDER — SEMAGLUTIDE 1.34 MG/ML
1 INJECTION, SOLUTION SUBCUTANEOUS
Qty: 9 ML | Refills: 1 | Status: SHIPPED | OUTPATIENT
Start: 2024-02-02

## 2024-02-02 RX ORDER — EMPAGLIFLOZIN 25 MG/1
1 TABLET, FILM COATED ORAL DAILY
Qty: 90 TABLET | Refills: 2 | Status: SHIPPED | OUTPATIENT
Start: 2024-02-02

## 2024-02-02 ASSESSMENT — FIBROSIS 4 INDEX: FIB4 SCORE: 0.91

## 2024-02-02 NOTE — PROGRESS NOTES
RN-CDE Note    Subjective:   Endocrinology Clinic Progress Note  PCP: JESSICA Doty    HPI:  Mahesh Bradshaw is a 58 y.o. old patient who is seen today by the Diabetes Nurse Specialist  to re-establish care for  his uncontrolled type 2 diabetes with long term use of insulin.  Patient last seen in office 8/17/2020 with no further follow up.    Recent changes in health: Was seen  in the ED for abdominal pain and nausea and vomiting on 1/31/23  DM:   Last A1c:   Lab Results   Component Value Date/Time    HBA1C 8.1 (A) 02/02/2024 10:18 AM      Previous A1c was 10.5 on 6/15/2023  A1C GOAL: < 7    Diabetes Medications:   Ozempic 0.5 mg weekly  Pioglitazone 30 mg daily  Metformin 1000 mg bid      Exercise: working out 45 min -60 min daily on elliptical and strength training 4 days a week.   Diet: tries to eat healthy, drinks 2 protein shakes per day.  Unhealthy diet 4 days a week.    Patient's body mass index is 32.92 kg/m². Exercise and nutrition counseling were performed at this visit.    Glucose monitoring frequency: bid  fasting in the 80 range and ac dinner 200+ range (dinner can range from 5-9 pm )    Hypoglycemic episodes: no  Last Retinal Exam: on file and up-to-date  Daily Foot Exam: Yes , complains of chronic ingrown toenails.   Foot Exam:  Monofilament testing with a 10 gram force: sensation intact: intact bilaterally  Visual Inspection: Feet without maceration, ulcers, fissures.  Pedal pulses: intact bilaterally  .  Lab Results   Component Value Date/Time    MALBCRT see below 06/15/2023 12:30 AM    MICROALBUR <1.2 06/15/2023 12:30 AM        ACR Albumin/Creatinine Ratio goal <30     HTN:   Blood pressure goal <130/<80 .   Currently Rx ACE/ARB: Not Indicated     Dyslipidemia:    Lab Results   Component Value Date/Time    CHOLSTRLTOT 228 (H) 05/25/2022 11:34 AM     (H) 05/25/2022 11:34 AM    HDL 44 05/25/2022 11:34 AM    TRIGLYCERIDE 213 (H) 05/25/2022 11:34 AM         Currently Rx Statin:  Yes     He  reports that he has never smoked. He has never used smokeless tobacco.      Plan:     Discussed and educated on:   - All medications, side effects and compliance (discussed carefully)  - Annual eye examinations at Ophthalmology  - HbA1C: target  - Home glucose monitoring emphasized  - Weight control and daily exercise   Need to stay hydrated with Jardiance.     Recommended medication changes: increase ozempic to 1 mg add

## 2024-02-02 NOTE — PROGRESS NOTES
CHIEF COMPLAINT: Patient is here for follow up of Type 2 Diabetes Mellitus    HPI:     Mahesh Bradshaw is a 58 y.o. male with Type 2 Diabetes Mellitus here for follow up.    Labs from 2/2/2024 HbA1c is 8.1%    Patient has been lost to follow-up in the office and he was last seen in the office back in 2020.  He reports that he was unable to follow-up with us because of insurance changes and job changes    He is currently on Ozempic 0.5 mg weekly  Metformin 1000 mg twice a day  Actos 30 mg daily      He denies side effects with his medications  We do not have updated labs      He has hyperlipidemia and is on rosuvastatin 5 mg daily  He denies a history of coronary disease  He denies muscle aches and muscle weakness  Again we do not have an updated lipid profile        He does not have diabetic kidney disease  UACR was less than 30 on June 2023      Point-of-care eye exam was completed today      He does have primary hypothyroidism and is on [place] Thyroid 60 mg daily  He currently denies hypothyroid symptoms such as constipation and cold intolerance  We do not have updated thyroid labs because he has not been seen in the office for a long time      He also is on testosterone 100 mg 2 times a week which is an atypical regimen  He reports that his libido is good and he denies lower urinary tract obstructive symptoms  He has been receiving testosterone from Dr[facility] Renato Whitehead [facility]  We do not have testosterone levels,  PSA levels as well as hematocrit levels.      BG Diary:02/02/24  Patient did not bring glucose meter    Weight has been stable    Diabetes Complications   Retinopathy: No known retinopathy.  Last eye exam: Point-of-care eye exam was completed  Neuropathy: Denies paresthesias or numbness in hands or feet. Denies any foot wounds.  Exercise: Minimal.  Diet: Fair.  Patient's medications, allergies, and social histories were reviewed and updated as appropriate.    ROS:     CONS:      No fever, no chills   EYES:     No diplopia, no blurry vision   CV:           No chest pain, no palpitations   PULM:     No SOB, no cough, no hemoptysis.   GI:            No nausea, no vomiting, no diarrhea, no constipation   ENDO:     No polyuria, no polydipsia, no heat intolerance, no cold intolerance       Past Medical History:  Problem List:  2022-05: DKA, type 2, not at goal ([facility])  2022-02: Right foot pain  2021-06: Hyperosmolar hyperglycemic state (HHS) ([facility])  2021-03: Intractable vomiting with nausea  2020-11: COVID-19 virus infection  2020-11: Acute respiratory failure ([facility])  2020-04: Mixed hyperlipidemia due to type 2 diabetes mellitus ([facility])  2020-04: Long term (current) use of oral hypoglycemic drugs  2019-05: URI (upper respiratory infection)  2019-05: High anion gap metabolic acidosis  2019-01: Dental caries  2019-01: Hypertriglyceridemia - severe  2019-01: Sinusitis, acute  2018-12: LUQ pain  2018-11: Diabetic ketoacidosis without coma associated with type 2   diabetes mellitus ([facility])  2018-11: Dyslipidemia  2018-11: S/P acute bronchitis due to other specified organisms  2018-05: Closed fracture of sternum  2018-05: Type 2 diabetes mellitus, with long-term current use of   insulin ([facility])  2018-05: Hypothyroidism  2018-05: Depression  2018-05: Laceration of right ear  2018-05: Hand pain, right  2018-05: Knee pain, right  2018-05: Trauma  2018-05: No contraindication to deep vein thrombosis (DVT) prophylaxis  2018-05: Injury of costal cartilage  2017-12: Obesity (BMI 30.0-34.9)  2017-12: Uncontrolled type 2 diabetes mellitus with hyperglycemia   ([facility])  2017-12: History of nephrolithiasis  2017-12: Insomnia disorder  2017-12: Abscess of left thumb  2017-12: Cellulitis of left hand  2017-10: AP (abdominal pain)  2017-07: Sepsis(995.91)  2017-07: Acute hyponatremia  2017-05: Septicemia due to bacteroides ([facility])  2017-05: Abscess of right thigh  2017-05: Septic shock ([facility])  2017-05: Type 2 diabetes mellitus  without complication, with long-  term current use of insulin ([facility])  2017-05: Bacteremia  2017-05: Hyponatremia [pseudo-]  2017-04: Bacteremia due to Gram-negative bacteria  2017-04: Severe sepsis ([facility])  2017-04: Complicated wound infection  2017-04: Normocytic anemia  2017-04: Transaminitis  2017-04: Diabetic ketoacidosis ([facility])  2017-03: Subcutaneous abscess  2017-01: Back pain  2017-01: Hyponatremia  2017-01: Lactic acidosis  2017-01: DKA (diabetic ketoacidosis) ([facility])  2016-11: Abdominal wall cellulitis  2016-11: Incarcerated umbilical hernia  2016-03: Hx of acute pancreatitis  2016-03: Serum phosphate elevated  2016-03: Altered mental status  2016-02: Hypernatremia  2016-02: Chronic ulcerative colitis (CMS-HCC)  2016-02: Encephalopathy  2016-02: Elevated lipase  2016-02: Tachycardia  2016-02: Leukocytosis  2016-02: HHNC (hyperglycemic hyperosmolar nonketotic coma) ([facility])  2016-02: ADRIANE (acute kidney injury) ([facility])  2016-01: Essential hypertension  2016-01: Sepsis (CMS-HCC)  2015-06: Chest pain  2015-06: Gout  2015-06: GERD (gastroesophageal reflux disease)  2015-06: Ulcerative colitis ([facility])  2014-11: Hypokalemia  2014-11: Hyponatremia  2014-11: Elevated liver enzymes  2014-11: Thrombocytopenia (CMS-HCC)  2014-11: Depression  2014-11: Colitis  2014-11: GIB (gastrointestinal bleeding)      Past Surgical History:  Past Surgical History:   Procedure Laterality Date    UMBILICAL HERNIA REPAIR N/A 4/15/2018    Procedure: UMBILICAL HERNIA REPAIR;  Surgeon: Karen Beasley M.D.;  Location: [facility];  Service: General    IRRIGATION & DEBRIDEMENT ORTHO Left 12/10/2017    Procedure: IRRIGATION & DEBRIDEMENT ORTHO LEFT HAND;  Surgeon: Chicho Ramos M.D.;  Location: [facility];  Service: Orthopedics    IRRIGATION & DEBRIDEMENT GENERAL Right 5/1/2017    Procedure: IRRIGATION & DEBRIDEMENT GENERAL-Left HAND AND right  ANKLE;  Surgeon: Esau Dooley M.D.;  Location: [facility];   "Service:     IRRIGATION & DEBRIDEMENT GENERAL Right 4/29/2017    Procedure: IRRIGATION & DEBRIDEMENT GENERAL;  Surgeon: Esau Dooley M.D.;  Location: SURGERY [facility];  Service:     INCISION AND DRAINAGE GENERAL  4/7/2017    Procedure: INCISION AND DRAINAGE GENERAL;  Surgeon: Esau Dooley M.D.;  Location: SURGERY SAME DAY [facility];  Service:     UMBILICAL HERNIA REPAIR  3/10/2017    Procedure: UMBILICAL HERNIA REPAIR- INCISION AND DRAINAGE OF UMBILICAL WOUND AND MESH REMOVAL;  Surgeon: Esau Dooley M.D.;  Location: SURGERY SAME DAY [facility];  Service:     UMBILICAL HERNIA REPAIR N/A 11/6/2016    Procedure: UMBILICAL HERNIA REPAIR;  Surgeon: Esau Dooley M.D.;  Location: SURGERY [facility];  Service:     COLONOSCOPY WITH BIOPSY  11/26/2014    Performed by Solo Higginbotham M.D. at ENDOSCOPY [facility] BY LAPAROSCOPY  2005    OTHER ORTHOPEDIC SURGERY  1997 or 1998    Left Wrist ORIF        Allergies:  Hydrocodone-acetaminophen, Peanut (diagnostic), Peanut oil, Tradjenta [linagliptin], Hydrocodone, and Morphine     Social History:  Social History     Tobacco Use    Smoking status: Never    Smokeless tobacco: Never   Vaping Use    Vaping Use: Never used   Substance Use Topics    Alcohol use: Yes     Comment: rare    Drug use: No        Family History:   family history includes Cancer in his mother.      PHYSICAL EXAM:   OBJECTIVE:  Vital signs: /68 (BP Location: Left arm, Patient Position: Sitting, BP Cuff Size: Large adult)   Pulse (!) 101   Ht 1.727 m (5' 8\")   Wt 98.2 kg (216 lb 8 oz)   SpO2 95%   BMI 32.92 kg/m²   GENERAL: Well-developed, well-nourished in no apparent distress.   EYE:  No ocular asymmetry, PERRLA  HENT: Pink, moist mucous membranes.    NECK: No thyromegaly.   CARDIOVASCULAR:  No murmurs  LUNGS: Clear breath sounds  ABDOMEN: Soft, nontender   EXTREMITIES: No clubbing, cyanosis, or edema.   NEUROLOGICAL: No gross focal motor " "abnormalities   LYMPH: No cervical adenopathy palpated.   SKIN: No rashes, lesions.     Labs:  Lab Results   Component Value Date/Time    HBA1C 8.1 (A) 02/02/2024 10:18 AM        Lab Results   Component Value Date/Time    WBC 14.7 (H) 01/31/2024 11:05 AM    RBC 5.19 01/31/2024 11:05 AM    HEMOGLOBIN 17.0 01/31/2024 11:05 AM    MCV 93.1 01/31/2024 11:05 AM    MCH 32.8 01/31/2024 11:05 AM    MCHC 35.2 01/31/2024 11:05 AM    RDW 48.1 01/31/2024 11:05 AM    MPV 8.9 (L) 01/31/2024 11:05 AM       Lab Results   Component Value Date/Time    SODIUM 134 (L) 01/31/2024 11:05 AM    POTASSIUM 4.8 01/31/2024 11:05 AM    CHLORIDE 97 01/31/2024 11:05 AM    CO2 22 01/31/2024 11:05 AM    ANION 15.0 01/31/2024 11:05 AM    GLUCOSE 228 (H) 01/31/2024 11:05 AM    BUN 26 (H) 01/31/2024 11:05 AM    CREATININE 0.94 01/31/2024 11:05 AM    CREATININE 1.1 02/18/2008 10:00 AM    CALCIUM 9.7 01/31/2024 11:05 AM    ASTSGOT 16 01/31/2024 11:05 AM    ALTSGPT 20 01/31/2024 11:05 AM    TBILIRUBIN 0.6 01/31/2024 11:05 AM    ALBUMIN 4.7 01/31/2024 11:05 AM    TOTPROTEIN 8.0 01/31/2024 11:05 AM    GLOBULIN 3.3 01/31/2024 11:05 AM    AGRATIO 1.4 01/31/2024 11:05 AM       Lab Results   Component Value Date/Time    CHOLSTRLTOT 228 (H) 05/25/2022 1134    TRIGLYCERIDE 213 (H) 05/25/2022 1134    HDL 44 05/25/2022 1134     (H) 05/25/2022 1134       Lab Results   Component Value Date/Time    MALBCRT see below 06/15/2023 12:30 AM    MICROALBUR <1.2 06/15/2023 12:30 AM        Lab Results   Component Value Date/Time    TSHULTRASEN 1.520 05/25/2022 1134     No results found for: \"FREEDIR\"  No results found for: \"FREET3\"  No results found for: \"THYSTIMIG\"        ASSESSMENT/PLAN:     1. Uncontrolled type 2 diabetes mellitus with hyperglycemia (HCC)  Uncontrolled secondary to hyperglycemia  Recommend increasing Ozempic to 1 mg weekly  Start Jardiance 25 mg daily  Continue metformin  Continue Actos  Follow-up in 3 months with complete labs including CMP, fasting " lipids and urine albumin    2. Acquired hypothyroidism  Stable he appears clinically euthyroid  Continue [place] Thyroid 60 mg daily  Repeat TSH with upcoming labs    3. Dyslipidemia  Stable continue Crestor  Will repeat fasting lipids with upcoming labs    4. Vitamin D deficiency  Stable   We will check calcium vitamin D with upcoming labs    5. Long term (current) use of oral hypoglycemic drugs  Patient is on multiple oral agents for type 2 diabetes management    6. Dysuria  Stable I am using this diagnosis to get his PSA covered    7. Hypogonadism in male  Stable he appears to be you can add overall on androgen replacement therapy with an atypical regimen of testosterone 2 times a week.  I am going to check his testosterone levels with his upcoming labs including a PSA and hematocrit.  Technically he is receiving this medication from another provider        Return in about 3 months (around 5/2/2024).      This patient during there office visit today was started on a new medication.  Side effects of the new medication were discussed with the patient today in the office.     Total time spent on day of service was over 60 minutes which included obtaining a detailed history and physical exam, ordering labs, coordinating care and scheduling future follow-up    Thank you kindly for allowing me to participate in the diabetes care plan for this patient.    Keenan Magaña MD, FACE, [facility]  02/02/24    CC:   JESSICA Doty

## 2024-02-02 NOTE — LETTER
[employer]  TENISHA DotyRMynorN.  [address] [place] [address]  Fax: [phone]   Authorization for Release/Disclosure of   Protected Health Information   Name: DEVIN MO : 1965 SSN: [id number]   Address: [facility] [address] [place] [address] Phone:    There are no phone numbers on file.   I authorize the entity listed below to release/disclose the PHI below to:   [employer]/JESSICA Doty and Keenan Magaña M.D.   Provider or Entity Name:  Arcelia Be   Address   City, State, [facility]   Phone:      Fax:     Reason for request: continuity of care   Information to be released:    Retinal exam    [  ] Check here and initial the line next to each item to release ALL health information INCLUDING  _____ Care and treatment for drug and / or alcohol abuse  _____ HIV testing, infection status, or AIDS  _____ Genetic Testing    DATES OF SERVICE OR TIME PERIOD TO BE DISCLOSED: _____________  I understand and acknowledge that:  * This Authorization may be revoked at any time by you in writing, except if your health information has already been used or disclosed.  * Your health information that will be used or disclosed as a result of you signing this authorization could be re-disclosed by the recipient. If this occurs, your re-disclosed health information may no longer be protected by State or Federal laws.  * You may refuse to sign this Authorization. Your refusal will not affect your ability to obtain treatment.  * This Authorization becomes effective upon signing and will  on (date) __________.      If no date is indicated, this Authorization will  one (1) year from the signature date.    Name: Devin Mo  Signature:  continued medical care Date:   2024     PLEASE FAX REQUESTED RECORDS BACK TO: [phone]

## 2024-03-16 NOTE — ED NOTES
Pt reporting an increase in LLQ abd pain. ERP aware.   She has combined systolic and diastolic acute on chronic heart failure   BNP elevated  Continue IV lasix and aldactone   S/p Metolazone 5 mg once   cont. Her clonidine patch   Strict intake output and daily weight

## 2024-04-16 ENCOUNTER — TELEPHONE (OUTPATIENT)
Dept: ENDOCRINOLOGY | Facility: MEDICAL CENTER | Age: 59
End: 2024-04-16
Payer: COMMERCIAL

## 2024-04-16 NOTE — TELEPHONE ENCOUNTER
Prior Authorization for Jardiance 25mg tablets has been approved for a quantity of 30 , day supply 30    Prior Authorization reference number: [id number]  Insurance: [employer]  Effective dates: 04/16/24-04/16/25  Copay: $40.00     Is patient eligible to fill with [facility]? Yes    Next Steps: The Patients copay is less than $5.00. Will contact the patient to determine choice of pharmacy, if applicable.    Thank you,  Crista Blackwood [facility]  Endocrinology Coordinator   [phone]

## 2024-06-18 ENCOUNTER — HOSPITAL ENCOUNTER (EMERGENCY)
Facility: MEDICAL CENTER | Age: 59
End: 2024-06-18
Attending: STUDENT IN AN ORGANIZED HEALTH CARE EDUCATION/TRAINING PROGRAM
Payer: COMMERCIAL

## 2024-06-18 ENCOUNTER — APPOINTMENT (OUTPATIENT)
Dept: RADIOLOGY | Facility: MEDICAL CENTER | Age: 59
End: 2024-06-18
Attending: STUDENT IN AN ORGANIZED HEALTH CARE EDUCATION/TRAINING PROGRAM
Payer: COMMERCIAL

## 2024-06-18 VITALS
TEMPERATURE: 97.5 F | SYSTOLIC BLOOD PRESSURE: 135 MMHG | RESPIRATION RATE: 16 BRPM | BODY MASS INDEX: 31.74 KG/M2 | OXYGEN SATURATION: 90 % | WEIGHT: 209.44 LBS | HEART RATE: 88 BPM | HEIGHT: 68 IN | DIASTOLIC BLOOD PRESSURE: 70 MMHG

## 2024-06-18 DIAGNOSIS — K52.9 ENTERITIS: ICD-10-CM

## 2024-06-18 DIAGNOSIS — R10.11 RIGHT UPPER QUADRANT ABDOMINAL PAIN: ICD-10-CM

## 2024-06-18 LAB
ALBUMIN SERPL BCP-MCNC: 4.4 G/DL (ref 3.2–4.9)
ALBUMIN/GLOB SERPL: 1.3 G/DL
ALP SERPL-CCNC: 58 U/L (ref 30–99)
ALT SERPL-CCNC: 6 U/L (ref 2–50)
ANION GAP SERPL CALC-SCNC: 14 MMOL/L (ref 7–16)
APPEARANCE UR: CLEAR
AST SERPL-CCNC: 14 U/L (ref 12–45)
BASOPHILS # BLD AUTO: 0.1 % (ref 0–1.8)
BASOPHILS # BLD: 0.01 K/UL (ref 0–0.12)
BILIRUB SERPL-MCNC: 0.3 MG/DL (ref 0.1–1.5)
BILIRUB UR QL STRIP.AUTO: NEGATIVE
BUN SERPL-MCNC: 24 MG/DL (ref 8–22)
CALCIUM ALBUM COR SERPL-MCNC: 8.7 MG/DL (ref 8.5–10.5)
CALCIUM SERPL-MCNC: 9 MG/DL (ref 8.4–10.2)
CHLORIDE SERPL-SCNC: 103 MMOL/L (ref 96–112)
CO2 SERPL-SCNC: 21 MMOL/L (ref 20–33)
COLOR UR: YELLOW
CREAT SERPL-MCNC: 0.88 MG/DL (ref 0.5–1.4)
EKG IMPRESSION: NORMAL
EOSINOPHIL # BLD AUTO: 0.14 K/UL (ref 0–0.51)
EOSINOPHIL NFR BLD: 2.1 % (ref 0–6.9)
ERYTHROCYTE [DISTWIDTH] IN BLOOD BY AUTOMATED COUNT: 40.8 FL (ref 35.9–50)
GFR SERPLBLD CREATININE-BSD FMLA CKD-EPI: 99 ML/MIN/1.73 M 2
GLOBULIN SER CALC-MCNC: 3.5 G/DL (ref 1.9–3.5)
GLUCOSE SERPL-MCNC: 256 MG/DL (ref 65–99)
GLUCOSE UR STRIP.AUTO-MCNC: >=1000 MG/DL
HCT VFR BLD AUTO: 47.6 % (ref 42–52)
HGB BLD-MCNC: 17.3 G/DL (ref 14–18)
IMM GRANULOCYTES # BLD AUTO: 0.05 K/UL (ref 0–0.11)
IMM GRANULOCYTES NFR BLD AUTO: 0.7 % (ref 0–0.9)
KETONES UR STRIP.AUTO-MCNC: ABNORMAL MG/DL
LEUKOCYTE ESTERASE UR QL STRIP.AUTO: NEGATIVE
LIPASE SERPL-CCNC: 27 U/L (ref 11–82)
LYMPHOCYTES # BLD AUTO: 1.85 K/UL (ref 1–4.8)
LYMPHOCYTES NFR BLD: 27.1 % (ref 22–41)
MCH RBC QN AUTO: 32.5 PG (ref 27–33)
MCHC RBC AUTO-ENTMCNC: 36.3 G/DL (ref 32.3–36.5)
MCV RBC AUTO: 89.3 FL (ref 81.4–97.8)
MICRO URNS: ABNORMAL
MONOCYTES # BLD AUTO: 0.42 K/UL (ref 0–0.85)
MONOCYTES NFR BLD AUTO: 6.2 % (ref 0–13.4)
NEUTROPHILS # BLD AUTO: 4.35 K/UL (ref 1.82–7.42)
NEUTROPHILS NFR BLD: 63.8 % (ref 44–72)
NITRITE UR QL STRIP.AUTO: NEGATIVE
NRBC # BLD AUTO: 0 K/UL
NRBC BLD-RTO: 0 /100 WBC (ref 0–0.2)
PH UR STRIP.AUTO: 5.5 [PH] (ref 5–8)
PLATELET # BLD AUTO: 185 K/UL (ref 164–446)
PMV BLD AUTO: 9.2 FL (ref 9–12.9)
POTASSIUM SERPL-SCNC: 4 MMOL/L (ref 3.6–5.5)
PROT SERPL-MCNC: 7.9 G/DL (ref 6–8.2)
PROT UR QL STRIP: NEGATIVE MG/DL
RBC # BLD AUTO: 5.33 M/UL (ref 4.7–6.1)
RBC UR QL AUTO: NEGATIVE
SODIUM SERPL-SCNC: 138 MMOL/L (ref 135–145)
SP GR UR STRIP.AUTO: 1.01
WBC # BLD AUTO: 6.8 K/UL (ref 4.8–10.8)

## 2024-06-18 PROCEDURE — 99285 EMERGENCY DEPT VISIT HI MDM: CPT

## 2024-06-18 PROCEDURE — 700117 HCHG RX CONTRAST REV CODE 255: Performed by: STUDENT IN AN ORGANIZED HEALTH CARE EDUCATION/TRAINING PROGRAM

## 2024-06-18 PROCEDURE — 83690 ASSAY OF LIPASE: CPT

## 2024-06-18 PROCEDURE — 80053 COMPREHEN METABOLIC PANEL: CPT

## 2024-06-18 PROCEDURE — 85025 COMPLETE CBC W/AUTO DIFF WBC: CPT

## 2024-06-18 PROCEDURE — 700105 HCHG RX REV CODE 258: Performed by: STUDENT IN AN ORGANIZED HEALTH CARE EDUCATION/TRAINING PROGRAM

## 2024-06-18 PROCEDURE — 36415 COLL VENOUS BLD VENIPUNCTURE: CPT

## 2024-06-18 PROCEDURE — 81003 URINALYSIS AUTO W/O SCOPE: CPT

## 2024-06-18 PROCEDURE — 700111 HCHG RX REV CODE 636 W/ 250 OVERRIDE (IP): Mod: JZ | Performed by: STUDENT IN AN ORGANIZED HEALTH CARE EDUCATION/TRAINING PROGRAM

## 2024-06-18 PROCEDURE — 96374 THER/PROPH/DIAG INJ IV PUSH: CPT

## 2024-06-18 PROCEDURE — 74177 CT ABD & PELVIS W/CONTRAST: CPT

## 2024-06-18 PROCEDURE — 96376 TX/PRO/DX INJ SAME DRUG ADON: CPT

## 2024-06-18 PROCEDURE — 96375 TX/PRO/DX INJ NEW DRUG ADDON: CPT

## 2024-06-18 PROCEDURE — 93005 ELECTROCARDIOGRAM TRACING: CPT | Performed by: STUDENT IN AN ORGANIZED HEALTH CARE EDUCATION/TRAINING PROGRAM

## 2024-06-18 RX ORDER — ONDANSETRON 2 MG/ML
4 INJECTION INTRAMUSCULAR; INTRAVENOUS ONCE
Status: COMPLETED | OUTPATIENT
Start: 2024-06-18 | End: 2024-06-18

## 2024-06-18 RX ORDER — MORPHINE SULFATE 4 MG/ML
4 INJECTION INTRAVENOUS ONCE
Status: COMPLETED | OUTPATIENT
Start: 2024-06-18 | End: 2024-06-18

## 2024-06-18 RX ORDER — DICYCLOMINE HCL 20 MG
10 TABLET ORAL EVERY 6 HOURS
Qty: 30 TABLET | Refills: 0 | Status: SHIPPED | OUTPATIENT
Start: 2024-06-18

## 2024-06-18 RX ORDER — SODIUM CHLORIDE, SODIUM LACTATE, POTASSIUM CHLORIDE, CALCIUM CHLORIDE 600; 310; 30; 20 MG/100ML; MG/100ML; MG/100ML; MG/100ML
1000 INJECTION, SOLUTION INTRAVENOUS ONCE
Status: COMPLETED | OUTPATIENT
Start: 2024-06-18 | End: 2024-06-18

## 2024-06-18 RX ORDER — ONDANSETRON 4 MG/1
4 TABLET, ORALLY DISINTEGRATING ORAL EVERY 6 HOURS PRN
Qty: 10 TABLET | Refills: 0 | Status: SHIPPED | OUTPATIENT
Start: 2024-06-18

## 2024-06-18 RX ORDER — OMEPRAZOLE 40 MG/1
40 CAPSULE, DELAYED RELEASE ORAL DAILY
Qty: 14 CAPSULE | Refills: 0 | Status: SHIPPED | OUTPATIENT
Start: 2024-06-18 | End: 2024-07-02

## 2024-06-18 RX ADMIN — MORPHINE SULFATE 4 MG: 4 INJECTION, SOLUTION INTRAMUSCULAR; INTRAVENOUS at 18:19

## 2024-06-18 RX ADMIN — ONDANSETRON 4 MG: 2 INJECTION INTRAMUSCULAR; INTRAVENOUS at 17:18

## 2024-06-18 RX ADMIN — MORPHINE SULFATE 4 MG: 4 INJECTION, SOLUTION INTRAMUSCULAR; INTRAVENOUS at 17:19

## 2024-06-18 RX ADMIN — SODIUM CHLORIDE, POTASSIUM CHLORIDE, SODIUM LACTATE AND CALCIUM CHLORIDE 1000 ML: 600; 310; 30; 20 INJECTION, SOLUTION INTRAVENOUS at 17:17

## 2024-06-18 RX ADMIN — IOHEXOL 100 ML: 350 INJECTION, SOLUTION INTRAVENOUS at 17:55

## 2024-06-18 ASSESSMENT — FIBROSIS 4 INDEX: FIB4 SCORE: 0.93

## 2024-06-18 NOTE — ED TRIAGE NOTES
"Chief Complaint   Patient presents with    Abdominal Pain     RUQ abd pain x 2 weeks, progressively worsened over the last 2 weeks, and much worse today \"today it had me doubled over in pain.\"  +nausea w/o vomiting, tactile fevers today. Hx cholecystectomy.      BP (!) 148/93   Pulse (!) 109   Temp 36.6 °C (97.8 °F) (Temporal)   Resp 18   Ht 1.727 m (5' 8\")   Wt 95 kg (209 lb 7 oz)   SpO2 94%   BMI 31.84 kg/m²     "

## 2024-06-18 NOTE — ED TRIAGE NOTES
"Chief Complaint   Patient presents with    Abdominal Pain     RUQ abd pain x 2 weeks, progressively worsened over the last 2 weeks, and much worse today \"today it had me doubled over in pain.\"  +nausea w/o vomiting, tactile fevers today. Hx cholecystectomy.      BP (!) 148/93   Pulse (!) 109   Temp 36.6 °C (97.8 °F) (Temporal)   Resp 18   Ht 1.727 m (5' 8\")   Wt 95 kg (209 lb 7 oz)   SpO2 94%   BMI 31.84 kg/m²     Pt has DM2 and states blood sugars have been normal <200s.    "

## 2024-06-18 NOTE — ED PROVIDER NOTES
"ED Provider Note    CHIEF COMPLAINT  Chief Complaint   Patient presents with    Abdominal Pain     RUQ abd pain x 2 weeks, progressively worsened over the last 2 weeks, and much worse today \"today it had me doubled over in pain.\"  +nausea w/o vomiting, tactile fevers today. Hx cholecystectomy.        EXTERNAL RECORDS REVIEWED  Inpatient Notes patient was discharged from the hospital 5/12/2022 after admission with abdominal pain, polyuria, arthralgias.  Patient was found to be in DKA and admitted to the intensive care unit.    HPI/ROS  LIMITATION TO HISTORY   Select: : None      Mahesh Bradshaw is a 59 y.o. male who presents to the emergency department for evaluation of abdominal pain.  Patient reports slightly under 2 weeks of pain in his right upper flank area extending to his right upper quadrant and epigastrium.  Pain has been progressively worsening, waxes and wanes but as of this afternoon is a constant severe sensation described as a sharp stabbing pain.  He states this afternoon he felt like he was doubled over due to the severity.  He reports nausea but denies vomiting or diarrhea or constipation.  He denies any urinary symptoms.  He reports subjective fevers but denies chills.  He reports that he has had pancreatitis 10 years ago, prior kidney stones but this does not feel similar and has had his gallbladder removed previously.    PAST MEDICAL HISTORY   has a past medical history of Abscess of left thumb (12/9/2017), Acute hyponatremia (7/9/2017), Acute respiratory failure (HCC) (11/17/2020), ADRIANE (acute kidney injury) ([facility]) (2/24/2016), Anesthesia, Arthritis (3-3-17), Aspiration pneumonia ([facility]) (3-2016), ASTHMA (3-3-17), Breath shortness (3-3-17), Cellulitis of left hand (12/9/2017), Closed fracture of sternum (5/19/2018), Complicated wound infection (4/19/2017), Congestive heart failure (HCC) (2-2016 ), COVID-19 virus infection (11/17/2020), Dental disorder, Depression (11/26/2014), Diabetes ([facility]) " (3-3-17), Diabetes ([facility]), Difficult intubation (2-2016), DKA (diabetic ketoacidoses) (2-2016), Electrolyte imbalance (2-2016), Elevated LFTs, Elevated liver enzymes (2-2016), Encephalopathy (2/25/2016), Essential hypertension (1/22/2016), Gout, Heart burn, HHNC (hyperglycemic hyperosmolar nonketotic coma) ([facility]) (2/24/2016), High cholesterol (3-3-17), Hypertriglyceridemia - severe (1/3/2019), Hypothyroid (3-3-17), Incarcerated umbilical hernia (11/6/2016), Indigestion, Kidney stones, Klebsiella infect, Migraine, MRSA cellulitis, Necrotizing myositis, On mechanically assisted ventilation ([facility]) (2-2016), Pain (3-3-17), Pancreatitis (2-2016), RF (renal failure) (2-2016), Sepsis ([facility]) (1/21/2016), Serum phosphate elevated (3/7/2016), Severe sepsis ([facility]) (4/29/2017), Snoring (3-3-17), Streptococcus infection, UC (ulcerative colitis) ([facility]) (3-3-17), Urinary incontinence, and Vitamin D deficiency.    SURGICAL HISTORY   has a past surgical history that includes vaishnavi by laparoscopy (2005); colonoscopy with biopsy (11/26/2014); incision and drainage general (4/7/2017); irrigation & debridement general (Right, 4/29/2017); irrigation & debridement general (Right, 5/1/2017); other orthopedic surgery (1997 or 1998); umbilical hernia repair (N/A, 11/6/2016); umbilical hernia repair (3/10/2017); irrigation & debridement ortho (Left, 12/10/2017); and umbilical hernia repair (N/A, 4/15/2018).    FAMILY HISTORY  Family History   Problem Relation Age of Onset    Cancer Mother        SOCIAL HISTORY  Social History     Tobacco Use    Smoking status: Never    Smokeless tobacco: Never   Vaping Use    Vaping status: Never Used   Substance and Sexual Activity    Alcohol use: Yes     Comment: rare    Drug use: No    Sexual activity: Not on file       CURRENT MEDICATIONS  Home Medications       Reviewed by Darleen Sherman (Pharmacy Tech) on 06/18/24 at 1717  Med List Status: Complete     Medication Last Dose Status   Blood Glucose Meter  "Kit Unknown Active   Empagliflozin (JARDIANCE) 25 MG Tab 6/18/2024 Active   ibuprofen (MOTRIN) 200 MG Tab 6/18/2024 Active   Insulin Pen Needle 32 G x 4 mm Unknown Active   metformin (GLUCOPHAGE) 1000 MG tablet 6/18/2024 Active   Misc Natural Products (GLUCOS-CHONDROIT-MSM COMPLEX PO) 6/17/2024 Active   Multiple Vitamins-Minerals (MENS MULTIVITAMIN) Tab 6/18/2024 Active   ondansetron (ZOFRAN ODT) 4 MG TABLET DISPERSIBLE > 1 month Active   pioglitazone (ACTOS) 30 MG Tab 6/18/2024 Active   rosuvastatin (CRESTOR) 5 MG Tab 6/17/2024 Active   Semaglutide, 1 MG/DOSE, (OZEMPIC, 1 MG/DOSE,) 4 MG/3ML Solution Pen-injector 6/17/2024 Active   tadalafil (CIALIS) 5 MG tablet > 2 weeks Active   testosterone cypionate (DEPO-TESTOSTERONE) 200 MG/ML Solution injection > 2 weeks Active   thyroid (ARMOUR THYROID) 60 MG Tab Not Taking Active                  Audit from Redirected Encounters    **Home medications have not yet been reviewed for this encounter**         ALLERGIES  Allergies   Allergen Reactions    Hydrocodone-Acetaminophen Rash     Tolerates oxycodone and morphine    Peanut (Diagnostic) Anaphylaxis    Peanut Oil Anaphylaxis    Tradjenta [Linagliptin] Unspecified     Pt developed pancreatitis    Hydrocodone Hives     Tolerates Oxycodone/hydromorphone      Morphine Itching       PHYSICAL EXAM  VITAL SIGNS: BP (!) 148/93   Pulse (!) 109   Temp 36.6 °C (97.8 °F) (Temporal)   Resp 18   Ht 1.727 m (5' 8\")   Wt 95 kg (209 lb 7 oz)   SpO2 94%   BMI 31.84 kg/m²    Constitutional: Pleasant, uncomfortable appearing  HEENT: Atraumatic, normocephalic, pupils are equal round reactive to light, nose normal, mouth shows moist mucous membranes  Neck: Supple, no JVD, no tracheal deviation  Cardiovascular: Tachycardic, regular, no murmur, rub or gallop, 2+ pulses peripherally x4  Thorax & Lungs: No respiratory distress, no wheezes, rales or rhonchi, no chest wall tenderness.  GI: Soft, mildly distended, tender in the right upper " quadrant and epigastrium with guarding, no rebound.  Multiple abdominal surgical scars present.  Skin: Warm, dry, no acute rash or lesion  Musculoskeletal: Moving all extremities, no acute deformity, no edema, no tenderness  Neurologic: A&Ox3, at baseline mentation, cranial nerves II through XII are grossly intact, no sensory deficit, no ataxia  Psychiatric: Appropriate affect for situation at this time        EKG/LABS  Labs Reviewed   COMP METABOLIC PANEL - Abnormal; Notable for the following components:       Result Value    Glucose 256 (*)     Bun 24 (*)     All other components within normal limits   URINALYSIS - Abnormal; Notable for the following components:    Glucose >=1000 (*)     Ketones Trace (*)     All other components within normal limits   CBC WITH DIFFERENTIAL   LIPASE   ESTIMATED GFR     Results for orders placed or performed during the hospital encounter of 24   EKG   Result Value Ref Range    Report       [facility] Emergency Dept.    Test Date:  2024  Pt Name:    DEVIN MO              Department: [facility]  MRN:        [id number]                      Room:  Gender:     Male                         Technician: [id number]  :        1965                   Requested By:ER TRIAGE PROTOCOL  Order #:    [id number]                    Reading MD: FARRUKH SIEGEL MD    Measurements  Intervals                                Axis  Rate:       115                          P:          62  AL:         129                          QRS:        16  QRSD:       81                           T:          28  QT:         309  QTc:        428    Interpretive Statements  Twelve-lead EKG shows a sinus tachycardia with a ventricular to 115, normal  QRS, normal intervals, no ST segment elevation or depression, normal T waves.  Overall no acute ischemic change  Electronically Signed On 2024 11:01:09 PST by FARRUKH SIEGEL MD         I have independently interpreted this  EKG    RADIOLOGY/PROCEDURES   I have independently interpreted the diagnostic imaging associated with this visit and am waiting the final reading from the radiologist.   My preliminary interpretation is as follows: CT scan images demonstrate no evidence of bowel obstruction, no peripancreatic inflammation.    Radiologist interpretation:  CT-ABDOMEN-PELVIS WITH   Final Result      1.  Mild wall thickening of jejunum small bowel without surrounding fat stranding. Equivocal for mild jejunal enteritis.   2.  There is some fecalization in ileum small bowel. No convincing bowel obstruction. Mild fecal loading in the colon.          COURSE & MEDICAL DECISION MAKING    ASSESSMENT, COURSE AND PLAN  Care Narrative:     Patient presents to the ER for evaluation of abdominal pain.  History of prior cholecystectomy, recurrent kidney stone, pancreatitis.  On Ozempic.  Having bowel movements and continuing to pass gas with no vomiting.  Doubt bowel obstruction the patient certainly is at risk given his multiple abdominal surgeries previously.  Suspect pancreatitis versus choledocholithiasis versus peptic ulcer disease in the setting of heavy ibuprofen use recently.  Will plan for lab work, CT scan abdomen pelvis with contrast to further assess.  Will provide IV fluids, morphine and Zofran for symptom control.  Will screen for atypical ACS with EKG.    Workup quite reassuring.  No significant leukocytosis or anemia.  Urinalysis without evidence of infection.  Normal lipase argues against pancreatitis and CMP without evidence suggestive of biliary obstruction, hepatic injury or inflammation.  CT scan with findings suggestive of jejunal enteritis and constipation.    On reassessment patient reports his symptoms have improved.  Discussed reassuring workup as well as my recommendation that we attempt treatment with a PPI for 2 weeks as he may have developed a stomach ulcer in the setting of ibuprofen use which we would otherwise not  have been able to see on his workup today.  Also recommend Bentyl and Zofran at home, primary care follow-up.  He is comfortable with this plan.  Return precautions discussed and all questions answered he was discharged stable condition.    Hydration: Based on the patient's presentation of Dehydration the patient was given IV fluids. IV Hydration was used because oral hydration was not adequate alone. Upon recheck following hydration, the patient was improved.          ADDITIONAL PROBLEMS MANAGED  None    DISPOSITION AND DISCUSSIONS  I have discussed management of the patient with the following physicians and CARLOS's: None    Discussion of management with other QHP or appropriate source(s): None     Escalation of care considered, and ultimately not performed:acute inpatient care management, however at this time, the patient is most appropriate for outpatient management    Decision tools and prescription drugs considered including, but not limited to: Pain Medications morphine, Zofran .    FINAL DIAGNOSIS  1. Enteritis    2. Right upper quadrant abdominal pain           Electronically signed by: Nithin Flynn M.D., 6/18/2024 4:50 PM

## 2024-06-19 NOTE — DISCHARGE INSTRUCTIONS
As we discussed you can take half a tablet of Bentyl every 6 hours for bowel spasms.  You can take Zofran every 6-8 hours for nausea.  Begin taking omeprazole daily at least 2 hours before your first meal.  Follow-up with your doctor for recheck.

## 2024-06-19 NOTE — ED NOTES
Medication history reviewed with pt. Med rec is complete.  Allergies reviewed, per pt    Pt reports that he was taken off his ARMOUR THYROID 60MG about 3 months ago.    Patient has not had any outpatient antibiotics in the last 30 days.    Pt is not on any anticoagulants

## 2024-06-30 DIAGNOSIS — E11.00 HYPEROSMOLAR HYPERGLYCEMIC STATE (HHS) (HCC): ICD-10-CM

## 2024-07-02 RX ORDER — PIOGLITAZONEHYDROCHLORIDE 30 MG/1
30 TABLET ORAL DAILY
Qty: 30 TABLET | Refills: 1 | Status: SHIPPED | OUTPATIENT
Start: 2024-07-02

## 2024-07-24 ENCOUNTER — TELEPHONE (OUTPATIENT)
Dept: ENDOCRINOLOGY | Facility: MEDICAL CENTER | Age: 59
End: 2024-07-24
Payer: COMMERCIAL

## 2024-07-29 NOTE — DISCHARGE PLANNING
Anticipated Discharge Disposition: home    Action: contacted by patient with request to send ER visit notes to Dr Schumacher office for continuity of care for 12.9.24 follow up appt with NP. Records sent to to  as requested.    Barriers to Discharge: none    Plan: no further eR CM needs

## 2024-07-30 ENCOUNTER — HOSPITAL ENCOUNTER (OUTPATIENT)
Dept: LAB | Facility: MEDICAL CENTER | Age: 59
End: 2024-07-30
Attending: INTERNAL MEDICINE
Payer: COMMERCIAL

## 2024-07-30 ENCOUNTER — HOSPITAL ENCOUNTER (OUTPATIENT)
Dept: LAB | Facility: MEDICAL CENTER | Age: 59
End: 2024-07-30
Attending: EMERGENCY MEDICINE
Payer: COMMERCIAL

## 2024-07-30 DIAGNOSIS — R30.0 DYSURIA: ICD-10-CM

## 2024-07-30 DIAGNOSIS — Z79.84 LONG TERM (CURRENT) USE OF ORAL HYPOGLYCEMIC DRUGS: ICD-10-CM

## 2024-07-30 DIAGNOSIS — E78.5 DYSLIPIDEMIA: ICD-10-CM

## 2024-07-30 DIAGNOSIS — E55.9 VITAMIN D DEFICIENCY: ICD-10-CM

## 2024-07-30 DIAGNOSIS — E11.65 UNCONTROLLED TYPE 2 DIABETES MELLITUS WITH HYPERGLYCEMIA (HCC): ICD-10-CM

## 2024-07-30 DIAGNOSIS — E29.1 HYPOGONADISM IN MALE: ICD-10-CM

## 2024-07-30 DIAGNOSIS — E03.9 ACQUIRED HYPOTHYROIDISM: ICD-10-CM

## 2024-07-30 LAB
25(OH)D3 SERPL-MCNC: 27 NG/ML (ref 30–100)
25(OH)D3 SERPL-MCNC: 27 NG/ML (ref 30–100)
ALBUMIN SERPL BCP-MCNC: 4.4 G/DL (ref 3.2–4.9)
ALBUMIN SERPL BCP-MCNC: 4.5 G/DL (ref 3.2–4.9)
ALBUMIN/GLOB SERPL: 1.2 G/DL
ALBUMIN/GLOB SERPL: 1.4 G/DL
ALP SERPL-CCNC: 74 U/L (ref 30–99)
ALP SERPL-CCNC: 76 U/L (ref 30–99)
ALT SERPL-CCNC: 16 U/L (ref 2–50)
ALT SERPL-CCNC: 16 U/L (ref 2–50)
ANION GAP SERPL CALC-SCNC: 16 MMOL/L (ref 7–16)
ANION GAP SERPL CALC-SCNC: 17 MMOL/L (ref 7–16)
APPEARANCE UR: CLEAR
AST SERPL-CCNC: 14 U/L (ref 12–45)
AST SERPL-CCNC: 15 U/L (ref 12–45)
BASOPHILS # BLD AUTO: 0.4 % (ref 0–1.8)
BASOPHILS # BLD: 0.02 K/UL (ref 0–0.12)
BILIRUB SERPL-MCNC: 0.4 MG/DL (ref 0.1–1.5)
BILIRUB SERPL-MCNC: 0.4 MG/DL (ref 0.1–1.5)
BILIRUB UR QL STRIP.AUTO: NEGATIVE
BUN SERPL-MCNC: 23 MG/DL (ref 8–22)
BUN SERPL-MCNC: 23 MG/DL (ref 8–22)
CALCIUM ALBUM COR SERPL-MCNC: 9.6 MG/DL (ref 8.5–10.5)
CALCIUM ALBUM COR SERPL-MCNC: 9.8 MG/DL (ref 8.5–10.5)
CALCIUM SERPL-MCNC: 10 MG/DL (ref 8.5–10.5)
CALCIUM SERPL-MCNC: 10.1 MG/DL (ref 8.5–10.5)
CHLORIDE SERPL-SCNC: 101 MMOL/L (ref 96–112)
CHLORIDE SERPL-SCNC: 101 MMOL/L (ref 96–112)
CHOLEST SERPL-MCNC: 270 MG/DL (ref 100–199)
CHOLEST SERPL-MCNC: 286 MG/DL (ref 100–199)
CO2 SERPL-SCNC: 21 MMOL/L (ref 20–33)
CO2 SERPL-SCNC: 21 MMOL/L (ref 20–33)
COLOR UR: YELLOW
CORTIS SERPL-MCNC: 18.7 UG/DL (ref 0–23)
CREAT SERPL-MCNC: 0.83 MG/DL (ref 0.5–1.4)
CREAT SERPL-MCNC: 0.86 MG/DL (ref 0.5–1.4)
CREAT UR-MCNC: 74.16 MG/DL
CRP SERPL HS-MCNC: <0.3 MG/DL (ref 0–0.75)
DHEA-S SERPL-MCNC: 95.5 UG/DL (ref 51.7–295)
EOSINOPHIL # BLD AUTO: 0.11 K/UL (ref 0–0.51)
EOSINOPHIL NFR BLD: 2.1 % (ref 0–6.9)
ERYTHROCYTE [DISTWIDTH] IN BLOOD BY AUTOMATED COUNT: 45.1 FL (ref 35.9–50)
EST. AVERAGE GLUCOSE BLD GHB EST-MCNC: 263 MG/DL
ESTRADIOL SERPL-MCNC: 33.5 PG/ML
FERRITIN SERPL-MCNC: 609 NG/ML (ref 22–322)
GFR SERPLBLD CREATININE-BSD FMLA CKD-EPI: 100 ML/MIN/1.73 M 2
GFR SERPLBLD CREATININE-BSD FMLA CKD-EPI: 101 ML/MIN/1.73 M 2
GLOBULIN SER CALC-MCNC: 3.2 G/DL (ref 1.9–3.5)
GLOBULIN SER CALC-MCNC: 3.6 G/DL (ref 1.9–3.5)
GLUCOSE SERPL-MCNC: 207 MG/DL (ref 65–99)
GLUCOSE SERPL-MCNC: 213 MG/DL (ref 65–99)
GLUCOSE UR STRIP.AUTO-MCNC: >=1000 MG/DL
HBA1C MFR BLD: 10.8 % (ref 4–5.6)
HCT VFR BLD AUTO: 45.8 % (ref 42–52)
HCT VFR BLD AUTO: 46.5 % (ref 42–52)
HCYS SERPL-SCNC: 14.56 UMOL/L
HDLC SERPL-MCNC: 39 MG/DL
HDLC SERPL-MCNC: 40 MG/DL
HGB BLD-MCNC: 16.6 G/DL (ref 14–18)
HGB BLD-MCNC: 16.7 G/DL (ref 14–18)
IMM GRANULOCYTES # BLD AUTO: 0.04 K/UL (ref 0–0.11)
IMM GRANULOCYTES NFR BLD AUTO: 0.7 % (ref 0–0.9)
IRON SATN MFR SERPL: 36 % (ref 15–55)
IRON SERPL-MCNC: 123 UG/DL (ref 50–180)
KETONES UR STRIP.AUTO-MCNC: ABNORMAL MG/DL
LDLC SERPL CALC-MCNC: ABNORMAL MG/DL
LDLC SERPL CALC-MCNC: ABNORMAL MG/DL
LEUKOCYTE ESTERASE UR QL STRIP.AUTO: NEGATIVE
LYMPHOCYTES # BLD AUTO: 1.53 K/UL (ref 1–4.8)
LYMPHOCYTES NFR BLD: 28.6 % (ref 22–41)
MCH RBC QN AUTO: 32.7 PG (ref 27–33)
MCHC RBC AUTO-ENTMCNC: 35.9 G/DL (ref 32.3–36.5)
MCV RBC AUTO: 91 FL (ref 81.4–97.8)
MICRO URNS: ABNORMAL
MICROALBUMIN UR-MCNC: <1.2 MG/DL
MICROALBUMIN/CREAT UR: NORMAL MG/G (ref 0–30)
MONOCYTES # BLD AUTO: 0.35 K/UL (ref 0–0.85)
MONOCYTES NFR BLD AUTO: 6.5 % (ref 0–13.4)
NEUTROPHILS # BLD AUTO: 3.3 K/UL (ref 1.82–7.42)
NEUTROPHILS NFR BLD: 61.7 % (ref 44–72)
NITRITE UR QL STRIP.AUTO: NEGATIVE
NRBC # BLD AUTO: 0 K/UL
NRBC BLD-RTO: 0 /100 WBC (ref 0–0.2)
PH UR STRIP.AUTO: 5.5 [PH] (ref 5–8)
PLATELET # BLD AUTO: 193 K/UL (ref 164–446)
PMV BLD AUTO: 10.4 FL (ref 9–12.9)
POTASSIUM SERPL-SCNC: 4.3 MMOL/L (ref 3.6–5.5)
POTASSIUM SERPL-SCNC: 4.3 MMOL/L (ref 3.6–5.5)
PROLACTIN SERPL-MCNC: 10.3 NG/ML (ref 2.1–17.7)
PROT SERPL-MCNC: 7.7 G/DL (ref 6–8.2)
PROT SERPL-MCNC: 8 G/DL (ref 6–8.2)
PROT UR QL STRIP: NEGATIVE MG/DL
PSA SERPL-MCNC: 0.69 NG/ML (ref 0–4)
PSA SERPL-MCNC: 0.72 NG/ML (ref 0–4)
RBC # BLD AUTO: 5.11 M/UL (ref 4.7–6.1)
RBC UR QL AUTO: NEGATIVE
SODIUM SERPL-SCNC: 138 MMOL/L (ref 135–145)
SODIUM SERPL-SCNC: 139 MMOL/L (ref 135–145)
SP GR UR STRIP.AUTO: >=1.045
T3FREE SERPL-MCNC: 2.62 PG/ML (ref 2–4.4)
T4 FREE SERPL-MCNC: 1.02 NG/DL (ref 0.93–1.7)
TIBC SERPL-MCNC: 337 UG/DL (ref 250–450)
TRIGL SERPL-MCNC: 464 MG/DL (ref 0–149)
TRIGL SERPL-MCNC: 478 MG/DL (ref 0–149)
TSH SERPL-ACNC: 2.74 UIU/ML (ref 0.35–5.5)
TSH SERPL-ACNC: 2.76 UIU/ML (ref 0.35–5.5)
UIBC SERPL-MCNC: 214 UG/DL (ref 110–370)
UROBILINOGEN UR STRIP.AUTO-MCNC: 0.2 MG/DL
WBC # BLD AUTO: 5.4 K/UL (ref 4.8–10.8)

## 2024-07-30 PROCEDURE — 84402 ASSAY OF FREE TESTOSTERONE: CPT | Mod: 91

## 2024-07-30 PROCEDURE — 83525 ASSAY OF INSULIN: CPT

## 2024-07-30 PROCEDURE — 80061 LIPID PANEL: CPT | Mod: 91

## 2024-07-30 PROCEDURE — 84146 ASSAY OF PROLACTIN: CPT

## 2024-07-30 PROCEDURE — 84305 ASSAY OF SOMATOMEDIN: CPT

## 2024-07-30 PROCEDURE — 82627 DEHYDROEPIANDROSTERONE: CPT

## 2024-07-30 PROCEDURE — 36415 COLL VENOUS BLD VENIPUNCTURE: CPT

## 2024-07-30 PROCEDURE — 83090 ASSAY OF HOMOCYSTEINE: CPT

## 2024-07-30 PROCEDURE — 82043 UR ALBUMIN QUANTITATIVE: CPT

## 2024-07-30 PROCEDURE — 85018 HEMOGLOBIN: CPT

## 2024-07-30 PROCEDURE — 82570 ASSAY OF URINE CREATININE: CPT

## 2024-07-30 PROCEDURE — 82728 ASSAY OF FERRITIN: CPT

## 2024-07-30 PROCEDURE — 82670 ASSAY OF TOTAL ESTRADIOL: CPT

## 2024-07-30 PROCEDURE — 80053 COMPREHEN METABOLIC PANEL: CPT

## 2024-07-30 PROCEDURE — 84481 FREE ASSAY (FT-3): CPT

## 2024-07-30 PROCEDURE — 84270 ASSAY OF SEX HORMONE GLOBUL: CPT

## 2024-07-30 PROCEDURE — 84439 ASSAY OF FREE THYROXINE: CPT

## 2024-07-30 PROCEDURE — 84270 ASSAY OF SEX HORMONE GLOBUL: CPT | Mod: 91

## 2024-07-30 PROCEDURE — 84403 ASSAY OF TOTAL TESTOSTERONE: CPT | Mod: 91

## 2024-07-30 PROCEDURE — 80053 COMPREHEN METABOLIC PANEL: CPT | Mod: 91

## 2024-07-30 PROCEDURE — 84443 ASSAY THYROID STIM HORMONE: CPT | Mod: 91

## 2024-07-30 PROCEDURE — 83550 IRON BINDING TEST: CPT

## 2024-07-30 PROCEDURE — 84443 ASSAY THYROID STIM HORMONE: CPT

## 2024-07-30 PROCEDURE — 85014 HEMATOCRIT: CPT

## 2024-07-30 PROCEDURE — 82306 VITAMIN D 25 HYDROXY: CPT

## 2024-07-30 PROCEDURE — 84403 ASSAY OF TOTAL TESTOSTERONE: CPT

## 2024-07-30 PROCEDURE — 82306 VITAMIN D 25 HYDROXY: CPT | Mod: 91

## 2024-07-30 PROCEDURE — 84402 ASSAY OF FREE TESTOSTERONE: CPT

## 2024-07-30 PROCEDURE — 80061 LIPID PANEL: CPT

## 2024-07-30 PROCEDURE — 86140 C-REACTIVE PROTEIN: CPT

## 2024-07-30 PROCEDURE — 81003 URINALYSIS AUTO W/O SCOPE: CPT

## 2024-07-30 PROCEDURE — 84153 ASSAY OF PSA TOTAL: CPT

## 2024-07-30 PROCEDURE — 83036 HEMOGLOBIN GLYCOSYLATED A1C: CPT

## 2024-07-30 PROCEDURE — 82533 TOTAL CORTISOL: CPT

## 2024-07-30 PROCEDURE — 85025 COMPLETE CBC W/AUTO DIFF WBC: CPT

## 2024-07-30 PROCEDURE — 83540 ASSAY OF IRON: CPT

## 2024-07-30 PROCEDURE — 82172 ASSAY OF APOLIPOPROTEIN: CPT

## 2024-08-01 LAB
APO B100 SERPL-MCNC: 153 MG/DL (ref 66–133)
INSULIN P FAST SERPL-ACNC: 33 UIU/ML (ref 3–25)
SHBG SERPL-SCNC: 24 NMOL/L (ref 19–76)
SHBG SERPL-SCNC: 25 NMOL/L (ref 19–76)
TESTOST FREE MFR SERPL: 2.1 % (ref 1.6–2.9)
TESTOST FREE MFR SERPL: 2.1 % (ref 1.6–2.9)
TESTOST FREE SERPL-MCNC: 57 PG/ML (ref 47–244)
TESTOST FREE SERPL-MCNC: 58 PG/ML (ref 47–244)
TESTOST SERPL-MCNC: 274 NG/DL (ref 300–890)
TESTOST SERPL-MCNC: 283 NG/DL (ref 300–890)

## 2024-08-02 LAB
IGF-I SERPL-MCNC: 132 NG/ML (ref 55–203)
IGF-I Z-SCORE SERPL: 0.3

## 2024-08-07 LAB — T4 FREE SERPL DIALY-MCNC: 1.8 NG/DL (ref 1.1–2.4)

## 2024-08-26 DIAGNOSIS — E11.00 HYPEROSMOLAR HYPERGLYCEMIC STATE (HHS) (HCC): ICD-10-CM

## 2024-08-29 ENCOUNTER — TELEPHONE (OUTPATIENT)
Dept: HEALTH INFORMATION MANAGEMENT | Facility: OTHER | Age: 59
End: 2024-08-29
Payer: COMMERCIAL

## 2025-05-10 ENCOUNTER — OFFICE VISIT (OUTPATIENT)
Dept: URGENT CARE | Facility: CLINIC | Age: 60
End: 2025-05-10
Payer: COMMERCIAL

## 2025-05-10 VITALS
TEMPERATURE: 97.6 F | DIASTOLIC BLOOD PRESSURE: 82 MMHG | BODY MASS INDEX: 31.07 KG/M2 | HEART RATE: 102 BPM | HEIGHT: 68 IN | SYSTOLIC BLOOD PRESSURE: 148 MMHG | RESPIRATION RATE: 20 BRPM | OXYGEN SATURATION: 97 % | WEIGHT: 205 LBS

## 2025-05-10 DIAGNOSIS — J01.90 ACUTE BACTERIAL SINUSITIS: ICD-10-CM

## 2025-05-10 DIAGNOSIS — B96.89 ACUTE BACTERIAL SINUSITIS: ICD-10-CM

## 2025-05-10 PROCEDURE — 99213 OFFICE O/P EST LOW 20 MIN: CPT

## 2025-05-10 PROCEDURE — 3079F DIAST BP 80-89 MM HG: CPT

## 2025-05-10 PROCEDURE — 3077F SYST BP >= 140 MM HG: CPT

## 2025-05-10 ASSESSMENT — ENCOUNTER SYMPTOMS
SINUS PAIN: 1
COUGH: 1
FEVER: 0

## 2025-05-10 ASSESSMENT — FIBROSIS 4 INDEX: FIB4 SCORE: 1.17

## 2025-05-10 NOTE — PROGRESS NOTES
Verbal consent was acquired by the patient to use [employer] ambient listening note generation during this visit   Subjective:   Mahesh Bradshaw is a 60 y.o. male who presents for URI (Sinus, cough, ear plugged x 2 weeks)      HPI:  History of Present Illness  The patient is a 60-year-old male who presents for evaluation of an upper respiratory infection.    He reports symptoms of cough, sinus drainage, ear congestion, and expectoration of yellow phlegm. These symptoms began approximately 2 weeks ago and have progressively worsened over the past few days following his return from [place]. He has been exposed to multiple patients with pneumonia over the past 2 weeks. He also reports a sensation of fullness in one side of his head upon waking this morning, which he attributes to sleeping on that side. He has not experienced any fevers or headaches. Despite self-administration of Benadryl, Flonase, and Allegra, his symptoms have not improved. He has a past medical history of sinus infections.       Review of Systems   Constitutional:  Negative for fever.   HENT:  Positive for congestion and sinus pain.    Respiratory:  Positive for cough.        Medications:    Current Outpatient Medications on File Prior to Visit   Medication Sig Dispense Refill    pioglitazone (ACTOS) 30 MG Tab TAKE 1 TABLET BY MOUTH DAILY 30 Tablet 1    metformin (GLUCOPHAGE) 1000 MG tablet TAKE 1 TABLET BY MOUTH TWICE A DAY WITH MEALS 60 Tablet 1    Misc Natural Products (GLUCOS-CHONDROIT-MSM COMPLEX PO) Take 1 Tablet by mouth every evening.      ondansetron (ZOFRAN ODT) 4 MG TABLET DISPERSIBLE Take 1 Tablet by mouth every 6 hours as needed for Nausea/Vomiting. 10 Tablet 0    Empagliflozin (JARDIANCE) 25 MG Tab Take 1 Tablet by mouth every day. 90 Tablet 2    ondansetron (ZOFRAN ODT) 4 MG TABLET DISPERSIBLE Take 1 Tablet by mouth every 6 hours as needed for Nausea/Vomiting. 10 Tablet 0    testosterone cypionate (DEPO-TESTOSTERONE) 200 MG/ML  "Solution injection Inject 100 mg into the shoulder, thigh, or buttocks see administration instructions. Twice a week 100 mg, on Monday and Thur      Insulin Pen Needle 32 G x 4 mm Use one pen needle in pen device to inject insulin once daily. 100 Each 0    tadalafil (CIALIS) 5 MG tablet Take 5 mg by mouth every evening.      Multiple Vitamins-Minerals (MENS MULTIVITAMIN) Tab Take 1 Package by mouth every day. \"[facility]\"      Blood Glucose Meter Kit Test blood sugar as recommended by provider. True Metrix blood glucose monitoring kit. 1 Kit 0    dicyclomine (BENTYL) 20 MG Tab Take 0.5 Tablets by mouth every 6 hours. (Patient not taking: Reported on 5/10/2025) 30 Tablet 0    Semaglutide, 1 MG/DOSE, (OZEMPIC, 1 MG/DOSE,) 4 MG/3ML Solution Pen-injector Inject 1 mg under the skin every 7 days. (Patient not taking: Reported on 5/10/2025) 9 mL 1    ibuprofen (MOTRIN) 200 MG Tab Take 600 mg by mouth every 6 hours as needed. Indications: Pain (Patient not taking: Reported on 5/10/2025)      rosuvastatin (CRESTOR) 5 MG Tab Take 1 Tablet by mouth every evening. (Patient not taking: Reported on 5/10/2025) 90 Tablet 3    thyroid (ARMOUR THYROID) 60 MG Tab Take 1 Tablet by mouth every morning. (Patient not taking: Reported on 6/18/2024) 90 Tablet 1     No current facility-administered medications on file prior to visit.        Allergies:   Hydrocodone-acetaminophen, Peanut (diagnostic), Peanut oil, Tradjenta [linagliptin], Hydrocodone, and Morphine    Problem List:   Patient Active Problem List   Diagnosis    Gout    GERD (gastroesophageal reflux disease)    Chronic ulcerative colitis (CMS-HCC)    Back pain    Normocytic anemia    Obesity (BMI 30.0-34.9)    Uncontrolled type 2 diabetes mellitus with hyperglycemia ([facility])    History of nephrolithiasis    Insomnia disorder    Hypothyroidism    Depression    Knee pain, right    Injury of costal cartilage    Dyslipidemia    LUQ pain    Dental caries    Mixed hyperlipidemia due to " type 2 diabetes mellitus (HCC)    Long term (current) use of oral hypoglycemic drugs    Hyperosmolar hyperglycemic state (HHS) (HCC)    Right foot pain        Surgical History:  Past Surgical History:   Procedure Laterality Date    UMBILICAL HERNIA REPAIR N/A 4/15/2018    Procedure: UMBILICAL HERNIA REPAIR;  Surgeon: Karen Beasley M.D.;  Location: SURGERY [facility];  Service: General    IRRIGATION & DEBRIDEMENT ORTHO Left 12/10/2017    Procedure: IRRIGATION & DEBRIDEMENT ORTHO LEFT HAND;  Surgeon: Chicho Ramos M.D.;  Location: SURGERY [facility];  Service: Orthopedics    WOUND IRRIGATION & DEBRIDEMENT Right 5/1/2017    Procedure: IRRIGATION & DEBRIDEMENT GENERAL-Left HAND AND right  ANKLE;  Surgeon: Esau Dooley M.D.;  Location: SURGERY [facility];  Service:     WOUND IRRIGATION & DEBRIDEMENT Right 4/29/2017    Procedure: IRRIGATION & DEBRIDEMENT GENERAL;  Surgeon: Esau Dooley M.D.;  Location: SURGERY [facility];  Service:     INCISION AND DRAINAGE  4/7/2017    Procedure: INCISION AND DRAINAGE GENERAL;  Surgeon: Esau Dooley M.D.;  Location: SURGERY SAME DAY [facility];  Service:     UMBILICAL HERNIA REPAIR  3/10/2017    Procedure: UMBILICAL HERNIA REPAIR- INCISION AND DRAINAGE OF UMBILICAL WOUND AND MESH REMOVAL;  Surgeon: Esau Dooley M.D.;  Location: SURGERY SAME DAY [facility];  Service:     UMBILICAL HERNIA REPAIR N/A 11/6/2016    Procedure: UMBILICAL HERNIA REPAIR;  Surgeon: Easu Dooley M.D.;  Location: SURGERY [facility];  Service:     COLONOSCOPY WITH BIOPSY  11/26/2014    Performed by Solo Higginbotham M.D. at ENDOSCOPY [facility]    LIVE BY LAPAROSCOPY  2005    OTHER ORTHOPEDIC SURGERY  1997 or 1998    Left Wrist ORIF       Past Social Hx:   Social History     Tobacco Use    Smoking status: Never    Smokeless tobacco: Never   Vaping Use    Vaping status: Never Used   Substance Use Topics    Alcohol use: Yes     Comment: rare    Drug use:  "No          Problem list, medications, and allergies reviewed by myself today in Epic.     Objective:     BP (!) 148/82   Pulse (!) 102   Temp 36.4 °C (97.6 °F) (Temporal)   Resp 20   Ht 1.727 m (5' 8\")   Wt 93 kg (205 lb)   SpO2 97%   BMI 31.17 kg/m²     Physical Exam  Vitals and nursing note reviewed.   Constitutional:       General: He is not in acute distress.     Appearance: Normal appearance. He is normal weight. He is not ill-appearing, toxic-appearing or diaphoretic.   HENT:      Head: Normocephalic and atraumatic.      Right Ear: Tympanic membrane, ear canal and external ear normal. There is no impacted cerumen.      Left Ear: Tympanic membrane, ear canal and external ear normal. There is no impacted cerumen.      Nose: Congestion present. No rhinorrhea.      Mouth/Throat:      Mouth: Mucous membranes are moist.      Pharynx: Oropharynx is clear. No oropharyngeal exudate or posterior oropharyngeal erythema.   Cardiovascular:      Rate and Rhythm: Normal rate and regular rhythm.      Pulses: Normal pulses.      Heart sounds: Normal heart sounds. No murmur heard.     No friction rub. No gallop.   Pulmonary:      Effort: Pulmonary effort is normal. No respiratory distress.      Breath sounds: Normal breath sounds. No stridor. No wheezing, rhonchi or rales.   Chest:      Chest wall: No tenderness.   Musculoskeletal:      Cervical back: Normal range of motion and neck supple. No tenderness.   Lymphadenopathy:      Cervical: No cervical adenopathy.   Skin:     General: Skin is warm and dry.      Capillary Refill: Capillary refill takes less than 2 seconds.   Neurological:      General: No focal deficit present.      Mental Status: He is alert and oriented to person, place, and time. Mental status is at baseline.   Psychiatric:         Mood and Affect: Mood normal.         Behavior: Behavior normal.         Thought Content: Thought content normal.         Judgment: Judgment normal.         Assessment/Plan: "     Diagnosis and associated orders:   1. Acute bacterial sinusitis  - amoxicillin-clavulanate (AUGMENTIN) 875-125 MG Tab; Take 1 Tablet by mouth 2 times a day for 7 days.  Dispense: 14 Tablet; Refill: 0        Comments/MDM:   Pt is clinically stable at today's acute urgent care visit.  No acute distress noted. Appropriate for outpatient management at this time.     Assessment & Plan  1. Sinus infection.  Reports symptoms of cough, sinus drainage, ear congestion, and yellow phlegm for the past 2 weeks, worsening after returning from [place]. Physical examination reveals clear lung sounds. Advised to continue antihistamines and Flonase; prescribed Augmentin for 1 week.            Discussed DDx, management options (risks,benefits, and alternatives to planned treatment), natural progression and supportive care.  Expressed understanding and the treatment plan was agreed upon. Questions were encouraged and answered   Return to urgent care prn if new or worsening sx or if there is no improvement in condition prn.    Educated in Red flags and indications to immediately call 911 or present to the Emergency Department.   Advised the patient to follow-up with the primary care physician for recheck, reevaluation, and consideration of further management.    I personally reviewed prior external notes and test results pertinent to today's visit.  I have independently reviewed and interpreted all diagnostics ordered during this urgent care acute visit.         Please note that this dictation was created using voice recognition software. I have made a reasonable attempt to correct obvious errors, but I expect that there are errors of grammar and possibly content that I did not discover before finalizing the note.    This note was electronically signed by DEMETRIO Adrian

## 2025-06-22 NOTE — ED NOTES
[id number].   Occupational Therapy    Visit Type: treatment  SUBJECTIVE  \"I have to use the bathroom really bad\"    Pain   Patient denies pain.    OBJECTIVE     Cognitive Status   Orientation    - Oriented to: appropriate for developmental age  Functional Communication   - Overall Communication Status: within functional limits   - Forms of Communication: verbal  Attention Span    - Attention: intact  Following Direction   - follows all commands and directions consistently  Transition Between Tasks   - transitions without difficulty  Memory   - appears intact  Safety Awareness/Insight   - intact  Awareness of Deficits   - fully aware of deficits      Sitting Balance  (ARAM = base of support)  Static      - Trial 1 details: independent  Dynamic      - Trial 1 details: supervision    Standing Balance  (ARAM = base of support)  Firm Surface: Double Leg      - Static, Eyes Open       - Trial 1 details: supervision and with double UE support     - Dynamic, Eyes Open       - Trial 1 details: stand by assist and with double UE support       Bed Mobility  - Supine to sit: independent  Transfers  Assistive devices: gait belt, 2-wheeled walker  Description: BUE use  - Sit to stand: stand by assist  - Stand to sit: stand by assist      Functional Ambulation  - Assistance: stand by assist  - Assistive device: gait belt and 2-wheeled walker  - Distance (ft):20; 25  - Surface: even  Patient ambulated with ww/gb from EOB to bathroom with SBA, no overt LOB noted. SBA required for safety while turning and managing IV pole, and from bathroom to recliner chair.  Activities of Daily Living (ADLs)  Grooming/Oral Hygiene:   - Grooming assist: supervision  - Position: standing at sink  - Assist needed for: steadying and supervision/safety  Toileting:   - Toilet transfer:        - Assist: stand by assist       - Device: gait belt and 2-wheeled walker       - Equipment: grab bar use  - Assist: supervision  - Position: sitting  Pt completing toileting task  with distant supervision for safety. Increased time needed for completion of task per Pt request. Pt able to complete own hygiene cares with supervision.  Interventions    Treatment provided: balance retraining, compensatory techniques, transfer training, functional ambulation, activity tolerance, ADL training, bed mobility training and safety training  Skilled input: as detailed above         Education:   - Present and ready to learn: patient    ASSESSMENT   Progress: progressing toward goals    Discharge needs based on today's assessment:  - Current level of function: slightly below baseline level of function  - Therapy needs at discharge: therapy 5 or more times per week  - Activities of daily living (ADLs) requiring support at discharge: transfers, ambulation, dressing, grooming, bathing and toileting  - Impairments that require further therapy intervention: strength, activity tolerance, balance and safety awareness  AM-PAC  - Prior Level of Function: IND/MOD I ([facility] 22-24)       Key: MOD A=moderate assistance, IND/MOD I=independent/modified independent  - Generalized Current Level of Function     - Current Self-Cares: 20       Scoring Key= >21 Modified Independent; 20-21 Supervision; 18-19 Minimal assist; 13-17 Moderate assist; 9-12 Max assist; <9 Total assist      PLAN (while hospitalized)  Suggestions for next session as indicated: Progress dynamic balance, progress standing ADLs    OT Frequency: 3-5 x per week      PT/OT Mobility Equipment for Discharge: tbd     Agreement to plan and goals: patient agrees with goals and treatment plan      GOALS  Review Date: 6/24/2025  Long Term Goals: (to be met by time of discharge from hospital)  Grooming: Patient will complete grooming tasks in standing modified independent.  Upper body dressing: Patient will complete upper body dressing in sitting independent.  Lower body dressing: Patient will complete lower body dressing in sitting and in standing modified  independent.  Toileting: Patient will complete toileting modified independent.  Bathing: Patient will complete bathing standing at sink and sitting at sinkmodified independent Toilet transfer: Patient will complete toilet transfer with least restrictive device, modified independent.   Home setting transfer: Patient will complete home setting transfers with least restrictive device, modified independent.     Documented in the chart in the following areas: Assessment/Plan.    Patient at End of Session:   Location: in chair  Safety measures: alarm system in place/re-engaged, lines intact and call light within reach  Handoff to: nurse      Therapy procedure time and total treatment time can be found documented on the Time Entry flowsheet

## (undated) DEVICE — BANDAGE ROLL STERILE BULKEE 4.5 IN X 4 YD (100EA/CA)

## (undated) DEVICE — GAUZE PACKING STRIP STERILE IODOFORM 1/2 IN X 5 YDS

## (undated) DEVICE — CATHETER IV 20 GA X 1-1/4 ---SURG.& SDS ONLY--- (50EA/BX)

## (undated) DEVICE — ELECTRODE 850 FOAM ADHESIVE - HYDROGEL RADIOTRNSPRNT (50/PK)

## (undated) DEVICE — DRESSING ABDOMINAL PAD STERILE 8 X 10" (360EA/CA)"

## (undated) DEVICE — CHLORAPREP 26 ML APPLICATOR - ORANGE TINT(25/CA)

## (undated) DEVICE — KIT ANESTHESIA W/CIRCUIT & 3/LT BAG W/FILTER (20EA/CA)

## (undated) DEVICE — TRAY MULTI-LUMEN 7FR PRESSURE W/MAX BARRIER AND BIOPATCH - (5/CA)

## (undated) DEVICE — LACTATED RINGERS INJ 1000 ML - (14EA/CA 60CA/PF)

## (undated) DEVICE — SUCTION INSTRUMENT YANKAUER BULBOUS TIP W/O VENT (50EA/CA)

## (undated) DEVICE — KIT ROOM DECONTAMINATION

## (undated) DEVICE — TUBE, CULTURE AEROBIC

## (undated) DEVICE — CANISTER SUCTION RIGID RED 1500CC (40EA/CA)

## (undated) DEVICE — GLOVE BIOGEL SZ 7 SURGICAL PF LTX - (50PR/BX 4BX/CA)

## (undated) DEVICE — PACK MINOR BASIN - (2EA/CA)

## (undated) DEVICE — MASK ANESTHESIA ADULT  - (100/CA)

## (undated) DEVICE — SENSOR SPO2 NEO LNCS ADHESIVE (20/BX) SEE USER NOTES

## (undated) DEVICE — SHEET PEDIATRIC LAPAROTOMY - (10/CA)

## (undated) DEVICE — GOWN WARMING STANDARD FLEX - (30/CA)

## (undated) DEVICE — MASK, LARYNGEAL AIRWAY #4

## (undated) DEVICE — GUARD SPLASH FOR PULSEVAC (12EA/PK)

## (undated) DEVICE — LEAD SET 6 DISP. EKG NIHON KOHDEN (100EA/CA)

## (undated) DEVICE — GLOVE BIOGEL INDICATOR SZ 7.5 SURGICAL PF LTX - (50PR/BX 4BX/CA)

## (undated) DEVICE — KIT  I.V. START (100EA/CA)

## (undated) DEVICE — SUTURE GENERAL

## (undated) DEVICE — NEPTUNE 4 PORT MANIFOLD - (20/PK)

## (undated) DEVICE — TUBING CLEARLINK DUO-VENT - C-FLO (48EA/CA)

## (undated) DEVICE — SPONGE GAUZESTER. 2X2 4-PL - (2/PK 50PK/BX 30BX/CS)

## (undated) DEVICE — TUBE E-T HI-LO CUFF 7.0MM (10EA/PK)

## (undated) DEVICE — PROTECTOR ULNA NERVE - (36PR/CA)

## (undated) DEVICE — PAD LAP STERILE 18 X 18 - (5/PK 40PK/CA)

## (undated) DEVICE — GLOVE BIOGEL ECLIPSE  PF LATEX SIZE 6.5 (50PR/BX)

## (undated) DEVICE — BLADE SURGICAL #11 - (50/BX)

## (undated) DEVICE — PACK LOWER EXTREMITY - (2/CA)

## (undated) DEVICE — SET EXTENSION WITH 2 PORTS (48EA/CA) ***PART #2C8610 IS A SUBSTITUTE*****

## (undated) DEVICE — GVL 3 STAT DISPOSABLE - (10/BX)

## (undated) DEVICE — SWAB ANAEROBIC SPEC.COLLECTOR - (25/PK 4PK/CA 100EA/CA)

## (undated) DEVICE — HEAD HOLDER JUNIOR/ADULT

## (undated) DEVICE — DRAPE LAPAROTOMY T SHEET - (12EA/CA)

## (undated) DEVICE — SUTURE 4-0 VICRYL PLUS FS-2 - 27 INCH (36/BX)

## (undated) DEVICE — GLOVE BIOGEL PI INDICATOR SZ 8.0 SURGICAL PF LF -(50/BX 4BX/CA)

## (undated) DEVICE — ELECTRODE DUAL RETURN W/ CORD - (50/PK)

## (undated) DEVICE — DRESSING TRANSPARENT FILM TEGADERM 4 X 4.75" (50EA/BX)"

## (undated) DEVICE — GLOVE BIOGEL SZ 7.5 SURGICAL PF LTX - (50PR/BX 4BX/CA)

## (undated) DEVICE — TIP INTPLS HFLO ML ORFC BTRY - (12/CS)  FOR SURGILAV

## (undated) DEVICE — STRIP PACKING STERILE 1/4 IN - 1/4 IN X 5 YDS (IODOFORM)

## (undated) DEVICE — GLOVE SZ 8 BIOGEL PI MICRO - PF LF (50PR/BX)

## (undated) DEVICE — GOWN SURGEONS X-LARGE - DISP. (30/CA)

## (undated) DEVICE — GLOVE BIOGEL SZ 6.5 SURGICAL PF LTX (50PR/BX 4BX/CA)

## (undated) DEVICE — WATER IRRIGATION STERILE 1000ML (12EA/CA)

## (undated) DEVICE — LACTATED RINGERS INJ. 500 ML - (24EA/CA)

## (undated) DEVICE — BLADE SURGICAL CLIPPER - (50EA/CA)

## (undated) DEVICE — SET IRRIGATION CYSTOSCOPY TUBE L80 IN (20EA/CA)

## (undated) DEVICE — GAUZE FLUFF STERILE 2-PLY 36 X 36 (100EA/CA)

## (undated) DEVICE — TOURNIQUET CUFF 18 X 3 ONE PORT DISP - STERILE (10/BX)

## (undated) DEVICE — BANDAGE ELASTIC 4 HONEYCOMB - 4"X5YD LF (20/CA)"

## (undated) DEVICE — GAUZE PACKING STRIP STERILE IODOFORM 1 IN X 5 YDS

## (undated) DEVICE — SODIUM CHL. IRRIGATION 0.9% 3000ML (4EA/CA 65CA/PF)

## (undated) DEVICE — SUTURE 3-0 ETHILON FS-1 - (36/BX) 30 INCH

## (undated) DEVICE — TUBE CONNECTING SUCTION - CLEAR PLASTIC STERILE 72 IN (50EA/CA)

## (undated) DEVICE — TRAY SKIN SCRUB PVP WET (20EA/CA) PART #DYND70356 DISCONTINUED

## (undated) DEVICE — SUTURE 0 ETHIBOND CT-1 - (12/BX) 18 INCH

## (undated) DEVICE — SPONGE XRAY 8X4 STERL. 12PL - (10EA/TY 80TY/CA)

## (undated) DEVICE — SUTURE 3-0 VICRYL PLUS SH - 8X 18 INCH (12/BX)

## (undated) DEVICE — HANDPIECE 10FT INTPLS SCT PLS IRRIGATION HAND CONTROL SET (6/PK)

## (undated) DEVICE — SODIUM CHL IRRIGATION 0.9% 1000ML (12EA/CA)

## (undated) DEVICE — CANISTER SUCTION 3000ML MECHANICAL FILTER AUTO SHUTOFF MEDI-VAC NONSTERILE LF DISP  (40EA/CA)

## (undated) DEVICE — GLOVE BIOGEL INDICATOR SZ 6.5 SURGICAL PF LTX - (50PR/BX 4BX/CA)

## (undated) DEVICE — BALL COTTON STERILE 5/PK - (5/PK 25PK/CA)

## (undated) DEVICE — SUTURE 4-0 MONOCRYL PLUS PS-2 - 27 INCH (36/BX)

## (undated) DEVICE — BLADE SURGICAL #15 - (50/BX 3BX/CA)

## (undated) DEVICE — SUTURE 4-0 VICRYL PLUSFS-1 - 27 INCH (36/BX)

## (undated) DEVICE — GOWN SURGEONS LARGE - (32/CA)

## (undated) DEVICE — SUTURE 0 ETHIBOND MO6 C/R - (12/BX) 8-18 INCH ETHICON

## (undated) DEVICE — CONTAINER SPECIMEN BAG OR - STERILE 4 OZ W/LID (100EA/CA)

## (undated) DEVICE — SET LEADWIRE 5 LEAD BEDSIDE DISPOSABLE ECG (1SET OF 5/EA)

## (undated) DEVICE — BOVIE BLADE COATED - (50/PK)

## (undated) DEVICE — LEAD SET 6 DISP. EKG NIHON KOHDEN

## (undated) DEVICE — LEAD SET 6 DISP. EKG NIHON KOHDEN (100EA/CA) [9859].

## (undated) DEVICE — SPONGE GAUZESTER 4 X 4 4PLY - (128PK/CA)

## (undated) DEVICE — CONTAINER, SPECIMEN, STERILE

## (undated) DEVICE — CLOSURE SKIN STRIP 1/2 X 4 IN - (STERI STRIP) (50/BX 4BX/CA)

## (undated) DEVICE — SLEEVE, VASO, THIGH, MED

## (undated) DEVICE — TRANSDUCER ADULT DISP. SINGLE BONDED STERILE - (20EA/CA)

## (undated) DEVICE — PADDING CAST 4 IN STERILE - 4 X 4 YDS (24/CA)

## (undated) DEVICE — BANDAGE ELASTIC 6 HONEYCOMB - 6X5YD LF (20/CA)"